# Patient Record
Sex: FEMALE | Race: BLACK OR AFRICAN AMERICAN | NOT HISPANIC OR LATINO | Employment: OTHER | URBAN - METROPOLITAN AREA
[De-identification: names, ages, dates, MRNs, and addresses within clinical notes are randomized per-mention and may not be internally consistent; named-entity substitution may affect disease eponyms.]

---

## 2017-01-03 ENCOUNTER — GENERIC CONVERSION - ENCOUNTER (OUTPATIENT)
Dept: OTHER | Facility: OTHER | Age: 82
End: 2017-01-03

## 2017-01-23 ENCOUNTER — GENERIC CONVERSION - ENCOUNTER (OUTPATIENT)
Dept: OTHER | Facility: OTHER | Age: 82
End: 2017-01-23

## 2017-02-09 ENCOUNTER — GENERIC CONVERSION - ENCOUNTER (OUTPATIENT)
Dept: OTHER | Facility: OTHER | Age: 82
End: 2017-02-09

## 2017-02-24 ENCOUNTER — ALLSCRIPTS OFFICE VISIT (OUTPATIENT)
Dept: OTHER | Facility: OTHER | Age: 82
End: 2017-02-24

## 2017-02-24 LAB
BILIRUB UR QL STRIP: NEGATIVE
CLARITY UR: NORMAL
COLOR UR: YELLOW
GLUCOSE (HISTORICAL): NEGATIVE
HGB UR QL STRIP.AUTO: NEGATIVE
KETONES UR STRIP-MCNC: NEGATIVE MG/DL
LEUKOCYTE ESTERASE UR QL STRIP: NORMAL
NITRITE UR QL STRIP: NEGATIVE
PH UR STRIP.AUTO: 5.5 [PH]
PROT UR STRIP-MCNC: NEGATIVE MG/DL
SP GR UR STRIP.AUTO: 1.02
UROBILINOGEN UR QL STRIP.AUTO: 0.2

## 2017-02-27 LAB
CULTURE RESULT (HISTORICAL): NORMAL
MISCELLANEOUS LAB TEST RESULT (HISTORICAL): NORMAL

## 2017-02-28 ENCOUNTER — ALLSCRIPTS OFFICE VISIT (OUTPATIENT)
Dept: OTHER | Facility: OTHER | Age: 82
End: 2017-02-28

## 2017-03-12 ENCOUNTER — HOSPITAL ENCOUNTER (EMERGENCY)
Facility: HOSPITAL | Age: 82
Discharge: HOME | End: 2017-03-13
Attending: EMERGENCY MEDICINE | Admitting: EMERGENCY MEDICINE
Payer: COMMERCIAL

## 2017-03-12 ENCOUNTER — APPOINTMENT (EMERGENCY)
Dept: CT IMAGING | Facility: HOSPITAL | Age: 82
End: 2017-03-12
Payer: COMMERCIAL

## 2017-03-12 VITALS
OXYGEN SATURATION: 94 % | RESPIRATION RATE: 18 BRPM | TEMPERATURE: 98.2 F | BODY MASS INDEX: 31.18 KG/M2 | HEART RATE: 64 BPM | SYSTOLIC BLOOD PRESSURE: 136 MMHG | WEIGHT: 176 LBS | DIASTOLIC BLOOD PRESSURE: 57 MMHG

## 2017-03-12 DIAGNOSIS — R07.81 RIB PAIN ON LEFT SIDE: Primary | ICD-10-CM

## 2017-03-12 DIAGNOSIS — R07.89 CHEST WALL PAIN: ICD-10-CM

## 2017-03-12 LAB
ALBUMIN SERPL BCP-MCNC: 3.3 G/DL (ref 3.5–5)
ALP SERPL-CCNC: 85 U/L (ref 46–116)
ALT SERPL W P-5'-P-CCNC: 15 U/L (ref 12–78)
ANION GAP SERPL CALCULATED.3IONS-SCNC: 9 MMOL/L (ref 4–13)
AST SERPL W P-5'-P-CCNC: 17 U/L (ref 5–45)
BACTERIA UR QL AUTO: ABNORMAL /HPF
BASOPHILS # BLD AUTO: 0.05 THOUSANDS/ΜL (ref 0–0.1)
BASOPHILS NFR BLD AUTO: 1 % (ref 0–1)
BILIRUB SERPL-MCNC: 0.34 MG/DL (ref 0.2–1)
BILIRUB UR QL STRIP: NEGATIVE
BUN SERPL-MCNC: 16 MG/DL (ref 5–25)
CALCIUM SERPL-MCNC: 8.9 MG/DL (ref 8.3–10.1)
CHLORIDE SERPL-SCNC: 105 MMOL/L (ref 100–108)
CLARITY UR: CLEAR
CO2 SERPL-SCNC: 25 MMOL/L (ref 21–32)
COLOR UR: YELLOW
CREAT SERPL-MCNC: 0.89 MG/DL (ref 0.6–1.3)
EOSINOPHIL # BLD AUTO: 0.14 THOUSAND/ΜL (ref 0–0.61)
EOSINOPHIL NFR BLD AUTO: 2 % (ref 0–6)
ERYTHROCYTE [DISTWIDTH] IN BLOOD BY AUTOMATED COUNT: 14.1 % (ref 11.6–15.1)
GFR SERPL CREATININE-BSD FRML MDRD: >60 ML/MIN/1.73SQ M
GLUCOSE SERPL-MCNC: 127 MG/DL (ref 65–140)
GLUCOSE UR STRIP-MCNC: NEGATIVE MG/DL
HCT VFR BLD AUTO: 44 % (ref 34.8–46.1)
HGB BLD-MCNC: 14.8 G/DL (ref 11.5–15.4)
HGB UR QL STRIP.AUTO: ABNORMAL
KETONES UR STRIP-MCNC: NEGATIVE MG/DL
LEUKOCYTE ESTERASE UR QL STRIP: NEGATIVE
LYMPHOCYTES # BLD AUTO: 1.18 THOUSANDS/ΜL (ref 0.6–4.47)
LYMPHOCYTES NFR BLD AUTO: 18 % (ref 14–44)
MCH RBC QN AUTO: 31 PG (ref 26.8–34.3)
MCHC RBC AUTO-ENTMCNC: 33.6 G/DL (ref 31.4–37.4)
MCV RBC AUTO: 92 FL (ref 82–98)
MONOCYTES # BLD AUTO: 0.49 THOUSAND/ΜL (ref 0.17–1.22)
MONOCYTES NFR BLD AUTO: 7 % (ref 4–12)
NEUTROPHILS # BLD AUTO: 4.78 THOUSANDS/ΜL (ref 1.85–7.62)
NEUTS SEG NFR BLD AUTO: 72 % (ref 43–75)
NITRITE UR QL STRIP: NEGATIVE
NON-SQ EPI CELLS URNS QL MICRO: ABNORMAL /HPF
NRBC BLD AUTO-RTO: 0 /100 WBCS
PH UR STRIP.AUTO: 6.5 [PH] (ref 4.5–8)
PLATELET # BLD AUTO: 176 THOUSANDS/UL (ref 149–390)
PMV BLD AUTO: 10.8 FL (ref 8.9–12.7)
POTASSIUM SERPL-SCNC: 4.2 MMOL/L (ref 3.5–5.3)
PROT SERPL-MCNC: 7.1 G/DL (ref 6.4–8.2)
PROT UR STRIP-MCNC: NEGATIVE MG/DL
RBC # BLD AUTO: 4.78 MILLION/UL (ref 3.81–5.12)
RBC #/AREA URNS AUTO: ABNORMAL /HPF
SODIUM SERPL-SCNC: 139 MMOL/L (ref 136–145)
SP GR UR STRIP.AUTO: 1.01 (ref 1–1.03)
TROPONIN I SERPL-MCNC: 0.03 NG/ML
UROBILINOGEN UR QL STRIP.AUTO: 0.2 E.U./DL
WBC # BLD AUTO: 6.64 THOUSAND/UL (ref 4.31–10.16)
WBC #/AREA URNS AUTO: ABNORMAL /HPF

## 2017-03-12 PROCEDURE — 81002 URINALYSIS NONAUTO W/O SCOPE: CPT | Performed by: EMERGENCY MEDICINE

## 2017-03-12 PROCEDURE — 93005 ELECTROCARDIOGRAM TRACING: CPT | Performed by: EMERGENCY MEDICINE

## 2017-03-12 PROCEDURE — 85025 COMPLETE CBC W/AUTO DIFF WBC: CPT | Performed by: EMERGENCY MEDICINE

## 2017-03-12 PROCEDURE — 36415 COLL VENOUS BLD VENIPUNCTURE: CPT | Performed by: EMERGENCY MEDICINE

## 2017-03-12 PROCEDURE — 71275 CT ANGIOGRAPHY CHEST: CPT

## 2017-03-12 PROCEDURE — 74175 CTA ABDOMEN W/CONTRAST: CPT

## 2017-03-12 PROCEDURE — 84484 ASSAY OF TROPONIN QUANT: CPT | Performed by: EMERGENCY MEDICINE

## 2017-03-12 PROCEDURE — 96361 HYDRATE IV INFUSION ADD-ON: CPT

## 2017-03-12 PROCEDURE — 87086 URINE CULTURE/COLONY COUNT: CPT

## 2017-03-12 PROCEDURE — 81001 URINALYSIS AUTO W/SCOPE: CPT

## 2017-03-12 PROCEDURE — 96374 THER/PROPH/DIAG INJ IV PUSH: CPT

## 2017-03-12 PROCEDURE — 80053 COMPREHEN METABOLIC PANEL: CPT | Performed by: EMERGENCY MEDICINE

## 2017-03-12 RX ORDER — MORPHINE SULFATE 2 MG/ML
2 INJECTION, SOLUTION INTRAMUSCULAR; INTRAVENOUS ONCE
Status: COMPLETED | OUTPATIENT
Start: 2017-03-12 | End: 2017-03-12

## 2017-03-12 RX ORDER — LIDOCAINE 50 MG/G
1 PATCH TOPICAL EVERY 24 HOURS
Qty: 30 PATCH | Refills: 0 | Status: SHIPPED | OUTPATIENT
Start: 2017-03-12 | End: 2017-03-25 | Stop reason: ALTCHOICE

## 2017-03-12 RX ORDER — LIDOCAINE 50 MG/G
1 PATCH TOPICAL ONCE
Status: DISCONTINUED | OUTPATIENT
Start: 2017-03-13 | End: 2017-03-13 | Stop reason: HOSPADM

## 2017-03-12 RX ADMIN — SODIUM CHLORIDE 1000 ML: 0.9 INJECTION, SOLUTION INTRAVENOUS at 21:43

## 2017-03-12 RX ADMIN — MORPHINE SULFATE 2 MG: 2 INJECTION, SOLUTION INTRAMUSCULAR; INTRAVENOUS at 21:43

## 2017-03-12 RX ADMIN — LIDOCAINE 1 PATCH: 50 PATCH CUTANEOUS at 23:51

## 2017-03-12 RX ADMIN — IOHEXOL 100 ML: 350 INJECTION, SOLUTION INTRAVENOUS at 23:21

## 2017-03-13 PROCEDURE — 99284 EMERGENCY DEPT VISIT MOD MDM: CPT

## 2017-03-14 LAB
ATRIAL RATE: 62 BPM
P AXIS: 29 DEGREES
PR INTERVAL: 202 MS
QRS AXIS: -14 DEGREES
QRSD INTERVAL: 102 MS
QT INTERVAL: 410 MS
QTC INTERVAL: 416 MS
T WAVE AXIS: -19 DEGREES
VENTRICULAR RATE: 62 BPM

## 2017-03-15 LAB — BACTERIA UR CULT: NORMAL

## 2017-03-24 ENCOUNTER — DOCTOR'S OFFICE (OUTPATIENT)
Dept: URBAN - METROPOLITAN AREA CLINIC 136 | Facility: CLINIC | Age: 82
Setting detail: OPHTHALMOLOGY
End: 2017-03-24
Payer: COMMERCIAL

## 2017-03-24 DIAGNOSIS — G44.1: ICD-10-CM

## 2017-03-24 DIAGNOSIS — H40.10X3: ICD-10-CM

## 2017-03-24 PROCEDURE — 92012 INTRM OPH EXAM EST PATIENT: CPT | Performed by: OPHTHALMOLOGY

## 2017-03-24 ASSESSMENT — REFRACTION_OUTSIDERX
OD_VA2: 20/25-
OD_ADD: +2.50
OS_VA3: 20/
OS_AXIS: 085
OD_VA3: 20/
OS_SPHERE: -0.25
OD_SPHERE: -1.25
OS_ADD: +2.50
OS_VA2: 20/
OD_AXIS: 080
OS_VA1: 20/25-1
OD_CYLINDER: -2.75
OU_VA: 20/25
OD_VA1: 20/20
OS_CYLINDER: -2.75

## 2017-03-24 ASSESSMENT — REFRACTION_CURRENTRX
OS_OVR_VA: 20/
OS_OVR_VA: 20/
OD_OVR_VA: 20/
OS_OVR_VA: 20/
OD_OVR_VA: 20/
OD_OVR_VA: 20/

## 2017-03-24 ASSESSMENT — REFRACTION_MANIFEST
OD_VA1: 20/20
OD_CYLINDER: -3.25
OD_VA3: 20/
OU_VA: 20/
OS_VA3: 20/
OS_VA2: 20/
OU_VA: 20/
OS_VA2: 20/
OD_VA2: 20/
OS_SPHERE: PLANO
OD_SPHERE: -1.00
OS_VA3: 20/
OS_CYLINDER: -3.50
OD_VA3: 20/
OD_AXIS: 080
OD_VA2: 20/
OS_VA1: 20/
OS_VA1: 20/30
OS_AXIS: 085
OD_VA1: 20/

## 2017-03-24 ASSESSMENT — LID POSITION - DERMATOCHALASIS
OS_DERMATOCHALASIS: LUL 1+
OD_DERMATOCHALASIS: RUL 1+

## 2017-03-24 ASSESSMENT — VISUAL ACUITY
OD_BCVA: 20/100-
OS_BCVA: 20/50-2

## 2017-03-24 ASSESSMENT — LID EXAM ASSESSMENTS
OS_BLEPHARITIS: LLL LUL 1+
OD_BLEPHARITIS: RLL RUL 1+

## 2017-03-24 ASSESSMENT — SPHEQUIV_DERIVED
OD_SPHEQUIV: -2.625
OD_SPHEQUIV: -3.5
OS_SPHEQUIV: -2.125

## 2017-03-24 ASSESSMENT — REFRACTION_AUTOREFRACTION
OS_SPHERE: +0.25
OS_CYLINDER: -4.75
OS_AXIS: 81
OD_CYLINDER: -4.50
OD_AXIS: 80
OD_SPHERE: -1.25

## 2017-03-24 ASSESSMENT — PUNCTA - ASSESSMENT: OS_PUNCTA: SIL PLUG LLL

## 2017-03-24 ASSESSMENT — CONFRONTATIONAL VISUAL FIELD TEST (CVF): OD_FINDINGS: FULL

## 2017-03-25 ENCOUNTER — APPOINTMENT (EMERGENCY)
Dept: CT IMAGING | Facility: HOSPITAL | Age: 82
End: 2017-03-25
Payer: COMMERCIAL

## 2017-03-25 ENCOUNTER — HOSPITAL ENCOUNTER (EMERGENCY)
Facility: HOSPITAL | Age: 82
Discharge: HOME | End: 2017-03-25
Attending: EMERGENCY MEDICINE | Admitting: EMERGENCY MEDICINE
Payer: COMMERCIAL

## 2017-03-25 ENCOUNTER — APPOINTMENT (EMERGENCY)
Dept: RADIOLOGY | Facility: HOSPITAL | Age: 82
End: 2017-03-25
Payer: COMMERCIAL

## 2017-03-25 VITALS
RESPIRATION RATE: 18 BRPM | DIASTOLIC BLOOD PRESSURE: 66 MMHG | SYSTOLIC BLOOD PRESSURE: 119 MMHG | OXYGEN SATURATION: 95 % | HEART RATE: 54 BPM | BODY MASS INDEX: 30.82 KG/M2 | TEMPERATURE: 97.4 F | WEIGHT: 174 LBS

## 2017-03-25 DIAGNOSIS — M54.2 NECK PAIN: Primary | ICD-10-CM

## 2017-03-25 DIAGNOSIS — R10.9 NONSPECIFIC ABDOMINAL PAIN: ICD-10-CM

## 2017-03-25 LAB
ALBUMIN SERPL BCP-MCNC: 3.3 G/DL (ref 3.5–5)
ALP SERPL-CCNC: 88 U/L (ref 46–116)
ALT SERPL W P-5'-P-CCNC: 14 U/L (ref 12–78)
ANION GAP SERPL CALCULATED.3IONS-SCNC: 10 MMOL/L (ref 4–13)
APTT PPP: 34 SECONDS (ref 24–36)
AST SERPL W P-5'-P-CCNC: 13 U/L (ref 5–45)
ATRIAL RATE: 60 BPM
BASOPHILS # BLD AUTO: 0.05 THOUSANDS/ΜL (ref 0–0.1)
BASOPHILS NFR BLD AUTO: 1 % (ref 0–1)
BILIRUB SERPL-MCNC: 0.34 MG/DL (ref 0.2–1)
BILIRUB UR QL STRIP: NEGATIVE
BUN SERPL-MCNC: 15 MG/DL (ref 5–25)
CALCIUM SERPL-MCNC: 9 MG/DL (ref 8.3–10.1)
CHLORIDE SERPL-SCNC: 105 MMOL/L (ref 100–108)
CLARITY UR: CLEAR
CO2 SERPL-SCNC: 27 MMOL/L (ref 21–32)
COLOR UR: YELLOW
COLOR, POC: YELLOW
CREAT SERPL-MCNC: 0.87 MG/DL (ref 0.6–1.3)
EOSINOPHIL # BLD AUTO: 0.13 THOUSAND/ΜL (ref 0–0.61)
EOSINOPHIL NFR BLD AUTO: 2 % (ref 0–6)
ERYTHROCYTE [DISTWIDTH] IN BLOOD BY AUTOMATED COUNT: 13.9 % (ref 11.6–15.1)
GFR SERPL CREATININE-BSD FRML MDRD: >60 ML/MIN/1.73SQ M
GLUCOSE SERPL-MCNC: 113 MG/DL (ref 65–140)
GLUCOSE UR STRIP-MCNC: NEGATIVE MG/DL
HCT VFR BLD AUTO: 42.4 % (ref 34.8–46.1)
HGB BLD-MCNC: 14.6 G/DL (ref 11.5–15.4)
HGB UR QL STRIP.AUTO: NEGATIVE
INR PPP: 1.08 (ref 0.86–1.16)
KETONES UR STRIP-MCNC: NEGATIVE MG/DL
LEUKOCYTE ESTERASE UR QL STRIP: NEGATIVE
LYMPHOCYTES # BLD AUTO: 1.03 THOUSANDS/ΜL (ref 0.6–4.47)
LYMPHOCYTES NFR BLD AUTO: 17 % (ref 14–44)
MCH RBC QN AUTO: 31.6 PG (ref 26.8–34.3)
MCHC RBC AUTO-ENTMCNC: 34.4 G/DL (ref 31.4–37.4)
MCV RBC AUTO: 92 FL (ref 82–98)
MONOCYTES # BLD AUTO: 0.41 THOUSAND/ΜL (ref 0.17–1.22)
MONOCYTES NFR BLD AUTO: 7 % (ref 4–12)
NEUTROPHILS # BLD AUTO: 4.54 THOUSANDS/ΜL (ref 1.85–7.62)
NEUTS SEG NFR BLD AUTO: 73 % (ref 43–75)
NITRITE UR QL STRIP: NEGATIVE
NRBC BLD AUTO-RTO: 0 /100 WBCS
P AXIS: 135 DEGREES
PH UR STRIP.AUTO: 6.5 [PH] (ref 4.5–8)
PLATELET # BLD AUTO: 204 THOUSANDS/UL (ref 149–390)
PMV BLD AUTO: 10.5 FL (ref 8.9–12.7)
POTASSIUM SERPL-SCNC: 4.1 MMOL/L (ref 3.5–5.3)
PR INTERVAL: 216 MS
PROT SERPL-MCNC: 6.9 G/DL (ref 6.4–8.2)
PROT UR STRIP-MCNC: NEGATIVE MG/DL
PROTHROMBIN TIME: 14 SECONDS (ref 12–14.3)
QRS AXIS: -9 DEGREES
QRSD INTERVAL: 102 MS
QT INTERVAL: 412 MS
QTC INTERVAL: 412 MS
RBC # BLD AUTO: 4.62 MILLION/UL (ref 3.81–5.12)
SODIUM SERPL-SCNC: 142 MMOL/L (ref 136–145)
SP GR UR STRIP.AUTO: 1.01 (ref 1–1.03)
SPECIMEN SOURCE: NORMAL
T WAVE AXIS: 196 DEGREES
TROPONIN I BLD-MCNC: 0.01 NG/ML (ref 0–0.08)
UROBILINOGEN UR QL STRIP.AUTO: 0.2 E.U./DL
VENTRICULAR RATE: 60 BPM
WBC # BLD AUTO: 6.16 THOUSAND/UL (ref 4.31–10.16)

## 2017-03-25 PROCEDURE — 96361 HYDRATE IV INFUSION ADD-ON: CPT

## 2017-03-25 PROCEDURE — 96360 HYDRATION IV INFUSION INIT: CPT

## 2017-03-25 PROCEDURE — 71020 HB CHEST X-RAY 2VW FRONTAL&LATL: CPT

## 2017-03-25 PROCEDURE — 70450 CT HEAD/BRAIN W/O DYE: CPT

## 2017-03-25 PROCEDURE — 84484 ASSAY OF TROPONIN QUANT: CPT

## 2017-03-25 PROCEDURE — 36415 COLL VENOUS BLD VENIPUNCTURE: CPT | Performed by: EMERGENCY MEDICINE

## 2017-03-25 PROCEDURE — 93005 ELECTROCARDIOGRAM TRACING: CPT | Performed by: EMERGENCY MEDICINE

## 2017-03-25 PROCEDURE — 85610 PROTHROMBIN TIME: CPT | Performed by: EMERGENCY MEDICINE

## 2017-03-25 PROCEDURE — 80053 COMPREHEN METABOLIC PANEL: CPT | Performed by: EMERGENCY MEDICINE

## 2017-03-25 PROCEDURE — 81003 URINALYSIS AUTO W/O SCOPE: CPT

## 2017-03-25 PROCEDURE — 85025 COMPLETE CBC W/AUTO DIFF WBC: CPT | Performed by: EMERGENCY MEDICINE

## 2017-03-25 PROCEDURE — 74176 CT ABD & PELVIS W/O CONTRAST: CPT

## 2017-03-25 PROCEDURE — 85730 THROMBOPLASTIN TIME PARTIAL: CPT | Performed by: EMERGENCY MEDICINE

## 2017-03-25 PROCEDURE — 99285 EMERGENCY DEPT VISIT HI MDM: CPT

## 2017-03-25 PROCEDURE — 81002 URINALYSIS NONAUTO W/O SCOPE: CPT | Performed by: EMERGENCY MEDICINE

## 2017-03-25 RX ORDER — CYCLOBENZAPRINE HCL 5 MG
5 TABLET ORAL 2 TIMES DAILY PRN
Qty: 10 TABLET | Refills: 0 | Status: SHIPPED | OUTPATIENT
Start: 2017-03-25 | End: 2017-04-21

## 2017-03-25 RX ORDER — ACETAMINOPHEN 325 MG/1
650 TABLET ORAL ONCE
Status: COMPLETED | OUTPATIENT
Start: 2017-03-25 | End: 2017-03-25

## 2017-03-25 RX ORDER — CYCLOBENZAPRINE HCL 5 MG
5 TABLET ORAL 2 TIMES DAILY PRN
Qty: 10 TABLET | Refills: 0 | Status: SHIPPED | OUTPATIENT
Start: 2017-03-25 | End: 2017-03-25

## 2017-03-25 RX ADMIN — SODIUM CHLORIDE 500 ML: 0.9 INJECTION, SOLUTION INTRAVENOUS at 12:29

## 2017-03-25 RX ADMIN — ACETAMINOPHEN 650 MG: 325 TABLET, FILM COATED ORAL at 12:26

## 2017-04-07 ENCOUNTER — GENERIC CONVERSION - ENCOUNTER (OUTPATIENT)
Dept: OTHER | Facility: OTHER | Age: 82
End: 2017-04-07

## 2017-04-21 ENCOUNTER — GENERIC CONVERSION - ENCOUNTER (OUTPATIENT)
Dept: OTHER | Facility: OTHER | Age: 82
End: 2017-04-21

## 2017-04-21 ENCOUNTER — HOSPITAL ENCOUNTER (INPATIENT)
Facility: HOSPITAL | Age: 82
LOS: 1 days | Discharge: HOME | DRG: 313 | End: 2017-04-22
Attending: EMERGENCY MEDICINE | Admitting: INTERNAL MEDICINE
Payer: COMMERCIAL

## 2017-04-21 ENCOUNTER — APPOINTMENT (EMERGENCY)
Dept: RADIOLOGY | Facility: HOSPITAL | Age: 82
DRG: 313 | End: 2017-04-21
Payer: COMMERCIAL

## 2017-04-21 DIAGNOSIS — I35.0 AORTIC STENOSIS: ICD-10-CM

## 2017-04-21 DIAGNOSIS — R07.9 CHEST PAIN, RULE OUT ACUTE MYOCARDIAL INFARCTION: Primary | ICD-10-CM

## 2017-04-21 DIAGNOSIS — M79.602 PAIN OF LEFT UPPER EXTREMITY: ICD-10-CM

## 2017-04-21 LAB
ANION GAP SERPL CALCULATED.3IONS-SCNC: 7 MMOL/L (ref 4–13)
ATRIAL RATE: 64 BPM
BASOPHILS # BLD AUTO: 0.03 THOUSANDS/ΜL (ref 0–0.1)
BASOPHILS NFR BLD AUTO: 1 % (ref 0–1)
BUN SERPL-MCNC: 16 MG/DL (ref 5–25)
CALCIUM SERPL-MCNC: 8.5 MG/DL (ref 8.3–10.1)
CHLORIDE SERPL-SCNC: 110 MMOL/L (ref 100–108)
CO2 SERPL-SCNC: 28 MMOL/L (ref 21–32)
CREAT SERPL-MCNC: 0.88 MG/DL (ref 0.6–1.3)
EOSINOPHIL # BLD AUTO: 0.22 THOUSAND/ΜL (ref 0–0.61)
EOSINOPHIL NFR BLD AUTO: 5 % (ref 0–6)
ERYTHROCYTE [DISTWIDTH] IN BLOOD BY AUTOMATED COUNT: 13.8 % (ref 11.6–15.1)
GFR SERPL CREATININE-BSD FRML MDRD: >60 ML/MIN/1.73SQ M
GLUCOSE SERPL-MCNC: 105 MG/DL (ref 65–140)
GLUCOSE SERPL-MCNC: 107 MG/DL (ref 65–140)
GLUCOSE SERPL-MCNC: 123 MG/DL (ref 65–140)
GLUCOSE SERPL-MCNC: 155 MG/DL (ref 65–140)
HCT VFR BLD AUTO: 43 % (ref 34.8–46.1)
HGB BLD-MCNC: 14.3 G/DL (ref 11.5–15.4)
LYMPHOCYTES # BLD AUTO: 1.13 THOUSANDS/ΜL (ref 0.6–4.47)
LYMPHOCYTES NFR BLD AUTO: 24 % (ref 14–44)
MCH RBC QN AUTO: 30.7 PG (ref 26.8–34.3)
MCHC RBC AUTO-ENTMCNC: 33.3 G/DL (ref 31.4–37.4)
MCV RBC AUTO: 92 FL (ref 82–98)
MONOCYTES # BLD AUTO: 0.49 THOUSAND/ΜL (ref 0.17–1.22)
MONOCYTES NFR BLD AUTO: 10 % (ref 4–12)
NEUTROPHILS # BLD AUTO: 2.83 THOUSANDS/ΜL (ref 1.85–7.62)
NEUTS SEG NFR BLD AUTO: 60 % (ref 43–75)
P AXIS: 42 DEGREES
PLATELET # BLD AUTO: 183 THOUSANDS/UL (ref 149–390)
PLATELET # BLD AUTO: 193 THOUSANDS/UL (ref 149–390)
PMV BLD AUTO: 10.3 FL (ref 8.9–12.7)
PMV BLD AUTO: 10.8 FL (ref 8.9–12.7)
POTASSIUM SERPL-SCNC: 4.3 MMOL/L (ref 3.5–5.3)
PR INTERVAL: 222 MS
QRS AXIS: 20 DEGREES
QRSD INTERVAL: 104 MS
QT INTERVAL: 396 MS
QTC INTERVAL: 408 MS
RBC # BLD AUTO: 4.66 MILLION/UL (ref 3.81–5.12)
SODIUM SERPL-SCNC: 145 MMOL/L (ref 136–145)
SPECIMEN SOURCE: NORMAL
T WAVE AXIS: 84 DEGREES
TROPONIN I BLD-MCNC: 0.01 NG/ML (ref 0–0.08)
TROPONIN I SERPL-MCNC: <0.02 NG/ML
TROPONIN I SERPL-MCNC: <0.02 NG/ML
VENTRICULAR RATE: 64 BPM
WBC # BLD AUTO: 4.7 THOUSAND/UL (ref 4.31–10.16)

## 2017-04-21 PROCEDURE — 85049 AUTOMATED PLATELET COUNT: CPT | Performed by: PHYSICIAN ASSISTANT

## 2017-04-21 PROCEDURE — 84484 ASSAY OF TROPONIN QUANT: CPT | Performed by: PHYSICIAN ASSISTANT

## 2017-04-21 PROCEDURE — 82948 REAGENT STRIP/BLOOD GLUCOSE: CPT

## 2017-04-21 PROCEDURE — 36415 COLL VENOUS BLD VENIPUNCTURE: CPT | Performed by: EMERGENCY MEDICINE

## 2017-04-21 PROCEDURE — 93005 ELECTROCARDIOGRAM TRACING: CPT | Performed by: EMERGENCY MEDICINE

## 2017-04-21 PROCEDURE — 80048 BASIC METABOLIC PNL TOTAL CA: CPT | Performed by: EMERGENCY MEDICINE

## 2017-04-21 PROCEDURE — 71010 HB CHEST X-RAY 1 VIEW FRONTAL (PORTABLE): CPT

## 2017-04-21 PROCEDURE — 85025 COMPLETE CBC W/AUTO DIFF WBC: CPT | Performed by: EMERGENCY MEDICINE

## 2017-04-21 PROCEDURE — 99285 EMERGENCY DEPT VISIT HI MDM: CPT

## 2017-04-21 PROCEDURE — 84484 ASSAY OF TROPONIN QUANT: CPT

## 2017-04-21 RX ORDER — LATANOPROST 50 UG/ML
1 SOLUTION/ DROPS OPHTHALMIC
Status: DISCONTINUED | OUTPATIENT
Start: 2017-04-21 | End: 2017-04-22 | Stop reason: HOSPADM

## 2017-04-21 RX ORDER — ONDANSETRON 2 MG/ML
4 INJECTION INTRAMUSCULAR; INTRAVENOUS EVERY 6 HOURS PRN
Status: DISCONTINUED | OUTPATIENT
Start: 2017-04-21 | End: 2017-04-22 | Stop reason: HOSPADM

## 2017-04-21 RX ORDER — ENALAPRIL MALEATE 10 MG/1
5 TABLET ORAL DAILY
Status: DISCONTINUED | OUTPATIENT
Start: 2017-04-21 | End: 2017-04-22 | Stop reason: HOSPADM

## 2017-04-21 RX ORDER — ACETAMINOPHEN 325 MG/1
650 TABLET ORAL EVERY 4 HOURS PRN
Status: DISCONTINUED | OUTPATIENT
Start: 2017-04-21 | End: 2017-04-22 | Stop reason: HOSPADM

## 2017-04-21 RX ORDER — AMLODIPINE BESYLATE 10 MG/1
10 TABLET ORAL DAILY
Status: DISCONTINUED | OUTPATIENT
Start: 2017-04-21 | End: 2017-04-22 | Stop reason: HOSPADM

## 2017-04-21 RX ORDER — HEPARIN SODIUM 5000 [USP'U]/ML
5000 INJECTION, SOLUTION INTRAVENOUS; SUBCUTANEOUS EVERY 8 HOURS SCHEDULED
Status: DISCONTINUED | OUTPATIENT
Start: 2017-04-21 | End: 2017-04-22 | Stop reason: HOSPADM

## 2017-04-21 RX ORDER — DORZOLAMIDE HCL 20 MG/ML
1 SOLUTION/ DROPS OPHTHALMIC 3 TIMES DAILY
Status: DISCONTINUED | OUTPATIENT
Start: 2017-04-21 | End: 2017-04-22 | Stop reason: HOSPADM

## 2017-04-21 RX ORDER — MAGNESIUM HYDROXIDE/ALUMINUM HYDROXICE/SIMETHICONE 120; 1200; 1200 MG/30ML; MG/30ML; MG/30ML
30 SUSPENSION ORAL EVERY 6 HOURS PRN
Status: DISCONTINUED | OUTPATIENT
Start: 2017-04-21 | End: 2017-04-22 | Stop reason: HOSPADM

## 2017-04-21 RX ORDER — ATORVASTATIN CALCIUM 40 MG/1
40 TABLET, FILM COATED ORAL EVERY EVENING
Status: DISCONTINUED | OUTPATIENT
Start: 2017-04-21 | End: 2017-04-22 | Stop reason: HOSPADM

## 2017-04-21 RX ORDER — GABAPENTIN 300 MG/1
300 CAPSULE ORAL
Status: DISCONTINUED | OUTPATIENT
Start: 2017-04-21 | End: 2017-04-22 | Stop reason: HOSPADM

## 2017-04-21 RX ORDER — LEVOTHYROXINE SODIUM 0.07 MG/1
75 TABLET ORAL
Status: DISCONTINUED | OUTPATIENT
Start: 2017-04-21 | End: 2017-04-22 | Stop reason: HOSPADM

## 2017-04-21 RX ORDER — ASPIRIN 81 MG/1
81 TABLET, CHEWABLE ORAL DAILY
Status: DISCONTINUED | OUTPATIENT
Start: 2017-04-21 | End: 2017-04-22 | Stop reason: HOSPADM

## 2017-04-21 RX ORDER — NITROGLYCERIN 0.4 MG/1
0.4 TABLET SUBLINGUAL
Status: DISCONTINUED | OUTPATIENT
Start: 2017-04-21 | End: 2017-04-22 | Stop reason: HOSPADM

## 2017-04-21 RX ADMIN — GABAPENTIN 300 MG: 300 CAPSULE ORAL at 22:08

## 2017-04-21 RX ADMIN — LATANOPROST 1 DROP: 50 SOLUTION OPHTHALMIC at 22:08

## 2017-04-21 RX ADMIN — ATORVASTATIN CALCIUM 40 MG: 40 TABLET, FILM COATED ORAL at 17:01

## 2017-04-21 RX ADMIN — ENALAPRIL MALEATE 5 MG: 10 TABLET ORAL at 14:21

## 2017-04-21 RX ADMIN — DORZOLAMIDE HYDROCHLORIDE 1 DROP: 20 SOLUTION/ DROPS OPHTHALMIC at 20:19

## 2017-04-21 RX ADMIN — AMLODIPINE BESYLATE 10 MG: 10 TABLET ORAL at 14:20

## 2017-04-21 RX ADMIN — DORZOLAMIDE HYDROCHLORIDE 1 DROP: 20 SOLUTION/ DROPS OPHTHALMIC at 17:01

## 2017-04-21 RX ADMIN — ASPIRIN 81 MG: 81 TABLET, CHEWABLE ORAL at 14:20

## 2017-04-21 RX ADMIN — HEPARIN SODIUM 5000 UNITS: 5000 INJECTION, SOLUTION INTRAVENOUS; SUBCUTANEOUS at 14:21

## 2017-04-21 RX ADMIN — LEVOTHYROXINE SODIUM 75 MCG: 75 TABLET ORAL at 17:01

## 2017-04-21 RX ADMIN — HEPARIN SODIUM 5000 UNITS: 5000 INJECTION, SOLUTION INTRAVENOUS; SUBCUTANEOUS at 22:08

## 2017-04-22 VITALS
HEIGHT: 61 IN | OXYGEN SATURATION: 92 % | HEART RATE: 64 BPM | RESPIRATION RATE: 18 BRPM | DIASTOLIC BLOOD PRESSURE: 56 MMHG | WEIGHT: 164.8 LBS | BODY MASS INDEX: 31.11 KG/M2 | SYSTOLIC BLOOD PRESSURE: 124 MMHG | TEMPERATURE: 95.4 F

## 2017-04-22 LAB
ANION GAP SERPL CALCULATED.3IONS-SCNC: 8 MMOL/L (ref 4–13)
BUN SERPL-MCNC: 18 MG/DL (ref 5–25)
CALCIUM SERPL-MCNC: 8.6 MG/DL (ref 8.3–10.1)
CHLORIDE SERPL-SCNC: 108 MMOL/L (ref 100–108)
CHOLEST SERPL-MCNC: 184 MG/DL (ref 50–200)
CO2 SERPL-SCNC: 26 MMOL/L (ref 21–32)
CREAT SERPL-MCNC: 0.89 MG/DL (ref 0.6–1.3)
EST. AVERAGE GLUCOSE BLD GHB EST-MCNC: 137 MG/DL
GFR SERPL CREATININE-BSD FRML MDRD: >60 ML/MIN/1.73SQ M
GLUCOSE SERPL-MCNC: 113 MG/DL (ref 65–140)
GLUCOSE SERPL-MCNC: 115 MG/DL (ref 65–140)
GLUCOSE SERPL-MCNC: 116 MG/DL (ref 65–140)
HBA1C MFR BLD: 6.4 % (ref 4.2–6.3)
HDLC SERPL-MCNC: 44 MG/DL (ref 40–60)
LDLC SERPL CALC-MCNC: 110 MG/DL (ref 0–100)
POTASSIUM SERPL-SCNC: 4.1 MMOL/L (ref 3.5–5.3)
SODIUM SERPL-SCNC: 142 MMOL/L (ref 136–145)
TRIGL SERPL-MCNC: 149 MG/DL
TSH SERPL DL<=0.05 MIU/L-ACNC: 1.88 UIU/ML (ref 0.36–3.74)

## 2017-04-22 PROCEDURE — 80048 BASIC METABOLIC PNL TOTAL CA: CPT | Performed by: PHYSICIAN ASSISTANT

## 2017-04-22 PROCEDURE — 84443 ASSAY THYROID STIM HORMONE: CPT | Performed by: PHYSICIAN ASSISTANT

## 2017-04-22 PROCEDURE — 83036 HEMOGLOBIN GLYCOSYLATED A1C: CPT | Performed by: PHYSICIAN ASSISTANT

## 2017-04-22 PROCEDURE — 80061 LIPID PANEL: CPT | Performed by: PHYSICIAN ASSISTANT

## 2017-04-22 PROCEDURE — 82948 REAGENT STRIP/BLOOD GLUCOSE: CPT

## 2017-04-22 RX ADMIN — ASPIRIN 81 MG: 81 TABLET, CHEWABLE ORAL at 08:23

## 2017-04-22 RX ADMIN — LEVOTHYROXINE SODIUM 75 MCG: 75 TABLET ORAL at 06:05

## 2017-04-22 RX ADMIN — AMLODIPINE BESYLATE 10 MG: 10 TABLET ORAL at 08:23

## 2017-04-22 RX ADMIN — DORZOLAMIDE HYDROCHLORIDE 1 DROP: 20 SOLUTION/ DROPS OPHTHALMIC at 08:25

## 2017-04-22 RX ADMIN — ENALAPRIL MALEATE 5 MG: 10 TABLET ORAL at 08:23

## 2017-04-22 RX ADMIN — HEPARIN SODIUM 5000 UNITS: 5000 INJECTION, SOLUTION INTRAVENOUS; SUBCUTANEOUS at 06:06

## 2017-04-25 ENCOUNTER — ALLSCRIPTS OFFICE VISIT (OUTPATIENT)
Dept: OTHER | Facility: OTHER | Age: 82
End: 2017-04-25

## 2017-05-05 ENCOUNTER — ALLSCRIPTS OFFICE VISIT (OUTPATIENT)
Dept: OTHER | Facility: OTHER | Age: 82
End: 2017-05-05

## 2017-05-22 ENCOUNTER — TRANSCRIBE ORDERS (OUTPATIENT)
Dept: ADMINISTRATIVE | Facility: HOSPITAL | Age: 82
End: 2017-05-22

## 2017-05-22 DIAGNOSIS — I25.10 ATHEROSCLEROSIS OF NATIVE CORONARY ARTERY WITHOUT ANGINA PECTORIS, UNSPECIFIED WHETHER NATIVE OR TRANSPLANTED HEART: ICD-10-CM

## 2017-05-22 DIAGNOSIS — R07.89 OTHER CHEST PAIN: ICD-10-CM

## 2017-05-22 DIAGNOSIS — I35.0 NODULAR CALCIFIC AORTIC VALVE STENOSIS: Primary | ICD-10-CM

## 2017-05-23 ENCOUNTER — ALLSCRIPTS OFFICE VISIT (OUTPATIENT)
Dept: OTHER | Facility: OTHER | Age: 82
End: 2017-05-23

## 2017-05-25 DIAGNOSIS — E11.9 TYPE 2 DIABETES MELLITUS WITHOUT COMPLICATIONS (HCC): ICD-10-CM

## 2017-05-25 DIAGNOSIS — E03.9 HYPOTHYROIDISM: ICD-10-CM

## 2017-05-26 ENCOUNTER — APPOINTMENT (OUTPATIENT)
Dept: LAB | Facility: CLINIC | Age: 82
End: 2017-05-26
Payer: COMMERCIAL

## 2017-05-26 ENCOUNTER — TRANSCRIBE ORDERS (OUTPATIENT)
Dept: LAB | Facility: CLINIC | Age: 82
End: 2017-05-26

## 2017-05-26 DIAGNOSIS — E11.9 TYPE 2 DIABETES MELLITUS WITHOUT COMPLICATIONS (HCC): ICD-10-CM

## 2017-05-26 DIAGNOSIS — E03.9 HYPOTHYROIDISM: ICD-10-CM

## 2017-05-26 LAB
CHOLEST SERPL-MCNC: 211 MG/DL (ref 50–200)
HDLC SERPL-MCNC: 45 MG/DL (ref 40–60)
LDLC SERPL CALC-MCNC: 135 MG/DL (ref 0–100)
TRIGL SERPL-MCNC: 155 MG/DL
TSH SERPL DL<=0.05 MIU/L-ACNC: 2.91 UIU/ML (ref 0.36–3.74)

## 2017-05-26 PROCEDURE — 84443 ASSAY THYROID STIM HORMONE: CPT

## 2017-05-26 PROCEDURE — 80061 LIPID PANEL: CPT

## 2017-05-26 PROCEDURE — 36415 COLL VENOUS BLD VENIPUNCTURE: CPT

## 2017-05-30 ENCOUNTER — APPOINTMENT (OUTPATIENT)
Dept: LAB | Facility: CLINIC | Age: 82
End: 2017-05-30
Payer: COMMERCIAL

## 2017-05-30 ENCOUNTER — GENERIC CONVERSION - ENCOUNTER (OUTPATIENT)
Dept: OTHER | Facility: OTHER | Age: 82
End: 2017-05-30

## 2017-05-30 DIAGNOSIS — E11.9 TYPE 2 DIABETES MELLITUS WITHOUT COMPLICATIONS (HCC): ICD-10-CM

## 2017-05-30 DIAGNOSIS — E03.9 HYPOTHYROIDISM: ICD-10-CM

## 2017-05-30 LAB
CREAT UR-MCNC: 72.6 MG/DL
MICROALBUMIN UR-MCNC: <5 MG/L (ref 0–20)
MICROALBUMIN/CREAT 24H UR: <7 MG/G CREATININE (ref 0–30)

## 2017-05-30 PROCEDURE — 82043 UR ALBUMIN QUANTITATIVE: CPT

## 2017-05-30 PROCEDURE — 82570 ASSAY OF URINE CREATININE: CPT

## 2017-06-06 ENCOUNTER — ALLSCRIPTS OFFICE VISIT (OUTPATIENT)
Dept: OTHER | Facility: OTHER | Age: 82
End: 2017-06-06

## 2017-06-13 ENCOUNTER — GENERIC CONVERSION - ENCOUNTER (OUTPATIENT)
Dept: OTHER | Facility: OTHER | Age: 82
End: 2017-06-13

## 2017-06-16 ENCOUNTER — DOCTOR'S OFFICE (OUTPATIENT)
Dept: URBAN - METROPOLITAN AREA CLINIC 136 | Facility: CLINIC | Age: 82
Setting detail: OPHTHALMOLOGY
End: 2017-06-16
Payer: COMMERCIAL

## 2017-06-16 DIAGNOSIS — H40.1132: ICD-10-CM

## 2017-06-16 DIAGNOSIS — G44.1: ICD-10-CM

## 2017-06-16 PROCEDURE — 92014 COMPRE OPH EXAM EST PT 1/>: CPT | Performed by: OPHTHALMOLOGY

## 2017-06-16 ASSESSMENT — LID POSITION - DERMATOCHALASIS
OD_DERMATOCHALASIS: RUL 1+
OS_DERMATOCHALASIS: LUL 1+

## 2017-06-16 ASSESSMENT — LID EXAM ASSESSMENTS
OS_BLEPHARITIS: LLL LUL 1+
OD_BLEPHARITIS: RLL RUL 1+

## 2017-06-16 ASSESSMENT — CONFRONTATIONAL VISUAL FIELD TEST (CVF): OD_FINDINGS: FULL

## 2017-06-16 ASSESSMENT — PUNCTA - ASSESSMENT: OS_PUNCTA: SIL PLUG LLL

## 2017-06-19 ASSESSMENT — REFRACTION_MANIFEST
OD_CYLINDER: -3.25
OD_VA2: 20/
OS_VA1: 20/
OD_VA3: 20/
OD_VA3: 20/
OS_VA2: 20/
OS_CYLINDER: -3.50
OU_VA: 20/
OD_VA1: 20/20
OS_VA2: 20/
OU_VA: 20/
OS_VA3: 20/
OS_AXIS: 085
OD_SPHERE: -1.00
OS_VA3: 20/
OD_VA1: 20/
OD_AXIS: 080
OS_VA1: 20/30
OS_SPHERE: PLANO
OD_VA2: 20/

## 2017-06-19 ASSESSMENT — SPHEQUIV_DERIVED
OS_SPHEQUIV: -2.125
OD_SPHEQUIV: -3.5
OD_SPHEQUIV: -2.625

## 2017-06-19 ASSESSMENT — REFRACTION_OUTSIDERX
OS_ADD: +2.50
OD_CYLINDER: -2.75
OD_VA1: 20/20
OD_ADD: +2.50
OS_VA1: 20/25-1
OS_SPHERE: -0.25
OD_VA3: 20/
OS_AXIS: 085
OS_CYLINDER: -2.75
OS_VA3: 20/
OD_VA2: 20/25-
OD_AXIS: 080
OS_VA2: 20/
OD_SPHERE: -1.25
OU_VA: 20/25

## 2017-06-19 ASSESSMENT — REFRACTION_AUTOREFRACTION
OS_SPHERE: +0.25
OD_CYLINDER: -4.50
OS_AXIS: 81
OD_AXIS: 80
OS_CYLINDER: -4.75
OD_SPHERE: -1.25

## 2017-06-19 ASSESSMENT — REFRACTION_CURRENTRX
OS_OVR_VA: 20/
OS_OVR_VA: 20/
OD_OVR_VA: 20/
OS_OVR_VA: 20/
OD_OVR_VA: 20/
OD_OVR_VA: 20/

## 2017-06-19 ASSESSMENT — VISUAL ACUITY
OD_BCVA: 20/60-2
OS_BCVA: 20/50-2

## 2017-07-13 ENCOUNTER — ALLSCRIPTS OFFICE VISIT (OUTPATIENT)
Dept: OTHER | Facility: OTHER | Age: 82
End: 2017-07-13

## 2017-07-13 ENCOUNTER — HOSPITAL ENCOUNTER (OUTPATIENT)
Dept: NON INVASIVE DIAGNOSTICS | Facility: CLINIC | Age: 82
Discharge: HOME | End: 2017-07-13
Payer: COMMERCIAL

## 2017-07-13 DIAGNOSIS — I35.0 NONRHEUMATIC AORTIC VALVE STENOSIS: ICD-10-CM

## 2017-07-13 PROCEDURE — 93306 TTE W/DOPPLER COMPLETE: CPT

## 2017-07-14 ENCOUNTER — DOCTOR'S OFFICE (OUTPATIENT)
Dept: URBAN - METROPOLITAN AREA CLINIC 136 | Facility: CLINIC | Age: 82
Setting detail: OPHTHALMOLOGY
End: 2017-07-14
Payer: COMMERCIAL

## 2017-07-14 DIAGNOSIS — Z79.84: ICD-10-CM

## 2017-07-14 DIAGNOSIS — H52.13: ICD-10-CM

## 2017-07-14 DIAGNOSIS — H40.1132: ICD-10-CM

## 2017-07-14 DIAGNOSIS — E11.9: ICD-10-CM

## 2017-07-14 PROCEDURE — 92012 INTRM OPH EXAM EST PATIENT: CPT | Performed by: OPHTHALMOLOGY

## 2017-07-14 ASSESSMENT — REFRACTION_MANIFEST
OD_CYLINDER: -3.25
OD_VA2: 20/
OU_VA: 20/
OS_VA2: 20/
OD_VA3: 20/
OS_VA3: 20/
OD_AXIS: 080
OS_SPHERE: PLANO
OS_AXIS: 085
OD_SPHERE: -1.00
OD_VA3: 20/
OS_VA1: 20/30
OD_VA1: 20/
OD_VA2: 20/
OU_VA: 20/
OD_VA1: 20/20
OS_CYLINDER: -3.50
OS_VA1: 20/
OS_VA3: 20/
OS_VA2: 20/

## 2017-07-14 ASSESSMENT — REFRACTION_AUTOREFRACTION
OD_CYLINDER: -4.50
OD_AXIS: 80
OS_SPHERE: +0.25
OS_CYLINDER: -4.75
OS_AXIS: 81
OD_SPHERE: -1.25

## 2017-07-14 ASSESSMENT — REFRACTION_OUTSIDERX
OD_AXIS: 080
OS_ADD: +2.50
OD_SPHERE: -1.25
OS_VA1: 20/25-1
OU_VA: 20/25
OS_CYLINDER: -2.75
OD_VA1: 20/20
OD_VA2: 20/25-
OS_SPHERE: -0.25
OD_CYLINDER: -2.75
OS_AXIS: 085
OD_VA3: 20/
OD_ADD: +2.50
OS_VA2: 20/
OS_VA3: 20/

## 2017-07-14 ASSESSMENT — LID POSITION - DERMATOCHALASIS
OS_DERMATOCHALASIS: LUL 1+
OD_DERMATOCHALASIS: RUL 1+

## 2017-07-14 ASSESSMENT — REFRACTION_CURRENTRX
OS_OVR_VA: 20/
OS_OVR_VA: 20/
OD_OVR_VA: 20/
OD_OVR_VA: 20/
OS_OVR_VA: 20/
OD_OVR_VA: 20/

## 2017-07-14 ASSESSMENT — SPHEQUIV_DERIVED
OD_SPHEQUIV: -2.625
OS_SPHEQUIV: -2.125
OD_SPHEQUIV: -3.5

## 2017-07-14 ASSESSMENT — PUNCTA - ASSESSMENT: OS_PUNCTA: SIL PLUG LLL

## 2017-07-14 ASSESSMENT — CONFRONTATIONAL VISUAL FIELD TEST (CVF): OD_FINDINGS: FULL

## 2017-07-14 ASSESSMENT — LID EXAM ASSESSMENTS
OS_BLEPHARITIS: LLL LUL 1+
OD_BLEPHARITIS: RLL RUL 1+

## 2017-07-14 ASSESSMENT — VISUAL ACUITY
OD_BCVA: 20/70-1
OS_BCVA: 20/60-2

## 2017-07-27 ENCOUNTER — ALLSCRIPTS OFFICE VISIT (OUTPATIENT)
Dept: OTHER | Facility: OTHER | Age: 82
End: 2017-07-27

## 2017-07-27 ENCOUNTER — TRANSCRIBE ORDERS (OUTPATIENT)
Dept: ADMINISTRATIVE | Facility: HOSPITAL | Age: 82
End: 2017-07-27

## 2017-07-27 DIAGNOSIS — K57.90 DIVERTICULOSIS OF INTESTINE WITHOUT PERFORATION OR ABSCESS WITHOUT BLEEDING: ICD-10-CM

## 2017-07-27 DIAGNOSIS — R10.12 ABDOMINAL PAIN, LEFT UPPER QUADRANT: Primary | ICD-10-CM

## 2017-07-27 DIAGNOSIS — R10.12 LEFT UPPER QUADRANT PAIN: ICD-10-CM

## 2017-07-27 DIAGNOSIS — R19.7 DIARRHEA OF PRESUMED INFECTIOUS ORIGIN: ICD-10-CM

## 2017-07-27 DIAGNOSIS — R53.1 WEAKNESS: ICD-10-CM

## 2017-07-27 DIAGNOSIS — R19.7 DIARRHEA: ICD-10-CM

## 2017-07-27 DIAGNOSIS — I20.0 UNSTABLE ANGINA PECTORIS (HCC): ICD-10-CM

## 2017-07-27 DIAGNOSIS — R11.0 NAUSEA: ICD-10-CM

## 2017-07-27 DIAGNOSIS — R11.0 NAUSEA ALONE: ICD-10-CM

## 2017-07-27 DIAGNOSIS — R10.9 ABDOMINAL PAIN: ICD-10-CM

## 2017-07-27 DIAGNOSIS — R07.89 OTHER CHEST PAIN: ICD-10-CM

## 2017-08-01 ENCOUNTER — HOSPITAL ENCOUNTER (OUTPATIENT)
Dept: CT IMAGING | Facility: HOSPITAL | Age: 82
Discharge: HOME | End: 2017-08-01
Payer: COMMERCIAL

## 2017-08-01 ENCOUNTER — GENERIC CONVERSION - ENCOUNTER (OUTPATIENT)
Dept: OTHER | Facility: OTHER | Age: 82
End: 2017-08-01

## 2017-08-01 ENCOUNTER — APPOINTMENT (OUTPATIENT)
Dept: LAB | Facility: HOSPITAL | Age: 82
End: 2017-08-01
Payer: COMMERCIAL

## 2017-08-01 DIAGNOSIS — R11.0 NAUSEA: ICD-10-CM

## 2017-08-01 DIAGNOSIS — R10.12 LEFT UPPER QUADRANT PAIN: ICD-10-CM

## 2017-08-01 DIAGNOSIS — R19.7 DIARRHEA: ICD-10-CM

## 2017-08-01 DIAGNOSIS — K57.90 DIVERTICULOSIS OF INTESTINE WITHOUT PERFORATION OR ABSCESS WITHOUT BLEEDING: ICD-10-CM

## 2017-08-01 DIAGNOSIS — R53.1 WEAKNESS: ICD-10-CM

## 2017-08-01 DIAGNOSIS — R10.9 ABDOMINAL PAIN: ICD-10-CM

## 2017-08-01 DIAGNOSIS — R07.89 OTHER CHEST PAIN: ICD-10-CM

## 2017-08-01 LAB
BUN SERPL-MCNC: 16 MG/DL (ref 5–25)
CREAT SERPL-MCNC: 0.86 MG/DL (ref 0.6–1.3)
GFR SERPL CREATININE-BSD FRML MDRD: 67 ML/MIN/1.73SQ M

## 2017-08-01 PROCEDURE — 36415 COLL VENOUS BLD VENIPUNCTURE: CPT

## 2017-08-01 PROCEDURE — 82565 ASSAY OF CREATININE: CPT

## 2017-08-01 PROCEDURE — 74177 CT ABD & PELVIS W/CONTRAST: CPT

## 2017-08-01 PROCEDURE — 71250 CT THORAX DX C-: CPT

## 2017-08-01 PROCEDURE — 84520 ASSAY OF UREA NITROGEN: CPT

## 2017-08-01 RX ADMIN — IOHEXOL 100 ML: 350 INJECTION, SOLUTION INTRAVENOUS at 16:32

## 2017-08-01 RX ADMIN — IOHEXOL 50 ML: 240 INJECTION, SOLUTION INTRATHECAL; INTRAVASCULAR; INTRAVENOUS; ORAL at 15:17

## 2017-08-04 ENCOUNTER — ALLSCRIPTS OFFICE VISIT (OUTPATIENT)
Dept: OTHER | Facility: OTHER | Age: 82
End: 2017-08-04

## 2017-08-12 ENCOUNTER — HOSPITAL ENCOUNTER (EMERGENCY)
Facility: HOSPITAL | Age: 82
Discharge: HOME | End: 2017-08-12
Attending: EMERGENCY MEDICINE | Admitting: EMERGENCY MEDICINE
Payer: COMMERCIAL

## 2017-08-12 VITALS
OXYGEN SATURATION: 97 % | SYSTOLIC BLOOD PRESSURE: 132 MMHG | RESPIRATION RATE: 18 BRPM | TEMPERATURE: 97.8 F | DIASTOLIC BLOOD PRESSURE: 92 MMHG | WEIGHT: 160 LBS | BODY MASS INDEX: 30.23 KG/M2 | HEART RATE: 67 BPM

## 2017-08-12 DIAGNOSIS — R53.1 WEAKNESS GENERALIZED: Primary | ICD-10-CM

## 2017-08-12 LAB
ALBUMIN SERPL BCP-MCNC: 3 G/DL (ref 3.5–5)
ALP SERPL-CCNC: 71 U/L (ref 46–116)
ALT SERPL W P-5'-P-CCNC: 20 U/L (ref 12–78)
ANION GAP SERPL CALCULATED.3IONS-SCNC: 8 MMOL/L (ref 4–13)
AST SERPL W P-5'-P-CCNC: 16 U/L (ref 5–45)
BACTERIA UR QL AUTO: ABNORMAL /HPF
BASOPHILS # BLD AUTO: 0.03 THOUSANDS/ΜL (ref 0–0.1)
BASOPHILS NFR BLD AUTO: 0 % (ref 0–1)
BILIRUB SERPL-MCNC: 0.35 MG/DL (ref 0.2–1)
BILIRUB UR QL STRIP: NEGATIVE
BUN SERPL-MCNC: 12 MG/DL (ref 5–25)
CALCIUM SERPL-MCNC: 8.9 MG/DL (ref 8.3–10.1)
CHLORIDE SERPL-SCNC: 106 MMOL/L (ref 100–108)
CLARITY UR: CLEAR
CO2 SERPL-SCNC: 25 MMOL/L (ref 21–32)
COLOR UR: YELLOW
CREAT SERPL-MCNC: 0.94 MG/DL (ref 0.6–1.3)
EOSINOPHIL # BLD AUTO: 0.31 THOUSAND/ΜL (ref 0–0.61)
EOSINOPHIL NFR BLD AUTO: 5 % (ref 0–6)
ERYTHROCYTE [DISTWIDTH] IN BLOOD BY AUTOMATED COUNT: 14.6 % (ref 11.6–15.1)
GFR SERPL CREATININE-BSD FRML MDRD: 60 ML/MIN/1.73SQ M
GLUCOSE SERPL-MCNC: 115 MG/DL (ref 65–140)
GLUCOSE UR STRIP-MCNC: NEGATIVE MG/DL
HCT VFR BLD AUTO: 40.7 % (ref 34.8–46.1)
HGB BLD-MCNC: 14.1 G/DL (ref 11.5–15.4)
HGB UR QL STRIP.AUTO: ABNORMAL
KETONES UR STRIP-MCNC: NEGATIVE MG/DL
LEUKOCYTE ESTERASE UR QL STRIP: ABNORMAL
LIPASE SERPL-CCNC: 119 U/L (ref 73–393)
LYMPHOCYTES # BLD AUTO: 1.05 THOUSANDS/ΜL (ref 0.6–4.47)
LYMPHOCYTES NFR BLD AUTO: 15 % (ref 14–44)
MCH RBC QN AUTO: 31.2 PG (ref 26.8–34.3)
MCHC RBC AUTO-ENTMCNC: 34.6 G/DL (ref 31.4–37.4)
MCV RBC AUTO: 90 FL (ref 82–98)
MONOCYTES # BLD AUTO: 0.64 THOUSAND/ΜL (ref 0.17–1.22)
MONOCYTES NFR BLD AUTO: 9 % (ref 4–12)
NEUTROPHILS # BLD AUTO: 4.89 THOUSANDS/ΜL (ref 1.85–7.62)
NEUTS SEG NFR BLD AUTO: 71 % (ref 43–75)
NITRITE UR QL STRIP: NEGATIVE
NON-SQ EPI CELLS URNS QL MICRO: ABNORMAL /HPF
NRBC BLD AUTO-RTO: 0 /100 WBCS
PH UR STRIP.AUTO: 5.5 [PH] (ref 4.5–8)
PLATELET # BLD AUTO: 177 THOUSANDS/UL (ref 149–390)
PMV BLD AUTO: 10.5 FL (ref 8.9–12.7)
POTASSIUM SERPL-SCNC: 4.5 MMOL/L (ref 3.5–5.3)
PROT SERPL-MCNC: 7.2 G/DL (ref 6.4–8.2)
PROT UR STRIP-MCNC: NEGATIVE MG/DL
RBC # BLD AUTO: 4.52 MILLION/UL (ref 3.81–5.12)
RBC #/AREA URNS AUTO: ABNORMAL /HPF
SODIUM SERPL-SCNC: 139 MMOL/L (ref 136–145)
SP GR UR STRIP.AUTO: 1.01 (ref 1–1.03)
UROBILINOGEN UR QL STRIP.AUTO: 0.2 E.U./DL
WBC # BLD AUTO: 6.92 THOUSAND/UL (ref 4.31–10.16)
WBC #/AREA URNS AUTO: ABNORMAL /HPF

## 2017-08-12 PROCEDURE — 93005 ELECTROCARDIOGRAM TRACING: CPT

## 2017-08-12 PROCEDURE — 85025 COMPLETE CBC W/AUTO DIFF WBC: CPT

## 2017-08-12 PROCEDURE — 99285 EMERGENCY DEPT VISIT HI MDM: CPT

## 2017-08-12 PROCEDURE — 83690 ASSAY OF LIPASE: CPT

## 2017-08-12 PROCEDURE — 36415 COLL VENOUS BLD VENIPUNCTURE: CPT

## 2017-08-12 PROCEDURE — 81002 URINALYSIS NONAUTO W/O SCOPE: CPT

## 2017-08-12 PROCEDURE — 81001 URINALYSIS AUTO W/SCOPE: CPT

## 2017-08-12 PROCEDURE — 80053 COMPREHEN METABOLIC PANEL: CPT

## 2017-09-13 ENCOUNTER — ALLSCRIPTS OFFICE VISIT (OUTPATIENT)
Dept: OTHER | Facility: OTHER | Age: 82
End: 2017-09-13

## 2017-10-03 NOTE — PROGRESS NOTES
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)    Plan  Cranial neuralgia    · Follow-up visit in 6 months Evaluation and Treatment  Follow-up  Status: Complete   Done: 03EEB4928  Ordered; For: Cranial neuralgia; Ordered By: Vanna Diaz  Performed:   Due:   82MTM7306; Last Updated By: Marie Naqvi; 9/13/2017 12:14:42 PM  Diabetic neuropathy, Headache, Hypertension    · *1 -  NEUROLOGY Physician Referral  Consult  Status: Canceled  Ordered; For: Diabetic neuropathy, Headache, Hypertension;  Ordered By: Nisreen Guzmán    Performed:   Due: 79AHO8993; Last Updated By: Ara Houston; 9/11/2017 4:25:14 PM  () Care Summary provided  : Yes  Sleep disturbance    · 1 - Alexandra Bowers DO  (Neurology) Co-Management  *  Status: Active  Requested for:  96Lcg5005  Ordered; For: Sleep disturbance;  Ordered By: Vanna Diaz  Performed:   Due:   28PPZ7934; Last Updated By: Marie Naqvi; 9/13/2017 12:14:42 PM  () Care Summary provided  : Yes    Discussion/Summary  Discussion Summary:   Chronic myofascial pain, cranial neuralgia, ?cervicogenic headaches:Continue gabapentin to 300 mg TIDDiscussed supplement/ headache prevention med Magnesium 250 mg daily, can increase to 500 mg if toleratedWill f/u with chiropractor, consider PT and/or acupuncture  patient will follow up in 6 months or earlier if needed  The patient was encouraged to call in the meantime with any questions or concerns  The patient was told to call 911 or report to the nearest ER with any new or worsening neurological symptoms  She and her daughter expressed understanding of the plan and they were appreciative  Counseling Documentation With Imm: The patient, patient's family was counseled regarding diagnostic results,-instructions for management,-prognosis,-patient and family education,-impressions  Patient's Capacity to Self-Care: Patient is able to Self-Care     Medication SE Review and Pt Understands Tx: Possible side effects of new medications were reviewed with the patient/guardian today  The treatment plan was reviewed with the patient/guardian  The patient/guardian understands and agrees with the treatment plan   Patient Guardian understands agrees: The treatment plan was reviewed with the patient/guardian  The patient/guardian understands and agrees with the treatment plan   Headache [facility]:   The patient was counseled regarding; There are no changes in medication management  Patient education was completed today and we also discussed precautions for rebound headaches  -   When patient has a moderate to severe headache, they should seek rest, initiate relaxation and apply cold compresses to the head  Also recommended to the patient :  1  Maintain regular sleep schedule  -2  Limit over the counter medications  (No more than 3 times a week)  -3  Maintain headache diary  -4  Limit caffeine to 1-2 cups a day or less  -5  Avoid dietary trigger  (list given to the patient and reviewed with them)  -6  Patient is to have regular frequent meals to prevent headache onset  Chief Complaint  Chief Complaint Free Text Note Form: follow up for headaches      History of Present Illness  HPI: Gonzalez Bledsoe is a very pleasant 79 yo female who presents for a headache evaluation  She first saw Dr Anne Farfan in consultation on 7/25/16  She is accompanied by her daughter today  pain improving with medication, but continues to have occasional pain in the regions: right temple, parietal, occi and this radiates into the neck at times  She takes Gabapentin 300 mg qHS (half of a 600 mg tab) wo side effects and states that it works fine for the head pain   She was told to take 600 mg qhs but it caused her to feel weird so she decreased back down to 300 mg qhs and states this works well   states the pain starts at the apex and radiates in a straight line down to the LEFT forehead (today) and directly above the left eye near the supra orbital notch  It is dull and achy, no sharp, electric, burning or ice pick like pain  Pain was 10/10 at worst, but currently is 5/10  The patient reports blurred vision, but this is 2/2 glaucoma and she sees her eye doctor regularly for this  She saw ophtho yesterday with normal pressure of the eyes  Denies any n/v, p/p, or any other associated neurological features  She does occasionally deal with imbalance, but no falls, no lightheadedness, no LOC  notes that she does not sleep well at all, about 5 hours at most  If she goes to bed and does get to sleep she will wake up at 3:00 and then is stuck awake  Bedtime is approx 11:30-12, leading up to that time she will wait and watch the news  Coffee in the morning and tea during the day  MRI wo contrast on 1/7/16 is unremarkable, and reveals no explanation for her left supraorbital pain  of systems, Past medical history, Surgical history, Family history, Social history and Medication history were all reviewed today  Review of Systems  Neurological ROS:   Constitutional: no fever, no chills, no recent weight gain, no recent weight loss, no complaints of feeling tired, no changes in appetite  HEENT: blurred vision-and-dryness of the eyes  Cardiovascular:  no chest pain or pressure, no palpitations present, the heart rate was not rapid or irregular, no swelling in the arms or legs, no poor circulation  Respiratory: shortness of breath with or without exertion  Gastrointestinal:  no nausea, no vomiting, no diarrhea, no abdominal pain, no changes in bowel habits, no melena, no loss of bowel control  Genitourinary:  no incontinence, no feelings of urinary urgency, no increase in frequency, no urinary hesitancy, no dysuria, no hematuria  Musculoskeletal: head/neck/back pain  Integumentary  no masses, no rash, no skin lesions, no livedo reticularis  Psychiatric: depression  Endocrine   no unusual weight loss or gain, no excessive urination, no excessive thirst, no hair loss or gain, no hot or cold intolerance, no menstrual period change or irregularity, no loss of sexual ability or drive, no erection difficulty, no nipple discharge  Hematologic/Lymphatic:  no unusual bleeding, no tendency for easy bruising, no clotting skin or lumps  Neurological General: trouble falling asleep  Neurological Mental Status:  no confusion, no mood swings, no alteration or loss of consciousness, no difficulty expressing/understanding speech, no memory problems  Neurological Cranial Nerves: vertigo or dizziness  Neurological Motor findings include:  no tremor, no twitching, no cramping(pre/post exercise), no atrophy  Neurological Coordination:  no unsteadiness, no vertigo or dizziness, no clumsiness, no problems reaching for objects  Neurological Sensory:  no numbness, no pain, no tingling, does not fall when eyes closed or taking a shower  Neurological Gait: difficulty walking  Active Problems  1  Abdominal pain, left upper quadrant (789 02) (R10 12)  2  Acute cystitis (595 0) (N30 00)  3  Advanced age  3  Allergic rhinitis (477 9) (J30 9)  5  Ambulatory dysfunction (719 7) (R26 2)  6  Anxiety disorder (300 00) (F41 9)  7  Aortic stenosis (424 1) (I35 0)  8  Arteriosclerosis of coronary artery (414 00) (I25 10)  9  Arthralgia (719 40) (M25 50)  10  Atypical chest pain (786 59) (R07 89)  11  Bilateral cold feet (782 9) (R20 9)  12  Bladder prolapse  13  Chronic ankle pain, unspecified laterality (777 97,545 35) (M25 579,G89 29)  14  Costochondritis (733 6) (M94 0)  15  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)  16  Diarrhea (787 91) (R19 7)  17  Diverticulitis (562 11) (K57 92)  18  Diverticulosis (562 10) (K57 90)  19  Dizziness (780 4) (R42)  20  Dyspnea on exertion (786 09) (R06 09)  21  Ear ringing sound (388 30) (H93 19)  22  Female pelvic pain (625 9) (R10 2)  23  Foot swelling (729 81) (M79 89)  24  Gastritis (535 50) (K29 70)  25   General weakness (780 79) (R53 1)  26  Glaucoma (365 9) (H40 9)  27  Glaucoma, open angle, 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,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)  28  Headache (784 0) (R51)  29  Hearing loss (389 9) (H91 90)  30  Hiatal hernia (553 3) (K44 9)  31  Hyperlipidemia (272 4) (E78 5)  32  Hypertension (401 9) (I10)  33  Hypothyroidism (244 9) (E03 9)  34  Insomnia (780 52) (G47 00)  35  Internal hemorrhoids (455 0) (K64 8)  36  Irritable bowel (564 1) (K58 9)  37  Left arm pain (729 5) (M79 602)  38  Loss of appetite (783 0) (R63 0)  39  Low back pain (724 2) (M54 5)  40  Mild cognitive impairment (331 83) (G31 84)  41  Muscle weakness (generalized) (728 87) (M62 81)  42  Myofacial muscle pain (729 1) (M79 1)  43  Nausea (787 02) (R11 0)  44  Neck pain (723 1) (M54 2)  45  Need for pneumococcal vaccine (V03 82) (Z23)  46  Osteoarthritis (715 90) (M19 90)  47  Overactive bladder (596 51) (N32 81)  48  Pelvic pressure in female (625 8) (R10 2)  49  Peripheral neuropathy (356 9) (G62 9)  50  Polypharmacy (V58 69) (Z79 899)  51  Reactive airway disease (493 90) (J45 909)  52  Shakiness (781 0) (R25 1)  53  Shortness of breath (786 05) (R06 02)  54  Skin lesion (709 9) (L98 9)  55  Swelling of lower extremity (729 81) (M79 89)  56  Trigeminal neuralgia (350 1) (G50 0)  57  Type 2 diabetes mellitus (250 00) (E11 9)  58  Type 2 diabetes mellitus, uncontrolled, with neuropathy (250 62,357 2) (E11 40,E11 65)  59  Urinary incontinence (788 30) (R32)  60  Visual hallucinations (368 16) (R44 1)    Past Medical History  1  History of Acute UTI (599 0) (N39 0)  2  History of Acute UTI (599 0) (N39 0)  3  History of Atypical angina (413 9) (I20 8)  4  History of Atypical pneumonia (486) (J18 9)  5  History of Candidal intertrigo (112 3) (B37 2)  6  History of Candidal UTI (urinary tract infection) (112 2) (B37 49)  7  History of Chest pressure (786 59) (R07 89)  8  History of Dizziness (780 4) (R42)  9  History of Dyspnea on exertion (786 09) (R06 09)  10   History of Facial pain (784 0) (R51)  11  History of abdominal pain (V13 89) (Z87 898)  12  History of acute otitis media (V12 49) (Z86 69)  13  History of blurred vision (V12 49) (Z86 69)  14  History of blurred vision (V12 49) (Z86 69)  15  History of chest pain (V13 89) (Z87 898)  16  History of constipation (V12 79) (Z87 19)  17  History of depression (V11 8) (Z86 59)  18  History of diabetic neuropathy (V12 29) (Z86 39)  19  History of diarrhea (V12 79) (Z87 898)  20  History of earache (V12 49) (Z86 69)  21  History of fever (V13 89) (Z87 898)  22  History of gastric ulcer (V12 79) (Z87 19)  23  History of headache (V13 89) (Z87 898)  24  History of left flank pain (V13 89) (Z87 898)  25  History of thyroid disease (V12 29) (Z86 39)  26  History of urinary frequency (V13 09) (Z87 898)  27  History of urinary tract infection (V13 02) (Z87 440)  28  History of Influenza vaccine needed (V04 81) (Z23)  29  History of Intertrigo (695 89) (L30 4)  30  History of Left arm numbness (782 0) (R20 0)  31  History of Left sided numbness (782 0) (R20 0)  32  History of Left-sided chest wall pain (786 52) (R07 89)  33  History of Left-sided chest wall pain (786 52) (R07 89)  34  History of Tachycardia (785 0) (R00 0)  35  History of Urinary hesitancy (788 64) (R39 11)  Active Problems And Past Medical History Reviewed: The active problems and past medical history were reviewed and updated today  Surgical History  1  History of Complete Colonoscopy  2  History of Esophagogastroduodenoscopy With Biopsy  3  History of Hysterectomy  4  History of Pulmonary Function Tests  Surgical History Reviewed: The surgical history was reviewed and updated today  Family History  Sister   1  Family history of Breast cancer  2  Family history of Hypertension  3  Family history of Thyroid disorder  Family History Reviewed: The family history was reviewed and updated today         Social History   · Caffeine use (V49 89) (F15 90)   · Lives with adult children   · Never a smoker   · No drug use   · Retired   · Social alcohol use (Z78 9)   · Two children   ·   Social History Reviewed: The social history was reviewed and updated today  The social history was reviewed and is unchanged  Current Meds  1  Accu-Chek Aline Plus In Vitro Strip; USE 1 STRIP DAILY; Therapy: 08ZIM4112 to (MNNEDGSW:42WUZ2242)  Requested for: 86FHS5490; Last   Rx:23Jun2017 Ordered  2  Accu-Chek Aline Plus w/Device Kit; testing QD; Therapy: 21LSH4542 to (Last PF:67NCT1328)  Requested for: 27Jun2017 Ordered  3  Accu-Chek Softclix Lancets Miscellaneous; TEST ONCE DAILY   DX: 250; Therapy: 01XRD5103 to (Last JM:49RXZ2636)  Requested for: 27Jun2017 Ordered  4  Amlodipine Besy-Benazepril HCl - 10-20 MG Oral Capsule; take 1 capsule by mouth   daily; Therapy: 87Nhd1905 to ([employer])  Requested for: 56Ift2462; Last   Rx:07Sep2017 Ordered  5  Aspirin 81 MG TABS; Therapy: ([phone]) to Recorded  6  Ciprofloxacin HCl - 500 MG Oral Tablet; Take 1 tablet twice daily; Therapy: 97Wuq9708 to (Last Rx:01Aug2017)  Requested for: 01Aug2017 Ordered  7  Dorzolamide HCl - 2 % Ophthalmic Solution; Therapy: 74OZE3067 to Recorded  8  Dorzolamide HCl - 2 % Ophthalmic Solution; Therapy: 62VXI0325 to (Evaluate:14Jun2015) Recorded  9  Gabapentin 600 MG Oral Tablet; Take 1/2 tab q8 hours; Therapy: 35VJK3862 to (566 324 313)  Requested for: 64RYV1975; Last   DY:82KHY5611 Ordered  10  Januvia 50 MG Oral Tablet; TAKE 1 TABLET DAILY; Therapy: 66WIV9270 to Recorded  11  Latanoprost 0 005 % Ophthalmic Solution; Therapy: 11FSH2465 to ([id number]) Recorded  12  Levothyroxine Sodium 75 MCG Oral Tablet; take 1 tablet every day; Therapy: 24ZFL0402 to (Evaluate:32Vkg5631)  Requested for: 01Sep2016; Last    Rx:01Sep2016 Ordered  13  Lidocaine 5 % External Ointment; APPLY TO AFFECTED AREAS AS DIRECTED;     Therapy: 74UXG0818 to (Last Rx:27Jul2017) Requested for: 04Kao0561 Ordered  14  LORazepam 0 5 MG Oral Tablet; TAKE 1/2 TO 1 TABLET TWICE DAILY AS NEEDED; Therapy: 08HNU2830 to Recorded  15  MetroNIDAZOLE 500 MG Oral Tablet; TAKE 1 TABLET 3 TIMES DAILY UNTIL GONE;    Therapy: 10Bad6620 to (Evaluate:77Fiz3036)  Requested for: 01Aug2017; Last    Rx:08Qyk7074 Ordered  16  Vitamin D3 2000 UNIT Oral Capsule; Take 1 tablet daily; Therapy: (Recorded:07Ach8235) to Recorded    Allergies  1  Statins    Vitals  Signs   Recorded: 13Sep2017 11:20AM   Heart Rate: 68  Systolic: 892  Diastolic: 84  Weight: 006 lb   BMI Calculated: 31 18  BSA Calculated: 1 74  Pain Scale: 2    Physical Exam  Neuro Exam: The patient was found to be awake and alert, with appropriate mental status and language function  She is very pleasant today  She appears younger than her stated age  She was oriented ×3 with intact attention  Cranial nerves II-XII were intact and symmetric bilaterally  Motor testing reveals 5/5 strength t/o bilateral upper and lower extremities with no drift  Coordination testing was unremarkable  Sensation was diminished to temperature and vibration in a length dependent fashion in the bilateral lower extremities  Romberg borderline  DTR's were 1+ and symmetric  Gait was wide based  Future Appointments    Date/Time Provider Specialty Site   03/14/2018 01:15 PM Mark Hillman, [facility] [address]     Signatures   Electronically signed by :  Pk Gold, [facility]; Sep 26 2017 10:22PM EST                       (Author)    Electronically signed by : Susy Hidalgo MD; Oct  2 2017  3:57PM EST                       (Author)    Electronically signed by : Susy Hidalgo MD; Oct  2 2017  3:57PM EST                       (Author)

## 2017-10-04 ENCOUNTER — ALLSCRIPTS OFFICE VISIT (OUTPATIENT)
Dept: OTHER | Facility: OTHER | Age: 82
End: 2017-10-04

## 2017-10-05 NOTE — PROGRESS NOTES
Assessment  1  Acute URI (465 9) (J06 9)   2  Cough (786 2) (R05)   3  Neck pain (723 1) (M54 2)   4  Lump in neck (784 2) (R22 1)   5  Hypothyroidism (244 9) (E03 9)   6  Headache (784 0) (R51)    Plan  Cough    · Benzonatate 200 MG Oral Capsule; Take one three times a day as needed for  cough  Health Maintenance    · *VB - Urinary Incontinence Screen (Dx Z13 89 Screen for UI); Status:Complete -  Retrospective By Protocol Authorization;   Done: 32LLN5021 10:57AM  Influenza vaccine needed    · Fluzone High-Dose 0 5 ML Intramuscular Suspension Prefilled Syringe    Discussion/Summary    1  URI: Advised patient on symptomatic treatment, cough suppressant was given, fluids and rest  If symptoms not better in 2 days patient to start taking azithromycin  It patient is not better to return to the office for further consideration  Palpable neck lump: From physical examination it feels like the right thyroid lobe, I can't rule out cervical lymphadenitis specially with the recent URI, patient to follow up in 4 weeks for her routine checkup and will do neck examination for that palpable lump  If still present then will consider thyroid/neck ultrasound  Hypothyroidism: Continue with the levothyroxine  Chronic headache: Patient has regular follow-up with Neurology  To continue with gabapentin  Possible side effects of new medications were reviewed with the patient/guardian today  The treatment plan was reviewed with the patient/guardian  The patient/guardian understands and agrees with the treatment plan     Self Referrals: No Neurology      Chief Complaint  c/o cold sx and neck pain x several days   kck      History of Present Illness  HPI: pt is c/o neck pain for the last few days getting worse feel pain abut no stiffness ,no headache more than her usual fever or chills but feel cold all the time, sore throat and feel a lump on her neck area cough that get worse lately , tried OTC meds with no help denied any change in her vision   feel alump on her neck for the last few days , no change in size, non tender, no difficulty swallowing      Review of Systems    Constitutional: feeling poorly-and-feeling tired, but-no fever-and-no chills  ENT: no ear ache, no loss of hearing, no nosebleeds or nasal discharge, no sore throat or hoarseness  Cardiovascular: no complaints of slow or fast heart rate, no chest pain, no palpitations, no leg claudication or lower extremity edema  Respiratory: cough, but-no complaints of shortness of breath, no wheezing, no dyspnea on exertion, no orthopnea or PND  Gastrointestinal: no complaints of abdominal pain, no constipation, no nausea or diarrhea, no vomiting, no bloody stools  Genitourinary: no complaints of dysuria, no incontinence, no pelvic pain, no dysmenorrhea, no vaginal discharge or abnormal vaginal bleeding  Musculoskeletal: arthralgias-and-myalgias, but-no joint swelling-and-no joint stiffness  Integumentary: no complaints of skin rash or lesion, no itching or dry skin, no skin wounds  Neurological: headache, but-no numbness,-no confusion-and-no dizziness  Preventive Quality 65 and Older: The patient currently has no urinary incontinence symptoms  Active Problems  1  Abdominal pain, left upper quadrant (789 02) (R10 12)   2  Advanced age   1  Allergic rhinitis (477 9) (J30 9)   4  Ambulatory dysfunction (719 7) (R26 2)   5  Anxiety disorder (300 00) (F41 9)   6  Aortic stenosis (424 1) (I35 0)   7  Arteriosclerosis of coronary artery (414 00) (I25 10)   8  Arthralgia (719 40) (M25 50)   9  Atypical chest pain (786 59) (R07 89)   10  Bilateral cold feet (782 9) (R20 9)   11  Bladder prolapse   12  Chronic ankle pain, unspecified laterality (045 07,960 15) (M25 579,G89 29)   13  Costochondritis (733 6) (M94 0)   14  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)   15  Diarrhea (787 91) (R19 7)   16  Diverticulosis (562 10) (K57 90)   17  Dizziness (780 4) (R42)   18   Dyspnea on exertion (786 09) (R06 09)   19  Ear ringing sound (388 30) (H93 19)   20  Female pelvic pain (625 9) (R10 2)   21  Foot swelling (729 81) (M79 89)   22  Gastritis (535 50) (K29 70)   23  General weakness (780 79) (R53 1)   24  Glaucoma (365 9) (H40 9)   25  Glaucoma, open angle, 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,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)   26  Headache (784 0) (R51)   27  Hearing loss (389 9) (H91 90)   28  Hiatal hernia (553 3) (K44 9)   29  Hyperlipidemia (272 4) (E78 5)   30  Hypertension (401 9) (I10)   31  Hypothyroidism (244 9) (E03 9)   32  Insomnia (780 52) (G47 00)   33  Internal hemorrhoids (455 0) (K64 8)   34  Irritable bowel (564 1) (K58 9)   35  Left arm pain (729 5) (M79 602)   36  Loss of appetite (783 0) (R63 0)   37  Low back pain (724 2) (M54 5)   38  Mild cognitive impairment (331 83) (G31 84)   39  Muscle weakness (generalized) (728 87) (M62 81)   40  Myofacial muscle pain (729 1) (M79 1)   41  Nausea (787 02) (R11 0)   42  Neck pain (723 1) (M54 2)   43  Need for pneumococcal vaccine (V03 82) (Z23)   44  Osteoarthritis (715 90) (M19 90)   45  Overactive bladder (596 51) (N32 81)   46  Pelvic pressure in female (625 8) (R10 2)   47  Peripheral neuropathy (356 9) (G62 9)   48  Polypharmacy (V58 69) (Z79 899)   49  Reactive airway disease (493 90) (J45 909)   50  Shakiness (781 0) (R25 1)   51  Shortness of breath (786 05) (R06 02)   52  Skin lesion (709 9) (L98 9)   53  Sleep disturbance (780 50) (G47 9)   54  Swelling of lower extremity (729 81) (M79 89)   55  Trigeminal neuralgia (350 1) (G50 0)   56  Type 2 diabetes mellitus (250 00) (E11 9)   57  Type 2 diabetes mellitus, uncontrolled, with neuropathy (250 62,357 2) (E11 40,E11 65)   58  Urinary incontinence (788 30) (R32)   59  Visual hallucinations (368 16) (R44 1)    Past Medical History  1  History of Acute UTI (599 0) (N39 0)   2  History of Acute UTI (599 0) (N39 0)   3  History of Atypical angina (413 9) (I20 8)   4   History of Atypical pneumonia (5) (J18 9)   5  History of Candidal intertrigo (112 3) (B37 2)   6  History of Candidal UTI (urinary tract infection) (112 2) (B37 49)   7  History of Chest pressure (786 59) (R07 89)   8  History of Dizziness (780 4) (R42)   9  History of Dyspnea on exertion (786 09) (R06 09)   10  History of Facial pain (784 0) (R51)   11  History of abdominal pain (V13 89) (Z87 898)   12  History of acute cystitis (V13 02) (Z87 440)   13  History of acute otitis media (V12 49) (Z86 69)   14  History of blurred vision (V12 49) (Z86 69)   15  History of blurred vision (V12 49) (Z86 69)   16  History of chest pain (V13 89) (Z87 898)   17  History of constipation (V12 79) (Z87 19)   18  History of depression (V11 8) (Z86 59)   19  History of diabetic neuropathy (V12 29) (Z86 39)   20  History of diarrhea (V12 79) (Z87 898)   21  History of diverticulitis (V12 70) (Z87 19)   22  History of earache (V12 49) (Z86 69)   23  History of fever (V13 89) (Z87 898)   24  History of gastric ulcer (V12 79) (Z87 19)   25  History of headache (V13 89) (Z87 898)   26  History of left flank pain (V13 89) (Z87 898)   27  History of thyroid disease (V12 29) (Z86 39)   28  History of urinary frequency (V13 09) (Z87 898)   29  History of urinary tract infection (V13 02) (Z87 440)   30  History of Influenza vaccine needed (V04 81) (Z23)   31  History of Intertrigo (695 89) (L30 4)   32  History of Left arm numbness (782 0) (R20 0)   33  History of Left sided numbness (782 0) (R20 0)   34  History of Left-sided chest wall pain (786 52) (R07 89)   35  History of Left-sided chest wall pain (786 52) (R07 89)   36  History of Tachycardia (785 0) (R00 0)   37  History of Urinary hesitancy (968 64) (R39 11)  Active Problems And Past Medical History Reviewed: The active problems and past medical history were reviewed and updated today  Family History  Sister    1  Family history of Breast cancer   2  Family history of Hypertension   3   Family history of Thyroid disorder  Family History Reviewed: The family history was reviewed and updated today  Social History   · Caffeine use (V49 89) (F15 90)   · Lives with adult children   · Never a smoker   · No drug use   · Retired   · Social alcohol use (Z78 9)   · Two children   ·   The social history was reviewed and updated today  The social history was reviewed and is unchanged  Surgical History  1  History of Complete Colonoscopy   2  History of Esophagogastroduodenoscopy With Biopsy   3  History of Hysterectomy   4  History of Pulmonary Function Tests  Surgical History Reviewed: The surgical history was reviewed and updated today  Current Meds   1  Accu-Chek Aline Plus In Vitro Strip; USE 1 STRIP DAILY; Therapy: 90BCG4566 [facility]  Requested for: 33WBZ3525; Last   Rx:12Nei9457 Ordered   2  Accu-Chek Aline Plus w/Device Kit; testing QD; Therapy: 89HRP0703 to (Last YL:68JJU8228)  Requested for: 27Jun2017 Ordered   3  Accu-Chek Softclix Lancets Miscellaneous; TEST ONCE DAILY   DX: 250; Therapy: 16ZVE1581 to (Last ZL:30EPZ7175)  Requested for: 93Cyd7328 Ordered   4  Amlodipine Besy-Benazepril HCl - 10-20 MG Oral Capsule; take 1 capsule by mouth   daily; Therapy: 57Uqd7230 to (Veto Chin)  Requested for: 79Hsw5333; Last   Rx:24Eyq5084 Ordered   5  Aspirin 81 MG TABS; Therapy: ([phone]) to Recorded   6  Dorzolamide HCl - 2 % Ophthalmic Solution; Therapy: 93PVY2458 to Recorded   7  Dorzolamide HCl - 2 % Ophthalmic Solution; Therapy: 73TYH9208 to (Evaluate:42Nzj0200) Recorded   8  Gabapentin 600 MG Oral Tablet; Take 1/2 tab q8 hours; Therapy: 17RKC9637 to (566 324 313)  Requested for: 92SOB2657; Last   NM:80LLG2942 Ordered   9  Januvia 50 MG Oral Tablet; TAKE 1 TABLET DAILY; Therapy: 76PSB4372 to Recorded   10  Latanoprost 0 005 % Ophthalmic Solution; Therapy: 30UTR0862 to ([phone]) Recorded   11   Levothyroxine Sodium 75 MCG Oral Tablet; take 1 tablet by mouth every day; Therapy: 70JYE0962 to (Evaluate:78Tkx3528)  Requested for: 82SKX3966; Last    Rx:12Qse8929 Ordered   12  Lidocaine 5 % External Ointment; APPLY TO AFFECTED AREAS AS DIRECTED; Therapy: 09IEZ9857 to (Last Rx:62Uzt3616)  Requested for: 19Jxq6748 Ordered   13  LORazepam 0 5 MG Oral Tablet; TAKE 1/2 TO 1 TABLET TWICE DAILY AS NEEDED; Therapy: 93JBI8440 to Recorded   14  MetroNIDAZOLE 500 MG Oral Tablet; TAKE 1 TABLET 3 TIMES DAILY UNTIL GONE;    Therapy: 44Ani3803 to (Evaluate:00Tub0777)  Requested for: 21Rfk9435; Last    Rx:08Mgz9507 Ordered   15  Vitamin D3 2000 UNIT Oral Capsule; Take 1 tablet daily; Therapy: (Recorded:56Hiw8823) to Recorded    The medication list was reviewed and updated today  Allergies  1  Statins    Vitals   Recorded: 07PHT3098 10:53AM   Heart Rate 64   Systolic 698   Diastolic 60   Height 5 ft 1 in   Weight 165 lb 6 oz   BMI Calculated 31 25   BSA Calculated 1 74     Physical Exam    Constitutional   General appearance: No acute distress, well appearing and well nourished  Eyes   Conjunctiva and lids: No swelling, erythema or discharge  Ears, Nose, Mouth, and Throat   External inspection of ears and nose: Normal     Otoscopic examination: Tympanic membranes translucent with normal light reflex  Canals patent without erythema  Nasal mucosa, septum, and turbinates: Normal without edema or erythema  Oropharynx: Normal with no erythema, edema, exudate or lesions  Pulmonary   Respiratory effort: No increased work of breathing or signs of respiratory distress  Auscultation of lungs: Clear to auscultation  Cardiovascular   Palpation of heart: Normal PMI, no thrills  Auscultation of heart: Normal rate and rhythm, normal S1 and S2, without murmurs  Examination of extremities for edema and/or varicosities: Normal     Carotid pulses: Normal     Abdomen   Abdomen: Non-tender, no masses      Liver and spleen: No hepatomegaly or splenomegaly  Lymphatic   Palpation of lymph nodes in neck: No lymphadenopathy  Musculoskeletal   Gait and station: Normal     Digits and nails: Normal without clubbing or cyanosis  Inspection/palpation of joints, bones, and muscles: Normal     Skin   Skin and subcutaneous tissue: Normal without rashes or lesions  Psychiatric   Orientation to person, place, and time: Normal     Mood and affect: Normal     Additional Exam:  Right thyroid lobe was palpable on examination, nontender, moving with swallowing  No surrounding erythema, or warmth, no lymph node          Results/Data  *VB - Urinary Incontinence Screen (Dx Z13 89 Screen for UI) 10QPG1407 10:57AM Tameka Alves     Test Name Result Flag Reference   Urinary Incontinence Assessment 20BGQ1399         Future Appointments    Date/Time Provider Specialty Site   03/14/2018 01:15 PM Deep Luz, [facility] Neurology [address]     Signatures   Electronically signed by : Marc Coker MD; Oct  4 2017 11:43AM EST                       (Author)

## 2017-10-16 ENCOUNTER — RX ONLY (RX ONLY)
Age: 82
End: 2017-10-16

## 2017-10-16 ENCOUNTER — DOCTOR'S OFFICE (OUTPATIENT)
Dept: URBAN - METROPOLITAN AREA CLINIC 136 | Facility: CLINIC | Age: 82
Setting detail: OPHTHALMOLOGY
End: 2017-10-16
Payer: COMMERCIAL

## 2017-10-16 DIAGNOSIS — H52.13: ICD-10-CM

## 2017-10-16 DIAGNOSIS — E11.9: ICD-10-CM

## 2017-10-16 DIAGNOSIS — Z79.84: ICD-10-CM

## 2017-10-16 DIAGNOSIS — H40.1132: ICD-10-CM

## 2017-10-16 PROCEDURE — 76514 ECHO EXAM OF EYE THICKNESS: CPT | Performed by: OPHTHALMOLOGY

## 2017-10-16 PROCEDURE — 92133 CPTRZD OPH DX IMG PST SGM ON: CPT | Performed by: OPHTHALMOLOGY

## 2017-10-16 PROCEDURE — 92250 FUNDUS PHOTOGRAPHY W/I&R: CPT | Performed by: OPHTHALMOLOGY

## 2017-10-16 PROCEDURE — 92014 COMPRE OPH EXAM EST PT 1/>: CPT | Performed by: OPHTHALMOLOGY

## 2017-10-16 ASSESSMENT — REFRACTION_OUTSIDERX
OS_VA1: 20/25-1
OD_SPHERE: -1.25
OD_VA2: 20/25-
OS_AXIS: 085
OD_ADD: +2.50
OS_VA2: 20/
OD_VA3: 20/
OS_CYLINDER: -2.75
OS_VA3: 20/
OD_CYLINDER: -2.75
OD_VA1: 20/20
OS_SPHERE: -0.25
OS_ADD: +2.50
OU_VA: 20/25
OD_AXIS: 080

## 2017-10-16 ASSESSMENT — REFRACTION_AUTOREFRACTION
OS_SPHERE: +0.25
OD_AXIS: 80
OD_SPHERE: -1.25
OS_CYLINDER: -4.75
OD_CYLINDER: -4.50
OS_AXIS: 81

## 2017-10-16 ASSESSMENT — REFRACTION_MANIFEST
OS_CYLINDER: -3.50
OS_VA2: 20/
OD_VA2: 20/
OD_VA1: 20/20
OS_VA1: 20/30
OS_SPHERE: PLANO
OS_VA1: 20/
OU_VA: 20/
OD_VA3: 20/
OD_VA2: 20/
OD_VA1: 20/
OD_SPHERE: -1.00
OD_VA3: 20/
OU_VA: 20/
OS_AXIS: 085
OS_VA2: 20/
OS_VA3: 20/
OD_AXIS: 080
OD_CYLINDER: -3.25
OS_VA3: 20/

## 2017-10-16 ASSESSMENT — SPHEQUIV_DERIVED
OS_SPHEQUIV: -2.125
OD_SPHEQUIV: -2.625
OD_SPHEQUIV: -3.5

## 2017-10-16 ASSESSMENT — REFRACTION_CURRENTRX
OS_OVR_VA: 20/
OD_OVR_VA: 20/
OS_OVR_VA: 20/
OS_OVR_VA: 20/
OD_OVR_VA: 20/
OD_OVR_VA: 20/

## 2017-10-16 ASSESSMENT — LID POSITION - DERMATOCHALASIS
OD_DERMATOCHALASIS: RUL 1+
OS_DERMATOCHALASIS: LUL 1+

## 2017-10-16 ASSESSMENT — CONFRONTATIONAL VISUAL FIELD TEST (CVF): OD_FINDINGS: FULL

## 2017-10-16 ASSESSMENT — LID EXAM ASSESSMENTS
OS_BLEPHARITIS: LLL LUL 1+
OD_BLEPHARITIS: RLL RUL 1+

## 2017-10-16 ASSESSMENT — PUNCTA - ASSESSMENT: OS_PUNCTA: SIL PLUG LLL

## 2017-10-16 ASSESSMENT — VISUAL ACUITY
OS_BCVA: 20/100
OD_BCVA: 20/70

## 2017-11-01 ENCOUNTER — GENERIC CONVERSION - ENCOUNTER (OUTPATIENT)
Dept: FAMILY MEDICINE CLINIC | Facility: CLINIC | Age: 82
End: 2017-11-01

## 2017-11-01 ENCOUNTER — GENERIC CONVERSION - ENCOUNTER (OUTPATIENT)
Dept: OTHER | Facility: OTHER | Age: 82
End: 2017-11-01

## 2017-11-14 ENCOUNTER — DOCTOR'S OFFICE (OUTPATIENT)
Dept: URBAN - METROPOLITAN AREA CLINIC 136 | Facility: CLINIC | Age: 82
Setting detail: OPHTHALMOLOGY
End: 2017-11-14
Payer: COMMERCIAL

## 2017-11-14 DIAGNOSIS — H40.1132: ICD-10-CM

## 2017-11-14 DIAGNOSIS — Z79.84: ICD-10-CM

## 2017-11-14 DIAGNOSIS — E11.9: ICD-10-CM

## 2017-11-14 PROCEDURE — 92014 COMPRE OPH EXAM EST PT 1/>: CPT | Performed by: OPHTHALMOLOGY

## 2017-11-14 ASSESSMENT — REFRACTION_CURRENTRX
OD_OVR_VA: 20/
OS_OVR_VA: 20/
OS_OVR_VA: 20/
OD_OVR_VA: 20/
OD_OVR_VA: 20/
OS_OVR_VA: 20/

## 2017-11-14 ASSESSMENT — REFRACTION_OUTSIDERX
OS_AXIS: 085
OD_VA2: 20/25-
OS_CYLINDER: -2.75
OS_VA2: 20/
OU_VA: 20/25
OD_VA3: 20/
OD_VA1: 20/20
OD_ADD: +2.50
OD_SPHERE: -1.25
OS_VA3: 20/
OD_CYLINDER: -2.75
OS_ADD: +2.50
OS_SPHERE: -0.25
OS_VA1: 20/25-1
OD_AXIS: 080

## 2017-11-14 ASSESSMENT — REFRACTION_MANIFEST
OD_VA1: 20/
OU_VA: 20/
OD_VA3: 20/
OD_AXIS: 080
OS_VA1: 20/
OS_VA2: 20/
OS_VA3: 20/
OS_AXIS: 085
OD_VA2: 20/
OU_VA: 20/
OS_CYLINDER: -3.50
OS_VA2: 20/
OD_VA3: 20/
OS_VA3: 20/
OS_VA1: 20/30
OD_VA2: 20/
OD_VA1: 20/20
OD_CYLINDER: -3.25
OS_SPHERE: PLANO
OD_SPHERE: -1.00

## 2017-11-14 ASSESSMENT — LID EXAM ASSESSMENTS
OS_BLEPHARITIS: LLL LUL 1+
OD_BLEPHARITIS: RLL RUL 1+

## 2017-11-14 ASSESSMENT — SPHEQUIV_DERIVED
OD_SPHEQUIV: -3.5
OS_SPHEQUIV: -2.125
OD_SPHEQUIV: -2.625

## 2017-11-14 ASSESSMENT — REFRACTION_AUTOREFRACTION
OD_AXIS: 80
OD_SPHERE: -1.25
OS_SPHERE: +0.25
OD_CYLINDER: -4.50
OS_CYLINDER: -4.75
OS_AXIS: 81

## 2017-11-14 ASSESSMENT — LID POSITION - DERMATOCHALASIS
OS_DERMATOCHALASIS: LUL 1+
OD_DERMATOCHALASIS: RUL 1+

## 2017-11-14 ASSESSMENT — CONFRONTATIONAL VISUAL FIELD TEST (CVF)
OD_FINDINGS: FULL
OS_FINDINGS: FULL

## 2017-11-14 ASSESSMENT — VISUAL ACUITY
OD_BCVA: 20/70-2
OS_BCVA: 20/60-2

## 2017-11-14 ASSESSMENT — PUNCTA - ASSESSMENT: OS_PUNCTA: SIL PLUG LLL

## 2017-11-27 ENCOUNTER — GENERIC CONVERSION - ENCOUNTER (OUTPATIENT)
Dept: OTHER | Facility: OTHER | Age: 82
End: 2017-11-27

## 2017-12-08 ENCOUNTER — GENERIC CONVERSION - ENCOUNTER (OUTPATIENT)
Dept: OTHER | Facility: OTHER | Age: 82
End: 2017-12-08

## 2017-12-08 ENCOUNTER — APPOINTMENT (EMERGENCY)
Dept: RADIOLOGY | Facility: HOSPITAL | Age: 82
End: 2017-12-08
Payer: COMMERCIAL

## 2017-12-08 ENCOUNTER — HOSPITAL ENCOUNTER (EMERGENCY)
Facility: HOSPITAL | Age: 82
Discharge: HOME | End: 2017-12-08
Attending: EMERGENCY MEDICINE | Admitting: EMERGENCY MEDICINE
Payer: COMMERCIAL

## 2017-12-08 ENCOUNTER — OFFICE VISIT (OUTPATIENT)
Dept: URGENT CARE | Facility: MEDICAL CENTER | Age: 82
End: 2017-12-08
Payer: COMMERCIAL

## 2017-12-08 VITALS
HEART RATE: 61 BPM | RESPIRATION RATE: 18 BRPM | TEMPERATURE: 97.5 F | DIASTOLIC BLOOD PRESSURE: 60 MMHG | SYSTOLIC BLOOD PRESSURE: 130 MMHG | OXYGEN SATURATION: 96 %

## 2017-12-08 DIAGNOSIS — R07.89 CHEST WALL PAIN: Primary | ICD-10-CM

## 2017-12-08 LAB
ALBUMIN SERPL BCP-MCNC: 3.3 G/DL (ref 3.5–5)
ALP SERPL-CCNC: 80 U/L (ref 46–116)
ALT SERPL W P-5'-P-CCNC: 20 U/L (ref 12–78)
ANION GAP SERPL CALCULATED.3IONS-SCNC: 5 MMOL/L (ref 4–13)
AST SERPL W P-5'-P-CCNC: 17 U/L (ref 5–45)
ATRIAL RATE: 61 BPM
BASOPHILS # BLD AUTO: 0.06 THOUSANDS/ΜL (ref 0–0.1)
BASOPHILS NFR BLD AUTO: 1 % (ref 0–1)
BILIRUB SERPL-MCNC: 0.36 MG/DL (ref 0.2–1)
BUN SERPL-MCNC: 16 MG/DL (ref 5–25)
CALCIUM SERPL-MCNC: 8.9 MG/DL (ref 8.3–10.1)
CHLORIDE SERPL-SCNC: 104 MMOL/L (ref 100–108)
CO2 SERPL-SCNC: 28 MMOL/L (ref 21–32)
CREAT SERPL-MCNC: 0.91 MG/DL (ref 0.6–1.3)
EOSINOPHIL # BLD AUTO: 0.31 THOUSAND/ΜL (ref 0–0.61)
EOSINOPHIL NFR BLD AUTO: 5 % (ref 0–6)
ERYTHROCYTE [DISTWIDTH] IN BLOOD BY AUTOMATED COUNT: 14.2 % (ref 11.6–15.1)
GFR SERPL CREATININE-BSD FRML MDRD: 62 ML/MIN/1.73SQ M
GLUCOSE SERPL-MCNC: 96 MG/DL (ref 65–140)
HCT VFR BLD AUTO: 43.7 % (ref 34.8–46.1)
HGB BLD-MCNC: 14.9 G/DL (ref 11.5–15.4)
LYMPHOCYTES # BLD AUTO: 1.4 THOUSANDS/ΜL (ref 0.6–4.47)
LYMPHOCYTES NFR BLD AUTO: 22 % (ref 14–44)
MCH RBC QN AUTO: 31.5 PG (ref 26.8–34.3)
MCHC RBC AUTO-ENTMCNC: 34.1 G/DL (ref 31.4–37.4)
MCV RBC AUTO: 92 FL (ref 82–98)
MONOCYTES # BLD AUTO: 0.58 THOUSAND/ΜL (ref 0.17–1.22)
MONOCYTES NFR BLD AUTO: 9 % (ref 4–12)
NEUTROPHILS # BLD AUTO: 4.08 THOUSANDS/ΜL (ref 1.85–7.62)
NEUTS SEG NFR BLD AUTO: 63 % (ref 43–75)
NRBC BLD AUTO-RTO: 0 /100 WBCS
NT-PROBNP SERPL-MCNC: 249 PG/ML
P AXIS: 46 DEGREES
PLATELET # BLD AUTO: 205 THOUSANDS/UL (ref 149–390)
PMV BLD AUTO: 10.9 FL (ref 8.9–12.7)
POTASSIUM SERPL-SCNC: 4.1 MMOL/L (ref 3.5–5.3)
PR INTERVAL: 212 MS
PROT SERPL-MCNC: 7.5 G/DL (ref 6.4–8.2)
QRS AXIS: 5 DEGREES
QRSD INTERVAL: 104 MS
QT INTERVAL: 418 MS
QTC INTERVAL: 420 MS
RBC # BLD AUTO: 4.73 MILLION/UL (ref 3.81–5.12)
SODIUM SERPL-SCNC: 137 MMOL/L (ref 136–145)
SPECIMEN SOURCE: NORMAL
T WAVE AXIS: 60 DEGREES
TROPONIN I BLD-MCNC: 0 NG/ML (ref 0–0.08)
VENTRICULAR RATE: 61 BPM
WBC # BLD AUTO: 6.43 THOUSAND/UL (ref 4.31–10.16)

## 2017-12-08 PROCEDURE — 84484 ASSAY OF TROPONIN QUANT: CPT

## 2017-12-08 PROCEDURE — 80053 COMPREHEN METABOLIC PANEL: CPT | Performed by: EMERGENCY MEDICINE

## 2017-12-08 PROCEDURE — 36415 COLL VENOUS BLD VENIPUNCTURE: CPT

## 2017-12-08 PROCEDURE — 83880 ASSAY OF NATRIURETIC PEPTIDE: CPT | Performed by: EMERGENCY MEDICINE

## 2017-12-08 PROCEDURE — 71020 HB CHEST X-RAY 2VW FRONTAL&LATL: CPT

## 2017-12-08 PROCEDURE — 99285 EMERGENCY DEPT VISIT HI MDM: CPT

## 2017-12-08 PROCEDURE — 93005 ELECTROCARDIOGRAM TRACING: CPT

## 2017-12-08 PROCEDURE — 99203 OFFICE O/P NEW LOW 30 MIN: CPT

## 2017-12-08 PROCEDURE — 85025 COMPLETE CBC W/AUTO DIFF WBC: CPT | Performed by: EMERGENCY MEDICINE

## 2017-12-08 PROCEDURE — G0463 HOSPITAL OUTPT CLINIC VISIT: HCPCS

## 2017-12-08 PROCEDURE — 93005 ELECTROCARDIOGRAM TRACING: CPT | Performed by: EMERGENCY MEDICINE

## 2017-12-08 RX ORDER — ENALAPRIL MALEATE 20 MG/1
20 TABLET ORAL DAILY
COMMUNITY
End: 2018-01-30

## 2017-12-08 RX ORDER — LIDOCAINE 50 MG/G
1 PATCH TOPICAL ONCE
Status: DISCONTINUED | OUTPATIENT
Start: 2017-12-08 | End: 2017-12-08 | Stop reason: HOSPADM

## 2017-12-08 RX ADMIN — LIDOCAINE 1 PATCH: 50 PATCH CUTANEOUS at 16:25

## 2017-12-08 NOTE — DISCHARGE INSTRUCTIONS
Chest Wall Pain   WHAT YOU NEED TO KNOW:   What do I need to know about chest wall pain? Chest wall pain may be caused by problems with the muscles, cartilage, or bones of the chest wall  Chest wall pain may also be caused by pain that spreads to your chest from another part of your body  The pain may be aching, severe, dull, or sharp  It may come and go, or it may be constant  The pain may be worse when you move in certain ways, breathe deeply, or cough  What causes chest wall pain? · Conditions that affect the joints or cartilage of the chest wall, such as arthritis or costochondritis    · Strain or injury of the chest wall muscles     · Fractures of the ribs or vertebrae (bones in your spine)    · Herniation of the discs in the upper or middle section of your spine     · Shingles  How is chest wall pain diagnosed? Your healthcare provider will ask you to describe your pain  Tell him when the pain started, what type of pain it is, and what makes it better or worse  He will also ask if you have any other symptoms  He will examine your chest  He may also ask you to move your arms in different directions to see how it affects your pain  Ask your healthcare provider about these and other tests you may need:  · A chest x-ray  may show the cause of your chest wall pain  You may need more than one x-ray  · An MRI  takes pictures of your chest or spine to show the cause of your chest wall pain  You may be given contrast liquid to help the pictures show up better  Tell a healthcare provider if you have ever had an allergic reaction to contrast liquid  Do not enter the MRI room with anything metal  Metal can cause serious injury  Tell a healthcare provider if you have any metal in or on your body  How is chest wall pain treated? Treatment depends on the cause of your chest wall pain  You may need any of the following:  · NSAIDs , such as ibuprofen, help decrease swelling, pain, and fever   This medicine is available with or without a doctor's order  NSAIDs can cause stomach bleeding or kidney problems in certain people  If you take blood thinner medicine, always ask your healthcare provider if NSAIDs are safe for you  Always read the medicine label and follow directions  · Acetaminophen  decreases pain  It is available without a doctor's order  Ask how much to take and how often to take it  Follow directions  Acetaminophen can cause liver damage if not taken correctly  · A cream  may be applied to your chest to decrease pain  · Apply heat  on your chest for 20 to 30 minutes every 2 hours for as many days as directed  Heat helps decrease pain and muscle spasms  · Apply ice  on your chest for 15 to 20 minutes every hour or as directed  Use an ice pack, or put crushed ice in a plastic bag  Cover it with a towel  Ice helps prevent tissue damage and decreases swelling and pain  Call 911 if:   · You have any of the following signs of a heart attack:      ¨ Squeezing, pressure, or pain in your chest that lasts longer than 5 minutes or returns    ¨ Discomfort or pain in your back, neck, jaw, stomach, or arm     ¨ Trouble breathing    ¨ Nausea or vomiting    ¨ Lightheadedness or a sudden cold sweat, especially with chest pain or trouble breathing    When should I seek immediate care? · You have severe pain  When should I contact my healthcare provider? · You develop a rash  · You have other new symptoms  · Your pain does not improve, even with treatment  · You have questions or concerns about your condition or care  CARE AGREEMENT:   You have the right to help plan your care  Learn about your health condition and how it may be treated  Discuss treatment options with your caregivers to decide what care you want to receive  You always have the right to refuse treatment  The above information is an  only  It is not intended as medical advice for individual conditions or treatments  Talk to your doctor, nurse or pharmacist before following any medical regimen to see if it is safe and effective for you  © 2017 [employer] Information is for End User's use only and may not be sold, redistributed or otherwise used for commercial purposes  All illustrations and images included in CareNotes® are the copyrighted property of [employer]  or [employer]

## 2017-12-08 NOTE — ED PROVIDER NOTES
History  Chief Complaint   Patient presents with    Chest Pain     Pain in left-side rib cage/low chest under breast/LUQ abdomen  Pain radiates down left arm  Sent as call-ahead from [address]  Symptoms for "awhile" intermittently but worse for the last couple of days  Denies NVD  Reporting some dizziness and SOB, but states that this is baseline  HPI   This is a 77-year-old female presenting for evaluation of left-sided chest wall pain  Patient has chronic left-sided chest pain and patient states the pain is the in the same location and quality, however more severe  She states that is directly underneath her left breast   She describes as a sharp pain which is intermittent  Pain is improved with lidocaine patch, which she has been using for while, and is worse whenever she is lying down  Patient says that movement does not alter the pain  Patient says taking a deep breath does also exacerbated pain  Patient denies any trauma, no falls  Patient states she has been feeling short of breath because she cannot take a deep breath  Patient has been evaluated multiple times over the past year for this, has had negative CT scans and echoes that revealed only valvular heart disease  She does complain of been intermittent, nonproductive cough  She denies any fevers or chills, no upper respiratory symptoms, no abdominal pain, no nausea vomiting  Of note, patient described having 2 episodes of the upper body shaking at nighttime  Patient says that this woke her up, is unsure how long they last for  Patient denies any extremity shaking during these episodes  This may be rigors  The patient says he also has increased weakness for the past 7 days  Patient has a history of hypertension, diabetes, hypothyroidism  There is recorded CAD history on her chart, however cardiology notes were reviewed and did not show the evidence of this  Patient does live at home with her daughter    Denies smoking history, denies alcohol or drug use  Prior to Admission Medications   Prescriptions Last Dose Informant Patient Reported? Taking? Cholecalciferol (VITAMIN D3) 2000 UNITS TABS   Yes Yes   Sig: Take 1,000 mg by mouth daily     amLODIPine (NORVASC) 10 mg tablet   Yes Yes   Sig: Take 10 mg by mouth daily     aspirin 81 MG tablet   Yes Yes   Sig: Take 81 mg by mouth daily  dorzolamide (TRUSOPT) 2 % ophthalmic solution   Yes Yes   Sig: Apply 1 drop to eye 3 (three) times a day  enalapril (VASOTEC) 20 mg tablet   Yes Yes   Sig: Take 20 mg by mouth daily   gabapentin (NEURONTIN) 600 MG tablet   Yes Yes   Sig: Take 300 mg by mouth daily at bedtime   latanoprost (XALATAN) 0 005 % ophthalmic solution   Yes Yes   Sig: Apply 1 drop to eye daily at bedtime  Both eyes   levothyroxine 50 mcg tablet   Yes Yes   Sig: Take 75 mcg by mouth see administration instructions Take one tablet (50 mcg) daily except Saturday and Sunday take 1 1/2 (75 mcg) tablets    sitaGLIPtin (JANUVIA) 50 mg tablet   Yes Yes   Sig: Take 50 mg by mouth daily        Facility-Administered Medications: None       Past Medical History:   Diagnosis Date    Asthma     CAD (coronary artery disease)     [facility] mellitus ([facility] [address] )     Dyslipidemia     Female bladder prolapse     Glaucoma     HTN (hypertension)     Hyperlipidemia     Hypothyroidism 4/18/2016       Past Surgical History:   Procedure Laterality Date    HYSTERECTOMY         Family History   Problem Relation Age of Onset    Breast cancer Mother     Hypothyroidism Mother     Hypertension Father      I have reviewed and agree with the history as documented  Social History   Substance Use Topics    Smoking status: Never Smoker    Smokeless tobacco: Never Used    Alcohol use No        Review of Systems    Constitutional: Negative for appetite change, chills and fever  HENT: Negative for congestion, rhinorrhea and sore throat  Eyes: Negative for photophobia, pain and visual disturbance  Respiratory: Negative for cough, chest tightness  Positive for shortness of breath  Cardiovascular:  Positive for chest pain, negative for palpitations and leg swelling  Gastrointestinal: Negative for abdominal pain, diarrhea, nausea and vomiting  Genitourinary: Negative for dysuria, flank pain and hematuria  Musculoskeletal: Negative for back pain, neck pain and neck stiffness  Skin: Negative for color change, rash and wound  Neurological: Negative for dizziness, numbness and headaches  All other systems reviewed and are negative  Physical Exam  ED Triage Vitals [12/08/17 1508]   Temperature Pulse Respirations Blood Pressure SpO2   97 5 °F (36 4 °C) 64 18 132/63 95 %      Temp Source Heart Rate Source Patient Position - Orthostatic VS BP Location FiO2 (%)   Oral Monitor Lying Right arm --      Pain Score       --           Orthostatic Vital Signs  Vitals:    12/08/17 1508 12/08/17 1531 12/08/17 1656   BP: 132/63 118/67 130/60   Pulse: 64 65 61   Patient Position - Orthostatic VS: Lying Lying Lying       Physical Exam  /60   Pulse 61   Temp 97 5 °F (36 4 °C) (Oral)   Resp 18   SpO2 96%     General Appearance:  Alert, cooperative, no distress, appears stated age   Head:  Normocephalic, without obvious abnormality, atraumatic   Eyes:  PERRL, conjunctiva/corneas clear, EOM's intact       Nose: Nares normal, septum midline,mucosa normal, no drainage or sinus tenderness   Throat: Lips, mucosa, and tongue normal; teeth and gums normal   Neck: Supple, symmetrical, trachea midline, no adenopathy   Back:   Symmetric, no curvature, ROM normal, no CVA tenderness   Lungs:   Clear to auscultation bilaterally, respirations unlabored   Heart:  Regular rate and rhythm, S1 and S2 normal, no murmur, rub, or gallop  Patient with pinpoint reproducible tenderness underneath the left breast on the anterior lateral chest wall  No signs of trauma at this area     Abdomen:   Soft, non-tender, bowel sounds active all four quadrants   Pelvic: Deferred   Extremities: Extremities normal, atraumatic, no cyanosis or edema   Pulses: 2+ and symmetric   Skin: Skin color, texture, turgor normal, no rashes or lesions   Neurologic:      Psychiatric: Moves all extremities, sensation and strength in tact in all extremities    Normal mood and affect           ED Medications  Medications   lidocaine (LIDODERM) 5 % patch 1 patch (1 patch Transdermal Medication Applied 12/8/17 1625)       Diagnostic Studies  Results Reviewed     Procedure Component Value Units Date/Time    B-type natriuretic peptide [[id number]]  (Normal) Collected:  12/08/17 1521    Lab Status:  Final result Specimen:  Blood from Arm, Right Updated:  12/08/17 1547     NT-proBNP 249 pg/mL     Comprehensive metabolic panel [[id number]]  (Abnormal) Collected:  12/08/17 1521    Lab Status:  Final result Specimen:  Blood from Arm, Right Updated:  12/08/17 1542     Sodium 137 mmol/L      Potassium 4 1 mmol/L      Chloride 104 mmol/L      CO2 28 mmol/L      Anion Gap 5 mmol/L      BUN 16 mg/dL      Creatinine 0 91 mg/dL      Glucose 96 mg/dL      Calcium 8 9 mg/dL      AST 17 U/L      ALT 20 U/L      Alkaline Phosphatase 80 U/L      Total Protein 7 5 g/dL      Albumin 3 3 (L) g/dL      Total Bilirubin 0 36 mg/dL      eGFR 62 ml/min/1 73sq m     Narrative:         National Kidney Disease Education Program recommendations are as follows:  GFR calculation is accurate only with a steady state creatinine  Chronic Kidney disease less than 60 ml/min/1 73 sq  meters  Kidney failure less than 15 ml/min/1 73 sq  meters      POCT troponin [[id number]]  (Normal) Collected:  12/08/17 1524    Lab Status:  Final result Updated:  12/08/17 1537     POC Troponin I 0 00 ng/ml      Specimen Type VENOUS    Narrative:         [employer] i-Stat handheld analyzer 99% cutoff is > 0 08ng/mL in network Emergency Departments    o cTnI 99% cutoff is useful only when applied to patients in the clinical setting of myocardial ischemia  o cTnI 99% cutoff should be interpreted in the context of clinical history, ECG findings and possibly cardiac imaging to establish correct diagnosis  o cTnI 99% cutoff may be suggestive but clearly not indicative of a coronary event without the clinical setting of myocardial ischemia  CBC and differential [[id number]]  (Normal) Collected:  12/08/17 1521    Lab Status:  Final result Specimen:  Blood from Arm, Right Updated:  12/08/17 1528     WBC 6 43 Thousand/uL      RBC 4 73 Million/uL      Hemoglobin 14 9 g/dL      Hematocrit 43 7 %      MCV 92 fL      MCH 31 5 pg      MCHC 34 1 g/dL      RDW 14 2 %      MPV 10 9 fL      Platelets 080 Thousands/uL      nRBC 0 /100 WBCs      Neutrophils Relative 63 %      Lymphocytes Relative 22 %      Monocytes Relative 9 %      Eosinophils Relative 5 %      Basophils Relative 1 %      Neutrophils Absolute 4 08 Thousands/µL      Lymphocytes Absolute 1 40 Thousands/µL      Monocytes Absolute 0 58 Thousand/µL      Eosinophils Absolute 0 31 Thousand/µL      Basophils Absolute 0 06 Thousands/µL                  X-ray chest 2 views   ED Interpretation by Mandy Aaron MD (12/08 1658)   No hemothorax, no pneumothorax, no consolidation, no effusion  Final Result by Leonela Bermeo DO (12/08 1703)      No active pulmonary disease  Workstation performed: [id number]               Procedures  Procedures      Phone Consults  ED Phone Contact    ED Course  ED Course as of Dec 08 1819   Robert Lauren Dec 08, 2017   1628 Spoke with patient about admission and would not showed want a cardiac catheterization  Patient is refusing admission, says that she with the use any kind of procedure like that  Patient just wants the pain feel better  1738 Patient states the pain feels better, says the pain is very slight now  Patient able to urinate  Given patient is afebrile with no white count, abdominal tenderness, unlikely that she has UTI    Will DC home with follow-up to PCP             MDM   Patient with acute on chronic chest wall pain  Given her age and comorbidities, we will do a cardiac workup  Will discuss with patient whether not she would like to be admitted to the hospital       [place] Time    Disposition  Final diagnoses:   Chest wall pain     Time reflects when diagnosis was documented in both MDM as applicable and the Disposition within this note     Time User Action Codes Description Comment    12/8/2017  5:37 PM Lara Damarisly Add [R07 89] Chest wall pain       ED Disposition     ED Disposition Condition Comment    Discharge  Mulu Castro discharge to home/self care  Condition at discharge: Stable        Follow-up Information     Follow up With Specialties Details Why Taqueria Garcia MD [facility] Medicine Schedule an appointment as soon as possible for a visit in 3 days  [facility] [address] [place] [address]  [phone]          Discharge Medication List as of 12/8/2017  5:39 PM      CONTINUE these medications which have NOT CHANGED    Details   amLODIPine (NORVASC) 10 mg tablet Take 10 mg by mouth daily  , Until Discontinued, Historical Med      aspirin 81 MG tablet Take 81 mg by mouth daily  , Until Discontinued, Historical Med      Cholecalciferol (VITAMIN D3) 2000 UNITS TABS Take 1,000 mg by mouth daily  , Historical Med      dorzolamide (TRUSOPT) 2 % ophthalmic solution Apply 1 drop to eye 3 (three) times a day , Until Discontinued, Historical Med      enalapril (VASOTEC) 20 mg tablet Take 20 mg by mouth daily, Historical Med      gabapentin (NEURONTIN) 600 MG tablet Take 300 mg by mouth daily at bedtime, Until Discontinued, Historical Med      latanoprost (XALATAN) 0 005 % ophthalmic solution Apply 1 drop to eye daily at bedtime   Both eyes, Until Discontinued, Historical Med      levothyroxine 50 mcg tablet Take 75 mcg by mouth see administration instructions Take one tablet (50 mcg) daily except Saturday and Sunday take 1 1/2 (75 mcg) tablets , Until Discontinued, Historical Med      sitaGLIPtin (JANUVIA) 50 mg tablet Take 50 mg by mouth daily  , Historical Med           No discharge procedures on file  ED Provider  Attending physically available and evaluated Russ Dee I managed the patient along with the ED Attending      Electronically Signed by         Stephanie Nicolas MD  Resident  12/08/17 7838

## 2017-12-09 LAB
ATRIAL RATE: 67 BPM
P AXIS: 37 DEGREES
PR INTERVAL: 204 MS
QRS AXIS: -13 DEGREES
QRSD INTERVAL: 104 MS
QT INTERVAL: 404 MS
QTC INTERVAL: 426 MS
T WAVE AXIS: 77 DEGREES
VENTRICULAR RATE: 67 BPM

## 2017-12-12 ENCOUNTER — DOCTOR'S OFFICE (OUTPATIENT)
Dept: URBAN - METROPOLITAN AREA CLINIC 136 | Facility: CLINIC | Age: 82
Setting detail: OPHTHALMOLOGY
End: 2017-12-12
Payer: COMMERCIAL

## 2017-12-12 DIAGNOSIS — E11.9: ICD-10-CM

## 2017-12-12 DIAGNOSIS — H40.1132: ICD-10-CM

## 2017-12-12 DIAGNOSIS — Z79.84: ICD-10-CM

## 2017-12-12 PROCEDURE — 92012 INTRM OPH EXAM EST PATIENT: CPT | Performed by: OPHTHALMOLOGY

## 2017-12-12 ASSESSMENT — REFRACTION_OUTSIDERX
OS_AXIS: 085
OD_VA1: 20/20
OD_CYLINDER: -2.75
OS_VA3: 20/
OS_CYLINDER: -2.75
OD_SPHERE: -1.25
OS_ADD: +2.50
OU_VA: 20/25
OD_VA2: 20/25-
OS_VA2: 20/
OD_VA3: 20/
OS_VA1: 20/25-1
OD_AXIS: 080
OS_SPHERE: -0.25
OD_ADD: +2.50

## 2017-12-12 ASSESSMENT — REFRACTION_MANIFEST
OS_SPHERE: PLANO
OD_VA3: 20/
OS_VA2: 20/
OD_VA2: 20/
OS_VA2: 20/
OS_VA3: 20/
OD_VA1: 20/20
OS_AXIS: 085
OS_VA3: 20/
OS_VA1: 20/30
OD_SPHERE: -1.00
OS_VA1: 20/
OD_CYLINDER: -3.25
OU_VA: 20/
OU_VA: 20/
OS_CYLINDER: -3.50
OD_VA1: 20/
OD_VA2: 20/
OD_VA3: 20/
OD_AXIS: 080

## 2017-12-12 ASSESSMENT — REFRACTION_AUTOREFRACTION
OD_CYLINDER: -4.50
OS_CYLINDER: -4.75
OD_AXIS: 80
OS_SPHERE: +0.25
OD_SPHERE: -1.25
OS_AXIS: 81

## 2017-12-12 ASSESSMENT — REFRACTION_CURRENTRX
OD_OVR_VA: 20/
OD_OVR_VA: 20/
OS_OVR_VA: 20/
OD_OVR_VA: 20/

## 2017-12-12 ASSESSMENT — SPHEQUIV_DERIVED
OD_SPHEQUIV: -3.5
OD_SPHEQUIV: -2.625
OS_SPHEQUIV: -2.125

## 2017-12-12 ASSESSMENT — PUNCTA - ASSESSMENT: OS_PUNCTA: SIL PLUG LLL

## 2017-12-12 ASSESSMENT — LID EXAM ASSESSMENTS
OD_BLEPHARITIS: RLL RUL 1+
OS_BLEPHARITIS: LLL LUL 1+

## 2017-12-12 ASSESSMENT — VISUAL ACUITY
OS_BCVA: 20/40-2
OD_BCVA: 20/50-2

## 2017-12-12 ASSESSMENT — LID POSITION - DERMATOCHALASIS
OD_DERMATOCHALASIS: RUL 1+
OS_DERMATOCHALASIS: LUL 1+

## 2017-12-15 ENCOUNTER — GENERIC CONVERSION - ENCOUNTER (OUTPATIENT)
Dept: OTHER | Facility: OTHER | Age: 82
End: 2017-12-15

## 2017-12-15 DIAGNOSIS — R10.11 RIGHT UPPER QUADRANT PAIN: ICD-10-CM

## 2017-12-15 DIAGNOSIS — R07.89 OTHER CHEST PAIN: ICD-10-CM

## 2017-12-15 NOTE — PROGRESS NOTES
Assessment  1  Chest pain (786 50) (R07 9)    Discussion/Summary  Discussion Summary:   Today you were evaluated for left sided chest pain  EKG shows no acute abnormality in the office  At this time I feel that you need more of a work than what I can provide here  Therefore, please go directly to [facility] [address] ER for further evaluation  Patient declined an ambulance and preferred to go via private vehicle with her daughter  AMA form was signed  Understands and agrees with treatment plan: The treatment plan was reviewed with the patient/guardian  The patient/guardian understands and agrees with the treatment plan      Chief Complaint  1  Chest Pain  Chief Complaint Free Text Note Form: CP, weakness and left arm pain X 2 days  History of Present Illness  HPI: Patient is a 17-year-old female with a medical history of hypertension and diabetes who presents today for evaluation of left-sided chest pain for the past 2 days  Patient states that she has had this pain previously however this is worse than what it has ever been  She states that she has pain that radiates down her left arm  She also admits having some tingling in the tips of her fingers  Patient rates her pain as an 8/10 and states that it is worse with lying down  She took Tylenol yesterday without relief  Patient also admits to feeling short of breath and feeling generally weak  Patient denies any recent cardiac workup  Hospital Based Practices Required Assessment:  Pain Assessment  the patient states they have pain  (on a scale of 0 to 10, the patient rates the pain at 8 )  Abuse And Domestic Violence Screen   Yes, the patient is safe at home  Depression And Suicide Screen  No, the patient has not had thoughts of hurting themself  No, the patient has not felt depressed in the past 7 days  Prefered Language is  [place]  Primary Language is  English  Review of Systems  Focused-Female:  Constitutional: no fever-- and-- no chills  Cardiovascular: chest pain  Respiratory: shortness of breath-- and-- cough  Gastrointestinal: no abdominal pain,-- no nausea,-- no vomiting-- and-- no diarrhea  Neurological: tingling  ROS Reviewed:   ROS reviewed  Active Problems  1  Advanced age   3  Allergic rhinitis (477 9) (J30 9)   3  Ambulatory dysfunction (719 7) (R26 2)   4  Anxiety disorder (300 00) (F41 9)   5  Aortic stenosis (424 1) (I35 0)   6  Arteriosclerosis of coronary artery (414 00) (I25 10)   7  Arthralgia (719 40) (M25 50)   8  Bilateral cold feet (782 9) (R20 9)   9  Bladder prolapse   10  Chronic ankle pain, unspecified laterality (779 79,278 20) (M25 579,G89 29)   11  Costochondritis (733 6) (M94 0)   12  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)   13  Diarrhea (787 91) (R19 7)   14  Diverticulosis (562 10) (K57 90)   15  Dizziness (780 4) (R42)   16  Dyspnea on exertion (786 09) (R06 09)   17  Ear ringing sound (388 30) (H93 19)   18  Gastritis (535 50) (K29 70)   19  General weakness (780 79) (R53 1)   20  Glaucoma (365 9) (H40 9)   21  Glaucoma, open angle, 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,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)   22  Headache (784 0) (R51)   23  Hearing loss (389 9) (H91 90)   24  Hiatal hernia (553 3) (K44 9)   25  Hyperlipidemia (272 4) (E78 5)   26  Hypertension (401 9) (I10)   27  Hypothyroidism (244 9) (E03 9)   28  Insomnia (780 52) (G47 00)   29  Internal hemorrhoids (455 0) (K64 8)   30  Irritable bowel (564 1) (K58 9)   31  Loss of appetite (783 0) (R63 0)   32  Low back pain (724 2) (M54 5)   33  Lump in neck (784 2) (R22 1)   34  Mild cognitive impairment (331 83) (G31 84)   35  Muscle weakness (generalized) (728 87) (M62 81)   36  Myofacial muscle pain (729 1) (M79 1)   37  Neck pain (723 1) (M54 2)   38  Osteoarthritis (715 90) (M19 90)   39  Overactive bladder (596 51) (N32 81)   40  Peripheral neuropathy (356 9) (G62 9)   41  Polypharmacy (V58 69) (Z79 899)   42  Right ear pain (388 70) (H92 01)   43  Shakiness (781 0) (R25 1)   44  Shortness of breath (786 05) (R06 02)   45  Skin lesion (709 9) (L98 9)   46  Sleep disturbance (780 50) (G47 9)   47  Swelling of lower extremity (729 81) (M79 89)   48  Trigeminal neuralgia (350 1) (G50 0)   49  Type 2 diabetes mellitus (250 00) (E11 9)   50  Type 2 diabetes mellitus, uncontrolled, with neuropathy (250 62,357 2) (E11 40,E11 65)   51  Urinary incontinence (788 30) (R32)   52  Visual hallucinations (368 16) (R44 1)    Past Medical History  1  History of Abdominal pain, left upper quadrant (789 02) (R10 12)   2  History of Acute URI (465 9) (J06 9)   3  History of Acute UTI (599 0) (N39 0)   4  History of Acute UTI (599 0) (N39 0)   5  History of Atypical angina (413 9) (I20 8)   6  History of Atypical chest pain (786 59) (R07 89)   7  History of Atypical pneumonia (486) (J18 9)   8  History of Candidal intertrigo (112 3) (B37 2)   9  History of Candidal UTI (urinary tract infection) (112 2) (B37 49)   10  History of Chest pressure (786 59) (R07 89)   11  History of Dizziness (780 4) (R42)   12  History of Dyspnea on exertion (786 09) (R06 09)   13  History of Facial pain (784 0) (R51)   14  History of Female pelvic pain (625 9) (R10 2)   15  History of Foot swelling (729 81) (M79 89)   16  History of abdominal pain (V13 89) (Z87 898)   17  History of acute cystitis (V13 02) (Z87 440)   18  History of acute otitis media (V12 49) (Z86 69)   19  History of blurred vision (V12 49) (Z86 69)   20  History of blurred vision (V12 49) (Z86 69)   21  History of chest pain (V13 89) (Z87 898)   22  History of constipation (V12 79) (Z87 19)   23  History of cough   24  History of depression (V11 8) (Z86 59)   25  History of diabetic neuropathy (V12 29) (Z86 39)   26  History of diarrhea (V12 79) (Z87 898)   27  History of diverticulitis (V12 70) (Z87 19)   28  History of earache (V12 49) (Z86 69)   29  History of fever (V13 89) (Z87 898)   30  History of gastric ulcer (V12 79) (Z87 19)   31   History of headache (V13 89) (Z87 898)   32  History of influenza vaccination (V49 89) (Z92 29)   33  History of left flank pain (V13 89) (Z87 898)   34  History of nausea (V12 79) (Z87 898)   35  History of pneumococcal vaccination (V49 89) (Z92 29)   36  History of reactive airway disease (V12 69) (Z87 09)   37  History of thyroid disease (V12 29) (Z86 39)   38  History of urinary frequency (V13 09) (Z87 898)   39  History of urinary tract infection (V13 02) (Z87 440)   40  History of Influenza vaccine needed (V04 81) (Z23)   41  History of Intertrigo (695 89) (L30 4)   42  History of Left arm numbness (782 0) (R20 0)   43  History of Left arm pain (729 5) (M79 602)   44  History of Left sided numbness (782 0) (R20 0)   45  History of Left-sided chest wall pain (786 52) (R07 89)   46  History of Left-sided chest wall pain (786 52) (R07 89)   47  History of Pelvic pressure in female (625 8) (R10 2)   48  History of Tachycardia (785 0) (R00 0)   49  History of Urinary hesitancy (788 64) (R39 11)  Active Problems And Past Medical History Reviewed: The active problems and past medical history were reviewed and updated today  Family History  Sister    1  Family history of Breast cancer   2  Family history of Hypertension   3  Family history of Thyroid disorder  Family History Reviewed: The family history was reviewed and updated today  Social History   · Caffeine use (V49 89) (F15 90)   · Lives with adult children   · Never a smoker   · No drug use   · Retired   · Social alcohol use (Z78 9)   · Two children   ·   Social History Reviewed: The social history was reviewed and updated today  The social history was reviewed and is unchanged  Surgical History  1  History of Complete Colonoscopy   2  History of Esophagogastroduodenoscopy With Biopsy   3  History of Hysterectomy   4  History of Pulmonary Function Tests  Surgical History Reviewed: The surgical history was reviewed and updated today         Current Meds   1  Accu-Chek Aline Plus In Vitro Strip; USE 1 STRIP DAILY; Therapy: 76APJ6217 to (XTCYDZVN:97OXS3002)  Requested for: 97HRS5917; Last Rx:23Jun2017 Ordered   2  Accu-Chek Aline Plus w/Device Kit; testing QD; Therapy: 43IOF8977 to (Last FM:64HXN5403)  Requested for: 27Jun2017 Ordered   3  Accu-Chek Softclix Lancets Miscellaneous; TEST ONCE DAILY DX: 250; Therapy: 05KTJ1000 to (Last QY:70BLR8454)  Requested for: 27Jun2017 Ordered   4  Amlodipine Besy-Benazepril HCl - 5-10 MG Oral Capsule; take 1 capsule by mouth daily; Therapy: 25Xyw5696 to (Javier Knight)  Requested for: 38PBQ8462; Last Rx:02Nov2017 Ordered   5  Aspirin 81 MG TABS; Therapy: (0477 49 14 00) to Recorded   6  Dorzolamide HCl - 2 % Ophthalmic Solution; Therapy: 28JUG2782 to Recorded   7  Dorzolamide HCl - 2 % Ophthalmic Solution; Therapy: 21ICM7650 to (Evaluate:14Jun2015) Recorded   8  Gabapentin 600 MG Oral Tablet; Take 1/2 tab q8 hours; Therapy: 68CKA4156 to ([facility] Cancer)  Requested for: 34CXD4546; Last PU:22OUB1088 Ordered   9  Januvia 50 MG Oral Tablet; TAKE 1 TABLET DAILY; Therapy: 17TNI8499 to Recorded   10  Latanoprost 0 005 % Ophthalmic Solution; Therapy: 24TJE6418 to ([id number]) Recorded   11  Levothyroxine Sodium 75 MCG Oral Tablet; take 1 tablet by mouth every day; Therapy: 45EJH1038 to (Evaluate:72Qqq0600)  Requested for: 86QPU0081; Last  Rx:20Lms1264 Ordered   12  Lidocaine 5 % External Ointment; APPLY TO AFFECTED AREAS AS DIRECTED; Therapy: 56ETT5133 to (Last Rx:64Qoa2105)  Requested for: 90Edw3862 Ordered   13  LORazepam 0 5 MG Oral Tablet; TAKE 1/2 TO 1 TABLET TWICE DAILY AS NEEDED; Therapy: 60BKN1888 to Recorded   14  Vitamin D3 2000 UNIT Oral Capsule; Take 1 tablet daily; Therapy: (Recorded:84Xbe1211) to Recorded  Medication List Reviewed: The medication list was reviewed and updated today  Allergies  1   Statins    Vitals  Signs   Recorded: 58VYF9617 02:14PM   Temperature: 98 F  Heart Rate: 70  Respiration: 16  Systolic: 654  Diastolic: 61  Height: 5 ft 1 in  Weight: 165 lb   BMI Calculated: 31 18  BSA Calculated: 1 74  O2 Saturation: 95  Pain Scale: 8    Physical Exam   Constitutional  General appearance: No acute distress, well appearing and well nourished  Pulmonary  Respiratory effort: No increased work of breathing or signs of respiratory distress  -- slight tenderness upon palpation of the left anterior chest   Auscultation of lungs: Clear to auscultation  Cardiovascular  Auscultation of heart: Normal rate and rhythm, normal S1 and S2, without murmurs  Abdomen  Abdomen: Non-tender, no masses  Skin  Skin and subcutaneous tissue: Normal without rashes or lesions  Psychiatric  Orientation to person, place, and time: Normal        Results/Data  ECG: A 12 lead ECG was performed and was normal   Rhythm and rate: normal sinus rhythm    ST segment: the ST segments are normal       Future Appointments    Date/Time Provider Specialty Site   03/14/2018 01:15 PM Anabell Miller [facility] [address] [place]   01/10/2018 01:00 PM Brittney Westbrook DO Cardiology  CARDIOLOGY  [place]     Signatures   Electronically signed by : Steffanie Cody [facility]; Dec  8 2017  4:19PM EST                       (Author)    Electronically signed by : BRIDGETT Graves ; Dec 14 2017 12:15PM EST                       (Co-author)

## 2017-12-19 NOTE — ED ATTENDING ATTESTATION
Pedro Valdivia MD, saw and evaluated the patient  I have discussed the patient with the resident/non-physician practitioner and agree with the resident's/non-physician practitioner's findings, Plan of Care, and MDM as documented in the resident's/non-physician practitioner's note, except where noted  All available labs and Radiology studies were reviewed  At this point I agree with the current assessment done in the Emergency Department  I have conducted an independent evaluation of this patient a history and physical is as follows:    42-year-old female presents for evaluation of left-sided chest wall pain  Patient describes acute on chronic left-sided chest pain that has been isolated to a single location for about a year  Patient states the pain is directly under her left breast   This is a sharp pain that has been somewhat intermittent  States that pain is often worse when she lies supine and is often improved with lidocaine patch  Denies traumas or falls  Patient notes that she has been evaluated multiple times over the past year for this and has had negative CT scans and cardiac workups that were unremarkable  Denies fevers or chills  No nausea or vomiting  Physical exam:  GCS 15, nonfocal   Sclerae nonicteric, conjunctiva normal   Moist mucous membranes  Regular rate and rhythm, clear to auscultation bilaterally, there is focal point tenderness under the left breast   abdomen is soft and nontender without rebound or guarding  Extremities are nontender without edema  Impression plan:  42-year-old female with acute on chronic left-sided chest pain  Patient has had multiple workups for the same in the past that have been unremarkable  Patient does have CAD history recorded in her chart, however in review of her most recent cardiology notes there is not seem to be any evidence of this    Will perform initial cardiac workup including EKG and troponin to rule out ACS, chest x-ray to rule out pneumonia pneumothorax  Of note, I did discuss possible admission for observation with the patient  She declined admission stating that she would not want any type of procedure and does not wish to stay in the hospital   Given the patient's advanced age I feel this is reasonable after a discussion of risks and benefits      Critical Care Time  CritCare Time    Procedures

## 2017-12-20 ENCOUNTER — HOSPITAL ENCOUNTER (OUTPATIENT)
Dept: ULTRASOUND IMAGING | Facility: HOSPITAL | Age: 82
Discharge: HOME | End: 2017-12-20
Payer: COMMERCIAL

## 2017-12-20 ENCOUNTER — GENERIC CONVERSION - ENCOUNTER (OUTPATIENT)
Dept: OTHER | Facility: OTHER | Age: 82
End: 2017-12-20

## 2017-12-20 DIAGNOSIS — R10.11 RIGHT UPPER QUADRANT PAIN: ICD-10-CM

## 2017-12-20 DIAGNOSIS — R07.89 OTHER CHEST PAIN: ICD-10-CM

## 2017-12-20 PROCEDURE — 76705 ECHO EXAM OF ABDOMEN: CPT

## 2018-01-09 NOTE — MISCELLANEOUS
Message  PT called c/o sneezing , runny nose of clear mucous for the last 4 days , no fever or chills, mild headache, dry cough, feel achy all over, chest congestion, tried Robitussin OTC with no help   pt most likely has allergy started on Clarinex and to use the cough suppressant   to RTC if not better      Plan  Allergic rhinitis    · Desloratadine 5 MG Oral Tablet (Clarinex); Take 1 daily  Hypertension    · AmLODIPine Besylate 10 MG Oral Tablet   · Amlodipine Besy-Benazepril HCl - 10-20 MG Oral Capsule; take 1 capsule daily  Hypothyroidism, Type 2 diabetes mellitus    · (1) LIPID PANEL, FASTING; Status:Active; Requested for:54Ahy1798;    · (1) MICROALBUMIN CREATININE RATIO, RANDOM URINE; Status:Active; Requested  for:54Cbp3949;    · (1) TSH WITH FT4 REFLEX; Status:Active; Requested for:27Lpc0229;    Unlinked    · Proventil  (90 Base) MCG/ACT Inhalation Aerosol Solution    Signatures   Electronically signed by : Judy Olivo MD; May  2 2017  1:27PM EST                       (Author)

## 2018-01-09 NOTE — RESULT NOTES
Verified Results  (1) TSH WITH FT4 REFLEX [id number] 09:28AM [facility] [place]     Test Name Result Flag Reference   TSH 2 910 uIU/mL  0 358-3 740   Patients undergoing fluorescein dye angiography may retain small amounts of fluorescein in the body for 48-72 hours post procedure  Samples containing fluorescein can produce falsely depressed TSH values  If the patient had this procedure,a specimen should be resubmitted post fluorescein clearance            The recommended reference ranges for TSH during pregnancy are as follows:  First trimester 0 1 to 2 5 uIU/mL  Second trimester  0 2 to 3 0 uIU/mL  Third trimester 0 3 to 3 0 uIU/m

## 2018-01-10 ENCOUNTER — ALLSCRIPTS OFFICE VISIT (OUTPATIENT)
Dept: OTHER | Facility: OTHER | Age: 83
End: 2018-01-10

## 2018-01-10 NOTE — RESULT NOTES
Message   this blood work form 8/2016    i did see pt multiple times after I do remember that I reviewed thoseresutlt with her    let m know if any htign was mising and why just came through,  Verified Results  (1) LIPID PANEL, FASTING [id number] 08:47AM [facility]     Test Name Result Flag Reference   CHOLESTEROL 272 mg/dL H    HDL,DIRECT 48 mg/dL  40-60   Specimen collection should occur prior to Metamizole administration due to the potential for falsely depressed results  LDL CHOLESTEROL CALCULATED 181 mg/dL H 0-100   Triglyceride:         Normal              <150 mg/dl       Borderline High    150-199 mg/dl       High               200-499 mg/dl       Very High          >499 mg/dl  Cholesterol:         Desirable        <200 mg/dl      Borderline High  200-239 mg/dl      High             >239 mg/dl  HDL Cholesterol:        High    >59 mg/dL      Low     <41 mg/dL  LDL CALCULATED:    This screening LDL is a calculated result  It does not have the accuracy of the Direct Measured LDL in the monitoring of patients with hyperlipidemia and/or statin therapy  Direct Measure LDL (HXS810) must be ordered separately in these patients  TRIGLYCERIDES 213 mg/dL H <=150   Specimen collection should occur prior to N-Acetylcysteine or Metamizole administration due to the potential for falsely depressed results

## 2018-01-10 NOTE — MISCELLANEOUS
Message  CT chest normal   CT abdomen and pelvis showed diverticulosis with sigmoid wall thickening could represent diverticulitis   also there is bladder wall thickening this could represent Cystitis    pt was started on 2 abx Flagyl x 7 days and ciprofloxacin for 10 days   The result will be reported to patient   I dw with Flor GOLDSTEIN      Signatures   Electronically signed by : Suzanne Christian MD; Aug  1 2017  6:38PM EST                       (Author)

## 2018-01-10 NOTE — RESULT NOTES
Message   Please call pt with her result    her TSH is high need to increase her medication for the thyroid    Levothyroxine to be increased to 75mcg    fu on TSH in 6 weeks    her cholesterol is doing worse due to medication stopping  Verified Results  (1) TSH WITH FT4 REFLEX [id number] 08:47AM [facility]     Test Name Result Flag Reference   TSH 4 610 uIU/mL H 0 358-3 740   Patients undergoing fluorescein dye angiography may retain small amounts of fluorescein in the body for 48-72 hours post procedure  Samples containing fluorescein can produce falsely depressed TSH values  If the patient had this procedure,a specimen should be resubmitted post fluorescein clearance            The recommended reference ranges for TSH during pregnancy are as follows:  First trimester 0 1 to 2 5 uIU/mL  Second trimester  0 2 to 3 0 uIU/mL  Third trimester 0 3 to 3 0 uIU/m   T4,FREE 1 02 ng/dL  0 76-1 46

## 2018-01-12 VITALS
WEIGHT: 163 LBS | BODY MASS INDEX: 30.8 KG/M2 | HEART RATE: 78 BPM | DIASTOLIC BLOOD PRESSURE: 60 MMHG | SYSTOLIC BLOOD PRESSURE: 120 MMHG

## 2018-01-12 NOTE — PROGRESS NOTES
Assessment   Assessed    1  Costochondritis (733 6) (M94 0)   2  Hypertension (401 9) (I10)   3  Aortic stenosis (424 1) (I35 0)    Plan   Aortic stenosis    · Follow-up visit in 6 months Evaluation and Treatment  Follow-up  Status: Hold For -    Scheduling  Requested for: 69KYD2053   Ordered; For: Aortic stenosis; Ordered By: Bryan Rinaldi Performed:  Due: 15UBR0786   · ECHO COMPLETE WITH CONTRAST IF INDICATED; Status:Need Information - Financial    Authorization; Requested for: With next appointment;    Perform:[facility] Radiology; CZS:[id number];PÉREZ; For:Aortic stenosis; Ordered By:Shelia Cardozo;    Discussion/Summary   Cardiology Discussion Summary Free Text Note Form [facility]:    #1  Chronic atypical chest pain: 4 hospitalizations in our system, once in April 2017, before that in October 2016, and prior to that in April 2016 and January 2016  Patient ruled out for myocardialinjury every time without ischemic EKG changes  Ischemic evaluation has not been pursued secondary to the patient's advanced age, comorbidities, and suboptimal candidacy for invasive testing and revascularization, and given the atypical nature of her symptoms  Patient reassured  Hypertension: Well controlled, continue current medications  Moderate to severe aortic valve stenosis: Stable on echocardiogram, repeat with next visit in 6 months   in 6 months w/ echo  Chief Complaint   Chief Complaint Free Text Note Form: AS      History of Present Illness   Cardiology HPI Free Text Note Form [facility]: This is a 25-year-old female with a history of chronic atypical chest pain, hypertension, diabetes, and dyslipidemia  She had stated on initial evaluation that she had 2 separate stress tests several years ago for atypical chest pain which was within normal limits  presented to [address] [place] [address] in October 2016 for a stress echocardiogram, which was canceled due to an inability to walk on the treadmill   Echocardiogram has also revealed moderate to severe aortic valve stenosis  was hospitalized in October 2016 for atypical chest discomfort, and ruled out for myocardial injury  She was last hospitalized in April 2017 again for atypical chest pain, and ruled out for myocardial injury with negative troponins and no ischemic changes on ECG  went to the emergency department once again on 12/08/2017 with chest wall pain  Troponin was within normal limits, with no ischemic ECG changes  She presents today for follow-up with her daughter  She has chronic exertional dyspnea, and episodic chest discomfort although she has feeling well since her visit to the emergency department  Review of Systems   Cardiology Female ROS:         Cardiac: has swelling in the ankles by end of day resolves by AM-- and-- palpitations present , but-- no chest pain  Genitourinary: No complaints of recurrent urinary tract infections, frequent urination at night, difficult urination, blood in urine, kidney stones, loss of bladder control, kidney problems, denies any birth control or hormone replacement, is not post menopausal, not currently pregnant  Psychological: depression-- and-- anxiety  General: trouble sleeping, but-- no lack of energy/fatigue  Respiratory: shortness of breath  Gastrointestinal: nausea-- and-- constipation, but-- no liver problems,-- no vomiting,-- no heartburn-- and-- no diarrhea      Hematologic: no excessive bruising      Neurological: weakness,-- dizziness-- and-- diplopia, but-- no headaches      Musculoskeletal: arthritis             ROS Reviewed:    ROS reviewed  Active Problems   Problems    1  Advanced age   3  Allergic rhinitis (477 9) (J30 9)   3  Ambulatory dysfunction (719 7) (R26 2)   4  Anxiety disorder (300 00) (F41 9)   5  Aortic stenosis (424 1) (I35 0)   6  Arteriosclerosis of coronary artery (414 00) (I25 10)   7  Arthralgia (719 40) (M25 50)   8  Bilateral cold feet (782 9) (R20 9)   9  Bladder prolapse   10  Chest pain (786 50) (R07 9)   11  Chronic ankle pain, unspecified laterality (491 38,474 74) (M25 579,G89 29)   12  Chronic left upper quadrant pain (789 02,338 29) (R10 12,G89 29)   13  Chronic right upper quadrant pain (789 01,338 29) (R10 11,G89 29)   14  Costochondritis (733 6) (M94 0)   15  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)   16  Diarrhea (787 91) (R19 7)   17  Diverticulosis (562 10) (K57 90)   18  Dizziness (780 4) (R42)   19  Dyspnea on exertion (786 09) (R06 09)   20  Ear ringing sound (388 30) (H93 19)   21  Gastritis (535 50) (K29 70)   22  General weakness (780 79) (R53 1)   23  Glaucoma (365 9) (H40 9)   24  Glaucoma, open angle, 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,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)   25  Headache (784 0) (R51)   26  Hearing loss (389 9) (H91 90)   27  Hiatal hernia (553 3) (K44 9)   28  Hyperlipidemia (272 4) (E78 5)   29  Hypertension (401 9) (I10)   30  Hypothyroidism (244 9) (E03 9)   31  Insomnia (780 52) (G47 00)   32  Internal hemorrhoids (455 0) (K64 8)   33  Irritable bowel (564 1) (K58 9)   34  Left-sided chest wall pain (786 52) (R07 89)   35  Loss of appetite (783 0) (R63 0)   36  Low back pain (724 2) (M54 5)   37  Lump in neck (784 2) (R22 1)   38  Mild cognitive impairment (331 83) (G31 84)   39  Muscle weakness (generalized) (728 87) (M62 81)   40  Myofacial muscle pain (729 1) (M79 1)   41  Neck pain (723 1) (M54 2)   42  Osteoarthritis (715 90) (M19 90)   43  Overactive bladder (596 51) (N32 81)   44  Peripheral neuropathy (356 9) (G62 9)   45  Polypharmacy (V58 69) (Z79 899)   46  Right ear pain (388 70) (H92 01)   47  Shakiness (781 0) (R25 1)   48  Shortness of breath (786 05) (R06 02)   49  Skin lesion (709 9) (L98 9)   50  Sleep disturbance (780 50) (G47 9)   51  Swelling of lower extremity (729 81) (M79 89)   52  Trigeminal neuralgia (350 1) (G50 0)   53  Type 2 diabetes mellitus (250 00) (E11 9)   54   Type 2 diabetes mellitus, uncontrolled, with neuropathy (250 62,357 2) (E11 40,E11 65)   55  Urinary incontinence (788 30) (R32)   56  Visual hallucinations (368 16) (R44 1)    Past Medical History   Problems    1  History of Abdominal pain, left upper quadrant (789 02) (R10 12)   2  History of Acute URI (465 9) (J06 9)   3  History of Acute UTI (599 0) (N39 0)   4  History of Acute UTI (599 0) (N39 0)   5  History of Atypical angina (413 9) (I20 8)   6  History of Atypical chest pain (786 59) (R07 89)   7  History of Atypical pneumonia (486) (J18 9)   8  History of Candidal intertrigo (112 3) (B37 2)   9  History of Candidal UTI (urinary tract infection) (112 2) (B37 49)   10  History of Chest pressure (786 59) (R07 89)   11  History of Dizziness (780 4) (R42)   12  History of Dyspnea on exertion (786 09) (R06 09)   13  History of Facial pain (784 0) (R51)   14  History of Female pelvic pain (625 9) (R10 2)   15  History of Foot swelling (729 81) (M79 89)   16  History of abdominal pain (V13 89) (Z87 898)   17  History of acute cystitis (V13 02) (Z87 440)   18  History of acute otitis media (V12 49) (Z86 69)   19  History of blurred vision (V12 49) (Z86 69)   20  History of blurred vision (V12 49) (Z86 69)   21  History of chest pain (V13 89) (Z87 898)   22  History of constipation (V12 79) (Z87 19)   23  History of cough   24  History of depression (V11 8) (Z86 59)   25  History of diabetic neuropathy (V12 29) (Z86 39)   26  History of diarrhea (V12 79) (Z87 898)   27  History of diverticulitis (V12 70) (Z87 19)   28  History of earache (V12 49) (Z86 69)   29  History of fever (V13 89) (Z87 898)   30  History of gastric ulcer (V12 79) (Z87 19)   31  History of headache (V13 89) (Z87 898)   32  History of influenza vaccination (V49 89) (Z92 29)   33  History of left flank pain (V13 89) (Z87 898)   34  History of nausea (V12 79) (Z87 898)   35  History of pneumococcal vaccination (V49 89) (Z92 29)   36  History of reactive airway disease (V12 69) (Z87 09)   37   History of thyroid disease (V12 29) (Z86 39)   38  History of urinary frequency (V13 09) (Z87 898)   39  History of urinary tract infection (V13 02) (Z87 440)   40  History of Influenza vaccine needed (V04 81) (Z23)   41  History of Intertrigo (695 89) (L30 4)   42  History of Left arm numbness (782 0) (R20 0)   43  History of Left arm pain (729 5) (M79 602)   44  History of Left sided numbness (782 0) (R20 0)   45  History of Left-sided chest wall pain (786 52) (R07 89)   46  History of Pelvic pressure in female (625 8) (R10 2)   47  History of Tachycardia (785 0) (R00 0)   48  History of Urinary hesitancy (788 64) (R39 11)  Active Problems And Past Medical History Reviewed: The active problems and past medical history were reviewed and updated today  Surgical History   Problems    1  History of Complete Colonoscopy   2  History of Esophagogastroduodenoscopy With Biopsy   3  History of Hysterectomy   4  History of Pulmonary Function Tests  Surgical History Reviewed: The surgical history was reviewed and updated today  Family History   Sister    1  Family history of Breast cancer   2  Family history of Hypertension   3  Family history of Thyroid disorder  Family History Reviewed: The family history was reviewed and updated today  Social History   Problems    · Caffeine use (V49 89) (F15 90)   · Lives with adult children   · Never a smoker   · No drug use   · Retired   · Social alcohol use (Z78 9)   · Two children   ·   Social History Reviewed: The social history was reviewed and updated today  Current Meds    1  Accu-Chek Aline Plus In Vitro Strip; USE 1 STRIP DAILY; Therapy: 43RAO3294 to (FQSGTXGQ:10YUD5956)  Requested for: 51NXG5809; Last     Rx:23Jun2017 Ordered   2  Accu-Chek Aline Plus w/Device Kit; testing QD; Therapy: 85OVY3846 to (Last MP:96KFE9966)  Requested for: 27Jun2017 Ordered   3  Accu-Chek Softclix Lancets Miscellaneous; TEST ONCE DAILY     DX: 250;      Therapy: 02JDE0561 to (Last WV:61NKH2186)  Requested for: 42Tiq5988 Ordered   4  Acetaminophen-Codeine #3 300-30 MG Oral Tablet; one tablet every 8 hrs as needed for     pain; Therapy: 81IFL2866 to (Last Rx:82Ngz4819) Ordered   5  Amlodipine Besy-Benazepril HCl - 5-10 MG Oral Capsule; take 1 capsule by mouth daily; Therapy: 23Cfi6419 to (Essence Babin)  Requested for: 31LCH8027; Last     Rx:97Exa9812 Ordered   6  Aspirin 81 MG TABS; Therapy: (0477 49 14 00) to Recorded   7  Dorzolamide HCl - 2 % Ophthalmic Solution; Therapy: 85HWK3829 to Recorded   8  Gabapentin 600 MG Oral Tablet; Take 1/2 tab q8 hours; Therapy: 65QYH9911 to (23 578111)  Requested for: 64LJE0122; Last     GA:44SGE9437 Ordered   9  Januvia 50 MG Oral Tablet; TAKE 1 TABLET DAILY; Therapy: 51EXR6121 to Recorded   10  Latanoprost 0 005 % Ophthalmic Solution; Therapy: 00NWB5096 to ([id number]) Recorded   11  Levothyroxine Sodium 75 MCG Oral Tablet; take 1 tablet by mouth every day; Therapy: 92RAI8814 to (Evaluate:27Mar2018)  Requested for: 44Cyx3475; Last      Rx:14Cgi4087 Ordered   12  Lidocaine 5 % External Ointment; APPLY TO AFFECTED AREAS AS DIRECTED; Therapy: 89IHX3377 to (Last Rx:89Toh1527)  Requested for: 45Rxg4244 Ordered   13  LORazepam 0 5 MG Oral Tablet; TAKE 1/2 TO 1 TABLET TWICE DAILY AS NEEDED; Therapy: 77NQI4105 to Recorded   14  Vitamin D3 2000 UNIT Oral Capsule; Take 1 tablet daily; Therapy: (Recorded:40Ojd8809) to Recorded  Medication List Reviewed: The medication list was reviewed and updated today  Allergies   Medication    1  Statins    Vitals   Vital Signs    Recorded: 49KRV4178 01:05PM   Heart Rate 72   Respiration 16   Systolic 312   Diastolic 68   Height 5 ft 1 in   Weight 168 lb    BMI Calculated 31 74   BSA Calculated 1 75     Physical Exam        Constitutional - General appearance: No acute distress, well appearing and well nourished        Eyes - Conjunctiva and Sclera examination: Conjunctiva pink, sclera anicteric  Neck - Normal, no JVD   Pulmonary - Respiratory effort: No signs of respiratory distress  -- Auscultation of lungs: Clear to auscultation  Cardiovascular - Auscultation of heart: Abnormal  -- 2/6 systolic ejection murmur, with audible S2 -- Pedal pulses: Normal, 2+ bilaterally  -- Examination of extremities for edema and/or varicosities: Normal        Abdomen - Soft  Musculoskeletal - Gait and station: Normal gait  Skin - Skin: Normal without rashes  Skin is warm and well perfused  Neurologic - Speech normal  No focal deficits        Psychiatric - Orientation to person, place, and time: Normal       Future Appointments      Date/Time Provider Specialty Site   03/14/2018 01:15 PM Namita Collado, [facility] Neurology [facility] NEUROLOGY [facility]   02/16/2018 01:30 PM Jesi Aguila MD Family Medicine [address] [facility]     Signatures    Electronically signed by : Inga Nix DO; Madhav 10 2018  1:32PM EST                       (Author)

## 2018-01-12 NOTE — MISCELLANEOUS
Message  I rec'd a call from pt stating her Blood sugars have been higher then they were when she was only on 25 mg of Januvia  Per pt her normal FBS was between 128-137 and now she is getting 140 or higher and at bedtime she is getting 195  Per BT if her BS are above 240 at bedtime or above 180 or below 80 in the morning she is to call the office for advice, otherwise, keep monitoring and keep OV for 2/8/16  Pt agrees  kw      Active Problems    1  Acute UTI (599 0) (N39 0)   2  Advanced age   1  Ambulatory dysfunction (719 7) (R26 2)   4  Anxiety (300 00) (F41 9)   5  Arthralgia (719 40) (M25 50)   6  Bladder prolapse   7  CAD (coronary atherosclerotic disease) (414 00) (I25 10)   8  Constipation (564 00) (K59 00)   9  Diabetic neuropathy (250 60,357 2) (E11 40)   10  Diverticulosis (562 10) (K57 90)   11  Dizziness (780 4) (R42)   12  Dyspnea on exertion (786 09) (R06 09)   13  Ear ringing sound (388 30) (H93 19)   14  Facial pain (784 0) (R51)   15  Female pelvic pain (625 9) (R10 2)   16  Gastritis (535 50) (K29 70)   17  General weakness (780 79) (R53 1)   18  Glaucoma (365 9) (H40 9)   19  Headache (784 0) (R51)   20  Hearing loss (389 9) (H91 90)   21  Hiatal hernia (553 3) (K44 9)   22  Hyperlipidemia (272 4) (E78 5)   23  Hypertension (401 9) (I10)   24  Hypothyroidism (244 9) (E03 9)   25  Insomnia (780 52) (G47 00)   26  Internal hemorrhoids (455 0) (K64 8)   27  Irritable bowel (564 1) (K58 9)   28  Left arm numbness (782 0) (R20 0)   29  Left flank pain (789 09) (R10 9)   30  Left sided numbness (782 0) (R20 0)   31  Low back pain (724 2) (M54 5)   32  Mild cognitive impairment (331 83) (G31 84)   33  Muscle weakness (generalized) (728 87) (M62 81)   34  Overactive bladder (596 51) (N32 81)   35  Polypharmacy (V58 69) (Z79 899)   36  Type 2 diabetes mellitus (250 00) (E11 9)   37  Uncontrolled diabetes mellitus (250 02) (E11 65)   38  Urinary incontinence (788 30) (R32)   39   Visual hallucinations (368 16) (R44 1)    Current Meds   1  AmLODIPine Besylate 10 MG Oral Tablet; Take 1 tablet daily; Therapy: 40UJB7366 to (Last Rx:14Jan2016)  Requested for: 25Jan2016 Ordered   2  Aspirin 81 MG Oral Tablet; Therapy: (Recorded:25Nov2014) to Recorded   3  Butalbital-APAP-Caffeine -40 MG Oral Tablet; TAKE 1 Tablet EVERY 4 HOURS   AS NEEDED FOR HEADACHE  DO NOT EXCEED 4 TABLETS IN 24 HOURS; Therapy: 91PHW1415 to (Evaluate:18Jan2016)  Requested for: 43WLS7599; Last   Rx:13Jan2016 Ordered   4  Carvedilol 3 125 MG Oral Tablet; TAKE 1 TABLET TWICE DAILY WITH MEALS; Therapy: 02Apr2015 to (Lionel Brown)  Requested for: 02Apr2015; Last   Rx:02Apr2015 Ordered   5  Dorzolamide HCl - 2 % Ophthalmic Solution; Therapy: 57BBG8464 to (Evaluate:14Jun2015) Recorded   6  Enalapril Maleate 10 MG Oral Tablet; take 1 tablet by mouth every day; Therapy: 99Yzg8376 to (Evaluate:08Jan2017)  Requested for: 45SRM9866; Last   Rx:14Jan2016 Ordered   7  Enalapril Maleate 5 MG Oral Tablet; TAKE 1 TABLET DAILY AS DIRECTED; Therapy: 45OLK7199 to (Evaluate:44Pfz0917)  Requested for: 67NLE5135; Last   Rx:05Oct2015 Ordered   8  FreeStyle Lancets Miscellaneous; TEST ONCE DAILY   DX: 250; Therapy: 02NVJ3063 to (Evaluate:11Mar2016)  Requested for: 76SDN8294; Last   Rx:17Mar2015 Ordered   9  FreeStyle Lite Test In Vitro Strip; TEST ONCE DAILY   Dx 250; Therapy: 63XFK5685 to (Evaluate:11Mar2016)  Requested for: 89WEE8949; Last   Rx:17Mar2015 Ordered   10  Januvia 50 MG Oral Tablet; TAKE 1 TABLET DAILY; Therapy: 65CCA7539 to (Evaluate:28Uzv3870)  Requested for: 57EJI4915; Last    Rx:00Bao8455 Ordered   11  Latanoprost 0 005 % Ophthalmic Solution; Therapy: 92PKD3332 to (0699 164 39 35) Recorded   12  Levothyroxine Sodium 50 MCG Oral Tablet; TAKE 1 TABLET DAILY; Therapy: 46YIG1884 to (Evaluate:08Jan2017)  Requested for: 08NTU8734; Last    Rx:14Jan2016 Ordered   13  Lidoderm 5 % External Patch (Lidocaine);     Therapy: (0479 34 44 62) to Recorded   14  Myrbetriq 25 MG Oral Tablet Extended Release 24 Hour; Take 1 tablet daily; Therapy: 24MCK1152 to (Evaluate:19Apr2016)  Requested for: 26DSI6910; Last    Rx:20Jan2016 Ordered   15  Nitrostat 0 4 MG Sublingual Tablet Sublingual; PLACE 1 TABLET UNDER THE TONGUE    EVERY 5 MINUTES FOR UP TO 3 DOSES AS NEEDED FOR CHEST PAIN  CALL    911 IF PAIN PERSISTS; Therapy: 51MJU8934 to (Evaluate:12Nov2015)  Requested for: 460 73 493; Last    Rx:23Oct2015 Ordered   16  OLANZapine 2 5 MG Oral Tablet (ZyPREXA); TAKE 1 TABLET Bedtime; Therapy: 10GFS7704 to (Evaluate:09Ykn3857)  Requested for: 61FLF3398; Last    Rx:17Nov2015 Ordered   17  PrednisoLONE Acetate 1 % Ophthalmic Suspension; Therapy: 80Cfj0170 to (Evaluate:06Oct2015) Recorded   18  Premarin 0 625 MG/GM Vaginal Cream; Apply a pea-sized amount to the opening of the    vagina every Monday, Wednesday, and Friday evening; Therapy: 16XBC6163 to (Evaluate:70Uxd0369)  Requested for: 23UVB4531; Last    Rx:20Jan2016 Ordered   19  Simvastatin 20 MG Oral Tablet; Take 1 tablet daily; Therapy: 40EHK3207 to (Evaluate:08Jan2017)  Requested for: 08JFK5015; Last    Rx:14Jan2016 Ordered   20  Vitamin D3 2000 UNIT Oral Capsule; Take 1 tablet daily; Therapy: (Recorded:72Kcb8640) to Recorded    Allergies    1   Statins    Signatures   Electronically signed by : Alfredo Arciniega, ; Feb 5 2016 10:06AM EST                       (Author)

## 2018-01-12 NOTE — RESULT NOTES
Message   doppler US is normal , no clot      Verified Results  VAS LOWER LIMB VENOUS DUPLEX STUDY, UNILATERAL/LIMITED [id number] 04:14PM Yani Gu   TW Order Number: [id number]    Order Number: [id number]    Order Number: [id number]     Test Name Result Flag Reference   VAS LOWER LIMB VENOUS DUPLEX STUDY, UNILATERAL/LIMITED (Report)     [facility] REPORT   CLINICAL:   Indications: Other specified soft tissue disorders [M79 89]  Patient presents   with swelling of her right foot for approximately one month  Worsens throughout   the day, is ok when she gets up in the morning  Risk Factors: The patient has history of obesity  She has no history of DVT   or malignancy  Clinical: Right Lower Limb   There is edema  FINDINGS:      Segment Right      Left          Impression    Impression       CFV   Normal (Patent) Normal (Patent)             CONCLUSION:   Impression:   RIGHT LOWER LIMB: NORMAL   No evidence of acute or chronic deep vein thrombosis   No evidence of superficial thrombophlebitis noted  Doppler evaluation shows a normal response to augmentation maneuvers  Popliteal, posterior tibial and anterior tibial arterial Doppler waveforms are   biphasic  LEFT LOWER LIMB LIMITED: NORMAL   Evaluation shows no evidence of thrombus in the common femoral vein  Doppler evaluation shows a normal response to augmentation maneuvers        SIGNATURE:   Electronically Signed by: Bart Carrel, MD, [address] on 2016-08-01 10:40:10 PM

## 2018-01-13 VITALS
BODY MASS INDEX: 31.23 KG/M2 | HEART RATE: 64 BPM | DIASTOLIC BLOOD PRESSURE: 60 MMHG | WEIGHT: 165.38 LBS | SYSTOLIC BLOOD PRESSURE: 134 MMHG | HEIGHT: 61 IN

## 2018-01-13 VITALS
TEMPERATURE: 97.9 F | WEIGHT: 165.6 LBS | HEART RATE: 70 BPM | SYSTOLIC BLOOD PRESSURE: 124 MMHG | HEIGHT: 61 IN | DIASTOLIC BLOOD PRESSURE: 70 MMHG | BODY MASS INDEX: 31.26 KG/M2

## 2018-01-13 VITALS
HEART RATE: 72 BPM | RESPIRATION RATE: 16 BRPM | WEIGHT: 170 LBS | DIASTOLIC BLOOD PRESSURE: 70 MMHG | SYSTOLIC BLOOD PRESSURE: 142 MMHG | BODY MASS INDEX: 32.1 KG/M2 | HEIGHT: 61 IN | TEMPERATURE: 98.4 F

## 2018-01-14 VITALS
HEIGHT: 61 IN | SYSTOLIC BLOOD PRESSURE: 120 MMHG | BODY MASS INDEX: 31.37 KG/M2 | WEIGHT: 166.13 LBS | HEART RATE: 72 BPM | RESPIRATION RATE: 16 BRPM | DIASTOLIC BLOOD PRESSURE: 64 MMHG

## 2018-01-14 VITALS
DIASTOLIC BLOOD PRESSURE: 60 MMHG | BODY MASS INDEX: 30.85 KG/M2 | HEIGHT: 61 IN | TEMPERATURE: 97.1 F | SYSTOLIC BLOOD PRESSURE: 118 MMHG | HEART RATE: 80 BPM | OXYGEN SATURATION: 96 % | WEIGHT: 163.38 LBS

## 2018-01-14 VITALS
SYSTOLIC BLOOD PRESSURE: 160 MMHG | WEIGHT: 167 LBS | HEART RATE: 60 BPM | BODY MASS INDEX: 31.53 KG/M2 | DIASTOLIC BLOOD PRESSURE: 64 MMHG | HEIGHT: 61 IN

## 2018-01-14 VITALS
DIASTOLIC BLOOD PRESSURE: 56 MMHG | BODY MASS INDEX: 30.8 KG/M2 | HEART RATE: 68 BPM | SYSTOLIC BLOOD PRESSURE: 140 MMHG | WEIGHT: 163 LBS

## 2018-01-14 VITALS
DIASTOLIC BLOOD PRESSURE: 84 MMHG | SYSTOLIC BLOOD PRESSURE: 124 MMHG | WEIGHT: 165 LBS | HEART RATE: 68 BPM | BODY MASS INDEX: 31.18 KG/M2

## 2018-01-15 VITALS
SYSTOLIC BLOOD PRESSURE: 122 MMHG | BODY MASS INDEX: 30.8 KG/M2 | WEIGHT: 163 LBS | DIASTOLIC BLOOD PRESSURE: 60 MMHG | HEART RATE: 68 BPM

## 2018-01-15 NOTE — RESULT NOTES
Verified Results  (1) SED RATE [id number] 11:48AM Melinda Dey     Test Name Result Flag Reference   SED RATE 16 mm/hour  0-20

## 2018-01-15 NOTE — MISCELLANEOUS
Message  Pt called the service stating she is having painful urination and urgency  Pt unable to get out in this snow  Per BT pt may have 3 days of Cipro 500 mg BID and make OV Monday if still with symptoms  Pt aware and agreeable  kw      Active Problems    1  Abdominal pain, left upper quadrant (789 02) (R10 12)   2  Advanced age   1  Ambulatory dysfunction (719 7) (R26 2)   4  Anxiety (300 00) (F41 9)   5  Anxiety disorder (300 00) (F41 9)   6  Aortic stenosis (424 1) (I35 0)   7  Arteriosclerosis of coronary artery (414 00) (I25 10)   8  Arthralgia (719 40) (M25 50)   9  Back pain (724 5) (M54 9)   10  Bilateral cold feet (782 9) (R20 9)   11  Bladder prolapse   12  Blurring of visual image (368 8) (H53 8)   13  Candidal intertrigo (112 3) (B37 2)   14  Chest pressure (786 59) (R07 89)   15  Chronic ankle pain, unspecified laterality (670 53,572 50) (M25 579,G89 29)   16  Constipation (564 00) (K59 00)   17  Costochondritis (733 6) (M94 0)   18  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)   19  Diverticulosis (562 10) (K57 90)   20  Dizziness (780 4) (R42)   21  Dyspnea on exertion (786 09) (R06 09)   22  Ear ringing sound (388 30) (H93 19)   23  Female pelvic pain (625 9) (R10 2)   24  Foot swelling (729 81) (M79 89)   25  Gastritis (535 50) (K29 70)   26  General weakness (780 79) (R53 1)   27  Glaucoma (365 9) (H40 9)   28  Headache (784 0) (R51)   29  Hearing loss (389 9) (H91 90)   30  Hiatal hernia (553 3) (K44 9)   31  Hyperlipidemia (272 4) (E78 5)   32  Hypertension (401 9) (I10)   33  Hypothyroidism (244 9) (E03 9)   34  Influenza vaccine needed (V04 81) (Z23)   35  Insomnia (780 52) (G47 00)   36  Internal hemorrhoids (455 0) (K64 8)   37  Irritable bowel (564 1) (K58 9)   38  Left arm numbness (782 0) (R20 0)   39  Left arm pain (729 5) (M79 602)   40  Left flank pain (789 09) (R10 9)   41  Left sided numbness (782 0) (R20 0)   42  Left-sided chest wall pain (786 52) (R07 89)   43   Loss of appetite (783 0) (R63 0)   44  Low back pain (724 2) (M54 5)   45  Mild cognitive impairment (331 83) (G31 84)   46  Muscle weakness (generalized) (728 87) (M62 81)   47  Neck pain (723 1) (M54 2)   48  Need for pneumococcal vaccine (V03 82) (Z23)   49  Osteoarthritis (715 90) (M19 90)   50  Overactive bladder (596 51) (N32 81)   51  Pelvic pressure in female (625 8) (N94 89)   52  Peripheral neuropathy (356 9) (G62 9)   53  Polypharmacy (V58 69) (Z79 899)   54  Shortness of breath (786 05) (R06 02)   55  Skin lesion (709 9) (L98 9)   56  Swelling of lower extremity (729 81) (M79 89)   57  Trigeminal neuralgia (350 1) (G50 0)   58  Type 2 diabetes mellitus (250 00) (E11 9)   59  Type 2 diabetes mellitus, uncontrolled, with neuropathy (250 62,357 2) (E11 40,E11 65)   60  Urinary incontinence (788 30) (R32)   61  Visual hallucinations (368 16) (R44 1)    Current Meds   1  Alcohol Prep Pads MISC; Therapy: (Recorded:12Epm1686) to Recorded   2  AmLODIPine Besylate 10 MG Oral Tablet; Take 1 tablet daily; Therapy: 15HLG6992 to (Last Rx:14Jan2016)  Requested for: 25Jan2016 Ordered   3  Aspirin 81 MG TABS; Therapy: (0479 34 44 62) to Recorded   4  Celecoxib 200 MG Oral Capsule (CeleBREX); TAKE ONE CAPSULE BY MOUTH EVERY   DAY; Therapy: 24Vws6355 to (Last Rx:28Dec2016)  Requested for: 28Dec2016 Ordered   5  Cough SYRP;   Therapy: (Recorded:96Xop8847) to Recorded   6  Diclofenac Sodium 1 % Transdermal Gel (Voltaren); apply sparingly to affected area(s)   once daily; Therapy: 26VQV5542 to (Evaluate:07Jan2017)  Requested for: 28Dec2016; Last   Rx:37Rbg0520 Ordered   7  Dorzolamide HCl - 2 % Ophthalmic Solution; Therapy: 25ZVA2944 to (Evaluate:14Jun2015) Recorded   8  Enalapril Maleate 10 MG Oral Tablet; take 1 tablet by mouth every day; Therapy: 43Dxe0142 to (Evaluate:24Nov2017)  Requested for: 52JAD4222; Last   Rx:29Nov2016 Ordered   9  FreeStyle Lancets Miscellaneous; TEST ONCE DAILY   DX: 250;    Therapy: 17XYB7024 to (Evaluate:83Gii6080)  Requested for: 66Duf8426; Last   Rx:06Xja2919 Ordered   10  FreeStyle Lite Test In Vitro Strip; TEST ONCE DAILY    Dx 250; Therapy: 39UDE9136 to (Evaluate:11Mar2016)  Requested for: 32YWP6627; Last    Rx:17Mar2015 Ordered   11  Gabapentin 600 MG Oral Tablet; Take 1/2 tab bid; Therapy: 87ZUZ8911 to (Evaluate:30Mar2017)  Requested for: 46Avt4093; Last    Rx:51Bft5657 Ordered   12  Januvia 50 MG Oral Tablet; TAKE 1 TABLET DAILY; Therapy: 93RNP3725 to (Evaluate:26Jun2017)  Requested for: 31Otp6538; Last    Rx:02Dvz0610 Ordered   13  Latanoprost 0 005 % Ophthalmic Solution; Therapy: 84MLB8552 to ([phone]) Recorded   14  Levothyroxine Sodium 75 MCG Oral Tablet; take 1 tablet every day; Therapy: 06KIO9132 to (Evaluate:78Nlp8546)  Requested for: 08Kdh9628; Last    Rx:90Hte2937 Ordered   15  LORazepam 0 5 MG Oral Tablet (Ativan); TAKE 0 5 TABLET Once prn; Therapy: 41VUZ6135 to (Evaluate:20Xia5126); Last Rx:17Aug2016 Ordered   16  Nitrostat 0 4 MG Sublingual Tablet Sublingual (Nitroglycerin); PLACE 1 TABLET UNDER    THE TONGUE EVERY 5 MINUTES FOR UP TO 3 DOSES AS NEEDED    FOR CHEST PAIN  CALL 911 IF PAIN PERSISTS; Therapy: 51CTZ7953 to (Evaluate:12Nov2015)  Requested for: 460 73 493; Last    Rx:39Djo7350 Ordered   17  Nystatin 495854 UNIT/GM External Powder; APPLY SPARINGLY TO AFFECTED    AREA(S) TWICE DAILY; Therapy: 81Rmy0239 to (Last Rx:33Dvi7372)  Requested for: 27Mgj2494 Ordered   18  Proventil  (90 Base) MCG/ACT Inhalation Aerosol Solution; Therapy: (Recorded:17Aug2016) to Recorded   19  Vitamin D3 2000 UNIT Oral Capsule; Take 1 tablet daily; Therapy: (Recorded:50Uis2714) to Recorded    Allergies    1  Statins    Plan  PMH: Acute UTI    · Ciprofloxacin HCl - 500 MG Oral Tablet;  Take 1 every 12 hours    Signatures   Electronically signed by : Rafael Michael, ; Feb 9 2017  2:36PM EST                       (Author)

## 2018-01-15 NOTE — PSYCH
History of Present Illness  Psychotherapy Provided [facility]: Individual Psychotherapy 30 minutes minutes provided today  Goals addressed in session:   Met with patient and daughter  Details some issues with some decreased energy and motivation  Has become more sedentary since her relocation to this area from [facility]  Had been volunteering and could walk on own anywhere to access stores, etc  Stays at home and reads  Patient verbal and cooperative  HPI - Psych: Patient has no prior counseling hx  Has no hx of anxiety or depression  Has limited supports in this area  Has good family support system  Denies any SI  Feels somewhat anxious as she continues to acclimate to move   Note   Note:   Will investigate increase social activity options for her  Have encouraged her to increase physical activity to see if it improves energy and interest- she agrees to do so  Assessment    1   Anxiety (300 00) (F41 9)    Signatures   Electronically signed by : Betsy Ryan LCSW; Feb 26 2016 12:20PM EST                       (Author)

## 2018-01-17 NOTE — RESULT NOTES
Message   please find out what is going on      Verified Results  [facility]) Please note [id number] 12:00AM [facility]     Test Name Result Flag Reference   Please note Comment     The date and/or time of collection was not indicated on the  requisition as required by state and federal law  The date of  receipt of the specimen was used as the collection date if not  supplied

## 2018-01-22 VITALS — SYSTOLIC BLOOD PRESSURE: 120 MMHG | DIASTOLIC BLOOD PRESSURE: 68 MMHG

## 2018-01-22 VITALS
DIASTOLIC BLOOD PRESSURE: 58 MMHG | SYSTOLIC BLOOD PRESSURE: 108 MMHG | WEIGHT: 168.38 LBS | BODY MASS INDEX: 31.81 KG/M2 | TEMPERATURE: 97.7 F | HEART RATE: 64 BPM

## 2018-01-22 VITALS
HEIGHT: 61 IN | BODY MASS INDEX: 31.72 KG/M2 | HEART RATE: 72 BPM | SYSTOLIC BLOOD PRESSURE: 122 MMHG | DIASTOLIC BLOOD PRESSURE: 68 MMHG | RESPIRATION RATE: 16 BRPM | WEIGHT: 168 LBS

## 2018-01-22 VITALS — DIASTOLIC BLOOD PRESSURE: 70 MMHG | SYSTOLIC BLOOD PRESSURE: 115 MMHG

## 2018-01-23 VITALS
HEART RATE: 70 BPM | SYSTOLIC BLOOD PRESSURE: 139 MMHG | DIASTOLIC BLOOD PRESSURE: 61 MMHG | OXYGEN SATURATION: 95 % | BODY MASS INDEX: 31.15 KG/M2 | RESPIRATION RATE: 16 BRPM | WEIGHT: 165 LBS | HEIGHT: 61 IN | TEMPERATURE: 98 F

## 2018-01-23 NOTE — MISCELLANEOUS
Assessment   1  Atypical chest pain (786 59) (R07 89)1   2  Abdominal pain, left upper quadrant (789 02) (R10 12)1   3  Aortic stenosis (424 1) (I35 0)1   4  Hypertension (401 9) (I10)1   5  Hyperlipidemia (272 4) (E78 5)1   6  Hypothyroidism (244 9) (E03 9)1   7  Type 2 diabetes mellitus (250 00) (E11 9)2      1 Amended By: Cristiano Mattson; Apr 25 2017 1:57 PM EST   2 Amended By: Cristiano Mattson; Apr 25 2017 2:15 PM EST    Plan  Hypertension    · Stop: AmLODIPine Besylate 10 MG Oral Tablet1   Rx By: Cristiano Mattson; Dispense: 0 Days ; #:90 Tablet; Refill: 3;For: Hypertension;   LUCY = N; Sent To: [facility]    · Start: Amlodipine Besy-Benazepril HCl - 10-20 MG Oral Capsule;1 1,2  take 1 capsule daily2   Rx By: Cristiano Mattson; Dispense: 30 Days ; #:30 Capsule; Refill: 5;For: Hypertension;   LUCY = N;1 1,2  Verified Transmission to [place] [id number];7 1,2  Last Updated By: System, [employer]; 4/25/2017 2:05:16 PM2   Hypothyroidism, Type 2 diabetes mellitus    · (1) LIPID PANEL, FASTING; Status:Active; Requested for:[id number]; 2   Perform:[facility]; MNC:[id number]; Ordered; For:Hypothyroidism, Type 2   diabetes mellitus; Ordered By:Teresa Zuñiga    · (1) MICROALBUMIN CREATININE RATIO, RANDOM URINE; Status:Active; Requested  for:[id number]; 2   Perform:[facility]; KVY:[id number]; Ordered; For:Hypothyroidism, Type 2   diabetes mellitus; Ordered By:Teresa Zuñiga    · (1) TSH WITH FT4 REFLEX; Status:Active; Requested for:13Tzx9822; 2   Perform:[facility]; YSY:[id number]; Ordered; For:Hypothyroidism, Type 2   diabetes mellitus; Ordered By:Teresa Zuñiga   Unlinked    · Stop: Proventil  (90 Base) MCG/ACT Inhalation Aerosol Solution1   Dispense: 0 Days ; #: Sufficient AERS; Refill: 0; LUCY = N; Record; Last Updated By:   Inderjit Longoria 1,2  4/25/2017 2:2 1,2 01:2 1,2 26 PM2      1 Amended By: Cristiano Mattson;  Apr 25 2017 1:59 PM EST   2 Amended By: Autumn Reed; Apr 25 2017 2:14 PM EST    Discussion/Summary  Discussion Summary:   1- Chest pain : resolved , 1  1,2  non 2  cardiac related 1    2    2- Chronic LUQ 1  1,2  pain 2  : I suspect this is her IBS flare up  advised on GAS-X m, probiotics and to fu if 1  1,2  not 2  better   rochelle pt 1  1,2  with 2  1,2  irregular 2  BM   '  3- HTN: 1  1,2  uncontrolled 2  1,2  lately 2  BP 1  1,2  reading 2  from home 150-170   so pt was on 1  1,2  Enalapril 2  before so 1  1,2  started 2  on amlodipine 1  1,2  -benazepril 2      4- Aortic 1  1,2  stenosis: 2  stable 1  1,2  ,cont 2  1,2  with 2  1,2  meds 2  1,2      5- HLD: 1  1,2  stable, 2  1,2  cont 2  with 1  1,2  no meds 2      6- DM : 1  1,2  stable 2  1,2  ,Januvia 2  free 1  1,2  sample 2  1,2  given     7- 1  1,2  Visual 2  hallucination: pt advised to use 1  1,2  Ativan 2  to 1  1,2  help 2  her sleep , if 1  1,2  not 2  1,2  working 2  1,2  need 2  to 1  1,2  reconsider 2  Zyprexa or may be low dose of 1  1,2  Haldol  2    Medication SE Review and Pt Understands Tx: Possible side effects of new medications were reviewed with the patient/guardian today2  The treatment plan was reviewed with the patient/guardian  The patient/guardian understands and agrees with the treatment plan2        1 Amended By: Autumn Reed; Apr 25 2017 2:21 PM EST   2 Amended By: Autumn Reed; Apr 26 2017 8:43 AM EST    Chief Complaint  Chief Complaint Free Text Note Form: pt here for a hospital f/u for atypical chest pain on 4/21/17 and d/c'd on 4/22/17 at [facility]  Patient still has left arm pain, left flank pain and left back pain  ak1    Chief Complaint Chronic Condition [facility]: The patient is here for an emergency room follow-up  2        1 Amended By: Vish Espionza; Apr 25 2017 1:15 PM EST   2 Amended By: Autumn Reed;  Apr 25 2017 1:51 PM EST    History of Present Illness  TCM Communication [facility]: The patient is being contacted for follow-up after hospitalization and pt has charli appt scheduled with DM on 4/28/17  She was hospitalized at [facility]  The date of admission: 4/21/17, date of discharge: 4/22/17  Diagnosis: Atypical Chest Pain  She was discharged to home  Symptoms:1  upper abdominal pain1  and loose stools1 , but no weakness1 , no dizziness1 , no cough1 , no shortness of breath1 , no chest pain1 , no back pain on left side1 , no arm pain left side1 , no lower abdominal pain1 , no rash:1 , no nausea1 , no vomiting1 , no constipation1  and no pain with urinating1   Communication performed and completed by Yasmin Delong LPN   HPI: pt here for fu after ER visit for acute chest pain, was non cardiac related , pt was dc home , pt chest pain improved but now she has LUQ pain non radiating, no fever or chills pt had a constipation and then voer the last few days it is more of loose BM ,   no other sx1        1 Amended By: Leandra Singh; Apr 25 2017 1:53 PM EST    Review of Systems  Complete-Female:   Constitutional:1  No fever, no chills, feels well, no tiredness, no recent weight gain or weight loss1   Eyes:1  No complaints of eye pain, no red eyes, no eyesight problems, no discharge, no dry eyes, no itching of eyes1   ENT:1  no complaints of earache, no loss of hearing, no nose bleeds, no nasal discharge, no sore throat, no hoarseness1   Cardiovascular:1  No complaints of slow heart rate, no fast heart rate, no chest pain, no palpitations, no leg claudication, no lower extremity edema1   Gastrointestinal:1  abdominal pain1 , constipation1  and diarrhea1 , but no nausea1 , no vomiting1  and no blood in stools1   Genitourinary:1  No complaints of dysuria, no incontinence, no pelvic pain, no dysmenorrhea, no vaginal discharge or bleeding1   Musculoskeletal:1  No complaints of arthralgias, no myalgias, no joint swelling or stiffness, no limb pain or swelling1      Integumentary:1  No complaints of skin rash or lesions, no itching, no skin wounds, no breast pain or lump1   Neurological:1  No complaints of headache, no confusion, no convulsions, no numbness, no dizziness or fainting, no tingling, no limb weakness, no difficulty walking1         1 Amended By: Perico Sellers; Apr 25 2017 1:54 PM EST    Active Problems   1  Advanced age  3  Ambulatory dysfunction (719 7) (R26 2)  3  Anxiety (300 00) (F41 9)  4  Anxiety disorder (300 00) (F41 9)  5  Aortic stenosis (424 1) (I35 0)  6  Arteriosclerosis of coronary artery (414 00) (I25 10)  7  Arthralgia (719 40) (M25 50)  8  Back pain (724 5) (M54 9)  9  Bilateral cold feet (782 9) (R20 9)  10  Bladder prolapse  11  Chronic ankle pain, unspecified laterality (093 11,176 77) (M25 579,G89 29)  12  Constipation (564 00) (K59 00)  13  Costochondritis (733 6) (M94 0)  14  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)  15  Diverticulosis (562 10) (K57 90)  16  Dizziness (780 4) (R42)  17  Dyspnea on exertion (786 09) (R06 09)  18  Ear ringing sound (388 30) (H93 19)  19  Female pelvic pain (625 9) (R10 2)  20  Foot swelling (729 81) (M79 89)  21  Gastritis (535 50) (K29 70)  22  General weakness (780 79) (R53 1)  23  Glaucoma (365 9) (H40 9)  24  Glaucoma, open angle, 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,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)  25  Headache (784 0) (R51)  26  Hearing loss (389 9) (H91 90)  27  Hiatal hernia (553 3) (K44 9)  28  Hyperlipidemia (272 4) (E78 5)  29  Hypertension (401 9) (I10)  30  Hypothyroidism (244 9) (E03 9)  31  Insomnia (780 52) (G47 00)  32  Internal hemorrhoids (455 0) (K64 8)  33  Irritable bowel (564 1) (K58 9)  34  Left arm pain (729 5) (M79 602)  35  Left flank pain (789 09) (R10 9)  36  Left sided numbness (782 0) (R20 0)  37  Left-sided chest wall pain (786 52) (R07 89)  38  Loss of appetite (783 0) (R63 0)  39  Low back pain (724 2) (M54 5)  40  Mild cognitive impairment (331 83) (G31 84)  41  Muscle weakness (generalized) (728 87) (M62 81)  42  Neck pain (723 1) (M54 2)  43  Need for pneumococcal vaccine (V03 82) (Z23)  44  Osteoarthritis (715 90) (M19 90)  45  Overactive bladder (596 51) (N32 81)  46  Pelvic pressure in female (625 8) (N94 89)  47  Peripheral neuropathy (356 9) (G62 9)  48  Polypharmacy (V58 69) (Z79 899)  49  Shortness of breath (786 05) (R06 02)  50  Skin lesion (709 9) (L98 9)  51  Swelling of lower extremity (729 81) (M79 89)  52  Trigeminal neuralgia (350 1) (G50 0)  53  Type 2 diabetes mellitus (250 00) (E11 9)  54  Type 2 diabetes mellitus, uncontrolled, with neuropathy (250 62,357 2) (E11 40,E11 65)  55  Urinary incontinence (788 30) (R32)  56  Visual hallucinations (368 16) (R44 1)    Past Medical History   1  History of Abdominal pain, left upper quadrant (789 02) (R10 12)  2  History of Acute UTI (599 0) (N39 0)  3  History of Acute UTI (599 0) (N39 0)  4  History of Atypical angina (413 9) (I20 8)  5  History of Candidal intertrigo (112 3) (B37 2)  6  History of Candidal UTI (urinary tract infection) (112 2) (B37 49)  7  History of Chest pressure (786 59) (R07 89)  8  History of Dizziness (780 4) (R42)  9  History of Dyspnea on exertion (786 09) (R06 09)  10  History of Facial pain (784 0) (R51)  11  History of abdominal pain (V13 89) (Z87 898)  12  History of acute otitis media (V12 49) (Z86 69)  13  History of blurred vision (V12 49) (Z86 69)  14  History of blurred vision (V12 49) (Z86 69)  15  History of chest pain (V13 89) (Z87 898)  16  History of depression (V11 8) (Z86 59)  17  History of diabetic neuropathy (V12 29) (Z86 39)  18  History of diarrhea (V12 79) (Z87 898)  19  History of earache (V12 49) (Z86 69)  20  History of fever (V13 89) (Z87 898)  21  History of gastric ulcer (V12 79) (Z87 19)  22  History of headache (V13 89) (Z87 898)  23  History of thyroid disease (V12 29) (Z86 39)  24  History of urinary frequency (V13 09) (Z87 898)  25  History of urinary tract infection (V13 02) (Z87 440)  26  History of Influenza vaccine needed (V04 81) (Z23)  27   History of Intertrigo (695 89) (L30 4)  28  History of Left arm numbness (782 0) (R20 0)  29  History of Tachycardia (785 0) (R00 0)  30  History of Urinary hesitancy (788 64) (R39 11)    Surgical History   1  History of Complete Colonoscopy  2  History of Esophagogastroduodenoscopy With Biopsy  3  History of Hysterectomy  4  History of Pulmonary Function Tests  Surgical History Reviewed: The surgical history was reviewed and updated today  1        1 Amended By: Archie Coyle; Apr 25 2017 1:57 PM EST    Family History  Sister   1  Family history of Breast cancer  2  Family history of Hypertension  3  Family history of Thyroid disorder  Family History Reviewed: The family history was reviewed and updated today  1        1 Amended By: Archie Coyle; Apr 25 2017 1:57 PM EST    Social History    · Caffeine use (V49 89) (F15 90)   · Lives with adult children   · Never a smoker   · No drug use   · Retired   · Social alcohol use (Z78 9)   · Two children   ·     Social History Reviewed: The social history was reviewed and updated today  1  The social history was reviewed and is unchanged  1        1 Amended By: Archie Coyle; Apr 25 2017 1:57 PM EST    Current Meds  1     1   2  Amlodipine Besy-Benazepril HCl - 10-20 MG Oral Capsule;1 1,2  take 1 capsule daily;2    Therapy: 98PYK8644 to (Evaluate:1 1,2 84IRM3090)  Requested for: 13PFS8367;5  Last Rx:25Apr2017 Ordered1   3  Aspirin 81 MG TABS; Therapy: ([phone]) to Recorded  4  Celecoxib 200 MG Oral Capsule (CeleBREX);1  TAKE ONE CAPSULE BY MOUTH EVERY DAY; Therapy: 72Nrj4165 to (Last Rx:70Jhg0825)  Requested for: 28Dec2016 Ordered  5  Diclofenac Sodium 1 % Transdermal Gel (Voltaren);1  apply sparingly to affected area(s) once daily; Therapy: 49WOI0308 to (Evaluate:07Jan2017)  Requested for: 70Xbf1888; Last   Rx:94Tei3488 Ordered  6  Dorzolamide HCl - 2 % Ophthalmic Solution; Therapy: 72XDK1411 to (Evaluate:14Jun2015) Recorded  7  1   8   FreeStyle Lancets Miscellaneous; TEST ONCE DAILY   DX: 250; Therapy: 51LFZ1604 to (Evaluate:85Zhu2425)  Requested for: 73Nxf5983; Last   Rx:55Ilz2230 Ordered  9  FreeStyle Lite Test In Vitro Strip; TEST ONCE DAILY   Dx 250; Therapy: 01OYS4974 to (Evaluate:11Mar2016)  Requested for: 49PWM6442; Last   Rx:17Mar2015 Ordered  10  Gabapentin 600 MG Oral Tablet; Take 1/2 tab bid; Therapy: 01UFL9585 to (Evaluate:30Mar2017)  Requested for: 06TON8008; Last    Rx:48Jcs8649 Ordered  11  1   12  Latanoprost 0 005 % Ophthalmic Solution; Therapy: 46SGY4244 to ([id number]) Recorded  13  Levothyroxine Sodium 75 MCG Oral Tablet; take 1 tablet every day; Therapy: 67DRJ2747 to (Evaluate:43Gkz0207)  Requested for: 65Wuo9247; Last    Rx:41Jqy6209 Ordered  14  LORazepam 0 5 MG Oral Tablet (Ativan);1  TAKE 0 5 TABLET Once prn; Therapy: 61DLG0004 to (Evaluate:60Hzf3561);3  Last Rx:07Hgb4640 Ordered3   15  Nitrostat 0 4 MG Sublingual Tablet Sublingual (Nitroglycerin);1  PLACE 1 TABLET UNDER THE TONGUE EVERY 5 MINUTES FOR UP TO 3 DOSES AS    NEEDED FOR CHEST PAIN  CALL 911 IF PAIN PERSISTS; Therapy: 71YFM7440 to (Evaluate:12Nov2015)  Requested for: 460 73 493; Last    Rx:71Tax6581 Ordered  16  Nystatin 457285 UNIT/GM External Powder; APPLY SPARINGLY TO AFFECTED AREA(S)    TWICE DAILY; Therapy: 19Rmo8295 to (Last Rx:83Wob5544)  Requested for: 79Qfn8498 Ordered  17  1   18  Vitamin D3 2000 UNIT Oral Capsule; Take 1 tablet daily; Therapy: (Recorded:59Adf6111) to Recorded  Medication List Reviewed: The medication list was reviewed and updated today  1        1 Amended By: Renu Dickinson; Apr 25 2017 1:54 PM EST   2 Amended By: Renu Dickinson; Apr 25 2017 2:14 PM EST   3 Amended By: Renu Dickinson; Apr 26 2017 8:38 AM EST    Allergies   1  Statins    Physical Exam    Constitutional1    General appearance: No acute distress, well appearing and well nourished  1    Eyes1    Conjunctiva and lids: No swelling, erythema or discharge  1    Pupils and irises: Equal, round and reactive to light  1    Ears, Nose, Mouth, and Throat1    External inspection of ears and nose: Normal 1    Otoscopic examination: Tympanic membranes translucent with normal light reflex  Canals patent without erythema  1    Nasal mucosa, septum, and turbinates: Normal without edema or erythema  1    Oropharynx: Normal with no erythema, edema, exudate or lesions  1    Pulmonary1    Respiratory effort: No increased work of breathing or signs of respiratory distress  1    Auscultation of lungs: Clear to auscultation  1    Cardiovascular1    Palpation of heart: Normal PMI, no thrills  1    Auscultation of heart: Normal rate and rhythm, normal S1 and S2, without murmurs  1    Examination of extremities for edema and/or varicosities: Normal 1    Carotid pulses: Normal 1    Abdomen1    Abdomen: Non-tender, no masses  1    Liver and spleen: No hepatomegaly or splenomegaly  1    Lymphatic1    Palpation of lymph nodes in neck: No lymphadenopathy  1    Musculoskeletal1    Gait and station: Normal 1    Digits and nails: Normal without clubbing or cyanosis  1    Inspection/palpation of joints, bones, and muscles: Normal 1    Skin1    Skin and subcutaneous tissue: Normal without rashes or lesions  1    Neurologic1    Cranial nerves: Cranial nerves 2-12 intact  1    Reflexes: 2+ and symmetric  1    Sensation: No sensory loss  1    Psychiatric1    Orientation to person, place, and time: Normal 1    Mood and affect: Normal 1          1 Amended By: Lucas Mlcaughlin;  Apr 25 2017 1:54 PM EST    Future Appointments    Date/Time Provider Specialty Site   06/30/2017 02:00 PM Kyung [facility]  [address]   04/25/2017 01:30 PM Lucas Mclaughlin [address] [employer] [facility]   05/30/2017 01:30 PM Lucas Mclaughlin [address] [employer] [facility]     Signatures   Electronically signed by : Verona Vasquez, ; Apr 24 2017  3:40PM EST (Author)    Electronically signed by : Amalia Flores MD; Apr 25 2017  9:49AM EST                       (Author)    Electronically signed by : Amalia Flores MD; Apr 26 2017  8:43AM EST                       (Author)

## 2018-01-23 NOTE — RESULT NOTES
Discussion/Summary   Ultrasound is completely normal      Verified Results  US ABDOMEN LIMITED [id number] 11:26AM Santo Horvath Order Number: [id number]   Performing Comments: please pay attention to the left upper quardant area , ant left lower chest area    with the maximal tenderness    multiple CT abdomen were negative   - Patient Instructions: To schedule this appointment, please contact Central Scheduling at [phone]  Test Name Result Flag Reference   US ABDOMEN LIMITED (Report)     LEFT UPPER QUADRANT ULTRASOUND     INDICATION: Left upper quadrant pain, chronic  COMPARISON: CT dated 8/1/2017  TECHNIQUE:  Real-time ultrasound of the left upper quadrant ventral abdominal wall was performed with a curvilinear transducer with both volumetric sweeps and still imaging techniques  FINDINGS:     Soft tissues are unremarkable  Underlying musculature appears intact  No hernia or mass identified  Normal vascularity  IMPRESSION:   Unremarkable ultrasound of the left upper ventral abdominal wall         Workstation performed: [id number]     Signed by:   Delilah Lee MD   12/20/17

## 2018-01-24 VITALS
HEART RATE: 64 BPM | BODY MASS INDEX: 31.34 KG/M2 | DIASTOLIC BLOOD PRESSURE: 68 MMHG | HEIGHT: 61 IN | TEMPERATURE: 98.6 F | RESPIRATION RATE: 16 BRPM | SYSTOLIC BLOOD PRESSURE: 120 MMHG | WEIGHT: 166 LBS

## 2018-01-30 ENCOUNTER — OFFICE VISIT (OUTPATIENT)
Dept: FAMILY MEDICINE CLINIC | Facility: CLINIC | Age: 83
End: 2018-01-30
Payer: COMMERCIAL

## 2018-01-30 VITALS
WEIGHT: 170 LBS | SYSTOLIC BLOOD PRESSURE: 100 MMHG | HEIGHT: 61 IN | DIASTOLIC BLOOD PRESSURE: 62 MMHG | TEMPERATURE: 97.6 F | BODY MASS INDEX: 32.1 KG/M2 | HEART RATE: 64 BPM

## 2018-01-30 DIAGNOSIS — G89.29 CHRONIC LEFT UPPER QUADRANT PAIN: ICD-10-CM

## 2018-01-30 DIAGNOSIS — R44.1 VISUAL HALLUCINATIONS: ICD-10-CM

## 2018-01-30 DIAGNOSIS — I10 ESSENTIAL HYPERTENSION: ICD-10-CM

## 2018-01-30 DIAGNOSIS — F41.9 ANXIETY DISORDER, UNSPECIFIED TYPE: ICD-10-CM

## 2018-01-30 DIAGNOSIS — I35.0 AORTIC VALVE STENOSIS, ETIOLOGY OF CARDIAC VALVE DISEASE UNSPECIFIED: ICD-10-CM

## 2018-01-30 DIAGNOSIS — E03.8 OTHER SPECIFIED HYPOTHYROIDISM: ICD-10-CM

## 2018-01-30 DIAGNOSIS — E78.5 HYPERLIPIDEMIA, UNSPECIFIED HYPERLIPIDEMIA TYPE: ICD-10-CM

## 2018-01-30 DIAGNOSIS — R26.2 AMBULATORY DYSFUNCTION: ICD-10-CM

## 2018-01-30 DIAGNOSIS — I25.10 CORONARY ARTERY DISEASE INVOLVING NATIVE CORONARY ARTERY OF NATIVE HEART WITHOUT ANGINA PECTORIS: ICD-10-CM

## 2018-01-30 DIAGNOSIS — G50.0 TRIGEMINAL NEURALGIA: ICD-10-CM

## 2018-01-30 DIAGNOSIS — R53.1 GENERAL WEAKNESS: ICD-10-CM

## 2018-01-30 DIAGNOSIS — R10.12 CHRONIC LEFT UPPER QUADRANT PAIN: ICD-10-CM

## 2018-01-30 DIAGNOSIS — E11.9 TYPE 2 DIABETES MELLITUS WITHOUT COMPLICATION, WITHOUT LONG-TERM CURRENT USE OF INSULIN (HCC): Primary | ICD-10-CM

## 2018-01-30 PROBLEM — J30.9 ALLERGIC RHINITIS: Status: ACTIVE | Noted: 2017-05-02

## 2018-01-30 PROCEDURE — 99214 OFFICE O/P EST MOD 30 MIN: CPT | Performed by: FAMILY MEDICINE

## 2018-01-30 RX ORDER — OLANZAPINE 2.5 MG/1
TABLET ORAL
Qty: 20 TABLET | Refills: 0 | Status: SHIPPED | OUTPATIENT
Start: 2018-01-30 | End: 2018-02-16 | Stop reason: SDUPTHER

## 2018-01-30 RX ORDER — BLOOD-GLUCOSE METER
EACH MISCELLANEOUS
COMMUNITY
Start: 2017-06-23 | End: 2018-08-05 | Stop reason: ALTCHOICE

## 2018-01-30 RX ORDER — OLANZAPINE 2.5 MG/1
2.5 TABLET ORAL
Qty: 10 TABLET | Refills: 0 | Status: SHIPPED | OUTPATIENT
Start: 2018-01-30 | End: 2018-01-30 | Stop reason: SDUPTHER

## 2018-01-30 RX ORDER — LORAZEPAM 0.5 MG/1
1 TABLET ORAL ONCE AS NEEDED
COMMUNITY
Start: 2017-07-14 | End: 2018-05-12

## 2018-01-30 RX ORDER — LANCETS
EACH MISCELLANEOUS
COMMUNITY
Start: 2017-06-23 | End: 2018-08-05 | Stop reason: ALTCHOICE

## 2018-01-30 RX ORDER — ACETAMINOPHEN 160 MG
2000 TABLET,DISINTEGRATING ORAL DAILY
COMMUNITY

## 2018-01-30 RX ORDER — ACETAMINOPHEN AND CODEINE PHOSPHATE 300; 30 MG/1; MG/1
TABLET ORAL
COMMUNITY
Start: 2017-12-15 | End: 2018-04-13 | Stop reason: SDUPTHER

## 2018-01-30 RX ORDER — AMLODIPINE BESYLATE AND BENAZEPRIL HYDROCHLORIDE 10; 20 MG/1; MG/1
1 CAPSULE ORAL DAILY
COMMUNITY
Start: 2017-04-25 | End: 2018-02-17 | Stop reason: DRUGHIGH

## 2018-01-30 NOTE — PROGRESS NOTES
Assessment/Plan:     Diagnoses and all orders for this visit:    Type 2 diabetes mellitus without complication, without long-term current use of insulin ([facility])  Comments:  Stable, continue with Januvia 25 mg daily  Follow-up on hemoglobin A1c  Orders:  -     CBC and differential; Future  -     Comprehensive metabolic panel; Future  -     Hemoglobin A1c; Future  -     Vitamin D 25 hydroxy; Future  -     Vitamin B12; Future  -     TSH, 3rd generation with T4 reflex; Future  -     Folate; Future  -     CBC and differential  -     Comprehensive metabolic panel  -     Hemoglobin A1c  -     Vitamin D 25 hydroxy  -     Vitamin B12  -     TSH, 3rd generation with T4 reflex  -     Folate    Chronic left upper quadrant pain  Comments: This is chronic pain, complete workup and imaging was done before, I feel it is related to her neuropathy, it is more stable now on the gabapentin to continue  Visual hallucinations  Comments:  Extremely uncontrolled, affecting patient sleep, advised to start Zyprexa 2 5 mg q h s  for the next week, to call me if it did not work  Orders:  -     OLANZapine (ZyPREXA) 2 5 mg tablet; Take 1 tablet (2 5 mg total) by mouth daily at bedtime for 10 days    Other specified hypothyroidism  Comments:  Stable continue with levothyroxine follow up on a TSH  Essential hypertension  Comments:  Blood pressure is on the low side, patient is taking amlodipine/enalapril, to continue for now, if blood pressure continue to be low need adjustment  Orders:  -     CBC and differential; Future  -     Comprehensive metabolic panel; Future  -     Hemoglobin A1c; Future  -     Vitamin D 25 hydroxy; Future  -     Vitamin B12; Future  -     TSH, 3rd generation with T4 reflex; Future  -     Folate;  Future  -     CBC and differential  -     Comprehensive metabolic panel  -     Hemoglobin A1c  -     Vitamin D 25 hydroxy  -     Vitamin B12  -     TSH, 3rd generation with T4 reflex  -     Folate    Coronary artery disease involving native coronary artery of native heart without angina pectoris  Comments:  Stable has regular follow-up with cardiologist     Aortic valve stenosis, etiology of cardiac valve disease unspecified  Comments:  Moderate to severe, has regular follow-up with cardiologist, scheduled for an echocardiogram in 6 months  Hyperlipidemia, unspecified hyperlipidemia type  Comments:  Stable continue of the statin due to her generalized weakness  Orders:  -     CBC and differential; Future  -     Comprehensive metabolic panel; Future  -     Hemoglobin A1c; Future  -     Vitamin D 25 hydroxy; Future  -     Vitamin B12; Future  -     TSH, 3rd generation with T4 reflex; Future  -     Folate; Future  -     CBC and differential  -     Comprehensive metabolic panel  -     Hemoglobin A1c  -     Vitamin D 25 hydroxy  -     Vitamin B12  -     TSH, 3rd generation with T4 reflex  -     Folate    Trigeminal neuralgia  Comments:  Very stable on the gabapentin, to continue  Ambulatory dysfunction    Anxiety disorder, unspecified type    General weakness  Comments:  Discontinued, this could be due to her medication the gabapentin, but the benefit of taking the medication out weight the risks  Patient to increase her fluid     Other orders  -     Incontinence Supply Disposable (ATTENDS BRIEFS LARGE) MISC;   -     Elastic Bandages & Supports ([address]) MISC;   -     Toothbrushes (TOOTHBRUSH/CROSS ACTION) MISC;   -     glucose blood (ACCU-CHEK BURTON PLUS) test strip; 1 Squirt by In Vitro route daily  -     Blood Glucose Monitoring Suppl (ACCU-CHEK BURTON PLUS) w/Device KIT; by Does not apply route  -     ACCU-CHEK SOFTCLIX LANCETS lancets; by Does not apply route  -     acetaminophen-codeine (TYLENOL #3) 300-30 mg per tablet; Take by mouth  -     sitaGLIPtin (JANUVIA) 50 mg tablet; Take 1 tablet by mouth daily  -     Cholecalciferol (VITAMIN D3) 2000 units capsule;  Take 1 tablet by mouth daily  - LORazepam (ATIVAN) 0 5 mg tablet; Take 1 tablet by mouth 2 (two) times a day as needed  -     amLODIPine-benazepril (LOTREL 5-10) 5-10 MG per capsule; Take 1 capsule by mouth daily          Subjective:      Patient ID: Eleazar Arnold is a 80 y o  female  Pt c/o headaches and congestion  Also pt states the pain she has in the left flank has now spread across stomach x 3 days  She is not currently experiencing this but it comes and goes and can radiate up the left side in her under arm  Also pt states she has terrible pain in her fingers which is worse in the morning  Kw  1- chronic Lorena q pain   2- headache   3- congestion      Abdominal Pain   This is a chronic problem  The current episode started more than 1 year ago  The onset quality is gradual  The problem occurs every several days  The problem has been unchanged  The pain is located in the LUQ  The pain is mild  The quality of the pain is burning and sharp  The abdominal pain radiates to the epigastric region  Associated symptoms include arthralgias and headaches  Pertinent negatives include no anorexia, belching, constipation, diarrhea, dysuria, fever, flatus, frequency, hematochezia, hematuria, melena, myalgias, nausea, vomiting or weight loss  The pain is relieved by being still  She has tried antacids, proton pump inhibitors and acetaminophen for the symptoms  The treatment provided mild relief  Prior diagnostic workup includes CT scan, GI consult and ultrasound  Her past medical history is significant for GERD  There is no history of colon cancer, Crohn's disease, gallstones, irritable bowel syndrome, pancreatitis, PUD or ulcerative colitis  The following portions of the patient's history were reviewed and updated as appropriate: allergies, current medications, past family history, past medical history, past social history, past surgical history and problem list     Review of Systems   Constitutional: Positive for fatigue   Negative for activity change, appetite change, chills, fever and weight loss  HENT: Positive for congestion and sinus pressure  Negative for drooling, ear discharge, ear pain, postnasal drip, rhinorrhea, sinus pain, sore throat, trouble swallowing and voice change  Eyes: Negative for photophobia and visual disturbance  Respiratory: Negative for cough and shortness of breath  Cardiovascular: Negative for chest pain, palpitations and leg swelling  Gastrointestinal: Positive for abdominal pain  Negative for abdominal distention, anorexia, blood in stool, constipation, diarrhea, flatus, hematochezia, melena, nausea, rectal pain and vomiting  Genitourinary: Negative for dysuria, frequency and hematuria  Musculoskeletal: Positive for arthralgias, back pain and gait problem  Negative for myalgias and neck stiffness  Neurological: Positive for weakness and headaches  Negative for dizziness, tremors, light-headedness and numbness  Objective:  Vitals:    01/30/18 1115   BP: 100/62   Pulse: 64   Temp: 97 6 °F (36 4 °C)   Weight: 77 1 kg (170 lb)   Height: 5' 1" (1 549 m)      Physical Exam   Constitutional: She is oriented to person, place, and time  She appears well-developed and well-nourished  HENT:   Head: Normocephalic and atraumatic  Eyes: Conjunctivae and EOM are normal    Neck: Normal range of motion  Neck supple  Cardiovascular: Normal rate and regular rhythm  Pulmonary/Chest: Effort normal and breath sounds normal    Abdominal: Soft  Bowel sounds are normal    Musculoskeletal: Normal range of motion  Neurological: She is alert and oriented to person, place, and time  Skin: Skin is warm and dry  Nursing note and vitals reviewed

## 2018-01-30 NOTE — PATIENT INSTRUCTIONS

## 2018-02-14 ENCOUNTER — TELEPHONE (OUTPATIENT)
Dept: FAMILY MEDICINE CLINIC | Facility: CLINIC | Age: 83
End: 2018-02-14

## 2018-02-14 DIAGNOSIS — I10 ESSENTIAL HYPERTENSION: Primary | ICD-10-CM

## 2018-02-14 NOTE — TELEPHONE ENCOUNTER
I spoke with patient and she does need a script for quinipril sent to [facility]   cd

## 2018-02-14 NOTE — TELEPHONE ENCOUNTER
Patient called because she said that Dr Judy Lomlei wanted her to call us with her BP Readings  2/4/18 133/53  2/5/18 135/63  2/6/18 114/64  2/7/18 121/74  2/13/18 122/53  2/14/18 121/66    She also said that the Dr would have samples of Januvia for her

## 2018-02-14 NOTE — TELEPHONE ENCOUNTER
Patient blood pressure is on the low side, that could be the reason for her dizziness and weakness, advised patient to adjust her blood pressure medication slightly, patient is taking amlodipine/quinapril, patient need to stop this and to start taking quinapril 10 mg daily and to keep monitoring her blood pressure over the next few weeks  I feel that patient had quinapril bite self if she does not I can send a prescription over to the pharmacy

## 2018-02-14 NOTE — TELEPHONE ENCOUNTER
Please call patient for medication adjustment  Patient to stop amlodipine/quinapril combination  Patient to start taking quinapril 10 mg daily   Patient to keep monitoring her blood pressure regularly  Let me know if patient needs a prescription for quinapril

## 2018-02-15 DIAGNOSIS — I10 ESSENTIAL HYPERTENSION: ICD-10-CM

## 2018-02-15 DIAGNOSIS — E03.8 OTHER SPECIFIED HYPOTHYROIDISM: ICD-10-CM

## 2018-02-15 DIAGNOSIS — G50.0 TRIGEMINAL NEURALGIA: ICD-10-CM

## 2018-02-15 DIAGNOSIS — E03.9 HYPOTHYROIDISM, UNSPECIFIED TYPE: Primary | ICD-10-CM

## 2018-02-15 DIAGNOSIS — G52.9 CRANIAL NEURALGIA: ICD-10-CM

## 2018-02-15 RX ORDER — QUINAPRIL 10 MG/1
10 TABLET ORAL
Qty: 30 TABLET | Refills: 5 | Status: SHIPPED | OUTPATIENT
Start: 2018-02-15 | End: 2018-02-16 | Stop reason: SDUPTHER

## 2018-02-16 ENCOUNTER — OFFICE VISIT (OUTPATIENT)
Dept: FAMILY MEDICINE CLINIC | Facility: CLINIC | Age: 83
End: 2018-02-16
Payer: COMMERCIAL

## 2018-02-16 VITALS
HEIGHT: 62 IN | WEIGHT: 171.6 LBS | HEART RATE: 68 BPM | BODY MASS INDEX: 31.58 KG/M2 | SYSTOLIC BLOOD PRESSURE: 130 MMHG | DIASTOLIC BLOOD PRESSURE: 62 MMHG

## 2018-02-16 DIAGNOSIS — R06.00 DOE (DYSPNEA ON EXERTION): ICD-10-CM

## 2018-02-16 DIAGNOSIS — R44.1 VISUAL HALLUCINATIONS: ICD-10-CM

## 2018-02-16 DIAGNOSIS — I10 ESSENTIAL HYPERTENSION: Primary | ICD-10-CM

## 2018-02-16 PROCEDURE — 99214 OFFICE O/P EST MOD 30 MIN: CPT | Performed by: FAMILY MEDICINE

## 2018-02-16 RX ORDER — OLANZAPINE 2.5 MG/1
2.5 TABLET ORAL
Qty: 30 TABLET | Refills: 2 | Status: SHIPPED | OUTPATIENT
Start: 2018-02-16 | End: 2018-05-12

## 2018-02-16 RX ORDER — LORAZEPAM 0.5 MG/1
0.5 TABLET ORAL 2 TIMES DAILY PRN
Qty: 30 TABLET | Refills: 0 | Status: CANCELLED | OUTPATIENT
Start: 2018-02-16

## 2018-02-16 NOTE — PROGRESS NOTES
Assessment/Plan:    HTN (hypertension)  BP is better controlled but her numbers from home are more on the low side , Advised pt to monitor her BP closely, medication reviewed and adjusted , pt to stop the benazepril or the quinapril, to change the medication from amlodipine  benazepril 10/20 to 5/10 mg daily and to monitor BP closely  Coronary artery disease involving native coronary artery of native heart  Stable lately, has regular fu with cardiology   Her chest pain is very less frequent  Trigeminal neuralgia  Under control with the use of the gabapentin  Visual hallucinations  Better controlled with the use of Zyprexa and no SE from it  Refills was given  SMITH (dyspnea on exertion)  This is seem to be multifactorial, pt with h/o CAD and with Aortic stenosis   Pt respond well to albuterol inhaler , refills was given   explained to her if sx get worse pr more frequent to fu with cardiology  Diagnoses and all orders for this visit:    Essential hypertension  -     amLODIPine-benazepril (LOTREL 5-10) 5-10 MG per capsule; Take 1 capsule by mouth daily    Visual hallucinations  Comments:  Extremely uncontrolled, affecting patient sleep, advised to start Zyprexa 2 5 mg q h s  for the next week, to call me if it did not work  Orders:  -     OLANZapine (ZyPREXA) 2 5 mg tablet; Take 1 tablet (2 5 mg total) by mouth daily at bedtime    SMITH (dyspnea on exertion)  -     albuterol (VENTOLIN HFA) 90 mcg/act inhaler; Inhale 2 puffs every 6 (six) hours as needed for wheezing    Other orders  -     Cancel: LORazepam (ATIVAN) 0 5 mg tablet; Take 1 tablet (0 5 mg total) by mouth 2 (two) times a day as needed          Subjective:   CC: Follow up to discuss blood pressure and to discuss medications    naima     Patient ID: Ayden Rome is a 80 y o  female      Pt was here to discuss her BP ,pt also brought her medication to review them, pt was taking quinapril 10 mg and amlodipine 10/20 mg , feel weak, she is asking for refills on Zyprexa as well it helped her visual halluciantion  The following portions of the patient's history were reviewed and updated as appropriate: allergies, current medications, past family history, past medical history, past social history, past surgical history and problem list     Review of Systems   Constitutional: Negative for appetite change, chills and fever  HENT: Negative for congestion, sore throat and voice change  Eyes: Negative for pain and visual disturbance  Respiratory: Negative for cough  Cardiovascular: Negative for chest pain and palpitations  Gastrointestinal: Negative for abdominal pain, constipation, diarrhea and nausea  Endocrine: Negative for cold intolerance, heat intolerance and polyuria  Genitourinary: Negative for dysuria, enuresis, flank pain and frequency  Musculoskeletal: Negative for gait problem, joint swelling and neck pain  Skin: Negative for color change and wound  Neurological: Negative for dizziness, syncope, speech difficulty, numbness and headaches  Psychiatric/Behavioral: Negative for behavioral problems and confusion  The patient is not nervous/anxious  Objective:    Vitals:    02/16/18 1338   BP: 130/62   BP Location: Left arm   Patient Position: Sitting   Pulse: 68   Weight: 77 8 kg (171 lb 9 6 oz)   Height: 5' 2 25" (1 581 m)          Physical Exam   Constitutional: She is oriented to person, place, and time  She appears well-developed and well-nourished  HENT:   Head: Normocephalic and atraumatic  Eyes: Conjunctivae and EOM are normal  Pupils are equal, round, and reactive to light  Neck: Normal range of motion  Neck supple  Cardiovascular: Normal rate, regular rhythm, normal heart sounds and intact distal pulses  Pulmonary/Chest: Effort normal and breath sounds normal    Abdominal: Soft  Bowel sounds are normal    Musculoskeletal: Normal range of motion     Neurological: She is alert and oriented to person, place, and time  She has normal reflexes  Skin: Skin is warm and dry  Psychiatric: She has a normal mood and affect  Her behavior is normal    Vitals reviewed

## 2018-02-17 PROBLEM — R06.09 DOE (DYSPNEA ON EXERTION): Status: ACTIVE | Noted: 2018-02-17

## 2018-02-17 PROBLEM — R06.00 DOE (DYSPNEA ON EXERTION): Status: ACTIVE | Noted: 2018-02-17

## 2018-02-17 RX ORDER — AMLODIPINE BESYLATE AND BENAZEPRIL HYDROCHLORIDE 5; 10 MG/1; MG/1
1 CAPSULE ORAL DAILY
Qty: 30 CAPSULE | Refills: 5
Start: 2018-02-17 | End: 2018-05-12

## 2018-02-17 RX ORDER — ALBUTEROL SULFATE 90 UG/1
2 AEROSOL, METERED RESPIRATORY (INHALATION) EVERY 6 HOURS PRN
Qty: 1 INHALER | Refills: 5 | Status: SHIPPED | OUTPATIENT
Start: 2018-02-17 | End: 2018-05-12

## 2018-02-17 RX ORDER — OLANZAPINE 2.5 MG/1
TABLET ORAL
Qty: 90 TABLET | Refills: 2 | OUTPATIENT
Start: 2018-02-17

## 2018-02-17 NOTE — PATIENT INSTRUCTIONS

## 2018-02-17 NOTE — ASSESSMENT & PLAN NOTE
This is seem to be multifactorial, pt with h/o CAD and with Aortic stenosis   Pt respond well to albuterol inhaler , refills was given   explained to her if sx get worse pr more frequent to fu with cardiology

## 2018-02-17 NOTE — ASSESSMENT & PLAN NOTE
BP is better controlled but her numbers from home are more on the low side , Advised pt to monitor her BP closely, medication reviewed and adjusted , pt to stop the benazepril or the quinapril, to change the medication from amlodipine  benazepril 10/20 to 5/10 mg daily and to monitor BP closely

## 2018-02-20 RX ORDER — LEVOTHYROXINE SODIUM 0.05 MG/1
75 TABLET ORAL DAILY
Qty: 90 TABLET | Refills: 3 | Status: SHIPPED | OUTPATIENT
Start: 2018-02-20 | End: 2018-03-22 | Stop reason: SDUPTHER

## 2018-02-20 RX ORDER — AMLODIPINE BESYLATE AND BENAZEPRIL HYDROCHLORIDE 5; 10 MG/1; MG/1
1 CAPSULE ORAL DAILY
Qty: 90 CAPSULE | Refills: 3 | Status: SHIPPED | OUTPATIENT
Start: 2018-02-20 | End: 2018-03-29 | Stop reason: SDUPTHER

## 2018-02-20 RX ORDER — QUINAPRIL 10 MG/1
TABLET ORAL
Qty: 90 TABLET | Refills: 5 | OUTPATIENT
Start: 2018-02-20

## 2018-02-20 RX ORDER — GABAPENTIN 600 MG/1
300 TABLET ORAL EVERY 8 HOURS PRN
Qty: 90 TABLET | Refills: 3 | Status: SHIPPED | OUTPATIENT
Start: 2018-02-20 | End: 2019-02-25 | Stop reason: SDUPTHER

## 2018-02-27 DIAGNOSIS — G52.9 CRANIAL NEURALGIA: ICD-10-CM

## 2018-02-27 RX ORDER — GABAPENTIN 600 MG/1
300 TABLET ORAL EVERY 8 HOURS PRN
Qty: 90 TABLET | Refills: 0 | Status: CANCELLED | OUTPATIENT
Start: 2018-02-27

## 2018-03-15 ENCOUNTER — DOCTOR'S OFFICE (OUTPATIENT)
Dept: URBAN - METROPOLITAN AREA CLINIC 136 | Facility: CLINIC | Age: 83
Setting detail: OPHTHALMOLOGY
End: 2018-03-15
Payer: COMMERCIAL

## 2018-03-15 DIAGNOSIS — H02.834: ICD-10-CM

## 2018-03-15 DIAGNOSIS — E11.9: ICD-10-CM

## 2018-03-15 DIAGNOSIS — H02.011: ICD-10-CM

## 2018-03-15 DIAGNOSIS — Z79.84: ICD-10-CM

## 2018-03-15 DIAGNOSIS — H40.1132: ICD-10-CM

## 2018-03-15 DIAGNOSIS — H02.831: ICD-10-CM

## 2018-03-15 PROCEDURE — 92083 EXTENDED VISUAL FIELD XM: CPT | Performed by: OPHTHALMOLOGY

## 2018-03-15 PROCEDURE — 92014 COMPRE OPH EXAM EST PT 1/>: CPT | Performed by: OPHTHALMOLOGY

## 2018-03-15 ASSESSMENT — REFRACTION_CURRENTRX
OS_OVR_VA: 20/
OD_OVR_VA: 20/
OD_OVR_VA: 20/
OS_OVR_VA: 20/
OD_OVR_VA: 20/
OS_OVR_VA: 20/

## 2018-03-15 ASSESSMENT — SUPERFICIAL PUNCTATE KERATITIS (SPK)
OD_SPK: 1+
OS_SPK: 1+

## 2018-03-15 ASSESSMENT — VISUAL ACUITY
OD_BCVA: 20/60+1
OS_BCVA: 20/70-2

## 2018-03-15 ASSESSMENT — REFRACTION_OUTSIDERX
OS_VA2: 20/
OS_VA1: 20/25-1
OD_CYLINDER: -2.75
OD_VA3: 20/
OS_AXIS: 085
OD_VA2: 20/25-
OS_VA3: 20/
OD_VA1: 20/20
OS_SPHERE: -0.25
OD_AXIS: 080
OD_SPHERE: -1.25
OS_ADD: +2.50
OS_CYLINDER: -2.75
OU_VA: 20/25
OD_ADD: +2.50

## 2018-03-15 ASSESSMENT — REFRACTION_AUTOREFRACTION
OS_SPHERE: +0.25
OD_AXIS: 80
OD_CYLINDER: -4.50
OS_AXIS: 81
OS_CYLINDER: -4.75
OD_SPHERE: -1.25

## 2018-03-15 ASSESSMENT — REFRACTION_MANIFEST
OS_VA1: 20/
OD_SPHERE: -1.00
OS_VA3: 20/
OS_VA1: 20/30
OS_CYLINDER: -3.50
OD_VA2: 20/
OD_VA1: 20/
OS_VA2: 20/
OS_VA2: 20/
OD_AXIS: 080
OS_VA3: 20/
OD_VA2: 20/
OD_VA3: 20/
OU_VA: 20/
OU_VA: 20/
OS_SPHERE: PLANO
OD_CYLINDER: -3.25
OD_VA3: 20/
OS_AXIS: 085
OD_VA1: 20/20

## 2018-03-15 ASSESSMENT — SPHEQUIV_DERIVED
OD_SPHEQUIV: -3.5
OD_SPHEQUIV: -2.625
OS_SPHEQUIV: -2.125

## 2018-03-15 ASSESSMENT — LID EXAM ASSESSMENTS
OS_COMMENTS: MGD
OD_BLEPHARITIS: RLL RUL 1+
OD_TRICHIASIS: RUL
OS_BLEPHARITIS: LLL LUL 1+

## 2018-03-15 ASSESSMENT — CONFRONTATIONAL VISUAL FIELD TEST (CVF)
OS_FINDINGS: FULL
OD_FINDINGS: FULL

## 2018-03-15 ASSESSMENT — LID POSITION - DERMATOCHALASIS
OD_DERMATOCHALASIS: RUL 1+
OS_DERMATOCHALASIS: LUL 1+

## 2018-03-15 ASSESSMENT — PUNCTA - ASSESSMENT: OS_PUNCTA: SIL PLUG LLL

## 2018-03-16 ENCOUNTER — TELEPHONE (OUTPATIENT)
Dept: FAMILY MEDICINE CLINIC | Facility: CLINIC | Age: 83
End: 2018-03-16

## 2018-03-16 NOTE — TELEPHONE ENCOUNTER
Vianey Cash called in and said that she needs a refill of her levothyroxine  She said that [place] told her they were trying to get an auth from the office but have not received anything  Please advise   Thank you

## 2018-03-22 DIAGNOSIS — E03.9 HYPOTHYROIDISM, UNSPECIFIED TYPE: Primary | ICD-10-CM

## 2018-03-26 ENCOUNTER — APPOINTMENT (OUTPATIENT)
Dept: LAB | Facility: CLINIC | Age: 83
End: 2018-03-26
Payer: COMMERCIAL

## 2018-03-26 ENCOUNTER — TRANSCRIBE ORDERS (OUTPATIENT)
Dept: LAB | Facility: CLINIC | Age: 83
End: 2018-03-26

## 2018-03-26 DIAGNOSIS — E78.49 OTHER HYPERLIPIDEMIA: Primary | ICD-10-CM

## 2018-03-26 LAB
25(OH)D3 SERPL-MCNC: 26.7 NG/ML (ref 30–100)
ALBUMIN SERPL BCP-MCNC: 3.2 G/DL (ref 3.5–5)
ALP SERPL-CCNC: 85 U/L (ref 46–116)
ALT SERPL W P-5'-P-CCNC: 14 U/L (ref 12–78)
ANION GAP SERPL CALCULATED.3IONS-SCNC: 4 MMOL/L (ref 4–13)
AST SERPL W P-5'-P-CCNC: 13 U/L (ref 5–45)
BASOPHILS # BLD AUTO: 0.05 THOUSANDS/ΜL (ref 0–0.1)
BASOPHILS NFR BLD AUTO: 1 % (ref 0–1)
BILIRUB SERPL-MCNC: 0.54 MG/DL (ref 0.2–1)
BUN SERPL-MCNC: 17 MG/DL (ref 5–25)
CALCIUM SERPL-MCNC: 8.9 MG/DL (ref 8.3–10.1)
CHLORIDE SERPL-SCNC: 108 MMOL/L (ref 100–108)
CO2 SERPL-SCNC: 26 MMOL/L (ref 21–32)
CREAT SERPL-MCNC: 0.87 MG/DL (ref 0.6–1.3)
EOSINOPHIL # BLD AUTO: 0.24 THOUSAND/ΜL (ref 0–0.61)
EOSINOPHIL NFR BLD AUTO: 4 % (ref 0–6)
ERYTHROCYTE [DISTWIDTH] IN BLOOD BY AUTOMATED COUNT: 13.4 % (ref 11.6–15.1)
EST. AVERAGE GLUCOSE BLD GHB EST-MCNC: 146 MG/DL
GFR SERPL CREATININE-BSD FRML MDRD: 65 ML/MIN/1.73SQ M
GLUCOSE P FAST SERPL-MCNC: 131 MG/DL (ref 65–99)
HBA1C MFR BLD: 6.7 % (ref 4.2–6.3)
HCT VFR BLD AUTO: 44.3 % (ref 34.8–46.1)
HGB BLD-MCNC: 15 G/DL (ref 11.5–15.4)
LYMPHOCYTES # BLD AUTO: 1.22 THOUSANDS/ΜL (ref 0.6–4.47)
LYMPHOCYTES NFR BLD AUTO: 19 % (ref 14–44)
MCH RBC QN AUTO: 30.8 PG (ref 26.8–34.3)
MCHC RBC AUTO-ENTMCNC: 33.9 G/DL (ref 31.4–37.4)
MCV RBC AUTO: 91 FL (ref 82–98)
MONOCYTES # BLD AUTO: 0.56 THOUSAND/ΜL (ref 0.17–1.22)
MONOCYTES NFR BLD AUTO: 9 % (ref 4–12)
NEUTROPHILS # BLD AUTO: 4.33 THOUSANDS/ΜL (ref 1.85–7.62)
NEUTS SEG NFR BLD AUTO: 67 % (ref 43–75)
NRBC BLD AUTO-RTO: 0 /100 WBCS
PLATELET # BLD AUTO: 210 THOUSANDS/UL (ref 149–390)
PMV BLD AUTO: 11.2 FL (ref 8.9–12.7)
POTASSIUM SERPL-SCNC: 4.1 MMOL/L (ref 3.5–5.3)
PROT SERPL-MCNC: 7.5 G/DL (ref 6.4–8.2)
RBC # BLD AUTO: 4.87 MILLION/UL (ref 3.81–5.12)
SODIUM SERPL-SCNC: 138 MMOL/L (ref 136–145)
WBC # BLD AUTO: 6.41 THOUSAND/UL (ref 4.31–10.16)

## 2018-03-26 PROCEDURE — 36415 COLL VENOUS BLD VENIPUNCTURE: CPT

## 2018-03-26 PROCEDURE — 83036 HEMOGLOBIN GLYCOSYLATED A1C: CPT

## 2018-03-26 PROCEDURE — 85025 COMPLETE CBC W/AUTO DIFF WBC: CPT

## 2018-03-26 PROCEDURE — 82306 VITAMIN D 25 HYDROXY: CPT

## 2018-03-26 PROCEDURE — 80053 COMPREHEN METABOLIC PANEL: CPT

## 2018-03-27 RX ORDER — LEVOTHYROXINE SODIUM 0.07 MG/1
75 TABLET ORAL DAILY
Qty: 90 TABLET | Refills: 3 | Status: SHIPPED | OUTPATIENT
Start: 2018-03-27 | End: 2018-03-29 | Stop reason: SDUPTHER

## 2018-03-29 ENCOUNTER — OFFICE VISIT (OUTPATIENT)
Dept: FAMILY MEDICINE CLINIC | Facility: CLINIC | Age: 83
End: 2018-03-29
Payer: COMMERCIAL

## 2018-03-29 VITALS
WEIGHT: 170.2 LBS | HEIGHT: 63 IN | HEART RATE: 84 BPM | DIASTOLIC BLOOD PRESSURE: 60 MMHG | SYSTOLIC BLOOD PRESSURE: 156 MMHG | BODY MASS INDEX: 30.16 KG/M2

## 2018-03-29 DIAGNOSIS — E03.9 HYPOTHYROIDISM, UNSPECIFIED TYPE: Primary | ICD-10-CM

## 2018-03-29 DIAGNOSIS — E11.9 TYPE 2 DIABETES MELLITUS WITHOUT COMPLICATION, WITHOUT LONG-TERM CURRENT USE OF INSULIN (HCC): ICD-10-CM

## 2018-03-29 DIAGNOSIS — I25.10 CORONARY ARTERY DISEASE INVOLVING NATIVE CORONARY ARTERY OF NATIVE HEART WITHOUT ANGINA PECTORIS: ICD-10-CM

## 2018-03-29 DIAGNOSIS — G89.29 CHRONIC LEFT UPPER QUADRANT PAIN: ICD-10-CM

## 2018-03-29 DIAGNOSIS — R10.12 CHRONIC LEFT UPPER QUADRANT PAIN: ICD-10-CM

## 2018-03-29 DIAGNOSIS — I10 ESSENTIAL HYPERTENSION: ICD-10-CM

## 2018-03-29 PROCEDURE — 99214 OFFICE O/P EST MOD 30 MIN: CPT | Performed by: FAMILY MEDICINE

## 2018-03-29 RX ORDER — LEVOTHYROXINE SODIUM 0.07 MG/1
75 TABLET ORAL DAILY
Qty: 90 TABLET | Refills: 3 | Status: SHIPPED | OUTPATIENT
Start: 2018-03-29 | End: 2019-01-09 | Stop reason: ALTCHOICE

## 2018-03-29 NOTE — ASSESSMENT & PLAN NOTE
Very chronic, multiple CT scan was done and they were all negative, x-ray, echo were done and were negative, patient described as sharp pain on the left upper quadrant waking her up from sleep, and related to her bowel movement, from her description of from the physical exam it sounds like musculoskeletal pain, it usually responds to full taking gel, advised patient to use Voltaren gel as needed to apply ice, to return to the office if it become more frequent or more severe

## 2018-03-29 NOTE — PROGRESS NOTES
Assessment/Plan:    HTN (hypertension)  Blood pressure is better controlled, is slightly on the high side today but patient is not as lightheaded as before her generalized weakness and dizziness improved, I think that is reasonable to keep her blood pressure slightly higher to be able to function  To continue with the benazepril amlodipine  Other specified hypothyroidism  Stable, with normal TSH, refills was given for the levothyroxine  Chronic left upper quadrant pain  Very chronic, multiple CT scan was done and they were all negative, x-ray, echo were done and were negative, patient described as sharp pain on the left upper quadrant waking her up from sleep, and related to her bowel movement, from her description of from the physical exam it sounds like musculoskeletal pain, it usually responds to full taking gel, advised patient to use Voltaren gel as needed to apply ice, to return to the office if it become more frequent or more severe  Coronary artery disease involving native coronary artery of native heart  Stable, has regular follow-up with cardiologist     Type 2 diabetes mellitus without complication ([facility] )  Stable with a hemoglobin A1c of 6 9, Januvia samples was given  Diagnoses and all orders for this visit:    Hypothyroidism, unspecified type  -     levothyroxine 75 mcg tablet; Take 1 tablet (75 mcg total) by mouth daily    Chronic left upper quadrant pain    Essential hypertension    Coronary artery disease involving native coronary artery of native heart without angina pectoris    Type 2 diabetes mellitus without complication, without long-term current use of insulin ([facility])    Other orders  -     Melatonin 5 MG CAPS;           Subjective:   Chief complaint:  Pt is here for follow up of hypertension   ~cd     Patient ID: Dionisio Roman is a 80 y o  female  PATIENT IS HERE FOR FOLLOW-UP ON HYPERTENSION, MEDICATION ADJUSTMENT WAS DONE RECENTLY        Hypertension   This is a chronic problem  The current episode started more than 1 year ago  The problem is unchanged  The problem is controlled  Pertinent negatives include no anxiety, blurred vision, chest pain, headaches, neck pain, orthopnea, palpitations, peripheral edema, PND, shortness of breath or sweats  There are no associated agents to hypertension  The following portions of the patient's history were reviewed and updated as appropriate: allergies, current medications, past family history, past medical history, past social history, past surgical history and problem list     Review of Systems   Constitutional: Negative for appetite change, chills and fever  HENT: Negative for congestion, sore throat and voice change  Eyes: Negative for blurred vision, pain and visual disturbance  Respiratory: Negative for cough and shortness of breath  Cardiovascular: Negative for chest pain, palpitations, orthopnea and PND  Gastrointestinal: Negative for abdominal pain, constipation, diarrhea and nausea  Endocrine: Negative for cold intolerance, heat intolerance and polyuria  Genitourinary: Negative for dysuria, enuresis, flank pain and frequency  Musculoskeletal: Negative for gait problem, joint swelling and neck pain  Skin: Negative for color change and wound  Neurological: Negative for dizziness, syncope, speech difficulty, numbness and headaches  Psychiatric/Behavioral: Negative for behavioral problems and confusion  The patient is not nervous/anxious  Objective:    Vitals:    03/29/18 1050   BP: 156/60   BP Location: Left arm   Patient Position: Sitting   Cuff Size: Large   Pulse: 84   Weight: 77 2 kg (170 lb 3 2 oz)   Height: 5' 2 5" (1 588 m)        Physical Exam   Constitutional: She is oriented to person, place, and time  She appears well-developed and well-nourished  HENT:   Head: Normocephalic and atraumatic  Eyes: Conjunctivae and EOM are normal  Pupils are equal, round, and reactive to light     Neck: Normal range of motion  Neck supple  Cardiovascular: Normal rate, regular rhythm, normal heart sounds and intact distal pulses  Pulmonary/Chest: Effort normal and breath sounds normal    Abdominal: Soft  Bowel sounds are normal    Musculoskeletal: Normal range of motion  Neurological: She is alert and oriented to person, place, and time  She has normal reflexes  Skin: Skin is warm and dry  Psychiatric: She has a normal mood and affect  Her behavior is normal    Vitals reviewed        Recent Results (from the past 1008 hour(s))   CBC and differential    Collection Time: 03/26/18  9:55 AM   Result Value Ref Range    WBC 6 41 4 31 - 10 16 Thousand/uL    RBC 4 87 3 81 - 5 12 Million/uL    Hemoglobin 15 0 11 5 - 15 4 g/dL    Hematocrit 44 3 34 8 - 46 1 %    MCV 91 82 - 98 fL    MCH 30 8 26 8 - 34 3 pg    MCHC 33 9 31 4 - 37 4 g/dL    RDW 13 4 11 6 - 15 1 %    MPV 11 2 8 9 - 12 7 fL    Platelets 065 505 - 743 Thousands/uL    nRBC 0 /100 WBCs    Neutrophils Relative 67 43 - 75 %    Lymphocytes Relative 19 14 - 44 %    Monocytes Relative 9 4 - 12 %    Eosinophils Relative 4 0 - 6 %    Basophils Relative 1 0 - 1 %    Neutrophils Absolute 4 33 1 85 - 7 62 Thousands/µL    Lymphocytes Absolute 1 22 0 60 - 4 47 Thousands/µL    Monocytes Absolute 0 56 0 17 - 1 22 Thousand/µL    Eosinophils Absolute 0 24 0 00 - 0 61 Thousand/µL    Basophils Absolute 0 05 0 00 - 0 10 Thousands/µL   Comprehensive metabolic panel    Collection Time: 03/26/18  9:55 AM   Result Value Ref Range    Sodium 138 136 - 145 mmol/L    Potassium 4 1 3 5 - 5 3 mmol/L    Chloride 108 100 - 108 mmol/L    CO2 26 21 - 32 mmol/L    Anion Gap 4 4 - 13 mmol/L    BUN 17 5 - 25 mg/dL    Creatinine 0 87 0 60 - 1 30 mg/dL    Glucose, Fasting 131 (H) 65 - 99 mg/dL    Calcium 8 9 8 3 - 10 1 mg/dL    AST 13 5 - 45 U/L    ALT 14 12 - 78 U/L    Alkaline Phosphatase 85 46 - 116 U/L    Total Protein 7 5 6 4 - 8 2 g/dL    Albumin 3 2 (L) 3 5 - 5 0 g/dL    Total Bilirubin 0  54 0 20 - 1 00 mg/dL    eGFR 65 ml/min/1 73sq m   HEMOGLOBIN A1C W/ EAG ESTIMATION    Collection Time: 03/26/18  9:55 AM   Result Value Ref Range    Hemoglobin A1C 6 7 (H) 4 2 - 6 3 %     mg/dl   Vitamin D 25 hydroxy    Collection Time: 03/26/18  9:55 AM   Result Value Ref Range    Vit D, 25-Hydroxy 26 7 (L) 30 0 - 100 0 ng/mL

## 2018-03-29 NOTE — ASSESSMENT & PLAN NOTE
Blood pressure is better controlled, is slightly on the high side today but patient is not as lightheaded as before her generalized weakness and dizziness improved, I think that is reasonable to keep her blood pressure slightly higher to be able to function  To continue with the benazepril amlodipine

## 2018-03-29 NOTE — PATIENT INSTRUCTIONS
Recent Results (from the past 1008 hour(s))   CBC and differential    Collection Time: 03/26/18  9:55 AM   Result Value Ref Range    WBC 6 41 4 31 - 10 16 Thousand/uL    RBC 4 87 3 81 - 5 12 Million/uL    Hemoglobin 15 0 11 5 - 15 4 g/dL    Hematocrit 44 3 34 8 - 46 1 %    MCV 91 82 - 98 fL    MCH 30 8 26 8 - 34 3 pg    MCHC 33 9 31 4 - 37 4 g/dL    RDW 13 4 11 6 - 15 1 %    MPV 11 2 8 9 - 12 7 fL    Platelets 192 988 - 049 Thousands/uL    nRBC 0 /100 WBCs    Neutrophils Relative 67 43 - 75 %    Lymphocytes Relative 19 14 - 44 %    Monocytes Relative 9 4 - 12 %    Eosinophils Relative 4 0 - 6 %    Basophils Relative 1 0 - 1 %    Neutrophils Absolute 4 33 1 85 - 7 62 Thousands/µL    Lymphocytes Absolute 1 22 0 60 - 4 47 Thousands/µL    Monocytes Absolute 0 56 0 17 - 1 22 Thousand/µL    Eosinophils Absolute 0 24 0 00 - 0 61 Thousand/µL    Basophils Absolute 0 05 0 00 - 0 10 Thousands/µL   Comprehensive metabolic panel    Collection Time: 03/26/18  9:55 AM   Result Value Ref Range    Sodium 138 136 - 145 mmol/L    Potassium 4 1 3 5 - 5 3 mmol/L    Chloride 108 100 - 108 mmol/L    CO2 26 21 - 32 mmol/L    Anion Gap 4 4 - 13 mmol/L    BUN 17 5 - 25 mg/dL    Creatinine 0 87 0 60 - 1 30 mg/dL    Glucose, Fasting 131 (H) 65 - 99 mg/dL    Calcium 8 9 8 3 - 10 1 mg/dL    AST 13 5 - 45 U/L    ALT 14 12 - 78 U/L    Alkaline Phosphatase 85 46 - 116 U/L    Total Protein 7 5 6 4 - 8 2 g/dL    Albumin 3 2 (L) 3 5 - 5 0 g/dL    Total Bilirubin 0 54 0 20 - 1 00 mg/dL    eGFR 65 ml/min/1 73sq m   HEMOGLOBIN A1C W/ EAG ESTIMATION    Collection Time: 03/26/18  9:55 AM   Result Value Ref Range    Hemoglobin A1C 6 7 (H) 4 2 - 6 3 %     mg/dl   Vitamin D 25 hydroxy    Collection Time: 03/26/18  9:55 AM   Result Value Ref Range    Vit D, 25-Hydroxy 26 7 (L) 30 0 - 100 0 ng/mL

## 2018-04-13 ENCOUNTER — OFFICE VISIT (OUTPATIENT)
Dept: FAMILY MEDICINE CLINIC | Facility: CLINIC | Age: 83
End: 2018-04-13
Payer: COMMERCIAL

## 2018-04-13 VITALS
WEIGHT: 169 LBS | BODY MASS INDEX: 29.95 KG/M2 | SYSTOLIC BLOOD PRESSURE: 128 MMHG | DIASTOLIC BLOOD PRESSURE: 74 MMHG | HEIGHT: 63 IN | HEART RATE: 68 BPM

## 2018-04-13 DIAGNOSIS — R10.12 CHRONIC LEFT UPPER QUADRANT PAIN: ICD-10-CM

## 2018-04-13 DIAGNOSIS — Z79.899 MEDICATION MANAGEMENT: ICD-10-CM

## 2018-04-13 DIAGNOSIS — E11.9 TYPE 2 DIABETES MELLITUS WITHOUT COMPLICATION, WITHOUT LONG-TERM CURRENT USE OF INSULIN (HCC): ICD-10-CM

## 2018-04-13 DIAGNOSIS — I25.10 CORONARY ARTERY DISEASE INVOLVING NATIVE CORONARY ARTERY OF NATIVE HEART WITHOUT ANGINA PECTORIS: ICD-10-CM

## 2018-04-13 DIAGNOSIS — I10 ESSENTIAL HYPERTENSION: Primary | ICD-10-CM

## 2018-04-13 DIAGNOSIS — G89.29 CHRONIC LEFT UPPER QUADRANT PAIN: ICD-10-CM

## 2018-04-13 PROCEDURE — 99214 OFFICE O/P EST MOD 30 MIN: CPT | Performed by: FAMILY MEDICINE

## 2018-04-13 RX ORDER — ACETAMINOPHEN AND CODEINE PHOSPHATE 300; 30 MG/1; MG/1
1 TABLET ORAL DAILY PRN
Qty: 30 TABLET | Refills: 0 | Status: SHIPPED | OUTPATIENT
Start: 2018-04-13 | End: 2018-05-12

## 2018-04-13 NOTE — PROGRESS NOTES
Assessment/Plan:    HTN (hypertension)  Well controlled, continue with same medication, advised patient to take her medication in the evening to avoid weakness and dizziness in the morning  Coronary artery disease involving native coronary artery of native heart  Stable has regular follow-up with cardiologist     Medication management  Patient medication was reviewed, adjusted and updated, refills on Tylenol No   3 was given, advised patient to use it wisely to avoid the side effect of the medication  Patient to use on as a being on regular basis since patient sleep disturbance almost every night from the nightmares and the hallucination  Patient to use gabapentin every night to help with the pain and to prevent the headache, patient was using it intermittently, to continue with aspirin, vitamin-D, Januvia, blood pressure medication, and levothyroxine  Patient seem to be confused about her medication, I suggested for patient to use a dispenser box to help her take her medication regularly and routinely, patient again is that idea, discussed it with the patient again and again and discuss it with her daughter at the office visit as well who agree to it  Patient will think about it    Chronic left upper quadrant pain  Continued to complain about this, to use Tylenol with codeine as needed for the pain to prevent her visit to the ER,       Diagnoses and all orders for this visit:    Essential hypertension    Coronary artery disease involving native coronary artery of native heart without angina pectoris    Medication management    Type 2 diabetes mellitus without complication, without long-term current use of insulin (HCC)    Chronic left upper quadrant pain  -     acetaminophen-codeine (TYLENOL #3) 300-30 mg per tablet; Take 1 tablet by mouth daily as needed for moderate pain  -     diclofenac sodium (VOLTAREN) 1 %;  Apply 2 g topically 4 (four) times a day    Other orders  -     Discontinue: diclofenac sodium (VOLTAREN) 1 %; Apply 2 g topically 4 (four) times a day          Subjective: Pt c/o high blood pressure first noticed this morning  Pt has a arm cuff that read 177/76 this morning  kw     Patient ID: May Rosa is a 80 y o  female  Patient is here for follow-up on hypertension, diabetes, patient is doing well in regard to her dizziness, generalized weakness  Patient noticed that her blood pressure is high today 177/76, patient continued to complain of nightmares but she is not taking her Zyprexa on regular basis, patient continued to complain of the chronic left upper quadrant pain that radiate to the midline and to the back and to the shoulder sometimes, is not chest pain, last for few seconds, described as sharp pain, patient had complete workup that was done before and was completely negative, patient was constipated and she is using MiraLax now  The following portions of the patient's history were reviewed and updated as appropriate: allergies, current medications, past family history, past medical history, past social history, past surgical history and problem list     Review of Systems   Constitutional: Negative for appetite change, chills and fever  HENT: Negative for congestion, sore throat and voice change  Eyes: Negative for pain and visual disturbance  Respiratory: Negative for cough  Cardiovascular: Negative for chest pain and palpitations  Gastrointestinal: Positive for abdominal pain  Negative for abdominal distention, blood in stool, constipation, diarrhea and nausea  Endocrine: Negative for cold intolerance, heat intolerance and polyuria  Genitourinary: Negative for dysuria, enuresis, flank pain and frequency  Musculoskeletal: Negative for gait problem, joint swelling and neck pain  Skin: Negative for color change and wound  Neurological: Negative for dizziness, syncope, speech difficulty, numbness and headaches     Psychiatric/Behavioral: Negative for behavioral problems and confusion  The patient is not nervous/anxious  Objective:  Vitals:    04/13/18 1107   BP: 128/74   BP Location: Left arm   Pulse: 68   Weight: 76 7 kg (169 lb)   Height: 5' 2 5" (1 588 m)      Physical Exam   Constitutional: She is oriented to person, place, and time  She appears well-developed and well-nourished  HENT:   Head: Normocephalic and atraumatic  Eyes: Conjunctivae and EOM are normal  Pupils are equal, round, and reactive to light  Neck: Normal range of motion  Neck supple  Cardiovascular: Normal rate, regular rhythm, normal heart sounds and intact distal pulses  Pulmonary/Chest: Effort normal and breath sounds normal    Abdominal: Soft  Bowel sounds are normal    Musculoskeletal: Normal range of motion  Neurological: She is alert and oriented to person, place, and time  She has normal reflexes  Skin: Skin is warm and dry  Psychiatric: She has a normal mood and affect  Her behavior is normal    Vitals reviewed

## 2018-04-13 NOTE — ASSESSMENT & PLAN NOTE
Well controlled, continue with same medication, advised patient to take her medication in the evening to avoid weakness and dizziness in the morning

## 2018-04-13 NOTE — ASSESSMENT & PLAN NOTE
Continued to complain about this, to use Tylenol with codeine as needed for the pain to prevent her visit to the ER,

## 2018-04-13 NOTE — PATIENT INSTRUCTIONS
Hypertension, Ambulatory Care   GENERAL INFORMATION:   Hypertension  is high blood pressure (BP)  Your BP is the force of your blood moving against the walls of your arteries  Hypertension is a BP of 140/90 or higher  Hypertension causes your BP to get so high that your heart has to work much harder than normal  This can cause damage to your heart  Common symptoms include the following:   · Headache     · Blurred vision     · Chest pain     · Dizziness or weakness     · Trouble breathing    · Nosebleeds  Seek immediate care for the following symptoms:   · Severe headache or vision loss    · Weakness in an arm or leg    · Confusion or difficulty speaking    · Discomfort in your chest that feels like squeezing, pressure, fullness, or pain    · Suddenly feeling lightheaded or trouble breathing    · Pain or discomfort in your back, neck, jaw, stomach, or arm  Treatment for hypertension  may include medicine to lower your BP  You may also need to make lifestyle changes  Take your medicine exactly as directed  Manage hypertension:   · Take your BP at home  Sit and rest for 5 minutes before you take your BP  Extend your arm and support it on a flat surface  Your arm should be at the same level as your heart  Follow the directions that came with your BP monitor  If possible, take at least 2 BP readings each time  Take your BP at least twice a day at the same times each day, such as morning and evening  Keep a log of your BP readings and bring it to your follow-up visits  · Eat less sodium (salt)  Do not add sodium to your food  Limit foods that are high in sodium, such as canned foods, potato chips, and cold cuts  Your healthcare provider may suggest that you follow the [address] [place] [address]  The plan is low in sodium, unhealthy fats, and total fat  It is high in potassium, calcium, and fiber  · Exercise regularly  Exercise at least 30 minutes per day, on most days of the week  This will help decrease your BP  Ask your healthcare provider about the best exercise plan for you  · Limit alcohol  Women should limit alcohol to 1 drink a day  Men should limit alcohol to 2 drinks a day  A drink of alcohol is 12 ounces of beer, 5 ounces of wine, or 1½ ounces of liquor  · Do not smoke  If you smoke, it is never too late to quit  Smoking can increase your BP  Smoking also worsens other health conditions you may have that can increase your risk for hypertension  Ask your healthcare provider for information if you need help quitting  Follow up with your healthcare provider as directed: You will need to return to have your BP checked and to have other lab tests done  Write down your questions so you remember to ask them during your visits  CARE AGREEMENT:   You have the right to help plan your care  Learn about your health condition and how it may be treated  Discuss treatment options with your caregivers to decide what care you want to receive  You always have the right to refuse treatment  The above information is an  only  It is not intended as medical advice for individual conditions or treatments  Talk to your doctor, nurse or pharmacist before following any medical regimen to see if it is safe and effective for you  © 2014 [address] is for End User's use only and may not be sold, redistributed or otherwise used for commercial purposes  All illustrations and images included in CareNotes® are the copyrighted property of A D A M [employer]  or Luis Enrique Monzon

## 2018-04-13 NOTE — ASSESSMENT & PLAN NOTE
Patient medication was reviewed, adjusted and updated, refills on Tylenol No   3 was given, advised patient to use it wisely to avoid the side effect of the medication  Patient to use on as a being on regular basis since patient sleep disturbance almost every night from the nightmares and the hallucination  Patient to use gabapentin every night to help with the pain and to prevent the headache, patient was using it intermittently, to continue with aspirin, vitamin-D, Januvia, blood pressure medication, and levothyroxine  Patient seem to be confused about her medication, I suggested for patient to use a dispenser box to help her take her medication regularly and routinely, patient again is that idea, discussed it with the patient again and again and discuss it with her daughter at the office visit as well who agree to it    Patient will think about it

## 2018-05-12 ENCOUNTER — HOSPITAL ENCOUNTER (OUTPATIENT)
Facility: HOSPITAL | Age: 83
Setting detail: OBSERVATION
Discharge: HOME | End: 2018-05-13
Attending: EMERGENCY MEDICINE | Admitting: INTERNAL MEDICINE
Payer: COMMERCIAL

## 2018-05-12 ENCOUNTER — APPOINTMENT (EMERGENCY)
Dept: CT IMAGING | Facility: HOSPITAL | Age: 83
End: 2018-05-12
Payer: COMMERCIAL

## 2018-05-12 DIAGNOSIS — R07.9 CHEST PAIN: Primary | ICD-10-CM

## 2018-05-12 DIAGNOSIS — R07.9 LEFT SIDED CHEST PAIN: ICD-10-CM

## 2018-05-12 LAB
ALBUMIN SERPL BCP-MCNC: 3.2 G/DL (ref 3.5–5)
ALP SERPL-CCNC: 86 U/L (ref 46–116)
ALT SERPL W P-5'-P-CCNC: 16 U/L (ref 12–78)
ANION GAP SERPL CALCULATED.3IONS-SCNC: 10 MMOL/L (ref 4–13)
APTT PPP: 37 SECONDS (ref 23–35)
AST SERPL W P-5'-P-CCNC: 16 U/L (ref 5–45)
BASOPHILS # BLD AUTO: 0.02 THOUSANDS/ΜL (ref 0–0.1)
BASOPHILS NFR BLD AUTO: 0 % (ref 0–1)
BILIRUB SERPL-MCNC: 0.2 MG/DL (ref 0.2–1)
BUN SERPL-MCNC: 21 MG/DL (ref 5–25)
CALCIUM SERPL-MCNC: 9.6 MG/DL (ref 8.3–10.1)
CHLORIDE SERPL-SCNC: 105 MMOL/L (ref 100–108)
CO2 SERPL-SCNC: 23 MMOL/L (ref 21–32)
CREAT SERPL-MCNC: 1.12 MG/DL (ref 0.6–1.3)
EOSINOPHIL # BLD AUTO: 0.12 THOUSAND/ΜL (ref 0–0.61)
EOSINOPHIL NFR BLD AUTO: 2 % (ref 0–6)
ERYTHROCYTE [DISTWIDTH] IN BLOOD BY AUTOMATED COUNT: 13.7 % (ref 11.6–15.1)
GFR SERPL CREATININE-BSD FRML MDRD: 48 ML/MIN/1.73SQ M
GLUCOSE SERPL-MCNC: 113 MG/DL (ref 65–140)
GLUCOSE SERPL-MCNC: 142 MG/DL (ref 65–140)
GLUCOSE SERPL-MCNC: 170 MG/DL (ref 65–140)
HCT VFR BLD AUTO: 43.6 % (ref 34.8–46.1)
HGB BLD-MCNC: 14.8 G/DL (ref 11.5–15.4)
INR PPP: 1.13 (ref 0.86–1.16)
LIPASE SERPL-CCNC: 115 U/L (ref 73–393)
LYMPHOCYTES # BLD AUTO: 1.13 THOUSANDS/ΜL (ref 0.6–4.47)
LYMPHOCYTES NFR BLD AUTO: 18 % (ref 14–44)
MAGNESIUM SERPL-MCNC: 1.9 MG/DL (ref 1.6–2.6)
MCH RBC QN AUTO: 30.8 PG (ref 26.8–34.3)
MCHC RBC AUTO-ENTMCNC: 33.9 G/DL (ref 31.4–37.4)
MCV RBC AUTO: 91 FL (ref 82–98)
MONOCYTES # BLD AUTO: 0.42 THOUSAND/ΜL (ref 0.17–1.22)
MONOCYTES NFR BLD AUTO: 7 % (ref 4–12)
NEUTROPHILS # BLD AUTO: 4.76 THOUSANDS/ΜL (ref 1.85–7.62)
NEUTS SEG NFR BLD AUTO: 73 % (ref 43–75)
NRBC BLD AUTO-RTO: 0 /100 WBCS
NT-PROBNP SERPL-MCNC: 470 PG/ML
PLATELET # BLD AUTO: 225 THOUSANDS/UL (ref 149–390)
PMV BLD AUTO: 11.2 FL (ref 8.9–12.7)
POTASSIUM SERPL-SCNC: 4.4 MMOL/L (ref 3.5–5.3)
PROT SERPL-MCNC: 7.8 G/DL (ref 6.4–8.2)
PROTHROMBIN TIME: 14.5 SECONDS (ref 12.1–14.4)
RBC # BLD AUTO: 4.8 MILLION/UL (ref 3.81–5.12)
SODIUM SERPL-SCNC: 138 MMOL/L (ref 136–145)
TROPONIN I SERPL-MCNC: <0.02 NG/ML
WBC # BLD AUTO: 6.45 THOUSAND/UL (ref 4.31–10.16)

## 2018-05-12 PROCEDURE — 96360 HYDRATION IV INFUSION INIT: CPT

## 2018-05-12 PROCEDURE — 93005 ELECTROCARDIOGRAM TRACING: CPT

## 2018-05-12 PROCEDURE — 85025 COMPLETE CBC W/AUTO DIFF WBC: CPT | Performed by: EMERGENCY MEDICINE

## 2018-05-12 PROCEDURE — 99285 EMERGENCY DEPT VISIT HI MDM: CPT

## 2018-05-12 PROCEDURE — 80053 COMPREHEN METABOLIC PANEL: CPT | Performed by: EMERGENCY MEDICINE

## 2018-05-12 PROCEDURE — 85730 THROMBOPLASTIN TIME PARTIAL: CPT | Performed by: PHYSICIAN ASSISTANT

## 2018-05-12 PROCEDURE — 84484 ASSAY OF TROPONIN QUANT: CPT | Performed by: PHYSICIAN ASSISTANT

## 2018-05-12 PROCEDURE — 83880 ASSAY OF NATRIURETIC PEPTIDE: CPT | Performed by: PHYSICIAN ASSISTANT

## 2018-05-12 PROCEDURE — 71275 CT ANGIOGRAPHY CHEST: CPT

## 2018-05-12 PROCEDURE — 85610 PROTHROMBIN TIME: CPT | Performed by: PHYSICIAN ASSISTANT

## 2018-05-12 PROCEDURE — 99220 PR INITIAL OBSERVATION CARE/DAY 70 MINUTES: CPT | Performed by: INTERNAL MEDICINE

## 2018-05-12 PROCEDURE — 84484 ASSAY OF TROPONIN QUANT: CPT | Performed by: INTERNAL MEDICINE

## 2018-05-12 PROCEDURE — 83690 ASSAY OF LIPASE: CPT | Performed by: EMERGENCY MEDICINE

## 2018-05-12 PROCEDURE — 36415 COLL VENOUS BLD VENIPUNCTURE: CPT | Performed by: EMERGENCY MEDICINE

## 2018-05-12 PROCEDURE — 83735 ASSAY OF MAGNESIUM: CPT | Performed by: PHYSICIAN ASSISTANT

## 2018-05-12 PROCEDURE — 82948 REAGENT STRIP/BLOOD GLUCOSE: CPT

## 2018-05-12 RX ORDER — SUCRALFATE ORAL 1 G/10ML
1000 SUSPENSION ORAL
Status: DISCONTINUED | OUTPATIENT
Start: 2018-05-12 | End: 2018-05-13 | Stop reason: HOSPADM

## 2018-05-12 RX ORDER — LEVOTHYROXINE SODIUM 0.07 MG/1
75 TABLET ORAL
Status: DISCONTINUED | OUTPATIENT
Start: 2018-05-13 | End: 2018-05-13 | Stop reason: HOSPADM

## 2018-05-12 RX ORDER — HYDRALAZINE HYDROCHLORIDE 20 MG/ML
10 INJECTION INTRAMUSCULAR; INTRAVENOUS EVERY 4 HOURS PRN
Status: DISCONTINUED | OUTPATIENT
Start: 2018-05-12 | End: 2018-05-13 | Stop reason: HOSPADM

## 2018-05-12 RX ORDER — ONDANSETRON 2 MG/ML
4 INJECTION INTRAMUSCULAR; INTRAVENOUS EVERY 4 HOURS PRN
Status: DISCONTINUED | OUTPATIENT
Start: 2018-05-12 | End: 2018-05-13 | Stop reason: HOSPADM

## 2018-05-12 RX ORDER — DORZOLAMIDE HCL 20 MG/ML
1 SOLUTION/ DROPS OPHTHALMIC 3 TIMES DAILY
Status: DISCONTINUED | OUTPATIENT
Start: 2018-05-12 | End: 2018-05-13 | Stop reason: HOSPADM

## 2018-05-12 RX ORDER — MELATONIN
1000 DAILY
Status: DISCONTINUED | OUTPATIENT
Start: 2018-05-12 | End: 2018-05-13 | Stop reason: HOSPADM

## 2018-05-12 RX ORDER — ACETAMINOPHEN 325 MG/1
650 TABLET ORAL EVERY 6 HOURS PRN
Status: DISCONTINUED | OUTPATIENT
Start: 2018-05-12 | End: 2018-05-13 | Stop reason: HOSPADM

## 2018-05-12 RX ORDER — ENALAPRIL MALEATE 10 MG/1
20 TABLET ORAL DAILY
COMMUNITY
End: 2018-08-14

## 2018-05-12 RX ORDER — ENALAPRIL MALEATE 10 MG/1
10 TABLET ORAL DAILY
Status: DISCONTINUED | OUTPATIENT
Start: 2018-05-13 | End: 2018-05-13 | Stop reason: HOSPADM

## 2018-05-12 RX ORDER — AMLODIPINE BESYLATE 10 MG/1
TABLET ORAL
COMMUNITY
End: 2018-08-09 | Stop reason: HOSPADM

## 2018-05-12 RX ORDER — AMLODIPINE BESYLATE 10 MG/1
10 TABLET ORAL DAILY
Status: DISCONTINUED | OUTPATIENT
Start: 2018-05-13 | End: 2018-05-13 | Stop reason: HOSPADM

## 2018-05-12 RX ORDER — ASPIRIN 81 MG/1
81 TABLET, CHEWABLE ORAL DAILY
Status: DISCONTINUED | OUTPATIENT
Start: 2018-05-12 | End: 2018-05-13 | Stop reason: HOSPADM

## 2018-05-12 RX ORDER — PANTOPRAZOLE SODIUM 40 MG/1
40 TABLET, DELAYED RELEASE ORAL
Status: DISCONTINUED | OUTPATIENT
Start: 2018-05-12 | End: 2018-05-13 | Stop reason: HOSPADM

## 2018-05-12 RX ORDER — HEPARIN SODIUM 5000 [USP'U]/ML
5000 INJECTION, SOLUTION INTRAVENOUS; SUBCUTANEOUS EVERY 8 HOURS SCHEDULED
Status: DISCONTINUED | OUTPATIENT
Start: 2018-05-12 | End: 2018-05-13 | Stop reason: HOSPADM

## 2018-05-12 RX ORDER — SODIUM CHLORIDE 9 MG/ML
50 INJECTION, SOLUTION INTRAVENOUS CONTINUOUS
Status: DISCONTINUED | OUTPATIENT
Start: 2018-05-12 | End: 2018-05-13 | Stop reason: HOSPADM

## 2018-05-12 RX ADMIN — ASPIRIN 81 MG 81 MG: 81 TABLET ORAL at 16:10

## 2018-05-12 RX ADMIN — INSULIN LISPRO 1 UNITS: 100 INJECTION, SOLUTION INTRAVENOUS; SUBCUTANEOUS at 21:19

## 2018-05-12 RX ADMIN — IODIXANOL 73 ML: 320 INJECTION, SOLUTION INTRAVASCULAR at 13:48

## 2018-05-12 RX ADMIN — HEPARIN SODIUM 5000 UNITS: 5000 INJECTION, SOLUTION INTRAVENOUS; SUBCUTANEOUS at 16:10

## 2018-05-12 RX ADMIN — PANTOPRAZOLE SODIUM 40 MG: 40 TABLET, DELAYED RELEASE ORAL at 16:10

## 2018-05-12 RX ADMIN — VITAMIN D, TAB 1000IU (100/BT) 1000 UNITS: 25 TAB at 16:10

## 2018-05-12 RX ADMIN — DORZOLAMIDE HCL 1 DROP: 20 SOLUTION/ DROPS OPHTHALMIC at 21:19

## 2018-05-12 RX ADMIN — HEPARIN SODIUM 5000 UNITS: 5000 INJECTION, SOLUTION INTRAVENOUS; SUBCUTANEOUS at 21:19

## 2018-05-12 RX ADMIN — SODIUM CHLORIDE 500 ML/HR: 0.9 INJECTION, SOLUTION INTRAVENOUS at 13:06

## 2018-05-12 RX ADMIN — DORZOLAMIDE HCL 1 DROP: 20 SOLUTION/ DROPS OPHTHALMIC at 17:22

## 2018-05-12 RX ADMIN — SUCRALFATE 1000 MG: 1 SUSPENSION ORAL at 16:10

## 2018-05-12 RX ADMIN — SUCRALFATE 1000 MG: 1 SUSPENSION ORAL at 21:18

## 2018-05-12 NOTE — ASSESSMENT & PLAN NOTE
Appears atypical   History troponin negative  Patient has had multiple emergency department visits and EKGs did not demonstrate any ischemia  Trend troponins continue aspirin  Monitor on telemetry  There is suspicion for costochondritis versus peptic ulcer disease given location of lower chest/left upper abdomen  Will trial PPI and sucralfate

## 2018-05-12 NOTE — H&P
H&P- April Griffin 9/24/1922, 80 y o  female MRN: [id number]    Unit/Bed#: ED 18 Encounter: [id number]    Primary Care Provider: Tatum Ayoub MD   Date and time admitted to hospital: 5/12/2018 12:06 PM    Assessment and Plan    * Left sided chest pain   Assessment & Plan    Appears atypical   History troponin negative  Patient has had multiple emergency department visits and EKGs did not demonstrate any ischemia  Trend troponins continue aspirin  Monitor on telemetry  There is suspicion for costochondritis versus peptic ulcer disease given location of lower chest/left upper abdomen  Will trial PPI and sucralfate  Hypothyroidism   Assessment & Plan    Continue levothyroxine, check TSH in the morning as patient complains of constantly being cold        Diabetic neuropathy ([facility] [address] )   Assessment & Plan    Diabetes with neuropathy  On Januvia  Add sliding scale  Takes gabapentin only as needed  Advanced age   Assessment & Plan    Patient is 80years old        Aortic stenosis   Assessment & Plan    Moderate to severe aortic stenosis  Advanced age, not surgical candidate        Glaucoma   Assessment & Plan    Continue dorzolamide and latanoprost        HTN (hypertension)   Assessment & Plan    Blood pressure stable continue amlodipine and enalapril        VTE Prophylaxis: Heparin  Code Status: Level 3 - DNAR and DNI  Anticipated Length of Stay:  Patient will be admitted on an Observation basis with an anticipated length of stay of   lesslength of stay of than 2 midnights  Justification for Hospital Stay:   Chest pain  Total Time for Visit, including Counseling / Coordination of Care: 40 mins  Greater than 50% of this total time spent on direct patient counseling and coordination of care  Chief Complaint:     Chief Complaint   Patient presents with    Abdominal Pain     Patient reports LUQ pain since lastnight   Patient also reporting weakness for the past two weeks as well as nausea X1 week       History of Present Illness:    Sharlene Lackey is a 80 y o  female who presents with worsening chest and abdominal pain  The patient has had multiple emergency department visits for similar  She has also seen her cardiologist and this is atypical chest pain  Patient states that symptoms has been going on for several years and would last up to 1 hour  Last night she was woken up 3 times and had to call her daughter who lives in same house  This morning due to ongoing pain she was brought here to the emergency department  Upon further questioning see states that this morning she drank hot chocolate in attempt to alleviate the pain but it worsened  She denies worsening with movement or activity however is worsened with deep breath  CTA of chest in the emergency department negative for pulmonary embolism  Due to diabetes and hypertension, admission was requested for observation ACS rule out      Review of Systems:  History obtained from chart review and the patient  General ROS: negative for - chills or fever  Psychological ROS: negative for - anxiety  Ophthalmic ROS: negative for - itchy eyes or loss of vision  Respiratory ROS: negative for - cough or shortness of breath  Cardiovascular ROS: positive for - chest pain  Gastrointestinal ROS: positive for - abdominal pain  Genito-Urinary ROS: negative for - dysuria or hematuria  Musculoskeletal ROS: negative for - joint pain or joint stiffness  Neurological ROS: negative for - memory loss  Otherwise, all other 12 point review of systems normal     Past Medical and Surgical History:   Past Medical History:   Diagnosis Date    Asthma     Atypical chest pain     CAD (coronary artery disease)     Candidal intertrigo     Depression     Diabetes mellitus ([facility] [address] )     Diabetic neuropathy ([facility] [address] )     Diverticulitis     Dyslipidemia     Female bladder prolapse     Gastric ulcer     Glaucoma     HTN (hypertension)     Hyperlipidemia     Hypothyroidism 2016    RAD (reactive airway disease)      Past Surgical History:   Procedure Laterality Date    COLONOSCOPY      ESOPHAGOGASTRODUODENOSCOPY      HYSTERECTOMY       Meds/Allergies: Allergies: Allergies   Allergen Reactions    Statins      Other reaction(s): Weakness  Category: Adverse Reaction;      Prior to Admission Medications   Prescriptions Last Dose Informant Patient Reported? Taking? ACCU-CHEK SOFTCLIX LANCETS lancets 2018 at Unknown time Self Yes Yes   Sig: by Does not apply route   Blood Glucose Monitoring Suppl (ACCU-CHEK BURTON PLUS) w/Device KIT 2018 at Unknown time Self Yes Yes   Sig: by Does not apply route   Cholecalciferol (VITAMIN D3) 2000 units capsule 2018 at Unknown time Self Yes Yes   Sig: Take 1,000 Units by mouth daily     Incontinence Supply Disposable (ATTENDS BRIEFS LARGE) [facility] 2018 at Unknown time Self Yes Yes   Toothbrushes (TOOTHBRUSH/CROSS ACTION) MISC 2018 at Unknown time Self Yes Yes   amLODIPine (NORVASC) 10 mg tablet 2018 at Unknown time  Yes Yes   Sig: amlodipine 10 mg tablet   aspirin 81 MG tablet 2018 at Unknown time Self Yes Yes   Sig: Take 81 mg by mouth daily  dorzolamide (TRUSOPT) 2 % ophthalmic solution 2018 at Unknown time Self Yes Yes   Sig: Apply 1 drop to eye 3 (three) times a day  enalapril (VASOTEC) 10 mg tablet 2018 at Unknown time  Yes Yes   Sig: Take 20 mg by mouth daily     gabapentin (NEURONTIN) 600 MG tablet 2018 at Unknown time Self No Yes   Sig: Take 0 5 tablets (300 mg total) by mouth every 8 (eight) hours as needed (Cranial Neuralgia)   glucose blood (ACCU-CHEK BURTON PLUS) test strip 2018 at Unknown time Self Yes Yes   Si Squirt by In Vitro route daily   latanoprost (XALATAN) 0 005 % ophthalmic solution 2018 at Unknown time Self Yes Yes   Sig: Apply 1 drop to eye daily at bedtime   Both eyes   levothyroxine 75 mcg tablet 2018 at Unknown time Self No Yes   Sig: Take 1 tablet (75 mcg total) by mouth daily   sitaGLIPtin (JANUVIA) 50 mg tablet 5/12/2018 at Unknown time Self Yes Yes   Sig: Take 1 tablet by mouth daily      Facility-Administered Medications: None     Social History:     Social History     Social History    Marital status:      Spouse name: N/A    Number of children: 2    Years of education: N/A     Occupational History    Retired      Social History Main Topics    Smoking status: Never Smoker    Smokeless tobacco: Never Used    Alcohol use No      Comment: Social Alcohol Use -- as per allscripts    Drug use: No    Sexual activity: Not on file     Other Topics Concern    Not on file     Social History Narrative    Lived with adult children    Caffeine Use         Patient Pre-hospital Living Situation: Lives with daughter  Patient Pre-hospital Level of Mobility: Cane only when going outside of house  Patient Pre-hospital Diet Restrictions:     Family History:  Family History   Problem Relation Age of Onset   [facility] Breast cancer Mother     Hypothyroidism Mother     Hypertension Father     Breast cancer Sister     Thyroid disease Sister     Hypertension Sister      Physical Exam:   Vitals:   Blood Pressure: 159/82 (05/12/18 1542)  Pulse: 72 (05/12/18 1542)  Temperature: 97 6 °F (36 4 °C) (05/12/18 1542)  Temp Source: Tympanic (05/12/18 1542)  Respirations: 18 (05/12/18 1542)  SpO2: 98 % (05/12/18 1542)    General appearance: alert, appears stated age and cooperative  Skin: Skin color, texture, turgor normal  No rashes or lesions  Head: Normocephalic, without obvious abnormality, atraumatic  Eyes: conjunctivae/corneas clear  PERRL, EOM's intact  Lungs: clear to auscultation bilaterally  Heart: regular rate and rhythm and positive murmur  Abdomen: soft, LUQ tenderness to palpation  Back: symmetric, no curvature  ROM normal  No CVA tenderness    Extremities: no edema  Neurologic: Grossly normal  Psychiatric: Good eye contact mood appropriate    Lab Results: I have personally reviewed pertinent reports  Results from last 7 days  Lab Units 05/12/18  1243   WBC Thousand/uL 6 45   HEMOGLOBIN g/dL 14 8   HEMATOCRIT % 43 6   PLATELETS Thousands/uL 225   NEUTROS PCT % 73   LYMPHS PCT % 18   MONOS PCT % 7   EOS PCT % 2       Results from last 7 days  Lab Units 05/12/18  1306 05/12/18  1243   SODIUM mmol/L  --  138   POTASSIUM mmol/L  --  4 4   CHLORIDE mmol/L  --  105   CO2 mmol/L  --  23   ANION GAP mmol/L  --  10   BUN mg/dL  --  21   CREATININE mg/dL  --  1 12   CALCIUM mg/dL  --  9 6   TOTAL PROTEIN g/dL  --  7 8   BILIRUBIN TOTAL mg/dL  --  0 20   ALK PHOS U/L  --  86   ALT U/L  --  16   AST U/L  --  16   EGFR ml/min/1 73sq m  --  48   GLUCOSE RANDOM mg/dL  --  142*   MAGNESIUM mg/dL 1 9  --        Results from last 7 days  Lab Units 05/12/18  1306   INR  1 13       Results from last 7 days  Lab Units 05/12/18  1552 05/12/18  1306   TROPONIN I ng/mL <0 02 <0 02               Results from last 7 days  Lab Units 05/12/18  1243   NT-PRO BNP pg/mL 470*        Imaging: I have personally reviewed pertinent films in PACS  Cta Ed Chest Pe Study  Result Date: 5/12/2018  Impression: No evidence of acute pulmonary embolus or thoracic aortic aneurysm  No acute cardiopulmonary process  Workstation performed: [id number]       EKG, Pathology, and Other Studies Reviewed on Admission:   EKG  Result Date: 05/12/18  Impression:  Normal sinus rhythm, first degree AV block    Allscripts Records Reviewed:  Yes

## 2018-05-12 NOTE — PLAN OF CARE
CARDIOVASCULAR - ADULT     Maintains optimal cardiac output and hemodynamic stability Progressing     Absence of cardiac dysrhythmias or at baseline rhythm Progressing        MUSCULOSKELETAL - ADULT     Maintain or return mobility to safest level of function Progressing     Maintain proper alignment of affected body part Progressing        Potential for Falls     Patient will remain free of falls Progressing

## 2018-05-12 NOTE — ED PROVIDER NOTES
History  Chief Complaint   Patient presents with    Abdominal Pain     Patient reports LUQ pain since lastnight  Patient also reporting weakness for the past two weeks as well as nausea X1 week  Patient is a 80-year-old female who presents for evaluation of left-sided chest pain  Has been present chronically according to patient however has worsened over the last day  The patient's daughter reports that the patient became anxious and shortness of breath  Patient agrees  She reports that she is at baseline short of breath has worsened  She denies any falls or trauma  Denies any fever  She does report to a cough without sputum production  Patient denies any significant back pain and reports that she has some chronic lower back pain which is unchanged  Denies any significant headache or blurry vision  Denies any diaphoresis  Prior to Admission Medications   Prescriptions Last Dose Informant Patient Reported? Taking? ACCU-CHEK SOFTCLIX LANCETS lancets  Self Yes No   Sig: by Does not apply route   Blood Glucose Monitoring Suppl (ACCU-CHEK BURTON PLUS) w/Device KIT  Self Yes No   Sig: by Does not apply route   Cholecalciferol (VITAMIN D3) 2000 units capsule  Self Yes Yes   Sig: Take 1,000 Units by mouth daily     Incontinence Supply Disposable (ATTENDS BRIEFS LARGE) MISC  Self Yes No   Toothbrushes (TOOTHBRUSH/CROSS ACTION) MISC  Self Yes No   amLODIPine (NORVASC) 10 mg tablet   Yes Yes   Sig: amlodipine 10 mg tablet   aspirin 81 MG tablet  Self Yes Yes   Sig: Take 81 mg by mouth daily     dorzolamide (TRUSOPT) 2 % ophthalmic solution  Self Yes Yes   Sig: Apply 1 drop to eye 3 (three) times a day    gabapentin (NEURONTIN) 600 MG tablet  Self No Yes   Sig: Take 0 5 tablets (300 mg total) by mouth every 8 (eight) hours as needed (Cranial Neuralgia)   glucose blood (ACCU-CHEK BURTON PLUS) test strip  Self Yes No   Si Squirt by In Vitro route daily   latanoprost (XALATAN) 0 005 % ophthalmic solution  Self Yes Yes   Sig: Apply 1 drop to eye daily at bedtime  Both eyes   levothyroxine 75 mcg tablet  Self No Yes   Sig: Take 1 tablet (75 mcg total) by mouth daily   sitaGLIPtin (JANUVIA) 50 mg tablet  Self Yes Yes   Sig: Take 1 tablet by mouth daily      Facility-Administered Medications: None       Past Medical History:   Diagnosis Date    Asthma     Atypical angina (HCC)     Atypical chest pain     Atypical pneumonia     Blurred vision     CAD (coronary artery disease)     Candidal intertrigo     Chest pressure     Depression     [facility]     Diabetic neuropathy ([facility]     Diverticulitis     Dyslipidemia     Dyspnea on exertion     Female bladder prolapse     Gastric ulcer     Glaucoma     HTN (hypertension)     Hyperlipidemia     Hypothyroidism 4/18/2016    Intertrigo     Left-sided chest wall pain     RAD (reactive airway disease)     Tachycardia     Thyroid disease        Past Surgical History:   Procedure Laterality Date    COLONOSCOPY      ESOPHAGOGASTRODUODENOSCOPY  2011    HYSTERECTOMY         Family History   Problem Relation Age of Onset    Breast cancer Mother     Hypothyroidism Mother     Hypertension Father     Breast cancer Sister     Thyroid disease Sister     Hypertension Sister      I have reviewed and agree with the history as documented  Social History   Substance Use Topics    Smoking status: Never Smoker    Smokeless tobacco: Never Used    Alcohol use No      Comment: Social Alcohol Use -- as per allscripts        Review of Systems   Constitutional: Negative for activity change, appetite change and fatigue  HENT: Negative for nosebleeds, sneezing, sore throat, trouble swallowing and voice change  Eyes: Negative for photophobia, pain and visual disturbance  Respiratory: Negative for apnea, choking and stridor  Cardiovascular: Negative for palpitations and leg swelling     Gastrointestinal: Negative for anal bleeding and constipation  Endocrine: Negative for cold intolerance, heat intolerance, polydipsia and polyphagia  Genitourinary: Negative for decreased urine volume, enuresis, frequency, genital sores and urgency  Musculoskeletal: Negative for joint swelling and myalgias  Allergic/Immunologic: Negative for environmental allergies and food allergies  Neurological: Negative for tremors, seizures, speech difficulty and weakness  Hematological: Negative for adenopathy  Psychiatric/Behavioral: Negative for behavioral problems, decreased concentration, dysphoric mood and hallucinations  All other systems reviewed and are negative  Physical Exam  ED Triage Vitals   Temperature Pulse Respirations Blood Pressure SpO2   05/12/18 1205 05/12/18 1203 05/12/18 1203 05/12/18 1203 05/12/18 1203   97 5 °F (36 4 °C) 73 18 153/69 99 %      Temp Source Heart Rate Source Patient Position - Orthostatic VS BP Location FiO2 (%)   05/12/18 1205 05/12/18 1203 05/12/18 1203 05/12/18 1203 --   Oral Monitor Sitting Left arm       Pain Score       05/12/18 1203       8           Orthostatic Vital Signs  Vitals:    05/12/18 1203 05/12/18 1400   BP: 153/69 141/67   Pulse: 73 64   Patient Position - Orthostatic VS: Sitting        Physical Exam   Constitutional: She is oriented to person, place, and time  She appears well-developed and well-nourished  No distress  HENT:   Head: Normocephalic and atraumatic  Right Ear: External ear normal    Left Ear: External ear normal    Nose: Nose normal    Mouth/Throat: Oropharynx is clear and moist    Eyes: Conjunctivae and EOM are normal  Pupils are equal, round, and reactive to light  Neck: Normal range of motion  Neck supple  Cardiovascular: Normal rate, regular rhythm and normal heart sounds  Exam reveals no gallop and no friction rub  No murmur heard  Pulmonary/Chest: Effort normal and breath sounds normal  No respiratory distress  She has no wheezes  Abdominal: Soft   Bowel sounds are normal    Neurological: She is alert and oriented to person, place, and time  Skin: Skin is warm and dry  She is not diaphoretic  Psychiatric: She has a normal mood and affect  Her behavior is normal    Vitals reviewed  ED Medications  Medications   sodium chloride 0 9 % infusion (0 mL/hr Intravenous Stopped 5/12/18 1407)   iodixanol (VISIPAQUE) 320 MG/ML injection 73 mL (73 mL Intravenous Given 5/12/18 1348)       Diagnostic Studies  Results Reviewed     Procedure Component Value Units Date/Time    B-type natriuretic peptide [[id number]] Collected:  05/12/18 1243    Lab Status: In process Specimen:  Blood from Arm, Left Updated:  05/12/18 1430    Troponin I [[id number]]  (Normal) Collected:  05/12/18 1306    Lab Status:  Final result Specimen:  Blood from Arm, Left Updated:  05/12/18 1338     Troponin I <0 02 ng/mL     Narrative:         [employer] Chemistry analyzer 99% cutoff is > 0 04 ng/mL in network labs    o cTnI 99% cutoff is useful only when applied to patients in the clinical setting of myocardial ischemia  o cTnI 99% cutoff should be interpreted in the context of clinical history, ECG findings and possibly cardiac imaging to establish correct diagnosis  o cTnI 99% cutoff may be suggestive but clearly not indicative of a coronary event without the clinical setting of myocardial ischemia      Protime-INR [[id number]]  (Abnormal) Collected:  05/12/18 1306    Lab Status:  Final result Specimen:  Blood from Arm, Left Updated:  05/12/18 1337     Protime 14 5 (H) seconds      INR 1 13    APTT [[id number]]  (Abnormal) Collected:  05/12/18 1306    Lab Status:  Final result Specimen:  Blood from Arm, Left Updated:  05/12/18 1337     PTT 37 (H) seconds     Magnesium [[id number]]  (Normal) Collected:  05/12/18 1306    Lab Status:  Final result Specimen:  Blood from Arm, Left Updated:  05/12/18 1328     Magnesium 1 9 mg/dL     Comprehensive metabolic panel [[id number]]  (Abnormal) Collected:  05/12/18 1243    Lab Status:  Final result Specimen:  Blood from Arm, Left Updated:  05/12/18 1310     Sodium 138 mmol/L      Potassium 4 4 mmol/L      Chloride 105 mmol/L      CO2 23 mmol/L      Anion Gap 10 mmol/L      BUN 21 mg/dL      Creatinine 1 12 mg/dL      Glucose 142 (H) mg/dL      Calcium 9 6 mg/dL      AST 16 U/L      ALT 16 U/L      Alkaline Phosphatase 86 U/L      Total Protein 7 8 g/dL      Albumin 3 2 (L) g/dL      Total Bilirubin 0 20 mg/dL      eGFR 48 ml/min/1 73sq m     Narrative:         National Kidney Disease Education Program recommendations are as follows:  GFR calculation is accurate only with a steady state creatinine  Chronic Kidney disease less than 60 ml/min/1 73 sq  meters  Kidney failure less than 15 ml/min/1 73 sq  meters  Lipase [[id number]]  (Normal) Collected:  05/12/18 1243    Lab Status:  Final result Specimen:  Blood from Arm, Left Updated:  05/12/18 1310     Lipase 115 u/L     CBC and differential [[id number]] Collected:  05/12/18 1243    Lab Status:  Final result Specimen:  Blood from Arm, Left Updated:  05/12/18 1257     WBC 6 45 Thousand/uL      RBC 4 80 Million/uL      Hemoglobin 14 8 g/dL      Hematocrit 43 6 %      MCV 91 fL      MCH 30 8 pg      MCHC 33 9 g/dL      RDW 13 7 %      MPV 11 2 fL      Platelets 098 Thousands/uL      nRBC 0 /100 WBCs      Neutrophils Relative 73 %      Lymphocytes Relative 18 %      Monocytes Relative 7 %      Eosinophils Relative 2 %      Basophils Relative 0 %      Neutrophils Absolute 4 76 Thousands/µL      Lymphocytes Absolute 1 13 Thousands/µL      Monocytes Absolute 0 42 Thousand/µL      Eosinophils Absolute 0 12 Thousand/µL      Basophils Absolute 0 02 Thousands/µL     POCT urinalysis dipstick [[id number]]     Lab Status:  No result Specimen:  Urine                  CTA ED chest PE study   Final Result by Tyrus Apley, MD (05/12 3329)         No evidence of acute pulmonary embolus or thoracic aortic aneurysm  No acute cardiopulmonary process  Workstation performed: [id number]                    Procedures  ECG 12 Lead Documentation  Date/Time: 5/12/2018 2:30 PM  Performed by: Arline Dan by: Ann Chand     Patient location:  ED  Interpretation:     Interpretation: normal    Rate:     ECG rate:  67    ECG rate assessment: normal    Rhythm:     Rhythm: sinus rhythm    QRS:     QRS axis:  Normal  ST segments:     ST segments:  Normal  T waves:     T waves: normal             Phone Contacts  ED Phone Contact    ED Course         HEART Risk Score      Most Recent Value   History  1 Filed at: 05/12/2018 1435   ECG  0 Filed at: 05/12/2018 1435   Age  2 Filed at: 05/12/2018 1435   Risk Factors  2 Filed at: 05/12/2018 1435   Troponin  0 Filed at: 05/12/2018 1435   Heart Score Risk Calculator   History  1 Filed at: 05/12/2018 1435   ECG  0 Filed at: 05/12/2018 1435   Age  2 Filed at: 05/12/2018 1435   Risk Factors  2 Filed at: 05/12/2018 1435   Troponin  0 Filed at: 05/12/2018 1435   HEART Score  5 Filed at: 05/12/2018 1435   HEART Score  5 Filed at: 05/12/2018 1435                            MDM  CritCare Time    Disposition  Final diagnoses:   Chest pain     Time reflects when diagnosis was documented in both MDM as applicable and the Disposition within this note     Time User Action Codes Description Comment    5/12/2018  2:38 PM Anushka Valentin Add [R07 9] Chest pain       ED Disposition     None      Follow-up Information    None       Patient's Medications   Discharge Prescriptions    No medications on file     No discharge procedures on file      ED Provider  Electronically Signed by           Sonny Klein PA-C  05/12/18 1439

## 2018-05-13 VITALS
DIASTOLIC BLOOD PRESSURE: 81 MMHG | TEMPERATURE: 97 F | OXYGEN SATURATION: 97 % | HEART RATE: 74 BPM | RESPIRATION RATE: 17 BRPM | SYSTOLIC BLOOD PRESSURE: 149 MMHG

## 2018-05-13 LAB
ALBUMIN SERPL BCP-MCNC: 2.6 G/DL (ref 3.5–5)
ALP SERPL-CCNC: 72 U/L (ref 46–116)
ALT SERPL W P-5'-P-CCNC: 13 U/L (ref 12–78)
ANION GAP SERPL CALCULATED.3IONS-SCNC: 10 MMOL/L (ref 4–13)
AST SERPL W P-5'-P-CCNC: 14 U/L (ref 5–45)
ATRIAL RATE: 60 BPM
ATRIAL RATE: 67 BPM
BILIRUB SERPL-MCNC: 0.27 MG/DL (ref 0.2–1)
BUN SERPL-MCNC: 19 MG/DL (ref 5–25)
CALCIUM SERPL-MCNC: 8.5 MG/DL (ref 8.3–10.1)
CHLORIDE SERPL-SCNC: 109 MMOL/L (ref 100–108)
CHOLEST SERPL-MCNC: 196 MG/DL (ref 50–200)
CO2 SERPL-SCNC: 22 MMOL/L (ref 21–32)
CREAT SERPL-MCNC: 0.87 MG/DL (ref 0.6–1.3)
ERYTHROCYTE [DISTWIDTH] IN BLOOD BY AUTOMATED COUNT: 13.9 % (ref 11.6–15.1)
GFR SERPL CREATININE-BSD FRML MDRD: 65 ML/MIN/1.73SQ M
GLUCOSE SERPL-MCNC: 127 MG/DL (ref 65–140)
GLUCOSE SERPL-MCNC: 138 MG/DL (ref 65–140)
GLUCOSE SERPL-MCNC: 213 MG/DL (ref 65–140)
HCT VFR BLD AUTO: 40.1 % (ref 34.8–46.1)
HDLC SERPL-MCNC: 37 MG/DL (ref 40–60)
HGB BLD-MCNC: 13.2 G/DL (ref 11.5–15.4)
LDLC SERPL CALC-MCNC: 134 MG/DL (ref 0–100)
MCH RBC QN AUTO: 30.1 PG (ref 26.8–34.3)
MCHC RBC AUTO-ENTMCNC: 32.9 G/DL (ref 31.4–37.4)
MCV RBC AUTO: 92 FL (ref 82–98)
P AXIS: 28 DEGREES
P AXIS: 74 DEGREES
PLATELET # BLD AUTO: 203 THOUSANDS/UL (ref 149–390)
PMV BLD AUTO: 11.5 FL (ref 8.9–12.7)
POTASSIUM SERPL-SCNC: 4 MMOL/L (ref 3.5–5.3)
PR INTERVAL: 218 MS
PR INTERVAL: 224 MS
PROT SERPL-MCNC: 6.5 G/DL (ref 6.4–8.2)
QRS AXIS: -9 DEGREES
QRS AXIS: 12 DEGREES
QRSD INTERVAL: 104 MS
QRSD INTERVAL: 104 MS
QT INTERVAL: 386 MS
QT INTERVAL: 434 MS
QTC INTERVAL: 407 MS
QTC INTERVAL: 434 MS
RBC # BLD AUTO: 4.38 MILLION/UL (ref 3.81–5.12)
SODIUM SERPL-SCNC: 141 MMOL/L (ref 136–145)
T WAVE AXIS: 62 DEGREES
T WAVE AXIS: 83 DEGREES
TRIGL SERPL-MCNC: 125 MG/DL
TSH SERPL DL<=0.05 MIU/L-ACNC: 2.05 UIU/ML (ref 0.36–3.74)
VENTRICULAR RATE: 60 BPM
VENTRICULAR RATE: 67 BPM
WBC # BLD AUTO: 5.07 THOUSAND/UL (ref 4.31–10.16)

## 2018-05-13 PROCEDURE — 80053 COMPREHEN METABOLIC PANEL: CPT | Performed by: INTERNAL MEDICINE

## 2018-05-13 PROCEDURE — 82948 REAGENT STRIP/BLOOD GLUCOSE: CPT

## 2018-05-13 PROCEDURE — 84443 ASSAY THYROID STIM HORMONE: CPT | Performed by: INTERNAL MEDICINE

## 2018-05-13 PROCEDURE — 93010 ELECTROCARDIOGRAM REPORT: CPT | Performed by: INTERNAL MEDICINE

## 2018-05-13 PROCEDURE — 80061 LIPID PANEL: CPT | Performed by: INTERNAL MEDICINE

## 2018-05-13 PROCEDURE — 85027 COMPLETE CBC AUTOMATED: CPT | Performed by: INTERNAL MEDICINE

## 2018-05-13 PROCEDURE — 99217 PR OBSERVATION CARE DISCHARGE MANAGEMENT: CPT | Performed by: INTERNAL MEDICINE

## 2018-05-13 RX ORDER — PANTOPRAZOLE SODIUM 40 MG/1
40 TABLET, DELAYED RELEASE ORAL DAILY
Qty: 30 TABLET | Refills: 2 | Status: SHIPPED | OUTPATIENT
Start: 2018-05-13 | End: 2018-08-05 | Stop reason: ALTCHOICE

## 2018-05-13 RX ORDER — SUCRALFATE ORAL 1 G/10ML
1000 SUSPENSION ORAL
Qty: 420 ML | Refills: 2 | Status: SHIPPED | OUTPATIENT
Start: 2018-05-13 | End: 2018-08-05 | Stop reason: ALTCHOICE

## 2018-05-13 RX ADMIN — SITAGLIPTIN 50 MG: 50 TABLET, FILM COATED ORAL at 08:38

## 2018-05-13 RX ADMIN — LEVOTHYROXINE SODIUM 75 MCG: 75 TABLET ORAL at 05:38

## 2018-05-13 RX ADMIN — ASPIRIN 81 MG 81 MG: 81 TABLET ORAL at 08:37

## 2018-05-13 RX ADMIN — HEPARIN SODIUM 5000 UNITS: 5000 INJECTION, SOLUTION INTRAVENOUS; SUBCUTANEOUS at 05:39

## 2018-05-13 RX ADMIN — SUCRALFATE 1000 MG: 1 SUSPENSION ORAL at 05:38

## 2018-05-13 RX ADMIN — PANTOPRAZOLE SODIUM 40 MG: 40 TABLET, DELAYED RELEASE ORAL at 05:38

## 2018-05-13 RX ADMIN — SUCRALFATE 1000 MG: 1 SUSPENSION ORAL at 12:10

## 2018-05-13 RX ADMIN — HYDRALAZINE HYDROCHLORIDE 10 MG: 20 INJECTION INTRAMUSCULAR; INTRAVENOUS at 07:46

## 2018-05-13 RX ADMIN — INSULIN LISPRO 2 UNITS: 100 INJECTION, SOLUTION INTRAVENOUS; SUBCUTANEOUS at 12:10

## 2018-05-13 RX ADMIN — ENALAPRIL MALEATE 10 MG: 10 TABLET ORAL at 08:37

## 2018-05-13 RX ADMIN — DORZOLAMIDE HCL 1 DROP: 20 SOLUTION/ DROPS OPHTHALMIC at 08:37

## 2018-05-13 RX ADMIN — ACETAMINOPHEN 650 MG: 325 TABLET, FILM COATED ORAL at 07:45

## 2018-05-13 RX ADMIN — AMLODIPINE BESYLATE 10 MG: 10 TABLET ORAL at 08:38

## 2018-05-13 RX ADMIN — VITAMIN D, TAB 1000IU (100/BT) 1000 UNITS: 25 TAB at 08:37

## 2018-05-13 NOTE — PLAN OF CARE
CARDIOVASCULAR - ADULT     Maintains optimal cardiac output and hemodynamic stability Progressing     Absence of cardiac dysrhythmias or at baseline rhythm Progressing        MUSCULOSKELETAL - ADULT     Maintain or return mobility to safest level of function Progressing     Maintain proper alignment of affected body part Progressing        Nutrition/Hydration-ADULT     Nutrient/Hydration intake appropriate for improving, restoring or maintaining nutritional needs Progressing        Potential for Falls     Patient will remain free of falls Progressing

## 2018-05-13 NOTE — PLAN OF CARE
CARDIOVASCULAR - ADULT     Maintains optimal cardiac output and hemodynamic stability Completed     Absence of cardiac dysrhythmias or at baseline rhythm Completed        MUSCULOSKELETAL - ADULT     Maintain or return mobility to safest level of function Completed     Maintain proper alignment of affected body part Completed        Nutrition/Hydration-ADULT     Nutrient/Hydration intake appropriate for improving, restoring or maintaining nutritional needs Completed        Potential for Falls     Patient will remain free of falls Completed

## 2018-05-13 NOTE — ASSESSMENT & PLAN NOTE
Appears atypical   Troponins negative  Patient has had multiple emergency department visits and EKGs did not demonstrate any ischemia  Suspect likely secondary to peptic ulcer disease versus GERD  No further symptoms since started PPI and sucralfate

## 2018-05-13 NOTE — CASE MANAGEMENT
Initial Clinical Review    Admission: Date/Time/Statement: 5/12/18 1438    Orders Placed This Encounter   Procedures    Place in Observation (expected length of stay for this patient is less than two midnights)     Standing Status:   Standing     Number of Occurrences:   1     Order Specific Question:   Admitting Physician     Answer:   Manuela Grier [[id number]]     Order Specific Question:   Level of Care     Answer:   Med Surg [[id number]]         ED: Date/Time/Mode of Arrival:   ED Arrival Information     Expected Arrival Acuity Means of Arrival Escorted By Service Admission Type    - 5/12/2018 11:59 Urgent Wheelchair Family Member General Medicine Urgent    Arrival Complaint    Abd pain          Chief Complaint:   Chief Complaint   Patient presents with    Abdominal Pain     Patient reports LUQ pain since lastnight  Patient also reporting weakness for the past two weeks as well as nausea X1 week  History of Illness: Modesta Calvert is a 80 y o  female who presents with worsening chest and abdominal pain  The patient has had multiple emergency department visits for similar  She has also seen her cardiologist and this is atypical chest pain  Patient states that symptoms has been going on for several years and would last up to 1 hour  Last night she was woken up 3 times and had to call her daughter who lives in same house  This morning due to ongoing pain she was brought here to the emergency department  Upon further questioning see states that this morning she drank hot chocolate in attempt to alleviate the pain but it worsened  She denies worsening with movement or activity however is worsened with deep breath  CTA of chest in the emergency department negative for pulmonary embolism    Due to diabetes and hypertension, admission was requested for observation ACS rule out        ED Vital Signs:   ED Triage Vitals   Temperature Pulse Respirations Blood Pressure SpO2   05/12/18 1205 05/12/18 1203 05/12/18 1203 05/12/18 1203 05/12/18 1203   97 5 °F (36 4 °C) 73 18 153/69 99 %      Temp Source Heart Rate Source Patient Position - Orthostatic VS BP Location FiO2 (%)   05/12/18 1205 05/12/18 1203 05/12/18 1203 05/12/18 1203 --   Oral Monitor Sitting Left arm       Pain Score       05/12/18 1203       8        Wt Readings from Last 1 Encounters:   04/13/18 76 7 kg (169 lb)       Abnormal Labs/Diagnostic Test Results:  ALB 2 6 HDL 37   CT CHEST   No evidence of acute pulmonary embolus or thoracic aortic aneurysm  No acute cardiopulmonary process         ED Treatment:   Medication Administration from 05/12/2018 1159 to 05/12/2018 1521       Date/Time Order Dose Route Action Action by Comments     05/12/2018 1407 sodium chloride 0 9 % infusion 0 mL/hr Intravenous Stopped Tara Castellanos RN      05/12/2018 1306 sodium chloride 0 9 % infusion 500 mL/hr Intravenous Jose Juan Castellanos RN      05/12/2018 1348 iodixanol (VISIPAQUE) 320 MG/ML injection 73 mL 73 mL Intravenous Given Yasmin Clemente           Past Medical/Surgical History:    Active Ambulatory Problems     Diagnosis Date Noted    HTN (hypertension)     Glaucoma     [facility]     Migraine 04/18/2016    Other specified hypothyroidism 04/18/2016    Coronary artery disease involving native coronary artery of native heart     Asthma     Aortic stenosis 10/21/2016    Hyperlipidemia     Female bladder prolapse     Advanced age 06/03/2015    Allergic rhinitis 05/02/2017    Ambulatory dysfunction 04/02/2015    Anxiety disorder 08/17/2016    Chronic left upper quadrant pain 12/15/2017    General weakness 11/03/2015    Irritable bowel 04/24/2015    Trigeminal neuralgia 03/16/2016    Visual hallucinations 11/17/2015    SMITH (dyspnea on exertion) 02/17/2018    Medication management 04/13/2018    Atypical chest pain      Resolved Ambulatory Problems     Diagnosis Date Noted    CAD (coronary artery disease)  Asthma     Atypical chest pain 04/18/2016    Cellulitis of leg 04/18/2016    Arm pain 04/18/2016     Past Medical History:   Diagnosis Date    Asthma     Atypical chest pain     CAD (coronary artery disease)     Candidal intertrigo     Depression     Diabetes mellitus (HCC)     Diabetic neuropathy ([facility] [address] [address] [facility]     Diverticulitis     Dyslipidemia     Female bladder prolapse     Gastric ulcer     Glaucoma     HTN (hypertension)     Hyperlipidemia     Hypothyroidism 4/18/2016    RAD (reactive airway disease)        Admitting Diagnosis: Chest pain [R07 9]  Abdominal pain [R10 9]    Age/Sex: 80 y o  female    Assessment/Plan:   * Left sided chest pain   Assessment & Plan     Appears atypical   History troponin negative  Patient has had multiple emergency department visits and EKGs did not demonstrate any ischemia  Trend troponins continue aspirin  Monitor on telemetry  There is suspicion for costochondritis versus peptic ulcer disease given location of lower chest/left upper abdomen  Will trial PPI and sucralfate       Hypothyroidism   Assessment & Plan     Continue levothyroxine, check TSH in the morning as patient complains of constantly being cold      Diabetic neuropathy ([facility] [address] [address] [facility]   Assessment & Plan     Diabetes with neuropathy  On Januvia  Add sliding scale  Takes gabapentin only as needed       Advanced age   Assessment & Plan     Patient is 80years old      Aortic stenosis   Assessment & Plan     Moderate to severe aortic stenosis  Advanced age, not surgical candidate      Glaucoma   Assessment & Plan     Continue dorzolamide and latanoprost      HTN (hypertension)   Assessment & Plan     Blood pressure stable continue amlodipine and enalapril      VTE Prophylaxis: Heparin  Code Status: Level 3 - DNAR and DNI  Anticipated Length of Stay:  Patient will be admitted on an Observation basis with an anticipated length of stay of   lesslength of stay of than 2 midnights       Justification for Hospital Stay:   Chest pain  Total Time for Visit, including Counseling / Coordination of Care: 40 mins   Greater than 50% of this total time spent on direct patient counseling and coordination of care          Admission Orders:  Scheduled Meds:   Current Facility-Administered Medications:  acetaminophen 650 mg Oral Q6H PRN Konrad Barrera, DO    amLODIPine 10 mg Oral Daily Vik Starr, DO    aspirin 81 mg Oral Daily Vik Starr, DO    cholecalciferol 1,000 Units Oral Daily Vik Starr, DO    dorzolamide 1 drop Both Eyes TID Vik Starr, DO    enalapril 10 mg Oral Daily Vik Starr, DO    heparin (porcine) 5,000 Units Subcutaneous [facility] Vik Starr, DO    hydrALAZINE 10 mg Intravenous Q4H PRN Konrad Barrera, DO    insulin lispro 1-5 Units Subcutaneous HS Vik Starr, DO    insulin lispro 1-6 Units Subcutaneous TID AC Vik Starr, DO    levothyroxine 75 mcg Oral Early Morning Vik Starr, DO    magnesium hydroxide 30 mL Oral BID PRN Konrad Barrera, DO    ondansetron 4 mg Intravenous Q4H PRN Vik Starr, DO    pantoprazole 40 mg Oral BID AC Vik Starr, DO    sitaGLIPtin 50 mg Oral Daily Vik Starr, DO    sodium chloride 50 mL/hr Intravenous Continuous Vik Starr, DO Last Rate: 50 mL/hr (05/12/18 1611)   sucralfate 1,000 mg Oral 4x Daily (AC & HS) Vik Starr, DO      Continuous Infusions:   sodium chloride 50 mL/hr Last Rate: 50 mL/hr (05/12/18 1611)     PRN Meds:   acetaminophen    hydrALAZINE    magnesium hydroxide    ondansetron

## 2018-05-13 NOTE — PROGRESS NOTES
Pt discharged to home without VNA needs  A&Ox4, able to identify needs  Minimal assist w/ ADL's  D/C summary explained and provided to pt and daughter  Both receptive to all education and compliant  2 signed scripts (non-narcotic) provided  Peripheral IV removed and pressure dsg intact  All belongings verified upon d/c and returned we/ pt  Pt assisted out of facility via w/c w/ daughter and PCa at side  Personal transport provided

## 2018-05-13 NOTE — DISCHARGE SUMMARY
Discharge- Erika Reyes 9/24/1922, 80 y o  female MRN: [id number]    Unit/Bed#: E4 -01 Encounter: [id number]    Primary Care Provider: Amena Espinoza MD   Date and time admitted to hospital: 5/12/2018 12:06 PM      DISCHARGE DIAGNOSES  * Left sided chest pain   Assessment & Plan    Appears atypical   Troponins negative  Patient has had multiple emergency department visits and EKGs did not demonstrate any ischemia  Suspect likely secondary to peptic ulcer disease versus GERD  No further symptoms since started PPI and sucralfate  Hypothyroidism   Assessment & Plan    Continue levothyroxine, TSH checked here within limits        Diabetic neuropathy ([facility] [address] )   Assessment & Plan    Diabetes with neuropathy  On Januvia  Takes gabapentin only as needed for trigeminal neuralgia  Advanced age   Assessment & Plan    Patient is 80years old        Aortic stenosis   Assessment & Plan    Moderate to severe aortic stenosis  Advanced age, not surgical candidate        Glaucoma   Assessment & Plan    Continue dorzolamide and latanoprost        HTN (hypertension)   Assessment & Plan    Blood pressure stable continue amlodipine and enalapril        Admitting Provider:  Wily Pickard DO  Discharge Provider:  Wily Pickard DO  Admission Date: 5/12/2018       Discharge Date: 05/13/18   LOS: 0  Primary Care Physician at Discharge: Amena Espinoza, [id number] [place] COURSE:  Erika Reyes is a 80 y o  female who presented with chest pain  Patient has chronic chest pain and has had multiple emergency department visits and seen Cardiology  Day of admission, chest pain was after drinking some hot chocolate  She was started on PPI and sucralfate as suspicion for gastrointestinal cause was high  Since admission she has not had any further pains  Her cardiac enzymes and telemetry are within limits  Daughter states that patient has pains after meals    Should continue PPI and sucralfate and follow up with PCP for further recommendations  CONSULTING PROVIDERS   None    PROCEDURES PERFORMED  Cta Ed Chest Pe Study  Result Date: 5/12/2018  Impression: No evidence of acute pulmonary embolus or thoracic aortic aneurysm  No acute cardiopulmonary process   Workstation performed: [id number]       Other Pertinent Test Results    Results from last 7 days  Lab Units 05/13/18  0507 05/12/18  1306 05/12/18  1243   WBC Thousand/uL 5 07  --  6 45   HEMOGLOBIN g/dL 13 2  --  14 8   HEMATOCRIT % 40 1  --  43 6   MCV fL 92  --  91   PLATELETS Thousands/uL 203  --  225   INR   --  1 13  --        Results from last 7 days  Lab Units 05/13/18  0503 05/12/18  1243   SODIUM mmol/L 141 138   POTASSIUM mmol/L 4 0 4 4   CHLORIDE mmol/L 109* 105   CO2 mmol/L 22 23   ANION GAP mmol/L 10 10   BUN mg/dL 19 21   CREATININE mg/dL 0 87 1 12   CALCIUM mg/dL 8 5 9 6   BILIRUBIN TOTAL mg/dL 0 27 0 20   ALK PHOS U/L 72 86   ALT U/L 13 16   AST U/L 14 16   EGFR ml/min/1 73sq m 65 48   GLUCOSE RANDOM mg/dL 127 142*       Results from last 7 days  Lab Units 05/12/18  1854 05/12/18  1552 05/12/18  1306   TROPONIN I ng/mL <0 02 <0 02 <0 02       Results from last 7 days  Lab Units 05/12/18  1243   NT-PRO BNP pg/mL 470*            Results from last 7 days  Lab Units 05/13/18  1055 05/13/18  0721 05/12/18  2112 05/12/18  1634   POC GLUCOSE mg/dl 213* 138 170* 113           Results from last 7 days  Lab Units 05/13/18  0503   TSH 3RD GENERATON uIU/mL 2 048           Results from last 7 days  Lab Units 05/13/18  0503   TRIGLYCERIDES mg/dL 125   CHOLESTEROL mg/dL 196   LDL CALC mg/dL 134*   HDL mg/dL 37*       PHYSICAL EXAM:  Vitals:   Blood Pressure: 149/81 (05/13/18 1032)  Pulse: 74 (05/13/18 1032)  Temperature: (!) 97 °F (36 1 °C) (05/13/18 0721)  Temp Source: Tympanic (05/13/18 0721)  Respirations: 17 (05/13/18 0721)  SpO2: 97 % (05/13/18 0721)    General appearance: alert, appears stated age and cooperative  Head: atraumatic  Eyes: conjunctivae/corneas clear  PERRL, EOM's intact  Lungs: diminished breath sounds  Heart: regular rate and rhythm and positive murmur  Abdomen: soft nontender  Back: range of motion normal  Extremities: extremities normal, atraumatic, no cyanosis or edema  Neurologic: Grossly normal    Planned Re-admission: No  Discharge Disposition: Home/Self Care    Test Results Pending at Discharge:   Incidental findings: None    Medications   Please see After Visit Summary for reconciled discharge medications provided to patient and family  Diet restrictions: Regular diet   Activity restrictions: No strenuous activity  Discharge Condition: fair    Outpatient Follow-Up  1  Michelle Mason MD [facility] [address] [phone] - follow-up within one week  2  Taylor Gore DO cardiology  Follow-up as scheduled  Code Status: Level 3 - DNAR and DNI  Discharge Statement   I spent 32 minutes discharging the patient  This time was spent on the day of discharge  Greater than 50% of total time was spent with the patient and / or family counseling and / or coordination of care

## 2018-05-18 ENCOUNTER — OFFICE VISIT (OUTPATIENT)
Dept: FAMILY MEDICINE CLINIC | Facility: CLINIC | Age: 83
End: 2018-05-18
Payer: COMMERCIAL

## 2018-05-18 VITALS
DIASTOLIC BLOOD PRESSURE: 64 MMHG | SYSTOLIC BLOOD PRESSURE: 108 MMHG | BODY MASS INDEX: 30.65 KG/M2 | HEIGHT: 63 IN | HEART RATE: 60 BPM | WEIGHT: 173 LBS

## 2018-05-18 DIAGNOSIS — R53.1 GENERAL WEAKNESS: ICD-10-CM

## 2018-05-18 DIAGNOSIS — R10.12 CHRONIC LEFT UPPER QUADRANT PAIN: ICD-10-CM

## 2018-05-18 DIAGNOSIS — R07.89 ATYPICAL CHEST PAIN: ICD-10-CM

## 2018-05-18 DIAGNOSIS — G89.29 CHRONIC LEFT UPPER QUADRANT PAIN: ICD-10-CM

## 2018-05-18 DIAGNOSIS — R07.9 LEFT SIDED CHEST PAIN: Primary | ICD-10-CM

## 2018-05-18 PROCEDURE — 99213 OFFICE O/P EST LOW 20 MIN: CPT | Performed by: FAMILY MEDICINE

## 2018-05-18 PROCEDURE — 1111F DSCHRG MED/CURRENT MED MERGE: CPT | Performed by: FAMILY MEDICINE

## 2018-05-18 NOTE — ASSESSMENT & PLAN NOTE
Was evaluated in the hospital just recently CT scan of the abdomen and pelvis was negative, blood work was unremarkable, was noncardiac related, it was so to be due to indigestion, patient feels much better now she have no more symptoms with using the pantoprazole 40 mg daily, advised patient to continue with that, patient was asking about the sucralfate if she need to use it , with the medication being in non covered by her insurance advised patient to use over-the-counter Maalox, if no improvement in her symptoms to return to the office

## 2018-05-18 NOTE — PATIENT INSTRUCTIONS
Indigestion   AMBULATORY CARE:   Indigestion , or dyspepsia, is stomach discomfort, feeling full quickly, or pain or burning in your esophagus or stomach  The cause may not be known  Seek care immediately if:   · You have trouble swallowing  · You have severe abdominal pain that does not go away even after you take pain medicine  · Your bowel movement is black or you vomit blood  · You have severe nausea or vomiting  · You feel a mass or lump in your abdomen  Contact your healthcare provider if:   · You have pain, discomfort, or constipation  · You have moderate nausea with vomiting and bloating  · Your skin looks pale, and you feel weaker and more tired than usual      · You have questions or concerns about your condition or care  Treatment for indigestion  may include medicines to help decrease acid in your stomach  You may need to stop medicines that are causing your indigestion  Manage your symptoms:   · Do not eat foods that can irritate your stomach , such as spicy or fatty foods  Do not have drinks that contain caffeine or alcohol  Chocolate, peppermint, spearmint, and citrus may also make your symptoms worse  Eat small meals several times a day instead of large meals  · Limit medicines that irritate your stomach , such as NSAIDs, steroids, or narcotics  Your healthcare provider may suggest another medicine that is less irritating  Ask your healthcare provider before you take any over-the-counter medicine  · Find ways to decrease stress  Learn new ways to relax, such as exercise, deep breathing, meditation, or listening to music  · Do not smoke  Nicotine and other chemicals in cigarettes and cigars can cause indigestion  Ask your healthcare provider for information if you currently smoke and need help to quit  E-cigarettes or smokeless tobacco still contain nicotine  Talk to your healthcare provider before you use these products    Follow up with your healthcare provider as directed:  Write down your questions so you remember to ask them during your visits  © 2017 [employer] Information is for End User's use only and may not be sold, redistributed or otherwise used for commercial purposes  All illustrations and images included in CareNotes® are the copyrighted property of [employer]  or [employer]  The above information is an  only  It is not intended as medical advice for individual conditions or treatments  Talk to your doctor, nurse or pharmacist before following any medical regimen to see if it is safe and effective for you

## 2018-05-18 NOTE — PROGRESS NOTES
Assessment/Plan:    Left sided chest pain  Was evaluated in the hospital just recently CT scan of the abdomen and pelvis was negative, blood work was unremarkable, was noncardiac related, it was so to be due to indigestion, patient feels much better now she have no more symptoms with using the pantoprazole 40 mg daily, advised patient to continue with that, patient was asking about the sucralfate if she need to use it , with the medication being in non covered by her insurance advised patient to use over-the-counter Maalox, if no improvement in her symptoms to return to the office  Chronic left upper quadrant pain  Most likely due to indigestion acid reflux to continue with the pantoprazole, to use Maalox as needed  Diagnoses and all orders for this visit:    Left sided chest pain    Atypical chest pain    General weakness    Chronic left upper quadrant pain          Subjective: ER follow up re: Chest pain  Pt was started on Pantoprazole and Carafate  She states she is doing better but she can not afford the Carafate and that worked very well  kw     Patient ID: Maru Navarrete is a 80 y o  female  Patient is here for follow-up after her recent ER visit for left-sided chest pain left upper quadrant pain, patient was diagnosed with indigestion was given Maalox in the hospital which give her a lot of relieved patient have no further symptoms after, patient denied any pain at this point, patient also was started on pantoprazole 40 mg and she is doing very well, patient was given a prescription of sucralfate it was unaffordable with her insurance plan  Patient denied any nausea vomiting, complaining of stiffness and in the morning          The following portions of the patient's history were reviewed and updated as appropriate: allergies, current medications, past family history, past medical history, past social history, past surgical history and problem list     Review of Systems   Constitutional: Negative for activity change, appetite change, chills, fatigue and fever  Gastrointestinal: Positive for abdominal pain  Negative for blood in stool, constipation, diarrhea, nausea, rectal pain and vomiting  Genitourinary: Negative for difficulty urinating, dysuria, hematuria, pelvic pain, vaginal bleeding, vaginal discharge and vaginal pain  Musculoskeletal: Negative for back pain and joint swelling  Objective:    /64 (BP Location: Left arm)   Pulse 60   Ht 5' 2 5" (1 588 m)   Wt 78 5 kg (173 lb)   BMI 31 14 kg/m²      Physical Exam   Constitutional: She appears well-developed and well-nourished  HENT:   Head: Normocephalic and atraumatic  Pulmonary/Chest: Effort normal and breath sounds normal    Abdominal: Soft  Bowel sounds are normal    Skin: Skin is warm and dry  Psychiatric: She has a normal mood and affect   Her behavior is normal

## 2018-05-18 NOTE — ASSESSMENT & PLAN NOTE
Most likely due to indigestion acid reflux to continue with the pantoprazole, to use Maalox as needed

## 2018-06-19 ENCOUNTER — DOCTOR'S OFFICE (OUTPATIENT)
Dept: URBAN - METROPOLITAN AREA CLINIC 136 | Facility: CLINIC | Age: 83
Setting detail: OPHTHALMOLOGY
End: 2018-06-19
Payer: COMMERCIAL

## 2018-06-19 DIAGNOSIS — Z79.84: ICD-10-CM

## 2018-06-19 DIAGNOSIS — H40.1132: ICD-10-CM

## 2018-06-19 DIAGNOSIS — H02.831: ICD-10-CM

## 2018-06-19 DIAGNOSIS — H02.011: ICD-10-CM

## 2018-06-19 DIAGNOSIS — H02.834: ICD-10-CM

## 2018-06-19 DIAGNOSIS — E11.9: ICD-10-CM

## 2018-06-19 LAB
LEFT EYE DIABETIC RETINOPATHY: NORMAL
RIGHT EYE DIABETIC RETINOPATHY: NORMAL

## 2018-06-19 PROCEDURE — 92014 COMPRE OPH EXAM EST PT 1/>: CPT | Performed by: OPHTHALMOLOGY

## 2018-06-19 PROCEDURE — 92133 CPTRZD OPH DX IMG PST SGM ON: CPT | Performed by: OPHTHALMOLOGY

## 2018-06-19 ASSESSMENT — REFRACTION_OUTSIDERX
OD_SPHERE: -1.25
OS_AXIS: 085
OS_CYLINDER: -2.75
OD_AXIS: 080
OS_VA1: 20/25-1
OS_ADD: +2.50
OD_VA3: 20/
OS_SPHERE: -0.25
OS_VA2: 20/
OD_CYLINDER: -2.75
OU_VA: 20/25
OD_ADD: +2.50
OS_VA3: 20/
OD_VA2: 20/25-
OD_VA1: 20/20

## 2018-06-19 ASSESSMENT — REFRACTION_MANIFEST
OD_VA2: 20/
OD_VA1: 20/20
OS_VA1: 20/
OS_VA3: 20/
OD_VA2: 20/
OS_CYLINDER: -3.50
OU_VA: 20/
OS_SPHERE: PLANO
OD_AXIS: 080
OS_VA2: 20/
OD_SPHERE: -1.00
OS_VA2: 20/
OS_VA1: 20/30
OU_VA: 20/
OD_VA1: 20/
OS_VA3: 20/
OS_AXIS: 085
OD_VA3: 20/
OD_VA3: 20/
OD_CYLINDER: -3.25

## 2018-06-19 ASSESSMENT — LID EXAM ASSESSMENTS
OD_BLEPHARITIS: RLL RUL 1+
OD_TRICHIASIS: RUL
OS_BLEPHARITIS: LLL LUL 1+
OS_COMMENTS: MGD

## 2018-06-19 ASSESSMENT — REFRACTION_AUTOREFRACTION
OS_SPHERE: +0.25
OD_SPHERE: -1.25
OD_CYLINDER: -4.50
OS_AXIS: 81
OD_AXIS: 80
OS_CYLINDER: -4.75

## 2018-06-19 ASSESSMENT — CONFRONTATIONAL VISUAL FIELD TEST (CVF)
OS_FINDINGS: FULL
OD_FINDINGS: FULL

## 2018-06-19 ASSESSMENT — VISUAL ACUITY
OS_BCVA: 20/60+2
OD_BCVA: 20/60

## 2018-06-19 ASSESSMENT — REFRACTION_CURRENTRX
OD_OVR_VA: 20/
OS_OVR_VA: 20/
OD_OVR_VA: 20/
OD_OVR_VA: 20/
OS_OVR_VA: 20/
OS_OVR_VA: 20/

## 2018-06-19 ASSESSMENT — LID POSITION - DERMATOCHALASIS
OS_DERMATOCHALASIS: LUL 1+
OD_DERMATOCHALASIS: RUL 1+

## 2018-06-19 ASSESSMENT — SPHEQUIV_DERIVED
OS_SPHEQUIV: -2.125
OD_SPHEQUIV: -2.625
OD_SPHEQUIV: -3.5

## 2018-06-19 ASSESSMENT — SUPERFICIAL PUNCTATE KERATITIS (SPK)
OS_SPK: 1+
OD_SPK: 2+

## 2018-06-19 ASSESSMENT — PUNCTA - ASSESSMENT: OS_PUNCTA: SIL PLUG LLL

## 2018-07-11 ENCOUNTER — OFFICE VISIT (OUTPATIENT)
Dept: FAMILY MEDICINE CLINIC | Facility: CLINIC | Age: 83
End: 2018-07-11
Payer: COMMERCIAL

## 2018-07-11 VITALS
DIASTOLIC BLOOD PRESSURE: 62 MMHG | HEART RATE: 64 BPM | BODY MASS INDEX: 31.14 KG/M2 | SYSTOLIC BLOOD PRESSURE: 150 MMHG | WEIGHT: 173 LBS

## 2018-07-11 DIAGNOSIS — R35.0 URINARY FREQUENCY: Primary | ICD-10-CM

## 2018-07-11 DIAGNOSIS — G50.0 TRIGEMINAL NEURALGIA: ICD-10-CM

## 2018-07-11 DIAGNOSIS — N39.0 URINARY TRACT INFECTION WITHOUT HEMATURIA, SITE UNSPECIFIED: ICD-10-CM

## 2018-07-11 DIAGNOSIS — E11.9 TYPE 2 DIABETES MELLITUS WITHOUT COMPLICATION, WITHOUT LONG-TERM CURRENT USE OF INSULIN (HCC): ICD-10-CM

## 2018-07-11 DIAGNOSIS — R10.12 CHRONIC LEFT UPPER QUADRANT PAIN: ICD-10-CM

## 2018-07-11 DIAGNOSIS — I25.10 CORONARY ARTERY DISEASE INVOLVING NATIVE CORONARY ARTERY OF NATIVE HEART WITHOUT ANGINA PECTORIS: ICD-10-CM

## 2018-07-11 DIAGNOSIS — G89.29 CHRONIC LEFT UPPER QUADRANT PAIN: ICD-10-CM

## 2018-07-11 DIAGNOSIS — R07.89 ATYPICAL CHEST PAIN: ICD-10-CM

## 2018-07-11 LAB
SL AMB  POCT GLUCOSE, UA: ABNORMAL
SL AMB LEUKOCYTE ESTERASE,UA: ABNORMAL
SL AMB POCT BILIRUBIN,UA: ABNORMAL
SL AMB POCT BLOOD,UA: ABNORMAL
SL AMB POCT CLARITY,UA: ABNORMAL
SL AMB POCT COLOR,UA: YELLOW
SL AMB POCT KETONES,UA: ABNORMAL
SL AMB POCT NITRITE,UA: ABNORMAL
SL AMB POCT PH,UA: 6.5
SL AMB POCT SPECIFIC GRAVITY,UA: 1.01
SL AMB POCT URINE PROTEIN: ABNORMAL
SL AMB POCT UROBILINOGEN: 0.2

## 2018-07-11 PROCEDURE — 81003 URINALYSIS AUTO W/O SCOPE: CPT | Performed by: FAMILY MEDICINE

## 2018-07-11 PROCEDURE — 87086 URINE CULTURE/COLONY COUNT: CPT | Performed by: FAMILY MEDICINE

## 2018-07-11 PROCEDURE — 99214 OFFICE O/P EST MOD 30 MIN: CPT | Performed by: FAMILY MEDICINE

## 2018-07-11 PROCEDURE — 81002 URINALYSIS NONAUTO W/O SCOPE: CPT | Performed by: FAMILY MEDICINE

## 2018-07-11 RX ORDER — CIPROFLOXACIN 250 MG/1
500 TABLET, FILM COATED ORAL EVERY 12 HOURS SCHEDULED
Qty: 20 TABLET | Refills: 0 | Status: SHIPPED | OUTPATIENT
Start: 2018-07-11 | End: 2018-07-16

## 2018-07-11 RX ORDER — OMEPRAZOLE 10 MG/1
10 CAPSULE, DELAYED RELEASE ORAL DAILY
COMMUNITY
End: 2018-08-05 | Stop reason: ALTCHOICE

## 2018-07-11 NOTE — PROGRESS NOTES
Assessment/Plan:    Urinary tract infection without hematuria  Patient was treated with ciprofloxacin 250 mg twice a day for 5 days, follow up on urine culture, advised on well hydration    Chronic left upper quadrant pain  Had complete evaluation for this pain in the emergency room and through an outpatient workup  Patient symptoms seem to be better controlled when she take Protonix routine, advised patient to take Protonix every morning, to take Maalox every night Co if she continued to have her symptoms to return to the office for further evaluation    General weakness  Could be due to the recent UTI advised on well hydration, to return to the office if symptoms not better    Type 2 diabetes mellitus without complication ([facility] )  Lab Results   Component Value Date    HGBA1C 6 7 (H) 03/26/2018     Well controlled on Januvia 25 mg a day, no free samples available at this point prescription will be sent to the pharmacy  Diagnoses and all orders for this visit:    Urinary frequency  -     POCT urine dip  -     Urine culture; Future  -     Urine culture    Urinary tract infection without hematuria, site unspecified  -     ciprofloxacin (CIPRO) 250 mg tablet; Take 2 tablets (500 mg total) by mouth every 12 (twelve) hours for 5 days    Atypical chest pain    Chronic left upper quadrant pain    Coronary artery disease involving native coronary artery of native heart without angina pectoris    Type 2 diabetes mellitus without complication, without long-term current use of insulin ([facility])    Trigeminal neuralgia    Other orders  -     omeprazole (PriLOSEC) 10 mg delayed release capsule; Take 10 mg by mouth daily          Subjective: patient here for a 3 month f/u re: chronic medical conditions  Patient needs to review bloodwork results  Patient c/o ongoing left sided pain and weakness  ak     Patient ID: Manpreet Marshall is a 80 y o  female      Patient is here complaining of left upper quadrant pain, comes and goes, associated with generalized shaking, patient is noncompliant with her antiacid medication come when she take the Maalox and the Protonix her symptoms are well controlled patient also has increased urine frequency, and she see some threads in her urine         The following portions of the patient's history were reviewed and updated as appropriate: allergies, current medications, past family history, past medical history, past social history, past surgical history and problem list     Review of Systems   Constitutional: Negative for appetite change, chills and fever  HENT: Negative for congestion, sore throat and voice change  Eyes: Negative for pain and visual disturbance  Respiratory: Negative for cough  Cardiovascular: Negative for chest pain and palpitations  Gastrointestinal: Negative for abdominal pain, constipation, diarrhea and nausea  Endocrine: Negative for cold intolerance, heat intolerance and polyuria  Genitourinary: Positive for frequency and urgency  Negative for dysuria, enuresis, flank pain, pelvic pain and vaginal bleeding  Musculoskeletal: Negative for gait problem, joint swelling and neck pain  Skin: Negative for color change and wound  Neurological: Negative for dizziness, syncope, speech difficulty, numbness and headaches  Psychiatric/Behavioral: Negative for behavioral problems and confusion  The patient is not nervous/anxious  Objective:    /62   Pulse 64   Wt 78 5 kg (173 lb)   BMI 31 14 kg/m²      Physical Exam   Constitutional: She is oriented to person, place, and time  She appears well-developed and well-nourished  HENT:   Head: Normocephalic and atraumatic  Eyes: Conjunctivae and EOM are normal  Pupils are equal, round, and reactive to light  Neck: Normal range of motion  Neck supple  Cardiovascular: Normal rate, regular rhythm, normal heart sounds and intact distal pulses      Pulmonary/Chest: Effort normal and breath sounds normal  Abdominal: Soft  Bowel sounds are normal    Musculoskeletal: Normal range of motion  Neurological: She is alert and oriented to person, place, and time  She has normal reflexes  Skin: Skin is warm and dry  Psychiatric: She has a normal mood and affect  Her behavior is normal    Vitals reviewed

## 2018-07-11 NOTE — PATIENT INSTRUCTIONS
Urinary Tract Infection in Women   WHAT YOU NEED TO KNOW:   A urinary tract infection (UTI) is caused by bacteria that get inside your urinary tract  Most bacteria that enter your urinary tract come out when you urinate  If the bacteria stay in your urinary tract, you may get an infection  Your urinary tract includes your kidneys, ureters, bladder, and urethra  Urine is made in your kidneys, and it flows from the ureters to the bladder  Urine leaves the bladder through the urethra  A UTI is more common in your lower urinary tract, which includes your bladder and urethra  DISCHARGE INSTRUCTIONS:   Return to the emergency department if:   · You are urinating very little or not at all  · You have a high fever with shaking chills  · You have side or back pain that gets worse  Contact your healthcare provider if:   · You have a fever  · You do not feel better after 2 days of taking antibiotics  · You are vomiting  · You have questions or concerns about your condition or care  Medicines:   · Antibiotics  help fight a bacterial infection  · Medicines  may be given to decrease pain and burning when you urinate  They will also help decrease the feeling that you need to urinate often  These medicines will make your urine orange or red  · Take your medicine as directed  Contact your healthcare provider if you think your medicine is not helping or if you have side effects  Tell him or her if you are allergic to any medicine  Keep a list of the medicines, vitamins, and herbs you take  Include the amounts, and when and why you take them  Bring the list or the pill bottles to follow-up visits  Carry your medicine list with you in case of an emergency  Follow up with your healthcare provider as directed:  Write down your questions so you remember to ask them during your visits  Prevent another UTI:   · Empty your bladder often  Urinate and empty your bladder as soon as you feel the need   Do not hold your urine for long periods of time  · Wipe from front to back after you urinate or have a bowel movement  This will help prevent germs from getting into your urinary tract through your urethra  · Drink liquids as directed  Ask how much liquid to drink each day and which liquids are best for you  You may need to drink more liquids than usual to help flush out the bacteria  Do not drink alcohol, caffeine, or citrus juices  These can irritate your bladder and increase your symptoms  Your healthcare provider may recommend cranberry juice to help prevent a UTI  · Urinate after you have sex  This can help flush out bacteria passed during sex  · Do not douche or use feminine deodorants  These can change the chemical balance in your vagina  · Change sanitary pads or tampons often  This will help prevent germs from getting into your urinary tract  · Do pelvic muscle exercises often  Pelvic muscle exercises may help you start and stop urinating  Strong pelvic muscles may help you empty your bladder easier  Squeeze these muscles tightly for 5 seconds like you are trying to hold back urine  Then relax for 5 seconds  Gradually work up to squeezing for 10 seconds  Do 3 sets of 15 repetitions a day, or as directed  © 2017 [address] [employer]  Information is for End User's use only and may not be sold, redistributed or otherwise used for commercial purposes  All illustrations and images included in CareNotes® are the copyrighted property of [employer]  or [employer]  The above information is an  only  It is not intended as medical advice for individual conditions or treatments  Talk to your doctor, nurse or pharmacist before following any medical regimen to see if it is safe and effective for you

## 2018-07-13 PROBLEM — N39.0 URINARY TRACT INFECTION WITHOUT HEMATURIA: Status: ACTIVE | Noted: 2018-07-13

## 2018-07-13 NOTE — ASSESSMENT & PLAN NOTE
Could be due to the recent UTI advised on well hydration, to return to the office if symptoms not better

## 2018-07-13 NOTE — ASSESSMENT & PLAN NOTE
Patient was treated with ciprofloxacin 250 mg twice a day for 5 days, follow up on urine culture, advised on well hydration

## 2018-07-13 NOTE — ASSESSMENT & PLAN NOTE
Lab Results   Component Value Date    HGBA1C 6 7 (H) 03/26/2018     Well controlled on Januvia 25 mg a day, no free samples available at this point prescription will be sent to the pharmacy

## 2018-07-13 NOTE — ASSESSMENT & PLAN NOTE
Had complete evaluation for this pain in the emergency room and through an outpatient workup  Patient symptoms seem to be better controlled when she take Protonix routine, advised patient to take Protonix every morning, to take Maalox every night Co if she continued to have her symptoms to return to the office for further evaluation

## 2018-07-14 LAB — BACTERIA UR CULT: ABNORMAL

## 2018-08-05 ENCOUNTER — APPOINTMENT (EMERGENCY)
Dept: RADIOLOGY | Facility: HOSPITAL | Age: 83
DRG: 293 | End: 2018-08-05
Payer: COMMERCIAL

## 2018-08-05 ENCOUNTER — HOSPITAL ENCOUNTER (INPATIENT)
Facility: HOSPITAL | Age: 83
LOS: 4 days | Discharge: HOME HEALTH | DRG: 293 | End: 2018-08-09
Attending: EMERGENCY MEDICINE | Admitting: HOSPITALIST
Payer: COMMERCIAL

## 2018-08-05 DIAGNOSIS — B96.89 MORAXELLA CATARRHALIS BRONCHITIS: ICD-10-CM

## 2018-08-05 DIAGNOSIS — I10 HYPERTENSION: ICD-10-CM

## 2018-08-05 DIAGNOSIS — R06.00 DYSPNEA: ICD-10-CM

## 2018-08-05 DIAGNOSIS — R77.8 ELEVATED TROPONIN: ICD-10-CM

## 2018-08-05 DIAGNOSIS — I50.31 ACUTE DIASTOLIC CONGESTIVE HEART FAILURE (HCC): ICD-10-CM

## 2018-08-05 DIAGNOSIS — J40 MORAXELLA CATARRHALIS BRONCHITIS: ICD-10-CM

## 2018-08-05 DIAGNOSIS — I50.9 CHF (CONGESTIVE HEART FAILURE) (HCC): Primary | ICD-10-CM

## 2018-08-05 PROBLEM — J18.9 PNEUMONIA: Status: ACTIVE | Noted: 2018-08-05

## 2018-08-05 PROBLEM — E11.49 TYPE 2 DIABETES MELLITUS WITH NEUROLOGIC COMPLICATION (HCC): Status: ACTIVE | Noted: 2018-08-05

## 2018-08-05 LAB
ANION GAP SERPL CALCULATED.3IONS-SCNC: 9 MMOL/L (ref 4–13)
ATRIAL RATE: 86 BPM
BASOPHILS # BLD AUTO: 0.02 THOUSANDS/ΜL (ref 0–0.1)
BASOPHILS NFR BLD AUTO: 0 % (ref 0–1)
BUN SERPL-MCNC: 11 MG/DL (ref 5–25)
CALCIUM SERPL-MCNC: 9.2 MG/DL (ref 8.3–10.1)
CHLORIDE SERPL-SCNC: 100 MMOL/L (ref 100–108)
CO2 SERPL-SCNC: 25 MMOL/L (ref 21–32)
CREAT SERPL-MCNC: 0.9 MG/DL (ref 0.6–1.3)
EOSINOPHIL # BLD AUTO: 0.17 THOUSAND/ΜL (ref 0–0.61)
EOSINOPHIL NFR BLD AUTO: 2 % (ref 0–6)
ERYTHROCYTE [DISTWIDTH] IN BLOOD BY AUTOMATED COUNT: 13.9 % (ref 11.6–15.1)
EST. AVERAGE GLUCOSE BLD GHB EST-MCNC: 143 MG/DL
GFR SERPL CREATININE-BSD FRML MDRD: 63 ML/MIN/1.73SQ M
GLUCOSE SERPL-MCNC: 146 MG/DL (ref 65–140)
GLUCOSE SERPL-MCNC: 149 MG/DL (ref 65–140)
GLUCOSE SERPL-MCNC: 177 MG/DL (ref 65–140)
HBA1C MFR BLD: 6.6 % (ref 4.2–6.3)
HCT VFR BLD AUTO: 43.1 % (ref 34.8–46.1)
HGB BLD-MCNC: 14.4 G/DL (ref 11.5–15.4)
L PNEUMO1 AG UR QL IA.RAPID: NEGATIVE
LYMPHOCYTES # BLD AUTO: 0.82 THOUSANDS/ΜL (ref 0.6–4.47)
LYMPHOCYTES NFR BLD AUTO: 8 % (ref 14–44)
MCH RBC QN AUTO: 30.8 PG (ref 26.8–34.3)
MCHC RBC AUTO-ENTMCNC: 33.4 G/DL (ref 31.4–37.4)
MCV RBC AUTO: 92 FL (ref 82–98)
MONOCYTES # BLD AUTO: 0.75 THOUSAND/ΜL (ref 0.17–1.22)
MONOCYTES NFR BLD AUTO: 7 % (ref 4–12)
NEUTROPHILS # BLD AUTO: 8.77 THOUSANDS/ΜL (ref 1.85–7.62)
NEUTS SEG NFR BLD AUTO: 83 % (ref 43–75)
NRBC BLD AUTO-RTO: 0 /100 WBCS
NT-PROBNP SERPL-MCNC: 1242 PG/ML
P AXIS: 70 DEGREES
PLATELET # BLD AUTO: 166 THOUSANDS/UL (ref 149–390)
PMV BLD AUTO: 11.3 FL (ref 8.9–12.7)
POTASSIUM SERPL-SCNC: 4 MMOL/L (ref 3.5–5.3)
PR INTERVAL: 218 MS
QRS AXIS: 3 DEGREES
QRSD INTERVAL: 106 MS
QT INTERVAL: 362 MS
QTC INTERVAL: 433 MS
RBC # BLD AUTO: 4.68 MILLION/UL (ref 3.81–5.12)
S PNEUM AG UR QL: NEGATIVE
SODIUM SERPL-SCNC: 134 MMOL/L (ref 136–145)
T WAVE AXIS: 77 DEGREES
TROPONIN I SERPL-MCNC: 0.04 NG/ML
TROPONIN I SERPL-MCNC: 0.05 NG/ML
TROPONIN I SERPL-MCNC: 0.05 NG/ML
VENTRICULAR RATE: 86 BPM
WBC # BLD AUTO: 10.53 THOUSAND/UL (ref 4.31–10.16)

## 2018-08-05 PROCEDURE — 96374 THER/PROPH/DIAG INJ IV PUSH: CPT

## 2018-08-05 PROCEDURE — 71046 X-RAY EXAM CHEST 2 VIEWS: CPT

## 2018-08-05 PROCEDURE — 87205 SMEAR GRAM STAIN: CPT | Performed by: HOSPITALIST

## 2018-08-05 PROCEDURE — 36415 COLL VENOUS BLD VENIPUNCTURE: CPT | Performed by: EMERGENCY MEDICINE

## 2018-08-05 PROCEDURE — 87449 NOS EACH ORGANISM AG IA: CPT | Performed by: HOSPITALIST

## 2018-08-05 PROCEDURE — 82948 REAGENT STRIP/BLOOD GLUCOSE: CPT

## 2018-08-05 PROCEDURE — 83036 HEMOGLOBIN GLYCOSYLATED A1C: CPT | Performed by: HOSPITALIST

## 2018-08-05 PROCEDURE — 99285 EMERGENCY DEPT VISIT HI MDM: CPT

## 2018-08-05 PROCEDURE — 93010 ELECTROCARDIOGRAM REPORT: CPT | Performed by: INTERNAL MEDICINE

## 2018-08-05 PROCEDURE — 80048 BASIC METABOLIC PNL TOTAL CA: CPT | Performed by: EMERGENCY MEDICINE

## 2018-08-05 PROCEDURE — 99223 1ST HOSP IP/OBS HIGH 75: CPT | Performed by: HOSPITALIST

## 2018-08-05 PROCEDURE — 84484 ASSAY OF TROPONIN QUANT: CPT | Performed by: HOSPITALIST

## 2018-08-05 PROCEDURE — 93005 ELECTROCARDIOGRAM TRACING: CPT

## 2018-08-05 PROCEDURE — 87185 SC STD ENZYME DETCJ PER NZM: CPT | Performed by: HOSPITALIST

## 2018-08-05 PROCEDURE — 94760 N-INVAS EAR/PLS OXIMETRY 1: CPT

## 2018-08-05 PROCEDURE — 84484 ASSAY OF TROPONIN QUANT: CPT | Performed by: EMERGENCY MEDICINE

## 2018-08-05 PROCEDURE — 85025 COMPLETE CBC W/AUTO DIFF WBC: CPT | Performed by: EMERGENCY MEDICINE

## 2018-08-05 PROCEDURE — 87070 CULTURE OTHR SPECIMN AEROBIC: CPT | Performed by: HOSPITALIST

## 2018-08-05 PROCEDURE — 87040 BLOOD CULTURE FOR BACTERIA: CPT | Performed by: HOSPITALIST

## 2018-08-05 PROCEDURE — 83880 ASSAY OF NATRIURETIC PEPTIDE: CPT | Performed by: EMERGENCY MEDICINE

## 2018-08-05 RX ORDER — ACETAMINOPHEN 325 MG/1
650 TABLET ORAL EVERY 6 HOURS PRN
Status: DISCONTINUED | OUTPATIENT
Start: 2018-08-05 | End: 2018-08-09 | Stop reason: HOSPADM

## 2018-08-05 RX ORDER — LEVOTHYROXINE SODIUM 0.07 MG/1
75 TABLET ORAL
Status: DISCONTINUED | OUTPATIENT
Start: 2018-08-06 | End: 2018-08-09 | Stop reason: HOSPADM

## 2018-08-05 RX ORDER — LATANOPROST 50 UG/ML
1 SOLUTION/ DROPS OPHTHALMIC
Status: DISCONTINUED | OUTPATIENT
Start: 2018-08-05 | End: 2018-08-09 | Stop reason: HOSPADM

## 2018-08-05 RX ORDER — LISINOPRIL 20 MG/1
40 TABLET ORAL DAILY
Status: DISCONTINUED | OUTPATIENT
Start: 2018-08-05 | End: 2018-08-09 | Stop reason: HOSPADM

## 2018-08-05 RX ORDER — FUROSEMIDE 10 MG/ML
40 INJECTION INTRAMUSCULAR; INTRAVENOUS ONCE
Status: COMPLETED | OUTPATIENT
Start: 2018-08-05 | End: 2018-08-05

## 2018-08-05 RX ORDER — DORZOLAMIDE HCL 20 MG/ML
1 SOLUTION/ DROPS OPHTHALMIC 3 TIMES DAILY
Status: DISCONTINUED | OUTPATIENT
Start: 2018-08-05 | End: 2018-08-09 | Stop reason: HOSPADM

## 2018-08-05 RX ORDER — ASPIRIN 81 MG/1
324 TABLET, CHEWABLE ORAL ONCE
Status: COMPLETED | OUTPATIENT
Start: 2018-08-05 | End: 2018-08-05

## 2018-08-05 RX ORDER — FUROSEMIDE 10 MG/ML
20 INJECTION INTRAMUSCULAR; INTRAVENOUS
Status: DISCONTINUED | OUTPATIENT
Start: 2018-08-05 | End: 2018-08-08

## 2018-08-05 RX ORDER — ONDANSETRON 2 MG/ML
4 INJECTION INTRAMUSCULAR; INTRAVENOUS EVERY 6 HOURS PRN
Status: DISCONTINUED | OUTPATIENT
Start: 2018-08-05 | End: 2018-08-09 | Stop reason: HOSPADM

## 2018-08-05 RX ORDER — ASPIRIN 81 MG/1
81 TABLET, CHEWABLE ORAL DAILY
Status: DISCONTINUED | OUTPATIENT
Start: 2018-08-05 | End: 2018-08-09 | Stop reason: HOSPADM

## 2018-08-05 RX ORDER — OMEGA-3S/DHA/EPA/FISH OIL/D3 300MG-1000
400 CAPSULE ORAL DAILY
Status: DISCONTINUED | OUTPATIENT
Start: 2018-08-05 | End: 2018-08-09 | Stop reason: HOSPADM

## 2018-08-05 RX ORDER — AZITHROMYCIN 250 MG/1
500 TABLET, FILM COATED ORAL EVERY 24 HOURS
Status: DISCONTINUED | OUTPATIENT
Start: 2018-08-05 | End: 2018-08-09 | Stop reason: HOSPADM

## 2018-08-05 RX ADMIN — ENOXAPARIN SODIUM 40 MG: 40 INJECTION SUBCUTANEOUS at 13:20

## 2018-08-05 RX ADMIN — LISINOPRIL 40 MG: 20 TABLET ORAL at 13:20

## 2018-08-05 RX ADMIN — CHOLECALCIFEROL TAB 10 MCG (400 UNIT) 400 UNITS: 10 TAB at 13:20

## 2018-08-05 RX ADMIN — AZITHROMYCIN MONOHYDRATE 500 MG: 250 TABLET ORAL at 13:19

## 2018-08-05 RX ADMIN — DORZOLAMIDE HCL 1 DROP: 20 SOLUTION/ DROPS OPHTHALMIC at 13:19

## 2018-08-05 RX ADMIN — ASPIRIN 81 MG 324 MG: 81 TABLET ORAL at 10:16

## 2018-08-05 RX ADMIN — LATANOPROST 1 DROP: 50 SOLUTION OPHTHALMIC at 22:10

## 2018-08-05 RX ADMIN — DORZOLAMIDE HCL 1 DROP: 20 SOLUTION/ DROPS OPHTHALMIC at 21:32

## 2018-08-05 RX ADMIN — DORZOLAMIDE HCL 1 DROP: 20 SOLUTION/ DROPS OPHTHALMIC at 16:51

## 2018-08-05 RX ADMIN — SITAGLIPTIN 50 MG: 50 TABLET, FILM COATED ORAL at 13:19

## 2018-08-05 RX ADMIN — FUROSEMIDE 20 MG: 10 INJECTION, SOLUTION INTRAMUSCULAR; INTRAVENOUS at 16:51

## 2018-08-05 RX ADMIN — CEFTRIAXONE 1000 MG: 1 INJECTION, POWDER, FOR SOLUTION INTRAMUSCULAR; INTRAVENOUS at 13:20

## 2018-08-05 RX ADMIN — FUROSEMIDE 40 MG: 10 INJECTION, SOLUTION INTRAMUSCULAR; INTRAVENOUS at 10:17

## 2018-08-05 NOTE — ASSESSMENT & PLAN NOTE
Lab Results   Component Value Date    HGBA1C 6 7 (H) 03/26/2018       No results for input(s): POCGLU in the last 72 hours      Blood Sugar Average: Last 72 hrs:     Continue home diabetic meds plus sliding scale insulin

## 2018-08-05 NOTE — ED PROVIDER NOTES
History  Chief Complaint   Patient presents with    Shortness of Breath     Pt reports having a bad cold and sob x 3 days  Pt having difficulty speaking due to sob  Pt denies fevers/cp but states she has left side back pain  Pt reports dry non productive cough      80year-old female presents for evaluation of 3 days of shortness of breath with associated nonproductive cough  The patient does have some left-sided chest and back pain which is worse with coughing  Nothing has made her symptoms better  The patient states that her twin sister was recently hospitalized sick with pneumonia and she was around her for the past week  Prior to Admission Medications   Prescriptions Last Dose Informant Patient Reported? Taking? Cholecalciferol (VITAMIN D3) 2000 units capsule  Self Yes Yes   Sig: Take 1,000 Units by mouth daily     amLODIPine (NORVASC) 10 mg tablet  Self Yes Yes   Sig: amlodipine 10 mg tablet   aspirin 81 MG tablet  Self Yes Yes   Sig: Take 81 mg by mouth daily  dorzolamide (TRUSOPT) 2 % ophthalmic solution  Self Yes Yes   Sig: Apply 1 drop to eye 3 (three) times a day  enalapril (VASOTEC) 10 mg tablet  Self Yes Yes   Sig: Take 20 mg by mouth daily     gabapentin (NEURONTIN) 600 MG tablet  Self No Yes   Sig: Take 0 5 tablets (300 mg total) by mouth every 8 (eight) hours as needed (Cranial Neuralgia)   latanoprost (XALATAN) 0 005 % ophthalmic solution  Self Yes Yes   Sig: Apply 1 drop to eye daily at bedtime   Both eyes   levothyroxine 75 mcg tablet  Self No Yes   Sig: Take 1 tablet (75 mcg total) by mouth daily   sitaGLIPtin (JANUVIA) 50 mg tablet  Self Yes Yes   Sig: Take 1 tablet by mouth daily      Facility-Administered Medications: None       Past Medical History:   Diagnosis Date    Asthma     Atypical chest pain     CAD (coronary artery disease)     Candidal intertrigo     Depression     Diabetes mellitus (HCC)     Diabetic neuropathy ([address] )     Diverticulitis     Dyslipidemia  Female bladder prolapse     Gastric ulcer     Glaucoma     HTN (hypertension)     Hyperlipidemia     Hypothyroidism 4/18/2016    RAD (reactive airway disease)        Past Surgical History:   Procedure Laterality Date    COLONOSCOPY      ESOPHAGOGASTRODUODENOSCOPY  2011    HYSTERECTOMY         Family History   Problem Relation Age of Onset    Breast cancer Mother     Hypothyroidism Mother     Hypertension Father     Breast cancer Sister     Thyroid disease Sister     Hypertension Sister      I have reviewed and agree with the history as documented  Social History   Substance Use Topics    Smoking status: Never Smoker    Smokeless tobacco: Never Used    Alcohol use No      Comment: Social Alcohol Use -- as per allscripts        Review of Systems   Constitutional: Negative for chills and fever  Respiratory: Positive for cough and shortness of breath  Cardiovascular: Positive for chest pain  Gastrointestinal: Negative for abdominal pain, diarrhea, nausea and vomiting  Neurological: Positive for light-headedness  All other systems reviewed and are negative  Physical Exam  Physical Exam   Constitutional: She is oriented to person, place, and time  She appears well-developed and well-nourished  No distress  HENT:   Head: Normocephalic and atraumatic  Right Ear: External ear normal    Left Ear: External ear normal    Eyes: Conjunctivae and EOM are normal  Pupils are equal, round, and reactive to light  No scleral icterus  Neck: Normal range of motion  Cardiovascular: Normal rate  An irregular rhythm present  Occasional extrasystoles are present  Murmur heard  Systolic murmur is present with a grade of 3/6   Pulmonary/Chest: Effort normal and breath sounds normal  No respiratory distress  Abdominal: Soft  Bowel sounds are normal  There is no tenderness  There is no rebound and no guarding  Musculoskeletal: Normal range of motion  She exhibits no edema  Neurological: She is alert and oriented to person, place, and time  Skin: Skin is warm and dry  No rash noted  Psychiatric: She has a normal mood and affect  Nursing note and vitals reviewed  Vital Signs  ED Triage Vitals   Temperature Pulse Respirations Blood Pressure SpO2   08/05/18 0858 08/05/18 0858 08/05/18 0858 08/05/18 0858 08/05/18 0858   99 1 °F (37 3 °C) 85 18 163/71 97 %      Temp Source Heart Rate Source Patient Position - Orthostatic VS BP Location FiO2 (%)   08/05/18 0858 08/05/18 0858 08/05/18 0858 08/05/18 0858 --   Temporal Monitor Sitting Right arm       Pain Score       08/05/18 1006       7           Vitals:    08/05/18 0858 08/05/18 1006   BP: 163/71 146/64   Pulse: 85 74   Patient Position - Orthostatic VS: Sitting        Visual Acuity      ED Medications  Medications   aspirin chewable tablet 324 mg (324 mg Oral Given 8/5/18 1016)   furosemide (LASIX) injection 40 mg (40 mg Intravenous Given 8/5/18 1017)       Diagnostic Studies  Results Reviewed     Procedure Component Value Units Date/Time    Basic metabolic panel [[id number]]  (Abnormal) Collected:  08/05/18 0921    Lab Status:  Final result Specimen:  Blood from Arm, Left Updated:  08/05/18 0949     Sodium 134 (L) mmol/L      Potassium 4 0 mmol/L      Chloride 100 mmol/L      CO2 25 mmol/L      Anion Gap 9 mmol/L      BUN 11 mg/dL      Creatinine 0 90 mg/dL      Glucose 177 (H) mg/dL      Calcium 9 2 mg/dL      eGFR 63 ml/min/1 73sq m     Narrative:         National Kidney Disease Education Program recommendations are as follows:  GFR calculation is accurate only with a steady state creatinine  Chronic Kidney disease less than 60 ml/min/1 73 sq  meters  Kidney failure less than 15 ml/min/1 73 sq  meters      B-type natriuretic peptide [[id number]]  (Abnormal) Collected:  08/05/18 0921    Lab Status:  Final result Specimen:  Blood from Arm, Left Updated:  08/05/18 0949     NT-proBNP 1,242 (H) pg/mL     Troponin I [[id number]] (Abnormal) Collected:  08/05/18 0921    Lab Status:  Final result Specimen:  Blood from Arm, Left Updated:  08/05/18 0946     Troponin I 0 05 (H) ng/mL     CBC and differential [[id number]]  (Abnormal) Collected:  08/05/18 0921    Lab Status:  Final result Specimen:  Blood from Arm, Left Updated:  08/05/18 0927     WBC 10 53 (H) Thousand/uL      RBC 4 68 Million/uL      Hemoglobin 14 4 g/dL      Hematocrit 43 1 %      MCV 92 fL      MCH 30 8 pg      MCHC 33 4 g/dL      RDW 13 9 %      MPV 11 3 fL      Platelets 873 Thousands/uL      nRBC 0 /100 WBCs      Neutrophils Relative 83 (H) %      Lymphocytes Relative 8 (L) %      Monocytes Relative 7 %      Eosinophils Relative 2 %      Basophils Relative 0 %      Neutrophils Absolute 8 77 (H) Thousands/µL      Lymphocytes Absolute 0 82 Thousands/µL      Monocytes Absolute 0 75 Thousand/µL      Eosinophils Absolute 0 17 Thousand/µL      Basophils Absolute 0 02 Thousands/µL                  XR chest 2 views   Final Result by Cara Crews MD (09/82 3101)      Cardiomegaly  No radiographic evidence of acute intrathoracic process or significant interval change              Workstation performed: [id number]                    Procedures  ECG 12 Lead Documentation  Date/Time: 8/5/2018 9:29 AM  Performed by: Thuy Carvalho  Authorized by: Keaton VERA     Indications / Diagnosis:  Shortness of breath  ECG reviewed by me, the ED Provider: yes    Patient location:  ED  Previous ECG:     Previous ECG:  Compared to current    Comparison ECG info:  5/12/2018  Interpretation:     Interpretation: normal    Rate:     ECG rate:  86    ECG rate assessment: normal    Rhythm:     Rhythm: sinus rhythm    Ectopy:     Ectopy: none    QRS:     QRS axis:  Normal    QRS intervals:  Normal  Conduction:     Conduction: abnormal      Abnormal conduction: 1st degree    ST segments:     ST segments:  Normal  T waves:     T waves: normal             Phone Contacts  ED Phone Contact    ED Course  ED Course as of Aug 05 1018   Sun Aug 05, 2018   1014 I discussed the case with the hospitalist  We reviewed the HPI, pertinent PMH, ED course and workup   Hospitalist agreed with plan and will admit the patient to the hospital                                 MDM  Number of Diagnoses or Management Options  CHF (congestive heart failure) ([facility] ): new and requires workup  Dyspnea: new and requires workup  Elevated troponin: new and requires workup  Hypertension: minor  Diagnosis management comments: Differential diagnosis:  Pneumonia, bronchitis, CHF, viral syndrome, doubt acute coronary syndrome    All labs reviewed and utilized in the medical decision making process    All radiology studies independently viewed by me and interpreted by the radiologist            Amount and/or Complexity of Data Reviewed  Clinical lab tests: ordered and reviewed  Tests in the radiology section of CPT®: ordered and reviewed  Tests in the medicine section of CPT®: ordered and reviewed  Review and summarize past medical records: yes  Independent visualization of images, tracings, or specimens: yes      CritCare Time    Disposition  Final diagnoses:   CH[facility] (congestive heart failure) ([facility] )   Elevated troponin   Dyspnea   Hypertension     Time reflects when diagnosis was documented in both MDM as applicable and the Disposition within this note     Time User Action Codes Description Comment    8/5/2018 10:13 AM Katiana Duel B Add [I50 9] CHF (congestive heart failure) ([facility] [address] [address] )     8/5/2018 10:13 AM Katiana Duel B Add [R74 8] Elevated troponin     8/5/2018 10:14 AM Katiana Duel B Add [R06 00] Dyspnea     8/5/2018 10:14 AM Katiana Duel B Add [I10] Hypertension     8/5/2018 10:18 AM Katiana Duel B Modify [I50 9] CHF (congestive heart failure) [facility])       ED Disposition     ED Disposition Condition Comment    Admit  Case was discussed with Dr Melida Chua and the patient's admission status was agreed to be Admission Status: inpatient status to the service of Dr Hermann Lafleur   Follow-up Information    None         Patient's Medications   Discharge Prescriptions    No medications on file     No discharge procedures on file      ED Provider  Electronically Signed by           Andreia Hull DO  08/05/18 1019

## 2018-08-05 NOTE — ASSESSMENT & PLAN NOTE
I believe that this is the underlying cause of her SOB  Will give gentle IV Lasix  Check Echocardiogram  Rule out for MI

## 2018-08-05 NOTE — ASSESSMENT & PLAN NOTE
I think that this is less likely than CHF, but she was recently exposed to her sister who had pneumonia last week and she has a mildly elevated WBC with a cough  I will cover her with Rocephin and Zithromax     If her echocardiogram shows new CHF, then I think pneumonia is unlikely and antibiotics can likely be discontinued

## 2018-08-05 NOTE — ED NOTES
Pt's [facility], contacted as per the request of pt  Carmen Givens RN alerted Wesley Ceballos of being admitted to facility and alerted of diagnosis        Bacilio Pichardo RN  08/05/18 [id number]

## 2018-08-05 NOTE — H&P
H&P- Erika Reyes 9/24/1922, 80 y o  female MRN: [id number]    Unit/Bed#: E4 -01 Encounter: [id number]    Primary Care Provider: Amena Espinoza MD   Date and time admitted to hospital: 8/5/2018  8:57 AM        * CHF (congestive heart failure) ([address] )   Assessment & Plan    I believe that this is the underlying cause of her SOB  Will give gentle IV Lasix  Check Echocardiogram  Rule out for MI        Pneumonia   Assessment & Plan    I think that this is less likely than CHF, but she was recently exposed to her sister who had pneumonia last week and she has a mildly elevated WBC with a cough  I will cover her with Rocephin and Zithromax  If her echocardiogram shows new CHF, then I think pneumonia is unlikely and antibiotics can likely be discontinued        Type 2 diabetes mellitus with neurologic complication [facility])   Assessment & Plan    Lab Results   Component Value Date    HGBA1C 6 7 (H) 03/26/2018       No results for input(s): POCGLU in the last 72 hours  Blood Sugar Average: Last 72 hrs:     Continue home diabetic meds plus sliding scale insulin              Chief Complaint:   Shortness of breath      History of Present Illness:    Erika Reyes is a 80 y o  female who presents with shortness of breath  The patient has not history of CHF  She says for the last 3 days she has gotten more and more SOB at rest    She has a nonproductive cough  She denies chest pain  She has no fever or chills  She was exposed to her sister who had pneumonia last week so she wonders if she "caught" pneumonia  She denies leg swelling  She has no orthopnea  Review of Systems:    Review of Systems   Constitutional: Negative  HENT: Positive for congestion  Eyes: Negative  Respiratory: Positive for cough and shortness of breath  Cardiovascular: Negative  Gastrointestinal: Negative  Endocrine: Negative  Genitourinary: Negative  Musculoskeletal: Negative      All other systems reviewed and are negative  Past Medical and Surgical History:     Past Medical History:   Diagnosis Date    Asthma     Atypical chest pain     CAD (coronary artery disease)     Candidal intertrigo     Depression     Diabetes mellitus (HCC)     Diabetic neuropathy (HCC)     Diverticulitis     Dyslipidemia     Female bladder prolapse     Gastric ulcer     Glaucoma     HTN (hypertension)     Hyperlipidemia     Hypothyroidism 4/18/2016    RAD (reactive airway disease)        Past Surgical History:   Procedure Laterality Date    COLONOSCOPY      ESOPHAGOGASTRODUODENOSCOPY  2011    HYSTERECTOMY           Home Medications:    Prior to Admission medications    Medication Sig Start Date End Date Taking? Authorizing Provider   amLODIPine (NORVASC) 10 mg tablet amlodipine 10 mg tablet   Yes Historical Provider, MD   aspirin 81 MG tablet Take 81 mg by mouth daily  Yes Historical Provider, MD   Cholecalciferol (VITAMIN D3) 2000 units capsule Take 1,000 Units by mouth daily     Yes Historical Provider, MD   dorzolamide (TRUSOPT) 2 % ophthalmic solution Administer 1 drop to both eyes 3 (three) times a day     Yes Historical Provider, MD   enalapril (VASOTEC) 10 mg tablet Take 20 mg by mouth daily     Yes Historical Provider, MD   gabapentin (NEURONTIN) 600 MG tablet Take 0 5 tablets (300 mg total) by mouth every 8 (eight) hours as needed (Cranial Neuralgia) 2/20/18  Yes Marc Coker MD   latanoprost (XALATAN) 0 005 % ophthalmic solution Apply 1 drop to eye daily at bedtime   Both eyes   Yes Historical Provider, MD   levothyroxine 75 mcg tablet Take 1 tablet (75 mcg total) by mouth daily 3/29/18  Yes Marc Coker MD   sitaGLIPtin (JANUVIA) 50 mg tablet Take 1 tablet by mouth daily 7/13/17  Yes Historical Provider, MD   ACCU-CHEK SOFTCLIX LANCETS lancets by Does not apply route 6/23/17 8/5/18  Historical Provider, MD   Blood Glucose Monitoring Suppl (ACCU-CHEK BURTON PLUS) w/Device KIT by Does not apply route 6/23/17 8/5/18  Historical Provider, MD   glucose blood (ACCU-CHEK BURTON PLUS) test strip 1 Squirt by In Vitro route daily 6/23/17 8/5/18  Historical Provider, MD   Incontinence Supply Disposable (ATTENDS BRIEFS LARGE) [address] [facility]  1/2/18 8/5/18  Historical Provider, MD   omeprazole (PriLOSEC) 10 mg delayed release capsule Take 10 mg by mouth daily  8/5/18  Historical Provider, MD   pantoprazole (PROTONIX) 40 mg tablet Take 1 tablet (40 mg total) by mouth daily 5/13/18 8/5/18  Norma Pillow, DO   sucralfate (CARAFATE) 1 g/10 mL suspension Take 10 mL (1,000 mg total) by mouth 4 (four) times a day (before meals and at bedtime) 5/13/18 8/5/18  Norma Pillow, DO   Toothbrushes (TOOTHBRUSH/CROSS ACTION) MISC  11/10/17 8/5/18  Historical Provider, MD     I have reviewed home medications with patient personally  Allergies: Allergies   Allergen Reactions    Statins      Other reaction(s): Weakness  Category: Adverse Reaction;          Social History:    Substance Use History:   History   Alcohol Use No     Comment: Social Alcohol Use -- as per allscripts     History   Smoking Status    Never Smoker   Smokeless Tobacco    Never Used     History   Drug Use No         Family History:    non-contributory      Physical Exam:     Vitals:   Blood Pressure: 156/77 (08/05/18 1053)  Pulse: 79 (08/05/18 1053)  Temperature: 99 3 °F (37 4 °C) (08/05/18 1053)  Temp Source: Temporal (08/05/18 1053)  Respirations: 18 (08/05/18 1053)  SpO2: 97 % (08/05/18 1053)    Physical Exam   HENT:   Head: Normocephalic and atraumatic  Eyes: EOM are normal  Pupils are equal, round, and reactive to light  Neck: JVD (positive hepatojugular reflex 1/2 way up) present  Cardiovascular: Normal rate and regular rhythm  Exam reveals no gallop and no friction rub  No murmur heard  Pulmonary/Chest: Effort normal and breath sounds normal  She has no wheezes  She has no rales  Abdominal: Soft  There is no tenderness     Musculoskeletal: She exhibits edema (1+ pitting edema bilat LE 1/2 way up tibia)  Nursing note and vitals reviewed       Additional Data:     Lab Results: I have personally reviewed pertinent reports  Results from last 7 days  Lab Units 08/05/18  0921   WBC Thousand/uL 10 53*   HEMOGLOBIN g/dL 14 4   HEMATOCRIT % 43 1   PLATELETS Thousands/uL 166   NEUTROS PCT % 83*   LYMPHS PCT % 8*   MONOS PCT % 7   EOS PCT % 2       Results from last 7 days  Lab Units 08/05/18  0921   SODIUM mmol/L 134*   POTASSIUM mmol/L 4 0   CHLORIDE mmol/L 100   CO2 mmol/L 25   BUN mg/dL 11   CREATININE mg/dL 0 90   CALCIUM mg/dL 9 2   GLUCOSE RANDOM mg/dL 177*                     Imaging: I have personally reviewed pertinent reports  and I have personally reviewed pertinent films in PACS    XR chest 2 views   Final Result by Bethany Ramirez MD (60/63 6217)      Cardiomegaly  No radiographic evidence of acute intrathoracic process or significant interval change  Workstation performed: [id number]               EKG, Pathology, and Other Studies Reviewed on Admission:   · EKG: NSR  No ST changes      VTE Prophylaxis: Enoxaparin (Lovenox)        Anticipated Length of Stay:  Patient will be admitted on an Inpatient basis with an anticipated length of stay of  Greater than  2 midnights  Justification for Hospital Stay: Patient has new diagnosis of CHF  She needs IV Lasix diuresis and will require a greater than 2 midnight stay      Total Time for Visit, including Counseling / Coordination of Care: 45 minutes  Greater than 50% of this total time spent on direct patient counseling and coordination of care  ** Please Note: This note has been constructed using a voice recognition system   **

## 2018-08-06 PROBLEM — I50.31 ACUTE DIASTOLIC CONGESTIVE HEART FAILURE (HCC): Status: ACTIVE | Noted: 2018-08-05

## 2018-08-06 PROBLEM — J40 BRONCHITIS: Status: ACTIVE | Noted: 2018-08-05

## 2018-08-06 LAB
ANION GAP SERPL CALCULATED.3IONS-SCNC: 7 MMOL/L (ref 4–13)
BASOPHILS # BLD AUTO: 0.04 THOUSANDS/ΜL (ref 0–0.1)
BASOPHILS NFR BLD AUTO: 0 % (ref 0–1)
BUN SERPL-MCNC: 18 MG/DL (ref 5–25)
CALCIUM SERPL-MCNC: 8.9 MG/DL (ref 8.3–10.1)
CHLORIDE SERPL-SCNC: 103 MMOL/L (ref 100–108)
CHOLEST SERPL-MCNC: 178 MG/DL (ref 50–200)
CO2 SERPL-SCNC: 26 MMOL/L (ref 21–32)
CREAT SERPL-MCNC: 1.17 MG/DL (ref 0.6–1.3)
EOSINOPHIL # BLD AUTO: 0.58 THOUSAND/ΜL (ref 0–0.61)
EOSINOPHIL NFR BLD AUTO: 5 % (ref 0–6)
ERYTHROCYTE [DISTWIDTH] IN BLOOD BY AUTOMATED COUNT: 14.1 % (ref 11.6–15.1)
GFR SERPL CREATININE-BSD FRML MDRD: 46 ML/MIN/1.73SQ M
GLUCOSE SERPL-MCNC: 110 MG/DL (ref 65–140)
GLUCOSE SERPL-MCNC: 149 MG/DL (ref 65–140)
GLUCOSE SERPL-MCNC: 149 MG/DL (ref 65–140)
GLUCOSE SERPL-MCNC: 155 MG/DL (ref 65–140)
GLUCOSE SERPL-MCNC: 156 MG/DL (ref 65–140)
HCT VFR BLD AUTO: 38 % (ref 34.8–46.1)
HDLC SERPL-MCNC: 47 MG/DL (ref 40–60)
HGB BLD-MCNC: 12.6 G/DL (ref 11.5–15.4)
LDLC SERPL CALC-MCNC: 114 MG/DL (ref 0–100)
LYMPHOCYTES # BLD AUTO: 1.4 THOUSANDS/ΜL (ref 0.6–4.47)
LYMPHOCYTES NFR BLD AUTO: 13 % (ref 14–44)
MAGNESIUM SERPL-MCNC: 1.6 MG/DL (ref 1.6–2.6)
MCH RBC QN AUTO: 30.7 PG (ref 26.8–34.3)
MCHC RBC AUTO-ENTMCNC: 33.2 G/DL (ref 31.4–37.4)
MCV RBC AUTO: 93 FL (ref 82–98)
MONOCYTES # BLD AUTO: 1.11 THOUSAND/ΜL (ref 0.17–1.22)
MONOCYTES NFR BLD AUTO: 10 % (ref 4–12)
NEUTROPHILS # BLD AUTO: 7.55 THOUSANDS/ΜL (ref 1.85–7.62)
NEUTS SEG NFR BLD AUTO: 71 % (ref 43–75)
NONHDLC SERPL-MCNC: 131 MG/DL
NRBC BLD AUTO-RTO: 0 /100 WBCS
PLATELET # BLD AUTO: 181 THOUSANDS/UL (ref 149–390)
PMV BLD AUTO: 11.3 FL (ref 8.9–12.7)
POTASSIUM SERPL-SCNC: 3.6 MMOL/L (ref 3.5–5.3)
RBC # BLD AUTO: 4.11 MILLION/UL (ref 3.81–5.12)
SODIUM SERPL-SCNC: 136 MMOL/L (ref 136–145)
TRIGL SERPL-MCNC: 87 MG/DL
WBC # BLD AUTO: 10.68 THOUSAND/UL (ref 4.31–10.16)

## 2018-08-06 PROCEDURE — 83735 ASSAY OF MAGNESIUM: CPT | Performed by: HOSPITALIST

## 2018-08-06 PROCEDURE — 99232 SBSQ HOSP IP/OBS MODERATE 35: CPT | Performed by: INTERNAL MEDICINE

## 2018-08-06 PROCEDURE — 80061 LIPID PANEL: CPT | Performed by: HOSPITALIST

## 2018-08-06 PROCEDURE — 85025 COMPLETE CBC W/AUTO DIFF WBC: CPT | Performed by: HOSPITALIST

## 2018-08-06 PROCEDURE — 82948 REAGENT STRIP/BLOOD GLUCOSE: CPT

## 2018-08-06 PROCEDURE — 80048 BASIC METABOLIC PNL TOTAL CA: CPT | Performed by: HOSPITALIST

## 2018-08-06 PROCEDURE — G8978 MOBILITY CURRENT STATUS: HCPCS

## 2018-08-06 PROCEDURE — 97163 PT EVAL HIGH COMPLEX 45 MIN: CPT

## 2018-08-06 PROCEDURE — G8979 MOBILITY GOAL STATUS: HCPCS

## 2018-08-06 RX ORDER — FLUTICASONE PROPIONATE 50 MCG
2 SPRAY, SUSPENSION (ML) NASAL DAILY
Status: DISCONTINUED | OUTPATIENT
Start: 2018-08-06 | End: 2018-08-09 | Stop reason: HOSPADM

## 2018-08-06 RX ADMIN — DORZOLAMIDE HCL 1 DROP: 20 SOLUTION/ DROPS OPHTHALMIC at 21:36

## 2018-08-06 RX ADMIN — CHOLECALCIFEROL TAB 10 MCG (400 UNIT) 400 UNITS: 10 TAB at 08:52

## 2018-08-06 RX ADMIN — SITAGLIPTIN 50 MG: 50 TABLET, FILM COATED ORAL at 08:51

## 2018-08-06 RX ADMIN — CEFTRIAXONE 1000 MG: 1 INJECTION, POWDER, FOR SOLUTION INTRAMUSCULAR; INTRAVENOUS at 12:06

## 2018-08-06 RX ADMIN — ASPIRIN 81 MG 81 MG: 81 TABLET ORAL at 08:51

## 2018-08-06 RX ADMIN — FUROSEMIDE 20 MG: 10 INJECTION, SOLUTION INTRAMUSCULAR; INTRAVENOUS at 08:53

## 2018-08-06 RX ADMIN — LISINOPRIL 40 MG: 20 TABLET ORAL at 08:51

## 2018-08-06 RX ADMIN — INSULIN LISPRO 1 UNITS: 100 INJECTION, SOLUTION INTRAVENOUS; SUBCUTANEOUS at 12:06

## 2018-08-06 RX ADMIN — FUROSEMIDE 20 MG: 10 INJECTION, SOLUTION INTRAMUSCULAR; INTRAVENOUS at 17:39

## 2018-08-06 RX ADMIN — LATANOPROST 1 DROP: 50 SOLUTION OPHTHALMIC at 21:36

## 2018-08-06 RX ADMIN — ENOXAPARIN SODIUM 40 MG: 40 INJECTION SUBCUTANEOUS at 08:51

## 2018-08-06 RX ADMIN — AZITHROMYCIN MONOHYDRATE 500 MG: 250 TABLET ORAL at 13:32

## 2018-08-06 RX ADMIN — FLUTICASONE PROPIONATE 2 SPRAY: 50 SPRAY, METERED NASAL at 21:36

## 2018-08-06 RX ADMIN — LEVOTHYROXINE SODIUM 75 MCG: 75 TABLET ORAL at 06:30

## 2018-08-06 RX ADMIN — DORZOLAMIDE HCL 1 DROP: 20 SOLUTION/ DROPS OPHTHALMIC at 17:39

## 2018-08-06 RX ADMIN — DORZOLAMIDE HCL 1 DROP: 20 SOLUTION/ DROPS OPHTHALMIC at 08:52

## 2018-08-06 NOTE — NURSING NOTE
Patient has not urinated since yesterday  Patient stated "that's normal for me, I've been like that for years, I usually urinate every 12 hours " BS 21 ml, abd soft non distended

## 2018-08-06 NOTE — CASE MANAGEMENT
Notification of Inpatient Admission/Inpatient Authorization Request  This is a Notification of Inpatient Admission/Request for Inpatient Authorization to our facility [address]  Please be advised that this patient is currently in our facility under Inpatient Status  Below you will find the Attending Physician and Facilitys information including NPI# and contact information for the Utilization  assigned to the [facility] where the patient is receiving services  Please feel free to contact the Utilization Review Department with any questions  Patient Information:  PATIENT NAME: Martha Naqvi  MRN: [id number]  YOB: 1922    PRESENTATION DATE: 8/5/2018  8:57 AM  IP ADMISSION DATE: 8/5/18 1018  DISCHARGE DATE: No discharge date for patient encounter  DISPOSITION: Home/Self Care    Attending Physician:  Elke Wgagoner  EPK:[id number]  Facility:  [address] [place], [place] [id number]  Tax ID: [id number]  NPI: [id number]    Thank you,  Vishal Aponte  [facility] Utilization Review Department  Phone: [phone]; Fax [phone]  ATTENTION: The Network Utilization Review Department is now centralized for our 9 Facilities  Make a note that we have a new phone and fax numbers for our Department  Please call with any questions or concerns to [phone] and carefully follow the prompts so that you are directed to the right person  All voicemails are confidential  Fax any determinations, approvals, denials, and requests for initial or continue stay review clinical to [phone]  Due to HIGH CALL volume, it would be easier if you could please send faxed requests to expedite your requests and in part, help us provide discharge notifications faster

## 2018-08-06 NOTE — PLAN OF CARE
INFECTION - ADULT     Absence of fever/infection during neutropenic period Completed          DISCHARGE PLANNING     Discharge to home or other facility with appropriate resources Progressing        INFECTION - ADULT     Absence or prevention of progression during hospitalization Progressing        Knowledge Deficit     Patient/family/caregiver demonstrates understanding of disease process, treatment plan, medications, and discharge instructions Progressing        METABOLIC, FLUID AND ELECTROLYTES - ADULT     Glucose maintained within target range Progressing        PAIN - ADULT     Verbalizes/displays adequate comfort level or baseline comfort level Progressing        Potential for Falls     Patient will remain free of falls Progressing        SAFETY ADULT     Maintain or return to baseline ADL function Progressing     Maintain or return mobility status to optimal level Progressing

## 2018-08-06 NOTE — PHYSICAL THERAPY NOTE
PHYSICAL THERAPY EVALUATION  NAME:  Osmany Escobar  DATE: 08/06/18    AGE:   89 y o  Mrn:   [id number]  ADMIT DX:  CHF (congestive heart failure) (HCC) [I50 9]  Dyspnea [R06 00]  SOB (shortness of breath) [R06 02]  Hypertension [I10]  Elevated troponin [R74 8]    Past Medical History:   Diagnosis Date    Asthma     Atypical chest pain     CAD (coronary artery disease)     Candidal intertrigo     Depression     Diabetes mellitus (HCC)     Diabetic neuropathy ([address] )     Diverticulitis     Dyslipidemia     Female bladder prolapse     Gastric ulcer     Glaucoma     HTN (hypertension)     Hyperlipidemia     Hypothyroidism 4/18/2016    RAD (reactive airway disease)        Past Surgical History:   Procedure Laterality Date    COLONOSCOPY      ESOPHAGOGASTRODUODENOSCOPY  2011    HYSTERECTOMY         Length Of Stay: 1    PHYSICAL THERAPY EVALUATION:      08/06/18 1024   Note Type   Note type Eval only   Pain Assessment   Pain Assessment No/denies pain   Home Living   Type of [address] One level  (0 STAR )   Home Equipment Cane  (Pt reports using for community distances PTA )   Additional Comments Pt reports living with daughter who is able to assit pt if needed  Pt reports (-) home alone    Prior Function   Level of [place] Independent with ADLs and functional mobility   Lives With Daughter   Christen in the last 6 months 0   Vocational Retired   Comments Pt reports the use of a SPC for community distances PTA  Pt denies the use of an AD for ambulation in the home PTA    Restrictions/Precautions   Weight Bearing Precautions Per Order No   Other Precautions Telemetry;Multiple lines; Fall Risk   General   Family/Caregiver Present No   Cognition   Overall Cognitive Status WFL   Arousal/Participation Alert   Orientation Level Oriented to person;Oriented to place;Oriented to time   Memory Within functional limits   Following Commands Follows one step commands without difficulty   RUE Assessment   RUE Assessment WFL   LUE Assessment   LUE Assessment WFL   RLE Assessment   RLE Assessment WFL   Strength RLE   RLE Overall Strength 4+/5   LLE Assessment   LLE Assessment WFL   Strength LLE   LLE Overall Strength 4/5   Bed Mobility   Additional Comments NA, Pt seated EOB at time of PT eval    Transfers   Sit to Stand 5  Supervision   Additional items Increased time required   Stand to Sit 5  Supervision   Additional items Increased time required   Additional Comments VC needed for hand placement    Ambulation/Elevation   Gait pattern Excessively slow; Short stride; Foward flexed; Inconsistent mello   Gait Assistance 5  Supervision   Assistive Device Rolling walker   Distance 65ft x 2    Balance   Static Sitting Good   Static Standing Fair +   Ambulatory Fair   Endurance Deficit   Endurance Deficit Yes   Endurance Deficit Description fatigue    Activity Tolerance   Activity Tolerance Patient limited by fatigue   Nurse Made Aware Pt appropriate to be seen per Konrad Cook RN   Assessment   Prognosis Good   Problem List Decreased strength;Decreased endurance; Impaired balance;Decreased mobility   Assessment Pt is 80 y o  female seen for PT evaluation s/p admit to [address] [id number] on 8/5/2018  Two pt identifiers were used to confirm  Pt presented w/ increased SOB and cough  Pt was admitted with a primary dx of: CHF  PT now consulted for assessment of mobility and d/c needs  Pt with Activity as tolerated orders  Pts current co morbidities effecting treatment include: asthma, CAD, depression, DM, diabetic neuropathy, glaucoma, HTN, reactive airway disease  Pts current clinical presentation is Unstable/ Unpredictable (high complexity) due to Ongoing medical management for primary dx, Increased reliance on more restrictive AD compared to baseline, Decreased activity tolerance compared to baseline, Fall risk, Ongoing telemetry monitoring     Prior to admission, pt was I with ambulation in the home without the use of an AD and with use of SPC for ambulation in the community as per pt  Upon evaluation, pt currently is requiring ; S for transfers and S for ambulation w/ RW   Pt denies any lightheadedness or dizziness with ambulation  Pt presents at PT eval functioning below baseline and currently w/ overall mobility deficits 2* to: BLE weakness, impaired balance, decreased endurance, gait deviations, decreased activity tolerance compared to baseline, fall risk  Pt currently at a fall risk 2* to impairments listed above  Based on the aforementioned PT evaluation, pt will continue to benefit from skilled Acute PT interventions to address stated impairments; to maximize functional mobility; for ongoing pt/ family training; and DME needs  At conclusion of PT session pt returned BTB with phone and call bell within reach  Pt denies any further questions at this time  PT is currently recommending home with family support pt declines home PT at this time  Pt/ family agreeable to plan and goals as stated on evaluation  PT will continue to follow during hospital stay   Barriers to Discharge None   Goals   Patient Goals I want to go home   STG Expiration Date 08/16/18   Short Term Goal #1 In 10 days pt will complete: 1) Bed mobility skills with mod I to increase safety and independence as well as decrease caregiver burden  2) Functional transfers with mod I to promote increased independence, safety, and QOL  3) Ambulate 200' using least restrictive AD with mod I without LOB and stable vitals so that pt can negotiate home environment safely and promote independence with functional mobility and return to PLOF  4) Stair training up/ down 1 step/s using rail/s with mod I so that pt can negotiate home environment safely  5) Improve balance grades to Good to increase safety with all mobility and decrease fall risk    6) Improve BLE strength by 1/2 grade to help increase functional mobility and decrease fall risk  7) PT for ongoing pt and family education; DME needs and D/C planning to promote highest level of function in least restrictive environment  Plan   Treatment/Interventions Functional transfer training;LE strengthening/ROM; Elevations; Therapeutic exercise; Endurance training;Patient/family training;Equipment eval/education; Bed mobility;Gait training;Spoke to nursing   PT Frequency Other (Comment)  (4-5x a week )   Recommendation   Recommendation Home with family support  (pt declines home PT)   Equipment Recommended Walker   PT - OK to Discharge Yes  (when medically cleared )   Modified [place] Scale   Modified Marquis Scale 3   Barthel Index   Feeding 10   Bathing 5   Grooming Score 5   Dressing Score 5   Bladder Score 10   Bowels Score 10   Toilet Use Score 10   Transfers (Bed/Chair) Score 15   Mobility (Level Surface) Score 10   Stairs Score 0   Barthel Index Score 80   Linda Villatoro PT

## 2018-08-06 NOTE — NURSING NOTE
Assumed care of patient at 3pm  Patient is awake and alert  Able to state needs  No complaints offered at this time  Call bell within reach  Will continue to monitor

## 2018-08-06 NOTE — PROGRESS NOTES
Progress Note - Mulu aCstro 80 y o  female MRN: [id number]    Unit/Bed#: E4 -01 Encounter: [id number]      Assessment/Plan:  1-probable acute diastolic congestive heart failure exacerbation:  Patient presented with dyspnea which was felt to be secondary to acute congestive heart failure exacerbation    -continue diurese with IV Lasix-no significant diuresis as of yet  Weight identical to yesterday    -echocardiogram:  Pending report   -BMP 1242    2-possible bronchitis, or URI:  Patient's sister had pneumonia  Patient presented with dyspnea    -patient does have bilateral rhonchi  She notes a productive cough with gray phlegm  She however is afebrile with an essentially normal white blood cell count    -check procalcitonin   -continue empiric antibiotics at this time    3-diabetes type 2:  With neuropathy:  Patient is not on insulin as an outpatient  Continue Januvia after discharge    -Accu-Cheks today were 149, 156, 110    4-recent UTI:  I reviewed patient's family office notes from 07/11/2018  She was recently treated with ciprofloxacin for a UTI  Patient denies any urinary symptoms  5-chronic left upper quadrant pain:  Per her outpatient office visit notes  Patient was prescribed a proton pump inhibitor and Maalox  Denies any current pain    6-history of coronary artery disease:  No evidence of current chest pain, or acute ischemia  Continue her medical therapy with aspirin  She is not on beta-blocker or statin    7-history trigeminal neuralgia:  Continue gabapentin    8-hypothyroidism:  Continue Synthroid    9-moderate-severe aortic stenosis:  Continue supportive measures  Continue outpatient follow-up  10-essential hypertension:  Patient's blood pressure is currently adequately controlled    Continue medications and monitor    11-physical therapy evaluation:  Recommend home with family support    12-family:  Updated patient's daughter at the bedside and answered all questions      VTE Pharmacologic Prophylaxis: Enoxaparin (Lovenox)  VTE Mechanical Prophylaxis: sequential compression device    Certification Statement: The patient will continue to require additional inpatient hospital stay due to need for further acute intervention for congestive heart failure on IV Lasix    Status: inpatient     ===================================================================    Subjective:  Patient relates that she feels only slightly better  She still notes dyspnea with exertion  She denies any shortness of breath at rest   She notes she urinated large amounts once this morning  She passed her bowel movements  She denies any diarrhea or constipation  She denies any nausea or vomiting  She denies any chest pain or chest discomfort  She denies any current dizziness or lightheadedness  Patient notes a cough  It was initially dry but she is currently expectorating gray phlegm  She also notes sinus congestion and rhinorrhea  She notes she previously had a frontal headache which has resolved  She denies any fever  She notes she is chronically chilled  I reviewed her office notes from 07/11/2018 for her routine follow-up    Reviewed her discharge summary from her 05/12/2018 admission for chest pain that was felt to be from a noncardiac source  Physical Exam:   Temp:  [97 6 °F (36 4 °C)-97 8 °F (36 6 °C)] 97 6 °F (36 4 °C)  HR:  [77-84] 77  Resp:  [18] 18  BP: (113-151)/(60-86) 118/60    Gen:  Pleasant, non-tachypnic, non-dyspnic  Conversant  Daughter at the bedside  Heart: regular rate and rhythm, S1S2 present, 2/6 systolic ejection murmur  No rub or gallop  Lungs:  Left lower lobe crackles  Scattered rhonchi in both bases  No wheezes  Good air movement  No accessory muscle use or respiratory distress  Abd: soft, non-tender, non-distended  NABS, no guarding, rebound or peritoneal signs  Extremities: no clubbing, cyanosis or edema  2+pedal pulses bilaterally  Neuro: awake, alert and oriented  Fluent speech  Moving all 4 extremities symmetrically  Skin: warm and dry: no petechiae, purpura and rash  LABS:     Results from last 7 days  Lab Units 08/06/18  0600 08/05/18  0921   WBC Thousand/uL 10 68* 10 53*   HEMOGLOBIN g/dL 12 6 14 4   HEMATOCRIT % 38 0 43 1   PLATELETS Thousands/uL 181 166       Results from last 7 days  Lab Units 08/06/18  0600 08/05/18  0921   SODIUM mmol/L 136 134*   POTASSIUM mmol/L 3 6 4 0   CHLORIDE mmol/L 103 100   CO2 mmol/L 26 25   BUN mg/dL 18 11   CREATININE mg/dL 1 17 0 90   GLUCOSE RANDOM mg/dL 149* 177*   CALCIUM mg/dL 8 9 9 2       Hospital Data:    8/5:  Chest x-ray:  Cardiomegaly  No radiographic evidence of acute intrathoracic process or significant interval change  No airspace consolidation  No pulmonary edema  8/5:  Sputum culture:  Pending  8/5 strep pneumoniae/Legionella antigen:  Negative  8/5:  Blood culture:  Negative x2    Echocardiogram:  Pending  ---------------------------------------------------------------------------------------------------------------  This note has been constructed using a voice recognition system

## 2018-08-06 NOTE — PLAN OF CARE
Problem: PHYSICAL THERAPY ADULT  Goal: Performs mobility at highest level of function for planned discharge setting  See evaluation for individualized goals  Treatment/Interventions: Functional transfer training, LE strengthening/ROM, Elevations, Therapeutic exercise, Endurance training, Patient/family training, Equipment eval/education, Bed mobility, Gait training, Spoke to nursing  Equipment Recommended: Genia Gonzalez       See flowsheet documentation for full assessment, interventions and recommendations  Prognosis: Good  Problem List: Decreased strength, Decreased endurance, Impaired balance, Decreased mobility  Assessment: Pt is 80 y o  female seen for PT evaluation s/p admit to [facility] [place] on 8/5/2018  Two pt identifiers were used to confirm  Pt presented w/ increased SOB and cough  Pt was admitted with a primary dx of: CHF  PT now consulted for assessment of mobility and d/c needs  Pt with Activity as tolerated orders  Pts current co morbidities effecting treatment include: asthma, CAD, depression, DM, diabetic neuropathy, glaucoma, HTN, reactive airway disease  Pts current clinical presentation is Unstable/ Unpredictable (high complexity) due to Ongoing medical management for primary dx, Increased reliance on more restrictive AD compared to baseline, Decreased activity tolerance compared to baseline, Fall risk, Ongoing telemetry monitoring    Prior to admission, pt was I with ambulation in the home without the use of an AD and with use of SPC for ambulation in the community as per pt  Upon evaluation, pt currently is requiring ; S for transfers and S for ambulation w/ RW   Pt denies any lightheadedness or dizziness with ambulation  Pt presents at PT eval functioning below baseline and currently w/ overall mobility deficits 2* to: BLE weakness, impaired balance, decreased endurance, gait deviations, decreased activity tolerance compared to baseline, fall risk   Pt currently at a fall risk 2* to impairments listed above  Based on the aforementioned PT evaluation, pt will continue to benefit from skilled Acute PT interventions to address stated impairments; to maximize functional mobility; for ongoing pt/ family training; and DME needs  At conclusion of PT session pt returned BTB with phone and call bell within reach  Pt denies any further questions at this time  PT is currently recommending home with family support pt declines home PT at this time  Pt/ family agreeable to plan and goals as stated on evaluation  PT will continue to follow during hospital stay  Barriers to Discharge: None     Recommendation: Home with family support (pt declines home PT)     PT - OK to Discharge: Yes (when medically cleared )    See flowsheet documentation for full assessment

## 2018-08-06 NOTE — PLAN OF CARE
Problem: Potential for Falls  Goal: Patient will remain free of falls  INTERVENTIONS:  - Assess patient frequently for physical needs  -  Identify cognitive and physical deficits and behaviors that affect risk of falls    -  [place] fall precautions as indicated by assessment   - Educate patient/family on patient safety including physical limitations  - Instruct patient to call for assistance with activity based on assessment  - Modify environment to reduce risk of injury  - Consider OT/PT consult to assist with strengthening/mobility   Outcome: Progressing      Problem: PAIN - ADULT  Goal: Verbalizes/displays adequate comfort level or baseline comfort level  Interventions:  - Encourage patient to monitor pain and request assistance  - Assess pain using appropriate pain scale  - Administer analgesics based on type and severity of pain and evaluate response  - Implement non-pharmacological measures as appropriate and evaluate response  - Consider cultural and social influences on pain and pain management  - Notify physician/advanced practitioner if interventions unsuccessful or patient reports new pain   Outcome: Progressing      Problem: INFECTION - ADULT  Goal: Absence or prevention of progression during hospitalization  INTERVENTIONS:  - Assess and monitor for signs and symptoms of infection  - Monitor lab/diagnostic results  - Monitor all insertion sites, i e  indwelling lines, tubes, and drains  - Monitor endotracheal (as able) and nasal secretions for changes in amount and color  - [place] appropriate cooling/warming therapies per order  - Administer medications as ordered  - Instruct and encourage patient and family to use good hand hygiene technique  - Identify and instruct in appropriate isolation precautions for identified infection/condition   Outcome: Progressing      Problem: SAFETY ADULT  Goal: Maintain or return to baseline ADL function  INTERVENTIONS:  -  Assess patient's ability to carry out ADLs; assess patient's baseline for ADL function and identify physical deficits which impact ability to perform ADLs (bathing, care of mouth/teeth, toileting, grooming, dressing, etc )  - Assess/evaluate cause of self-care deficits   - Assess range of motion  - Assess patient's mobility; develop plan if impaired  - Assess patient's need for assistive devices and provide as appropriate  - Encourage maximum independence but intervene and supervise when necessary  ¯ Involve family in performance of ADLs  ¯ Assess for home care needs following discharge   ¯ Request OT consult to assist with ADL evaluation and planning for discharge  ¯ Provide patient education as appropriate   Outcome: Progressing    Goal: Maintain or return mobility status to optimal level  INTERVENTIONS:  - Assess patient's baseline mobility status (ambulation, transfers, stairs, etc )    - Identify cognitive and physical deficits and behaviors that affect mobility  - Identify mobility aids required to assist with transfers and/or ambulation (gait belt, sit-to-stand, lift, walker, cane, etc )  - [place] fall precautions as indicated by assessment  - Record patient progress and toleration of activity level on Mobility SBAR; progress patient to next Phase/Stage  - Instruct patient to call for assistance with activity based on assessment  - Request Rehabilitation consult to assist with strengthening/weightbearing, etc    Outcome: Progressing      Problem: DISCHARGE PLANNING  Goal: Discharge to home or other facility with appropriate resources  INTERVENTIONS:  - Identify barriers to discharge w/patient and caregiver  - Arrange for needed discharge resources and transportation as appropriate  - Identify discharge learning needs (meds, wound care, etc )  - Arrange for interpretive services to assist at discharge as needed  - Refer to Case Management Department for coordinating discharge planning if the patient needs post-hospital services based on physician/advanced practitioner order or complex needs related to functional status, cognitive ability, or social support system   Outcome: Progressing      Problem: Knowledge Deficit  Goal: Patient/family/caregiver demonstrates understanding of disease process, treatment plan, medications, and discharge instructions  Complete learning assessment and assess knowledge base    Interventions:  - Provide teaching at level of understanding  - Provide teaching via preferred learning methods   Outcome: Progressing      Problem: METABOLIC, FLUID AND ELECTROLYTES - ADULT  Goal: Glucose maintained within target range  INTERVENTIONS:  - Monitor Blood Glucose as ordered  - Assess for signs and symptoms of hyperglycemia and hypoglycemia  - Administer ordered medications to maintain glucose within target range  - Assess nutritional intake and initiate nutrition service referral as needed   Outcome: Progressing

## 2018-08-06 NOTE — CASE MANAGEMENT
Initial Clinical Review    Admission: Date/Time/Statement: 8/5/18 @ 1018     Orders Placed This Encounter   Procedures    Inpatient Admission (expected length of stay for this patient is greater than two midnights)     Standing Status:   Standing     Number of Occurrences:   1     Order Specific Question:   Admitting Physician     Answer:   Lamar Abreu [[id number]]     Order Specific Question:   Level of Care     Answer:   Med Surg [[id number]]     Order Specific Question:   Estimated length of stay     Answer:   More than 2 Midnights     Order Specific Question:   Certification     Answer:   I certify that inpatient services are medically necessary for this patient for a duration of greater than two midnights  See H&P and MD Progress Notes for additional information about the patient's course of treatment  ED: Date/Time/Mode of Arrival:   ED Arrival Information     Expected Arrival Acuity Means of Arrival Escorted By Service Admission Type    - 8/5/2018 08:53 Emergent Walk-In Family Member General Medicine Emergency    Arrival Complaint    Cough; Headache        Chief Complaint:   Chief Complaint   Patient presents with    Shortness of Breath     Pt reports having a bad cold and sob x 3 days  Pt having difficulty speaking due to sob  Pt denies fevers/cp but states she has left side back pain  Pt reports dry non productive cough        History of Illness:  77-year-old female presents for evaluation of 3 days of shortness of breath with associated nonproductive cough  The patient does have some left-sided chest and back pain which is worse with coughing  Nothing has made her symptoms better  The patient states that her twin sister was recently hospitalized sick with pneumonia and she was around her for the past week  JVD (positive hepatojugular reflex 1/2 way up) present  edema (1+ pitting edema bilat LE 1/2 way up tibia)       ED Vital Signs:   ED Triage Vitals   Temperature Pulse Respirations Blood Pressure SpO2   08/05/18 0858 08/05/18 0858 08/05/18 0858 08/05/18 0858 08/05/18 0858   99 1 °F (37 3 °C) 85 18 163/71 97 %      Temp Source Heart Rate Source Patient Position - Orthostatic VS BP Location FiO2 (%)   08/05/18 0858 08/05/18 0858 08/05/18 0858 08/05/18 0858 --   Temporal Monitor Sitting Right arm       Pain Score       08/05/18 1006       7        Wt Readings from Last 1 Encounters:   08/06/18 75 9 kg (167 lb 5 3 oz)       Vital Signs (abnormal):   08/05/18 1053 99 3 °F (37 4 °C) 79 18 156/77 97 % -- KD   08/05/18 1007 -- -- -- -- 97 % -- NJT   08/05/18 1006 99 9 °F (37 7 °C) 74 20 146/64 92 %       Abnormal Labs/Diagnostic Test Results:  WBC's 10 53,   ANC 8 77,   Na 134,   Glu 177,   NTproBNP 1,242,   Trop 0 05  CXR:   Cardiomegaly  No radiographic evidence of acute intrathoracic process or significant interval change        EKG:  NSR    ED Treatment:   Medication Administration from 08/05/2018 0853 to 08/05/2018 1053       Date/Time Order Dose Route Action Action by Comments     08/05/2018 1016 aspirin chewable tablet 324 mg 324 mg Oral Given       08/05/2018 1017 furosemide (LASIX) injection 40 mg 40 mg Intravenous Given            Past Medical/Surgical History:    Active Ambulatory Problems     Diagnosis Date Noted    HTN (hypertension)     Glaucoma     Type 2 diabetes mellitus without complication ([facility] [address] )     Migraine 04/18/2016    Other specified hypothyroidism 04/18/2016    Coronary artery disease involving native coronary artery of native heart     Asthma     Aortic stenosis 10/21/2016    Hyperlipidemia     Female bladder prolapse     Advanced age 06/03/2015    Allergic rhinitis 05/02/2017    Ambulatory dysfunction 04/02/2015    Anxiety disorder 08/17/2016    Chronic left upper quadrant pain 12/15/2017    General weakness 11/03/2015    Irritable bowel 04/24/2015    Trigeminal neuralgia 03/16/2016    Visual hallucinations 11/17/2015    SMITH (dyspnea on exertion) 02/17/2018    Medication management 04/13/2018    Atypical chest pain     Diabetic neuropathy ([facility] [address] )     Hypothyroidism 04/18/2016    Left sided chest pain 05/12/2018    Urinary tract infection without hematuria 07/13/2018     Resolved Ambulatory Problems     Diagnosis Date Noted    CAD (coronary artery disease)     Asthma     Atypical chest pain 04/18/2016    Cellulitis of leg 04/18/2016    Arm pain 04/18/2016     Past Medical History:   Diagnosis Date    Asthma     Atypical chest pain     CAD (coronary artery disease)     Candidal intertrigo     Depression     Diabetes mellitus (HCC)     Diabetic neuropathy ([facility] [address] [facility]     Diverticulitis     Dyslipidemia     Female bladder prolapse     Gastric ulcer     Glaucoma     HTN (hypertension)     Hyperlipidemia     Hypothyroidism 4/18/2016    RAD (reactive airway disease)        Admitting Diagnosis: CHF (congestive heart failure) ([facility]) [I50 9]  Dyspnea [R06 00]  SOB (shortness of breath) [R06 02]  Hypertension [I10]  Elevated troponin [R74 8]    Age/Sex: 80 y o  female    Assessment/Plan:   CHF (congestive heart failure) ([facility])   Assessment & Plan     I believe that this is the underlying cause of her SOB  Will give gentle IV Lasix  Check Echocardiogram  Rule out for MI          Pneumonia   Assessment & Plan     I think that this is less likely than CHF, but she was recently exposed to her sister who had pneumonia last week and she has a mildly elevated WBC with a cough  I will cover her with Rocephin and Zithromax     If her echocardiogram shows new CHF, then I think pneumonia is unlikely and antibiotics can likely be discontinued          Type 2 diabetes mellitus with neurologic complication [facility])   Assessment & Plan             Lab Results   Component Value Date     HGBA1C 6 7 (H) 03/26/2018         No results for input(s): POCGLU in the last 72 hours      Blood Sugar Average: Last 72 hrs:     Continue home diabetic meds plus sliding scale insulin        Admission Orders:  IP  TELE  EKG with CP or ST changes  Serial Trops  ECHO  PT Eval  BC's  And Sputum CX  Urine strep pneumoniae and Legionella  CBC,  BMP,  Mag,  Lipids  O2 @ 2 liters  - 97% (  RA sat 92%)  Scheduled Meds:   Current Facility-Administered Medications:          aspirin 81 mg Oral Daily     cefTRIAXone 1,000 mg Intravenous Q24H     And        azithromycin 500 mg Oral Q24H     cholecalciferol 400 Units Oral Daily     dorzolamide 1 drop Both Eyes TID     enoxaparin 40 mg Subcutaneous Daily     furosemide 20 mg Intravenous BID (diuretic)     insulin lispro 1-5 Units Subcutaneous TID AC     latanoprost 1 drop Both Eyes HS     levothyroxine 75 mcg Oral Daily Before Breakfast     lisinopril 40 mg Oral Daily             sitaGLIPtin 50 mg Oral Daily       Continuous Infusions:    PRN Meds:   acetaminophen    Ondansetron  Repeat trop: 0 05  8/6: 97 8 - 82 - 18   135/66;   RA sat 92% @ rest  WBC's 10 68,   Glu 159  + SMITH;   + rhonchi, crackles  + cough    Thank you,  Vishal Aponte  [facility] [place] Utilization Review Department  Phone: [phone]; Fax [phone]  ATTENTION: The Network Utilization Review Department is now centralized for our 9 Facilities  Make a note that we have a new phone and fax numbers for our Department  Please call with any questions or concerns to [phone] and carefully follow the prompts so that you are directed to the right person  All voicemails are confidential  Fax any determinations, approvals, denials, and requests for initial or continue stay review clinical to [phone]  Due to HIGH CALL volume, it would be easier if you could please send faxed requests to expedite your requests and in part, help us provide discharge notifications faster

## 2018-08-07 ENCOUNTER — APPOINTMENT (INPATIENT)
Dept: NON INVASIVE DIAGNOSTICS | Facility: HOSPITAL | Age: 83
DRG: 293 | End: 2018-08-07
Payer: COMMERCIAL

## 2018-08-07 PROBLEM — B96.89 MORAXELLA CATARRHALIS BRONCHITIS: Status: ACTIVE | Noted: 2018-08-05

## 2018-08-07 LAB
ANION GAP SERPL CALCULATED.3IONS-SCNC: 7 MMOL/L (ref 4–13)
BACTERIA SPT RESP CULT: ABNORMAL
BACTERIA SPT RESP CULT: ABNORMAL
BUN SERPL-MCNC: 21 MG/DL (ref 5–25)
CALCIUM SERPL-MCNC: 8.8 MG/DL (ref 8.3–10.1)
CHLORIDE SERPL-SCNC: 101 MMOL/L (ref 100–108)
CO2 SERPL-SCNC: 23 MMOL/L (ref 21–32)
CREAT SERPL-MCNC: 1.04 MG/DL (ref 0.6–1.3)
ERYTHROCYTE [DISTWIDTH] IN BLOOD BY AUTOMATED COUNT: 14 % (ref 11.6–15.1)
GFR SERPL CREATININE-BSD FRML MDRD: 53 ML/MIN/1.73SQ M
GLUCOSE SERPL-MCNC: 111 MG/DL (ref 65–140)
GLUCOSE SERPL-MCNC: 126 MG/DL (ref 65–140)
GLUCOSE SERPL-MCNC: 140 MG/DL (ref 65–140)
GLUCOSE SERPL-MCNC: 142 MG/DL (ref 65–140)
GLUCOSE SERPL-MCNC: 151 MG/DL (ref 65–140)
GRAM STN SPEC: ABNORMAL
HCT VFR BLD AUTO: 38.1 % (ref 34.8–46.1)
HGB BLD-MCNC: 12.3 G/DL (ref 11.5–15.4)
MCH RBC QN AUTO: 29.4 PG (ref 26.8–34.3)
MCHC RBC AUTO-ENTMCNC: 32.3 G/DL (ref 31.4–37.4)
MCV RBC AUTO: 91 FL (ref 82–98)
PLATELET # BLD AUTO: 182 THOUSANDS/UL (ref 149–390)
PMV BLD AUTO: 11.1 FL (ref 8.9–12.7)
POTASSIUM SERPL-SCNC: 4 MMOL/L (ref 3.5–5.3)
PROCALCITONIN SERPL-MCNC: 0.07 NG/ML
RBC # BLD AUTO: 4.18 MILLION/UL (ref 3.81–5.12)
SODIUM SERPL-SCNC: 131 MMOL/L (ref 136–145)
WBC # BLD AUTO: 8.7 THOUSAND/UL (ref 4.31–10.16)

## 2018-08-07 PROCEDURE — 93306 TTE W/DOPPLER COMPLETE: CPT

## 2018-08-07 PROCEDURE — 85027 COMPLETE CBC AUTOMATED: CPT | Performed by: INTERNAL MEDICINE

## 2018-08-07 PROCEDURE — 84145 PROCALCITONIN (PCT): CPT | Performed by: INTERNAL MEDICINE

## 2018-08-07 PROCEDURE — 82948 REAGENT STRIP/BLOOD GLUCOSE: CPT

## 2018-08-07 PROCEDURE — 93306 TTE W/DOPPLER COMPLETE: CPT | Performed by: INTERNAL MEDICINE

## 2018-08-07 PROCEDURE — 97116 GAIT TRAINING THERAPY: CPT

## 2018-08-07 PROCEDURE — 97110 THERAPEUTIC EXERCISES: CPT

## 2018-08-07 PROCEDURE — 99232 SBSQ HOSP IP/OBS MODERATE 35: CPT | Performed by: INTERNAL MEDICINE

## 2018-08-07 PROCEDURE — 80048 BASIC METABOLIC PNL TOTAL CA: CPT | Performed by: INTERNAL MEDICINE

## 2018-08-07 RX ADMIN — LISINOPRIL 40 MG: 20 TABLET ORAL at 08:36

## 2018-08-07 RX ADMIN — FUROSEMIDE 20 MG: 10 INJECTION, SOLUTION INTRAMUSCULAR; INTRAVENOUS at 08:44

## 2018-08-07 RX ADMIN — ASPIRIN 81 MG 81 MG: 81 TABLET ORAL at 08:36

## 2018-08-07 RX ADMIN — DORZOLAMIDE HCL 1 DROP: 20 SOLUTION/ DROPS OPHTHALMIC at 17:14

## 2018-08-07 RX ADMIN — LEVOTHYROXINE SODIUM 75 MCG: 75 TABLET ORAL at 05:46

## 2018-08-07 RX ADMIN — CEFTRIAXONE 1000 MG: 1 INJECTION, POWDER, FOR SOLUTION INTRAMUSCULAR; INTRAVENOUS at 12:19

## 2018-08-07 RX ADMIN — FUROSEMIDE 20 MG: 10 INJECTION, SOLUTION INTRAMUSCULAR; INTRAVENOUS at 17:14

## 2018-08-07 RX ADMIN — DORZOLAMIDE HCL 1 DROP: 20 SOLUTION/ DROPS OPHTHALMIC at 21:30

## 2018-08-07 RX ADMIN — CHOLECALCIFEROL TAB 10 MCG (400 UNIT) 400 UNITS: 10 TAB at 08:37

## 2018-08-07 RX ADMIN — SITAGLIPTIN 50 MG: 50 TABLET, FILM COATED ORAL at 08:36

## 2018-08-07 RX ADMIN — ENOXAPARIN SODIUM 40 MG: 40 INJECTION SUBCUTANEOUS at 08:36

## 2018-08-07 RX ADMIN — AZITHROMYCIN MONOHYDRATE 500 MG: 250 TABLET ORAL at 12:18

## 2018-08-07 RX ADMIN — FLUTICASONE PROPIONATE 2 SPRAY: 50 SPRAY, METERED NASAL at 08:36

## 2018-08-07 RX ADMIN — DORZOLAMIDE HCL 1 DROP: 20 SOLUTION/ DROPS OPHTHALMIC at 08:36

## 2018-08-07 RX ADMIN — LATANOPROST 1 DROP: 50 SOLUTION OPHTHALMIC at 21:30

## 2018-08-07 NOTE — SOCIAL WORK
Heart Failure Care Coordination Note  RE: Met briefly with patient who was just receiving lunch, to explain role and establish a relationship  Will stop in again on Wednesday

## 2018-08-07 NOTE — PLAN OF CARE
Problem: Potential for Falls  Goal: Patient will remain free of falls  INTERVENTIONS:  - Assess patient frequently for physical needs  -  Identify cognitive and physical deficits and behaviors that affect risk of falls    -  [place] fall precautions as indicated by assessment   - Educate patient/family on patient safety including physical limitations  - Instruct patient to call for assistance with activity based on assessment  - Modify environment to reduce risk of injury  - Consider OT/PT consult to assist with strengthening/mobility   Outcome: Progressing      Problem: PAIN - ADULT  Goal: Verbalizes/displays adequate comfort level or baseline comfort level  Interventions:  - Encourage patient to monitor pain and request assistance  - Assess pain using appropriate pain scale  - Administer analgesics based on type and severity of pain and evaluate response  - Implement non-pharmacological measures as appropriate and evaluate response  - Consider cultural and social influences on pain and pain management  - Notify physician/advanced practitioner if interventions unsuccessful or patient reports new pain   Outcome: Progressing      Problem: INFECTION - ADULT  Goal: Absence or prevention of progression during hospitalization  INTERVENTIONS:  - Assess and monitor for signs and symptoms of infection  - Monitor lab/diagnostic results  - Monitor all insertion sites, i e  indwelling lines, tubes, and drains  - Monitor endotracheal (as able) and nasal secretions for changes in amount and color  - [place] appropriate cooling/warming therapies per order  - Administer medications as ordered  - Instruct and encourage patient and family to use good hand hygiene technique  - Identify and instruct in appropriate isolation precautions for identified infection/condition   Outcome: Progressing      Problem: SAFETY ADULT  Goal: Maintain or return to baseline ADL function  INTERVENTIONS:  -  Assess patient's ability to carry out ADLs; assess patient's baseline for ADL function and identify physical deficits which impact ability to perform ADLs (bathing, care of mouth/teeth, toileting, grooming, dressing, etc )  - Assess/evaluate cause of self-care deficits   - Assess range of motion  - Assess patient's mobility; develop plan if impaired  - Assess patient's need for assistive devices and provide as appropriate  - Encourage maximum independence but intervene and supervise when necessary  ¯ Involve family in performance of ADLs  ¯ Assess for home care needs following discharge   ¯ Request OT consult to assist with ADL evaluation and planning for discharge  ¯ Provide patient education as appropriate   Outcome: Progressing    Goal: Maintain or return mobility status to optimal level  INTERVENTIONS:  - Assess patient's baseline mobility status (ambulation, transfers, stairs, etc )    - Identify cognitive and physical deficits and behaviors that affect mobility  - Identify mobility aids required to assist with transfers and/or ambulation (gait belt, sit-to-stand, lift, walker, cane, etc )  - [place] fall precautions as indicated by assessment  - Record patient progress and toleration of activity level on Mobility SBAR; progress patient to next Phase/Stage  - Instruct patient to call for assistance with activity based on assessment  - Request Rehabilitation consult to assist with strengthening/weightbearing, etc    Outcome: Progressing      Problem: DISCHARGE PLANNING  Goal: Discharge to home or other facility with appropriate resources  INTERVENTIONS:  - Identify barriers to discharge w/patient and caregiver  - Arrange for needed discharge resources and transportation as appropriate  - Identify discharge learning needs (meds, wound care, etc )  - Arrange for interpretive services to assist at discharge as needed  - Refer to Case Management Department for coordinating discharge planning if the patient needs post-hospital services based on physician/advanced practitioner order or complex needs related to functional status, cognitive ability, or social support system   Outcome: Progressing      Problem: Knowledge Deficit  Goal: Patient/family/caregiver demonstrates understanding of disease process, treatment plan, medications, and discharge instructions  Complete learning assessment and assess knowledge base    Interventions:  - Provide teaching at level of understanding  - Provide teaching via preferred learning methods   Outcome: Progressing      Problem: METABOLIC, FLUID AND ELECTROLYTES - ADULT  Goal: Glucose maintained within target range  INTERVENTIONS:  - Monitor Blood Glucose as ordered  - Assess for signs and symptoms of hyperglycemia and hypoglycemia  - Administer ordered medications to maintain glucose within target range  - Assess nutritional intake and initiate nutrition service referral as needed   Outcome: Progressing

## 2018-08-07 NOTE — PROGRESS NOTES
Progress Note - Modesta Calvert 80 y o  female MRN: [id number]    Unit/Bed#: E4 -01 Encounter: [id number]      Assessment/Plan:  1-probable acute diastolic congestive heart failure exacerbation:  Patient presented with dyspnea which was felt to be secondary to acute congestive heart failure exacerbation               -continue diurese with IV Lasix   -patient's weight is essentially unchanged however she is symptomatic lead improved              -echocardiogram:  normal left ventricular ejection fraction              -BMP 1242     2-Moraxella bronchitis:  Pt presented with dyspnea, and productive cough  -sputum cultures grew Moraxella   -currently asymptomatically improving on ceftriaxone    -will change to augmentin at discharge      3-diabetes type 2:  With neuropathy:  Patient is not on insulin as an outpatient  Continue Januvia after discharge               -Accu-Cheks today were 140, 111     4-recent UTI:  I reviewed patient's family office notes from 07/11/2018  She was recently treated with ciprofloxacin for a UTI  Patient denies any urinary symptoms      5-chronic left upper quadrant pain:  Per her outpatient office visit notes  Patient was prescribed a proton pump inhibitor and Maalox  Denies any current pain     6-history of coronary artery disease:  No evidence of current chest pain, or acute ischemia  Continue her medical therapy with aspirin  She is not on beta-blocker or statin     7-history trigeminal neuralgia:  Continue gabapentin     8-hypothyroidism:  Continue Synthroid     9-moderate-severe aortic stenosis:  Continue supportive measures  Continue outpatient follow-up      10-essential hypertension:  Patient's blood pressure is currently adequately controlled    Continue medications and monitor     11-physical therapy evaluation:  Recommend home with family support     12-family: called daughter Miya Joshi and LM to call me for update        VTE Pharmacologic Prophylaxis: Enoxaparin (Lovenox)  VTE Mechanical Prophylaxis: sequential compression device    Certification Statement: The patient will continue to require additional inpatient hospital stay due to need for further acute intervention for chf on iv lasix    Status: inpatient     =========================================    Subjective:  Patient relates that she feels better today  She notes she continues to have a cough that is productive for yellow phlegm  She denies any shortness of breath or dyspnea on exertion  She notes her nasal congestion have improved  She denies any headache or frontal sinus pressure  She denies any rhinorrhea or nasal congestion  She notes she had a sore throat yesterday which has resolved  She is tolerating p o  without any difficulties  She denies any pain anywhere at all  She denies any headache, sinus pain, chest pain, back pain, abdominal pain, extremity pain  She notes she is ambulating and denies any dizziness or lightheadedness  She denies any nausea, vomiting, diarrhea  She is tolerating p o  without murmur  No ny difficulty  Physical Exam:   Temp:  [97 4 °F (36 3 °C)-97 7 °F (36 5 °C)] 97 6 °F (36 4 °C)  HR:  [74-80] 74  Resp:  [18] 18  BP: (118-145)/(64-76) 145/76    Gen:  Pleasant, non-tachypnic, non-dyspnic  Conversant  Heart: regular rate and rhythm, S1S2 present, 2/6 systolic ejection rub or gallop  Lungs:  Increased air movement  Coarse breath sounds, however no current wheezing, crackles, rhonchi  No accessory muscle use or respiratory distress  Abd: soft, non-tender, non-distended  NABS, no guarding, rebound or peritoneal signs  Extremities: no clubbing, cyanosis or edema  2+pedal pulses bilaterally  Neuro: awake, alert  Fluent and goal directed speech  Skin: warm and dry: no petechiae, purpura and rash      LABS:     Results from last 7 days  Lab Units 08/07/18  0447 08/06/18  0600 08/05/18  0921   WBC Thousand/uL 8 70 10 68* 10 53* HEMOGLOBIN g/dL 12 3 12 6 14 4   HEMATOCRIT % 38 1 38 0 43 1   PLATELETS Thousands/uL 182 181 166       Results from last 7 days  Lab Units 08/07/18  0447 08/06/18  0600 08/05/18  0921   SODIUM mmol/L 131* 136 134*   POTASSIUM mmol/L 4 0 3 6 4 0   CHLORIDE mmol/L 101 103 100   CO2 mmol/L 23 26 25   BUN mg/dL 21 18 11   CREATININE mg/dL 1 04 1 17 0 90   GLUCOSE RANDOM mg/dL 142* 149* 177*   CALCIUM mg/dL 8 8 8 9 9 2       Hospital Data:  8/6:  Echocardiogram:  Left ventricular ejection fraction 65-70%  Possible hypokinesis of the basal inferior and basal inferolateral walls  Mild concentric hypertrophy  Mildly reduced right ventricular systolic function  Redundancy of the atrial septum with borderline criteria for aneurysm  Mild MR  Moderate-severe aortic stenosis  Gradient 25    8/5:  Chest x-ray:  Cardiomegaly  No radiographic evidence of acute intrathoracic process or significant interval change  No airspace consolidation  No pulmonary edema      8/5:  Sputum culture:   Moraxella, mixed resp carl  8/5 strep pneumoniae/Legionella antigen:  Negative  8/5:  Blood culture:  Negative x2         Procalcitonin 0 07    ---------------------------------------------------------------------------------------------------------------  This note has been constructed using a voice recognition system

## 2018-08-07 NOTE — PLAN OF CARE
Problem: PHYSICAL THERAPY ADULT  Goal: Performs mobility at highest level of function for planned discharge setting  See evaluation for individualized goals  Treatment/Interventions: Functional transfer training, LE strengthening/ROM, Elevations, Therapeutic exercise, Endurance training, Patient/family training, Equipment eval/education, Bed mobility, Gait training, Spoke to nursing  Equipment Recommended: Reema Gomez       See flowsheet documentation for full assessment, interventions and recommendations  Outcome: Progressing  Prognosis: Good  Problem List: Decreased strength, Decreased endurance, Impaired balance, Decreased mobility  Assessment: Pt resting in bed at time of PT session  Pt reports feeling a little better today, however states she is a little tired but is willing to participate in PT session  Pt able to perform all bed mobility and transfers with slightly less A required compared to previous session, however continues to require slight VC for hand placement and safety  Pt able to tolerate increased ambulation distances this session with the use of a RW and S  No LOB noted with gait training with step through gait pattern observed  Pt able to perform all therex seated EOB this session with a focus on static and dynamic seated balance without any increase in complaints  VC and TC needed for proper form and pacing at this time  Pt assisted back to EOB at conclusion of PT session with all needs within reach  Pt denies any further questions at this time  PT will continue to follow  D/C recommendation when medically cleared is home with family support  Barriers to Discharge: None     Recommendation: Home with family support (pt declines home PT )     PT - OK to Discharge: Yes (when medically cleared )    See flowsheet documentation for full assessment

## 2018-08-07 NOTE — NURSING NOTE
Picked up patient at 3pm  Patient was awake and alert, pleasant  Stated she was in no pain and had no needs  While I agree with the previous nurses assessment, I will continue to monitor

## 2018-08-07 NOTE — PHYSICAL THERAPY NOTE
PHYSICAL THERAPY NOTE          Patient Name: Rock CAUSEY Date: 8/7/2018 08/07/18 1451   Pain Assessment   Pain Assessment No/denies pain   Restrictions/Precautions   Weight Bearing Precautions Per Order No   Other Precautions Telemetry;Multiple lines; Fall Risk   General   Chart Reviewed Yes   Response to Previous Treatment Patient with no complaints from previous session  Family/Caregiver Present No   Cognition   Overall Cognitive Status WFL   Arousal/Participation Alert   Attention Within functional limits   Orientation Level Oriented to person;Oriented to place;Oriented to time   Memory Within functional limits   Following Commands Follows one step commands without difficulty   Subjective   Subjective pt willing to participate in PT session    Bed Mobility   Supine to Sit 6  Modified independent   Sit to Supine 6  Modified independent   Transfers   Sit to Stand 5  Supervision   Additional items Increased time required   Stand to Sit 5  Supervision   Additional items Increased time required   Additional Comments VC needed for hand placement and safety    Ambulation/Elevation   Gait pattern Excessively slow; Short stride; Foward flexed; Inconsistent mello   Gait Assistance 5  Supervision   Assistive Device Rolling walker   Distance 75ft x 2    Balance   Static Sitting Good   Static Standing Fair +   Ambulatory Fair   Endurance Deficit   Endurance Deficit Yes   Endurance Deficit Description fatigue    Activity Tolerance   Activity Tolerance Patient limited by fatigue   Nurse Made Aware Pt appropriate to be seen and mobilize per [facility]    Exercises   TKR Sitting;10 reps;AROM; Bilateral  (x 2 sets )   Assessment   Prognosis Good   Problem List Decreased strength;Decreased endurance; Impaired balance;Decreased mobility   Assessment Pt resting in bed at time of PT session   Pt reports feeling a little better today, however states she is a little tired but is willing to participate in PT session  Pt able to perform all bed mobility and transfers with slightly less A required compared to previous session, however continues to require slight VC for hand placement and safety  Pt able to tolerate increased ambulation distances this session with the use of a RW and S  No LOB noted with gait training with step through gait pattern observed  Pt able to perform all therex seated EOB this session with a focus on static and dynamic seated balance without any increase in complaints  VC and TC needed for proper form and pacing at this time  Pt assisted back to EOB at conclusion of PT session with all needs within reach  Pt denies any further questions at this time  PT will continue to follow  D/C recommendation when medically cleared is home with family support  Barriers to Discharge None   Goals   Patient Goals I want to go home   STG Expiration Date 08/16/18   Treatment Day 1   Plan   Treatment/Interventions Functional transfer training;LE strengthening/ROM; Therapeutic exercise; Endurance training;Elevations; Patient/family training;Equipment eval/education; Bed mobility;Gait training;Spoke to nursing   Progress Progressing toward goals   PT Frequency Other (Comment)  (4-5x a week )   Recommendation   Recommendation Home with family support  (pt declines home PT )   Equipment Recommended Walker   PT - OK to Discharge Yes  (when medically cleared )   Andrew Nuñez, PT

## 2018-08-08 LAB
ANION GAP SERPL CALCULATED.3IONS-SCNC: 9 MMOL/L (ref 4–13)
BUN SERPL-MCNC: 21 MG/DL (ref 5–25)
CALCIUM SERPL-MCNC: 8.9 MG/DL (ref 8.3–10.1)
CHLORIDE SERPL-SCNC: 101 MMOL/L (ref 100–108)
CO2 SERPL-SCNC: 25 MMOL/L (ref 21–32)
CREAT SERPL-MCNC: 0.96 MG/DL (ref 0.6–1.3)
GFR SERPL CREATININE-BSD FRML MDRD: 58 ML/MIN/1.73SQ M
GLUCOSE SERPL-MCNC: 127 MG/DL (ref 65–140)
GLUCOSE SERPL-MCNC: 128 MG/DL (ref 65–140)
GLUCOSE SERPL-MCNC: 141 MG/DL (ref 65–140)
GLUCOSE SERPL-MCNC: 152 MG/DL (ref 65–140)
GLUCOSE SERPL-MCNC: 166 MG/DL (ref 65–140)
POTASSIUM SERPL-SCNC: 3.8 MMOL/L (ref 3.5–5.3)
SODIUM SERPL-SCNC: 135 MMOL/L (ref 136–145)

## 2018-08-08 PROCEDURE — 82948 REAGENT STRIP/BLOOD GLUCOSE: CPT

## 2018-08-08 PROCEDURE — 93005 ELECTROCARDIOGRAM TRACING: CPT

## 2018-08-08 PROCEDURE — 99232 SBSQ HOSP IP/OBS MODERATE 35: CPT | Performed by: INTERNAL MEDICINE

## 2018-08-08 PROCEDURE — 80048 BASIC METABOLIC PNL TOTAL CA: CPT | Performed by: INTERNAL MEDICINE

## 2018-08-08 PROCEDURE — 97116 GAIT TRAINING THERAPY: CPT

## 2018-08-08 PROCEDURE — 97110 THERAPEUTIC EXERCISES: CPT

## 2018-08-08 RX ORDER — LORATADINE 10 MG/1
10 TABLET ORAL DAILY
Status: DISCONTINUED | OUTPATIENT
Start: 2018-08-08 | End: 2018-08-09 | Stop reason: HOSPADM

## 2018-08-08 RX ORDER — FUROSEMIDE 20 MG/1
20 TABLET ORAL DAILY
Status: DISCONTINUED | OUTPATIENT
Start: 2018-08-09 | End: 2018-08-09 | Stop reason: HOSPADM

## 2018-08-08 RX ORDER — LANOLIN ALCOHOL/MO/W.PET/CERES
3 CREAM (GRAM) TOPICAL
Status: DISCONTINUED | OUTPATIENT
Start: 2018-08-08 | End: 2018-08-09 | Stop reason: HOSPADM

## 2018-08-08 RX ORDER — IBUPROFEN 600 MG/1
600 TABLET ORAL ONCE
Status: DISCONTINUED | OUTPATIENT
Start: 2018-08-08 | End: 2018-08-09 | Stop reason: HOSPADM

## 2018-08-08 RX ADMIN — DORZOLAMIDE HCL 1 DROP: 20 SOLUTION/ DROPS OPHTHALMIC at 21:13

## 2018-08-08 RX ADMIN — LISINOPRIL 40 MG: 20 TABLET ORAL at 09:26

## 2018-08-08 RX ADMIN — INSULIN LISPRO 1 UNITS: 100 INJECTION, SOLUTION INTRAVENOUS; SUBCUTANEOUS at 17:45

## 2018-08-08 RX ADMIN — SITAGLIPTIN 50 MG: 50 TABLET, FILM COATED ORAL at 09:25

## 2018-08-08 RX ADMIN — CHOLECALCIFEROL TAB 10 MCG (400 UNIT) 400 UNITS: 10 TAB at 09:26

## 2018-08-08 RX ADMIN — CEFTRIAXONE 1000 MG: 1 INJECTION, POWDER, FOR SOLUTION INTRAMUSCULAR; INTRAVENOUS at 12:10

## 2018-08-08 RX ADMIN — FUROSEMIDE 20 MG: 10 INJECTION, SOLUTION INTRAMUSCULAR; INTRAVENOUS at 09:26

## 2018-08-08 RX ADMIN — MELATONIN TAB 3 MG 3 MG: 3 TAB at 22:12

## 2018-08-08 RX ADMIN — LEVOTHYROXINE SODIUM 75 MCG: 75 TABLET ORAL at 04:58

## 2018-08-08 RX ADMIN — INSULIN LISPRO 1 UNITS: 100 INJECTION, SOLUTION INTRAVENOUS; SUBCUTANEOUS at 09:25

## 2018-08-08 RX ADMIN — FLUTICASONE PROPIONATE 2 SPRAY: 50 SPRAY, METERED NASAL at 09:26

## 2018-08-08 RX ADMIN — DORZOLAMIDE HCL 1 DROP: 20 SOLUTION/ DROPS OPHTHALMIC at 09:26

## 2018-08-08 RX ADMIN — ACETAMINOPHEN 650 MG: 325 TABLET, FILM COATED ORAL at 11:58

## 2018-08-08 RX ADMIN — AZITHROMYCIN MONOHYDRATE 500 MG: 250 TABLET ORAL at 12:20

## 2018-08-08 RX ADMIN — LATANOPROST 1 DROP: 50 SOLUTION OPHTHALMIC at 21:13

## 2018-08-08 RX ADMIN — DORZOLAMIDE HCL 1 DROP: 20 SOLUTION/ DROPS OPHTHALMIC at 17:45

## 2018-08-08 RX ADMIN — ASPIRIN 81 MG 81 MG: 81 TABLET ORAL at 09:25

## 2018-08-08 RX ADMIN — LORATADINE 10 MG: 10 TABLET ORAL at 17:45

## 2018-08-08 RX ADMIN — ENOXAPARIN SODIUM 40 MG: 40 INJECTION SUBCUTANEOUS at 09:25

## 2018-08-08 NOTE — SOCIAL WORK
Heart Failure Care Coordination Note  RE: Re-visit with patient who had read the Heart Talk: Living with Heart Failure booklet and was able to teach back regarding diet, daily weights and when to contact a provider with concerns  I will assist to make a follow up OV with her PCP

## 2018-08-08 NOTE — PLAN OF CARE
Problem: PHYSICAL THERAPY ADULT  Goal: Performs mobility at highest level of function for planned discharge setting  See evaluation for individualized goals  Treatment/Interventions: Functional transfer training, LE strengthening/ROM, Elevations, Therapeutic exercise, Endurance training, Patient/family training, Equipment eval/education, Bed mobility, Gait training, Spoke to nursing  Equipment Recommended: Brenda Tucker       See flowsheet documentation for full assessment, interventions and recommendations  Outcome: Progressing  Prognosis: Good  Problem List: Decreased strength, Decreased endurance, Impaired balance, Decreased mobility  Assessment: Pt resting in chair at time of PT session  Pt reports feeling more tired today, however is willing to participate in PT  Pt able to perform all transfers with slightly less A and VC required compared to previous session, however slight cueing needed to reach back for chair prior to performing stand to sit transfers  Pt able to tolerate increased ambulation distances this session with the use of a RW and S  No LOB noted with gait training and pt able to demonstrate step through gait pattern  Slight cueing needed to prevent pt from advancing RW too far forward during ambulation  Improvement noted at conclusion of gait training  Pt able to perform all LE therex seated OOB in chair and denies any new complaints  VC needed for proper form and pacing at this time  Pt continues to require increased time to complete all mobility as well as therex  Pt assisted back into chair at conclusion of PT session with all needs within reach  Pt denies any further questions at this time  PT will continue to follow  D/C recommendation when medically cleared is home with family support  Pt declines home PT at this time     Barriers to Discharge: None     Recommendation: Home with family support (pt declines home PT at this time )     PT - OK to Discharge: Yes (when medically cleared )    See flowsheet documentation for full assessment

## 2018-08-08 NOTE — PROGRESS NOTES
Progress Note - Valentina Linn 80 y o  female MRN: [id number]    Unit/Bed#: E4 -01 Encounter: [id number]      Assessment/Plan:  1-probable acute diastolic congestive heart failure exacerbation:  Patient presented with dyspnea which was felt to be secondary to acute congestive heart failure exacerbation               -patient's weight is essentially unchanged however she is symptomatically and clinically improved              -echocardiogram:  normal left ventricular ejection fraction              -BMP 1242   -with decrease Lasix to 20 mg tablet p o  daily      2-Moraxella bronchitis:  Pt presented with dyspnea, and productive cough                 -sputum cultures grew Moraxella              -currently asymptomatically improving on ceftriaxone               -will change to augmentin at discharge      3-diabetes type 2:  With neuropathy:  Patient is not on insulin as an outpatient   Continue Januvia after discharge               -Accu-Cheks today were 152, 128     4-recent UTI:  I reviewed patient's family office notes from 07/11/2018  Mayelin Goldberg was recently treated with ciprofloxacin for a UTI   Patient denies any urinary symptoms      5-chronic left upper quadrant pain:  Per her outpatient office visit notes  Esvin Patino was prescribed a proton pump inhibitor and Maalox   Denies any current pain     6-history of coronary artery disease:  No evidence of current chest pain, or acute ischemia   Continue her medical therapy with aspirin   She is not on beta-blocker or statin     7-history trigeminal neuralgia:  Continue gabapentin     8-hypothyroidism:  Continue Synthroid     9-moderate-severe aortic stenosis:  Continue supportive measures   Continue outpatient follow-up      10-essential hypertension:  Patient's blood pressure is currently adequately controlled   Continue medications and monitor     11-physical therapy evaluation:  Recommend home with family support    12-headache:  Patient presents with a frontal headache, and probable frontal sinusitis secondary to moraxella  Continue antibiotics  Continue Flonase  Will also start Claritin    13-irregular heartbeat:  Check EKG to evaluate for atrial fibrillation versus sinus arrhythmia/PVCs    14-lightheadedness:  Without focal neurologic signs  Check orthostatic vital signs to evaluate for the static hypotension secondary to diuresis     15-family:  Called and updated daughter Mariana Blanton        VTE Pharmacologic Prophylaxis: Enoxaparin (Lovenox)  VTE Mechanical Prophylaxis: sequential compression device      Certification Statement: The patient will continue to require additional inpatient hospital stay due to need for further acute intervention for dyspnea    Status: inpatient     ===================================================================    Subjective:  Patient relates that she feels fatigued today  She notes dyspnea with exertion  She is walking from the bathroom to her bed leaves her winded  She notes sinus pain across her frontal sinuses, and pressure  She denies any rhinorrhea  She notes her cough is currently dry today  She complains of exhaustion  She denies any pain anywhere, except for a frontal headache  She denies any neck pain, chest pain, back pain, abdominal or extremity pain  She notes she felt lightheaded with standing  She denies any numbness or weakness  She denies any nausea, vomiting, diarrhea  She is tolerating p o  Jodi Peals Physical Exam:   Temp:  [97 1 °F (36 2 °C)-97 6 °F (36 4 °C)] 97 6 °F (36 4 °C)  HR:  [66-71] 66  Resp:  [18] 18  BP: (113-156)/(68-85) 113/85    Gen:  Pleasant, non-tachypnic, non-dyspnic  Conversant  Heart:  Irregularly irregular , S1S2 present, 2/6 systolic ejection murmur  No rub or gallop  Lungs: clear to ausculatation bilaterally  No wheezing, crackles, or rhonchi  No accessory muscle use or respiratory distress  Good air movement  Abd: soft, non-tender, non-distended   NABS, no guarding, rebound or peritoneal signs  Extremities: no clubbing, cyanosis or edema  2+pedal pulses bilaterally  Full range of motion  Neuro: awake, alert  Fluent and goal directed speech  Skin: warm and dry: no petechiae, purpura and rash  LABS:     Results from last 7 days  Lab Units 08/07/18 0447 08/06/18  0600 08/05/18  0921   WBC Thousand/uL 8 70 10 68* 10 53*   HEMOGLOBIN g/dL 12 3 12 6 14 4   HEMATOCRIT % 38 1 38 0 43 1   PLATELETS Thousands/uL 182 181 166       Results from last 7 days  Lab Units 08/08/18  0425 08/07/18 0447 08/06/18  0600   SODIUM mmol/L 135* 131* 136   POTASSIUM mmol/L 3 8 4 0 3 6   CHLORIDE mmol/L 101 101 103   CO2 mmol/L 25 23 26   BUN mg/dL 21 21 18   CREATININE mg/dL 0 96 1 04 1 17   GLUCOSE RANDOM mg/dL 141* 142* 149*   CALCIUM mg/dL 8 9 8 8 8 9       Hospital Data:    8/6:  Echocardiogram:  Left ventricular ejection fraction 65-70%  Possible hypokinesis of the basal inferior and basal inferolateral walls  Mild concentric hypertrophy  Mildly reduced right ventricular systolic function  Redundancy of the atrial septum with borderline criteria for aneurysm  Mild MR  Moderate-severe aortic stenosis  Gradient 25     8/5:  Chest x-ray:  Cardiomegaly   No radiographic evidence of acute intrathoracic process or significant interval change   No airspace consolidation   No pulmonary edema      8/5:  Sputum culture:   Moraxella, mixed resp carl  8/5 strep pneumoniae/Legionella antigen:  Negative  8/5:  Blood culture:  Negative x2        ---------------------------------------------------------------------------------------------------------------  This note has been constructed using a voice recognition system

## 2018-08-08 NOTE — SOCIAL WORK
TC to dtr to completed assessment and explained role  PCP is Dr Addi Clement  Pt lives in [facility] with her dtr in a [address] style house with 2 steps to enter  Pt has a first floor set up  Pt is independent with ADLs and amb with no devices in the home  Out in the community pt uses [facility]  DME: [facility]  Pt was not open with any [facility]NA  Pt uses [facility] on [address]  Pt has hx of depression and no hx of D&A  Dtr Dipti Weeks is  at [phone]  Ritchie Singh will transport home  Pt admitted with CHF and dtr is agreeable to [facility] for RN and they have accepted the case

## 2018-08-08 NOTE — PLAN OF CARE
Problem: Potential for Falls  Goal: Patient will remain free of falls  INTERVENTIONS:  - Assess patient frequently for physical needs  -  Identify cognitive and physical deficits and behaviors that affect risk of falls    -  [place] fall precautions as indicated by assessment   - Educate patient/family on patient safety including physical limitations  - Instruct patient to call for assistance with activity based on assessment  - Modify environment to reduce risk of injury  - Consider OT/PT consult to assist with strengthening/mobility   Outcome: Progressing      Problem: PAIN - ADULT  Goal: Verbalizes/displays adequate comfort level or baseline comfort level  Interventions:  - Encourage patient to monitor pain and request assistance  - Assess pain using appropriate pain scale  - Administer analgesics based on type and severity of pain and evaluate response  - Implement non-pharmacological measures as appropriate and evaluate response  - Consider cultural and social influences on pain and pain management  - Notify physician/advanced practitioner if interventions unsuccessful or patient reports new pain   Outcome: Progressing      Problem: INFECTION - ADULT  Goal: Absence or prevention of progression during hospitalization  INTERVENTIONS:  - Assess and monitor for signs and symptoms of infection  - Monitor lab/diagnostic results  - Monitor all insertion sites, i e  indwelling lines, tubes, and drains  - Monitor endotracheal (as able) and nasal secretions for changes in amount and color  - [place] appropriate cooling/warming therapies per order  - Administer medications as ordered  - Instruct and encourage patient and family to use good hand hygiene technique  - Identify and instruct in appropriate isolation precautions for identified infection/condition   Outcome: Progressing      Problem: SAFETY ADULT  Goal: Maintain or return to baseline ADL function  INTERVENTIONS:  -  Assess patient's ability to carry out ADLs; assess patient's baseline for ADL function and identify physical deficits which impact ability to perform ADLs (bathing, care of mouth/teeth, toileting, grooming, dressing, etc )  - Assess/evaluate cause of self-care deficits   - Assess range of motion  - Assess patient's mobility; develop plan if impaired  - Assess patient's need for assistive devices and provide as appropriate  - Encourage maximum independence but intervene and supervise when necessary  ¯ Involve family in performance of ADLs  ¯ Assess for home care needs following discharge   ¯ Request OT consult to assist with ADL evaluation and planning for discharge  ¯ Provide patient education as appropriate   Outcome: Progressing    Goal: Maintain or return mobility status to optimal level  INTERVENTIONS:  - Assess patient's baseline mobility status (ambulation, transfers, stairs, etc )    - Identify cognitive and physical deficits and behaviors that affect mobility  - Identify mobility aids required to assist with transfers and/or ambulation (gait belt, sit-to-stand, lift, walker, cane, etc )  - [place] fall precautions as indicated by assessment  - Record patient progress and toleration of activity level on Mobility SBAR; progress patient to next Phase/Stage  - Instruct patient to call for assistance with activity based on assessment  - Request Rehabilitation consult to assist with strengthening/weightbearing, etc    Outcome: Progressing      Problem: DISCHARGE PLANNING  Goal: Discharge to home or other facility with appropriate resources  INTERVENTIONS:  - Identify barriers to discharge w/patient and caregiver  - Arrange for needed discharge resources and transportation as appropriate  - Identify discharge learning needs (meds, wound care, etc )  - Arrange for interpretive services to assist at discharge as needed  - Refer to Case Management Department for coordinating discharge planning if the patient needs post-hospital services based on physician/advanced practitioner order or complex needs related to functional status, cognitive ability, or social support system   Outcome: Progressing      Problem: Knowledge Deficit  Goal: Patient/family/caregiver demonstrates understanding of disease process, treatment plan, medications, and discharge instructions  Complete learning assessment and assess knowledge base    Interventions:  - Provide teaching at level of understanding  - Provide teaching via preferred learning methods   Outcome: Progressing      Problem: METABOLIC, FLUID AND ELECTROLYTES - ADULT  Goal: Glucose maintained within target range  INTERVENTIONS:  - Monitor Blood Glucose as ordered  - Assess for signs and symptoms of hyperglycemia and hypoglycemia  - Administer ordered medications to maintain glucose within target range  - Assess nutritional intake and initiate nutrition service referral as needed   Outcome: Progressing

## 2018-08-08 NOTE — PHYSICAL THERAPY NOTE
PHYSICAL THERAPY NOTE          Patient Name: Frankie Mckenzie  [id number] Date: 8/8/2018 08/08/18 1310   Pain Assessment   Pain Assessment No/denies pain   Restrictions/Precautions   Weight Bearing Precautions Per Order No   Other Precautions Telemetry;Multiple lines; Fall Risk   General   Chart Reviewed Yes   Response to Previous Treatment Patient with no complaints from previous session  Family/Caregiver Present No   Cognition   Overall Cognitive Status WFL   Arousal/Participation Alert   Attention Within functional limits   Orientation Level Oriented X4   Memory Within functional limits   Following Commands Follows one step commands without difficulty   Subjective   Subjective Pt willing to participate in PT    Bed Mobility   Additional Comments NA, Pt OOB in chair at time of PT session    Transfers   Sit to Stand 5  Supervision   Additional items Increased time required   Stand to Sit 5  Supervision   Additional items Increased time required   Additional Comments VC needed for hand placement and safety    Ambulation/Elevation   Gait pattern Short stride; Foward flexed   Gait Assistance 5  Supervision   Assistive Device Rolling walker   Distance 100ft x 2    Balance   Static Sitting Good   Static Standing Fair +   Ambulatory Fair   Endurance Deficit   Endurance Deficit Yes   Endurance Deficit Description fatigue    Activity Tolerance   Activity Tolerance Patient limited by fatigue   Nurse Made Aware Pt appropriate to be seen and mobilize per Inge Watkins RN   Exercises   TKR Sitting;10 reps;AROM; Bilateral  (x 2 sets )   Assessment   Prognosis Good   Problem List Decreased strength;Decreased endurance; Impaired balance;Decreased mobility   Assessment Pt resting in chair at time of PT session  Pt reports feeling more tired today, however is willing to participate in PT   Pt able to perform all transfers with slightly less A and VC required compared to previous session, however slight cueing needed to reach back for chair prior to performing stand to sit transfers  Pt able to tolerate increased ambulation distances this session with the use of a RW and S  No LOB noted with gait training and pt able to demonstrate step through gait pattern  Slight cueing needed to prevent pt from advancing RW too far forward during ambulation  Improvement noted at conclusion of gait training  Pt able to perform all LE therex seated OOB in chair and denies any new complaints  VC needed for proper form and pacing at this time  Pt continues to require increased time to complete all mobility as well as therex  Pt assisted back into chair at conclusion of PT session with all needs within reach  Pt denies any further questions at this time  PT will continue to follow  D/C recommendation when medically cleared is home with family support  Pt declines home PT at this time  Goals   Patient Goals I want to go home    STG Expiration Date 08/16/18   Treatment Day 2   Plan   Treatment/Interventions Functional transfer training;LE strengthening/ROM; Therapeutic exercise; Endurance training;Patient/family training;Bed mobility;Gait training;Spoke to nursing;Family;Elevations; Equipment eval/education   Progress Progressing toward goals   PT Frequency Other (Comment)  (4-5x a week )   Recommendation   Recommendation Home with family support  (pt declines home PT at this time )   Equipment Recommended Walker   PT - OK to Discharge Yes  (when medically cleared )   Yaw Crowley, PT

## 2018-08-08 NOTE — PLAN OF CARE
Problem: DISCHARGE PLANNING - CARE MANAGEMENT  Goal: Discharge to post-acute care or home with appropriate resources  INTERVENTIONS:  - Conduct assessment to determine patient/family and health care team treatment goals, and need for post-acute services based on payer coverage, community resources, and patient preferences, and barriers to discharge  - Address psychosocial, clinical, and financial barriers to discharge as identified in assessment in conjunction with the patient/family and health care team  - Arrange appropriate level of post-acute services according to patients   needs and preference and payer coverage in collaboration with the physician and health care team  - Communicate with and update the patient/family, physician, and health care team regarding progress on the discharge plan  - Arrange appropriate transportation to post-acute venues  Outcome: Adequate for Discharge  Pt admitted with CHF and dtr is agreeable to [facility] for RN and they have accepted the case

## 2018-08-09 VITALS
HEART RATE: 64 BPM | SYSTOLIC BLOOD PRESSURE: 129 MMHG | TEMPERATURE: 97.6 F | HEIGHT: 64 IN | WEIGHT: 169.53 LBS | OXYGEN SATURATION: 96 % | DIASTOLIC BLOOD PRESSURE: 62 MMHG | RESPIRATION RATE: 18 BRPM | BODY MASS INDEX: 28.94 KG/M2

## 2018-08-09 LAB
ATRIAL RATE: 71 BPM
GLUCOSE SERPL-MCNC: 118 MG/DL (ref 65–140)
GLUCOSE SERPL-MCNC: 166 MG/DL (ref 65–140)
P AXIS: 28 DEGREES
PR INTERVAL: 206 MS
QRS AXIS: -13 DEGREES
QRSD INTERVAL: 108 MS
QT INTERVAL: 398 MS
QTC INTERVAL: 432 MS
T WAVE AXIS: 66 DEGREES
VENTRICULAR RATE: 71 BPM

## 2018-08-09 PROCEDURE — 93010 ELECTROCARDIOGRAM REPORT: CPT | Performed by: INTERNAL MEDICINE

## 2018-08-09 PROCEDURE — 82948 REAGENT STRIP/BLOOD GLUCOSE: CPT

## 2018-08-09 PROCEDURE — 99239 HOSP IP/OBS DSCHRG MGMT >30: CPT | Performed by: INTERNAL MEDICINE

## 2018-08-09 PROCEDURE — 97116 GAIT TRAINING THERAPY: CPT

## 2018-08-09 PROCEDURE — 97110 THERAPEUTIC EXERCISES: CPT

## 2018-08-09 RX ORDER — AMOXICILLIN AND CLAVULANATE POTASSIUM 875; 125 MG/1; MG/1
1 TABLET, FILM COATED ORAL EVERY 12 HOURS SCHEDULED
Qty: 4 TABLET | Refills: 0 | Status: SHIPPED | OUTPATIENT
Start: 2018-08-09 | End: 2018-08-11

## 2018-08-09 RX ORDER — FLUTICASONE PROPIONATE 50 MCG
2 SPRAY, SUSPENSION (ML) NASAL DAILY
Qty: 16 G | Refills: 0 | Status: SHIPPED | OUTPATIENT
Start: 2018-08-10 | End: 2019-03-15 | Stop reason: SDUPTHER

## 2018-08-09 RX ORDER — FUROSEMIDE 20 MG/1
20 TABLET ORAL DAILY
Qty: 30 TABLET | Refills: 0 | Status: SHIPPED | OUTPATIENT
Start: 2018-08-10 | End: 2018-09-04 | Stop reason: SDUPTHER

## 2018-08-09 RX ORDER — LORATADINE 10 MG/1
10 TABLET ORAL DAILY
Qty: 30 TABLET | Refills: 0 | Status: SHIPPED | OUTPATIENT
Start: 2018-08-10 | End: 2018-12-18

## 2018-08-09 RX ADMIN — CEFTRIAXONE 1000 MG: 1 INJECTION, POWDER, FOR SOLUTION INTRAMUSCULAR; INTRAVENOUS at 12:00

## 2018-08-09 RX ADMIN — AZITHROMYCIN MONOHYDRATE 500 MG: 250 TABLET ORAL at 12:00

## 2018-08-09 RX ADMIN — SITAGLIPTIN 50 MG: 50 TABLET, FILM COATED ORAL at 09:08

## 2018-08-09 RX ADMIN — LEVOTHYROXINE SODIUM 75 MCG: 75 TABLET ORAL at 06:06

## 2018-08-09 RX ADMIN — ENOXAPARIN SODIUM 40 MG: 40 INJECTION SUBCUTANEOUS at 09:08

## 2018-08-09 RX ADMIN — FLUTICASONE PROPIONATE 2 SPRAY: 50 SPRAY, METERED NASAL at 09:09

## 2018-08-09 RX ADMIN — CHOLECALCIFEROL TAB 10 MCG (400 UNIT) 400 UNITS: 10 TAB at 09:08

## 2018-08-09 RX ADMIN — ASPIRIN 81 MG 81 MG: 81 TABLET ORAL at 09:08

## 2018-08-09 RX ADMIN — LISINOPRIL 40 MG: 20 TABLET ORAL at 09:08

## 2018-08-09 RX ADMIN — INSULIN LISPRO 1 UNITS: 100 INJECTION, SOLUTION INTRAVENOUS; SUBCUTANEOUS at 12:56

## 2018-08-09 RX ADMIN — DORZOLAMIDE HCL 1 DROP: 20 SOLUTION/ DROPS OPHTHALMIC at 09:09

## 2018-08-09 RX ADMIN — FUROSEMIDE 20 MG: 20 TABLET ORAL at 09:08

## 2018-08-09 RX ADMIN — LORATADINE 10 MG: 10 TABLET ORAL at 09:08

## 2018-08-09 NOTE — PLAN OF CARE
Problem: PHYSICAL THERAPY ADULT  Goal: Performs mobility at highest level of function for planned discharge setting  See evaluation for individualized goals  Treatment/Interventions: Functional transfer training, LE strengthening/ROM, Elevations, Therapeutic exercise, Endurance training, Patient/family training, Equipment eval/education, Bed mobility, Gait training, Spoke to nursing  Equipment Recommended: Jeff Letters       See flowsheet documentation for full assessment, interventions and recommendations  Outcome: Progressing  Prognosis: Good  Problem List: Decreased strength, Decreased endurance, Impaired balance, Decreased mobility  Assessment: Pt resting in bed at time of PT treatment session  Pt reports feeling much better today and is willing to participate in PT  Pt able to perform all bed mobility and transfers with slightly less A required compared to previous session, however slight cueing needed for hand placement with stand to sit transfer  Increased carryover of have placement and safety noted with all transfers  Pt able to tolerate increased ambulation distances this session with the use of a RW, however pts distances continue to be limited by fatigue at this time  No LOB noted with gait training and step through gait pattern observed  Pt able to perform all LE therex seated EOB without any increase in complaints  Slight VC needed for proper form and pacing at his time  Pt assisted back into chair at conclusion of PT session with all needs within reach  Pt denies any further questions at this time  PT will continue to follow  D/C recommendation when medically cleared is home with family support  Barriers to Discharge: None     Recommendation: Home with family support (pt declines home PT )     PT - OK to Discharge: Yes (when medically cleared )    See flowsheet documentation for full assessment

## 2018-08-09 NOTE — PLAN OF CARE
Problem: Potential for Falls  Goal: Patient will remain free of falls  INTERVENTIONS:  - Assess patient frequently for physical needs  -  Identify cognitive and physical deficits and behaviors that affect risk of falls    -  [place] fall precautions as indicated by assessment   - Educate patient/family on patient safety including physical limitations  - Instruct patient to call for assistance with activity based on assessment  - Modify environment to reduce risk of injury  - Consider OT/PT consult to assist with strengthening/mobility   Outcome: Adequate for Discharge      Problem: PAIN - ADULT  Goal: Verbalizes/displays adequate comfort level or baseline comfort level  Interventions:  - Encourage patient to monitor pain and request assistance  - Assess pain using appropriate pain scale  - Administer analgesics based on type and severity of pain and evaluate response  - Implement non-pharmacological measures as appropriate and evaluate response  - Consider cultural and social influences on pain and pain management  - Notify physician/advanced practitioner if interventions unsuccessful or patient reports new pain   Outcome: Adequate for Discharge      Problem: INFECTION - ADULT  Goal: Absence or prevention of progression during hospitalization  INTERVENTIONS:  - Assess and monitor for signs and symptoms of infection  - Monitor lab/diagnostic results  - Monitor all insertion sites, i e  indwelling lines, tubes, and drains  - Monitor endotracheal (as able) and nasal secretions for changes in amount and color  - [place] appropriate cooling/warming therapies per order  - Administer medications as ordered  - Instruct and encourage patient and family to use good hand hygiene technique  - Identify and instruct in appropriate isolation precautions for identified infection/condition   Outcome: Adequate for Discharge      Problem: SAFETY ADULT  Goal: Maintain or return to baseline ADL function  INTERVENTIONS:  -  Assess patient's ability to carry out ADLs; assess patient's baseline for ADL function and identify physical deficits which impact ability to perform ADLs (bathing, care of mouth/teeth, toileting, grooming, dressing, etc )  - Assess/evaluate cause of self-care deficits   - Assess range of motion  - Assess patient's mobility; develop plan if impaired  - Assess patient's need for assistive devices and provide as appropriate  - Encourage maximum independence but intervene and supervise when necessary  ¯ Involve family in performance of ADLs  ¯ Assess for home care needs following discharge   ¯ Request OT consult to assist with ADL evaluation and planning for discharge  ¯ Provide patient education as appropriate   Outcome: Adequate for Discharge    Goal: Maintain or return mobility status to optimal level  INTERVENTIONS:  - Assess patient's baseline mobility status (ambulation, transfers, stairs, etc )    - Identify cognitive and physical deficits and behaviors that affect mobility  - Identify mobility aids required to assist with transfers and/or ambulation (gait belt, sit-to-stand, lift, walker, cane, etc )  - [place] fall precautions as indicated by assessment  - Record patient progress and toleration of activity level on Mobility SBAR; progress patient to next Phase/Stage  - Instruct patient to call for assistance with activity based on assessment  - Request Rehabilitation consult to assist with strengthening/weightbearing, etc    Outcome: Adequate for Discharge      Problem: DISCHARGE PLANNING  Goal: Discharge to home or other facility with appropriate resources  INTERVENTIONS:  - Identify barriers to discharge w/patient and caregiver  - Arrange for needed discharge resources and transportation as appropriate  - Identify discharge learning needs (meds, wound care, etc )  - Arrange for interpretive services to assist at discharge as needed  - Refer to Case Management Department for coordinating discharge planning if the patient needs post-hospital services based on physician/advanced practitioner order or complex needs related to functional status, cognitive ability, or social support system   Outcome: Adequate for Discharge      Problem: Knowledge Deficit  Goal: Patient/family/caregiver demonstrates understanding of disease process, treatment plan, medications, and discharge instructions  Complete learning assessment and assess knowledge base    Interventions:  - Provide teaching at level of understanding  - Provide teaching via preferred learning methods   Outcome: Adequate for Discharge      Problem: METABOLIC, FLUID AND ELECTROLYTES - ADULT  Goal: Glucose maintained within target range  INTERVENTIONS:  - Monitor Blood Glucose as ordered  - Assess for signs and symptoms of hyperglycemia and hypoglycemia  - Administer ordered medications to maintain glucose within target range  - Assess nutritional intake and initiate nutrition service referral as needed   Outcome: Adequate for Discharge      Problem: DISCHARGE PLANNING - CARE MANAGEMENT  Goal: Discharge to post-acute care or home with appropriate resources  INTERVENTIONS:  - Conduct assessment to determine patient/family and health care team treatment goals, and need for post-acute services based on payer coverage, community resources, and patient preferences, and barriers to discharge  - Address psychosocial, clinical, and financial barriers to discharge as identified in assessment in conjunction with the patient/family and health care team  - Arrange appropriate level of post-acute services according to patients   needs and preference and payer coverage in collaboration with the physician and health care team  - Communicate with and update the patient/family, physician, and health care team regarding progress on the discharge plan  - Arrange appropriate transportation to post-acute venues   Outcome: Adequate for Discharge

## 2018-08-09 NOTE — DISCHARGE SUMMARY
Discharge Summary - Medical May Rosa 80 y o  female MRN: [id number]    [address] [address] MED SURG Room / Bed: [address]4 -* Encounter: [id number]    BRIEF OVERVIEW      Admission Date: 8/5/2018       Discharge Date:  05/09/2018    Primary Diagnoses  Principal Problem:    Acute diastolic congestive heart failure ([facility] )  Active Problems:    Essential hypertension    Coronary artery disease involving native coronary artery of native heart    Aortic stenosis    Hyperlipidemia    Trigeminal neuralgia    Hypothyroidism    Moraxella catarrhalis bronchitis    Type 2 diabetes mellitus with neurologic complication ([facility] )  Resolved Problems:    * No resolved hospital problems  *    Service:  [facility]      Consulting Providers   None    Procedures Performed   None    Hospital Studies:  8/6:  Echocardiogram:  Left ventricular ejection fraction 65-70%   Possible hypokinesis of the basal inferior and basal inferolateral walls   Mild concentric hypertrophy  Mildly reduced right ventricular systolic function   Redundancy of the atrial septum with borderline criteria for aneurysm   Mild MR  Moderate-severe aortic stenosis   Gradient 25     8/5:  Chest x-ray:  Cardiomegaly   No radiographic evidence of acute intrathoracic process or significant interval change   No airspace consolidation   No pulmonary edema      8/5:  Sputum culture:   Moraxella, mixed resp carl  8/5 strep pneumoniae/Legionella antigen:  Negative  8/5:  Blood culture:  Negative x2    Results from last 7 days  Lab Units 08/07/18  0447 08/06/18  0600 08/05/18  0921   WBC Thousand/uL 8 70 10 68* 10 53*   HEMOGLOBIN g/dL 12 3 12 6 14 4   HEMATOCRIT % 38 1 38 0 43 1   PLATELETS Thousands/uL 182 181 166       Results from last 7 days  Lab Units 08/08/18  0425 08/07/18  0447 08/06/18  0600   SODIUM mmol/L 135* 131* 136   POTASSIUM mmol/L 3 8 4 0 3 6   CHLORIDE mmol/L 101 101 103   CO2 mmol/L 25 23 26   BUN mg/dL 21 21 18   CREATININE mg/dL 0 96 1 04 1 17   GLUCOSE RANDOM mg/dL 141* 142* 149*   CALCIUM mg/dL 8 9 8 8 8 9       History and Physical Exam:  Please refer to the Admission H&P note    Hospital Course by Problem  1-probable acute diastolic congestive heart failure exacerbation:  Patient presented with dyspnea which was felt to be secondary to acute congestive heart failure exacerbation               -patient started on IV Lasix and has clinically and symptomatically improved               -echocardiogram:  normal left ventricular ejection fraction              -BMP 1242              -will discharge home on oral Lasix 20 mg daily  Will need a BMP in 1 week with her family physician     2-Moraxella bronchitis:  Pt presented with dyspnea, and productive cough                 -sputum cultures grew Moraxella              -currently improving on ceftriaxone               -will change to augmentin at discharge, to complete a 7 day course of antibiotics     3-diabetes type 2:  With neuropathy:  Patient is not on insulin as an outpatient   Continue Januvia after discharge               -Accu-Cheks today were 118, 166, demonstrating adequate control  She will resume her diabetic diet, oral agents, and glucometer testing as an outpatient  Will follow up with her primary care provider      4-recent UTI:  I reviewed patient's family office notes from 07/11/2018  Juan Manuel Dumont was recently treated with ciprofloxacin for a UTI   Patient denies any urinary symptoms      5-chronic left upper quadrant pain:  Per her outpatient office visit notes  Liv Knox was prescribed a proton pump inhibitor and Maalox   Denies any current pain  Patient is tolerating p   O    She has not had any significant abdominal pain during her hospitalization      6-history of coronary artery disease:  No evidence of current chest pain, or acute ischemia   Continue her medical therapy with aspirin   She is not on beta-blocker or statin  She will follow up as an outpatient      7-history trigeminal neuralgia:  Continue gabapentin     8-hypothyroidism:  Continue Synthroid     9-moderate-severe aortic stenosis:  Continue supportive measures   Continue outpatient follow-up      10-essential hypertension:  Patient's blood pressure is currently adequately controlled   Continue medications and outpatient follow-up  Prior to discharge her blood pressure is 129/62      11-physical therapy evaluation:  Recommend home with family support     12-headache:  Patient presented with a frontal headache, and probable frontal sinusitis secondary to moraxella  Patient was continued on antibiotics  She was started on Flonase and Claritin and her headache has completely resolved     13-normal sinus rhythm:  Patient had a questionable irregular heart rate during exam however her follow-up EKG revealed normal sinus rhythm  She did not require further workup      14-lightheadedness:   patient had an episode of lightheadedness  Orthostatic vital signs were unremarkable  The episode has since normalized  Patient is currently ambulating without any lightheadedness  This is packed at her lightheadedness is secondary to sinusitis and sinus pressure/headache  Continue Flonase and Claritin      15-family:  Called and updated daughter Martha Steen  Answered all questions         VTE Pharmacologic Prophylaxis: Enoxaparin (Lovenox)  VTE Mechanical Prophylaxis: sequential compression device    Discharge Condition: Improved  Discharge Disposition:  Home with home health    Discharge Note and Physical Exam:   Patient relates she feels much better today  She denies any current pain or discomfort anywhere  Her previous headache, as well as sinus pressure have completely resolved  She denies any headache, sinus pain, pressure, or discomfort  She notes her shortness of breath has resolved    She is ambulating around her room, to the restroom and back without any dyspnea on exertion  She notes she has a cough with scant yellow phlegm production  She denies any chest pain, back pain, abdominal or extremity pain  She denies any nausea, vomiting, diarrhea  She denies any constipation  She relates she is ambulating and denies any dizziness or lightheadedness  She is tolerating p o  Bartholome Pinks Vitals:    08/09/18 1100   BP: 129/62   Pulse: 64   Resp: 18   Temp: 97 6 °F (36 4 °C)   SpO2: 96%     General:  Pleasant, non-tachypnic, non-dyspnic  Conversant  Heart: Regular rate and rhythm, S1S2 present  No murmur, rub or gallop  Lungs: Clear to auscultation bilaterally, no wheezing, rhonchi, or crackles  Good air movement  No accessory muscle use or respiratory distress  Abdomen: soft, non-tender, non-distended, NABS  No rebound or guarding  No mass or peritoneal signs  Extremities: no clubbing, cyanosis or edema  2+ pedal pulses bilaterally  Neurologic:  Awake and alert  Fluent speech  Moving all 4 extremities symmetrically  Smiling  Skin: warm and dry  No petechiae, purpura or rash  Discharge Medications   Please see Medical Reconciliation Discharge Form    Discharge Follow Up Appointments:   Suzanne Christian MD: 1 week  BMP with PCP in 1 week    Discharge  Statement   Total Time Spent today including physical exam, discussion with patient and her nurse and daughter , and discharge arrangements/care = 38minutes      This note has been constructed using a voice recognition system

## 2018-08-09 NOTE — SOCIAL WORK
Heart Failure Care Coordination Note  RE: Re-visit with patient and introduced the Rachael Morales who set up a home visit

## 2018-08-09 NOTE — PHYSICAL THERAPY NOTE
PHYSICAL THERAPY NOTE          Patient Name: Daily Murillo  FZIDN'I Date: 8/9/2018 08/09/18 1040   Pain Assessment   Pain Assessment No/denies pain   Restrictions/Precautions   Weight Bearing Precautions Per Order No   Other Precautions Telemetry;Multiple lines; Fall Risk   General   Chart Reviewed Yes   Response to Previous Treatment Patient with no complaints from previous session  Family/Caregiver Present No   Cognition   Overall Cognitive Status WFL   Arousal/Participation Alert   Attention Within functional limits   Orientation Level Oriented X4   Memory Within functional limits   Following Commands Follows one step commands without difficulty   Subjective   Subjective Pt willing to participate in PT   Bed Mobility   Supine to Sit 6  Modified independent   Transfers   Sit to Stand 5  Supervision   Additional items Increased time required   Stand to Sit 5  Supervision   Additional items Increased time required   Additional Comments VC needed for hand placement    Ambulation/Elevation   Gait pattern Short stride; Foward flexed   Gait Assistance 5  Supervision   Assistive Device Rolling walker   Distance 110ft x 2    Balance   Static Sitting Good   Static Standing Fair +   Ambulatory Fair   Endurance Deficit   Endurance Deficit Yes   Endurance Deficit Description fatigue    Activity Tolerance   Activity Tolerance Patient limited by fatigue   Nurse Made Aware Pt appropriate to be seen per Beatriz Arthur RN   Exercises   TKR Sitting;10 reps;AROM; Bilateral  (x 2 sets )   Assessment   Prognosis Good   Problem List Decreased strength;Decreased endurance; Impaired balance;Decreased mobility   Assessment Pt resting in bed at time of PT treatment session  Pt reports feeling much better today and is willing to participate in PT   Pt able to perform all bed mobility and transfers with slightly less A required compared to previous session, however slight cueing needed for hand placement with stand to sit transfer  Increased carryover of have placement and safety noted with all transfers  Pt able to tolerate increased ambulation distances this session with the use of a RW, however pts distances continue to be limited by fatigue at this time  No LOB noted with gait training and step through gait pattern observed  Pt able to perform all LE therex seated EOB without any increase in complaints  Slight VC needed for proper form and pacing at his time  Pt assisted back into chair at conclusion of PT session with all needs within reach  Pt denies any further questions at this time  PT will continue to follow  D/C recommendation when medically cleared is home with family support  Barriers to Discharge None   Goals   Patient Goals I want to go home    STG Expiration Date 08/16/18   Treatment Day 3   Plan   Treatment/Interventions Functional transfer training;LE strengthening/ROM; Therapeutic exercise; Endurance training;Patient/family training;Equipment eval/education; Bed mobility;Gait training;Spoke to nursing   Progress Progressing toward goals   PT Frequency Other (Comment)  (4-5x a week )   Recommendation   Recommendation Home with family support  (pt declines home PT )   Equipment Recommended Walker   PT - OK to Discharge Yes  (when medically cleared )   Mary Jane Crawford, PT

## 2018-08-09 NOTE — DISCHARGE INSTRUCTIONS
====================================================================================================  DISCHARGE INSTRUCTIONS:    Please take your medication as directed  You will be discharged on the antibiotic Augmentin  Your 1st dose will start tomorrow morning  You have 2 more days of antibiotics, to complete a 7 day total course  This is treating your bronchitis/possible pneumonia, as well as your sinusitis  Please continue the nasal spray and Claritin for your sinus headache  You have also been started on Lasix/furosemide as a water pill/diuretic for your congestive heart failure  Please follow a low-salt, less than 2000 mg of sodium daily diet  Please weigh yourself daily  Please call your family doctor if you gain or lose more than 2 lb in a week's time  Will need to see your family doctor in 1 week for a checkup  At that visit you will need to have repeat lab work to recheck your basic metabolic panel and potassium levels, while you are on the diuretic  Side effects of the diuretic or dehydration  Please monitor for any dizziness or lightheadedness, or decreased urinary output      Please return to the ER with any problems at all, especially with any pain, worsening difficulty breathing, worsening cough, fever, chills, dizziness, lightheadedness, chest pain, or any other problems at all   ===========================================================================================

## 2018-08-09 NOTE — SOCIAL WORK
MCG Guide Used for Initial Round: Heart Failure RRG    Optimal GLOS: 2 days  Note: Goal Length of Stay assumes optimal recovery, decision making, and care  Patients may be discharged to a lower level of care (either later than or sooner than the goal) when it is appropriate for their clinical status and care needs  Hospital Day: 4 days     DC Readiness:   · Discharge readiness is indicated by patient meeting Recovery Milestones, including ALL of the following:  ? Hemodynamic stability  ? MI excluded  ? Cardiac rate and rhythm acceptable  ? Tachypnea absent  ? Immediate precipitating factors absent or controlled  ? Pulmonary edema absent or acceptable for next level of care  ? Peripheral or sacral edema absent, at baseline, or improved  ? Volume status acceptable on oral medication  ? Oxygenation at baseline or acceptable for next level of care  ? Renal function at baseline or acceptable for next level of care  ? Electrolyte levels normal or acceptable for next level of care  ? Mental status at baseline  ? Oral hydration, medications, and diet  ? Ambulatory  ? Discharge plans and education understood    Identified Barriers:   None, per morning rounds, patient will be discharged Home with [facility] for RN CHF teaching  Discussion Date (Time): 08/09/18 with Dr Kacie Mckeon during morning rounds

## 2018-08-09 NOTE — CASE MANAGEMENT
Continued Stay Review    Date:  8/8/2018    Vital Signs:  97 6 - 66 - 18   113/85   RA 96%  Ortho's:  Lying 64   138/63;      Sitting  72  158/76;     Standing 76  158/76    Medications:   Scheduled Meds:   Current Facility-Administered Medications:          aspirin 81 mg Oral Daily     cefTRIAXone 1,000 mg Intravenous Q24H     And        azithromycin 500 mg Oral Q24H     cholecalciferol 400 Units Oral Daily     dorzolamide 1 drop Both Eyes TID     enoxaparin 40 mg Subcutaneous Daily     fluticasone 2 spray Each Nare Daily     furosemide 20 mg IV BID     ibuprofen 600 mg Oral Once     insulin lispro 1-5 Units Subcutaneous TID AC     latanoprost 1 drop Both Eyes HS     levothyroxine 75 mcg Oral Daily Before Breakfast     lisinopril 40 mg Oral Daily     loratadine 10 mg Oral Daily                     sitaGLIPtin 50 mg Oral Daily       Continuous Infusions:    PRN Meds:   Acetaminophen x 1    melatonin    ondansetron    Abnormal Labs/Diagnostic Results:  Na 135,   Glu 141    Age/Sex: 80 y o  female   + SMITH ;  walking from the bathroom to her bed leaves her winded                                               lightheaded with standing     FATIGUE    Assessment/Plan:     1-probable acute diastolic congestive heart failure exacerbation:  Patient presented with dyspnea which was felt to be secondary to acute congestive heart failure exacerbation               -patient's weight is essentially unchanged however she is symptomatically and clinically improved              -echocardiogram:  normal left ventricular ejection fraction              -BMP 1242              -with decrease Lasix to 20 mg tablet p o  daily      2-Moraxella bronchitis:  Pt presented with dyspnea, and productive cough                 -sputum cultures grew Moraxella              -currently asymptomatically improving on ceftriaxone               -will change to augmentin at discharge      3-diabetes type 2:  With neuropathy:  Patient is not on insulin as an outpatient  Jackolyn Dural after discharge               -Accu-Cheks today were 152, 128     4-recent UTI:  I reviewed patient's family office notes from 07/11/2018  Amrita Siu was recently treated with ciprofloxacin for a UTI   Patient denies any urinary symptoms      5-chronic left upper quadrant pain:  Per her outpatient office visit notes  Fátima eLung was prescribed a proton pump inhibitor and Maalox   Denies any current pain     6-history of coronary artery disease:  No evidence of current chest pain, or acute ischemia   Continue her medical therapy with aspirin   She is not on beta-blocker or statin     7-history trigeminal neuralgia:  Continue gabapentin     8-hypothyroidism:  Continue Synthroid     9-moderate-severe aortic stenosis:  Continue supportive measures   Continue outpatient follow-up      10-essential hypertension:  Patient's blood pressure is currently adequately controlled   Continue medications and monitor     11-physical therapy evaluation:  Recommend home with family support     12-headache:  Patient presents with a frontal headache, and probable frontal sinusitis secondary to moraxella  Continue antibiotics  Continue Flonase  Will also start Claritin     13-irregular heartbeat:  Check EKG to evaluate for atrial fibrillation versus sinus arrhythmia/PVCs     14-lightheadedness:  Without focal neurologic signs  Check orthostatic vital signs to evaluate for the static hypotension secondary to diuresis      Certification Statement: The patient will continue to require additional inpatient hospital stay due to need for further acute intervention for dyspnea    Discharge Plan:  Anticipate home once medically cleared  With SAVANNA CRUZ      Thank you,  Vishal Aponte  [facility] Utilization Review Department  Phone: [phone]; Fax [phone]  ATTENTION: The Network Utilization Review Department is now centralized for our 9 Facilities   Make a note that we have a new phone and fax numbers for our Department  Please call with any questions or concerns to [phone] and carefully follow the prompts so that you are directed to the right person  All voicemails are confidential  Fax any determinations, approvals, denials, and requests for initial or continue stay review clinical to [phone]  Due to HIGH CALL volume, it would be easier if you could please send faxed requests to expedite your requests and in part, help us provide discharge notifications faster

## 2018-08-10 ENCOUNTER — TRANSITIONAL CARE MANAGEMENT (OUTPATIENT)
Dept: FAMILY MEDICINE CLINIC | Facility: CLINIC | Age: 83
End: 2018-08-10

## 2018-08-10 LAB
BACTERIA BLD CULT: NORMAL
BACTERIA BLD CULT: NORMAL

## 2018-08-14 ENCOUNTER — PATIENT OUTREACH (OUTPATIENT)
Dept: FAMILY MEDICINE CLINIC | Facility: CLINIC | Age: 83
End: 2018-08-14

## 2018-08-14 ENCOUNTER — OFFICE VISIT (OUTPATIENT)
Dept: FAMILY MEDICINE CLINIC | Facility: CLINIC | Age: 83
End: 2018-08-14
Payer: COMMERCIAL

## 2018-08-14 VITALS
HEART RATE: 60 BPM | BODY MASS INDEX: 28.67 KG/M2 | SYSTOLIC BLOOD PRESSURE: 130 MMHG | DIASTOLIC BLOOD PRESSURE: 60 MMHG | WEIGHT: 167 LBS

## 2018-08-14 DIAGNOSIS — I10 ESSENTIAL HYPERTENSION: ICD-10-CM

## 2018-08-14 DIAGNOSIS — R44.3 HALLUCINATION: ICD-10-CM

## 2018-08-14 DIAGNOSIS — J40 MORAXELLA CATARRHALIS BRONCHITIS: Primary | ICD-10-CM

## 2018-08-14 DIAGNOSIS — R10.12 CHRONIC LEFT UPPER QUADRANT PAIN: ICD-10-CM

## 2018-08-14 DIAGNOSIS — G89.29 CHRONIC LEFT UPPER QUADRANT PAIN: ICD-10-CM

## 2018-08-14 DIAGNOSIS — E11.9 TYPE 2 DIABETES MELLITUS WITHOUT COMPLICATION, WITHOUT LONG-TERM CURRENT USE OF INSULIN (HCC): ICD-10-CM

## 2018-08-14 DIAGNOSIS — I50.31 ACUTE DIASTOLIC CONGESTIVE HEART FAILURE (HCC): ICD-10-CM

## 2018-08-14 DIAGNOSIS — B96.89 MORAXELLA CATARRHALIS BRONCHITIS: Primary | ICD-10-CM

## 2018-08-14 DIAGNOSIS — I25.10 CORONARY ARTERY DISEASE INVOLVING NATIVE CORONARY ARTERY OF NATIVE HEART WITHOUT ANGINA PECTORIS: ICD-10-CM

## 2018-08-14 DIAGNOSIS — R53.1 GENERAL WEAKNESS: ICD-10-CM

## 2018-08-14 PROBLEM — N39.0 URINARY TRACT INFECTION WITHOUT HEMATURIA: Status: RESOLVED | Noted: 2018-07-13 | Resolved: 2018-08-14

## 2018-08-14 PROBLEM — R07.9 LEFT SIDED CHEST PAIN: Status: RESOLVED | Noted: 2018-05-12 | Resolved: 2018-08-14

## 2018-08-14 PROCEDURE — 99495 TRANSJ CARE MGMT MOD F2F 14D: CPT | Performed by: FAMILY MEDICINE

## 2018-08-14 PROCEDURE — 1111F DSCHRG MED/CURRENT MED MERGE: CPT | Performed by: FAMILY MEDICINE

## 2018-08-14 RX ORDER — OMEPRAZOLE 40 MG/1
CAPSULE, DELAYED RELEASE ORAL
Qty: 90 CAPSULE | Refills: 1 | Status: SHIPPED | OUTPATIENT
Start: 2018-08-14 | End: 2018-08-25

## 2018-08-14 RX ORDER — OLANZAPINE 2.5 MG/1
5 TABLET ORAL
Qty: 30 TABLET | Refills: 0 | Status: SHIPPED | OUTPATIENT
Start: 2018-08-14 | End: 2018-08-14 | Stop reason: SDUPTHER

## 2018-08-14 RX ORDER — OLANZAPINE 2.5 MG/1
2.5 TABLET ORAL
Qty: 30 TABLET | Refills: 0 | Status: SHIPPED | OUTPATIENT
Start: 2018-08-14 | End: 2018-08-14 | Stop reason: SDUPTHER

## 2018-08-14 RX ORDER — OMEPRAZOLE 40 MG/1
40 CAPSULE, DELAYED RELEASE ORAL DAILY
Qty: 30 CAPSULE | Refills: 1 | Status: SHIPPED | OUTPATIENT
Start: 2018-08-14 | End: 2018-08-14 | Stop reason: SDUPTHER

## 2018-08-14 RX ORDER — OLANZAPINE 2.5 MG/1
TABLET ORAL
Qty: 90 TABLET | Refills: 0 | Status: SHIPPED | OUTPATIENT
Start: 2018-08-14 | End: 2018-08-25

## 2018-08-14 NOTE — ASSESSMENT & PLAN NOTE
Lab Results   Component Value Date    HGBA1C 6 6 (H) 08/05/2018       Patient to continue with Januvia 25 mg daily  Prescription was sent to the pharmacy, if patient cannot afford it then will consider alternative

## 2018-08-14 NOTE — PROGRESS NOTES
Assessment/Plan:     Moraxella catarrhalis bronchitis  Status post Augmentin, she is feeling better her symptoms are improving  Acute diastolic congestive heart failure ([address] )  I do know if it is a diastolic congestive heart failure or systolic or mixed  Her echocardiogram showed hypokinesis on the inferior wall which is a new finding since 2017, this was addressed at her recent hospitalization, patient has regular follow-up with a cardiologist her appointment to see the cardiologist not until September 7th comma I will try to contact the cardiologist to see patient need to be seen sooner  To continue with Lasix  Coronary artery disease involving native coronary artery of native heart  Patient was taken off the Ace inhibitors unclear why  Continue with aspirin 81 mg daily  Patient was taken of the statin due to her myalgia and generalized body weakness  With new finding on the echocardiogram patient may need to restarted on the statin, I will discuss it with Cardiology in details  Essential hypertension  Patient off all her medication, currently on Lasix  Type 2 diabetes mellitus without complication (HCC)  Lab Results   Component Value Date    HGBA1C 6 6 (H) 08/05/2018       Patient to continue with Januvia 25 mg daily  Prescription was sent to the pharmacy, if patient cannot afford it then will consider alternative  Trigeminal neuralgia  Patient have more headache episodes lately, patient was taking gabapentin on an intermittent basis, patient to take gabapentin 300 mg every night to help her headache         Diagnoses and all orders for this visit:    Moraxella catarrhalis bronchitis    Acute diastolic congestive heart failure (HCC)    Coronary artery disease involving native coronary artery of native heart without angina pectoris    Essential hypertension    General weakness    Type 2 diabetes mellitus without complication, without long-term current use of insulin ([address] )  -     Lancet Devices (ACCU-CHEK SOFTCLIX) lancets; Use as instructed  -     glucose blood test strip; Use as instructed  -     sitaGLIPtin (JANUVIA) 50 mg tablet; Take 1 tablet (50 mg total) by mouth daily    Chronic left upper quadrant pain  -     Discontinue: omeprazole (PriLOSEC) 40 MG capsule; Take 1 capsule (40 mg total) by mouth daily    Hallucination  -     OLANZapine (ZyPREXA) 2 5 mg tablet; Take 2 tablets (5 mg total) by mouth daily at bedtime         Subjective:patient states that since she's been home she's had a headache and dizziness  Patient is taking Tylenol with some relief  ak     Patient ID: Chelsea Felix is a 80 y o  female  Patient was admitted to [facility] from August 5 to August 9, 2018 due to shortness of breath found to have moreaxilla catarrhalis bronchitis and acute congestive heart failure  Patient was started on Lasix 20 mg daily and continued on it, patient denied any shortness of breath at rest, she does have shortness of breath with exertion patient has orthopnea she need to use 3 pillows instead of 1 before her trip to the hospital   Denied paroxysmal nocturnal dyspnea no lower limb swelling  Her cough is much improved, it become nonproductive now, no fever no chills no any other symptoms  Patient's twin sister was admitted to the hospital for pneumonia and heart failure 5 days before this patient admission  Patient feel generalized weak, she get her headache more frequently  Review of Systems   Constitutional: Positive for fatigue  Negative for activity change, appetite change, chills, fever and unexpected weight change  HENT: Negative for congestion, sore throat and voice change  Eyes: Negative for pain and visual disturbance  Respiratory: Positive for cough and shortness of breath  Negative for apnea, chest tightness, wheezing and stridor  Cardiovascular: Negative for chest pain and palpitations  Gastrointestinal: Negative for abdominal pain, constipation, diarrhea and nausea  Endocrine: Negative for cold intolerance, heat intolerance and polyuria  Genitourinary: Negative for dysuria, enuresis, flank pain and frequency  Musculoskeletal: Negative for gait problem, joint swelling and neck pain  Skin: Negative for color change and wound  Neurological: Positive for light-headedness and headaches  Negative for dizziness, syncope, speech difficulty and numbness  Psychiatric/Behavioral: Negative for behavioral problems and confusion  The patient is not nervous/anxious  Objective:     Physical Exam   Constitutional: She is oriented to person, place, and time  She appears well-developed and well-nourished  HENT:   Head: Normocephalic and atraumatic  Eyes: Conjunctivae and EOM are normal  Pupils are equal, round, and reactive to light  Neck: Normal range of motion  Neck supple  Cardiovascular: Normal rate, regular rhythm, normal heart sounds and intact distal pulses  Pulmonary/Chest: Effort normal and breath sounds normal    Abdominal: Soft  Bowel sounds are normal    Musculoskeletal: Normal range of motion  Neurological: She is alert and oriented to person, place, and time  She has normal reflexes  Skin: Skin is warm and dry  Psychiatric: She has a normal mood and affect  Her behavior is normal    Vitals reviewed  Vitals:    08/14/18 1400   BP: 130/60   Pulse: 60   Weight: 75 8 kg (167 lb)       Transitional Care Management Review:  Tyesha Peterson is a 80 y o  female here for TCM follow up       During the TCM phone call patient stated:    Date and time hospital follow up call was made:  8/10/2018  1:10 PM  Hospital care reviewed:  Records reviewed  Patient was hopsitalized at:  [facility]  Date of admission:  8/5/18  Date of discharge:  8/9/18  Diagnosis:  CHF  Disposition:  Home  Were the patients medicaitons reviewed and updated:  Yes  Current symptoms:  None  Post hospital issues:  None  Should patient be enrolled in anticoag monitoring?: No  Scheduled for follow up?:  Yes (Comment: follow up appt with DM on 8/14/2018 at 2:15pm)  I have advised the patient to call PCP with any new or worsening symptoms (please type in name along with any credentials):  J Carlos Carty MD

## 2018-08-14 NOTE — ASSESSMENT & PLAN NOTE
I do know if it is a diastolic congestive heart failure or systolic or mixed  Her echocardiogram showed hypokinesis on the inferior wall which is a new finding since 2017, this was addressed at her recent hospitalization, patient has regular follow-up with a cardiologist her appointment to see the cardiologist not until September 7th comma I will try to contact the cardiologist to see patient need to be seen sooner  To continue with Lasix

## 2018-08-14 NOTE — PROGRESS NOTES
Outpatient Care Management Note: Complex Care    I verified with [facility] at [facility] that Brissa Bass was opened for Skilled Nursing on 8/12 & her next RN visit will be on 8/16  She is also being followed by Tadeo Rahman, [facility] Worker, OP Care Management

## 2018-08-14 NOTE — ASSESSMENT & PLAN NOTE
Patient was taken off the Ace inhibitors unclear why  Continue with aspirin 81 mg daily  Patient was taken of the statin due to her myalgia and generalized body weakness  With new finding on the echocardiogram patient may need to restarted on the statin, I will discuss it with Cardiology in details

## 2018-08-14 NOTE — PATIENT INSTRUCTIONS
Hallucinations   AMBULATORY CARE:   Hallucinations  are things you see, hear, feel, taste, or smell that seem real but are not  Some hallucinations are temporary  Hallucinations that continue, interfere with daily activities, or worsen may be a sign of a serious medical or mental condition that needs treatment  Types of hallucinations:   · Auditory  means you hear things, such as music, buzzing, or ringing  You may hear voices even though no one else is in the room  The voices may say negative things about you or tell you to harm yourself or others  You may hear the voice of a loved one who recently passed away  · Visual  means you see things, such as a person or object that is not real  Flashes of light or shapes are other examples  Another example is an object that is real but looks different to you than it does to others  · Tactile  means you feel things, such as an object that is not real  You may feel like something is touching you or is crawling on or in your skin  You may also feel that your body is being cut or torn  You may feel like something is in a body part, such as your stomach, even though tests show nothing is there  · Olfactory  means you smell something that is not real  The smell may make you gag or choke if it is not pleasant  You may smell something good, such as food or flowers  Olfactory hallucinations may be a sign of a serious medical condition that needs treatment, such as a brain tumor  · Gustatory  means you taste things that are not real  You may taste something even when your mouth is empty  Your food may taste rotten or sour even though others eating the same food think it tastes fine  Call 911 if you or someone else notices any of the following:   · You want to harm yourself or someone else  · You hear voices telling you to harm yourself or someone else  · You have a seizure      · You are confused, do not know where you are, or are not making sense when you speak   Seek care immediately if:   · Your hallucinations worsen or return after treatment  · You vomit several times in a row  · Your heartbeat or breathing is faster or slower than usual      · You have trouble breathing or shortness of breath  Contact your healthcare provider if:   · You have new hallucinations  · You have questions or concerns about your condition or care  Treatment  may include any of the following:  · Medicines  may be given to stop the hallucinations, reduce anxiety, or relax your muscles  · A behavior therapist  may help you recognize and manage hallucinations  He may teach methods such as the talk-through method  You will learn to tell yourself that the hallucination is not real and what to do when it ends  Follow up with your healthcare provider as directed:  Write down your questions so you remember to ask them during your visits  © 2017 [employer] Information is for End User's use only and may not be sold, redistributed or otherwise used for commercial purposes  All illustrations and images included in CareNotes® are the copyrighted property of A D A M [employer] Inc  or [employer] En[employer] Monzon  The above information is an  only  It is not intended as medical advice for individual conditions or treatments  Talk to your doctor, nurse or pharmacist before following any medical regimen to see if it is safe and effective for you

## 2018-08-14 NOTE — ASSESSMENT & PLAN NOTE
Patient have more headache episodes lately, patient was taking gabapentin on an intermittent basis, patient to take gabapentin 300 mg every night to help her headache

## 2018-08-17 ENCOUNTER — TELEPHONE (OUTPATIENT)
Dept: FAMILY MEDICINE CLINIC | Facility: CLINIC | Age: 83
End: 2018-08-17

## 2018-08-17 NOTE — TELEPHONE ENCOUNTER
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  Whether she has ongoing ischemia w/ exertion which is contributing to the SOB is uncertain  We should be sure she is remaining euvolemic since her hospitalization (stable weight, no increased edema/SOB)  If her SOB is worsening w/ evidence of weight gain/swelling, she may need augmented diuresis  If she's euvolemic, emperic antianginals and CAD tx(ie  Statins/antiplatlets) should be tried before invasive angiography and stenting in light of her age and comorbidities  I will try to fit her in sooner, but can tell you our schedules have been a bit tight  We are in search for another noninvasive cardiologist to help get pt's in sooner  I hope this helps  Thanks  Shelia    ----- Message -----  From: Rell Brown MD  Sent: 8/15/2018  10:35 AM  To: Sophia Sanchez DO  Subject: care coordination, ECHO                          Dear Dr Johnathon Goode our [place] patient, very youthful 80Years old was seen by Me yesterday for her transition of care visit after her recent hospitalization 8/5-8/9 to [facility]  Her symptoms was mainly shortness of breath she was discovered to have acute diastolic congestive heart failure?   I was looking at her echocardiogram from August 7 and there is possible hypokinesis of the basal inferior and basal inferior lateral walls which is new finding in compare to the prior echo  Patient continued to have dyspnea on exertion, but no actual chest pain except for her chronic left upper quadrant pain and her atypical chest pain  I am a little concerned about this echo finding and her generalized weakness that is getting worse  Patient has an appointment to see you in September, I feel that she may need to be seen sooner than September, let me know your opinion on her echo on if anything need to be done from my end      Tim Roland   [phone]

## 2018-08-21 ENCOUNTER — PATIENT OUTREACH (OUTPATIENT)
Dept: FAMILY MEDICINE CLINIC | Facility: CLINIC | Age: 83
End: 2018-08-21

## 2018-08-21 DIAGNOSIS — R06.00 DOE (DYSPNEA ON EXERTION): ICD-10-CM

## 2018-08-21 DIAGNOSIS — R26.2 AMBULATORY DYSFUNCTION: Primary | ICD-10-CM

## 2018-08-21 RX ORDER — ALBUTEROL SULFATE 90 UG/1
2 AEROSOL, METERED RESPIRATORY (INHALATION) EVERY 4 HOURS PRN
Status: DISCONTINUED | OUTPATIENT
Start: 2018-08-21 | End: 2019-04-16

## 2018-08-21 NOTE — PROGRESS NOTES
Outpatient Care Management Note: Complex Care        I spoke with Eren Saenz and she said that she is feeling well  We talked about her 2 questions/requests: shower chair & inhaler  She told me that she used to take albuterol  I explained that I would talk with Dr Hernando De Jesus about her questions  I also let her know that I'm working with [facility] to request an authorization for the shower chair  In [employer] message sent today  She is being followed at home by [facility]: RN and Josue Velarde, [address] Worker

## 2018-08-25 ENCOUNTER — HOSPITAL ENCOUNTER (EMERGENCY)
Facility: HOSPITAL | Age: 83
Discharge: HOME | End: 2018-08-25
Attending: EMERGENCY MEDICINE | Admitting: EMERGENCY MEDICINE
Payer: COMMERCIAL

## 2018-08-25 VITALS
OXYGEN SATURATION: 97 % | TEMPERATURE: 97.7 F | HEART RATE: 63 BPM | DIASTOLIC BLOOD PRESSURE: 60 MMHG | SYSTOLIC BLOOD PRESSURE: 144 MMHG | RESPIRATION RATE: 20 BRPM

## 2018-08-25 DIAGNOSIS — R07.89 CHEST WALL PAIN: Primary | ICD-10-CM

## 2018-08-25 LAB
ALBUMIN SERPL BCP-MCNC: 2.9 G/DL (ref 3.5–5)
ALP SERPL-CCNC: 91 U/L (ref 46–116)
ALT SERPL W P-5'-P-CCNC: 16 U/L (ref 12–78)
ANION GAP SERPL CALCULATED.3IONS-SCNC: 11 MMOL/L (ref 4–13)
AST SERPL W P-5'-P-CCNC: 34 U/L (ref 5–45)
BASOPHILS # BLD AUTO: 0.05 THOUSANDS/ΜL (ref 0–0.1)
BASOPHILS NFR BLD AUTO: 1 % (ref 0–1)
BILIRUB SERPL-MCNC: 0.29 MG/DL (ref 0.2–1)
BUN SERPL-MCNC: 24 MG/DL (ref 5–25)
CALCIUM SERPL-MCNC: 8.8 MG/DL (ref 8.3–10.1)
CHLORIDE SERPL-SCNC: 103 MMOL/L (ref 100–108)
CO2 SERPL-SCNC: 23 MMOL/L (ref 21–32)
CREAT SERPL-MCNC: 1.14 MG/DL (ref 0.6–1.3)
EOSINOPHIL # BLD AUTO: 0.34 THOUSAND/ΜL (ref 0–0.61)
EOSINOPHIL NFR BLD AUTO: 5 % (ref 0–6)
ERYTHROCYTE [DISTWIDTH] IN BLOOD BY AUTOMATED COUNT: 13.8 % (ref 11.6–15.1)
GFR SERPL CREATININE-BSD FRML MDRD: 47 ML/MIN/1.73SQ M
GLUCOSE SERPL-MCNC: 185 MG/DL (ref 65–140)
HCT VFR BLD AUTO: 41 % (ref 34.8–46.1)
HGB BLD-MCNC: 13.8 G/DL (ref 11.5–15.4)
LIPASE SERPL-CCNC: 153 U/L (ref 73–393)
LYMPHOCYTES # BLD AUTO: 1.46 THOUSANDS/ΜL (ref 0.6–4.47)
LYMPHOCYTES NFR BLD AUTO: 22 % (ref 14–44)
MCH RBC QN AUTO: 31 PG (ref 26.8–34.3)
MCHC RBC AUTO-ENTMCNC: 33.7 G/DL (ref 31.4–37.4)
MCV RBC AUTO: 92 FL (ref 82–98)
MONOCYTES # BLD AUTO: 0.47 THOUSAND/ΜL (ref 0.17–1.22)
MONOCYTES NFR BLD AUTO: 7 % (ref 4–12)
NEUTROPHILS # BLD AUTO: 4.42 THOUSANDS/ΜL (ref 1.85–7.62)
NEUTS SEG NFR BLD AUTO: 66 % (ref 43–75)
NRBC BLD AUTO-RTO: 0 /100 WBCS
PLATELET # BLD AUTO: 224 THOUSANDS/UL (ref 149–390)
PMV BLD AUTO: 10.9 FL (ref 8.9–12.7)
POTASSIUM SERPL-SCNC: 4.9 MMOL/L (ref 3.5–5.3)
PROT SERPL-MCNC: 7.5 G/DL (ref 6.4–8.2)
RBC # BLD AUTO: 4.45 MILLION/UL (ref 3.81–5.12)
SODIUM SERPL-SCNC: 137 MMOL/L (ref 136–145)
WBC # BLD AUTO: 6.74 THOUSAND/UL (ref 4.31–10.16)

## 2018-08-25 PROCEDURE — 99285 EMERGENCY DEPT VISIT HI MDM: CPT

## 2018-08-25 PROCEDURE — 83690 ASSAY OF LIPASE: CPT | Performed by: EMERGENCY MEDICINE

## 2018-08-25 PROCEDURE — 36415 COLL VENOUS BLD VENIPUNCTURE: CPT

## 2018-08-25 PROCEDURE — 93005 ELECTROCARDIOGRAM TRACING: CPT

## 2018-08-25 PROCEDURE — 80053 COMPREHEN METABOLIC PANEL: CPT | Performed by: EMERGENCY MEDICINE

## 2018-08-25 PROCEDURE — 85025 COMPLETE CBC W/AUTO DIFF WBC: CPT | Performed by: EMERGENCY MEDICINE

## 2018-08-25 RX ORDER — LIDOCAINE 50 MG/G
1 PATCH TOPICAL ONCE
Status: DISCONTINUED | OUTPATIENT
Start: 2018-08-25 | End: 2018-08-26 | Stop reason: HOSPADM

## 2018-08-25 RX ORDER — METHOCARBAMOL 500 MG/1
500 TABLET, FILM COATED ORAL ONCE
Status: COMPLETED | OUTPATIENT
Start: 2018-08-25 | End: 2018-08-25

## 2018-08-25 RX ORDER — ACETAMINOPHEN 325 MG/1
975 TABLET ORAL ONCE
Status: COMPLETED | OUTPATIENT
Start: 2018-08-25 | End: 2018-08-25

## 2018-08-25 RX ADMIN — ACETAMINOPHEN 975 MG: 325 TABLET, FILM COATED ORAL at 22:15

## 2018-08-25 RX ADMIN — METHOCARBAMOL 500 MG: 500 TABLET ORAL at 22:15

## 2018-08-25 RX ADMIN — LIDOCAINE 1 PATCH: 50 PATCH TOPICAL at 22:13

## 2018-08-26 NOTE — DISCHARGE INSTRUCTIONS
Chest Wall Pain   WHAT YOU NEED TO KNOW:   Chest wall pain may be caused by problems with the muscles, cartilage, or bones of the chest wall  Chest wall pain may also be caused by pain that spreads to your chest from another part of your body  The pain may be aching, severe, dull, or sharp  It may come and go, or it may be constant  The pain may be worse when you move in certain ways, breathe deeply, or cough  DISCHARGE INSTRUCTIONS:   Call 911 if:   · You have any of the following signs of a heart attack:      ¨ Squeezing, pressure, or pain in your chest that lasts longer than 5 minutes or returns    ¨ Discomfort or pain in your back, neck, jaw, stomach, or arm     ¨ Trouble breathing    ¨ Nausea or vomiting    ¨ Lightheadedness or a sudden cold sweat, especially with chest pain or trouble breathing    Return to the emergency department if:   · You have severe pain  Contact your healthcare provider if:   · You develop a rash  · You have other new symptoms  · Your pain does not improve, even with treatment  · You have questions or concerns about your condition or care  Medicines: You may need any of the following:  · NSAIDs , such as ibuprofen, help decrease swelling, pain, and fever  This medicine is available with or without a doctor's order  NSAIDs can cause stomach bleeding or kidney problems in certain people  If you take blood thinner medicine, always ask your healthcare provider if NSAIDs are safe for you  Always read the medicine label and follow directions  · Acetaminophen  decreases pain  It is available without a doctor's order  Ask how much to take and how often to take it  Follow directions  Acetaminophen can cause liver damage if not taken correctly  · A cream  may be applied to your chest to decrease pain  · Take your medicine as directed  Contact your healthcare provider if you think your medicine is not helping or if you have side effects   Tell him of her if you are allergic to any medicine  Keep a list of the medicines, vitamins, and herbs you take  Include the amounts, and when and why you take them  Bring the list or the pill bottles to follow-up visits  Carry your medicine list with you in case of an emergency  Follow up with your healthcare provider as directed:  Write down your questions so you remember to ask them during your visits  Self-care:   · Rest  as needed  Avoid activities that make your chest wall pain worse  · Apply heat  on your chest for 20 to 30 minutes every 2 hours for as many days as directed  Heat helps decrease pain and muscle spasms  · Apply ice  on your chest for 15 to 20 minutes every hour or as directed  Use an ice pack, or put crushed ice in a plastic bag  Cover it with a towel  Ice helps prevent tissue damage and decreases swelling and pain  © 2017 [employer] Information is for End User's use only and may not be sold, redistributed or otherwise used for commercial purposes  All illustrations and images included in CareNotes® are the copyrighted property of A [employer] M [employer] Inc  or [employer] En[employer] Monzon  The above information is an  only  It is not intended as medical advice for individual conditions or treatments  Talk to your doctor, nurse or pharmacist before following any medical regimen to see if it is safe and effective for you  Chest Pain   WHAT YOU NEED TO KNOW:   What causes chest pain? Chest pain can be caused by a range of conditions, from not serious to life-threatening  Chest pain can be a symptom of a digestive problem, such as acid reflux or a stomach ulcer  An anxiety attack or a strong emotion, such as anger, can also cause chest pain  Infection, inflammation, or a fracture in the bones or cartilage in your chest can cause pain or discomfort  Sometimes chest pain or pressure is caused by poor blood flow to your heart (angina)   Chest pain may also be caused by life-threatening conditions such as a heart attack or blood clot in your lungs  What other symptoms might I have with chest pain? · A burning feeling behind your breastbone    · A racing or slow heartbeat     · Fever or sweating     · Nausea or vomiting     · Shortness of breath     · Discomfort or pressure that spreads from your chest to your back, jaw, or arm     · Feeling weak, tired, or faint  How is the cause of chest pain diagnosed? Your healthcare provider will examine you  Describe your chest pain in as much detail as possible  Tell him or her where your pain is and when it began  Tell the provider if you notice anything that makes the pain worse or better  Tell him or her if it is constant or comes and goes  Your healthcare provider will ask about any medicines you use and medical conditions you have  He or she will also examine you  You may also need any of the following tests:  · An EKG  is a test that records your heart's electrical activity  · Blood tests  check for heart damage and signs of a heart attack  · An echocardiogram  uses sound waves to see if blood is flowing normally through your heart  · An ultrasound, x-ray, CT, or MRI scan  may show the cause of your chest pain  You may be given contrast liquid to help your heart show up better in the pictures  Tell the healthcare provider if you have ever had an allergic reaction to contrast liquid  Do not enter the MRI room with anything metal  Metal can cause serious injury  Tell the healthcare provider if you have any metal in or on your body  · An endoscopy  may be done to check for ulcers or problem with your esophagus  How is chest pain treated? Medicines may be given to treat the cause of your chest pain  Examples include pain medicine, anxiety medicine, or medicines to increase blood flow to your heart   Do not  take certain medicines without asking your healthcare provider first  These include NSAIDs, herbal or vitamin supplements, or hormones (estrogen or progestin)  What are some healthy living tips? The following are general healthy guidelines  If your chest pain is caused by a heart problem, your healthcare provider will give you specific guidelines to follow  · Do not smoke  Nicotine and other chemicals in cigarettes and cigars can cause lung and heart damage  Ask your healthcare provider for information if you currently smoke and need help to quit  E-cigarettes or smokeless tobacco still contain nicotine  Talk to your healthcare provider before you use these products  · Eat a variety of healthy, low-fat foods  Healthy foods include fruits, vegetables, whole-grain breads, low-fat dairy products, beans, lean meats, and fish  Ask for more information about a heart healthy diet  · Ask about activity  Your healthcare provider will tell you which activities to limit or avoid  Ask when you can drive, return to work, and have sex  Ask about the best exercise plan for you  · Maintain a healthy weight  Ask your healthcare provider how much you should weigh  Ask him or her to help you create a weight loss plan if you are overweight  Call 911 if:   · You have any of the following signs of a heart attack:      ¨ Squeezing, pressure, or pain in your chest that lasts longer than 5 minutes or returns    ¨ Discomfort or pain in your back, neck, jaw, stomach, or arm     ¨ Trouble breathing    ¨ Nausea or vomiting    ¨ Lightheadedness or a sudden cold sweat, especially with chest pain or trouble breathing    When should I seek immediate care? · You have chest discomfort that gets worse, even with medicine  · You cough or vomit blood  · Your bowel movements are black or bloody  · You cannot stop vomiting, or it hurts to swallow  When should I contact my healthcare provider? · You have questions or concerns about your condition or care  CARE AGREEMENT:   You have the right to help plan your care   Learn about your health condition and how it may be treated  Discuss treatment options with your caregivers to decide what care you want to receive  You always have the right to refuse treatment  The above information is an  only  It is not intended as medical advice for individual conditions or treatments  Talk to your doctor, nurse or pharmacist before following any medical regimen to see if it is safe and effective for you  © 2017 [employer] Information is for End User's use only and may not be sold, redistributed or otherwise used for commercial purposes  All illustrations and images included in CareNotes® are the copyrighted property of A [employer] M , Inc  or [employer] Enrique Monzon

## 2018-08-26 NOTE — ED PROVIDER NOTES
History  Chief Complaint   Patient presents with    Abdominal Pain     Left sided "side pain"  Reports nausea and need to move bowels but couldn't  Also reports SOB  Recently discharged from hospital following CHF exacerabation   Shortness of Breath     Patient is a 49-year-old female that presents for chronic left-sided chest pain  She states that she has had this pain for a very long time  States that she has been on lidocaine patch is, Tylenol and other over-the-counter medications  States that she had an echo done at the beginning of this month when she was admitted for CHF  States that during that echo the pain was exacerbated by the ultrasound probe  Patient states that last night she was laying down to go to sleep and woke up because she felt spasming and shaking internally on that left side  She states that this is the same pain she has always had just a little bit worse  History provided by:  Patient and relative   used: No    Shortness of Breath   Associated symptoms: chest pain and headaches    Associated symptoms: no abdominal pain, no cough, no fever, no rash and no vomiting    Chest Pain   Pain location:  L lateral chest  Pain quality: sharp and stabbing    Pain radiates to:  Does not radiate  Pain radiates to the back: no    Pain severity:  Moderate  Onset quality:  Sudden  Duration:  2 days  Timing:  Intermittent  Chronicity:  Chronic  Context: at rest    Relieved by:  Nothing  Associated symptoms: headache and nausea    Associated symptoms: no abdominal pain, no back pain, no cough, no dizziness, no fever, no shortness of breath and not vomiting        Prior to Admission Medications   Prescriptions Last Dose Informant Patient Reported? Taking? Cholecalciferol (VITAMIN D3) 2000 units capsule  Self Yes Yes   Sig: Take 1,000 Units by mouth daily     aspirin 81 MG tablet  Self Yes Yes   Sig: Take 81 mg by mouth daily     dorzolamide (TRUSOPT) 2 % ophthalmic solution  Self Yes Yes   Sig: Administer 1 drop to both eyes 3 (three) times a day     fluticasone (FLONASE) 50 mcg/act nasal spray   No Yes   Si sprays into each nostril daily   furosemide (LASIX) 20 mg tablet   No Yes   Sig: Take 1 tablet (20 mg total) by mouth daily   gabapentin (NEURONTIN) 600 MG tablet  Self No Yes   Sig: Take 0 5 tablets (300 mg total) by mouth every 8 (eight) hours as needed (Cranial Neuralgia)   latanoprost (XALATAN) 0 005 % ophthalmic solution  Self Yes Yes   Sig: Apply 1 drop to eye daily at bedtime  Both eyes   levothyroxine 75 mcg tablet  Self No Yes   Sig: Take 1 tablet (75 mcg total) by mouth daily   loratadine (CLARITIN) 10 mg tablet   No Yes   Sig: Take 1 tablet (10 mg total) by mouth daily   sitaGLIPtin (JANUVIA) 50 mg tablet   No Yes   Sig: Take 1 tablet (50 mg total) by mouth daily      Facility-Administered Medications Last Administration Doses Remaining   albuterol (PROVENTIL HFA,VENTOLIN HFA) inhaler 2 puff None recorded           Past Medical History:   Diagnosis Date    Asthma     Atypical chest pain     CAD (coronary artery disease)     Candidal intertrigo     Depression     Diabetes mellitus (HCC)     Diabetic neuropathy ([address] )     Diverticulitis     Dyslipidemia     Female bladder prolapse     Gastric ulcer     Glaucoma     HTN (hypertension)     Hyperlipidemia     Hypothyroidism 2016    RAD (reactive airway disease)        Past Surgical History:   Procedure Laterality Date    COLONOSCOPY      ESOPHAGOGASTRODUODENOSCOPY      HYSTERECTOMY         Family History   Problem Relation Age of Onset    Breast cancer Mother     Hypothyroidism Mother     Hypertension Father     Breast cancer Sister     Thyroid disease Sister     Hypertension Sister      I have reviewed and agree with the history as documented      Social History   Substance Use Topics    Smoking status: Never Smoker    Smokeless tobacco: Never Used    Alcohol use No Comment: Social Alcohol Use -- as per allscripts        Review of Systems   Constitutional: Negative for chills and fever  Respiratory: Negative for cough, chest tightness and shortness of breath  Cardiovascular: Positive for chest pain  Gastrointestinal: Positive for nausea  Negative for abdominal pain and vomiting  Musculoskeletal: Negative for back pain  Skin: Negative for color change, pallor, rash and wound  Neurological: Positive for headaches  Negative for dizziness and light-headedness  All other systems reviewed and are negative  Physical Exam  Physical Exam   Constitutional: She is oriented to person, place, and time  She appears well-developed and well-nourished  No distress  HENT:   Head: Normocephalic and atraumatic  Mouth/Throat: Oropharynx is clear and moist    Eyes: Conjunctivae are normal  No scleral icterus  Neck: Normal range of motion  Neck supple  Cardiovascular: Normal rate, regular rhythm and intact distal pulses  Exam reveals no friction rub  Murmur heard  Systolic murmur is present with a grade of 4/6   Pulmonary/Chest: Effort normal and breath sounds normal  No stridor  No respiratory distress  She has no wheezes  She has no rales  She exhibits tenderness  She exhibits no crepitus, no edema and no swelling  Abdominal: Soft  She exhibits no distension  There is no tenderness  There is no rebound and no guarding  Musculoskeletal: Normal range of motion  She exhibits no edema, tenderness or deformity  Neurological: She is alert and oriented to person, place, and time  Skin: Skin is warm and dry  She is not diaphoretic  Psychiatric: She has a normal mood and affect  Nursing note and vitals reviewed        Vital Signs  ED Triage Vitals   Temperature Pulse Respirations Blood Pressure SpO2   08/25/18 2114 08/25/18 2116 08/25/18 2116 08/25/18 2119 08/25/18 2116   97 7 °F (36 5 °C) 75 22 170/86 95 %      Temp Source Heart Rate Source Patient Position - Orthostatic VS BP Location FiO2 (%)   08/25/18 2114 08/25/18 2114 08/25/18 2119 08/25/18 2119 --   Oral Monitor Sitting Left arm       Pain Score       --                  Vitals:    08/25/18 2116 08/25/18 2119   BP:  170/86   Pulse: 75    Patient Position - Orthostatic VS:  Sitting       Visual Acuity      ED Medications  Medications   lidocaine (LIDODERM) 5 % patch 1 patch (1 patch Transdermal Medication Applied 8/25/18 2213)   acetaminophen (TYLENOL) tablet 975 mg (975 mg Oral Given 8/25/18 2215)   methocarbamol (ROBAXIN) tablet 500 mg (500 mg Oral Given 8/25/18 2215)       Diagnostic Studies  Results Reviewed     Procedure Component Value Units Date/Time    Comprehensive metabolic panel [[id number]]  (Abnormal) Collected:  08/25/18 2131    Lab Status:  Final result Specimen:  Blood from Arm, Left Updated:  08/25/18 2159     Sodium 137 mmol/L      Potassium 4 9 mmol/L      Chloride 103 mmol/L      CO2 23 mmol/L      Anion Gap 11 mmol/L      BUN 24 mg/dL      Creatinine 1 14 mg/dL      Glucose 185 (H) mg/dL      Calcium 8 8 mg/dL      AST 34 U/L      ALT 16 U/L      Alkaline Phosphatase 91 U/L      Total Protein 7 5 g/dL      Albumin 2 9 (L) g/dL      Total Bilirubin 0 29 mg/dL      eGFR 47 ml/min/1 73sq m     Narrative:         National Kidney Disease Education Program recommendations are as follows:  GFR calculation is accurate only with a steady state creatinine  Chronic Kidney disease less than 60 ml/min/1 73 sq  meters  Kidney failure less than 15 ml/min/1 73 sq  meters      Lipase [[id number]]  (Normal) Collected:  08/25/18 2131    Lab Status:  Final result Specimen:  Blood from Arm, Left Updated:  08/25/18 2159     Lipase 153 u/L     CBC and differential [[id number]] Collected:  08/25/18 2131    Lab Status:  Final result Specimen:  Blood from Arm, Left Updated:  08/25/18 2139     WBC 6 74 Thousand/uL      RBC 4 45 Million/uL      Hemoglobin 13 8 g/dL      Hematocrit 41 0 %      MCV 92 fL      MCH 31 0 pg MCHC 33 7 g/dL      RDW 13 8 %      MPV 10 9 fL      Platelets 842 Thousands/uL      nRBC 0 /100 WBCs      Neutrophils Relative 66 %      Lymphocytes Relative 22 %      Monocytes Relative 7 %      Eosinophils Relative 5 %      Basophils Relative 1 %      Neutrophils Absolute 4 42 Thousands/µL      Lymphocytes Absolute 1 46 Thousands/µL      Monocytes Absolute 0 47 Thousand/µL      Eosinophils Absolute 0 34 Thousand/µL      Basophils Absolute 0 05 Thousands/µL                  No orders to display              Procedures  ECG 12 Lead Documentation  Date/Time: 8/25/2018 10:24 PM  Performed by: Rio Collins  Authorized by: Rio Collins     Indications / Diagnosis:  Chest pain  Patient location:  ED  Previous ECG:     Previous ECG:  Compared to current    Similarity:  No change  Interpretation:     Interpretation: non-specific    Rate:     ECG rate:  72    ECG rate assessment: normal    Rhythm:     Rhythm: sinus rhythm and A-V block    Ectopy:     Ectopy: none    QRS:     QRS axis:  Normal    QRS intervals:  Normal  Conduction:     Conduction: normal    ST segments:     ST segments:  Normal  T waves:     T waves: normal    Other findings:     Other findings: LVH             Phone Contacts  ED Phone Contact    ED Course                               MDM  Number of Diagnoses or Management Options  Chest wall pain: new and requires workup  Diagnosis management comments: Patient appears well on exam   She has no shortness of breath right now  States that her breathing is bad at baseline and her shortness of breath complaint to triage is a chronic complaint and not new  States that this ER visit is different when she was admitted for CHF  She is not having centralized chest pain or shortness of breath or leg swelling  States that she is just having spasms to her left lower rib cage that is prevent her from fully falling asleep    Labs were checked and are normal  Patient does have some tenderness along the left lateral rib cage around the 11th and 12th ribs  Plan is to treat with Lidoderm, Tylenol and a small dose of Robaxin and reassess  10:42 PM  Patient asking to go home  Symptoms are well controlled at this time  Will have her follow up with her PCP  Amount and/or Complexity of Data Reviewed  Clinical lab tests: ordered and reviewed  Tests in the medicine section of CPT®: reviewed and ordered  Review and summarize past medical records: yes    Patient Progress  Patient progress: improved    CritCare Time    Disposition  Final diagnoses:   Chest wall pain     Time reflects when diagnosis was documented in both MDM as applicable and the Disposition within this note     Time User Action Codes Description Comment    8/25/2018 10:41 PM Kellyherbert Onesimo Add [R07 89] Chest wall pain       ED Disposition     ED Disposition Condition Comment    Discharge  Frankie Mckenzie discharge to home/self care  Condition at discharge: Good        Follow-up Information     Follow up With Specialties Details Why Taqueria Garcia MD Family Medicine Call in 2 days If symptoms persist, To schedule an appointment as soon as you can [address] [place], [place] [address]  [phone]            Patient's Medications   Discharge Prescriptions    No medications on file     No discharge procedures on file      ED Provider  Electronically Signed by           Josias Garcia DO  08/25/18 8566

## 2018-08-29 LAB
ATRIAL RATE: 72 BPM
P AXIS: 52 DEGREES
PR INTERVAL: 242 MS
QRS AXIS: -3 DEGREES
QRSD INTERVAL: 106 MS
QT INTERVAL: 394 MS
QTC INTERVAL: 431 MS
T WAVE AXIS: 91 DEGREES
VENTRICULAR RATE: 72 BPM

## 2018-08-29 PROCEDURE — 93010 ELECTROCARDIOGRAM REPORT: CPT | Performed by: INTERNAL MEDICINE

## 2018-08-30 ENCOUNTER — PATIENT OUTREACH (OUTPATIENT)
Dept: FAMILY MEDICINE CLINIC | Facility: CLINIC | Age: 83
End: 2018-08-30

## 2018-09-04 DIAGNOSIS — I50.31 ACUTE DIASTOLIC CONGESTIVE HEART FAILURE (HCC): ICD-10-CM

## 2018-09-04 RX ORDER — FUROSEMIDE 20 MG/1
20 TABLET ORAL DAILY
Qty: 30 TABLET | Refills: 5 | Status: SHIPPED | OUTPATIENT
Start: 2018-09-04 | End: 2018-11-06

## 2018-09-05 ENCOUNTER — OFFICE VISIT (OUTPATIENT)
Dept: CARDIOLOGY CLINIC | Facility: CLINIC | Age: 83
End: 2018-09-05
Payer: COMMERCIAL

## 2018-09-05 VITALS
DIASTOLIC BLOOD PRESSURE: 88 MMHG | BODY MASS INDEX: 28.85 KG/M2 | SYSTOLIC BLOOD PRESSURE: 180 MMHG | WEIGHT: 169 LBS | HEART RATE: 68 BPM | HEIGHT: 64 IN

## 2018-09-05 DIAGNOSIS — I10 HTN (HYPERTENSION), BENIGN: Primary | ICD-10-CM

## 2018-09-05 PROCEDURE — 99214 OFFICE O/P EST MOD 30 MIN: CPT | Performed by: INTERNAL MEDICINE

## 2018-09-05 RX ORDER — AMLODIPINE BESYLATE AND BENAZEPRIL HYDROCHLORIDE 5; 10 MG/1; MG/1
1 CAPSULE ORAL DAILY
Qty: 90 CAPSULE | Refills: 5
Start: 2018-09-05 | End: 2019-01-09 | Stop reason: SDDI

## 2018-09-05 NOTE — PROGRESS NOTES
Cardiology Follow Up        Chelsea Felix      9/24/1922      [id number]      Discussion/Summary:    1  Moderate to severe aortic valve stenosis  2  Chronic diastolic heart failure, compensated  3  Intermittent fatigue, suspect secondary to intermittent sleep deprivation  4  Benign essential hypertension, controlled  5  Chronic atypical chest pain  6  Possible CAD--prior echocardiogram with possible basal inferior/basilar inferolateral hypokinesis    · Echocardiogram 08/07/2018 with moderate to severe aortic valve stenosis, consistent with today's physical examination  No indication for surgery at this time  · Euvolemic by physical examination, BMP stable by recent blood work 10 days ago, continue furosemide 20 mg daily  · Blood pressure uncontrolled, benazepril-amlodipine discontinued at some point, uncertain why  Will restart  Patient states she has refills on prior prescription which she has already ordered  · Suspect patient has intermittent fatigue with certain days secondary to sleep apnea  She will continue melatonin and touch base with her PCP if any sleep remains suboptimal   · Reviewed echocardiogram personally from 08/07/2018  Agree, cannot rule out basal inferior and basal inferolateral infarction  She has exertional dyspnea which is chronic but stable, which may be an anginal equivalent  At this time would pursue adequate blood pressure control  Would hold off on ischemic evaluation considering her advanced age and comorbidities, as well as uncontrolled blood pressure  May consider restarting statin in the near future  Follow-up in 2 months                           Interval History: This is a very pleasant female with a history of chronic atypical chest pain, hypertension, dyslipidemia, and diabetes  She also has a history of severe aortic valve stenosis    She has had several admissions in the past, January 2016/April 2016/October 2016/April 2017 for atypical chest pain at which time she was ruled out for myocardial injury  She was last hospitalized August 2018 for acute diastolic congestive heart failure  She was discharged on furosemide 20 mg daily which she has been doing well with  BMP 10 days ago revealed stable renal function and potassium  She has chronic but stable exertional dyspnea  She has days when she is very tired in the daytime, usually when she does not sleep the night before  She is on melatonin, but her daughter is not sure if she takes it regularly  At some point her amlodipine/benazepril where discontinued, unclear exactly when  She complains of fatigue today as she did not sleep at all last night  Vitals:  Vitals:    09/05/18 1314   BP: (!) 180/88   BP Location: Right arm   Patient Position: Sitting   Cuff Size: Standard   Pulse: 68   Weight: 76 7 kg (169 lb)   Height: 5' 4" (1 626 m)         Past Medical History:   Diagnosis Date    Asthma     Atypical chest pain     CAD (coronary artery disease)     Candidal intertrigo     Depression     Diabetes mellitus (HCC)     Diabetic neuropathy (HCC)     Diverticulitis     Dyslipidemia     Female bladder prolapse     Gastric ulcer     Glaucoma     HTN (hypertension)     Hyperlipidemia     Hypothyroidism 4/18/2016    RAD (reactive airway disease)      Social History     Social History    Marital status:       Spouse name: N/A    Number of children: 2    Years of education: N/A     Occupational History    Retired      Social History Main Topics    Smoking status: Never Smoker    Smokeless tobacco: Never Used    Alcohol use No      Comment: Social Alcohol Use -- as per allscripts    Drug use: No    Sexual activity: Not on file     Other Topics Concern    Not on file     Social History Narrative    Lived with adult children    Caffeine Use          Family History   Problem Relation Age of Onset   Mega Messina Breast cancer Mother    Mega Messina Hypothyroidism Mother     Hypertension Father     Breast cancer Sister     Thyroid disease Sister     Hypertension Sister      Past Surgical History:   Procedure Laterality Date    COLONOSCOPY      ESOPHAGOGASTRODUODENOSCOPY  2011    HYSTERECTOMY         Current Outpatient Prescriptions:     aspirin 81 MG tablet, Take 81 mg by mouth daily  , Disp: , Rfl:     Cholecalciferol (VITAMIN D3) 2000 units capsule, Take 1,000 Units by mouth daily  , Disp: , Rfl:     dorzolamide (TRUSOPT) 2 % ophthalmic solution, Administer 1 drop to both eyes 3 (three) times a day  , Disp: , Rfl:     fluticasone (FLONASE) 50 mcg/act nasal spray, 2 sprays into each nostril daily, Disp: 16 g, Rfl: 0    furosemide (LASIX) 20 mg tablet, Take 1 tablet (20 mg total) by mouth daily, Disp: 30 tablet, Rfl: 5    gabapentin (NEURONTIN) 600 MG tablet, Take 0 5 tablets (300 mg total) by mouth every 8 (eight) hours as needed (Cranial Neuralgia), Disp: 90 tablet, Rfl: 3    latanoprost (XALATAN) 0 005 % ophthalmic solution, Apply 1 drop to eye daily at bedtime  Both eyes, Disp: , Rfl:     levothyroxine 75 mcg tablet, Take 1 tablet (75 mcg total) by mouth daily, Disp: 90 tablet, Rfl: 3    loratadine (CLARITIN) 10 mg tablet, Take 1 tablet (10 mg total) by mouth daily, Disp: 30 tablet, Rfl: 0    sitaGLIPtin (JANUVIA) 50 mg tablet, Take 1 tablet (50 mg total) by mouth daily, Disp: 30 tablet, Rfl: 5    amLODIPine-benazepril (LOTREL 5-10) 5-10 MG per capsule, Take 1 capsule by mouth daily, Disp: 90 capsule, Rfl: 5    Current Facility-Administered Medications:     albuterol (PROVENTIL HFA,VENTOLIN HFA) inhaler 2 puff, 2 puff, Inhalation, Q4H PRN, Shannan Regalado MD        Review of Systems:  Review of Systems   Constitutional: Positive for fatigue  Negative for activity change, fever and unexpected weight change  HENT: Negative for facial swelling, nosebleeds and voice change  Respiratory: Positive for shortness of breath   Negative for chest tightness and wheezing  Cardiovascular: Negative for chest pain, palpitations and leg swelling  Gastrointestinal: Negative for abdominal distention  Genitourinary: Negative for hematuria  Musculoskeletal: Negative for arthralgias  Skin: Negative for color change, pallor, rash and wound  Neurological: Negative for dizziness, seizures and syncope  Psychiatric/Behavioral: Negative for agitation  Physical Exam:  Physical Exam   Constitutional: She is oriented to person, place, and time  She appears well-developed and well-nourished  HENT:   Head: Normocephalic and atraumatic  Eyes: EOM are normal    Neck: Normal range of motion  Neck supple  Cardiovascular: Normal rate, regular rhythm and intact distal pulses  2/6 systolic ejection murmur, audible S2   Pulmonary/Chest: Effort normal and breath sounds normal    Abdominal: Soft  Musculoskeletal: Normal range of motion  Neurological: She is alert and oriented to person, place, and time  Skin: Skin is warm and dry  Psychiatric: She has a normal mood and affect  Her behavior is normal  Judgment and thought content normal    Vitals reviewed  This note was completed in part utilizing [employer] Software  Grammatical errors, random word insertions, spelling mistakes, and incomplete sentences can be an occasional consequence of this system secondary to software limitations, ambient noise, and hardware issues  If you have any questions or concerns about the content, text, or information contained within the body of this dictation, please contact the provider for clarification

## 2018-09-11 ENCOUNTER — PATIENT OUTREACH (OUTPATIENT)
Dept: FAMILY MEDICINE CLINIC | Facility: CLINIC | Age: 83
End: 2018-09-11

## 2018-09-11 NOTE — PROGRESS NOTES
Outpatient Care Management Note: Complex Care    I called Derrek to follow up on the shower chair  She explained that [facility] does not have that item, but could provide a tub transfer bench  If the patient were to be interested, a new prescription would be needed  I spoke with Igor Montgomery after she visited with Jared Alicia today  Jared Alicia would prefer a smaller shower chair & has decided to have her daughter purchase one at [facility] or a pharmacy

## 2018-09-13 ENCOUNTER — TELEPHONE (OUTPATIENT)
Dept: CARDIOLOGY CLINIC | Facility: CLINIC | Age: 83
End: 2018-09-13

## 2018-09-26 ENCOUNTER — TELEPHONE (OUTPATIENT)
Dept: FAMILY MEDICINE CLINIC | Facility: CLINIC | Age: 83
End: 2018-09-26

## 2018-09-26 NOTE — TELEPHONE ENCOUNTER
Patient called stating she's had intermittent diarrhea since last week and wondering what she can do for it  I advised she start with the BRAT diet and gatorade or powerade to keep electrolytes in her  She is aware if things don't improve in the next few days to call us or make an appt

## 2018-10-12 ENCOUNTER — OFFICE VISIT (OUTPATIENT)
Dept: FAMILY MEDICINE CLINIC | Facility: CLINIC | Age: 83
End: 2018-10-12
Payer: COMMERCIAL

## 2018-10-12 VITALS
WEIGHT: 171 LBS | BODY MASS INDEX: 29.19 KG/M2 | SYSTOLIC BLOOD PRESSURE: 110 MMHG | HEIGHT: 64 IN | DIASTOLIC BLOOD PRESSURE: 60 MMHG | HEART RATE: 76 BPM

## 2018-10-12 DIAGNOSIS — R10.12 CHRONIC LEFT UPPER QUADRANT PAIN: ICD-10-CM

## 2018-10-12 DIAGNOSIS — E11.43 TYPE 2 DIABETES MELLITUS WITH DIABETIC AUTONOMIC NEUROPATHY, WITHOUT LONG-TERM CURRENT USE OF INSULIN (HCC): ICD-10-CM

## 2018-10-12 DIAGNOSIS — Z23 NEED FOR INFLUENZA VACCINATION: ICD-10-CM

## 2018-10-12 DIAGNOSIS — G47.00 INSOMNIA, UNSPECIFIED TYPE: ICD-10-CM

## 2018-10-12 DIAGNOSIS — G89.29 CHRONIC LEFT UPPER QUADRANT PAIN: ICD-10-CM

## 2018-10-12 DIAGNOSIS — R53.1 GENERALIZED WEAKNESS: Primary | ICD-10-CM

## 2018-10-12 DIAGNOSIS — R44.1 VISUAL HALLUCINATIONS: ICD-10-CM

## 2018-10-12 DIAGNOSIS — R00.2 PALPITATION: ICD-10-CM

## 2018-10-12 PROBLEM — B96.89 MORAXELLA CATARRHALIS BRONCHITIS: Status: RESOLVED | Noted: 2018-08-05 | Resolved: 2018-10-12

## 2018-10-12 PROBLEM — J40 MORAXELLA CATARRHALIS BRONCHITIS: Status: RESOLVED | Noted: 2018-08-05 | Resolved: 2018-10-12

## 2018-10-12 PROCEDURE — 99214 OFFICE O/P EST MOD 30 MIN: CPT | Performed by: FAMILY MEDICINE

## 2018-10-12 PROCEDURE — 90662 IIV NO PRSV INCREASED AG IM: CPT

## 2018-10-12 PROCEDURE — G0008 ADMIN INFLUENZA VIRUS VAC: HCPCS

## 2018-10-12 RX ORDER — OMEPRAZOLE 40 MG/1
CAPSULE, DELAYED RELEASE ORAL
Refills: 1 | COMMUNITY
Start: 2018-09-27 | End: 2018-11-06 | Stop reason: SDUPTHER

## 2018-10-12 RX ORDER — OLANZAPINE 2.5 MG/1
2.5 TABLET ORAL
Qty: 30 TABLET | Refills: 5 | Status: SHIPPED | OUTPATIENT
Start: 2018-10-12 | End: 2019-01-09 | Stop reason: ALTCHOICE

## 2018-10-12 NOTE — ASSESSMENT & PLAN NOTE
Continued, patient was not taking Zyprexa, I advised patient to restart taking Zyprexa at night for this reason  Her hallucination is very disturbing and affecting her sleep quality

## 2018-10-12 NOTE — ASSESSMENT & PLAN NOTE
Continue to be a problem, it happen mainly in the morning, I feel this is related to her insomnia, patient describes she feel weak on the days that she does not sleep, advised patient to increase her melatonin to 5 mg every night, to use natural remedies to help her with sleep like chamomile tea, to return to the office if her sleep is not better to consider small dose trazodone

## 2018-10-12 NOTE — ASSESSMENT & PLAN NOTE
Lab Results   Component Value Date    HGBA1C 6 6 (H) 08/05/2018     Seem to be stable, continue with Januvia

## 2018-10-12 NOTE — PROGRESS NOTES
Assessment/Plan:    General weakness  Continue to be a problem, it happen mainly in the morning, I feel this is related to her insomnia, patient describes she feel weak on the days that she does not sleep, advised patient to increase her melatonin to 5 mg every night, to use natural remedies to help her with sleep like chamomile tea, to return to the office if her sleep is not better to consider small dose trazodone  Chronic left upper quadrant pain  This seem to be stable, advised patient to continue with omeprazole and Maalox as needed  Insomnia  Advised to increase her melatonin to 6 mg  To drink chamomile tea to help with insomnia  If not better to consider low-dose trazodone  Visual hallucinations  Continued, patient was not taking Zyprexa, I advised patient to restart taking Zyprexa at night for this reason  Her hallucination is very disturbing and affecting her sleep quality  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)  Lab Results   Component Value Date    HGBA1C 6 6 (H) 08/05/2018     Seem to be stable, continue with Januvia  Essential hypertension  Stable continue with amlodipine/benazepril  Acute diastolic congestive heart failure ([address] )  Seem to be euvolemic currently, continue with the Lasix as needed  Diagnoses and all orders for this visit:    Generalized weakness  -     CBC and differential; Future  -     Comprehensive metabolic panel; Future  -     Hemoglobin A1C; Future  -     TSH, 3rd generation with Free T4 reflex; Future    Need for influenza vaccination  -     influenza vaccine, [id number], high-dose, PF 0 5 mL, for patients 65 yr+ (FLUZONE HIGH-DOSE)    Visual hallucinations  -     OLANZapine (ZyPREXA) 2 5 mg tablet;  Take 1 tablet (2 5 mg total) by mouth daily at bedtime    Palpitation    Chronic left upper quadrant pain    Insomnia, unspecified type    Type 2 diabetes mellitus with diabetic autonomic neuropathy, without long-term current use of insulin ([facility] )    Other orders  -     omeprazole (PriLOSEC) 40 MG capsule;           Subjective: Follow up anxiety, HTN, DM2, hyperlipidemia, hypothyroidism  Review BW results  Pt states she is feeling weak and lightheaded today    Vision blurry, but pt states this is normal for her  High dose flu immunization administered today  - [facility]      Patient ID: Dieudonne Caballero is a 80 y o  female  Patient here for follow-up on her chronic condition congestive heart failure, coronary artery disease, hypertension,  She complaining of generalized weakness specially this morning, she also complaining of intermittent palpitation 3 times over the last 3 weeks, the only new medication over the last 3 weeks was Claritin for her allergies  No fever no chills no chest pain no shortness of breath currently but patient have dyspnea on exertion occasionally  The following portions of the patient's history were reviewed and updated as appropriate: allergies, current medications, past family history, past medical history, past social history, past surgical history and problem list     Review of Systems   Constitutional: Positive for fatigue  Negative for activity change, appetite change, chills, fever and unexpected weight change  HENT: Positive for sinus pressure  Negative for congestion, postnasal drip, sinus pain, sore throat and voice change  Eyes: Negative for pain and visual disturbance  Respiratory: Positive for shortness of breath  Negative for cough, choking, chest tightness, wheezing and stridor  Cardiovascular: Negative for chest pain and palpitations  Gastrointestinal: Negative for abdominal pain, blood in stool, constipation, diarrhea and nausea  Endocrine: Negative for cold intolerance, heat intolerance and polyuria  Genitourinary: Negative for dysuria, enuresis, flank pain and frequency  Musculoskeletal: Negative for arthralgias, back pain, gait problem, joint swelling and neck pain     Skin: Negative for color change, rash and wound  Neurological: Negative for dizziness, syncope, speech difficulty, numbness and headaches  Psychiatric/Behavioral: Positive for hallucinations  Negative for behavioral problems and confusion  The patient is not nervous/anxious  Objective:    /60 (BP Location: Left arm, Patient Position: Sitting, Cuff Size: Large)   Pulse 76   Ht 5' 4" (1 626 m)   Wt 77 6 kg (171 lb)   BMI 29 35 kg/m²      Physical Exam   Constitutional: She is oriented to person, place, and time  She appears well-developed and well-nourished  HENT:   Head: Normocephalic and atraumatic  Eyes: Pupils are equal, round, and reactive to light  Conjunctivae and EOM are normal    Neck: Normal range of motion  Neck supple  Cardiovascular: Normal rate, regular rhythm and intact distal pulses  Murmur heard  Pulmonary/Chest: Effort normal and breath sounds normal    Abdominal: Soft  Bowel sounds are normal    Genitourinary: Guaiac stool: Systolic murmur 3/6 nonradiating  Musculoskeletal: Normal range of motion  Neurological: She is alert and oriented to person, place, and time  She has normal reflexes  Skin: Skin is warm and dry  Psychiatric: She has a normal mood and affect  Her behavior is normal    Vitals reviewed

## 2018-10-12 NOTE — ASSESSMENT & PLAN NOTE
Advised to increase her melatonin to 6 mg  To drink chamomile tea to help with insomnia  If not better to consider low-dose trazodone

## 2018-10-12 NOTE — PATIENT INSTRUCTIONS
Weakness   WHAT YOU NEED TO KNOW:   What is weakness? Weakness is a loss of muscle strength  You may have weakness in a single muscle or in a group of muscles  Weakness can come and go or be constant  Weakness can get worse over time  You may have weakness for a short time, or it may be permanent  What causes or increases my risk for weakness? · Older age    · A problem in your brain, nerves, or muscles    · A condition such as dehydration, a heart problem, infection, or pregnancy    · Anxiety or depression    · Steroid or heart medicine, or muscle relaxers    · Alcohol or illegal drugs    · Lack of movement, such as from wearing a cast or splint, or being on bed rest  How is the cause of weakness diagnosed? Your healthcare provider will ask when your signs and symptoms started and what makes your weakness worse  Tell your provider about any medical conditions you have  He or she will test your muscle strength, reflexes, and sense of touch  He or she will also check how far you can move or lift your weakened area  You may also need any of the following:  · Blood tests  may be used to check for infection or another condition that can cause weakness  · A muscle biopsy  is a procedure used to take a muscle sample  This may happen if your blood tests do not show the cause of your weakness  · X-ray pictures  may show what is causing your weakness  How can I manage weakness? · Use assistive devices as directed  These help protect you from injury  Examples include a walker or cane  Have someone install handrails in your home  These will help you get out of a bathtub or stand up from a toilet  Use a shower chair so you can sit while you shower  Sit down on the toilet or another chair to dry off and put on your clothes  Get help going up and down stairs if your legs are weak  · Go to physical or occupational therapy if directed  A physical therapist can teach you exercises to help strengthen weak muscles  An occupational therapist can show you ways to do your daily activities more easily  For example, light forks and spoons can be easier to use if you have hand weakness  You may also learn ways to organize your household items so you are not moving heavy items  · Balance rest with exercise  Exercise can help increase your muscle strength and energy  Do not exercise for long periods at a time  Take breaks often to rest  Too much exercise can cause muscle strain or make you more tired  Ask your healthcare provider how much exercise is right for you  · Eat a variety of healthy foods  Too much or too little food may cause weakness or tiredness  Ask your healthcare provider what a healthy amount of food is for you  Healthy foods include fruits, vegetables, whole-grain breads, low-fat dairy products, lean meats and fish, nuts, and cooked beans  · Do not smoke  Nicotine and other chemicals in cigarettes and cigars can make your symptoms worse, and can cause lung damage  Ask your healthcare provider for information if you currently smoke and need help to quit  E-cigarettes or smokeless tobacco still contain nicotine  Talk to your healthcare provider before you use these products  · Do not use caffeine, alcohol, or illegal drugs  These may cause muscle twitching, which could lead to worsened weakness  Call 911 for any of the following:   · You have any of the following signs of a stroke:      ¨ Numbness or drooping on one side of your face     ¨ Weakness in an arm or leg    ¨ Confusion or difficulty speaking    ¨ Dizziness, a severe headache, or vision loss    · You lose feeling in your weakened body area  · You have electric shock-like feelings down your arms and legs when you flex or move your neck  · You have sudden or increased trouble speaking, swallowing, or breathing  When should I seek immediate care? · You have severe pain in your back, arms, or legs that worsens      · You have sudden or worsened muscle weakness or loss of movement  · You are not able to control when you urinate or have a bowel movement  When should I contact my healthcare provider? · You feel depressed or anxious  · You have questions or concerns about your condition or care  Insomnia   WHAT YOU NEED TO KNOW:   What is insomnia? Insomnia is a condition that makes it hard to fall or stay asleep  Lack of sleep can lead to attention or memory problems during the day  You may also be vargas, depressed, clumsy, or have headaches  What increases my risk for insomnia? · Older age    · Stress or worry    · A medical condition, such as sleep apnea, GERD, COPD, or asthma    · A mental health condition, such as depression or anxiety    · Blood pressure medicines or antidepressants    · Odd work schedules or frequent travel  How is insomnia diagnosed? Your healthcare provider will ask when your symptoms began and how often you cannot sleep  He will ask if you take any medicines that can cause insomnia, such as blood pressure medicine  He will ask if you have a medical condition, such as GERD, or a mental health condition, such as depression  He may also have you take a survey about your sleep  How is insomnia treated? · Cognitive behavioral therapy (CBT)  helps you find ways to relax, decrease stress, and improve sleep  · Medicines  may help you sleep more regularly or help you feel less anxious  Take them as directed  What can I do to improve my sleep? · Create a sleep schedule  This will help you form a sleep routine  Keep a record of your sleep patterns, and any sleeping problems you have  Bring the record to follow-up visits with healthcare providers  · Do not take naps  Naps could make it hard for you to fall asleep at bedtime  · Keep your bedroom cool, quiet, and dark  Turn on white noise, such as a fan, to help you relax  Do not use your bed for any activity that will keep you awake   Do not read, exercise, eat, or watch TV in your bedroom  · Get up if you do not fall asleep within 20 minutes  Move to another room and do something relaxing until you become sleepy  · Limit caffeine, alcohol, and food to earlier in the day  Only drink caffeine in the morning  Do not drink alcohol within 6 hours of bedtime  Do not eat a heavy meal right before you go to bed  · Exercise regularly  Daily exercise may help you sleep better  Do not exercise within 4 hours of bedtime  When should I contact my healthcare provider? · Your symptoms do not get better, or they get worse  · You begin to use drugs or alcohol to fall asleep  · You have questions or concerns about your condition or care  CARE AGREEMENT:   You have the right to help plan your care  Learn about your health condition and how it may be treated  Discuss treatment options with your caregivers to decide what care you want to receive  You always have the right to refuse treatment  The above information is an  only  It is not intended as medical advice for individual conditions or treatments  Talk to your doctor, nurse or pharmacist before following any medical regimen to see if it is safe and effective for you  © 2017 [facility] Information is for End User's use only and may not be sold, redistributed or otherwise used for commercial purposes  All illustrations and images included in CareNotes® are the copyrighted property of [employer]  or [facility] AGREEMENT:   You have the right to help plan your care  Learn about your health condition and how it may be treated  Discuss treatment options with your caregivers to decide what care you want to receive  You always have the right to refuse treatment  The above information is an  only  It is not intended as medical advice for individual conditions or treatments   Talk to your doctor, nurse or pharmacist before following any medical regimen to see if it is safe and effective for you  © 2017 [address] [employer] Information is for End User's use only and may not be sold, redistributed or otherwise used for commercial purposes  All illustrations and images included in CareNotes® are the copyrighted property of [employer]  or [employer]

## 2018-11-02 ENCOUNTER — PATIENT OUTREACH (OUTPATIENT)
Dept: FAMILY MEDICINE CLINIC | Facility: CLINIC | Age: 83
End: 2018-11-02

## 2018-11-02 NOTE — PROGRESS NOTES
Outpatient Care Management Note: Complex Care    I spoke with Roopa Mejia & she said that she is 'doing ok'  The following is a summary of our conversation:  -she now has a shower chair  -she told me that she is weighing herself daily; [id number] lbs  -she said that her BP & BS are 'good'  -she is sob with activity, but it has not gotten worse  -she is taking ZyPrexa, but is still having hallucinations at night  -she canceled her appointment on 11/1, but realizes that she should come in to discuss her concerns  -she is agreeable to call the office today to make an appointment for next week  She is interested continuing with Care Management & I gave her my contact information

## 2018-11-06 ENCOUNTER — OFFICE VISIT (OUTPATIENT)
Dept: FAMILY MEDICINE CLINIC | Facility: CLINIC | Age: 83
End: 2018-11-06
Payer: COMMERCIAL

## 2018-11-06 VITALS
BODY MASS INDEX: 30.21 KG/M2 | SYSTOLIC BLOOD PRESSURE: 128 MMHG | WEIGHT: 176 LBS | TEMPERATURE: 98.2 F | DIASTOLIC BLOOD PRESSURE: 62 MMHG | HEART RATE: 72 BPM

## 2018-11-06 DIAGNOSIS — J30.9 ALLERGIC RHINITIS, UNSPECIFIED SEASONALITY, UNSPECIFIED TRIGGER: Primary | ICD-10-CM

## 2018-11-06 DIAGNOSIS — E11.42 DIABETIC POLYNEUROPATHY ASSOCIATED WITH TYPE 2 DIABETES MELLITUS (HCC): ICD-10-CM

## 2018-11-06 DIAGNOSIS — K21.9 GASTROESOPHAGEAL REFLUX DISEASE WITHOUT ESOPHAGITIS: ICD-10-CM

## 2018-11-06 DIAGNOSIS — E11.43 TYPE 2 DIABETES MELLITUS WITH DIABETIC AUTONOMIC NEUROPATHY, WITHOUT LONG-TERM CURRENT USE OF INSULIN (HCC): ICD-10-CM

## 2018-11-06 DIAGNOSIS — E03.9 HYPOTHYROIDISM, UNSPECIFIED TYPE: ICD-10-CM

## 2018-11-06 PROCEDURE — 1036F TOBACCO NON-USER: CPT | Performed by: FAMILY MEDICINE

## 2018-11-06 PROCEDURE — 4040F PNEUMOC VAC/ADMIN/RCVD: CPT | Performed by: FAMILY MEDICINE

## 2018-11-06 PROCEDURE — 99214 OFFICE O/P EST MOD 30 MIN: CPT | Performed by: FAMILY MEDICINE

## 2018-11-06 PROCEDURE — 1160F RVW MEDS BY RX/DR IN RCRD: CPT | Performed by: FAMILY MEDICINE

## 2018-11-06 RX ORDER — ACETAMINOPHEN AND CODEINE PHOSPHATE 300; 30 MG/1; MG/1
1 TABLET ORAL EVERY 4 HOURS PRN
COMMUNITY
End: 2018-12-28 | Stop reason: SDUPTHER

## 2018-11-06 RX ORDER — OMEPRAZOLE 40 MG/1
40 CAPSULE, DELAYED RELEASE ORAL DAILY
Qty: 90 CAPSULE | Refills: 3 | Status: SHIPPED | OUTPATIENT
Start: 2018-11-06 | End: 2019-05-02 | Stop reason: SDUPTHER

## 2018-11-06 NOTE — ASSESSMENT & PLAN NOTE
Patient's symptoms of feeling something on her throat that get worse when she lays supine, and she feel it moving down her throat is consistent with postnasal drip, most likely due to her allergy, advised patient to use Flonase routinely for the next 7-10 days, if not better then will consider ENT referral   Physical examination is completely benign no polyps lesion or others

## 2018-11-06 NOTE — PATIENT INSTRUCTIONS
Allergic Rhinitis   AMBULATORY CARE:   Allergic rhinitis , or hay fever, is swelling of the inside of your nose  The swelling is a reaction to allergens in the air  An allergen can be anything that causes an allergic reaction  Allergies to weeds, grass, trees, or mold often cause seasonal allergic rhinitis  Indoor dust mites, cockroaches, pet dander, or mold can also cause allergic rhinitis  Common signs and symptoms include the following:   · Sneezing    · Nasal congestion    · Runny nose    · Itchy nose, eyes, or mouth    · Red, watery eyes    · Postnasal drip (nasal drainage down the back of your throat)    · Cough or frequent throat clearing    · Feeling tired or lethargic    · Dark circles under your eyes  Call 911 for the following:   · You have chest pain or shortness of breath  Seek care immediately if:   · You have severe pain  · You cough up blood  Contact your healthcare provider if:   · You have a fever  · You have ear or sinus pain, or a headache  · Your symptoms get worse, even after treatment  · You have yellow, green, brown, or bloody mucus coming from your nose  · Your nose is bleeding or you have pain inside your nose  · You have trouble sleeping because of your symptoms  · You have questions or concerns about your condition or care  Treatment:   · Antihistamines  help reduce itching, sneezing, and a runny nose  Some antihistamines can make you sleepy  · Nasal steroids  help decrease inflammation in your nose  · Decongestants  help clear your stuffy nose  · Immunotherapy  may be needed if your symptoms are severe or other treatments do not work  Immunotherapy is used to inject an allergen into your skin  At first, the therapy contains tiny amounts of the allergen  Your healthcare provider will slowly increase the amount of allergen  This may help your body be less sensitive to the allergen and stop reacting to it   You may need immunotherapy for weeks or longer  Manage allergic rhinitis:  The best way to manage allergic rhinitis is to avoid allergens that can trigger your symptoms  Any of the following may help decrease your symptoms:  · Rinse your nose and sinuses  with a salt water solution or use a salt water nasal spray  This will help thin the mucus in your nose and rinse away pollen and dirt  It will also help reduce swelling so you can breathe normally  Ask your healthcare provider how often to rinse your nose  · Reduce exposure to dust mites  Wash sheets and towels in hot water every week  Cover your pillows and mattresses with allergen-free covers  Limit the number of stuffed animals and soft toys your child has  Wash your child's toys in hot water regularly  Vacuum weekly and use a vacuum  with an air filter  If possible, get rid of carpets and curtains  These collect dust and dust mites  · Reduce exposure to pollen  Keep windows and doors closed in your house and car  Stay inside when air pollution or the pollen count is high  Run your air conditioner on recycle, and change air filters often  Shower and wash your hair before bed every night to rinse away pollen  · Reduce exposure to pet dander  If possible, do not keep cats, dogs, birds, or other pets  If you do keep pets in your home, keep them out of bedrooms and carpeted rooms  Bathe them often  · Reduce exposure to mold  Do not spend time in basements  Choose artificial plants instead of live plants  Keep your home's humidity at less than 45%  Do not have ponds or standing water in your home or yard  · Do not smoke  Avoid others who smoke  Ask your healthcare provider for information if you currently smoke and need help to quit  Follow up with your healthcare provider as directed:  Write down your questions so you remember to ask them during your visits     © 2017 [employer] Information is for End User's use only and may not be sold, redistributed or otherwise used for commercial purposes  All illustrations and images included in CareNotes® are the copyrighted property of [employer]  or [employer]  The above information is an  only  It is not intended as medical advice for individual conditions or treatments  Talk to your doctor, nurse or pharmacist before following any medical regimen to see if it is safe and effective for you

## 2018-11-06 NOTE — PROGRESS NOTES
Assessment/Plan:    Allergic rhinitis  Patient's symptoms of feeling something on her throat that get worse when she lays supine, and she feel it moving down her throat is consistent with postnasal drip, most likely due to her allergy, advised patient to use Flonase routinely for the next 7-10 days, if not better then will consider ENT referral   Physical examination is completely benign no polyps lesion or others    Type 2 diabetes mellitus with neurologic complication ([facility] )  Lab Results   Component Value Date    HGBA1C 6 6 (H) 08/05/2018        Stable, patient complaining of feeling of something ran through her legs down explained this could be neuropathy, patient to continue with the gabapentin  Visual hallucinations  This continue to be a problem but is less frequent lately, and her her within a shin are less disturbing lately  She see more of a flower than people  Diagnoses and all orders for this visit:    Allergic rhinitis, unspecified seasonality, unspecified trigger    Type 2 diabetes mellitus with diabetic autonomic neuropathy, without long-term current use of insulin ([facility])    Hypothyroidism, unspecified type    Diabetic polyneuropathy associated with type 2 diabetes mellitus ([facility])    Gastroesophageal reflux disease without esophagitis  -     omeprazole (PriLOSEC) 40 MG capsule; Take 1 capsule (40 mg total) by mouth daily    Other orders  -     acetaminophen-codeine (TYLENOL #3) 300-30 mg per tablet; Take 1 tablet by mouth every 4 (four) hours as needed for moderate pain          Subjective: patient c/o feeling like theres something in her throat, headache, neck pain, nasal congestion, right ear pain, intermittent cough x 3 weeks  Patient is NOT taking any NSAIDS, etc  For these symptoms  ak     Patient ID: Hallie Luz is a 80 y o  female      Patient is complaining of head pressure, neck stiffness, nasal congestion, right ear pain, dry cough, she feel something run the back of her throat specially when she laid down, her all symptoms started when she had a down at night, no fever no chills, her symptoms been going on for a while but it is worse over the last few days, denied any excessive sneezing, patient denied if she have postnasal drip or not  The following portions of the patient's history were reviewed and updated as appropriate: allergies, current medications, past family history, past medical history, past social history, past surgical history and problem list     Review of Systems   Constitutional: Positive for chills and fatigue  Negative for activity change, appetite change and fever  HENT: Positive for ear pain, sinus pain, sinus pressure and sore throat  Negative for congestion, drooling, ear discharge, hearing loss, postnasal drip, rhinorrhea, sneezing, trouble swallowing and voice change  Respiratory: Negative for chest tightness, shortness of breath and wheezing  Cardiovascular: Negative for chest pain and leg swelling  Gastrointestinal: Negative for abdominal pain, diarrhea, nausea and vomiting  Genitourinary: Negative for dysuria  Musculoskeletal: Negative for neck pain  Skin: Negative for rash  Neurological: Positive for headaches  Negative for dizziness, facial asymmetry and light-headedness  Objective:      /62   Pulse 72   Temp 98 2 °F (36 8 °C)   Wt 79 8 kg (176 lb)   BMI 30 21 kg/m²          Physical Exam   Constitutional: She is oriented to person, place, and time  She appears well-developed and well-nourished  HENT:   Head: Normocephalic and atraumatic  Eyes: Pupils are equal, round, and reactive to light  Conjunctivae and EOM are normal    Neck: Normal range of motion  Neck supple  Cardiovascular: Normal rate, regular rhythm, normal heart sounds and intact distal pulses  Pulmonary/Chest: Effort normal and breath sounds normal    Abdominal: Soft  Bowel sounds are normal    Musculoskeletal: Normal range of motion  Neurological: She is alert and oriented to person, place, and time  She has normal reflexes  Skin: Skin is warm and dry  Psychiatric: She has a normal mood and affect  Her behavior is normal    Nursing note and vitals reviewed

## 2018-11-06 NOTE — ASSESSMENT & PLAN NOTE
This continue to be a problem but is less frequent lately, and her her within a shin are less disturbing lately  She see more of a flower than people

## 2018-11-06 NOTE — ASSESSMENT & PLAN NOTE
Lab Results   Component Value Date    HGBA1C 6 6 (H) 08/05/2018        Stable, patient complaining of feeling of something ran through her legs down explained this could be neuropathy, patient to continue with the gabapentin

## 2018-12-06 RX ORDER — FUROSEMIDE 20 MG/1
20 TABLET ORAL DAILY
Refills: 5 | COMMUNITY
Start: 2018-11-08 | End: 2019-03-15 | Stop reason: SDUPTHER

## 2018-12-18 ENCOUNTER — OFFICE VISIT (OUTPATIENT)
Dept: FAMILY MEDICINE CLINIC | Facility: CLINIC | Age: 83
End: 2018-12-18
Payer: COMMERCIAL

## 2018-12-18 VITALS
WEIGHT: 174 LBS | BODY MASS INDEX: 32.02 KG/M2 | DIASTOLIC BLOOD PRESSURE: 56 MMHG | OXYGEN SATURATION: 96 % | TEMPERATURE: 98.6 F | HEIGHT: 62 IN | SYSTOLIC BLOOD PRESSURE: 124 MMHG | HEART RATE: 68 BPM

## 2018-12-18 DIAGNOSIS — G89.29 CHRONIC LEFT UPPER QUADRANT PAIN: ICD-10-CM

## 2018-12-18 DIAGNOSIS — R54 ADVANCED AGE: ICD-10-CM

## 2018-12-18 DIAGNOSIS — R53.1 GENERAL WEAKNESS: ICD-10-CM

## 2018-12-18 DIAGNOSIS — R44.1 VISUAL HALLUCINATIONS: ICD-10-CM

## 2018-12-18 DIAGNOSIS — I35.0 AORTIC VALVE STENOSIS, ETIOLOGY OF CARDIAC VALVE DISEASE UNSPECIFIED: ICD-10-CM

## 2018-12-18 DIAGNOSIS — R06.00 DYSPNEA ON EXERTION: Primary | ICD-10-CM

## 2018-12-18 DIAGNOSIS — R10.12 CHRONIC LEFT UPPER QUADRANT PAIN: ICD-10-CM

## 2018-12-18 DIAGNOSIS — I25.10 ARTERIOSCLEROSIS OF CORONARY ARTERY: ICD-10-CM

## 2018-12-18 PROBLEM — R25.1 SHAKINESS: Status: ACTIVE | Noted: 2017-05-23

## 2018-12-18 PROBLEM — G47.9 SLEEP DISTURBANCE: Status: ACTIVE | Noted: 2017-09-13

## 2018-12-18 PROBLEM — I50.31 ACUTE DIASTOLIC CONGESTIVE HEART FAILURE (HCC): Status: RESOLVED | Noted: 2018-08-05 | Resolved: 2018-12-18

## 2018-12-18 PROBLEM — IMO0002 TYPE 2 DIABETES MELLITUS, UNCONTROLLED, WITH NEUROPATHY: Status: ACTIVE | Noted: 2018-08-05

## 2018-12-18 PROCEDURE — 93000 ELECTROCARDIOGRAM COMPLETE: CPT | Performed by: FAMILY MEDICINE

## 2018-12-18 PROCEDURE — 99215 OFFICE O/P EST HI 40 MIN: CPT | Performed by: FAMILY MEDICINE

## 2018-12-18 NOTE — PATIENT INSTRUCTIONS
Dyspnea   AMBULATORY CARE:   What is dyspnea? Dyspnea is breathing difficulty or discomfort  You may have labored, painful, or shallow breathing  You may feel breathless or short of breath  Dyspnea can occur during rest or with activity  You may have dyspnea for a short time, or it might become chronic  Dyspnea is often a symptom of a disease or condition  An allergic reaction, anxiety, or travel to high altitudes can increase your risk for dyspnea  Your risk is also increased by a lung condition such as asthma, a heart condition such as heart failure, or a nerve condition  Being overweight or smoking cigarettes can also lead to dyspnea  Signs and symptoms that can occur with dyspnea:   · Chest tightness or pain    · Cough or a coarse or high-pitched noise when you breathe    · Pale and sweaty, cool skin    · Confusion and tiredness    · Bluish-gray lips or nails  Seek care immediately if:   · Your signs and symptoms are the same or worse within 24 hours of treatment  · You have shaking chills or a fever over 102°F      · You have new pain, pressure, or tightness in your chest      · You have a new or worse cough or wheezing, or you cough up blood  · You feel like you cannot get enough air  · The skin over your ribs or on your neck sinks in when you breathe  · You have a severe headache with vomiting and abdominal pain  · You feel confused or dizzy  Contact your healthcare provider if:   · You have questions or concerns about your condition or care  Treatment:  You will work with your healthcare provider to treat the condition causing your dyspnea  You may need the following to improve your symptoms:  · Oxygen therapy  may be used to help you breathe easier  You may need oxygen if your blood oxygen level is lower than it should be  · Medicines  may be used to treat the cause of your dyspnea   Medicines may reduce swelling in your airway or decrease extra fluid from around your heart or lungs  Other medicines may be used to decrease anxiety and help you feel calm and relaxed  · Pulmonary rehabilitation  is used to reduce your symptoms while keeping you active  You may learn breathing techniques, muscle strengthening, and how to pace yourself when you are active  Manage long-term dyspnea:   · Create an action plan  You and your healthcare provider can work together to create a plan for how to handle episodes of dyspnea  The plan can include daily activities, treatment changes, and what to do if you have severe breathing problems  · Lean forward on your elbows when you sit  This helps your lungs expand and may make it easier to breathe  · Use pursed-lip breathing any time you feel short of breath  Breathe in through your nose and then slowly breathe out through your mouth with your lips slightly puckered  It should take you twice as long to breathe out as it did to breathe in  · Do not smoke  Nicotine and other chemicals in cigarettes and cigars can cause lung damage and make it harder to breathe  Ask your healthcare provider for information if you currently smoke and need help to quit  E-cigarettes or smokeless tobacco still contain nicotine  Talk to your healthcare provider before you use these products  · Reach or maintain a healthy weight  Your healthcare provider can help you create a safe weight loss plan if you are overweight  · Exercise as directed  Exercise can help your lungs work more easily  Exercise can also help you lose weight if needed  Try to get at least 30 minutes of exercise most days of the week  Your healthcare provider can help you create an exercise plan that is safe for you  Follow up with your healthcare provider or specialist as directed:  Write down your questions so you remember to ask them during your visits    © 2017 [employer] Information is for End User's use only and may not be sold, redistributed or otherwise used for commercial purposes  All illustrations and images included in CareNotes® are the copyrighted property of [employer]  or [employer]  The above information is an  only  It is not intended as medical advice for individual conditions or treatments  Talk to your doctor, nurse or pharmacist before following any medical regimen to see if it is safe and effective for you

## 2018-12-18 NOTE — PROGRESS NOTES
Assessment/Plan:    General weakness  Unclear the etiology   Pt set up for PT/OT to help her strength  Pt to fu with the cardiologist ASAP to r/o cardiac reason for her sx rochelle with SMITH,  Pt has an appointment with cardiology on 1/11/2018 , will try to schedule it sooner   Pt to go to ER if worsening sx   Chronic left upper quadrant pain  I feel this neuropathic pain   Advised to cont with Gabapentin     Visual hallucinations  That much improved     Dyspnea on exertion  To fu with [facility] ASAP   This could be due to deconditioning, PT/OT ordered  Arteriosclerosis of coronary artery  Stable , has regular fu with cardiology     Aortic stenosis  To fu with cardiology, this could be contributing to her SMITH   Advanced age  Pt worried about her health a lot, her twin sister not doing well and she is in the hospital and that make pt more conscious about her sx and feel worried although she is denying that multiple times  Pt live with her daughter Jose Carlos Julian but she is very independent and take care of her medication, she refuse the help from her dtr rochelle with medication managment, she mentioned that she knew all her medication and she take them on her own and she doesn't need any help yet  I reassured the patient and her dtr there is no cognitive decline here and pt can cont to be independent but I will schedule appointments for her every 6-8 weeks routinely to avoid any extra visit to urgent or ER visits  Diagnoses and all orders for this visit:    Dyspnea on exertion  -     POCT ECG    Chronic left upper quadrant pain    General weakness  -     Ambulatory referral to Physical Therapy; Future    Visual hallucinations    Advanced age    Arteriosclerosis of coronary artery    Aortic valve stenosis, etiology of cardiac valve disease unspecified          Subjective: patient c/o SOBOE, left sided flank pain with radiation to the front,  weakness x 2-3 weeks  No fevers, n/v/d   Patient states that an OTC patch used to help but that its not working anymore  ak     Patient ID: Renny Zambrano is a 80 y o  female  Patient is here for fu   Pt cont to c/o generalized weakness no new medication, no urinary sx   Continue to c/o of LUQ pain that comes and go, pt taking pantoprazole daily with no help, pt was using Maalox daily but lately she is not  Taking it  Pt also c/o SMITH but not worse than before   No fever chills or any other sx   No chest pain or angina like sx   Hallucination improved,   Sleep better but still interrupted   The following portions of the patient's history were reviewed and updated as appropriate: allergies, current medications, past family history, past medical history, past social history, past surgical history and problem list     Review of Systems   Constitutional: Positive for activity change and fatigue  Negative for appetite change, chills, diaphoresis, fever and unexpected weight change  HENT: Negative for congestion, ear discharge, ear pain, facial swelling, postnasal drip, sinus pressure, sore throat and voice change  Eyes: Negative for pain, redness, itching and visual disturbance  Respiratory: Negative for cough, chest tightness, shortness of breath and wheezing  Cardiovascular: Negative for chest pain, palpitations and leg swelling  Gastrointestinal: Negative for abdominal pain, constipation, diarrhea and nausea  Endocrine: Negative for cold intolerance, heat intolerance and polyuria  Genitourinary: Negative for difficulty urinating, dysuria, enuresis, flank pain, frequency and hematuria  Musculoskeletal: Positive for gait problem  Negative for joint swelling and neck pain  Skin: Negative for color change and wound  Neurological: Negative for dizziness, syncope, speech difficulty, numbness and headaches  Psychiatric/Behavioral: Positive for sleep disturbance   Negative for behavioral problems, confusion, decreased concentration, dysphoric mood, hallucinations, self-injury and suicidal ideas  The patient is not nervous/anxious  Objective:      /56   Pulse 68   Temp 98 6 °F (37 °C)   Ht 5' 2" (1 575 m)   Wt 78 9 kg (174 lb)   SpO2 96%   BMI 31 83 kg/m²          Physical Exam   Constitutional: She is oriented to person, place, and time  She appears well-developed and well-nourished  HENT:   Head: Normocephalic and atraumatic  Eyes: Pupils are equal, round, and reactive to light  Conjunctivae and EOM are normal    Neck: Normal range of motion  Neck supple  Cardiovascular: Normal rate, regular rhythm and intact distal pulses  Murmur heard  Murmur    Pulmonary/Chest: Effort normal and breath sounds normal    Abdominal: Soft  Bowel sounds are normal    Musculoskeletal: Normal range of motion  Neurological: She is alert and oriented to person, place, and time  She has normal reflexes  Skin: Skin is warm and dry  Psychiatric: She has a normal mood and affect  Her behavior is normal    Nursing note and vitals reviewed  POCT ECG     Date/Time 12/18/2018 3:03 PM     Performed by  Reena Black by Ab Feliz       Consent: Verbal consent obtained  Risks and benefits: risks, benefits and alternatives were discussed      Local anesthesia used: no     Anesthesia   Local anesthesia used: no     Procedure Details   Procedure Notes: Bradycardia with left ventricular hypertrophy with prolongation of the P-R interval, no change since August 2018     Patient tolerance: Patient tolerated the procedure well with no immediate complications

## 2018-12-19 ENCOUNTER — DOCTOR'S OFFICE (OUTPATIENT)
Dept: URBAN - METROPOLITAN AREA CLINIC 136 | Facility: CLINIC | Age: 83
Setting detail: OPHTHALMOLOGY
End: 2018-12-19
Payer: COMMERCIAL

## 2018-12-19 DIAGNOSIS — H04.123: ICD-10-CM

## 2018-12-19 DIAGNOSIS — H02.011: ICD-10-CM

## 2018-12-19 DIAGNOSIS — H40.1132: ICD-10-CM

## 2018-12-19 PROCEDURE — 92012 INTRM OPH EXAM EST PATIENT: CPT | Performed by: OPHTHALMOLOGY

## 2018-12-19 PROCEDURE — 92083 EXTENDED VISUAL FIELD XM: CPT | Performed by: OPHTHALMOLOGY

## 2018-12-19 ASSESSMENT — REFRACTION_MANIFEST
OD_SPHERE: -1.00
OS_VA1: 20/25-1
OS_CYLINDER: -3.50
OD_ADD: +2.50
OS_AXIS: 085
OU_VA: 20/
OD_VA3: 20/
OS_VA2: 20/
OD_CYLINDER: -2.75
OD_SPHERE: -1.25
OS_VA3: 20/
OD_VA1: 20/20
OS_CYLINDER: -2.75
OD_CYLINDER: -3.25
OD_VA2: 20/25-
OS_VA3: 20/
OU_VA: 20/25
OS_VA1: 20/30
OD_VA3: 20/
OD_AXIS: 080
OD_VA2: 20/
OS_VA2: 20/
OD_VA1: 20/20
OS_SPHERE: -0.25
OD_AXIS: 080
OS_ADD: +2.50
OS_SPHERE: PLANO
OS_AXIS: 085

## 2018-12-19 ASSESSMENT — SPHEQUIV_DERIVED
OD_SPHEQUIV: -2.625
OD_SPHEQUIV: -2.625
OD_SPHEQUIV: -3.5
OS_SPHEQUIV: -1.625
OS_SPHEQUIV: -2.125

## 2018-12-19 ASSESSMENT — REFRACTION_AUTOREFRACTION
OS_SPHERE: +0.25
OD_AXIS: 80
OS_AXIS: 81
OS_CYLINDER: -4.75
OD_CYLINDER: -4.50
OD_SPHERE: -1.25

## 2018-12-19 ASSESSMENT — LID EXAM ASSESSMENTS
OD_TRICHIASIS: RUL
OS_BLEPHARITIS: LLL LUL 1+
OS_COMMENTS: MGD
OD_BLEPHARITIS: RLL RUL 1+

## 2018-12-19 ASSESSMENT — REFRACTION_CURRENTRX
OD_OVR_VA: 20/
OS_OVR_VA: 20/
OD_OVR_VA: 20/
OS_OVR_VA: 20/
OS_OVR_VA: 20/
OD_OVR_VA: 20/

## 2018-12-19 ASSESSMENT — PUNCTA - ASSESSMENT: OS_PUNCTA: SIL PLUG LLL

## 2018-12-19 ASSESSMENT — CONFRONTATIONAL VISUAL FIELD TEST (CVF)
OS_FINDINGS: FULL
OD_FINDINGS: FULL

## 2018-12-19 ASSESSMENT — LID POSITION - DERMATOCHALASIS
OD_DERMATOCHALASIS: RUL 1+
OS_DERMATOCHALASIS: LUL 1+

## 2018-12-19 ASSESSMENT — VISUAL ACUITY
OS_BCVA: 20/60
OD_BCVA: 20/60-2

## 2018-12-19 ASSESSMENT — SUPERFICIAL PUNCTATE KERATITIS (SPK)
OD_SPK: 2+
OS_SPK: 1+

## 2018-12-27 NOTE — ASSESSMENT & PLAN NOTE
Pt worried about her health a lot, her twin sister not doing well and she is in the hospital and that make pt more conscious about her sx and feel worried although she is denying that multiple times  Pt live with her daughter Toni Lee but she is very independent and take care of her medication, she refuse the help from her dtr rochelle with medication managment, she mentioned that she knew all her medication and she take them on her own and she doesn't need any help yet  I reassured the patient and her dtr there is no cognitive decline here and pt can cont to be independent but I will schedule appointments for her every 6-8 weeks routinely to avoid any extra visit to urgent or ER visits

## 2018-12-27 NOTE — ASSESSMENT & PLAN NOTE
Unclear the etiology   Pt set up for PT/OT to help her strength  Pt to fu with the cardiologist ASAP to r/o cardiac reason for her sx rochelle with SMITH,  Pt has an appointment with cardiology on 1/11/2018 , will try to schedule it sooner   Pt to go to ER if worsening sx

## 2018-12-28 DIAGNOSIS — G89.29 CHRONIC LEFT UPPER QUADRANT PAIN: Primary | ICD-10-CM

## 2018-12-28 DIAGNOSIS — R10.12 CHRONIC LEFT UPPER QUADRANT PAIN: Primary | ICD-10-CM

## 2018-12-28 RX ORDER — ACETAMINOPHEN AND CODEINE PHOSPHATE 300; 30 MG/1; MG/1
1 TABLET ORAL EVERY 4 HOURS PRN
Qty: 30 TABLET | Refills: 0 | Status: SHIPPED | OUTPATIENT
Start: 2018-12-28 | End: 2020-04-29 | Stop reason: ALTCHOICE

## 2018-12-28 NOTE — PROGRESS NOTES
Patient called complaining of right upper quadrant pain, asking for refills on Tylenol with codeine, refills was given to the patient  She is not in critical pain, patient to go to the emergency room if pain get worse or to return to the office if not better

## 2019-01-09 ENCOUNTER — APPOINTMENT (EMERGENCY)
Dept: CT IMAGING | Facility: HOSPITAL | Age: 84
End: 2019-01-09
Payer: COMMERCIAL

## 2019-01-09 ENCOUNTER — HOSPITAL ENCOUNTER (OUTPATIENT)
Facility: HOSPITAL | Age: 84
Setting detail: OBSERVATION
Discharge: HOME HEALTH | End: 2019-01-11
Attending: EMERGENCY MEDICINE | Admitting: HOSPITALIST
Payer: COMMERCIAL

## 2019-01-09 DIAGNOSIS — R06.00 DYSPNEA ON EXERTION: Primary | ICD-10-CM

## 2019-01-09 DIAGNOSIS — R07.9 LEFT SIDED CHEST PAIN: ICD-10-CM

## 2019-01-09 DIAGNOSIS — I50.9 CHF (CONGESTIVE HEART FAILURE) (HCC): ICD-10-CM

## 2019-01-09 DIAGNOSIS — I35.0 NONRHEUMATIC AORTIC VALVE STENOSIS: Chronic | ICD-10-CM

## 2019-01-09 DIAGNOSIS — R06.00 DOE (DYSPNEA ON EXERTION): ICD-10-CM

## 2019-01-09 PROBLEM — R51.9 HEADACHE: Status: ACTIVE | Noted: 2019-01-09

## 2019-01-09 PROBLEM — I50.33 ACUTE ON CHRONIC DIASTOLIC CONGESTIVE HEART FAILURE (HCC): Status: ACTIVE | Noted: 2018-08-05

## 2019-01-09 PROBLEM — I50.32 CHRONIC DIASTOLIC CONGESTIVE HEART FAILURE (HCC): Chronic | Status: ACTIVE | Noted: 2018-08-05

## 2019-01-09 LAB
ALBUMIN SERPL BCP-MCNC: 3.5 G/DL (ref 3.5–5)
ALP SERPL-CCNC: 94 U/L (ref 46–116)
ALT SERPL W P-5'-P-CCNC: 20 U/L (ref 12–78)
ANION GAP SERPL CALCULATED.3IONS-SCNC: 8 MMOL/L (ref 4–13)
AST SERPL W P-5'-P-CCNC: 16 U/L (ref 5–45)
BACTERIA UR QL AUTO: ABNORMAL /HPF
BASOPHILS # BLD AUTO: 0.06 THOUSANDS/ΜL (ref 0–0.1)
BASOPHILS NFR BLD AUTO: 1 % (ref 0–1)
BILIRUB DIRECT SERPL-MCNC: 0.1 MG/DL (ref 0–0.2)
BILIRUB SERPL-MCNC: 0.39 MG/DL (ref 0.2–1)
BILIRUB UR QL STRIP: NEGATIVE
BUN SERPL-MCNC: 12 MG/DL (ref 5–25)
CALCIUM SERPL-MCNC: 9.7 MG/DL (ref 8.3–10.1)
CHLORIDE SERPL-SCNC: 103 MMOL/L (ref 100–108)
CLARITY UR: CLEAR
CO2 SERPL-SCNC: 26 MMOL/L (ref 21–32)
COLOR UR: YELLOW
CREAT SERPL-MCNC: 1.01 MG/DL (ref 0.6–1.3)
EOSINOPHIL # BLD AUTO: 0.11 THOUSAND/ΜL (ref 0–0.61)
EOSINOPHIL NFR BLD AUTO: 2 % (ref 0–6)
ERYTHROCYTE [DISTWIDTH] IN BLOOD BY AUTOMATED COUNT: 13.2 % (ref 11.6–15.1)
GFR SERPL CREATININE-BSD FRML MDRD: 54 ML/MIN/1.73SQ M
GLUCOSE SERPL-MCNC: 111 MG/DL (ref 65–140)
GLUCOSE SERPL-MCNC: 130 MG/DL (ref 65–140)
GLUCOSE UR STRIP-MCNC: NEGATIVE MG/DL
HCT VFR BLD AUTO: 45.7 % (ref 34.8–46.1)
HGB BLD-MCNC: 14.7 G/DL (ref 11.5–15.4)
HGB UR QL STRIP.AUTO: NEGATIVE
IMM GRANULOCYTES # BLD AUTO: 0.01 THOUSAND/UL (ref 0–0.2)
IMM GRANULOCYTES NFR BLD AUTO: 0 % (ref 0–2)
KETONES UR STRIP-MCNC: NEGATIVE MG/DL
LEUKOCYTE ESTERASE UR QL STRIP: ABNORMAL
LIPASE SERPL-CCNC: 118 U/L (ref 73–393)
LYMPHOCYTES # BLD AUTO: 0.98 THOUSANDS/ΜL (ref 0.6–4.47)
LYMPHOCYTES NFR BLD AUTO: 15 % (ref 14–44)
MCH RBC QN AUTO: 29.9 PG (ref 26.8–34.3)
MCHC RBC AUTO-ENTMCNC: 32.2 G/DL (ref 31.4–37.4)
MCV RBC AUTO: 93 FL (ref 82–98)
MONOCYTES # BLD AUTO: 0.61 THOUSAND/ΜL (ref 0.17–1.22)
MONOCYTES NFR BLD AUTO: 9 % (ref 4–12)
NEUTROPHILS # BLD AUTO: 4.69 THOUSANDS/ΜL (ref 1.85–7.62)
NEUTS SEG NFR BLD AUTO: 73 % (ref 43–75)
NITRITE UR QL STRIP: NEGATIVE
NON-SQ EPI CELLS URNS QL MICRO: ABNORMAL /HPF
NRBC BLD AUTO-RTO: 0 /100 WBCS
NT-PROBNP SERPL-MCNC: 1092 PG/ML
PH UR STRIP.AUTO: 6.5 [PH] (ref 4.5–8)
PLATELET # BLD AUTO: 207 THOUSANDS/UL (ref 149–390)
PMV BLD AUTO: 10.7 FL (ref 8.9–12.7)
POTASSIUM SERPL-SCNC: 4.2 MMOL/L (ref 3.5–5.3)
PROT SERPL-MCNC: 7.4 G/DL (ref 6.4–8.2)
PROT UR STRIP-MCNC: NEGATIVE MG/DL
RBC # BLD AUTO: 4.91 MILLION/UL (ref 3.81–5.12)
RBC #/AREA URNS AUTO: ABNORMAL /HPF
SODIUM SERPL-SCNC: 137 MMOL/L (ref 136–145)
SP GR UR STRIP.AUTO: 1.01 (ref 1–1.03)
TROPONIN I SERPL-MCNC: 0.03 NG/ML
UROBILINOGEN UR QL STRIP.AUTO: 0.2 E.U./DL
WBC # BLD AUTO: 6.46 THOUSAND/UL (ref 4.31–10.16)
WBC #/AREA URNS AUTO: ABNORMAL /HPF

## 2019-01-09 PROCEDURE — 80048 BASIC METABOLIC PNL TOTAL CA: CPT | Performed by: EMERGENCY MEDICINE

## 2019-01-09 PROCEDURE — 96360 HYDRATION IV INFUSION INIT: CPT

## 2019-01-09 PROCEDURE — 71260 CT THORAX DX C+: CPT

## 2019-01-09 PROCEDURE — 93005 ELECTROCARDIOGRAM TRACING: CPT

## 2019-01-09 PROCEDURE — 83880 ASSAY OF NATRIURETIC PEPTIDE: CPT | Performed by: EMERGENCY MEDICINE

## 2019-01-09 PROCEDURE — 83690 ASSAY OF LIPASE: CPT | Performed by: EMERGENCY MEDICINE

## 2019-01-09 PROCEDURE — 99220 PR INITIAL OBSERVATION CARE/DAY 70 MINUTES: CPT | Performed by: NURSE PRACTITIONER

## 2019-01-09 PROCEDURE — 70450 CT HEAD/BRAIN W/O DYE: CPT

## 2019-01-09 PROCEDURE — 74177 CT ABD & PELVIS W/CONTRAST: CPT

## 2019-01-09 PROCEDURE — 80076 HEPATIC FUNCTION PANEL: CPT | Performed by: EMERGENCY MEDICINE

## 2019-01-09 PROCEDURE — 81001 URINALYSIS AUTO W/SCOPE: CPT

## 2019-01-09 PROCEDURE — 36415 COLL VENOUS BLD VENIPUNCTURE: CPT | Performed by: EMERGENCY MEDICINE

## 2019-01-09 PROCEDURE — 85025 COMPLETE CBC W/AUTO DIFF WBC: CPT | Performed by: EMERGENCY MEDICINE

## 2019-01-09 PROCEDURE — 99285 EMERGENCY DEPT VISIT HI MDM: CPT

## 2019-01-09 PROCEDURE — 82948 REAGENT STRIP/BLOOD GLUCOSE: CPT

## 2019-01-09 PROCEDURE — 84484 ASSAY OF TROPONIN QUANT: CPT | Performed by: EMERGENCY MEDICINE

## 2019-01-09 RX ORDER — FUROSEMIDE 10 MG/ML
20 INJECTION INTRAMUSCULAR; INTRAVENOUS ONCE
Status: COMPLETED | OUTPATIENT
Start: 2019-01-09 | End: 2019-01-09

## 2019-01-09 RX ORDER — ACETAMINOPHEN 325 MG/1
650 TABLET ORAL EVERY 6 HOURS PRN
Status: DISCONTINUED | OUTPATIENT
Start: 2019-01-09 | End: 2019-01-11 | Stop reason: HOSPADM

## 2019-01-09 RX ORDER — FUROSEMIDE 20 MG/1
20 TABLET ORAL DAILY
Status: DISCONTINUED | OUTPATIENT
Start: 2019-01-10 | End: 2019-01-11 | Stop reason: HOSPADM

## 2019-01-09 RX ORDER — DORZOLAMIDE HCL 20 MG/ML
1 SOLUTION/ DROPS OPHTHALMIC 3 TIMES DAILY
Status: DISCONTINUED | OUTPATIENT
Start: 2019-01-09 | End: 2019-01-11 | Stop reason: HOSPADM

## 2019-01-09 RX ORDER — ALBUTEROL SULFATE 90 UG/1
2 AEROSOL, METERED RESPIRATORY (INHALATION) EVERY 4 HOURS PRN
Status: DISCONTINUED | OUTPATIENT
Start: 2019-01-09 | End: 2019-01-11 | Stop reason: HOSPADM

## 2019-01-09 RX ORDER — ACETAMINOPHEN 325 MG/1
650 TABLET ORAL ONCE
Status: COMPLETED | OUTPATIENT
Start: 2019-01-09 | End: 2019-01-09

## 2019-01-09 RX ORDER — ASPIRIN 81 MG/1
81 TABLET, CHEWABLE ORAL DAILY
Status: DISCONTINUED | OUTPATIENT
Start: 2019-01-10 | End: 2019-01-11 | Stop reason: HOSPADM

## 2019-01-09 RX ORDER — LIDOCAINE 50 MG/G
1 PATCH TOPICAL ONCE
Status: COMPLETED | OUTPATIENT
Start: 2019-01-09 | End: 2019-01-10

## 2019-01-09 RX ORDER — PANTOPRAZOLE SODIUM 40 MG/1
40 TABLET, DELAYED RELEASE ORAL
Status: DISCONTINUED | OUTPATIENT
Start: 2019-01-10 | End: 2019-01-11 | Stop reason: HOSPADM

## 2019-01-09 RX ORDER — GABAPENTIN 300 MG/1
300 CAPSULE ORAL EVERY 8 HOURS PRN
Status: DISCONTINUED | OUTPATIENT
Start: 2019-01-09 | End: 2019-01-09

## 2019-01-09 RX ORDER — AMLODIPINE BESYLATE 5 MG/1
5 TABLET ORAL DAILY
Status: DISCONTINUED | OUTPATIENT
Start: 2019-01-10 | End: 2019-01-11 | Stop reason: HOSPADM

## 2019-01-09 RX ORDER — LATANOPROST 50 UG/ML
1 SOLUTION/ DROPS OPHTHALMIC
Status: DISCONTINUED | OUTPATIENT
Start: 2019-01-09 | End: 2019-01-11 | Stop reason: HOSPADM

## 2019-01-09 RX ORDER — OXYCODONE HYDROCHLORIDE 5 MG/1
2.5 TABLET ORAL EVERY 4 HOURS PRN
Status: DISCONTINUED | OUTPATIENT
Start: 2019-01-09 | End: 2019-01-11 | Stop reason: HOSPADM

## 2019-01-09 RX ORDER — AMLODIPINE BESYLATE AND ATORVASTATIN CALCIUM 5; 10 MG/1; MG/1
1 TABLET, FILM COATED ORAL DAILY
Status: ON HOLD | COMMUNITY
End: 2019-01-09 | Stop reason: CLARIF

## 2019-01-09 RX ORDER — ONDANSETRON 2 MG/ML
4 INJECTION INTRAMUSCULAR; INTRAVENOUS EVERY 6 HOURS PRN
Status: DISCONTINUED | OUTPATIENT
Start: 2019-01-09 | End: 2019-01-11 | Stop reason: HOSPADM

## 2019-01-09 RX ORDER — MAGNESIUM HYDROXIDE/ALUMINUM HYDROXICE/SIMETHICONE 120; 1200; 1200 MG/30ML; MG/30ML; MG/30ML
30 SUSPENSION ORAL EVERY 4 HOURS PRN
Status: DISCONTINUED | OUTPATIENT
Start: 2019-01-09 | End: 2019-01-11 | Stop reason: HOSPADM

## 2019-01-09 RX ORDER — AMLODIPINE BESYLATE AND BENAZEPRIL HYDROCHLORIDE 5; 10 MG/1; MG/1
1 CAPSULE ORAL DAILY
COMMUNITY
End: 2019-05-02 | Stop reason: SDUPTHER

## 2019-01-09 RX ORDER — GABAPENTIN 100 MG/1
100 CAPSULE ORAL
Status: DISCONTINUED | OUTPATIENT
Start: 2019-01-09 | End: 2019-01-11 | Stop reason: HOSPADM

## 2019-01-09 RX ORDER — MELATONIN
1000 DAILY
Status: DISCONTINUED | OUTPATIENT
Start: 2019-01-10 | End: 2019-01-11 | Stop reason: HOSPADM

## 2019-01-09 RX ORDER — MAGNESIUM HYDROXIDE/ALUMINUM HYDROXICE/SIMETHICONE 120; 1200; 1200 MG/30ML; MG/30ML; MG/30ML
30 SUSPENSION ORAL EVERY 4 HOURS PRN
COMMUNITY
End: 2021-11-18

## 2019-01-09 RX ORDER — LIDOCAINE 50 MG/G
1 PATCH TOPICAL DAILY
Status: DISCONTINUED | OUTPATIENT
Start: 2019-01-10 | End: 2019-01-11 | Stop reason: HOSPADM

## 2019-01-09 RX ORDER — LISINOPRIL 10 MG/1
10 TABLET ORAL DAILY
Status: DISCONTINUED | OUTPATIENT
Start: 2019-01-10 | End: 2019-01-11 | Stop reason: HOSPADM

## 2019-01-09 RX ADMIN — ACETAMINOPHEN 650 MG: 325 TABLET, FILM COATED ORAL at 17:50

## 2019-01-09 RX ADMIN — LIDOCAINE 1 PATCH: 50 PATCH TOPICAL at 17:51

## 2019-01-09 RX ADMIN — IODIXANOL 90 ML: 320 INJECTION, SOLUTION INTRAVASCULAR at 18:40

## 2019-01-09 RX ADMIN — FUROSEMIDE 20 MG: 10 INJECTION, SOLUTION INTRAVENOUS at 20:15

## 2019-01-09 RX ADMIN — DORZOLAMIDE HYDROCHLORIDE 1 DROP: 20 SOLUTION/ DROPS OPHTHALMIC at 22:45

## 2019-01-09 RX ADMIN — GABAPENTIN 100 MG: 100 CAPSULE ORAL at 21:57

## 2019-01-09 RX ADMIN — SODIUM CHLORIDE 500 ML: 0.9 INJECTION, SOLUTION INTRAVENOUS at 17:49

## 2019-01-09 NOTE — ED PROVIDER NOTES
History  Chief Complaint   Patient presents with    Shortness of Breath     patient c/o of chest pain, SOB and generalized body aches that started a couple of weeks ago;    Chest Pain     80year-old female presenting for evaluation of left-sided chest/LUQ abdominal pain  Patient reports that this same pain has been present for the past few years without findings of cause, reports she has had multiple workups in the past   Reports the pain has been getting more severe over the past few days, keeping her from sleep  Pain is now migrating across her left upper abdomen to the right side  Pain is unchanged with position, exertion, food/drink  She reports occasionally feeling nauseous, but has been tolerating PO  She reports that any time she eats she immediately has a bowel movement, but denies any loose, dark or bloody stools  Patient also reports a chronic headache mainly to the top of her head radiating to her left temporal region, denies any changes in vision, numbness, weakness  No head trauma  Patient denies any recent illness, fevers, chills, urinary complaints  She does report a cough, occasionally productive  She lives at home with her daughter who is at bedside  Per review of records, patient was evaluated for similar complaints in August 2018  History of CHF, last echo was in August       A/P:  80year-old female with CP/abdominal pain, will get cardiac workup, CTH to rule intracranial bleed, CT to rule out pneumonia/bony lesion, intra-abdominal pathology, urine to rule out UTI               8/7/18 ECHO  Size was normal  Systolic function was vigorous  Ejection fraction was estimated in the range of 65 % to 70 %  There was possible hypokinesis of the basal inferior and basal inferolateral wall(s)  There was mild concentric  hypertrophy  No evidence of apical thrombus  DOPPLER: The study was not technically sufficient to allow evaluation of LV diastolic function        Prior to Admission Medications Prescriptions Last Dose Informant Patient Reported? Taking? Cholecalciferol (VITAMIN D3) 2000 units capsule  Self Yes Yes   Sig: Take 1,000 Units by mouth daily     acetaminophen-codeine (TYLENOL #3) 300-30 mg per tablet   No Yes   Sig: Take 1 tablet by mouth every 4 (four) hours as needed for moderate pain   aluminum-magnesium hydroxide-simethicone (MYLANTA) 200-200-20 mg/5 mL suspension  Self Yes Yes   Sig: Take 30 mL by mouth every 4 (four) hours as needed for indigestion or heartburn   amLODIPine-benazepril (LOTREL 5-10) 5-10 MG per capsule   Yes Yes   Sig: Take 1 capsule by mouth daily   aspirin 81 MG tablet  Self Yes Yes   Sig: Take 81 mg by mouth daily  dorzolamide (TRUSOPT) 2 % ophthalmic solution  Self Yes Yes   Sig: Administer 1 drop to both eyes 3 (three) times a day     fluticasone (FLONASE) 50 mcg/act nasal spray   No Yes   Si sprays into each nostril daily   furosemide (LASIX) 20 mg tablet   Yes Yes   Sig: Take 20 mg by mouth daily     gabapentin (NEURONTIN) 600 MG tablet  Self No Yes   Sig: Take 0 5 tablets (300 mg total) by mouth every 8 (eight) hours as needed (Cranial Neuralgia)   latanoprost (XALATAN) 0 005 % ophthalmic solution  Self Yes Yes   Sig: Apply 1 drop to eye daily at bedtime   Both eyes   omeprazole (PriLOSEC) 40 MG capsule   No Yes   Sig: Take 1 capsule (40 mg total) by mouth daily   sitaGLIPtin (JANUVIA) 50 mg tablet   No Yes   Sig: Take 1 tablet (50 mg total) by mouth daily      Facility-Administered Medications Last Administration Doses Remaining   albuterol (PROVENTIL HFA,VENTOLIN HFA) inhaler 2 puff None recorded           Past Medical History:   Diagnosis Date    Asthma     Atypical chest pain     CAD (coronary artery disease)     Candidal intertrigo     [facility]     Depression     Diabetes mellitus ([facility])     Diabetic neuropathy ([facility] [address] )     Diverticulitis     Dyslipidemia     Female bladder prolapse     Gastric ulcer     Glaucoma  HTN (hypertension)     Hyperlipidemia     Hypothyroidism 4/18/2016    RAD (reactive airway disease)        Past Surgical History:   Procedure Laterality Date    COLONOSCOPY      ESOPHAGOGASTRODUODENOSCOPY  2011    HYSTERECTOMY         Family History   Problem Relation Age of Onset    Breast cancer Mother     Hypothyroidism Mother     Hypertension Father     Breast cancer Sister     Thyroid disease Sister     Hypertension Sister      I have reviewed and agree with the history as documented  Social History   Substance Use Topics    Smoking status: Never Smoker    Smokeless tobacco: Never Used    Alcohol use No      Comment: Social Alcohol Use -- as per allscripts (pt denies all alcohol use)        Review of Systems   Constitutional: Negative for chills, fever and unexpected weight change  HENT: Negative for ear pain, rhinorrhea and sore throat  Eyes: Negative for pain and visual disturbance  Respiratory: Negative for cough and shortness of breath  Cardiovascular: Positive for chest pain  Negative for leg swelling  Gastrointestinal: Positive for abdominal pain  Negative for constipation, diarrhea, nausea and vomiting  Endocrine: Negative for polydipsia, polyphagia and polyuria  Genitourinary: Negative for dysuria, frequency, hematuria and urgency  Musculoskeletal: Negative for back pain, myalgias and neck pain  Skin: Negative for color change and rash  Allergic/Immunologic: Negative for environmental allergies and immunocompromised state  Neurological: Positive for headaches  Negative for dizziness, weakness, light-headedness and numbness  Hematological: Negative for adenopathy  Does not bruise/bleed easily  Psychiatric/Behavioral: Negative for agitation and confusion  All other systems reviewed and are negative  Physical Exam  Physical Exam   Constitutional: She is oriented to person, place, and time  She appears well-developed and well-nourished     HENT: Head: Normocephalic and atraumatic  Nose: Nose normal    Mouth/Throat: Oropharynx is clear and moist    Eyes: Conjunctivae and EOM are normal    Neck: Normal range of motion  Neck supple  Cardiovascular: Normal rate, regular rhythm, normal heart sounds and intact distal pulses  Pulmonary/Chest: Effort normal and breath sounds normal  No stridor  No respiratory distress  She has no wheezes  She has no rales  She exhibits tenderness  Mild ttp over L anterior/lateral low ribs, no skin changes/rash/crepitus   Abdominal: Soft  She exhibits no distension  There is no tenderness  There is no rebound and no guarding  Musculoskeletal: She exhibits no edema or deformity  Neurological: She is alert and oriented to person, place, and time  She exhibits normal muscle tone  Coordination normal    Skin: Skin is warm and dry  No rash noted  Psychiatric: She has a normal mood and affect  Judgment and thought content normal    Nursing note and vitals reviewed        Vital Signs  ED Triage Vitals [01/09/19 1622]   Temperature Pulse Respirations Blood Pressure SpO2   (!) 97 3 °F (36 3 °C) 70 18 (!) 177/77 98 %      Temp Source Heart Rate Source Patient Position - Orthostatic VS BP Location FiO2 (%)   Temporal Monitor Sitting Left arm --      Pain Score       9           Vitals:    01/09/19 2159 01/09/19 2200 01/09/19 2341 01/10/19 0844   BP: (!) 171/105 170/69 101/63 130/62   Pulse: 63  61 68   Patient Position - Orthostatic VS: Lying Lying Lying        Visual Acuity      ED Medications  Medications   albuterol (PROVENTIL HFA,VENTOLIN HFA) inhaler 2 puff (not administered)   aluminum-magnesium hydroxide-simethicone (MYLANTA) 200-200-20 mg/5 mL oral suspension 30 mL (not administered)   aspirin chewable tablet 81 mg (81 mg Oral Given 1/10/19 0846)   cholecalciferol (VITAMIN D3) tablet 1,000 Units (1,000 Units Oral Given 1/10/19 0846)   dorzolamide (TRUSOPT) ophthalmic solution 1 drop (1 drop Both Eyes Given 1/10/19 0849) furosemide (LASIX) tablet 20 mg (20 mg Oral Given 1/10/19 0847)   latanoprost (XALATAN) 0 005 % ophthalmic solution 1 drop (1 drop Both Eyes Not Given 1/9/19 2149)   pantoprazole (PROTONIX) EC tablet 40 mg (40 mg Oral Given 1/10/19 0533)   ondansetron (ZOFRAN) injection 4 mg (not administered)   enoxaparin (LOVENOX) subcutaneous injection 40 mg (40 mg Subcutaneous Not Given 1/10/19 0847)   insulin lispro (HumaLOG) 100 units/mL subcutaneous injection 1-5 Units (1 Units Subcutaneous Not Given 1/10/19 0840)   insulin lispro (HumaLOG) 100 units/mL subcutaneous injection 1-5 Units (1 Units Subcutaneous Not Given 1/9/19 2155)   acetaminophen (TYLENOL) tablet 650 mg (650 mg Oral Given 1/10/19 1103)   oxyCODONE (ROXICODONE) IR tablet 2 5 mg (not administered)   amLODIPine (NORVASC) tablet 5 mg (5 mg Oral Given 1/10/19 0846)   lisinopril (ZESTRIL) tablet 10 mg (10 mg Oral Given 1/10/19 0847)   lidocaine (LIDODERM) 5 % patch 1 patch (not administered)   gabapentin (NEURONTIN) capsule 100 mg (100 mg Oral Given 1/9/19 2157)   sodium chloride 0 9 % bolus 500 mL (0 mL Intravenous Stopped 1/9/19 1849)   acetaminophen (TYLENOL) tablet 650 mg (650 mg Oral Given 1/9/19 1750)   lidocaine (LIDODERM) 5 % patch 1 patch (1 patch Topical Patch Removed 1/10/19 0633)   iodixanol (VISIPAQUE) 320 MG/ML injection 90 mL (90 mL Intravenous Given 1/9/19 1840)   furosemide (LASIX) injection 20 mg (20 mg Intravenous Given 1/9/19 2015)       Diagnostic Studies  Results Reviewed     Procedure Component Value Units Date/Time    Urine Microscopic [[id number]]  (Abnormal) Collected:  01/09/19 2018    Lab Status:  Final result Specimen:  Urine from Urine, Clean Catch Updated:  01/09/19 2035     RBC, UA None Seen /hpf      WBC, UA 2-4 (A) /hpf      Epithelial Cells Occasional /hpf      Bacteria, UA Occasional /hpf     POCT urinalysis dipstick [[id number]]  (Abnormal) Resulted:  01/09/19 2003    Lab Status:  Final result Updated:  01/09/19 2003    ED Urine Macroscopic [[id number]]  (Abnormal) Collected:  01/09/19 2018    Lab Status:  Final result Specimen:  Urine Updated:  01/09/19 2002     Color, UA Yellow     Clarity, UA Clear     pH, UA 6 5     Leukocytes, UA Trace (A)     Nitrite, UA Negative     Protein, UA Negative mg/dl      Glucose, UA Negative mg/dl      Ketones, UA Negative mg/dl      Urobilinogen, UA 0 2 E U /dl      Bilirubin, UA Negative     Blood, UA Negative     Specific Gravity, UA 1 010    Narrative:       CLINITEK RESULT    Hepatic function panel [[id number]]  (Normal) Collected:  01/09/19 1746    Lab Status:  Final result Specimen:  Blood from Arm, Left Updated:  01/09/19 1840     Total Bilirubin 0 39 mg/dL      Bilirubin, Direct 0 10 mg/dL      Alkaline Phosphatase 94 U/L      AST 16 U/L      ALT 20 U/L      Total Protein 7 4 g/dL      Albumin 3 5 g/dL     Troponin I [[id number]]  (Normal) Collected:  01/09/19 1746    Lab Status:  Final result Specimen:  Blood from Arm, Left Updated:  01/09/19 1830     Troponin I 0 03 ng/mL     NT-BNP PRO [[id number]]  (Abnormal) Collected:  01/09/19 1746    Lab Status:  Final result Specimen:  Blood from Arm, Left Updated:  01/09/19 1825     NT-proBNP 1,092 (H) pg/mL     Basic metabolic panel [[id number]] Collected:  01/09/19 1746    Lab Status:  Final result Specimen:  Blood from Arm, Left Updated:  01/09/19 1819     Sodium 137 mmol/L      Potassium 4 2 mmol/L      Chloride 103 mmol/L      CO2 26 mmol/L      ANION GAP 8 mmol/L      BUN 12 mg/dL      Creatinine 1 01 mg/dL      Glucose 130 mg/dL      Calcium 9 7 mg/dL      eGFR 54 ml/min/1 73sq m     Narrative:         National Kidney Disease Education Program recommendations are as follows:  GFR calculation is accurate only with a steady state creatinine  Chronic Kidney disease less than 60 ml/min/1 73 sq  meters  Kidney failure less than 15 ml/min/1 73 sq  meters      Lipase [[id number]]  (Normal) Collected:  01/09/19 1746    Lab Status:  Final result Specimen:  Blood from Arm, Left Updated:  01/09/19 1819     Lipase 118 u/L     CBC and differential [[id number]] Collected:  01/09/19 1746    Lab Status:  Final result Specimen:  Blood from Arm, Left Updated:  01/09/19 1803     WBC 6 46 Thousand/uL      RBC 4 91 Million/uL      Hemoglobin 14 7 g/dL      Hematocrit 45 7 %      MCV 93 fL      MCH 29 9 pg      MCHC 32 2 g/dL      RDW 13 2 %      MPV 10 7 fL      Platelets 540 Thousands/uL      nRBC 0 /100 WBCs      Neutrophils Relative 73 %      Immat GRANS % 0 %      Lymphocytes Relative 15 %      Monocytes Relative 9 %      Eosinophils Relative 2 %      Basophils Relative 1 %      Neutrophils Absolute 4 69 Thousands/µL      Immature Grans Absolute 0 01 Thousand/uL      Lymphocytes Absolute 0 98 Thousands/µL      Monocytes Absolute 0 61 Thousand/µL      Eosinophils Absolute 0 11 Thousand/µL      Basophils Absolute 0 06 Thousands/µL                  CT chest abdomen pelvis w contrast   ED Interpretation by Lynda Neumann DO (01/09 1930)   IMPRESSION:       Stable chronic changes without findings to explain this patient's left chest/left upper quadrant pain           Final Result by Meir Gustafson MD (01/09 1908)      Stable chronic changes without findings to explain this patient's left chest/left upper quadrant pain  Workstation performed: [id number]         CT head without contrast   ED Interpretation by Lynda Neumann DO (01/09 1858)   IMPRESSION:       No acute intracranial abnormality  Stable chronic microangiopathic changes      Final Result by Meir Gustafson MD (01/09 1852)      No acute intracranial abnormality  Stable chronic microangiopathic changes                    Workstation performed: [id number]                    Procedures  Procedures   EKG, NSR without ST/T wave changes    Phone Contacts  ED Phone Contact    ED Course  ED Course as of Madhav 10 1125   Wed Jan 09, 2019   1951 Patient ambulated to the bathroom to give a urine sample and upon returning to the room I re-evaluated she is tachypneic, has conversational dyspnea, oxygen saturation admitted 9 days and lungs are clear  Given the worsening shortness of breath and significant dyspnea on exertion, will admit          HEART Risk Score      Most Recent Value   History  0 Filed at: 01/09/2019 1123   ECG  0 Filed at: 01/09/2019 1123   Age  2 Filed at: 01/09/2019 1123   Risk Factors  1 Filed at: 01/09/2019 1123   Troponin  0 Filed at: 01/09/2019 1123   Heart Score Risk Calculator   History  0 Filed at: 01/09/2019 1123   ECG  0 Filed at: 01/09/2019 1123   Age  2 Filed at: 01/09/2019 1123   Risk Factors  1 Filed at: 01/09/2019 1123   Troponin  0 Filed at: 01/09/2019 1123   HEART Score  3 Filed at: 01/09/2019 1123   HEART Score  3 Filed at: 01/09/2019 1123                            MDM  Number of Diagnoses or Management Options  CHF (congestive heart failure) ([place] [address] ):   Dyspnea on exertion:   Left sided chest pain:   Diagnosis management comments: 81 yo F with CP- unremarkable ED workup, pain seems to be chronic/unchanged  Pt was noted to be very SOB with  Minimal exertion in the ED- tachypneic with conversation dyspnea, lungs clear and O2 saturation WNL   History of CHF, admitted for furhter workup of worsening SMITH       Amount and/or Complexity of Data Reviewed  Clinical lab tests: ordered and reviewed  Tests in the radiology section of CPT®: ordered and reviewed  Tests in the medicine section of CPT®: ordered and reviewed  Review and summarize past medical records: yes      CritCare Time    Disposition  Final diagnoses:   Dyspnea on exertion   CHF (congestive heart failure) ([email or web address] )   Left sided chest pain     Time reflects when diagnosis was documented in both MDM as applicable and the Disposition within this note     Time User Action Codes Description Comment    1/9/2019  7:59 PM José CANRTELL Add [R06 09] Dyspnea on exertion     1/9/2019  7:59 PM Bianca Alberts Add [I50 9] CHF (congestive heart failure) ([email or web address] 75 )     1/9/2019 7:59 PM José CANTRELL Add [R07 9] Left sided chest pain     1/9/2019  9:08 PM Carmen Jimenez Add [R06 09] SMITH (dyspnea on exertion)       ED Disposition     ED Disposition Condition Comment    Admit  Case was discussed with KISHAN and the patient's admission status was agreed to be Admission Status: observation status to the service of Dr Gilda Garza          Follow-up Information    None         Current Discharge Medication List      CONTINUE these medications which have NOT CHANGED    Details   acetaminophen-codeine (TYLENOL #3) 300-30 mg per tablet Take 1 tablet by mouth every 4 (four) hours as needed for moderate pain  Qty: 30 tablet, Refills: 0    Associated Diagnoses: Chronic left upper quadrant pain      aluminum-magnesium hydroxide-simethicone (MYLANTA) 200-200-20 mg/5 mL suspension Take 30 mL by mouth every 4 (four) hours as needed for indigestion or heartburn      amLODIPine-benazepril (LOTREL 5-10) 5-10 MG per capsule Take 1 capsule by mouth daily      aspirin 81 MG tablet Take 81 mg by mouth daily  Cholecalciferol (VITAMIN D3) 2000 units capsule Take 1,000 Units by mouth daily        dorzolamide (TRUSOPT) 2 % ophthalmic solution Administer 1 drop to both eyes 3 (three) times a day        fluticasone (FLONASE) 50 mcg/act nasal spray 2 sprays into each nostril daily  Qty: 16 g, Refills: 0    Associated Diagnoses: Moraxella catarrhalis bronchitis      furosemide (LASIX) 20 mg tablet Take 20 mg by mouth daily    Refills: 5      gabapentin (NEURONTIN) 600 MG tablet Take 0 5 tablets (300 mg total) by mouth every 8 (eight) hours as needed (Cranial Neuralgia)  Qty: 90 tablet, Refills: 3    Associated Diagnoses: Cranial neuralgia      latanoprost (XALATAN) 0 005 % ophthalmic solution Apply 1 drop to eye daily at bedtime   Both eyes      omeprazole (PriLOSEC) 40 MG capsule Take 1 capsule (40 mg total) by mouth daily  Qty: 90 capsule, Refills: 3    Associated Diagnoses: Gastroesophageal reflux disease without esophagitis      sitaGLIPtin (JANUVIA) 50 mg tablet Take 1 tablet (50 mg total) by mouth daily  Qty: 30 tablet, Refills: 5    Associated Diagnoses: Type 2 diabetes mellitus without complication, without long-term current use of insulin (HCC)           No discharge procedures on file      ED Provider  Electronically Signed by           Ann Barroso DO  01/10/19 4143

## 2019-01-10 LAB
ANION GAP SERPL CALCULATED.3IONS-SCNC: 9 MMOL/L (ref 4–13)
ATRIAL RATE: 64 BPM
BASOPHILS # BLD AUTO: 0.08 THOUSANDS/ΜL (ref 0–0.1)
BASOPHILS NFR BLD AUTO: 1 % (ref 0–1)
BUN SERPL-MCNC: 18 MG/DL (ref 5–25)
CALCIUM SERPL-MCNC: 9 MG/DL (ref 8.3–10.1)
CHLORIDE SERPL-SCNC: 106 MMOL/L (ref 100–108)
CO2 SERPL-SCNC: 25 MMOL/L (ref 21–32)
CREAT SERPL-MCNC: 1.27 MG/DL (ref 0.6–1.3)
EOSINOPHIL # BLD AUTO: 0.2 THOUSAND/ΜL (ref 0–0.61)
EOSINOPHIL NFR BLD AUTO: 3 % (ref 0–6)
ERYTHROCYTE [DISTWIDTH] IN BLOOD BY AUTOMATED COUNT: 13.4 % (ref 11.6–15.1)
GFR SERPL CREATININE-BSD FRML MDRD: 41 ML/MIN/1.73SQ M
GLUCOSE P FAST SERPL-MCNC: 130 MG/DL (ref 65–99)
GLUCOSE SERPL-MCNC: 101 MG/DL (ref 65–140)
GLUCOSE SERPL-MCNC: 110 MG/DL (ref 65–140)
GLUCOSE SERPL-MCNC: 130 MG/DL (ref 65–140)
GLUCOSE SERPL-MCNC: 183 MG/DL (ref 65–140)
GLUCOSE SERPL-MCNC: 98 MG/DL (ref 65–140)
HCT VFR BLD AUTO: 42 % (ref 34.8–46.1)
HGB BLD-MCNC: 13.4 G/DL (ref 11.5–15.4)
IMM GRANULOCYTES # BLD AUTO: 0.02 THOUSAND/UL (ref 0–0.2)
IMM GRANULOCYTES NFR BLD AUTO: 0 % (ref 0–2)
LYMPHOCYTES # BLD AUTO: 1.58 THOUSANDS/ΜL (ref 0.6–4.47)
LYMPHOCYTES NFR BLD AUTO: 25 % (ref 14–44)
MAGNESIUM SERPL-MCNC: 2 MG/DL (ref 1.6–2.6)
MCH RBC QN AUTO: 29.9 PG (ref 26.8–34.3)
MCHC RBC AUTO-ENTMCNC: 31.9 G/DL (ref 31.4–37.4)
MCV RBC AUTO: 94 FL (ref 82–98)
MONOCYTES # BLD AUTO: 0.68 THOUSAND/ΜL (ref 0.17–1.22)
MONOCYTES NFR BLD AUTO: 11 % (ref 4–12)
NEUTROPHILS # BLD AUTO: 3.79 THOUSANDS/ΜL (ref 1.85–7.62)
NEUTS SEG NFR BLD AUTO: 60 % (ref 43–75)
NRBC BLD AUTO-RTO: 0 /100 WBCS
P AXIS: 53 DEGREES
PLATELET # BLD AUTO: 203 THOUSANDS/UL (ref 149–390)
PMV BLD AUTO: 10.5 FL (ref 8.9–12.7)
POTASSIUM SERPL-SCNC: 4.1 MMOL/L (ref 3.5–5.3)
PR INTERVAL: 210 MS
QRS AXIS: -5 DEGREES
QRSD INTERVAL: 106 MS
QT INTERVAL: 378 MS
QTC INTERVAL: 389 MS
RBC # BLD AUTO: 4.48 MILLION/UL (ref 3.81–5.12)
SODIUM SERPL-SCNC: 140 MMOL/L (ref 136–145)
T WAVE AXIS: 89 DEGREES
VENTRICULAR RATE: 64 BPM
WBC # BLD AUTO: 6.35 THOUSAND/UL (ref 4.31–10.16)

## 2019-01-10 PROCEDURE — 83735 ASSAY OF MAGNESIUM: CPT | Performed by: NURSE PRACTITIONER

## 2019-01-10 PROCEDURE — 93010 ELECTROCARDIOGRAM REPORT: CPT | Performed by: INTERNAL MEDICINE

## 2019-01-10 PROCEDURE — G8987 SELF CARE CURRENT STATUS: HCPCS

## 2019-01-10 PROCEDURE — 99204 OFFICE O/P NEW MOD 45 MIN: CPT | Performed by: INTERNAL MEDICINE

## 2019-01-10 PROCEDURE — 85025 COMPLETE CBC W/AUTO DIFF WBC: CPT | Performed by: NURSE PRACTITIONER

## 2019-01-10 PROCEDURE — G8988 SELF CARE GOAL STATUS: HCPCS

## 2019-01-10 PROCEDURE — 97166 OT EVAL MOD COMPLEX 45 MIN: CPT

## 2019-01-10 PROCEDURE — G8979 MOBILITY GOAL STATUS: HCPCS

## 2019-01-10 PROCEDURE — 80048 BASIC METABOLIC PNL TOTAL CA: CPT | Performed by: NURSE PRACTITIONER

## 2019-01-10 PROCEDURE — 82948 REAGENT STRIP/BLOOD GLUCOSE: CPT

## 2019-01-10 PROCEDURE — G8978 MOBILITY CURRENT STATUS: HCPCS

## 2019-01-10 PROCEDURE — 99226 PR SBSQ OBSERVATION CARE/DAY 35 MINUTES: CPT | Performed by: HOSPITALIST

## 2019-01-10 PROCEDURE — 97163 PT EVAL HIGH COMPLEX 45 MIN: CPT

## 2019-01-10 RX ORDER — FUROSEMIDE 20 MG/1
20 TABLET ORAL ONCE
Status: COMPLETED | OUTPATIENT
Start: 2019-01-10 | End: 2019-01-10

## 2019-01-10 RX ADMIN — LIDOCAINE 1 PATCH: 50 PATCH TOPICAL at 17:33

## 2019-01-10 RX ADMIN — DORZOLAMIDE HYDROCHLORIDE 1 DROP: 20 SOLUTION/ DROPS OPHTHALMIC at 17:33

## 2019-01-10 RX ADMIN — AMLODIPINE BESYLATE 5 MG: 5 TABLET ORAL at 08:46

## 2019-01-10 RX ADMIN — GABAPENTIN 100 MG: 100 CAPSULE ORAL at 21:23

## 2019-01-10 RX ADMIN — PANTOPRAZOLE SODIUM 40 MG: 40 TABLET, DELAYED RELEASE ORAL at 05:33

## 2019-01-10 RX ADMIN — LISINOPRIL 10 MG: 10 TABLET ORAL at 08:47

## 2019-01-10 RX ADMIN — LATANOPROST 1 DROP: 50 SOLUTION/ DROPS OPHTHALMIC at 21:23

## 2019-01-10 RX ADMIN — VITAMIN D, TAB 1000IU (100/BT) 1000 UNITS: 25 TAB at 08:46

## 2019-01-10 RX ADMIN — INSULIN LISPRO 1 UNITS: 100 INJECTION, SOLUTION INTRAVENOUS; SUBCUTANEOUS at 17:36

## 2019-01-10 RX ADMIN — DORZOLAMIDE HYDROCHLORIDE 1 DROP: 20 SOLUTION/ DROPS OPHTHALMIC at 21:23

## 2019-01-10 RX ADMIN — FUROSEMIDE 20 MG: 20 TABLET ORAL at 08:47

## 2019-01-10 RX ADMIN — ACETAMINOPHEN 650 MG: 325 TABLET, FILM COATED ORAL at 11:03

## 2019-01-10 RX ADMIN — DORZOLAMIDE HYDROCHLORIDE 1 DROP: 20 SOLUTION/ DROPS OPHTHALMIC at 08:49

## 2019-01-10 RX ADMIN — ASPIRIN 81 MG 81 MG: 81 TABLET ORAL at 08:46

## 2019-01-10 RX ADMIN — FUROSEMIDE 20 MG: 20 TABLET ORAL at 17:33

## 2019-01-10 NOTE — OCCUPATIONAL THERAPY NOTE
[address]  Evaluation     Patient Name: Dionisio Roman  [id number] Date: 1/10/2019  Problem List  Patient Active Problem List   Diagnosis    Hypertension    Glaucoma, open angle, severe stage    Atypical chest pain    Migraine    Arteriosclerosis of coronary artery    Asthma    Aortic stenosis    Hyperlipidemia    Female bladder prolapse    Advanced age   [de-identified] Allergic rhinitis    Ambulatory dysfunction    Anxiety disorder    Intractable left upper quadrant abdominal pain    General weakness    Irritable bowel    Trigeminal neuralgia    Visual hallucinations    Dyspnea on exertion    Medication management    Chest pain    Diabetic neuropathy (HCC)    Hypothyroidism    Chronic diastolic congestive heart failure (HCC)    Type 2 diabetes mellitus, uncontrolled, with neuropathy [facility])    Insomnia    Gastroesophageal reflux disease without esophagitis    Arthralgia    Bilateral cold feet    Female cystocele    Diverticulosis    Dizziness    Hearing loss    Hiatal hernia    Low back pain    Mild cognitive impairment    Osteoarthritis    Overactive bladder    Shakiness    Sleep disturbance    Urinary incontinence    Headache     Past Medical History  Past Medical History:   Diagnosis Date    Asthma     Atypical chest pain     CAD (coronary artery disease)     Candidal intertrigo     CHF [facility]congestive heart failure) ([facility])     Depression     Diabetes mellitus ([address] )     Diabetic neuropathy ([address] )     Diverticulitis     Dyslipidemia     Female bladder prolapse     Gastric ulcer     Glaucoma     HTN (hypertension)     Hyperlipidemia     Hypothyroidism 4/18/2016    RAD (reactive airway disease)      Past Surgical History  Past Surgical History:   Procedure Laterality Date    COLONOSCOPY      ESOPHAGOGASTRODUODENOSCOPY  2011    HYSTERECTOMY             01/10/19 1339   Note Type   Note type Eval/Treat   Restrictions/Precautions   Weight Bearing Precautions Per Order No   Other Precautions Fall Risk;Multiple lines;Telemetry;Hard of hearing   Pain Assessment   Pain Assessment 0-10   Pain Score 2   Pain Type Acute pain   Pain Location Head  (headache, reports going away slowly)   Pain Orientation Bilateral   Hospital Pain Intervention(s) Ambulation/increased activity;Repositioned; Emotional support   Response to Interventions tolerated   Home Living   Type of [address] [place] [address] Two level; Able to live on main level with bedroom/bathroom  (1 STAR; 1st floor setup)   Bathroom Shower/Tub Walk-in shower   Bathroom Toilet Standard   Bathroom Equipment Grab bars in shower;Grab bars around toilet   [facility]   Additional Comments daughter transports to appointments and daughter provides 24/7 support/supervision   Prior Function   Level of [place] Independent with ADLs and functional mobility   Lives With Daughter   Receives Help From Family   ADL Assistance Independent   IADLs Independent  (daughter does cleaning, pt does cooking)   Falls in the last 6 months 0   Vocational Retired   Comments pt active PTA, use of SPC for mobility; pt reports feeling weak and wants to not get weaker while she is in the hospital    Lifestyle   Autonomy per pt independent w/ ADLs, independent w/ functional transfers and mobility w/ [facility], daughter transports to appointments, pt does light cooking   Reciprocal Relationships daughter and family   Service [employer] retired  in [address] reading   [address] "I'm doing okay, my headache is getting better"   ADL   Where [address] 5  Supervision/Setup   Grooming Assistance 5  [address] [facility] 5  Supervision/Setup   LB Bathing Assistance 5  [facility]  [facility] 66  5  [address] [facility]  Deficit Setup;Verbal cueing;Supervison/safety; Increased time to complete;Grab bar use   Bed Mobility   Additional Comments pt seated in bedside chair pre/post session   Transfers   Sit to Stand 5  Supervision   Additional items Assist x 1; Increased time required;Verbal cues;Armrests   Stand to Sit 5  Supervision   Additional items Assist x 1; Increased time required;Verbal cues;Armrests   Toilet transfer 5  Supervision   Additional items Assist x 1; Increased time required;Verbal cues;Standard toilet  (grab bar use)   Additional Comments VCs for hand placement and positioning   Functional Mobility   Functional Mobility 4  Minimal assistance  (contact guard)   Additional Comments assist x1 w/ increased time to complete   Additional items Rolling walker   Balance   Static Sitting Good   Dynamic Sitting Fair +   Static Standing Fair   Dynamic Standing Fair   Ambulatory Fair -   Activity Tolerance   Activity Tolerance Patient limited by fatigue   Nurse Made Aware appropriate to see per Laura FONG Assessment   RUE Assessment WFL  (4-/5)   LUE Assessment   LUE Assessment WFL  (4-/5)   Hand Function   Gross Motor Coordination Functional   Fine Motor Coordination Functional  (atrophy of prominences in hands)   Sensation   Light Touch No apparent deficits  (reports numbness in digits in the morning)   Vision-Basic Assessment   Visual History Glaucoma   Patient Visual Report (reports blurry vision)   Vision - Complex Assessment   Ocular Range of Motion [facility]   Acuity Able to read clock/calendar on wall without difficulty   Cognition   Overall Cognitive Status [facility]   Arousal/Participation Alert; Cooperative   Attention Within functional limits   Orientation Level Oriented X4   Memory Within functional limits   Following Commands Follows one step commands without difficulty   Comments pt engages in appropriate conversations   Assessment   Limitation Decreased ADL status; Decreased UE strength;Decreased cognition;Decreased Safe judgement during ADL;Decreased endurance;Decreased self-care trans;Decreased high-level ADLs   Prognosis Good   Assessment Pt is a 80 y o  female seen for OT evaluation s/p admit to [facility] on 1/9/2019 w/ Intractable left upper quadrant abdominal pain  CT head (-)  Comorbidities affecting pt's functional performance at time of assessment include: headache, chronic CHF, general weakness, aortic stenosis, DM II, GERD  Personal factors affecting pt at time of IE include: dyspnea on exertion, Hard of hearing  Prior to admission, pt was living w/ daughter and reports independent w/ ADLs, independent w/ functional transfers and mobility w/ [facility], daughter transports pt and completes cleaning, pt independent light cooking  Upon evaluation: Pt requires supervision sit<>stand w/ increased time from chair and toilet w/ grab bar use, supervision grooming at sink, supervision UB/LB ADLs, supervision toileting, contact guard assist functional mobility w/ SPC 2* the following deficits impacting occupational performance: decreased strength and endurance, slightly impaired balance, visual deficit (reports blurry vision), dyspnea on exertion (after activity 93-95% on room air)  Pt to benefit from continued skilled OT tx while in the hospital to address deficits as defined above and maximize level of functional independence w ADL's and functional mobility  Occupational Performance areas to address include: grooming, bathing/shower, toilet hygiene, dressing, functional mobility, clothing management, cleaning and meal prep, energy conservation education, and home safety education  From OT standpoint, recommendation at time of d/c would be home w/ family support vs  HOME w/ HOME OT pending progress  Goals   Patient Goals "get stronger"   LTG Time Frame 7-10   Long Term Goal please see below goals   Plan   Treatment Interventions ADL retraining;UE strengthening/ROM; Functional transfer training; Endurance training;Cognitive reorientation;Patient/family training;Equipment evaluation/education; Compensatory technique education; Energy conservation; Activityengagement;Cardiac education   Goal Expiration Date 01/20/19   OT Frequency 2-3x/wk   Recommendation   OT Discharge Recommendation Home OT   OT - OK to Discharge (when medically stable)   Barthel Index   Feeding 10   Bathing 0   Grooming Score 5   Dressing Score 5   Bladder Score 10   Bowels Score 10   Toilet Use Score 5   Transfers (Bed/Chair) Score 10   Mobility (Level Surface) Score 0  (NT)   Stairs Score 0  (NT)   Barthel Index Score 55   Modified Marquis Scale   Modified Marquis Scale 4     Occupational Therapy Goals to be met in 7-10 days:  1) Pt will improve activity tolerance to G for min 30 min txment sessions to enhance ADLs  2) Pt will complete ADLs/self care w/ mod I   3) Pt will complete toileting w/ mod I w/ G hygiene/thoroughness using DME PRN  4) Pt will improve functional transfers on/off all surfaces using DME PRN w/ G balance/safety including toileting w/ mod I  5) Pt will improve fx'l mobility during I/ADl/leisure tasks using DME PRN w/ g balance/safety w/ mod I  6) Pt will engage in ongoing cognitive assessment w/ G participation to A w/ safe d/c planning/recommendations  7) Pt will demonstrate G carryover of pt/caregiver education and training as appropriate w/ mod I  w/ G tolerance  8) Pt will demonstrate 100% carryover of E C  techniques w/ mod I t/o fx'l I/ADL/leisure tasks w/o cues s/p skilled education  9) Pt will demonstrate improved bed mobility to MOD I  10) Pt will demonstrate improved standing tolerance to 3-5 minutes during functional tasks w/ no LOB to enhance ADL performance  11) Pt will demonstrate improved b/l UE strength by 1 MMT grade to enhance ADLS and functional transfers  12) Pt will demonstrate and recall self-monitoring techniques for CHF (such as daily weights on scale, reading scale, modified diets) at MOD I level s/p education and training to enhance health maintenance routines at home       Documentation completed by: Keith Mahoney MS, OTR/L

## 2019-01-10 NOTE — PLAN OF CARE
CARDIOVASCULAR - ADULT     Maintains optimal cardiac output and hemodynamic stability Progressing     Absence of cardiac dysrhythmias or at baseline rhythm Progressing        GASTROINTESTINAL - ADULT     Minimal or absence of nausea and/or vomiting Progressing     Maintains adequate nutritional intake Progressing        MUSCULOSKELETAL - ADULT     Maintain or return mobility to safest level of function Progressing        Potential for Falls     Patient will remain free of falls Progressing

## 2019-01-10 NOTE — UTILIZATION REVIEW
[address]  Utilization Review Department  Phone: [phone]; Fax [phone]  [email or web address]  ATTENTION: Please call with any questions or concerns to [phone]  and carefully listen to the prompts so that you are directed to the right person  Send all requests for admission clinical reviews, approved or denied determinations and any other requests to fax [phone]   All voicemails are confidential   Initial Clinical Review    Admission: Date/Time/Statement: 1/09/2019 2001  Orders Placed This Encounter   Procedures    Place in Observation (expected length of stay for this patient is less than two midnights)     Standing Status:   Standing     Number of Occurrences:   1     Order Specific Question:   Admitting Physician     Answer:   Corrina Chaparro [[id number]]     Order Specific Question:   Level of Care     Answer:   Med Surg [16]     ED: Date/Time/Mode of Arrival:   ED Arrival Information     Expected Arrival Acuity Means of Arrival Escorted By Service Admission Type    - 1/9/2019 16:10 Emergent Walk-In Family Member General Medicine Emergency    Arrival Complaint    body aches        Chief Complaint:   Chief Complaint   Patient presents with    Shortness of Breath     patient c/o of chest pain, SOB and generalized body aches that started a couple of weeks ago;    Chest Pain     History of Illness: 64 yr old female presents twiith sob , headache , weakness and luq pain-- pain is chronic but has worsened as she woke up with the pain and has headache and not slept in 2 days  ED Vital Signs:   ED Triage Vitals [01/09/19 1622]   Temperature Pulse Respirations Blood Pressure SpO2   (!) 97 3 °F (36 3 °C) 70 18 (!) 177/77 98 %      Temp Source Heart Rate Source Patient Position - Orthostatic VS BP Location FiO2 (%)   Temporal Monitor Sitting Left arm --      Pain Score       9        Wt Readings from Last 1 Encounters:   01/10/19 75 1 kg (165 lb 9 1 oz) Vital Signs (abnormal): bp 152//72  Pertinent Labs/Diagnostic Test Results: bnp 1092--tr leuks in urine  ED Treatment:   Medication Administration from 01/09/2019 1610 to 01/09/2019 2128       Date/Time Order Dose Route Action Action by Comments     01/09/2019 1849 sodium chloride 0 9 % bolus 500 mL 0 mL Intravenous Stopped Adelina Arvizu RN      01/09/2019 1749 sodium chloride 0 9 % bolus 500 mL 500 mL Intravenous Leighannyamil 37 [facility], [address] [place] [address]      01/09/2019 1750 acetaminophen (TYLENOL) tablet 650 mg 650 mg Oral Given Adelina Arvizu RN      01/09/2019 1751 lidocaine (LIDODERM) 5 % patch 1 patch 1 patch Topical Medication Applied Adelina Arvizu RN      01/09/2019 1840 iodixanol (VISIPAQUE) 320 MG/ML injection 90 mL 90 mL Intravenous Given Raul Herd      01/09/2019 2015 furosemide (LASIX) injection 20 mg 20 mg Intravenous Given Adelina Arvizu RN         Past Medical/Surgical History:    Active Ambulatory Problems     Diagnosis Date Noted    Hypertension     Glaucoma, open angle, severe stage     Atypical chest pain 04/18/2016    Migraine 04/18/2016    Arteriosclerosis of coronary artery     Asthma     Aortic stenosis 10/21/2016    Hyperlipidemia     Female bladder prolapse     Advanced age 06/03/2015    Allergic rhinitis 05/02/2017    Ambulatory dysfunction 04/02/2015    Anxiety disorder 08/17/2016    Intractable left upper quadrant abdominal pain 12/15/2017    General weakness 11/03/2015    Irritable bowel 04/24/2015    Trigeminal neuralgia 03/16/2016    Visual hallucinations 11/17/2015    Dyspnea on exertion 02/17/2018    Medication management 04/13/2018    Chest pain     Diabetic neuropathy [facility]     Hypothyroidism 04/18/2016    Chronic diastolic congestive heart failure ([facility] ) 08/05/2018    Type 2 diabetes mellitus, uncontrolled, with neuropathy ([facility] ) 08/05/2018    Insomnia 10/12/2018    Gastroesophageal reflux disease without esophagitis 11/06/2018    Arthralgia 01/21/2015    Bilateral cold feet 12/28/2016    Female cystocele 04/24/2015    Diverticulosis 04/03/2015    Dizziness 07/02/2015    Hearing loss 07/27/2015    Hiatal hernia 04/03/2015    Low back pain 11/03/2015    Mild cognitive impairment 07/27/2015    Osteoarthritis 06/21/2016    Overactive bladder 01/20/2016    Shakiness 05/23/2017    Sleep disturbance 09/13/2017    Urinary incontinence 07/27/2015     Resolved Ambulatory Problems     Diagnosis Date Noted    CAD (coronary artery disease)     Asthma     Cellulitis of leg 04/18/2016    Arm pain 04/18/2016    Left sided chest pain 05/12/2018    Urinary tract infection without hematuria 07/13/2018    Moraxella catarrhalis bronchitis 08/05/2018    Gastritis 04/03/2015     Past Medical History:   Diagnosis Date    Asthma     Atypical chest pain     CAD (coronary artery disease)     Candidal intertrigo     [facility]congest[facility]) [facility])     Depression     Diabetes mellitus ([facility])     Diabetic neuropathy ([facility]     Diverticulitis     Dyslipidemia     Female bladder prolapse     Gastric ulcer     Glaucoma     HTN (hypertension)     Hyperlipidemia     Hypothyroidism 4/18/2016    RAD (reactive airway disease)      Admitting Diagnosis: CHF (congestive heart failure) ([facility]) [I50 9]  Dyspnea on exertion [R06 09]  SMITH (dyspnea on exertion) [R06 09]  Complaints of total body pain [R52]  Left sided chest pain [R07 9]  Age/Sex: 80 y o  female  Assessment/Plan: intractable left  Upper quad abd pain--? Neuropathic-- lidoderm patch with pain improvement-- ppi and prn maalox-- pain control and aqua k pad  Headache- ct negative--has insomnia  Chronic diastolic chf--bnp 2119--BIIBOXB sob and smith-- echo lvef 65-70%--ct of chest neg  Weakness-- pt consult  Patient will be admitted on an Observation basis with an anticipated length of stay of  < 2 midnights     Justification for Hospital Stay: LUQ pain      Admission Orders:  Scheduled Meds:   Current Facility-Administered Medications:  acetaminophen 650 mg Oral Q6H PRN Claudette Heller, DEVEN   albuterol 2 puff Inhalation Q4H PRN Claudette Heller, DEVEN   aluminum-magnesium hydroxide-simethicone 30 mL Oral Q4H PRN Claudette Heller, CRNP   amLODIPine 5 mg Oral Daily Claudette Heller, CRNP   aspirin 81 mg Oral Daily Claudette Heller, CRNP   cholecalciferol 1,000 Units Oral Daily Claudette Heller, CRNP   dorzolamide 1 drop Both Eyes TID Claudette Heller, CRNP   enoxaparin 40 mg Subcutaneous Daily Claudette Helmandeep, CRNP   furosemide 20 mg Oral Daily Claudette Helmandeep, CRNP   gabapentin 100 mg Oral HS Claudette Heller, CRNP   insulin lispro 1-5 Units Subcutaneous TID AC Claudette Heller, CRNP   insulin lispro 1-5 Units Subcutaneous HS Claudette Helmandeep, CRNP   latanoprost 1 drop Both Eyes HS Claudette Heller, CRNP   lidocaine 1 patch Topical Daily Claudette Heller, DEVEN   lisinopril 10 mg Oral Daily Claudette Heller, DEVEN   ondansetron 4 mg Intravenous Q6H PRN Claudette Heller, DEVEN   oxyCODONE 2 5 mg Oral Q4H PRN Claudette Heller, DEVEN   pantoprazole 40 mg Oral Early Morning Claudette Heller, DEVEN     Continuous Infusions:    PRN Meds:   acetaminophen    albuterol    aluminum-magnesium hydroxide-simethicone    ondansetron    oxyCODONE     fs glucose 4x daily  telm  Daily wt  Monitor skin heat application q 20 minutes  Cardiovascular cont carb diet  Pt/ot    fbs 130

## 2019-01-10 NOTE — ASSESSMENT & PLAN NOTE
Lab Results   Component Value Date    HGBA1C 6 6 (H) 08/05/2018     · Accuchecks and sliding scale  No results for input(s): POCGLU in the last 72 hours      Blood Sugar Average: Last 72 hrs:

## 2019-01-10 NOTE — CONSULTS
Consult - Cardiology   Travis Greenberg 80 y o  female MRN: [id number]  Unit/Bed#: E4 -01 Encounter: [id number]        Reason For Consult:  Dyspnea and possible chest discomfort               Assessment and Plan:     1  Chest pain:  Patient has very atypical pain which is poorly localized to the low left anterior chest versus upper abdomen  While it is possible that the patient may have some chest pain related either to poorly controlled hypertension and/or some component of subendocardial ischemia in the setting of at least moderate to severe aortic stenosis there is no strong evidence of this  Noncardiac etiologies are of equal or greater probability with no obvious cause with evaluation deferred to the admitting service  -Given recent completion of an echocardiogram less than 5 months ago, one will not be repeated at this time        -Continue to follow patient's blood pressure trend addressing the potential need for Rx titration to achieve good control   -will observed telemetry over the next 24 hr for any symptomatic correlation though her history is not strongly suggestive of dysrhythmia  2  Hypertension: Uncontrolled on admission-now normotensive   -follow trend with continuation of Lasix 20 mg and Norvasc 5 mg plus lisinopril (surrogate for pre-hospital Lotrel 5/10)  3  Aortic stenosis:     -Moderate to severe with INO 0 8 centimeters squared per recent echo 8/2018 ~~>as above in 1    4  Chronic diastolic CHF:  Currently seems compensated      History Of Present Illness: This woman is a 80year-old patient of Dr Yani Grace of our group (last seen 9/5/2018)  Her history is notable for hypertension, dyslipidemia, diabetes mellitus, and aortic valvar stenosis  She has also had a history of chronic recurrent atypical chest pain for which she was hospitalized several times in 2016, and again in April 2017  In August of 2018 she was hospitalized for acute decompensated diastolic CHF    An echocardiogram during that hospitalization reported normal LVEF possibly with some hypokinesia of the basal inferior and basal inferolateral walls with mild concentric hypertrophy and moderate to severe aortic stenosis with a calculated INO of 0 8 centimeters squared  When last seen by Dr Collette Edward in September 2018 patient had again reiterated a stable pattern of exertional dyspnea  She had had insomnia and because of this had daytime somnolence and fatigue  At that visit it was noted that the patient had at some point and for uncertain reasons had abandonment of benazepril and amlodipine  At that visit her systolic pressure was in excess of 180 and she was placed back on these medications  Yesterday patient came to emergency department reporting a constellation of symptoms none of which seem particularly acute  Among the she describes exertional dyspnea  He does not seem as though she has any particular rest symptoms including orthopnea or PND  With activity however she does seem to have a consistent pattern of mild dyspnea including ambulating in her home  It is possible that there may have been some mild insidious worsening in recent months though no obvious acuity  She has not describe any chest pain that which strongly endorse angina  She does complain of some discomfort which is poorly localized to the inframammary area which to me seems more like discomfort in lateral left upper quadrant or low chest wall  This too was poorly differentiated with the patient describing it as a sharp discomfort which occurs repeatedly lasting for hours at a time  It is not clearly correlated to eating or lack thereof, activity, movement or other appreciable precipitant  Ms Alis Andre also reports generalized weakness  She does not have any specific motor dysfunction but rather what sounds like fatigue, some component of effort intolerance, and perhaps lack of enthusiasm    She sleeps very poorly often no more than 4 hr per night  It sounds as if in the day that follows she is fatigued and does not nap  It sounds, to me, as it is this generalized fatigue that has most motivated the patient to come to the hospital   Again all of her symptoms lack any particular acuity  Past Medical History:        Past Medical History:   Diagnosis Date    Asthma     Atypical chest pain     CAD (coronary artery disease)     Candidal intertrigo     CHF (congestive heart failure) (HCC)     Depression     Diabetes mellitus (HCC)     Diabetic neuropathy (HCC)     Diverticulitis     Dyslipidemia     Female bladder prolapse     Gastric ulcer     Glaucoma     HTN (hypertension)     Hyperlipidemia     Hypothyroidism 4/18/2016    RAD (reactive airway disease)     Past Surgical History:   Procedure Laterality Date    COLONOSCOPY      ESOPHAGOGASTRODUODENOSCOPY  2011    HYSTERECTOMY          Allergy:        Allergies   Allergen Reactions    Statins      Other reaction(s): Weakness  Category: Adverse Reaction;        Medications:       Prior to Admission medications    Medication Sig Start Date End Date Taking? Authorizing Provider   acetaminophen-codeine (TYLENOL #3) 300-30 mg per tablet Take 1 tablet by mouth every 4 (four) hours as needed for moderate pain 12/28/18  Yes Mirian Killian MD   aluminum-magnesium hydroxide-simethicone (MYLANTA) 200-200-20 mg/5 mL suspension Take 30 mL by mouth every 4 (four) hours as needed for indigestion or heartburn   Yes Historical Provider, MD   amLODIPine-benazepril (LOTREL 5-10) 5-10 MG per capsule Take 1 capsule by mouth daily   Yes Historical Provider, MD   aspirin 81 MG tablet Take 81 mg by mouth daily     Yes Historical Provider, MD   Cholecalciferol (VITAMIN D3) 2000 units capsule Take 1,000 Units by mouth daily     Yes Historical Provider, MD   dorzolamide (TRUSOPT) 2 % ophthalmic solution Administer 1 drop to both eyes 3 (three) times a day     Yes Historical Provider, MD   fluticasone (FLONASE) 50 mcg/act nasal spray 2 sprays into each nostril daily 8/10/18  Yes Shellie Armenta MD   furosemide (LASIX) 20 mg tablet Take 20 mg by mouth daily   11/8/18  Yes Historical Provider, MD   gabapentin (NEURONTIN) 600 MG tablet Take 0 5 tablets (300 mg total) by mouth every 8 (eight) hours as needed (Cranial Neuralgia) 2/20/18  Yes Raina Harrell MD   latanoprost (XALATAN) 0 005 % ophthalmic solution Apply 1 drop to eye daily at bedtime  Both eyes   Yes Historical Provider, MD   omeprazole (PriLOSEC) 40 MG capsule Take 1 capsule (40 mg total) by mouth daily 11/6/18  Yes Raina Harrell MD   sitaGLIPtin (JANUVIA) 50 mg tablet Take 1 tablet (50 mg total) by mouth daily 8/14/18  Yes Raina Harrell MD       Family History:     Family History   Problem Relation Age of Onset   [facility] Breast cancer Mother     Hypothyroidism Mother     Hypertension Father     Breast cancer Sister     Thyroid disease Sister     Hypertension Sister         Social History:       Social History     Social History    Marital status:      Spouse name: N/A    Number of children: 2    Years of education: N/A     Occupational History    Retired      Social History Main Topics    Smoking status: Never Smoker    Smokeless tobacco: Never Used    Alcohol use No      Comment: Social Alcohol Use -- as per allscripts (pt denies all alcohol use)    Drug use: No    Sexual activity: Not Asked     Other Topics Concern    None     Social History Narrative    Lived with adult children    Caffeine Use           ROS:  Symptoms per HPI  Intermittent use of a cane to assist with ambulation  Fair appetite typically only eating 2 meals daily  Patient reports typically having postprandial bowel movement-usually 2 per day  Formed and otherwise unremarkable  Remainder review of systems is negative    Exam:  General:  Alert, normally conversant, appears less than her chronologic age  Head: Normocephalic, atraumatic  Eyes:  EOMI   Pupils - equal, round, reactive to accomodation  No icterus  Normal Conjunctiva  Oropharynx:  Moist without lesion  Neck:  No gross bruit, JVD, thyromegaly, or lymphadenopathy  Heart:  Regular with controlled rate  Basilar JENNY with near absent S2  Lungs:  Clear without rales/rhonchi/wheeze  Abdomen:  Soft and nontender with normal bowel sounds  No organomegaly or mass  Lower Limbs:  No edema  Pulses[de-identified]  RLE - DP:  2+                 LLE - DP:  2+  Musculoskeletal: Independent movement of limbs observed, Formal ROM and strength eval not performed  Neurologic:    Oriented to: person , place, situation  Cranial Nerves: grossly intact - vision, smell, taste, and hearing  were not tested       Motor function:grossly normal, symmetric

## 2019-01-10 NOTE — ASSESSMENT & PLAN NOTE
· CT head negative for acute intracranial abnormality  · Pain is probably due to insomnia, she has not slept in the last few days due to intractable pain  · P r n   Tylenol

## 2019-01-10 NOTE — ASSESSMENT & PLAN NOTE
· Multiple ER visits for chest pain, EKG and troponin negative for myocardial ischemia  · CT: Stable chronic changes without findings to explain this patient's left chest/left upper quadrant pain  · Troponin negative  · EKG without ischemic changes  · VAHID  · Telemetry monitoring

## 2019-01-10 NOTE — ASSESSMENT & PLAN NOTE
· As per PMD thought to be neuropathic pain; continue gabapentin  · CT: Stable chronic changes without findings to explain this patient's left chest/left upper quadrant pain  · Lidoderm patch placed, patient reports improvement in pain  Continue    · Aqua K-pad  · Pain control  · Continue PPI and prn Maalox

## 2019-01-10 NOTE — PLAN OF CARE
Problem: OCCUPATIONAL THERAPY ADULT  Goal: Performs self-care activities at highest level of function for planned discharge setting  See evaluation for individualized goals  Treatment Interventions: ADL retraining, UE strengthening/ROM, Functional transfer training, Endurance training, Cognitive reorientation, Patient/family training, Equipment evaluation/education, Compensatory technique education, Energy conservation, Activityengagement, Cardiac education          See flowsheet documentation for full assessment, interventions and recommendations  Limitation: Decreased ADL status, Decreased UE strength, Decreased cognition, Decreased Safe judgement during ADL, Decreased endurance, Decreased self-care trans, Decreased high-level ADLs  Prognosis: Good  Assessment: Pt is a 80 y o  female seen for OT evaluation s/p admit to [facility] on 1/9/2019 w/ Intractable left upper quadrant abdominal pain  CT head (-)  Comorbidities affecting pt's functional performance at time of assessment include: headache, chronic CHF, general weakness, aortic stenosis, DM II, GERD  Personal factors affecting pt at time of IE include: dyspnea on exertion, Hard of hearing  Prior to admission, pt was living w/ daughter and reports independent w/ ADLs, independent w/ functional transfers and mobility w/ [facility], daughter transports pt and completes cleaning, pt independent light cooking  Upon evaluation: Pt requires supervision sit<>stand w/ increased time from chair and toilet w/ grab bar use, supervision grooming at sink, supervision UB/LB ADLs, supervision toileting, contact guard assist functional mobility w/ SPC 2* the following deficits impacting occupational performance: decreased strength and endurance, slightly impaired balance, visual deficit (reports blurry vision), dyspnea on exertion (after activity 93-95% on room air)    Pt to benefit from continued skilled OT tx while in the hospital to address deficits as defined above and maximize level of functional independence w ADL's and functional mobility  Occupational Performance areas to address include: grooming, bathing/shower, toilet hygiene, dressing, functional mobility, clothing management, cleaning and meal prep, energy conservation education, and home safety education  From OT standpoint, recommendation at time of d/c would be home w/ family support vs  HOME w/ HOME OT pending progress       OT Discharge Recommendation: Home OT  OT - OK to Discharge:  (when medically stable)      Comments: Conner Morgan MS, OTR/L

## 2019-01-10 NOTE — ASSESSMENT & PLAN NOTE
· Euvolemic, no acute exacerbation  · BNP 1092; reports SOB and SMITH, chronic complaint, per Cardiology note on Sept: patient has chronic exertional dyspnea, stable, may be anginal equivalent; hold off ischemic evaluation considering her advanced age and comorbidities as well as uncontrolled blood pressure; control BP and consider statin in the future    · ECHO: LVEF 65-70%; moderate to severe aortic stenosis  · CT:  Negative for effusions or pulmonary consolidation  · Continue oral Lasix 20mg daily  · Low Na diet  · Daily weight, I&O

## 2019-01-10 NOTE — H&P
H&P- Buster Sales 9/24/1922, 80 y o  female MRN: [id number]    Unit/Bed#: E4 -01 Encounter: [id number]    Primary Care Provider: Talya Fulton MD   Date and time admitted to hospital: 1/9/2019  4:29 PM      * Intractable left upper quadrant abdominal pain   Assessment & Plan    · As per PMD thought to be neuropathic pain; continue gabapentin  · CT: Stable chronic changes without findings to explain this patient's left chest/left upper quadrant pain  · Lidoderm patch placed, patient reports improvement in pain  Continue  · Aqua K-pad  · Pain control  · Continue PPI and prn Maalox     Headache   Assessment & Plan    · CT head negative for acute intracranial abnormality  · Pain is probably due to insomnia, she has not slept in the last few days due to intractable pain  · P r n  Tylenol     Chronic diastolic congestive heart failure (HCC)   Assessment & Plan    · Euvolemic, no acute exacerbation  · BNP 1092; reports SOB and SMITH, chronic complaint, per Cardiology note on Sept: patient has chronic exertional dyspnea, stable, may be anginal equivalent; hold off ischemic evaluation considering her advanced age and comorbidities as well as uncontrolled blood pressure; control BP and consider statin in the future  · ECHO: LVEF 65-70%; moderate to severe aortic stenosis  · CT:  Negative for effusions or pulmonary consolidation  · Continue oral Lasix 20mg daily  · Low Na diet  · Daily weight, I&O     General weakness   Assessment & Plan    · PT OT consult     Aortic stenosis   Assessment & Plan    · SMITH may be related to aortic stenosis   Echo on 8/2018 shows moderate to severe aortic valve stenosis; is followed outpatient by cardiology, no indication for surgery as per cardiology  · Due to her advanced age likely not a surgical candidate     Gastroesophageal reflux disease without esophagitis   Assessment & Plan    · Continue PPI     Type 2 diabetes mellitus, uncontrolled, with neuropathy ([facility] [address] )   Assessment & Plan    Lab Results   Component Value Date    HGBA1C 6 6 (H) 08/05/2018     · Accuchecks and sliding scale  No results for input(s): POCGLU in the last 72 hours  Blood Sugar Average: Last 72 hrs:       Hypothyroidism   Assessment & Plan    · Continue levothyroxine     Hypertension   Assessment & Plan    · Continue amlodipine-benazepril        VTE Prophylaxis: Enoxaparin (Lovenox)  / reason for no mechanical VTE prophylaxis Ambulate   Code Status: FC  POLST: POLST is not applicable to this patient  Discussion with family:     Anticipated Length of Stay:  Patient will be admitted on an Observation basis with an anticipated length of stay of  < 2 midnights  Justification for Hospital Stay: LUQ pain    Total Time for Visit, including Counseling / Coordination of Care: 45 minutes  Greater than 50% of this total time spent on direct patient counseling and coordination of care  Chief Complaint:   Left upper quadrant pain, headache, weakness    History of Present Illness:    Vi Lee is a 80 y o  female who presents with c/o shortness of breath, headache, weakness and left upper quadrant pain  Reports left upper quadrant pain is chronic although recently worsening waking her from her sleep causing her to shake from the pain  Reports headache, has not slept in a few days 2/2 LUQ pain  C/o generalized weakness, has been using her walker lately, normally can ambulate independently  Patient admitted multiple times for left upper quadrant and chest pain, cardiac workup negative for ischemia, left upper quadrant pain thought to be neuropathic, treated with gabapentin, PPI and p r n  Maalox  Denies chest pain  Denies unilateral weakness, numbness, dizziness, visual changes, orthopnea, weight gain, edema, abdominal pain, NVCD or dysuria  Review of Systems:    Review of Systems   HENT: Negative  Respiratory: Positive for shortness of breath  SMITH   Cardiovascular: Negative      Gastrointestinal: Negative  Genitourinary: Negative  Musculoskeletal:        Left lateral upper quadrant pain   Neurological: Positive for weakness and headaches  Negative for dizziness, syncope, speech difficulty, light-headedness and numbness  Psychiatric/Behavioral: Positive for sleep disturbance  Past Medical and Surgical History:     Past Medical History:   Diagnosis Date    Asthma     Atypical chest pain     CAD (coronary artery disease)     Candidal intertrigo     Depression     Diabetes mellitus (HCC)     Diabetic neuropathy ([facility] [address] )     Diverticulitis     Dyslipidemia     Female bladder prolapse     Gastric ulcer     Glaucoma     HTN (hypertension)     Hyperlipidemia     Hypothyroidism 4/18/2016    RAD (reactive airway disease)        Past Surgical History:   Procedure Laterality Date    COLONOSCOPY      ESOPHAGOGASTRODUODENOSCOPY  2011    HYSTERECTOMY         Meds/Allergies:    Prior to Admission medications    Medication Sig Start Date End Date Taking? Authorizing Provider   acetaminophen-codeine (TYLENOL #3) 300-30 mg per tablet Take 1 tablet by mouth every 4 (four) hours as needed for moderate pain 12/28/18  Yes Judy Olivo MD   aluminum-magnesium hydroxide-simethicone (MYLANTA) 200-200-20 mg/5 mL suspension Take 30 mL by mouth every 4 (four) hours as needed for indigestion or heartburn   Yes Historical Provider, MD   amLODIPine-atorvastatin (CADUET) 5-10 MG per tablet Take 1 tablet by mouth daily   Yes Historical Provider, MD   aspirin 81 MG tablet Take 81 mg by mouth daily     Yes Historical Provider, MD   Cholecalciferol (VITAMIN D3) 2000 units capsule Take 1,000 Units by mouth daily     Yes Historical Provider, MD   dorzolamide (TRUSOPT) 2 % ophthalmic solution Administer 1 drop to both eyes 3 (three) times a day     Yes Historical Provider, MD   fluticasone (FLONASE) 50 mcg/act nasal spray 2 sprays into each nostril daily 8/10/18  Yes Newton De Leon MD   furosemide (LASIX) 20 mg tablet  11/8/18  Yes Historical Provider, MD   gabapentin (NEURONTIN) 600 MG tablet Take 0 5 tablets (300 mg total) by mouth every 8 (eight) hours as needed (Cranial Neuralgia) 2/20/18  Yes Kaley Whyte MD   latanoprost (XALATAN) 0 005 % ophthalmic solution Apply 1 drop to eye daily at bedtime  Both eyes   Yes Historical Provider, MD   omeprazole (PriLOSEC) 40 MG capsule Take 1 capsule (40 mg total) by mouth daily 11/6/18  Yes Kaley Whyte MD   sitaGLIPtin (JANUVIA) 50 mg tablet Take 1 tablet (50 mg total) by mouth daily 8/14/18  Yes Kaley Whyte MD   amLODIPine-benazepril (LOTREL 5-10) 5-10 MG per capsule Take 1 capsule by mouth daily 9/5/18 1/9/19 Yes Shelia Cardozo DO   levothyroxine 75 mcg tablet Take 1 tablet (75 mcg total) by mouth daily 3/29/18 1/9/19  Kaley Whyte MD   OLANZapine (ZyPREXA) 2 5 mg tablet Take 1 tablet (2 5 mg total) by mouth daily at bedtime 10/12/18 1/9/19  Kaley Whyte MD     I have reviewed home medications with patient personally  Allergies: Allergies   Allergen Reactions    Statins      Other reaction(s): Weakness  Category: Adverse Reaction;        Social History:     Marital Status:    Occupation: retired  Patient Pre-hospital Living Situation:  Resides at home with daughter  Patient Pre-hospital Level of Mobility:  Ambulatory, cane p r n    Patient Pre-hospital Diet Restrictions:   Substance Use History:   History   Alcohol Use No     Comment: Social Alcohol Use -- as per allscripts     History   Smoking Status    Never Smoker   Smokeless Tobacco    Never Used     History   Drug Use No       Family History:    Family History   Problem Relation Age of Onset   Mavis Cousin Breast cancer Mother     Hypothyroidism Mother     Hypertension Father     Breast cancer Sister     Thyroid disease Sister     Hypertension Sister        Physical Exam:     Vitals:   Blood Pressure: 166/74 (01/1933)  Pulse: 61 (01/1933)  Temperature: (!) 97 3 °F (36 3 °C) (01/09/19 1622)  Temp Source: Temporal (01/09/19 1622)  Respirations: 18 (01/1933)  Weight - Scale: 75 3 kg (166 lb) (01/09/19 1622)  SpO2: 95 % (01/1933)    Physical Exam   Constitutional: She is oriented to person, place, and time  She appears well-developed and well-nourished  No distress  HENT:   Head: Normocephalic and atraumatic  Eyes: Conjunctivae and EOM are normal    Neck: Neck supple  Cardiovascular: Normal rate, regular rhythm and intact distal pulses  Murmur heard  Pulmonary/Chest: Effort normal and breath sounds normal  No respiratory distress  She has no wheezes  She has no rales  She exhibits no tenderness  Abdominal: Soft  Bowel sounds are normal  She exhibits no distension and no mass  There is no tenderness  There is no rebound and no guarding  Musculoskeletal: She exhibits no edema  Neurological: She is alert and oriented to person, place, and time  Skin: Skin is warm and dry  She is not diaphoretic  No erythema  No pallor  Psychiatric: She has a normal mood and affect  Her behavior is normal  Judgment and thought content normal      Additional Data:     Lab Results: I have personally reviewed pertinent reports  Results from last 7 days  Lab Units 01/09/19  1746   WBC Thousand/uL 6 46   HEMOGLOBIN g/dL 14 7   HEMATOCRIT % 45 7   PLATELETS Thousands/uL 207   NEUTROS PCT % 73   LYMPHS PCT % 15   MONOS PCT % 9   EOS PCT % 2       Results from last 7 days  Lab Units 01/09/19  1746   SODIUM mmol/L 137   POTASSIUM mmol/L 4 2   CHLORIDE mmol/L 103   CO2 mmol/L 26   BUN mg/dL 12   CREATININE mg/dL 1 01   ANION GAP mmol/L 8   CALCIUM mg/dL 9 7   ALBUMIN g/dL 3 5   TOTAL BILIRUBIN mg/dL 0 39   ALK PHOS U/L 94   ALT U/L 20   AST U/L 16   GLUCOSE RANDOM mg/dL 130                       Imaging: I have personally reviewed pertinent reports        CT chest abdomen pelvis w contrast   ED Interpretation by Cary Nino DO (01/09 1930)   IMPRESSION:       Stable chronic changes without findings to explain this patient's left chest/left upper quadrant pain           Final Result by Patricia Fontenot MD (01/09 1908)      Stable chronic changes without findings to explain this patient's left chest/left upper quadrant pain  Workstation performed: [id number]         CT head without contrast   ED Interpretation by Padma Hernandez DO (01/09 1858)   IMPRESSION:       No acute intracranial abnormality  Stable chronic microangiopathic changes      Final Result by Patricia Fontenot MD (01/09 1852)      No acute intracranial abnormality  Stable chronic microangiopathic changes  Workstation performed: [id number]             EKG, Pathology, and Other Studies Reviewed on Admission:   · EKG:  Echo, CT    Allscripts / Epic Records Reviewed: Yes     ** Please Note: This note has been constructed using a voice recognition system   **

## 2019-01-10 NOTE — ED NOTES
Pt ambulatory at the bathroom at this time with RN/Cane assistance      Rosemary Saucedo RN  01/09/19 1940

## 2019-01-10 NOTE — ASSESSMENT & PLAN NOTE
· SMITH may be related to aortic stenosis   Echo on 8/2018 shows moderate to severe aortic valve stenosis; is followed outpatient by cardiology, no indication for surgery as per cardiology  · Due to her advanced age likely not a surgical candidate

## 2019-01-10 NOTE — PLAN OF CARE
Problem: PHYSICAL THERAPY ADULT  Goal: Performs mobility at highest level of function for planned discharge setting  See evaluation for individualized goals  Treatment/Interventions: Functional transfer training, LE strengthening/ROM, Elevations, Therapeutic exercise, Endurance training, Patient/family training, Equipment eval/education, Bed mobility, Gait training, Compensatory technique education, Continued evaluation, Spoke to nursing          See flowsheet documentation for full assessment, interventions and recommendations  Outcome: Progressing  Prognosis: Good  Problem List: Decreased strength, Decreased endurance, Impaired balance, Decreased mobility, Impaired hearing  Assessment: Pt is 81 y/o female admitted with L sided abdominal pain and chest wall pain, headache and weakness  Hx of CHF, AS  PT consulted  Prior to admission was independent with use of cane  Primarily household ambulation  Notes progressive decline with strength and activity tolerance with increased fatigue  Resides with daughter who is supportive and available to assist prn  Denies hx of falls  Currently presents with mild impairments in strength, balance, activity tolerance and overall mobility  S for transfers and close S for ambulation with use of SPC  No LOB with cane  + fatigue noted and mild SMITH with ambulation  O2 sats on RA 93-96%  Given impairments in strength, balance and activity tolerance, may benefit from ongoing PT in order to optimize outcomes  Pt expresses desire to improve strength overall  HHPT may be beneficial   Pt receptive to same  PT will follow and progress  Recommendation: Home PT, Home with family support     PT - OK to Discharge: Yes    See flowsheet documentation for full assessment

## 2019-01-10 NOTE — PROGRESS NOTES
Progress Note - Farhad Blanton 80 y o  female MRN: [id number]    Unit/Bed#: E4 -01 Encounter: [id number]    Principal Problem:    Intractable left upper quadrant abdominal pain  Active Problems:    Hypertension    Aortic stenosis    General weakness    Hypothyroidism    Chronic diastolic congestive heart failure (HCC)    Type 2 diabetes mellitus, uncontrolled, with neuropathy ([facility])    Gastroesophageal reflux disease without esophagitis    Headache  Resolved Problems:    * No resolved hospital problems  *      Assessment/Plan:     left upper quadrant abdominal and left chest wall pain  Assessment & Plan     · As per PMD thought to be neuropathic pain however could be angina equivalent; per the patient's daughter her chest pain occurs almost daily at rest   ·   CT: Stable chronic changes without findings to explain this patient's left chest/left upper quadrant pain  · Lidoderm patch placed, patient reports improvement in pain  Continue  · Aqua K-pad  · Pain control  · Continue PPI and prn Maalox      Headache   Assessment & Plan     · CT head negative for acute intracranial abnormality  · Pain is probably due to insomnia, she has not slept in the last few days due to intractable pain  · P r n  Tylenol      Chronic diastolic congestive heart failure (HCC)   Assessment & Plan     · Euvolemic, no acute exacerbation  · BNP 1092; reports SOB and SMITH, chronic complaint, per Cardiology note on Sept: patient has chronic exertional dyspnea, stable, may be anginal equivalent; hold off ischemic evaluation considering her advanced age and comorbidities as well as uncontrolled blood pressure; control BP and consider statin in the future    · ECHO: LVEF 65-70%; moderate to severe aortic stenosis  · CT:  Negative for effusions or pulmonary consolidation  · Continue oral Lasix 20mg daily  · Low Na diet  · Daily weight, I&O      General weakness   Assessment & Plan     · PT OT consult      Aortic stenosis   Assessment & Plan     · SMITH may be related to aortic stenosis  Echo on 8/2018 shows moderate to severe aortic valve stenosis; is followed outpatient by cardiology, no indication for surgery as per cardiology  · Due to her advanced age likely not a surgical candidate-TAVR could be an option      Gastroesophageal reflux disease without esophagitis   Assessment & Plan     · Continue PPI      Type 2 diabetes mellitus, uncontrolled, with neuropathy ([facility] [address] )   Assessment & Plan             Lab Results   Component Value Date     HGBA1C 6 6 (H) 08/05/2018      · Accuchecks and sliding scale  No results for input(s): POCGLU in the last 72 hours      Blood Sugar Average: Last 72 hrs:         Hypothyroidism   Assessment & Plan     · Continue levothyroxine      Hypertension   Assessment & Plan     · Continue amlodipine-benazepril         Consult Cardiology  Patient will need another midnight stay      Subjective:  Patient admitted with left-sided chest and upper abdominal wall pain  Patient also has chronic exertional dyspnea which she states has now worsened over the past few weeks  Has moderate to severe aortic valve stenosis  Was due to see Dr Flor Kennedy today  During her last visit in September of 2018-it was thought that she probably had angina equivalent and ischemic evaluation was held of considering her advanced age  Physical Exam:   Vitals: Blood pressure 130/62, pulse 68, temperature 98 4 °F (36 9 °C), temperature source Tympanic, resp  rate 18, weight 75 1 kg (165 lb 9 1 oz), SpO2 92 %, not currently breastfeeding  ,Body mass index is 30 28 kg/m²  Gen:  Pleasant, non-tachypnic, non-dyspnic  Conversant  Heart: regular rate and rhythm, S1S2 present, no murmur, rub or gallop  Lungs: clear to ausculatation bilaterally  No wheezing, crackless, or rhonchi  No accessory muscle use or respiratory distress  Abd: soft, non-tender, non-distended  NABS, no guarding, rebound or peritoneal signs    Extremities: no clubbing, cyanosis or edema  2+pedal pulses bilaterally  Full range of motion  Neuro: awake, alert and oriented  Cranial nerves 2-12 intact  Strength and sensation grossly intact  Skin: warm and dry: no petechiae, purpura and rash      LABS:     Results from last 7 days  Lab Units 01/10/19  0554 01/09/19  1746   WBC Thousand/uL 6 35 6 46   HEMOGLOBIN g/dL 13 4 14 7   HEMATOCRIT % 42 0 45 7   PLATELETS Thousands/uL 203 207       Results from last 7 days  Lab Units 01/10/19  0554 01/09/19  1746   POTASSIUM mmol/L 4 1 4 2   CHLORIDE mmol/L 106 103   CO2 mmol/L 25 26   BUN mg/dL 18 12   CREATININE mg/dL 1 27 1 01   CALCIUM mg/dL 9 0 9 7       Intake/Output Summary (Last 24 hours) at 01/10/19 1209  Last data filed at 01/10/19 0930   Gross per 24 hour   Intake              960 ml   Output              540 ml   Net              420 ml           Current Facility-Administered Medications:  acetaminophen 650 mg Oral Q6H PRN Elizabethtova Huddleston, CRNP   albuterol 2 puff Inhalation Q4H PRN Elizabethtova Huddleston, CRNP   aluminum-magnesium hydroxide-simethicone 30 mL Oral Q4H PRN Rayvilletova Huddleston, CRNP   amLODIPine 5 mg Oral Daily Elizabeth Speck, CRNP   aspirin 81 mg Oral Daily Rayville Speck, CRNP   cholecalciferol 1,000 Units Oral Daily Elizabeth Speck, CRNP   dorzolamide 1 drop Both Eyes TID Rayvilletova Huddleston, CRNP   enoxaparin 40 mg Subcutaneous Daily Rayvilletova Huddleston, CRNP   furosemide 20 mg Oral Daily Elizabethtova Huddleston, CRNP   gabapentin 100 mg Oral HS Rayvilletova Huddleston, CRNP   insulin lispro 1-5 Units Subcutaneous TID AC Rayville Flaquito, CRNP   insulin lispro 1-5 Units Subcutaneous HS Rayville Flaquito, CRNP   latanoprost 1 drop Both Eyes HS Rayvilletova Huddleston, CRNP   lidocaine 1 patch Topical Daily Elizabethtova Huddleston, CRNP   lisinopril 10 mg Oral Daily Elizabethtova Huddleston, CRNP   ondansetron 4 mg Intravenous Q6H PRN Rayvilletova Huddleston, CRNP   oxyCODONE 2 5 mg Oral Q4H PRN Rayvilletova Huddleston, CRNP   pantoprazole 40 mg Oral Early Morning DEVEN Jimenez       Family update- updated daughter Jennifer Obando

## 2019-01-11 VITALS
RESPIRATION RATE: 18 BRPM | BODY MASS INDEX: 30.28 KG/M2 | HEART RATE: 60 BPM | TEMPERATURE: 97.6 F | WEIGHT: 165.57 LBS | DIASTOLIC BLOOD PRESSURE: 60 MMHG | SYSTOLIC BLOOD PRESSURE: 125 MMHG | OXYGEN SATURATION: 97 %

## 2019-01-11 LAB
ANION GAP SERPL CALCULATED.3IONS-SCNC: 10 MMOL/L (ref 4–13)
BUN SERPL-MCNC: 26 MG/DL (ref 5–25)
CALCIUM SERPL-MCNC: 8.8 MG/DL (ref 8.3–10.1)
CHLORIDE SERPL-SCNC: 103 MMOL/L (ref 100–108)
CO2 SERPL-SCNC: 23 MMOL/L (ref 21–32)
CREAT SERPL-MCNC: 1.29 MG/DL (ref 0.6–1.3)
GFR SERPL CREATININE-BSD FRML MDRD: 40 ML/MIN/1.73SQ M
GLUCOSE P FAST SERPL-MCNC: 118 MG/DL (ref 65–99)
GLUCOSE SERPL-MCNC: 118 MG/DL (ref 65–140)
GLUCOSE SERPL-MCNC: 119 MG/DL (ref 65–140)
GLUCOSE SERPL-MCNC: 126 MG/DL (ref 65–140)
POTASSIUM SERPL-SCNC: 4 MMOL/L (ref 3.5–5.3)
SODIUM SERPL-SCNC: 136 MMOL/L (ref 136–145)

## 2019-01-11 PROCEDURE — 82948 REAGENT STRIP/BLOOD GLUCOSE: CPT

## 2019-01-11 PROCEDURE — 99239 HOSP IP/OBS DSCHRG MGMT >30: CPT | Performed by: HOSPITALIST

## 2019-01-11 PROCEDURE — 80048 BASIC METABOLIC PNL TOTAL CA: CPT | Performed by: INTERNAL MEDICINE

## 2019-01-11 PROCEDURE — 97530 THERAPEUTIC ACTIVITIES: CPT

## 2019-01-11 PROCEDURE — 97110 THERAPEUTIC EXERCISES: CPT

## 2019-01-11 PROCEDURE — 97116 GAIT TRAINING THERAPY: CPT

## 2019-01-11 RX ADMIN — ASPIRIN 81 MG 81 MG: 81 TABLET ORAL at 08:13

## 2019-01-11 RX ADMIN — DORZOLAMIDE HYDROCHLORIDE 1 DROP: 20 SOLUTION/ DROPS OPHTHALMIC at 08:12

## 2019-01-11 RX ADMIN — LISINOPRIL 10 MG: 10 TABLET ORAL at 08:12

## 2019-01-11 RX ADMIN — LIDOCAINE 1 PATCH: 50 PATCH TOPICAL at 08:11

## 2019-01-11 RX ADMIN — PANTOPRAZOLE SODIUM 40 MG: 40 TABLET, DELAYED RELEASE ORAL at 05:43

## 2019-01-11 RX ADMIN — ENOXAPARIN SODIUM 40 MG: 40 INJECTION SUBCUTANEOUS at 08:12

## 2019-01-11 RX ADMIN — AMLODIPINE BESYLATE 5 MG: 5 TABLET ORAL at 08:13

## 2019-01-11 RX ADMIN — FUROSEMIDE 20 MG: 20 TABLET ORAL at 08:13

## 2019-01-11 RX ADMIN — VITAMIN D, TAB 1000IU (100/BT) 1000 UNITS: 25 TAB at 08:13

## 2019-01-11 NOTE — SOCIAL WORK
Patient accepted by [address] [place] [address]  for SN/PT/OT    Eduin Calix RN  01/11/19  11:18 AM

## 2019-01-11 NOTE — UTILIZATION REVIEW
Notification of Observation Care Status/Observation Authorization Request    This is a Notification of Observation Care Status to our facility [facility] [email or web address] Topete  Be advised that this patient was admitted to our facility under Observation Status  Please contact the Utilization Review Department where the patient is receiving care services for additional admission information  Place of Service Code: 25  Place of Service Name: [facility]  CPT Code for Observation:     Presentation Date & Time: 1/9/2019  4:29 PM  Observation Admission Date & Time: 01/09 @ 2000  Hours in Observation: 37 HRS AS OF 01/11 @ 09:22 AM  Discharge Date & Time: No discharge date for patient encounter  Discharge Disposition (if discharged): Home/Self Care  Attending Physician: BRIDGETT Dodson  [facility] Practioner ID- [id number]  [facility], [facility]  Phone 1: [phone]  Fax: [phone]  Admission Orders     Ordered        01/09/19 2000  Place in Observation (expected length of stay for this patient is less than two midnights)  Once             Facility[email or web address]  Address: [address] [place], [place] [address]adonay Larios, [facility], [facility]  Phone: [phone] Tax ID: [id number]  NPI: [id number]  Medicare ID: [id number]    [facility]  Phone: [phone]; Fax [phone]  ATTENTION: Please call with any questions or concerns to [phone]  and carefully listen to the prompts so that you are directed to the right person  Send all requests for admission clinical reviews, approved or denied determinations and any other requests to fax [phone]   All voicemails are confidential

## 2019-01-11 NOTE — PROGRESS NOTES
Progress Note - Anthony Valle 80 y o  female MRN: [id number]    Unit/Bed#: E4 -01 Encounter: [id number]    Principal Problem:    Intractable left upper quadrant abdominal pain  Active Problems:    Hypertension    Aortic stenosis    General weakness    Hypothyroidism    Chronic diastolic congestive heart failure (HCC)    Type 2 diabetes mellitus, uncontrolled, with neuropathy ([facility])    Gastroesophageal reflux disease without esophagitis    Headache  Resolved Problems:    * No resolved hospital problems  *      Assessment/Plan:   left upper quadrant abdominal and left chest wall pain  Resolved   Assessment & Plan     · As per PMD thought to be neuropathic pain however could be angina equivalent; per the patient's daughter her chest pain occurs almost daily at rest   Cardiology input appreciated  Could have coronary artery disease  Continue aspirin  ·   CT: Stable chronic changes left upper quadrant pain  · Lidoderm patch placed, patient reports improvement in pain   Continue  · Aqua K-pad  · Pain control  · Continue PPI and prn Maalox      Headache   Assessment & Plan     · CT head negative for acute intracranial abnormality  · Pain is probably due to insomnia, she has not slept in the last few days due to intractable pain  · P r n  Tylenol      Chronic diastolic congestive heart failure (HCC)   Assessment & Plan     · Euvolemic, no acute exacerbation  · BNP 1092; reports SOB and SMITH, chronic complaint, per Cardiology note on Sept: patient has chronic exertional dyspnea, stable, may be anginal equivalent; hold off ischemic evaluation considering her advanced age and comorbidities as well as uncontrolled blood pressure; control BP and consider statin in the future    · ECHO: LVEF 65-70%; moderate to severe aortic stenosis  · CT:  Negative for effusions or pulmonary consolidation  · Continue oral Lasix 20mg daily  · Low Na diet  · Daily weight, I&O      General weakness   Assessment & Plan     · PT OT consult      Aortic stenosis   Assessment & Plan     · SMITH may be related to aortic stenosis  Echo on 8/2018 shows moderate to severe aortic valve stenosis; is followed outpatient by cardiology, no indication for surgery as per cardiology  Due to her advanced age likely not a surgical candidate-    Gastroesophageal reflux disease without esophagitis   Assessment & Plan     · Continue PPI      Type 2 diabetes mellitus, uncontrolled, with neuropathy ([facility] [address] )   Assessment & Plan                   Lab Results   Component Value Date     HGBA1C 6 6 (H) 08/05/2018      · Accuchecks and sliding scale  No results for input(s): POCGLU in the last 72 hours      Blood Sugar Average: Last 72 hrs:         Hypothyroidism   Assessment & Plan     · Continue levothyroxine      Hypertension   Assessment & Plan     · Continue amlodipine-benazepril           Stable for discharge home    Subjective:  Patient doing well  No further episodes of chest pain  Shortness of breath attributed to severe aortic stenosis  No intervention suggested by Cardiology in light of age 80 years  Physical Exam:   Vitals: Blood pressure 125/60, pulse 60, temperature 97 6 °F (36 4 °C), temperature source Tympanic, resp  rate 18, weight 75 1 kg (165 lb 9 1 oz), SpO2 97 %, not currently breastfeeding  ,Body mass index is 30 28 kg/m²  Gen:  Pleasant, non-tachypnic, non-dyspnic  Conversant  Heart: regular rate and rhythm, S1S2 present, no murmur, rub or gallop  Lungs: clear to ausculatation bilaterally  No wheezing, crackless, or rhonchi  No accessory muscle use or respiratory distress  Abd: soft, non-tender, non-distended  NABS, no guarding, rebound or peritoneal signs  Extremities: no clubbing, cyanosis or edema  2+pedal pulses bilaterally  Full range of motion  Neuro: awake, alert and oriented  Cranial nerves 2-12 intact  Strength and sensation grossly intact  Skin: warm and dry: no petechiae, purpura and rash      LABS:     Results from last 7 days  Lab Units 01/10/19  0554 01/09/19  1746   WBC Thousand/uL 6 35 6 46   HEMOGLOBIN g/dL 13 4 14 7   HEMATOCRIT % 42 0 45 7   PLATELETS Thousands/uL 203 207       Results from last 7 days  Lab Units 01/11/19  0506 01/10/19  0554 01/09/19  1746   POTASSIUM mmol/L 4 0 4 1 4 2   CHLORIDE mmol/L 103 106 103   CO2 mmol/L 23 25 26   BUN mg/dL 26* 18 12   CREATININE mg/dL 1 29 1 27 1 01   CALCIUM mg/dL 8 8 9 0 9 7       Intake/Output Summary (Last 24 hours) at 01/11/19 1039  Last data filed at 01/11/19 1001   Gross per 24 hour   Intake              580 ml   Output             3600 ml   Net            -3020 ml           Current Facility-Administered Medications:  acetaminophen 650 mg Oral Q6H PRN Carmen Grade, CRNP   albuterol 2 puff Inhalation Q4H PRN Carmen Grade, CRNP   aluminum-magnesium hydroxide-simethicone 30 mL Oral Q4H PRN Carmen Grade, CRNP   amLODIPine 5 mg Oral Daily Carmen Grade, CRNP   aspirin 81 mg Oral Daily Carmen Grade, CRNP   cholecalciferol 1,000 Units Oral Daily Carmen Grade, CRNP   dorzolamide 1 drop Both Eyes TID Carmen Grade, CRNP   enoxaparin 40 mg Subcutaneous Daily Carmen Grade, CRNP   furosemide 20 mg Oral Daily Carmen Grade, CRNP   gabapentin 100 mg Oral HS Carmen Grade, CRNP   insulin lispro 1-5 Units Subcutaneous TID AC Carmen Grade, CRNP   insulin lispro 1-5 Units Subcutaneous HS Carmen Grade, CRNP   latanoprost 1 drop Both Eyes HS Carmen Grade, CRNP   lidocaine 1 patch Topical Daily Carmen Grade, CRNP   lisinopril 10 mg Oral Daily Carmen Grade, CRNP   ondansetron 4 mg Intravenous Q6H PRN Carmen Grade, CRNP   oxyCODONE 2 5 mg Oral Q4H PRN Carmen Grade, CRNP   pantoprazole 40 mg Oral Early Morning Carmen Grade, CRNP       Family update- updated daughter

## 2019-01-11 NOTE — PHYSICAL THERAPY NOTE
Physical Therapy Progress Note     01/11/19 1200   Pain Assessment   Pain Assessment No/denies pain   Pain Score No Pain   Hospital Pain Intervention(s) Ambulation/increased activity;Repositioned   Response to Interventions Tolerated  Restrictions/Precautions   Weight Bearing Precautions Per Order No   Other Precautions Fall Risk   General   Chart Reviewed Yes   Response to Previous Treatment Patient with no complaints from previous session  Family/Caregiver Present No   Subjective   Subjective Willing to participate in therapy this AM    Bed Mobility   Supine to Sit 5  Supervision   Additional items Assist x 1;Bedrails;HOB elevated; Increased time required;Leg ;Verbal cues;LE management   Sit to Supine 5  Supervision   Additional items Assist x 1;Bedrails;Leg ; Increased time required;LE management;Verbal cues   Transfers   Sit to Stand 5  Supervision   Additional items Assist x 1;Bedrails; Increased time required;Verbal cues   Stand to Sit 5  Supervision   Additional items Assist x 1;Bedrails; Increased time required;Verbal cues   Ambulation/Elevation   Gait pattern Decreased foot clearance; Improper Weight shift; Inconsistent mello   Gait Assistance 5  Supervision   Additional items Assist x 1;Verbal cues; Tactile cues   Assistive Device [facility]   Distance 100'   Balance   Static Sitting Good   Dynamic Sitting Fair +   Static Standing Fair   Dynamic Standing Fair   Ambulatory Fair   Endurance Deficit   Endurance Deficit No   Activity Tolerance   Activity Tolerance Patient tolerated treatment well   Exercises   TKR Sitting;10 reps;AROM; Bilateral   Balance training  Able to complete standing dynamic balance training, simulating stair training   Assessment   Prognosis Good   Problem List Decreased strength;Decreased range of motion;Decreased endurance; Impaired balance;Decreased mobility; Impaired hearing   Assessment Pt  supine in bed upon my arrival  Progressed with transfers being able to complete practicing proper technique with no noted LOB  Performance of HEP seated with cues provided for proper completion  Progressed with OOB mobility being able to complete with standbyA of therapist with use of SPC  After completion of additional amb  trial returned to room and repositioned supine in bed  O2 saturation measured at 96% after increased amb  trial  PT will continue to recommend d/c home with HHPT and family support as needed when medically stable  Barriers to Discharge None   Goals   Patient Goals To go home today  STG Expiration Date 01/20/19   Treatment Day 1   Plan   Treatment/Interventions LE strengthening/ROM; Functional transfer training; Therapeutic exercise;Elevations; Endurance training;Bed mobility;Gait training;Spoke to nursing;Spoke to case management   Progress Progressing toward goals   PT Frequency Other (Comment)  (4-5x/wk)   Recommendation   Recommendation Home PT; Home with family support   PT - OK to Discharge Yes  (if d/c when medically stable )     Clyde Armendariz, PTA

## 2019-01-11 NOTE — PLAN OF CARE
Problem: PHYSICAL THERAPY ADULT  Goal: Performs mobility at highest level of function for planned discharge setting  See evaluation for individualized goals  Treatment/Interventions: Functional transfer training, LE strengthening/ROM, Elevations, Therapeutic exercise, Endurance training, Patient/family training, Equipment eval/education, Bed mobility, Gait training, Compensatory technique education, Continued evaluation, Spoke to nursing          See flowsheet documentation for full assessment, interventions and recommendations  Outcome: Progressing  Prognosis: Good  Problem List: Decreased strength, Decreased range of motion, Decreased endurance, Impaired balance, Decreased mobility, Impaired hearing  Assessment: Pt  supine in bed upon my arrival  Progressed with transfers being able to complete practicing proper technique with no noted LOB  Performance of HEP seated with cues provided for proper completion  Progressed with OOB mobility being able to complete with standbyA of therapist with use of SPC  After completion of additional amb  trial returned to room and repositioned supine in bed  O2 saturation measured at 96% after increased amb  trial  PT will continue to recommend d/c home with HHPT and family support as needed when medically stable  Barriers to Discharge: None     Recommendation: Home PT, Home with family support     PT - OK to Discharge: Yes (if d/c when medically stable )    See flowsheet documentation for full assessment

## 2019-01-11 NOTE — DISCHARGE SUMMARY
Discharge Summary - Medical Dionisio Roman 80 y o  female MRN: [id number]    [address] [address] MED SURG Room / Bed: [address] 438/E4 MS Lisset-* Encounter: [id number]    BRIEF OVERVIEW      Admission Date: 1/9/2019       Discharge Date:  1/11/2019      Admitting Diagnosis:  Chest pain  Shortness of breath    Primary Discharge Diagnosis  Principal Problem:    Chest pain  Active Problems:    Hypertension    Aortic stenosis    General weakness    Hypothyroidism    Chronic diastolic congestive heart failure (HCC)    Type 2 diabetes mellitus, uncontrolled, with neuropathy (HCC)    Gastroesophageal reflux disease without esophagitis    Headache      Service:  [facility] Internal Medicine    Consulting Providers   Cardiology-Dr Camilla Bender      Assessment and plan on date of discharge  left upper quadrant abdominal and left chest wall pain  Resolved   Assessment & Plan     · As per PMD thought to be neuropathic pain however could be angina equivalent; per the patient's daughter her chest pain occurs almost daily at rest   Cardiology input appreciated  Could have coronary artery disease  Continue aspirin  ·   CT: Stable chronic changes left upper quadrant pain  · Lidoderm patch placed, patient reports improvement in pain   Continue  · Aqua K-pad  · Pain control  · Continue PPI and prn Maalox      Headache   Assessment & Plan     · CT head negative for acute intracranial abnormality  · Pain is probably due to insomnia, she has not slept in the last few days due to intractable pain  · P r n   Tylenol      Chronic diastolic congestive heart failure (HCC)   Assessment & Plan     · Euvolemic, no acute exacerbation  · BNP 1092; reports SOB and SMITH, chronic complaint, per Cardiology note on Sept: patient has chronic exertional dyspnea, stable, may be anginal equivalent; hold off ischemic evaluation considering her advanced age and comorbidities as well as uncontrolled blood pressure; control BP and consider statin in the future  · ECHO: LVEF 65-70%; moderate to severe aortic stenosis  · CT:  Negative for effusions or pulmonary consolidation  · Continue oral Lasix 20mg daily  · Low Na diet  · Daily weight, I&O      General weakness   Assessment & Plan     · PT OT consult      Aortic stenosis   Assessment & Plan     · SMITH may be related to aortic stenosis  Echo on 8/2018 shows moderate to severe aortic valve stenosis; is followed outpatient by cardiology, no indication for surgery as per cardiology  Due to her advanced age likely not a surgical candidate-    Gastroesophageal reflux disease without esophagitis   Assessment & Plan     · Continue PPI      Type 2 diabetes mellitus, uncontrolled, with neuropathy ([facility] [address] )   Assessment & Plan                   Lab Results   Component Value Date     HGBA1C 6 6 (H) 08/05/2018      · Accuchecks and sliding scale  No results for input(s): POCGLU in the last 72 hours      Blood Sugar Average: Last 72 hrs:         Hypothyroidism   Assessment & Plan     · Continue levothyroxine      Hypertension   Assessment & Plan     · Continue amlodipine-benazepril             Stable for discharge home     Subjective:  Patient doing well  No further episodes of chest pain  Shortness of breath attributed to severe aortic stenosis  No intervention suggested by Cardiology in light of age   Discharge Condition: Improved    Discharge Disposition: Home/Self Care    Discharge summary:   Mal Guzman is an extremely pleasant 51-year-old female who has chronic diastolic heart failure, longstanding or typical chest pain and essential hypertension  Patient has severe aortic stenosis and a stable pattern of exertional dyspnea  The patient was admitted to [facility] on 01/09/2019 complaining of worsening exertional dyspnea    Patient also complained of left-sided chest discomfort going down to the lower aspects of her let for rib which she describes as being sharp shooting in nature  This has been treated with gabapentin for suspected neuropathic pain  This was evaluated by Cardiology who felt that her chest pain was a typical and likely noncardiac with negative troponin despite prolonged episode  It was entirely possible that she could have coronary artery disease at her age and had some wall motion abnormality seen on prior echo  It was recommended that she be treated conservatively and continue on aspirin  Her shortness of breath could likely be secondary to her aortic stenosis-and mild diastolic heart failure again conservative medical management was recommended in light of her age of 80 years  The patient currently feels well and will be discharged home without any change in her home regimen  She will follow up with her cardiologist Dr Rafael Lemus on discharge          Discharge Medications   Please see Medical Reconciliation Discharge Form    Discharge Follow Up Appointments:   PCP  Cardiology    Discharge  Statement   Total Time Spent today including physical exam, discussion with patient and family, and discharge arrangements/care 41 minutes

## 2019-01-11 NOTE — UTILIZATION REVIEW
Continued Stay Review    Date:  1/11/2019    Vital Signs: /60 (BP Location: Left leg)   Pulse 60   Temp 97 6 °F (36 4 °C) (Tympanic)   Resp 18   Wt 75 1 kg (165 lb 9 1 oz)   SpO2 97%   BMI 30 28 kg/m²        Assessment/Plan:   Patient doing well  No further episodes of chest pain  Shortness of breath attributed to severe aortic stenosis  No intervention suggested by Cardiology in light of age 80 years  Written for Discharge today    Medications:   Scheduled Meds:   Current Facility-Administered Medications:  acetaminophen 650 mg Oral Q6H PRN    albuterol 2 puff Inhalation Q4H PRN    aluminum-magnesium hydroxide-simethicone 30 mL Oral Q4H PRN    amLODIPine 5 mg Oral Daily    aspirin 81 mg Oral Daily    cholecalciferol 1,000 Units Oral Daily    dorzolamide 1 drop Both Eyes TID    enoxaparin 40 mg Subcutaneous Daily    furosemide 20 mg Oral Daily    gabapentin 100 mg Oral HS    insulin lispro 1-5 Units Subcutaneous TID AC    insulin lispro 1-5 Units Subcutaneous HS    latanoprost 1 drop Both Eyes HS    lidocaine 1 patch Topical Daily    lisinopril 10 mg Oral Daily    ondansetron 4 mg Intravenous Q6H PRN    oxyCODONE 2 5 mg Oral Q4H PRN    pantoprazole 40 mg Oral Early Morning      Pertinent Labs/Diagnostic Results:   BUN 26      Discharge Plan: Home with [address] [place] [address] Review Department  Phone: [phone]; Fax [phone]  [email or web address]  ATTENTION: Please call with any questions or concerns to [phone]  and carefully listen to the prompts so that you are directed to the right person  Send all requests for admission clinical reviews, approved or denied determinations and any other requests to fax [phone]   All voicemails are confidential

## 2019-01-11 NOTE — SOCIAL WORK
PT/OT recommend Home PT/OT, per record review patient has used [facility] in the past, [facility] referral sent for SN/PT/OT  CM will f/u if able to accept case and will discuss with patient       Carmen Richardson RN  01/11/19  8:22 AM

## 2019-01-11 NOTE — PLAN OF CARE
CARDIOVASCULAR - ADULT     Maintains optimal cardiac output and hemodynamic stability Progressing     Absence of cardiac dysrhythmias or at baseline rhythm Progressing        DISCHARGE PLANNING     Discharge to home or other facility with appropriate resources Progressing        GASTROINTESTINAL - ADULT     Minimal or absence of nausea and/or vomiting Progressing     Maintains adequate nutritional intake Progressing        Knowledge Deficit     Patient/family/caregiver demonstrates understanding of disease process, treatment plan, medications, and discharge instructions Progressing        MUSCULOSKELETAL - ADULT     Maintain or return mobility to safest level of function Progressing        PAIN - ADULT     Verbalizes/displays adequate comfort level or baseline comfort level Progressing        Potential for Falls     Patient will remain free of falls Progressing        SAFETY ADULT     Maintain or return to baseline ADL function Progressing     Maintain or return mobility status to optimal level Progressing

## 2019-01-14 ENCOUNTER — TRANSITIONAL CARE MANAGEMENT (OUTPATIENT)
Dept: FAMILY MEDICINE CLINIC | Facility: CLINIC | Age: 84
End: 2019-01-14

## 2019-01-18 ENCOUNTER — APPOINTMENT (OUTPATIENT)
Dept: LAB | Facility: CLINIC | Age: 84
End: 2019-01-18
Payer: COMMERCIAL

## 2019-01-18 ENCOUNTER — TELEPHONE (OUTPATIENT)
Dept: CARDIOLOGY CLINIC | Facility: CLINIC | Age: 84
End: 2019-01-18

## 2019-01-18 DIAGNOSIS — R53.1 GENERALIZED WEAKNESS: ICD-10-CM

## 2019-01-18 LAB
ALBUMIN SERPL BCP-MCNC: 3.1 G/DL (ref 3.5–5)
ALP SERPL-CCNC: 85 U/L (ref 46–116)
ALT SERPL W P-5'-P-CCNC: 14 U/L (ref 12–78)
ANION GAP SERPL CALCULATED.3IONS-SCNC: 7 MMOL/L (ref 4–13)
AST SERPL W P-5'-P-CCNC: 14 U/L (ref 5–45)
BASOPHILS # BLD AUTO: 0.08 THOUSANDS/ΜL (ref 0–0.1)
BASOPHILS NFR BLD AUTO: 1 % (ref 0–1)
BILIRUB SERPL-MCNC: 0.29 MG/DL (ref 0.2–1)
BUN SERPL-MCNC: 24 MG/DL (ref 5–25)
CALCIUM SERPL-MCNC: 8.9 MG/DL (ref 8.3–10.1)
CHLORIDE SERPL-SCNC: 109 MMOL/L (ref 100–108)
CO2 SERPL-SCNC: 22 MMOL/L (ref 21–32)
CREAT SERPL-MCNC: 1.09 MG/DL (ref 0.6–1.3)
EOSINOPHIL # BLD AUTO: 0.35 THOUSAND/ΜL (ref 0–0.61)
EOSINOPHIL NFR BLD AUTO: 6 % (ref 0–6)
ERYTHROCYTE [DISTWIDTH] IN BLOOD BY AUTOMATED COUNT: 13.4 % (ref 11.6–15.1)
EST. AVERAGE GLUCOSE BLD GHB EST-MCNC: 157 MG/DL
GFR SERPL CREATININE-BSD FRML MDRD: 50 ML/MIN/1.73SQ M
GLUCOSE SERPL-MCNC: 120 MG/DL (ref 65–140)
HBA1C MFR BLD: 7.1 % (ref 4.2–6.3)
HCT VFR BLD AUTO: 40.5 % (ref 34.8–46.1)
HGB BLD-MCNC: 13.3 G/DL (ref 11.5–15.4)
IMM GRANULOCYTES # BLD AUTO: 0.02 THOUSAND/UL (ref 0–0.2)
IMM GRANULOCYTES NFR BLD AUTO: 0 % (ref 0–2)
LYMPHOCYTES # BLD AUTO: 1.38 THOUSANDS/ΜL (ref 0.6–4.47)
LYMPHOCYTES NFR BLD AUTO: 24 % (ref 14–44)
MCH RBC QN AUTO: 30.8 PG (ref 26.8–34.3)
MCHC RBC AUTO-ENTMCNC: 32.8 G/DL (ref 31.4–37.4)
MCV RBC AUTO: 94 FL (ref 82–98)
MONOCYTES # BLD AUTO: 0.56 THOUSAND/ΜL (ref 0.17–1.22)
MONOCYTES NFR BLD AUTO: 10 % (ref 4–12)
NEUTROPHILS # BLD AUTO: 3.34 THOUSANDS/ΜL (ref 1.85–7.62)
NEUTS SEG NFR BLD AUTO: 59 % (ref 43–75)
NRBC BLD AUTO-RTO: 0 /100 WBCS
PLATELET # BLD AUTO: 182 THOUSANDS/UL (ref 149–390)
PMV BLD AUTO: 11.5 FL (ref 8.9–12.7)
POTASSIUM SERPL-SCNC: 4.4 MMOL/L (ref 3.5–5.3)
PROT SERPL-MCNC: 7 G/DL (ref 6.4–8.2)
RBC # BLD AUTO: 4.32 MILLION/UL (ref 3.81–5.12)
SODIUM SERPL-SCNC: 138 MMOL/L (ref 136–145)
T4 FREE SERPL-MCNC: 0.99 NG/DL (ref 0.76–1.46)
TSH SERPL DL<=0.05 MIU/L-ACNC: 3.87 UIU/ML (ref 0.36–3.74)
WBC # BLD AUTO: 5.73 THOUSAND/UL (ref 4.31–10.16)

## 2019-01-18 PROCEDURE — 36415 COLL VENOUS BLD VENIPUNCTURE: CPT

## 2019-01-18 PROCEDURE — 83036 HEMOGLOBIN GLYCOSYLATED A1C: CPT

## 2019-01-18 PROCEDURE — 84443 ASSAY THYROID STIM HORMONE: CPT

## 2019-01-18 PROCEDURE — 84439 ASSAY OF FREE THYROXINE: CPT

## 2019-01-18 PROCEDURE — 80053 COMPREHEN METABOLIC PANEL: CPT

## 2019-01-18 PROCEDURE — 85025 COMPLETE CBC W/AUTO DIFF WBC: CPT

## 2019-01-18 NOTE — TELEPHONE ENCOUNTER
Trevin Syed from home care calling on pt, wt on 1/17/19 was 166 lbs, wt today was 169 lbs  /60 HR 62 O2 94%  No edema, SOB is unchanged, lungs clear  She did have blood work done today  Pt does have an appt with Dr Zepeda Mom on 2/7/19

## 2019-01-22 ENCOUNTER — OFFICE VISIT (OUTPATIENT)
Dept: FAMILY MEDICINE CLINIC | Facility: CLINIC | Age: 84
End: 2019-01-22
Payer: COMMERCIAL

## 2019-01-22 VITALS
WEIGHT: 174.2 LBS | DIASTOLIC BLOOD PRESSURE: 60 MMHG | BODY MASS INDEX: 32.06 KG/M2 | SYSTOLIC BLOOD PRESSURE: 122 MMHG | HEART RATE: 56 BPM | HEIGHT: 62 IN

## 2019-01-22 DIAGNOSIS — R06.00 DYSPNEA ON EXERTION: ICD-10-CM

## 2019-01-22 DIAGNOSIS — G50.0 TRIGEMINAL NEURALGIA: ICD-10-CM

## 2019-01-22 DIAGNOSIS — R68.89 THROAT AND MOUTH SYMPTOM: ICD-10-CM

## 2019-01-22 DIAGNOSIS — I25.10 ARTERIOSCLEROSIS OF CORONARY ARTERY: ICD-10-CM

## 2019-01-22 DIAGNOSIS — R10.12 INTRACTABLE LEFT UPPER QUADRANT ABDOMINAL PAIN: Primary | ICD-10-CM

## 2019-01-22 DIAGNOSIS — IMO0002 TYPE 2 DIABETES MELLITUS, UNCONTROLLED, WITH NEUROPATHY: ICD-10-CM

## 2019-01-22 DIAGNOSIS — I10 ESSENTIAL HYPERTENSION: ICD-10-CM

## 2019-01-22 DIAGNOSIS — R68.2 DRY MOUTH: ICD-10-CM

## 2019-01-22 DIAGNOSIS — I50.32 CHRONIC DIASTOLIC CONGESTIVE HEART FAILURE (HCC): Chronic | ICD-10-CM

## 2019-01-22 DIAGNOSIS — R44.1 VISUAL HALLUCINATIONS: ICD-10-CM

## 2019-01-22 DIAGNOSIS — I35.0 NONRHEUMATIC AORTIC VALVE STENOSIS: Chronic | ICD-10-CM

## 2019-01-22 PROCEDURE — 1111F DSCHRG MED/CURRENT MED MERGE: CPT | Performed by: FAMILY MEDICINE

## 2019-01-22 PROCEDURE — 99496 TRANSJ CARE MGMT HIGH F2F 7D: CPT | Performed by: FAMILY MEDICINE

## 2019-01-22 RX ORDER — OLANZAPINE 2.5 MG/1
TABLET ORAL
Refills: 5 | COMMUNITY
Start: 2019-01-12 | End: 2019-02-26 | Stop reason: SDUPTHER

## 2019-01-22 NOTE — TELEPHONE ENCOUNTER
Spoke with Lucille President, [facility] visit for today was canceled due to pt going to PCP office  No current updates, I will call after PCP visit

## 2019-01-22 NOTE — PROGRESS NOTES
Assessment/Plan:     Intractable left upper quadrant abdominal pain  Acute on chronic unclear the etiology   Seem to be neuropathic  Seem to be getting worse when pt was off the gabapentin, Pt restarted on gabapentin, to use the Capsicin gel   Tylenol with codeine to use it as needed for pain to prevent ER visits  To use Maalox and PPI for possible GERD  Throat and mouth symptom  Pt c/o something on the back of her throat, no difficulty swallowing, I had along discussion with patient to use Flonase for possible PND, pt insist on a referral to ENT,  Had a very long discussion with patient with her given age to avoid too many referral but pt insisting on that  Type 2 diabetes mellitus, uncontrolled, with neuropathy (HCC)  Lab Results   Component Value Date    HGBA1C 7 1 (H) 01/18/2019       Stable cont with [facility] and [place]  Aortic stenosis  Cont with medical managementhas regular fu with cardiology      Hypertension  Stable cont with meds     Chronic diastolic congestive heart failure ([facility] [address] )  euvolemic currently     Arteriosclerosis of coronary artery  Stable no anginal sx  Trigeminal neuralgia  To cont with gabapentin       Visual hallucinations  To use Zyprexa as needed   Need to consider psych if continued   Diagnoses and all orders for this visit:    Intractable left upper quadrant abdominal pain    Dry mouth  -     Ambulatory Referral to Otolaryngology; Future    Throat and mouth symptom  -     Ambulatory Referral to Otolaryngology; Future    Type 2 diabetes mellitus, uncontrolled, with neuropathy (HCC)    Nonrheumatic aortic valve stenosis    Chronic diastolic congestive heart failure (HCC)    Essential hypertension    Arteriosclerosis of coronary artery    Trigeminal neuralgia    Visual hallucinations    Dyspnea on exertion    Other orders  -     OLANZapine (ZyPREXA) 2 5 mg tablet;          Subjective: Hospital Follow up re: SOB and Headache  Pt states she is feeling better   kw     Patient ID: Ayden Rome is a 80 y o  female  Pt here for fu of her recent hospitalization for LUQ pain that is improving now, pt weakness improved much   Headache start coming back   Pt hallucination start back again, no dizziness ,trouble sleeping continued   Pt twin sister not doing well and she is admitted to SNF AND pt worried about her health   Pt c/o something stuck in her throat for awhile, feel dry , no PND, no difficulty swallowing but she always feel something on the back of her throat and she requested to be seen by ENT  Review of Systems   Constitutional: Positive for fatigue  Negative for activity change, appetite change, chills, diaphoresis, fever and unexpected weight change  HENT: Positive for dental problem and postnasal drip  Negative for congestion, drooling, ear discharge, ear pain, facial swelling, hearing loss, mouth sores, nosebleeds, rhinorrhea, sinus pain, sinus pressure, sneezing, sore throat, tinnitus, trouble swallowing and voice change  Eyes: Negative for pain, redness and visual disturbance  Respiratory: Positive for choking  Negative for apnea, cough, chest tightness, shortness of breath, wheezing and stridor  Cardiovascular: Negative for chest pain, palpitations and leg swelling  Gastrointestinal: Negative for abdominal pain, anal bleeding, constipation, diarrhea and nausea  Endocrine: Negative for cold intolerance, heat intolerance and polyuria  Genitourinary: Negative for dysuria, enuresis, flank pain and frequency  Musculoskeletal: Negative for gait problem, joint swelling and neck pain  Skin: Negative for color change and wound  Neurological: Positive for headaches  Negative for dizziness, syncope, speech difficulty and numbness  Psychiatric/Behavioral: Positive for hallucinations  Negative for behavioral problems and confusion  The patient is not nervous/anxious            Objective:     Physical Exam   Constitutional: She is oriented to person, place, and time  She appears well-developed and well-nourished  HENT:   Head: Normocephalic and atraumatic  Eyes: Pupils are equal, round, and reactive to light  Conjunctivae and EOM are normal    Neck: Normal range of motion  Neck supple  Cardiovascular: Normal rate, regular rhythm, normal heart sounds and intact distal pulses  Pulmonary/Chest: Effort normal and breath sounds normal    Abdominal: Soft  Bowel sounds are normal    Musculoskeletal: Normal range of motion  Neurological: She is alert and oriented to person, place, and time  She has normal reflexes  Skin: Skin is warm and dry  Psychiatric: She has a normal mood and affect  Her behavior is normal    Nursing note and vitals reviewed  Vitals:    01/22/19 1305   BP: 122/60   BP Location: Left arm   Pulse: 56   Weight: 79 kg (174 lb 3 2 oz)   Height: 5' 2" (1 575 m)       Transitional Care Management Review:  Ashtyn Hutchinson is a 80 y o  female here for TCM follow up  During the TCM phone call patient stated:    TCM Call (since 12/28/2018)     Date and time call was made  1/14/2019 10:03 AM    Hospital care reviewed  Records reviewed    Patient was hospitialized at  [place] [address]    Date of Admission  01/09/19    Date of discharge  01/11/19    Diagnosis  LUQ abdominal pain, SOB, Chest pain    Disposition  Home    Were the patients medications reviewed and updated  Yes    Current Symptoms  None      TCM Call (since 12/28/2018)     Post hospital issues  None    Should patient be enrolled in anticoag monitoring? No    Scheduled for follow up?   Yes    Patients specialists  Cardiologist    Cardiologist name  Dr Kaleb Campos    Did you obtain your prescribed medications  Yes    Do you need help managing your prescriptions or medications  No    Is transportation to your appointment needed  No    I have advised the patient to call PCP with any new or worsening symptoms  PADMINI Rocha MD

## 2019-01-24 NOTE — TELEPHONE ENCOUNTER
I did try calling pt, no answer  I did look at PCP office visit and pt's weight from ov was 174 3 lbs

## 2019-01-25 NOTE — TELEPHONE ENCOUNTER
Kelly Billings calling from [facility]  /66 HR 64  WT  169 lbs  No edema, lungs clear  O2 95%  Pt states SOB has gotten better, she does have a dry cough as well  Any questions or orders please call Kelly Billings

## 2019-01-28 PROBLEM — R68.89 THROAT AND MOUTH SYMPTOM: Status: ACTIVE | Noted: 2019-01-28

## 2019-01-28 NOTE — ASSESSMENT & PLAN NOTE
Lab Results   Component Value Date    HGBA1C 7 1 (H) 01/18/2019       Stable cont with [facility] and [place]

## 2019-01-28 NOTE — PATIENT INSTRUCTIONS
Postnasal Drip   AMBULATORY CARE:   Postnasal drip  is a condition that causes a large amount of mucus to collect in your throat or nose  It may also be called upper airway cough syndrome because the mucus causes repeated coughing  You may have a sore throat, or throat tissues may swell  This may feel like a lump in your throat  You may also feel like you need to clear your throat often  Contact your healthcare provider if:   · You have trouble breathing because of the mucus  · You have new or worsening symptoms, even with treatment  · You have signs of an infection, such as yellow or green mucus, or a fever  · You have questions or concerns about your condition or care  Treatment  may include any of the following:  · Medicines  may be given to thin the mucus  You may need to swallow the medicine or use a device to flush your sinuses with liquid squirted into your nose  Nasal sprays may also be needed to keep the tissues in your nose moist  Medicines can also relieve congestion  Allergy medicine may help if your symptoms are caused by seasonal allergies, such as hay fever  You may need medicine to help control GERD  · Antibiotics  may be needed to treat a bacterial infection  Manage postnasal drip:   · Use a humidifier or vaporizer  Use a cool mist humidifier or a vaporizer to increase air moisture in your home  This may make it easier for you to breathe  · Drink more liquids as directed  Liquids help keep your air passages moist and help you cough up mucus  Ask how much liquid to drink each day and which liquids are best for you  · Avoid cold air and dry, heated air  Cold or dry air can trigger postnasal drip  Try to stay inside on cold days, or keep your mouth covered  Do not stay long in areas that have dry, heated air  · Do not smoke, and avoid secondhand smoke  Nicotine and other chemicals in cigarettes and cigars can irritate your throat and make coughing worse   Ask your healthcare provider for information if you currently smoke and need help to quit  E-cigarettes or smokeless tobacco still contain nicotine  Talk to your healthcare provider before you use these products  Follow up with your healthcare provider as directed:  Write down your questions so you remember to ask them during your visits  © 2017 [employer] Information is for End User's use only and may not be sold, redistributed or otherwise used for commercial purposes  All illustrations and images included in CareNotes® are the copyrighted property of [employer] , [employer]  or [employer]  The above information is an  only  It is not intended as medical advice for individual conditions or treatments  Talk to your doctor, nurse or pharmacist before following any medical regimen to see if it is safe and effective for you

## 2019-01-28 NOTE — ASSESSMENT & PLAN NOTE
Pt c/o something on the back of her throat, no difficulty swallowing, I had along discussion with patient to use Flonase for possible PND, pt insist on a referral to ENT,  Had a very long discussion with patient with her given age to avoid too many referral but pt insisting on that

## 2019-01-28 NOTE — ASSESSMENT & PLAN NOTE
Acute on chronic unclear the etiology   Seem to be neuropathic  Seem to be getting worse when pt was off the gabapentin, Pt restarted on gabapentin, to use the Capsicin gel   Tylenol with codeine to use it as needed for pain to prevent ER visits  To use Maalox and PPI for possible GERD

## 2019-02-05 ENCOUNTER — TELEPHONE (OUTPATIENT)
Dept: CARDIOLOGY CLINIC | Facility: CLINIC | Age: 84
End: 2019-02-05

## 2019-02-05 NOTE — TELEPHONE ENCOUNTER
Tino Mckeon from [facility] calling to give update  Weight today is 170 lbs  /70 HR 64 o2 96%  Trace to +1 edema  Pt does have a dry cough, lungs are clear  Pt does have an office visit this Thursday 2/7/19  [facility] will wait for this office visit and any updates then will DC next visit

## 2019-02-07 ENCOUNTER — OFFICE VISIT (OUTPATIENT)
Dept: CARDIOLOGY CLINIC | Facility: CLINIC | Age: 84
End: 2019-02-07
Payer: COMMERCIAL

## 2019-02-07 VITALS
SYSTOLIC BLOOD PRESSURE: 134 MMHG | BODY MASS INDEX: 32.28 KG/M2 | DIASTOLIC BLOOD PRESSURE: 60 MMHG | HEIGHT: 62 IN | HEART RATE: 68 BPM | WEIGHT: 175.4 LBS

## 2019-02-07 DIAGNOSIS — I35.0 CALCIFIC AORTIC STENOSIS: Primary | ICD-10-CM

## 2019-02-07 DIAGNOSIS — I10 BENIGN ESSENTIAL HTN: ICD-10-CM

## 2019-02-07 DIAGNOSIS — I50.32 CHRONIC DIASTOLIC CHF (CONGESTIVE HEART FAILURE) (HCC): ICD-10-CM

## 2019-02-07 PROCEDURE — 99214 OFFICE O/P EST MOD 30 MIN: CPT | Performed by: INTERNAL MEDICINE

## 2019-02-07 NOTE — PROGRESS NOTES
Cardiology Follow Up        Estrella Hanks      9/24/1922      [id number]      Discussion/Summary:    1  Moderate to severe aortic valve stenosis  2  Chronic diastolic heart failure, compensated  3  Intermittent fatigue, suspect secondary to intermittent sleep deprivation  4  Benign essential hypertension, controlled  5  Chronic atypical chest pain  6  Possible CAD--prior echocardiogram with possible basal inferior/basilar inferolateral hypokinesis    · Echocardiogram 08/07/2018 with moderate to severe aortic valve stenosis, consistent with today's physical examination  No indication for surgery at this time  · Euvolemic by examination, stable renal function and potassium 01/18/2018 blood work, continue furosemide 20 mg daily  · Continue benazepril/amlodipine for blood pressure control    Follow-up in 6 months, will likely repeat echocardiogram after next visit to re-evaluate aortic valve stenosis                   Interval History: This is a very pleasant female with a history of chronic atypical chest pain, hypertension, dyslipidemia, and diabetes  She also has a history of severe aortic valve stenosis  She has had several admissions in the past, January 2016/April 2016/October 2016/April 2017 for atypical chest pain at which time she was ruled out for myocardial injury  She was last hospitalized August 2018 for acute diastolic congestive heart failure  She was discharged on furosemide 20 mg daily which she has been doing well with  Doing was in the hospital 01/2019 with atypical chest pain, rule out for myocardial injury  She presents today for follow-up with her daughter with no new complaints  She has chronic but stable exertional dyspnea  She denies lower extremity edema, subjective weight gain, chest pain with exertion, dizziness             Vitals:  Vitals:    02/07/19 1553   BP: 134/60   BP Location: Right arm   Patient Position: Sitting   Cuff Size: Large   Pulse: 68   Weight: 79 6 kg (175 lb 6 4 oz)   Height: 5' 2" (1 575 m)         Past Medical History:   Diagnosis Date    Asthma     Atypical chest pain     CAD (coronary artery disease)     Candidal intertrigo     CHF (congestive heart failure) (HCC)     Depression     Diabetes mellitus (HCC)     Diabetic neuropathy (HCC)     Diverticulitis     Dyslipidemia     Female bladder prolapse     Gastric ulcer     Glaucoma     HTN (hypertension)     Hyperlipidemia     Hypothyroidism 4/18/2016    RAD (reactive airway disease)      Social History     Social History    Marital status:       Spouse name: N/A    Number of children: 2    Years of education: N/A     Occupational History    Retired      Social History Main Topics    Smoking status: Never Smoker    Smokeless tobacco: Never Used    Alcohol use No      Comment: Social Alcohol Use -- as per allscripts (pt denies all alcohol use)    Drug use: No    Sexual activity: Not on file     Other Topics Concern    Not on file     Social History Narrative    Lived with adult children    Caffeine Use          Family History   Problem Relation Age of Onset   Anna Mcelroy Breast cancer Mother     Hypothyroidism Mother     Hypertension Father     Breast cancer Sister     Thyroid disease Sister     Hypertension Sister      Past Surgical History:   Procedure Laterality Date    COLONOSCOPY      ESOPHAGOGASTRODUODENOSCOPY  2011    HYSTERECTOMY         Current Outpatient Prescriptions:     acetaminophen-codeine (TYLENOL #3) 300-30 mg per tablet, Take 1 tablet by mouth every 4 (four) hours as needed for moderate pain, Disp: 30 tablet, Rfl: 0    aluminum-magnesium hydroxide-simethicone (MYLANTA) 200-200-20 mg/5 mL suspension, Take 30 mL by mouth every 4 (four) hours as needed for indigestion or heartburn, Disp: , Rfl:     amLODIPine-benazepril (LOTREL 5-10) 5-10 MG per capsule, Take 1 capsule by mouth daily, Disp: , Rfl:     aspirin 81 MG tablet, Take 81 mg by mouth daily  , Disp: , Rfl:     Cholecalciferol (VITAMIN D3) 2000 units capsule, Take 1,000 Units by mouth daily  , Disp: , Rfl:     dorzolamide (TRUSOPT) 2 % ophthalmic solution, Administer 1 drop to both eyes 3 (three) times a day  , Disp: , Rfl:     fluticasone (FLONASE) 50 mcg/act nasal spray, 2 sprays into each nostril daily, Disp: 16 g, Rfl: 0    furosemide (LASIX) 20 mg tablet, Take 20 mg by mouth daily  , Disp: , Rfl: 5    gabapentin (NEURONTIN) 600 MG tablet, Take 0 5 tablets (300 mg total) by mouth every 8 (eight) hours as needed (Cranial Neuralgia), Disp: 90 tablet, Rfl: 3    latanoprost (XALATAN) 0 005 % ophthalmic solution, Apply 1 drop to eye daily at bedtime  Both eyes, Disp: , Rfl:     Lidocaine-Menthol 4-5 % PTCH, lidocaine patch, Disp: , Rfl:     OLANZapine (ZyPREXA) 2 5 mg tablet, , Disp: , Rfl: 5    omeprazole (PriLOSEC) 40 MG capsule, Take 1 capsule (40 mg total) by mouth daily, Disp: 90 capsule, Rfl: 3    sitaGLIPtin (JANUVIA) 50 mg tablet, Take 1 tablet (50 mg total) by mouth daily, Disp: 30 tablet, Rfl: 5    Current Facility-Administered Medications:     albuterol (PROVENTIL HFA,VENTOLIN HFA) inhaler 2 puff, 2 puff, Inhalation, Q4H PRN, Lore Hahn MD        Review of Systems:  Review of Systems   Constitutional: Positive for fatigue  Negative for activity change, fever and unexpected weight change  HENT: Negative for facial swelling, nosebleeds and voice change  Respiratory: Positive for shortness of breath  Negative for chest tightness and wheezing  Cardiovascular: Negative for chest pain, palpitations and leg swelling  Gastrointestinal: Negative for abdominal distention  Genitourinary: Negative for hematuria  Musculoskeletal: Negative for arthralgias  Skin: Negative for color change, pallor, rash and wound  Neurological: Negative for dizziness, seizures and syncope  Hematological: Bruises/bleeds easily     Psychiatric/Behavioral: Negative for agitation  No change in review of systems since last visit      Physical Exam:  Physical Exam   Constitutional: She is oriented to person, place, and time  She appears well-developed and well-nourished  HENT:   Head: Normocephalic and atraumatic  Eyes: EOM are normal    Neck: Normal range of motion  Neck supple  Cardiovascular: Normal rate, regular rhythm and intact distal pulses  2/6 systolic ejection murmur, audible S2   Pulmonary/Chest: Effort normal and breath sounds normal    Abdominal: Soft  Musculoskeletal: Normal range of motion  Neurological: She is alert and oriented to person, place, and time  Skin: Skin is warm and dry  Psychiatric: She has a normal mood and affect  Her behavior is normal  Judgment and thought content normal    Vitals reviewed  No change in physical examination since last visit    This note was completed in part utilizing M-Modal Fluency Direct Software  Grammatical errors, random word insertions, spelling mistakes, and incomplete sentences can be an occasional consequence of this system secondary to software limitations, ambient noise, and hardware issues  If you have any questions or concerns about the content, text, or information contained within the body of this dictation, please contact the provider for clarification

## 2019-02-08 ENCOUNTER — TELEPHONE (OUTPATIENT)
Dept: FAMILY MEDICINE CLINIC | Facility: CLINIC | Age: 84
End: 2019-02-08

## 2019-02-08 NOTE — TELEPHONE ENCOUNTER
MAYDA FROM  VISITING NURSE CALLED  FYI - PATIENT IS BEING DISCHARGED FROM NURSING TO THERAPY  THANK YOU

## 2019-02-25 DIAGNOSIS — G52.9 CRANIAL NEURALGIA: ICD-10-CM

## 2019-02-25 RX ORDER — GABAPENTIN 600 MG/1
TABLET ORAL
Qty: 90 TABLET | Refills: 3 | Status: SHIPPED | OUTPATIENT
Start: 2019-02-25 | End: 2020-01-23 | Stop reason: SDUPTHER

## 2019-02-26 DIAGNOSIS — R44.3 HALLUCINATION: Primary | ICD-10-CM

## 2019-02-26 DIAGNOSIS — E11.9 TYPE 2 DIABETES MELLITUS WITHOUT COMPLICATION, WITHOUT LONG-TERM CURRENT USE OF INSULIN (HCC): ICD-10-CM

## 2019-02-27 RX ORDER — OLANZAPINE 2.5 MG/1
2.5 TABLET ORAL DAILY
Qty: 90 TABLET | Refills: 3 | Status: SHIPPED | OUTPATIENT
Start: 2019-02-27 | End: 2020-06-25 | Stop reason: SDUPTHER

## 2019-03-15 DIAGNOSIS — H40.10X3 OPEN-ANGLE GLAUCOMA, SEVERE STAGE, UNSPECIFIED LATERALITY, UNSPECIFIED OPEN-ANGLE GLAUCOMA TYPE: ICD-10-CM

## 2019-03-15 DIAGNOSIS — I50.32 CHRONIC DIASTOLIC CONGESTIVE HEART FAILURE (HCC): Primary | Chronic | ICD-10-CM

## 2019-03-15 DIAGNOSIS — J40 MORAXELLA CATARRHALIS BRONCHITIS: ICD-10-CM

## 2019-03-15 DIAGNOSIS — B96.89 MORAXELLA CATARRHALIS BRONCHITIS: ICD-10-CM

## 2019-03-16 RX ORDER — FLUTICASONE PROPIONATE 50 MCG
2 SPRAY, SUSPENSION (ML) NASAL DAILY
Qty: 3 BOTTLE | Refills: 3 | Status: SHIPPED | OUTPATIENT
Start: 2019-03-16 | End: 2022-03-09 | Stop reason: HOSPADM

## 2019-03-16 RX ORDER — FUROSEMIDE 20 MG/1
20 TABLET ORAL DAILY
Qty: 90 TABLET | Refills: 3 | Status: SHIPPED | OUTPATIENT
Start: 2019-03-16 | End: 2019-03-20 | Stop reason: SDUPTHER

## 2019-03-16 RX ORDER — LATANOPROST 50 UG/ML
1 SOLUTION/ DROPS OPHTHALMIC
Qty: 7.5 ML | Refills: 1 | Status: SHIPPED | OUTPATIENT
Start: 2019-03-16

## 2019-03-20 ENCOUNTER — OFFICE VISIT (OUTPATIENT)
Dept: FAMILY MEDICINE CLINIC | Facility: CLINIC | Age: 84
End: 2019-03-20
Payer: COMMERCIAL

## 2019-03-20 VITALS
DIASTOLIC BLOOD PRESSURE: 50 MMHG | HEART RATE: 72 BPM | RESPIRATION RATE: 16 BRPM | SYSTOLIC BLOOD PRESSURE: 98 MMHG | WEIGHT: 170 LBS | BODY MASS INDEX: 31.28 KG/M2 | HEIGHT: 62 IN

## 2019-03-20 DIAGNOSIS — I50.32 CHRONIC DIASTOLIC CONGESTIVE HEART FAILURE (HCC): Primary | Chronic | ICD-10-CM

## 2019-03-20 DIAGNOSIS — G47.00 INSOMNIA, UNSPECIFIED TYPE: ICD-10-CM

## 2019-03-20 DIAGNOSIS — I25.10 ARTERIOSCLEROSIS OF CORONARY ARTERY: ICD-10-CM

## 2019-03-20 DIAGNOSIS — E78.5 HYPERLIPIDEMIA, UNSPECIFIED HYPERLIPIDEMIA TYPE: ICD-10-CM

## 2019-03-20 DIAGNOSIS — R10.12 INTRACTABLE LEFT UPPER QUADRANT ABDOMINAL PAIN: ICD-10-CM

## 2019-03-20 DIAGNOSIS — G50.0 TRIGEMINAL NEURALGIA: ICD-10-CM

## 2019-03-20 DIAGNOSIS — R68.89 THROAT AND MOUTH SYMPTOM: ICD-10-CM

## 2019-03-20 DIAGNOSIS — R06.02 SOB (SHORTNESS OF BREATH): ICD-10-CM

## 2019-03-20 DIAGNOSIS — I50.32 CHRONIC DIASTOLIC CONGESTIVE HEART FAILURE (HCC): Chronic | ICD-10-CM

## 2019-03-20 DIAGNOSIS — R44.1 VISUAL HALLUCINATIONS: ICD-10-CM

## 2019-03-20 DIAGNOSIS — IMO0002 TYPE 2 DIABETES MELLITUS, UNCONTROLLED, WITH NEUROPATHY: ICD-10-CM

## 2019-03-20 DIAGNOSIS — Z00.00 MEDICARE ANNUAL WELLNESS VISIT, SUBSEQUENT: ICD-10-CM

## 2019-03-20 DIAGNOSIS — I10 HYPERTENSION, UNSPECIFIED TYPE: ICD-10-CM

## 2019-03-20 DIAGNOSIS — I35.0 NONRHEUMATIC AORTIC VALVE STENOSIS: Chronic | ICD-10-CM

## 2019-03-20 DIAGNOSIS — R53.1 GENERAL WEAKNESS: ICD-10-CM

## 2019-03-20 LAB — SL AMB POCT HGB: 14.1

## 2019-03-20 PROCEDURE — 85018 HEMOGLOBIN: CPT | Performed by: FAMILY MEDICINE

## 2019-03-20 PROCEDURE — 1101F PT FALLS ASSESS-DOCD LE1/YR: CPT | Performed by: FAMILY MEDICINE

## 2019-03-20 PROCEDURE — 1160F RVW MEDS BY RX/DR IN RCRD: CPT | Performed by: FAMILY MEDICINE

## 2019-03-20 PROCEDURE — G0439 PPPS, SUBSEQ VISIT: HCPCS | Performed by: FAMILY MEDICINE

## 2019-03-20 PROCEDURE — 1036F TOBACCO NON-USER: CPT | Performed by: FAMILY MEDICINE

## 2019-03-20 PROCEDURE — 3725F SCREEN DEPRESSION PERFORMED: CPT | Performed by: FAMILY MEDICINE

## 2019-03-20 PROCEDURE — 99214 OFFICE O/P EST MOD 30 MIN: CPT | Performed by: FAMILY MEDICINE

## 2019-03-20 PROCEDURE — 1125F AMNT PAIN NOTED PAIN PRSNT: CPT | Performed by: FAMILY MEDICINE

## 2019-03-20 PROCEDURE — 1170F FXNL STATUS ASSESSED: CPT | Performed by: FAMILY MEDICINE

## 2019-03-20 RX ORDER — FLUTICASONE PROPIONATE 50 MCG
SPRAY, SUSPENSION (ML) NASAL
COMMUNITY
End: 2019-03-20

## 2019-03-20 RX ORDER — FUROSEMIDE 20 MG/1
20 TABLET ORAL DAILY
Qty: 10 TABLET | Refills: 0 | Status: SHIPPED | OUTPATIENT
Start: 2019-03-20 | End: 2019-03-20 | Stop reason: SDUPTHER

## 2019-03-20 NOTE — PROGRESS NOTES
Assessment and Plan:    Problem List Items Addressed This Visit     Chronic diastolic congestive heart failure ([address] ) (Chronic)      Other Visit Diagnoses     Medicare annual wellness visit, subsequent    -  Primary    SOB (shortness of breath)        Relevant Orders    POCT hemoglobin fingerstick (Completed)    Anemia, unspecified type        Relevant Orders    POCT hemoglobin fingerstick (Completed)        Health Maintenance Due   Topic Date Due    Medicare Annual Wellness Visit (AWV)  09/24/1922    BMI: Followup Plan  09/[birth year]/1940    URINE MICROALBUMIN  05/30/2018         HPI:  Leah Deras is a 80 y o  female here for her Subsequent Wellness Visit      Patient Active Problem List   Diagnosis    Hypertension    Glaucoma, open angle, severe stage    Atypical chest pain    Migraine    Arteriosclerosis of coronary artery    Asthma    Aortic stenosis    Hyperlipidemia    Female bladder prolapse    Advanced age   Bonner Allergic rhinitis    Ambulatory dysfunction    Anxiety disorder    Intractable left upper quadrant abdominal pain    General weakness    Irritable bowel    Trigeminal neuralgia    Visual hallucinations    Dyspnea on exertion    Medication management    Hypothyroidism    Chronic diastolic congestive heart failure (HCC)    Type 2 diabetes mellitus, uncontrolled, with neuropathy (HCC)    Insomnia    Gastroesophageal reflux disease without esophagitis    Arthralgia    Bilateral cold feet    Female cystocele    Diverticulosis    Dizziness    Hearing loss    Hiatal hernia    Low back pain    Mild cognitive impairment    Osteoarthritis    Overactive bladder    Shakiness    Sleep disturbance    Urinary incontinence    Headache    Throat and mouth symptom     Past Medical History:   Diagnosis Date    Asthma     Atypical chest pain     CAD (coronary artery disease)     Candidal intertrigo     CHF (congestive heart failure) (HCC)     Depression     Diabetes mellitus ([facility] )     Diabetic neuropathy ([facility] )     Diverticulitis     Dyslipidemia     Female bladder prolapse     Gastric ulcer     Glaucoma     HTN (hypertension)     Hyperlipidemia     Hypothyroidism 4/18/2016    RAD (reactive airway disease)      Past Surgical History:   Procedure Laterality Date    COLONOSCOPY      ESOPHAGOGASTRODUODENOSCOPY  2011    HYSTERECTOMY       Family History   Problem Relation Age of Onset    Breast cancer Mother     Hypothyroidism Mother     Hypertension Father     Breast cancer Sister     Thyroid disease Sister     Hypertension Sister      Social History     Tobacco Use   Smoking Status Never Smoker   Smokeless Tobacco Never Used     Social History     Substance and Sexual Activity   Alcohol Use No    Comment: Social Alcohol Use -- as per allscripts (pt denies all alcohol use)      Social History     Substance and Sexual Activity   Drug Use No       Current Outpatient Medications   Medication Sig Dispense Refill    acetaminophen-codeine (TYLENOL #3) 300-30 mg per tablet Take 1 tablet by mouth every 4 (four) hours as needed for moderate pain 30 tablet 0    aluminum-magnesium hydroxide-simethicone (MYLANTA) 200-200-20 mg/5 mL suspension Take 30 mL by mouth every 4 (four) hours as needed for indigestion or heartburn      amLODIPine-benazepril (LOTREL 5-10) 5-10 MG per capsule Take 1 capsule by mouth daily      aspirin 81 MG tablet Take 81 mg by mouth daily        Cholecalciferol (VITAMIN D3) 2000 units capsule Take 1,000 Units by mouth daily        dorzolamide (TRUSOPT) 2 % ophthalmic solution Administer 1 drop to both eyes 3 (three) times a day        fluticasone (FLONASE) 50 mcg/act nasal spray 2 sprays into each nostril daily 3 Bottle 3    gabapentin (NEURONTIN) 600 MG tablet TAKE 1/2 TABLET EVERY 8 HOURS AS NEEDED (CRANIAL NEURALGIA) 90 tablet 3    latanoprost (XALATAN) 0 005 % ophthalmic solution Apply 1 drop to eye daily at bedtime Both eyes 7 5 mL 1    Lidocaine-Menthol 4-5 % PTCH lidocaine patch      OLANZapine (ZyPREXA) 2 5 mg tablet Take 1 tablet (2 5 mg total) by mouth daily 90 tablet 3    omeprazole (PriLOSEC) 40 MG capsule Take 1 capsule (40 mg total) by mouth daily 90 capsule 3    sitaGLIPtin (JANUVIA) 50 mg tablet Take 1 tablet (50 mg total) by mouth daily 90 tablet 3    furosemide (LASIX) 20 mg tablet TAKE 1 TABLET(20 MG) BY MOUTH DAILY 90 tablet 0     Current Facility-Administered Medications   Medication Dose Route Frequency Provider Last Rate Last Dose    albuterol (PROVENTIL HFA,VENTOLIN HFA) inhaler 2 puff  2 puff Inhalation Q4H PRN Manpreet Prieto MD         Allergies   Allergen Reactions    Statins      Other reaction(s): Weakness  Category: Adverse Reaction;      Immunization History   Administered Date(s) Administered    INFLUENZA 09/25/2014, 10/23/2015, 12/28/2016, 10/04/2017    Influenza Quadrivalent, 6-35 Months IM 10/23/2015    Influenza Split High Dose Preservative Free IM 12/28/2016, 10/04/2017    Influenza, high dose seasonal 0 5 mL 10/12/2018    Pneumococcal Conjugate 13-Valent 12/28/2016    Pneumococcal Polysaccharide PPV23 01/01/2009       Patient Care Team:  Manpreet Prieto MD as PCP - General (Family Medicine)  Manpreet Prieto MD as PCP - General  MD Manpreet Francois MD Amie Mason, DO    Medicare Screening Tests and Risk Assessments:  Derek Wallace is here for her Subsequent Wellness visit  Last Medicare Wellness visit information reviewed, patient interviewed and updates made to the record today  Health Risk Assessment:  Patient rates overall health as fair  Patient feels that their physical health rating is Same  Eyesight was rated as Slightly worse  Hearing was rated as Same  Patient feels that their emotional and mental health rating is Same  Pain experienced by patient in the last 7 days has been None  Patient's pain rating has been 4/10   Patient states that she has experienced no weight loss or gain in last 6 months  Emotional/Mental Health:    PHQ-9 Depression Screening:    Frequency of the following problems over the past two weeks:      1  Little interest or pleasure in doing things: 0 - not at all      2  Feeling down, depressed, or hopeless: 0 - not at all  PHQ-2 Score: 0          Broken Bones/Falls: Fall Risk Assessment:    In the past year, patient has experienced: No history of falling in past year          Bladder/Bowel:  Patient has not leaked urine accidently in the last six months  Patient reports no loss of bowel control  Immunizations:  Patient has had a flu vaccination within the last year  Patient has received a pneumonia shot  Patient has received a shingles shot  Patient has received tetanus/diphtheria shot  Home Safety:  Patient does not have trouble with stairs inside or outside of their home  Patient currently reports that there are safety hazards present in home , working smoke alarms, working carbon monoxide detectors  Preventative Screenings:   Breast cancer screening performed, colon cancer screen completed, cholesterol screen completed, glaucoma eye exam completed,     Nutrition:  Current diet: Diabetic and Limited junk food with servings of the following:    Medications:  Patient is currently taking over-the-counter supplements  Patient is able to manage medications  Lifestyle Choices:  Patient reports no tobacco use  Patient has not smoked or used tobacco in the past   Patient reports no alcohol use  Patient does not drive a vehicle  Patient wears seat belt          Activities of Daily Living:  Can get out of bed by his or her self, able to dress self, able to make own meals, able to do own shopping, able to bathe self, can do own laundry/housekeeping, can manage own money, pay bills and track expenses    Previous Hospitalizations:  No hospitalization or ED visit in past 12 months        Advanced Directives:  Patient has not spoken to designated power of   Patient has completed advanced directive  Preventative Screening/Counseling:      Cardiovascular:      General: Risks and Benefits Discussed and Screening Current          Diabetes:      General: Risks and Benefits Discussed and Screening Current          Colorectal Cancer:      General: Risks and Benefits Discussed and Screening Current          Breast Cancer:      General: Risks and Benefits Discussed, Patient Declines and Screening Not Indicated          Cervical Cancer:      General: Risks and Benefits Discussed and Screening Not Indicated          Osteoporosis:      General: Risks and Benefits Discussed, Screening Not Indicated and Patient Declines      Counseling: Calcium and Vitamin D Intake and Regular Weightbearing Exercise          AAA:      General: Screening Not Indicated          Glaucoma:      General: Risks and Benefits Discussed and Screening Current          HIV:      General: Screening Not Indicated          Hepatitis C:      General: Screening Not Indicated        Advanced Directives:   Patient has living will for healthcare, has durable POA for healthcare, patient has an advanced directive  Immunizations:      Influenza: Risks & Benefits Discussed and Influenza UTD This Year      Pneumococcal: Risks & Benefits Discussed and Lifetime Vaccine Completed      TDAP: Risks & Benefits Discussed and Tdap Vaccine UTD      Other Preventative Counseling (Non-Medicare):   Fall Prevention and Increase physical activity      Referrals:  Referral(s) to: Cardiologist and Neurology

## 2019-03-20 NOTE — PROGRESS NOTES
Assessment and Plan:    Chronic diastolic congestive heart failure ([address] )  I feel patient in mild congestive heart failure eacerbation, I attribute her symptoms to being off the Lasix, advised patient to restart her Lasix immediately, if not better to go to the emergency room  Hypertension  Her blood pressure is on the low side today, advised patient to hold off her blood pressure medication the next day,  especially with the restarting of the Lasix  To monitor her blood pressure closely, if continued to be low to call us to adjust her medication further  General weakness  This is chronic but is getting worse lately, could be due to multiple recent could be beginning of simple viral infection versus her fluid overload status, if not better to go to the emergency room immediately  Intractable left upper quadrant abdominal pain  Unclear the reason for this, it is chronic but persistent, multiple imaging multiple evaluation between my office, emergency room visits, geriatric clinic, unclear the etiology for her pain, the suspicion that could be gastritis, patient taking Protonix daily, advised to use Maalox every night, patient was given prescription of Tylenol with codeine to take it as needed, no evidence of post herpetic neurologia, no evidence of rib pain,      Visual hallucinations  Improving, to cont with Zyprexa at night  Insomnia  Unclear why, pt to try melatonin   If not better may consider small dose Seroquel  Throat and mouth symptom  Evaluated by ENT, nothing was found, pt sx much better now  Hyperlipidemia  Off statin, BW need to be updated  Trigeminal neuralgia  Had regular fu with neurology, her current sx unrelated to this, pt is worried about every single symptoms  Pt is to cont with Gabapentin       Arteriosclerosis of coronary artery  Stable, has regular fu with cardiology     Jorge Neves is very social person and she loves to be socializing with people, now she lives with her daughter , her twin sister was just admitted to [facility] and she is happy there, and she feel better there, Lorenzo Wayne was not thinking about leaving her house now but I discussed with her and her daughter to look into it if her weakness progressively worsen,   Pt and daughter don't want to discuss this now  Diagnoses and all orders for this visit:    Chronic diastolic congestive heart failure (HCC)  -     Discontinue: furosemide (LASIX) 20 mg tablet; Take 1 tablet (20 mg total) by mouth daily    SOB (shortness of breath)  -     POCT hemoglobin fingerstick    Medicare annual wellness visit, subsequent    Type 2 diabetes mellitus, uncontrolled, with neuropathy ([facility] )    Nonrheumatic aortic valve stenosis    Hypertension, unspecified type    Arteriosclerosis of coronary artery    Trigeminal neuralgia    Hyperlipidemia, unspecified hyperlipidemia type    Intractable left upper quadrant abdominal pain    General weakness    Insomnia, unspecified type    Throat and mouth symptom    Visual hallucinations    Other orders  -     Discontinue: fluticasone (FLONASE) 50 mcg/act nasal spray; fluticasone propionate 50 mcg/actuation nasal spray,suspension          Subjective:      Patient ID: Ayden Rome is a 80 y o  female  CC:    Chief Complaint   Patient presents with    Follow-up     pt here for a follow up on chronic conditions  Pt states she is not feeling good, still has stomach issues and headaches   R Jorge Luis       HPI:    Patient is here stating she is not feeling well  For the last week she is feeling more weak than usual, tired, somewhat short of breath but no chest pain, mainly with exertion with no paroxysmal nocturnal dyspnea or orthopnea, mild lower limb swelling, patient did not take the Lasix for the last 5 -6 days, ran out of medication and waiting for her refills, also continued to complain of the chronic left upper quadrant pain, patient described as the pain dull ache that is start the mainly at night and then it is followed by shaking, no seizure or tonic clonic movement, the pain take few minutes to go away, she feel more fatigued and tired than usual, she denied any fever but she feel cold all the time, denied any upper respiratory symptoms of cough, runny nose or sinus congestion, although she feel dry mouth and she has been drinking constantly, also her headache is start bothering how at the top of the head and radiating behind the left eye  No numbness no tingling, patient is taking her gabapentin 300 mg at night  The following portions of the patient's history were reviewed and updated as appropriate: allergies, current medications, past family history, past medical history, past social history, past surgical history and problem list       Review of Systems   Constitutional: Positive for activity change, appetite change, chills and fatigue  Negative for diaphoresis, fever and unexpected weight change  HENT: Negative for congestion, postnasal drip, sinus pressure, sore throat and voice change  Eyes: Negative for pain, redness and visual disturbance  Respiratory: Positive for shortness of breath  Negative for apnea, cough, choking, chest tightness and wheezing  Cardiovascular: Positive for leg swelling  Negative for chest pain and palpitations  Gastrointestinal: Positive for abdominal pain  Negative for blood in stool, constipation, diarrhea, nausea, rectal pain and vomiting  Endocrine: Negative for cold intolerance, heat intolerance and polyuria  Genitourinary: Negative for difficulty urinating, dysuria, enuresis, flank pain, frequency and vaginal bleeding  Musculoskeletal: Positive for arthralgias, back pain, gait problem and myalgias  Negative for joint swelling and neck pain  Skin: Negative for color change, pallor, rash and wound  Neurological: Positive for headaches  Negative for dizziness, syncope, speech difficulty and numbness     Psychiatric/Behavioral: Positive for sleep disturbance  Negative for agitation, behavioral problems, confusion, decreased concentration, hallucinations, self-injury and suicidal ideas  The patient is nervous/anxious  Data to review:       Objective:    Vitals:    03/20/19 1326   BP: 98/50   BP Location: Left arm   Patient Position: Sitting   Cuff Size: Large   Pulse: 72   Resp: 16   Weight: 77 1 kg (170 lb)   Height: 5' 2" (1 575 m)        Physical Exam   Constitutional: She is oriented to person, place, and time  She appears well-developed and well-nourished  Tired looking  HENT:   Head: Normocephalic and atraumatic  Eyes: Pupils are equal, round, and reactive to light  Conjunctivae and EOM are normal    Neck: Normal range of motion  Neck supple  Cardiovascular: Normal rate, regular rhythm and intact distal pulses  Gallop: systolic murmur  Murmur heard  Pulmonary/Chest: Effort normal and breath sounds normal    Abdominal: Soft  Bowel sounds are normal    Musculoskeletal: Normal range of motion  Walk with a cane    Neurological: She is alert and oriented to person, place, and time  She has normal reflexes  Skin: Skin is warm and dry  Psychiatric: She has a normal mood and affect  Her behavior is normal    Nursing note and vitals reviewed

## 2019-03-22 RX ORDER — FUROSEMIDE 20 MG/1
TABLET ORAL
Qty: 90 TABLET | Refills: 0 | Status: SHIPPED | OUTPATIENT
Start: 2019-03-22 | End: 2019-08-21 | Stop reason: SDUPTHER

## 2019-03-22 NOTE — ASSESSMENT & PLAN NOTE
I feel patient in mild congestive heart failure eacerbation, I attribute her symptoms to being off the Lasix, advised patient to restart her Lasix immediately, if not better to go to the emergency room

## 2019-03-22 NOTE — ASSESSMENT & PLAN NOTE
Her blood pressure is on the low side today, advised patient to hold off her blood pressure medication the next day,  especially with the restarting of the Lasix  To monitor her blood pressure closely, if continued to be low to call us to adjust her medication further

## 2019-03-22 NOTE — ASSESSMENT & PLAN NOTE
This is chronic but is getting worse lately, could be due to multiple recent could be beginning of simple viral infection versus her fluid overload status, if not better to go to the emergency room immediately

## 2019-03-22 NOTE — ASSESSMENT & PLAN NOTE
Unclear the reason for this, it is chronic but persistent, multiple imaging multiple evaluation between my office, emergency room visits, geriatric clinic, unclear the etiology for her pain, the suspicion that could be gastritis, patient taking Protonix daily, advised to use Maalox every night, patient was given prescription of Tylenol with codeine to take it as needed, no evidence of post herpetic neurologia, no evidence of rib pain,

## 2019-03-28 NOTE — ASSESSMENT & PLAN NOTE
Had regular fu with neurology, her current sx unrelated to this, pt is worried about every single symptoms  Pt is to cont with Gabapentin

## 2019-04-09 ENCOUNTER — DOCTOR'S OFFICE (OUTPATIENT)
Dept: URBAN - METROPOLITAN AREA CLINIC 136 | Facility: CLINIC | Age: 84
Setting detail: OPHTHALMOLOGY
End: 2019-04-09
Payer: COMMERCIAL

## 2019-04-09 DIAGNOSIS — H04.123: ICD-10-CM

## 2019-04-09 DIAGNOSIS — H04.122: ICD-10-CM

## 2019-04-09 DIAGNOSIS — H01.001: ICD-10-CM

## 2019-04-09 DIAGNOSIS — H01.002: ICD-10-CM

## 2019-04-09 DIAGNOSIS — H02.011: ICD-10-CM

## 2019-04-09 DIAGNOSIS — H01.004: ICD-10-CM

## 2019-04-09 DIAGNOSIS — H40.1132: ICD-10-CM

## 2019-04-09 DIAGNOSIS — H01.005: ICD-10-CM

## 2019-04-09 PROCEDURE — 92014 COMPRE OPH EXAM EST PT 1/>: CPT | Performed by: OPHTHALMOLOGY

## 2019-04-09 ASSESSMENT — LID EXAM ASSESSMENTS
OS_COMMENTS: POSTERIOR
OS_BLEPHARITIS: LLL LUL 1+
OD_TRICHIASIS: RUL
OD_COMMENTS: POSTERIOR
OD_BLEPHARITIS: RLL RUL 1+

## 2019-04-09 ASSESSMENT — REFRACTION_MANIFEST
OS_ADD: +2.50
OD_ADD: +2.50
OS_VA3: 20/
OD_VA1: 20/20
OD_VA2: 20/25-
OD_CYLINDER: -2.75
OS_VA1: 20/25-1
OD_SPHERE: -1.00
OS_SPHERE: -0.25
OD_AXIS: 080
OD_VA2: 20/
OD_SPHERE: -1.25
OS_AXIS: 085
OS_CYLINDER: -2.75
OD_AXIS: 080
OU_VA: 20/
OU_VA: 20/25
OS_VA2: 20/
OS_VA1: 20/30
OD_VA3: 20/
OS_SPHERE: PLANO
OS_AXIS: 085
OS_CYLINDER: -3.50
OD_VA1: 20/20
OS_VA3: 20/
OD_CYLINDER: -3.25
OD_VA3: 20/
OS_VA2: 20/

## 2019-04-09 ASSESSMENT — REFRACTION_AUTOREFRACTION
OD_SPHERE: -1.25
OS_SPHERE: +0.25
OS_AXIS: 81
OD_AXIS: 80
OD_CYLINDER: -4.50
OS_CYLINDER: -4.75

## 2019-04-09 ASSESSMENT — LID POSITION - DERMATOCHALASIS
OS_DERMATOCHALASIS: LUL 1+
OD_DERMATOCHALASIS: RUL 1+

## 2019-04-09 ASSESSMENT — REFRACTION_CURRENTRX
OS_OVR_VA: 20/
OD_OVR_VA: 20/

## 2019-04-09 ASSESSMENT — VISUAL ACUITY
OS_BCVA: 20/70-2
OD_BCVA: 20/60-2

## 2019-04-09 ASSESSMENT — DRY EYES - PHYSICIAN NOTES
OS_GENERALCOMMENTS: LOW TEAR FILM
OD_GENERALCOMMENTS: LOW TEAR FILM

## 2019-04-09 ASSESSMENT — CONFRONTATIONAL VISUAL FIELD TEST (CVF)
OD_FINDINGS: FULL
OS_FINDINGS: FULL

## 2019-04-09 ASSESSMENT — SPHEQUIV_DERIVED
OS_SPHEQUIV: -2.125
OD_SPHEQUIV: -3.5
OD_SPHEQUIV: -2.625
OD_SPHEQUIV: -2.625
OS_SPHEQUIV: -1.625

## 2019-04-09 ASSESSMENT — PUNCTA - ASSESSMENT: OS_PUNCTA: SIL PLUG LLL

## 2019-04-09 ASSESSMENT — SUPERFICIAL PUNCTATE KERATITIS (SPK)
OD_SPK: T
OS_SPK: T

## 2019-04-16 DIAGNOSIS — R06.00 DYSPNEA ON EXERTION: Primary | ICD-10-CM

## 2019-04-16 RX ORDER — ALBUTEROL SULFATE 90 UG/1
2 AEROSOL, METERED RESPIRATORY (INHALATION) EVERY 4 HOURS PRN
Qty: 1 INHALER | Refills: 3 | Status: SHIPPED | OUTPATIENT
Start: 2019-04-16 | End: 2022-03-09 | Stop reason: HOSPADM

## 2019-04-17 DIAGNOSIS — E03.9 HYPOTHYROIDISM, UNSPECIFIED TYPE: Primary | ICD-10-CM

## 2019-04-19 RX ORDER — LEVOTHYROXINE SODIUM 0.07 MG/1
75 TABLET ORAL DAILY
Qty: 90 TABLET | Refills: 3 | Status: SHIPPED | OUTPATIENT
Start: 2019-04-19 | End: 2019-05-21 | Stop reason: SDUPTHER

## 2019-05-02 DIAGNOSIS — I15.9 SECONDARY HYPERTENSION: Primary | ICD-10-CM

## 2019-05-02 DIAGNOSIS — K21.9 GASTROESOPHAGEAL REFLUX DISEASE WITHOUT ESOPHAGITIS: ICD-10-CM

## 2019-05-08 RX ORDER — AMLODIPINE BESYLATE AND BENAZEPRIL HYDROCHLORIDE 5; 10 MG/1; MG/1
1 CAPSULE ORAL DAILY
Qty: 90 CAPSULE | Refills: 3 | Status: SHIPPED | OUTPATIENT
Start: 2019-05-08 | End: 2019-10-08 | Stop reason: HOSPADM

## 2019-05-08 RX ORDER — OMEPRAZOLE 40 MG/1
40 CAPSULE, DELAYED RELEASE ORAL DAILY
Qty: 90 CAPSULE | Refills: 3 | Status: SHIPPED | OUTPATIENT
Start: 2019-05-08 | End: 2020-03-25 | Stop reason: SDUPTHER

## 2019-05-21 ENCOUNTER — OFFICE VISIT (OUTPATIENT)
Dept: FAMILY MEDICINE CLINIC | Facility: CLINIC | Age: 84
End: 2019-05-21
Payer: COMMERCIAL

## 2019-05-21 VITALS
DIASTOLIC BLOOD PRESSURE: 58 MMHG | HEIGHT: 62 IN | SYSTOLIC BLOOD PRESSURE: 122 MMHG | BODY MASS INDEX: 32.57 KG/M2 | HEART RATE: 64 BPM | WEIGHT: 177 LBS

## 2019-05-21 DIAGNOSIS — G47.00 INSOMNIA, UNSPECIFIED TYPE: ICD-10-CM

## 2019-05-21 DIAGNOSIS — E03.9 HYPOTHYROIDISM, UNSPECIFIED TYPE: ICD-10-CM

## 2019-05-21 DIAGNOSIS — I25.10 ARTERIOSCLEROSIS OF CORONARY ARTERY: ICD-10-CM

## 2019-05-21 DIAGNOSIS — K21.9 GASTROESOPHAGEAL REFLUX DISEASE WITHOUT ESOPHAGITIS: ICD-10-CM

## 2019-05-21 DIAGNOSIS — I50.32 CHRONIC DIASTOLIC CONGESTIVE HEART FAILURE (HCC): Chronic | ICD-10-CM

## 2019-05-21 DIAGNOSIS — IMO0002 TYPE 2 DIABETES MELLITUS, UNCONTROLLED, WITH NEUROPATHY: Primary | ICD-10-CM

## 2019-05-21 DIAGNOSIS — R44.1 VISUAL HALLUCINATIONS: ICD-10-CM

## 2019-05-21 DIAGNOSIS — I35.0 NONRHEUMATIC AORTIC VALVE STENOSIS: Chronic | ICD-10-CM

## 2019-05-21 LAB — SL AMB POCT HEMOGLOBIN AIC: 6.5 (ref ?–6.5)

## 2019-05-21 PROCEDURE — 99214 OFFICE O/P EST MOD 30 MIN: CPT | Performed by: FAMILY MEDICINE

## 2019-05-21 PROCEDURE — 83036 HEMOGLOBIN GLYCOSYLATED A1C: CPT | Performed by: FAMILY MEDICINE

## 2019-05-21 RX ORDER — NITROGLYCERIN 0.4 MG/1
TABLET SUBLINGUAL
COMMUNITY
End: 2019-11-01 | Stop reason: SDUPTHER

## 2019-05-21 RX ORDER — LEVOTHYROXINE SODIUM 0.07 MG/1
75 TABLET ORAL DAILY
Qty: 90 TABLET | Refills: 3 | Status: SHIPPED | OUTPATIENT
Start: 2019-05-21 | End: 2020-07-02

## 2019-06-21 ENCOUNTER — OFFICE VISIT (OUTPATIENT)
Dept: FAMILY MEDICINE CLINIC | Facility: CLINIC | Age: 84
End: 2019-06-21
Payer: COMMERCIAL

## 2019-06-21 VITALS
SYSTOLIC BLOOD PRESSURE: 126 MMHG | DIASTOLIC BLOOD PRESSURE: 62 MMHG | HEART RATE: 84 BPM | WEIGHT: 177.2 LBS | BODY MASS INDEX: 32.61 KG/M2 | HEIGHT: 62 IN

## 2019-06-21 DIAGNOSIS — M25.551 RIGHT HIP PAIN: Primary | ICD-10-CM

## 2019-06-21 DIAGNOSIS — IMO0002 TYPE 2 DIABETES MELLITUS, UNCONTROLLED, WITH NEUROPATHY: ICD-10-CM

## 2019-06-21 DIAGNOSIS — I50.32 CHRONIC DIASTOLIC CONGESTIVE HEART FAILURE (HCC): Chronic | ICD-10-CM

## 2019-06-21 DIAGNOSIS — G50.0 TRIGEMINAL NEURALGIA: ICD-10-CM

## 2019-06-21 DIAGNOSIS — B37.2 CANDIDAL INTERTRIGO: ICD-10-CM

## 2019-06-21 PROCEDURE — 99214 OFFICE O/P EST MOD 30 MIN: CPT | Performed by: FAMILY MEDICINE

## 2019-06-21 PROCEDURE — 1160F RVW MEDS BY RX/DR IN RCRD: CPT | Performed by: FAMILY MEDICINE

## 2019-06-21 PROCEDURE — 1036F TOBACCO NON-USER: CPT | Performed by: FAMILY MEDICINE

## 2019-06-21 RX ORDER — NYSTATIN 100000 [USP'U]/G
POWDER TOPICAL 2 TIMES DAILY
Qty: 60 G | Refills: 1 | Status: SHIPPED | OUTPATIENT
Start: 2019-06-21 | End: 2020-08-21 | Stop reason: SDUPTHER

## 2019-06-21 RX ORDER — KETOCONAZOLE 20 MG/G
CREAM TOPICAL DAILY
Qty: 60 G | Refills: 1 | Status: SHIPPED | OUTPATIENT
Start: 2019-06-21 | End: 2020-06-26

## 2019-07-19 ENCOUNTER — EVALUATION (OUTPATIENT)
Dept: PHYSICAL THERAPY | Facility: CLINIC | Age: 84
End: 2019-07-19
Payer: COMMERCIAL

## 2019-07-19 DIAGNOSIS — M25.551 RIGHT HIP PAIN: Primary | ICD-10-CM

## 2019-07-19 PROCEDURE — 97162 PT EVAL MOD COMPLEX 30 MIN: CPT | Performed by: PHYSICAL THERAPIST

## 2019-07-19 NOTE — PROGRESS NOTES
PT Evaluation     Today's date: 2019  Patient name: Rani Louis  : 1922  MRN: [id number]  Referring provider: Stanislav Dougherty MD  Dx:   Encounter Diagnosis     ICD-10-CM    1  Right hip pain M25 551 Ambulatory referral to Physical Therapy                  Assessment  Assessment details: Rani Louis is a 80 y o  female who presents to physical therapy with Right hip pain  Pt has deficits in lumbar AROM, L hip abduction strength, and core stability  Pt has good support at home and states that her daughter helps her with IADLs  Pt feels stiffness in the morning and has worst pain with lying down at night  Rani Louis would benefit from formal physical therapy to address impairments as detailed, decrease pain, and restore maximal level of function for all home, self-care, and mobility tasks  Thank you for this referral     Impairments: abnormal or restricted ROM, activity intolerance, impaired physical strength, lacks appropriate home exercise program, pain with function and poor posture   Barriers to therapy: High copay  Understanding of Dx/Px/POC: good   Prognosis: good    Goals  Short-term goals:  1  Pt will decrease pain by 1-2 points within 4 weeks  2  Pt will improve ROM by 5-10 degrees within 4 weeks  3  Pt will improve strength by 1/2 grade within 4 weeks  4  Pt will improve physical FS score by 10 points by discharge  Long-term goals:  1  Pt will demonstrate independence with HEP by discharge  2  Pt will return to all home tasks with good body mechanics and pain <3/10 by discharge  3  Pt will perform all transfers with good body mechanics and without cuing by discharge      Plan  Plan details: Due to high copay, plan to see pt 1x next week to establish HEP, then 1x in 2 weeks to assess effectiveness of HEP  Patient would benefit from: PT eval and skilled PT  Planned modality interventions: cryotherapy and thermotherapy: hydrocollator packs  Planned therapy interventions: joint mobilization, manual therapy, neuromuscular re-education, patient education, strengthening, stretching, therapeutic exercise, flexibility, functional ROM exercises, home exercise program, abdominal trunk stabilization, activity modification, behavior modification, body mechanics training, postural training, therapeutic activities and self care  Frequency: 1x week  Duration in weeks: 4  Treatment plan discussed with: patient and family        Subjective Evaluation    History of Present Illness  Mechanism of injury: Pt locates pain over her L hip, stomach, and lower back  Pt feels stiff in the morning and takes a while to get moving properly  Pt notes that her ambulation is slower and painful  Pt has shortness of breath that has been worsening over the last few months  Pt denies heart palpitations and indigestion, reports wheezing  Pt is being followed by cardiologist  Pt reports chronic pain at L ribs that comes and goes, worse at night, slightly relieved with Lidocaine patch  Pt states that pain used to happen more frequently  Pt is not feeling much relief with inhaler  Pt states that she sometimes "feels a noise" in L hip  Pt has been carrying cane in R hand  Pt minimizes stairclimbing and is able to do so carefully  Pt can cook a little but has difficulty with cleaning, sweeping, and doing housework, stating that daughter takes care of most things  Pt denies numbness and tingling in legs    Pain  At best pain ratin  At worst pain ratin  Location: lower back  Relieving factors: medications    Social Support  Stairs in house: yes   Lives with: adult children    Employment status: not working    Diagnostic Tests  No diagnostic tests performed  Patient Goals  Patient goals for therapy: decreased pain  Patient goal: do more around the house        Objective     Static Posture     Comments  Thoracic kyphosis, rotated L, decreased lordosis, [facility] posture    Tenderness     Additional Tenderness Details  Tender over L ischial tuberosity    Neurological Testing     Reflexes   Left   Patellar (L4): trace (1+)  Achilles (S1): trace (1+)  Clonus sign: negative    Right   Patellar (L4): normal (2+)  Achilles (S1): normal (2+)  Clonus sign: negative    Active Range of Motion     Lumbar   Flexion: 72 (low back pain) degrees  with pain  Extension: 26 degrees   Left lateral flexion: 19 (increased groin pain) degrees    with pain  Right lateral flexion: 19 (central spine pain) degrees  with pain  Left rotation: Active left lumbar rotation: 100%, discomfort in sides  Right rotation: Active right lumbar rotation: 100%, discomfort in sides  Strength/Myotome Testing     Left Hip   Planes of Motion   Flexion: 4+  Abduction: 3  External rotation: 4  Internal rotation: 5    Right Hip   Planes of Motion   Flexion: 4+  External rotation: 4  Internal rotation: 5    Left Knee   Flexion: 4+  Extension: 4+    Right Knee   Flexion: 4+  Extension: 4    Left Ankle/Foot   Dorsiflexion: 5  Plantar flexion: 4+  Inversion: 4  Eversion: 5    Right Ankle/Foot   Dorsiflexion: 5  Plantar flexion: 4+  Inversion: 4  Eversion: 5    Tests     Lumbar     Left   Negative crossed SLR, passive SLR and slump test      Right   Negative crossed SLR, passive SLR and slump test      Left Pelvic Girdle/Sacrum   Negative: active SLR test      Right Pelvic Girdle/Sacrum   Negative: active SLR test      Left Hip   Positive FADIR  Negative RADHA  Right Hip   Negative RADHA and FADIR       Additional Tests Details  Groin pain B with maximal hip flexion  Increased pain with B LAD, L > R             Precautions: DM with neuropathy, hearing loss, CHF, asthma, dyspnea on exertion, hypothyroid, HTN, migraine, OA, bladder prolapse  Focus on hip strengthening, posture, transitional movements    Manual  7/19            none                                                                     Exercise Diary  7/19            Sit to supine nv            TA iso nv            TA iso + march nv            TA iso + hip add squeeze nv            TA iso + TB hip abd nv            TA + SLR (flex, abd) nv            TA + clamshells, reg & rev nv            Side stepping nv            Sit to stands nv            TA + shoulder scaption with breathing nv            TA + shoulder ext nv            TA + low rows nv                                                                                                                        Modalities  7/19            MH/CP prn nv

## 2019-07-23 ENCOUNTER — DOCTOR'S OFFICE (OUTPATIENT)
Dept: URBAN - METROPOLITAN AREA CLINIC 136 | Facility: CLINIC | Age: 84
Setting detail: OPHTHALMOLOGY
End: 2019-07-23
Payer: COMMERCIAL

## 2019-07-23 DIAGNOSIS — H04.123: ICD-10-CM

## 2019-07-23 DIAGNOSIS — H43.811: ICD-10-CM

## 2019-07-23 DIAGNOSIS — H40.1132: ICD-10-CM

## 2019-07-23 DIAGNOSIS — H34.8312: ICD-10-CM

## 2019-07-23 PROBLEM — G44.1 HEADACHE: Status: RESOLVED | Noted: 2017-03-24 | Resolved: 2019-07-23

## 2019-07-23 PROCEDURE — 92133 CPTRZD OPH DX IMG PST SGM ON: CPT | Performed by: OPHTHALMOLOGY

## 2019-07-23 PROCEDURE — 76514 ECHO EXAM OF EYE THICKNESS: CPT | Performed by: OPHTHALMOLOGY

## 2019-07-23 PROCEDURE — 92014 COMPRE OPH EXAM EST PT 1/>: CPT | Performed by: OPHTHALMOLOGY

## 2019-07-23 ASSESSMENT — REFRACTION_MANIFEST
OS_VA3: 20/
OU_VA: 20/25
OS_VA2: 20/
OD_VA2: 20/
OS_AXIS: 085
OD_VA3: 20/
OD_VA2: 20/25-
OS_VA3: 20/
OS_AXIS: 085
OU_VA: 20/
OD_VA1: 20/20
OS_VA1: 20/25-1
OS_SPHERE: PLANO
OS_SPHERE: -0.25
OD_VA3: 20/
OD_SPHERE: -1.25
OS_CYLINDER: -2.75
OS_ADD: +2.50
OD_AXIS: 080
OS_VA2: 20/
OD_SPHERE: -1.00
OD_AXIS: 080
OS_CYLINDER: -3.50
OD_ADD: +2.50
OD_VA1: 20/20
OD_CYLINDER: -3.25
OS_VA1: 20/30
OD_CYLINDER: -2.75

## 2019-07-23 ASSESSMENT — SUPERFICIAL PUNCTATE KERATITIS (SPK)
OS_SPK: 2+
OD_SPK: 2+

## 2019-07-23 ASSESSMENT — REFRACTION_AUTOREFRACTION
OS_SPHERE: +0.25
OD_CYLINDER: -4.50
OD_SPHERE: -1.25
OS_AXIS: 81
OD_AXIS: 80
OS_CYLINDER: -4.75

## 2019-07-23 ASSESSMENT — LID POSITION - DERMATOCHALASIS
OS_DERMATOCHALASIS: LUL 1+
OD_DERMATOCHALASIS: RUL 1+

## 2019-07-23 ASSESSMENT — SPHEQUIV_DERIVED
OD_SPHEQUIV: -3.5
OS_SPHEQUIV: -2.125
OD_SPHEQUIV: -2.625
OS_SPHEQUIV: -1.625
OD_SPHEQUIV: -2.625

## 2019-07-23 ASSESSMENT — VISUAL ACUITY
OD_BCVA: 20/80
OS_BCVA: 20/80-1

## 2019-07-23 ASSESSMENT — REFRACTION_CURRENTRX
OS_OVR_VA: 20/
OS_OVR_VA: 20/
OD_OVR_VA: 20/
OS_OVR_VA: 20/
OD_OVR_VA: 20/
OD_OVR_VA: 20/

## 2019-07-23 ASSESSMENT — PACHYMETRY
OS_CT_UM: 534
OD_CT_CORRECTION: 1
OD_CT_UM: 532
OS_CT_CORRECTION: 1

## 2019-07-23 ASSESSMENT — LID EXAM ASSESSMENTS
OS_BLEPHARITIS: LLL LUL 1+
OD_TRICHIASIS: RUL
OD_COMMENTS: POSTERIOR
OS_COMMENTS: POSTERIOR
OD_BLEPHARITIS: RLL RUL 1+

## 2019-07-23 ASSESSMENT — DRY EYES - PHYSICIAN NOTES
OD_GENERALCOMMENTS: LOW TEAR FILM
OS_GENERALCOMMENTS: LOW TEAR FILM

## 2019-07-23 ASSESSMENT — CONFRONTATIONAL VISUAL FIELD TEST (CVF)
OS_FINDINGS: CONSTRICTION
OD_FINDINGS: CONSTRICTION

## 2019-07-23 ASSESSMENT — PUNCTA - ASSESSMENT: OS_PUNCTA: SIL PLUG LLL

## 2019-07-24 ENCOUNTER — TELEPHONE (OUTPATIENT)
Dept: FAMILY MEDICINE CLINIC | Facility: CLINIC | Age: 84
End: 2019-07-24

## 2019-07-24 ENCOUNTER — OFFICE VISIT (OUTPATIENT)
Dept: PHYSICAL THERAPY | Facility: CLINIC | Age: 84
End: 2019-07-24
Payer: COMMERCIAL

## 2019-07-24 DIAGNOSIS — M25.551 PAIN OF BOTH HIP JOINTS: Primary | ICD-10-CM

## 2019-07-24 DIAGNOSIS — M54.5 LOW BACK PAIN, UNSPECIFIED BACK PAIN LATERALITY, UNSPECIFIED CHRONICITY, WITH SCIATICA PRESENCE UNSPECIFIED: ICD-10-CM

## 2019-07-24 DIAGNOSIS — M25.551 RIGHT HIP PAIN: Primary | ICD-10-CM

## 2019-07-24 DIAGNOSIS — M15.9 OSTEOARTHRITIS OF MULTIPLE JOINTS, UNSPECIFIED OSTEOARTHRITIS TYPE: ICD-10-CM

## 2019-07-24 DIAGNOSIS — M25.552 PAIN OF BOTH HIP JOINTS: Primary | ICD-10-CM

## 2019-07-24 PROCEDURE — 97140 MANUAL THERAPY 1/> REGIONS: CPT

## 2019-07-24 PROCEDURE — 97110 THERAPEUTIC EXERCISES: CPT

## 2019-07-24 NOTE — TELEPHONE ENCOUNTER
Dr [facility] patient  Patient presented with order for PT for R hip, she then complained of L hip during her initial eval  They can not continue to treat L hip without an order  Patient is now insisting she has no pain in R hip  Will you place order for L hip without her being seen here? Please call [facility] to advise [phone]  They will see her today but not after   Thank you

## 2019-07-24 NOTE — PROGRESS NOTES
Daily Note     Today's date: 2019  Patient name: Erika Reyes  : 1922  MRN: [id number]  Referring provider: Yani Gu MD  Dx:   Encounter Diagnosis     ICD-10-CM    1  Right hip pain M25 551                   Subjective: Pt presents to PT reporting increased shortness of breath today stating "everything is taking longer to do"  Pt does have dyspenia on exertion as noted in the chart but patient states worse since yesterday  Took vitals; all with in normal limits  She denies any cardia signs or sx  /72, L UE seated  HR 62 BPM    Pt reports it is her L hip that is bothering her not her R as noted on the script  [facility] desk staff called MD office to confirm however MD who wrote the script is no longer there and stated they will call back  Pt grades her pain a 2/10 in L posterior hip  Later talked to daughter and she states that occ breathing gets worse and her mother has breathing treatment she should do but refuses when daughter states she offers the treatment  Pt was advised to follow MD orders for breathing treatment so she can perform task with less shortness of breath  Pt reports a verbal understanding  Objective: See treatment diary below      Assessment:  Pt performed minor TE today secondary to pt's complaint of SOB  Issued pt HEP and highlighted the three TE to perform at home and issued a peach theraband for hip abd seated  Also, reviewed and practiced with pt transferring from sit <---> stand  Pt demonstrates improved transfers with education and practice  Patient demonstrated fatigue post treatment, exhibited good technique with therapeutic exercises and would benefit from continued PT  Plan: Continue per plan of care        Precautions: DM with neuropathy, hearing loss, CHF, asthma, dyspnea on exertion, hypothyroid, HTN, migraine, OA, bladder prolapse  Focus on hip strengthening, posture, transitional movements    Manual             none Exercise Diary  7/19 7/24           Sit to supine nv nv           TA iso nv 5" x 10           TA iso + march nv nv           TA iso + hip add squeeze nv 5" x 10           TA iso + TB hip abd nv 5" x 10,  peach           TA + SLR (flex, abd) nv nv           TA + clamshells, reg & rev nv nv           Side stepping nv nv           Sit to stands nv 8'           TA + shoulder scaption with breathing nv nv           TA + shoulder ext nv nv           TA + low rows nv nv                                                                                                                       Modalities  7/19 7/24           MH/CP prn nv

## 2019-07-25 PROBLEM — M25.552 PAIN OF BOTH HIP JOINTS: Status: ACTIVE | Noted: 2019-06-21

## 2019-07-26 ENCOUNTER — APPOINTMENT (OUTPATIENT)
Dept: PHYSICAL THERAPY | Facility: CLINIC | Age: 84
End: 2019-07-26
Payer: COMMERCIAL

## 2019-07-31 ENCOUNTER — DOCTOR'S OFFICE (OUTPATIENT)
Dept: URBAN - METROPOLITAN AREA CLINIC 136 | Facility: CLINIC | Age: 84
Setting detail: OPHTHALMOLOGY
End: 2019-07-31
Payer: COMMERCIAL

## 2019-07-31 DIAGNOSIS — H43.811: ICD-10-CM

## 2019-07-31 DIAGNOSIS — H34.8312: ICD-10-CM

## 2019-07-31 DIAGNOSIS — H40.1132: ICD-10-CM

## 2019-07-31 PROCEDURE — 92134 CPTRZ OPH DX IMG PST SGM RTA: CPT | Performed by: OPHTHALMOLOGY

## 2019-07-31 PROCEDURE — 67028 INJECTION EYE DRUG: CPT | Performed by: OPHTHALMOLOGY

## 2019-07-31 PROCEDURE — 92014 COMPRE OPH EXAM EST PT 1/>: CPT | Performed by: OPHTHALMOLOGY

## 2019-07-31 ASSESSMENT — DRY EYES - PHYSICIAN NOTES
OS_GENERALCOMMENTS: LOW TEAR FILM
OD_GENERALCOMMENTS: LOW TEAR FILM

## 2019-07-31 ASSESSMENT — PUNCTA - ASSESSMENT: OS_PUNCTA: SIL PLUG LLL

## 2019-07-31 ASSESSMENT — LID POSITION - DERMATOCHALASIS
OS_DERMATOCHALASIS: LUL 1+
OD_DERMATOCHALASIS: RUL 1+

## 2019-07-31 ASSESSMENT — LID EXAM ASSESSMENTS
OD_COMMENTS: POSTERIOR
OS_BLEPHARITIS: LLL LUL 1+
OS_COMMENTS: POSTERIOR
OD_BLEPHARITIS: RLL RUL 1+
OD_TRICHIASIS: RUL

## 2019-07-31 ASSESSMENT — CONFRONTATIONAL VISUAL FIELD TEST (CVF)
OS_FINDINGS: CONSTRICTION
OD_FINDINGS: CONSTRICTION

## 2019-07-31 ASSESSMENT — SUPERFICIAL PUNCTATE KERATITIS (SPK)
OS_SPK: 2+
OD_SPK: 2+

## 2019-08-01 ASSESSMENT — SPHEQUIV_DERIVED
OD_SPHEQUIV: -3.5
OS_SPHEQUIV: -1.625
OD_SPHEQUIV: -2.625
OD_SPHEQUIV: -2.625
OS_SPHEQUIV: -2.125

## 2019-08-01 ASSESSMENT — REFRACTION_MANIFEST
OD_VA3: 20/
OS_SPHERE: -0.25
OS_VA3: 20/
OS_AXIS: 085
OD_VA3: 20/
OD_SPHERE: -1.25
OS_CYLINDER: -3.50
OD_VA2: 20/25-
OS_VA3: 20/
OS_ADD: +2.50
OD_VA2: 20/
OD_CYLINDER: -3.25
OS_CYLINDER: -2.75
OD_AXIS: 080
OD_AXIS: 080
OS_SPHERE: PLANO
OS_VA1: 20/25-1
OD_CYLINDER: -2.75
OD_VA1: 20/20
OD_VA1: 20/20
OS_VA2: 20/
OU_VA: 20/25
OU_VA: 20/
OS_VA2: 20/
OS_VA1: 20/30
OS_AXIS: 085
OD_SPHERE: -1.00
OD_ADD: +2.50

## 2019-08-01 ASSESSMENT — REFRACTION_CURRENTRX
OD_OVR_VA: 20/
OS_OVR_VA: 20/
OS_OVR_VA: 20/
OD_OVR_VA: 20/
OS_OVR_VA: 20/
OD_OVR_VA: 20/

## 2019-08-01 ASSESSMENT — REFRACTION_AUTOREFRACTION
OS_CYLINDER: -4.75
OD_CYLINDER: -4.50
OS_AXIS: 81
OD_SPHERE: -1.25
OD_AXIS: 80
OS_SPHERE: +0.25

## 2019-08-01 ASSESSMENT — VISUAL ACUITY
OS_BCVA: 20/150-1
OD_BCVA: 20/150-2

## 2019-08-21 ENCOUNTER — OFFICE VISIT (OUTPATIENT)
Dept: CARDIOLOGY CLINIC | Facility: CLINIC | Age: 84
End: 2019-08-21
Payer: COMMERCIAL

## 2019-08-21 VITALS
DIASTOLIC BLOOD PRESSURE: 65 MMHG | OXYGEN SATURATION: 96 % | HEART RATE: 63 BPM | HEIGHT: 62 IN | RESPIRATION RATE: 19 BRPM | BODY MASS INDEX: 32.2 KG/M2 | SYSTOLIC BLOOD PRESSURE: 135 MMHG | WEIGHT: 175 LBS

## 2019-08-21 DIAGNOSIS — I35.0 CALCIFIC AORTIC STENOSIS: ICD-10-CM

## 2019-08-21 DIAGNOSIS — I10 BENIGN ESSENTIAL HTN: ICD-10-CM

## 2019-08-21 DIAGNOSIS — I50.32 CHRONIC DIASTOLIC CONGESTIVE HEART FAILURE (HCC): Primary | Chronic | ICD-10-CM

## 2019-08-21 PROCEDURE — 99214 OFFICE O/P EST MOD 30 MIN: CPT | Performed by: INTERNAL MEDICINE

## 2019-08-21 RX ORDER — FUROSEMIDE 20 MG/1
40 TABLET ORAL DAILY
Qty: 180 TABLET | Refills: 3 | Status: SHIPPED | OUTPATIENT
Start: 2019-08-21 | End: 2019-10-08 | Stop reason: HOSPADM

## 2019-08-21 RX ORDER — DIAPER,BRIEF,ADULT, DISPOSABLE
EACH MISCELLANEOUS
COMMUNITY
Start: 2019-06-14

## 2019-08-21 NOTE — PROGRESS NOTES
Cardiology Follow Up        Elmo Espinosa      9/24/1922      [id number]      Discussion/Summary:    1  Moderate to severe aortic valve stenosis  2  Chronic diastolic heart failure, compensated  3  Intermittent fatigue, suspect secondary to intermittent sleep deprivation  4  Benign essential hypertension, controlled  5  Chronic atypical chest pain  6  Possible CAD--prior echocardiogram with possible basal inferior/basilar inferolateral hypokinesis  7  Exertional dyspnea    · Uncertain etiology of exertional dyspnea  Patient states she is not sure if it is related to the left-sided rib pain that she has, or with actual physical exertion  However upon repeated questioning she does state that she feels significantly worse than before in terms of her breathing and her functional capacity has reduced  Will improve clean increase furosemide to 40 mg daily  I have requested she increase her dietary potassium intake  Will recheck echocardiogram and re-evaluate severity of aortic valve stenosis  By exam, sounds moderate to severe  NT proBNP and BMP before next visit in 4 weeks  Her weight has been stable without increased lower extremity edema  · Blood pressure acceptable, continue amlodipine and benazepril  · No symptoms of angina, although cannot rule out her exertional dyspnea as an anginal equivalent  Follow-up in 4 weeks after BMP, NT proBNP  Schedule echocardiogram      Interval History: This is a very pleasant female with a history of chronic atypical chest pain, hypertension, dyslipidemia, and diabetes  She also has a history of severe aortic valve stenosis  She has had several admissions in the past, January 2016/April 2016/October 2016/April 2017 for atypical chest pain at which time she was ruled out for myocardial injury      She was last hospitalized August 2018 for acute diastolic congestive heart failure    She was discharged on furosemide 20 mg daily which she has been doing well with        Danny Maharaj was in the hospital 01/2019 with atypical chest pain, rule out for myocardial injury  She presents today for follow-up, and has complaints of exertional dyspnea, ongoing for the past several months  She denies any weight gain, worsening lower extremity edema, exertional chest discomfort, orthopnea, or paroxysmal nocturnal dyspnea  She feels short of breath usually walking around the house  She does have a discomfort under her left breast in the ribs which always happens at rest as well, and she cannot tell if her discomfort causes shortness of breath or whether it is more related to physical exertion  Vitals:  Vitals:    08/21/19 1521   BP: 135/65   BP Location: Right arm   Patient Position: Sitting   Cuff Size: Large   Pulse: 63   Resp: 19   SpO2: 96%   Weight: 79 4 kg (175 lb)   Height: 5' 2" (1 575 m)         Past Medical History:   Diagnosis Date    Asthma     Atypical chest pain     CAD (coronary artery disease)     Candidal intertrigo     CHF (congestive heart failure) (HCC)     Depression     Diabetes mellitus (HCC)     Diabetic neuropathy (HCC)     Diverticulitis     Dyslipidemia     Female bladder prolapse     Gastric ulcer     Glaucoma     HTN (hypertension)     Hyperlipidemia     Hypothyroidism 4/18/2016    RAD (reactive airway disease)      Social History     Socioeconomic History    Marital status:      Spouse name: Not on file    Number of children: 2    Years of education: Not on file    Highest education level: Not on file   Occupational History    Occupation: Retired   Social Needs    Financial resource strain: Not on file    Food insecurity:     Worry: Not on file     Inability: Not on file   TORIA needs:     Medical: Not on file     Non-medical: Not on file   Tobacco Use    Smoking status: Never Smoker    Smokeless tobacco: Never Used   Substance and Sexual Activity    Alcohol use:  No Comment: Social Alcohol Use -- as per allscripts (pt denies all alcohol use)    Drug use: No    Sexual activity: Not on file   Lifestyle    Physical activity:     Days per week: Not on file     Minutes per session: Not on file    Stress: Not on file   Relationships    Social connections:     Talks on phone: Not on file     Gets together: Not on file     Attends Spiritism service: Not on file     Active member of club or organization: Not on file     Attends meetings of clubs or organizations: Not on file     Relationship status: Not on file    Intimate partner violence:     Fear of current or ex partner: Not on file     Emotionally abused: Not on file     Physically abused: Not on file     Forced sexual activity: Not on file   Other Topics Concern    Not on file   Social History Narrative    Lived with adult children    Caffeine Use      Family History   Problem Relation Age of Onset    Breast cancer Mother     Hypothyroidism Mother     Hypertension Father     Breast cancer Sister     Thyroid disease Sister     Hypertension Sister      Past Surgical History:   Procedure Laterality Date    COLONOSCOPY      ESOPHAGOGASTRODUODENOSCOPY  2011    HYSTERECTOMY         Current Outpatient Medications:     acetaminophen-codeine (TYLENOL #3) 300-30 mg per tablet, Take 1 tablet by mouth every 4 (four) hours as needed for moderate pain, Disp: 30 tablet, Rfl: 0    albuterol (VENTOLIN HFA) 90 mcg/act inhaler, Inhale 2 puffs every 4 (four) hours as needed for wheezing, Disp: 1 Inhaler, Rfl: 3    aluminum-magnesium hydroxide-simethicone (MYLANTA) 200-200-20 mg/5 mL suspension, Take 30 mL by mouth every 4 (four) hours as needed for indigestion or heartburn, Disp: , Rfl:     amLODIPine-benazepril (LOTREL 5-10) 5-10 MG per capsule, Take 1 capsule by mouth daily, Disp: 90 capsule, Rfl: 3    aspirin 81 MG tablet, Take 81 mg by mouth daily  , Disp: , Rfl:     Cholecalciferol (VITAMIN D3) 2000 units capsule, Take 1,000 Units by mouth daily  , Disp: , Rfl:     dorzolamide (TRUSOPT) 2 % ophthalmic solution, Administer 1 drop to both eyes 3 (three) times a day  , Disp: , Rfl:     furosemide (LASIX) 20 mg tablet, TAKE 1 TABLET(20 MG) BY MOUTH DAILY, Disp: 90 tablet, Rfl: 0    gabapentin (NEURONTIN) 600 MG tablet, TAKE 1/2 TABLET EVERY 8 HOURS AS NEEDED (CRANIAL NEURALGIA) (Patient taking differently: 600 mg ), Disp: 90 tablet, Rfl: 3    Incontinence Supply Disposable (SELECT DISPOSABLE UNDERWEAR LG) MISC, , Disp: , Rfl:     ketoconazole (NIZORAL) 2 % cream, Apply topically daily, Disp: 60 g, Rfl: 1    latanoprost (XALATAN) 0 005 % ophthalmic solution, Apply 1 drop to eye daily at bedtime Both eyes, Disp: 7 5 mL, Rfl: 1    levothyroxine 75 mcg tablet, Take 1 tablet (75 mcg total) by mouth daily, Disp: 90 tablet, Rfl: 3    Lidocaine-Menthol 4-5 % PTCH, lidocaine patch, Disp: , Rfl:     Melatonin 5 MG CAPS, 5 mg as needed , Disp: , Rfl:     nystatin (MYCOSTATIN) powder, Apply topically 2 (two) times a day, Disp: 60 g, Rfl: 1    omeprazole (PriLOSEC) 40 MG capsule, Take 1 capsule (40 mg total) by mouth daily (Patient taking differently: Take 40 mg by mouth as needed ), Disp: 90 capsule, Rfl: 3    sitaGLIPtin (JANUVIA) 50 mg tablet, Take 1 tablet (50 mg total) by mouth daily, Disp: 90 tablet, Rfl: 3    fluticasone (FLONASE) 50 mcg/act nasal spray, 2 sprays into each nostril daily (Patient not taking: Reported on 8/21/2019), Disp: 3 Bottle, Rfl: 3    nitroglycerin (NITROSTAT) 0 4 mg SL tablet, Nitrostat 0 4 mg sublingual tablet, Disp: , Rfl:     OLANZapine (ZyPREXA) 2 5 mg tablet, Take 1 tablet (2 5 mg total) by mouth daily, Disp: 90 tablet, Rfl: 3        Review of Systems:  Review of Systems   Constitutional: Negative for activity change, fever and unexpected weight change  HENT: Negative for facial swelling, nosebleeds and voice change  Respiratory: Negative for chest tightness, shortness of breath and wheezing  Cardiovascular: Negative for chest pain, palpitations and leg swelling  Gastrointestinal: Negative for abdominal distention  Genitourinary: Negative for hematuria  Musculoskeletal: Negative for arthralgias  Skin: Negative for color change, pallor, rash and wound  Neurological: Negative for dizziness, seizures and syncope  Psychiatric/Behavioral: Negative for agitation  Physical Exam:  Physical Exam   Constitutional: She is oriented to person, place, and time  She appears well-developed and well-nourished  HENT:   Head: Normocephalic and atraumatic  Eyes: EOM are normal    Neck: Normal range of motion  Neck supple  Cardiovascular: Normal rate, regular rhythm and intact distal pulses  2/6 JENNY, audible S2   Pulmonary/Chest: Effort normal and breath sounds normal    Abdominal: Soft  Musculoskeletal: Normal range of motion  Neurological: She is alert and oriented to person, place, and time  Skin: Skin is warm and dry  Psychiatric: She has a normal mood and affect  Her behavior is normal  Judgment and thought content normal    Vitals reviewed  This note was completed in part utilizing [employer] Software  Grammatical errors, random word insertions, spelling mistakes, and incomplete sentences can be an occasional consequence of this system secondary to software limitations, ambient noise, and hardware issues  If you have any questions or concerns about the content, text, or information contained within the body of this dictation, please contact the provider for clarification

## 2019-08-22 ENCOUNTER — OFFICE VISIT (OUTPATIENT)
Dept: FAMILY MEDICINE CLINIC | Facility: CLINIC | Age: 84
End: 2019-08-22
Payer: COMMERCIAL

## 2019-08-22 VITALS
BODY MASS INDEX: 32.28 KG/M2 | SYSTOLIC BLOOD PRESSURE: 108 MMHG | WEIGHT: 175.4 LBS | HEART RATE: 64 BPM | DIASTOLIC BLOOD PRESSURE: 58 MMHG | HEIGHT: 62 IN

## 2019-08-22 DIAGNOSIS — I50.32 CHRONIC DIASTOLIC CONGESTIVE HEART FAILURE (HCC): Chronic | ICD-10-CM

## 2019-08-22 DIAGNOSIS — IMO0002 TYPE 2 DIABETES MELLITUS, UNCONTROLLED, WITH NEUROPATHY: Primary | ICD-10-CM

## 2019-08-22 DIAGNOSIS — E03.9 ACQUIRED HYPOTHYROIDISM: ICD-10-CM

## 2019-08-22 DIAGNOSIS — E55.9 VITAMIN D DEFICIENCY: ICD-10-CM

## 2019-08-22 DIAGNOSIS — E78.2 MIXED HYPERLIPIDEMIA: ICD-10-CM

## 2019-08-22 PROBLEM — R68.89 THROAT AND MOUTH SYMPTOM: Status: RESOLVED | Noted: 2019-01-28 | Resolved: 2019-08-22

## 2019-08-22 PROBLEM — R10.12 INTRACTABLE LEFT UPPER QUADRANT ABDOMINAL PAIN: Status: RESOLVED | Noted: 2017-12-15 | Resolved: 2019-08-22

## 2019-08-22 PROCEDURE — 1036F TOBACCO NON-USER: CPT | Performed by: PHYSICIAN ASSISTANT

## 2019-08-22 PROCEDURE — 99214 OFFICE O/P EST MOD 30 MIN: CPT | Performed by: PHYSICIAN ASSISTANT

## 2019-08-22 NOTE — PATIENT INSTRUCTIONS
Problem List Items Addressed This Visit        Endocrine    Hypothyroidism       Check TSH  Relevant Orders    TSH, 3rd generation with Free T4 reflex    Type 2 diabetes mellitus, uncontrolled, with neuropathy (HCC) - Primary       Check hemoglobin A1c and fasting CMP  Relevant Orders    Microalbumin / creatinine urine ratio    Comprehensive metabolic panel    Hemoglobin A1C    Microalbumin / creatinine urine ratio       Cardiovascular and Mediastinum    Chronic diastolic congestive heart failure ([facility] [place] [address] ) (Chronic)      Continue cardiology follow-up  Other    Hyperlipidemia      Check fasting lipid panel  Relevant Orders    Lipid Panel with Direct LDL reflex    Vitamin D deficiency      Check vitamin-D level           Relevant Orders    Vitamin D 25 hydroxy

## 2019-08-22 NOTE — PROGRESS NOTES
Assessment and Plan:  Pt is due for labs for all chronic conditions  I have ordered and she will go in 3 weeks with cardio orders also and I will review and then determine when pt needs to come back to office  In the future I prefer to have blood work results to go over when patient comes in for her visit  Problem List Items Addressed This Visit        Endocrine    Hypothyroidism       Check TSH  Relevant Orders    TSH, 3rd generation with Free T4 reflex    Type 2 diabetes mellitus, uncontrolled, with neuropathy (HCC) - Primary       Check hemoglobin A1c and fasting CMP  Relevant Orders    Microalbumin / creatinine urine ratio    Comprehensive metabolic panel    Hemoglobin A1C    Microalbumin / creatinine urine ratio       Cardiovascular and Mediastinum    Chronic diastolic congestive heart failure ([facility] [address] ) (Chronic)      Continue cardiology follow-up  Other    Hyperlipidemia      Check fasting lipid panel  Relevant Orders    Lipid Panel with Direct LDL reflex    Vitamin D deficiency      Check vitamin-D level  Relevant Orders    Vitamin D 25 hydroxy                 Diagnoses and all orders for this visit:    Type 2 diabetes mellitus, uncontrolled, with neuropathy ([facility] [address] )  -     Microalbumin / creatinine urine ratio  -     Comprehensive metabolic panel  -     Hemoglobin A1C  -     Microalbumin / creatinine urine ratio    Acquired hypothyroidism  -     TSH, 3rd generation with Free T4 reflex    Vitamin D deficiency  -     Vitamin D 25 hydroxy    Mixed hyperlipidemia  -     Lipid Panel with Direct LDL reflex    Chronic diastolic congestive heart failure (HCC)    Other orders  -     Cancel: IRIS Diabetic eye exam              Subjective:      Patient ID: Louis Griffin is a 80 y o  female  CC:    Chief Complaint   Patient presents with    Follow-up     Follow up   Also pt states her entire left arm was sore this morning and she was afraid there was something wrong with her heart  kw    Diabetes    Hypothyroidism    Hypertension       HPI:      Dilia Rivas is here for chronic conditions f/u including the diagnosis of Type 2 diabetes mellitus, uncontrolled, with neuropathy (hcc)  (primary encounter diagnosis)   Pt  states they are taking all medications as directed without complaints or side effects     Blood work has not    This is a DM patient I am seeing for the 1st time that has multiple problem  Blood work has not been done since January  Patient is accompanied by her daughter today and states that her fasting sugars have been good in the 130s  She is blood work to do for cardiology 3 weeks from now and can go for the blood work I am ordering today than  No other acute complaints  She states this morning her arm hurt but it has resolved  The following portions of the patient's history were reviewed and updated as appropriate: allergies, current medications, past family history, past medical history, past social history, past surgical history and problem list       Review of Systems   Constitutional: Negative  HENT: Negative  Eyes: Negative  Respiratory: Negative  Cardiovascular: Negative  Gastrointestinal: Negative  Endocrine: Negative  Genitourinary: Negative  Musculoskeletal: Negative  Skin: Negative  Allergic/Immunologic: Negative  Neurological: Negative  Hematological: Negative  Psychiatric/Behavioral: Negative  Data to review:       Objective:    Vitals:    08/22/19 1147   BP: 108/58   BP Location: Left arm   Patient Position: Sitting   Pulse: 64   Weight: 79 6 kg (175 lb 6 4 oz)   Height: 5' 2" (1 575 m)        Physical Exam   Constitutional: She is oriented to person, place, and time  She appears well-developed and well-nourished  No distress  HENT:   Head: Normocephalic and atraumatic  Eyes: Conjunctivae are normal  Right eye exhibits no discharge  Left eye exhibits no discharge     Neck: Neck supple  Carotid bruit is not present  Cardiovascular: Normal rate and regular rhythm  Exam reveals no gallop and no friction rub  Murmur heard  Systolic murmur is present with a grade of 4/6  Pulmonary/Chest: Effort normal and breath sounds normal  No respiratory distress  She has no wheezes  She has no rales  Neurological: She is alert and oriented to person, place, and time  Skin: Skin is warm and dry  She is not diaphoretic  Psychiatric: She has a normal mood and affect  Judgment normal    Nursing note and vitals reviewed

## 2019-08-29 ENCOUNTER — DOCTOR'S OFFICE (OUTPATIENT)
Dept: URBAN - METROPOLITAN AREA CLINIC 136 | Facility: CLINIC | Age: 84
Setting detail: OPHTHALMOLOGY
End: 2019-08-29
Payer: COMMERCIAL

## 2019-08-29 DIAGNOSIS — H34.8312: ICD-10-CM

## 2019-08-29 PROCEDURE — 67028 INJECTION EYE DRUG: CPT | Performed by: OPHTHALMOLOGY

## 2019-08-29 PROCEDURE — 92134 CPTRZ OPH DX IMG PST SGM RTA: CPT | Performed by: OPHTHALMOLOGY

## 2019-08-29 ASSESSMENT — REFRACTION_AUTOREFRACTION
OD_SPHERE: -1.25
OS_CYLINDER: -4.75
OS_AXIS: 81
OS_SPHERE: +0.25
OD_CYLINDER: -4.50
OD_AXIS: 80

## 2019-08-29 ASSESSMENT — VISUAL ACUITY
OS_BCVA: 20/100+1
OD_BCVA: 20/100+1

## 2019-08-29 ASSESSMENT — SPHEQUIV_DERIVED
OS_SPHEQUIV: -2.125
OD_SPHEQUIV: -3.5

## 2019-09-10 ENCOUNTER — APPOINTMENT (OUTPATIENT)
Dept: LAB | Facility: CLINIC | Age: 84
End: 2019-09-10
Payer: COMMERCIAL

## 2019-09-10 DIAGNOSIS — E55.9 VITAMIN D DEFICIENCY: ICD-10-CM

## 2019-09-10 DIAGNOSIS — IMO0002 TYPE 2 DIABETES MELLITUS, UNCONTROLLED, WITH NEUROPATHY: Primary | ICD-10-CM

## 2019-09-10 DIAGNOSIS — I50.32 CHRONIC DIASTOLIC CONGESTIVE HEART FAILURE (HCC): Chronic | ICD-10-CM

## 2019-09-10 DIAGNOSIS — E78.2 MIXED HYPERLIPIDEMIA: ICD-10-CM

## 2019-09-10 LAB
25(OH)D3 SERPL-MCNC: 23.4 NG/ML (ref 30–100)
ALBUMIN SERPL BCP-MCNC: 3.2 G/DL (ref 3.5–5)
ALP SERPL-CCNC: 94 U/L (ref 46–116)
ALT SERPL W P-5'-P-CCNC: 14 U/L (ref 12–78)
ANION GAP SERPL CALCULATED.3IONS-SCNC: 4 MMOL/L (ref 4–13)
AST SERPL W P-5'-P-CCNC: 12 U/L (ref 5–45)
BILIRUB SERPL-MCNC: 0.3 MG/DL (ref 0.2–1)
BUN SERPL-MCNC: 30 MG/DL (ref 5–25)
CALCIUM SERPL-MCNC: 8.9 MG/DL (ref 8.3–10.1)
CHLORIDE SERPL-SCNC: 109 MMOL/L (ref 100–108)
CHOLEST SERPL-MCNC: 231 MG/DL (ref 50–200)
CO2 SERPL-SCNC: 25 MMOL/L (ref 21–32)
CREAT SERPL-MCNC: 1.27 MG/DL (ref 0.6–1.3)
CREAT UR-MCNC: 72.6 MG/DL
EST. AVERAGE GLUCOSE BLD GHB EST-MCNC: 151 MG/DL
GFR SERPL CREATININE-BSD FRML MDRD: 41 ML/MIN/1.73SQ M
GLUCOSE P FAST SERPL-MCNC: 124 MG/DL (ref 65–99)
HBA1C MFR BLD: 6.9 % (ref 4.2–6.3)
HDLC SERPL-MCNC: 38 MG/DL (ref 40–60)
LDLC SERPL CALC-MCNC: 152 MG/DL (ref 0–100)
MICROALBUMIN UR-MCNC: 9.4 MG/L (ref 0–20)
MICROALBUMIN/CREAT 24H UR: 13 MG/G CREATININE (ref 0–30)
NT-PROBNP SERPL-MCNC: 244 PG/ML
POTASSIUM SERPL-SCNC: 4.1 MMOL/L (ref 3.5–5.3)
PROT SERPL-MCNC: 7.2 G/DL (ref 6.4–8.2)
SODIUM SERPL-SCNC: 138 MMOL/L (ref 136–145)
TRIGL SERPL-MCNC: 206 MG/DL
TSH SERPL DL<=0.05 MIU/L-ACNC: 2.03 UIU/ML (ref 0.36–3.74)

## 2019-09-10 PROCEDURE — 80053 COMPREHEN METABOLIC PANEL: CPT | Performed by: PHYSICIAN ASSISTANT

## 2019-09-10 PROCEDURE — 84443 ASSAY THYROID STIM HORMONE: CPT | Performed by: PHYSICIAN ASSISTANT

## 2019-09-10 PROCEDURE — 83036 HEMOGLOBIN GLYCOSYLATED A1C: CPT | Performed by: PHYSICIAN ASSISTANT

## 2019-09-10 PROCEDURE — 82570 ASSAY OF URINE CREATININE: CPT | Performed by: PHYSICIAN ASSISTANT

## 2019-09-10 PROCEDURE — 82306 VITAMIN D 25 HYDROXY: CPT | Performed by: PHYSICIAN ASSISTANT

## 2019-09-10 PROCEDURE — 80061 LIPID PANEL: CPT | Performed by: PHYSICIAN ASSISTANT

## 2019-09-10 PROCEDURE — 82043 UR ALBUMIN QUANTITATIVE: CPT | Performed by: PHYSICIAN ASSISTANT

## 2019-09-10 PROCEDURE — 83880 ASSAY OF NATRIURETIC PEPTIDE: CPT

## 2019-09-10 PROCEDURE — 36415 COLL VENOUS BLD VENIPUNCTURE: CPT | Performed by: PHYSICIAN ASSISTANT

## 2019-09-25 ENCOUNTER — DOCTOR'S OFFICE (OUTPATIENT)
Dept: URBAN - METROPOLITAN AREA CLINIC 136 | Facility: CLINIC | Age: 84
Setting detail: OPHTHALMOLOGY
End: 2019-09-25
Payer: COMMERCIAL

## 2019-09-25 DIAGNOSIS — H34.8312: ICD-10-CM

## 2019-09-25 DIAGNOSIS — H34.8313: ICD-10-CM

## 2019-09-25 DIAGNOSIS — H34.8311: ICD-10-CM

## 2019-09-25 PROCEDURE — 67028 INJECTION EYE DRUG: CPT | Performed by: OPHTHALMOLOGY

## 2019-09-25 PROCEDURE — 92134 CPTRZ OPH DX IMG PST SGM RTA: CPT | Performed by: OPHTHALMOLOGY

## 2019-09-25 ASSESSMENT — DRY EYES - PHYSICIAN NOTES
OD_GENERALCOMMENTS: LOW TEAR FILM
OS_GENERALCOMMENTS: LOW TEAR FILM

## 2019-09-25 ASSESSMENT — REFRACTION_CURRENTRX
OS_OVR_VA: 20/
OD_OVR_VA: 20/
OS_OVR_VA: 20/
OD_OVR_VA: 20/
OS_OVR_VA: 20/
OD_OVR_VA: 20/

## 2019-09-25 ASSESSMENT — REFRACTION_MANIFEST
OD_VA3: 20/
OU_VA: 20/
OD_VA2: 20/
OD_VA1: 20/
OS_VA1: 20/
OS_VA3: 20/
OU_VA: 20/
OD_VA3: 20/
OS_VA2: 20/
OD_VA1: 20/
OS_VA3: 20/
OS_VA1: 20/
OS_VA2: 20/
OD_VA2: 20/

## 2019-09-25 ASSESSMENT — VISUAL ACUITY
OD_BCVA: 20/80-2
OS_BCVA: 20/80

## 2019-09-25 ASSESSMENT — CONFRONTATIONAL VISUAL FIELD TEST (CVF)
OS_FINDINGS: CONSTRICTION
OD_FINDINGS: CONSTRICTION

## 2019-09-25 ASSESSMENT — SUPERFICIAL PUNCTATE KERATITIS (SPK)
OS_SPK: 2+
OD_SPK: 2+

## 2019-09-25 ASSESSMENT — LID EXAM ASSESSMENTS
OD_COMMENTS: POSTERIOR
OD_TRICHIASIS: RUL
OD_BLEPHARITIS: RLL RUL 1+
OS_COMMENTS: POSTERIOR
OS_BLEPHARITIS: LLL LUL 1+

## 2019-09-25 ASSESSMENT — REFRACTION_AUTOREFRACTION
OS_SPHERE: +0.25
OS_CYLINDER: -4.75
OD_AXIS: 80
OD_SPHERE: -1.25
OS_AXIS: 81
OD_CYLINDER: -4.50

## 2019-09-25 ASSESSMENT — SPHEQUIV_DERIVED
OS_SPHEQUIV: -2.125
OD_SPHEQUIV: -3.5

## 2019-09-25 ASSESSMENT — LID POSITION - DERMATOCHALASIS
OD_DERMATOCHALASIS: RUL 1+
OS_DERMATOCHALASIS: LUL 1+

## 2019-09-25 ASSESSMENT — PUNCTA - ASSESSMENT: OS_PUNCTA: SIL PLUG LLL

## 2019-10-05 ENCOUNTER — APPOINTMENT (EMERGENCY)
Dept: CT IMAGING | Facility: HOSPITAL | Age: 84
DRG: 306 | End: 2019-10-05
Payer: COMMERCIAL

## 2019-10-05 ENCOUNTER — APPOINTMENT (EMERGENCY)
Dept: RADIOLOGY | Facility: HOSPITAL | Age: 84
DRG: 306 | End: 2019-10-05
Payer: COMMERCIAL

## 2019-10-05 ENCOUNTER — HOSPITAL ENCOUNTER (INPATIENT)
Facility: HOSPITAL | Age: 84
LOS: 1 days | Discharge: HOME HEALTH | DRG: 306 | End: 2019-10-08
Attending: EMERGENCY MEDICINE | Admitting: INTERNAL MEDICINE
Payer: COMMERCIAL

## 2019-10-05 DIAGNOSIS — I35.0 NONRHEUMATIC AORTIC VALVE STENOSIS: Chronic | ICD-10-CM

## 2019-10-05 DIAGNOSIS — R07.9 INTERMITTENT CHEST PAIN: ICD-10-CM

## 2019-10-05 DIAGNOSIS — R06.00 DYSPNEA ON MINIMAL EXERTION: ICD-10-CM

## 2019-10-05 DIAGNOSIS — R06.00 DYSPNEA ON EXERTION: Primary | ICD-10-CM

## 2019-10-05 PROBLEM — E11.42 DM TYPE 2 WITH DIABETIC PERIPHERAL NEUROPATHY (HCC): Chronic | Status: ACTIVE | Noted: 2019-10-05

## 2019-10-05 LAB
ALBUMIN SERPL BCP-MCNC: 3.3 G/DL (ref 3.5–5)
ALP SERPL-CCNC: 98 U/L (ref 46–116)
ALT SERPL W P-5'-P-CCNC: 14 U/L (ref 12–78)
ANION GAP SERPL CALCULATED.3IONS-SCNC: 10 MMOL/L (ref 4–13)
AST SERPL W P-5'-P-CCNC: 25 U/L (ref 5–45)
BASOPHILS # BLD AUTO: 0.06 THOUSANDS/ΜL (ref 0–0.1)
BASOPHILS NFR BLD AUTO: 1 % (ref 0–1)
BILIRUB SERPL-MCNC: 0.36 MG/DL (ref 0.2–1)
BUN SERPL-MCNC: 15 MG/DL (ref 5–25)
CALCIUM SERPL-MCNC: 10.1 MG/DL (ref 8.3–10.1)
CHLORIDE SERPL-SCNC: 106 MMOL/L (ref 100–108)
CO2 SERPL-SCNC: 23 MMOL/L (ref 21–32)
CREAT SERPL-MCNC: 1.05 MG/DL (ref 0.6–1.3)
DEPRECATED D DIMER PPP: 2073 NG/ML (FEU)
EOSINOPHIL # BLD AUTO: 0.14 THOUSAND/ΜL (ref 0–0.61)
EOSINOPHIL NFR BLD AUTO: 2 % (ref 0–6)
ERYTHROCYTE [DISTWIDTH] IN BLOOD BY AUTOMATED COUNT: 13.5 % (ref 11.6–15.1)
GFR SERPL CREATININE-BSD FRML MDRD: 51 ML/MIN/1.73SQ M
GLUCOSE SERPL-MCNC: 149 MG/DL (ref 65–140)
HCT VFR BLD AUTO: 45.7 % (ref 34.8–46.1)
HGB BLD-MCNC: 14.3 G/DL (ref 11.5–15.4)
IMM GRANULOCYTES # BLD AUTO: 0.02 THOUSAND/UL (ref 0–0.2)
IMM GRANULOCYTES NFR BLD AUTO: 0 % (ref 0–2)
LYMPHOCYTES # BLD AUTO: 0.67 THOUSANDS/ΜL (ref 0.6–4.47)
LYMPHOCYTES NFR BLD AUTO: 10 % (ref 14–44)
MCH RBC QN AUTO: 29.2 PG (ref 26.8–34.3)
MCHC RBC AUTO-ENTMCNC: 31.3 G/DL (ref 31.4–37.4)
MCV RBC AUTO: 94 FL (ref 82–98)
MONOCYTES # BLD AUTO: 0.39 THOUSAND/ΜL (ref 0.17–1.22)
MONOCYTES NFR BLD AUTO: 6 % (ref 4–12)
NEUTROPHILS # BLD AUTO: 5.16 THOUSANDS/ΜL (ref 1.85–7.62)
NEUTS SEG NFR BLD AUTO: 81 % (ref 43–75)
NRBC BLD AUTO-RTO: 0 /100 WBCS
NT-PROBNP SERPL-MCNC: 798 PG/ML
PLATELET # BLD AUTO: 191 THOUSANDS/UL (ref 149–390)
PMV BLD AUTO: 10.8 FL (ref 8.9–12.7)
POTASSIUM SERPL-SCNC: 4.3 MMOL/L (ref 3.5–5.3)
PROT SERPL-MCNC: 7.6 G/DL (ref 6.4–8.2)
RBC # BLD AUTO: 4.89 MILLION/UL (ref 3.81–5.12)
SODIUM SERPL-SCNC: 139 MMOL/L (ref 136–145)
TROPONIN I SERPL-MCNC: 0.02 NG/ML
TROPONIN I SERPL-MCNC: 0.02 NG/ML
TROPONIN I SERPL-MCNC: <0.02 NG/ML
WBC # BLD AUTO: 6.44 THOUSAND/UL (ref 4.31–10.16)

## 2019-10-05 PROCEDURE — 85379 FIBRIN DEGRADATION QUANT: CPT | Performed by: EMERGENCY MEDICINE

## 2019-10-05 PROCEDURE — 84484 ASSAY OF TROPONIN QUANT: CPT | Performed by: HOSPITALIST

## 2019-10-05 PROCEDURE — 99285 EMERGENCY DEPT VISIT HI MDM: CPT

## 2019-10-05 PROCEDURE — 99285 EMERGENCY DEPT VISIT HI MDM: CPT | Performed by: EMERGENCY MEDICINE

## 2019-10-05 PROCEDURE — 99220 PR INITIAL OBSERVATION CARE/DAY 70 MINUTES: CPT | Performed by: HOSPITALIST

## 2019-10-05 PROCEDURE — 71275 CT ANGIOGRAPHY CHEST: CPT

## 2019-10-05 PROCEDURE — 85025 COMPLETE CBC W/AUTO DIFF WBC: CPT | Performed by: EMERGENCY MEDICINE

## 2019-10-05 PROCEDURE — 84484 ASSAY OF TROPONIN QUANT: CPT | Performed by: EMERGENCY MEDICINE

## 2019-10-05 PROCEDURE — 71046 X-RAY EXAM CHEST 2 VIEWS: CPT

## 2019-10-05 PROCEDURE — 96360 HYDRATION IV INFUSION INIT: CPT

## 2019-10-05 PROCEDURE — 94760 N-INVAS EAR/PLS OXIMETRY 1: CPT

## 2019-10-05 PROCEDURE — 93005 ELECTROCARDIOGRAM TRACING: CPT

## 2019-10-05 PROCEDURE — 94640 AIRWAY INHALATION TREATMENT: CPT

## 2019-10-05 PROCEDURE — 83880 ASSAY OF NATRIURETIC PEPTIDE: CPT | Performed by: EMERGENCY MEDICINE

## 2019-10-05 PROCEDURE — 80053 COMPREHEN METABOLIC PANEL: CPT | Performed by: EMERGENCY MEDICINE

## 2019-10-05 PROCEDURE — 36415 COLL VENOUS BLD VENIPUNCTURE: CPT | Performed by: EMERGENCY MEDICINE

## 2019-10-05 RX ORDER — IPRATROPIUM BROMIDE AND ALBUTEROL SULFATE 2.5; .5 MG/3ML; MG/3ML
3 SOLUTION RESPIRATORY (INHALATION)
Status: DISCONTINUED | OUTPATIENT
Start: 2019-10-05 | End: 2019-10-05

## 2019-10-05 RX ORDER — LATANOPROST 50 UG/ML
1 SOLUTION/ DROPS OPHTHALMIC
Status: DISCONTINUED | OUTPATIENT
Start: 2019-10-05 | End: 2019-10-08 | Stop reason: HOSPADM

## 2019-10-05 RX ORDER — DORZOLAMIDE HCL 20 MG/ML
1 SOLUTION/ DROPS OPHTHALMIC 3 TIMES DAILY
Status: DISCONTINUED | OUTPATIENT
Start: 2019-10-05 | End: 2019-10-08 | Stop reason: HOSPADM

## 2019-10-05 RX ORDER — FUROSEMIDE 40 MG/1
40 TABLET ORAL DAILY
Status: DISCONTINUED | OUTPATIENT
Start: 2019-10-05 | End: 2019-10-06

## 2019-10-05 RX ORDER — FLUTICASONE PROPIONATE 50 MCG
2 SPRAY, SUSPENSION (ML) NASAL DAILY
Status: DISCONTINUED | OUTPATIENT
Start: 2019-10-05 | End: 2019-10-08 | Stop reason: HOSPADM

## 2019-10-05 RX ORDER — HEPARIN SODIUM 5000 [USP'U]/ML
5000 INJECTION, SOLUTION INTRAVENOUS; SUBCUTANEOUS EVERY 8 HOURS SCHEDULED
Status: DISCONTINUED | OUTPATIENT
Start: 2019-10-05 | End: 2019-10-08 | Stop reason: HOSPADM

## 2019-10-05 RX ORDER — AMLODIPINE BESYLATE 10 MG/1
10 TABLET ORAL DAILY
Status: DISCONTINUED | OUTPATIENT
Start: 2019-10-05 | End: 2019-10-07

## 2019-10-05 RX ORDER — AMLODIPINE BESYLATE 10 MG/1
10 TABLET ORAL DAILY
COMMUNITY
End: 2019-10-08 | Stop reason: HOSPADM

## 2019-10-05 RX ORDER — LEVOTHYROXINE SODIUM 0.07 MG/1
75 TABLET ORAL
Status: DISCONTINUED | OUTPATIENT
Start: 2019-10-06 | End: 2019-10-08 | Stop reason: HOSPADM

## 2019-10-05 RX ORDER — GABAPENTIN 300 MG/1
300 CAPSULE ORAL
Status: DISCONTINUED | OUTPATIENT
Start: 2019-10-05 | End: 2019-10-08 | Stop reason: HOSPADM

## 2019-10-05 RX ORDER — IPRATROPIUM BROMIDE AND ALBUTEROL SULFATE 2.5; .5 MG/3ML; MG/3ML
3 SOLUTION RESPIRATORY (INHALATION)
Status: DISCONTINUED | OUTPATIENT
Start: 2019-10-05 | End: 2019-10-08 | Stop reason: HOSPADM

## 2019-10-05 RX ORDER — ASPIRIN 81 MG/1
81 TABLET ORAL DAILY
Status: DISCONTINUED | OUTPATIENT
Start: 2019-10-05 | End: 2019-10-08 | Stop reason: HOSPADM

## 2019-10-05 RX ADMIN — IOHEXOL 85 ML: 350 INJECTION, SOLUTION INTRAVENOUS at 14:22

## 2019-10-05 RX ADMIN — IPRATROPIUM BROMIDE AND ALBUTEROL SULFATE 3 ML: 2.5; .5 SOLUTION RESPIRATORY (INHALATION) at 19:50

## 2019-10-05 RX ADMIN — GABAPENTIN 300 MG: 300 CAPSULE ORAL at 21:13

## 2019-10-05 RX ADMIN — DORZOLAMIDE HYDROCHLORIDE 1 DROP: 20 SOLUTION/ DROPS OPHTHALMIC at 21:13

## 2019-10-05 RX ADMIN — HEPARIN SODIUM 5000 UNITS: 5000 INJECTION INTRAVENOUS; SUBCUTANEOUS at 17:38

## 2019-10-05 RX ADMIN — SODIUM CHLORIDE 500 ML: 0.9 INJECTION, SOLUTION INTRAVENOUS at 13:53

## 2019-10-05 RX ADMIN — LATANOPROST 1 DROP: 50 SOLUTION OPHTHALMIC at 21:13

## 2019-10-05 RX ADMIN — HEPARIN SODIUM 5000 UNITS: 5000 INJECTION INTRAVENOUS; SUBCUTANEOUS at 21:13

## 2019-10-05 NOTE — ASSESSMENT & PLAN NOTE
It is probably multifactorial in etiology    Patient does have a history of aortic stenosis, diastolic CHF, asthma  -will repeat a 2D echocardiogram  -will draw serial troponins  -monitor in telemetry  -patient will benefit by Cardiology as well as Pulmonary Medicine evaluations

## 2019-10-05 NOTE — H&P
H&P- Bishop Juares 9/24/1922, 80 y o  female MRN: [id number]    Unit/Bed#: EDITA Encounter: [id number]    Primary Care Provider: Nick Valentin MD   Date and time admitted to hospital: 10/5/2019 11:35 AM        * Dyspnea on minimal exertion  Assessment & Plan  It is probably multifactorial in etiology  Patient does have a history of aortic stenosis, diastolic CHF, asthma  -will repeat a 2D echocardiogram  -will draw serial troponins  -monitor in telemetry  -patient will benefit by Cardiology as well as Pulmonary Medicine evaluations    Aortic stenosis  Assessment & Plan  -echocardiogram ordered  -follow up with cardiology's recommendations    Asthma  Assessment & Plan  -also contributing to patient's shortness of breath  -patient will benefit from Pulmonary Medicine consultation med optimization for asthma    DM type 2 with diabetic peripheral neuropathy [facility])  Assessment & Plan  Lab Results   Component Value Date    HGBA1C 6 9 (H) 09/10/2019       No results for input(s): POCGLU in the last 72 hours  Blood Sugar Average: Last 72 hrs:     -continue Januvia  Would not recommend tight blood sugar control due to patient's advanced age  -continue gabapentin for neuropathy    Chronic diastolic congestive heart failure [facility])  Assessment & Plan  Wt Readings from Last 3 Encounters:   10/05/19 78 7 kg (173 lb 8 oz)   08/22/19 79 6 kg (175 lb 6 4 oz)   08/21/19 79 4 kg (175 lb)       -continue Lasix        Hypothyroidism  Assessment & Plan  -continue levothyroxine        [address] Internal Medicine - History and Physical:       Bisohp Juares 80 y o  female MRN: [id number]  Unit/Bed#: EDITA Encounter: [id number]  Admitting Physician: Charis Worley MD  PCP: Nick Valentin MD  Date of Admission:  10/05/19        Assessment and Plan:     * Dyspnea on minimal exertion  Assessment & Plan  It is probably multifactorial in etiology    Patient does have a history of aortic stenosis, diastolic CHF, asthma  -will repeat a 2D echocardiogram  -will draw serial troponins  -monitor in telemetry  -patient will benefit by Cardiology as well as Pulmonary Medicine evaluations    Aortic stenosis  Assessment & Plan  -echocardiogram ordered  -follow up with cardiology's recommendations    Asthma  Assessment & Plan  -also contributing to patient's shortness of breath  -patient will benefit from Pulmonary Medicine consultation med optimization for asthma    DM type 2 with diabetic peripheral neuropathy [facility])  Assessment & Plan  Lab Results   Component Value Date    HGBA1C 6 9 (H) 09/10/2019       No results for input(s): POCGLU in the last 72 hours  Blood Sugar Average: Last 72 hrs:     -continue Januvia  Would not recommend tight blood sugar control due to patient's advanced age  -continue gabapentin for neuropathy    Chronic diastolic congestive heart failure (HCC)  Assessment & Plan  Wt Readings from Last 3 Encounters:   10/05/19 78 7 kg (173 lb 8 oz)   08/22/19 79 6 kg (175 lb 6 4 oz)   08/21/19 79 4 kg (175 lb)       -continue Lasix        Hypothyroidism  Assessment & Plan  -continue levothyroxine            VTE Prophylaxis: Heparin  / sequential compression device   Code Status:  Full code    Anticipated Length of Stay:  Patient will be admitted on an Observation basis with an anticipated length of stay of  1-2 midnights  Justification for Hospital Stay:  Dyspnea    Total Time for Visit, including Counseling / Coordination of Care: 45 minutes  Greater than 50% of this total time spent on direct patient counseling and coordination of care  Chief Complaint:   Dyspnea    History of Present Illness:    Bhavani Mitchell is a 80 y o  female who presented to the ED with complaints of dyspnea upon minimal exertion  According to the patient, she gets short of breath easily  This is been going on for while but lately she has been noticing that it is becoming progressively worse    At times she gets short of breath even upon rest   The patient has a history of asthma but she does not feel that her inhalers are helping her  She stated that she was recently put on Lasix by her cardiologist   She denied any subjective fevers/chills/chest pain/diaphoresis  Review of Systems:    Review of Systems   Constitutional: Negative  HENT: Negative  Respiratory: Positive for shortness of breath  Cardiovascular: Negative  Gastrointestinal: Negative  Genitourinary: Negative  Musculoskeletal: Negative  Neurological: Negative  Psychiatric/Behavioral: Negative  Past Medical and Surgical History:     Past Medical History:   Diagnosis Date    Asthma     Atypical chest pain     CAD (coronary artery disease)     Candidal intertrigo     CHF (congestive heart failure) (HCC)     Depression     Diabetes mellitus (HCC)     Diabetic neuropathy (HCC)     Diverticulitis     Dyslipidemia     Female bladder prolapse     Gastric ulcer     Glaucoma     HTN (hypertension)     Hyperlipidemia     Hypothyroidism 4/18/2016    RAD (reactive airway disease)        Past Surgical History:   Procedure Laterality Date    COLONOSCOPY      ESOPHAGOGASTRODUODENOSCOPY  2011    HYSTERECTOMY         Meds/Allergies:    Prior to Admission medications    Medication Sig Start Date End Date Taking? Authorizing Provider   amLODIPine (NORVASC) 10 mg tablet Take 10 mg by mouth daily   Yes Historical Provider, MD   aspirin 81 MG tablet Take 81 mg by mouth daily     Yes Historical Provider, MD   Cholecalciferol (VITAMIN D3) 2000 units capsule Take 1,000 Units by mouth daily     Yes Historical Provider, MD   dorzolamide (TRUSOPT) 2 % ophthalmic solution Administer 1 drop to both eyes 3 (three) times a day     Yes Historical Provider, MD   gabapentin (NEURONTIN) 600 MG tablet TAKE 1/2 TABLET EVERY 8 HOURS AS NEEDED (CRANIAL NEURALGIA)  Patient taking differently: 300 mg daily at bedtime Taking 1 tablet daily at bedtime 2/25/19  Yes Sowmya Richey MD latanoprost (XALATAN) 0 005 % ophthalmic solution Apply 1 drop to eye daily at bedtime Both eyes 3/16/19  Yes Holden Chew MD   levothyroxine 75 mcg tablet Take 1 tablet (75 mcg total) by mouth daily 5/21/19  Yes Holden Chew MD   sitaGLIPtin (JANUVIA) 50 mg tablet Take 1 tablet (50 mg total) by mouth daily 2/27/19  Yes Holden Chew MD   acetaminophen-codeine (TYLENOL #3) 300-30 mg per tablet Take 1 tablet by mouth every 4 (four) hours as needed for moderate pain 12/28/18   Holden Chew MD   albuterol (VENTOLIN HFA) 90 mcg/act inhaler Inhale 2 puffs every 4 (four) hours as needed for wheezing 4/16/19   Holden Chew MD   aluminum-magnesium hydroxide-simethicone (MYLANTA) 200-200-20 mg/5 mL suspension Take 30 mL by mouth every 4 (four) hours as needed for indigestion or heartburn    Historical Provider, MD   amLODIPine-benazepril (LOTREL 5-10) 5-10 MG per capsule Take 1 capsule by mouth daily 5/8/19   Holden Chew MD   fluticasone (FLONASE) 50 mcg/act nasal spray 2 sprays into each nostril daily 3/16/19   Holden Chew MD   furosemide (LASIX) 20 mg tablet Take 2 tablets (40 mg total) by mouth daily 8/21/19   Shelia Cardozo,    Incontinence Supply Disposable (SELECT DISPOSABLE UNDERWEAR LG) MISC  6/14/19   Historical Provider, MD   ketoconazole (NIZORAL) 2 % cream Apply topically daily 6/21/19   Holden Chew MD   Lidocaine-Menthol 4-5 % PTCH lidocaine patch    Historical Provider, MD   Melatonin 5 MG CAPS 5 mg as needed     Historical Provider, MD   nitroglycerin (NITROSTAT) 0 4 mg SL tablet Nitrostat 0 4 mg sublingual tablet    Historical Provider, MD   nystatin (MYCOSTATIN) powder Apply topically 2 (two) times a day 6/21/19   Holden Chew MD   OLANZapine (ZyPREXA) 2 5 mg tablet Take 1 tablet (2 5 mg total) by mouth daily 2/27/19   Holden Chew MD   omeprazole (PriLOSEC) 40 MG capsule Take 1 capsule (40 mg total) by mouth daily  Patient taking differently: Take 40 mg by mouth as needed 5/8/19   Garfield Castle MD     I have reviewed home medications with patient personally  Allergies: Allergies   Allergen Reactions    Statins      Other reaction(s): Weakness  Category: Adverse Reaction;        Social History:     Marital Status:      Social History     Substance and Sexual Activity   Alcohol Use No    Comment: Social Alcohol Use -- as per allscripts (pt denies all alcohol use)     Social History     Tobacco Use   Smoking Status Never Smoker   Smokeless Tobacco Never Used     Social History     Substance and Sexual Activity   Drug Use No       Family History:    non-contributory    Physical Exam:     Vitals:   Blood Pressure: 149/67 (10/05/19 1505)  Pulse: 65 (10/05/19 1505)  Temperature: 98 5 °F (36 9 °C) (10/05/19 1059)  Temp Source: Oral (10/05/19 1059)  Respirations: 18 (10/05/19 1505)  Weight - Scale: 78 7 kg (173 lb 8 oz) (10/05/19 1059)  SpO2: 94 % (10/05/19 1505)    Physical Exam   Constitutional: She is oriented to person, place, and time  She appears well-developed and well-nourished  HENT:   Head: Normocephalic and atraumatic  Eyes: Pupils are equal, round, and reactive to light  EOM are normal    Neck: Neck supple  Cardiovascular: Normal rate and regular rhythm  Murmur heard  Pulmonary/Chest: Effort normal and breath sounds normal    Abdominal: Soft  Bowel sounds are normal    Musculoskeletal: Normal range of motion  Neurological: She is alert and oriented to person, place, and time  Skin: Skin is warm and dry  Psychiatric: Her behavior is normal          Additional Data:     Lab Results: I have personally reviewed pertinent reports        Results from last 7 days   Lab Units 10/05/19  1208   WBC Thousand/uL 6 44   HEMOGLOBIN g/dL 14 3   HEMATOCRIT % 45 7   PLATELETS Thousands/uL 191   NEUTROS PCT % 81*   LYMPHS PCT % 10*   MONOS PCT % 6   EOS PCT % 2     Results from last 7 days   Lab Units 10/05/19  1208   SODIUM mmol/L 139   POTASSIUM mmol/L 4 3   CHLORIDE mmol/L 106   CO2 mmol/L 23   BUN mg/dL 15   CREATININE mg/dL 1 05   ANION GAP mmol/L 10   CALCIUM mg/dL 10 1   ALBUMIN g/dL 3 3*   TOTAL BILIRUBIN mg/dL 0 36   ALK PHOS U/L 98   ALT U/L 14   AST U/L 25   GLUCOSE RANDOM mg/dL 149*                       Imaging: I have personally reviewed pertinent reports  CTA ED chest PE study   Final Result by Catalina Larios MD (10/05 1443)   No acute intrathoracic pathology identified      Specifically, no evidence of pulmonary embolism                           Workstation performed: [id number]         X-ray chest 2 views   Final Result by Dia Bashir MD (10/05 1426)      No acute cardiopulmonary disease  Workstation performed: [id number]             EKG, Pathology, and Other Studies Reviewed on Admission:   · EKG:  Sinus rhythm  No ST elevations noted    Allscripts / Epic Records Reviewed: Yes     ** Please Note: This note has been constructed using a voice recognition system   **

## 2019-10-05 NOTE — ASSESSMENT & PLAN NOTE
Wt Readings from Last 3 Encounters:   10/05/19 78 7 kg (173 lb 8 oz)   08/22/19 79 6 kg (175 lb 6 4 oz)   08/21/19 79 4 kg (175 lb)       -continue Lasix

## 2019-10-05 NOTE — ASSESSMENT & PLAN NOTE
Lab Results   Component Value Date    HGBA1C 6 9 (H) 09/10/2019       No results for input(s): POCGLU in the last 72 hours  Blood Sugar Average: Last 72 hrs:     -continue Januvia    Would not recommend tight blood sugar control due to patient's advanced age  -continue gabapentin for neuropathy

## 2019-10-05 NOTE — ED PROVIDER NOTES
History  Chief Complaint   Patient presents with    Shortness of Breath     Reports worsening shortness of breath  Reports SMITH  States chest pain that woke her up out of sleep a "couple weeks ago " Denies chest pain currently  Dyspnic when moving from wheel chair to stretcher next to each other   Chest Pain     81 yo female with CHF, DM, CAD, HTN, hypothyroid, c/o onset of shortness of breath, bothering her for several months, but increasing now for at least a few days, most exacerbated by any amount of exertion, only able to go a few feet before feeling very short of breath  Apparently about 1 week ago, she was experiencing shortness of breath and chest pain and told her daughter with whom she lives that she thought she was having a heart attack,but apparently they did not seek any medical care  History provided by:  Patient  Shortness of Breath   Severity:  Moderate  Onset quality:  Gradual  Timing:  Constant  Progression:  Worsening  Chronicity:  Recurrent  Context: activity    Worsened by: Movement  Ineffective treatments:  Inhaler and rest  Associated symptoms: cough    Associated symptoms: no abdominal pain, no chest pain, no fever, no headaches, no neck pain, no rash, no sore throat, no vomiting and no wheezing        Prior to Admission Medications   Prescriptions Last Dose Informant Patient Reported? Taking?    Cholecalciferol (VITAMIN D3) 2000 units capsule  Self Yes Yes   Sig: Take 1,000 Units by mouth daily     Incontinence Supply Disposable (SELECT DISPOSABLE UNDERWEAR LG) MISC  Self Yes No   Lidocaine-Menthol 4-5 % PTCH  Self Yes No   Sig: lidocaine patch   Melatonin 5 MG CAPS  Self Yes No   Si mg as needed    OLANZapine (ZyPREXA) 2 5 mg tablet  Self No No   Sig: Take 1 tablet (2 5 mg total) by mouth daily   acetaminophen-codeine (TYLENOL #3) 300-30 mg per tablet  Self No No   Sig: Take 1 tablet by mouth every 4 (four) hours as needed for moderate pain   albuterol (VENTOLIN HFA) 90 mcg/act inhaler  Self No No   Sig: Inhale 2 puffs every 4 (four) hours as needed for wheezing   aluminum-magnesium hydroxide-simethicone (MYLANTA) 200-200-20 mg/5 mL suspension  Self Yes No   Sig: Take 30 mL by mouth every 4 (four) hours as needed for indigestion or heartburn   amLODIPine (NORVASC) 10 mg tablet   Yes Yes   Sig: Take 10 mg by mouth daily   amLODIPine-benazepril (LOTREL 5-10) 5-10 MG per capsule  Self No No   Sig: Take 1 capsule by mouth daily   aspirin 81 MG tablet  Self Yes Yes   Sig: Take 81 mg by mouth daily     dorzolamide (TRUSOPT) 2 % ophthalmic solution  Self Yes Yes   Sig: Administer 1 drop to both eyes 3 (three) times a day     fluticasone (FLONASE) 50 mcg/act nasal spray  Self No No   Si sprays into each nostril daily   furosemide (LASIX) 20 mg tablet  Self No No   Sig: Take 2 tablets (40 mg total) by mouth daily   gabapentin (NEURONTIN) 600 MG tablet  Self No Yes   Sig: TAKE 1/2 TABLET EVERY 8 HOURS AS NEEDED (CRANIAL NEURALGIA)   Patient taking differently: 300 mg daily at bedtime Taking 1 tablet daily at bedtime   ketoconazole (NIZORAL) 2 % cream  Self No No   Sig: Apply topically daily   latanoprost (XALATAN) 0 005 % ophthalmic solution  Self No Yes   Sig: Apply 1 drop to eye daily at bedtime Both eyes   levothyroxine 75 mcg tablet  Self No Yes   Sig: Take 1 tablet (75 mcg total) by mouth daily   nitroglycerin (NITROSTAT) 0 4 mg SL tablet  Self Yes No   Sig: Nitrostat 0 4 mg sublingual tablet   nystatin (MYCOSTATIN) powder  Self No No   Sig: Apply topically 2 (two) times a day   omeprazole (PriLOSEC) 40 MG capsule  Self No No   Sig: Take 1 capsule (40 mg total) by mouth daily   Patient taking differently: Take 40 mg by mouth as needed    sitaGLIPtin (JANUVIA) 50 mg tablet  Self No Yes   Sig: Take 1 tablet (50 mg total) by mouth daily      Facility-Administered Medications: None       Past Medical History:   Diagnosis Date    Asthma     Atypical chest pain     CAD (coronary artery disease)     Candidal intertrigo     CHF (congestive heart failure) (HCC)     Depression     Diabetes mellitus (HCC)     Diabetic neuropathy (HCC)     Diverticulitis     Dyslipidemia     Female bladder prolapse     Gastric ulcer     Glaucoma     HTN (hypertension)     Hyperlipidemia     Hypothyroidism 4/18/2016    RAD (reactive airway disease)        Past Surgical History:   Procedure Laterality Date    COLONOSCOPY      ESOPHAGOGASTRODUODENOSCOPY  2011    HYSTERECTOMY         Family History   Problem Relation Age of Onset    Breast cancer Mother     Hypothyroidism Mother     Hypertension Father     Breast cancer Sister     Thyroid disease Sister     Hypertension Sister      I have reviewed and agree with the history as documented  Social History     Tobacco Use    Smoking status: Never Smoker    Smokeless tobacco: Never Used   Substance Use Topics    Alcohol use: No     Comment: Social Alcohol Use -- as per allscripts (pt denies all alcohol use)    Drug use: No        Review of Systems   Constitutional: Negative for appetite change, chills and fever  HENT: Negative for sore throat  Respiratory: Positive for cough and shortness of breath  Negative for wheezing  Cardiovascular: Negative for chest pain and palpitations  Gastrointestinal: Negative for abdominal pain, diarrhea, nausea and vomiting  Genitourinary: Negative for dysuria and hematuria  Musculoskeletal: Negative for neck pain  Skin: Negative for rash  Neurological: Negative for dizziness, weakness and headaches  Psychiatric/Behavioral: Negative for suicidal ideas  All other systems reviewed and are negative  Physical Exam  Physical Exam   Constitutional: She is oriented to person, place, and time  Vital signs are normal  She appears well-developed and well-nourished  Non-toxic appearance  No distress  Elderly and frail c/w recorded age   HENT:   Head: Normocephalic and atraumatic     Right Ear: Tympanic membrane and external ear normal    Left Ear: Tympanic membrane and external ear normal    Nose: Nose normal    Mouth/Throat: Oropharynx is clear and moist    Eyes: Pupils are equal, round, and reactive to light  Conjunctivae and EOM are normal    Neck: Normal range of motion and full passive range of motion without pain  Neck supple  No Brudzinski's sign and no Kernig's sign noted  Cardiovascular: Normal rate, regular rhythm, normal heart sounds, intact distal pulses and normal pulses  No murmur heard  Pulmonary/Chest: Effort normal and breath sounds normal  No tachypnea  No respiratory distress  She has no decreased breath sounds  She has no wheezes  Abdominal: Soft  Bowel sounds are normal  She exhibits no distension  There is no tenderness  There is no rigidity, no rebound and no guarding  Musculoskeletal: Normal range of motion  Right lower leg: She exhibits no swelling  Left lower leg: She exhibits no swelling  Lymphadenopathy:     She has no cervical adenopathy  Neurological: She is alert and oriented to person, place, and time  She has normal strength and normal reflexes  No cranial nerve deficit or sensory deficit  Coordination and gait normal  GCS eye subscore is 4  GCS verbal subscore is 5  GCS motor subscore is 6  Skin: Skin is warm and dry  Capillary refill takes less than 2 seconds  No rash noted  She is not diaphoretic  No pallor  Psychiatric: She has a normal mood and affect  Her speech is normal and behavior is normal  Judgment and thought content normal  Cognition and memory are normal    Nursing note and vitals reviewed        Vital Signs  ED Triage Vitals [10/05/19 1059]   Temperature Pulse Respirations Blood Pressure SpO2   98 5 °F (36 9 °C) 78 16 165/74 98 %      Temp Source Heart Rate Source Patient Position - Orthostatic VS BP Location FiO2 (%)   Oral Monitor Sitting Right arm --      Pain Score       No Pain           Vitals:    10/05/19 1353 10/05/19 1505 10/05/19 1538 10/05/19 1618   BP: 151/71 149/67 (!) 187/79 144/71   Pulse: 70 65 76 67   Patient Position - Orthostatic VS: Lying Lying Sitting Lying         Visual Acuity      ED Medications  Medications   aspirin (ECOTRIN LOW STRENGTH) EC tablet 81 mg (81 mg Oral Not Given 10/5/19 1605)   fluticasone (FLONASE) 50 mcg/act nasal spray 2 spray (2 sprays Nasal Refused 10/5/19 1606)   dorzolamide (TRUSOPT) ophthalmic solution 1 drop (1 drop Both Eyes Not Given 10/5/19 1605)   amLODIPine (NORVASC) tablet 10 mg (10 mg Oral Not Given 10/5/19 1607)   furosemide (LASIX) tablet 40 mg (0 mg Oral Hold 10/5/19 1606)   gabapentin (NEURONTIN) capsule 300 mg (has no administration in time range)   levothyroxine tablet 75 mcg (has no administration in time range)   sitaGLIPtin (JANUVIA) tablet 50 mg (50 mg Oral Not Given 10/5/19 1606)   latanoprost (XALATAN) 0 005 % ophthalmic solution 1 drop (has no administration in time range)   heparin (porcine) subcutaneous injection 5,000 Units (has no administration in time range)   ipratropium-albuterol (DUO-NEB) 0 5-2 5 mg/3 mL inhalation solution 3 mL (has no administration in time range)   sodium chloride 0 9 % bolus 500 mL (500 mL Intravenous New Bag 10/5/19 1353)   iohexol (OMNIPAQUE) 350 MG/ML injection (MULTI-DOSE) 85 mL (85 mL Intravenous Given 10/5/19 1422)       Diagnostic Studies  Results Reviewed     Procedure Component Value Units Date/Time    Troponin I [[id number]]     Lab Status:  No result Specimen:  Blood     Platelet count [[id number]]     Lab Status:  No result Specimen:  Blood     Troponin I [[id number]]     Lab Status:  No result Specimen:  Blood     Comprehensive metabolic panel [[id number]]  (Abnormal) Collected:  10/05/19 1208    Lab Status:  Final result Specimen:  Blood from Arm, Left Updated:  10/05/19 1250     Sodium 139 mmol/L      Potassium 4 3 mmol/L      Chloride 106 mmol/L      CO2 23 mmol/L      ANION GAP 10 mmol/L      BUN 15 mg/dL      Creatinine 1 05 mg/dL      Glucose 149 mg/dL      Calcium 10 1 mg/dL      AST 25 U/L      ALT 14 U/L      Alkaline Phosphatase 98 U/L      Total Protein 7 6 g/dL      Albumin 3 3 g/dL      Total Bilirubin 0 36 mg/dL      eGFR 51 ml/min/1 73sq m     Narrative:       [place] guidelines for Chronic Kidney Disease (CKD):     Stage 1 with normal or high GFR (GFR > 90 mL/min/1 73 square meters)    Stage 2 Mild CKD (GFR = 60-89 mL/min/1 73 square meters)    Stage 3A Moderate CKD (GFR = 45-59 mL/min/1 73 square meters)    Stage 3B Moderate CKD (GFR = 30-44 mL/min/1 73 square meters)    Stage 4 Severe CKD (GFR = 15-29 mL/min/1 73 square meters)    Stage 5 End Stage CKD (GFR <15 mL/min/1 73 square meters)  Note: GFR calculation is accurate only with a steady state creatinine    B-type natriuretic peptide [[id number]]  (Abnormal) Collected:  10/05/19 1208    Lab Status:  Final result Specimen:  Blood from Arm, Left Updated:  10/05/19 1250     NT-proBNP 798 pg/mL     Troponin I [[id number]]  (Normal) Collected:  10/05/19 1208    Lab Status:  Final result Specimen:  Blood from Arm, Left Updated:  10/05/19 1243     Troponin I <0 02 ng/mL     D-Dimer [[id number]]  (Abnormal) Collected:  10/05/19 1208    Lab Status:  Final result Specimen:  Blood from Arm, Left Updated:  10/05/19 1236     D-Dimer, Quant 2,073 ng/ml (FEU)     CBC and differential [[id number]]  (Abnormal) Collected:  10/05/19 1208    Lab Status:  Final result Specimen:  Blood from Arm, Left Updated:  10/05/19 1222     WBC 6 44 Thousand/uL      RBC 4 89 Million/uL      Hemoglobin 14 3 g/dL      Hematocrit 45 7 %      MCV 94 fL      MCH 29 2 pg      MCHC 31 3 g/dL      RDW 13 5 %      MPV 10 8 fL      Platelets 969 Thousands/uL      nRBC 0 /100 WBCs      Neutrophils Relative 81 %      Immat GRANS % 0 %      Lymphocytes Relative 10 %      Monocytes Relative 6 %      Eosinophils Relative 2 %      Basophils Relative 1 %      Neutrophils Absolute 5 16 Thousands/µL      Immature Grans Absolute 0 02 Thousand/uL      Lymphocytes Absolute 0 67 Thousands/µL      Monocytes Absolute 0 39 Thousand/µL      Eosinophils Absolute 0 14 Thousand/µL      Basophils Absolute 0 06 Thousands/µL                  CTA ED chest PE study   Final Result by Linda Jordan MD (10/05 1443)   No acute intrathoracic pathology identified      Specifically, no evidence of pulmonary embolism                           Workstation performed: [id number]         X-ray chest 2 views   Final Result by Lakshmi Nam MD (10/05 1426)      No acute cardiopulmonary disease        Workstation performed: [id number]                    Procedures  Procedures       ED Course  ED Course as of Oct 05 1653   Sat Oct 05, 2019   1320 Elevated, in range to consider PE   D-DIMER QUANTITATIVE(!): 2,073   1320 No acute findings   X-ray chest 2 views   1454 No PE     CTA ED chest PE study         HEART Risk Score      Most Recent Value   History  1 Filed at: 10/05/2019 1502   ECG  1 Filed at: 10/05/2019 1502   Age  2 Filed at: 10/05/2019 1502   Risk Factors  1 Filed at: 10/05/2019 1502   Troponin  0 Filed at: 10/05/2019 1502   Heart Score Risk Calculator   History  1 Filed at: 10/05/2019 1502   ECG  1 Filed at: 10/05/2019 1502   Age  2 Filed at: 10/05/2019 1502   Risk Factors  1 Filed at: 10/05/2019 1502   Troponin  0 Filed at: 10/05/2019 1502   HEART Score  5 Filed at: 10/05/2019 1502   HEART Score  5 Filed at: 10/05/2019 1502                            MDM    Disposition  Final diagnoses:   Dyspnea on exertion   Intermittent chest pain     Time reflects when diagnosis was documented in both MDM as applicable and the Disposition within this note     Time User Action Codes Description Comment    10/5/2019  2:57 PM Mukund Lover L Add [R06 09] Dyspnea on exertion     10/5/2019  3:00 PM Mukund Lover L Add [R07 9] Intermittent chest pain       ED Disposition     ED Disposition Condition Date/Time Comment    Admit Good Sat Oct 5, 2019  3:00 PM Case was discussed with Dr Ravin Garduno and the patient's admission status was agreed to be Admission Status: observation status to the service of Dr Ravin Garduno   Follow-up Information    None         Current Discharge Medication List      CONTINUE these medications which have NOT CHANGED    Details   amLODIPine (NORVASC) 10 mg tablet Take 10 mg by mouth daily      aspirin 81 MG tablet Take 81 mg by mouth daily        Cholecalciferol (VITAMIN D3) 2000 units capsule Take 1,000 Units by mouth daily        dorzolamide (TRUSOPT) 2 % ophthalmic solution Administer 1 drop to both eyes 3 (three) times a day        gabapentin (NEURONTIN) 600 MG tablet TAKE 1/2 TABLET EVERY 8 HOURS AS NEEDED (CRANIAL NEURALGIA)  Qty: 90 tablet, Refills: 3    Associated Diagnoses: Cranial neuralgia      latanoprost (XALATAN) 0 005 % ophthalmic solution Apply 1 drop to eye daily at bedtime Both eyes  Qty: 7 5 mL, Refills: 1    Associated Diagnoses: Open-angle glaucoma, severe stage, unspecified laterality, unspecified open-angle glaucoma type      levothyroxine 75 mcg tablet Take 1 tablet (75 mcg total) by mouth daily  Qty: 90 tablet, Refills: 3    Associated Diagnoses: Hypothyroidism, unspecified type      sitaGLIPtin (JANUVIA) 50 mg tablet Take 1 tablet (50 mg total) by mouth daily  Qty: 90 tablet, Refills: 3    Associated Diagnoses: Type 2 diabetes mellitus without complication, without long-term current use of insulin ([facility])      acetaminophen-codeine (TYLENOL #3) 300-30 mg per tablet Take 1 tablet by mouth every 4 (four) hours as needed for moderate pain  Qty: 30 tablet, Refills: 0    Associated Diagnoses: Chronic left upper quadrant pain      albuterol (VENTOLIN HFA) 90 mcg/act inhaler Inhale 2 puffs every 4 (four) hours as needed for wheezing  Qty: 1 Inhaler, Refills: 3    Associated Diagnoses: Dyspnea on exertion      aluminum-magnesium hydroxide-simethicone (MYLANTA) 200-200-20 mg/5 mL suspension Take 30 mL by mouth every 4 (four) hours as needed for indigestion or heartburn      amLODIPine-benazepril (LOTREL 5-10) 5-10 MG per capsule Take 1 capsule by mouth daily  Qty: 90 capsule, Refills: 3    Associated Diagnoses: Secondary hypertension      fluticasone (FLONASE) 50 mcg/act nasal spray 2 sprays into each nostril daily  Qty: 3 Bottle, Refills: 3    Associated Diagnoses: Moraxella catarrhalis bronchitis      furosemide (LASIX) 20 mg tablet Take 2 tablets (40 mg total) by mouth daily  Qty: 180 tablet, Refills: 3    Comments: **Patient requests 90 days supply**  Associated Diagnoses: Chronic diastolic congestive heart failure (HCC)      Incontinence Supply Disposable (SELECT DISPOSABLE UNDERWEAR LG) MISC       ketoconazole (NIZORAL) 2 % cream Apply topically daily  Qty: 60 g, Refills: 1    Associated Diagnoses: Candidal intertrigo      Lidocaine-Menthol 4-5 % PTCH lidocaine patch      Melatonin 5 MG CAPS 5 mg as needed       nitroglycerin (NITROSTAT) 0 4 mg SL tablet Nitrostat 0 4 mg sublingual tablet      nystatin (MYCOSTATIN) powder Apply topically 2 (two) times a day  Qty: 60 g, Refills: 1    Associated Diagnoses: Candidal intertrigo      OLANZapine (ZyPREXA) 2 5 mg tablet Take 1 tablet (2 5 mg total) by mouth daily  Qty: 90 tablet, Refills: 3    Associated Diagnoses: Hallucination      omeprazole (PriLOSEC) 40 MG capsule Take 1 capsule (40 mg total) by mouth daily  Qty: 90 capsule, Refills: 3    Associated Diagnoses: Gastroesophageal reflux disease without esophagitis           No discharge procedures on file      ED Provider  Electronically Signed by           Tamera Garcia MD  10/05/19 76

## 2019-10-05 NOTE — ASSESSMENT & PLAN NOTE
-also contributing to patient's shortness of breath  -patient will benefit from Pulmonary Medicine consultation med optimization for asthma

## 2019-10-05 NOTE — ED NOTES
Patient ambulated to the bathroom with daughter utilizing cane with steady gait        Lety Cunha RN  10/05/19 9808

## 2019-10-05 NOTE — PLAN OF CARE
Problem: Potential for Falls  Goal: Patient will remain free of falls  Description  INTERVENTIONS:  - Assess patient frequently for physical needs  -  Identify cognitive and physical deficits and behaviors that affect risk of falls    -  [place] fall precautions as indicated by assessment   - Educate patient/family on patient safety including physical limitations  - Instruct patient to call for assistance with activity based on assessment  - Modify environment to reduce risk of injury  - Consider OT/PT consult to assist with strengthening/mobility  Outcome: Progressing     Problem: Prexisting or High Potential for Compromised Skin Integrity  Goal: Skin integrity is maintained or improved  Description  INTERVENTIONS:  - Identify patients at risk for skin breakdown  - Assess and monitor skin integrity  - Assess and monitor nutrition and hydration status  - Monitor labs   - Assess for incontinence   - Turn and reposition patient  - Assist with mobility/ambulation  - Relieve pressure over bony prominences  - Avoid friction and shearing  - Provide appropriate hygiene as needed including keeping skin clean and dry  - Evaluate need for skin moisturizer/barrier cream  - Collaborate with interdisciplinary team   - Patient/family teaching  - Consider wound care consult   Outcome: Progressing     Problem: RESPIRATORY - ADULT  Goal: Achieves optimal ventilation and oxygenation  Description  INTERVENTIONS:  - Assess for changes in respiratory status  - Assess for changes in mentation and behavior  - Position to facilitate oxygenation and minimize respiratory effort  - Oxygen administered by appropriate delivery if ordered  - Initiate smoking cessation education as indicated  - Encourage broncho-pulmonary hygiene including cough, deep breathe, Incentive Spirometry  - Assess the need for suctioning and aspirate as needed  - Assess and instruct to report SOB or any respiratory difficulty  - Respiratory Therapy support as indicated  Outcome: Progressing

## 2019-10-06 LAB
ALBUMIN SERPL BCP-MCNC: 2.7 G/DL (ref 3.5–5)
ALP SERPL-CCNC: 75 U/L (ref 46–116)
ALT SERPL W P-5'-P-CCNC: 11 U/L (ref 12–78)
ANION GAP SERPL CALCULATED.3IONS-SCNC: 11 MMOL/L (ref 4–13)
AST SERPL W P-5'-P-CCNC: 19 U/L (ref 5–45)
BILIRUB SERPL-MCNC: 0.39 MG/DL (ref 0.2–1)
BUN SERPL-MCNC: 18 MG/DL (ref 5–25)
CALCIUM SERPL-MCNC: 9 MG/DL (ref 8.3–10.1)
CHLORIDE SERPL-SCNC: 107 MMOL/L (ref 100–108)
CO2 SERPL-SCNC: 23 MMOL/L (ref 21–32)
CREAT SERPL-MCNC: 1.14 MG/DL (ref 0.6–1.3)
ERYTHROCYTE [DISTWIDTH] IN BLOOD BY AUTOMATED COUNT: 13.9 % (ref 11.6–15.1)
GFR SERPL CREATININE-BSD FRML MDRD: 47 ML/MIN/1.73SQ M
GLUCOSE SERPL-MCNC: 119 MG/DL (ref 65–140)
HCT VFR BLD AUTO: 39 % (ref 34.8–46.1)
HGB BLD-MCNC: 12.6 G/DL (ref 11.5–15.4)
MCH RBC QN AUTO: 29.9 PG (ref 26.8–34.3)
MCHC RBC AUTO-ENTMCNC: 32.3 G/DL (ref 31.4–37.4)
MCV RBC AUTO: 93 FL (ref 82–98)
PLATELET # BLD AUTO: 181 THOUSANDS/UL (ref 149–390)
PMV BLD AUTO: 10.8 FL (ref 8.9–12.7)
POTASSIUM SERPL-SCNC: 3.5 MMOL/L (ref 3.5–5.3)
PROT SERPL-MCNC: 6.2 G/DL (ref 6.4–8.2)
RBC # BLD AUTO: 4.21 MILLION/UL (ref 3.81–5.12)
SODIUM SERPL-SCNC: 141 MMOL/L (ref 136–145)
TSH SERPL DL<=0.05 MIU/L-ACNC: 2.32 UIU/ML (ref 0.36–3.74)
WBC # BLD AUTO: 5.43 THOUSAND/UL (ref 4.31–10.16)

## 2019-10-06 PROCEDURE — 99203 OFFICE O/P NEW LOW 30 MIN: CPT | Performed by: INTERNAL MEDICINE

## 2019-10-06 PROCEDURE — 85027 COMPLETE CBC AUTOMATED: CPT | Performed by: HOSPITALIST

## 2019-10-06 PROCEDURE — 94760 N-INVAS EAR/PLS OXIMETRY 1: CPT

## 2019-10-06 PROCEDURE — 99225 PR SBSQ OBSERVATION CARE/DAY 25 MINUTES: CPT | Performed by: INTERNAL MEDICINE

## 2019-10-06 PROCEDURE — 80053 COMPREHEN METABOLIC PANEL: CPT | Performed by: HOSPITALIST

## 2019-10-06 PROCEDURE — 99215 OFFICE O/P EST HI 40 MIN: CPT | Performed by: INTERNAL MEDICINE

## 2019-10-06 PROCEDURE — 84443 ASSAY THYROID STIM HORMONE: CPT | Performed by: HOSPITALIST

## 2019-10-06 PROCEDURE — 94640 AIRWAY INHALATION TREATMENT: CPT

## 2019-10-06 RX ORDER — BUDESONIDE 0.5 MG/2ML
0.5 INHALANT ORAL
Status: DISCONTINUED | OUTPATIENT
Start: 2019-10-06 | End: 2019-10-08 | Stop reason: HOSPADM

## 2019-10-06 RX ORDER — FLUTICASONE FUROATE AND VILANTEROL 100; 25 UG/1; UG/1
1 POWDER RESPIRATORY (INHALATION) DAILY
Status: DISCONTINUED | OUTPATIENT
Start: 2019-10-06 | End: 2019-10-08 | Stop reason: HOSPADM

## 2019-10-06 RX ORDER — POTASSIUM CHLORIDE 20 MEQ/1
20 TABLET, EXTENDED RELEASE ORAL DAILY
Status: DISCONTINUED | OUTPATIENT
Start: 2019-10-06 | End: 2019-10-07

## 2019-10-06 RX ORDER — FUROSEMIDE 40 MG/1
40 TABLET ORAL
Status: DISCONTINUED | OUTPATIENT
Start: 2019-10-06 | End: 2019-10-07

## 2019-10-06 RX ADMIN — GABAPENTIN 300 MG: 300 CAPSULE ORAL at 21:00

## 2019-10-06 RX ADMIN — ASPIRIN 81 MG: 81 TABLET, COATED ORAL at 09:18

## 2019-10-06 RX ADMIN — DORZOLAMIDE HYDROCHLORIDE 1 DROP: 20 SOLUTION/ DROPS OPHTHALMIC at 20:56

## 2019-10-06 RX ADMIN — BUDESONIDE 0.5 MG: 0.5 INHALANT RESPIRATORY (INHALATION) at 19:41

## 2019-10-06 RX ADMIN — DORZOLAMIDE HYDROCHLORIDE 1 DROP: 20 SOLUTION/ DROPS OPHTHALMIC at 16:08

## 2019-10-06 RX ADMIN — DORZOLAMIDE HYDROCHLORIDE 1 DROP: 20 SOLUTION/ DROPS OPHTHALMIC at 09:18

## 2019-10-06 RX ADMIN — FLUTICASONE FUROATE AND VILANTEROL TRIFENATATE 1 PUFF: 100; 25 POWDER RESPIRATORY (INHALATION) at 14:07

## 2019-10-06 RX ADMIN — SITAGLIPTIN 50 MG: 50 TABLET, FILM COATED ORAL at 09:18

## 2019-10-06 RX ADMIN — HEPARIN SODIUM 5000 UNITS: 5000 INJECTION INTRAVENOUS; SUBCUTANEOUS at 21:00

## 2019-10-06 RX ADMIN — IPRATROPIUM BROMIDE AND ALBUTEROL SULFATE 3 ML: 2.5; .5 SOLUTION RESPIRATORY (INHALATION) at 07:51

## 2019-10-06 RX ADMIN — HEPARIN SODIUM 5000 UNITS: 5000 INJECTION INTRAVENOUS; SUBCUTANEOUS at 05:58

## 2019-10-06 RX ADMIN — FUROSEMIDE 40 MG: 40 TABLET ORAL at 16:08

## 2019-10-06 RX ADMIN — LEVOTHYROXINE SODIUM 75 MCG: 75 TABLET ORAL at 05:58

## 2019-10-06 RX ADMIN — POTASSIUM CHLORIDE 20 MEQ: 1500 TABLET, EXTENDED RELEASE ORAL at 11:59

## 2019-10-06 RX ADMIN — HEPARIN SODIUM 5000 UNITS: 5000 INJECTION INTRAVENOUS; SUBCUTANEOUS at 13:41

## 2019-10-06 RX ADMIN — IPRATROPIUM BROMIDE AND ALBUTEROL SULFATE 3 ML: 2.5; .5 SOLUTION RESPIRATORY (INHALATION) at 19:41

## 2019-10-06 RX ADMIN — FUROSEMIDE 40 MG: 40 TABLET ORAL at 09:18

## 2019-10-06 RX ADMIN — LATANOPROST 1 DROP: 50 SOLUTION OPHTHALMIC at 21:00

## 2019-10-06 RX ADMIN — AMLODIPINE BESYLATE 10 MG: 10 TABLET ORAL at 09:18

## 2019-10-06 NOTE — ASSESSMENT & PLAN NOTE
Echocardiogram ordered    Cardiology recommendations are below    1  Exertional dyspnea, suspect secondary to significant aortic valve stenosis  2  Chronic diastolic heart failure, seems to be well compensated on exam  3  Hypertension  4  Possible CAD with prior echocardiogram revealing possible basal inferior and basal inferolateral hypokinesis    Diuretic to be increased   Check BMP in the morning

## 2019-10-06 NOTE — ASSESSMENT & PLAN NOTE
Multifactorial in etiology    Patient does have a history of aortic stenosis, diastolic CHF, asthma  -will repeat a 2D echocardiogram  -patient will benefit by Cardiology as well as Pulmonary Medicine evaluations

## 2019-10-06 NOTE — UTILIZATION REVIEW
Initial Clinical Review    Admission: Date/Time/Statement:  10/5 2019 @ 1501 to Niharika Lipscomb This Encounter   Procedures    Place in Observation     Standing Status:   Standing     Number of Occurrences:   1     Order Specific Question:   Admitting Physician     Answer:   Denette Lennox [[id number]]     Order Specific Question:   Level of Care     Answer:   Med Surg [[id number]]     ED Arrival Information     Expected Arrival Acuity Means of Arrival Escorted By Service Admission Type    - 10/5/2019 10:41 Urgent Wheelchair Self General Medicine Urgent    Arrival Complaint    Sob/ chest pain        Chief Complaint   Patient presents with    Shortness of Breath     Reports worsening shortness of breath  Reports SMITH  States chest pain that woke her up out of sleep a "couple weeks ago " Denies chest pain currently  Dyspnic when moving from wheel chair to stretcher next to each other   Chest Pain     Assessment/Plan: 79 yo female with CHF, DM, CAD, HTN, hypothyroid presents to ED from home with increasing SOB last few days both with exertion & @ rest  CT neg for PE  Admitted to IP with Dyspnea - probably multifactorial in etiology  Patient does have a history of aortic stenosis, diastolic CHF, asthma  Monitor on Tele, Check ECHO, serial trops, Consult Cardio & Pulm  10/6 Per Cardio: Suspect Exertional dyspnea 2/2 to Aortic valve stenosis  Increase lasix to bid  Check ECHO   Possible CAD    ED Triage Vitals [10/05/19 1059]   Temperature Pulse Respirations Blood Pressure SpO2   98 5 °F (36 9 °C) 78 16 165/74 98 %      Temp Source Heart Rate Source Patient Position - Orthostatic VS BP Location FiO2 (%)   Oral Monitor Sitting Right arm --      Pain Score       No Pain        Wt Readings from Last 1 Encounters:   10/05/19 78 5 kg (173 lb 1 oz)     Additional Vital Signs:   10/06/19 0749  96 7 °F (35 9   59  18  123/60  95 %  None (Room air) Lying   10/05/19 2300  98 5 °F (36 9 °C)  68  17  135/63  97 %  None (Room air) Lying   10/05/19 1618    67  18  144/71  95 %  None (Room air) Lying   10/05/19 1538  98 3 °F (36 8 °C)  76  16  187/79Abnormal   95 %  None (Room air) Sitting       Pertinent Labs/Diagnostic Test Results:   Results from last 7 days   Lab Units 10/06/19  0443 10/05/19  1208   WBC Thousand/uL 5 43 6 44   HEMOGLOBIN g/dL 12 6 14 3   HEMATOCRIT % 39 0 45 7   PLATELETS Thousands/uL 181 191   NEUTROS ABS Thousands/µL  --  5 16     Results from last 7 days   Lab Units 10/05/19  1208   SODIUM mmol/L 139   POTASSIUM mmol/L 4 3   CHLORIDE mmol/L 106   CO2 mmol/L 23   ANION GAP mmol/L 10   BUN mg/dL 15   CREATININE mg/dL 1 05   EGFR ml/min/1 73sq m 51   CALCIUM mg/dL 10 1     Results from last 7 days   Lab Units 10/05/19  1208   AST U/L 25   ALT U/L 14   ALK PHOS U/L 98   TOTAL PROTEIN g/dL 7 6   ALBUMIN g/dL 3 3*   TOTAL BILIRUBIN mg/dL 0 36     Results from last 7 days   Lab Units 10/05/19  1208   GLUCOSE RANDOM mg/dL 149*       Results from last 7 days   Lab Units 10/05/19  2118 10/05/19  1654 10/05/19  1208   TROPONIN I ng/mL 0 02 0 02 <0 02     Results from last 7 days   Lab Units 10/05/19  1208   D DIMER QUANT ng/ml (FEU) 2,073*     Results from last 7 days   Lab Units 10/05/19  1208   NT-PRO BNP pg/mL 798*       10/5 CTA Chest:  No acute intrathoracic pathology identified  Specifically, no evidence of pulmonary embolism  10/5 CXR: No acute cardiopulmonary disease          ED Treatment:   Medication Administration from 10/05/2019 1041 to 10/05/2019 1548       Date/Time Order Dose Route Action     10/05/2019 1353 sodium chloride 0 9 % bolus 500 mL 500 mL Intravenous New Bag        Past Medical History:   Diagnosis Date    Asthma     Atypical chest pain     CAD (coronary artery disease)     Candidal intertrigo     CHF (congestive heart failure) (HCC)     Depression     Diabetes mellitus (HCC)     Diabetic neuropathy ([address] )     Diverticulitis     Dyslipidemia     Female bladder prolapse     Gastric ulcer     Glaucoma     HTN (hypertension)     Hyperlipidemia     Hypothyroidism 4/18/2016    RAD (reactive airway disease)      Present on Admission:   Aortic stenosis   Asthma   Chronic diastolic congestive heart failure (HCC)   Hypothyroidism   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HCC)      Admitting Diagnosis: SOB (shortness of breath) [R06 02]  Dyspnea on exertion [R06 09]  Intermittent chest pain [R07 9]  Age/Sex: 80 y o  female  Admission Orders:    Current Facility-Administered Medications:  amLODIPine 10 mg Oral Daily    aspirin 81 mg Oral Daily    dorzolamide 1 drop Both Eyes TID    fluticasone 2 spray Nasal Daily    furosemide 40 mg Oral Daily Increased to 40 BID 10/6   gabapentin 300 mg Oral HS    heparin (porcine) 5,000 Units Subcutaneous Q8H [address]    ipratropium-albuterol 3 mL Nebulization Q6H    latanoprost 1 drop Both Eyes HS    levothyroxine 75 mcg Oral Early Morning    sitaGLIPtin 50 mg Oral Daily      TELE  ECHO  SCD's  IP CONSULT TO CARDIOLOGY  IP CONSULT TO PULMONOLOGY    Network Utilization Review Department  Phone: [phone]; Fax [phone]  [email or web address]  ATTENTION: Please call with any questions or concerns to [phone]  and carefully listen to the prompts so that you are directed to the right person  Send all requests for admission clinical reviews, approved or denied determinations and any other requests to fax [phone]   All voicemails are confidential

## 2019-10-06 NOTE — ASSESSMENT & PLAN NOTE
Lab Results   Component Value Date    HGBA1C 6 9 (H) 09/10/2019       Blood Sugar Average: Last 72 hrs:  Continue Januvia  Continue gabapentin for neuropathy

## 2019-10-06 NOTE — PROGRESS NOTES
Progress Note - Elise Lundy 9/24/1922, 80 y o  female MRN: [id number]    Unit/Bed#: E4 -01 Encounter: [id number]    Primary Care Provider: Manjit Clement MD   Date and time admitted to hospital: 10/5/2019 11:35 AM        DM type 2 with diabetic peripheral neuropathy [facility])  Assessment & Plan  Lab Results   Component Value Date    HGBA1C 6 9 (H) 09/10/2019       Blood Sugar Average: Last 72 hrs:  Continue Januvia  Continue gabapentin for neuropathy    Chronic diastolic congestive heart failure (HCC)  Assessment & Plan    Weights stable- 78 5 kg        Hypothyroidism  Assessment & Plan  -continue levothyroxine    Aortic stenosis  Assessment & Plan  Echocardiogram ordered    Cardiology recommendations are below    1  Exertional dyspnea, suspect secondary to significant aortic valve stenosis  2  Chronic diastolic heart failure, seems to be well compensated on exam  3  Hypertension  4  Possible CAD with prior echocardiogram revealing possible basal inferior and basal inferolateral hypokinesis    Diuretic to be increased  Check BMP in the morning         Asthma  Assessment & Plan  No bronchospasm on examination  Suspect age appropriate volume loss contributing to patient's shortness of breath  * Dyspnea on minimal exertion  Assessment & Plan  Multifactorial in etiology  Patient does have a history of aortic stenosis, diastolic CHF, asthma  -will repeat a 2D echocardiogram  -patient will benefit by Cardiology as well as Pulmonary Medicine evaluations        VTE Pharmacologic Prophylaxis:   Pharmacologic: Heparin  Mechanical VTE Prophylaxis in Place: Yes    Patient Centered Rounds: I have performed bedside rounds with nursing staff today  Discussions with Specialists or Other Care Team Provider:  Cardiology    Education and Discussions with Family / Patient:  None available for discussion    Time Spent for Care: 15 minutes    More than 50% of total time spent on counseling and coordination of care as described above  Current Length of Stay: 0 day(s)    Current Patient Status: Observation   Certification Statement:  Awaiting echocardiogram for further evaluation of patient's shortness of breath  Echocardiogram could not be obtained to 10/07/2019    Discharge Plan:  Home self-care    Code Status: Level 1 - Full Code      Subjective:   Patient denies any shortness of breath while laying in bed  She is reported to have dyspnea on minimal exertion which was not tested  No chest pain, lower extremity edema  A complete and comprehensive 14 point organ system review was performed and all other systems are negative    Objective:     Vitals:   Temp (24hrs), Av 7 °F (36 5 °C), Min:96 7 °F (35 9 °C), Max:98 5 °F (36 9 °C)    Temp:  [96 7 °F (35 9 °C)-98 5 °F (36 9 °C)] 97 4 °F (36 3 °C)  HR:  [59-76] 71  Resp:  [16-19] 19  BP: (112-187)/(53-79) 112/53  SpO2:  [94 %-97 %] 94 %  Body mass index is 31 65 kg/m²  Input and Output Summary (last 24 hours): Intake/Output Summary (Last 24 hours) at 10/6/2019 1341  Last data filed at 10/6/2019 1124  Gross per 24 hour   Intake 270 ml   Output 1375 ml   Net -1105 ml       Physical Exam:     Physical Exam   Constitutional: She is oriented to person, place, and time  She appears well-developed and well-nourished  HENT:   Head: Normocephalic and atraumatic  Mouth/Throat: Oropharynx is clear and moist    Eyes: Pupils are equal, round, and reactive to light  EOM are normal    Neck: Normal range of motion  Neck supple  Cardiovascular: Normal rate and regular rhythm  Murmur heard  Pulmonary/Chest: Effort normal and breath sounds normal    Musculoskeletal: Normal range of motion  Right lower leg: Normal         Left lower leg: Normal    Neurological: She is alert and oriented to person, place, and time  Skin: Skin is warm and dry  Capillary refill takes less than 2 seconds  Psychiatric: She has a normal mood and affect   Her behavior is normal  Additional Data:     Labs:    Results from last 7 days   Lab Units 10/06/19  0443 10/05/19  1208   WBC Thousand/uL 5 43 6 44   HEMOGLOBIN g/dL 12 6 14 3   HEMATOCRIT % 39 0 45 7   PLATELETS Thousands/uL 181 191   NEUTROS PCT %  --  81*   LYMPHS PCT %  --  10*   MONOS PCT %  --  6   EOS PCT %  --  2     Results from last 7 days   Lab Units 10/06/19  0443   SODIUM mmol/L 141   POTASSIUM mmol/L 3 5   CHLORIDE mmol/L 107   CO2 mmol/L 23   BUN mg/dL 18   CREATININE mg/dL 1 14   ANION GAP mmol/L 11   CALCIUM mg/dL 9 0   ALBUMIN g/dL 2 7*   TOTAL BILIRUBIN mg/dL 0 39   ALK PHOS U/L 75   ALT U/L 11*   AST U/L 19   GLUCOSE RANDOM mg/dL 119                           * I Have Reviewed All Lab Data Listed Above  * Additional Pertinent [facility] Tests Reviewed: [facility]    Imaging:    Recent Cultures (last 7 days):           Last 24 Hours Medication List:     Current Facility-Administered Medications:  amLODIPine 10 mg Oral Daily Ihsan Doe MD   aspirin 81 mg Oral Daily Ihsan Doe MD   budesonide 0 5 mg Nebulization Q12H Suresh Diaz MD   dorzolamide 1 drop Both Eyes TID Ihsan Doe MD   fluticasone 2 spray Nasal Daily Ihsan Doe MD   fluticasone-vilanterol 1 puff Inhalation Daily Suresh Diaz MD   furosemide 40 mg Oral BID (diuretic) Rujul Cardozo, DO   gabapentin 300 mg Oral HS Ihsan Doe MD   heparin (porcine) 5,000 Units Subcutaneous [facility] Ihsan Doe MD   ipratropium-albuterol 3 mL Nebulization Q6H Ihsan Doe MD   latanoprost 1 drop Both Eyes HS Ihsan Doe MD   levothyroxine 75 mcg Oral Early Morning Ihsan Doe MD   potassium chloride 20 mEq Oral Daily Rujul Cardozo, DO   sitaGLIPtin 50 mg Oral Daily Ihsan Doe MD        Today, Patient Was Seen By: Missy Quinn DO    ** Please Note: Dictation voice to text software may have been used in the creation of this document   **

## 2019-10-06 NOTE — ASSESSMENT & PLAN NOTE
No bronchospasm on examination  Suspect age appropriate volume loss contributing to patient's shortness of breath

## 2019-10-06 NOTE — CONSULTS
Consult Note - Pulmonary   Tiffanie Bravo 80 y o  female MRN: [id number]  Unit/Bed#: E4 -01 Encounter: [id number]      Reason for consultation:  Shortness of breath    Requesting physician:  Dr Leanne Ku and plan:  The patient has the following medical problems:  1  Cardiac  The patient has most probably diastolic heart failure, mild, with moderate to severe aortic stenosis  Her echo previously showed Arctic valve area 0 8 cm with mean gradient 25  She is being evaluated by Cardiology by another echo and consideration of cover  2  Pulmonary  The patient has asthma for the last 2 years according to her  She is not taking any inhalers besides rescue inhaler very infrequently  She is currently wheezing  Even though I do not think that her main issue, it might be a component in her shortness of breath  Recommend to treat with nebulizers and very low dose of steroids given her age for 5 days  Once discharged, recommend to continue with Breo once daily  We can see her as an outpatient for asthma management if clinically indicated  We are signing off, please re-consult as needed  Steven Poon MD/PhD,  [facility]    History of Present Illness  Tiffanie Bravo is a 80 y o  female with previous medical history of asthma in the last 2 years, Arctic stenosis, congestive diastolic heart failure, diabetes mellitus, who presented on October 5th due to shortness of breath  Patient had CT angio that was normal   We are being consulted for asthma management  Occupational History:  Retired  Social History:  Never smoked  Does not drink alcohol  Lives with her daughter  Review of systems  Review of Systems   Constitutional: Negative  HENT: Negative  Eyes: Negative  Respiratory: Positive for shortness of breath  Cardiovascular: Positive for chest pain  Gastrointestinal: Negative  Endocrine: Negative  Genitourinary: Negative  Musculoskeletal: Negative  Skin: Negative  Allergic/Immunologic: Negative  Neurological: Negative  Hematological: Negative  Psychiatric/Behavioral: Negative          Historical Information   Past Medical History:   Diagnosis Date    Asthma     Atypical chest pain     CAD (coronary artery disease)     Candidal intertrigo     CHF (congestive heart failure) (HCC)     Depression     Diabetes mellitus (HCC)     Diabetic neuropathy (HCC)     Diverticulitis     Dyslipidemia     Female bladder prolapse     Gastric ulcer     Glaucoma     HTN (hypertension)     Hyperlipidemia     Hypothyroidism 4/18/2016    RAD (reactive airway disease)      Past Surgical History:   Procedure Laterality Date    COLONOSCOPY      ESOPHAGOGASTRODUODENOSCOPY  2011    HYSTERECTOMY       Family History   Problem Relation Age of Onset    Breast cancer Mother     Hypothyroidism Mother     Hypertension Father     Breast cancer Sister     Thyroid disease Sister     Hypertension Sister        Meds/Allergies   Current Facility-Administered Medications   Medication Dose Route Frequency    amLODIPine (NORVASC) tablet 10 mg  10 mg Oral Daily    aspirin (ECOTRIN LOW STRENGTH) EC tablet 81 mg  81 mg Oral Daily    dorzolamide (TRUSOPT) ophthalmic solution 1 drop  1 drop Both Eyes TID    fluticasone (FLONASE) 50 mcg/act nasal spray 2 spray  2 spray Nasal Daily    furosemide (LASIX) tablet 40 mg  40 mg Oral BID (diuretic)    gabapentin (NEURONTIN) capsule 300 mg  300 mg Oral HS    heparin (porcine) subcutaneous injection 5,000 Units  5,000 Units Subcutaneous Q8H [address]    ipratropium-albuterol (DUO-NEB) 0 5-2 5 mg/3 mL inhalation solution 3 mL  3 mL Nebulization Q6H    latanoprost (XALATAN) 0 005 % ophthalmic solution 1 drop  1 drop Both Eyes HS    levothyroxine tablet 75 mcg  75 mcg Oral Early Morning    potassium chloride (K-DUR,KLOR-CON) CR tablet 20 mEq  20 mEq Oral Daily    sitaGLIPtin (JANUVIA) tablet 50 mg  50 mg Oral Daily     Medications Prior to Admission   Medication    amLODIPine (NORVASC) 10 mg tablet    aspirin 81 MG tablet    Cholecalciferol (VITAMIN D3) 2000 units capsule    dorzolamide (TRUSOPT) 2 % ophthalmic solution    gabapentin (NEURONTIN) 600 MG tablet    latanoprost (XALATAN) 0 005 % ophthalmic solution    levothyroxine 75 mcg tablet    sitaGLIPtin (JANUVIA) 50 mg tablet    acetaminophen-codeine (TYLENOL #3) 300-30 mg per tablet    albuterol (VENTOLIN HFA) 90 mcg/act inhaler    aluminum-magnesium hydroxide-simethicone (MYLANTA) 200-200-20 mg/5 mL suspension    amLODIPine-benazepril (LOTREL 5-10) 5-10 MG per capsule    fluticasone (FLONASE) 50 mcg/act nasal spray    furosemide (LASIX) 20 mg tablet    Incontinence Supply Disposable (SELECT DISPOSABLE UNDERWEAR LG) MISC    ketoconazole (NIZORAL) 2 % cream    Lidocaine-Menthol 4-5 % PTCH    Melatonin 5 MG CAPS    nitroglycerin (NITROSTAT) 0 4 mg SL tablet    nystatin (MYCOSTATIN) powder    OLANZapine (ZyPREXA) 2 5 mg tablet    omeprazole (PriLOSEC) 40 MG capsule     Allergies   Allergen Reactions    Statins      Other reaction(s): Weakness  Category: Adverse Reaction;        Vitals: Blood pressure 112/53, pulse 71, temperature (!) 97 4 °F (36 3 °C), temperature source Temporal, resp  rate 19, height 5' 2" (1 575 m), weight 78 5 kg (173 lb 1 oz), SpO2 94 %, not currently breastfeeding , Body mass index is 31 65 kg/m²  Physical Exam  Physical Exam   Constitutional: She is oriented to person, place, and time  She appears well-developed and well-nourished  No distress  HENT:   Head: Normocephalic and atraumatic  Eyes: Pupils are equal, round, and reactive to light  EOM are normal    Neck: Normal range of motion  Neck supple  No JVD present  Cardiovascular: Normal rate and regular rhythm  Murmur (Systolic murmur) heard  Pulmonary/Chest: Effort normal  No stridor  No respiratory distress  She has wheezes  She has no rales  Abdominal: Soft  She exhibits no distension  Musculoskeletal: Normal range of motion  She exhibits no edema or deformity  Neurological: She is alert and oriented to person, place, and time  Skin: Skin is warm  She is not diaphoretic  Psychiatric: She has a normal mood and affect  Intake/Output Summary (Last 24 hours) at 10/6/2019 1131  Last data filed at 10/6/2019 1124  Gross per 24 hour   Intake 270 ml   Output 1375 ml   Net -1105 ml       Labs: I have personally reviewed pertinent lab results    Results from last 7 days   Lab Units 10/06/19  0443 10/05/19  1208   WBC Thousand/uL 5 43 6 44   HEMOGLOBIN g/dL 12 6 14 3   HEMATOCRIT % 39 0 45 7   PLATELETS Thousands/uL 181 191   NEUTROS PCT %  --  81*   MONOS PCT %  --  6      Results from last 7 days   Lab Units 10/06/19  0443 10/05/19  1208   POTASSIUM mmol/L 3 5 4 3   CHLORIDE mmol/L 107 106   CO2 mmol/L 23 23   BUN mg/dL 18 15   CREATININE mg/dL 1 14 1 05   CALCIUM mg/dL 9 0 10 1   ALK PHOS U/L 75 98   ALT U/L 11* 14   AST U/L 19 25                      0   Lab Value Date/Time    TROPONINI 0 02 10/05/2019 2118    TROPONINI 0 02 10/05/2019 1654    TROPONINI <0 02 10/05/2019 1208    TROPONINI 0 03 01/09/2019 1746    TROPONINI 0 04 08/05/2018 1624    TROPONINI 0 05 (H) 08/05/2018 1210    TROPONINI 0 05 (H) 08/05/2018 0921    TROPONINI <0 02 05/12/2018 1854    TROPONINI <0 02 05/12/2018 1552    TROPONINI <0 02 05/12/2018 1306    TROPONINI <0 02 04/21/2017 1751    TROPONINI <0 02 04/21/2017 1419    TROPONINI 0 03 03/12/2017 2143    TROPONINI <0 02 10/21/2016 1200    TROPONINI <0 02 10/21/2016 0922    TROPONINI <0 02 10/21/2016 1975    TROPONINI <0 02 04/18/2016 1641    TROPONINI <0 02 04/18/2016 1042    TROPONINI <0 02 01/27/2016 2208    TROPONINI 0 02 01/27/2016 1545    TROPONINI <0 02 01/27/2016 0951    TROPONINI <0 02 10/15/2015 0510    TROPONINI <0 02 10/14/2015 2050    TROPONINI <0 04 04/19/2015 1230    TROPONINI <0 04 04/18/2015 2125    TROPONINI <0 04 04/18/2015 1300    TROPONINI <0 04 03/22/2015 2044    TROPONINI 0 06 (H) 03/22/2015 1408       Imaging and other studies: I have personally reviewed pertinent films in PACS  Chest CT October 5, 2019  PULMONARY ARTERIAL TREE:  No pulmonary embolus is seen       LUNGS: Persistent minimal bibasal atelectasis  Lungs are otherwise clear    There is no tracheal or endobronchial lesion      PLEURA:  Unremarkable      HEART/GREAT VESSELS:  Unremarkable for patient's age      MEDIASTINUM AND MARLENE:  Unremarkable      CHEST WALL AND LOWER NECK:   Unremarkable      VISUALIZED STRUCTURES IN THE UPPER ABDOMEN:  Calcified splenic granuloma      OSSEOUS STRUCTURES:  No acute fracture or destructive osseous lesion      IMPRESSION:  No acute intrathoracic pathology identified       Gris Henson MD/PhD,  [facility]

## 2019-10-06 NOTE — UTILIZATION REVIEW
Notification of Observation Care Status/Observation Authorization Request    This is a Notification of Observation Care Status to our facility [address]  Be advised that this patient was admitted to our facility under Observation Status  Please contact the Utilization Review Department where the patient is receiving care services for additional admission information  Place of Service Code: 25  Place of Service Name: [facility]  CPT Code for Observation: CPT [facility]ours / CPT 63124    Presentation Date & Time: 10/5/2019 11:35 AM  Observation Admission Date & Time: 10/05 @ 1501 PM  Discharge Date & Time: No discharge date for patient encounter  Discharge Disposition (if discharged): Home with [facility]  Attending Physician and NPI#:Ravindra Shea [[id number]]  Admission Orders (From admission, onward)     Ordered        10/05/19 1501  Place in Observation  Once                   Facility: [address] Utilization Review Department  Phone: [phone]; Fax [phone]  [email or web address]  ATTENTION: Please call with any questions or concerns to [phone]  and carefully listen to the prompts so that you are directed to the right person  Send all requests for admission clinical reviews, approved or denied determinations and any other requests to fax [phone]   All voicemails are confidential

## 2019-10-06 NOTE — CONSULTS
Cardiology Consult  10/06/19    Referring Physian: DO KISHAN Morales    Chief Complain/Reason for Referal:     IMPRESSION/RECOMMENDATIONS/DISCUSSION:  1  Exertional dyspnea, suspect secondary to significant aortic valve stenosis  2  Chronic diastolic heart failure, seems to be well compensated on exam  3  Hypertension  4  Possible CAD with prior echocardiogram revealing possible basal inferior and basal inferolateral hypokinesis    · Will increase furosemide 40 mg twice daily empirically to see if shortness of breath improved  · Echocardiogram tomorrow to re-evaluate aortic valve stenosis  Suspect severe/symptomatic  · If aortic valve stenosis does not appear severe on echocardiogram, and if regional wall motion abnormalities present, augmenting antianginal regimen may be considered  No beta-blocker secondary to sinus bradycardia--consider nuclear stress tests with eventual revascularization if no symptomatic relief  · Continue amlodipine, aspirin  · Discussed with patient's daughter at bedside      ======================================================    HPI:  I am seeing this patient in cardiology consultation for:  Shortness of breath    Starlyn Snellen is a 80 y o  female with a history of hypertension, dyslipidemia, diabetes  She has had prior admissions to the hospital, last 1 being 04/2017 for atypical chest pain, always ruled out for myocardial injury  In August 2018 she was admitted for diastolic congestive heart failure and discharged on furosemide 20 mg daily  During her prior visit with me 08/21/2019, she had complaints of worsening exertional dyspnea  For this reason furosemide was increased to 40 mg daily and she was scheduled for an echocardiogram     She presents to the hospital yesterday with progressively worsening exertional dyspnea  She denies much in the way of lower extremity edema, but admits to orthopnea  Her weight has been stable and she denies exertional chest discomfort  She has been compliant with her medications  She denies lightheadedness, presyncope, syncope, palpitations  Past Medical History:   Diagnosis Date    Asthma     Atypical chest pain     CAD (coronary artery disease)     Candidal intertrigo     CHF (congestive heart failure) (HCC)     Depression     Diabetes mellitus (HCC)     Diabetic neuropathy (HCC)     Diverticulitis     Dyslipidemia     Female bladder prolapse     Gastric ulcer     Glaucoma     HTN (hypertension)     Hyperlipidemia     Hypothyroidism 4/18/2016    RAD (reactive airway disease)          Scheduled Meds:  Current Facility-Administered Medications:  amLODIPine 10 mg Oral Daily Kaley Kamara MD   aspirin 81 mg Oral Daily Kaley Kamara MD   dorzolamide 1 drop Both Eyes TID Kaley Kamara MD   fluticasone 2 spray Nasal Daily Kaley Kamara MD   furosemide 40 mg Oral Daily Kaley Kamara MD   gabapentin 300 mg Oral HS Kaley Kamara MD   heparin (porcine) 5,000 Units Subcutaneous [facility] Kaley Kamara MD   ipratropium-albuterol 3 mL Nebulization Q6H Kaley Kamara MD   latanoprost 1 drop Both Eyes HS Kaley Kamara MD   levothyroxine 75 mcg Oral Early Morning Kaley Kamara MD   sitaGLIPtin 50 mg Oral Daily Kaley Kamara MD     Continuous Infusions:   PRN Meds:  Allergies   Allergen Reactions    Statins      Other reaction(s): Weakness  Category: Adverse Reaction;      I reviewed the Home Medication list in the chart  Family History   Problem Relation Age of Onset   Ismael Spindale Breast cancer Mother     Hypothyroidism Mother     Hypertension Father     Breast cancer Sister     Thyroid disease Sister     Hypertension Sister        Social History     Socioeconomic History    Marital status:       Spouse name: Not on file    Number of children: 2    Years of education: Not on file    Highest education level: Not on file   Occupational History    Occupation: Retired   Social Needs    Financial resource strain: Not on file    Food insecurity:     Worry: Not on file     Inability: Not on file    Transportation needs:     Medical: Not on file     Non-medical: Not on file   Tobacco Use    Smoking status: Never Smoker    Smokeless tobacco: Never Used   Substance and Sexual Activity    Alcohol use: No     Comment: Social Alcohol Use -- as per allscripts (pt denies all alcohol use)    Drug use: No    Sexual activity: Not on file   Lifestyle    Physical activity:     Days per week: Not on file     Minutes per session: Not on file    Stress: Not on file   Relationships    Social connections:     Talks on phone: Not on file     Gets together: Not on file     Attends Buddhist service: Not on file     Active member of club or organization: Not on file     Attends meetings of clubs or organizations: Not on file     Relationship status: Not on file    Intimate partner violence:     Fear of current or ex partner: Not on file     Emotionally abused: Not on file     Physically abused: Not on file     Forced sexual activity: Not on file   Other Topics Concern    Not on file   Social History Narrative    Lived with adult children    Caffeine Use       Review of Systems - as per HPI, all others reviewed and negative  Vitals:    10/06/19 0752   BP:    Pulse:    Resp:    Temp:    SpO2: 96%     I/O       10/04 0701 - 10/05 0700 10/05 0701 - 10/06 0700 10/06 0701 - 10/07 0700    P  O   270     Total Intake(mL/kg)  270 (3 4)     Urine (mL/kg/hr)  1100     Total Output  1100     Net  -830                Weight (last 2 days)     Date/Time   Weight    10/05/19 1538   78 5 (173 06)    10/05/19 1059   78 7 (173 5)              GEN: NAD, Alert  HEENT: Mucus membranes moist, pink conjunctivae  EYES: Pupils equal, sclera anicteric  NECK: No JVD/HJR, no carotid bruit  CARDIOVASCULAR: RRR, No murmur, rub, gallops S1,S2, no parasternal heave/thrill  LUNGS: Clear To auscultation bilaterally  ABDOMEN: Soft, nondistended, no hepatic systolic pulsation  EXTREMITIES/VASCULAR: No edema  PSYCH: Normal Affect by limited examination  NEURO: Grossly intact by limited examination    HEME: No significant bleeding, bruising, petechia by limited examination  SKIN: No significant rashes by limited examination  MSK: Normal upper extremity and trunk strengths by limited examination      EKG:  Sinus rhythm, LVH  TELE:  Sinus rhythm, no dysrhythmia  CXR:  No acute cardiopulmonary disease    ECHO:  Prior echocardiogram 08/07/2018 with dimensionless index 0 25, valve area 0 8 centimeter squared, classified moderate to severe aortic valve stenosis     Results from last 7 days   Lab Units 10/06/19  0443 10/05/19  1208   WBC Thousand/uL 5 43 6 44   HEMOGLOBIN g/dL 12 6 14 3   HEMATOCRIT % 39 0 45 7   PLATELETS Thousands/uL 181 191   NEUTROS PCT %  --  81*   MONOS PCT %  --  6     Results from last 7 days   Lab Units 10/06/19  0443 10/05/19  1208   POTASSIUM mmol/L 3 5 4 3   CHLORIDE mmol/L 107 106   CO2 mmol/L 23 23   BUN mg/dL 18 15   CREATININE mg/dL 1 14 1 05   CALCIUM mg/dL 9 0 10 1     Results from last 7 days   Lab Units 10/06/19  0443 10/05/19  1208   POTASSIUM mmol/L 3 5 4 3   CHLORIDE mmol/L 107 106   CO2 mmol/L 23 23   BUN mg/dL 18 15   CREATININE mg/dL 1 14 1 05   CALCIUM mg/dL 9 0 10 1   ALK PHOS U/L 75 98   ALT U/L 11* 14   AST U/L 19 25     No results found for: TROPONINT      Results from last 7 days   Lab Units 10/05/19  2118   TROPONIN I ng/mL 0 02                       I have personally reviewed the EKG, CXR and Telemetry images directly        Patient Active Problem List    Diagnosis Date Noted    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[facility] [address] ) 10/05/2019     Priority: Low    Vitamin D deficiency 08/22/2019     Priority: Low    Pain of both hip joints 06/21/2019     Priority: Low    Candidal intertrigo 06/21/2019     Priority: Low    Headache 01/09/2019     Priority: Low    Gastroesophageal reflux disease without esophagitis 11/06/2018     Priority: Low    Insomnia 10/12/2018     Priority: Low    Chronic diastolic congestive heart failure ([facility] ) 08/05/2018     Priority: Low    Type 2 diabetes mellitus, uncontrolled, with neuropathy ([facility] ) 08/05/2018     Priority: Low    Medication management 04/13/2018     Priority: Low    Dyspnea on minimal exertion 02/17/2018     Priority: Low    Shakiness 05/23/2017     Priority: Low    Allergic rhinitis 05/02/2017     Priority: Low    Hyperlipidemia      Priority: Low    Female bladder prolapse      Priority: Low    Bilateral cold feet 12/28/2016     Priority: Low    Aortic stenosis 10/21/2016     Priority: Low    Arteriosclerosis of coronary artery      Priority: Low    Asthma      Priority: Low    Anxiety disorder 08/17/2016     Priority: Low    Osteoarthritis 06/21/2016     Priority: Low    Migraine 04/18/2016     Priority: Low    Hypothyroidism 04/18/2016     Priority: Low    Trigeminal neuralgia 03/16/2016     Priority: Low    Peripheral vascular disease (HCC)      Priority: Low    Glaucoma, open angle, severe stage      Priority: Low    Overactive bladder 01/20/2016     Priority: Low    Visual hallucinations 11/17/2015     Priority: Low    General weakness 11/03/2015     Priority: Low    Low back pain 11/03/2015     Priority: Low    Hearing loss 07/27/2015     Priority: Low    Mild cognitive impairment 07/27/2015     Priority: Low    Urinary incontinence 07/27/2015     Priority: Low    Dizziness 07/02/2015     Priority: Low    Advanced age 06/03/2015     Priority: Low    Irritable bowel 04/24/2015     Priority: Low    Female cystocele 04/24/2015     Priority: Low    Diverticulosis 04/03/2015     Priority: Low    Hiatal hernia 04/03/2015     Priority: Low    Ambulatory dysfunction 04/02/2015     Priority: Low       Portions of the record may have been created with voice recognition software   Occasional wrong word or "sound a like" substitutions may have occurred due to the inherent limitations of voice recognition software  Read the chart carefully and recognize, using context, where substitutions have occurred

## 2019-10-07 ENCOUNTER — APPOINTMENT (OUTPATIENT)
Dept: NON INVASIVE DIAGNOSTICS | Facility: HOSPITAL | Age: 84
DRG: 306 | End: 2019-10-07
Payer: COMMERCIAL

## 2019-10-07 LAB
ATRIAL RATE: 79 BPM
P AXIS: 45 DEGREES
PR INTERVAL: 206 MS
QRS AXIS: -10 DEGREES
QRSD INTERVAL: 108 MS
QT INTERVAL: 378 MS
QTC INTERVAL: 433 MS
T WAVE AXIS: 81 DEGREES
VENTRICULAR RATE: 79 BPM

## 2019-10-07 PROCEDURE — 94760 N-INVAS EAR/PLS OXIMETRY 1: CPT

## 2019-10-07 PROCEDURE — 99232 SBSQ HOSP IP/OBS MODERATE 35: CPT | Performed by: INTERNAL MEDICINE

## 2019-10-07 PROCEDURE — 94640 AIRWAY INHALATION TREATMENT: CPT

## 2019-10-07 PROCEDURE — 93010 ELECTROCARDIOGRAM REPORT: CPT

## 2019-10-07 PROCEDURE — 93306 TTE W/DOPPLER COMPLETE: CPT

## 2019-10-07 PROCEDURE — 93306 TTE W/DOPPLER COMPLETE: CPT | Performed by: INTERNAL MEDICINE

## 2019-10-07 RX ORDER — AMLODIPINE BESYLATE 5 MG/1
5 TABLET ORAL DAILY
Status: DISCONTINUED | OUTPATIENT
Start: 2019-10-08 | End: 2019-10-08 | Stop reason: HOSPADM

## 2019-10-07 RX ORDER — FUROSEMIDE 10 MG/ML
20 INJECTION INTRAMUSCULAR; INTRAVENOUS
Status: DISCONTINUED | OUTPATIENT
Start: 2019-10-07 | End: 2019-10-07

## 2019-10-07 RX ORDER — FUROSEMIDE 10 MG/ML
20 INJECTION INTRAMUSCULAR; INTRAVENOUS DAILY
Status: DISCONTINUED | OUTPATIENT
Start: 2019-10-07 | End: 2019-10-08

## 2019-10-07 RX ORDER — POTASSIUM CHLORIDE 20 MEQ/1
20 TABLET, EXTENDED RELEASE ORAL 2 TIMES DAILY
Status: DISCONTINUED | OUTPATIENT
Start: 2019-10-07 | End: 2019-10-08 | Stop reason: HOSPADM

## 2019-10-07 RX ADMIN — DORZOLAMIDE HYDROCHLORIDE 1 DROP: 20 SOLUTION/ DROPS OPHTHALMIC at 15:25

## 2019-10-07 RX ADMIN — SITAGLIPTIN 50 MG: 50 TABLET, FILM COATED ORAL at 08:38

## 2019-10-07 RX ADMIN — HEPARIN SODIUM 5000 UNITS: 5000 INJECTION INTRAVENOUS; SUBCUTANEOUS at 06:14

## 2019-10-07 RX ADMIN — BUDESONIDE 0.5 MG: 0.5 INHALANT RESPIRATORY (INHALATION) at 19:09

## 2019-10-07 RX ADMIN — ASPIRIN 81 MG: 81 TABLET, COATED ORAL at 08:38

## 2019-10-07 RX ADMIN — FUROSEMIDE 20 MG: 10 INJECTION, SOLUTION INTRAMUSCULAR; INTRAVENOUS at 12:08

## 2019-10-07 RX ADMIN — HEPARIN SODIUM 5000 UNITS: 5000 INJECTION INTRAVENOUS; SUBCUTANEOUS at 13:53

## 2019-10-07 RX ADMIN — POTASSIUM CHLORIDE 20 MEQ: 1500 TABLET, EXTENDED RELEASE ORAL at 17:16

## 2019-10-07 RX ADMIN — FLUTICASONE PROPIONATE 2 SPRAY: 50 SPRAY, METERED NASAL at 08:38

## 2019-10-07 RX ADMIN — FUROSEMIDE 40 MG: 40 TABLET ORAL at 08:38

## 2019-10-07 RX ADMIN — HEPARIN SODIUM 5000 UNITS: 5000 INJECTION INTRAVENOUS; SUBCUTANEOUS at 21:40

## 2019-10-07 RX ADMIN — DORZOLAMIDE HYDROCHLORIDE 1 DROP: 20 SOLUTION/ DROPS OPHTHALMIC at 08:37

## 2019-10-07 RX ADMIN — IPRATROPIUM BROMIDE AND ALBUTEROL SULFATE 3 ML: 2.5; .5 SOLUTION RESPIRATORY (INHALATION) at 08:16

## 2019-10-07 RX ADMIN — BUDESONIDE 0.5 MG: 0.5 INHALANT RESPIRATORY (INHALATION) at 08:16

## 2019-10-07 RX ADMIN — GABAPENTIN 300 MG: 300 CAPSULE ORAL at 21:40

## 2019-10-07 RX ADMIN — DORZOLAMIDE HYDROCHLORIDE 1 DROP: 20 SOLUTION/ DROPS OPHTHALMIC at 21:40

## 2019-10-07 RX ADMIN — AMLODIPINE BESYLATE 10 MG: 10 TABLET ORAL at 08:38

## 2019-10-07 RX ADMIN — LATANOPROST 1 DROP: 50 SOLUTION OPHTHALMIC at 21:47

## 2019-10-07 RX ADMIN — IPRATROPIUM BROMIDE AND ALBUTEROL SULFATE 3 ML: 2.5; .5 SOLUTION RESPIRATORY (INHALATION) at 19:09

## 2019-10-07 RX ADMIN — LEVOTHYROXINE SODIUM 75 MCG: 75 TABLET ORAL at 06:14

## 2019-10-07 RX ADMIN — FLUTICASONE FUROATE AND VILANTEROL TRIFENATATE 1 PUFF: 100; 25 POWDER RESPIRATORY (INHALATION) at 08:38

## 2019-10-07 RX ADMIN — IPRATROPIUM BROMIDE AND ALBUTEROL SULFATE 3 ML: 2.5; .5 SOLUTION RESPIRATORY (INHALATION) at 13:15

## 2019-10-07 RX ADMIN — POTASSIUM CHLORIDE 20 MEQ: 1500 TABLET, EXTENDED RELEASE ORAL at 08:38

## 2019-10-07 NOTE — PROGRESS NOTES
Progress Note - Cardiology   Merlyn Boas 80 y o  female MRN: [id number]  Unit/Bed#: E4 -01 Encounter: [id number]      Assessment/Recommendations/Discussion:   1  Exertional dyspnea, multifactorial  2  Severe calcific aortic valve stenosis  3  Chronic diastolic heart failure, possible acute component  4  Hypertension  5  Possible CAD with prior echocardiogram revealing possible basal inferior and basal inferolateral hypokinesis    · Will challenge with 20 mg IV Lasix daily to see if helped with shortness of breath  Suspect increased intracavitary gradients in left atrial pressures secondary to diastolic dysfunction and severe aortic stenosis  NT proBNP slightly increased from baseline  · Recommend outpatient CT surgery evaluation for consideration of transcatheter aortic valve replacement  Although she is 80 years of old, she is fairly functional and quality of life is fairly impaired with her symptoms  Will need coronary angiography after assessment by CT surgery  · Reduce amlodipine to 5 mg once daily to avoid hypotension  · Increase potassium to 20 mEq b i d    BMP tomorrow morning      Subjective:  Patient seen and examined, feels well at rest          Physical Exam:  GEN:  NAD  HEENT:  MMM, NCAT, pink conjunctiva, EOMI, nonicteric sclera  CV:  NO JVD/HJR, RR, 12 6 systolic ejection murmur, +S1/S2, NO PARASTERNAL HEAVE/THRILL, NO LE EDEMA, NO HEPATIC SYSTOLIC PULSATION, WARM EXTREMITIES  RESP:  CTAB/L  ABD:  SOFT, NT, NO GROSS ORGANOMEGALY        Vitals:   /58 (BP Location: Left arm)   Pulse 74   Temp 97 6 °F (36 4 °C) (Tympanic)   Resp 20   Ht 5' 2" (1 575 m)   Wt 78 5 kg (173 lb 1 oz)   SpO2 97%   BMI 31 65 kg/m²   Vitals:    10/05/19 1059 10/05/19 1538   Weight: 78 7 kg (173 lb 8 oz) 78 5 kg (173 lb 1 oz)       Intake/Output Summary (Last 24 hours) at 10/7/2019 1141  Last data filed at 10/7/2019 1001  Gross per 24 hour   Intake 600 ml   Output 200 ml   Net 400 ml       TELEMETRY: SR  Lab Results:  Results from last 7 days   Lab Units 10/06/19  0443   WBC Thousand/uL 5 43   HEMOGLOBIN g/dL 12 6   HEMATOCRIT % 39 0   PLATELETS Thousands/uL 181     Results from last 7 days   Lab Units 10/06/19  0443   POTASSIUM mmol/L 3 5   CHLORIDE mmol/L 107   CO2 mmol/L 23   BUN mg/dL 18   CREATININE mg/dL 1 14   CALCIUM mg/dL 9 0   ALK PHOS U/L 75   ALT U/L 11*   AST U/L 19     Results from last 7 days   Lab Units 10/06/19  0443   POTASSIUM mmol/L 3 5   CHLORIDE mmol/L 107   CO2 mmol/L 23   BUN mg/dL 18   CREATININE mg/dL 1 14   CALCIUM mg/dL 9 0           Medications:    Current Facility-Administered Medications:     amLODIPine (NORVASC) tablet 10 mg, 10 mg, Oral, Daily, Elena Villareal MD, 10 mg at 10/07/19 0838    aspirin (ECOTRIN LOW STRENGTH) EC tablet 81 mg, 81 mg, Oral, Daily, Elena Villareal MD, 81 mg at 10/07/19 0838    budesonide (PULMICORT) inhalation solution 0 5 mg, 0 5 mg, Nebulization, Q12H, Robert Hernandez MD, 0 5 mg at 10/07/19 0816    dorzolamide (TRUSOPT) ophthalmic solution 1 drop, 1 drop, Both Eyes, TID, Elena Villareal MD, 1 drop at 10/07/19 0837    fluticasone (FLONASE) 50 mcg/act nasal spray 2 spray, 2 spray, Nasal, Daily, Elena Villareal MD, 2 spray at 10/07/19 0838    fluticasone-vilanterol (BREO ELLIPTA) 100-25 mcg/inh inhaler 1 puff, 1 puff, Inhalation, Daily, Robert Hernandez MD, 1 puff at 10/07/19 0838    furosemide (LASIX) injection 20 mg, 20 mg, Intravenous, BID (diuretic), Rujul Cardozo, DO    gabapentin (NEURONTIN) capsule 300 mg, 300 mg, Oral, HS, Elena Villareal MD, 300 mg at 10/06/19 2100    heparin (porcine) subcutaneous injection 5,000 Units, 5,000 Units, Subcutaneous, Q8H [facility], 5,000 Units at 10/07/19 0614 **AND** Platelet count, , , Once, Elena Villareal MD    ipratropium-albuterol (DUO-NEB) 0 5-2 5 mg/3 mL inhalation solution 3 mL, 3 mL, Nebulization, Q6H, Elena Villareal MD, 3 mL at 10/07/19 0816    latanoprost (XALATAN) 0 005 % ophthalmic solution 1 drop, 1 drop, Both Eyes, HS, Larry Lockwood MD, 1 drop at 10/06/19 2100    levothyroxine tablet 75 mcg, 75 mcg, Oral, Early Morning, Larry Lockwood MD, 75 mcg at 10/07/19 3593    perflutren lipid microsphere (DEFINITY) injection, 0 4 mL/min, Intravenous, Once in imaging, Larry Lockwood MD    potassium chloride (K-DUR,KLOR-CON) CR tablet 20 mEq, 20 mEq, Oral, Daily, Rujul Cardozo, DO, 20 mEq at 10/07/19 0838    sitaGLIPtin (JANUVIA) tablet 50 mg, 50 mg, Oral, Daily, Larry Lockwood MD, 50 mg at 10/07/19 5899    This note was completed in part utilizing [employer] Fluency Direct Software  Grammatical errors, random word insertions, spelling mistakes, and incomplete sentences may be an occasional consequence of this system secondary to software limitations, ambient noise, and hardware issues  If you have any questions or concerns about the content, text, or information contained within the body of this dictation, please contact the provider for clarification

## 2019-10-07 NOTE — ASSESSMENT & PLAN NOTE
Lab Results   Component Value Date    HGBA1C 6 9 (H) 09/10/2019       Blood Sugar Average: Last 72 hrs:  Continue Januvia  Continue gabapentin for neuropathy  No changes over the past 24 hours

## 2019-10-07 NOTE — UTILIZATION REVIEW
Continued Stay Review    Date: 10/7                         Patient Class/ Level of Care:   10/5  Admitted OBS status at [facility]  LOC   10/7  CHANGED to INPT status  At  Level 2 Stepdown  LOC  Due to ongoing dyspnea w/MINIMAL exertion; Initiation of IV diuresis,  Continued neb therapy, cardiac med adjustments  HPI:89 y o  female initially admitted on  10/5  @  1501  For exertional dyspnea, suspected 2/2 significant AV stenosis  Chronic Diastolic heart failure well compensated  10/6   CARDIOLOGY   Increased lasix  Empirically  To 40 mg po daily  Ordered Echocardiogram tomorrow to re-evaluate aortic valve stenosis  (prior echo revealed possible basal inferior and basal inferolateral hypokinesis)   Cont amlodipine, ASA  · If aortic valve stenosis does not appear severe on echocardiogram, and if regional wall motion abnormalities present, augmenting antianginal regimen may be considered  No beta-blocker secondary to sinus bradycardia--consider nuclear stress tests with eventual revascularization if no symptomatic relief    10/6  PULMONOLOGY   patient has most probably diastolic heart failure, mild, with moderate to severe aortic stenosis  Does have h/o asthma, (+) wheezing; May be contributing to SOB  Will treat w/nebs and lo dose steroids for 5 days    10/7    CONTINUES w/Dyspnea on MINIMAL exertion;   Rm air sats  92-98%,  Dependent on activity     10/7   CARDIOLOGY  Will challenge with 20 mg IV Lasix daily to see if helps w/SOB  Suspect increased intracavitary gradients in left atrial pressures secondary to diastolic dysfunction and severe aortic stenosis  NT proBNP slightly increased from baseline  · Recommend outpatient CT surgery evaluation for consideration of transcatheter aortic valve replacement  · Reduce amlodipine to 5 mg once daily to avoid hypotension  Increase potassium to 20 mEq b i d    BMP tomorrow morning      Pertinent Labs/Diagnostic Results:   10/5  cxr -  The lungs are clear   No pneumothorax or pleural effusion    10/5  CT chest -  Unremarkable  No ekg available       Results from last 7 days   Lab Units 10/06/19  0443 10/05/19  1208   WBC Thousand/uL 5 43 6 44   HEMOGLOBIN g/dL 12 6 14 3   HEMATOCRIT % 39 0 45 7   PLATELETS Thousands/uL 181 191   NEUTROS ABS Thousands/µL  --  5 16         Results from last 7 days   Lab Units 10/06/19  0443 10/05/19  1208   SODIUM mmol/L 141 139   POTASSIUM mmol/L 3 5 4 3   CHLORIDE mmol/L 107 106   CO2 mmol/L 23 23   ANION GAP mmol/L 11 10   BUN mg/dL 18 15   CREATININE mg/dL 1 14 1 05   EGFR ml/min/1 73sq m 47 51   CALCIUM mg/dL 9 0 10 1     Results from last 7 days   Lab Units 10/06/19  0443 10/05/19  1208   AST U/L 19 25   ALT U/L 11* 14   ALK PHOS U/L 75 98   TOTAL PROTEIN g/dL 6 2* 7 6   ALBUMIN g/dL 2 7* 3 3*   TOTAL BILIRUBIN mg/dL 0 39 0 36         Results from last 7 days   Lab Units 10/06/19  0443 10/05/19  1208   GLUCOSE RANDOM mg/dL 119 149*         Results from last 7 days   Lab Units 10/05/19  2118 10/05/19  1654 10/05/19  1208   TROPONIN I ng/mL 0 02 0 02 <0 02     Results from last 7 days   Lab Units 10/05/19  1208   D DIMER QUANT ng/ml (FEU) 2,073*         Results from last 7 days   Lab Units 10/06/19  0443   TSH 3RD GENERATON uIU/mL 2 323     Results from last 7 days   Lab Units 10/05/19  1208   NT-PRO BNP pg/mL 798*     Vital Signs:   10/07/19 0754  98 1 °F (36 7 °C)  93  20  142/66  96 %     10/07/19 1124  97 6 °F (36 4 °C)  74  20  129/58 97% sat on rma air while sitting        Medications:   Scheduled Meds:   Current Facility-Administered Medications:  amLODIPine 10 mg Oral Daily   aspirin 81 mg Oral Daily   budesonide 0 5 mg Nebulization Q12H   dorzolamide 1 drop Both Eyes TID   fluticasone 2 spray Nasal Daily   fluticasone-vilanterol 1 puff Inhalation Daily   furosemide 40 mg Oral BID (diuretic)   gabapentin 300 mg Oral HS   heparin (porcine) 5,000 Units Subcutaneous Q8H [address]   ipratropium-albuterol 3 mL Nebulization Q6H   latanoprost 1 drop Both Eyes HS   levothyroxine 75 mcg Oral Early Morning   potassium chloride 20 mEq Oral Daily   sitaGLIPtin 50 mg Oral Daily       Discharge Plan: tbd    Network Utilization Review Department  Phone: [phone]; Fax [phone]  [email or web address]  ATTENTION: Please call with any questions or concerns to [phone]  and carefully listen to the prompts so that you are directed to the right person  Send all requests for admission clinical reviews, approved or denied determinations and any other requests to fax [phone]   All voicemails are confidential

## 2019-10-07 NOTE — NURSING NOTE
I spoke to Dr Madhu Neumann via telephone  He wants the patient to have the IV lasix 20mg now, in addition to the po dose she had earlier today

## 2019-10-07 NOTE — PLAN OF CARE
Problem: Potential for Falls  Goal: Patient will remain free of falls  Description  INTERVENTIONS:  - Assess patient frequently for physical needs  -  Identify cognitive and physical deficits and behaviors that affect risk of falls    -  [place] fall precautions as indicated by assessment   - Educate patient/family on patient safety including physical limitations  - Instruct patient to call for assistance with activity based on assessment  - Modify environment to reduce risk of injury  - Consider OT/PT consult to assist with strengthening/mobility  Outcome: Progressing     Problem: Prexisting or High Potential for Compromised Skin Integrity  Goal: Skin integrity is maintained or improved  Description  INTERVENTIONS:  - Identify patients at risk for skin breakdown  - Assess and monitor skin integrity  - Assess and monitor nutrition and hydration status  - Monitor labs   - Assess for incontinence   - Turn and reposition patient  - Assist with mobility/ambulation  - Relieve pressure over bony prominences  - Avoid friction and shearing  - Provide appropriate hygiene as needed including keeping skin clean and dry  - Evaluate need for skin moisturizer/barrier cream  - Collaborate with interdisciplinary team   - Patient/family teaching  - Consider wound care consult   Outcome: Progressing     Problem: RESPIRATORY - ADULT  Goal: Achieves optimal ventilation and oxygenation  Description  INTERVENTIONS:  - Assess for changes in respiratory status  - Assess for changes in mentation and behavior  - Position to facilitate oxygenation and minimize respiratory effort  - Oxygen administered by appropriate delivery if ordered  - Initiate smoking cessation education as indicated  - Encourage broncho-pulmonary hygiene including cough, deep breathe, Incentive Spirometry  - Assess the need for suctioning and aspirate as needed  - Assess and instruct to report SOB or any respiratory difficulty  - Respiratory Therapy support as indicated  Outcome: Progressing

## 2019-10-07 NOTE — PROGRESS NOTES
Progress Note - Joelene Plane 9/24/1922, 80 y o  female MRN: [id number]    Unit/Bed#: E4 -01 Encounter: [id number]    Primary Care Provider: Mario Tellez MD   Date and time admitted to hospital: 10/5/2019 11:35 AM        DM type 2 with diabetic peripheral neuropathy [facility])  Assessment & Plan  Lab Results   Component Value Date    HGBA1C 6 9 (H) 09/10/2019       Blood Sugar Average: Last 72 hrs:  Continue Januvia  Continue gabapentin for neuropathy  No changes over the past 24 hours    Chronic diastolic congestive heart failure (HCC)  Assessment & Plan    Weights stable- 78 5 kg, monitor      Hypothyroidism  Assessment & Plan  -continue levothyroxine    Aortic stenosis  Assessment & Plan  Echo reviewed  Will optimize volume status      Cardiology recommendations are below    1  Exertional dyspnea, suspect secondary to significant aortic valve stenosis  2  Chronic diastolic heart failure, seems to be well compensated on exam  3  Hypertension  4  Possible CAD with prior echocardiogram revealing possible basal inferior and basal inferolateral hypokinesis  · Will challenge with 20 mg IV Lasix daily to see if helped with shortness of breath  Suspect increased intracavitary gradients in left atrial pressures secondary to diastolic dysfunction and severe aortic stenosis  NT proBNP slightly increased from baseline  · Recommend outpatient CT surgery evaluation for consideration of transcatheter aortic valve replacement  Although she is 80 years of old, she is fairly functional and quality of life is fairly impaired with her symptoms  Will need coronary angiography after assessment by CT surgery  · Reduce amlodipine to 5 mg once daily to avoid hypotension  · Increase potassium to 20 mEq b i d  BMP tomorrow morning       Asthma  Assessment & Plan  No bronchospasm on examination  Suspect age appropriate volume loss contributing to patient's shortness of breath           VTE Pharmacologic Prophylaxis: Pharmacologic: Heparin  Mechanical VTE Prophylaxis in Place: Yes    Patient Centered Rounds: I have performed bedside rounds with nursing staff today  Discussions with Specialists or Other Care Team Provider:  Cardiology notes reviewed    Education and Discussions with Family / Patient:  Patient    Time Spent for Care: 30 minutes  More than 50% of total time spent on counseling and coordination of care as described above  Current Length of Stay: 0 day(s)    Current Patient Status: Inpatient   Certification Statement: The patient will continue to require additional inpatient hospital stay due to Intravenous diuretics to help maintain euvolemia hazard is concerned that her shortness of breath is likely a result of volume overload given the patient's aortic stenosis    Discharge Plan:  Home with visiting nurses    Code Status: Level 1 - Full Code      Subjective:   Patient seen and examined still with significant shortness of breath, no lower extremity edema and no chest pain    Objective:     Vitals:   Temp (24hrs), Av 4 °F (36 9 °C), Min:97 6 °F (36 4 °C), Max:99 1 °F (37 3 °C)    Temp:  [97 6 °F (36 4 °C)-99 1 °F (37 3 °C)] 98 2 °F (36 8 °C)  HR:  [] 102  Resp:  [19-20] 20  BP: (123-142)/(58-70) 140/60  SpO2:  [92 %-97 %] 96 %  Body mass index is 31 65 kg/m²  Input and Output Summary (last 24 hours): Intake/Output Summary (Last 24 hours) at 10/7/2019 1613  Last data filed at 10/7/2019 1305  Gross per 24 hour   Intake 680 ml   Output 800 ml   Net -120 ml       Physical Exam:     Physical Exam   Constitutional: She appears well-developed and well-nourished  HENT:   Head: Normocephalic and atraumatic  Right Ear: External ear normal    Left Ear: External ear normal    Mouth/Throat: Oropharynx is clear and moist    Eyes: Pupils are equal, round, and reactive to light  Conjunctivae and EOM are normal    Neck: Normal range of motion     Cardiovascular: Regular rhythm and intact distal pulses  Murmur heard  Pulmonary/Chest: Effort normal and breath sounds normal    Abdominal: Soft  Bowel sounds are normal    Musculoskeletal: Normal range of motion  Neurological: She is alert  Skin: Skin is warm and dry  Capillary refill takes less than 2 seconds  Psychiatric: She has a normal mood and affect  Her behavior is normal  Thought content normal          Additional Data:     Labs:    Results from last 7 days   Lab Units 10/06/19  0443 10/05/19  1208   WBC Thousand/uL 5 43 6 44   HEMOGLOBIN g/dL 12 6 14 3   HEMATOCRIT % 39 0 45 7   PLATELETS Thousands/uL 181 191   NEUTROS PCT %  --  81*   LYMPHS PCT %  --  10*   MONOS PCT %  --  6   EOS PCT %  --  2     Results from last 7 days   Lab Units 10/06/19  0443   SODIUM mmol/L 141   POTASSIUM mmol/L 3 5   CHLORIDE mmol/L 107   CO2 mmol/L 23   BUN mg/dL 18   CREATININE mg/dL 1 14   ANION GAP mmol/L 11   CALCIUM mg/dL 9 0   ALBUMIN g/dL 2 7*   TOTAL BILIRUBIN mg/dL 0 39   ALK PHOS U/L 75   ALT U/L 11*   AST U/L 19   GLUCOSE RANDOM mg/dL 119                     The patient was changed from observation to inpatient secondary to symptomatic aortic stenosis requiring an additional 2 midnights for treatment and management  For the past family and social history and review of systems please see observation H&P which was dated October 5, 2019, and is located in the patient's chart  I have reviewed the information and there have been no changes  * I Have Reviewed All Lab Data Listed Above  * Additional Pertinent Lab Tests Reviewed:  [facility]    Imaging:    Imaging Reports Reviewed Today Include:  Echocardiogram      Recent Cultures (last 7 days):           Last 24 Hours Medication List:     Current Facility-Administered Medications:  [START ON 10/8/2019] amLODIPine 5 mg Oral Daily Shelia Cardozo DO   aspirin 81 mg Oral Daily Wilfred Freitas MD   budesonide 0 5 mg Nebulization Q12H Elena Garcia MD   dorzolamide 1 drop Both Eyes TID Radhika Cerda MD   fluticasone 2 spray Nasal Daily Radhika Cerda MD   fluticasone-vilanterol 1 puff Inhalation Daily Priscilla Walters MD   furosemide 20 mg Intravenous Daily Shelia Cardozo DO   gabapentin 300 mg Oral HS Radhika Cerda MD   heparin (porcine) 5,000 Units Subcutaneous [facility] Radhika Cerda MD   ipratropium-albuterol 3 mL Nebulization Q6H Radhika Cerda MD   latanoprost 1 drop Both Eyes HS Radhika Cerda MD   levothyroxine 75 mcg Oral Early Morning Radhika Cerda MD   perflutren lipid microsphere 0 4 mL/min Intravenous Once in imaging Radhika Cerda MD   potassium chloride 20 mEq Oral BID Shelia Cardozo DO   sitaGLIPtin 50 mg Oral Daily Radhika Cerda MD        Today, Patient Was Seen By: Angelica Lafleur DO    ** Please Note: Dictation voice to text software may have been used in the creation of this document   **

## 2019-10-07 NOTE — PLAN OF CARE
Problem: Potential for Falls  Goal: Patient will remain free of falls  Description  INTERVENTIONS:  - Assess patient frequently for physical needs  -  Identify cognitive and physical deficits and behaviors that affect risk of falls    -  [place] fall precautions as indicated by assessment   - Educate patient/family on patient safety including physical limitations  - Instruct patient to call for assistance with activity based on assessment  - Modify environment to reduce risk of injury  - Consider OT/PT consult to assist with strengthening/mobility  10/7/2019 1052 by Bret Renteria RN  Outcome: Progressing  10/7/2019 1036 by Bret Renteria RN  Outcome: Progressing     Problem: Prexisting or High Potential for Compromised Skin Integrity  Goal: Skin integrity is maintained or improved  Description  INTERVENTIONS:  - Identify patients at risk for skin breakdown  - Assess and monitor skin integrity  - Assess and monitor nutrition and hydration status  - Monitor labs   - Assess for incontinence   - Turn and reposition patient  - Assist with mobility/ambulation  - Relieve pressure over bony prominences  - Avoid friction and shearing  - Provide appropriate hygiene as needed including keeping skin clean and dry  - Evaluate need for skin moisturizer/barrier cream  - Collaborate with interdisciplinary team   - Patient/family teaching  - Consider wound care consult   10/7/2019 1052 by Bret Renteria RN  Outcome: Progressing  10/7/2019 1036 by Bret Renteria RN  Outcome: Progressing     Problem: RESPIRATORY - ADULT  Goal: Achieves optimal ventilation and oxygenation  Description  INTERVENTIONS:  - Assess for changes in respiratory status  - Assess for changes in mentation and behavior  - Position to facilitate oxygenation and minimize respiratory effort  - Oxygen administered by appropriate delivery if ordered  - Initiate smoking cessation education as indicated  - Encourage broncho-pulmonary hygiene including cough, deep breathe, Incentive Spirometry  - Assess the need for suctioning and aspirate as needed  - Assess and instruct to report SOB or any respiratory difficulty  - Respiratory Therapy support as indicated  10/7/2019 1052 by Renata Dumont RN  Outcome: Progressing  10/7/2019 1036 by Renata uDmont RN  Outcome: Progressing

## 2019-10-07 NOTE — PLAN OF CARE
Problem: Potential for Falls  Goal: Patient will remain free of falls  Description  INTERVENTIONS:  - Assess patient frequently for physical needs  -  Identify cognitive and physical deficits and behaviors that affect risk of falls    -  [place] fall precautions as indicated by assessment   - Educate patient/family on patient safety including physical limitations  - Instruct patient to call for assistance with activity based on assessment  - Modify environment to reduce risk of injury  - Consider OT/PT consult to assist with strengthening/mobility  Outcome: Progressing     Problem: Prexisting or High Potential for Compromised Skin Integrity  Goal: Skin integrity is maintained or improved  Description  INTERVENTIONS:  - Identify patients at risk for skin breakdown  - Assess and monitor skin integrity  - Assess and monitor nutrition and hydration status  - Monitor labs   - Assess for incontinence   - Turn and reposition patient  - Assist with mobility/ambulation  - Relieve pressure over bony prominences  - Avoid friction and shearing  - Provide appropriate hygiene as needed including keeping skin clean and dry  - Evaluate need for skin moisturizer/barrier cream  - Collaborate with interdisciplinary team   - Patient/family teaching  - Consider wound care consult   Outcome: Progressing     Problem: RESPIRATORY - ADULT  Goal: Achieves optimal ventilation and oxygenation  Description  INTERVENTIONS:  - Assess for changes in respiratory status  - Assess for changes in mentation and behavior  - Position to facilitate oxygenation and minimize respiratory effort  - Oxygen administered by appropriate delivery if ordered  - Initiate smoking cessation education as indicated  - Encourage broncho-pulmonary hygiene including cough, deep breathe, Incentive Spirometry  - Assess the need for suctioning and aspirate as needed  - Assess and instruct to report SOB or any respiratory difficulty  - Respiratory Therapy support as indicated  Outcome: Progressing

## 2019-10-08 ENCOUNTER — DOCUMENTATION (OUTPATIENT)
Dept: INTERNAL MEDICINE UNIT | Facility: HOSPITAL | Age: 84
End: 2019-10-08

## 2019-10-08 ENCOUNTER — TRANSITIONAL CARE MANAGEMENT (OUTPATIENT)
Dept: FAMILY MEDICINE CLINIC | Facility: CLINIC | Age: 84
End: 2019-10-08

## 2019-10-08 VITALS
RESPIRATION RATE: 20 BRPM | TEMPERATURE: 98.2 F | BODY MASS INDEX: 30.87 KG/M2 | HEIGHT: 62 IN | SYSTOLIC BLOOD PRESSURE: 138 MMHG | DIASTOLIC BLOOD PRESSURE: 63 MMHG | WEIGHT: 167.77 LBS | HEART RATE: 67 BPM | OXYGEN SATURATION: 97 %

## 2019-10-08 DIAGNOSIS — Z71.89 COMPLEX CARE COORDINATION: Primary | ICD-10-CM

## 2019-10-08 LAB
ANION GAP SERPL CALCULATED.3IONS-SCNC: 11 MMOL/L (ref 4–13)
BUN SERPL-MCNC: 25 MG/DL (ref 5–25)
CALCIUM SERPL-MCNC: 9.1 MG/DL (ref 8.3–10.1)
CHLORIDE SERPL-SCNC: 106 MMOL/L (ref 100–108)
CO2 SERPL-SCNC: 22 MMOL/L (ref 21–32)
CREAT SERPL-MCNC: 1.16 MG/DL (ref 0.6–1.3)
GFR SERPL CREATININE-BSD FRML MDRD: 46 ML/MIN/1.73SQ M
GLUCOSE SERPL-MCNC: 139 MG/DL (ref 65–140)
POTASSIUM SERPL-SCNC: 4.2 MMOL/L (ref 3.5–5.3)
SODIUM SERPL-SCNC: 139 MMOL/L (ref 136–145)

## 2019-10-08 PROCEDURE — 99239 HOSP IP/OBS DSCHRG MGMT >30: CPT | Performed by: INTERNAL MEDICINE

## 2019-10-08 PROCEDURE — 94760 N-INVAS EAR/PLS OXIMETRY 1: CPT

## 2019-10-08 PROCEDURE — 80048 BASIC METABOLIC PNL TOTAL CA: CPT | Performed by: INTERNAL MEDICINE

## 2019-10-08 PROCEDURE — 99232 SBSQ HOSP IP/OBS MODERATE 35: CPT | Performed by: INTERNAL MEDICINE

## 2019-10-08 PROCEDURE — 94640 AIRWAY INHALATION TREATMENT: CPT

## 2019-10-08 RX ORDER — FUROSEMIDE 20 MG/1
20 TABLET ORAL DAILY
Qty: 30 TABLET | Refills: 0 | Status: SHIPPED | OUTPATIENT
Start: 2019-10-08 | End: 2019-10-09 | Stop reason: SDUPTHER

## 2019-10-08 RX ORDER — AMLODIPINE BESYLATE 5 MG/1
5 TABLET ORAL DAILY
Qty: 30 TABLET | Refills: 0 | Status: SHIPPED | OUTPATIENT
Start: 2019-10-09 | End: 2019-11-07 | Stop reason: SDUPTHER

## 2019-10-08 RX ORDER — AMLODIPINE BESYLATE 5 MG/1
5 TABLET ORAL DAILY
Qty: 30 TABLET | Refills: 0 | Status: SHIPPED | OUTPATIENT
Start: 2019-10-09 | End: 2019-10-08

## 2019-10-08 RX ORDER — POTASSIUM CHLORIDE 20 MEQ/1
20 TABLET, EXTENDED RELEASE ORAL DAILY
Qty: 60 TABLET | Refills: 0 | Status: SHIPPED | OUTPATIENT
Start: 2019-10-08 | End: 2019-10-08

## 2019-10-08 RX ORDER — POTASSIUM CHLORIDE 20 MEQ/1
20 TABLET, EXTENDED RELEASE ORAL DAILY
Qty: 60 TABLET | Refills: 0 | Status: SHIPPED | OUTPATIENT
Start: 2019-10-08 | End: 2020-06-08

## 2019-10-08 RX ORDER — FLUTICASONE FUROATE AND VILANTEROL 100; 25 UG/1; UG/1
1 POWDER RESPIRATORY (INHALATION) DAILY
Qty: 1 INHALER | Refills: 0 | Status: SHIPPED | OUTPATIENT
Start: 2019-10-09 | End: 2019-10-08

## 2019-10-08 RX ORDER — FUROSEMIDE 20 MG/1
20 TABLET ORAL
Status: DISCONTINUED | OUTPATIENT
Start: 2019-10-08 | End: 2019-10-08 | Stop reason: HOSPADM

## 2019-10-08 RX ORDER — FLUTICASONE FUROATE AND VILANTEROL 100; 25 UG/1; UG/1
1 POWDER RESPIRATORY (INHALATION) DAILY
Qty: 1 INHALER | Refills: 0 | Status: SHIPPED | OUTPATIENT
Start: 2019-10-09 | End: 2019-10-15

## 2019-10-08 RX ORDER — FUROSEMIDE 20 MG/1
20 TABLET ORAL DAILY
Qty: 30 TABLET | Refills: 0 | Status: SHIPPED | OUTPATIENT
Start: 2019-10-08 | End: 2019-10-08

## 2019-10-08 RX ADMIN — BUDESONIDE 0.5 MG: 0.5 INHALANT RESPIRATORY (INHALATION) at 08:02

## 2019-10-08 RX ADMIN — DORZOLAMIDE HYDROCHLORIDE 1 DROP: 20 SOLUTION/ DROPS OPHTHALMIC at 08:15

## 2019-10-08 RX ADMIN — FLUTICASONE FUROATE AND VILANTEROL TRIFENATATE 1 PUFF: 100; 25 POWDER RESPIRATORY (INHALATION) at 08:15

## 2019-10-08 RX ADMIN — HEPARIN SODIUM 5000 UNITS: 5000 INJECTION INTRAVENOUS; SUBCUTANEOUS at 05:08

## 2019-10-08 RX ADMIN — IPRATROPIUM BROMIDE AND ALBUTEROL SULFATE 3 ML: 2.5; .5 SOLUTION RESPIRATORY (INHALATION) at 08:02

## 2019-10-08 RX ADMIN — AMLODIPINE BESYLATE 5 MG: 5 TABLET ORAL at 08:14

## 2019-10-08 RX ADMIN — FUROSEMIDE 20 MG: 10 INJECTION, SOLUTION INTRAMUSCULAR; INTRAVENOUS at 08:14

## 2019-10-08 RX ADMIN — SITAGLIPTIN 50 MG: 50 TABLET, FILM COATED ORAL at 08:14

## 2019-10-08 RX ADMIN — LEVOTHYROXINE SODIUM 75 MCG: 75 TABLET ORAL at 05:09

## 2019-10-08 RX ADMIN — FLUTICASONE PROPIONATE 2 SPRAY: 50 SPRAY, METERED NASAL at 08:15

## 2019-10-08 RX ADMIN — POTASSIUM CHLORIDE 20 MEQ: 1500 TABLET, EXTENDED RELEASE ORAL at 08:14

## 2019-10-08 RX ADMIN — ASPIRIN 81 MG: 81 TABLET, COATED ORAL at 08:14

## 2019-10-08 NOTE — PLAN OF CARE
Problem: Potential for Falls  Goal: Patient will remain free of falls  Description  INTERVENTIONS:  - Assess patient frequently for physical needs  -  Identify cognitive and physical deficits and behaviors that affect risk of falls    -  [place] fall precautions as indicated by assessment   - Educate patient/family on patient safety including physical limitations  - Instruct patient to call for assistance with activity based on assessment  - Modify environment to reduce risk of injury  - Consider OT/PT consult to assist with strengthening/mobility  10/8/2019 1230 by Tariq Spears RN  Outcome: Adequate for Discharge  10/8/2019 0948 by Tariq Spears RN  Outcome: Progressing     Problem: Prexisting or High Potential for Compromised Skin Integrity  Goal: Skin integrity is maintained or improved  Description  INTERVENTIONS:  - Identify patients at risk for skin breakdown  - Assess and monitor skin integrity  - Assess and monitor nutrition and hydration status  - Monitor labs   - Assess for incontinence   - Turn and reposition patient  - Assist with mobility/ambulation  - Relieve pressure over bony prominences  - Avoid friction and shearing  - Provide appropriate hygiene as needed including keeping skin clean and dry  - Evaluate need for skin moisturizer/barrier cream  - Collaborate with interdisciplinary team   - Patient/family teaching  - Consider wound care consult   10/8/2019 1230 by Tariq Spears RN  Outcome: Adequate for Discharge  10/8/2019 0948 by Tariq Spears RN  Outcome: Progressing     Problem: RESPIRATORY - ADULT  Goal: Achieves optimal ventilation and oxygenation  Description  INTERVENTIONS:  - Assess for changes in respiratory status  - Assess for changes in mentation and behavior  - Position to facilitate oxygenation and minimize respiratory effort  - Oxygen administered by appropriate delivery if ordered  - Initiate smoking cessation education as indicated  - Encourage broncho-pulmonary hygiene including cough, deep breathe, Incentive Spirometry  - Assess the need for suctioning and aspirate as needed  - Assess and instruct to report SOB or any respiratory difficulty  - Respiratory Therapy support as indicated  10/8/2019 1230 by Anabel Meza RN  Outcome: Adequate for Discharge  10/8/2019 0948 by Anabel Meza RN  Outcome: Progressing

## 2019-10-08 NOTE — DISCHARGE INSTRUCTIONS
Aortic Stenosis   WHAT YOU NEED TO KNOW:   Aortic stenosis is a condition where the aortic valve in your heart is narrowed  The aortic valve is between the left ventricle and the aorta  The left ventricle is the lower left chamber of your heart  The aorta is a blood vessel that pumps blood to your head and body  The aortic valve opens and closes to direct blood flow through your heart  When the aortic valve is narrowed, blood flow may decrease  Your tissues and organs will not have enough oxygen and nutrients to function properly  DISCHARGE INSTRUCTIONS:   Medicines:   · Cholesterol medicine: This medicine will help decrease the amount of cholesterol in your blood  · Antibiotics: This medicine will help fight or prevent an infection  You may need it if you had rheumatic fever in the past  You may need to take the medicine every day, or once a month  · Take your medicine as directed  Contact your healthcare provider if you think your medicine is not helping or if you have side effects  Tell him or her if you are allergic to any medicine  Keep a list of the medicines, vitamins, and herbs you take  Include the amounts, and when and why you take them  Bring the list or the pill bottles to follow-up visits  Carry your medicine list with you in case of an emergency  Follow up with your healthcare provider or cardiologist as directed: You may need to return for more tests to check your heart  Write down your questions so you remember to ask them during your visits  Self-care:   · Eat a variety of healthy foods:  Healthy foods include fruits, vegetables, whole-grain breads, low-fat dairy products, beans, lean meats, and fish  Ask if you need to be on a special diet  · Exercise: This will improve your heart health  Ask your healthcare provider about the best exercise plan for you  · Maintain a healthy weight:  Ask your healthcare provider how much you should weigh   Ask him to help you create a weight loss plan if you are overweight  Contact your healthcare provider or cardiologist if:   · You are bleeding from your nose or gums  · The veins in your neck look swollen or are bulging  · You have a fever  · You have blood in your urine or bowel movements  · You have questions or concerns about your condition or care  Seek care immediately or call 911 if:   · Your arm or leg feels warm, tender, and painful  It may look swollen and red  · Your heart is beating faster than normal for you, and you feel fluttering in your chest     · You suddenly feel lightheaded and short of breath  · You have chest pain that feels like squeezing, pressure, fullness, or pain  · You have chest pain that lasts for more than a few minutes or returns  · You are nauseated and have trouble breathing  · You have a severe headache, cold sweats, and feel lightheaded or dizzy  · You have weakness or numbness on one side of your arm, leg, or face  · You are confused and cannot speak clearly  © 2017 [employer] Information is for End User's use only and may not be sold, redistributed or otherwise used for commercial purposes  All illustrations and images included in CareNotes® are the copyrighted property of [employer]  or [employer]  The above information is an  only  It is not intended as medical advice for individual conditions or treatments  Talk to your doctor, nurse or pharmacist before following any medical regimen to see if it is safe and effective for you

## 2019-10-08 NOTE — PLAN OF CARE
Problem: Potential for Falls  Goal: Patient will remain free of falls  Description  INTERVENTIONS:  - Assess patient frequently for physical needs  -  Identify cognitive and physical deficits and behaviors that affect risk of falls    -  [place] fall precautions as indicated by assessment   - Educate patient/family on patient safety including physical limitations  - Instruct patient to call for assistance with activity based on assessment  - Modify environment to reduce risk of injury  - Consider OT/PT consult to assist with strengthening/mobility  Outcome: Progressing     Problem: Prexisting or High Potential for Compromised Skin Integrity  Goal: Skin integrity is maintained or improved  Description  INTERVENTIONS:  - Identify patients at risk for skin breakdown  - Assess and monitor skin integrity  - Assess and monitor nutrition and hydration status  - Monitor labs   - Assess for incontinence   - Turn and reposition patient  - Assist with mobility/ambulation  - Relieve pressure over bony prominences  - Avoid friction and shearing  - Provide appropriate hygiene as needed including keeping skin clean and dry  - Evaluate need for skin moisturizer/barrier cream  - Collaborate with interdisciplinary team   - Patient/family teaching  - Consider wound care consult   Outcome: Progressing     Problem: RESPIRATORY - ADULT  Goal: Achieves optimal ventilation and oxygenation  Description  INTERVENTIONS:  - Assess for changes in respiratory status  - Assess for changes in mentation and behavior  - Position to facilitate oxygenation and minimize respiratory effort  - Oxygen administered by appropriate delivery if ordered  - Initiate smoking cessation education as indicated  - Encourage broncho-pulmonary hygiene including cough, deep breathe, Incentive Spirometry  - Assess the need for suctioning and aspirate as needed  - Assess and instruct to report SOB or any respiratory difficulty  - Respiratory Therapy support as indicated  Outcome: Progressing

## 2019-10-08 NOTE — ASSESSMENT & PLAN NOTE
Will optimize volume status      Cardiology recommendations are below  -Furosemide 20 mg in the morning and 20 mg afternoon along with amlodipine 5 mg once daily  Continue with potassium chloride 20 mEq once daily - repeat BMP in a week  - continue remaining COPD and other medications    - follow-up appointment made with Dr Mitzi Barnes for October 15, 2019, Tuesday, at 10 AM   Referral will be made for TAVR team evaluation

## 2019-10-08 NOTE — PROGRESS NOTES
CARDIOLOGY INPATIENT FOLLOW-UP PROGRESS NOTE    ENCOUNTER DATE: 10/08/19 10:19 AM  PATIENT NAME: Mikie Lamar   9/24/1922    [id number]  Age: 80 y o  Sex: female  AUTHOR: Maria De Jesus Encarnacion MD  PRIMARYCARE PHYSICIAN: Melony Pinto MD  PRIMARY INPATIENT PHYSICIAN: Mich Gordon DO  *-*-*-*-*-*-*-*-*-*-*-*-*-*-*-*-*-*-*-*-*-*-*-*-*-*-*-*-*-*-*-*-*-*-*-*-*-*-*-*-*-*-*-*-*-*-*-*-*-*-*-*-*-*-  FOLLOW-UP FOR:  1  Decompensated diastolic heart failure  2  Severe calcific aortic valve stenosis  3  Benign essential hypertension  4  Suspected underlying CAD due to presence of regional wall motion abnormality on echocardiogram      Patient is symptomatic Elvia better with improved breathing and resolved decompensated heart failure  CARDIOLOGY PLAN:  - Agree with discharging patient on furosemide 20 mg in the morning and 20 mg afternoon along with amlodipine 5 mg once daily  Continue with potassium chloride 20 mEq twice daily  - continue remaining COPD and other medications  - follow-up appointment made with Dr Jacob Abrams for October 15, 2019, Tuesday, at 10 AM   Referral will be made for TAVR team evaluation  *-*-*-*-*-*-*-*-*-*-*-*-*-*-*-*-*-*-*-*-*-*-*-*-*-*-*-*-*-*-*-*-*-*-*-*-*-*-*-*-*-*-*-*-*-*-*-*-*-*-*-*-*-*-  INTERVAL CHANGES / HISTORY OF PRESENT ILLNESS:  Patient reports feeling much better with improved shortness of breath  Denies any chest pain, dizziness or lightheadedness  Is ambulating using a cane  REVIEW OF SYMPTOMS:    Positive for:  Improved and resolved shortness of breath  Negative for: All remaining as reviewed below and in HPI      SYSTEM SYMPTOMS REVIEWED:  Generalweight change, fever, night sweats  Respiratoryl Wheezing, shortness of breath, cough, URI symptoms, sputum, blood  Cardiovascularchest pain, syncope, dyspnea on exertion, edema, decline in exercise tolerance, claudication   Gastrointestinalpersistent vomiting, diarrhea, abdominal distention, blood in stool Muscular or skeletaljoint pain or swelling   Neurologicheadaches, syncope, abnormal movement  Hematologichistory of easy bruising and bleeding   Endocrinethyroid enlargement, heat or cold intolerance, polyuria   Psychiatricanxiety, depression   *-*-*-*-*-*-*-*-*-*-*-*-*-*-*-*-*-*-*-*-*-*-*-*-*-*-*-*-*-*-*-*-*-*-*-*-*-*-*-*-*-*-*-*-*-*-*-*-*-*-*-*-*-*-    VITAL SIGNS:  Vitals:    10/07/19 2340 10/08/19 0726 10/08/19 0729 10/08/19 08   BP: 117/58  138/63    BP Location: Left arm  Left arm    Pulse: 67  67    Resp: 18  20    Temp: 99 1 °F (37 3 °C)  98 2 °F (36 8 °C)    TempSrc: Temporal  Tympanic    SpO2: 92%  98% 97%   Weight:  76 1 kg (167 lb 12 3 oz)     Height:          Temp (24hrs), Av 3 °F (36 8 °C), Min:97 6 °F (36 4 °C), Max:99 1 °F (37 3 °C)  Current: Temperature: 98 2 °F (36 8 °C)      Intake/Output Summary (Last 24 hours) at 10/8/2019 1019  Last data filed at 10/8/2019 0729  Gross per 24 hour   Intake 1200 ml   Output 800 ml   Net 400 ml      Weight (last 2 days)     Date/Time   Weight    10/08/19 0726   76 1 (167 77)           ,   Wt Readings from Last 3 Encounters:   10/08/19 76 1 kg (167 lb 12 3 oz)   19 79 6 kg (175 lb 6 4 oz)   19 79 4 kg (175 lb)    , Body mass index is 30 69 kg/m²  *-*-*-*-*-*-*-*-*-*-*-*-*-*-*-*-*-*-*-*-*-*-*-*-*-*-*-*-*-*-*-*-*-*-*-*-*-*-*-*-*-*-*-*-*-*-*-*-*-*-*-*-*-*-  PHYSICAL EXAM:  General Appearance:    Alert, cooperative, no distress, appears stated age   Head, Eyes, ENT:    No obvious abnormality, moist mucous mebranes  Neck:   Supple, no carotid bruit or JVD   Back:     Symmetric, no curvature  Lungs:     Respirations unlabored  Clear to auscultation bilaterally,    Chest wall:    No tenderness or deformity   Heart:    Regular rate and rhythm, S1 and S2 normal, 3/6 harsh systolic murmur along upper sternal border consistent with aortic valve stenosis, rub  or gallop     Abdomen:     Soft, non-tender, No obvious masses, or organomegaly Extremities:   Extremities normal, no cyanosis or edema    Skin:   Skin color, texture, turgor normal, no rashes or lesions   *-*-*-*-*-*-*-*-*-*-*-*-*-*-*-*-*-*-*-*-*-*-*-*-*-*-*-*-*-*-*-*-*-*-*-*-*-*-*-*-*-*-*-*-*-*-*-*-*-*-*-*-*-*-    Telemetry reviewed    Not on telemetry    Last ECG     Results for orders placed or performed during the hospital encounter of 10/05/19   ECG 12 lead   Result Value    Ventricular Rate 79    Atrial Rate 79    MO Interval 206    QRSD Interval 108    QT Interval 378    QTC Interval 433    P Axis 45    QRS Axis -10    T Wave Axis 81    Narrative    Normal sinus rhythm  Left ventricular hypertrophy with repolarization abnormality  Abnormal ECG  When compared with ECG of 09-JAN-2019 16:29,  No significant change was found  Confirmed by Jos English ([id number]) on 10/7/2019 3:49:52 PM       Medications reviewed         Current Facility-Administered Medications:     amLODIPine (NORVASC) tablet 5 mg, 5 mg, Oral, Daily, Rujul Cardozo, DO, 5 mg at 10/08/19 0814    aspirin (ECOTRIN LOW STRENGTH) EC tablet 81 mg, 81 mg, Oral, Daily, Bill Jones MD, 81 mg at 10/08/19 0814    budesonide (PULMICORT) inhalation solution 0 5 mg, 0 5 mg, Nebulization, Q12H, Nolvia Russell MD, 0 5 mg at 10/08/19 0802    dorzolamide (TRUSOPT) ophthalmic solution 1 drop, 1 drop, Both Eyes, TID, Bill Jones MD, 1 drop at 10/08/19 0815    fluticasone (FLONASE) 50 mcg/act nasal spray 2 spray, 2 spray, Nasal, Daily, Bill Jones MD, 2 spray at 10/08/19 0815    fluticasone-vilanterol (BREO ELLIPTA) 100-25 mcg/inh inhaler 1 puff, 1 puff, Inhalation, Daily, Nolvia Russell MD, 1 puff at 10/08/19 0815    furosemide (LASIX) injection 20 mg, 20 mg, Intravenous, Daily, Shelia Cardozo DO, 20 mg at 10/08/19 0814    gabapentin (NEURONTIN) capsule 300 mg, 300 mg, Oral, HS, Bill Jones MD, 300 mg at 10/07/19 2140    heparin (porcine) subcutaneous injection 5,000 Units, 5,000 Units, Subcutaneous, Q8H [address], 5,000 Units at 10/08/19 0508 **AND** Platelet count, , , Once, Dionne Chew MD    ipratropium-albuterol (DUO-NEB) 0 5-2 5 mg/3 mL inhalation solution 3 mL, 3 mL, Nebulization, Q6H, Dionne Chew MD, 3 mL at 10/08/19 0802    latanoprost (XALATAN) 0 005 % ophthalmic solution 1 drop, 1 drop, Both Eyes, HS, Dionne Chew MD, 1 drop at 10/07/19 2147    levothyroxine tablet 75 mcg, 75 mcg, Oral, Early Morning, Dionne Chew MD, 75 mcg at 10/08/19 0509    perflutren lipid microsphere (DEFINITY) injection, 0 4 mL/min, Intravenous, Once in imaging, Dionne Chew MD    potassium chloride (K-DUR,KLOR-CON) CR tablet 20 mEq, 20 mEq, Oral, BID, Rujul Cardozo, DO, 20 mEq at 10/08/19 0814    sitaGLIPtin (JANUVIA) tablet 50 mg, 50 mg, Oral, Daily, Dionne Chew MD, 50 mg at 10/08/19 1960    Imaging studies results reviewed    No procedure found  *-*-*-*-*-*-*-*-*-*-*-*-*-*-*-*-*-*-*-*-*-*-*-*-*-*-*-*-*-*-*-*-*-*-*-*-*-*-*-*-*-*-*-*-*-*-*-*-*-*-*-*-*-*-    LABORATORY DATA:  I have personally reviewed pertinent labs  CMP:   Results from last 7 days   Lab Units 10/08/19  0525 10/06/19  0443 10/05/19  1208   POTASSIUM mmol/L 4 2 3 5 4 3   CHLORIDE mmol/L 106 107 106   CO2 mmol/L 22 23 23   BUN mg/dL 25 18 15   CREATININE mg/dL 1 16 1 14 1 05   CALCIUM mg/dL 9 1 9 0 10 1   ALK PHOS U/L  --  75 98   ALT U/L  --  11* 14   AST U/L  --  19 25       Cardiac Profile:   Troponin I   Date Value Ref Range Status   10/05/2019 0 02 <=0 04 ng/mL Final     Comment:       [employer] Chemistry analyzer 99% cutoff is > 0 04 ng/mL in network labs     o cTnI 99% cutoff is useful only when applied to patients in the clinical setting of myocardial ischemia   o cTnI 99% cutoff should be interpreted in the context of clinical history, ECG findings and possibly cardiac imaging to establish correct diagnosis     o cTnI 99% cutoff may be suggestive but clearly not indicative of a coronary event without the clinical setting of myocardial ischemia  10/05/2019 0 02 <=0 04 ng/mL Final     Comment:       S[employer]ens Chemistry analyzer 99% cutoff is > 0 04 ng/mL in network labs     o cTnI 99% cutoff is useful only when applied to patients in the clinical setting of myocardial ischemia   o cTnI 99% cutoff should be interpreted in the context of clinical history, ECG findings and possibly cardiac imaging to establish correct diagnosis  o cTnI 99% cutoff may be suggestive but clearly not indicative of a coronary event without the clinical setting of myocardial ischemia  10/05/2019 <0 02 <=0 04 ng/mL Final     Comment:     3Autovalidation override  Siemens Chemistry analyzer 99% cutoff is > 0 04 ng/mL in network labs     o cTnI 99% cutoff is useful only when applied to patients in the clinical setting of myocardial ischemia   o cTnI 99% cutoff should be interpreted in the context of clinical history, ECG findings and possibly cardiac imaging to establish correct diagnosis  o cTnI 99% cutoff may be suggestive but clearly not indicative of a coronary event without the clinical setting of myocardial ischemia       10/15/2015 <0 02 0 00 - 0 04 ng/mL Final     Comment:     The above [facility]es [facility] [address]     10/14/2015 <0 02 0 00 - 0 04 ng/mL Final     Comment:     The above 1 analytes were performed by [address] [address]     04/19/2015 <0 04 0 00 - 0 04 ng/mL Final     Comment:     The above 1 analytes were performed by [address] [address]       NT-proBNP   Date Value Ref Range Status   10/05/2019 798 (H) <450 pg/mL Final   09/10/2019 244 <450 pg/mL Final   01/09/2019 1,092 (H) <450 pg/mL Final       CBC:   Results from last 7 days   Lab Units 10/06/19  0443 10/05/19  1208   WBC Thousand/uL 5 43 6 44   HEMOGLOBIN g/dL 12 6 14 3   HEMATOCRIT % 39 0 45 7   PLATELETS Thousands/uL 181 191       PT/INR: No results found for: PT, INR,     Magnesium: Phosphorous:       Microbiology:              *-*-*-*-*-*-*-*-*-*-*-*-*-*-*-*-*-*-*-*-*-*-*-*-*-*-*-*-*-*-*-*-*-*-*-*-*-*-*-*-*-*-*-*-*-*-*-*-*-*-*-*-*-*-  ECHOCARDIOGRAM AND OTHER CARDIOLOGY RESULTS:  Results for orders placed during the hospital encounter of 10/05/19   Echo complete with contrast if indicated    Narrative [address]  [address]  [place], [address]  [phone]    Transthoracic Echocardiogram  2D, M-mode, Doppler, and Color Doppler    Study date:  07-Oct-2019    Patient: Suma Carson  MR number: [id number]  Account number: [de-identified]  : 24-Sep-1922  Age: 80 years  Gender: Female  Status: Outpatient  Location: Bedside  Height: 62 in  Weight: 173 lb  BP: 142/ 66 mmHg    Indications: Shortness of breath  Diagnoses: R06 02 - Shortness of breath    Sonographer:  BALJIT Solano  Primary Physician:  Alize Olsen MD  Referring Physician:  Sukhi Romero MD  Group:  [facility]  Interpreting Physician:  Julissa Caballero MD    SUMMARY    LEFT VENTRICLE:  Systolic function was hyperdynamic  Ejection fraction was estimated to be 70 %  Intracavitary gradient of 27 mmHg at rest that is likely secondary to hyperdynamic left ventrcle plus basal septal hypertrophy  Although no diagnostic regional wall motion abnormality was identified, this possibility cannot be completely excluded on the basis of this study  There is mild to moderate basal septal hypertrophy  LEFT ATRIUM:  The atrium was mildly to moderately dilated  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  RIGHT ATRIUM:  The atrium was mildly to moderately dilated  MITRAL VALVE:  There was mild annular calcification  AORTIC VALVE:  The valve was trileaflet  Leaflets exhibited normal thickness, moderate calcification, and moderately reduced cuspal separation  There was severe stenosis  Mean gradient is 41 mmHg  Dimensionless index is 0 22   Valve area is approximately 0 7 cm2  There was mild regurgitation  TRICUSPID VALVE:  There was mild regurgitation  Estimated peak PA pressure was 44 mmHg plus right atrial pressure  AORTA:  The root exhibited mild dilatation  HISTORY: PRIOR HISTORY: Risk factors: hypertension, diabetes, and medication-treated hypercholesterolemia  History of severe stenosis of the aortic valve  PROCEDURE: The procedure was performed at the bedside  This was a routine study  The transthoracic approach was used  The study included complete 2D imaging, M-mode, complete spectral Doppler, and color Doppler  The heart rate was 89 bpm,  at the start of the study  Intravenous contrast (  4ml of Definity) was administered  Echocardiographic views were limited due to poor acoustic window availability and lung interference  This was a technically difficult study  LEFT VENTRICLE: Size was normal  Systolic function was hyperdynamic  Ejection fraction was estimated to be 70 %  Intracavitary gradient of 27 mmHg at rest that is likely secondary to hyperdynamic left ventrcle plus basal septal  hypertrophy  Although no diagnostic regional wall motion abnormality was identified, this possibility cannot be completely excluded on the basis of this study  There is mild to moderate basal septal hypertrophy  No evidence of apical  thrombus  DOPPLER: The study was not technically sufficient to allow evaluation of LV diastolic function  RIGHT VENTRICLE: The size was normal  Systolic function was normal  Wall thickness was normal     LEFT ATRIUM: The atrium was mildly to moderately dilated  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  RIGHT ATRIUM: The atrium was mildly to moderately dilated  MITRAL VALVE: There was mild annular calcification  Valve structure was normal  There was normal leaflet separation  DOPPLER: The transmitral velocity was within the normal range  There was no evidence for stenosis   There was no  significant regurgitation  AORTIC VALVE: The valve was trileaflet  Leaflets exhibited normal thickness, moderate calcification, and moderately reduced cuspal separation  DOPPLER: Transaortic velocity was within the normal range  There was severe stenosis  Mean  gradient is 41 mmHg  Dimensionless index is 0 22  Valve area is approximately 0 7 cm2  There was mild regurgitation  TRICUSPID VALVE: The valve structure was normal  There was normal leaflet separation  DOPPLER: The transtricuspid velocity was within the normal range  There was no evidence for stenosis  There was mild regurgitation  Estimated peak PA  pressure was 44 mmHg plus right atrial pressure  PULMONIC VALVE: Leaflets exhibited normal thickness, no calcification, and normal cuspal separation  DOPPLER: The transpulmonic velocity was within the normal range  There was no significant regurgitation  PERICARDIUM: There was no pericardial effusion  The pericardium was normal in appearance  AORTA: The root exhibited mild dilatation  SYSTEMIC VEINS: IVC: The inferior vena cava was normal in size  SYSTEM MEASUREMENT TABLES    2D  Ao Diam: 3 4 cm  LA Area: 19 4 cm2  LA Diam: 4 2 cm  LVEDV MOD A4C: 31 1 ml  LVEF MOD A4C: 51 7 %  LVESV MOD A4C: 15 ml  LVLd A4C: 6 9 cm  LVLs A4C: 6 cm  LVOT Diam: 2 2 cm  RA Area: 20 1 cm2  RVIDd: 2 7 cm  SV MOD A4C: 16 1 ml    CW  AR Dec [place]: 3 7 m/s2  AR Dec Time: 1052 4 ms  AR PHT: 305 2 ms  AR Vmax: 3 9 m/s  AR maxP 8 mmHg  AV Env  Ti: 266 4 ms  AV VTI: 75 5 cm  AV Vmax: 3 8 m/s  AV Vmean: 2 8 m/s  AV maxP 3 mmHg  AV meanP 3 mmHg  TR Vmax: 3 3 m/s  TR maxP 9 mmHg    Doppler Continuous Wave  AV Pmax: 27 1 mmHg    MM  TAPSE: 1 4 cm    PW  INO (VTI): 1 1 cm2  INO Vmax: 1 1 cm2  E': 0 1 m/s  E' Lat: 0 1 m/s  LVOT Env  Ti: 281 6 ms  LVOT VTI: 21 3 cm  LVOT Vmax: 1 1 m/s  LVOT Vmean: 0 8 m/s  LVOT maxP 8 mmHg  LVOT meanP 7 mmHg  LVSV Dopp: 82 5 ml    [facility]    Prepared and electronically signed by    Elisha Bee MD  Signed 07-Oct-2019 11:19:28       No results found for this or any previous visit  No results found for this or any previous visit  No results found for this or any previous visit  *-*-*-*-*-*-*-*-*-*-*-*-*-*-*-*-*-*-*-*-*-*-*-*-*-*-*-*-*-*-*-*-*-*-*-*-*-*-*-*-*-*-*-*-*-*-*-*-*-*-*-*-*-*-  ALLERGIES:  Allergies   Allergen Reactions    Statins      Other reaction(s): Weakness  Category:  Adverse Reaction;        *-*-*-*-*-*-*-*-*-*-*-*-*-*-*-*-*-*-*-*-*-*-*-*-*-*-*-*-*-*-*-*-*-*-*-*-*-*-*-*-*-*-*-*-*-*-*-*-*-*-*-*-*-*-    SIGNATURES:   @YSO@   Maria De Jesus Encarnacion MD

## 2019-10-08 NOTE — PLAN OF CARE
Problem: Potential for Falls  Goal: Patient will remain free of falls  Description  INTERVENTIONS:  - Assess patient frequently for physical needs  -  Identify cognitive and physical deficits and behaviors that affect risk of falls    -  [place] fall precautions as indicated by assessment   - Educate patient/family on patient safety including physical limitations  - Instruct patient to call for assistance with activity based on assessment  - Modify environment to reduce risk of injury  - Consider OT/PT consult to assist with strengthening/mobility  Outcome: Progressing     Problem: Prexisting or High Potential for Compromised Skin Integrity  Goal: Skin integrity is maintained or improved  Description  INTERVENTIONS:  - Identify patients at risk for skin breakdown  - Assess and monitor skin integrity  - Assess and monitor nutrition and hydration status  - Monitor labs   - Assess for incontinence   - Turn and reposition patient  - Assist with mobility/ambulation  - Relieve pressure over bony prominences  - Avoid friction and shearing  - Provide appropriate hygiene as needed including keeping skin clean and dry  - Evaluate need for skin moisturizer/barrier cream  - Collaborate with interdisciplinary team   - Patient/family teaching  - Consider wound care consult   Outcome: Progressing     Problem: RESPIRATORY - ADULT  Goal: Achieves optimal ventilation and oxygenation  Description  INTERVENTIONS:  - Assess for changes in respiratory status  - Assess for changes in mentation and behavior  - Position to facilitate oxygenation and minimize respiratory effort  - Oxygen administered by appropriate delivery if ordered  - Initiate smoking cessation education as indicated  - Encourage broncho-pulmonary hygiene including cough, deep breathe, Incentive Spirometry  - Assess the need for suctioning and aspirate as needed  - Assess and instruct to report SOB or any respiratory difficulty  - Respiratory Therapy support as indicated  Outcome: Progressing

## 2019-10-08 NOTE — DISCHARGE SUMMARY
Discharge- Smooth Cowan 9/24/1922, 80 y o  female MRN: [id number]    Unit/Bed#: E4 -01 Encounter: [id number]    Primary Care Provider: Stiven Barnes MD   Date and time admitted to hospital: 10/5/2019 11:35 AM        Chronic diastolic congestive heart failure ([address] )  Assessment & Plan    Weights stable- 76 1kg      Aortic stenosis  Assessment & Plan    Will optimize volume status      Cardiology recommendations are below  -Furosemide 20 mg in the morning and 20 mg afternoon along with amlodipine 5 mg once daily  Continue with potassium chloride 20 mEq once daily - repeat BMP in a week  - continue remaining COPD and other medications  - follow-up appointment made with Dr Susan Lopez for October 15, 2019, Tuesday, at 10 AM   Referral will be made for TAVR team evaluation              Discharging Physician / Practitioner: Essence Broussard DO  PCP: Stiven Barnes MD  Admission Date:   Admission Orders (From admission, onward)     Ordered        10/07/19 1306  Inpatient Admission  Once         10/05/19 1501  Place in Observation  Once                   Discharge Date: 10/08/19    Resolved Problems  Date Reviewed: 10/8/2019    None          Consultations During Hospital Stay:  · Cardiology and Pulmonary     Procedures Performed:   X-ray Chest 2 Views    Impression: No acute cardiopulmonary disease  Workstation performed: [id number]     Cta Ed Chest Pe Study        · Impression: No acute intrathoracic pathology identified Specifically, no evidence of pulmonary embolism Workstation    Significant Findings / Test Results: The valve was trileaflet  Leaflets exhibited normal thickness, moderate calcification, and moderately reduced cuspal separation  There was severe stenosis  Mean gradient is 41 mmHg  Dimensionless index is 0 22  Valve area is approximately 0 7 cm2  There was mild regurgitation  Incidental Findings:   · N/A    Test Results Pending at Discharge (will require follow up):    · None Outpatient Tests Requested:  · Outpatient TAVR eval       Reason for Admission: shortness of breath    Hospital Course:     Francisco Bravo is a 80 y o  female patient who originally presented to the hospital on 10/5/2019 due to shortness of breath not improving with medical treatment  She underwent CT Pulmonary Study with no evidence of lung pathology  She was started on inhaler regimen by the pulmonary service  She was found to have severe AS, and diuretic regimen was adjusted  At time of discharge her shortness of breath had improved  She was without complaints    She will f/u with Cardiology and CT surgery as an outpatient  Please see above list of diagnoses and related plan for additional information  Condition at Discharge: good     Discharge Day Visit / Exam:     Subjective:  No complaints  Vitals: Blood Pressure: 138/63 (10/08/19 0729)  Pulse: 67 (10/08/19 0729)  Temperature: 98 2 °F (36 8 °C) (10/08/19 0729)  Temp Source: Tympanic (10/08/19 0729)  Respirations: 20 (10/08/19 0729)  Height: 5' 2" (157 5 cm) (10/05/19 1538)  Weight - Scale: 76 1 kg (167 lb 12 3 oz) (10/08/19 0726)  SpO2: 97 % (10/08/19 0802)  Exam:   Physical Exam   Constitutional: She is oriented to person, place, and time  She appears well-developed and well-nourished  HENT:   Head: Normocephalic and atraumatic  Right Ear: External ear normal    Left Ear: External ear normal    Mouth/Throat: Oropharynx is clear and moist    Eyes: Pupils are equal, round, and reactive to light  Conjunctivae and EOM are normal    Neck: Normal range of motion  Neck supple  Cardiovascular: Normal rate, regular rhythm and intact distal pulses  Murmur heard  Pulmonary/Chest: Effort normal and breath sounds normal    Abdominal: Soft  Bowel sounds are normal    Musculoskeletal: Normal range of motion  Neurological: She is alert and oriented to person, place, and time  Skin: Skin is warm and dry  Capillary refill takes less than 2 seconds  Psychiatric: She has a normal mood and affect  Her behavior is normal            Discharge instructions/Information to patient and family:   See after visit summary for information provided to patient and family  Provisions for Follow-Up Care:  See after visit summary for information related to follow-up care and any pertinent home health orders  Disposition:     Home with VNA Services (Reminder: Complete face to face encounter)    For Discharges to [facility]:     Planned Readmission: No     Discharge Statement:  I spent 35 minutes discharging the patient  This time was spent on the day of discharge  I had direct contact with the patient on the day of discharge  Greater than 50% of the total time was spent examining patient, answering all patient questions, arranging and discussing plan of care with patient as well as directly providing post-discharge instructions  Additional time then spent on discharge activities  Discharge Medications:  See after visit summary for reconciled discharge medications provided to patient and family        ** Please Note: This note has been constructed using a voice recognition system **

## 2019-10-09 ENCOUNTER — OFFICE VISIT (OUTPATIENT)
Dept: FAMILY MEDICINE CLINIC | Facility: CLINIC | Age: 84
End: 2019-10-09
Payer: COMMERCIAL

## 2019-10-09 VITALS
BODY MASS INDEX: 31.83 KG/M2 | OXYGEN SATURATION: 96 % | TEMPERATURE: 97.8 F | DIASTOLIC BLOOD PRESSURE: 68 MMHG | RESPIRATION RATE: 18 BRPM | SYSTOLIC BLOOD PRESSURE: 120 MMHG | HEART RATE: 72 BPM | WEIGHT: 174 LBS

## 2019-10-09 DIAGNOSIS — I50.32 CHRONIC DIASTOLIC CONGESTIVE HEART FAILURE (HCC): Primary | Chronic | ICD-10-CM

## 2019-10-09 DIAGNOSIS — I35.0 NONRHEUMATIC AORTIC VALVE STENOSIS: Chronic | ICD-10-CM

## 2019-10-09 PROCEDURE — 99496 TRANSJ CARE MGMT HIGH F2F 7D: CPT | Performed by: PHYSICIAN ASSISTANT

## 2019-10-09 RX ORDER — FUROSEMIDE 20 MG/1
20 TABLET ORAL 2 TIMES DAILY
Qty: 60 TABLET | Refills: 11
Start: 2019-10-09 | End: 2019-10-15 | Stop reason: SDUPTHER

## 2019-10-09 NOTE — UTILIZATION REVIEW
Continued Stay Review    Date: 10/7                         Patient Class/ Level of Care:   10/5  Admitted OBS status at [facility], [facility]  LOC   10/7  CHANGED to INPT status  At  Level 2 Stepdown  LOC  Due to ongoing dyspnea w/MINIMAL exertion; Initiation of IV diuresis,  Continued neb therapy, cardiac med adjustments  HPI:89 y o  female initially admitted on  10/5  @  1501  For exertional dyspnea, suspected 2/2 significant AV stenosis  Chronic Diastolic heart failure well compensated  10/6   CARDIOLOGY   Increased lasix  Empirically  To 40 mg po daily  Ordered Echocardiogram tomorrow to re-evaluate aortic valve stenosis  (prior echo revealed possible basal inferior and basal inferolateral hypokinesis)   Cont amlodipine, ASA  · If aortic valve stenosis does not appear severe on echocardiogram, and if regional wall motion abnormalities present, augmenting antianginal regimen may be considered  No beta-blocker secondary to sinus bradycardia--consider nuclear stress tests with eventual revascularization if no symptomatic relief    10/6  PULMONOLOGY   patient has most probably diastolic heart failure, mild, with moderate to severe aortic stenosis  Does have h/o asthma, (+) wheezing; May be contributing to SOB  Will treat w/nebs and lo dose steroids for 5 days    10/7    CONTINUES w/Dyspnea on MINIMAL exertion;   Rm air sats  92-98%,  Dependent on activity     10/7   CARDIOLOGY  Will challenge with 20 mg IV Lasix daily to see if helps w/SOB  Suspect increased intracavitary gradients in left atrial pressures secondary to diastolic dysfunction and severe aortic stenosis  NT proBNP slightly increased from baseline  · Recommend outpatient CT surgery evaluation for consideration of transcatheter aortic valve replacement  · Reduce amlodipine to 5 mg once daily to avoid hypotension  Increase potassium to 20 mEq b i d    BMP tomorrow morning      Pertinent Labs/Diagnostic Results:   10/5  cxr -  The lungs are clear   No pneumothorax or pleural effusion    10/5  CT chest -  Unremarkable  No ekg available       Results from last 7 days   Lab Units 10/06/19  0443 10/05/19  1208   WBC Thousand/uL 5 43 6 44   HEMOGLOBIN g/dL 12 6 14 3   HEMATOCRIT % 39 0 45 7   PLATELETS Thousands/uL 181 191   NEUTROS ABS Thousands/µL  --  5 16         Results from last 7 days   Lab Units 10/08/19  0525 10/06/19  0443 10/05/19  1208   SODIUM mmol/L 139 141 139   POTASSIUM mmol/L 4 2 3 5 4 3   CHLORIDE mmol/L 106 107 106   CO2 mmol/L 22 23 23   ANION GAP mmol/L 11 11 10   BUN mg/dL 25 18 15   CREATININE mg/dL 1 16 1 14 1 05   EGFR ml/min/1 73sq m 46 47 51   CALCIUM mg/dL 9 1 9 0 10 1     Results from last 7 days   Lab Units 10/06/19  0443 10/05/19  1208   AST U/L 19 25   ALT U/L 11* 14   ALK PHOS U/L 75 98   TOTAL PROTEIN g/dL 6 2* 7 6   ALBUMIN g/dL 2 7* 3 3*   TOTAL BILIRUBIN mg/dL 0 39 0 36         Results from last 7 days   Lab Units 10/08/19  0525 10/06/19  0443 10/05/19  1208   GLUCOSE RANDOM mg/dL 139 119 149*         Results from last 7 days   Lab Units 10/05/19  2118 10/05/19  1654 10/05/19  1208   TROPONIN I ng/mL 0 02 0 02 <0 02     Results from last 7 days   Lab Units 10/05/19  1208   D DIMER QUANT ng/ml (FEU) 2,073*         Results from last 7 days   Lab Units 10/06/19  0443   TSH 3RD GENERATON uIU/mL 2 323     Results from last 7 days   Lab Units 10/05/19  1208   NT-PRO BNP pg/mL 798*     Vital Signs:   10/07/19 0754  98 1 °F (36 7 °C)  93  20  142/66  96 %     10/07/19 1124  97 6 °F (36 4 °C)  74  20  129/58 97% sat on rma air while sitting        Medications:   Scheduled Meds:   Current Facility-Administered Medications:  amLODIPine 10 mg Oral Daily   aspirin 81 mg Oral Daily   budesonide 0 5 mg Nebulization Q12H   dorzolamide 1 drop Both Eyes TID   fluticasone 2 spray Nasal Daily   fluticasone-vilanterol 1 puff Inhalation Daily   furosemide 40 mg Oral BID (diuretic)   gabapentin 300 mg Oral HS   heparin (porcine) 5,000 Units Subcutaneous Q8H [address]   ipratropium-albuterol 3 mL Nebulization Q6H   latanoprost 1 drop Both Eyes HS   levothyroxine 75 mcg Oral Early Morning   potassium chloride 20 mEq Oral Daily   sitaGLIPtin 50 mg Oral Daily       Discharge Plan: tbd    Network Utilization Review Department  Phone: [phone]; Fax [phone]  [email or web address]  ATTENTION: Please call with any questions or concerns to [phone]  and carefully listen to the prompts so that you are directed to the right person  Send all requests for admission clinical reviews, approved or denied determinations and any other requests to fax [phone]   All voicemails are confidential

## 2019-10-09 NOTE — ASSESSMENT & PLAN NOTE
Currently very stable no shortness of breath no leg swelling  Patient did get mixed up on her Lasix dosing and has been taking 40 mg twice daily since discharge when she was only supposed to be taking 20 mg twice daily  She realizes this now and will go back to taking 20 mg twice daily and will actually skip tonight's dose as she already took 40 mg this morning  BMP will be done on Monday and Dr Aleisha Han cardiology appointment is on Tuesday next week

## 2019-10-09 NOTE — UTILIZATION REVIEW
Initial Clinical Review    Admission: Date/Time/Statement:   Inpatient Admission Orders (From admission, onward)     Ordered        10/07/19 1306  Inpatient Admission  Once                  10/5 2019 @ 1501 to Niharika Lipscomb This Encounter   Procedures    Place in Observation     Standing Status:   Standing     Number of Occurrences:   1     Order Specific Question:   Admitting Physician     Answer:   Jose G Kang [[id number]]     Order Specific Question:   Level of Care     Answer:   Med Surg [16]    Inpatient Admission     Standing Status:   Standing     Number of Occurrences:   1     Order Specific Question:   Admitting Physician     Answer:   Shin Hollins [[id number]]     Order Specific Question:   Level of Care     Answer:   Level 2 Stepdown / HOT [14]     Order Specific Question:   Bed Type     Answer:   Nacho [4]     Order Specific Question:   Estimated length of stay     Answer:   More than 2 Midnights     Order Specific Question:   Certification     Answer:   I certify that inpatient services are medically necessary for this patient for a duration of greater than two midnights  See H&P and MD Progress Notes for additional information about the patient's course of treatment  ED Arrival Information     Expected Arrival Acuity Means of Arrival Escorted By Service Admission Type    - 10/5/2019 10:41 Urgent Wheelchair Self General Medicine Urgent    Arrival Complaint    Sob/ chest pain        Chief Complaint   Patient presents with    Shortness of Breath     Reports worsening shortness of breath  Reports SMITH  States chest pain that woke her up out of sleep a "couple weeks ago " Denies chest pain currently  Dyspnic when moving from wheel chair to stretcher next to each other   Chest Pain     Assessment/Plan: 81 yo female with CHF, DM, CAD, HTN, hypothyroid presents to ED from home with increasing SOB last few days both with exertion & @ rest  CT neg for PE   Admitted to IP with Dyspnea - probably multifactorial in etiology  Patient does have a history of aortic stenosis, diastolic CHF, asthma  Monitor on Tele, Check ECHO, serial trops, Consult Cardio & Pulm  10/6 Per Cardio: Suspect Exertional dyspnea 2/2 to Aortic valve stenosis  Increase lasix to bid  Check ECHO   Possible CAD    ED Triage Vitals [10/05/19 1059]   Temperature Pulse Respirations Blood Pressure SpO2   98 5 °F (36 9 °C) 78 16 165/74 98 %      Temp Source Heart Rate Source Patient Position - Orthostatic VS BP Location FiO2 (%)   Oral Monitor Sitting Right arm --      Pain Score       No Pain          Wt Readings from Last 1 Encounters:   10/09/19 78 9 kg (174 lb)     Additional Vital Signs:   10/06/19 0749  96 7 °F (35 9   59  18  123/60  95 %  None (Room air) Lying   10/05/19 2300  98 5 °F (36 9 °C)  68  17  135/63  97 %  None (Room air) Lying   10/05/19 1618    67  18  144/71  95 %  None (Room air) Lying   10/05/19 1538  98 3 °F (36 8 °C)  76  16  187/79Abnormal   95 %  None (Room air) Sitting       Pertinent Labs/Diagnostic Test Results:   Results from last 7 days   Lab Units 10/06/19  0443 10/05/19  1208   WBC Thousand/uL 5 43 6 44   HEMOGLOBIN g/dL 12 6 14 3   HEMATOCRIT % 39 0 45 7   PLATELETS Thousands/uL 181 191   NEUTROS ABS Thousands/µL  --  5 16     Results from last 7 days   Lab Units 10/08/19  0525 10/06/19  0443 10/05/19  1208   SODIUM mmol/L 139 141 139   POTASSIUM mmol/L 4 2 3 5 4 3   CHLORIDE mmol/L 106 107 106   CO2 mmol/L 22 23 23   ANION GAP mmol/L 11 11 10   BUN mg/dL 25 18 15   CREATININE mg/dL 1 16 1 14 1 05   EGFR ml/min/1 73sq m 46 47 51   CALCIUM mg/dL 9 1 9 0 10 1     Results from last 7 days   Lab Units 10/06/19  0443 10/05/19  1208   AST U/L 19 25   ALT U/L 11* 14   ALK PHOS U/L 75 98   TOTAL PROTEIN g/dL 6 2* 7 6   ALBUMIN g/dL 2 7* 3 3*   TOTAL BILIRUBIN mg/dL 0 39 0 36     Results from last 7 days   Lab Units 10/08/19  0525 10/06/19  0443 10/05/19  1208   GLUCOSE RANDOM mg/dL 139 119 149* Results from last 7 days   Lab Units 10/05/19  2118 10/05/19  1654 10/05/19  1208   TROPONIN I ng/mL 0 02 0 02 <0 02     Results from last 7 days   Lab Units 10/05/19  1208   D DIMER QUANT ng/ml (FEU) 2,073*     Results from last 7 days   Lab Units 10/05/19  1208   NT-PRO BNP pg/mL 798*       10/5 CTA Chest:  No acute intrathoracic pathology identified  Specifically, no evidence of pulmonary embolism  10/5 CXR: No acute cardiopulmonary disease          ED Treatment:   Medication Administration from 10/05/2019 1041 to 10/05/2019 1548       Date/Time Order Dose Route Action     10/05/2019 1353 sodium chloride 0 9 % bolus 500 mL 500 mL Intravenous New Bag        Past Medical History:   Diagnosis Date    Asthma     Atypical chest pain     CAD (coronary artery disease)     Candidal intertrigo     CH[facility]congestive [facility] failure) [facility]     Depression     Diabetes mellitus (HCC)     Diabetic neuropathy ([address] )     Diverticulitis     Dyslipidemia     Female bladder prolapse     Gastric ulcer     Glaucoma     HTN (hypertension)     Hyperlipidemia     Hypothyroidism 4/18/2016    RAD (reactive airway disease)      Present on Admission:   Aortic stenosis   Asthma   Chronic diastolic congestive heart failure (HCC)   Hypothyroidism   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[facility])      Admitting Diagnosis: SOB (shortness of breath) [R06 02]  Dyspnea on exertion [R06 09]  Intermittent chest pain [R07 9]  Age/Sex: 80 y o  female  Admission Orders:    Current Facility-Administered Medications:  amLODIPine 10 mg Oral Daily    aspirin 81 mg Oral Daily    dorzolamide 1 drop Both Eyes TID    fluticasone 2 spray Nasal Daily    furosemide 40 mg Oral Daily Increased to 40 BID 10/6   gabapentin 300 mg Oral HS    heparin (porcine) 5,000 Units Subcutaneous Q8H [facility]    ipratropium-albuterol 3 mL Nebulization Q6H    latanoprost 1 drop Both Eyes HS    levothyroxine 75 mcg Oral Early Morning    sitaGLIPtin 50 mg Oral Daily TELE  ECHO  SCD's  IP CONSULT TO CARDIOLOGY  IP CONSULT TO PULMONOLOGY    Network Utilization Review Department  Phone: [phone]; Fax [phone]  [email or web address]  ATTENTION: Please call with any questions or concerns to [phone]  and carefully listen to the prompts so that you are directed to the right person  Send all requests for admission clinical reviews, approved or denied determinations and any other requests to fax [phone]   All voicemails are confidential

## 2019-10-09 NOTE — PATIENT INSTRUCTIONS
Problem List Items Addressed This Visit        Cardiovascular and Mediastinum    Aortic stenosis (Chronic)       Patient will be called by cardiothoracic surgery for consultation in the near future  Relevant Medications    furosemide (LASIX) 20 mg tablet    Chronic diastolic congestive heart failure (HCC) - Primary (Chronic)       Currently very stable no shortness of breath no leg swelling  Patient did get mixed up on her Lasix dosing and has been taking 40 mg twice daily since discharge when she was only supposed to be taking 20 mg twice daily  She realizes this now and will go back to taking 20 mg twice daily and will actually skip tonight's dose as she already took 40 mg this morning  BMP will be done on Monday and Dr Kimo Ravi cardiology appointment is on Tuesday next week

## 2019-10-09 NOTE — PROGRESS NOTES
Assessment/Plan: Aortic stenosis    Patient will be called by cardiothoracic surgery for consultation in the near future  Chronic diastolic congestive heart failure (HCC)    Currently very stable no shortness of breath no leg swelling  Patient did get mixed up on her Lasix dosing and has been taking 40 mg twice daily since discharge when she was only supposed to be taking 20 mg twice daily  She realizes this now and will go back to taking 20 mg twice daily and will actually skip tonight's dose as she already took 40 mg this morning  BMP will be done on Monday and Dr Ely Mann cardiology appointment is on Tuesday next week  Diagnoses and all orders for this visit:    Chronic diastolic congestive heart failure (HCC)    Nonrheumatic aortic valve stenosis  -     furosemide (LASIX) 20 mg tablet; Take 1 tablet (20 mg total) by mouth 2 (two) times a day         Subjective:     Patient ID: Tiffanie Bravo is a 80 y o  female  Patient here today for ARIEL visit  She presented to the emergency room with shortness of breath on exertion on 10/05 and was admitted until 10/8 at [facility]  She was diagnosed with acute on chronic CHF and severe aortic stenosis  She was put on diuretic and transitioned to an outpatient dosing of Lasix 20 mg a m  And p m  And Norvasc 5 mg added  She remains on all other medications  CT PE negative she has an outpatient appointment with Dr Ely Mann cardiology on the 15th of October  Patient is accompanied by her daughter today patient states that sometimes she has trouble getting out the words that she really wants to say which may cause her to slur 1 word and sentence very infrequently  She has been taking 40 mg of Lasix twice a day since discharge and today she states she is not short of breath but she feels weak  She was supposed only be taking 20 and 20   Otherwise she has her order for BMP which is going to be done on Monday and then she sees a cardiologist on Tuesday  Review of Systems   Constitutional: Positive for fatigue  HENT: Negative  Eyes: Negative  Respiratory: Negative  Cardiovascular: Negative  Gastrointestinal: Negative  Endocrine: Negative  Genitourinary: Negative  Musculoskeletal: Negative  Skin: Negative  Allergic/Immunologic: Negative  Neurological: Negative  Hematological: Negative  Psychiatric/Behavioral: Negative  Objective:     Physical Exam   Constitutional: She is oriented to person, place, and time  She appears well-developed and well-nourished  No distress  HENT:   Head: Normocephalic and atraumatic  Eyes: Conjunctivae are normal  Right eye exhibits no discharge  Left eye exhibits no discharge  Neck: Neck supple  Carotid bruit is not present  Cardiovascular: Normal rate and regular rhythm  Exam reveals no gallop and no friction rub  Murmur heard  Systolic murmur is present with a grade of 4/6  Pulmonary/Chest: Effort normal and breath sounds normal  No respiratory distress  She has no wheezes  She has no rales  Neurological: She is alert and oriented to person, place, and time  Skin: Skin is warm and dry  She is not diaphoretic  Psychiatric: She has a normal mood and affect  Judgment normal    Nursing note and vitals reviewed  Vitals:    10/09/19 1435   BP: 120/68   BP Location: Left arm   Patient Position: Sitting   Cuff Size: Adult   Pulse: 72   Resp: 18   Temp: 97 8 °F (36 6 °C)   TempSrc: Temporal   SpO2: 96%   Weight: 78 9 kg (174 lb)       Transitional Care Management Review:  Starlyn Snellen is a 80 y o  female here for TCM follow up       During the TCM phone call patient stated:    TCM Call (since 9/8/2019)     Date and time call was made  10/8/2019  4:01 PM    Hospital care reviewed  Records reviewed    Patient was hospitialized at  [facility]     Date of Admission  10/05/19    Date of discharge  10/08/19    Diagnosis  Dyspnea on Minimal Exertion Disposition  Home    Were the patients medications reviewed and updated  Yes    Current Symptoms  None      TCM Call (since 9/8/2019)     Post hospital issues  None    Scheduled for follow up?   Yes    Did you obtain your prescribed medications  Yes    Do you need help managing your prescriptions or medications  No    Is transportation to your appointment needed  No    I have advised the patient to call PCP with any new or worsening symptoms  Tyrone Libman, LPN Leveda Stair, PA-C

## 2019-10-10 ENCOUNTER — PATIENT OUTREACH (OUTPATIENT)
Dept: FAMILY MEDICINE CLINIC | Facility: CLINIC | Age: 84
End: 2019-10-10

## 2019-10-10 NOTE — PROGRESS NOTES
This CM spoke to patient's daughter Selina Warren who declined [facility] services but accepted CM contact number and was made aware she could call should she change her mind about needing OPCM services or if she had any concerns

## 2019-10-11 NOTE — UTILIZATION REVIEW
Notification of Discharge  This is a Notification of Discharge from our facility [address]  Please be advised that this patient has been discharge from our facility  Below you will find the admission and discharge date and time including the patients disposition  PRESENTATION DATE: 10/5/2019 11:35 AM  OBS ADMISSION DATE:   IP ADMISSION DATE: 10/7/19 1306   DISCHARGE DATE: 10/8/2019  2:01 PM  [facility] [address] [facility]   Admission Orders listed below:  Admission Orders (From admission, onward)     Ordered        10/07/19 1306  Inpatient Admission  Once         10/05/19 1501  Place in Observation  Once                   Please contact the UR Department if additional information is required to close this patient's authorization/case  [address]  Utilization Review Department  Phone: [phone]; Fax [phone]  [email or web address]  ATTENTION: Please call with any questions or concerns to [phone]  and carefully listen to the prompts so that you are directed to the right person  Send all requests for admission clinical reviews, approved or denied determinations and any other requests to fax [phone]   All voicemails are confidential

## 2019-10-14 ENCOUNTER — TRANSCRIBE ORDERS (OUTPATIENT)
Dept: LAB | Facility: CLINIC | Age: 84
End: 2019-10-14

## 2019-10-14 ENCOUNTER — APPOINTMENT (OUTPATIENT)
Dept: LAB | Facility: CLINIC | Age: 84
End: 2019-10-14
Payer: COMMERCIAL

## 2019-10-14 DIAGNOSIS — R06.89 HYPOVENTILATION, IDIOPATHIC: ICD-10-CM

## 2019-10-14 DIAGNOSIS — R06.89 HYPOVENTILATION, IDIOPATHIC: Primary | ICD-10-CM

## 2019-10-14 DIAGNOSIS — I35.0 NODULAR CALCIFIC AORTIC VALVE STENOSIS: ICD-10-CM

## 2019-10-14 LAB
ANION GAP SERPL CALCULATED.3IONS-SCNC: 9 MMOL/L (ref 4–13)
BUN SERPL-MCNC: 20 MG/DL (ref 5–25)
CALCIUM SERPL-MCNC: 9 MG/DL (ref 8.3–10.1)
CHLORIDE SERPL-SCNC: 113 MMOL/L (ref 100–108)
CO2 SERPL-SCNC: 20 MMOL/L (ref 21–32)
CREAT SERPL-MCNC: 1.09 MG/DL (ref 0.6–1.3)
GFR SERPL CREATININE-BSD FRML MDRD: 49 ML/MIN/1.73SQ M
GLUCOSE SERPL-MCNC: 126 MG/DL (ref 65–140)
POTASSIUM SERPL-SCNC: 4.3 MMOL/L (ref 3.5–5.3)
SODIUM SERPL-SCNC: 142 MMOL/L (ref 136–145)

## 2019-10-14 PROCEDURE — 80048 BASIC METABOLIC PNL TOTAL CA: CPT

## 2019-10-14 PROCEDURE — 36415 COLL VENOUS BLD VENIPUNCTURE: CPT

## 2019-10-15 ENCOUNTER — OFFICE VISIT (OUTPATIENT)
Dept: CARDIOLOGY CLINIC | Facility: CLINIC | Age: 84
End: 2019-10-15
Payer: COMMERCIAL

## 2019-10-15 VITALS
RESPIRATION RATE: 16 BRPM | HEART RATE: 79 BPM | SYSTOLIC BLOOD PRESSURE: 142 MMHG | WEIGHT: 173.8 LBS | DIASTOLIC BLOOD PRESSURE: 70 MMHG | HEIGHT: 62 IN | BODY MASS INDEX: 31.98 KG/M2

## 2019-10-15 DIAGNOSIS — I35.0 NONRHEUMATIC AORTIC VALVE STENOSIS: Chronic | ICD-10-CM

## 2019-10-15 DIAGNOSIS — I10 BENIGN ESSENTIAL HTN: ICD-10-CM

## 2019-10-15 DIAGNOSIS — I35.0 CALCIFIC AORTIC STENOSIS: ICD-10-CM

## 2019-10-15 DIAGNOSIS — R06.00 DYSPNEA ON MINIMAL EXERTION: ICD-10-CM

## 2019-10-15 DIAGNOSIS — I25.10 ARTERIOSCLEROSIS OF CORONARY ARTERY: Primary | ICD-10-CM

## 2019-10-15 DIAGNOSIS — I50.32 CHRONIC DIASTOLIC CHF (CONGESTIVE HEART FAILURE) (HCC): ICD-10-CM

## 2019-10-15 PROCEDURE — 93000 ELECTROCARDIOGRAM COMPLETE: CPT | Performed by: INTERNAL MEDICINE

## 2019-10-15 PROCEDURE — 99214 OFFICE O/P EST MOD 30 MIN: CPT | Performed by: INTERNAL MEDICINE

## 2019-10-15 RX ORDER — FUROSEMIDE 20 MG/1
TABLET ORAL
Qty: 60 TABLET | Refills: 11 | Status: SHIPPED | OUTPATIENT
Start: 2019-10-15 | End: 2019-10-15 | Stop reason: SDUPTHER

## 2019-10-15 RX ORDER — FLUTICASONE FUROATE AND VILANTEROL 100; 25 UG/1; UG/1
1 POWDER RESPIRATORY (INHALATION) DAILY
Qty: 1 INHALER | Refills: 5 | Status: SHIPPED | OUTPATIENT
Start: 2019-10-15 | End: 2022-06-28

## 2019-10-15 RX ORDER — FUROSEMIDE 20 MG/1
TABLET ORAL
Qty: 270 TABLET | Refills: 11 | Status: SHIPPED | OUTPATIENT
Start: 2019-10-15 | End: 2021-04-21

## 2019-10-15 NOTE — PROGRESS NOTES
Cardiology Follow Up        Partha Kunz      9/24/1922      [id number]      Discussion/Summary:    1  Severe calcific aortic valve stenosis  2  Acute on chronic diastolic heart failure  3  Hypertension  4  Possible CAD with prior echocardiogram revealing possible basal inferior and basal inferolateral hypokinesis    · Patient still with symptoms of paroxysmal nocturnal dyspnea and orthopnea, requiring at least 2 pillows to sleep at night  She still wakes up soon after falling sleep with shortness of breath and pressure in her throat  Will challenge with increasing furosemide at 20 mg in a m /40 mg in p m  Netoleo Angela Patient asked to call me in 1 week to report how she is doing  If still with symptoms, will challenge with 40 mg furosemide twice daily and repeat BMP/NT proBNP in 2 weeks, and follow up with her in 3 weeks  · Follow-up with CT surgery scheduled in 2 weeks    =============================  Addendum: 10/22/19:  Pt called, feels a bit better, no orthopnea or PND  Will continue furosemide 20 AM/40 PM and plan on BMP/NTproBNP on MOnday before re-evaluation Tuesday  Interval History: This is a very pleasant 79 YO female with a history of chronic atypical chest pain, hypertension, dyslipidemia, and diabetes   She also has a history of severe aortic valve stenosis  Mannie Camarena has had several admissions in the past, January 2016/April 2016/October 2016/April 2017 for atypical chest pain at which time she was ruled out for myocardial injury      She was last hospitalized August 2018 for acute diastolic congestive heart failure  Mannie Camarena was discharged on furosemide 20 mg daily which she has been doing well with        Anna Zamora was in the hospital 01/2019 with atypical chest pain, rule out for myocardial injury  She was then hospitalized 10/2019 with symptoms of exertional dyspnea  Echocardiogram revealed severe calcific aortic valve stenosis    Her furosemide was increased, she was discharged on 20 mg furosemide twice daily  She presents today for follow-up  She states although she felt better since her hospitalization, she started to feel worse again  She admits to paroxysmal nocturnal dyspnea, and orthopnea, stating that she requires 2 pillows to sleep at night  She wakes up soon after falling sleep gasping for air and with a discomfort in her throat, and feels more short of breath lying supine  Interestingly she states her exertional symptoms have improved  Vitals:  Vitals:    10/15/19 0945   BP: 142/70   Pulse: 79   Resp: 16   Weight: 78 8 kg (173 lb 12 8 oz)   Height: 5' 2" (1 575 m)         Past Medical History:   Diagnosis Date    Asthma     Atypical chest pain     CAD (coronary artery disease)     Candidal intertrigo     CHF (congestive heart failure) (HCC)     Depression     Diabetes mellitus (HCC)     Diabetic neuropathy (HCC)     Diverticulitis     Dyslipidemia     Female bladder prolapse     Gastric ulcer     Glaucoma     HTN (hypertension)     Hyperlipidemia     Hypothyroidism 4/18/2016    RAD (reactive airway disease)      Social History     Socioeconomic History    Marital status:       Spouse name: Not on file    Number of children: 2    Years of education: Not on file    Highest education level: Not on file   Occupational History    Occupation: Retired   Social Needs    Financial resource strain: Not on file    Food insecurity:     Worry: Not on file     Inability: Not on file   [employer] needs:     Medical: Not on file     Non-medical: Not on file   Tobacco Use    Smoking status: Never Smoker    Smokeless tobacco: Never Used   Substance and Sexual Activity    Alcohol use: No     Comment: Social Alcohol Use -- as per allscripts (pt denies all alcohol use)    Drug use: No    Sexual activity: Not on file   Lifestyle    Physical activity:     Days per week: Not on file     Minutes per session: Not on file    Stress: Not on file   Relationships    Social connections:     Talks on phone: Not on file     Gets together: Not on file     Attends Denominational service: Not on file     Active member of club or organization: Not on file     Attends meetings of clubs or organizations: Not on file     Relationship status: Not on file    Intimate partner violence:     Fear of current or ex partner: Not on file     Emotionally abused: Not on file     Physically abused: Not on file     Forced sexual activity: Not on file   Other Topics Concern    Not on file   Social History Narrative    Lived with adult children    Caffeine Use      Family History   Problem Relation Age of Onset    Breast cancer Mother     Hypothyroidism Mother     Hypertension Father     Breast cancer Sister     Thyroid disease Sister     Hypertension Sister      Past Surgical History:   Procedure Laterality Date    COLONOSCOPY      ESOPHAGOGASTRODUODENOSCOPY  2011    HYSTERECTOMY         Current Outpatient Medications:     acetaminophen-codeine (TYLENOL #3) 300-30 mg per tablet, Take 1 tablet by mouth every 4 (four) hours as needed for moderate pain, Disp: 30 tablet, Rfl: 0    albuterol (VENTOLIN HFA) 90 mcg/act inhaler, Inhale 2 puffs every 4 (four) hours as needed for wheezing, Disp: 1 Inhaler, Rfl: 3    aluminum-magnesium hydroxide-simethicone (MYLANTA) 200-200-20 mg/5 mL suspension, Take 30 mL by mouth every 4 (four) hours as needed for indigestion or heartburn, Disp: , Rfl:     amLODIPine (NORVASC) 5 mg tablet, Take 1 tablet (5 mg total) by mouth daily, Disp: 30 tablet, Rfl: 0    aspirin 81 MG tablet, Take 81 mg by mouth daily  , Disp: , Rfl:     Cholecalciferol (VITAMIN D3) 2000 units capsule, Take 1,000 Units by mouth daily  , Disp: , Rfl:     dorzolamide (TRUSOPT) 2 % ophthalmic solution, Administer 1 drop to both eyes 3 (three) times a day  , Disp: , Rfl:     fluticasone (FLONASE) 50 mcg/act nasal spray, 2 sprays into each nostril daily, Disp: 3 Bottle, Rfl: 3    furosemide (LASIX) 20 mg tablet, Take 1 tablet in AM, 2 tablets in PM, Disp: 60 tablet, Rfl: 11    gabapentin (NEURONTIN) 600 MG tablet, TAKE 1/2 TABLET EVERY 8 HOURS AS NEEDED (CRANIAL NEURALGIA) (Patient taking differently: 300 mg daily at bedtime Taking 1 tablet daily at bedtime), Disp: 90 tablet, Rfl: 3    Incontinence Supply Disposable (SELECT DISPOSABLE UNDERWEAR LG) MISC, , Disp: , Rfl:     ketoconazole (NIZORAL) 2 % cream, Apply topically daily, Disp: 60 g, Rfl: 1    latanoprost (XALATAN) 0 005 % ophthalmic solution, Apply 1 drop to eye daily at bedtime Both eyes, Disp: 7 5 mL, Rfl: 1    levothyroxine 75 mcg tablet, Take 1 tablet (75 mcg total) by mouth daily, Disp: 90 tablet, Rfl: 3    Lidocaine-Menthol 4-5 % PTCH, lidocaine patch, Disp: , Rfl:     Melatonin 5 MG CAPS, 5 mg as needed , Disp: , Rfl:     nitroglycerin (NITROSTAT) 0 4 mg SL tablet, Nitrostat 0 4 mg sublingual tablet, Disp: , Rfl:     nystatin (MYCOSTATIN) powder, Apply topically 2 (two) times a day, Disp: 60 g, Rfl: 1    OLANZapine (ZyPREXA) 2 5 mg tablet, Take 1 tablet (2 5 mg total) by mouth daily, Disp: 90 tablet, Rfl: 3    omeprazole (PriLOSEC) 40 MG capsule, Take 1 capsule (40 mg total) by mouth daily (Patient taking differently: Take 40 mg by mouth as needed ), Disp: 90 capsule, Rfl: 3    potassium chloride (K-DUR,KLOR-CON) 20 mEq tablet, Take 1 tablet (20 mEq total) by mouth daily, Disp: 60 tablet, Rfl: 0    sitaGLIPtin (JANUVIA) 50 mg tablet, Take 1 tablet (50 mg total) by mouth daily, Disp: 90 tablet, Rfl: 3    fluticasone-vilanterol (BREO ELLIPTA) 100-25 mcg/inh inhaler, Inhale 1 puff daily Rinse mouth after use , Disp: 1 Inhaler, Rfl: 5        Review of Systems:  Review of Systems   Constitutional: Negative for activity change, fever and unexpected weight change  HENT: Negative for facial swelling, nosebleeds and voice change  Respiratory: Positive for shortness of breath  Negative for chest tightness and wheezing  +PND, orthopnea   Cardiovascular: Negative for chest pain, palpitations and leg swelling  Gastrointestinal: Negative for abdominal distention  Genitourinary: Negative for hematuria  Musculoskeletal: Negative for arthralgias  Skin: Negative for color change, pallor, rash and wound  Neurological: Negative for dizziness, seizures and syncope  Psychiatric/Behavioral: Negative for agitation  Physical Exam:  Physical Exam   Constitutional: She is oriented to person, place, and time  She appears well-developed and well-nourished  HENT:   Head: Normocephalic and atraumatic  Eyes: EOM are normal    Neck: Normal range of motion  Neck supple  Cardiovascular: Normal rate, regular rhythm and intact distal pulses  2/6 JENNY, +S2   Pulmonary/Chest: Effort normal and breath sounds normal    Abdominal: Soft  Musculoskeletal: Normal range of motion  Neurological: She is alert and oriented to person, place, and time  Skin: Skin is warm and dry  Psychiatric: She has a normal mood and affect  Her behavior is normal  Judgment and thought content normal    Vitals reviewed  This note was completed in part utilizing [employer] Fluency Direct Software  Grammatical errors, random word insertions, spelling mistakes, and incomplete sentences can be an occasional consequence of this system secondary to software limitations, ambient noise, and hardware issues  If you have any questions or concerns about the content, text, or information contained within the body of this dictation, please contact the provider for clarification

## 2019-10-16 ENCOUNTER — TRANSCRIBE ORDERS (OUTPATIENT)
Dept: FAMILY MEDICINE CLINIC | Facility: CLINIC | Age: 84
End: 2019-10-16

## 2019-10-16 DIAGNOSIS — I35.0 NONRHEUMATIC AORTIC (VALVE) STENOSIS: Primary | ICD-10-CM

## 2019-10-16 DIAGNOSIS — I50.32 CHRONIC DIASTOLIC (CONGESTIVE) HEART FAILURE (HCC): ICD-10-CM

## 2019-10-18 ENCOUNTER — TRANSCRIBE ORDERS (OUTPATIENT)
Dept: FAMILY MEDICINE CLINIC | Facility: CLINIC | Age: 84
End: 2019-10-18

## 2019-10-18 DIAGNOSIS — E11.65 TYPE 2 DIABETES MELLITUS WITH HYPERGLYCEMIA (HCC): Primary | ICD-10-CM

## 2019-10-18 DIAGNOSIS — H40.10X3 UNSPECIFIED OPEN-ANGLE GLAUCOMA, SEVERE STAGE: ICD-10-CM

## 2019-10-21 ENCOUNTER — TRANSCRIBE ORDERS (OUTPATIENT)
Dept: FAMILY MEDICINE CLINIC | Facility: CLINIC | Age: 84
End: 2019-10-21

## 2019-10-21 DIAGNOSIS — I35.0 NONRHEUMATIC AORTIC (VALVE) STENOSIS: ICD-10-CM

## 2019-10-21 DIAGNOSIS — I50.32 CHRONIC DIASTOLIC (CONGESTIVE) HEART FAILURE (HCC): Primary | ICD-10-CM

## 2019-10-22 ENCOUNTER — DOCTOR'S OFFICE (OUTPATIENT)
Dept: URBAN - METROPOLITAN AREA CLINIC 136 | Facility: CLINIC | Age: 84
Setting detail: OPHTHALMOLOGY
End: 2019-10-22
Payer: COMMERCIAL

## 2019-10-22 DIAGNOSIS — E11.9: ICD-10-CM

## 2019-10-22 DIAGNOSIS — H04.123: ICD-10-CM

## 2019-10-22 DIAGNOSIS — H40.1132: ICD-10-CM

## 2019-10-22 DIAGNOSIS — H34.8312: ICD-10-CM

## 2019-10-22 LAB
LEFT EYE DIABETIC RETINOPATHY: NORMAL
RIGHT EYE DIABETIC RETINOPATHY: NORMAL

## 2019-10-22 PROCEDURE — 92014 COMPRE OPH EXAM EST PT 1/>: CPT | Performed by: OPHTHALMOLOGY

## 2019-10-22 ASSESSMENT — REFRACTION_MANIFEST
OD_VA3: 20/
OS_VA2: 20/
OS_VA1: 20/
OU_VA: 20/
OD_VA1: 20/
OU_VA: 20/
OS_VA3: 20/
OS_VA3: 20/
OD_VA2: 20/
OS_VA1: 20/
OD_VA3: 20/
OD_VA2: 20/
OD_VA1: 20/
OS_VA2: 20/

## 2019-10-22 ASSESSMENT — REFRACTION_CURRENTRX
OD_OVR_VA: 20/
OD_OVR_VA: 20/
OS_OVR_VA: 20/
OD_OVR_VA: 20/

## 2019-10-22 ASSESSMENT — SPHEQUIV_DERIVED
OS_SPHEQUIV: -2.125
OD_SPHEQUIV: -3.5

## 2019-10-22 ASSESSMENT — LID EXAM ASSESSMENTS
OS_BLEPHARITIS: LLL LUL 1+
OS_COMMENTS: POSTERIOR
OD_COMMENTS: POSTERIOR
OD_BLEPHARITIS: RLL RUL 1+
OD_TRICHIASIS: RUL

## 2019-10-22 ASSESSMENT — VISUAL ACUITY
OD_BCVA: 20/70-1
OS_BCVA: 20/60-1

## 2019-10-22 ASSESSMENT — CONFRONTATIONAL VISUAL FIELD TEST (CVF)
OD_FINDINGS: CONSTRICTION
OS_FINDINGS: CONSTRICTION

## 2019-10-22 ASSESSMENT — LID POSITION - DERMATOCHALASIS
OS_DERMATOCHALASIS: LUL 1+
OD_DERMATOCHALASIS: RUL 1+

## 2019-10-22 ASSESSMENT — REFRACTION_AUTOREFRACTION
OD_SPHERE: -1.25
OS_SPHERE: +0.25
OS_CYLINDER: -4.75
OD_AXIS: 80
OS_AXIS: 81
OD_CYLINDER: -4.50

## 2019-10-22 ASSESSMENT — SUPERFICIAL PUNCTATE KERATITIS (SPK)
OS_SPK: 2+
OD_SPK: 2+

## 2019-10-22 ASSESSMENT — PUNCTA - ASSESSMENT: OS_PUNCTA: SIL PLUG LLL

## 2019-10-22 ASSESSMENT — DRY EYES - PHYSICIAN NOTES
OS_GENERALCOMMENTS: LOW TEAR FILM
OD_GENERALCOMMENTS: LOW TEAR FILM

## 2019-10-28 ENCOUNTER — LAB REQUISITION (OUTPATIENT)
Dept: LAB | Facility: HOSPITAL | Age: 84
End: 2019-10-28
Payer: COMMERCIAL

## 2019-10-28 DIAGNOSIS — I25.10 ATHEROSCLEROTIC HEART DISEASE OF NATIVE CORONARY ARTERY WITHOUT ANGINA PECTORIS: ICD-10-CM

## 2019-10-28 LAB
ANION GAP SERPL CALCULATED.3IONS-SCNC: 8 MMOL/L (ref 4–13)
BUN SERPL-MCNC: 23 MG/DL (ref 5–25)
CALCIUM SERPL-MCNC: 9 MG/DL (ref 8.3–10.1)
CHLORIDE SERPL-SCNC: 110 MMOL/L (ref 100–108)
CO2 SERPL-SCNC: 23 MMOL/L (ref 21–32)
CREAT SERPL-MCNC: 1.13 MG/DL (ref 0.6–1.3)
GFR SERPL CREATININE-BSD FRML MDRD: 47 ML/MIN/1.73SQ M
GLUCOSE SERPL-MCNC: 136 MG/DL (ref 65–140)
NT-PROBNP SERPL-MCNC: 401 PG/ML
POTASSIUM SERPL-SCNC: 4.1 MMOL/L (ref 3.5–5.3)
SODIUM SERPL-SCNC: 141 MMOL/L (ref 136–145)

## 2019-10-28 PROCEDURE — 80048 BASIC METABOLIC PNL TOTAL CA: CPT | Performed by: INTERNAL MEDICINE

## 2019-10-28 PROCEDURE — 83880 ASSAY OF NATRIURETIC PEPTIDE: CPT | Performed by: INTERNAL MEDICINE

## 2019-10-29 ENCOUNTER — OFFICE VISIT (OUTPATIENT)
Dept: CARDIOLOGY CLINIC | Facility: CLINIC | Age: 84
End: 2019-10-29
Payer: COMMERCIAL

## 2019-10-29 VITALS
BODY MASS INDEX: 31.28 KG/M2 | OXYGEN SATURATION: 95 % | HEIGHT: 62 IN | DIASTOLIC BLOOD PRESSURE: 70 MMHG | HEART RATE: 75 BPM | WEIGHT: 170 LBS | SYSTOLIC BLOOD PRESSURE: 118 MMHG

## 2019-10-29 DIAGNOSIS — I10 BENIGN ESSENTIAL HTN: ICD-10-CM

## 2019-10-29 DIAGNOSIS — R06.00 DOE (DYSPNEA ON EXERTION): ICD-10-CM

## 2019-10-29 DIAGNOSIS — I35.0 CALCIFIC AORTIC STENOSIS: Primary | ICD-10-CM

## 2019-10-29 PROCEDURE — 99214 OFFICE O/P EST MOD 30 MIN: CPT | Performed by: INTERNAL MEDICINE

## 2019-10-29 NOTE — PROGRESS NOTES
Cardiology Follow Up        Bhavani Mitchell      9/24/1922      [id number]      Discussion/Summary:    1  Severe calcific aortic valve stenosis  2  Chronic diastolic heart failure  3  Hypertension  4  Possible CAD with prior echocardiogram revealing possible basal inferior and basal inferolateral hypokinesis    · Perhaps minimal improvement in shortness of breath, orthopnea, and PND with augmenting furosemide regimen, now taking 20 mg in a m /40 mg in p m   NT proBNP also improved  Patient reluctant to increase diuretic regimen any further  She has residual exertional dyspnea, orthopnea, and PND  She is scheduled to meet with CT surgery tomorrow to assess her risk for TAVR  She states she is minimally interested in invasive procedures at this time, and may end up refusing this procedure, but will get more information from our surgeons  In light of her advanced age and comorbidities, medical therapy may certainly be reasonable  · Blood pressure controlled, continue amlodipine    Follow-up in 6 weeks      Interval History: This is a very pleasant 81 YO female with a history of chronic atypical chest pain, hypertension, dyslipidemia, and diabetes   She also has a history of severe aortic valve stenosis  Keila Tristan has had several admissions in the past, January 2016/April 2016/October 2016/April 2017 for atypical chest pain at which time she was ruled out for myocardial injury      She was last hospitalized August 2018 for acute diastolic congestive heart failure  Keila Tristan was discharged on furosemide 20 mg daily which she has been doing well with        Favio Rust in the hospital 01/2019 with atypical chest pain, rule out for myocardial injury      She was then hospitalized 10/2019 with symptoms of exertional dyspnea  Echocardiogram revealed severe calcific aortic valve stenosis    Her furosemide was increased, she was discharged on 20 mg furosemide twice daily, and subsequently 60 mg daily  Subsequent NT proBNP yesterday improved to 401 pg/mL with stable renal function and potassium  She continues to have some level of exertional dyspnea, orthopnea, and paroxysmal nocturnal dyspnea  She does state that she is urinating frequently now and is reluctant to increase her diuretic regimen further  Vitals:  Vitals:    10/29/19 1357   BP: 118/70   BP Location: Right arm   Patient Position: Sitting   Cuff Size: Adult   Pulse: 75   SpO2: 95%   Weight: 77 1 kg (170 lb)   Height: 5' 2" (1 575 m)         Past Medical History:   Diagnosis Date    Asthma     Atypical chest pain     CAD (coronary artery disease)     Candidal intertrigo     CHF (congestive heart failure) (HCC)     Depression     Diabetes mellitus (HCC)     Diabetic neuropathy (HCC)     Diverticulitis     Dyslipidemia     Female bladder prolapse     Gastric ulcer     Glaucoma     HTN (hypertension)     Hyperlipidemia     Hypothyroidism 4/18/2016    RAD (reactive airway disease)      Social History     Socioeconomic History    Marital status:       Spouse name: Not on file    Number of children: 2    Years of education: Not on file    Highest education level: Not on file   Occupational History    Occupation: Retired   Social Needs    Financial resource strain: Not on file    Food insecurity:     Worry: Not on file     Inability: Not on file   [employer] needs:     Medical: Not on file     Non-medical: Not on file   Tobacco Use    Smoking status: Never Smoker    Smokeless tobacco: Never Used   Substance and Sexual Activity    Alcohol use: No     Comment: Social Alcohol Use -- as per allscripts (pt denies all alcohol use)    Drug use: No    Sexual activity: Not on file   Lifestyle    Physical activity:     Days per week: Not on file     Minutes per session: Not on file    Stress: Not on file   Relationships    Social connections:     Talks on phone: Not on file     Gets together: Not on file     Attends Mandaen service: Not on file     Active member of club or organization: Not on file     Attends meetings of clubs or organizations: Not on file     Relationship status: Not on file    Intimate partner violence:     Fear of current or ex partner: Not on file     Emotionally abused: Not on file     Physically abused: Not on file     Forced sexual activity: Not on file   Other Topics Concern    Not on file   Social History Narrative    Lived with adult children    Caffeine Use      Family History   Problem Relation Age of Onset    Breast cancer Mother     Hypothyroidism Mother     Hypertension Father     Breast cancer Sister     Thyroid disease Sister     Hypertension Sister      Past Surgical History:   Procedure Laterality Date    COLONOSCOPY      ESOPHAGOGASTRODUODENOSCOPY  2011    HYSTERECTOMY         Current Outpatient Medications:     acetaminophen-codeine (TYLENOL #3) 300-30 mg per tablet, Take 1 tablet by mouth every 4 (four) hours as needed for moderate pain, Disp: 30 tablet, Rfl: 0    albuterol (VENTOLIN HFA) 90 mcg/act inhaler, Inhale 2 puffs every 4 (four) hours as needed for wheezing, Disp: 1 Inhaler, Rfl: 3    aluminum-magnesium hydroxide-simethicone (MYLANTA) 200-200-20 mg/5 mL suspension, Take 30 mL by mouth every 4 (four) hours as needed for indigestion or heartburn, Disp: , Rfl:     amLODIPine (NORVASC) 5 mg tablet, Take 1 tablet (5 mg total) by mouth daily, Disp: 30 tablet, Rfl: 0    aspirin 81 MG tablet, Take 81 mg by mouth daily  , Disp: , Rfl:     Cholecalciferol (VITAMIN D3) 2000 units capsule, Take 1,000 Units by mouth daily  , Disp: , Rfl:     dorzolamide (TRUSOPT) 2 % ophthalmic solution, Administer 1 drop to both eyes 3 (three) times a day  , Disp: , Rfl:     fluticasone (FLONASE) 50 mcg/act nasal spray, 2 sprays into each nostril daily, Disp: 3 Bottle, Rfl: 3    fluticasone-vilanterol (BREO ELLIPTA) 100-25 mcg/inh inhaler, Inhale 1 puff daily Rinse mouth after use , Disp: 1 Inhaler, Rfl: 5    furosemide (LASIX) 20 mg tablet, TAKE 1 TABLET BY MOUTH EVERY MORNING AND 2 TABLETS BY MOUTH EVERY EVENING, Disp: 270 tablet, Rfl: 11    gabapentin (NEURONTIN) 600 MG tablet, TAKE 1/2 TABLET EVERY 8 HOURS AS NEEDED (CRANIAL NEURALGIA) (Patient taking differently: 300 mg daily at bedtime Taking 1 tablet daily at bedtime), Disp: 90 tablet, Rfl: 3    Incontinence Supply Disposable (SELECT DISPOSABLE UNDERWEAR LG) MISC, , Disp: , Rfl:     ketoconazole (NIZORAL) 2 % cream, Apply topically daily, Disp: 60 g, Rfl: 1    latanoprost (XALATAN) 0 005 % ophthalmic solution, Apply 1 drop to eye daily at bedtime Both eyes, Disp: 7 5 mL, Rfl: 1    levothyroxine 75 mcg tablet, Take 1 tablet (75 mcg total) by mouth daily, Disp: 90 tablet, Rfl: 3    Lidocaine-Menthol 4-5 % PTCH, lidocaine patch, Disp: , Rfl:     Melatonin 5 MG CAPS, 5 mg as needed , Disp: , Rfl:     nitroglycerin (NITROSTAT) 0 4 mg SL tablet, Nitrostat 0 4 mg sublingual tablet, Disp: , Rfl:     nystatin (MYCOSTATIN) powder, Apply topically 2 (two) times a day, Disp: 60 g, Rfl: 1    OLANZapine (ZyPREXA) 2 5 mg tablet, Take 1 tablet (2 5 mg total) by mouth daily, Disp: 90 tablet, Rfl: 3    omeprazole (PriLOSEC) 40 MG capsule, Take 1 capsule (40 mg total) by mouth daily (Patient taking differently: Take 40 mg by mouth as needed ), Disp: 90 capsule, Rfl: 3    potassium chloride (K-DUR,KLOR-CON) 20 mEq tablet, Take 1 tablet (20 mEq total) by mouth daily, Disp: 60 tablet, Rfl: 0    sitaGLIPtin (JANUVIA) 50 mg tablet, Take 1 tablet (50 mg total) by mouth daily, Disp: 90 tablet, Rfl: 3        Review of Systems:  Review of Systems   Constitutional: Negative for activity change, fever and unexpected weight change  HENT: Negative for facial swelling, nosebleeds and voice change  Respiratory: Positive for shortness of breath  Negative for chest tightness and wheezing      Cardiovascular: Negative for chest pain, palpitations and leg swelling  Gastrointestinal: Negative for abdominal distention  Genitourinary: Negative for hematuria  Musculoskeletal: Negative for arthralgias  Skin: Negative for color change, pallor, rash and wound  Neurological: Negative for dizziness, seizures and syncope  Psychiatric/Behavioral: Negative for agitation  Physical Exam:  Physical Exam   Constitutional: She is oriented to person, place, and time  She appears well-developed and well-nourished  HENT:   Head: Normocephalic and atraumatic  Eyes: EOM are normal    Neck: Normal range of motion  Neck supple  Cardiovascular: Normal rate, regular rhythm, normal heart sounds and intact distal pulses  Pulmonary/Chest: Effort normal and breath sounds normal    Abdominal: Soft  Musculoskeletal: Normal range of motion  Neurological: She is alert and oriented to person, place, and time  Skin: Skin is warm and dry  Psychiatric: She has a normal mood and affect  Her behavior is normal  Judgment and thought content normal    Vitals reviewed  This note was completed in part utilizing [employer] Flu[employer] Direct Software  Grammatical errors, random word insertions, spelling mistakes, and incomplete sentences can be an occasional consequence of this system secondary to software limitations, ambient noise, and hardware issues  If you have any questions or concerns about the content, text, or information contained within the body of this dictation, please contact the provider for clarification

## 2019-10-30 ENCOUNTER — OFFICE VISIT (OUTPATIENT)
Dept: CARDIAC SURGERY | Facility: CLINIC | Age: 84
End: 2019-10-30
Payer: COMMERCIAL

## 2019-10-30 VITALS
RESPIRATION RATE: 12 BRPM | TEMPERATURE: 97.6 F | HEART RATE: 68 BPM | WEIGHT: 174 LBS | DIASTOLIC BLOOD PRESSURE: 56 MMHG | SYSTOLIC BLOOD PRESSURE: 120 MMHG | BODY MASS INDEX: 32.02 KG/M2 | OXYGEN SATURATION: 94 % | HEIGHT: 62 IN

## 2019-10-30 DIAGNOSIS — I35.0 NONRHEUMATIC AORTIC VALVE STENOSIS: Primary | ICD-10-CM

## 2019-10-30 PROCEDURE — 99203 OFFICE O/P NEW LOW 30 MIN: CPT | Performed by: PHYSICIAN ASSISTANT

## 2019-10-30 NOTE — PROGRESS NOTES
Consultation - Cardiothoracic Surgery   Kyung Ness 80 y o  female MRN: [id number]    Physician Requesting Consult: Dr Lieutenant Arellano    Reason for Consult / Principal Problem: Aortic stenosis, Non-Rheumatic    History of Present Illness: Kyung Ness is a 80y o  year old female who presents for initial outpatient surgical consultation for severe symptomatic aortic stenosis  Ms Christiano Blanton has a history of chronic diastolic CHF and a hospitalization in August 2018 for acute diastolic CHF  She also has several other hospitalizations in the past for atypical chest pain  She complains of SMITH, PND and 2 pillow orthopnea  She also reports having a choking sensation in her throat that wakes her from sleep  She denies chest pain, lightheadedness, syncope, LE edema or palpitations  She remains active in her house, but requires assistance from her daughter for bathing  She is a lifelong non-smoker and does not drink alcohol  She denies any family history of cardiac disease  She obtains routine dental care      Past Medical History:  Past Medical History:   Diagnosis Date    Asthma     Atypical chest pain     CAD (coronary artery disease)     Candidal intertrigo     CHF (congestive heart failure) (HCC)     Depression     Diabetes mellitus (HCC)     Diabetic neuropathy (HCC)     Diverticulitis     Dyslipidemia     Female bladder prolapse     Gastric ulcer     Glaucoma     HTN (hypertension)     Hyperlipidemia     Hypothyroidism 4/18/2016    RAD (reactive airway disease)          Past Surgical History:   Past Surgical History:   Procedure Laterality Date    COLONOSCOPY      ESOPHAGOGASTRODUODENOSCOPY  2011    HYSTERECTOMY           Family History:  Family History   Problem Relation Age of Onset   [facility] Breast cancer Mother     Hypothyroidism Mother     Hypertension Father     Breast cancer Sister     Thyroid disease Sister     Hypertension Sister          Social History:    Social History     Substance and Sexual Activity   Alcohol Use No    Comment: Social Alcohol Use -- as per allscripts (pt denies all alcohol use)     Social History     Substance and Sexual Activity   Drug Use No     Social History     Tobacco Use   Smoking Status Never Smoker   Smokeless Tobacco Never Used       Home Medications:   Prior to Admission medications    Medication Sig Start Date End Date Taking? Authorizing Provider   acetaminophen-codeine (TYLENOL #3) 300-30 mg per tablet Take 1 tablet by mouth every 4 (four) hours as needed for moderate pain 12/28/18  Yes Madhav Phan MD   albuterol (VENTOLIN HFA) 90 mcg/act inhaler Inhale 2 puffs every 4 (four) hours as needed for wheezing 4/16/19  Yes Madhav Phan MD   aluminum-magnesium hydroxide-simethicone (MYLANTA) 200-200-20 mg/5 mL suspension Take 30 mL by mouth every 4 (four) hours as needed for indigestion or heartburn   Yes Historical Provider, MD   amLODIPine (NORVASC) 5 mg tablet Take 1 tablet (5 mg total) by mouth daily 10/9/19 11/8/19 Yes Ravindra Paul DO   aspirin 81 MG tablet Take 81 mg by mouth daily  Yes Historical Provider, MD   Cholecalciferol (VITAMIN D3) 2000 units capsule Take 1,000 Units by mouth daily     Yes Historical Provider, MD   dorzolamide (TRUSOPT) 2 % ophthalmic solution Administer 1 drop to both eyes 3 (three) times a day     Yes Historical Provider, MD   fluticasone (FLONASE) 50 mcg/act nasal spray 2 sprays into each nostril daily 3/16/19  Yes Madhav Phan MD   fluticasone-vilanterol (BREO ELLIPTA) 100-25 mcg/inh inhaler Inhale 1 puff daily Rinse mouth after use   10/15/19  Yes Shelia Cardozo DO   furosemide (LASIX) 20 mg tablet TAKE 1 TABLET BY MOUTH EVERY MORNING AND 2 TABLETS BY MOUTH EVERY EVENING 10/15/19  Yes Shelia Cardozo DO   gabapentin (NEURONTIN) 600 MG tablet TAKE 1/2 TABLET EVERY 8 HOURS AS NEEDED (CRANIAL NEURALGIA)  Patient taking differently: 300 mg daily at bedtime Taking 1 tablet daily at bedtime 2/25/19  Yes Madhav Phan MD Incontinence Supply Disposable (SELECT DISPOSABLE UNDERWEAR LG) MISC  6/14/19  Yes Historical Provider, MD   ketoconazole (NIZORAL) 2 % cream Apply topically daily 6/21/19  Yes Fariba Hurst MD   latanoprost (XALATAN) 0 005 % ophthalmic solution Apply 1 drop to eye daily at bedtime Both eyes 3/16/19  Yes Fariba Hurst MD   levothyroxine 75 mcg tablet Take 1 tablet (75 mcg total) by mouth daily 5/21/19  Yes Fariba Hurst MD   Lidocaine-Menthol 4-5 % PTCH lidocaine patch   Yes Historical Provider, MD   Melatonin 5 MG CAPS 5 mg as needed    Yes Historical Provider, MD   nitroglycerin (NITROSTAT) 0 4 mg SL tablet Nitrostat 0 4 mg sublingual tablet   Yes Historical Provider, MD   nystatin (MYCOSTATIN) powder Apply topically 2 (two) times a day 6/21/19  Yes Fariba Hurst MD   OLANZapine (ZyPREXA) 2 5 mg tablet Take 1 tablet (2 5 mg total) by mouth daily 2/27/19  Yes Fariba Hurst MD   omeprazole (PriLOSEC) 40 MG capsule Take 1 capsule (40 mg total) by mouth daily  Patient taking differently: Take 40 mg by mouth as needed  5/8/19  Yes Fariba Hurst MD   potassium chloride (K-DUR,KLOR-CON) 20 mEq tablet Take 1 tablet (20 mEq total) by mouth daily 10/8/19 11/7/19 Yes Ravindra Paul,    sitaGLIPtin (JANUVIA) 50 mg tablet Take 1 tablet (50 mg total) by mouth daily 2/27/19  Yes Fariba Hurst MD       Allergies: Allergies   Allergen Reactions    Statins      Other reaction(s): Weakness  Category: Adverse Reaction; Review of Systems:  Review of Systems   Constitutional: Negative  HENT: Negative for dental problem, facial swelling, sinus pressure, sneezing and trouble swallowing  Eyes: Negative  Respiratory: Positive for choking and shortness of breath  Negative for chest tightness  Cardiovascular: Negative  Negative for chest pain, palpitations and leg swelling  Gastrointestinal: Negative  Negative for abdominal pain  Endocrine: Negative  Genitourinary: Negative      Musculoskeletal: Negative  Allergic/Immunologic: Negative  Neurological: Negative  Negative for dizziness, syncope, weakness and light-headedness  Hematological: Negative for adenopathy  Does not bruise/bleed easily  Psychiatric/Behavioral: Negative  All other systems reviewed and are negative  Vital Signs:     Vitals:    10/30/19 1400 10/30/19 1422   BP: 122/56 120/56   BP Location: Left arm Right arm   Cuff Size: Adult    Pulse: 68    Resp: 12    Temp: 97 6 °F (36 4 °C)    TempSrc: Tympanic    SpO2: 94%    Weight: 78 9 kg (174 lb)    Height: 5' 2" (1 575 m)        Physical Exam:  Physical Exam   Constitutional: She is oriented to person, place, and time  She appears well-developed and well-nourished  No distress  HENT:   Head: Normocephalic and atraumatic  Right Ear: External ear normal    Left Ear: External ear normal    Nose: Nose normal    Mouth/Throat: Oropharynx is clear and moist  No oropharyngeal exudate  Eyes: Pupils are equal, round, and reactive to light  Conjunctivae and EOM are normal  Right eye exhibits no discharge  Left eye exhibits no discharge  No scleral icterus  Neck: Normal range of motion  Neck supple  No JVD present  No tracheal deviation present  Cardiovascular: Normal rate, regular rhythm and intact distal pulses  Exam reveals no gallop and no friction rub  Murmur heard  Systolic murmur is present with a grade of 3/6  Pulmonary/Chest: Effort normal and breath sounds normal  No stridor  No respiratory distress  She has no wheezes  She has no rales  Abdominal: Soft  Bowel sounds are normal  She exhibits no distension  There is no tenderness  There is no rebound and no guarding  Musculoskeletal: Normal range of motion  She exhibits edema  She exhibits no tenderness or deformity  Trace lower extremity edema bilaterally   Neurological: She is alert and oriented to person, place, and time  She displays normal reflexes  No cranial nerve deficit or sensory deficit   She exhibits normal muscle tone  Coordination normal    Skin: Skin is warm and dry  No rash noted  She is not diaphoretic  No erythema  No pallor  Psychiatric: She has a normal mood and affect  Her behavior is normal  Judgment and thought content normal    Nursing note and vitals reviewed  Lab Results:         Results from last 7 days   Lab Units 10/28/19  1024   POTASSIUM mmol/L 4 1   CHLORIDE mmol/L 110*   CO2 mmol/L 23   BUN mg/dL 23   CREATININE mg/dL 1 13   CALCIUM mg/dL 9 0         Lab Results   Component Value Date    HGBA1C 6 9 (H) 09/10/2019     Lab Results   Component Value Date    TROPONINI 0 02 10/05/2019       Imaging Studies:     Echocardiogram:    LEFT VENTRICLE: Size was normal  Systolic function was hyperdynamic  Ejection fraction was estimated to be 70 %  Intracavitary gradient of 27 mmHg at rest that is likely secondary to hyperdynamic left ventrcle plus basal septal  hypertrophy  Although no diagnostic regional wall motion abnormality was identified, this possibility cannot be completely excluded on the basis of this study  There is mild to moderate basal septal hypertrophy  No evidence of apical  thrombus  DOPPLER: The study was not technically sufficient to allow evaluation of LV diastolic function      RIGHT VENTRICLE: The size was normal  Systolic function was normal  Wall thickness was normal      LEFT ATRIUM: The atrium was mildly to moderately dilated      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      RIGHT ATRIUM: The atrium was mildly to moderately dilated      MITRAL VALVE: There was mild annular calcification  Valve structure was normal  There was normal leaflet separation  DOPPLER: The transmitral velocity was within the normal range  There was no evidence for stenosis  There was no  significant regurgitation      AORTIC VALVE: The valve was trileaflet  Leaflets exhibited normal thickness, moderate calcification, and moderately reduced cuspal separation   DOPPLER: Transaortic velocity was within the normal range  There was severe stenosis  Mean  gradient is 41 mmHg  Dimensionless index is 0 22  Valve area is approximately 0 7 cm2  There was mild regurgitation      TRICUSPID VALVE: The valve structure was normal  There was normal leaflet separation  DOPPLER: The transtricuspid velocity was within the normal range  There was no evidence for stenosis  There was mild regurgitation  Estimated peak PA  pressure was 44 mmHg plus right atrial pressure      PULMONIC VALVE: Leaflets exhibited normal thickness, no calcification, and normal cuspal separation  DOPPLER: The transpulmonic velocity was within the normal range  There was no significant regurgitation      PERICARDIUM: There was no pericardial effusion  The pericardium was normal in appearance      AORTA: The root exhibited mild dilatation      SYSTEMIC VEINS: IVC: The inferior vena cava was normal in size      SYSTEM MEASUREMENT TABLES     2D  Ao Diam: 3 4 cm  LA Area: 19 4 cm2  LA Diam: 4 2 cm  LVEDV MOD A4C: 31 1 ml  LVEF MOD A4C: 51 7 %  LVESV MOD A4C: 15 ml  LVLd A4C: 6 9 cm  LVLs A4C: 6 cm  LVOT Diam: 2 2 cm  RA Area: 20 1 cm2  RVIDd: 2 7 cm  SV MOD A4C: 16 1 ml     CW  AR Dec [place]: 3 7 m/s2  AR Dec Time: 1052 4 ms  AR PHT: 305 2 ms  AR Vmax: 3 9 m/s  AR maxP 8 mmHg  AV Env  Ti: 266 4 ms  AV VTI: 75 5 cm  AV Vmax: 3 8 m/s  AV Vmean: 2 8 m/s  AV maxP 3 mmHg  AV meanP 3 mmHg  TR Vmax: 3 3 m/s  TR maxP 9 mmHg     Doppler Continuous Wave  AV Pmax: 27 1 mmHg     MM  TAPSE: 1 4 cm     PW  INO (VTI): 1 1 cm2  INO Vmax: 1 1 cm2  E': 0 1 m/s  E' Lat: 0 1 m/s  LVOT Env  Ti: 281 6 ms  LVOT VTI: 21 3 cm  LVOT Vmax: 1 1 m/s  LVOT Vmean: 0 8 m/s  LVOT maxP 8 mmHg  LVOT meanP 7 mmHg  LVSV Dopp: 82 5 ml    CTA chest PE study:  No acute intrathoracic pathology identified    I have personally reviewed pertinent reports     and I have personally reviewed pertinent films in PACS    TAVR evaluation Assessment:     Jaz Goff: III    STS Risk Score: 4 1 %, mortality risk    KCCQ-12 completed        Assessment:  Patient Active Problem List    Diagnosis Date Noted    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[facility] ) 10/05/2019    Vitamin D deficiency 08/22/2019    Pain of both hip joints 06/21/2019    Candidal intertrigo 06/21/2019    Headache 01/09/2019    Gastroesophageal reflux disease without esophagitis 11/06/2018    Insomnia 10/12/2018    Chronic diastolic congestive heart failure ([facility] ) 08/05/2018    Type 2 diabetes mellitus, uncontrolled, with neuropathy ([facility] ) 08/05/2018    Medication management 04/13/2018    Dyspnea on minimal exertion 02/17/2018    Shakiness 05/23/2017    Allergic rhinitis 05/02/2017    Hyperlipidemia     Female bladder prolapse     Bilateral cold feet 12/28/2016    Aortic stenosis 10/21/2016    Arteriosclerosis of coronary artery     Asthma     Anxiety disorder 08/17/2016    Osteoarthritis 06/21/2016    Migraine 04/18/2016    Hypothyroidism 04/18/2016    Trigeminal neuralgia 03/16/2016    Peripheral vascular disease (HCC)     Glaucoma, open angle, severe stage     Overactive bladder 01/20/2016    Visual hallucinations 11/17/2015    General weakness 11/03/2015    Low back pain 11/03/2015    Hearing loss 07/27/2015    Mild cognitive impairment 07/27/2015    Urinary incontinence 07/27/2015    Dizziness 07/02/2015    Advanced age 06/03/2015    Irritable bowel 04/24/2015    Female cystocele 04/24/2015    Diverticulosis 04/03/2015    Hiatal hernia 04/03/2015    Ambulatory dysfunction 04/02/2015     Severe aortic stenosis; Ongoing TAVR workup    Plan:    Myrtis Lundborg has severe symptomatic aortic stenosis  Based on their STS risk assessment and review of her CTA of the chest, Ms Ruby Perdomo is not a suitable candidate for TAVR  Her CTA reveals extensive atherosclerotic calcifications, making TF TAVR unsuitable  She is not a suitable candidate for TA approach   Our recommendation is she continue with medical management and follow with her Cardiologist and PCP  Mikie Lamar was comfortable with our recommendations, and their questions were answered to their satisfaction  Thank you for allowing us to participate in the care of this patient       Routine referral to gastroenterology for colonoscopy screening was not indicated, as the patient is over 76years old    SIGNATURE: Stevie Muro  DATE: October 30, 2019  TIME: 2:53 PM

## 2019-10-30 NOTE — LETTER
October 30, 2019     Chao Andrade [address] 1983 [facility]  [address] [address] [address]    Patient: Archie Carey   YOB: 1922   Date of Visit: 10/30/2019       Dear Dr Margarita Hdz: Thank you for referring Dale Morton to me for evaluation  Below are my notes for this consultation  If you have questions, please do not hesitate to call me  I look forward to following your patient along with you  Sincerely,        Bebeto Weiss,         CC: No Recipients  Pedro Minaya, [place]  10/30/2019  3:18 PM  Attested  Consultation - Cardiothoracic Surgery   Archie Carey 80 y o  female MRN: [id number]    Physician Requesting Consult: Dr Rose Marie Espinoza    Reason for Consult / Principal Problem: Aortic stenosis, Non-Rheumatic    History of Present Illness: Archie Carey is a 80y o  year old female who presents for initial outpatient surgical consultation for severe symptomatic aortic stenosis  Ms Stacey Awan has a history of chronic diastolic CHF and a hospitalization in August 2018 for acute diastolic CHF  She also has several other hospitalizations in the past for atypical chest pain  She complains of SMITH, PND and 2 pillow orthopnea  She also reports having a choking sensation in her throat that wakes her from sleep  She denies chest pain, lightheadedness, syncope, LE edema or palpitations  She remains active in her house, but requires assistance from her daughter for bathing  She is a lifelong non-smoker and does not drink alcohol  She denies any family history of cardiac disease  She obtains routine dental care      Past Medical History:  Past Medical History:   Diagnosis Date    Asthma     Atypical chest pain     CAD (coronary artery disease)     Candidal intertrigo     CHF (congestive heart failure) (HCC)     Depression     Diabetes mellitus (HCC)     Diabetic neuropathy (HCC)     Diverticulitis     Dyslipidemia     Female bladder prolapse     Gastric ulcer     Glaucoma     HTN (hypertension)     Hyperlipidemia     Hypothyroidism 4/18/2016    RAD (reactive airway disease)          Past Surgical History:   Past Surgical History:   Procedure Laterality Date    COLONOSCOPY      ESOPHAGOGASTRODUODENOSCOPY  2011    HYSTERECTOMY           Family History:  Family History   Problem Relation Age of Onset   [facility] Breast cancer Mother     Hypothyroidism Mother    [facility] Hypertension Father     Breast cancer Sister     Thyroid disease Sister     Hypertension Sister          Social History:    Social History     Substance and Sexual Activity   Alcohol Use No    Comment: Social Alcohol Use -- as per allscripts (pt denies all alcohol use)     Social History     Substance and Sexual Activity   Drug Use No     Social History     Tobacco Use   Smoking Status Never Smoker   Smokeless Tobacco Never Used       Home Medications:   Prior to Admission medications    Medication Sig Start Date End Date Taking? Authorizing Provider   acetaminophen-codeine (TYLENOL #3) 300-30 mg per tablet Take 1 tablet by mouth every 4 (four) hours as needed for moderate pain 12/28/18  Yes Garcia Dillon MD   albuterol (VENTOLIN HFA) 90 mcg/act inhaler Inhale 2 puffs every 4 (four) hours as needed for wheezing 4/16/19  Yes Garcia Dillon MD   aluminum-magnesium hydroxide-simethicone (MYLANTA) 200-200-20 mg/5 mL suspension Take 30 mL by mouth every 4 (four) hours as needed for indigestion or heartburn   Yes Historical Provider, MD   amLODIPine (NORVASC) 5 mg tablet Take 1 tablet (5 mg total) by mouth daily 10/9/19 11/8/19 Yes Ravindra Paul,    aspirin 81 MG tablet Take 81 mg by mouth daily     Yes Historical Provider, MD   Cholecalciferol (VITAMIN D3) 2000 units capsule Take 1,000 Units by mouth daily     Yes Historical Provider, MD   dorzolamide (TRUSOPT) 2 % ophthalmic solution Administer 1 drop to both eyes 3 (three) times a day     Yes Historical Provider, MD   fluticasone (FLONASE) 50 mcg/act nasal spray 2 sprays into each nostril daily 3/16/19  Yes Melissa Parisi MD   fluticasone-vilanterol (BREO ELLIPTA) 100-25 mcg/inh inhaler Inhale 1 puff daily Rinse mouth after use  10/15/19  Yes Shelia Cardozo DO   furosemide (LASIX) 20 mg tablet TAKE 1 TABLET BY MOUTH EVERY MORNING AND 2 TABLETS BY MOUTH EVERY EVENING 10/15/19  Yes Shelia Cardozo DO   gabapentin (NEURONTIN) 600 MG tablet TAKE 1/2 TABLET EVERY 8 HOURS AS NEEDED (CRANIAL NEURALGIA)  Patient taking differently: 300 mg daily at bedtime Taking 1 tablet daily at bedtime 2/25/19  Yes Melissa Parisi MD   Incontinence Supply Disposable (SELECT DISPOSABLE UNDERWEAR LG) MISC  6/14/19  Yes Historical Provider, MD   ketoconazole (NIZORAL) 2 % cream Apply topically daily 6/21/19  Yes Melissa Parisi MD   latanoprost (XALATAN) 0 005 % ophthalmic solution Apply 1 drop to eye daily at bedtime Both eyes 3/16/19  Yes Melissa Parisi MD   levothyroxine 75 mcg tablet Take 1 tablet (75 mcg total) by mouth daily 5/21/19  Yes Melissa Parisi MD   Lidocaine-Menthol 4-5 % PTCH lidocaine patch   Yes Historical Provider, MD   Melatonin 5 MG CAPS 5 mg as needed    Yes Historical Provider, MD   nitroglycerin (NITROSTAT) 0 4 mg SL tablet Nitrostat 0 4 mg sublingual tablet   Yes Historical Provider, MD   nystatin (MYCOSTATIN) powder Apply topically 2 (two) times a day 6/21/19  Yes Melissa Parisi MD   OLANZapine (ZyPREXA) 2 5 mg tablet Take 1 tablet (2 5 mg total) by mouth daily 2/27/19  Yes Melissa Parisi MD   omeprazole (PriLOSEC) 40 MG capsule Take 1 capsule (40 mg total) by mouth daily  Patient taking differently: Take 40 mg by mouth as needed  5/8/19  Yes Melissa Parisi MD   potassium chloride (K-DUR,KLOR-CON) 20 mEq tablet Take 1 tablet (20 mEq total) by mouth daily 10/8/19 11/7/19 Yes Ravindra Paul DO   sitaGLIPtin (JANUVIA) 50 mg tablet Take 1 tablet (50 mg total) by mouth daily 2/27/19  Yes Melissa Parisi MD       Allergies:   Allergies   Allergen Reactions    Statins      Other reaction(s): Weakness  Category: Adverse Reaction; Review of Systems:  Review of Systems   Constitutional: Negative  HENT: Negative for dental problem, facial swelling, sinus pressure, sneezing and trouble swallowing  Eyes: Negative  Respiratory: Positive for choking and shortness of breath  Negative for chest tightness  Cardiovascular: Negative  Negative for chest pain, palpitations and leg swelling  Gastrointestinal: Negative  Negative for abdominal pain  Endocrine: Negative  Genitourinary: Negative  Musculoskeletal: Negative  Allergic/Immunologic: Negative  Neurological: Negative  Negative for dizziness, syncope, weakness and light-headedness  Hematological: Negative for adenopathy  Does not bruise/bleed easily  Psychiatric/Behavioral: Negative  All other systems reviewed and are negative  Vital Signs:     Vitals:    10/30/19 1400 10/30/19 1422   BP: 122/56 120/56   BP Location: Left arm Right arm   Cuff Size: Adult    Pulse: 68    Resp: 12    Temp: 97 6 °F (36 4 °C)    TempSrc: Tympanic    SpO2: 94%    Weight: 78 9 kg (174 lb)    Height: 5' 2" (1 575 m)        Physical Exam:  Physical Exam   Constitutional: She is oriented to person, place, and time  She appears well-developed and well-nourished  No distress  HENT:   Head: Normocephalic and atraumatic  Right Ear: External ear normal    Left Ear: External ear normal    Nose: Nose normal    Mouth/Throat: Oropharynx is clear and moist  No oropharyngeal exudate  Eyes: Pupils are equal, round, and reactive to light  Conjunctivae and EOM are normal  Right eye exhibits no discharge  Left eye exhibits no discharge  No scleral icterus  Neck: Normal range of motion  Neck supple  No JVD present  No tracheal deviation present  Cardiovascular: Normal rate, regular rhythm and intact distal pulses  Exam reveals no gallop and no friction rub  Murmur heard  Systolic murmur is present with a grade of 3/6    Pulmonary/Chest: Effort normal and breath sounds normal  No stridor  No respiratory distress  She has no wheezes  She has no rales  Abdominal: Soft  Bowel sounds are normal  She exhibits no distension  There is no tenderness  There is no rebound and no guarding  Musculoskeletal: Normal range of motion  She exhibits edema  She exhibits no tenderness or deformity  Trace lower extremity edema bilaterally   Neurological: She is alert and oriented to person, place, and time  She displays normal reflexes  No cranial nerve deficit or sensory deficit  She exhibits normal muscle tone  Coordination normal    Skin: Skin is warm and dry  No rash noted  She is not diaphoretic  No erythema  No pallor  Psychiatric: She has a normal mood and affect  Her behavior is normal  Judgment and thought content normal    Nursing note and vitals reviewed  Lab Results:         Results from last 7 days   Lab Units 10/28/19  1024   POTASSIUM mmol/L 4 1   CHLORIDE mmol/L 110*   CO2 mmol/L 23   BUN mg/dL 23   CREATININE mg/dL 1 13   CALCIUM mg/dL 9 0         Lab Results   Component Value Date    HGBA1C 6 9 (H) 09/10/2019     Lab Results   Component Value Date    TROPONINI 0 02 10/05/2019       Imaging Studies:     Echocardiogram:    LEFT VENTRICLE: Size was normal  Systolic function was hyperdynamic  Ejection fraction was estimated to be 70 %  Intracavitary gradient of 27 mmHg at rest that is likely secondary to hyperdynamic left ventrcle plus basal septal  hypertrophy  Although no diagnostic regional wall motion abnormality was identified, this possibility cannot be completely excluded on the basis of this study  There is mild to moderate basal septal hypertrophy  No evidence of apical  thrombus   DOPPLER: The study was not technically sufficient to allow evaluation of LV diastolic function      RIGHT VENTRICLE: The size was normal  Systolic function was normal  Wall thickness was normal      LEFT ATRIUM: The atrium was mildly to moderately dilated      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      RIGHT ATRIUM: The atrium was mildly to moderately dilated      MITRAL VALVE: There was mild annular calcification  Valve structure was normal  There was normal leaflet separation  DOPPLER: The transmitral velocity was within the normal range  There was no evidence for stenosis  There was no  significant regurgitation      AORTIC VALVE: The valve was trileaflet  Leaflets exhibited normal thickness, moderate calcification, and moderately reduced cuspal separation  DOPPLER: Transaortic velocity was within the normal range  There was severe stenosis  Mean  gradient is 41 mmHg  Dimensionless index is 0 22  Valve area is approximately 0 7 cm2  There was mild regurgitation      TRICUSPID VALVE: The valve structure was normal  There was normal leaflet separation  DOPPLER: The transtricuspid velocity was within the normal range  There was no evidence for stenosis  There was mild regurgitation  Estimated peak PA  pressure was 44 mmHg plus right atrial pressure      PULMONIC VALVE: Leaflets exhibited normal thickness, no calcification, and normal cuspal separation  DOPPLER: The transpulmonic velocity was within the normal range  There was no significant regurgitation      PERICARDIUM: There was no pericardial effusion  The pericardium was normal in appearance      AORTA: The root exhibited mild dilatation      SYSTEMIC VEINS: IVC: The inferior vena cava was normal in size      SYSTEM MEASUREMENT TABLES     2D  Ao Diam: 3 4 cm  LA Area: 19 4 cm2  LA Diam: 4 2 cm  LVEDV MOD A4C: 31 1 ml  LVEF MOD A4C: 51 7 %  LVESV MOD A4C: 15 ml  LVLd A4C: 6 9 cm  LVLs A4C: 6 cm  LVOT Diam: 2 2 cm  RA Area: 20 1 cm2  RVIDd: 2 7 cm  SV MOD A4C: 16 1 ml     CW  AR Dec Collin: 3 7 m/s2  AR Dec Time: 1052 4 ms  AR PHT: 305 2 ms  AR Vmax: 3 9 m/s  AR maxP 8 mmHg  AV Env  Ti: 266 4 ms  AV VTI: 75 5 cm  AV Vmax: 3 8 m/s  AV Vmean: 2 8 m/s  AV maxP 3 mmHg  AV meanP 3 mmHg  TR Vmax: 3 3 m/s  TR maxP 9 mmHg     Doppler Continuous Wave  AV Pmax: 27 1 mmHg     MM  TAPSE: 1 4 cm     PW  INO (VTI): 1 1 cm2  INO Vmax: 1 1 cm2  E': 0 1 m/s  E' Lat: 0 1 m/s  LVOT Env  Ti: 281 6 ms  LVOT VTI: 21 3 cm  LVOT Vmax: 1 1 m/s  LVOT Vmean: 0 8 m/s  LVOT maxP 8 mmHg  LVOT meanP 7 mmHg  LVSV Dopp: 82 5 ml    CTA chest PE study:  No acute intrathoracic pathology identified    I have personally reviewed pertinent reports     and I have personally reviewed pertinent films in PACS    TAVR evaluation Assessment:     Nicole Lopez 122: III    STS Risk Score: 4 1 %, mortality risk    KCCQ-12 completed        Assessment:  Patient Active Problem List    Diagnosis Date Noted    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[facility] [place] [facility] ) 10/05/2019    Vitamin D deficiency 2019    Pain of both hip joints 2019    Candidal intertrigo 2019    Headache 2019    Gastroesophageal reflux disease without esophagitis 2018    Insomnia 10/12/2018    Chronic diastolic congestive heart failure ([facility] ) 2018    Type 2 diabetes mellitus, uncontrolled, with neuropathy ([facility] ) 2018    Medication management 2018    Dyspnea on minimal exertion 2018    Shakiness 2017    Allergic rhinitis 2017    Hyperlipidemia     Female bladder prolapse     Bilateral cold feet 2016    Aortic stenosis 10/21/2016    Arteriosclerosis of coronary artery     Asthma     Anxiety disorder 2016    Osteoarthritis 2016    Migraine 2016    Hypothyroidism 2016    Trigeminal neuralgia 2016    Peripheral vascular disease ([facility] )     Glaucoma, open angle, severe stage     Overactive bladder 2016    Visual hallucinations 2015    General weakness 2015    Low back pain 2015    Hearing loss 2015    Mild cognitive impairment 2015    Urinary incontinence 2015    Dizziness 2015    Advanced age 2015   Reggie Brock Irritable bowel 04/24/2015    Female cystocele 04/24/2015    Diverticulosis 04/03/2015    Hiatal hernia 04/03/2015    Ambulatory dysfunction 04/02/2015     Severe aortic stenosis; Ongoing TAVR workup    Plan:    Merlyn Boas has severe symptomatic aortic stenosis  Based on their STS risk assessment and review of her CTA of the chest, Ms Wilbert Rocha is not a suitable candidate for TAVR  Her CTA reveals extensive atherosclerotic calcifications, making TF TAVR unsuitable  She is not a suitable candidate for TA approach  Our recommendation is she continue with medical management and follow with her Cardiologist and PCP  Catherine Boas was comfortable with our recommendations, and their questions were answered to their satisfaction  Thank you for allowing us to participate in the care of this patient  Routine referral to gastroenterology for colonoscopy screening was not indicated, as the patient is over 76years old    SIGNATURE: Stevie Ulloa  DATE: October 30, 2019  TIME: 2:53 PM  Attestation signed by Zohra Thompson DO at 10/30/2019  3:54 PM:  The patient was seen and examined, and I agree with the midlevel's history, physical exam, assessment and plan with the following additions:    Clau Mckay was seen in the office for evaluation of her aortic stenosis  Her echocardiogram and CT scans were reviewed by myself personally and discussed with her and her daughter  She does indeed have aortic stenosis and is symptomatic  However with her other comorbidities, in particular her age and amount of arch calcification and lower extremity aortoiliac calcification, she would be high risk for rupture of iliac artery, or stroke from arch calcium  We would not consider a transapical approach in her age  Given her age and comorbidities and calcification of her aorta I recommended continued medical therapy and no further procedure

## 2019-10-31 ENCOUNTER — TELEPHONE (OUTPATIENT)
Dept: FAMILY MEDICINE CLINIC | Facility: CLINIC | Age: 84
End: 2019-10-31

## 2019-10-31 NOTE — TELEPHONE ENCOUNTER
CALLED PT TO CONFIRM THAT SHE IS STILL A PT AT OUR OFFICE OR IS SHE SEEING [facility] OFFICE  PT WILL CONTACT HER INSUR CAN HAVE DR JOSE RUIZ AS HER PCP AND WOULD LIKE TO CONTINUE TO SEE KB

## 2019-11-01 ENCOUNTER — RX ONLY (RX ONLY)
Age: 84
End: 2019-11-01

## 2019-11-01 ENCOUNTER — DOCTOR'S OFFICE (OUTPATIENT)
Dept: URBAN - METROPOLITAN AREA CLINIC 136 | Facility: CLINIC | Age: 84
Setting detail: OPHTHALMOLOGY
End: 2019-11-01
Payer: COMMERCIAL

## 2019-11-01 DIAGNOSIS — I20.8 STABLE ANGINA (HCC): Primary | ICD-10-CM

## 2019-11-01 DIAGNOSIS — H34.8313: ICD-10-CM

## 2019-11-01 DIAGNOSIS — H34.8312: ICD-10-CM

## 2019-11-01 PROCEDURE — 67028 INJECTION EYE DRUG: CPT | Performed by: OPHTHALMOLOGY

## 2019-11-01 PROCEDURE — 92134 CPTRZ OPH DX IMG PST SGM RTA: CPT | Performed by: OPHTHALMOLOGY

## 2019-11-01 ASSESSMENT — REFRACTION_AUTOREFRACTION
OD_SPHERE: -1.25
OD_CYLINDER: -4.50
OS_SPHERE: +0.25
OS_CYLINDER: -4.75
OD_AXIS: 80
OS_AXIS: 81

## 2019-11-01 ASSESSMENT — SPHEQUIV_DERIVED
OS_SPHEQUIV: -2.125
OD_SPHEQUIV: -3.5

## 2019-11-01 ASSESSMENT — VISUAL ACUITY
OD_BCVA: 20/70-1
OS_BCVA: 20/60-1

## 2019-11-04 NOTE — PROGRESS NOTES
Palliative and Supportive Care   Melony Foster 80 y o  female [id number]    Assessment/Plan:  1  Nonrheumatic aortic valve stenosis    2  Dyspnea on minimal exertion    3  Chronic diastolic congestive heart failure ([address] )    4  Moderate persistent asthma, unspecified whether complicated    5  Paroxysmal nocturnal dyspnea    6  Orthopnea    7  Shortness of breath          Symptom management: dyspnea on exertion, paroxysmal nocturnal dyspnea    - discussed use of opioids for dyspnea but I would prefer to hold off for now, as she is usually able to recover quickly after exertion, and concern about increasing fall risk, causing drowsiness, etc     - Patient is not interested in hospital bed  - Walked patient around the house, O2 sat dropped to 91% from 95% but not lower  Therefore wouldn't qualify for O2 on that basis at this time  - explained to patient Buxton is meant as a maintenance med, and should be used on daily basis, not as needed    - Given patient's symptoms of waking with sensation of choking at night accompanied by dyspnea, will order overnight pulsoximetry testing  Goals of care:  - Patient has end-stage condition, with severe AS and not candidate for surgical intervention  At this time she remains treatment-focused, and would like to continue with plan for medical optimization  She would elect to return to the hospital for acute issues in the future, for example to optimize volume status in event of heart failure exacerbation, etc  She indicates that she would likely elect DNR status, however she was not at all interested in completing POLST form today, after purpose was explained  Recommend visiting the topic of code status to clarify, if patient were to be hospitalized        Follow up in one month      Subjective    Chief Concern  New patient consultation for PALS at home, severe AS         History of Present Illness  Patient ID: Melony Foster is a 80 y o  female with history of chronic diastolic heart failure, severe AS, DM, chronic atypical chest pain, possible CAD  She was recently evaluated by cardiothoracic surgery for possible surgical intervention for AS as she is quite symptomatic however was deemed not to be a candidate for any procedures, due to age, co-morbidities, and calcification of her aorta  Most recent hospitalization was 10/5-10/8 with dyspnea, found to have severe AS, and diuretics were altered, also was started on inhalers  Dyspnea improved and she was discharged home  She is receiving VNA services but these will end soon  Patient has dyspnea on exertion; she recovers with rest  She occasionally has dyspnea at rest  Patient sleeps in bed propped by two pillows  She is sometimes awoken at night by choking sensation/ sensation that something is caught in her throat, which is accompanied by dyspnea  She has history of paroxysmal nocturnal dyspnea  Has been taking Breo on an as-needed basis, along with albuterol inhaler  Minimal improvement in dyspnea with use of albuterol inhaler  Lately sleeping well  Mood is described as "up and down," sometimes sad, but overall ok  Other history includes chronic left sided abdominal pain of unclear etiology and visual hallucinations  Patient reports that she has not had the chronic left-sided abdominal/ lateral side/ flank pain has been occurring for years but lately has not occurred often, only on rare occasion  Takes PRN Tylenol-codeine very rarely if needed when pain occurs  Lives w/ daughter Taryn Bastrop  Patient manages her own medications  Patient is able to ambulate around the house independently, with use of cane  She is able to leave home using cane  Daughter assists patient with showering  She has a shower chair  Daughter transports to appointments  No recent falls         The following portions of the patient's history were reviewed and updated as appropriate: allergies, current medications, past family history, past medical history, past social history, past surgical history and problem list       Visit Information    Accompanied By: daughter Miller Benoit present during visit but did not participate in visit for the most part  Source of History: Self  History Limitations: None    ROS  Review of Systems   Constitutional: Positive for fatigue  Negative for fever  Respiratory: Positive for shortness of breath  Cardiovascular: Negative for leg swelling  Musculoskeletal: Negative  Psychiatric/Behavioral: Positive for dysphoric mood (occasional)  Negative for sleep disturbance  Objective     Physical Exam   Constitutional: She is oriented to person, place, and time  She is cooperative  Cardiovascular: Normal rate and regular rhythm  Murmur heard  Systolic murmur is present  No lower extremity edema   Pulmonary/Chest: Effort normal and breath sounds normal    Neurological: She is alert and oriented to person, place, and time  Psychiatric: She has a normal mood and affect  Cognition and memory are normal          /60   Pulse 67   Wt 78 kg (172 lb)   SpO2 95%   BMI 31 46 kg/m²         Current Outpatient Medications:     Cholecalciferol (VITAMIN D3) 2000 units capsule, Take 2,000 Units by mouth daily , Disp: , Rfl:     acetaminophen-codeine (TYLENOL #3) 300-30 mg per tablet, Take 1 tablet by mouth every 4 (four) hours as needed for moderate pain, Disp: 30 tablet, Rfl: 0    albuterol (VENTOLIN HFA) 90 mcg/act inhaler, Inhale 2 puffs every 4 (four) hours as needed for wheezing, Disp: 1 Inhaler, Rfl: 3    aluminum-magnesium hydroxide-simethicone (MYLANTA) 200-200-20 mg/5 mL suspension, Take 30 mL by mouth every 4 (four) hours as needed for indigestion or heartburn, Disp: , Rfl:     amLODIPine (NORVASC) 5 mg tablet, Take 1 tablet (5 mg total) by mouth daily, Disp: 90 tablet, Rfl: 1    aspirin 81 MG tablet, Take 81 mg by mouth daily  , Disp: , Rfl:     dorzolamide (TRUSOPT) 2 % ophthalmic solution, Administer 1 drop to both eyes 3 (three) times a day  , Disp: , Rfl:     fluticasone (FLONASE) 50 mcg/act nasal spray, 2 sprays into each nostril daily, Disp: 3 Bottle, Rfl: 3    fluticasone-vilanterol (BREO ELLIPTA) 100-25 mcg/inh inhaler, Inhale 1 puff daily Rinse mouth after use , Disp: 1 Inhaler, Rfl: 5    furosemide (LASIX) 20 mg tablet, TAKE 1 TABLET BY MOUTH EVERY MORNING AND 2 TABLETS BY MOUTH EVERY EVENING, Disp: 270 tablet, Rfl: 11    gabapentin (NEURONTIN) 600 MG tablet, TAKE 1/2 TABLET EVERY 8 HOURS AS NEEDED (CRANIAL NEURALGIA) (Patient taking differently: 600 mg daily at bedtime ), Disp: 90 tablet, Rfl: 3    Incontinence Supply Disposable (SELECT DISPOSABLE UNDERWEAR LG) MISC, , Disp: , Rfl:     ketoconazole (NIZORAL) 2 % cream, Apply topically daily, Disp: 60 g, Rfl: 1    latanoprost (XALATAN) 0 005 % ophthalmic solution, Apply 1 drop to eye daily at bedtime Both eyes, Disp: 7 5 mL, Rfl: 1    levothyroxine 75 mcg tablet, Take 1 tablet (75 mcg total) by mouth daily, Disp: 90 tablet, Rfl: 3    Lidocaine-Menthol 4-5 % PTCH, lidocaine patch, Disp: , Rfl:     Melatonin 5 MG CAPS, 5 mg as needed , Disp: , Rfl:     nitroglycerin (NITROSTAT) 0 4 mg SL tablet, Place 1 tablet (0 4 mg total) under the tongue every 5 (five) minutes as needed for chest pain, Disp: 25 tablet, Rfl: 3    nystatin (MYCOSTATIN) powder, Apply topically 2 (two) times a day, Disp: 60 g, Rfl: 1    OLANZapine (ZyPREXA) 2 5 mg tablet, Take 1 tablet (2 5 mg total) by mouth daily, Disp: 90 tablet, Rfl: 3    omeprazole (PriLOSEC) 40 MG capsule, Take 1 capsule (40 mg total) by mouth daily, Disp: 90 capsule, Rfl: 3    potassium chloride (K-DUR,KLOR-CON) 20 mEq tablet, Take 1 tablet (20 mEq total) by mouth daily, Disp: 60 tablet, Rfl: 0    sitaGLIPtin (JANUVIA) 50 mg tablet, Take 1 tablet (50 mg total) by mouth daily, Disp: 90 tablet, Rfl: [address] [facility], [address] [place] [address] Palliative and Supportive Care

## 2019-11-05 RX ORDER — NITROGLYCERIN 0.4 MG/1
0.4 TABLET SUBLINGUAL
Qty: 25 TABLET | Refills: 3 | Status: SHIPPED | OUTPATIENT
Start: 2019-11-05 | End: 2022-03-09 | Stop reason: HOSPADM

## 2019-11-06 ENCOUNTER — IN HOME VISIT (OUTPATIENT)
Dept: PALLIATIVE MEDICINE | Facility: CLINIC | Age: 84
End: 2019-11-06
Payer: COMMERCIAL

## 2019-11-06 VITALS
BODY MASS INDEX: 31.46 KG/M2 | DIASTOLIC BLOOD PRESSURE: 60 MMHG | OXYGEN SATURATION: 95 % | HEART RATE: 67 BPM | WEIGHT: 172 LBS | SYSTOLIC BLOOD PRESSURE: 116 MMHG

## 2019-11-06 DIAGNOSIS — I50.32 CHRONIC DIASTOLIC CONGESTIVE HEART FAILURE (HCC): Chronic | ICD-10-CM

## 2019-11-06 DIAGNOSIS — R06.00 DYSPNEA ON MINIMAL EXERTION: ICD-10-CM

## 2019-11-06 DIAGNOSIS — R06.01 ORTHOPNEA: ICD-10-CM

## 2019-11-06 DIAGNOSIS — R06.00 PAROXYSMAL NOCTURNAL DYSPNEA: ICD-10-CM

## 2019-11-06 DIAGNOSIS — I35.0 NONRHEUMATIC AORTIC VALVE STENOSIS: Primary | Chronic | ICD-10-CM

## 2019-11-06 DIAGNOSIS — R06.02 SHORTNESS OF BREATH: ICD-10-CM

## 2019-11-06 DIAGNOSIS — J45.40 MODERATE PERSISTENT ASTHMA, UNSPECIFIED WHETHER COMPLICATED: ICD-10-CM

## 2019-11-06 PROCEDURE — 99342 HOME/RES VST NEW LOW MDM 30: CPT | Performed by: NURSE PRACTITIONER

## 2019-11-07 DIAGNOSIS — I35.0 NONRHEUMATIC AORTIC VALVE STENOSIS: Chronic | ICD-10-CM

## 2019-11-07 RX ORDER — AMLODIPINE BESYLATE 5 MG/1
5 TABLET ORAL DAILY
Qty: 90 TABLET | Refills: 1 | Status: SHIPPED | OUTPATIENT
Start: 2019-11-07 | End: 2020-06-02

## 2019-11-11 ENCOUNTER — TELEPHONE (OUTPATIENT)
Dept: NEUROLOGY | Facility: CLINIC | Age: 84
End: 2019-11-11

## 2019-11-11 PROBLEM — R06.00 PAROXYSMAL NOCTURNAL DYSPNEA: Status: ACTIVE | Noted: 2019-11-11

## 2019-11-11 PROBLEM — R06.01 ORTHOPNEA: Status: ACTIVE | Noted: 2019-11-11

## 2019-11-11 PROBLEM — R06.02 SHORTNESS OF BREATH: Status: ACTIVE | Noted: 2019-11-11

## 2019-11-12 ENCOUNTER — TELEPHONE (OUTPATIENT)
Dept: FAMILY MEDICINE CLINIC | Facility: CLINIC | Age: 84
End: 2019-11-12

## 2019-11-12 NOTE — TELEPHONE ENCOUNTER
Per call from Aster CHAUDHRY with [facility]- pt called her stating she was very weak and had diarrhea that started yesterday  Decreased appetite  Took meds yesterday but not yet today  /64    HR 70   O2 96%     Nurse believes it may be diet related as pt had chili on Sunday  She recommends BRAT diet and will have pt call if symptoms continue

## 2019-11-13 ENCOUNTER — OFFICE VISIT (OUTPATIENT)
Dept: NEUROLOGY | Facility: CLINIC | Age: 84
End: 2019-11-13
Payer: COMMERCIAL

## 2019-11-13 VITALS
HEART RATE: 64 BPM | DIASTOLIC BLOOD PRESSURE: 58 MMHG | BODY MASS INDEX: 31.09 KG/M2 | SYSTOLIC BLOOD PRESSURE: 119 MMHG | WEIGHT: 170 LBS

## 2019-11-13 DIAGNOSIS — R26.2 AMBULATORY DYSFUNCTION: Primary | ICD-10-CM

## 2019-11-13 DIAGNOSIS — R51.9 NONINTRACTABLE EPISODIC HEADACHE, UNSPECIFIED HEADACHE TYPE: ICD-10-CM

## 2019-11-13 DIAGNOSIS — G52.9 CRANIAL NEURALGIA: ICD-10-CM

## 2019-11-13 PROCEDURE — 99214 OFFICE O/P EST MOD 30 MIN: CPT | Performed by: PHYSICIAN ASSISTANT

## 2019-11-13 NOTE — PROGRESS NOTES
Patient ID: Archie Carey is a 80 y o  female  Assessment/Plan:     Diagnoses and all orders for this visit:    Ambulatory dysfunction    Cranial neuralgia    Nonintractable episodic headache, unspecified headache type         Headaches are unilateral and occur 2x/week at the most, they begin while laying in bed ready to fall asleep but do not wake her up from sleep  No migraine features, although difficult for her to give me a good history partly because she is in bed in the dark room when a headache comes on  No autonomic features associated with the headache  Very low suspicion for GCA  She did not want to increase gabapentin, and she did not want to try an alternative  She was agreeable to more conservative treatments such as magnesium and riboflavin  She will increase melatonin as discussed in the office, and if this does not help her sleep she should see sleep medicine in a formal evaluation  Discussed fall precautions: continue to use a cane at all times, avoid lose rugs in the house and keep the house well lit  Her daughter lives with her  Discussed trying to maintain more regular exercise in an attempt to improve headaches and sleep function  She likes to walk outside short distances, and her daughter can go with her  She should also do stretching exercises she learned in former PT  I asked the patient to follow up in 4-6 weeks  She prefers to follow up as needed  The patient should not hesitate to call me prior to her follow up with any questions or concerns  The patient was instructed to urgently call 911 or present to the nearest emergency room with any new or worsening neurological deficits  Subjective:    HPI     Ms Archie Carey is a very pleasant 81 yo female who is here for neurological follow-up for headaches  She is accompanied by her daughter today  I saw her about 2 years ago and she canceled some appointments because her headaches were better    In the past several months she has developed headaches again which are similar to the HAs described before  This time the headaches are  The unilateral and more so on the left  They start in the left occipital region and radiate forward into apex and then frontal region  Describes 8/10 at worst, usually it is only a 3/10  She takes Tylenol and she thinks it helps  This headache occurs 2 times per week on average  It never occurs during the day  No eye pain, lacrimation, congestion  Denies dizziness, light or sound sensitivity, nausea  The pain is described as a throbbing, sometimes sharp, sometimes dull  She continues gabapentin 600 mg q h s     She is unsure if it is helping  We prescribed this before and she stopped it because the headaches were better  When the headaches became worse she restarted it  She cannot tell me whether the gabapentin recently improved the headaches but she restarted it in the last few weeks  Denies side effects to gabapentin at this time  She denies any focal neurologic deficits such as facial droop, arm weakness or numbness  She denies worsening gait instability and continues to use a cane  Denies falls  She has difficulty falling asleep  Sometimes she cannot fall asleep at all  Over the past several weeks she had no sleep in 48 hours  She denies that the headache keeps her up she does cannot sleep  She takes melatonin 5 mg q h s  But it does not seem to help  Sometimes she awakens with a jolt or a feeling of falling  She denies restless leg  She denies snoring, rather her daughter denies that she hears snoring  Prior documentation: states the pain starts at the apex and radiates in a straight line down to the LEFT forehead and directly above the left eye near the supra orbital notch  It is dull and achy, no sharp, electric, burning or ice pick like pain  Pain was 10/10 at worst, but currently is 5/10   The patient reports blurred vision, but this is 2/2 glaucoma and she sees her eye doctor regularly for this  She saw ophtho yesterday with normal pressure of the eyes  Denies any n/v, p/p, or any other associated neurological features  She does occasionally deal with imbalance, but no falls, no lightheadedness, no LOC  MRI wo contrast on 1/7/16 is unremarkable, and reveals no explanation for her left supraorbital pain  She continues medications amlodipine for hypertension, furosemide for CHF, Zyprexa once daily but she cannot remember if she takes it and morning or evening or bed, Januvia, Prilosec, levothyroxine with recent normal TSH  Denies any changes to her medication regimen since last seen  The following portions of the patient's history were reviewed and updated as appropriate:   She  has a past medical history of Asthma, Atypical chest pain, CAD (coronary artery disease), Candidal intertrigo, CHF (congestive heart failure) ([address] ), Depression, Diabetes mellitus ([address] ), Diabetic neuropathy ([facility] [address] ), Diverticulitis, Dyslipidemia, Female bladder prolapse, Gastric ulcer, Glaucoma, HTN (hypertension), Hyperlipidemia, Hypothyroidism (4/18/2016), and RAD (reactive airway disease)    She   Patient Active Problem List    Diagnosis Date Noted    Cranial neuralgia 11/14/2019    Nonintractable episodic headache 11/14/2019    Paroxysmal nocturnal dyspnea 11/11/2019    Orthopnea 11/11/2019    Shortness of breath 11/11/2019    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[address] ) 10/05/2019    Vitamin D deficiency 08/22/2019    Pain of both hip joints 06/21/2019    Candidal intertrigo 06/21/2019    Headache 01/09/2019    Gastroesophageal reflux disease without esophagitis 11/06/2018    Insomnia 10/12/2018    Chronic diastolic congestive heart failure ([address] ) 08/05/2018    Type 2 diabetes mellitus, uncontrolled, with neuropathy ([address] ) 08/05/2018    Medication management 04/13/2018    Dyspnea on minimal exertion 02/17/2018    Shakiness 05/23/2017    Allergic rhinitis 05/02/2017    Hyperlipidemia     Female bladder prolapse     Bilateral cold feet 12/28/2016    Aortic stenosis 10/21/2016    Arteriosclerosis of coronary artery     Asthma     Anxiety disorder 08/17/2016    Osteoarthritis 06/21/2016    Migraine 04/18/2016    Hypothyroidism 04/18/2016    Trigeminal neuralgia 03/16/2016    Peripheral vascular disease (HCC)     Glaucoma, open angle, severe stage     Overactive bladder 01/20/2016    Visual hallucinations 11/17/2015    General weakness 11/03/2015    Low back pain 11/03/2015    Hearing loss 07/27/2015    Mild cognitive impairment 07/27/2015    Urinary incontinence 07/27/2015    Dizziness 07/02/2015    Advanced age 06/03/2015    Irritable bowel 04/24/2015    Female cystocele 04/24/2015    Diverticulosis 04/03/2015    Hiatal hernia 04/03/2015    Ambulatory dysfunction 04/02/2015     She  has a past surgical history that includes Hysterectomy; Colonoscopy; and Esophagogastroduodenoscopy (2011)  Her family history includes Breast cancer in her mother and sister; Hypertension in her father and sister; Hypothyroidism in her mother; Thyroid disease in her sister  She  reports that she has never smoked  She has never used smokeless tobacco  She reports that she does not drink alcohol or use drugs  Current Outpatient Medications   Medication Sig Dispense Refill    acetaminophen-codeine (TYLENOL #3) 300-30 mg per tablet Take 1 tablet by mouth every 4 (four) hours as needed for moderate pain 30 tablet 0    albuterol (VENTOLIN HFA) 90 mcg/act inhaler Inhale 2 puffs every 4 (four) hours as needed for wheezing 1 Inhaler 3    aluminum-magnesium hydroxide-simethicone (MYLANTA) 200-200-20 mg/5 mL suspension Take 30 mL by mouth every 4 (four) hours as needed for indigestion or heartburn      amLODIPine (NORVASC) 5 mg tablet Take 1 tablet (5 mg total) by mouth daily 90 tablet 1    aspirin 81 MG tablet Take 81 mg by mouth daily        Cholecalciferol (VITAMIN D3) 2000 units capsule Take 2,000 Units by mouth daily       dorzolamide (TRUSOPT) 2 % ophthalmic solution Administer 1 drop to both eyes 3 (three) times a day        fluticasone (FLONASE) 50 mcg/act nasal spray 2 sprays into each nostril daily 3 Bottle 3    fluticasone-vilanterol (BREO ELLIPTA) 100-25 mcg/inh inhaler Inhale 1 puff daily Rinse mouth after use  1 Inhaler 5    furosemide (LASIX) 20 mg tablet TAKE 1 TABLET BY MOUTH EVERY MORNING AND 2 TABLETS BY MOUTH EVERY EVENING 270 tablet 11    gabapentin (NEURONTIN) 600 MG tablet TAKE 1/2 TABLET EVERY 8 HOURS AS NEEDED (CRANIAL NEURALGIA) (Patient taking differently: 600 mg daily at bedtime ) 90 tablet 3    Incontinence Supply Disposable (SELECT DISPOSABLE UNDERWEAR LG) MISC       ketoconazole (NIZORAL) 2 % cream Apply topically daily 60 g 1    latanoprost (XALATAN) 0 005 % ophthalmic solution Apply 1 drop to eye daily at bedtime Both eyes 7 5 mL 1    levothyroxine 75 mcg tablet Take 1 tablet (75 mcg total) by mouth daily 90 tablet 3    Lidocaine-Menthol 4-5 % PTCH lidocaine patch      Melatonin 5 MG CAPS 5 mg as needed       nitroglycerin (NITROSTAT) 0 4 mg SL tablet Place 1 tablet (0 4 mg total) under the tongue every 5 (five) minutes as needed for chest pain 25 tablet 3    nystatin (MYCOSTATIN) powder Apply topically 2 (two) times a day 60 g 1    OLANZapine (ZyPREXA) 2 5 mg tablet Take 1 tablet (2 5 mg total) by mouth daily 90 tablet 3    omeprazole (PriLOSEC) 40 MG capsule Take 1 capsule (40 mg total) by mouth daily 90 capsule 3    sitaGLIPtin (JANUVIA) 50 mg tablet Take 1 tablet (50 mg total) by mouth daily 90 tablet 3    potassium chloride (K-DUR,KLOR-CON) 20 mEq tablet Take 1 tablet (20 mEq total) by mouth daily 60 tablet 0     No current facility-administered medications for this visit  She is allergic to statins            Objective:    Blood pressure 119/58, pulse 64, weight 77 1 kg (170 lb), not currently breastfeeding  Physical Exam    Neurological Exam  Vital signs reviewed  Well developed, well nourished  Head: Normocephalic, atraumatic  Neck: Neck flexors 5/5, No TTP  CN 2-12: intact and symmetric, including EOMs which are normal b/l and PERRL  MSK: 5/5 t/o  ROM normal x all 4 extr  Sensation: Inact to LT and temp x4 extr  Reflexes: 1+ and symmetric in all 4 extr  Coordination: Nml x4 extr  Gait: wb gait with a cane  ROS:    Review of Systems   Constitutional: Negative  Negative for appetite change and fever  HENT: Negative  Negative for hearing loss, tinnitus, trouble swallowing and voice change  Eyes: Negative  Negative for photophobia and pain  Respiratory: Negative  Negative for shortness of breath  Cardiovascular: Negative  Negative for palpitations  Gastrointestinal: Negative  Negative for nausea and vomiting  Endocrine: Negative  Negative for cold intolerance and heat intolerance  Genitourinary: Negative  Negative for dysuria, frequency and urgency  Musculoskeletal: Negative  Negative for myalgias and neck pain  Skin: Negative  Negative for rash  Neurological: Positive for headaches  Negative for dizziness, tremors, seizures, syncope, facial asymmetry, speech difficulty, weakness, light-headedness and numbness  Hematological: Negative  Does not bruise/bleed easily  Psychiatric/Behavioral: Negative  Negative for confusion, hallucinations and sleep disturbance  The following portions of the patient's history were reviewed and updated as appropriate: allergies, current medications/ medication history, past family history, past medical history, past social history, past surgical history and problem list     Review of systems was reviewed and otherwise unremarkable from a neurological perspective

## 2019-11-14 ENCOUNTER — TELEPHONE (OUTPATIENT)
Dept: PALLIATIVE MEDICINE | Facility: CLINIC | Age: 84
End: 2019-11-14

## 2019-11-14 PROBLEM — R51.9 NONINTRACTABLE EPISODIC HEADACHE: Status: ACTIVE | Noted: 2019-11-14

## 2019-11-14 PROBLEM — G52.9 CRANIAL NEURALGIA: Status: ACTIVE | Noted: 2019-11-14

## 2019-11-14 NOTE — TELEPHONE ENCOUNTER
Faxed script for Overnight Oximetry, demographic sheet and chart notes to [address] [facility]  [phone]     F/U call 11/14 spoke to Frederic @ [facility] she states she spoke to daughter 11/13 and gave her information that they will be sending oximeter out and pt will receive in 5-7 days and that pt will send back when completed

## 2019-11-25 NOTE — TELEPHONE ENCOUNTER
Overnight pulse oximetry test completed 11/18 to 11/19  Results sent to [facility] via secured E mail  Entered into [employer]

## 2019-11-29 NOTE — TELEPHONE ENCOUNTER
Overnight pulse Ox completed 11/18 to 11/19  Pt qualified for nocturnal O2  Order for home O2  sent to [facility] supply 11/26  Verified today that O2 equipment was delivered 11/26/19 as ordered

## 2019-12-02 NOTE — PROGRESS NOTES
Palliative and Supportive Care   Melony Foster 80 y o  female [id number]    Assessment/Plan:  1  Nonrheumatic aortic valve stenosis    2  Moderate persistent asthma, unspecified whether complicated    3  Chronic diastolic congestive heart failure ([address] )    4  Dyspnea on minimal exertion    5  Insomnia, unspecified type    6  Paroxysmal nocturnal dyspnea        Symptom management: difficulty sleeping, paroxsymal nocturnal dyspnea, dyspnea on exertion, occasional left-sided abdominal pain, new tingling of feet at night, hallucinations at night    - encouraged patient to use oxygen all night, every night, for goal of improvement of symptoms of paroxysmal nocturnal dyspnea    - restart olanzapine 2 5mg q HS PRN for hallucination    - hold off on adding new medications for sleep  For now, try restarting olanzapine and using oxygen all night  At last visit, sleep was not an issue  In the future, if issue persists, could consider use of mirtazapine     - holding off on use of opioids for dyspnea as she recovers quickly after exertion and concern for increasing fall risk, drowsiness, etc     - recommended evaluation of new tingling of feet at night by PCP    - abdominal pain is chronic issue, now occurring only intermittently  Managed with Tylenol and topical OTC products  - instructed to use Breo daily as a maintenance med    Goals of care: pt declined at last visit to complete POLST  She would elect to return to the hospital in the future for acute issues  Follow up in about one month      Subjective    Chief Concern  Follow up visit for:  PALS at home         History of Present Illness  Patient ID: Melony Foster is a 80 y o  female history of chronic diastolic heart failure, severe AS, DM, chronic atypical chest pain, possible CAD    She was recently evaluated by cardiothoracic surgery for possible surgical intervention for AS as she is quite symptomatic however was deemed not to be a candidate for any procedures, due to age, co-morbidities, and calcification of her aorta  Most recent hospitalization was 10/5-10/8 with dyspnea, found to have severe AS, and diuretics were altered, also was started on inhalers  Dyspnea improved and she was discharged home  She is receiving VNA services but these will end soon      No significant changes from last month  She continues with dyspnea on exertion, unchanged  She was approved for home O2 use at night due to hypoxia at night, and concentrator was delivered  She has only used a couple times for about 15min because she was unsure and hesitant about how often or how long to use  She often wakes at night with sensation of choking and dyspnea  Checking weight every day  Has been stable 169-171 lb  She does not get lower extremity edema  Continues with intermittent left-sided abdominal/ flank pain  This is a chronic issue that has been occurring for years but used to be much more constant  Now much more rare- occurred yesterday, and prior to that, about a week ago  No pain today  Improved with Tylenol yesterday; occasionally uses topical patches  She has been having tingling of feet at night for past couple weeks  Past couple days, also having sensation of something "running up" her legs  She has already been taking gabapentin at night  She does not have tingling during day  She does not have change in tingling with changes of leg position  She states this is a new issue  She has visual hallucinations at night, at least 3 nights per week  She was previously prescribed olanzapine for this issue  She is not taking currently  She has difficulty sleeping many night; often cannot fall asleep at all  Other nights has hallucinations which interfere with sleep  Did not get benefit from melatonin  Appetite is "not that great " Not losing weight      Lives w/ daughter Martin Hannah  Patient manages her own medications   Patient is able to ambulate around the house independently, with use of cane  She is able to leave home using cane  Daughter assists patient with showering  She has a shower chair  Daughter transports to appointments  The following portions of the patient's history were reviewed and updated as appropriate: allergies, current medications, past family history, past medical history, past social history, past surgical history and problem list       Visit Information    Accompanied By: daughter  Source of History: Self  History Limitations: None    ROS  Review of Systems   Constitutional: Positive for fatigue  Respiratory: Positive for shortness of breath (on exertion)  Cardiovascular: Negative for leg swelling  Gastrointestinal: Positive for abdominal pain (intermittent)  Neurological:        Tingling of feet at night   Psychiatric/Behavioral: Positive for hallucinations and sleep disturbance  Objective     Physical Exam   Constitutional: She is oriented to person, place, and time  She is cooperative  Cardiovascular: Normal rate and regular rhythm  Murmur heard  Systolic murmur is present  No lower extremity edema   Pulmonary/Chest: Effort normal and breath sounds normal    Neurological: She is alert and oriented to person, place, and time  Psychiatric: She has a normal mood and affect   Her behavior is normal  Cognition and memory are normal          /70   Pulse 73   Resp 18   SpO2 93%         Current Outpatient Medications:     acetaminophen-codeine (TYLENOL #3) 300-30 mg per tablet, Take 1 tablet by mouth every 4 (four) hours as needed for moderate pain, Disp: 30 tablet, Rfl: 0    albuterol (VENTOLIN HFA) 90 mcg/act inhaler, Inhale 2 puffs every 4 (four) hours as needed for wheezing, Disp: 1 Inhaler, Rfl: 3    aluminum-magnesium hydroxide-simethicone (MYLANTA) 200-200-20 mg/5 mL suspension, Take 30 mL by mouth every 4 (four) hours as needed for indigestion or heartburn, Disp: , Rfl:     amLODIPine (NORVASC) 5 mg tablet, Take 1 tablet (5 mg total) by mouth daily, Disp: 90 tablet, Rfl: 1    aspirin 81 MG tablet, Take 81 mg by mouth daily  , Disp: , Rfl:     Cholecalciferol (VITAMIN D3) 2000 units capsule, Take 2,000 Units by mouth daily , Disp: , Rfl:     dorzolamide (TRUSOPT) 2 % ophthalmic solution, Administer 1 drop to both eyes 3 (three) times a day  , Disp: , Rfl:     fluticasone (FLONASE) 50 mcg/act nasal spray, 2 sprays into each nostril daily, Disp: 3 Bottle, Rfl: 3    fluticasone-vilanterol (BREO ELLIPTA) 100-25 mcg/inh inhaler, Inhale 1 puff daily Rinse mouth after use , Disp: 1 Inhaler, Rfl: 5    furosemide (LASIX) 20 mg tablet, TAKE 1 TABLET BY MOUTH EVERY MORNING AND 2 TABLETS BY MOUTH EVERY EVENING, Disp: 270 tablet, Rfl: 11    gabapentin (NEURONTIN) 600 MG tablet, TAKE 1/2 TABLET EVERY 8 HOURS AS NEEDED (CRANIAL NEURALGIA) (Patient taking differently: 600 mg daily at bedtime ), Disp: 90 tablet, Rfl: 3    Incontinence Supply Disposable (SELECT DISPOSABLE UNDERWEAR LG) MISC, , Disp: , Rfl:     ketoconazole (NIZORAL) 2 % cream, Apply topically daily, Disp: 60 g, Rfl: 1    latanoprost (XALATAN) 0 005 % ophthalmic solution, Apply 1 drop to eye daily at bedtime Both eyes, Disp: 7 5 mL, Rfl: 1    levothyroxine 75 mcg tablet, Take 1 tablet (75 mcg total) by mouth daily, Disp: 90 tablet, Rfl: 3    Lidocaine-Menthol 4-5 % PTCH, lidocaine patch, Disp: , Rfl:     Melatonin 5 MG CAPS, 5 mg as needed , Disp: , Rfl:     nitroglycerin (NITROSTAT) 0 4 mg SL tablet, Place 1 tablet (0 4 mg total) under the tongue every 5 (five) minutes as needed for chest pain, Disp: 25 tablet, Rfl: 3    nystatin (MYCOSTATIN) powder, Apply topically 2 (two) times a day, Disp: 60 g, Rfl: 1    OLANZapine (ZyPREXA) 2 5 mg tablet, Take 1 tablet (2 5 mg total) by mouth daily, Disp: 90 tablet, Rfl: 3    omeprazole (PriLOSEC) 40 MG capsule, Take 1 capsule (40 mg total) by mouth daily, Disp: 90 capsule, Rfl: 3    potassium chloride (K-DUR,KLOR-CON) 20 mEq tablet, Take 1 tablet (20 mEq total) by mouth daily, Disp: 60 tablet, Rfl: 0    sitaGLIPtin (JANUVIA) 50 mg tablet, Take 1 tablet (50 mg total) by mouth daily, Disp: 90 tablet, Rfl: [address] [facility]  [phone]

## 2019-12-03 ENCOUNTER — IN HOME VISIT (OUTPATIENT)
Dept: PALLIATIVE MEDICINE | Facility: CLINIC | Age: 84
End: 2019-12-03
Payer: COMMERCIAL

## 2019-12-03 VITALS
OXYGEN SATURATION: 93 % | DIASTOLIC BLOOD PRESSURE: 70 MMHG | SYSTOLIC BLOOD PRESSURE: 126 MMHG | HEART RATE: 73 BPM | RESPIRATION RATE: 18 BRPM

## 2019-12-03 DIAGNOSIS — J45.40 MODERATE PERSISTENT ASTHMA, UNSPECIFIED WHETHER COMPLICATED: ICD-10-CM

## 2019-12-03 DIAGNOSIS — I35.0 NONRHEUMATIC AORTIC VALVE STENOSIS: Primary | Chronic | ICD-10-CM

## 2019-12-03 DIAGNOSIS — R06.00 PAROXYSMAL NOCTURNAL DYSPNEA: ICD-10-CM

## 2019-12-03 DIAGNOSIS — R06.00 DYSPNEA ON MINIMAL EXERTION: ICD-10-CM

## 2019-12-03 DIAGNOSIS — I50.32 CHRONIC DIASTOLIC CONGESTIVE HEART FAILURE (HCC): Chronic | ICD-10-CM

## 2019-12-03 DIAGNOSIS — G47.00 INSOMNIA, UNSPECIFIED TYPE: ICD-10-CM

## 2019-12-03 PROCEDURE — 99349 HOME/RES VST EST MOD MDM 40: CPT | Performed by: NURSE PRACTITIONER

## 2019-12-04 ENCOUNTER — DOCTOR'S OFFICE (OUTPATIENT)
Dept: URBAN - METROPOLITAN AREA CLINIC 136 | Facility: CLINIC | Age: 84
Setting detail: OPHTHALMOLOGY
End: 2019-12-04
Payer: COMMERCIAL

## 2019-12-04 DIAGNOSIS — H34.8312: ICD-10-CM

## 2019-12-04 DIAGNOSIS — H34.8313: ICD-10-CM

## 2019-12-04 DIAGNOSIS — H34.8311: ICD-10-CM

## 2019-12-04 PROCEDURE — SAMPLE SAMPLE MEDICATION: Performed by: OPHTHALMOLOGY

## 2019-12-04 PROCEDURE — 92012 INTRM OPH EXAM EST PATIENT: CPT | Performed by: OPHTHALMOLOGY

## 2019-12-04 PROCEDURE — 67028 INJECTION EYE DRUG: CPT | Performed by: OPHTHALMOLOGY

## 2019-12-04 PROCEDURE — 92134 CPTRZ OPH DX IMG PST SGM RTA: CPT | Performed by: OPHTHALMOLOGY

## 2019-12-04 ASSESSMENT — SPHEQUIV_DERIVED
OS_SPHEQUIV: -2.125
OD_SPHEQUIV: -3.5

## 2019-12-04 ASSESSMENT — REFRACTION_AUTOREFRACTION
OS_CYLINDER: -4.75
OS_SPHERE: +0.25
OS_AXIS: 81
OD_AXIS: 80
OD_CYLINDER: -4.50
OD_SPHERE: -1.25

## 2019-12-04 ASSESSMENT — LID EXAM ASSESSMENTS
OS_COMMENTS: POSTERIOR
OS_BLEPHARITIS: LLL LUL 1+
OD_COMMENTS: POSTERIOR
OD_TRICHIASIS: RUL
OD_BLEPHARITIS: RLL RUL 1+

## 2019-12-04 ASSESSMENT — CONFRONTATIONAL VISUAL FIELD TEST (CVF)
OD_FINDINGS: CONSTRICTION
OS_FINDINGS: CONSTRICTION

## 2019-12-04 ASSESSMENT — REFRACTION_MANIFEST
OD_VA3: 20/
OS_VA2: 20/
OS_VA2: 20/
OS_VA1: 20/
OS_VA1: 20/
OS_VA3: 20/
OD_VA3: 20/
OD_VA1: 20/
OD_VA2: 20/
OU_VA: 20/
OS_VA3: 20/
OD_VA1: 20/
OU_VA: 20/
OD_VA2: 20/

## 2019-12-04 ASSESSMENT — SUPERFICIAL PUNCTATE KERATITIS (SPK)
OS_SPK: 3+
OD_SPK: 3+

## 2019-12-04 ASSESSMENT — REFRACTION_CURRENTRX
OS_OVR_VA: 20/
OS_OVR_VA: 20/
OD_OVR_VA: 20/
OS_OVR_VA: 20/
OD_OVR_VA: 20/
OD_OVR_VA: 20/

## 2019-12-04 ASSESSMENT — DRY EYES - PHYSICIAN NOTES
OS_GENERALCOMMENTS: LOW TEAR FILM
OD_GENERALCOMMENTS: LOW TEAR FILM

## 2019-12-04 ASSESSMENT — VISUAL ACUITY
OS_BCVA: 20/80
OD_BCVA: 20/80

## 2019-12-04 ASSESSMENT — LID POSITION - DERMATOCHALASIS
OD_DERMATOCHALASIS: RUL 1+
OS_DERMATOCHALASIS: LUL 1+

## 2019-12-04 ASSESSMENT — PUNCTA - ASSESSMENT: OS_PUNCTA: SIL PLUG LLL

## 2020-01-07 ENCOUNTER — IN HOME VISIT (OUTPATIENT)
Dept: PALLIATIVE MEDICINE | Facility: CLINIC | Age: 85
End: 2020-01-07
Payer: COMMERCIAL

## 2020-01-07 VITALS
HEART RATE: 64 BPM | BODY MASS INDEX: 31.09 KG/M2 | WEIGHT: 170 LBS | SYSTOLIC BLOOD PRESSURE: 122 MMHG | DIASTOLIC BLOOD PRESSURE: 70 MMHG | OXYGEN SATURATION: 96 % | RESPIRATION RATE: 16 BRPM

## 2020-01-07 DIAGNOSIS — K58.9 IRRITABLE BOWEL SYNDROME WITHOUT DIARRHEA: ICD-10-CM

## 2020-01-07 DIAGNOSIS — I50.32 CHRONIC DIASTOLIC CONGESTIVE HEART FAILURE (HCC): Chronic | ICD-10-CM

## 2020-01-07 DIAGNOSIS — R54 ADVANCED AGE: ICD-10-CM

## 2020-01-07 DIAGNOSIS — K21.9 GASTROESOPHAGEAL REFLUX DISEASE WITHOUT ESOPHAGITIS: ICD-10-CM

## 2020-01-07 DIAGNOSIS — R06.02 SHORTNESS OF BREATH: ICD-10-CM

## 2020-01-07 PROCEDURE — 99497 ADVNCD CARE PLAN 30 MIN: CPT

## 2020-01-07 PROCEDURE — 99349 HOME/RES VST EST MOD MDM 40: CPT

## 2020-01-07 NOTE — PROGRESS NOTES
Assessment/Plan:      There are no diagnoses linked to this encounter  Subjective:     Patient ID: Mega English is a 80 y o  female  Visit made to patients home as part of the Palliative Care at Home program through [facility]  Greater than 1/2 hour was spent in direct patient care and participating in goals of care conversation  Chronic Care Management facilitated by this visit  Emotional and social issues addressed  This nurse reviewed all medications and instructed on their use and dosing  Used the teach back method to faciilitate learning of proper medication administration  Present during the visit were the patient and her friend  VSS  No peripheral edema noted  Lungs clear upon auscultation  Patient has 02 sat 96 percent on room air  She verbalized she uses the 02 when she feels sob  She is supposed to wear it at night but doesn't know why she needs to wear it  This nurse explained 02 use and how it helps her heart with workload  Patient asked if her concentrator can run out  This nurse explained how the concentrator works and use of the portable 02 tank for power outages or when she goes out to MD appts  Patient reverbalized understanding  Patient denies pain today and her complaint at this time is that she has bowel movements after each meal  She denies diarrhea and has formed stools  Explained if she were to develop irritation to rectum or other concerns with bowels we may address that concern but no changes made to treatment plan at this time  This nurse asked patient if she has any specific Advanced Directives in place or a POLST or 5 wishes document filled out  She does not have these documents in place but would like to do this  This nurse will bring the document to patient's home with next visit and assist her with completion  Plan to revisit in 1 month  Assess CVP and plan to fill out POLST document with patient             Review of Systems Constitutional: Negative  HENT:        Patient complains of some choking at night with sleeping  She uses 2 pillows  She denies chewing or swallowing problems  Takes mylanta for reflux  Respiratory: Positive for choking  Cardiovascular: Negative  Gastrointestinal: Negative  Endocrine: Negative  Genitourinary: Negative  Musculoskeletal: Negative  Skin: Negative  Allergic/Immunologic: Negative  Neurological: Positive for weakness  Hematological: Negative  Objective:     Physical Exam   Constitutional: She is oriented to person, place, and time  She appears well-nourished  Neck: Normal range of motion  No JVD present  No tracheal deviation present  Cardiovascular: Normal rate, regular rhythm and normal heart sounds  Pulmonary/Chest: Effort normal and breath sounds normal    Abdominal: Soft  Bowel sounds are normal    Musculoskeletal: Normal range of motion  She exhibits no edema  Neurological: She is alert and oriented to person, place, and time  Skin: Skin is warm and dry  Psychiatric: She has a normal mood and affect   Her behavior is normal  Judgment and thought content normal

## 2020-01-16 ENCOUNTER — DOCTOR'S OFFICE (OUTPATIENT)
Dept: URBAN - METROPOLITAN AREA CLINIC 136 | Facility: CLINIC | Age: 85
Setting detail: OPHTHALMOLOGY
End: 2020-01-16
Payer: COMMERCIAL

## 2020-01-16 DIAGNOSIS — H40.1132: ICD-10-CM

## 2020-01-16 DIAGNOSIS — H43.811: ICD-10-CM

## 2020-01-16 DIAGNOSIS — H35.81: ICD-10-CM

## 2020-01-16 DIAGNOSIS — E11.9: ICD-10-CM

## 2020-01-16 DIAGNOSIS — H34.8312: ICD-10-CM

## 2020-01-16 PROCEDURE — 67028 INJECTION EYE DRUG: CPT | Performed by: OPHTHALMOLOGY

## 2020-01-16 PROCEDURE — 92012 INTRM OPH EXAM EST PATIENT: CPT | Performed by: OPHTHALMOLOGY

## 2020-01-16 PROCEDURE — 92134 CPTRZ OPH DX IMG PST SGM RTA: CPT | Performed by: OPHTHALMOLOGY

## 2020-01-16 PROCEDURE — SAMPLE SAMPLE MEDICATION: Performed by: OPHTHALMOLOGY

## 2020-01-16 ASSESSMENT — REFRACTION_AUTOREFRACTION
OS_SPHERE: +0.25
OD_CYLINDER: -4.50
OS_CYLINDER: -4.75
OD_AXIS: 80
OD_SPHERE: -1.25
OS_AXIS: 81

## 2020-01-16 ASSESSMENT — LID POSITION - DERMATOCHALASIS
OS_DERMATOCHALASIS: LUL 1+
OD_DERMATOCHALASIS: RUL 1+

## 2020-01-16 ASSESSMENT — KERATOMETRY
OS_AXISANGLE_DEGREES: 90
OD_AXISANGLE_DEGREES: 75
OD_K2POWER_DIOPTERS: 47.25
OS_K2POWER_DIOPTERS: 46.75
METHOD_AUTO_MANUAL: AUTO
OS_K1POWER_DIOPTERS: 42.50
OD_K1POWER_DIOPTERS: 42.75

## 2020-01-16 ASSESSMENT — LID EXAM ASSESSMENTS
OS_COMMENTS: POSTERIOR
OD_TRICHIASIS: RUL
OS_BLEPHARITIS: LLL LUL 1+
OD_BLEPHARITIS: RLL RUL 1+
OD_COMMENTS: POSTERIOR

## 2020-01-16 ASSESSMENT — DRY EYES - PHYSICIAN NOTES
OD_GENERALCOMMENTS: LOW TEAR FILM
OS_GENERALCOMMENTS: LOW TEAR FILM

## 2020-01-16 ASSESSMENT — VISUAL ACUITY
OD_BCVA: 20/100
OS_BCVA: 20/100

## 2020-01-16 ASSESSMENT — CONFRONTATIONAL VISUAL FIELD TEST (CVF)
OD_FINDINGS: CONSTRICTION
OS_FINDINGS: CONSTRICTION

## 2020-01-16 ASSESSMENT — SUPERFICIAL PUNCTATE KERATITIS (SPK)
OD_SPK: 3+
OS_SPK: 3+

## 2020-01-16 ASSESSMENT — PUNCTA - ASSESSMENT: OS_PUNCTA: SIL PLUG LLL

## 2020-01-16 ASSESSMENT — SPHEQUIV_DERIVED
OD_SPHEQUIV: -3.5
OS_SPHEQUIV: -2.125

## 2020-01-16 ASSESSMENT — AXIALLENGTH_DERIVED
OD_AL: 24.4335
OS_AL: 24.014

## 2020-01-20 DIAGNOSIS — G52.9 CRANIAL NEURALGIA: ICD-10-CM

## 2020-01-20 RX ORDER — GABAPENTIN 600 MG/1
600 TABLET ORAL
Qty: 90 TABLET | Refills: 1 | Status: CANCELLED | OUTPATIENT
Start: 2020-01-20

## 2020-01-22 ENCOUNTER — DOCTOR'S OFFICE (OUTPATIENT)
Dept: URBAN - METROPOLITAN AREA CLINIC 136 | Facility: CLINIC | Age: 85
Setting detail: OPHTHALMOLOGY
End: 2020-01-22
Payer: COMMERCIAL

## 2020-01-22 DIAGNOSIS — H40.1132: ICD-10-CM

## 2020-01-22 DIAGNOSIS — H04.123: ICD-10-CM

## 2020-01-22 PROCEDURE — 92012 INTRM OPH EXAM EST PATIENT: CPT | Performed by: OPHTHALMOLOGY

## 2020-01-22 PROCEDURE — 92083 EXTENDED VISUAL FIELD XM: CPT | Performed by: OPHTHALMOLOGY

## 2020-01-22 ASSESSMENT — SUPERFICIAL PUNCTATE KERATITIS (SPK)
OD_SPK: 1+
OS_SPK: 1+

## 2020-01-22 ASSESSMENT — REFRACTION_AUTOREFRACTION
OD_CYLINDER: -4.25
OD_SPHERE: PLANO
OS_SPHERE: PLANO
OS_CYLINDER: -4.00
OS_AXIS: 084
OD_AXIS: 075

## 2020-01-22 ASSESSMENT — CONFRONTATIONAL VISUAL FIELD TEST (CVF)
OD_FINDINGS: CONSTRICTION
OS_FINDINGS: CONSTRICTION

## 2020-01-22 ASSESSMENT — KERATOMETRY
OD_K1POWER_DIOPTERS: 42.75
OS_AXISANGLE_DEGREES: 90
OD_K2POWER_DIOPTERS: 47.25
OS_K2POWER_DIOPTERS: 46.75
METHOD_AUTO_MANUAL: AUTO
OD_AXISANGLE_DEGREES: 75
OS_K1POWER_DIOPTERS: 42.50

## 2020-01-22 ASSESSMENT — DRY EYES - PHYSICIAN NOTES
OD_GENERALCOMMENTS: LOW TEAR FILM
OS_GENERALCOMMENTS: LOW TEAR FILM

## 2020-01-22 ASSESSMENT — VISUAL ACUITY
OD_BCVA: 20/100-1
OS_BCVA: 20/100-1

## 2020-01-22 ASSESSMENT — PUNCTA - ASSESSMENT: OS_PUNCTA: SIL PLUG LLL

## 2020-01-22 ASSESSMENT — LID POSITION - DERMATOCHALASIS
OD_DERMATOCHALASIS: RUL 1+
OS_DERMATOCHALASIS: LUL 1+

## 2020-01-23 DIAGNOSIS — G52.9 CRANIAL NEURALGIA: ICD-10-CM

## 2020-01-23 RX ORDER — GABAPENTIN 600 MG/1
600 TABLET ORAL
Qty: 90 TABLET | Refills: 3 | Status: SHIPPED | OUTPATIENT
Start: 2020-01-23 | End: 2020-02-18 | Stop reason: SDUPTHER

## 2020-01-27 ENCOUNTER — OFFICE VISIT (OUTPATIENT)
Dept: FAMILY MEDICINE CLINIC | Facility: CLINIC | Age: 85
End: 2020-01-27
Payer: COMMERCIAL

## 2020-01-27 VITALS
HEART RATE: 72 BPM | HEIGHT: 62 IN | RESPIRATION RATE: 16 BRPM | BODY MASS INDEX: 32.61 KG/M2 | WEIGHT: 177.2 LBS | TEMPERATURE: 97.7 F | DIASTOLIC BLOOD PRESSURE: 62 MMHG | SYSTOLIC BLOOD PRESSURE: 128 MMHG

## 2020-01-27 DIAGNOSIS — D49.89 NEOPLASM OF FACE: Primary | ICD-10-CM

## 2020-01-27 PROCEDURE — 1160F RVW MEDS BY RX/DR IN RCRD: CPT | Performed by: PHYSICIAN ASSISTANT

## 2020-01-27 PROCEDURE — 99213 OFFICE O/P EST LOW 20 MIN: CPT | Performed by: PHYSICIAN ASSISTANT

## 2020-01-27 NOTE — ASSESSMENT & PLAN NOTE
L upper cheek/tmeple area with raised corrugated brown  mole lesion with keratitic features  Will refer to derm for removal as it is located on the face

## 2020-01-27 NOTE — PROGRESS NOTES
Assessment and Plan:    Problem List Items Addressed This Visit        Other    Neoplasm of face - Primary     L upper cheek/tmeple area with raised corrugated brown  mole lesion with keratitic features  Will refer to derm for removal as it is located on the face  Relevant Orders    Ambulatory referral to Dermatology                 Diagnoses and all orders for this visit:    Neoplasm of face  -     Ambulatory referral to Dermatology; Future              Subjective:      Patient ID: Signe Siemens is a 80 y o  female  CC:    Chief Complaint   Patient presents with    Nevus     Pt is c/o mole to left side of face that she has had for "months" and stated she notices it is growing  Pt denies pain  HPI:    Patient here today accompanied by her daughter  She states that for the past few months she has had a growth on her left upper cheek that she would like removed  No personal history of skin cancer she does have other flat moles that are dark in nature but this 1 seems to be going quite fast and is catching on articles of clothing and jackets and such  She has never seen a dermatologist but would like to have this removed at this point time  The following portions of the patient's history were reviewed and updated as appropriate: allergies, current medications, past family history, past medical history, past social history, past surgical history and problem list       Review of Systems   Constitutional: Negative  HENT: Negative  Eyes: Negative  Respiratory: Negative  Cardiovascular: Negative  Gastrointestinal: Negative  Endocrine: Negative  Genitourinary: Negative  Musculoskeletal: Negative  Skin:        Mole on face  Allergic/Immunologic: Negative  Neurological: Negative  Hematological: Negative  Psychiatric/Behavioral: Negative            Data to review:       Objective:    Vitals:    01/27/20 1011   BP: 128/62   BP Location: Left arm Patient Position: Sitting   Cuff Size: Adult   Pulse: 72   Resp: 16   Temp: 97 7 °F (36 5 °C)   TempSrc: Tympanic   Weight: 80 4 kg (177 lb 3 2 oz)   Height: 5' 2" (1 575 m)        Physical Exam   Constitutional: She is oriented to person, place, and time  She appears well-developed and well-nourished  No distress  HENT:   Head: Normocephalic and atraumatic  Eyes: Conjunctivae are normal  Right eye exhibits no discharge  Left eye exhibits no discharge  Neck: Neck supple  Carotid bruit is not present  Cardiovascular: Normal rate, regular rhythm and normal heart sounds  Exam reveals no gallop and no friction rub  No murmur heard  Pulmonary/Chest: Effort normal and breath sounds normal  No respiratory distress  She has no wheezes  She has no rales  Neurological: She is alert and oriented to person, place, and time  Skin: Skin is warm and dry  She is not diaphoretic  Left high cheek temple area with a 7-8 mm raised hard keratotic looking brown growth that is dry without signs of infection raised roughly at least 5 mm  Patient does have multiple other flat brown spots of aging on the rest of her face as well  Psychiatric: She has a normal mood and affect  Judgment normal    Nursing note and vitals reviewed

## 2020-01-27 NOTE — PATIENT INSTRUCTIONS
Problem List Items Addressed This Visit        Other    Neoplasm of face - Primary     L upper cheek/tmeple area with raised corrugated brown  mole lesion with keratitic features  Will refer to derm for removal as it is located on the face            Relevant Orders    Ambulatory referral to Dermatology

## 2020-02-03 ENCOUNTER — APPOINTMENT (EMERGENCY)
Dept: CT IMAGING | Facility: HOSPITAL | Age: 85
End: 2020-02-03
Payer: COMMERCIAL

## 2020-02-03 ENCOUNTER — HOSPITAL ENCOUNTER (EMERGENCY)
Facility: HOSPITAL | Age: 85
Discharge: HOME | End: 2020-02-03
Attending: EMERGENCY MEDICINE
Payer: COMMERCIAL

## 2020-02-03 VITALS
SYSTOLIC BLOOD PRESSURE: 152 MMHG | OXYGEN SATURATION: 98 % | WEIGHT: 171 LBS | DIASTOLIC BLOOD PRESSURE: 67 MMHG | BODY MASS INDEX: 31.28 KG/M2 | RESPIRATION RATE: 18 BRPM | TEMPERATURE: 97.6 F | HEART RATE: 68 BPM

## 2020-02-03 DIAGNOSIS — G44.209 TENSION-TYPE HEADACHE, NOT INTRACTABLE, UNSPECIFIED CHRONICITY PATTERN: Primary | ICD-10-CM

## 2020-02-03 LAB
CRP SERPL QL: 3.2 MG/L
ERYTHROCYTE [SEDIMENTATION RATE] IN BLOOD: 15 MM/HOUR (ref 0–20)

## 2020-02-03 PROCEDURE — 36415 COLL VENOUS BLD VENIPUNCTURE: CPT | Performed by: EMERGENCY MEDICINE

## 2020-02-03 PROCEDURE — 99285 EMERGENCY DEPT VISIT HI MDM: CPT | Performed by: EMERGENCY MEDICINE

## 2020-02-03 PROCEDURE — 86140 C-REACTIVE PROTEIN: CPT | Performed by: EMERGENCY MEDICINE

## 2020-02-03 PROCEDURE — 70450 CT HEAD/BRAIN W/O DYE: CPT

## 2020-02-03 PROCEDURE — 99284 EMERGENCY DEPT VISIT MOD MDM: CPT

## 2020-02-03 PROCEDURE — 85652 RBC SED RATE AUTOMATED: CPT | Performed by: EMERGENCY MEDICINE

## 2020-02-03 RX ORDER — ACETAMINOPHEN 325 MG/1
975 TABLET ORAL ONCE
Status: COMPLETED | OUTPATIENT
Start: 2020-02-03 | End: 2020-02-03

## 2020-02-03 RX ADMIN — ACETAMINOPHEN 975 MG: 325 TABLET ORAL at 18:16

## 2020-02-03 NOTE — ED ATTENDING ATTESTATION
2/3/2020  Lia MONTEIRO, saw and evaluated the patient  I have discussed the patient with the resident/non-physician practitioner and agree with the resident's/non-physician practitioner's findings, Plan of Care, and MDM as documented in the resident's/non-physician practitioner's note, except where noted  All available labs and Radiology studies were reviewed  I was present for key portions of any procedure(s) performed by the resident/non-physician practitioner and I was immediately available to provide assistance  At this point I agree with the current assessment done in the Emergency Department  I have conducted an independent evaluation of this patient a history and physical is as follows:    A 66-year-old female with past medical history of CAD, CHF, depression, diabetes, neuropathy, dyslipidemia, glaucoma, hypertension, hyperlipidemia and hypothyroidism; presents with a left-sided headache that has gotten progressively worse over the past 2 days  Headache was of gradual onset  Patient does report a history of headaches, however states she has not had one recently  Patient's current pain feels similar to prior headaches however is more severe in nature  Patient does complain of blurred vision, worse from her baseline  Patient otherwise denies fever, chills, photosensitivity, phonosensitivity, dizziness, lightheadedness, neck pain, chest pain, shortness of breath, abdominal pain, nausea, vomiting, diarrhea, peripheral edema, rashes, paresthesias and focal weakness  Physical Exam  General Appearance: alert and oriented, nad, non toxic appearing  Skin:  Warm, dry, intact  HEENT: atraumatic, normocephalic  Tenderness over the left frontal and maxillary sinuses, also tender along left temporal region    Neck: Supple, trachea midline  Cardiac: RRR; no murmurs, rub, gallops  Pulmonary: lungs CTAB; no wheezes, rales, rhonchi  Gastrointestinal: abdomen soft, nontender, nondistended; no guarding or rebound tenderness; good bowel sounds, no mass or bruits  Extremities:  no pedal edema, 2+ pulses; no calf tenderness, no clubbing, no cyanosis  Neuro:  no focal motor or sensory deficits, CN 2-12 grossly intact  Psych:  Normal mood and affect, normal judgement and insight    Assessment and Plan:  Headache, tender over the left frontal and maxillary sinuses along with the left temporal region  Given patient's age, will obtain CT head to evaluate for intracranial pathology  Will obtain CRP and ESR to evaluate for possible temporal arteritis  Will treat symptomatically      ED Course         Critical Care Time  Procedures

## 2020-02-04 NOTE — ED PROVIDER NOTES
History  Chief Complaint   Patient presents with    Headache     pt reports HA for 2 days  pt reports photosensitivity  51-year-old female presenting for evaluation of a headache for the past 2 days which has been worsening  She has history of headaches in the past treated by Neurology she has been taking Tylenol inconsistently without improvement of her symptoms last dose was last night  Patient at this time has no neurologic symptoms but does states he has a left-sided frontal headache radiating to below her eye and the left side of her face  She has no associated chest pain shortness of breath nausea vomiting fevers chills neck stiffness or neck pain  She has not had any trauma she feels otherwise well but rates her headache a 9/10  History provided by:  Patient   used: No    Headache   Pain location:  Frontal and L temporal  Severity currently:  9/10  Onset quality:  Sudden  Timing:  Constant  Progression:  Unchanged  Chronicity:  New  Similar to prior headaches: yes    Relieved by:  Nothing  Worsened by:  Nothing  Ineffective treatments:  Acetaminophen  Associated symptoms: no abdominal pain, no diarrhea, no fever, no myalgias, no nausea, no sore throat, no vomiting and no weakness        Prior to Admission Medications   Prescriptions Last Dose Informant Patient Reported? Taking?    Cholecalciferol (VITAMIN D3) 2000 units capsule  Self Yes Yes   Sig: Take 2,000 Units by mouth daily    Incontinence Supply Disposable (SELECT DISPOSABLE UNDERWEAR LG) MISC  Self Yes Yes   Lidocaine-Menthol 4-5 % PTCH  Self Yes Yes   Sig: lidocaine patch   Melatonin 5 MG CAPS  Self Yes Yes   Si mg as needed    OLANZapine (ZyPREXA) 2 5 mg tablet  Self No Yes   Sig: Take 1 tablet (2 5 mg total) by mouth daily   acetaminophen-codeine (TYLENOL #3) 300-30 mg per tablet  Self No Yes   Sig: Take 1 tablet by mouth every 4 (four) hours as needed for moderate pain   albuterol (VENTOLIN HFA) 90 mcg/act inhaler  Self No Yes   Sig: Inhale 2 puffs every 4 (four) hours as needed for wheezing   aluminum-magnesium hydroxide-simethicone (MYLANTA) 200-200-20 mg/5 mL suspension  Self Yes Yes   Sig: Take 30 mL by mouth every 4 (four) hours as needed for indigestion or heartburn   amLODIPine (NORVASC) 5 mg tablet   No No   Sig: Take 1 tablet (5 mg total) by mouth daily   aspirin 81 MG tablet  Self Yes Yes   Sig: Take 81 mg by mouth daily  dorzolamide (TRUSOPT) 2 % ophthalmic solution  Self Yes Yes   Sig: Administer 1 drop to both eyes 3 (three) times a day     fluticasone (FLONASE) 50 mcg/act nasal spray  Self No Yes   Si sprays into each nostril daily   fluticasone-vilanterol (BREO ELLIPTA) 100-25 mcg/inh inhaler  Self No No   Sig: Inhale 1 puff daily Rinse mouth after use     furosemide (LASIX) 20 mg tablet  Self No Yes   Sig: TAKE 1 TABLET BY MOUTH EVERY MORNING AND 2 TABLETS BY MOUTH EVERY EVENING   gabapentin (NEURONTIN) 600 MG tablet   No No   Sig: Take 1 tablet (600 mg total) by mouth daily at bedtime   ketoconazole (NIZORAL) 2 % cream  Self No Yes   Sig: Apply topically daily   latanoprost (XALATAN) 0 005 % ophthalmic solution  Self No Yes   Sig: Apply 1 drop to eye daily at bedtime Both eyes   levothyroxine 75 mcg tablet  Self No Yes   Sig: Take 1 tablet (75 mcg total) by mouth daily   nitroglycerin (NITROSTAT) 0 4 mg SL tablet   No Yes   Sig: Place 1 tablet (0 4 mg total) under the tongue every 5 (five) minutes as needed for chest pain   nystatin (MYCOSTATIN) powder  Self No Yes   Sig: Apply topically 2 (two) times a day   omeprazole (PriLOSEC) 40 MG capsule  Self No Yes   Sig: Take 1 capsule (40 mg total) by mouth daily   potassium chloride (K-DUR,KLOR-CON) 20 mEq tablet  Self No No   Sig: Take 1 tablet (20 mEq total) by mouth daily   sitaGLIPtin (JANUVIA) 50 mg tablet  Self No Yes   Sig: Take 1 tablet (50 mg total) by mouth daily      Facility-Administered Medications: None       Past Medical History: Diagnosis Date    Asthma     Atypical chest pain     CAD (coronary artery disease)     Candidal intertrigo     CHF (congestive heart failure) (HCC)     Depression     Diabetes mellitus (HCC)     Diabetic neuropathy (HCC)     Diverticulitis     Dyslipidemia     Female bladder prolapse     Gastric ulcer     Glaucoma     HTN (hypertension)     Hyperlipidemia     Hypothyroidism 4/18/2016    RAD (reactive airway disease)        Past Surgical History:   Procedure Laterality Date    COLONOSCOPY      ESOPHAGOGASTRODUODENOSCOPY  2011    HYSTERECTOMY         Family History   Problem Relation Age of Onset    Breast cancer Mother     Hypothyroidism Mother     Hypertension Father     Breast cancer Sister     Thyroid disease Sister     Hypertension Sister      I have reviewed and agree with the history as documented  Social History     Tobacco Use    Smoking status: Never Smoker    Smokeless tobacco: Never Used   Substance Use Topics    Alcohol use: No     Comment: Social Alcohol Use -- as per allscripts (pt denies all alcohol use)    Drug use: No        Review of Systems   Constitutional: Negative for chills and fever  HENT: Negative for sore throat  Eyes: Negative for visual disturbance  Respiratory: Negative for chest tightness, shortness of breath and wheezing  Cardiovascular: Negative for chest pain  Gastrointestinal: Negative for abdominal pain, constipation, diarrhea, nausea and vomiting  Genitourinary: Negative for dysuria and hematuria  Musculoskeletal: Negative for arthralgias and myalgias  Skin: Negative for color change  Neurological: Positive for headaches  Negative for weakness and light-headedness  Hematological: Negative for adenopathy  Psychiatric/Behavioral: Negative for agitation and behavioral problems  All other systems reviewed and are negative        Physical Exam  ED Triage Vitals   Temperature Pulse Respirations Blood Pressure SpO2   02/03/20 1705 02/03/20 1705 02/03/20 1705 02/03/20 1706 02/03/20 1705   97 6 °F (36 4 °C) 78 18 156/79 98 %      Temp src Heart Rate Source Patient Position - Orthostatic VS BP Location FiO2 (%)   -- 02/03/20 1819 02/03/20 1819 02/03/20 1819 --    Monitor Sitting Left arm       Pain Score       02/03/20 1816       9             Orthostatic Vital Signs  Vitals:    02/03/20 1705 02/03/20 1706 02/03/20 1819 02/03/20 2008   BP:  156/79 164/70 152/67   Pulse: 78  66 68   Patient Position - Orthostatic VS:   Sitting        Physical Exam   Constitutional: She is oriented to person, place, and time  She appears well-developed and well-nourished  No distress  HENT:   Head: Normocephalic and atraumatic  Eyes: Conjunctivae and EOM are normal  No scleral icterus  Neck: Normal range of motion  Neck supple  Cardiovascular: Normal rate, regular rhythm and normal heart sounds  No murmur heard  Pulmonary/Chest: Effort normal and breath sounds normal  No respiratory distress  Abdominal: Soft  Bowel sounds are normal  There is no tenderness  Musculoskeletal: Normal range of motion  Neurological: She is alert and oriented to person, place, and time  No cranial nerve deficit  Coordination normal    Skin: Skin is warm and dry  Psychiatric: She has a normal mood and affect  Her behavior is normal    Nursing note and vitals reviewed        ED Medications  Medications   acetaminophen (TYLENOL) tablet 975 mg (975 mg Oral Given 2/3/20 1816)       Diagnostic Studies  Results Reviewed     Procedure Component Value Units Date/Time    Sedimentation rate, automated [[id number]]  (Normal) Collected:  02/03/20 1851    Lab Status:  Final result Specimen:  Blood from Arm, Left Updated:  02/03/20 2055     Sed Rate 15 mm/hour     C-reactive protein [[id number]]  (Abnormal) Collected:  02/03/20 1851    Lab Status:  Final result Specimen:  Blood from Arm, Left Updated:  02/03/20 1938     CRP 3 2 mg/L                  CT head without contrast Final Result by Naomy Castaneda DO (02/03 1907)      No acute intracranial abnormality  Microangiopathic changes  Workstation performed: [id number]               Procedures  Procedures      ED Course  ED Course as of Feb 03 2220 Mon Feb 03, 2020   1931 Patient's symptoms have improved gradually now rates at a 7/10 compared to previous 9/10       2101 Sed Rate: 15   2101 C-REACTIVE PROTEIN(!): 3 2                               MDM  Number of Diagnoses or Management Options  Tension-type headache, not intractable, unspecified chronicity pattern: new and requires workup  Diagnosis management comments: 15-year-old female presenting with headache on left side of her face, will order ESR CRP and CT head  CT head unremarkable and ESR CRP are within normal limits  Discussed with patient and her symptoms had significantly improved with Tylenol she will follow up with Neurology and primary care and return to the emergency depart with any worsening symptoms  Amount and/or Complexity of Data Reviewed  Clinical lab tests: ordered and reviewed  Tests in the radiology section of CPT®: ordered and reviewed  Tests in the medicine section of CPT®: ordered and reviewed  Review and summarize past medical records: yes  Independent visualization of images, tracings, or specimens: yes          Disposition  Final diagnoses:   Tension-type headache, not intractable, unspecified chronicity pattern     Time reflects when diagnosis was documented in both MDM as applicable and the Disposition within this note     Time User Action Codes Description Comment    2/3/2020  9:18 PM Jenaro Witt Add [G46 [id number]] Tension-type headache, not intractable, unspecified chronicity pattern       ED Disposition     ED Disposition Condition Date/Time Comment    Discharge Stable Mon Feb 3, 2020  9:18 PM Kyung Plan discharge to home/self care              Follow-up Information     Follow up With Specialties Details Why Contact Info Additional Information    [facility]   [address] [address]  [address] [facility], [address] [facility], [facility] [address], [place], [place], [address]    [facility] Emergency Department Emergency Medicine  If symptoms wor[facility] [id number] [phone] [place] ED, [address] [facility] , [place], [place], [address]          Discharge Medication List as of 2/3/2020  9:21 PM      CONTINUE these medications which have NOT CHANGED    Details   acetaminophen-codeine (TYLENOL #3) 300-30 mg per tablet Take 1 tablet by mouth every 4 (four) hours as needed for moderate pain, Starting Fri 12/28/2018, Normal      albuterol (VENTOLIN HFA) 90 mcg/act inhaler Inhale 2 puffs every 4 (four) hours as needed for wheezing, Starting Tue 4/16/2019, Normal      aluminum-magnesium hydroxide-simethicone (MYLANTA) 200-200-20 mg/5 mL suspension Take 30 mL by mouth every 4 (four) hours as needed for indigestion or heartburn, Historical Med      aspirin 81 MG tablet Take 81 mg by mouth daily  , Historical Med      Cholecalciferol (VITAMIN D3) 2000 units capsule Take 2,000 Units by mouth daily , Historical Med      dorzolamide (TRUSOPT) 2 % ophthalmic solution Administer 1 drop to both eyes 3 (three) times a day  , Historical Med      fluticasone (FLONASE) 50 mcg/act nasal spray 2 sprays into each nostril daily, Starting Sat 3/16/2019, Normal      furosemide (LASIX) 20 mg tablet TAKE 1 TABLET BY MOUTH EVERY MORNING AND 2 TABLETS BY MOUTH EVERY EVENING, Normal      Incontinence Supply Disposable (SELECT DISPOSABLE UNDERWEAR LG) MISC Starting Fri 6/14/2019, Historical Med      ketoconazole (NIZORAL) 2 % cream Apply topically daily, Starting Fri 6/21/2019, Normal      latanoprost (XALATAN) 0 005 % ophthalmic solution Apply 1 drop to eye daily at bedtime Both eyes, Starting Sat 3/16/2019, Normal      levothyroxine 75 mcg tablet Take 1 tablet (75 mcg total) by mouth daily, Starting Tue 5/21/2019, Normal      Lidocaine-Menthol 4-5 % PTCH lidocaine patch, Historical Med      Melatonin 5 MG CAPS 5 mg as needed , Historical Med      nitroglycerin (NITROSTAT) 0 4 mg SL tablet Place 1 tablet (0 4 mg total) under the tongue every 5 (five) minutes as needed for chest pain, Starting Tue 11/5/2019, Normal      nystatin (MYCOSTATIN) powder Apply topically 2 (two) times a day, Starting Fri 6/21/2019, Normal      OLANZapine (ZyPREXA) 2 5 mg tablet Take 1 tablet (2 5 mg total) by mouth daily, Starting Wed 2/27/2019, Normal      omeprazole (PriLOSEC) 40 MG capsule Take 1 capsule (40 mg total) by mouth daily, Starting Wed 5/8/2019, Normal      sitaGLIPtin (JANUVIA) 50 mg tablet Take 1 tablet (50 mg total) by mouth daily, Starting Wed 2/27/2019, Normal      amLODIPine (NORVASC) 5 mg tablet Take 1 tablet (5 mg total) by mouth daily, Starting Thu 11/7/2019, Until Mon 1/27/2020, Normal      fluticasone-vilanterol (BREO ELLIPTA) 100-25 mcg/inh inhaler Inhale 1 puff daily Rinse mouth after use , Starting Tue 10/15/2019, Normal      gabapentin (NEURONTIN) 600 MG tablet Take 1 tablet (600 mg total) by mouth daily at bedtime, Starting Thu 1/23/2020, Normal      potassium chloride (K-DUR,KLOR-CON) 20 mEq tablet Take 1 tablet (20 mEq total) by mouth daily, Starting Tue 10/8/2019, Until Mon 1/27/2020, Print           No discharge procedures on file  ED Provider  Attending physically available and evaluated Jhonatan Luke I managed the patient along with the ED Attending      Electronically Signed by         Tj Ortiz MD  02/03/20 9333

## 2020-02-04 NOTE — DISCHARGE INSTRUCTIONS
Follow-up with Neurology, information listed below  Return to the emergency depart with any worsening symptoms  Take Tylenol for your symptoms if they return

## 2020-02-04 NOTE — ED NOTES
Daughter, Anjelica Zayas, and updated on patient's plan of care and daughter on her way to pick patient up       Verito Murray, RN  02/03/20 3544

## 2020-02-09 ENCOUNTER — HOSPITAL ENCOUNTER (EMERGENCY)
Facility: HOSPITAL | Age: 85
Discharge: HOME | End: 2020-02-09
Attending: EMERGENCY MEDICINE
Payer: COMMERCIAL

## 2020-02-09 VITALS
DIASTOLIC BLOOD PRESSURE: 60 MMHG | SYSTOLIC BLOOD PRESSURE: 126 MMHG | HEART RATE: 59 BPM | WEIGHT: 169.5 LBS | BODY MASS INDEX: 31 KG/M2 | OXYGEN SATURATION: 95 % | TEMPERATURE: 98 F | RESPIRATION RATE: 17 BRPM

## 2020-02-09 DIAGNOSIS — G44.209 TENSION-TYPE HEADACHE, NOT INTRACTABLE, UNSPECIFIED CHRONICITY PATTERN: Primary | ICD-10-CM

## 2020-02-09 LAB
ANION GAP SERPL CALCULATED.3IONS-SCNC: 10 MMOL/L (ref 4–13)
BASOPHILS # BLD AUTO: 0.05 THOUSANDS/ΜL (ref 0–0.1)
BASOPHILS NFR BLD AUTO: 1 % (ref 0–1)
BUN SERPL-MCNC: 19 MG/DL (ref 5–25)
CALCIUM SERPL-MCNC: 9.2 MG/DL (ref 8.3–10.1)
CHLORIDE SERPL-SCNC: 106 MMOL/L (ref 100–108)
CO2 SERPL-SCNC: 26 MMOL/L (ref 21–32)
CREAT SERPL-MCNC: 1.08 MG/DL (ref 0.6–1.3)
EOSINOPHIL # BLD AUTO: 0.19 THOUSAND/ΜL (ref 0–0.61)
EOSINOPHIL NFR BLD AUTO: 3 % (ref 0–6)
ERYTHROCYTE [DISTWIDTH] IN BLOOD BY AUTOMATED COUNT: 13.4 % (ref 11.6–15.1)
GFR SERPL CREATININE-BSD FRML MDRD: 50 ML/MIN/1.73SQ M
GLUCOSE SERPL-MCNC: 140 MG/DL (ref 65–140)
HCT VFR BLD AUTO: 45.6 % (ref 34.8–46.1)
HGB BLD-MCNC: 14.7 G/DL (ref 11.5–15.4)
IMM GRANULOCYTES # BLD AUTO: 0.02 THOUSAND/UL (ref 0–0.2)
IMM GRANULOCYTES NFR BLD AUTO: 0 % (ref 0–2)
LYMPHOCYTES # BLD AUTO: 0.97 THOUSANDS/ΜL (ref 0.6–4.47)
LYMPHOCYTES NFR BLD AUTO: 14 % (ref 14–44)
MCH RBC QN AUTO: 30.6 PG (ref 26.8–34.3)
MCHC RBC AUTO-ENTMCNC: 32.2 G/DL (ref 31.4–37.4)
MCV RBC AUTO: 95 FL (ref 82–98)
MONOCYTES # BLD AUTO: 0.7 THOUSAND/ΜL (ref 0.17–1.22)
MONOCYTES NFR BLD AUTO: 10 % (ref 4–12)
NEUTROPHILS # BLD AUTO: 5.28 THOUSANDS/ΜL (ref 1.85–7.62)
NEUTS SEG NFR BLD AUTO: 72 % (ref 43–75)
NRBC BLD AUTO-RTO: 0 /100 WBCS
PLATELET # BLD AUTO: 191 THOUSANDS/UL (ref 149–390)
PMV BLD AUTO: 10.9 FL (ref 8.9–12.7)
POTASSIUM SERPL-SCNC: 4.3 MMOL/L (ref 3.5–5.3)
RBC # BLD AUTO: 4.8 MILLION/UL (ref 3.81–5.12)
SODIUM SERPL-SCNC: 142 MMOL/L (ref 136–145)
WBC # BLD AUTO: 7.21 THOUSAND/UL (ref 4.31–10.16)

## 2020-02-09 PROCEDURE — 80048 BASIC METABOLIC PNL TOTAL CA: CPT | Performed by: EMERGENCY MEDICINE

## 2020-02-09 PROCEDURE — 99283 EMERGENCY DEPT VISIT LOW MDM: CPT | Performed by: EMERGENCY MEDICINE

## 2020-02-09 PROCEDURE — 36415 COLL VENOUS BLD VENIPUNCTURE: CPT | Performed by: EMERGENCY MEDICINE

## 2020-02-09 PROCEDURE — 85025 COMPLETE CBC W/AUTO DIFF WBC: CPT | Performed by: EMERGENCY MEDICINE

## 2020-02-09 PROCEDURE — 99283 EMERGENCY DEPT VISIT LOW MDM: CPT

## 2020-02-09 RX ORDER — BUTALBITAL, ACETAMINOPHEN AND CAFFEINE 50; 325; 40 MG/1; MG/1; MG/1
1 TABLET ORAL EVERY 4 HOURS PRN
Qty: 6 TABLET | Refills: 0 | Status: SHIPPED | OUTPATIENT
Start: 2020-02-09 | End: 2021-11-18

## 2020-02-09 RX ORDER — BUTALBITAL, ACETAMINOPHEN AND CAFFEINE 50; 325; 40 MG/1; MG/1; MG/1
1 TABLET ORAL ONCE
Status: COMPLETED | OUTPATIENT
Start: 2020-02-09 | End: 2020-02-09

## 2020-02-09 RX ADMIN — BUTALBITAL, ACETAMINOPHEN, AND CAFFEINE 1 TABLET: 50; 325; 40 TABLET ORAL at 16:59

## 2020-02-09 NOTE — DISCHARGE INSTRUCTIONS
Tension Headache   WHAT YOU NEED TO KNOW:   Tension headaches are most often caused by stress, eye strain, or muscle tightness  The pain of a tension headache may start in the forehead or the back of the head  The pain often spreads over the whole head and down into the neck and shoulders  Over-the-counter pain medicine is the most useful and common treatment for a tension headache  DISCHARGE INSTRUCTIONS:   Return to the emergency department if:   You have a sudden headache that seems different or much worse than your usual headaches  You have difficulty seeing, speaking, or moving  You pass out, become confused, or have a seizure  You have a headache, fever, and a stiff neck  Contact your healthcare provider if:   Your headaches continue to get worse  Your headaches happen so often that they affect your ability to do your work or normal activities  You need to take medicine to help your headaches more often than your healthcare provider says you should  Your headaches get so bad that they cause you to vomit  You have questions or concerns about your condition or care  and when and why you take them  Bring the list or the pill bottles to follow-up visits  Carry your medicine list with you in case of an emergency  Prevent tension headaches:   Avoid muscle tension  Do not stay in one position for long periods of time  Use a different pillow if you wake up with sore neck and shoulder muscles  Find ways to relax your muscles, such as massage or resting in a quiet, dark room  Avoid eye strain  Make sure you have good lighting when you read, sew, or do similar activities  Get yearly eye exams and wear glasses as directed  Get enough sleep  Get 8 to 10 hours of sleep each night  Create a sleep schedule  Go to bed and wake up at the same times each day  It may be helpful to do something relaxing before bed  Do not watch television right before bed  Eat a variety of healthy foods  Healthy foods include fruits, vegetables, whole-grain breads, low-fat dairy products, beans, lean meats, and fish  Do not eat foods that trigger your headaches  Exercise regularly  Exercise helps decrease stress and headaches  Ask about the best exercise plan for you  Drink liquids as directed  You may need to drink more liquid to prevent dehydration  Dehydration can make a tension headache worse  Ask your healthcare provider how much liquid to drink and which liquids are best for you  Limit caffeine as directed  Caffeine may make a tension headache worse  Do not drink alcohol  Alcohol can trigger a headache  It can also prevent medicines from stopping your headache

## 2020-02-09 NOTE — ED PROVIDER NOTES
History  Chief Complaint   Patient presents with    Headache     Reports headache, weakness on left side, denies nausea/vomiting, photosensitivity, reports feeling more off balance  Reports blurred vision  States started on Monday and has not improved  81 yo female c/o headache that she recalls gradual onset about 2 weeks ago, localizing to left temple area, without fever, chills, N/V  She says she has blurry vision, but that seems to be chronic and bilateral   She uses acetaminophen to some relief initially, but it seems to have become less effective  She was evaluated in the ED 2/3/20 for same headache, and did have some improvement in the ED, CT with no acute findings, and labs  She was advised to continue to use acetaminophen and follow up soon with PCP, and/or neurologist, whom she has seen for HA in the past   She mentioned some balance issues with walking, but it also does not seem that is acutely associated with this HA syndrome  History provided by:  Patient  Headache   Pain location:  L temporal  Quality:  Dull  Radiates to: across left forehead  Duration:  2 weeks  Timing:  Constant  Progression:  Waxing and waning  Chronicity:  New  Associated symptoms: no cough, no fever, no nausea, no sinus pressure, no sore throat and no vomiting        Prior to Admission Medications   Prescriptions Last Dose Informant Patient Reported? Taking?    Cholecalciferol (VITAMIN D3) 2000 units capsule  Self Yes No   Sig: Take 2,000 Units by mouth daily    Incontinence Supply Disposable (SELECT DISPOSABLE UNDERWEAR LG) MISC  Self Yes No   Lidocaine-Menthol 4-5 % PTCH  Self Yes No   Sig: lidocaine patch   Melatonin 5 MG CAPS  Self Yes No   Si mg as needed    OLANZapine (ZyPREXA) 2 5 mg tablet  Self No No   Sig: Take 1 tablet (2 5 mg total) by mouth daily   acetaminophen-codeine (TYLENOL #3) 300-30 mg per tablet  Self No No   Sig: Take 1 tablet by mouth every 4 (four) hours as needed for moderate pain albuterol (VENTOLIN HFA) 90 mcg/act inhaler  Self No No   Sig: Inhale 2 puffs every 4 (four) hours as needed for wheezing   aluminum-magnesium hydroxide-simethicone (MYLANTA) 200-200-20 mg/5 mL suspension  Self Yes No   Sig: Take 30 mL by mouth every 4 (four) hours as needed for indigestion or heartburn   amLODIPine (NORVASC) 5 mg tablet   No No   Sig: Take 1 tablet (5 mg total) by mouth daily   aspirin 81 MG tablet  Self Yes No   Sig: Take 81 mg by mouth daily  dorzolamide (TRUSOPT) 2 % ophthalmic solution  Self Yes No   Sig: Administer 1 drop to both eyes 3 (three) times a day     fluticasone (FLONASE) 50 mcg/act nasal spray  Self No No   Si sprays into each nostril daily   fluticasone-vilanterol (BREO ELLIPTA) 100-25 mcg/inh inhaler  Self No No   Sig: Inhale 1 puff daily Rinse mouth after use     furosemide (LASIX) 20 mg tablet  Self No No   Sig: TAKE 1 TABLET BY MOUTH EVERY MORNING AND 2 TABLETS BY MOUTH EVERY EVENING   gabapentin (NEURONTIN) 600 MG tablet   No No   Sig: Take 1 tablet (600 mg total) by mouth daily at bedtime   ketoconazole (NIZORAL) 2 % cream  Self No No   Sig: Apply topically daily   latanoprost (XALATAN) 0 005 % ophthalmic solution  Self No No   Sig: Apply 1 drop to eye daily at bedtime Both eyes   levothyroxine 75 mcg tablet  Self No No   Sig: Take 1 tablet (75 mcg total) by mouth daily   nitroglycerin (NITROSTAT) 0 4 mg SL tablet   No No   Sig: Place 1 tablet (0 4 mg total) under the tongue every 5 (five) minutes as needed for chest pain   nystatin (MYCOSTATIN) powder  Self No No   Sig: Apply topically 2 (two) times a day   omeprazole (PriLOSEC) 40 MG capsule  Self No No   Sig: Take 1 capsule (40 mg total) by mouth daily   potassium chloride (K-DUR,KLOR-CON) 20 mEq tablet  Self No No   Sig: Take 1 tablet (20 mEq total) by mouth daily   sitaGLIPtin (JANUVIA) 50 mg tablet  Self No No   Sig: Take 1 tablet (50 mg total) by mouth daily      Facility-Administered Medications: None Past Medical History:   Diagnosis Date    Asthma     Atypical chest pain     CAD (coronary artery disease)     Candidal intertrigo     CHF (congestive heart failure) (HCC)     Depression     Diabetes mellitus (HCC)     Diabetic neuropathy (HCC)     Diverticulitis     Dyslipidemia     Female bladder prolapse     Gastric ulcer     Glaucoma     HTN (hypertension)     Hyperlipidemia     Hypothyroidism 4/18/2016    RAD (reactive airway disease)        Past Surgical History:   Procedure Laterality Date    COLONOSCOPY      ESOPHAGOGASTRODUODENOSCOPY  2011    HYSTERECTOMY         Family History   Problem Relation Age of Onset    Breast cancer Mother     Hypothyroidism Mother     Hypertension Father     Breast cancer Sister     Thyroid disease Sister     Hypertension Sister      I have reviewed and agree with the history as documented  Social History     Tobacco Use    Smoking status: Never Smoker    Smokeless tobacco: Never Used   Substance Use Topics    Alcohol use: No     Comment: Social Alcohol Use -- as per allscripts (pt denies all alcohol use)    Drug use: No        Review of Systems   Constitutional: Negative for fever  HENT: Negative for sinus pressure and sore throat  Respiratory: Negative for cough  Gastrointestinal: Negative for nausea and vomiting  Neurological: Positive for headaches  Physical Exam  Physical Exam   Constitutional: She is oriented to person, place, and time  Vital signs are normal  She appears well-developed and well-nourished  Non-toxic appearance  HENT:   Head: Normocephalic and atraumatic  Right Ear: Tympanic membrane and external ear normal    Left Ear: Tympanic membrane and external ear normal    Nose: Nose normal    Mouth/Throat: Oropharynx is clear and moist    Eyes: Pupils are equal, round, and reactive to light  Conjunctivae and EOM are normal  Right eye exhibits no chemosis, no discharge and no exudate   Left eye exhibits no chemosis, no discharge and no exudate  Right conjunctiva is not injected  Right conjunctiva has no hemorrhage  Left conjunctiva is not injected  Left conjunctiva has no hemorrhage  Right eye exhibits normal extraocular motion  Left eye exhibits normal extraocular motion  Right pupil is reactive  Left pupil is reactive  IOP:  OS 8  OD 7   Neck: Normal range of motion and full passive range of motion without pain  Neck supple  No Brudzinski's sign and no Kernig's sign noted  Cardiovascular: Normal rate, regular rhythm, normal heart sounds, intact distal pulses and normal pulses  No murmur heard  Pulmonary/Chest: Effort normal and breath sounds normal  No tachypnea  No respiratory distress  She has no wheezes  Abdominal: Soft  Bowel sounds are normal  She exhibits no distension  There is no tenderness  There is no rigidity, no rebound and no guarding  Musculoskeletal: Normal range of motion  Right lower leg: She exhibits no swelling  Left lower leg: She exhibits no swelling  Lymphadenopathy:     She has no cervical adenopathy  Neurological: She is alert and oriented to person, place, and time  She has normal strength and normal reflexes  No cranial nerve deficit or sensory deficit  Coordination and gait normal  GCS eye subscore is 4  GCS verbal subscore is 5  GCS motor subscore is 6  No pronator drift  BUE strength 6/6  BLE strength 6/6  Symmetrical  strength  Bilateral symmetrical rapid alternating movements that cross midline  Bilateral normal finger nose and crosses midline  No nystagmus  CN 2-12 intact   Skin: Skin is warm and dry  No rash noted  She is not diaphoretic  No pallor  Psychiatric: She has a normal mood and affect  Her speech is normal and behavior is normal  Judgment and thought content normal  Cognition and memory are normal    Nursing note and vitals reviewed        Vital Signs  ED Triage Vitals [02/09/20 1436]   Temperature Pulse Respirations Blood Pressure SpO2 98 °F (36 7 °C) 78 16 164/79 97 %      Temp Source Heart Rate Source Patient Position - Orthostatic VS BP Location FiO2 (%)   Temporal Monitor Sitting Left arm --      Pain Score       8           Vitals:    02/09/20 1436 02/09/20 1701   BP: 164/79 126/60   Pulse: 78 59   Patient Position - Orthostatic VS: Sitting Lying         Visual Acuity      ED Medications  Medications   butalbital-acetaminophen-caffeine (FIORICET,ESGIC) -40 mg per tablet 1 tablet (1 tablet Oral Given 2/9/20 1659)       Diagnostic Studies  Results Reviewed     Procedure Component Value Units Date/Time    Basic metabolic panel [[id number]] Collected:  02/09/20 1626    Lab Status:  Final result Specimen:  Blood from Arm, Right Updated:  02/09/20 1648     Sodium 142 mmol/L      Potassium 4 3 mmol/L      Chloride 106 mmol/L      CO2 26 mmol/L      ANION GAP 10 mmol/L      BUN 19 mg/dL      Creatinine 1 08 mg/dL      Glucose 140 mg/dL      Calcium 9 2 mg/dL      eGFR 50 ml/min/1 73sq m     Narrative:       [place] guidelines for Chronic Kidney Disease (CKD):     Stage 1 with normal or high GFR (GFR > 90 mL/min/1 73 square meters)    Stage 2 Mild CKD (GFR = 60-89 mL/min/1 73 square meters)    Stage 3A Moderate CKD (GFR = 45-59 mL/min/1 73 square meters)    Stage 3B Moderate CKD (GFR = 30-44 mL/min/1 73 square meters)    Stage 4 Severe CKD (GFR = 15-29 mL/min/1 73 square meters)    Stage 5 End Stage CKD (GFR <15 mL/min/1 73 square meters)  Note: GFR calculation is accurate only with a steady state creatinine    CBC and differential [[id number]] Collected:  02/09/20 1626    Lab Status:  Final result Specimen:  Blood from Arm, Right Updated:  02/09/20 1633     WBC 7 21 Thousand/uL      RBC 4 80 Million/uL      Hemoglobin 14 7 g/dL      Hematocrit 45 6 %      MCV 95 fL      MCH 30 6 pg      MCHC 32 2 g/dL      RDW 13 4 %      MPV 10 9 fL      Platelets 290 Thousands/uL      nRBC 0 /100 WBCs      Neutrophils Relative 72 %      Immat GRANS % 0 %      Lymphocytes Relative 14 %      Monocytes Relative 10 %      Eosinophils Relative 3 %      Basophils Relative 1 %      Neutrophils Absolute 5 28 Thousands/µL      Immature Grans Absolute 0 02 Thousand/uL      Lymphocytes Absolute 0 97 Thousands/µL      Monocytes Absolute 0 70 Thousand/µL      Eosinophils Absolute 0 19 Thousand/µL      Basophils Absolute 0 05 Thousands/µL                  No orders to display              Procedures  Procedures         ED Course  ED Course as of Feb 09 1735   Neto Lozano Feb 09, 2020   1618 She has pain on the left temple area, and she c/o tenderness to touch, so I was considering temporal arteritis, but review of records she had normal ESR 2/3/20 so I do not suspect it at this time      1731 She reports near complete relief with fiorcet  Her exam is otherwise normal, recent imaging and labs have been reassuring  I am comfortable providing a short course for her at home  I am encouraging her to followup with her PCP, neurologist, and ophthalmologist                                     MDM      Disposition  Final diagnoses:   Tension-type headache, not intractable, unspecified chronicity pattern     Time reflects when diagnosis was documented in both MDM as applicable and the Disposition within this note     Time User Action Codes Description Comment    2/9/2020  5:34 PM Latah Ang Add [G44 209] Tension-type headache, not intractable, unspecified chronicity pattern     2/9/2020  5:35 PM Mindy Ang Modify [J06 772] Tension-type headache, not intractable, unspecified chronicity pattern       ED Disposition     ED Disposition Condition Date/Time Comment    Discharge Good Sun Feb 9, 2020  5:34 PM Delories Confer discharge to home/self care              Follow-up Information     Follow up With Specialties Details Why Contact Info    Carmen Kaye PA-C Family Medicine, Physician Assistant Go to  For followup [id number] [id number]  [phone]            Patient's Medications   Discharge Prescriptions    BUTALBITAL-ACETAMINOPHEN-CAFFEINE (FIORICET,ESGIC) -40 MG PER TABLET    Take 1 tablet by mouth every 4 (four) hours as needed for headaches       Start Date: 2/9/2020  End Date: --       Order Dose: 1 tablet       Quantity: 6 tablet    Refills: 0     No discharge procedures on file      ED Provider  Electronically Signed by           Sheyla Townsend MD  02/09/20 710

## 2020-02-12 ENCOUNTER — TELEPHONE (OUTPATIENT)
Dept: FAMILY MEDICINE CLINIC | Facility: CLINIC | Age: 85
End: 2020-02-12

## 2020-02-12 NOTE — TELEPHONE ENCOUNTER
CALL FROM [facility] WITH [facility] STATING THEY HAD A VISIT WITH PT TODAY AND PT HAS A LOW GRADE FEVER 100 3, COUGHING UP MUCUS, LUNG SOUND CLEAR AND OTHER VITALS ARE STABLE   CALL BACK NUMBER IF ANY QUESTIONS [phone]

## 2020-02-18 ENCOUNTER — OFFICE VISIT (OUTPATIENT)
Dept: FAMILY MEDICINE CLINIC | Facility: CLINIC | Age: 85
End: 2020-02-18
Payer: COMMERCIAL

## 2020-02-18 VITALS
WEIGHT: 172 LBS | HEIGHT: 62 IN | HEART RATE: 80 BPM | BODY MASS INDEX: 31.65 KG/M2 | DIASTOLIC BLOOD PRESSURE: 60 MMHG | SYSTOLIC BLOOD PRESSURE: 110 MMHG

## 2020-02-18 DIAGNOSIS — I20.8 STABLE ANGINA (HCC): ICD-10-CM

## 2020-02-18 DIAGNOSIS — G50.0 TRIGEMINAL NEURALGIA: Primary | ICD-10-CM

## 2020-02-18 DIAGNOSIS — G52.9 CRANIAL NEURALGIA: ICD-10-CM

## 2020-02-18 DIAGNOSIS — F41.9 ANXIETY DISORDER, UNSPECIFIED TYPE: ICD-10-CM

## 2020-02-18 DIAGNOSIS — R44.1 VISUAL HALLUCINATIONS: ICD-10-CM

## 2020-02-18 PROBLEM — I20.89 STABLE ANGINA: Status: ACTIVE | Noted: 2020-02-18

## 2020-02-18 PROCEDURE — 4040F PNEUMOC VAC/ADMIN/RCVD: CPT | Performed by: PHYSICIAN ASSISTANT

## 2020-02-18 PROCEDURE — 3008F BODY MASS INDEX DOCD: CPT | Performed by: PHYSICIAN ASSISTANT

## 2020-02-18 PROCEDURE — 1160F RVW MEDS BY RX/DR IN RCRD: CPT | Performed by: PHYSICIAN ASSISTANT

## 2020-02-18 PROCEDURE — 1036F TOBACCO NON-USER: CPT | Performed by: PHYSICIAN ASSISTANT

## 2020-02-18 PROCEDURE — 3074F SYST BP LT 130 MM HG: CPT | Performed by: PHYSICIAN ASSISTANT

## 2020-02-18 PROCEDURE — 2022F DILAT RTA XM EVC RTNOPTHY: CPT | Performed by: PHYSICIAN ASSISTANT

## 2020-02-18 PROCEDURE — 99214 OFFICE O/P EST MOD 30 MIN: CPT | Performed by: PHYSICIAN ASSISTANT

## 2020-02-18 PROCEDURE — 3078F DIAST BP <80 MM HG: CPT | Performed by: PHYSICIAN ASSISTANT

## 2020-02-18 RX ORDER — GABAPENTIN 600 MG/1
TABLET ORAL
Qty: 180 TABLET | Refills: 3 | Status: SHIPPED | OUTPATIENT
Start: 2020-02-18 | End: 2020-05-27 | Stop reason: SDUPTHER

## 2020-02-18 NOTE — ASSESSMENT & PLAN NOTE
This diagnosis has been the main cause of patient's head pain lately in the past few weeks  It has come to light that patient actually does not take her gabapentin nightly on a regular basis and this may be the reason for the breakthrough of her trigeminal neuralgia pain  At this time I am going to have her start taking her 600 mg every night and for now I will add in half which is 300 mg during the morning and then if needed another half in the afternoon  She does have appoint with neurology 3/27

## 2020-02-18 NOTE — PATIENT INSTRUCTIONS
Problem List Items Addressed This Visit        Cardiovascular and Mediastinum    Stable angina (HCC)       Nervous and Auditory    Trigeminal neuralgia - Primary     This diagnosis has been the main cause of patient's head pain lately in the past few weeks  It has come to light that patient actually does not take her gabapentin nightly on a regular basis and this may be the reason for the breakthrough of her trigeminal neuralgia pain  At this time I am going to have her start taking her 600 mg every night and for now I will add in half which is 300 mg during the morning and then if needed another half in the afternoon  She does have appoint with neurology 3/27  Cranial neuralgia    Relevant Medications    gabapentin (NEURONTIN) 600 MG tablet       Other    Anxiety disorder     Pt is not taking her zyprexa every night just as needed for anxiety  It was explained that this medicine does not work quickly and she would need to take it regularly at bedtime for at least 2 weeks  If she does this and still not working I would increase then to 5 mg nightly  Visual hallucinations     Per grand daughter these have subsided

## 2020-02-18 NOTE — ASSESSMENT & PLAN NOTE
Pt is not taking her zyprexa every night just as needed for anxiety  It was explained that this medicine does not work quickly and she would need to take it regularly at bedtime for at least 2 weeks  If she does this and still not working I would increase then to 5 mg nightly

## 2020-02-18 NOTE — PROGRESS NOTES
Assessment and Plan:    Problem List Items Addressed This Visit        Cardiovascular and Mediastinum    Stable angina (HCC)       Nervous and Auditory    Trigeminal neuralgia - Primary     This diagnosis has been the main cause of patient's head pain lately in the past few weeks  It has come to light that patient actually does not take her gabapentin nightly on a regular basis and this may be the reason for the breakthrough of her trigeminal neuralgia pain  At this time I am going to have her start taking her 600 mg every night and for now I will add in half which is 300 mg during the morning and then if needed another half in the afternoon  She does have appoint with neurology 3/27  Cranial neuralgia    Relevant Medications    gabapentin (NEURONTIN) 600 MG tablet       Other    Anxiety disorder     Pt is not taking her zyprexa every night just as needed for anxiety  It was explained that this medicine does not work quickly and she would need to take it regularly at bedtime for at least 2 weeks  If she does this and still not working I would increase then to 5 mg nightly  Visual hallucinations     Per grand daughter these have subsided  Diagnoses and all orders for this visit:    Trigeminal neuralgia    Cranial neuralgia  -     gabapentin (NEURONTIN) 600 MG tablet; Take 1/2 tab am, 1/2 tab afternoon and whole tab at bedtime  Anxiety disorder, unspecified type    Visual hallucinations    Stable angina (HCC)              Subjective:      Patient ID: Johana Pool is a 80 y o  female  CC:    Chief Complaint   Patient presents with    Headache     patient c/o ongoing "head pain above the left eye" and radiates down to her left cheek  Patient was given Tylenol # 3 which helps  ak    Shortness of Breath     patient c/o ongoing SOB and is on oxygen  No chest pain/pressure   ak       HPI:    Patient here today accompanied by her granddaughter because her daughter usually cares for her had surgery recently  Kelly Ramirez was in the emergency room twice in the past 2 weeks forehead pain/headaches  She does see Neurology for this and is on gabapentin last appoint was 11/13/2019  She did have a CT scan of the head which is negative labs including CMP CBC normal CRP was elevated  She does have an upcoming appointment palliative care in 2/26 and with Neurology 3/27  At the last Neurology appointment patient was suggested to increase her gabapentin to control her symptoms better but patient did not want to do this at that time  Upon speaking with both the patient and the granddaughter it does seem that all of her symptoms are stemming from her trigeminal neuralgia in her left face and cheek  She has not been taking gabapentin on a regular basis and for long time did not take at all because she did not have any symptoms however now her pain is back and she restarted taking her gabapentin 600 mg at bedtime  It did explain that trigeminal neuralgia does 10 to breakthrough again if not on the proper medications so she should continue this even if she is feeling normal     She also is having a lot of what seems to be anxiety at nighttime after she falls asleep she will wake up 1 or 2 times having somewhat of an anxiety attack with physical symptoms of throat discomfort  Upon speaking with her she does not take her Zyprexa on a regular basis just here and there at bedtime  Patient no longer is having any hallucinations  The following portions of the patient's history were reviewed and updated as appropriate: allergies, current medications, past family history, past medical history, past social history, past surgical history and problem list       Review of Systems   Respiratory: Positive for shortness of breath  Neurological: Positive for headaches           Data to review:       Objective:    Vitals:    02/18/20 1433   BP: 110/60   Pulse: 80   Weight: 78 kg (172 lb)   Height: 5' 2" (1 575 m)        Physical Exam   Constitutional: She is oriented to person, place, and time  She appears well-developed and well-nourished  No distress  HENT:   Head: Normocephalic and atraumatic  Eyes: Conjunctivae are normal  Right eye exhibits no discharge  Left eye exhibits no discharge  Neck: Neck supple  Carotid bruit is not present  Cardiovascular: Normal rate and regular rhythm  Exam reveals no gallop and no friction rub  Murmur heard  Systolic murmur is present with a grade of 4/6  Pulmonary/Chest: Effort normal and breath sounds normal  No respiratory distress  She has no wheezes  She has no rales  Neurological: She is alert and oriented to person, place, and time  Skin: Skin is warm and dry  She is not diaphoretic  Psychiatric: She has a normal mood and affect  Judgment normal    Nursing note and vitals reviewed

## 2020-02-26 ENCOUNTER — IN HOME VISIT (OUTPATIENT)
Dept: PALLIATIVE MEDICINE | Facility: CLINIC | Age: 85
End: 2020-02-26

## 2020-02-26 VITALS
BODY MASS INDEX: 30.91 KG/M2 | SYSTOLIC BLOOD PRESSURE: 116 MMHG | RESPIRATION RATE: 16 BRPM | DIASTOLIC BLOOD PRESSURE: 60 MMHG | WEIGHT: 169 LBS | HEART RATE: 80 BPM | OXYGEN SATURATION: 98 %

## 2020-02-26 DIAGNOSIS — R06.00 DYSPNEA ON MINIMAL EXERTION: ICD-10-CM

## 2020-02-26 DIAGNOSIS — R06.02 SHORTNESS OF BREATH: ICD-10-CM

## 2020-02-26 DIAGNOSIS — I50.32 CHRONIC DIASTOLIC CONGESTIVE HEART FAILURE (HCC): Primary | ICD-10-CM

## 2020-02-26 PROCEDURE — 99499 UNLISTED E&M SERVICE: CPT

## 2020-02-26 NOTE — PROGRESS NOTES
Visit made to patients home as part of the Palliative Care at Home program through [facility]  Greater than 1/2 hour was spent in direct patient care and participating in goals of care conversation  Chronic Care Management facilitated by this visit  Emotional and social issues addressed  This nurse reviewed all medications and instructed on their use and dosing  Used the teach back method to faciilitate learning of proper medication administration  Present during the visit were the patient and her daughter Lidia Centeno  VSS  No peripheral edema noted weight down 2 lbs from normal Patient denies cough but admits to SOB with exertion  She has not been using her oxygen regularly  Educated on use of 02 to decrease workload to the heart  Patient will use 02 more regularly  Lidia Centeno, daughter, does not want patient to try low-dose opiods for SOB until using oxygen more frequenlty and will see how that helps first      Assessed GI  Per patient, bowels have been more sluggish lately  Recommended miralax and senokot tablets approved by Andi Cherry  Patient will try this and if has complaints of loose stools, diarrhea, she will decrease the amount of senokot she takes, then miralax  Plan to revisit in 1 month and assess cvp,oxygen use,  SOB, bowel regime

## 2020-02-27 ENCOUNTER — DOCTOR'S OFFICE (OUTPATIENT)
Dept: URBAN - METROPOLITAN AREA CLINIC 136 | Facility: CLINIC | Age: 85
Setting detail: OPHTHALMOLOGY
End: 2020-02-27
Payer: COMMERCIAL

## 2020-02-27 DIAGNOSIS — H34.8312: ICD-10-CM

## 2020-02-27 PROCEDURE — 92134 CPTRZ OPH DX IMG PST SGM RTA: CPT | Performed by: OPHTHALMOLOGY

## 2020-02-27 PROCEDURE — 67028 INJECTION EYE DRUG: CPT | Performed by: OPHTHALMOLOGY

## 2020-02-27 PROCEDURE — SAMPLE SAMPLE MEDICATION: Performed by: OPHTHALMOLOGY

## 2020-02-27 ASSESSMENT — REFRACTION_AUTOREFRACTION
OD_SPHERE: PLANO
OS_AXIS: 084
OD_CYLINDER: -4.25
OS_SPHERE: PLANO
OD_AXIS: 075
OS_CYLINDER: -4.00

## 2020-02-27 ASSESSMENT — KERATOMETRY
OD_AXISANGLE_DEGREES: 75
OS_K2POWER_DIOPTERS: 46.75
OD_K2POWER_DIOPTERS: 47.25
OD_K1POWER_DIOPTERS: 42.75
OS_AXISANGLE_DEGREES: 90
OS_K1POWER_DIOPTERS: 42.50
METHOD_AUTO_MANUAL: AUTO

## 2020-02-27 ASSESSMENT — VISUAL ACUITY
OD_BCVA: 20/100+1
OS_BCVA: 20/50-1

## 2020-02-27 ASSESSMENT — CONFRONTATIONAL VISUAL FIELD TEST (CVF)
OS_FINDINGS: CONSTRICTION
OD_FINDINGS: CONSTRICTION

## 2020-03-13 DIAGNOSIS — E11.9 TYPE 2 DIABETES MELLITUS WITHOUT COMPLICATION, WITHOUT LONG-TERM CURRENT USE OF INSULIN (HCC): ICD-10-CM

## 2020-03-14 RX ORDER — SITAGLIPTIN 50 MG/1
TABLET, FILM COATED ORAL
Qty: 90 TABLET | Refills: 3 | Status: SHIPPED | OUTPATIENT
Start: 2020-03-14 | End: 2020-11-18 | Stop reason: SDUPTHER

## 2020-03-18 ENCOUNTER — TELEPHONE (OUTPATIENT)
Dept: PALLIATIVE MEDICINE | Facility: CLINIC | Age: 85
End: 2020-03-18

## 2020-03-18 NOTE — TELEPHONE ENCOUNTER
Called patient and let her and her daughter Estefany Kruger know we are suspending visits at this time for the home visit program  She reports she is doing well and no longer has issues with constipation  We will call her in a couple of months to let her know where the home program stands   Lina Tuttle

## 2020-03-25 DIAGNOSIS — K21.9 GASTROESOPHAGEAL REFLUX DISEASE WITHOUT ESOPHAGITIS: ICD-10-CM

## 2020-03-25 RX ORDER — OMEPRAZOLE 40 MG/1
40 CAPSULE, DELAYED RELEASE ORAL DAILY
Qty: 90 CAPSULE | Refills: 3 | Status: SHIPPED | OUTPATIENT
Start: 2020-03-25 | End: 2021-06-01 | Stop reason: SDUPTHER

## 2020-03-27 ENCOUNTER — TELEMEDICINE (OUTPATIENT)
Dept: NEUROLOGY | Facility: CLINIC | Age: 85
End: 2020-03-27
Payer: COMMERCIAL

## 2020-03-27 DIAGNOSIS — G50.1 ATYPICAL FACIAL PAIN: Primary | ICD-10-CM

## 2020-03-27 DIAGNOSIS — F51.01 PRIMARY INSOMNIA: ICD-10-CM

## 2020-03-27 DIAGNOSIS — R51.9 OCCIPITAL HEADACHE: ICD-10-CM

## 2020-03-27 PROCEDURE — G2012 BRIEF CHECK IN BY MD/QHP: HCPCS | Performed by: PHYSICIAN ASSISTANT

## 2020-03-29 PROBLEM — R51.9 HEADACHE: Status: RESOLVED | Noted: 2019-01-09 | Resolved: 2020-03-29

## 2020-03-29 PROBLEM — G50.1 ATYPICAL FACIAL PAIN: Status: ACTIVE | Noted: 2020-03-29

## 2020-03-29 PROBLEM — G52.9 CRANIAL NEURALGIA: Status: RESOLVED | Noted: 2019-11-14 | Resolved: 2020-03-29

## 2020-03-29 RX ORDER — BACLOFEN 10 MG/1
TABLET ORAL
Qty: 30 TABLET | Refills: 2 | Status: SHIPPED | OUTPATIENT
Start: 2020-03-29 | End: 2020-06-26

## 2020-03-29 NOTE — PROGRESS NOTES
Virtual Brief Visit    Problem List Items Addressed This Visit        Other    Insomnia    Occipital headache    Relevant Medications    baclofen 10 mg tablet    Atypical facial pain - Primary    Relevant Medications    baclofen 10 mg tablet          Left-sided facial pain, probable occipital neuralgia, low suspicion for temporal arteritis, slightly worse when she clenches her jaw or chews  On questioning, the patient is not sure if it is worse recently because of the cold weather  Denies fevers, chills or any viral-like illness  No new or worsening neurologic deficits  She will add a half tab of baclofen and can increase to a full tab q h s  P r n  Facial pain  I specifically advised her daughter, who I talked to personally on the phone, to watch for any changes in mentation, dizziness or balance changes with the addition of this medication and to let me know if that occurs and completely stop it if that occurs  Will also order a [employer] cream   May be awkward to apply to the face, however better than adding a medication which could cause side effects  She was advised to stay away from the eyes  She should continue gabapentin 600 mg q h s  For the facial pain  She does not want to increase the dose of this any further on questioning  Regarding insomnia I asked her to increase the melatonin from 3-6 mg q h s     Adding baclofen may assist sleep  If this does not we will consider trazodone however will proceed with caution considering her age  I do not want to make her gait or mentation worse med side effects  Already taking Zyprexa some maybe this dose could be increased  We will cross that bridge when we get to it  The patient should not hesitate to call me prior to her follow up with any questions or concerns  The patient was instructed to urgently call 911 or present to the nearest emergency room with any new or worsening neurological deficits            Reason for visit is facial pain and headaches  Patient also complains of insomnia  Encounter provider Trecia Lennox, PA-C    Provider located at [address] [place] [phone]      Recent Visits  Date Type Provider Dept   03/27/20 106 Eloina Cho PA-C [facility] [place]   Showing recent visits within past 7 days and meeting all other requirements     Future Appointments  No visits were found meeting these conditions  Showing future appointments within next 150 days and meeting all other requirements        After connecting through telephone, the patient was identified by name and date of birth  Archie Carey was informed that this is a telemedicine visit and that the visit is being conducted through telephone  My office door was closed  No one else was in the room  She acknowledged consent and understanding of privacy and security of the video platform  The patient has agreed to participate and understands they can discontinue the visit at any time  Patient is aware this is a billable service  Subjective  Archie Carey is an extremely pleasant 80 y o  female who connected with me on the telephone today for a remote visit to address facial pain and headaches  Her daughter was available in the room to speak if needed  I mainly spoke to the patient alone over the phone  She has been doing quite well since the addition of gabapentin  It was prescribed for 3 times a day but she tells me that she only takes the nighttime dose of 600 mg  Since starting gabapentin is decreased her facial pain by greater than 50% however she continues to have daily pain of 4 to 5/10, reassuring me that it is nowhere near the severity was before and that she is happy with the relief  Recently her facial pain has worsened  It is classically on the left side  She states it is only worse when she chews or clenches her jaw    It is not worse when brushing her teeth or talking  Sometimes she has to pain on the left side and she feels like there is something wrong with her teeth, but her dentist recently reassured her that there is nothing wrong with her teeth  She denies nausea, light or sound sensitivity  Sometimes the headache is on the left occipital region and radiates around the ear or under the ear to the face  Sometimes she has pain on the left side of the nose  She reports that sometimes the pain is worse when she is trying to sleep and keeps her up at night  The pain is controlled she still has a difficult time falling asleep  She has tried melatonin 3 mg  She denies swelling, redness or pain in the temporal region  Recent sed rate is within normal limits and CRP is only slightly elevated  She denies vision changes or worsening gait instability, falls  She tries to get outside as much as possible and walk around the yd or sit on her porch      Past Medical History:   Diagnosis Date    Asthma     Atypical chest pain     CAD (coronary artery disease)     Candidal intertrigo     CHF (congestive heart failure) (HCC)     Depression     Diabetes mellitus (HCC)     Diabetic neuropathy (HCC)     Diverticulitis     Dyslipidemia     Female bladder prolapse     Gastric ulcer     Glaucoma     HTN (hypertension)     Hyperlipidemia     Hypothyroidism 4/18/2016    RAD (reactive airway disease)        Past Surgical History:   Procedure Laterality Date    COLONOSCOPY      ESOPHAGOGASTRODUODENOSCOPY  2011    HYSTERECTOMY         Current Outpatient Medications   Medication Sig Dispense Refill    albuterol (VENTOLIN HFA) 90 mcg/act inhaler Inhale 2 puffs every 4 (four) hours as needed for wheezing 1 Inhaler 3    aluminum-magnesium hydroxide-simethicone (MYLANTA) 200-200-20 mg/5 mL suspension Take 30 mL by mouth every 4 (four) hours as needed for indigestion or heartburn      amLODIPine (NORVASC) 5 mg tablet Take 1 tablet (5 mg total) by mouth daily 90 tablet 1    aspirin 81 MG tablet Take 81 mg by mouth daily   baclofen 10 mg tablet 1/2- 1 full tab qhs prn facial pain/ insomnia 30 tablet 2    butalbital-acetaminophen-caffeine (FIORICET,ESGIC) -40 mg per tablet Take 1 tablet by mouth every 4 (four) hours as needed for headaches 6 tablet 0    Cholecalciferol (VITAMIN D3) 2000 units capsule Take 2,000 Units by mouth daily       dorzolamide (TRUSOPT) 2 % ophthalmic solution Administer 1 drop to both eyes 3 (three) times a day        fluticasone (FLONASE) 50 mcg/act nasal spray 2 sprays into each nostril daily 3 Bottle 3    fluticasone-vilanterol (BREO ELLIPTA) 100-25 mcg/inh inhaler Inhale 1 puff daily Rinse mouth after use  1 Inhaler 5    furosemide (LASIX) 20 mg tablet TAKE 1 TABLET BY MOUTH EVERY MORNING AND 2 TABLETS BY MOUTH EVERY EVENING 270 tablet 11    gabapentin (NEURONTIN) 600 MG tablet Take 1/2 tab am, 1/2 tab afternoon and whole tab at bedtime   180 tablet 3    Incontinence Supply Disposable (SELECT DISPOSABLE UNDERWEAR [employer]) MISC       JANUVIA 50 MG tablet TAKE 1 TABLET EVERY DAY 90 tablet 3    ketoconazole (NIZORAL) 2 % cream Apply topically daily 60 g 1    latanoprost (XALATAN) 0 005 % ophthalmic solution Apply 1 drop to eye daily at bedtime Both eyes 7 5 mL 1    levothyroxine 75 mcg tablet Take 1 tablet (75 mcg total) by mouth daily 90 tablet 3    Lidocaine-Menthol 4-5 % PTCH lidocaine patch      Melatonin 5 MG CAPS Take 3 mg by mouth daily at bedtime      nitroglycerin (NITROSTAT) 0 4 mg SL tablet Place 1 tablet (0 4 mg total) under the tongue every 5 (five) minutes as needed for chest pain 25 tablet 3    nystatin (MYCOSTATIN) powder Apply topically 2 (two) times a day 60 g 1    OLANZapine (ZyPREXA) 2 5 mg tablet Take 1 tablet (2 5 mg total) by mouth daily 90 tablet 3    omeprazole (PriLOSEC) 40 MG capsule Take 1 capsule (40 mg total) by mouth daily 90 capsule 3    potassium chloride (K-DUR,KLOR-CON) 20 mEq tablet Take 1 tablet (20 mEq total) by mouth daily 60 tablet 0    acetaminophen-codeine (TYLENOL #3) 300-30 mg per tablet Take 1 tablet by mouth every 4 (four) hours as needed for moderate pain 30 tablet 0     No current facility-administered medications for this visit  Allergies   Allergen Reactions    Statins      Other reaction(s): Weakness  Category: Adverse Reaction; Review of Systems    Review of Systems - Negative except as mentioned in the HPI above  History obtained from child (daughter, Atul Cole), chart review and the patient    The following portions of the patient's history were reviewed and updated as appropriate: allergies, current medications/ medication history, past family history, past medical history, past social history, past surgical history and problem list     Review of systems was reviewed and otherwise unremarkable from a neurological perspective  I spent 21 minutes with the patient during this visit

## 2020-04-06 ENCOUNTER — TELEPHONE (OUTPATIENT)
Dept: FAMILY MEDICINE CLINIC | Facility: CLINIC | Age: 85
End: 2020-04-06

## 2020-04-28 DIAGNOSIS — E11.42 DM TYPE 2 WITH DIABETIC PERIPHERAL NEUROPATHY (HCC): Primary | Chronic | ICD-10-CM

## 2020-04-29 ENCOUNTER — TELEMEDICINE (OUTPATIENT)
Dept: PALLIATIVE MEDICINE | Facility: CLINIC | Age: 85
End: 2020-04-29
Payer: COMMERCIAL

## 2020-04-29 DIAGNOSIS — K21.9 GASTROESOPHAGEAL REFLUX DISEASE WITHOUT ESOPHAGITIS: ICD-10-CM

## 2020-04-29 DIAGNOSIS — I50.32 CHRONIC DIASTOLIC CONGESTIVE HEART FAILURE (HCC): Primary | ICD-10-CM

## 2020-04-29 DIAGNOSIS — R06.02 SHORTNESS OF BREATH: ICD-10-CM

## 2020-04-29 DIAGNOSIS — R54 ADVANCED AGE: ICD-10-CM

## 2020-04-29 DIAGNOSIS — J45.40 MODERATE PERSISTENT ASTHMA, UNSPECIFIED WHETHER COMPLICATED: ICD-10-CM

## 2020-04-29 PROCEDURE — 99211 OFF/OP EST MAY X REQ PHY/QHP: CPT

## 2020-05-11 DIAGNOSIS — E11.42 DM TYPE 2 WITH DIABETIC PERIPHERAL NEUROPATHY (HCC): Primary | Chronic | ICD-10-CM

## 2020-05-26 DIAGNOSIS — G52.9 CRANIAL NEURALGIA: ICD-10-CM

## 2020-05-26 RX ORDER — GABAPENTIN 600 MG/1
TABLET ORAL
Qty: 90 TABLET | Refills: 3 | Status: SHIPPED | OUTPATIENT
Start: 2020-05-26 | End: 2021-05-24

## 2020-05-26 NOTE — TELEPHONE ENCOUNTER
Patient arrived back to 5501 from PACU. Patient A&Ox4. VSS with exception to BP being elevated. Patient denies any pain. Patient has no feeling but can wiggle toes on L foot. Wound Vac in place. Doyle in place draining magalis urine. Fall precautions in place and call light within reach. Will continue to monitor. Phone call from [facility], who is in visiting Grzegorz Torrez, who is doing very well  Today, however, she has had a 5 lb weight gain in 3 days  No edema, usual chronic SOB, BP good at 140/80, HR 75, no signs of distress, feels fine, lungs clear, so Meghan Long is a little perplexed  I asked if the patient has changed her diet, and she answered "yes considerably"  She has family members cooking/bringing meals and eats 4-5 times a day, not just snacks  Meghan Long will check on her on Monday, and we will double check that she is not filling up with fluid, but today, it appears to be just from increased appetite

## 2020-05-27 ENCOUNTER — TELEMEDICINE (OUTPATIENT)
Dept: PALLIATIVE MEDICINE | Facility: CLINIC | Age: 85
End: 2020-05-27
Payer: COMMERCIAL

## 2020-05-27 DIAGNOSIS — K21.9 GASTROESOPHAGEAL REFLUX DISEASE WITHOUT ESOPHAGITIS: ICD-10-CM

## 2020-05-27 DIAGNOSIS — R06.02 SHORTNESS OF BREATH: ICD-10-CM

## 2020-05-27 DIAGNOSIS — I50.32 CHRONIC DIASTOLIC CONGESTIVE HEART FAILURE (HCC): Primary | ICD-10-CM

## 2020-05-27 DIAGNOSIS — R54 ADVANCED AGE: ICD-10-CM

## 2020-05-27 PROCEDURE — 1160F RVW MEDS BY RX/DR IN RCRD: CPT

## 2020-05-27 PROCEDURE — 99214 OFFICE O/P EST MOD 30 MIN: CPT

## 2020-06-02 ENCOUNTER — TELEPHONE (OUTPATIENT)
Dept: CARDIOLOGY CLINIC | Facility: CLINIC | Age: 85
End: 2020-06-02

## 2020-06-02 DIAGNOSIS — I35.0 NONRHEUMATIC AORTIC VALVE STENOSIS: Chronic | ICD-10-CM

## 2020-06-02 RX ORDER — AMLODIPINE BESYLATE 5 MG/1
TABLET ORAL
Qty: 90 TABLET | Refills: 1 | Status: SHIPPED | OUTPATIENT
Start: 2020-06-02 | End: 2020-12-29 | Stop reason: SDUPTHER

## 2020-06-03 DIAGNOSIS — I35.0 NONRHEUMATIC AORTIC VALVE STENOSIS: Chronic | ICD-10-CM

## 2020-06-08 RX ORDER — POTASSIUM CHLORIDE 20 MEQ/1
TABLET, EXTENDED RELEASE ORAL
Qty: 60 TABLET | Refills: 0 | Status: SHIPPED | OUTPATIENT
Start: 2020-06-08 | End: 2020-06-09 | Stop reason: SDUPTHER

## 2020-06-09 DIAGNOSIS — I35.0 NONRHEUMATIC AORTIC VALVE STENOSIS: Chronic | ICD-10-CM

## 2020-06-10 RX ORDER — POTASSIUM CHLORIDE 20 MEQ/1
20 TABLET, EXTENDED RELEASE ORAL DAILY
Qty: 90 TABLET | Refills: 2 | Status: SHIPPED | OUTPATIENT
Start: 2020-06-10 | End: 2021-09-30

## 2020-06-25 ENCOUNTER — OFFICE VISIT (OUTPATIENT)
Dept: FAMILY MEDICINE CLINIC | Facility: CLINIC | Age: 85
End: 2020-06-25
Payer: COMMERCIAL

## 2020-06-25 VITALS
SYSTOLIC BLOOD PRESSURE: 112 MMHG | BODY MASS INDEX: 30.55 KG/M2 | TEMPERATURE: 97.4 F | HEIGHT: 62 IN | RESPIRATION RATE: 16 BRPM | DIASTOLIC BLOOD PRESSURE: 74 MMHG | HEART RATE: 72 BPM | WEIGHT: 166 LBS

## 2020-06-25 DIAGNOSIS — F51.01 PRIMARY INSOMNIA: Primary | ICD-10-CM

## 2020-06-25 DIAGNOSIS — R44.3 HALLUCINATION: ICD-10-CM

## 2020-06-25 PROCEDURE — 3074F SYST BP LT 130 MM HG: CPT | Performed by: PHYSICIAN ASSISTANT

## 2020-06-25 PROCEDURE — 3008F BODY MASS INDEX DOCD: CPT | Performed by: PHYSICIAN ASSISTANT

## 2020-06-25 PROCEDURE — 1160F RVW MEDS BY RX/DR IN RCRD: CPT | Performed by: PHYSICIAN ASSISTANT

## 2020-06-25 PROCEDURE — 2022F DILAT RTA XM EVC RTNOPTHY: CPT | Performed by: PHYSICIAN ASSISTANT

## 2020-06-25 PROCEDURE — 99214 OFFICE O/P EST MOD 30 MIN: CPT | Performed by: PHYSICIAN ASSISTANT

## 2020-06-25 PROCEDURE — 4040F PNEUMOC VAC/ADMIN/RCVD: CPT | Performed by: PHYSICIAN ASSISTANT

## 2020-06-25 PROCEDURE — 1036F TOBACCO NON-USER: CPT | Performed by: PHYSICIAN ASSISTANT

## 2020-06-25 PROCEDURE — 3078F DIAST BP <80 MM HG: CPT | Performed by: PHYSICIAN ASSISTANT

## 2020-06-25 RX ORDER — OLANZAPINE 5 MG/1
5 TABLET ORAL DAILY
Qty: 90 TABLET | Refills: 3 | Status: ON HOLD | OUTPATIENT
Start: 2020-06-25 | End: 2022-03-09 | Stop reason: SDUPTHER

## 2020-06-26 ENCOUNTER — OFFICE VISIT (OUTPATIENT)
Dept: CARDIOLOGY CLINIC | Facility: CLINIC | Age: 85
End: 2020-06-26
Payer: COMMERCIAL

## 2020-06-26 VITALS
DIASTOLIC BLOOD PRESSURE: 70 MMHG | BODY MASS INDEX: 30.55 KG/M2 | HEIGHT: 62 IN | TEMPERATURE: 98.3 F | OXYGEN SATURATION: 97 % | HEART RATE: 67 BPM | WEIGHT: 166 LBS | SYSTOLIC BLOOD PRESSURE: 120 MMHG

## 2020-06-26 DIAGNOSIS — I10 BENIGN ESSENTIAL HTN: ICD-10-CM

## 2020-06-26 DIAGNOSIS — I35.0 NONRHEUMATIC AORTIC VALVE STENOSIS: Chronic | ICD-10-CM

## 2020-06-26 DIAGNOSIS — I50.32 CHRONIC DIASTOLIC CHF (CONGESTIVE HEART FAILURE) (HCC): ICD-10-CM

## 2020-06-26 DIAGNOSIS — I35.0 CALCIFIC AORTIC STENOSIS: Primary | ICD-10-CM

## 2020-06-26 PROCEDURE — 3078F DIAST BP <80 MM HG: CPT | Performed by: INTERNAL MEDICINE

## 2020-06-26 PROCEDURE — 2022F DILAT RTA XM EVC RTNOPTHY: CPT | Performed by: INTERNAL MEDICINE

## 2020-06-26 PROCEDURE — 3074F SYST BP LT 130 MM HG: CPT | Performed by: INTERNAL MEDICINE

## 2020-06-26 PROCEDURE — 4040F PNEUMOC VAC/ADMIN/RCVD: CPT | Performed by: INTERNAL MEDICINE

## 2020-06-26 PROCEDURE — 1036F TOBACCO NON-USER: CPT | Performed by: INTERNAL MEDICINE

## 2020-06-26 PROCEDURE — 99214 OFFICE O/P EST MOD 30 MIN: CPT | Performed by: INTERNAL MEDICINE

## 2020-06-26 PROCEDURE — 1160F RVW MEDS BY RX/DR IN RCRD: CPT | Performed by: INTERNAL MEDICINE

## 2020-06-26 PROCEDURE — 3008F BODY MASS INDEX DOCD: CPT | Performed by: INTERNAL MEDICINE

## 2020-06-30 ENCOUNTER — DOCTOR'S OFFICE (OUTPATIENT)
Dept: URBAN - METROPOLITAN AREA CLINIC 136 | Facility: CLINIC | Age: 85
Setting detail: OPHTHALMOLOGY
End: 2020-06-30
Payer: COMMERCIAL

## 2020-06-30 DIAGNOSIS — H43.811: ICD-10-CM

## 2020-06-30 DIAGNOSIS — H04.123: ICD-10-CM

## 2020-06-30 DIAGNOSIS — E11.9: ICD-10-CM

## 2020-06-30 DIAGNOSIS — H34.8312: ICD-10-CM

## 2020-06-30 DIAGNOSIS — H40.1132: ICD-10-CM

## 2020-06-30 DIAGNOSIS — H34.8311: ICD-10-CM

## 2020-06-30 PROCEDURE — 67028 INJECTION EYE DRUG: CPT | Performed by: OPHTHALMOLOGY

## 2020-06-30 PROCEDURE — 92014 COMPRE OPH EXAM EST PT 1/>: CPT | Performed by: OPHTHALMOLOGY

## 2020-06-30 PROCEDURE — SAMPLE SAMPLE MEDICATION: Performed by: OPHTHALMOLOGY

## 2020-06-30 PROCEDURE — 92134 CPTRZ OPH DX IMG PST SGM RTA: CPT | Performed by: OPHTHALMOLOGY

## 2020-06-30 ASSESSMENT — KERATOMETRY
OD_K2POWER_DIOPTERS: 47.25
METHOD_AUTO_MANUAL: AUTO
OD_AXISANGLE_DEGREES: 75
OS_AXISANGLE_DEGREES: 90
OD_K1POWER_DIOPTERS: 42.75
OS_K2POWER_DIOPTERS: 46.75
OS_K1POWER_DIOPTERS: 42.50

## 2020-06-30 ASSESSMENT — DRY EYES - PHYSICIAN NOTES
OS_GENERALCOMMENTS: LOW TEAR FILM
OD_GENERALCOMMENTS: LOW TEAR FILM

## 2020-06-30 ASSESSMENT — LID EXAM ASSESSMENTS
OD_BLEPHARITIS: RLL RUL 1+
OS_BLEPHARITIS: LLL LUL 1+
OD_TRICHIASIS: RUL
OD_COMMENTS: POSTERIOR
OS_COMMENTS: POSTERIOR

## 2020-06-30 ASSESSMENT — REFRACTION_AUTOREFRACTION
OS_AXIS: 084
OD_AXIS: 075
OD_SPHERE: PLANO
OD_CYLINDER: -4.25
OS_SPHERE: PLANO
OS_CYLINDER: -4.00

## 2020-06-30 ASSESSMENT — VISUAL ACUITY
OD_BCVA: 20/80+1
OS_BCVA: 20/150

## 2020-06-30 ASSESSMENT — CONFRONTATIONAL VISUAL FIELD TEST (CVF)
OD_FINDINGS: CONSTRICTION
OS_FINDINGS: CONSTRICTION

## 2020-06-30 ASSESSMENT — SUPERFICIAL PUNCTATE KERATITIS (SPK)
OS_SPK: 1+
OD_SPK: 1+

## 2020-06-30 ASSESSMENT — LID POSITION - DERMATOCHALASIS
OS_DERMATOCHALASIS: LUL 1+
OD_DERMATOCHALASIS: RUL 1+

## 2020-06-30 ASSESSMENT — PUNCTA - ASSESSMENT: OS_PUNCTA: SIL PLUG LLL

## 2020-07-02 DIAGNOSIS — E03.9 HYPOTHYROIDISM, UNSPECIFIED TYPE: ICD-10-CM

## 2020-07-02 RX ORDER — LEVOTHYROXINE SODIUM 0.07 MG/1
TABLET ORAL
Qty: 90 TABLET | Refills: 3 | Status: SHIPPED | OUTPATIENT
Start: 2020-07-02 | End: 2021-09-08

## 2020-07-22 ENCOUNTER — DOCTOR'S OFFICE (OUTPATIENT)
Dept: URBAN - METROPOLITAN AREA CLINIC 136 | Facility: CLINIC | Age: 85
Setting detail: OPHTHALMOLOGY
End: 2020-07-22
Payer: COMMERCIAL

## 2020-07-22 DIAGNOSIS — H02.011: ICD-10-CM

## 2020-07-22 DIAGNOSIS — H01.002: ICD-10-CM

## 2020-07-22 DIAGNOSIS — H01.005: ICD-10-CM

## 2020-07-22 DIAGNOSIS — H01.001: ICD-10-CM

## 2020-07-22 DIAGNOSIS — H04.123: ICD-10-CM

## 2020-07-22 DIAGNOSIS — H40.1132: ICD-10-CM

## 2020-07-22 DIAGNOSIS — H01.004: ICD-10-CM

## 2020-07-22 PROCEDURE — 92133 CPTRZD OPH DX IMG PST SGM ON: CPT | Performed by: OPHTHALMOLOGY

## 2020-07-22 PROCEDURE — 76514 ECHO EXAM OF EYE THICKNESS: CPT | Performed by: OPHTHALMOLOGY

## 2020-07-22 PROCEDURE — 92014 COMPRE OPH EXAM EST PT 1/>: CPT | Performed by: OPHTHALMOLOGY

## 2020-07-22 ASSESSMENT — PACHYMETRY
OD_CT_CORRECTION: 1
OS_CT_UM: 519
OD_CT_UM: 539
OS_CT_CORRECTION: 2

## 2020-07-22 ASSESSMENT — SUPERFICIAL PUNCTATE KERATITIS (SPK)
OS_SPK: 2+
OD_SPK: 2+

## 2020-07-22 ASSESSMENT — LID EXAM ASSESSMENTS
OS_COMMENTS: POSTERIOR
OD_COMMENTS: POSTERIOR
OD_TRICHIASIS: RUL
OD_BLEPHARITIS: RLL RUL 1+
OS_BLEPHARITIS: LLL LUL 1+

## 2020-07-22 ASSESSMENT — KERATOMETRY
METHOD_AUTO_MANUAL: AUTO
OD_K1POWER_DIOPTERS: 42.75
OD_K2POWER_DIOPTERS: 47.25
OD_AXISANGLE_DEGREES: 75
OS_K2POWER_DIOPTERS: 46.75
OS_K1POWER_DIOPTERS: 42.50
OS_AXISANGLE_DEGREES: 90

## 2020-07-22 ASSESSMENT — REFRACTION_AUTOREFRACTION
OD_AXIS: 075
OS_CYLINDER: -4.00
OS_SPHERE: PLANO
OD_SPHERE: PLANO
OS_AXIS: 084
OD_CYLINDER: -4.25

## 2020-07-22 ASSESSMENT — CONFRONTATIONAL VISUAL FIELD TEST (CVF)
OD_FINDINGS: CONSTRICTION
OS_FINDINGS: CONSTRICTION

## 2020-07-22 ASSESSMENT — LID POSITION - DERMATOCHALASIS
OS_DERMATOCHALASIS: LUL 1+
OD_DERMATOCHALASIS: RUL 1+

## 2020-07-22 ASSESSMENT — VISUAL ACUITY
OS_BCVA: 20/125-1
OD_BCVA: 20/200

## 2020-07-22 ASSESSMENT — PUNCTA - ASSESSMENT: OS_PUNCTA: SIL PLUG LLL

## 2020-08-12 ENCOUNTER — DOCTOR'S OFFICE (OUTPATIENT)
Dept: URBAN - METROPOLITAN AREA CLINIC 136 | Facility: CLINIC | Age: 85
Setting detail: OPHTHALMOLOGY
End: 2020-08-12
Payer: COMMERCIAL

## 2020-08-12 DIAGNOSIS — H34.8312: ICD-10-CM

## 2020-08-12 PROCEDURE — 67028 INJECTION EYE DRUG: CPT | Performed by: OPHTHALMOLOGY

## 2020-08-12 PROCEDURE — 92134 CPTRZ OPH DX IMG PST SGM RTA: CPT | Performed by: OPHTHALMOLOGY

## 2020-08-12 ASSESSMENT — REFRACTION_AUTOREFRACTION
OS_AXIS: 084
OD_SPHERE: PLANO
OS_CYLINDER: -4.00
OD_AXIS: 075
OD_CYLINDER: -4.25
OS_SPHERE: PLANO

## 2020-08-12 ASSESSMENT — KERATOMETRY
METHOD_AUTO_MANUAL: AUTO
OD_K1POWER_DIOPTERS: 42.75
OD_AXISANGLE_DEGREES: 75
OS_AXISANGLE_DEGREES: 90
OD_K2POWER_DIOPTERS: 47.25
OS_K2POWER_DIOPTERS: 46.75
OS_K1POWER_DIOPTERS: 42.50

## 2020-08-12 ASSESSMENT — VISUAL ACUITY
OS_BCVA: 20/150
OD_BCVA: 20/125

## 2020-08-21 ENCOUNTER — OFFICE VISIT (OUTPATIENT)
Dept: FAMILY MEDICINE CLINIC | Facility: CLINIC | Age: 85
End: 2020-08-21
Payer: COMMERCIAL

## 2020-08-21 VITALS
HEART RATE: 70 BPM | WEIGHT: 169 LBS | DIASTOLIC BLOOD PRESSURE: 58 MMHG | RESPIRATION RATE: 16 BRPM | HEIGHT: 62 IN | BODY MASS INDEX: 31.1 KG/M2 | SYSTOLIC BLOOD PRESSURE: 122 MMHG | TEMPERATURE: 98.5 F

## 2020-08-21 DIAGNOSIS — B37.2 CANDIDAL INTERTRIGO: ICD-10-CM

## 2020-08-21 DIAGNOSIS — R19.7 DIARRHEA, UNSPECIFIED TYPE: ICD-10-CM

## 2020-08-21 DIAGNOSIS — Z00.00 MEDICARE ANNUAL WELLNESS VISIT, SUBSEQUENT: Primary | ICD-10-CM

## 2020-08-21 PROCEDURE — 3725F SCREEN DEPRESSION PERFORMED: CPT | Performed by: PHYSICIAN ASSISTANT

## 2020-08-21 PROCEDURE — 4040F PNEUMOC VAC/ADMIN/RCVD: CPT | Performed by: PHYSICIAN ASSISTANT

## 2020-08-21 PROCEDURE — 1170F FXNL STATUS ASSESSED: CPT | Performed by: PHYSICIAN ASSISTANT

## 2020-08-21 PROCEDURE — 1036F TOBACCO NON-USER: CPT | Performed by: PHYSICIAN ASSISTANT

## 2020-08-21 PROCEDURE — 1125F AMNT PAIN NOTED PAIN PRSNT: CPT | Performed by: PHYSICIAN ASSISTANT

## 2020-08-21 PROCEDURE — 3008F BODY MASS INDEX DOCD: CPT | Performed by: PHYSICIAN ASSISTANT

## 2020-08-21 PROCEDURE — 99213 OFFICE O/P EST LOW 20 MIN: CPT | Performed by: PHYSICIAN ASSISTANT

## 2020-08-21 PROCEDURE — 1160F RVW MEDS BY RX/DR IN RCRD: CPT | Performed by: PHYSICIAN ASSISTANT

## 2020-08-21 PROCEDURE — G0439 PPPS, SUBSEQ VISIT: HCPCS | Performed by: PHYSICIAN ASSISTANT

## 2020-08-21 PROCEDURE — 2022F DILAT RTA XM EVC RTNOPTHY: CPT | Performed by: PHYSICIAN ASSISTANT

## 2020-08-21 PROCEDURE — 3074F SYST BP LT 130 MM HG: CPT | Performed by: PHYSICIAN ASSISTANT

## 2020-08-21 PROCEDURE — 3078F DIAST BP <80 MM HG: CPT | Performed by: PHYSICIAN ASSISTANT

## 2020-08-21 RX ORDER — NYSTATIN 100000 [USP'U]/G
POWDER TOPICAL 2 TIMES DAILY
Qty: 120 G | Refills: 3 | Status: SHIPPED | OUTPATIENT
Start: 2020-08-21 | End: 2022-03-09 | Stop reason: HOSPADM

## 2020-08-21 NOTE — PROGRESS NOTES
Assessment and Plan:    Problem List Items Addressed This Visit        Musculoskeletal and Integument    Candidal intertrigo    Relevant Medications    nystatin (MYCOSTATIN) powder       Other    Medicare annual wellness visit, subsequent - Primary     All screenings and immunizations and blood work for this 51-year-old female are up-to-date  Five wishes blue Packet given  Diarrhea     Currently resolved since patient made an appointment on Monday  She is on omeprazole and sometimes this can cause C diff so I did give her an order for C diff check on her stools if they become diarrhea again just to make sure  Relevant Orders    Clostridium difficile toxin by PCR                 Diagnoses and all orders for this visit:    Medicare annual wellness visit, subsequent    Candidal intertrigo  -     nystatin (MYCOSTATIN) powder; Apply topically 2 (two) times a day    Diarrhea, unspecified type  -     Clostridium difficile toxin by PCR; Future              Subjective:      Patient ID: Franki Santiago is a 80 y o  female  CC:    Chief Complaint   Patient presents with    Diarrhea     Patient is here due to having diarrhea 2-3 times in  x 1 week especially after eating with abdominal disconfort    Medication Refill     Patine is requesting a refill on Nystatin Jasmin ricks [address] [place] [address],[address]   Medicare Wellness Visit     Patient is here for an AWV        HPI:    Patient here today accompanied by her daughter  She states that her symptoms for why she made the appointment today have resolved and she made the appointment on Monday  But she was having frequent loose stools and diarrhea after eating only for couple days  No blood or mucus  No abdominal pain accompanied  Patient does have a list of irritable bowel on her diagnoses but no medications for this  She also like a refill of her nystatin powder which she has applied to a sore spot on her backside and worked really well        The following portions of the patient's history were reviewed and updated as appropriate: allergies, current medications, past family history, past medical history, past social history, past surgical history and problem list       Review of Systems   Constitutional: Negative  HENT: Negative  Eyes: Negative  Respiratory: Negative  Cardiovascular: Negative  Gastrointestinal: Positive for diarrhea  Endocrine: Negative  Genitourinary: Negative  Musculoskeletal: Negative  Skin: Negative  Allergic/Immunologic: Negative  Neurological: Negative  Hematological: Negative  Psychiatric/Behavioral: Negative  Data to review:       Objective:    Vitals:    08/21/20 1513   BP: 122/58   BP Location: Left arm   Patient Position: Sitting   Cuff Size: Standard   Pulse: 70   Resp: 16   Temp: 98 5 °F (36 9 °C)   Weight: 76 7 kg (169 lb)   Height: 5' 2" (1 575 m)        Physical Exam  Vitals signs and nursing note reviewed  Constitutional:       Appearance: Normal appearance  She is well-developed  HENT:      Head: Normocephalic and atraumatic  Eyes:      General: Lids are normal       Conjunctiva/sclera: Conjunctivae normal       Pupils: Pupils are equal, round, and reactive to light  Cardiovascular:      Rate and Rhythm: Normal rate and regular rhythm  Heart sounds: No murmur  Pulmonary:      Effort: Pulmonary effort is normal       Breath sounds: Normal breath sounds  Abdominal:      General: Abdomen is protuberant  Palpations: Abdomen is soft  Tenderness: There is no abdominal tenderness  Hernia: No hernia is present  Skin:     General: Skin is warm and dry  Neurological:      General: No focal deficit present  Mental Status: She is alert  Coordination: Coordination is intact  Psychiatric:         Mood and Affect: Mood normal          Behavior: Behavior normal  Behavior is cooperative  Thought Content:  Thought content normal          Judgment: Judgment normal

## 2020-08-21 NOTE — PROGRESS NOTES
Assessment and Plan:     Problem List Items Addressed This Visit        Other    Medicare annual wellness visit, subsequent - Primary     All screenings and immunizations and blood work for this 20-year-old female are up-to-date  Five wishes blue Packet given  Preventive health issues were discussed with patient, and age appropriate screening tests were ordered as noted in patient's After Visit Summary  Personalized health advice and appropriate referrals for health education or preventive services given if needed, as noted in patient's After Visit Summary       History of Present Illness:     Patient presents for Medicare Annual Wellness visit    Patient Care Team:  Jerod Bean PA-C as PCP - General (Family Medicine)  MD Will Santoro DO Elihu Dunk, RN as  ([address])  Fernie Leon RN as Outpatient Care Manager (Cardiology)     Problem List:     Patient Active Problem List   Diagnosis    Peripheral vascular disease ([facility] [address] )    Glaucoma, open angle, severe stage    Arteriosclerosis of coronary artery    Asthma    Aortic stenosis    Hyperlipidemia    Female bladder prolapse    Advanced age   [place] Allergic rhinitis    Ambulatory dysfunction    Anxiety disorder    General weakness    Irritable bowel    Trigeminal neuralgia    Hallucination    Dyspnea on minimal exertion    Medication management    Hypothyroidism    Chronic diastolic congestive heart failure (HCC)    Type 2 diabetes mellitus, uncontrolled, with neuropathy (HCC)    Insomnia    Gastroesophageal reflux disease without esophagitis    Bilateral cold feet    Female cystocele    Diverticulosis    Dizziness    Hearing loss    Hiatal hernia    Low back pain    Mild cognitive impairment    Osteoarthritis    Overactive bladder    Shakiness    Urinary incontinence    Pain of both hip joints    Candidal intertrigo    Vitamin D deficiency    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HCC)    Paroxysmal nocturnal dyspnea    Orthopnea    Shortness of breath    Occipital headache    Neoplasm of face    Stable angina (HCC)    Atypical facial pain    Medicare annual wellness visit, subsequent      Past Medical and Surgical History:     Past Medical History:   Diagnosis Date    Asthma     Atypical chest pain     CAD (coronary artery disease)     Candidal intertrigo     CHF (congestive heart failure) (HCC)     Depression     Diabetes mellitus (HCC)     Diabetic neuropathy (HCC)     Diverticulitis     Dyslipidemia     Female bladder prolapse     Gastric ulcer     Glaucoma     HTN (hypertension)     Hyperlipidemia     Hypothyroidism 4/18/2016    RAD (reactive airway disease)      Past Surgical History:   Procedure Laterality Date    COLONOSCOPY      ESOPHAGOGASTRODUODENOSCOPY  2011    HYSTERECTOMY        Family History:     Family History   Problem Relation Age of Onset    Breast cancer Mother     Hypothyroidism Mother     Hypertension Father     Breast cancer Sister     Thyroid disease Sister     Hypertension Sister       Social History:        Social History     Socioeconomic History    Marital status:       Spouse name: None    Number of children: 2    Years of education: None    Highest education level: None   Occupational History    Occupation: Retired   Social Needs    Financial resource strain: None    Food insecurity     Worry: None     Inability: None    Transportation needs     Medical: None     Non-medical: None   Tobacco Use    Smoking status: Never Smoker    Smokeless tobacco: Never Used   Substance and Sexual Activity    Alcohol use: No     Comment: Social Alcohol Use -- as per allscripts (pt denies all alcohol use)    Drug use: No    Sexual activity: None   Lifestyle    Physical activity     Days per week: None     Minutes per session: None    Stress: None   Relationships    Social connections     Talks on phone: None     Gets together: None     Attends Christian service: None     Active member of club or organization: None     Attends meetings of clubs or organizations: None     Relationship status: None    Intimate partner violence     Fear of current or ex partner: None     Emotionally abused: None     Physically abused: None     Forced sexual activity: None   Other Topics Concern    None   Social History Narrative    Lived with adult children    Caffeine Use      Medications and Allergies:     Current Outpatient Medications   Medication Sig Dispense Refill    aluminum-magnesium hydroxide-simethicone (MYLANTA) 200-200-20 mg/5 mL suspension Take 30 mL by mouth every 4 (four) hours as needed for indigestion or heartburn      amLODIPine (NORVASC) 5 mg tablet TAKE 1 TABLET EVERY DAY 90 tablet 1    aspirin 81 MG tablet Take 81 mg by mouth daily   Cholecalciferol (VITAMIN D3) 2000 units capsule Take 2,000 Units by mouth daily       dorzolamide (TRUSOPT) 2 % ophthalmic solution Administer 1 drop to both eyes 3 (three) times a day        fluticasone (FLONASE) 50 mcg/act nasal spray 2 sprays into each nostril daily 3 Bottle 3    fluticasone-vilanterol (BREO ELLIPTA) 100-25 mcg/inh inhaler Inhale 1 puff daily Rinse mouth after use  1 Inhaler 5    furosemide (LASIX) 20 mg tablet TAKE 1 TABLET BY MOUTH EVERY MORNING AND 2 TABLETS BY MOUTH EVERY EVENING 270 tablet 11    gabapentin (NEURONTIN) 600 MG tablet Take 1/2 tab am, 1/2 tab afternoon and whole tab at bedtime  90 tablet 3    glucose blood (Accu-Chek Aline Plus) test strip Testing once daily E11 9 100 each 3    Incontinence Supply Disposable (SELECT DISPOSABLE UNDERWEAR LG) MISC       JANUVIA 50 MG tablet TAKE 1 TABLET EVERY DAY 90 tablet 3    Lancets (ACCU-CHEK SOFT TOUCH) lancets Testing 1 time daily              Dx: E11 9 100 each 3    latanoprost (XALATAN) 0 005 % ophthalmic solution Apply 1 drop to eye daily at bedtime Both eyes 7 5 mL 1    levothyroxine 75 mcg tablet TAKE 1 TABLET EVERY DAY 90 tablet 3    Lidocaine-Menthol 4-5 % PTCH lidocaine patch      Melatonin 5 MG CAPS Take 3 mg by mouth daily at bedtime      nitroglycerin (NITROSTAT) 0 4 mg SL tablet Place 1 tablet (0 4 mg total) under the tongue every 5 (five) minutes as needed for chest pain 25 tablet 3    nystatin (MYCOSTATIN) powder Apply topically 2 (two) times a day 60 g 1    OLANZapine (ZyPREXA) 5 mg tablet Take 1 tablet (5 mg total) by mouth daily 90 tablet 3    omeprazole (PriLOSEC) 40 MG capsule Take 1 capsule (40 mg total) by mouth daily 90 capsule 3    potassium chloride (K-DUR,KLOR-CON) 20 mEq tablet Take 1 tablet (20 mEq total) by mouth daily 90 tablet 2    albuterol (VENTOLIN HFA) 90 mcg/act inhaler Inhale 2 puffs every 4 (four) hours as needed for wheezing (Patient not taking: Reported on 8/21/2020) 1 Inhaler 3    butalbital-acetaminophen-caffeine (FIORICET,ESGIC) -40 mg per tablet Take 1 tablet by mouth every 4 (four) hours as needed for headaches (Patient not taking: Reported on 6/26/2020) 6 tablet 0     No current facility-administered medications for this visit  Allergies   Allergen Reactions    Statins      Other reaction(s): Weakness  Category: Adverse Reaction;       Immunizations:     Immunization History   Administered Date(s) Administered    INFLUENZA 09/25/2014, 10/23/2015, 12/28/2016, 10/04/2017    Influenza Quadrivalent, 6-35 Months IM 10/23/2015    Influenza Split High Dose Preservative Free IM 12/28/2016, 10/04/2017    Influenza, high dose seasonal 0 7 mL 10/12/2018    Pneumococcal Conjugate 13-Valent 12/28/2016    Pneumococcal Polysaccharide PPV23 01/01/2009      Health Maintenance: There are no preventive care reminders to display for this patient        Topic Date Due    Influenza Vaccine  07/01/2020      Medicare Health Risk Assessment:     /58 (BP Location: Left arm, Patient Position: Sitting, Cuff Size: Standard)   Pulse 70   Temp 98 5 °F (36 9 °C)   Resp 16   Ht 5' 2" (1 575 m)   Wt 76 7 kg (169 lb)   BMI 30 91 kg/m²      Leticia Flynn is here for her Subsequent Wellness visit  Last Medicare Wellness visit information reviewed, patient interviewed and updates made to the record today  Health Risk Assessment:   Patient rates overall health as good  Patient feels that their physical health rating is same  Eyesight was rated as slightly worse  Hearing was rated as much worse  Patient feels that their emotional and mental health rating is same  Pain experienced in the last 7 days has been none  Patient states that she has experienced no weight loss or gain in last 6 months  Depression Screening:   PHQ-2 Score: 1      Fall Risk Screening: In the past year, patient has experienced: history of falling in past year    Number of falls: 1  Injured during fall?: No    Feels unsteady when standing or walking?: Yes    Worried about falling?: No      Home Safety:  Patient has trouble with stairs inside or outside of their home  Patient has working smoke alarms and has working carbon monoxide detector  Home safety hazards include: none  Nutrition:   Current diet is Diabetic and No Added Salt  Medications:   Patient is currently taking over-the-counter supplements  OTC medications include: see medication list  Patient is able to manage medications  Activities of Daily Living (ADLs)/Instrumental Activities of Daily Living (IADLs):   Walk and transfer into and out of bed and chair?: Yes  Dress and groom yourself?: Yes    Bathe or shower yourself?: No    Feed yourself?  Yes  Do your laundry/housekeeping?: No  Manage your money, pay your bills and track your expenses?: Yes  Make your own meals?: No    Do your own shopping?: No    Previous Hospitalizations:   Any hospitalizations or ED visits within the last 12 months?: No      Advance Care Planning:   Living will: No    Durable POA for healthcare: No    Advanced directive: No    Five wishes given: Yes      Cognitive Screening:   Provider or family/friend/caregiver concerned regarding cognition?: No    PREVENTIVE SCREENINGS      Cardiovascular Screening:    General: Screening Not Indicated and History Lipid Disorder      Diabetes Screening:     General: Screening Not Indicated and History Diabetes      Colorectal Cancer Screening:     General: Screening Not Indicated      Breast Cancer Screening:     General: Patient Declines      Cervical Cancer Screening:    General: Screening Not Indicated      Osteoporosis Screening:    General: Patient Declines      Abdominal Aortic Aneurysm (AAA) Screening:        General: Screening Not Indicated      Lung Cancer Screening:     General: Screening Not Indicated      Hepatitis C Screening:    General: Patient Declines      Glenny Richard PA-C

## 2020-08-21 NOTE — ASSESSMENT & PLAN NOTE
All screenings and immunizations and blood work for this 44-year-old female are up-to-date  Five wishes blue Packet given

## 2020-08-21 NOTE — ASSESSMENT & PLAN NOTE
Currently resolved since patient made an appointment on Monday  She is on omeprazole and sometimes this can cause C diff so I did give her an order for C diff check on her stools if they become diarrhea again just to make sure

## 2020-08-21 NOTE — PATIENT INSTRUCTIONS
Problem List Items Addressed This Visit        Musculoskeletal and Integument    Candidal intertrigo    Relevant Medications    nystatin (MYCOSTATIN) powder       Other    Medicare annual wellness visit, subsequent - Primary     All screenings and immunizations and blood work for this 80-year-old female are up-to-date  Five wishes blue Packet given  Diarrhea     Currently resolved since patient made an appointment on Monday  She is on omeprazole and sometimes this can cause C diff so I did give her an order for C diff check on her stools if they become diarrhea again just to make sure           Relevant Orders    Clostridium difficile toxin by PCR

## 2020-09-04 ENCOUNTER — DOCTOR'S OFFICE (OUTPATIENT)
Dept: URBAN - METROPOLITAN AREA CLINIC 136 | Facility: CLINIC | Age: 85
Setting detail: OPHTHALMOLOGY
End: 2020-09-04
Payer: COMMERCIAL

## 2020-09-04 DIAGNOSIS — E11.9: ICD-10-CM

## 2020-09-04 DIAGNOSIS — H02.011: ICD-10-CM

## 2020-09-04 DIAGNOSIS — H04.123: ICD-10-CM

## 2020-09-04 DIAGNOSIS — H40.1133: ICD-10-CM

## 2020-09-04 DIAGNOSIS — H34.8312: ICD-10-CM

## 2020-09-04 PROCEDURE — 92020 GONIOSCOPY: CPT | Performed by: OPHTHALMOLOGY

## 2020-09-04 PROCEDURE — 92014 COMPRE OPH EXAM EST PT 1/>: CPT | Performed by: OPHTHALMOLOGY

## 2020-09-04 PROCEDURE — 92083 EXTENDED VISUAL FIELD XM: CPT | Performed by: OPHTHALMOLOGY

## 2020-09-04 PROCEDURE — 92202 OPSCPY EXTND ON/MAC DRAW: CPT | Performed by: OPHTHALMOLOGY

## 2020-09-04 ASSESSMENT — CONFRONTATIONAL VISUAL FIELD TEST (CVF)
OS_FINDINGS: CONSTRICTION
OD_FINDINGS: CONSTRICTION

## 2020-09-04 ASSESSMENT — LID EXAM ASSESSMENTS
OD_BLEPHARITIS: RLL RUL T
OS_COMMENTS: POSTERIOR
OD_COMMENTS: POSTERIOR
OS_BLEPHARITIS: LLL LUL T

## 2020-09-04 ASSESSMENT — SUPERFICIAL PUNCTATE KERATITIS (SPK)
OS_SPK: 2+
OD_SPK: 2+

## 2020-09-04 ASSESSMENT — LID POSITION - DERMATOCHALASIS
OD_DERMATOCHALASIS: RUL 1+
OS_DERMATOCHALASIS: LUL 1+

## 2020-09-04 ASSESSMENT — PUNCTA - ASSESSMENT: OS_PUNCTA: SIL PLUG LLL

## 2020-09-07 VITALS — HEIGHT: 55 IN

## 2020-09-07 ASSESSMENT — KERATOMETRY
OS_K2POWER_DIOPTERS: 46.75
OS_K1POWER_DIOPTERS: 42.50
METHOD_AUTO_MANUAL: AUTO
OD_K2POWER_DIOPTERS: 47.25
OS_AXISANGLE_DEGREES: 90
OD_K1POWER_DIOPTERS: 42.75
OD_AXISANGLE_DEGREES: 75

## 2020-09-07 ASSESSMENT — REFRACTION_AUTOREFRACTION
OD_AXIS: 075
OD_CYLINDER: -4.25
OS_SPHERE: PLANO
OD_SPHERE: PLANO
OS_CYLINDER: -4.00
OS_AXIS: 084

## 2020-09-07 ASSESSMENT — VISUAL ACUITY
OD_BCVA: 20/150+1
OS_BCVA: 20/150

## 2020-09-09 ENCOUNTER — DOCTOR'S OFFICE (OUTPATIENT)
Dept: URBAN - METROPOLITAN AREA CLINIC 136 | Facility: CLINIC | Age: 85
Setting detail: OPHTHALMOLOGY
End: 2020-09-09
Payer: COMMERCIAL

## 2020-09-09 ENCOUNTER — RX ONLY (RX ONLY)
Age: 85
End: 2020-09-09

## 2020-09-09 DIAGNOSIS — H34.8310: ICD-10-CM

## 2020-09-09 PROCEDURE — 67028 INJECTION EYE DRUG: CPT | Performed by: OPHTHALMOLOGY

## 2020-09-09 PROCEDURE — 92134 CPTRZ OPH DX IMG PST SGM RTA: CPT | Performed by: OPHTHALMOLOGY

## 2020-09-09 ASSESSMENT — KERATOMETRY
OS_AXISANGLE_DEGREES: 90
OS_K2POWER_DIOPTERS: 46.75
OS_K1POWER_DIOPTERS: 42.50
OD_K2POWER_DIOPTERS: 47.25
OD_AXISANGLE_DEGREES: 75
METHOD_AUTO_MANUAL: AUTO
OD_K1POWER_DIOPTERS: 42.75

## 2020-09-09 ASSESSMENT — VISUAL ACUITY
OD_BCVA: 20/70-1
OS_BCVA: 20/100-1

## 2020-09-09 ASSESSMENT — REFRACTION_AUTOREFRACTION
OD_SPHERE: PLANO
OS_AXIS: 084
OD_CYLINDER: -4.25
OD_AXIS: 075
OS_CYLINDER: -4.00
OS_SPHERE: PLANO

## 2020-10-02 ENCOUNTER — DOCTOR'S OFFICE (OUTPATIENT)
Dept: URBAN - METROPOLITAN AREA CLINIC 136 | Facility: CLINIC | Age: 85
Setting detail: OPHTHALMOLOGY
End: 2020-10-02
Payer: COMMERCIAL

## 2020-10-02 DIAGNOSIS — H40.1133: ICD-10-CM

## 2020-10-02 DIAGNOSIS — H04.123: ICD-10-CM

## 2020-10-02 DIAGNOSIS — H34.8310: ICD-10-CM

## 2020-10-02 PROCEDURE — 92012 INTRM OPH EXAM EST PATIENT: CPT | Performed by: OPHTHALMOLOGY

## 2020-10-02 ASSESSMENT — LID EXAM ASSESSMENTS
OD_BLEPHARITIS: RLL RUL T
OS_BLEPHARITIS: LLL LUL T
OS_COMMENTS: POSTERIOR
OD_COMMENTS: POSTERIOR

## 2020-10-02 ASSESSMENT — PUNCTA - ASSESSMENT: OS_PUNCTA: SIL PLUG LLL

## 2020-10-02 ASSESSMENT — SUPERFICIAL PUNCTATE KERATITIS (SPK)
OD_SPK: 1+
OS_SPK: 1+

## 2020-10-02 ASSESSMENT — LID POSITION - DERMATOCHALASIS
OS_DERMATOCHALASIS: LUL 1+
OD_DERMATOCHALASIS: RUL 1+

## 2020-10-04 ASSESSMENT — REFRACTION_AUTOREFRACTION
OD_SPHERE: PLANO
OD_AXIS: 075
OD_CYLINDER: -4.25
OS_SPHERE: PLANO
OS_CYLINDER: -4.00
OS_AXIS: 084

## 2020-10-04 ASSESSMENT — KERATOMETRY
OS_K1POWER_DIOPTERS: 42.50
OS_K2POWER_DIOPTERS: 46.75
OD_K2POWER_DIOPTERS: 47.25
OS_AXISANGLE_DEGREES: 90
METHOD_AUTO_MANUAL: AUTO
OD_AXISANGLE_DEGREES: 75
OD_K1POWER_DIOPTERS: 42.75

## 2020-10-04 ASSESSMENT — VISUAL ACUITY
OD_BCVA: 20/80-1
OS_BCVA: 20/200-1

## 2020-10-15 ENCOUNTER — DOCTOR'S OFFICE (OUTPATIENT)
Dept: URBAN - METROPOLITAN AREA CLINIC 136 | Facility: CLINIC | Age: 85
Setting detail: OPHTHALMOLOGY
End: 2020-10-15
Payer: COMMERCIAL

## 2020-10-15 DIAGNOSIS — H40.1133: ICD-10-CM

## 2020-10-15 DIAGNOSIS — H34.8310: ICD-10-CM

## 2020-10-15 DIAGNOSIS — H04.123: ICD-10-CM

## 2020-10-15 PROCEDURE — 92134 CPTRZ OPH DX IMG PST SGM RTA: CPT | Performed by: OPHTHALMOLOGY

## 2020-10-15 PROCEDURE — 67028 INJECTION EYE DRUG: CPT | Performed by: OPHTHALMOLOGY

## 2020-10-15 PROCEDURE — 92012 INTRM OPH EXAM EST PATIENT: CPT | Performed by: OPHTHALMOLOGY

## 2020-10-15 ASSESSMENT — REFRACTION_AUTOREFRACTION
OS_AXIS: 084
OD_SPHERE: PLANO
OD_AXIS: 075
OS_SPHERE: PLANO
OS_CYLINDER: -4.00
OD_CYLINDER: -4.25

## 2020-10-15 ASSESSMENT — VISUAL ACUITY
OS_BCVA: 20/200
OD_BCVA: 20/100

## 2020-10-15 ASSESSMENT — KERATOMETRY
OS_K2POWER_DIOPTERS: 46.75
OS_K1POWER_DIOPTERS: 42.50
METHOD_AUTO_MANUAL: AUTO
OD_K2POWER_DIOPTERS: 47.25
OS_AXISANGLE_DEGREES: 90
OD_AXISANGLE_DEGREES: 75
OD_K1POWER_DIOPTERS: 42.75

## 2020-10-15 ASSESSMENT — LID EXAM ASSESSMENTS
OS_BLEPHARITIS: LLL LUL T
OD_COMMENTS: POSTERIOR
OS_COMMENTS: POSTERIOR
OD_BLEPHARITIS: RLL RUL T

## 2020-10-15 ASSESSMENT — SUPERFICIAL PUNCTATE KERATITIS (SPK)
OS_SPK: 1+
OD_SPK: 1+

## 2020-10-15 ASSESSMENT — LID POSITION - DERMATOCHALASIS
OS_DERMATOCHALASIS: LUL 1+
OD_DERMATOCHALASIS: RUL 1+

## 2020-10-15 ASSESSMENT — CONFRONTATIONAL VISUAL FIELD TEST (CVF)
OS_FINDINGS: CONSTRICTION
OD_FINDINGS: CONSTRICTION

## 2020-10-15 ASSESSMENT — PUNCTA - ASSESSMENT: OS_PUNCTA: SIL PLUG LLL

## 2020-11-16 ENCOUNTER — RX ONLY (RX ONLY)
Age: 85
End: 2020-11-16

## 2020-11-16 ENCOUNTER — DOCTOR'S OFFICE (OUTPATIENT)
Dept: URBAN - METROPOLITAN AREA CLINIC 136 | Facility: CLINIC | Age: 85
Setting detail: OPHTHALMOLOGY
End: 2020-11-16
Payer: COMMERCIAL

## 2020-11-16 DIAGNOSIS — H34.8310: ICD-10-CM

## 2020-11-16 DIAGNOSIS — H04.123: ICD-10-CM

## 2020-11-16 DIAGNOSIS — H40.1133: ICD-10-CM

## 2020-11-16 PROCEDURE — 92012 INTRM OPH EXAM EST PATIENT: CPT | Performed by: OPHTHALMOLOGY

## 2020-11-16 ASSESSMENT — PACHYMETRY
OS_CT_UM: 519
OD_CT_UM: 539
OS_CT_CORRECTION: 2
OD_CT_CORRECTION: 1

## 2020-11-16 ASSESSMENT — TONOMETRY
OD_IOP_MMHG: 13
OS_IOP_MMHG: 13

## 2020-11-16 ASSESSMENT — TEAR BREAK UP TIME (TBUT)
OS_TBUT: T
OD_TBUT: 1+

## 2020-11-16 ASSESSMENT — CONFRONTATIONAL VISUAL FIELD TEST (CVF)
OD_FINDINGS: FULL
OS_FINDINGS: FULL

## 2020-11-16 ASSESSMENT — LID POSITION - DERMATOCHALASIS
OS_DERMATOCHALASIS: LUL 1+
OD_DERMATOCHALASIS: RUL 1+

## 2020-11-16 ASSESSMENT — LID EXAM ASSESSMENTS
OS_COMMENTS: POSTERIOR
OS_BLEPHARITIS: LLL LUL T
OD_COMMENTS: POSTERIOR
OD_BLEPHARITIS: RLL RUL T

## 2020-11-16 ASSESSMENT — SUPERFICIAL PUNCTATE KERATITIS (SPK)
OS_SPK: T
OD_SPK: T

## 2020-11-16 ASSESSMENT — PUNCTA - ASSESSMENT: OS_PUNCTA: SIL PLUG LLL

## 2020-11-17 ASSESSMENT — KERATOMETRY
OS_K1POWER_DIOPTERS: 42.50
OS_AXISANGLE_DEGREES: 90
OD_AXISANGLE_DEGREES: 75
METHOD_AUTO_MANUAL: AUTO
OD_K2POWER_DIOPTERS: 47.25
OD_K1POWER_DIOPTERS: 42.75
OS_K2POWER_DIOPTERS: 46.75

## 2020-11-17 ASSESSMENT — REFRACTION_AUTOREFRACTION
OD_SPHERE: PLANO
OS_AXIS: 084
OS_SPHERE: PLANO
OD_AXIS: 075
OS_CYLINDER: -4.00
OD_CYLINDER: -4.25

## 2020-11-17 ASSESSMENT — VISUAL ACUITY
OS_BCVA: 20/400
OD_BCVA: 20/80-1

## 2020-11-18 DIAGNOSIS — E11.9 TYPE 2 DIABETES MELLITUS WITHOUT COMPLICATION, WITHOUT LONG-TERM CURRENT USE OF INSULIN (HCC): ICD-10-CM

## 2020-11-19 ENCOUNTER — DOCTOR'S OFFICE (OUTPATIENT)
Dept: URBAN - METROPOLITAN AREA CLINIC 136 | Facility: CLINIC | Age: 85
Setting detail: OPHTHALMOLOGY
End: 2020-11-19
Payer: COMMERCIAL

## 2020-11-19 ENCOUNTER — RX ONLY (RX ONLY)
Age: 85
End: 2020-11-19

## 2020-11-19 DIAGNOSIS — H43.811: ICD-10-CM

## 2020-11-19 DIAGNOSIS — H04.121: ICD-10-CM

## 2020-11-19 DIAGNOSIS — Z79.84: ICD-10-CM

## 2020-11-19 DIAGNOSIS — E11.9: ICD-10-CM

## 2020-11-19 DIAGNOSIS — H34.8310: ICD-10-CM

## 2020-11-19 DIAGNOSIS — H04.122: ICD-10-CM

## 2020-11-19 DIAGNOSIS — H53.16: ICD-10-CM

## 2020-11-19 PROBLEM — H01.004 BLEPHARITIS; RIGHT UPPER LID, RIGHT LOWER LID, LEFT UPPER LID, LEFT LOWER LID: Status: ACTIVE | Noted: 2019-04-09

## 2020-11-19 PROBLEM — H52.13 MYOPIA; BOTH EYES: Status: ACTIVE | Noted: 2017-03-24

## 2020-11-19 PROBLEM — H02.831 DERMATOCHALASIS; RIGHT UPPER LID, LEFT UPPER LID: Status: ACTIVE | Noted: 2020-09-04

## 2020-11-19 PROBLEM — H01.005 BLEPHARITIS; RIGHT UPPER LID, RIGHT LOWER LID, LEFT UPPER LID, LEFT LOWER LID: Status: ACTIVE | Noted: 2019-04-09

## 2020-11-19 PROBLEM — H25.89 PSEUDOEXFOLIATION OF LENS CAPSULE, RIGHT EYE: Status: ACTIVE | Noted: 2020-11-16

## 2020-11-19 PROBLEM — H52.223 ASTIGMATISM, REGULAR; BOTH EYES: Status: ACTIVE | Noted: 2017-03-24

## 2020-11-19 PROBLEM — H01.001 BLEPHARITIS; RIGHT UPPER LID, RIGHT LOWER LID, LEFT UPPER LID, LEFT LOWER LID: Status: ACTIVE | Noted: 2019-04-09

## 2020-11-19 PROBLEM — H02.011: Status: ACTIVE | Noted: 2018-03-15

## 2020-11-19 PROBLEM — H01.002 BLEPHARITIS; RIGHT UPPER LID, RIGHT LOWER LID, LEFT UPPER LID, LEFT LOWER LID: Status: ACTIVE | Noted: 2019-04-09

## 2020-11-19 PROBLEM — H52.4 PRESBYOPIA: Status: ACTIVE | Noted: 2017-03-24

## 2020-11-19 PROBLEM — H54.8 LEGAL BLINDNESS: Status: ACTIVE | Noted: 2020-09-04

## 2020-11-19 PROBLEM — H02.834 DERMATOCHALASIS; RIGHT UPPER LID, LEFT UPPER LID: Status: ACTIVE | Noted: 2020-09-04

## 2020-11-19 PROCEDURE — 92134 CPTRZ OPH DX IMG PST SGM RTA: CPT | Performed by: OPHTHALMOLOGY

## 2020-11-19 PROCEDURE — 92014 COMPRE OPH EXAM EST PT 1/>: CPT | Performed by: OPHTHALMOLOGY

## 2020-11-19 PROCEDURE — 67028 INJECTION EYE DRUG: CPT | Performed by: OPHTHALMOLOGY

## 2020-11-19 ASSESSMENT — LID EXAM ASSESSMENTS
OD_COMMENTS: POSTERIOR
OS_BLEPHARITIS: LLL LUL T
OS_COMMENTS: POSTERIOR
OD_BLEPHARITIS: RLL RUL T

## 2020-11-19 ASSESSMENT — KERATOMETRY
OD_K1POWER_DIOPTERS: 42.75
OS_AXISANGLE_DEGREES: 90
OD_AXISANGLE_DEGREES: 75
OD_K2POWER_DIOPTERS: 47.25
OS_K1POWER_DIOPTERS: 42.50
METHOD_AUTO_MANUAL: AUTO
OS_K2POWER_DIOPTERS: 46.75

## 2020-11-19 ASSESSMENT — VISUAL ACUITY
OD_BCVA: 20/100-1
OS_BCVA: 20/200

## 2020-11-19 ASSESSMENT — REFRACTION_AUTOREFRACTION
OD_SPHERE: PLANO
OD_CYLINDER: -4.25
OS_CYLINDER: -4.00
OS_AXIS: 084
OS_SPHERE: PLANO
OD_AXIS: 075

## 2020-11-19 ASSESSMENT — PUNCTA - ASSESSMENT: OS_PUNCTA: SIL PLUG LLL

## 2020-11-19 ASSESSMENT — CONFRONTATIONAL VISUAL FIELD TEST (CVF)
OD_FINDINGS: FULL
OS_FINDINGS: FULL

## 2020-11-19 ASSESSMENT — TEAR BREAK UP TIME (TBUT)
OD_TBUT: 1+
OS_TBUT: T

## 2020-11-19 ASSESSMENT — SUPERFICIAL PUNCTATE KERATITIS (SPK)
OS_SPK: T
OD_SPK: T

## 2020-11-19 ASSESSMENT — LID POSITION - DERMATOCHALASIS
OD_DERMATOCHALASIS: RUL 1+
OS_DERMATOCHALASIS: LUL 1+

## 2020-12-21 ENCOUNTER — TELEPHONE (OUTPATIENT)
Dept: FAMILY MEDICINE CLINIC | Facility: CLINIC | Age: 85
End: 2020-12-21

## 2020-12-23 DIAGNOSIS — E11.9 TYPE 2 DIABETES MELLITUS WITHOUT COMPLICATION, WITHOUT LONG-TERM CURRENT USE OF INSULIN (HCC): ICD-10-CM

## 2020-12-29 DIAGNOSIS — I35.0 NONRHEUMATIC AORTIC VALVE STENOSIS: Chronic | ICD-10-CM

## 2020-12-29 RX ORDER — AMLODIPINE BESYLATE 5 MG/1
5 TABLET ORAL DAILY
Qty: 90 TABLET | Refills: 1 | Status: SHIPPED | OUTPATIENT
Start: 2020-12-29 | End: 2021-06-01 | Stop reason: SDUPTHER

## 2020-12-30 ENCOUNTER — TELEMEDICINE (OUTPATIENT)
Dept: FAMILY MEDICINE CLINIC | Facility: CLINIC | Age: 85
End: 2020-12-30
Payer: COMMERCIAL

## 2020-12-30 VITALS
SYSTOLIC BLOOD PRESSURE: 141 MMHG | DIASTOLIC BLOOD PRESSURE: 66 MMHG | HEIGHT: 62 IN | WEIGHT: 166.5 LBS | BODY MASS INDEX: 30.64 KG/M2 | HEART RATE: 73 BPM

## 2020-12-30 DIAGNOSIS — K21.9 GASTROESOPHAGEAL REFLUX DISEASE WITHOUT ESOPHAGITIS: Primary | ICD-10-CM

## 2020-12-30 PROCEDURE — 99214 OFFICE O/P EST MOD 30 MIN: CPT | Performed by: PHYSICIAN ASSISTANT

## 2020-12-30 RX ORDER — FAMOTIDINE 20 MG/1
20 TABLET, FILM COATED ORAL 2 TIMES DAILY
Qty: 60 TABLET | Refills: 11 | Status: ON HOLD | OUTPATIENT
Start: 2020-12-30 | End: 2022-03-09 | Stop reason: SDUPTHER

## 2021-01-19 ENCOUNTER — TELEMEDICINE (OUTPATIENT)
Dept: FAMILY MEDICINE CLINIC | Facility: CLINIC | Age: 86
End: 2021-01-19
Payer: COMMERCIAL

## 2021-01-19 DIAGNOSIS — M25.552 LEFT HIP PAIN: Primary | ICD-10-CM

## 2021-01-19 PROCEDURE — 99214 OFFICE O/P EST MOD 30 MIN: CPT | Performed by: PHYSICIAN ASSISTANT

## 2021-01-19 NOTE — PROGRESS NOTES
Virtual Brief Visit    Assessment/Plan:    Problem List Items Addressed This Visit        Other    Left hip pain - Primary     Tylenol every 6 hours full tear in gel 3 times a day patient may use an ibuprofen product if she has that at home once a day  I did order x-ray of her left hip pelvis but patient states that it is too uncomfortable to walk on at this time but will consider if she improves over the week  Relevant Orders    XR hip/pelv 2-3 vws left if performed          BMI Counseling: There is no height or weight on file to calculate BMI  The BMI is above normal  Nutrition recommendations include decreasing portion sizes, encouraging healthy choices of fruits and vegetables, decreasing fast food intake, consuming healthier snacks, limiting drinks that contain sugar, moderation in carbohydrate intake, increasing intake of lean protein, reducing intake of saturated and trans fat and reducing intake of cholesterol  Exercise recommendations include exercising 3-5 times per week  No pharmacotherapy was ordered  Reason for visit is   Chief Complaint   Patient presents with    Virtual Brief Visit        Encounter provider Glenn Hardin PA-C    Provider located at [address] [place] [id number]    Recent Visits  No visits were found meeting these conditions  Showing recent visits within past 7 days and meeting all other requirements     Today's Visits  Date Type Provider Dept   01/19/21 1135 Joel Navarro PA-C [facility]   Showing today's visits and meeting all other requirements     Future Appointments  No visits were found meeting these conditions  Showing future appointments within next 150 days and meeting all other requirements        After connecting through telephone, the patient was identified by name and date of birth   Smooth Cowan was informed that this is a telemedicine visit and that the visit is being conducted through telephone  My office door was closed  No one else was in the room  She acknowledged consent and understanding of privacy and security of the platform  The patient has agreed to participate and understands she can discontinue the visit at any time  Patient is aware this is a billable service  Subjective    Smooth Cowan is a 80 y o  female pt  Pt states that on Friday she woke up and had excruciating pain of the left hip but was not able to ambulate or bear weight  She states it was about a 10 of 10 she has been using Voltaren gel and Tylenol once a day her last dose of this was last night  She states that today it is better is only about an 8 and she was able to bear weight and walk to the bathroom  She has no history of fall or injury she usually sleeps on her left side  She has no Motrin ibuprofen at home  Patient states that her legs are of even length and she is able to rotate feet and in out and have him in an upright position in the lying or sitting stance         Past Medical History:   Diagnosis Date    Asthma     Atypical chest pain     CAD (coronary artery disease)     Candidal intertrigo     CHF (congestive heart failure) (HCC)     Depression     Diabetes mellitus (HCC)     Diabetic neuropathy (HCC)     Diverticulitis     Dyslipidemia     Female bladder prolapse     Gastric ulcer     Glaucoma     HTN (hypertension)     Hyperlipidemia     Hypothyroidism 4/18/2016    RAD (reactive airway disease)        Past Surgical History:   Procedure Laterality Date    COLONOSCOPY      ESOPHAGOGASTRODUODENOSCOPY  2011    HYSTERECTOMY         Current Outpatient Medications   Medication Sig Dispense Refill    albuterol (VENTOLIN HFA) 90 mcg/act inhaler Inhale 2 puffs every 4 (four) hours as needed for wheezing (Patient not taking: Reported on 8/21/2020) 1 Inhaler 3    aluminum-magnesium hydroxide-simethicone (MYLANTA) 200-200-20 mg/5 mL suspension Take 30 mL by mouth every 4 (four) hours as needed for indigestion or heartburn      amLODIPine (NORVASC) 5 mg tablet Take 1 tablet (5 mg total) by mouth daily 90 tablet 1    aspirin 81 MG tablet Take 81 mg by mouth daily   butalbital-acetaminophen-caffeine (FIORICET,ESGIC) -40 mg per tablet Take 1 tablet by mouth every 4 (four) hours as needed for headaches (Patient not taking: Reported on 6/26/2020) 6 tablet 0    Cholecalciferol (VITAMIN D3) 2000 units capsule Take 2,000 Units by mouth daily       dorzolamide (TRUSOPT) 2 % ophthalmic solution Administer 1 drop to both eyes 3 (three) times a day        famotidine (PEPCID) 20 mg tablet Take 1 tablet (20 mg total) by mouth 2 (two) times a day 60 tablet 11    fluticasone (FLONASE) 50 mcg/act nasal spray 2 sprays into each nostril daily 3 Bottle 3    fluticasone-vilanterol (BREO ELLIPTA) 100-25 mcg/inh inhaler Inhale 1 puff daily Rinse mouth after use  1 Inhaler 5    furosemide (LASIX) 20 mg tablet TAKE 1 TABLET BY MOUTH EVERY MORNING AND 2 TABLETS BY MOUTH EVERY EVENING 270 tablet 11    gabapentin (NEURONTIN) 600 MG tablet Take 1/2 tab am, 1/2 tab afternoon and whole tab at bedtime  90 tablet 3    glucose blood (Accu-Chek Aline Plus) test strip Testing once daily E11 9 100 each 3    Incontinence Supply Disposable (SELECT DISPOSABLE UNDERWEAR LG) MISC       Lancets (ACCU-CHEK SOFT TOUCH) lancets Testing 1 time daily              Dx: E11 9 100 each 3    latanoprost (XALATAN) 0 005 % ophthalmic solution Apply 1 drop to eye daily at bedtime Both eyes 7 5 mL 1    levothyroxine 75 mcg tablet TAKE 1 TABLET EVERY DAY 90 tablet 3    Lidocaine-Menthol 4-5 % PTCH lidocaine patch      Melatonin 5 MG CAPS Take 3 mg by mouth daily at bedtime      nitroglycerin (NITROSTAT) 0 4 mg SL tablet Place 1 tablet (0 4 mg total) under the tongue every 5 (five) minutes as needed for chest pain 25 tablet 3    nystatin (MYCOSTATIN) powder Apply topically 2 (two) times a day 120 g 3    OLANZapine (ZyPREXA) 5 mg tablet Take 1 tablet (5 mg total) by mouth daily 90 tablet 3    omeprazole (PriLOSEC) 40 MG capsule Take 1 capsule (40 mg total) by mouth daily 90 capsule 3    potassium chloride (K-DUR,KLOR-CON) 20 mEq tablet Take 1 tablet (20 mEq total) by mouth daily 90 tablet 2    sitaGLIPtin (Januvia) 50 mg tablet Take 1 tablet (50 mg total) by mouth daily 90 tablet 3     No current facility-administered medications for this visit  Allergies   Allergen Reactions    Statins      Other reaction(s): Weakness  Category: Adverse Reaction; Review of Systems   Constitutional: Negative  HENT: Negative  Eyes: Negative  Respiratory: Negative  Cardiovascular: Negative  Gastrointestinal: Negative  Endocrine: Negative  Genitourinary: Negative  Musculoskeletal:        Hip pain   Skin: Negative  Allergic/Immunologic: Negative  Neurological: Negative  Hematological: Negative  Psychiatric/Behavioral: Negative  There were no vitals filed for this visit  I spent 15 minutes directly with the patient during this visit    VIRTUAL VISIT DISCLAIMER    Radha Rosales acknowledges that she has consented to an online visit or consultation  She understands that the online visit is based solely on information provided by her, and that, in the absence of a face-to-face physical evaluation by the physician, the diagnosis she receives is both limited and provisional in terms of accuracy and completeness  This is not intended to replace a full medical face-to-face evaluation by the physician  Radha Rosales understands and accepts these terms

## 2021-01-19 NOTE — ASSESSMENT & PLAN NOTE
Tylenol every 6 hours full tear in gel 3 times a day patient may use an ibuprofen product if she has that at home once a day  I did order x-ray of her left hip pelvis but patient states that it is too uncomfortable to walk on at this time but will consider if she improves over the week

## 2021-01-19 NOTE — PATIENT INSTRUCTIONS
Left hip pain  Tylenol every 6 hours full tear in gel 3 times a day patient may use an ibuprofen product if she has that at home once a day  I did order x-ray of her left hip pelvis but patient states that it is too uncomfortable to walk on at this time but will consider if she improves over the week

## 2021-01-24 ENCOUNTER — APPOINTMENT (EMERGENCY)
Dept: RADIOLOGY | Facility: HOSPITAL | Age: 86
End: 2021-01-24
Payer: COMMERCIAL

## 2021-01-24 ENCOUNTER — HOSPITAL ENCOUNTER (EMERGENCY)
Facility: HOSPITAL | Age: 86
Discharge: HOME | End: 2021-01-24
Attending: EMERGENCY MEDICINE | Admitting: EMERGENCY MEDICINE
Payer: COMMERCIAL

## 2021-01-24 ENCOUNTER — APPOINTMENT (EMERGENCY)
Dept: CT IMAGING | Facility: HOSPITAL | Age: 86
End: 2021-01-24
Payer: COMMERCIAL

## 2021-01-24 VITALS
OXYGEN SATURATION: 94 % | DIASTOLIC BLOOD PRESSURE: 67 MMHG | TEMPERATURE: 98.4 F | SYSTOLIC BLOOD PRESSURE: 158 MMHG | HEART RATE: 62 BPM | RESPIRATION RATE: 16 BRPM

## 2021-01-24 DIAGNOSIS — M47.9 DEGENERATIVE JOINT DISEASE OF LOW BACK: Primary | ICD-10-CM

## 2021-01-24 DIAGNOSIS — N12 PYELONEPHRITIS: ICD-10-CM

## 2021-01-24 LAB
ALBUMIN SERPL BCP-MCNC: 3.1 G/DL (ref 3.5–5)
ALP SERPL-CCNC: 92 U/L (ref 46–116)
ALT SERPL W P-5'-P-CCNC: 16 U/L (ref 12–78)
AMORPH URATE CRY URNS QL MICRO: ABNORMAL /HPF
ANION GAP SERPL CALCULATED.3IONS-SCNC: 7 MMOL/L (ref 4–13)
APTT PPP: 33 SECONDS (ref 23–37)
AST SERPL W P-5'-P-CCNC: 15 U/L (ref 5–45)
ATRIAL RATE: 67 BPM
BACTERIA UR QL AUTO: ABNORMAL /HPF
BASOPHILS # BLD AUTO: 0.06 THOUSANDS/ΜL (ref 0–0.1)
BASOPHILS NFR BLD AUTO: 1 % (ref 0–1)
BILIRUB SERPL-MCNC: 0.19 MG/DL (ref 0.2–1)
BILIRUB UR QL STRIP: NEGATIVE
BUN SERPL-MCNC: 23 MG/DL (ref 5–25)
CALCIUM ALBUM COR SERPL-MCNC: 10.1 MG/DL (ref 8.3–10.1)
CALCIUM SERPL-MCNC: 9.4 MG/DL (ref 8.3–10.1)
CHLORIDE SERPL-SCNC: 104 MMOL/L (ref 100–108)
CLARITY UR: CLEAR
CO2 SERPL-SCNC: 28 MMOL/L (ref 21–32)
COLOR UR: YELLOW
CREAT SERPL-MCNC: 1.24 MG/DL (ref 0.6–1.3)
EOSINOPHIL # BLD AUTO: 0.16 THOUSAND/ΜL (ref 0–0.61)
EOSINOPHIL NFR BLD AUTO: 3 % (ref 0–6)
ERYTHROCYTE [DISTWIDTH] IN BLOOD BY AUTOMATED COUNT: 13.2 % (ref 11.6–15.1)
FLUAV RNA RESP QL NAA+PROBE: NEGATIVE
FLUBV RNA RESP QL NAA+PROBE: NEGATIVE
GFR SERPL CREATININE-BSD FRML MDRD: 42 ML/MIN/1.73SQ M
GLUCOSE SERPL-MCNC: 180 MG/DL (ref 65–140)
GLUCOSE UR STRIP-MCNC: NEGATIVE MG/DL
HCT VFR BLD AUTO: 41 % (ref 34.8–46.1)
HGB BLD-MCNC: 12.9 G/DL (ref 11.5–15.4)
HGB UR QL STRIP.AUTO: NEGATIVE
IMM GRANULOCYTES # BLD AUTO: 0.01 THOUSAND/UL (ref 0–0.2)
IMM GRANULOCYTES NFR BLD AUTO: 0 % (ref 0–2)
INR PPP: 1.15 (ref 0.84–1.19)
KETONES UR STRIP-MCNC: NEGATIVE MG/DL
LEUKOCYTE ESTERASE UR QL STRIP: ABNORMAL
LYMPHOCYTES # BLD AUTO: 0.78 THOUSANDS/ΜL (ref 0.6–4.47)
LYMPHOCYTES NFR BLD AUTO: 13 % (ref 14–44)
MCH RBC QN AUTO: 29.9 PG (ref 26.8–34.3)
MCHC RBC AUTO-ENTMCNC: 31.5 G/DL (ref 31.4–37.4)
MCV RBC AUTO: 95 FL (ref 82–98)
MONOCYTES # BLD AUTO: 0.59 THOUSAND/ΜL (ref 0.17–1.22)
MONOCYTES NFR BLD AUTO: 10 % (ref 4–12)
NEUTROPHILS # BLD AUTO: 4.24 THOUSANDS/ΜL (ref 1.85–7.62)
NEUTS SEG NFR BLD AUTO: 73 % (ref 43–75)
NITRITE UR QL STRIP: NEGATIVE
NON-SQ EPI CELLS URNS QL MICRO: ABNORMAL /HPF
NRBC BLD AUTO-RTO: 0 /100 WBCS
NT-PROBNP SERPL-MCNC: 777 PG/ML
P AXIS: 75 DEGREES
PH UR STRIP.AUTO: 5.5 [PH]
PLATELET # BLD AUTO: 255 THOUSANDS/UL (ref 149–390)
PMV BLD AUTO: 10.6 FL (ref 8.9–12.7)
POTASSIUM SERPL-SCNC: 4 MMOL/L (ref 3.5–5.3)
PR INTERVAL: 226 MS
PROT SERPL-MCNC: 7.6 G/DL (ref 6.4–8.2)
PROT UR STRIP-MCNC: NEGATIVE MG/DL
PROTHROMBIN TIME: 14.5 SECONDS (ref 11.6–14.5)
QRS AXIS: -2 DEGREES
QRSD INTERVAL: 104 MS
QT INTERVAL: 386 MS
QTC INTERVAL: 407 MS
RBC # BLD AUTO: 4.31 MILLION/UL (ref 3.81–5.12)
RBC #/AREA URNS AUTO: ABNORMAL /HPF
RSV RNA RESP QL NAA+PROBE: NEGATIVE
SARS-COV-2 RNA RESP QL NAA+PROBE: NEGATIVE
SODIUM SERPL-SCNC: 139 MMOL/L (ref 136–145)
SP GR UR STRIP.AUTO: 1.01 (ref 1–1.03)
T WAVE AXIS: 64 DEGREES
TROPONIN I SERPL-MCNC: <0.02 NG/ML
TSH SERPL DL<=0.05 MIU/L-ACNC: 4.11 UIU/ML (ref 0.36–3.74)
UROBILINOGEN UR QL STRIP.AUTO: 0.2 E.U./DL
VENTRICULAR RATE: 67 BPM
WBC # BLD AUTO: 5.84 THOUSAND/UL (ref 4.31–10.16)
WBC #/AREA URNS AUTO: ABNORMAL /HPF

## 2021-01-24 PROCEDURE — 83880 ASSAY OF NATRIURETIC PEPTIDE: CPT | Performed by: EMERGENCY MEDICINE

## 2021-01-24 PROCEDURE — G1004 CDSM NDSC: HCPCS

## 2021-01-24 PROCEDURE — 93010 ELECTROCARDIOGRAM REPORT: CPT | Performed by: INTERNAL MEDICINE

## 2021-01-24 PROCEDURE — 85610 PROTHROMBIN TIME: CPT | Performed by: EMERGENCY MEDICINE

## 2021-01-24 PROCEDURE — 81001 URINALYSIS AUTO W/SCOPE: CPT | Performed by: EMERGENCY MEDICINE

## 2021-01-24 PROCEDURE — 84484 ASSAY OF TROPONIN QUANT: CPT | Performed by: EMERGENCY MEDICINE

## 2021-01-24 PROCEDURE — 80053 COMPREHEN METABOLIC PANEL: CPT | Performed by: EMERGENCY MEDICINE

## 2021-01-24 PROCEDURE — 71045 X-RAY EXAM CHEST 1 VIEW: CPT

## 2021-01-24 PROCEDURE — 36415 COLL VENOUS BLD VENIPUNCTURE: CPT | Performed by: EMERGENCY MEDICINE

## 2021-01-24 PROCEDURE — 74176 CT ABD & PELVIS W/O CONTRAST: CPT

## 2021-01-24 PROCEDURE — 85730 THROMBOPLASTIN TIME PARTIAL: CPT | Performed by: EMERGENCY MEDICINE

## 2021-01-24 PROCEDURE — 99285 EMERGENCY DEPT VISIT HI MDM: CPT

## 2021-01-24 PROCEDURE — 0241U HB NFCT DS VIR RESP RNA 4 TRGT: CPT | Performed by: EMERGENCY MEDICINE

## 2021-01-24 PROCEDURE — 99284 EMERGENCY DEPT VISIT MOD MDM: CPT | Performed by: EMERGENCY MEDICINE

## 2021-01-24 PROCEDURE — 85025 COMPLETE CBC W/AUTO DIFF WBC: CPT | Performed by: EMERGENCY MEDICINE

## 2021-01-24 PROCEDURE — 84443 ASSAY THYROID STIM HORMONE: CPT | Performed by: EMERGENCY MEDICINE

## 2021-01-24 PROCEDURE — 93005 ELECTROCARDIOGRAM TRACING: CPT

## 2021-01-24 RX ORDER — CYCLOBENZAPRINE HCL 5 MG
5 TABLET ORAL 3 TIMES DAILY PRN
Qty: 15 TABLET | Refills: 0 | Status: SHIPPED | OUTPATIENT
Start: 2021-01-24 | End: 2021-11-18

## 2021-01-24 RX ORDER — CYCLOBENZAPRINE HCL 10 MG
5 TABLET ORAL ONCE
Status: COMPLETED | OUTPATIENT
Start: 2021-01-24 | End: 2021-01-24

## 2021-01-24 RX ORDER — ACETAMINOPHEN 325 MG/1
650 TABLET ORAL EVERY 6 HOURS PRN
Qty: 20 TABLET | Refills: 0 | Status: SHIPPED | OUTPATIENT
Start: 2021-01-24

## 2021-01-24 RX ORDER — CEPHALEXIN 500 MG/1
500 CAPSULE ORAL EVERY 6 HOURS SCHEDULED
Qty: 28 CAPSULE | Refills: 0 | Status: SHIPPED | OUTPATIENT
Start: 2021-01-24 | End: 2021-01-31

## 2021-01-24 RX ORDER — ACETAMINOPHEN 325 MG/1
975 TABLET ORAL ONCE
Status: COMPLETED | OUTPATIENT
Start: 2021-01-24 | End: 2021-01-24

## 2021-01-24 RX ADMIN — CYCLOBENZAPRINE HYDROCHLORIDE 5 MG: 10 TABLET, FILM COATED ORAL at 17:27

## 2021-01-24 RX ADMIN — ACETAMINOPHEN 975 MG: 325 TABLET ORAL at 17:26

## 2021-01-24 NOTE — ED PROVIDER NOTES
History  Chief Complaint   Patient presents with    Back Pain     L lower back pain for 5 days, some radiation of pain into LLE  No known injury   Abdominal Pain     Sharp mid abdominal pain for several days, present only when lying down, accompanied by dizziness  C/o lower L sided back pain radiating down her leg for the past 5 days, no known injury  Sometimes pain radiates to her abd  She took tylenol for pain without much relief  No n/v/d, no fevers, no urinary symptoms  Pt  Appeared slightly sob when getting into the bed  She states that she is always a little sob at baseline  No cough or fevers lately  She lives at home with her daughter , Imtiaz Spangler, and walks with a cane  No weakness, no cp          Prior to Admission Medications   Prescriptions Last Dose Informant Patient Reported? Taking? Cholecalciferol (VITAMIN D3) 2000 units capsule  Self Yes No   Sig: Take 2,000 Units by mouth daily    Incontinence Supply Disposable (SELECT DISPOSABLE UNDERWEAR LG) MISC  Self Yes No   Lancets (ACCU-CHEK SOFT TOUCH) lancets  Self No No   Sig: Testing 1 time daily  Dx: E11 9   Lidocaine-Menthol 4-5 % PTCH  Self Yes No   Sig: lidocaine patch   Melatonin 5 MG CAPS  Self Yes No   Sig: Take 3 mg by mouth daily at bedtime   OLANZapine (ZyPREXA) 5 mg tablet  Self No No   Sig: Take 1 tablet (5 mg total) by mouth daily   albuterol (VENTOLIN HFA) 90 mcg/act inhaler  Self No No   Sig: Inhale 2 puffs every 4 (four) hours as needed for wheezing   Patient not taking: Reported on 8/21/2020   aluminum-magnesium hydroxide-simethicone (MYLANTA) 200-200-20 mg/5 mL suspension  Self Yes No   Sig: Take 30 mL by mouth every 4 (four) hours as needed for indigestion or heartburn   amLODIPine (NORVASC) 5 mg tablet   No No   Sig: Take 1 tablet (5 mg total) by mouth daily   aspirin 81 MG tablet  Self Yes No   Sig: Take 81 mg by mouth daily     butalbital-acetaminophen-caffeine (FIORICET,ESGIC) -40 mg per tablet Self No No   Sig: Take 1 tablet by mouth every 4 (four) hours as needed for headaches   Patient not taking: Reported on 2020   dorzolamide (TRUSOPT) 2 % ophthalmic solution  Self Yes No   Sig: Administer 1 drop to both eyes 3 (three) times a day     famotidine (PEPCID) 20 mg tablet   No No   Sig: Take 1 tablet (20 mg total) by mouth 2 (two) times a day   fluticasone (FLONASE) 50 mcg/act nasal spray  Self No No   Si sprays into each nostril daily   fluticasone-vilanterol (BREO ELLIPTA) 100-25 mcg/inh inhaler  Self No No   Sig: Inhale 1 puff daily Rinse mouth after use  furosemide (LASIX) 20 mg tablet  Self No No   Sig: TAKE 1 TABLET BY MOUTH EVERY MORNING AND 2 TABLETS BY MOUTH EVERY EVENING   gabapentin (NEURONTIN) 600 MG tablet  Self No No   Sig: Take 1/2 tab am, 1/2 tab afternoon and whole tab at bedtime     glucose blood (Accu-Chek Aline Plus) test strip  Self No No   Sig: Testing once daily E11 9   latanoprost (XALATAN) 0 005 % ophthalmic solution  Self No No   Sig: Apply 1 drop to eye daily at bedtime Both eyes   levothyroxine 75 mcg tablet   No No   Sig: TAKE 1 TABLET EVERY DAY   nitroglycerin (NITROSTAT) 0 4 mg SL tablet  Self No No   Sig: Place 1 tablet (0 4 mg total) under the tongue every 5 (five) minutes as needed for chest pain   nystatin (MYCOSTATIN) powder   No No   Sig: Apply topically 2 (two) times a day   omeprazole (PriLOSEC) 40 MG capsule  Self No No   Sig: Take 1 capsule (40 mg total) by mouth daily   potassium chloride (K-DUR,KLOR-CON) 20 mEq tablet  Self No No   Sig: Take 1 tablet (20 mEq total) by mouth daily   sitaGLIPtin (Januvia) 50 mg tablet   No No   Sig: Take 1 tablet (50 mg total) by mouth daily      Facility-Administered Medications: None       Past Medical History:   Diagnosis Date    Asthma     Atypical chest pain     CAD (coronary artery disease)     Candidal intertrigo     CHF (congestive heart failure) (HCC)     Depression     Diabetes mellitus ([facility] )     Diabetic neuropathy ([address] )     Diverticulitis     Dyslipidemia     Female bladder prolapse     Gastric ulcer     Glaucoma     HTN (hypertension)     Hyperlipidemia     Hypothyroidism 4/18/2016    RAD (reactive airway disease)        Past Surgical History:   Procedure Laterality Date    COLONOSCOPY      ESOPHAGOGASTRODUODENOSCOPY  2011    HYSTERECTOMY         Family History   Problem Relation Age of Onset    Breast cancer Mother     Hypothyroidism Mother     Hypertension Father     Breast cancer Sister     Thyroid disease Sister     Hypertension Sister      I have reviewed and agree with the history as documented  E-Cigarette/Vaping     E-Cigarette/Vaping Substances     Social History     Tobacco Use    Smoking status: Never Smoker    Smokeless tobacco: Never Used   Substance Use Topics    Alcohol use: No     Comment: Social Alcohol Use -- as per allscripts (pt denies all alcohol use)    Drug use: No       Review of Systems   Constitutional: Negative for appetite change, fatigue and fever  HENT: Negative for rhinorrhea and sore throat  Respiratory: Negative for cough and wheezing  Cardiovascular: Negative for chest pain and leg swelling  Gastrointestinal: Positive for abdominal pain  Negative for diarrhea, nausea and vomiting  Genitourinary: Negative for dysuria and flank pain  Musculoskeletal: Positive for back pain  Negative for neck pain  Skin: Negative for rash  Neurological: Negative for syncope and headaches  Psychiatric/Behavioral:        Mood normal       Physical Exam  Physical Exam  Vitals signs and nursing note reviewed  Constitutional:       Appearance: She is well-developed  HENT:      Head: Normocephalic and atraumatic  Eyes:      Pupils: Pupils are equal, round, and reactive to light  Neck:      Musculoskeletal: Normal range of motion and neck supple  Cardiovascular:      Rate and Rhythm: Normal rate and regular rhythm     Pulmonary:      Effort: Pulmonary effort is normal  No respiratory distress  Breath sounds: Normal breath sounds  No wheezing  Abdominal:      Palpations: Abdomen is soft  Tenderness: There is no abdominal tenderness  Musculoskeletal: Normal range of motion  Comments: Mild lower lumbar tenderness, neg  St  Leg raising test  +n/v intact   Skin:     General: Skin is warm and dry  Neurological:      Mental Status: She is alert and oriented to person, place, and time           Vital Signs  ED Triage Vitals [01/24/21 1640]   Temperature Pulse Respirations Blood Pressure SpO2   98 4 °F (36 9 °C) 80 16 167/72 98 %      Temp Source Heart Rate Source Patient Position - Orthostatic VS BP Location FiO2 (%)   Temporal Monitor Sitting Left arm --      Pain Score       7           Vitals:    01/24/21 1640 01/24/21 1849 01/24/21 2112   BP: 167/72 166/71 158/67   Pulse: 80 73 62   Patient Position - Orthostatic VS: Sitting Lying Lying         Visual Acuity      ED Medications  Medications   acetaminophen (TYLENOL) tablet 975 mg (975 mg Oral Given 1/24/21 1726)   cyclobenzaprine (FLEXERIL) tablet 5 mg (5 mg Oral Given 1/24/21 1727)       Diagnostic Studies  Results Reviewed     Procedure Component Value Units Date/Time    Urine Microscopic [[id number]]  (Abnormal) Collected: 01/24/21 1851    Lab Status: Final result Specimen: Urine, Clean Catch Updated: 01/24/21 1927     RBC, UA None Seen /hpf      WBC, UA 4-10 /hpf      Epithelial Cells Occasional /hpf      Bacteria, UA Occasional /hpf      AMORPH URATES Occasional /hpf     UA (URINE) with reflex to Scope [[id number]]  (Abnormal) Collected: 01/24/21 1851    Lab Status: Final result Specimen: Urine, Clean Catch Updated: 01/24/21 1906     Color, UA Yellow     Clarity, UA Clear     Specific Gravity, UA 1 010     pH, UA 5 5     Leukocytes, UA Trace     Nitrite, UA Negative     Protein, UA Negative mg/dl      Glucose, UA Negative mg/dl      Ketones, UA Negative mg/dl      Urobilinogen, UA 0 2 E U /dl Bilirubin, UA Negative     Blood, UA Negative    NT-BNP PRO [[id number]]  (Abnormal) Collected: 01/24/21 1722    Lab Status: Final result Specimen: Blood from Arm, Left Updated: 01/24/21 1828     NT-proBNP 777 pg/mL     COVID19, Influenza A/B, RSV PCR, [facility] [[id number]]  (Normal) Collected: 01/24/21 1726    Lab Status: Final result Specimen: Nares from Nasopharyngeal Swab Updated: 01/24/21 1826     SARS-CoV-2 Negative     INFLUENZA A PCR Negative     INFLUENZA B PCR Negative     RSV PCR Negative    Narrative: This test has been authorized by FDA under an EUA (Emergency Use Assay) for use by authorized laboratories  Clinical caution and judgement should be used with the interpretation of these results with consideration of the clinical impression and other laboratory testing  Testing reported as "Positive" or "Negative" has been proven to be accurate according to standard laboratory validation requirements  All testing is performed with control materials showing appropriate reactivity at standard intervals  Mary Alice Rash [[id number]]  (Normal) Collected: 01/24/21 1722    Lab Status: Final result Specimen: Blood from Arm, Left Updated: 01/24/21 1815     Protime 14 5 seconds      INR 1 15    APTT [[id number]]  (Normal) Collected: 01/24/21 1722    Lab Status: Final result Specimen: Blood from Arm, Left Updated: 01/24/21 1815     PTT 33 seconds     TSH [[id number]]  (Abnormal) Collected: 01/24/21 1722    Lab Status: Final result Specimen: Blood from Arm, Left Updated: 01/24/21 1811     TSH 3RD GENERATON 4 113 uIU/mL     Narrative:      Patients undergoing fluorescein dye angiography may retain small amounts of fluorescein in the body for 48-72 hours post procedure  Samples containing fluorescein can produce falsely depressed TSH values  If the patient had this procedure,a specimen should be resubmitted post fluorescein clearance        Troponin I [[id number]]  (Normal) Collected: 01/24/21 1722    Lab Status: Final result Specimen: Blood from Arm, Left Updated: 01/24/21 1803     Troponin I <0 02 ng/mL     Comprehensive metabolic panel [[id number]]  (Abnormal) Collected: 01/24/21 1722    Lab Status: Final result Specimen: Blood from Arm, Left Updated: 01/24/21 1754     Sodium 139 mmol/L      Potassium 4 0 mmol/L      Chloride 104 mmol/L      CO2 28 mmol/L      ANION GAP 7 mmol/L      BUN 23 mg/dL      Creatinine 1 24 mg/dL      Glucose 180 mg/dL      Calcium 9 4 mg/dL      Corrected Calcium 10 1 mg/dL      AST 15 U/L      ALT 16 U/L      Alkaline Phosphatase 92 U/L      Total Protein 7 6 g/dL      Albumin 3 1 g/dL      Total Bilirubin 0 19 mg/dL      eGFR 42 ml/min/1 73sq m     Narrative:      National Kidney Disease Foundation guidelines for Chronic Kidney Disease (CKD):     Stage 1 with normal or high GFR (GFR > 90 mL/min/1 73 square meters)    Stage 2 Mild CKD (GFR = 60-89 mL/min/1 73 square meters)    Stage 3A Moderate CKD (GFR = 45-59 mL/min/1 73 square meters)    Stage 3B Moderate CKD (GFR = 30-44 mL/min/1 73 square meters)    Stage 4 Severe CKD (GFR = 15-29 mL/min/1 73 square meters)    Stage 5 End Stage CKD (GFR <15 mL/min/1 73 square meters)  Note: GFR calculation is accurate only with a steady state creatinine    CBC and differential [[id number]]  (Abnormal) Collected: 01/24/21 1722    Lab Status: Final result Specimen: Blood from Arm, Left Updated: 01/24/21 1752     WBC 5 84 Thousand/uL      RBC 4 31 Million/uL      Hemoglobin 12 9 g/dL      Hematocrit 41 0 %      MCV 95 fL      MCH 29 9 pg      MCHC 31 5 g/dL      RDW 13 2 %      MPV 10 6 fL      Platelets 293 Thousands/uL      nRBC 0 /100 WBCs      Neutrophils Relative 73 %      Immat GRANS % 0 %      Lymphocytes Relative 13 %      Monocytes Relative 10 %      Eosinophils Relative 3 %      Basophils Relative 1 %      Neutrophils Absolute 4 24 Thousands/µL      Immature Grans Absolute 0 01 Thousand/uL      Lymphocytes Absolute 0 78 Thousands/µL Monocytes Absolute 0 59 Thousand/µL      Eosinophils Absolute 0 16 Thousand/µL      Basophils Absolute 0 06 Thousands/µL                  CT renal stone study abdomen pelvis without contrast   Final Result by Edilia Franklin MD (01/24 1939)         1  No hydronephrosis or obstructive nephrolithiasis  2   Trabeculated bladder with bladder wall thickening  This may be related to chronic urostasis and/or component of underdistention  Superimposed cystitis difficult to exclude  Correlation with urinalysis/clinical and other laboratory findings    recommended  3   Chronic sclerosing mesenteritis, stable from 2017 without progressive coalescent mesenteric lymphadenopathy or mass formation  4   Diverticulosis coli  5   Fat-containing umbilical hernia stable  Workstation performed: [id number]         CT recon only lumbar spine   Final Result by Edilia Franklin MD (01/24 1952)      Moderate spondylotic changes are similar to the previous study  No acute fracture or significant change from the prior study  Workstation performed: [id number]         XR chest 1 view portable    (Results Pending)              Procedures  ECG 12 Lead Documentation Only    Date/Time: 1/24/2021 5:31 PM  Performed by: Chalo Perdomo MD  Authorized by: Chalo Perdomo MD     Rate:     ECG rate:  67    ECG rate assessment: normal    Rhythm:     Rhythm: sinus rhythm    ST segments:     ST segments:  Non-specific             ED Course                             SBIRT 20yo+      Most Recent Value   SBIRT (23 yo +)   In order to provide better care to our patients, we are screening all of our patients for alcohol and drug use  Would it be okay to ask you these screening questions?   No Filed at: 01/24/2021 1651                    MDM  Number of Diagnoses or Management Options  Degenerative joint disease of low back:   Pyelonephritis:      Amount and/or Complexity of Data Reviewed  Clinical lab tests: reviewed and ordered  Tests in the radiology section of CPT®: ordered and reviewed    Risk of Complications, Morbidity, and/or Mortality  Presenting problems: moderate  General comments: CXR - nad    Pt  Was offered admisison but refused  Stable for outpt  Follow up    I updated her and her daughter, Mayelin Tredawell, on results  They understand to return for any worsening of symptoms  Disposition  Final diagnoses:   Degenerative joint disease of low back   Pyelonephritis     Time reflects when diagnosis was documented in both MDM as applicable and the Disposition within this note     Time User Action Codes Description Comment    1/24/2021  9:07 PM Wendy Arriola R Add [M47 9] Degenerative joint disease of low back     1/24/2021  9:08 PM Mojgan Arriola Add [N12] Pyelonephritis       ED Disposition     ED Disposition Condition Date/Time Comment    Discharge Stable Sun Jan 24, 2021  9:07 PM Smooth Cowan discharge to home/self care              Follow-up Information     Follow up With Specialties Details Why [address] [place] [address] [place], PA-C Family Medicine, Physician Assistant   Iva Vanegas [address] [place] [address]  [phone]            Discharge Medication List as of 1/24/2021  9:10 PM      START taking these medications    Details   acetaminophen (TYLENOL) 325 mg tablet Take 2 tablets (650 mg total) by mouth every 6 (six) hours as needed for mild pain, Starting Sun 1/24/2021, Normal      cephalexin (KEFLEX) 500 mg capsule Take 1 capsule (500 mg total) by mouth every 6 (six) hours for 7 days, Starting Sun 1/24/2021, Until Sun 1/31/2021, Normal      cyclobenzaprine (FLEXERIL) 5 mg tablet Take 1 tablet (5 mg total) by mouth 3 (three) times a day as needed for muscle spasms, Starting Sun 1/24/2021, Normal         CONTINUE these medications which have NOT CHANGED    Details   albuterol (VENTOLIN HFA) 90 mcg/act inhaler Inhale 2 puffs every 4 (four) hours as needed for wheezing, Starting Tue 4/16/2019, Normal      aluminum-magnesium hydroxide-simethicone (MYLANTA) 200-200-20 mg/5 mL suspension Take 30 mL by mouth every 4 (four) hours as needed for indigestion or heartburn, Historical Med      amLODIPine (NORVASC) 5 mg tablet Take 1 tablet (5 mg total) by mouth daily, Starting Tue 12/29/2020, Normal      aspirin 81 MG tablet Take 81 mg by mouth daily  , Historical Med      butalbital-acetaminophen-caffeine (FIORICET,ESGIC) -40 mg per tablet Take 1 tablet by mouth every 4 (four) hours as needed for headaches, Starting Sun 2/9/2020, Print      Cholecalciferol (VITAMIN D3) 2000 units capsule Take 2,000 Units by mouth daily , Historical Med      dorzolamide (TRUSOPT) 2 % ophthalmic solution Administer 1 drop to both eyes 3 (three) times a day  , Historical Med      famotidine (PEPCID) 20 mg tablet Take 1 tablet (20 mg total) by mouth 2 (two) times a day, Starting Wed 12/30/2020, Normal      fluticasone (FLONASE) 50 mcg/act nasal spray 2 sprays into each nostril daily, Starting Sat 3/16/2019, Normal      fluticasone-vilanterol (BREO ELLIPTA) 100-25 mcg/inh inhaler Inhale 1 puff daily Rinse mouth after use , Starting Tue 10/15/2019, Normal      furosemide (LASIX) 20 mg tablet TAKE 1 TABLET BY MOUTH EVERY MORNING AND 2 TABLETS BY MOUTH EVERY EVENING, Normal      gabapentin (NEURONTIN) 600 MG tablet Take 1/2 tab am, 1/2 tab afternoon and whole tab at bedtime , Normal      glucose blood (Accu-Chek Aline Plus) test strip Testing once daily E11 9, Normal      Incontinence Supply Disposable (SELECT DISPOSABLE UNDERWEAR LG) MISC Starting Fri 6/14/2019, Historical Med      Lancets (ACCU-CHEK SOFT TOUCH) lancets Testing 1 time daily              Dx: E11 9, Normal      latanoprost (XALATAN) 0 005 % ophthalmic solution Apply 1 drop to eye daily at bedtime Both eyes, Starting Sat 3/16/2019, Normal      levothyroxine 75 mcg tablet TAKE 1 TABLET EVERY DAY, Normal      Lidocaine-Menthol 4-5 % PTCH lidocaine patch, Historical Med      Melatonin 5 MG CAPS Take 3 mg by mouth daily at bedtime, Historical Med      nitroglycerin (NITROSTAT) 0 4 mg SL tablet Place 1 tablet (0 4 mg total) under the tongue every 5 (five) minutes as needed for chest pain, Starting Tue 11/5/2019, Normal      nystatin (MYCOSTATIN) powder Apply topically 2 (two) times a day, Starting Fri 8/21/2020, Normal      OLANZapine (ZyPREXA) 5 mg tablet Take 1 tablet (5 mg total) by mouth daily, Starting Thu 6/25/2020, Normal      omeprazole (PriLOSEC) 40 MG capsule Take 1 capsule (40 mg total) by mouth daily, Starting Wed 3/25/2020, Normal      potassium chloride (K-DUR,KLOR-CON) 20 mEq tablet Take 1 tablet (20 mEq total) by mouth daily, Starting Wed 6/10/2020, Normal      sitaGLIPtin (Januvia) 50 mg tablet Take 1 tablet (50 mg total) by mouth daily, Starting Wed 12/23/2020, Normal           No discharge procedures on file      PDMP Review       Value Time User    PDMP Reviewed  Yes 11/18/2020  1:31 PM Stephanie Kemp PA-C          ED Provider  Electronically Signed by           Chalo Perdomo MD  01/24/21 4247

## 2021-01-26 ENCOUNTER — TELEMEDICINE (OUTPATIENT)
Dept: FAMILY MEDICINE CLINIC | Facility: CLINIC | Age: 86
End: 2021-01-26
Payer: COMMERCIAL

## 2021-01-26 ENCOUNTER — TELEPHONE (OUTPATIENT)
Dept: FAMILY MEDICINE CLINIC | Facility: CLINIC | Age: 86
End: 2021-01-26

## 2021-01-26 VITALS
SYSTOLIC BLOOD PRESSURE: 151 MMHG | HEART RATE: 80 BPM | HEIGHT: 62 IN | BODY MASS INDEX: 30.55 KG/M2 | WEIGHT: 166 LBS | DIASTOLIC BLOOD PRESSURE: 78 MMHG

## 2021-01-26 DIAGNOSIS — N12 PYELONEPHRITIS: Primary | ICD-10-CM

## 2021-01-26 DIAGNOSIS — M25.552 LEFT HIP PAIN: ICD-10-CM

## 2021-01-26 PROCEDURE — 99213 OFFICE O/P EST LOW 20 MIN: CPT | Performed by: PHYSICIAN ASSISTANT

## 2021-01-26 PROCEDURE — 1160F RVW MEDS BY RX/DR IN RCRD: CPT | Performed by: PHYSICIAN ASSISTANT

## 2021-01-26 PROCEDURE — 1036F TOBACCO NON-USER: CPT | Performed by: PHYSICIAN ASSISTANT

## 2021-01-26 RX ORDER — BRIMONIDINE TARTRATE 0.1 %
DROPS OPHTHALMIC (EYE)
COMMUNITY
End: 2021-11-18

## 2021-01-26 NOTE — ASSESSMENT & PLAN NOTE
Recent diagnosis of pyelonephritis during ER evaluation for hip pain  I am not sure this is actually a true diagnosis as urine only showed trace white blood cell count CT scan of bilateral kidneys were normal without hydronephrosis however bladder did show evidence of either thickening or hypo distension which caused it to look like it was thickened so the emergency room did put patient on Keflex 500 mg 3 times daily for 7 days just in case she did have an infection

## 2021-01-26 NOTE — ASSESSMENT & PLAN NOTE
Patient continues to have left hip pain she thinks that the ER diagnosed this as a symptom of a kidney infection however I do not think this is correct and most likely the kidney infection possibility that she is being treated for was an incidental finding of a workup of symptoms  X-ray of the hip was not done in the emergency room even though was ordered by myself to for the patient to do as an outpatient  I am encouraging her to try the Flexeril given to her by the emergency room if her hip pain does not continue to slowly improve I would have her go for the x-ray placed by me at the last visit and she most likely would benefit from an ortho evaluation

## 2021-01-26 NOTE — PATIENT INSTRUCTIONS
Problem List Items Addressed This Visit        Genitourinary    Pyelonephritis - Primary     Recent diagnosis of pyelonephritis during ER evaluation for hip pain  I am not sure this is actually a true diagnosis as urine only showed trace white blood cell count CT scan of bilateral kidneys were normal without hydronephrosis however bladder did show evidence of either thickening or hypo distension which caused it to look like it was thickened so the emergency room did put patient on Keflex 500 mg 3 times daily for 7 days just in case she did have an infection  Other    Left hip pain     Patient continues to have left hip pain she thinks that the ER diagnosed this as a symptom of a kidney infection however I do not think this is correct and most likely the kidney infection possibility that she is being treated for was an incidental finding of a workup of symptoms  X-ray of the hip was not done in the emergency room even though was ordered by myself to for the patient to do as an outpatient  I am encouraging her to try the Flexeril given to her by the emergency room if her hip pain does not continue to slowly improve I would have her go for the x-ray placed by me at the last visit and she most likely would benefit from an ortho evaluation

## 2021-01-26 NOTE — PROGRESS NOTES
Virtual Brief Visit    Assessment/Plan:    Problem List Items Addressed This Visit        Genitourinary    Pyelonephritis - Primary     Recent diagnosis of pyelonephritis during ER evaluation for hip pain  I am not sure this is actually a true diagnosis as urine only showed trace white blood cell count CT scan of bilateral kidneys were normal without hydronephrosis however bladder did show evidence of either thickening or hypo distension which caused it to look like it was thickened so the emergency room did put patient on Keflex 500 mg 3 times daily for 7 days just in case she did have an infection  Other    Left hip pain     Patient continues to have left hip pain she thinks that the ER diagnosed this as a symptom of a kidney infection however I do not think this is correct and most likely the kidney infection possibility that she is being treated for was an incidental finding of a workup of symptoms  X-ray of the hip was not done in the emergency room even though was ordered by myself to for the patient to do as an outpatient  I am encouraging her to try the Flexeril given to her by the emergency room if her hip pain does not continue to slowly improve I would have her go for the x-ray placed by me at the last visit and she most likely would benefit from an ortho evaluation  Reason for visit is   Chief Complaint   Patient presents with    Follow-up     pt states she went to ed for hip pain and was dx with kidney infection and was given antibiotic and would like to discuss this        Virtual Brief Visit        Encounter provider Nicolas Gibbs PA-C    Provider located at [address] [place] [address] PRIMARY CARE  [address] [address]    Recent Visits  Date Type Provider Dept   01/19/21 1135 Joel Navarro PA-C Pg [facility]   Showing recent visits within past 7 days and meeting all other requirements     Today's Visits  Date Type Provider Dept   01/26/21 1135 Joel Navarro PA-C Pg [facility]   01/26/21 Telephone Cris Kurtz PA-C Pg [facility] ER Primary Care   Showing today's visits and meeting all other requirements     Future Appointments  No visits were found meeting these conditions  Showing future appointments within next 150 days and meeting all other requirements        After connecting through telephone, the patient was identified by name and date of birth  Radha Rosales was informed that this is a telemedicine visit and that the visit is being conducted through telephone  My office door was closed  No one else was in the room  She acknowledged consent and understanding of privacy and security of the platform  The patient has agreed to participate and understands she can discontinue the visit at any time  Patient is aware this is a billable service  Subjective    Radha Rosales is a 80 y o  female pt    Patient wanted to call today for follow-up from an ER visit  Patient visited the [address] [place] [address] Emergency Room because of her ongoing left hip pain that she describes however workup kind of followed in a different direction and they ended up doing blood work chest x-ray troponin CT stone study lumbar spine CT scan and no x-ray of her hip  Because of not done examination and my visit with her concerning this condition was only virtual [place] cannot say whether this evaluation was appropriate or not  CT stone study was negative kidneys bilateral normal without any hydronephrosis the bladder was either hypoinflated or thickened or evidence of cystitis and radiology said to correlate with other lab and physical findings however her urine only showed trace white cell and no bacteria and with micro only a slight elevation in white blood cell count with negative nitrate negative bacteria    Emergency room did however make a diagnosis of possible pyelonephritis and treated her with outpatient oral Keflex 500 low g 3 times a day for 7 days  Patient's hip pain is slowly improving but not because of the treatment  She thinks that she was told by them that her hip pain was secondary to a kidney infection although I do not think this is true and that the kidney issues that she is being treated for was an incidental finding  She has not tried the Flexeril given to her  Past Medical History:   Diagnosis Date    Asthma     Atypical chest pain     CAD (coronary artery disease)     Candidal intertrigo     CHF (congestive heart failure) (HCC)     Depression     Diabetes mellitus (HCC)     Diabetic neuropathy (HCC)     Diverticulitis     Dyslipidemia     Female bladder prolapse     Gastric ulcer     Glaucoma     HTN (hypertension)     Hyperlipidemia     Hypothyroidism 4/18/2016    RAD (reactive airway disease)        Past Surgical History:   Procedure Laterality Date    COLONOSCOPY      ESOPHAGOGASTRODUODENOSCOPY  2011    HYSTERECTOMY         Current Outpatient Medications   Medication Sig Dispense Refill    acetaminophen (TYLENOL) 325 mg tablet Take 2 tablets (650 mg total) by mouth every 6 (six) hours as needed for mild pain 20 tablet 0    aluminum-magnesium hydroxide-simethicone (MYLANTA) 200-200-20 mg/5 mL suspension Take 30 mL by mouth every 4 (four) hours as needed for indigestion or heartburn      amLODIPine (NORVASC) 5 mg tablet Take 1 tablet (5 mg total) by mouth daily 90 tablet 1    aspirin 81 MG tablet Take 81 mg by mouth daily        brimonidine (Alphagan P) 0 1 % Alphagan P 0 1 % eye drops   INT 1 GTT IN OU BID      cephalexin (KEFLEX) 500 mg capsule Take 1 capsule (500 mg total) by mouth every 6 (six) hours for 7 days 28 capsule 0    Cholecalciferol (VITAMIN D3) 2000 units capsule Take 2,000 Units by mouth daily       cyclobenzaprine (FLEXERIL) 5 mg tablet Take 1 tablet (5 mg total) by mouth 3 (three) times a day as needed for muscle spasms 15 tablet 0    dorzolamide (TRUSOPT) 2 % ophthalmic solution Administer 1 drop to both eyes 3 (three) times a day        famotidine (PEPCID) 20 mg tablet Take 1 tablet (20 mg total) by mouth 2 (two) times a day 60 tablet 11    fluticasone (FLONASE) 50 mcg/act nasal spray 2 sprays into each nostril daily 3 Bottle 3    fluticasone-vilanterol (BREO ELLIPTA) 100-25 mcg/inh inhaler Inhale 1 puff daily Rinse mouth after use  1 Inhaler 5    furosemide (LASIX) 20 mg tablet TAKE 1 TABLET BY MOUTH EVERY MORNING AND 2 TABLETS BY MOUTH EVERY EVENING 270 tablet 11    gabapentin (NEURONTIN) 600 MG tablet Take 1/2 tab am, 1/2 tab afternoon and whole tab at bedtime  90 tablet 3    glucose blood (Accu-Chek Aline Plus) test strip Testing once daily E11 9 100 each 3    Incontinence Supply Disposable (SELECT DISPOSABLE UNDERWEAR LG) MISC       Lancets (ACCU-CHEK SOFT TOUCH) lancets Testing 1 time daily              Dx: E11 9 100 each 3    latanoprost (XALATAN) 0 005 % ophthalmic solution Apply 1 drop to eye daily at bedtime Both eyes 7 5 mL 1    levothyroxine 75 mcg tablet TAKE 1 TABLET EVERY DAY 90 tablet 3    Lidocaine-Menthol 4-5 % PTCH lidocaine patch      Melatonin 5 MG CAPS Take 3 mg by mouth daily at bedtime      nitroglycerin (NITROSTAT) 0 4 mg SL tablet Place 1 tablet (0 4 mg total) under the tongue every 5 (five) minutes as needed for chest pain 25 tablet 3    nystatin (MYCOSTATIN) powder Apply topically 2 (two) times a day 120 g 3    OLANZapine (ZyPREXA) 5 mg tablet Take 1 tablet (5 mg total) by mouth daily 90 tablet 3    omeprazole (PriLOSEC) 40 MG capsule Take 1 capsule (40 mg total) by mouth daily 90 capsule 3    potassium chloride (K-DUR,KLOR-CON) 20 mEq tablet Take 1 tablet (20 mEq total) by mouth daily 90 tablet 2    sitaGLIPtin (Januvia) 50 mg tablet Take 1 tablet (50 mg total) by mouth daily 90 tablet 3    albuterol (VENTOLIN HFA) 90 mcg/act inhaler Inhale 2 puffs every 4 (four) hours as needed for wheezing (Patient not taking: Reported on 8/21/2020) 1 Inhaler 3    butalbital-acetaminophen-caffeine (FIORICET,ESGIC) -40 mg per tablet Take 1 tablet by mouth every 4 (four) hours as needed for headaches (Patient not taking: Reported on 6/26/2020) 6 tablet 0     No current facility-administered medications for this visit  Allergies   Allergen Reactions    Statins      Other reaction(s): Weakness  Category: Adverse Reaction; Review of Systems   Constitutional: Negative  HENT: Negative  Eyes: Negative  Respiratory: Negative  Cardiovascular: Negative  Gastrointestinal: Negative  Endocrine: Negative  Genitourinary: Negative  Musculoskeletal:        L hip pain   Skin: Negative  Allergic/Immunologic: Negative  Neurological: Negative  Hematological: Negative  Psychiatric/Behavioral: Negative  Vitals:    01/26/21 1351   BP: 151/78   BP Location: Left arm   Patient Position: Sitting   Cuff Size: Adult   Pulse: 80   Weight: 75 3 kg (166 lb)   Height: 5' 2" (1 575 m)         I spent 20 minutes directly with the patient during this visit    VIRTUAL VISIT DISCLAIMER    Mega English acknowledges that she has consented to an online visit or consultation  She understands that the online visit is based solely on information provided by her, and that, in the absence of a face-to-face physical evaluation by the physician, the diagnosis she receives is both limited and provisional in terms of accuracy and completeness  This is not intended to replace a full medical face-to-face evaluation by the physician  Mega English understands and accepts these terms

## 2021-02-11 ENCOUNTER — OFFICE VISIT (OUTPATIENT)
Dept: FAMILY MEDICINE CLINIC | Facility: CLINIC | Age: 86
End: 2021-02-11
Payer: COMMERCIAL

## 2021-02-11 VITALS
TEMPERATURE: 98.3 F | DIASTOLIC BLOOD PRESSURE: 60 MMHG | HEART RATE: 86 BPM | BODY MASS INDEX: 31.65 KG/M2 | WEIGHT: 172 LBS | HEIGHT: 62 IN | SYSTOLIC BLOOD PRESSURE: 110 MMHG

## 2021-02-11 DIAGNOSIS — H61.23 BILATERAL IMPACTED CERUMEN: ICD-10-CM

## 2021-02-11 DIAGNOSIS — K59.00 CONSTIPATION, UNSPECIFIED CONSTIPATION TYPE: ICD-10-CM

## 2021-02-11 DIAGNOSIS — N30.01 ACUTE CYSTITIS WITH HEMATURIA: ICD-10-CM

## 2021-02-11 DIAGNOSIS — Z23 NEED FOR INFLUENZA VACCINATION: Primary | ICD-10-CM

## 2021-02-11 LAB
SL AMB  POCT GLUCOSE, UA: NEGATIVE
SL AMB LEUKOCYTE ESTERASE,UA: ABNORMAL
SL AMB POCT BILIRUBIN,UA: NEGATIVE
SL AMB POCT BLOOD,UA: ABNORMAL
SL AMB POCT CLARITY,UA: ABNORMAL
SL AMB POCT COLOR,UA: YELLOW
SL AMB POCT KETONES,UA: NEGATIVE
SL AMB POCT NITRITE,UA: NEGATIVE
SL AMB POCT PH,UA: 6
SL AMB POCT SPECIFIC GRAVITY,UA: >1.03
SL AMB POCT URINE PROTEIN: ABNORMAL
SL AMB POCT UROBILINOGEN: 0.2

## 2021-02-11 PROCEDURE — 90662 IIV NO PRSV INCREASED AG IM: CPT | Performed by: PHYSICIAN ASSISTANT

## 2021-02-11 PROCEDURE — G0008 ADMIN INFLUENZA VIRUS VAC: HCPCS | Performed by: PHYSICIAN ASSISTANT

## 2021-02-11 PROCEDURE — 87186 SC STD MICRODIL/AGAR DIL: CPT | Performed by: PHYSICIAN ASSISTANT

## 2021-02-11 PROCEDURE — 87086 URINE CULTURE/COLONY COUNT: CPT | Performed by: PHYSICIAN ASSISTANT

## 2021-02-11 PROCEDURE — 87077 CULTURE AEROBIC IDENTIFY: CPT | Performed by: PHYSICIAN ASSISTANT

## 2021-02-11 PROCEDURE — 81002 URINALYSIS NONAUTO W/O SCOPE: CPT | Performed by: PHYSICIAN ASSISTANT

## 2021-02-11 PROCEDURE — 99214 OFFICE O/P EST MOD 30 MIN: CPT | Performed by: PHYSICIAN ASSISTANT

## 2021-02-11 RX ORDER — NITROFURANTOIN 25; 75 MG/1; MG/1
100 CAPSULE ORAL 2 TIMES DAILY
Qty: 14 CAPSULE | Refills: 0 | Status: SHIPPED | OUTPATIENT
Start: 2021-02-11 | End: 2021-02-18

## 2021-02-11 NOTE — ASSESSMENT & PLAN NOTE
Will send for culture  Treat with macrobid, increase fluids  Call with results only if we need to change treatment

## 2021-02-11 NOTE — PROGRESS NOTES
Assessment and Plan:    Problem List Items Addressed This Visit        Nervous and Auditory    Bilateral impacted cerumen     Pt to use debrox nightly for one week and RTO for flush  Genitourinary    Acute cystitis with hematuria     Will send for culture  Treat with macrobid, increase fluids  Call with results only if we need to change treatment  Relevant Medications    nitrofurantoin (MACROBID) 100 mg capsule    Other Relevant Orders    POCT urine dip (Completed)    Urine culture       Other    Constipation     Suggest suppository use to loosen from end up as on exam she has no physical blockage  Other Visit Diagnoses     Need for influenza vaccination    -  Primary    Relevant Orders    influenza vaccine, high-dose, PF 0 7 mL (FLUZONE HIGH-DOSE)                 Diagnoses and all orders for this visit:    Need for influenza vaccination  -     influenza vaccine, high-dose, PF 0 7 mL (FLUZONE HIGH-DOSE)    Acute cystitis with hematuria  -     nitrofurantoin (MACROBID) 100 mg capsule; Take 1 capsule (100 mg total) by mouth 2 (two) times a day for 7 days  -     POCT urine dip  -     Urine culture    Constipation, unspecified constipation type    Bilateral impacted cerumen              Subjective:      Patient ID: Tiffanie Bravo is a 80 y o  female  CC:    Chief Complaint   Patient presents with    Urinary Urgency     Pt notes foul smelling cloudy urine   Constipation     pt states there is "something in her bowels"  that prevents her from a bowel movement  This started on Monday  Pt denies any pain  High dose flu immunization administetred today  -  [facility]       HPI:    Patient here today accompanied by her daughter  She states that she has felt that her urine has been very strong smelling and she does have a little bit of burning with the end of urination and also she has frequency involved as well    She also feels like there is something blocking her from having a bowel movement and she would like us to take a look at it  Patient does seem a little confused today  The following portions of the patient's history were reviewed and updated as appropriate: allergies, current medications, past family history, past medical history, past social history, past surgical history and problem list       Review of Systems   Constitutional: Negative  Negative for fever  HENT: Negative  Eyes: Negative  Respiratory: Negative  Cardiovascular: Negative  Gastrointestinal: Positive for constipation  Endocrine: Negative  Genitourinary: Positive for dysuria and frequency  Negative for flank pain  Musculoskeletal: Negative  Skin: Negative  Allergic/Immunologic: Negative  Neurological: Negative  Hematological: Negative  Psychiatric/Behavioral: Negative  Data to review:       Objective:    Vitals:    02/11/21 1432   BP: 110/60   BP Location: Left arm   Patient Position: Sitting   Cuff Size: Large   Pulse: 86   Temp: 98 3 °F (36 8 °C)   TempSrc: Oral   Weight: 78 kg (172 lb)   Height: 5' 2" (1 575 m)        Physical Exam  HENT:      Right Ear: Decreased hearing noted  There is impacted cerumen  Left Ear: Decreased hearing noted  There is impacted cerumen  Abdominal:      General: Bowel sounds are normal       Palpations: Abdomen is soft  Tenderness: There is abdominal tenderness in the suprapubic area  There is no right CVA tenderness or left CVA tenderness  Genitourinary:         Comments: Patient with a small hemorrhoid but not obstructing rectal opening  It does seem that patient has a very large bowel movement ready to exit

## 2021-02-14 LAB
BACTERIA UR CULT: ABNORMAL
BACTERIA UR CULT: ABNORMAL

## 2021-02-15 ENCOUNTER — TELEPHONE (OUTPATIENT)
Dept: FAMILY MEDICINE CLINIC | Facility: CLINIC | Age: 86
End: 2021-02-15

## 2021-02-15 DIAGNOSIS — N30.01 ACUTE CYSTITIS WITH HEMATURIA: Primary | ICD-10-CM

## 2021-02-15 RX ORDER — LEVOFLOXACIN 250 MG/1
250 TABLET ORAL DAILY
Qty: 3 TABLET | Refills: 0 | Status: SHIPPED | OUTPATIENT
Start: 2021-02-15 | End: 2021-02-18

## 2021-02-15 NOTE — TELEPHONE ENCOUNTER
Please see result note from today about urine culture and needing to change to a different antibiotic

## 2021-02-15 NOTE — RESULT ENCOUNTER NOTE
Please let daughter of pt know her urine culture shows we need to change her antibiotic to levoquin once daily for 3 days  I called this in to her pharmacy

## 2021-02-15 NOTE — TELEPHONE ENCOUNTER
PATIENT STATES THE MACROBID IS UPSETTING HER STOMACH, SHE IS NAUSEOUS AND HAS LOTS OF ABDOMINAL CRAMPING AND LOWER BACK PAIN FROM IT   PLEASE ADVISE [phone]

## 2021-03-01 ENCOUNTER — TELEPHONE (OUTPATIENT)
Dept: NEUROLOGY | Facility: CLINIC | Age: 86
End: 2021-03-01

## 2021-03-01 DIAGNOSIS — G50.1 ATYPICAL FACIAL PAIN: Primary | ICD-10-CM

## 2021-03-01 DIAGNOSIS — G52.9 CRANIAL NEURALGIA: ICD-10-CM

## 2021-03-01 NOTE — TELEPHONE ENCOUNTER
Patient calling in requesting to speak with [address]  Patient says she is having extreme sharp pain on the left side of her face and head  States she started having head pain last week and as of today it has traveled down to her neck as well  Denies any numbness/tingling, denies any changes in speech  States the pain is 10/10 and is seeking recommendations  She was also requesting an office visit with [address], last visit was 3/27/2020  Patient is not available on Friday 3/5 for open appointment  Patient scheduled for next available on 4/29 @ 1130 AM and was placed on wait list as well  Medications:   Gabapentin 600mg Take 1/2 tab am, 1/2 tab afternoon and whole tab at bedtime  Tylenol     Please advise   Patient would like to speak with [address] directly if possible     Cb#: [phone]

## 2021-03-04 ENCOUNTER — OFFICE VISIT (OUTPATIENT)
Dept: FAMILY MEDICINE CLINIC | Facility: CLINIC | Age: 86
End: 2021-03-04
Payer: COMMERCIAL

## 2021-03-04 VITALS
WEIGHT: 172 LBS | DIASTOLIC BLOOD PRESSURE: 62 MMHG | TEMPERATURE: 97.6 F | HEIGHT: 62 IN | SYSTOLIC BLOOD PRESSURE: 128 MMHG | RESPIRATION RATE: 16 BRPM | HEART RATE: 68 BPM | BODY MASS INDEX: 31.65 KG/M2

## 2021-03-04 DIAGNOSIS — E11.42 DM TYPE 2 WITH DIABETIC PERIPHERAL NEUROPATHY (HCC): Primary | Chronic | ICD-10-CM

## 2021-03-04 DIAGNOSIS — H90.0 CONDUCTIVE HEARING LOSS, BILATERAL: ICD-10-CM

## 2021-03-04 DIAGNOSIS — G50.1 ATYPICAL FACIAL PAIN: ICD-10-CM

## 2021-03-04 DIAGNOSIS — G50.0 TRIGEMINAL NEURALGIA: ICD-10-CM

## 2021-03-04 DIAGNOSIS — H61.23 BILATERAL IMPACTED CERUMEN: ICD-10-CM

## 2021-03-04 DIAGNOSIS — IMO0002 TYPE 2 DIABETES MELLITUS, UNCONTROLLED, WITH NEUROPATHY: ICD-10-CM

## 2021-03-04 LAB — SL AMB POCT HEMOGLOBIN AIC: 6.9 (ref ?–6.5)

## 2021-03-04 PROCEDURE — 99214 OFFICE O/P EST MOD 30 MIN: CPT | Performed by: PHYSICIAN ASSISTANT

## 2021-03-04 PROCEDURE — 69210 REMOVE IMPACTED EAR WAX UNI: CPT | Performed by: PHYSICIAN ASSISTANT

## 2021-03-04 PROCEDURE — 83036 HEMOGLOBIN GLYCOSYLATED A1C: CPT | Performed by: PHYSICIAN ASSISTANT

## 2021-03-04 RX ORDER — LIDOCAINE 50 MG/G
OINTMENT TOPICAL AS NEEDED
Qty: 35.44 G | Refills: 0 | Status: SHIPPED | OUTPATIENT
Start: 2021-03-04 | End: 2021-03-25 | Stop reason: SDUPTHER

## 2021-03-04 NOTE — PATIENT INSTRUCTIONS
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

## 2021-03-04 NOTE — ASSESSMENT & PLAN NOTE
Utensil used to  Remove copious amounts of cerumen from bilateral ear canals  Patient is hearing better after procedure

## 2021-03-04 NOTE — TELEPHONE ENCOUNTER
Daveha/ clinical- can you please ask her if she tried baclofen? Tell her sorry for the late reply  I can call her later in the day, probably [phone] during triage  Please add her to the schedule if possible  Thanks

## 2021-03-04 NOTE — PROGRESS NOTES
Assessment and Plan:Ear cerumen removal    Date/Time: 3/4/2021 3:17 PM  Performed by: Roopa Hope PA-C  Authorized by: Roopa Hope PA-C   [place] Protocol:  Procedure performed by: (Daughter of patient)  Consent: Verbal consent obtained  Consent given by: patient  Patient understanding: patient states understanding of the procedure being performed  Patient identity confirmed: verbally with patient      Patient location:  Clinic  Procedure details:     Local anesthetic:  None    Location:  L ear and R ear    Procedure type: curette      Approach:  Natural orifice  Post-procedure details:     Complication:  None    Hearing quality:  Improved    Patient tolerance of procedure: Tolerated well, no immediate complications          Problem List Items Addressed This Visit        Endocrine    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)       Lab Results   Component Value Date    HGBA1C 6 9 (H) 09/10/2019            Relevant Orders    POCT hemoglobin A1c (Completed)    Type 2 diabetes mellitus, uncontrolled, with neuropathy (HCC)       Nervous and Auditory    Trigeminal neuralgia       Patient has been having a exacerbation of this for the past week  She is already on 1200 mg of gabapentin total   She does have Neurology  Neurology will be calling the patient tomorrow as the patient was busy coming to our office today for this evaluation  I am hesitant to increase her gabapentin anymore due to her age  I would wait for patient to consult with neurology tomorrow  Patient may possibly be a candidate for Trileptal however I will leave this up to neurology  Bilateral impacted cerumen      Utensil used to  Remove copious amounts of cerumen from bilateral ear canals  Patient is hearing better after procedure  Conductive hearing loss, bilateral       Bilateral ears with wax impaction  Resolved with utensil use              Other    Atypical facial pain Diagnoses and all orders for this visit:    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[address] )  -     POCT hemoglobin A1c    Type 2 diabetes mellitus, uncontrolled, with neuropathy (HCC)    Atypical facial pain    Trigeminal neuralgia    Bilateral impacted cerumen    Conductive hearing loss, bilateral    Other orders  -     Ear cerumen removal              Subjective:      Patient ID: Uma Fuchs is a 80 y o  female  CC:    Chief Complaint   Patient presents with    Headache     patient present today for horrible pain on her head radiating to the left sie of her forehead  Per patient the pain also radiated to her neck but, it is resolved  HPI:      Patient here today accompanied by her daughter  She has a history of trigeminal neuralgia and atypical face pain and is seeing Neurology  She is on 300 gabapentin a m  afternoon and 600 gabapentin in the p m  [facility] She did call Nephrology today and because she had already decided to come to our office they were unable to see her but are calling her tomorrow to further discuss her concerns  She has had this for many years  Patient states that about a week ago her whole head her and then it just kind of went into her left face and neck  Very sharp in nature coming going occurring with chewing yawning and just movement  Also patient is complaining of hearing loss bilateral ears and does have a history of cerumen impaction and would like us to remove any wax available today  The following portions of the patient's history were reviewed and updated as appropriate: allergies, current medications, past family history, past medical history, past social history, past surgical history and problem list       Review of Systems   Constitutional: Negative  HENT: Negative  Eyes: Negative  Respiratory: Negative  Cardiovascular: Negative  Gastrointestinal: Negative  Endocrine: Negative  Genitourinary: Negative  Musculoskeletal: Negative  Skin: Negative  Allergic/Immunologic: Negative  Neurological: Positive for headaches  Facial sharp pain left side  Hematological: Negative  Psychiatric/Behavioral: Negative  Data to review:       Objective:    Vitals:    03/04/21 1408   BP: 128/62   BP Location: Left arm   Patient Position: Sitting   Cuff Size: Large   Pulse: 68   Resp: 16   Temp: 97 6 °F (36 4 °C)   TempSrc: Tympanic   Weight: 78 kg (172 lb)   Height: 5' 2" (1 575 m)        Physical Exam  Vitals signs and nursing note reviewed  Constitutional:       Appearance: Normal appearance  She is well-developed  HENT:      Head: Normocephalic and atraumatic  Comments:   Patient nontender to palpation in the depicted area but this is where pain does sharply come and go the past 2 weeks  Right Ear: Tympanic membrane, ear canal and external ear normal       Left Ear: Tympanic membrane, ear canal and external ear normal       Ears:      Comments:  Previous to curette utensil use TMs not visualized secondary to total impaction of bilateral ear canals  Eyes:      General: Lids are normal       Conjunctiva/sclera: Conjunctivae normal       Pupils: Pupils are equal, round, and reactive to light  Cardiovascular:      Rate and Rhythm: Normal rate and regular rhythm  Heart sounds: No murmur  Pulmonary:      Effort: Pulmonary effort is normal       Breath sounds: Normal breath sounds  Skin:     General: Skin is warm and dry  Neurological:      General: No focal deficit present  Mental Status: She is alert  Coordination: Coordination is intact  Psychiatric:         Mood and Affect: Mood normal          Behavior: Behavior normal  Behavior is cooperative  Thought Content:  Thought content normal          Judgment: Judgment normal

## 2021-03-04 NOTE — TELEPHONE ENCOUNTER
The patient states that she has left-sided facial pain which is characterized by a sharp pain which lasts a second or 2 and goes away on its own  This is located above the left eye  It is triggered by yawning, chewing, moving her jaw  She states she had pain in the top portion of her teeth on the left side when she touched her tongue to her teeth  Pain was 10/10  Today she is doing better  Recommended half tab baclofen or 5 mg q h s   Side effects reviewed including drowsiness  Lidocaine cream also prescribed  Will use transdermal therapeutics if this is denied  Will call her tomorrow afternoon to check in  Patient needs an earlier appointment, will place on cancellation list     Will consider low-dose Tegretol if baclofen is ineffective  Also advised to call her dentist r/o infection

## 2021-03-04 NOTE — TELEPHONE ENCOUNTER
Called patient and discussed below  She states she has never tried baclofen  She has only been using warm compresses  Patient has an appointment today at 2 pm with another provider and will not be able to take the call today  Are you available to speak with the patient tomorrow?      Please advise

## 2021-03-04 NOTE — ASSESSMENT & PLAN NOTE
Patient has been having a exacerbation of this for the past week  She is already on 1200 mg of gabapentin total   She does have Neurology  Neurology will be calling the patient tomorrow as the patient was busy coming to our office today for this evaluation  I am hesitant to increase her gabapentin anymore due to her age  I would wait for patient to consult with neurology tomorrow  Patient may possibly be a candidate for Trileptal however I will leave this up to neurology

## 2021-03-05 NOTE — TELEPHONE ENCOUNTER
Called patient and offered 3/25/21 @ 11:45am  Patient accepted appointment  Patient r/s to this date/time  Patient reports that she had good results with the baclofen without any side effects  Patient does report she only took one dose at this time       [id number], vivian

## 2021-03-05 NOTE — TELEPHONE ENCOUNTER
Would you mind calling her to offer 1145 slot on 3/25/21? And also please ask her if the baclofen was helpful for pain, not causing s/e? Thanks

## 2021-03-09 ENCOUNTER — IMMUNIZATIONS (OUTPATIENT)
Dept: FAMILY MEDICINE CLINIC | Facility: HOSPITAL | Age: 86
End: 2021-03-09

## 2021-03-09 DIAGNOSIS — Z23 ENCOUNTER FOR IMMUNIZATION: Primary | ICD-10-CM

## 2021-03-09 PROCEDURE — 0001A SARS-COV-2 / COVID-19 MRNA VACCINE (PFIZER-BIONTECH) 30 MCG: CPT

## 2021-03-09 PROCEDURE — 91300 SARS-COV-2 / COVID-19 MRNA VACCINE (PFIZER-BIONTECH) 30 MCG: CPT

## 2021-03-12 ENCOUNTER — HOSPITAL ENCOUNTER (EMERGENCY)
Facility: HOSPITAL | Age: 86
Discharge: HOME | End: 2021-03-12
Attending: EMERGENCY MEDICINE
Payer: COMMERCIAL

## 2021-03-12 ENCOUNTER — TELEPHONE (OUTPATIENT)
Dept: NEUROLOGY | Facility: CLINIC | Age: 86
End: 2021-03-12

## 2021-03-12 VITALS
OXYGEN SATURATION: 95 % | TEMPERATURE: 97.7 F | SYSTOLIC BLOOD PRESSURE: 152 MMHG | WEIGHT: 172 LBS | DIASTOLIC BLOOD PRESSURE: 71 MMHG | HEART RATE: 60 BPM | RESPIRATION RATE: 14 BRPM | BODY MASS INDEX: 31.46 KG/M2

## 2021-03-12 DIAGNOSIS — G50.1 ATYPICAL FACIAL PAIN: ICD-10-CM

## 2021-03-12 DIAGNOSIS — G50.1 ATYPICAL FACIAL PAIN: Primary | ICD-10-CM

## 2021-03-12 DIAGNOSIS — G50.0 TRIGEMINAL NEURALGIA: Primary | ICD-10-CM

## 2021-03-12 PROCEDURE — 99284 EMERGENCY DEPT VISIT MOD MDM: CPT | Performed by: EMERGENCY MEDICINE

## 2021-03-12 PROCEDURE — 96365 THER/PROPH/DIAG IV INF INIT: CPT

## 2021-03-12 PROCEDURE — 96366 THER/PROPH/DIAG IV INF ADDON: CPT

## 2021-03-12 PROCEDURE — 99283 EMERGENCY DEPT VISIT LOW MDM: CPT

## 2021-03-12 RX ORDER — CARBAMAZEPINE 100 MG/1
TABLET, CHEWABLE ORAL
Qty: 60 TABLET | Refills: 0 | Status: SHIPPED | OUTPATIENT
Start: 2021-03-12 | End: 2021-03-12 | Stop reason: SDUPTHER

## 2021-03-12 RX ORDER — CARBAMAZEPINE 100 MG/1
TABLET, CHEWABLE ORAL
Qty: 60 TABLET | Refills: 0 | Status: SHIPPED | OUTPATIENT
Start: 2021-03-12 | End: 2021-03-15 | Stop reason: SDUPTHER

## 2021-03-12 RX ADMIN — PHENYTOIN SODIUM 1175 MG: 50 INJECTION INTRAMUSCULAR; INTRAVENOUS at 10:56

## 2021-03-12 NOTE — TELEPHONE ENCOUNTER
Received fax from [address] [place] [address] requesting a 90 day supply of carbamazepine 100 mg chewable to be sent  Please send updated script for 90 day supply to [place] on file  Thank you!

## 2021-03-12 NOTE — TELEPHONE ENCOUNTER
Ed provider contacted me via tiger text today re: Abhinav Pierre in the ED for severe/worsening facial pain  Collaborated on the use Dilantin for treatment of acute pain  Will send Tegretol to start tonight  Please contact her daughter to let her know I sent to js  Thank you!

## 2021-03-12 NOTE — ED PROVIDER NOTES
Pt Name: Meme Vargas  MRN: [id number]  [place] 9/24/1922  Age/Sex: 80 y o  female  Date of evaluation: 3/12/2021  PCP: Aneta Pringle Dr    Chief Complaint   Patient presents with    Headache     Headache and left arm pain "for awhile" Worse today, states "well, I'm 89) Taking gabapentin and baclofen without relief  HPI    Radha [place] presents to the Emergency Department complaining of severe episodes of sharp left sided facial pain  She has known trigeminal neuralgia and is seeing neurology for this  She is on gabapentin as well as baclofen for it but she cannot tolerate the pain  She has a full feeling in her throat but this is not reproducible and she is able to swallow and tolerate her secretions          HPI      Past Medical and Surgical History    Past Medical History:   Diagnosis Date    Asthma     Atypical chest pain     CAD (coronary artery disease)     Candidal intertrigo     CHF (congestive heart failure) (HCC)     Depression     Diabetes mellitus (HCC)     Diabetic neuropathy (HCC)     Diverticulitis     Dyslipidemia     Female bladder prolapse     Gastric ulcer     Glaucoma     HTN (hypertension)     Hyperlipidemia     Hypothyroidism 4/18/2016    RAD (reactive airway disease)        Past Surgical History:   Procedure Laterality Date    COLONOSCOPY      ESOPHAGOGASTRODUODENOSCOPY  2011    HYSTERECTOMY         Family History   Problem Relation Age of Onset    Breast cancer Mother     Hypothyroidism Mother     Hypertension Father     Breast cancer Sister     Thyroid disease Sister     Hypertension Sister        Social History     Tobacco Use    Smoking status: Never Smoker    Smokeless tobacco: Never Used   Substance Use Topics    Alcohol use: No     Comment: Social Alcohol Use -- as per allscripts (pt denies all alcohol use)    Drug use: No              Allergies    Allergies   Allergen Reactions    Statins      Other reaction(s): Weakness  Category: Adverse Reaction;        Home Medications    Prior to Admission medications    Medication Sig Start Date End Date Taking? Authorizing Provider   acetaminophen (TYLENOL) 325 mg tablet Take 2 tablets (650 mg total) by mouth every 6 (six) hours as needed for mild pain 1/24/21   Eva Garcia MD   albuterol (VENTOLIN HFA) 90 mcg/act inhaler Inhale 2 puffs every 4 (four) hours as needed for wheezing  Patient not taking: Reported on 8/21/2020 4/16/19   Alize Olsen MD   aluminum-magnesium hydroxide-simethicone (MYLANTA) 200-200-20 mg/5 mL suspension Take 30 mL by mouth every 4 (four) hours as needed for indigestion or heartburn    Historical Provider, MD   amLODIPine (NORVASC) 5 mg tablet Take 1 tablet (5 mg total) by mouth daily 12/29/20   Bar Robbins PA-C   aspirin 81 MG tablet Take 81 mg by mouth daily      Historical Provider, MD   brimonidine (Alphagan P) 0 1 % Alphagan P 0 1 % eye drops   INT 1 GTT IN OU BID    Historical Provider, MD   butalbital-acetaminophen-caffeine (FIORICET,ESGIC) -40 mg per tablet Take 1 tablet by mouth every 4 (four) hours as needed for headaches  Patient not taking: Reported on 6/26/2020 2/9/20   Bri Everett MD   Cholecalciferol (VITAMIN D3) 2000 units capsule Take 2,000 Units by mouth daily     Historical Provider, MD   cyclobenzaprine (FLEXERIL) 5 mg tablet Take 1 tablet (5 mg total) by mouth 3 (three) times a day as needed for muscle spasms 1/24/21   Eva Garcia MD   dorzolamide (TRUSOPT) 2 % ophthalmic solution Administer 1 drop to both eyes 3 (three) times a day      Historical Provider, MD   famotidine (PEPCID) 20 mg tablet Take 1 tablet (20 mg total) by mouth 2 (two) times a day 12/30/20   Bar Robbins PA-C   fluticasone [facility]) 50 mcg/act nasal spray 2 sprays into each nostril daily 3/16/19   Alize Olsen MD   fluticasone-vilanterol (BREO ELLIPTA) 100-25 mcg/inh inhaler Inhale 1 puff daily Rinse mouth after use  10/15/19   Rujul Cas, DO   furosemide (LASIX) 20 mg tablet TAKE 1 TABLET BY MOUTH EVERY MORNING AND 2 TABLETS BY MOUTH EVERY EVENING 10/15/19   Juniorl Cas, DO   gabapentin (NEURONTIN) 600 MG tablet Take 1/2 tab am, 1/2 tab afternoon and whole tab at bedtime  5/26/20   Lennoxjose ramon Dunn PA-C   glucose blood (Accu-Chek Aline Plus) test strip Testing once daily E11 9 5/11/20   Deann Gorodn PA-C   Incontinence Supply Disposable (SELECT DISPOSABLE UNDERWEAR LG) MISC  6/14/19   Historical Provider, MD   Lancets (ACCU-CHEK SOFT TOUCH) lancets Testing 1 time daily  Dx: E11 9 4/28/20   Deann Gordon PA-C   latanoprost (XALATAN) 0 005 % ophthalmic solution Apply 1 drop to eye daily at bedtime Both eyes 3/16/19   Melony Pinto MD   levothyroxine 75 mcg tablet TAKE 1 TABLET EVERY DAY 7/2/20   Celso Marie DO   lidocaine (XYLOCAINE) 5 % ointment Apply topically as needed for mild pain 3/4/21   Lennox Gold, PA-C   Lidocaine-Menthol 4-5 % [facility] lidocaine patch    Historical Provider, MD   Melatonin 5 MG CAPS Take 3 mg by mouth daily at bedtime    Historical Provider, MD   nitroglycerin (NITROSTAT) 0 4 mg SL tablet Place 1 tablet (0 4 mg total) under the tongue every 5 (five) minutes as needed for chest pain 11/5/19   Shelia Cardozo, DO   nystatin (MYCOSTATIN) powder Apply topically 2 (two) times a day 8/21/20   Deann Gordon PA-C   OLANZapine (ZyPREXA) 5 mg tablet Take 1 tablet (5 mg total) by mouth daily 6/25/20   Deann Gordon PA-C   omeprazole (PriLOSEC) 40 MG capsule Take 1 capsule (40 mg total) by mouth daily 3/25/20   Deann Gordon PA-C   potassium chloride (K-DUR,KLOR-CON) 20 mEq tablet Take 1 tablet (20 mEq total) by mouth daily 6/10/20   Shelia Cardozo, DO   sitaGLIPtin (Januvia) 50 mg tablet Take 1 tablet (50 mg total) by mouth daily 12/23/20   Deann Gordon PA-C           Review of Systems    Review of Systems   Constitutional: Negative for chills and fever  HENT: Negative for ear pain and sore throat  Eyes: Negative for pain and visual disturbance  Respiratory: Negative for cough and shortness of breath  Cardiovascular: Negative for chest pain and palpitations  Gastrointestinal: Negative for abdominal pain and vomiting  Genitourinary: Negative for dysuria and hematuria  Musculoskeletal: Negative for arthralgias and back pain  Skin: Negative for color change and rash  Neurological: Negative for seizures and syncope  All other systems reviewed and are negative  Physical Exam      ED Triage Vitals [03/12/21 0816]   Temperature Pulse Respirations Blood Pressure SpO2   97 7 °F (36 5 °C) 74 18 (!) 183/92 97 %      Temp Source Heart Rate Source Patient Position - Orthostatic VS BP Location FiO2 (%)   Oral Monitor Lying Right arm --      Pain Score       Worst Possible Pain               Physical Exam  Vitals signs and nursing note reviewed  Constitutional:       General: She is not in acute distress  Appearance: She is well-developed  HENT:      Head: Normocephalic and atraumatic  Eyes:      Conjunctiva/sclera: Conjunctivae normal    Neck:      Musculoskeletal: Neck supple  Cardiovascular:      Rate and Rhythm: Normal rate and regular rhythm  Heart sounds: No murmur  Pulmonary:      Effort: Pulmonary effort is normal  No respiratory distress  Breath sounds: Normal breath sounds  Abdominal:      Palpations: Abdomen is soft  Tenderness: There is no abdominal tenderness  Skin:     General: Skin is warm and dry  Neurological:      Mental Status: She is alert  Assessment and Plan    Meme Vargas is a 80 y o  female who presents with severe left sided facial pain  Physical examination remarkable for same  Differential diagnosis (not completely inclusive) includes exacerbation of chronic trigeminal neuralgia  Plan will be to perform diagnostic testing and treat symptomatically        MDM    Diagnostic Results      Labs:      Procedure    Procedures      ED Course of Care and Re-Assessments    I discussed case with Suzanne Breen from Neuro who cares for this patient  I also spoke with our clinical pharmacist Leesa Tierney and we agreed for a 2 hour infusion of phenytoin at 15mg/kg for pain relief and then she will be started on Tegretol as an outpatient  Medications   phenytoin (DILANTIN) 1,175 mg in sodium chloride 0 9 % 210 6 mL IVPB (loading dose) (0 mg/kg × 78 kg Intravenous Stopped 3/12/21 1306)       Patient was feeling better after infusion  She did complain of slight dizziness but otherwise was relieved that her pain was improved  FINAL IMPRESSION    Final diagnoses:   Trigeminal neuralgia         DISPOSITION/PLAN    Time reflects when diagnosis was documented in both MDM as applicable and the Disposition within this note     Time User Action Codes Description Comment    3/12/2021  1:02 PM Vonnie Tee Add [G50 0] Trigeminal neuralgia       ED Disposition     ED Disposition Condition Date/Time Comment    Discharge Stable Fri Mar 12, 2021  1:02 PM Ghislaine Rowan discharge to home/self care              Follow-up Information     Follow up With Specialties Details Why [address] [facility] [address], PA-C Family Medicine, Physician Assistant   [address] [place] [address] [address] [facility]  [phone]      Gume Davey PA-C Neurology, Physician Assistant Call in 1 day  [address] [facility] [phone]              PATIENT REFERRED TO:    Chris Grossman PA-C  [address] [place] [address] [address] [place] [address], [address]  [phone]    Call in 1 day        DISCHARGE MEDICATIONS:    Discharge Medication List as of 3/12/2021  1:03 PM      START taking these medications    Details   carBAMazepine (TEGretol) 100 mg chewable tablet 1 tab qhs x 1 week, then BID , Normal         CONTINUE these medications which have NOT CHANGED Details   acetaminophen (TYLENOL) 325 mg tablet Take 2 tablets (650 mg total) by mouth every 6 (six) hours as needed for mild pain, Starting Sun 1/24/2021, Normal      albuterol (VENTOLIN HFA) 90 mcg/act inhaler Inhale 2 puffs every 4 (four) hours as needed for wheezing, Starting Tue 4/16/2019, Normal      aluminum-magnesium hydroxide-simethicone (MYLANTA) 200-200-20 mg/5 mL suspension Take 30 mL by mouth every 4 (four) hours as needed for indigestion or heartburn, Historical Med      amLODIPine (NORVASC) 5 mg tablet Take 1 tablet (5 mg total) by mouth daily, Starting Tue 12/29/2020, Normal      aspirin 81 MG tablet Take 81 mg by mouth daily  , Historical Med      brimonidine (Alphagan P) 0 1 % Alphagan P 0 1 % eye drops   INT 1 GTT IN OU BID, Historical Med      butalbital-acetaminophen-caffeine (FIORICET,ESGIC) -40 mg per tablet Take 1 tablet by mouth every 4 (four) hours as needed for headaches, Starting Sun 2/9/2020, Print      Cholecalciferol (VITAMIN D3) 2000 units capsule Take 2,000 Units by mouth daily , Historical Med      cyclobenzaprine (FLEXERIL) 5 mg tablet Take 1 tablet (5 mg total) by mouth 3 (three) times a day as needed for muscle spasms, Starting Sun 1/24/2021, Normal      dorzolamide (TRUSOPT) 2 % ophthalmic solution Administer 1 drop to both eyes 3 (three) times a day  , Historical Med      famotidine (PEPCID) 20 mg tablet Take 1 tablet (20 mg total) by mouth 2 (two) times a day, Starting Wed 12/30/2020, Normal      fluticasone (FLONASE) 50 mcg/act nasal spray 2 sprays into each nostril daily, Starting Sat 3/16/2019, Normal      fluticasone-vilanterol (BREO ELLIPTA) 100-25 mcg/inh inhaler Inhale 1 puff daily Rinse mouth after use , Starting Tue 10/15/2019, Normal      furosemide (LASIX) 20 mg tablet TAKE 1 TABLET BY MOUTH EVERY MORNING AND 2 TABLETS BY MOUTH EVERY EVENING, Normal      gabapentin (NEURONTIN) 600 MG tablet Take 1/2 tab am, 1/2 tab afternoon and whole tab at bedtime , Normal glucose blood (Accu-Chek Aline Plus) test strip Testing once daily E11 9, Normal      Incontinence Supply Disposable (SELECT DISPOSABLE UNDERWEAR LG) MISC Starting Fri 6/14/2019, Historical Med      Lancets (ACCU-CHEK SOFT TOUCH) lancets Testing 1 time daily  Dx: E11 9, Normal      latanoprost (XALATAN) 0 005 % ophthalmic solution Apply 1 drop to eye daily at bedtime Both eyes, Starting Sat 3/16/2019, Normal      levothyroxine 75 mcg tablet TAKE 1 TABLET EVERY DAY, Normal      lidocaine (XYLOCAINE) 5 % ointment Apply topically as needed for mild pain, Starting Thu 3/4/2021, Normal      Lidocaine-Menthol 4-5 % PT[facility] lidocaine patch, Historical Med      Melatonin 5 MG CAPS Take 3 mg by mouth daily at bedtime, Historical Med      nitroglycerin (NITROSTAT) 0 4 mg SL tablet Place 1 tablet (0 4 mg total) under the tongue every 5 (five) minutes as needed for chest pain, Starting Tue 11/5/2019, Normal      nystatin (MYCOSTATIN) powder Apply topically 2 (two) times a day, Starting Fri 8/21/2020, Normal      OLANZapine (ZyPREXA) 5 mg tablet Take 1 tablet (5 mg total) by mouth daily, Starting Thu 6/25/2020, Normal      omeprazole (PriLOSEC) 40 MG capsule Take 1 capsule (40 mg total) by mouth daily, Starting Wed 3/25/2020, Normal      potassium chloride (K-DUR,KLOR-CON) 20 mEq tablet Take 1 tablet (20 mEq total) by mouth daily, Starting Wed 6/10/2020, Normal      sitaGLIPtin (Januvia) 50 mg tablet Take 1 tablet (50 mg total) by mouth daily, Starting Wed 12/23/2020, Normal             No discharge procedures on file           [facility], DO     Dianelys Rodney, DO  03/12/21 3078

## 2021-03-15 RX ORDER — CARBAMAZEPINE 100 MG/1
TABLET, CHEWABLE ORAL
Qty: 180 TABLET | Refills: 0 | Status: SHIPPED | OUTPATIENT
Start: 2021-03-15 | End: 2021-03-17 | Stop reason: SINTOL

## 2021-03-15 RX ORDER — CARBAMAZEPINE 100 MG/1
TABLET, CHEWABLE ORAL
Qty: 180 TABLET | Refills: 0 | Status: SHIPPED | OUTPATIENT
Start: 2021-03-15 | End: 2021-03-15 | Stop reason: SDUPTHER

## 2021-03-15 NOTE — TELEPHONE ENCOUNTER
Left patient detailed message regarding below recommendations   Asked patient to return call to office if she is agreeable to trying trileptal

## 2021-03-15 NOTE — TELEPHONE ENCOUNTER
Patient returning call to office  She states she tried the carbamazepine for 2 nights and says that it made her feel out of it  She reported drowsiness and weakness  She states she took only 1 tab nightly  States it did help with the pain but she is not comfortable with the side effects  Please advise       cb#: [phone]

## 2021-03-16 ENCOUNTER — OFFICE VISIT (OUTPATIENT)
Dept: FAMILY MEDICINE CLINIC | Facility: CLINIC | Age: 86
End: 2021-03-16
Payer: COMMERCIAL

## 2021-03-16 VITALS
TEMPERATURE: 96.6 F | HEIGHT: 62 IN | SYSTOLIC BLOOD PRESSURE: 120 MMHG | DIASTOLIC BLOOD PRESSURE: 74 MMHG | WEIGHT: 168 LBS | BODY MASS INDEX: 30.91 KG/M2 | HEART RATE: 76 BPM

## 2021-03-16 DIAGNOSIS — R30.0 DYSURIA: Primary | ICD-10-CM

## 2021-03-16 DIAGNOSIS — R82.90 FOUL SMELLING URINE: ICD-10-CM

## 2021-03-16 LAB
SL AMB  POCT GLUCOSE, UA: NORMAL
SL AMB LEUKOCYTE ESTERASE,UA: NORMAL
SL AMB POCT BILIRUBIN,UA: NORMAL
SL AMB POCT BLOOD,UA: NORMAL
SL AMB POCT CLARITY,UA: CLEAR
SL AMB POCT COLOR,UA: YELLOW
SL AMB POCT KETONES,UA: NORMAL
SL AMB POCT NITRITE,UA: NORMAL
SL AMB POCT PH,UA: 5.5
SL AMB POCT SPECIFIC GRAVITY,UA: 1.02
SL AMB POCT URINE PROTEIN: NORMAL
SL AMB POCT UROBILINOGEN: 0.2

## 2021-03-16 PROCEDURE — 1160F RVW MEDS BY RX/DR IN RCRD: CPT | Performed by: PHYSICIAN ASSISTANT

## 2021-03-16 PROCEDURE — 81002 URINALYSIS NONAUTO W/O SCOPE: CPT | Performed by: PHYSICIAN ASSISTANT

## 2021-03-16 PROCEDURE — 87086 URINE CULTURE/COLONY COUNT: CPT | Performed by: PHYSICIAN ASSISTANT

## 2021-03-16 PROCEDURE — 99213 OFFICE O/P EST LOW 20 MIN: CPT | Performed by: PHYSICIAN ASSISTANT

## 2021-03-16 PROCEDURE — 1036F TOBACCO NON-USER: CPT | Performed by: PHYSICIAN ASSISTANT

## 2021-03-16 NOTE — PROGRESS NOTES
Assessment and Plan:    Problem List Items Addressed This Visit        Other    Foul smelling urine      Patient does have a history of pyelonephritis in the recent past although she has no UTI symptoms currently and her urine in-house dip is normal   She states that her urine smell did go away as of today  To plate safe I will send a urine culture out and if it does grow bacteria will call antibiotic in for patient  She is to continue to drink adequate amounts of fluids  Other Visit Diagnoses     Dysuria    -  Primary    Relevant Orders    POCT urine dip (Completed)                 Diagnoses and all orders for this visit:    Dysuria  -     POCT urine dip    Foul smelling urine              Subjective:      Patient ID: Vandana Garcia is a 80 y o  female  CC:    Chief Complaint   Patient presents with    Urinary Symptoms     c/o strong odor for the past couple days  mjs       HPI:      Patient here today accompanied by her daughter because the past few days except for this morning her urine smelled very strong  She has not been eating any foods that would cause it to be strong without infection that she is aware of  She does have a history of a recent pyelonephritis that developed without her knowledge or symptoms and therefore they are just want to make sure that they are in top of any UTIs that she might develop before gets that point again  No fever back pain nausea slight discomfort in her suprapubic area  The following portions of the patient's history were reviewed and updated as appropriate: allergies, current medications, past family history, past medical history, past social history, past surgical history and problem list       Review of Systems   Constitutional: Negative  HENT: Negative  Eyes: Negative  Respiratory: Negative  Cardiovascular: Negative  Gastrointestinal: Negative  Endocrine: Negative  Genitourinary: Negative  Musculoskeletal: Negative      Skin: Negative  Allergic/Immunologic: Negative  Neurological: Negative  Hematological: Negative  Psychiatric/Behavioral: Negative  Data to review:       Objective:    Vitals:    03/16/21 1501   BP: 120/74   Pulse: 76   Temp: (!) 96 6 °F (35 9 °C)   Weight: 76 2 kg (168 lb)   Height: 5' 2" (1 575 m)        Physical Exam  Vitals signs and nursing note reviewed  Constitutional:       Appearance: Normal appearance  She is well-developed  HENT:      Head: Normocephalic and atraumatic  Eyes:      General: Lids are normal       Conjunctiva/sclera: Conjunctivae normal       Pupils: Pupils are equal, round, and reactive to light  Cardiovascular:      Rate and Rhythm: Normal rate and regular rhythm  Heart sounds: No murmur  Pulmonary:      Effort: Pulmonary effort is normal       Breath sounds: Normal breath sounds  Abdominal:      General: Abdomen is protuberant  Tenderness: There is abdominal tenderness in the suprapubic area  There is no right CVA tenderness or left CVA tenderness  Skin:     General: Skin is warm and dry  Neurological:      General: No focal deficit present  Mental Status: She is alert  Coordination: Coordination is intact  Psychiatric:         Mood and Affect: Mood normal          Behavior: Behavior normal  Behavior is cooperative  Thought Content:  Thought content normal          Judgment: Judgment normal

## 2021-03-16 NOTE — TELEPHONE ENCOUNTER
Patient's daughter returned call to office  Gayathri Magallanes is willing to try trileptal  Please send to [facility] on file    TY

## 2021-03-16 NOTE — PATIENT INSTRUCTIONS
Problem List Items Addressed This Visit        Other    Foul smelling urine      Patient does have a history of pyelonephritis in the recent past although she has no UTI symptoms currently and her urine in-house dip is normal   She states that her urine smell did go away as of today  To plate safe I will send a urine culture out and if it does grow bacteria will call antibiotic in for patient  She is to continue to drink adequate amounts of fluids             Other Visit Diagnoses     Dysuria    -  Primary    Relevant Orders    POCT urine dip (Completed)

## 2021-03-16 NOTE — ASSESSMENT & PLAN NOTE
Patient does have a history of pyelonephritis in the recent past although she has no UTI symptoms currently and her urine in-house dip is normal   She states that her urine smell did go away as of today  To plate safe I will send a urine culture out and if it does grow bacteria will call antibiotic in for patient  She is to continue to drink adequate amounts of fluids

## 2021-03-17 RX ORDER — OXCARBAZEPINE 150 MG/1
TABLET, FILM COATED ORAL
Qty: 60 TABLET | Refills: 0 | Status: SHIPPED | OUTPATIENT
Start: 2021-03-17 | End: 2021-03-18 | Stop reason: SDUPTHER

## 2021-03-17 NOTE — TELEPHONE ENCOUNTER
Received another fax from [facility] now requesting 90 day supply of Oxcarbazepine    If agreeable, please send to [address] on file  Thank you!

## 2021-03-18 LAB — BACTERIA UR CULT: NORMAL

## 2021-03-18 RX ORDER — OXCARBAZEPINE 150 MG/1
TABLET, FILM COATED ORAL
Qty: 180 TABLET | Refills: 0 | Status: SHIPPED | OUTPATIENT
Start: 2021-03-18 | End: 2021-06-16 | Stop reason: SDUPTHER

## 2021-03-18 NOTE — RESULT ENCOUNTER NOTE
Please let the patient know that her urine culture was negative and there is no infection and no need for antibiotics at this time  Thank you

## 2021-03-25 ENCOUNTER — OFFICE VISIT (OUTPATIENT)
Dept: NEUROLOGY | Facility: CLINIC | Age: 86
End: 2021-03-25
Payer: COMMERCIAL

## 2021-03-25 VITALS
SYSTOLIC BLOOD PRESSURE: 160 MMHG | WEIGHT: 170.2 LBS | HEART RATE: 77 BPM | BODY MASS INDEX: 31.32 KG/M2 | HEIGHT: 62 IN | DIASTOLIC BLOOD PRESSURE: 72 MMHG

## 2021-03-25 DIAGNOSIS — G50.0 SUPRAORBITAL NEURALGIA: Primary | ICD-10-CM

## 2021-03-25 DIAGNOSIS — G52.9 CRANIAL NEURALGIA: ICD-10-CM

## 2021-03-25 DIAGNOSIS — M79.18 MYOFASCIAL MUSCLE PAIN: ICD-10-CM

## 2021-03-25 PROCEDURE — 20552 NJX 1/MLT TRIGGER POINT 1/2: CPT | Performed by: PHYSICIAN ASSISTANT

## 2021-03-25 PROCEDURE — 99213 OFFICE O/P EST LOW 20 MIN: CPT | Performed by: PHYSICIAN ASSISTANT

## 2021-03-25 PROCEDURE — 1160F RVW MEDS BY RX/DR IN RCRD: CPT | Performed by: PHYSICIAN ASSISTANT

## 2021-03-25 RX ORDER — KETOROLAC TROMETHAMINE 10 MG/1
10 TABLET, FILM COATED ORAL EVERY 6 HOURS PRN
Qty: 10 TABLET | Refills: 0 | Status: SHIPPED | OUTPATIENT
Start: 2021-03-25 | End: 2021-11-18

## 2021-03-25 RX ORDER — LIDOCAINE 50 MG/G
OINTMENT TOPICAL AS NEEDED
Qty: 35.44 G | Refills: 0 | Status: SHIPPED | OUTPATIENT
Start: 2021-03-25 | End: 2021-03-25

## 2021-03-25 RX ORDER — KETOROLAC TROMETHAMINE 10 MG/1
10 TABLET, FILM COATED ORAL EVERY 6 HOURS PRN
Qty: 10 TABLET | Refills: 0 | Status: SHIPPED | OUTPATIENT
Start: 2021-03-25 | End: 2021-03-25

## 2021-03-25 RX ORDER — BUPIVACAINE HYDROCHLORIDE 2.5 MG/ML
1 INJECTION, SOLUTION EPIDURAL; INFILTRATION; INTRACAUDAL ONCE
Status: COMPLETED | OUTPATIENT
Start: 2021-03-25 | End: 2021-03-25

## 2021-03-25 RX ADMIN — BUPIVACAINE HYDROCHLORIDE 1 ML: 2.5 INJECTION, SOLUTION EPIDURAL; INFILTRATION; INTRACAUDAL at 12:46

## 2021-03-25 NOTE — PATIENT INSTRUCTIONS
Supraorbital neuralgia    150 mg oxcarbazepine x7 days at night, then 150 mg qAM and 150 mg qhs indeifnitely    Reschedule eye exam at your convenience    Watch sugars at home    If the pain comes back tomorrow- try a steroid taper    I will call you tomorrow

## 2021-03-25 NOTE — PROGRESS NOTES
Patient ID: Yosi Saleem is a 80 y o  female  Assessment/Plan:     Diagnoses and all orders for this visit:    Supraorbital neuralgia  -     ketorolac (TORADOL) 10 mg tablet; Take 1 tablet (10 mg total) by mouth every 6 (six) hours as needed (migraine) Max 2-3 per week  Cranial neuralgia  -     Discontinue: lidocaine (XYLOCAINE) 5 % ointment; Apply topically as needed for mild pain  -     Discontinue: ketorolac (TORADOL) 10 mg tablet; Take 1 tablet (10 mg total) by mouth every 6 (six) hours as needed (migraine) Max 2-3 per week  -     ketorolac (TORADOL) 10 mg tablet; Take 1 tablet (10 mg total) by mouth every 6 (six) hours as needed (migraine) Max 2-3 per week  Myofascial muscle pain  -     Cancel: Inj Trigger Point Single/Multiple; Future  -     bupivacaine (PF) (MARCAINE) 0 25 % injection 1 mL  -     Inj Trigger Point Single/Multiple         Suspect L supraorbital neuralgia vs L V1 trigeminal neuralgia  Pain does not extend into the nose, it does radiate into the apex/ parietal region on the left  HCT unremarkable     -continue to advance trileptal  Continue 150 mg qhs x 7 days, then BID if tolerated  - hold tegretol due to s/e  - TPIs today, if ineffective consider PM referral  - encouraged pt to tightly monitor sugars as this can sometimes contribute slightly  - encouraged repeat eye exam to r/o secondary ophtho issue  - toradol prn if pain recurs at home, try to use sparingly  - will call pt tomorrow on updates, could consider decadron/ steroid taper however would like to avoid in light of DM  - will consider decrease/wean from gabapentin as she feels this has been ineffective  The patient should not hesitate to call me prior to her follow up with any questions or concerns  Subjective:    HPI      The patient reports worsening facial pain on the left  This was previously fairly well controlled with gabapentin and baclofen, but recently ineffective    She reported to the ED for severe pain, associated with a full feeling in the throat  The ED physician consuelo texted me and we recommended Dilantin IV  She received phenytoin 15 mg/kilogram over 2 hours  The patient did not feel that this was helpful, when I questioned her today  Tegretol was prescribed for outpatient use but it caused excessive sedation and weakness during the day, so Trileptal was then prescribed  She no longer uses baclofen due to side effects  She prefers heat over ice  She states ice makes it worse  Tylenol does not help  Gabapentin does not help but she continues this as prescribed before  Head CT 2/3/2020 is unremarkable , except for microangiopathic changes  Similar in appearance to the last head CT on 1/9/2019  The patient further describes her pain as a sharp, stabbing pain around the left eye and radiating upward passed the hairline on the left into the left scalp/left parietal region  Rarely it does go down into the left temporal region  When she gets a headache or pain it is 10/10, and it goes in waves  The pain lasts several seconds and then goes away and then comes back several times per day  It is not associated with tearing, congestion, facial droop, numbness  She denies any visual disturbance  She denies swelling or redness  She denies any recent injury  The pain started about a month ago  Around that time she had a UTI which is now resolved, but otherwise she is unable to identify any other trigger  She denies any pain in the back of the head, some neck stiffness to a mild degree      The following portions of the patient's history were reviewed and updated as appropriate:   She  has a past medical history of Asthma, Atypical chest pain, CAD (coronary artery disease), Candidal intertrigo, CHF (congestive heart failure) ([facility] [address] ), Depression, Diabetes mellitus ([facility] [address] ), Diabetic neuropathy ([facility] [address] ), Diverticulitis, Dyslipidemia, Female bladder prolapse, Gastric ulcer, Glaucoma, HTN (hypertension), Hyperlipidemia, Hypothyroidism (4/18/2016), and RAD (reactive airway disease)    She   Patient Active Problem List    Diagnosis Date Noted    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    Myofascial muscle pain 03/25/2021    Cranial neuralgia 03/25/2021    Foul smelling urine 03/16/2021    Conductive hearing loss, bilateral 03/04/2021    Acute cystitis with hematuria 02/11/2021    Constipation 02/11/2021    Bilateral impacted cerumen 02/11/2021    Pyelonephritis 01/26/2021    Left hip pain 01/19/2021    Medicare annual wellness visit, subsequent 08/21/2020    Diarrhea 08/21/2020    Atypical facial pain 03/29/2020    Stable angina ([address] ) 02/18/2020    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    Occipital headache 11/14/2019    Paroxysmal nocturnal dyspnea 11/11/2019    Orthopnea 11/11/2019    Shortness of breath 11/11/2019    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[address] ) 10/05/2019    Vitamin D deficiency 08/22/2019    Pain of both hip joints 06/21/2019    Candidal intertrigo 06/21/2019    Gastroesophageal reflux disease without esophagitis 11/06/2018    Insomnia 10/12/2018    Chronic diastolic congestive heart failure ([address] ) 08/05/2018    Type 2 diabetes mellitus, uncontrolled, with neuropathy ([address] ) 08/05/2018    Medication management 04/13/2018    Dyspnea on minimal exertion 02/17/2018    Shakiness 05/23/2017    Allergic rhinitis 05/02/2017    Hyperlipidemia     Female bladder prolapse     Bilateral cold feet 12/28/2016    Aortic stenosis 10/21/2016    Arteriosclerosis of coronary artery     Asthma     Anxiety disorder 08/17/2016    Osteoarthritis 06/21/2016    Hypothyroidism 04/18/2016    Trigeminal neuralgia 03/16/2016    Peripheral vascular disease ([address] )     Glaucoma, open angle, severe stage     Overactive bladder 01/20/2016    Hallucination 11/17/2015    General weakness 11/03/2015    Low back pain 11/03/2015    Hearing loss 07/27/2015    Mild cognitive impairment 07/27/2015    Urinary incontinence 07/27/2015    Dizziness 07/02/2015    Advanced age 06/03/2015    Irritable bowel 04/24/2015    Female cystocele 04/24/2015    Diverticulosis 04/03/2015    Hiatal hernia 04/03/2015    Ambulatory dysfunction 04/02/2015     She  has a past surgical history that includes Hysterectomy; Colonoscopy; and Esophagogastroduodenoscopy (2011)  Her family history includes Breast cancer in her mother and sister; Hypertension in her father and sister; Hypothyroidism in her mother; Thyroid disease in her sister  She  reports that she has never smoked  She has never used smokeless tobacco  She reports that she does not drink alcohol or use drugs  Current Outpatient Medications   Medication Sig Dispense Refill    acetaminophen (TYLENOL) 325 mg tablet Take 2 tablets (650 mg total) by mouth every 6 (six) hours as needed for mild pain 20 tablet 0    aluminum-magnesium hydroxide-simethicone (MYLANTA) 200-200-20 mg/5 mL suspension Take 30 mL by mouth every 4 (four) hours as needed for indigestion or heartburn      amLODIPine (NORVASC) 5 mg tablet Take 1 tablet (5 mg total) by mouth daily 90 tablet 1    aspirin 81 MG tablet Take 81 mg by mouth daily   Cholecalciferol (VITAMIN D3) 2000 units capsule Take 2,000 Units by mouth daily       dorzolamide (TRUSOPT) 2 % ophthalmic solution Administer 1 drop to both eyes 3 (three) times a day        fluticasone (FLONASE) 50 mcg/act nasal spray 2 sprays into each nostril daily 3 Bottle 3    gabapentin (NEURONTIN) 600 MG tablet Take 1/2 tab am, 1/2 tab afternoon and whole tab at bedtime  90 tablet 3    glucose blood (Accu-Chek Aline Plus) test strip Testing once daily E11 9 100 each 3    Incontinence Supply Disposable (SELECT DISPOSABLE UNDERWEAR LG) MISC       Lancets (ACCU-CHEK SOFT TOUCH) lancets Testing 1 time daily              Dx: E11 9 100 each 3    latanoprost (XALATAN) 0 005 % ophthalmic solution Apply 1 drop to eye daily at bedtime Both eyes 7 5 mL 1    levothyroxine 75 mcg tablet TAKE 1 TABLET EVERY DAY 90 tablet 3    Melatonin 5 MG CAPS Take 3 mg by mouth daily at bedtime      OLANZapine (ZyPREXA) 5 mg tablet Take 1 tablet (5 mg total) by mouth daily 90 tablet 3    omeprazole (PriLOSEC) 40 MG capsule Take 1 capsule (40 mg total) by mouth daily 90 capsule 3    OXcarbazepine (TRILEPTAL) 150 mg tablet 1 tab qhs x 1 week, then  tablet 0    sitaGLIPtin (Januvia) 50 mg tablet Take 1 tablet (50 mg total) by mouth daily 90 tablet 3    albuterol (VENTOLIN HFA) 90 mcg/act inhaler Inhale 2 puffs every 4 (four) hours as needed for wheezing (Patient not taking: Reported on 3/25/2021) 1 Inhaler 3    brimonidine (Alphagan P) 0 1 % Alphagan P 0 1 % eye drops   INT 1 GTT IN OU BID      butalbital-acetaminophen-caffeine (FIORICET,ESGIC) -40 mg per tablet Take 1 tablet by mouth every 4 (four) hours as needed for headaches (Patient not taking: Reported on 3/25/2021) 6 tablet 0    cyclobenzaprine (FLEXERIL) 5 mg tablet Take 1 tablet (5 mg total) by mouth 3 (three) times a day as needed for muscle spasms (Patient not taking: Reported on 3/25/2021) 15 tablet 0    famotidine (PEPCID) 20 mg tablet Take 1 tablet (20 mg total) by mouth 2 (two) times a day (Patient not taking: Reported on 3/25/2021) 60 tablet 11    fluticasone-vilanterol (BREO ELLIPTA) 100-25 mcg/inh inhaler Inhale 1 puff daily Rinse mouth after use  (Patient not taking: Reported on 3/25/2021) 1 Inhaler 5    furosemide (LASIX) 20 mg tablet TAKE 1 TABLET BY MOUTH EVERY MORNING AND 2 TABLETS BY MOUTH EVERY EVENING (Patient not taking: Reported on 3/25/2021) 270 tablet 11    ketorolac (TORADOL) 10 mg tablet Take 1 tablet (10 mg total) by mouth every 6 (six) hours as needed (migraine) Max 2-3 per week   10 tablet 0    Lidocaine-Menthol 4-5 % PTCH lidocaine patch      nitroglycerin (NITROSTAT) 0 4 mg SL tablet Place 1 tablet (0 4 mg total) under the tongue every 5 (five) minutes as needed for chest pain (Patient not taking: Reported on 3/25/2021) 25 tablet 3    nystatin (MYCOSTATIN) powder Apply topically 2 (two) times a day (Patient not taking: Reported on 3/25/2021) 120 g 3    potassium chloride (K-DUR,KLOR-CON) 20 mEq tablet Take 1 tablet (20 mEq total) by mouth daily (Patient not taking: Reported on 3/25/2021) 90 tablet 2     No current facility-administered medications for this visit  She is allergic to statins            Objective:    Blood pressure 160/72, pulse 77, height 5' 2" (1 575 m), weight 77 2 kg (170 lb 3 2 oz), not currently breastfeeding  Body mass index is 31 13 kg/m²  Physical Exam    Neurological Exam  On neurologic exam, the patient is alert and oriented to time and place  Speech is fluent and articulate, and the patient follows commands appropriately  Judgment and affect appear normal  Pupils are equally round and reactive to light and extraocular muscles are intact without nystagmus  Face is symmetric, and tongue, uvula, and palate are midline  Facial sensation is normal and symmetric, in all 3 divisions of the trigeminal nerve  Hearing is diminished to finger rub b/l; I have to speak loudly for the pt to hear me  Motor examination reveals intact strength throughout  Normal gait is steady  ROS:    Review of Systems   Constitutional: Negative  Negative for appetite change and fever  HENT: Negative  Negative for hearing loss, tinnitus, trouble swallowing and voice change  Eyes: Negative  Negative for photophobia and pain  Respiratory: Positive for shortness of breath  Cardiovascular: Negative  Negative for palpitations  Gastrointestinal: Negative  Negative for nausea and vomiting  Endocrine: Negative  Negative for cold intolerance  Genitourinary: Negative  Negative for dysuria, frequency and urgency  Musculoskeletal: Positive for neck pain  Negative for myalgias  Skin: Negative  Negative for rash     Neurological: Positive for weakness and light-headedness  Negative for dizziness, tremors, seizures, syncope, facial asymmetry, speech difficulty, numbness and headaches  Hematological: Negative  Does not bruise/bleed easily  Psychiatric/Behavioral: Positive for hallucinations and sleep disturbance  The following portions of the patient's history were reviewed and updated as appropriate: allergies, current medications/ medication history, past family history, past medical history, past social history, past surgical history and problem list     Review of systems was reviewed and otherwise unremarkable from a neurological perspective  Inj Trigger Point Single/Multiple     Date/Time 3/25/2021 12:55 PM     Performed by  Elvira Weems PA-C     Authorized by Elvira Weems PA-C      Universal Protocol   Consent: The procedure was performed in an emergent situation  Verbal consent obtained  Written consent obtained  Risks and benefits: risks, benefits and alternatives were discussed  Consent given by: patient       Procedure Details   Procedure Notes:   Trigger point injections were performed in the sites of maximal tenderness at the following locations:  Left , left orbicularis oris, left frontalis muscle, and left scalp above the hairline/parietal region  A total of 1 mL 0 25% bupivacaine was injected using a 30 gauge 1/2 inch needle  The patient tolerated the injections well, there was minimal bleeding and no immediate complications    Patient Transportation: confirmed  Patient tolerance: patient tolerated the procedure well with no immediate complications

## 2021-03-30 ENCOUNTER — IMMUNIZATIONS (OUTPATIENT)
Dept: FAMILY MEDICINE CLINIC | Facility: HOSPITAL | Age: 86
End: 2021-03-30

## 2021-03-30 DIAGNOSIS — Z23 ENCOUNTER FOR IMMUNIZATION: Primary | ICD-10-CM

## 2021-03-30 PROCEDURE — 0002A SARS-COV-2 / COVID-19 MRNA VACCINE (PFIZER-BIONTECH) 30 MCG: CPT

## 2021-03-30 PROCEDURE — 91300 SARS-COV-2 / COVID-19 MRNA VACCINE (PFIZER-BIONTECH) 30 MCG: CPT

## 2021-03-31 ENCOUNTER — TELEPHONE (OUTPATIENT)
Dept: NEUROLOGY | Facility: CLINIC | Age: 86
End: 2021-03-31

## 2021-03-31 NOTE — TELEPHONE ENCOUNTER
Called and spoke with patient  Patient states after receiving injection she is in less pain  Patient states she does have pain sometimes but not as often

## 2021-03-31 NOTE — TELEPHONE ENCOUNTER
----- Message from Anupama Rodriguez PA-C sent at 3/30/2021  9:30 PM EDT -----  Regarding: f/u  Can you ask her how the injection went last week, if headache pain still is low after the injection or if worsening? Thanks

## 2021-04-02 ENCOUNTER — TELEPHONE (OUTPATIENT)
Dept: NEUROLOGY | Facility: CLINIC | Age: 86
End: 2021-04-02

## 2021-04-02 NOTE — TELEPHONE ENCOUNTER
Called patient, she states that she is not getting the headache pain as often  She states that it is still there, but it is not worsening  Patient asked about her oxcarbazepine if she should be feeling weak from taking this  I asked how she is taking this  Patient states she is taking it at bedtime, when she wakes up in the morning, and again in the afternoon  I advised patient that she is to take the medication only twice daily  Clarified for patient to take medication when she wakes up in the morning, and then when she is going to bed at night  Patient states she has only done this for two days  I advised her to call the office if she is not feeling any better after reverting to the correct dose after the weekend  Patient denies any dizziness, abd pain or blurry vision      Sanjuana

## 2021-04-02 NOTE — TELEPHONE ENCOUNTER
----- Message from Scott Higgins PA-C sent at 3/30/2021  9:30 PM EDT -----  Regarding: f/u  Can you ask her how the injection went last week, if headache pain still is low after the injection or if worsening? Thanks

## 2021-04-15 ENCOUNTER — APPOINTMENT (OUTPATIENT)
Dept: LAB | Facility: CLINIC | Age: 86
End: 2021-04-15
Payer: COMMERCIAL

## 2021-04-15 DIAGNOSIS — R22.1 LOCALIZED SWELLING, MASS AND LUMP, NECK: ICD-10-CM

## 2021-04-15 LAB
CREAT SERPL-MCNC: 1.19 MG/DL (ref 0.6–1.3)
GFR SERPL CREATININE-BSD FRML MDRD: 44 ML/MIN/1.73SQ M

## 2021-04-15 PROCEDURE — 82565 ASSAY OF CREATININE: CPT

## 2021-04-15 PROCEDURE — 36415 COLL VENOUS BLD VENIPUNCTURE: CPT

## 2021-04-19 ENCOUNTER — HOSPITAL ENCOUNTER (OUTPATIENT)
Dept: CT IMAGING | Facility: HOSPITAL | Age: 86
Discharge: HOME | End: 2021-04-19
Attending: OTOLARYNGOLOGY
Payer: COMMERCIAL

## 2021-04-19 DIAGNOSIS — R22.1 LOCALIZED SWELLING, MASS AND LUMP, NECK: ICD-10-CM

## 2021-04-19 PROCEDURE — 70491 CT SOFT TISSUE NECK W/DYE: CPT

## 2021-04-19 PROCEDURE — G1004 CDSM NDSC: HCPCS

## 2021-04-19 RX ADMIN — IOHEXOL 85 ML: 350 INJECTION, SOLUTION INTRAVENOUS at 16:44

## 2021-04-21 DIAGNOSIS — I35.0 NONRHEUMATIC AORTIC VALVE STENOSIS: Chronic | ICD-10-CM

## 2021-04-21 RX ORDER — FUROSEMIDE 20 MG/1
TABLET ORAL
Qty: 270 TABLET | Refills: 11 | Status: SHIPPED | OUTPATIENT
Start: 2021-04-21 | End: 2022-05-16

## 2021-05-03 ENCOUNTER — HOSPITAL ENCOUNTER (EMERGENCY)
Facility: HOSPITAL | Age: 86
Discharge: HOME | End: 2021-05-03
Attending: EMERGENCY MEDICINE | Admitting: EMERGENCY MEDICINE
Payer: COMMERCIAL

## 2021-05-03 ENCOUNTER — APPOINTMENT (EMERGENCY)
Dept: CT IMAGING | Facility: HOSPITAL | Age: 86
End: 2021-05-03
Payer: COMMERCIAL

## 2021-05-03 VITALS
OXYGEN SATURATION: 96 % | HEART RATE: 73 BPM | DIASTOLIC BLOOD PRESSURE: 74 MMHG | TEMPERATURE: 98.8 F | RESPIRATION RATE: 18 BRPM | SYSTOLIC BLOOD PRESSURE: 174 MMHG

## 2021-05-03 DIAGNOSIS — N39.0 URINARY TRACT INFECTION: ICD-10-CM

## 2021-05-03 DIAGNOSIS — R51.9 HEADACHE: ICD-10-CM

## 2021-05-03 DIAGNOSIS — R53.1 GENERALIZED WEAKNESS: Primary | ICD-10-CM

## 2021-05-03 LAB
ALBUMIN SERPL BCP-MCNC: 3.1 G/DL (ref 3.5–5)
ALP SERPL-CCNC: 86 U/L (ref 46–116)
ALT SERPL W P-5'-P-CCNC: 15 U/L (ref 12–78)
ANION GAP SERPL CALCULATED.3IONS-SCNC: 9 MMOL/L (ref 4–13)
AST SERPL W P-5'-P-CCNC: 17 U/L (ref 5–45)
ATRIAL RATE: 66 BPM
BACTERIA UR QL AUTO: ABNORMAL /HPF
BASOPHILS # BLD AUTO: 0.03 THOUSANDS/ΜL (ref 0–0.1)
BASOPHILS NFR BLD AUTO: 1 % (ref 0–1)
BILIRUB SERPL-MCNC: 0.27 MG/DL (ref 0.2–1)
BILIRUB UR QL STRIP: NEGATIVE
BUN SERPL-MCNC: 8 MG/DL (ref 5–25)
CALCIUM ALBUM COR SERPL-MCNC: 9.9 MG/DL (ref 8.3–10.1)
CALCIUM SERPL-MCNC: 9.2 MG/DL (ref 8.3–10.1)
CHLORIDE SERPL-SCNC: 101 MMOL/L (ref 100–108)
CLARITY UR: ABNORMAL
CO2 SERPL-SCNC: 22 MMOL/L (ref 21–32)
COLOR UR: YELLOW
CREAT SERPL-MCNC: 0.89 MG/DL (ref 0.6–1.3)
EOSINOPHIL # BLD AUTO: 0.06 THOUSAND/ΜL (ref 0–0.61)
EOSINOPHIL NFR BLD AUTO: 2 % (ref 0–6)
ERYTHROCYTE [DISTWIDTH] IN BLOOD BY AUTOMATED COUNT: 13.3 % (ref 11.6–15.1)
GFR SERPL CREATININE-BSD FRML MDRD: 62 ML/MIN/1.73SQ M
GLUCOSE SERPL-MCNC: 119 MG/DL (ref 65–140)
GLUCOSE UR STRIP-MCNC: NEGATIVE MG/DL
HCT VFR BLD AUTO: 39.3 % (ref 34.8–46.1)
HGB BLD-MCNC: 13 G/DL (ref 11.5–15.4)
HGB UR QL STRIP.AUTO: ABNORMAL
IMM GRANULOCYTES # BLD AUTO: 0.02 THOUSAND/UL (ref 0–0.2)
IMM GRANULOCYTES NFR BLD AUTO: 1 % (ref 0–2)
KETONES UR STRIP-MCNC: ABNORMAL MG/DL
LEUKOCYTE ESTERASE UR QL STRIP: ABNORMAL
LIPASE SERPL-CCNC: 70 U/L (ref 73–393)
LYMPHOCYTES # BLD AUTO: 0.73 THOUSANDS/ΜL (ref 0.6–4.47)
LYMPHOCYTES NFR BLD AUTO: 18 % (ref 14–44)
MCH RBC QN AUTO: 30.3 PG (ref 26.8–34.3)
MCHC RBC AUTO-ENTMCNC: 33.1 G/DL (ref 31.4–37.4)
MCV RBC AUTO: 92 FL (ref 82–98)
MONOCYTES # BLD AUTO: 0.59 THOUSAND/ΜL (ref 0.17–1.22)
MONOCYTES NFR BLD AUTO: 15 % (ref 4–12)
NEUTROPHILS # BLD AUTO: 2.56 THOUSANDS/ΜL (ref 1.85–7.62)
NEUTS SEG NFR BLD AUTO: 63 % (ref 43–75)
NITRITE UR QL STRIP: NEGATIVE
NON-SQ EPI CELLS URNS QL MICRO: ABNORMAL /HPF
NRBC BLD AUTO-RTO: 0 /100 WBCS
P AXIS: 70 DEGREES
PH UR STRIP.AUTO: 6 [PH] (ref 4.5–8)
PLATELET # BLD AUTO: 237 THOUSANDS/UL (ref 149–390)
PMV BLD AUTO: 10.1 FL (ref 8.9–12.7)
POTASSIUM SERPL-SCNC: 4.5 MMOL/L (ref 3.5–5.3)
PR INTERVAL: 222 MS
PROT SERPL-MCNC: 7 G/DL (ref 6.4–8.2)
PROT UR STRIP-MCNC: ABNORMAL MG/DL
QRS AXIS: 2 DEGREES
QRSD INTERVAL: 108 MS
QT INTERVAL: 390 MS
QTC INTERVAL: 408 MS
RBC # BLD AUTO: 4.29 MILLION/UL (ref 3.81–5.12)
RBC #/AREA URNS AUTO: ABNORMAL /HPF
SODIUM SERPL-SCNC: 132 MMOL/L (ref 136–145)
SP GR UR STRIP.AUTO: 1.02 (ref 1–1.03)
T WAVE AXIS: 50 DEGREES
TROPONIN I SERPL-MCNC: <0.02 NG/ML
UROBILINOGEN UR QL STRIP.AUTO: 0.2 E.U./DL
VENTRICULAR RATE: 66 BPM
WBC # BLD AUTO: 3.99 THOUSAND/UL (ref 4.31–10.16)
WBC #/AREA URNS AUTO: ABNORMAL /HPF

## 2021-05-03 PROCEDURE — 70450 CT HEAD/BRAIN W/O DYE: CPT

## 2021-05-03 PROCEDURE — 81001 URINALYSIS AUTO W/SCOPE: CPT

## 2021-05-03 PROCEDURE — 93005 ELECTROCARDIOGRAM TRACING: CPT

## 2021-05-03 PROCEDURE — 87186 SC STD MICRODIL/AGAR DIL: CPT

## 2021-05-03 PROCEDURE — 83690 ASSAY OF LIPASE: CPT | Performed by: EMERGENCY MEDICINE

## 2021-05-03 PROCEDURE — 74177 CT ABD & PELVIS W/CONTRAST: CPT

## 2021-05-03 PROCEDURE — 93010 ELECTROCARDIOGRAM REPORT: CPT | Performed by: INTERNAL MEDICINE

## 2021-05-03 PROCEDURE — 99284 EMERGENCY DEPT VISIT MOD MDM: CPT | Performed by: EMERGENCY MEDICINE

## 2021-05-03 PROCEDURE — 36415 COLL VENOUS BLD VENIPUNCTURE: CPT | Performed by: EMERGENCY MEDICINE

## 2021-05-03 PROCEDURE — 80053 COMPREHEN METABOLIC PANEL: CPT | Performed by: EMERGENCY MEDICINE

## 2021-05-03 PROCEDURE — 99284 EMERGENCY DEPT VISIT MOD MDM: CPT

## 2021-05-03 PROCEDURE — 87086 URINE CULTURE/COLONY COUNT: CPT

## 2021-05-03 PROCEDURE — 84484 ASSAY OF TROPONIN QUANT: CPT | Performed by: EMERGENCY MEDICINE

## 2021-05-03 PROCEDURE — 96374 THER/PROPH/DIAG INJ IV PUSH: CPT

## 2021-05-03 PROCEDURE — 87077 CULTURE AEROBIC IDENTIFY: CPT

## 2021-05-03 PROCEDURE — 85025 COMPLETE CBC W/AUTO DIFF WBC: CPT | Performed by: EMERGENCY MEDICINE

## 2021-05-03 RX ORDER — CEPHALEXIN 500 MG/1
500 CAPSULE ORAL EVERY 6 HOURS SCHEDULED
Qty: 28 CAPSULE | Refills: 0 | Status: SHIPPED | OUTPATIENT
Start: 2021-05-03 | End: 2021-05-07 | Stop reason: ALTCHOICE

## 2021-05-03 RX ORDER — METOCLOPRAMIDE HYDROCHLORIDE 5 MG/ML
10 INJECTION INTRAMUSCULAR; INTRAVENOUS ONCE
Status: COMPLETED | OUTPATIENT
Start: 2021-05-03 | End: 2021-05-03

## 2021-05-03 RX ADMIN — IOHEXOL 100 ML: 350 INJECTION, SOLUTION INTRAVENOUS at 15:37

## 2021-05-03 RX ADMIN — METOCLOPRAMIDE 10 MG: 5 INJECTION, SOLUTION INTRAMUSCULAR; INTRAVENOUS at 14:49

## 2021-05-03 NOTE — DISCHARGE INSTRUCTIONS
Continue with the prescribed antibiotic for the next 10 days for urinary tract infection  If weakness worsens or any altered mental status, return to the emergency department

## 2021-05-03 NOTE — ED PROVIDER NOTES
History  Chief Complaint   Patient presents with    Headache     Pt reports with daughter for headache, back pain and abd pain, pt reprots previously seen for and feels like its getting worse, back and stomach pain be going on for over a week, pt reports using bathroom often ("every 45 min"), Pt states always SOB, pt has diarrhea, no n/v, pt denies cp     Abdominal Pain     HPI  Patient is a 51-year-old female history of asthma, diabetes, diabetic neuropathy, bladder prolapse, peptic ulcer, trigeminal neuralgia with ongoing outpatient follow-up with neurology presenting for evaluation of multiple complaints including left-sided headache, blurry vision, abdominal pain, decreased appetite, diarrhea  Patient states she has been having headache for multiple years, states that it is left-sided, sometimes more on the surface in the upper lateral aspect of her forehead, sometimes deeper and radiating behind her left eye  Patient states that it is essentially constant, feels that it is sometimes exacerbated by light touch to the skin, denies associated nausea, vomiting, photophobia, phonophobia, focal weakness or numbness, nuchal rigidity  Patient feels that it is gradually worsened over the course of the past week and a half, had outpatient evaluation by her neurologist at which time her gabapentin was increased without significant improvement of symptoms  Patient additionally stating roughly 2 weeks of generalized abdominal pain, crampy, worst in the left lower quadrant, associated with multiple loose nonbloody bowel movements per day  Patient states that she was on antibiotics about a month ago but is unable to further elaborate on this, denies recent hospitalization, denies history of Clostridium difficile infection  Patient denies fevers, chills, rigors, nausea, has had a decreased appetite  Patient denies cough, sore throat, myalgias, recent travel or sick contacts, is vaccinated against COVID-19    Prior to Admission Medications   Prescriptions Last Dose Informant Patient Reported? Taking? Cholecalciferol (VITAMIN D3) 2000 units capsule  Self Yes No   Sig: Take 2,000 Units by mouth daily    Incontinence Supply Disposable (SELECT DISPOSABLE UNDERWEAR LG) MISC  Self Yes No   Lancets (ACCU-CHEK SOFT TOUCH) lancets  Self No No   Sig: Testing 1 time daily  Dx: E11 9   Patient not taking: Reported on 2021   Lidocaine-Menthol 4-5 % PTCH  Self Yes No   Sig: lidocaine patch   Melatonin 5 MG CAPS  Self Yes No   Sig: Take 3 mg by mouth daily at bedtime   OLANZapine (ZyPREXA) 5 mg tablet  Self No No   Sig: Take 1 tablet (5 mg total) by mouth daily   OXcarbazepine (TRILEPTAL) 150 mg tablet   No No   Si tab qhs x 1 week, then BID   acetaminophen (TYLENOL) 325 mg tablet  Self No No   Sig: Take 2 tablets (650 mg total) by mouth every 6 (six) hours as needed for mild pain   Patient not taking: Reported on 2021   albuterol (VENTOLIN HFA) 90 mcg/act inhaler  Self No No   Sig: Inhale 2 puffs every 4 (four) hours as needed for wheezing   Patient not taking: Reported on 3/25/2021   aluminum-magnesium hydroxide-simethicone (MYLANTA) 200-200-20 mg/5 mL suspension  Self Yes No   Sig: Take 30 mL by mouth every 4 (four) hours as needed for indigestion or heartburn   amLODIPine (NORVASC) 5 mg tablet  Self No No   Sig: Take 1 tablet (5 mg total) by mouth daily   aspirin 81 MG tablet  Self Yes No   Sig: Take 81 mg by mouth daily     brimonidine (Alphagan P) 0 1 %  Self Yes No   Sig: Alphagan P 0 1 % eye drops   INT 1 GTT IN OU BID   butalbital-acetaminophen-caffeine (FIORICET,ESGIC) -40 mg per tablet  Self No No   Sig: Take 1 tablet by mouth every 4 (four) hours as needed for headaches   Patient not taking: Reported on 3/25/2021   cyclobenzaprine (FLEXERIL) 5 mg tablet  Self No No   Sig: Take 1 tablet (5 mg total) by mouth 3 (three) times a day as needed for muscle spasms   Patient not taking: Reported on 3/25/2021 dorzolamide (TRUSOPT) 2 % ophthalmic solution  Self Yes No   Sig: Administer 1 drop to both eyes 3 (three) times a day     famotidine (PEPCID) 20 mg tablet  Self No No   Sig: Take 1 tablet (20 mg total) by mouth 2 (two) times a day   Patient not taking: Reported on 3/25/2021   fluticasone (FLONASE) 50 mcg/act nasal spray  Self No No   Si sprays into each nostril daily   Patient not taking: Reported on 2021   fluticasone-vilanterol (BREO ELLIPTA) 100-25 mcg/inh inhaler  Self No No   Sig: Inhale 1 puff daily Rinse mouth after use  furosemide (LASIX) 20 mg tablet   No No   Sig: TAKE 1 TABLET IN THE MORNING  AND TAKE 2 TABLETS IN THE EVENING   gabapentin (NEURONTIN) 600 MG tablet  Self No No   Sig: Take 1/2 tab am, 1/2 tab afternoon and whole tab at bedtime  glucose blood (Accu-Chek Aline Plus) test strip  Self No No   Sig: Testing once daily E11 9   Patient not taking: Reported on 2021   ketorolac (TORADOL) 10 mg tablet   No No   Sig: Take 1 tablet (10 mg total) by mouth every 6 (six) hours as needed (migraine) Max 2-3 per week     Patient not taking: Reported on 2021   latanoprost (XALATAN) 0 005 % ophthalmic solution  Self No No   Sig: Apply 1 drop to eye daily at bedtime Both eyes   levothyroxine 75 mcg tablet  Self No No   Sig: TAKE 1 TABLET EVERY DAY   nitroglycerin (NITROSTAT) 0 4 mg SL tablet  Self No No   Sig: Place 1 tablet (0 4 mg total) under the tongue every 5 (five) minutes as needed for chest pain   Patient not taking: Reported on 3/25/2021   nystatin (MYCOSTATIN) powder  Self No No   Sig: Apply topically 2 (two) times a day   Patient not taking: Reported on 3/25/2021   omeprazole (PriLOSEC) 40 MG capsule  Self No No   Sig: Take 1 capsule (40 mg total) by mouth daily   potassium chloride (K-DUR,KLOR-CON) 20 mEq tablet  Self No No   Sig: Take 1 tablet (20 mEq total) by mouth daily   sitaGLIPtin (Januvia) 50 mg tablet  Self No No   Sig: Take 1 tablet (50 mg total) by mouth daily Facility-Administered Medications: None       Past Medical History:   Diagnosis Date    Asthma     Atypical chest pain     CAD (coronary artery disease)     Candidal intertrigo     CHF (congestive heart failure) (HCC)     Depression     Diabetes mellitus (HCC)     Diabetic neuropathy (HCC)     Diverticulitis     Dyslipidemia     Female bladder prolapse     Gastric ulcer     Glaucoma     HTN (hypertension)     Hyperlipidemia     Hypothyroidism 4/18/2016    RAD (reactive airway disease)        Past Surgical History:   Procedure Laterality Date    COLONOSCOPY      ESOPHAGOGASTRODUODENOSCOPY  2011    HYSTERECTOMY      TONSILLECTOMY         Family History   Problem Relation Age of Onset    Breast cancer Mother     Hypothyroidism Mother     Hypertension Father     Breast cancer Sister     Thyroid disease Sister     Hypertension Sister      I have reviewed and agree with the history as documented  E-Cigarette/Vaping    E-Cigarette Use Never User      E-Cigarette/Vaping Substances     Social History     Tobacco Use    Smoking status: Never Smoker    Smokeless tobacco: Never Used   Substance Use Topics    Alcohol use: No     Comment: Social Alcohol Use -- as per allscripts (pt denies all alcohol use)    Drug use: No        Review of Systems   Constitutional: Positive for fatigue  Negative for chills and fever  HENT: Negative for hearing loss  Eyes: Positive for visual disturbance  Negative for pain  Respiratory: Negative for cough, chest tightness and shortness of breath  Cardiovascular: Negative for chest pain  Gastrointestinal: Positive for abdominal pain and diarrhea  Negative for abdominal distention, constipation, nausea and vomiting  Endocrine: Negative for polydipsia and polyuria  Genitourinary: Negative for dysuria and hematuria  Musculoskeletal: Negative for arthralgias and myalgias  Skin: Negative for color change and rash     Neurological: Positive for weakness (Generalized without focality) and headaches  Negative for dizziness, syncope and light-headedness  Psychiatric/Behavioral: Negative for confusion  Physical Exam  ED Triage Vitals   Temperature Pulse Respirations Blood Pressure SpO2   05/03/21 1321 05/03/21 1321 05/03/21 1321 05/03/21 1321 05/03/21 1321   98 8 °F (37 1 °C) 81 18 (!) 178/85 98 %      Temp Source Heart Rate Source Patient Position - Orthostatic VS BP Location FiO2 (%)   05/03/21 1321 05/03/21 1321 05/03/21 1321 05/03/21 1321 --   Oral Monitor Sitting Right arm       Pain Score       05/03/21 1521       7             Orthostatic Vital Signs  Vitals:    05/03/21 1321 05/03/21 1521   BP: (!) 178/85 (!) 174/74   Pulse: 81 73   Patient Position - Orthostatic VS: Sitting Lying       Physical Exam  Vitals signs reviewed  Constitutional:       General: She is not in acute distress  Appearance: She is well-developed  She is not diaphoretic  Comments: Well-appearing, nondistressed   HENT:      Head: Normocephalic and atraumatic  Comments: Moist mucous membranes     Right Ear: External ear normal       Left Ear: External ear normal       Nose: Nose normal       Mouth/Throat:      Pharynx: No oropharyngeal exudate  Eyes:      Pupils: Pupils are equal, round, and reactive to light  Neck:      Musculoskeletal: Normal range of motion  Cardiovascular:      Rate and Rhythm: Normal rate and regular rhythm  Heart sounds: Normal heart sounds  No murmur  No friction rub  No gallop  Pulmonary:      Effort: Pulmonary effort is normal  No respiratory distress  Breath sounds: Normal breath sounds  No wheezing  Chest:      Chest wall: No tenderness  Abdominal:      General: Bowel sounds are normal  There is no distension  Palpations: Abdomen is soft  There is no mass  Tenderness: There is abdominal tenderness  There is no guarding        Comments: Generalized abdominal tenderness, most prominent in the left lower quadrant without rigidity, rebound, guarding   Musculoskeletal: Normal range of motion  General: No deformity  Lymphadenopathy:      Cervical: No cervical adenopathy  Skin:     General: Skin is warm and dry  Capillary Refill: Capillary refill takes less than 2 seconds  Neurological:      Mental Status: She is alert and oriented to person, place, and time  Comments: Cranial nerves 2-12 intact, full strength and sensation bilaterally in upper and lower extremities, vision grossly normal, normal pupillary examination         ED Medications  Medications   metoclopramide (REGLAN) injection 10 mg (10 mg Intravenous Given 5/3/21 1449)   iohexol (OMNIPAQUE) 350 MG/ML injection (SINGLE-DOSE) 100 mL (100 mL Intravenous Given 5/3/21 1537)       Diagnostic Studies  Results Reviewed     Procedure Component Value Units Date/Time    Urine Microscopic [[id number]]  (Abnormal) Collected: 05/03/21 1517    Lab Status: Final result Specimen: Urine, Other Updated: 05/03/21 1552     RBC, UA 2-4 /hpf      WBC, UA Innumerable /hpf      Epithelial Cells Occasional /hpf      Bacteria, UA Occasional /hpf     Urine culture [[id number]] Collected: 05/03/21 1517    Lab Status:  In process Specimen: Urine, Other Updated: 05/03/21 1552    Comprehensive metabolic panel [[id number]]  (Abnormal) Collected: 05/03/21 1449    Lab Status: Final result Specimen: Blood from Arm, Right Updated: 05/03/21 1521     Sodium 132 mmol/L      Potassium 4 5 mmol/L      Chloride 101 mmol/L      CO2 22 mmol/L      ANION GAP 9 mmol/L      BUN 8 mg/dL      Creatinine 0 89 mg/dL      Glucose 119 mg/dL      Calcium 9 2 mg/dL      Corrected Calcium 9 9 mg/dL      AST 17 U/L      ALT 15 U/L      Alkaline Phosphatase 86 U/L      Total Protein 7 0 g/dL      Albumin 3 1 g/dL      Total Bilirubin 0 27 mg/dL      eGFR 62 ml/min/1 73sq m     Narrative:      [place] guidelines for Chronic Kidney Disease (CKD):     Stage 1 with normal or high GFR (GFR > 90 mL/min/1 73 square meters)    Stage 2 Mild CKD (GFR = 60-89 mL/min/1 73 square meters)    Stage 3A Moderate CKD (GFR = 45-59 mL/min/1 73 square meters)    Stage 3B Moderate CKD (GFR = 30-44 mL/min/1 73 square meters)    Stage 4 Severe CKD (GFR = 15-29 mL/min/1 73 square meters)    Stage 5 End Stage CKD (GFR <15 mL/min/1 73 square meters)  Note: GFR calculation is accurate only with a steady state creatinine    Lipase [[id number]]  (Abnormal) Collected: 05/03/21 1449    Lab Status: Final result Specimen: Blood from Arm, Right Updated: 05/03/21 1521     Lipase 70 u/L     Urine Macroscopic, POC [[id number]]  (Abnormal) Collected: 05/03/21 1517    Lab Status: Final result Specimen: Urine Updated: 05/03/21 1518     Color, UA Yellow     Clarity, UA Cloudy     pH, UA 6 0     Leukocytes, UA Large     Nitrite, UA Negative     Protein, UA 30 (1+) mg/dl      Glucose, UA Negative mg/dl      Ketones, UA 15 (1+) mg/dl      Urobilinogen, UA 0 2 E U /dl      Bilirubin, UA Negative     Blood, UA Moderate     Specific [place], UA 1 020    Narrative:      CLINITEK RESULT    Troponin I [[id number]]  (Normal) Collected: 05/03/21 1450    Lab Status: Final result Specimen: Blood from Arm, Right Updated: 05/03/21 1514     Troponin I <0 02 ng/mL     CBC and differential [[id number]]  (Abnormal) Collected: 05/03/21 1450    Lab Status: Final result Specimen: Blood from Arm, Right Updated: 05/03/21 1455     WBC 3 99 Thousand/uL      RBC 4 29 Million/uL      Hemoglobin 13 0 g/dL      Hematocrit 39 3 %      MCV 92 fL      MCH 30 3 pg      MCHC 33 1 g/dL      RDW 13 3 %      MPV 10 1 fL      Platelets 629 Thousands/uL      nRBC 0 /100 WBCs      Neutrophils Relative 63 %      Immat GRANS % 1 %      Lymphocytes Relative 18 %      Monocytes Relative 15 %      Eosinophils Relative 2 %      Basophils Relative 1 %      Neutrophils Absolute 2 56 Thousands/µL      Immature Grans Absolute 0 02 Thousand/uL      Lymphocytes Absolute 0 73 Thousands/µL      Monocytes Absolute 0 59 Thousand/µL      Eosinophils Absolute 0 06 Thousand/µL      Basophils Absolute 0 03 Thousands/µL                  CT head without contrast   Final Result by Ghislaine Orozco MD (05/03 9351)      No acute intracranial abnormality  Workstation performed: [id number]         CT abdomen pelvis with contrast   Final Result by Ck Waller MD (05/03 1606)      Thickened bladder wall with enhancing mucosa suggesting cystitis  Diverticulosis  Stable sclerosing mesenteritis  Workstation performed: [id number]               Procedures  Procedures      ED Course                             SBIRT 20yo+      Most Recent Value   SBIRT (22 yo +)   In order to provide better care to our patients, we are screening all of our patients for alcohol and drug use  Would it be okay to ask you these screening questions? No Filed at: 05/03/2021 1500                MDM  Number of Diagnoses or Management Options  Generalized weakness:   Headache:   Urinary tract infection:   Diagnosis management comments: 80-year-old female with multiple complaints including headache abdominal pain, diarrhea, patient with ongoing outpatient evaluation for trigeminal neuralgia and multiple prior evaluations for this, describing symptoms are similar to this but more severe, also describing certain components that sound like a migraine, plan to CT head, treat with Reglan, magnesium, additionally plan to check belly labs for abdominal pain, CT abdomen pelvis to evaluate for any kind of bowel pathology, particularly diverticulitis, likely outpatient stool study    CT grossly unremarkable for both head and abdomen pelvis, patient well-appearing on re-examination, urinalysis significant for leukocytes, patient provided with prescription for Keflex, discharged with return precautions, discussed this with patient's daughter    Patient Progress  Patient progress: improved      Disposition  Final diagnoses:   Generalized weakness   Urinary tract infection   Headache     Time reflects when diagnosis was documented in both MDM as applicable and the Disposition within this note     Time User Action Codes Description Comment    5/3/2021  4:25 PM Maryam Cull Add [R53 1] Generalized weakness     5/3/2021  4:25 PM Maryam Cull Add [N39 0] Urinary tract infection     5/3/2021  4:25 PM Maryam Cull Add [R51 9] Headache       ED Disposition     ED Disposition Condition Date/Time Comment    Discharge Stable Mon May 3, 2021  4:25 PM Pamelia Dance discharge to home/self care  Follow-up Information     Follow up With Specialties Details Why [address] [facility] [address], [facility], Physician Assistant   Iva Vanegas [address] [place] [address] [address]  [phone]            Discharge Medication List as of 5/3/2021  4:29 PM      START taking these medications    Details   cephalexin (KEFLEX) 500 mg capsule Take 1 capsule (500 mg total) by mouth every 6 (six) hours for 7 days, Starting Mon 5/3/2021, Until Mon 5/10/2021, Print         CONTINUE these medications which have NOT CHANGED    Details   acetaminophen (TYLENOL) 325 mg tablet Take 2 tablets (650 mg total) by mouth every 6 (six) hours as needed for mild pain, Starting Sun 1/24/2021, Normal      albuterol (VENTOLIN HFA) 90 mcg/act inhaler Inhale 2 puffs every 4 (four) hours as needed for wheezing, Starting Tue 4/16/2019, Normal      aluminum-magnesium hydroxide-simethicone (MYLANTA) 200-200-20 mg/5 mL suspension Take 30 mL by mouth every 4 (four) hours as needed for indigestion or heartburn, Historical Med      amLODIPine (NORVASC) 5 mg tablet Take 1 tablet (5 mg total) by mouth daily, Starting Tue 12/29/2020, Normal      aspirin 81 MG tablet Take 81 mg by mouth daily  , Historical Med      brimonidine (Alphagan P) 0 1 % Alphagan P 0 1 % eye drops   INT 1 GTT IN OU BID, Historical Med      butalbital-acetaminophen-caffeine (FIORICET,ESGIC) -40 mg per tablet Take 1 tablet by mouth every 4 (four) hours as needed for headaches, Starting Sun 2/9/2020, Print      Cholecalciferol (VITAMIN D3) 2000 units capsule Take 2,000 Units by mouth daily , Historical Med      cyclobenzaprine (FLEXERIL) 5 mg tablet Take 1 tablet (5 mg total) by mouth 3 (three) times a day as needed for muscle spasms, Starting Sun 1/24/2021, Normal      dorzolamide (TRUSOPT) 2 % ophthalmic solution Administer 1 drop to both eyes 3 (three) times a day  , Historical Med      famotidine (PEPCID) 20 mg tablet Take 1 tablet (20 mg total) by mouth 2 (two) times a day, Starting Wed 12/30/2020, Normal      fluticasone (FLONASE) 50 mcg/act nasal spray 2 sprays into each nostril daily, Starting Sat 3/16/2019, Normal      fluticasone-vilanterol (BREO ELLIPTA) 100-25 mcg/inh inhaler Inhale 1 puff daily Rinse mouth after use , Starting Tue 10/15/2019, Normal      furosemide (LASIX) 20 mg tablet TAKE 1 TABLET IN THE MORNING  AND TAKE 2 TABLETS IN THE EVENING, Normal      gabapentin (NEURONTIN) 600 MG tablet Take 1/2 tab am, 1/2 tab afternoon and whole tab at bedtime , Normal      glucose blood (Accu-Chek Aline Plus) test strip Testing once daily E11 9, Normal      Incontinence Supply Disposable (SELECT DISPOSABLE UNDERWEAR LG) MISC Starting Fri 6/14/2019, Historical Med      ketorolac (TORADOL) 10 mg tablet Take 1 tablet (10 mg total) by mouth every 6 (six) hours as needed (migraine) Max 2-3 per week , Starting u 3/25/2021, Normal      Lancets (ACCU-CHEK SOFT TOUCH) lancets Testing 1 time daily              Dx: E11 9, Normal      latanoprost (XALATAN) 0 005 % ophthalmic solution Apply 1 drop to eye daily at bedtime Both eyes, Starting Sat 3/16/2019, Normal      levothyroxine 75 mcg tablet TAKE 1 TABLET EVERY DAY, Normal      Lidocaine-Menthol 4-5 % PTCH lidocaine patch, Historical Med      Melatonin 5 MG CAPS Take 3 mg by mouth daily at bedtime, Historical Med nitroglycerin (NITROSTAT) 0 4 mg SL tablet Place 1 tablet (0 4 mg total) under the tongue every 5 (five) minutes as needed for chest pain, Starting Tue 11/5/2019, Normal      nystatin (MYCOSTATIN) powder Apply topically 2 (two) times a day, Starting Fri 8/21/2020, Normal      OLANZapine (ZyPREXA) 5 mg tablet Take 1 tablet (5 mg total) by mouth daily, Starting Thu 6/25/2020, Normal      omeprazole (PriLOSEC) 40 MG capsule Take 1 capsule (40 mg total) by mouth daily, Starting Wed 3/25/2020, Normal      OXcarbazepine (TRILEPTAL) 150 mg tablet 1 tab qhs x 1 week, then BID, Normal      potassium chloride (K-DUR,KLOR-CON) 20 mEq tablet Take 1 tablet (20 mEq total) by mouth daily, Starting Wed 6/10/2020, Normal      sitaGLIPtin (Januvia) 50 mg tablet Take 1 tablet (50 mg total) by mouth daily, Starting Wed 12/23/2020, Normal           No discharge procedures on file  PDMP Review       Value Time User    PDMP Reviewed  Yes 11/18/2020  1:31 PM Norah Kaur PA-C           ED Provider  Attending physically available and evaluated Anthony Stantonhaja MONTEIRO managed the patient along with the ED Attending      Electronically Signed by         Vashti Mcgovern MD  05/03/21 7933

## 2021-05-03 NOTE — ED ATTENDING ATTESTATION
5/3/2021  Rebecca Goff MD, saw and evaluated the patient  I have discussed the patient with the resident/non-physician practitioner and agree with the resident's/non-physician practitioner's findings, Plan of Care, and MDM as documented in the resident's/non-physician practitioner's note, except where noted  All available labs and Radiology studies were reviewed  I was present for key portions of any procedure(s) performed by the resident/non-physician practitioner and I was immediately available to provide assistance  At this point I agree with the current assessment done in the Emergency Department  I have conducted an independent evaluation of this patient a history and physical is as follows:    Final Diagnosis:  1  Generalized weakness    2  Urinary tract infection    3  Headache      Chief Complaint   Patient presents with    Headache     Pt reports with daughter for headache, back pain and abd pain, pt reprots previously seen for and feels like its getting worse, back and stomach pain be going on for over a week, pt reports using bathroom often ("every 45 min"), Pt states always SOB, pt has diarrhea, no n/v, pt denies cp     Abdominal Pain         This is a 60-year-old female with history of diabetes, CAD, hypertension, hyperlipidemia, trigeminal neuralgia, diverticulitis who presents with a headache and abdominal pain  Patient states that over the past week, she has been experiencing worsening of her trigeminal neuralgia  Patient is currently seeing Neurology for her symptoms  States that the area is very sensitive to the touch  Denies any rashes  No nausea/vomiting, lightheadedness/dizziness, neck pain  She also reports that over the past week, she has been experiencing intermittent left lower quadrant abdominal pain described as movement, nonradiating, associated with loose stools  Denies watery stool but the stool is loose    Patient states that she seems to be going every hour to 2 hours  However, she has not experienced a bowel movement today  Patient states that she was recently on antibiotics for UTI  She also admits to urinary frequency  Denies fever/chills, nausea/vomiting, lightheadedness/dizziness, numbness/weakness, change in vision, URI symptoms, neck pain, chest pain, palpitations, shortness of breath, cough, back pain, flank pain, hematochezia, melena, dysuria, hematuria, abnormal vaginal discharge/bleeding  PMH:  - hypertension, hyperlipidemia, diabetes, CAD, trigeminal neuralgia, diverticulitis  PSH:  - hysterectomy      PE:   Vitals:    05/03/21 1321 05/03/21 1521   BP: (!) 178/85 (!) 174/74   BP Location: Right arm Right arm   Pulse: 81 73   Resp: 18 18   Temp: 98 8 °F (37 1 °C)    TempSrc: Oral    SpO2: 98% 96%       Constitutional: Vital signs are normal   Chronically ill-appearing  She is cooperative  No distress  HENT:   Mouth/Throat: Uvula is midline, oropharynx is clear and moist and mucous membranes are normal    HEENT:  No rashes or tenderness  Eyes: Pupils are equal, round, and reactive to light  Conjunctivae and EOM are normal    Neck: Trachea normal  No thyroid mass and no thyromegaly present  Cardiovascular: Normal rate, regular rhythm, normal heart sounds, intact distal pulses and normal pulses  No murmur heard  Pulmonary/Chest: Effort normal and breath sounds normal    Abdominal: Soft  Normal appearance and bowel sounds are normal   Mild tenderness in the left lower quadrant (very benign abdominal exam)  There is no rebound, no guarding and no CVA tenderness  Neurological: She is alert  Skin: Skin is warm, dry and intact  Psychiatric: She has a normal mood and affect  Her speech is normal and behavior is normal  Thought content normal          A:  - this is a 20-year-old female who presents with worsening of her trigeminal neuralgia as well as abdominal pain  P:  - labs, EKG  CT head and abdomen/pelvis  Urinalysis    Disposition pending results  - 13 point ROS was performed and all are normal unless stated in the history above  - Nursing note reviewed  Vitals reviewed  - Orders placed by myself and/or advanced practitioner / resident     - Previous chart was reviewed  - No language barrier    - History obtained from patient  - There are no limitations to the history obtained  - Critical care time: Not applicable for this patient  Medications   metoclopramide (REGLAN) injection 10 mg (10 mg Intravenous Given 5/3/21 1449)   iohexol (OMNIPAQUE) 350 MG/ML injection (SINGLE-DOSE) 100 mL (100 mL Intravenous Given 5/3/21 1537)     CT head without contrast   Final Result      No acute intracranial abnormality  Workstation performed: [id number]         CT abdomen pelvis with contrast   Final Result      Thickened bladder wall with enhancing mucosa suggesting cystitis  Diverticulosis  Stable sclerosing mesenteritis              Workstation performed: [id number]           Orders Placed This Encounter   Procedures    Urine culture    CT head without contrast    CT abdomen pelvis with contrast    CBC and differential    Comprehensive metabolic panel    Lipase    Troponin I    Urine Microscopic    Urine dip analyzer    EKG RESULTS    ECG 12 lead    ECG 12 lead     Labs Reviewed   CBC AND DIFFERENTIAL - Abnormal       Result Value Ref Range Status    WBC 3 99 (*) 4 31 - 10 16 Thousand/uL Final    RBC 4 29  3 81 - 5 12 Million/uL Final    Hemoglobin 13 0  11 5 - 15 4 g/dL Final    Hematocrit 39 3  34 8 - 46 1 % Final    MCV 92  82 - 98 fL Final    MCH 30 3  26 8 - 34 3 pg Final    MCHC 33 1  31 4 - 37 4 g/dL Final    RDW 13 3  11 6 - 15 1 % Final    MPV 10 1  8 9 - 12 7 fL Final    Platelets 245  779 - 390 Thousands/uL Final    nRBC 0  /100 WBCs Final    Neutrophils Relative 63  43 - 75 % Final    Immat GRANS % 1  0 - 2 % Final    Lymphocytes Relative 18  14 - 44 % Final    Monocytes Relative 15 (*) 4 - 12 % Final    Eosinophils Relative 2  0 - 6 % Final    Basophils Relative 1  0 - 1 % Final    Neutrophils Absolute 2 56  1 85 - 7 62 Thousands/µL Final    Immature Grans Absolute 0 02  0 00 - 0 20 Thousand/uL Final    Lymphocytes Absolute 0 73  0 60 - 4 47 Thousands/µL Final    Monocytes Absolute 0 59  0 17 - 1 22 Thousand/µL Final    Eosinophils Absolute 0 06  0 00 - 0 61 Thousand/µL Final    Basophils Absolute 0 03  0 00 - 0 10 Thousands/µL Final   COMPREHENSIVE METABOLIC PANEL - Abnormal    Sodium 132 (*) 136 - 145 mmol/L Final    Potassium 4 5  3 5 - 5 3 mmol/L Final    Chloride 101  100 - 108 mmol/L Final    CO2 22  21 - 32 mmol/L Final    ANION GAP 9  4 - 13 mmol/L Final    BUN 8  5 - 25 mg/dL Final    Creatinine 0 89  0 60 - 1 30 mg/dL Final    Comment: Standardized to IDMS reference method    Glucose 119  65 - 140 mg/dL Final    Comment: If the patient is fasting, the ADA then defines impaired fasting glucose as > 100 mg/dL and diabetes as > or equal to 123 mg/dL  Specimen collection should occur prior to Sulfasalazine administration due to the potential for falsely depressed results  Specimen collection should occur prior to Sulfapyridine administration due to the potential for falsely elevated results  Calcium 9 2  8 3 - 10 1 mg/dL Final    Corrected Calcium 9 9  8 3 - 10 1 mg/dL Final    AST 17  5 - 45 U/L Final    Comment: Specimen collection should occur prior to Sulfasalazine administration due to the potential for falsely depressed results  ALT 15  12 - 78 U/L Final    Comment: Specimen collection should occur prior to Sulfasalazine administration due to the potential for falsely depressed results       Alkaline Phosphatase 86  46 - 116 U/L Final    Total Protein 7 0  6 4 - 8 2 g/dL Final    Albumin 3 1 (*) 3 5 - 5 0 g/dL Final    Total Bilirubin 0 27  0 20 - 1 00 mg/dL Final    Comment: Use of this assay is not recommended for patients undergoing treatment with eltrombopag due to the potential for falsely elevated results  eGFR 62  ml/min/1 73sq m Final    Narrative:     National Kidney Disease Foundation guidelines for Chronic Kidney Disease (CKD):     Stage 1 with normal or high GFR (GFR > 90 mL/min/1 73 square meters)    Stage 2 Mild CKD (GFR = 60-89 mL/min/1 73 square meters)    Stage 3A Moderate CKD (GFR = 45-59 mL/min/1 73 square meters)    Stage 3B Moderate CKD (GFR = 30-44 mL/min/1 73 square meters)    Stage 4 Severe CKD (GFR = 15-29 mL/min/1 73 square meters)    Stage 5 End Stage CKD (GFR <15 mL/min/1 73 square meters)  Note: GFR calculation is accurate only with a steady state creatinine   LIPASE - Abnormal    Lipase 70 (*) 73 - 393 u/L Final   URINE MICROSCOPIC - Abnormal    RBC, UA 2-4  None Seen, 2-4 /hpf Final    WBC, UA Innumerable (*) None Seen, 2-4 /hpf Final    Epithelial Cells Occasional  None Seen, Occasional /hpf Final    Bacteria, UA Occasional  None Seen, Occasional /hpf Final   URINE MACROSCOPIC, POC - Abnormal    Color, UA Yellow   Final    Clarity, UA Cloudy   Final    pH, UA 6 0  4 5 - 8 0 Final    Leukocytes, UA Large (*) Negative Final    Nitrite, UA Negative  Negative Final    Protein, UA 30 (1+) (*) Negative mg/dl Final    Glucose, UA Negative  Negative mg/dl Final    Ketones, UA 15 (1+) (*) Negative mg/dl Final    Urobilinogen, UA 0 2  0 2, 1 0 E U /dl E U /dl Final    Bilirubin, UA Negative  Negative Final    Blood, UA Moderate (*) Negative Final    Specific Gravity, UA 1 020  1 003 - 1 030 Final    Narrative:     CLINITEK RESULT   TROPONIN I - Normal    Troponin I <0 02  <=0 04 ng/mL Final    Comment: 3Autovalidation override  [employer] Chemistry analyzer 99% cutoff is > 0 04 ng/mL in network labs     o cTnI 99% cutoff is useful only when applied to patients in the clinical setting of myocardial ischemia   o cTnI 99% cutoff should be interpreted in the context of clinical history, ECG findings and possibly cardiac imaging to establish correct diagnosis     o cTnI 99% cutoff may be suggestive but clearly not indicative of a coronary event without the clinical setting of myocardial ischemia  URINE CULTURE     Time reflects when diagnosis was documented in both MDM as applicable and the Disposition within this note     Time User Action Codes Description Comment    5/3/2021  4:25 PM Elan Miamiville Add [R53 1] Generalized weakness     5/3/2021  4:25 PM Conover [place] Add [N39 0] Urinary tract infection     5/3/2021  4:25 PM Elan [place]le Add [R51 9] Headache       ED Disposition     ED Disposition Condition Date/Time Comment    Discharge Stable Mon May 3, 2021  4:25 PM Buster LetsCram discharge to home/self care  Follow-up Information     Follow up With Specialties Details Why [address] [facility] [address], PA-C Family Medicine, Physician Assistant   [address]  [facility] [address] [phone]          Patient's Medications   Discharge Prescriptions    CEPHALEXIN (KEFLEX) 500 MG CAPSULE    Take 1 capsule (500 mg total) by mouth every 6 (six) hours for 7 days       Start Date: 5/3/2021  End Date: 5/10/2021       Order Dose: 500 mg       Quantity: 28 capsule    Refills: 0     No discharge procedures on file  Prior to Admission Medications   Prescriptions Last Dose Informant Patient Reported? Taking? Cholecalciferol (VITAMIN D3) 2000 units capsule  Self Yes No   Sig: Take 2,000 Units by mouth daily    Incontinence Supply Disposable (SELECT DISPOSABLE UNDERWEAR LG) MISC  Self Yes No   Lancets (ACCU-CHEK SOFT TOUCH) lancets  Self No No   Sig: Testing 1 time daily              Dx: E11 9   Patient not taking: Reported on 2021   Lidocaine-Menthol 4-5 % PTCH  Self Yes No   Sig: lidocaine patch   Melatonin 5 MG CAPS  Self Yes No   Sig: Take 3 mg by mouth daily at bedtime   OLANZapine (ZyPREXA) 5 mg tablet  Self No No   Sig: Take 1 tablet (5 mg total) by mouth daily   OXcarbazepine (TRILEPTAL) 150 mg tablet   No No   Si tab qhs x 1 week, then BID   acetaminophen (TYLENOL) 325 mg tablet  Self No No   Sig: Take 2 tablets (650 mg total) by mouth every 6 (six) hours as needed for mild pain   Patient not taking: Reported on 2021   albuterol (VENTOLIN HFA) 90 mcg/act inhaler  Self No No   Sig: Inhale 2 puffs every 4 (four) hours as needed for wheezing   Patient not taking: Reported on 3/25/2021   aluminum-magnesium hydroxide-simethicone (MYLANTA) 200-200-20 mg/5 mL suspension  Self Yes No   Sig: Take 30 mL by mouth every 4 (four) hours as needed for indigestion or heartburn   amLODIPine (NORVASC) 5 mg tablet  Self No No   Sig: Take 1 tablet (5 mg total) by mouth daily   aspirin 81 MG tablet  Self Yes No   Sig: Take 81 mg by mouth daily  brimonidine (Alphagan P) 0 1 %  Self Yes No   Sig: Alphagan P 0 1 % eye drops   INT 1 GTT IN OU BID   butalbital-acetaminophen-caffeine (FIORICET,ESGIC) -40 mg per tablet  Self No No   Sig: Take 1 tablet by mouth every 4 (four) hours as needed for headaches   Patient not taking: Reported on 3/25/2021   cyclobenzaprine (FLEXERIL) 5 mg tablet  Self No No   Sig: Take 1 tablet (5 mg total) by mouth 3 (three) times a day as needed for muscle spasms   Patient not taking: Reported on 3/25/2021   dorzolamide (TRUSOPT) 2 % ophthalmic solution  Self Yes No   Sig: Administer 1 drop to both eyes 3 (three) times a day     famotidine (PEPCID) 20 mg tablet  Self No No   Sig: Take 1 tablet (20 mg total) by mouth 2 (two) times a day   Patient not taking: Reported on 3/25/2021   fluticasone (FLONASE) 50 mcg/act nasal spray  Self No No   Si sprays into each nostril daily   Patient not taking: Reported on 2021   fluticasone-vilanterol (BREO ELLIPTA) 100-25 mcg/inh inhaler  Self No No   Sig: Inhale 1 puff daily Rinse mouth after use     furosemide (LASIX) 20 mg tablet   No No   Sig: TAKE 1 TABLET IN THE MORNING  AND TAKE 2 TABLETS IN THE EVENING   gabapentin (NEURONTIN) 600 MG tablet  Self No No   Sig: Take 1/2 tab am, 1/2 tab afternoon and whole tab at bedtime  glucose blood (Accu-Chek Aline Plus) test strip  Self No No   Sig: Testing once daily E11 9   Patient not taking: Reported on 4/6/2021   ketorolac (TORADOL) 10 mg tablet   No No   Sig: Take 1 tablet (10 mg total) by mouth every 6 (six) hours as needed (migraine) Max 2-3 per week  Patient not taking: Reported on 4/6/2021   latanoprost (XALATAN) 0 005 % ophthalmic solution  Self No No   Sig: Apply 1 drop to eye daily at bedtime Both eyes   levothyroxine 75 mcg tablet  Self No No   Sig: TAKE 1 TABLET EVERY DAY   nitroglycerin (NITROSTAT) 0 4 mg SL tablet  Self No No   Sig: Place 1 tablet (0 4 mg total) under the tongue every 5 (five) minutes as needed for chest pain   Patient not taking: Reported on 3/25/2021   nystatin (MYCOSTATIN) powder  Self No No   Sig: Apply topically 2 (two) times a day   Patient not taking: Reported on 3/25/2021   omeprazole (PriLOSEC) 40 MG capsule  Self No No   Sig: Take 1 capsule (40 mg total) by mouth daily   potassium chloride (K-DUR,KLOR-CON) 20 mEq tablet  Self No No   Sig: Take 1 tablet (20 mEq total) by mouth daily   sitaGLIPtin (Januvia) 50 mg tablet  Self No No   Sig: Take 1 tablet (50 mg total) by mouth daily      Facility-Administered Medications: None       Portions of the record may have been created with voice recognition software  Occasional wrong word or "sound a like" substitutions may have occurred due to the inherent limitations of voice recognition software  Read the chart carefully and recognize, using context, where substitutions have occurred        ED Course         Critical Care Time  Procedures

## 2021-05-06 LAB — BACTERIA UR CULT: ABNORMAL

## 2021-05-07 RX ORDER — CEFDINIR 300 MG/1
300 CAPSULE ORAL EVERY 12 HOURS SCHEDULED
Qty: 14 CAPSULE | Refills: 0 | Status: SHIPPED | OUTPATIENT
Start: 2021-05-07 | End: 2021-05-11

## 2021-05-07 NOTE — RESULT ENCOUNTER NOTE
Called patient to inform her that the Keflex is not sensitive to the culture  Will change to cefdinir  Instructed patient to discontinue the Keflex, and start cefdinir twice a day  It was electronically prescribed to pharmacy of patient's choice  Patient verbalized understanding and all questions are answered

## 2021-05-11 ENCOUNTER — OFFICE VISIT (OUTPATIENT)
Dept: FAMILY MEDICINE CLINIC | Facility: CLINIC | Age: 86
End: 2021-05-11
Payer: COMMERCIAL

## 2021-05-11 VITALS
TEMPERATURE: 99.1 F | SYSTOLIC BLOOD PRESSURE: 154 MMHG | WEIGHT: 169 LBS | HEIGHT: 62 IN | BODY MASS INDEX: 31.1 KG/M2 | HEART RATE: 76 BPM | DIASTOLIC BLOOD PRESSURE: 62 MMHG

## 2021-05-11 DIAGNOSIS — N30.01 ACUTE CYSTITIS WITH HEMATURIA: Primary | ICD-10-CM

## 2021-05-11 LAB
SL AMB  POCT GLUCOSE, UA: NEGATIVE
SL AMB LEUKOCYTE ESTERASE,UA: ABNORMAL
SL AMB POCT BILIRUBIN,UA: NEGATIVE
SL AMB POCT BLOOD,UA: ABNORMAL
SL AMB POCT CLARITY,UA: ABNORMAL
SL AMB POCT COLOR,UA: YELLOW
SL AMB POCT KETONES,UA: NEGATIVE
SL AMB POCT NITRITE,UA: POSITIVE
SL AMB POCT PH,UA: 5.5
SL AMB POCT SPECIFIC GRAVITY,UA: 1.01
SL AMB POCT URINE PROTEIN: ABNORMAL
SL AMB POCT UROBILINOGEN: 0.2

## 2021-05-11 PROCEDURE — 81002 URINALYSIS NONAUTO W/O SCOPE: CPT | Performed by: PHYSICIAN ASSISTANT

## 2021-05-11 PROCEDURE — 87077 CULTURE AEROBIC IDENTIFY: CPT | Performed by: PHYSICIAN ASSISTANT

## 2021-05-11 PROCEDURE — 99214 OFFICE O/P EST MOD 30 MIN: CPT | Performed by: PHYSICIAN ASSISTANT

## 2021-05-11 PROCEDURE — 87186 SC STD MICRODIL/AGAR DIL: CPT | Performed by: PHYSICIAN ASSISTANT

## 2021-05-11 PROCEDURE — 87086 URINE CULTURE/COLONY COUNT: CPT | Performed by: PHYSICIAN ASSISTANT

## 2021-05-11 RX ORDER — LEVOFLOXACIN 250 MG/1
250 TABLET ORAL DAILY
Qty: 7 TABLET | Refills: 0 | Status: SHIPPED | OUTPATIENT
Start: 2021-05-11 | End: 2021-05-14

## 2021-05-11 NOTE — PROGRESS NOTES
Assessment and Plan:    Problem List Items Addressed This Visit        Genitourinary    Acute cystitis with hematuria - Primary       Patient was seen and evaluated in the emergency room on the 3rd of May she actually did have a CT of her  Abdomen and pelvis which did show evidence for cystitis and was initially told to go and start Keflex but then after culture results came in the switched her to MULLINS BRITNEY which patient is not tolerating secondary to side effects  At this time I will have pt switch to levoquin 250 mg as her UA in office is still highly positive for UTI and this will be sent for urine culture  Repeat urine culture after 7 days of new antibiotic  I also am going to recommend cranberry supplements daily to hopefully help prevent future infection  Relevant Medications    levofloxacin (LEVAQUIN) 250 mg tablet    Other Relevant Orders    POCT urine dip (Completed)    Urine culture    Urine culture                 Diagnoses and all orders for this visit:    Acute cystitis with hematuria  -     POCT urine dip  -     Urine culture  -     levofloxacin (LEVAQUIN) 250 mg tablet; Take 1 tablet (250 mg total) by mouth daily for 7 days  -     Urine culture; Future    Other orders  -     Diclofenac Sodium (Voltaren) 1 %; Voltaren 1 % topical gel              Subjective:      Patient ID: Jose Luis La is a 80 y o  female  CC:    Chief Complaint   Patient presents with    Follow-up     patient is here for an ER f/u for a UTI  Patient stopped Cefdinir because it gave her chest pain, dizziness  ak       HPI:      Patient was seen in the emergency room on the 3rd of May with complaints of abdominal pain and headache  Urine was positive for UTI and a CT abdomen pelvis confirmed that she had acute cystitis  Her blood pressure was also elevated and she was complaining a headache so CT head was also done and without egg any acute problems    She was initially given Keflex 500 mg q 6 hours for 7 days however with this was switched to MULLINS BRITNEY after a few days which the patient started and has now stopped because of side effects of chest discomfort blurry vision  Pt has been feeling some slight back pain for a few days  The following portions of the patient's history were reviewed and updated as appropriate: allergies, current medications, past family history, past medical history, past social history, past surgical history and problem list       Review of Systems   Constitutional: Negative  HENT: Negative  Eyes: Positive for visual disturbance  Respiratory: Negative  Cardiovascular: Negative  Gastrointestinal: Positive for abdominal pain  Endocrine: Negative  Genitourinary: Negative  Musculoskeletal: Negative  Skin: Negative  Allergic/Immunologic: Negative  Neurological: Negative  Hematological: Negative  Psychiatric/Behavioral: Negative  Data to review:       Objective:    Vitals:    05/11/21 1809   BP: 154/62   Pulse: 76   Temp: 99 1 °F (37 3 °C)   Weight: 76 7 kg (169 lb)   Height: 5' 2" (1 575 m)        Physical Exam  Vitals signs and nursing note reviewed  Constitutional:       Appearance: Normal appearance  She is well-developed  HENT:      Head: Normocephalic and atraumatic  Eyes:      General: Lids are normal       Conjunctiva/sclera: Conjunctivae normal       Pupils: Pupils are equal, round, and reactive to light  Cardiovascular:      Rate and Rhythm: Normal rate and regular rhythm  Heart sounds: No murmur  Pulmonary:      Effort: Pulmonary effort is normal       Breath sounds: Normal breath sounds  Abdominal:      General: Abdomen is protuberant  Bowel sounds are normal       Tenderness: There is abdominal tenderness in the suprapubic area  There is no right CVA tenderness or left CVA tenderness  Skin:     General: Skin is warm and dry  Neurological:      General: No focal deficit present  Mental Status: She is alert  Coordination: Coordination is intact  Psychiatric:         Mood and Affect: Mood normal          Behavior: Behavior normal  Behavior is cooperative  Thought Content:  Thought content normal          Judgment: Judgment normal

## 2021-05-11 NOTE — ASSESSMENT & PLAN NOTE
Patient was seen and evaluated in the emergency room on the 3rd of May she actually did have a CT of her  Abdomen and pelvis which did show evidence for cystitis and was initially told to go and start Keflex but then after culture results came in the switched her to MULLINS BRITNEY which patient is not tolerating secondary to side effects  At this time I will have pt switch to levoquin 250 mg as her UA in office is still highly positive for UTI and this will be sent for urine culture  Repeat urine culture after 7 days of new antibiotic  I also am going to recommend cranberry supplements daily to hopefully help prevent future infection

## 2021-05-12 ENCOUNTER — TELEPHONE (OUTPATIENT)
Dept: ADMINISTRATIVE | Facility: OTHER | Age: 86
End: 2021-05-12

## 2021-05-12 DIAGNOSIS — E11.9 TYPE 2 DIABETES MELLITUS WITHOUT COMPLICATION, WITHOUT LONG-TERM CURRENT USE OF INSULIN (HCC): ICD-10-CM

## 2021-05-12 NOTE — TELEPHONE ENCOUNTER
Upon review of the In Basket request and the patient's chart, initial outreach has been made via fax, please see Contacts section for details       Thank you  Shawna Michael

## 2021-05-12 NOTE — TELEPHONE ENCOUNTER
PATIENT CALLED IN STATES SHE HAD JUST ORDERED HER MEDICATION FROM [facility] "Elio Wang" & THEY HAVE NOT ARRIVED VIA MAIL ORDER YE  PT WOULD LIKE TO KNOW  [address] CAN POSSIBLY SEND A SCRIPT WITH A FEW PILLS TO HER LOCAL PHARMACY? ?- DO NOT SEE ANY REFILL REQUESTED ON THIS MEDICATION ON FILE         PLEASE SEND SHORT SUPPLY TO [facility] [address] PER PT  ANY QUESTIONS/CONCERNS PLEASE CALL PATIENT BACK [phone]

## 2021-05-12 NOTE — LETTER
Diabetic Foot Exam Form    Date Requested: 21  Patient: Martin Kaiser  Patient : [id number]   Referring Provider: Fabiola Cordero PA-C    Diabetic Foot Exam Performed with shoes and socks removed        Yes         No     Date of Diabetic Foot Exam ______________________________  Risk Score ____________________________________________    Left Foot       Visual Inspection         Monofilament Testing Sensory Exam        Pedal Pulses         Additional Comments         Right Foot      Visual Inspection         Monofilament Testing Sensory Exam       Pedal Pulses         Additional Comments         Comments __________________________________________________________    Practice Providing Exam ______________________________________________    Exam Performed By (print name) _______________________________________      Provider Signature ___________________________________________________      These reports are needed for  compliance    Please fax this completed form and a copy of the Diabetic Foot Exam report to our office located at [place] [address] as soon as possible to [phone] ambar Ramos: Phone [phone]    We thank you for your assistance in treating our mutual patient

## 2021-05-12 NOTE — TELEPHONE ENCOUNTER
----- Message from Theodora Gonsalves sent at 5/11/2021  6:28 PM EDT -----  Regarding: foot exam  05/11/21 6:28 PM    Hello, our patient Pily Ash has had Diabetic Foot Exam completed/performed  Please assist in updating the patient chart by making an External outreach to Dr Arturo Flores facility located in [facility]  The date of service is April or May 2021      Thank you,  Flor Reis [facility]

## 2021-05-14 ENCOUNTER — TELEPHONE (OUTPATIENT)
Dept: FAMILY MEDICINE CLINIC | Facility: CLINIC | Age: 86
End: 2021-05-14

## 2021-05-14 LAB — BACTERIA UR CULT: ABNORMAL

## 2021-05-14 NOTE — TELEPHONE ENCOUNTER
She should stop the antibiotic and do not take it  Please ascertain if she had 3 doses of this  If she did that would complete therapy    If she did not I would wait for susceptibility of her urine to come back and place another antibiotic

## 2021-05-14 NOTE — TELEPHONE ENCOUNTER
PT WORRIED ABOUT TAKING THE LEVOFLOXACIN, SHE ONLY HAD DOSES AND SHE IS GETTING CHEAT PAINS AND BODY ACHES WITH IT THAT SHE HAD NOT HAD BEFORE    PLEASE CALL PT BACK TODAY [phone]

## 2021-05-17 ENCOUNTER — TELEPHONE (OUTPATIENT)
Dept: FAMILY MEDICINE CLINIC | Facility: CLINIC | Age: 86
End: 2021-05-17

## 2021-05-17 ENCOUNTER — OFFICE VISIT (OUTPATIENT)
Dept: FAMILY MEDICINE CLINIC | Facility: CLINIC | Age: 86
End: 2021-05-17
Payer: COMMERCIAL

## 2021-05-17 VITALS
DIASTOLIC BLOOD PRESSURE: 80 MMHG | HEIGHT: 62 IN | SYSTOLIC BLOOD PRESSURE: 140 MMHG | WEIGHT: 168 LBS | BODY MASS INDEX: 30.91 KG/M2 | TEMPERATURE: 98.6 F | HEART RATE: 88 BPM

## 2021-05-17 DIAGNOSIS — R03.0 BLOOD PRESSURE ELEVATED WITHOUT HISTORY OF HTN: ICD-10-CM

## 2021-05-17 DIAGNOSIS — T50.905A ADVERSE EFFECT OF DRUG, INITIAL ENCOUNTER: Primary | ICD-10-CM

## 2021-05-17 DIAGNOSIS — N39.0 RECURRENT UTI: ICD-10-CM

## 2021-05-17 DIAGNOSIS — R07.9 CHEST PAIN, UNSPECIFIED TYPE: ICD-10-CM

## 2021-05-17 DIAGNOSIS — N12 PYELONEPHRITIS: ICD-10-CM

## 2021-05-17 DIAGNOSIS — R10.9 FLANK PAIN: ICD-10-CM

## 2021-05-17 PROCEDURE — 99214 OFFICE O/P EST MOD 30 MIN: CPT | Performed by: FAMILY MEDICINE

## 2021-05-17 PROCEDURE — 1160F RVW MEDS BY RX/DR IN RCRD: CPT | Performed by: FAMILY MEDICINE

## 2021-05-17 PROCEDURE — 93000 ELECTROCARDIOGRAM COMPLETE: CPT | Performed by: FAMILY MEDICINE

## 2021-05-17 PROCEDURE — 1036F TOBACCO NON-USER: CPT | Performed by: FAMILY MEDICINE

## 2021-05-17 NOTE — TELEPHONE ENCOUNTER
PATIENT CALLED ABOUT MEDICATIONS  SHE WANTS A NURSE TO CALL HER ABOUT QUESTIONS ON MEDICATIONS  SHE SAW DR Gee Bloom LAST Monday   [place] [address] [phone]

## 2021-05-17 NOTE — PROGRESS NOTES
Assessment and Plan:  1  Adverse drug reaction patient felt that her Levaquin increased her chest pain  Patient has been having chest pain off and on for years  Unsure if this is a true allergic reaction  2  Chest pain, chronic, EKG completed in office today  EKG was without acute change no change from previous  chest x-ray ordered  3  Recurrent UTI, UA with culture was ordered  4  Flank pain/ pyelonephritis, ultrasound renal bladder ordered  5  Elevated blood pressure normalized and of office visit  6  Will have patient recheck Thursday with her usual PCP    Problem List Items Addressed This Visit        Genitourinary    Pyelonephritis    Relevant Orders    UA (URINE) with reflex to Scope    Urine culture    US kidney and bladder    Recurrent UTI      Repeat urinalysis with culture ordered  Patient unable to void in office today will send slip to lab         Relevant Orders    UA (URINE) with reflex to Scope    Urine culture    US kidney and bladder      Other Visit Diagnoses     Adverse effect of drug, initial encounter    -  Primary    Chest pain, unspecified type        Relevant Orders    XR chest pa & lateral    Flank pain        Relevant Orders    UA (URINE) with reflex to Scope    Urine culture    US kidney and bladder    Blood pressure elevated without history of HTN                     Diagnoses and all orders for this visit:    Adverse effect of drug, initial encounter    Chest pain, unspecified type  -     XR chest pa & lateral; Future    Recurrent UTI  -     UA (URINE) with reflex to Scope; Future  -     Urine culture; Future  -     US kidney and bladder; Future    Pyelonephritis  -     UA (URINE) with reflex to Scope; Future  -     Urine culture; Future  -     US kidney and bladder; Future    Flank pain  -     UA (URINE) with reflex to Scope; Future  -     Urine culture; Future  -     US kidney and bladder;  Future    Blood pressure elevated without history of HTN              Subjective: Patient ID: Renny Zambrano is a 80 y o  female  CC:    Chief Complaint   Patient presents with    Allergic Reaction     patient states she was put on Levaquin last week, but stopped it after 2 doses due to it giving her chest pain  ak       HPI:     Patient had UTIs placed on Levaquin  Patient did take 2 doses  But fell of a that she got "chest pain" from this medication  Upon questioning patient still has chest pain however patient states this is off and on for "years"  Patient denies any dysuria hematuria  No fever chills or abdominal pain      The following portions of the patient's history were reviewed and updated as appropriate: allergies, current medications, past family history, past medical history, past social history, past surgical history and problem list       Review of Systems   Constitutional:         HPI   HENT: Negative  Eyes: Negative  Respiratory: Negative  Cardiovascular:         Chronic chest pain seemed to be worsening with Levaquin   Gastrointestinal: Negative  Endocrine: Negative  Genitourinary:         HPI   Musculoskeletal: Negative  Skin: Negative  Allergic/Immunologic: Negative  Neurological: Negative  Hematological: Negative  Psychiatric/Behavioral: Negative  Data to review:       Objective:    Vitals:    05/17/21 1619 05/17/21 1625   BP: (!) 172/68 140/80   Pulse: 88    Temp: 98 6 °F (37 °C)    Weight: 76 2 kg (168 lb)    Height: 5' 2" (1 575 m)         Physical Exam  Vitals signs and nursing note reviewed  Constitutional:       Appearance: Normal appearance  HENT:      Head: Normocephalic and atraumatic  Eyes:      General: No scleral icterus  Neck:      Musculoskeletal: Neck supple  No neck rigidity  Cardiovascular:      Rate and Rhythm: Normal rate and regular rhythm  Heart sounds: Normal heart sounds  Pulmonary:      Effort: Pulmonary effort is normal       Breath sounds: Normal breath sounds     Abdominal: General: Bowel sounds are normal       Palpations: Abdomen is soft  Tenderness: There is no abdominal tenderness  There is right CVA tenderness and left CVA tenderness  There is no guarding or rebound  Musculoskeletal:      Right lower leg: No edema  Left lower leg: No edema  Skin:     General: Skin is warm and dry  Neurological:      General: No focal deficit present  Mental Status: She is alert     Psychiatric:         Mood and Affect: Mood normal

## 2021-05-17 NOTE — ASSESSMENT & PLAN NOTE
Repeat urinalysis with culture ordered    Patient unable to void in office today will send slip to lab

## 2021-05-18 ENCOUNTER — HOSPITAL ENCOUNTER (OUTPATIENT)
Dept: ULTRASOUND IMAGING | Facility: HOSPITAL | Age: 86
Discharge: HOME | End: 2021-05-18
Payer: COMMERCIAL

## 2021-05-18 ENCOUNTER — APPOINTMENT (OUTPATIENT)
Dept: LAB | Facility: CLINIC | Age: 86
End: 2021-05-18
Payer: COMMERCIAL

## 2021-05-18 DIAGNOSIS — R10.9 FLANK PAIN: ICD-10-CM

## 2021-05-18 DIAGNOSIS — N12 PYELONEPHRITIS: ICD-10-CM

## 2021-05-18 DIAGNOSIS — N39.0 RECURRENT UTI: ICD-10-CM

## 2021-05-18 LAB
BILIRUB UR QL STRIP: NEGATIVE
CLARITY UR: CLEAR
COLOR UR: YELLOW
GLUCOSE UR STRIP-MCNC: NEGATIVE MG/DL
HGB UR QL STRIP.AUTO: NEGATIVE
KETONES UR STRIP-MCNC: NEGATIVE MG/DL
LEUKOCYTE ESTERASE UR QL STRIP: NEGATIVE
NITRITE UR QL STRIP: NEGATIVE
PH UR STRIP.AUTO: 6 [PH]
PROT UR STRIP-MCNC: NEGATIVE MG/DL
SP GR UR STRIP.AUTO: 1.01 (ref 1–1.03)
UROBILINOGEN UR QL STRIP.AUTO: 0.2 E.U./DL

## 2021-05-18 PROCEDURE — 87086 URINE CULTURE/COLONY COUNT: CPT

## 2021-05-18 PROCEDURE — 81003 URINALYSIS AUTO W/O SCOPE: CPT

## 2021-05-18 PROCEDURE — 76770 US EXAM ABDO BACK WALL COMP: CPT

## 2021-05-19 ENCOUNTER — TELEPHONE (OUTPATIENT)
Dept: FAMILY MEDICINE CLINIC | Facility: CLINIC | Age: 86
End: 2021-05-19

## 2021-05-19 ENCOUNTER — APPOINTMENT (EMERGENCY)
Dept: CT IMAGING | Facility: HOSPITAL | Age: 86
End: 2021-05-19
Payer: COMMERCIAL

## 2021-05-19 ENCOUNTER — HOSPITAL ENCOUNTER (OUTPATIENT)
Facility: HOSPITAL | Age: 86
Setting detail: OBSERVATION
Discharge: HOME | End: 2021-05-20
Attending: EMERGENCY MEDICINE | Admitting: STUDENT IN AN ORGANIZED HEALTH CARE EDUCATION/TRAINING PROGRAM
Payer: COMMERCIAL

## 2021-05-19 DIAGNOSIS — R07.9 CHEST PAIN: ICD-10-CM

## 2021-05-19 DIAGNOSIS — Z71.89 COMPLEX CARE COORDINATION: ICD-10-CM

## 2021-05-19 DIAGNOSIS — R53.1 GENERALIZED WEAKNESS: Primary | ICD-10-CM

## 2021-05-19 PROBLEM — E87.1 HYPONATREMIA: Status: ACTIVE | Noted: 2021-05-19

## 2021-05-19 PROBLEM — I10 ESSENTIAL HYPERTENSION: Chronic | Status: ACTIVE | Noted: 2021-05-19

## 2021-05-19 LAB
ALBUMIN SERPL BCP-MCNC: 3.5 G/DL (ref 3.5–5)
ALP SERPL-CCNC: 84 U/L (ref 46–116)
ALT SERPL W P-5'-P-CCNC: 13 U/L (ref 12–78)
ANION GAP SERPL CALCULATED.3IONS-SCNC: 9 MMOL/L (ref 4–13)
APTT PPP: 33 SECONDS (ref 23–37)
AST SERPL W P-5'-P-CCNC: 16 U/L (ref 5–45)
ATRIAL RATE: 75 BPM
BACTERIA UR CULT: NORMAL
BASOPHILS # BLD AUTO: 0.06 THOUSANDS/ΜL (ref 0–0.1)
BASOPHILS NFR BLD AUTO: 1 % (ref 0–1)
BILIRUB SERPL-MCNC: 0.41 MG/DL (ref 0.2–1)
BILIRUB UR QL STRIP: NEGATIVE
BUN SERPL-MCNC: 14 MG/DL (ref 5–25)
CALCIUM SERPL-MCNC: 9.4 MG/DL (ref 8.3–10.1)
CHLORIDE SERPL-SCNC: 99 MMOL/L (ref 100–108)
CLARITY UR: CLEAR
CO2 SERPL-SCNC: 25 MMOL/L (ref 21–32)
COLOR UR: YELLOW
CREAT SERPL-MCNC: 1.14 MG/DL (ref 0.6–1.3)
EOSINOPHIL # BLD AUTO: 0.13 THOUSAND/ΜL (ref 0–0.61)
EOSINOPHIL NFR BLD AUTO: 2 % (ref 0–6)
ERYTHROCYTE [DISTWIDTH] IN BLOOD BY AUTOMATED COUNT: 13.2 % (ref 11.6–15.1)
GFR SERPL CREATININE-BSD FRML MDRD: 46 ML/MIN/1.73SQ M
GLUCOSE SERPL-MCNC: 131 MG/DL (ref 65–140)
GLUCOSE SERPL-MCNC: 141 MG/DL (ref 65–140)
GLUCOSE UR STRIP-MCNC: NEGATIVE MG/DL
HCT VFR BLD AUTO: 39.7 % (ref 34.8–46.1)
HGB BLD-MCNC: 13.1 G/DL (ref 11.5–15.4)
HGB UR QL STRIP.AUTO: NEGATIVE
IMM GRANULOCYTES # BLD AUTO: 0.02 THOUSAND/UL (ref 0–0.2)
IMM GRANULOCYTES NFR BLD AUTO: 0 % (ref 0–2)
INR PPP: 1.08 (ref 0.84–1.19)
KETONES UR STRIP-MCNC: NEGATIVE MG/DL
LACTATE SERPL-SCNC: 1 MMOL/L (ref 0.5–2)
LEUKOCYTE ESTERASE UR QL STRIP: NEGATIVE
LYMPHOCYTES # BLD AUTO: 0.89 THOUSANDS/ΜL (ref 0.6–4.47)
LYMPHOCYTES NFR BLD AUTO: 15 % (ref 14–44)
MCH RBC QN AUTO: 30.1 PG (ref 26.8–34.3)
MCHC RBC AUTO-ENTMCNC: 33 G/DL (ref 31.4–37.4)
MCV RBC AUTO: 91 FL (ref 82–98)
MONOCYTES # BLD AUTO: 0.61 THOUSAND/ΜL (ref 0.17–1.22)
MONOCYTES NFR BLD AUTO: 11 % (ref 4–12)
NEUTROPHILS # BLD AUTO: 4.1 THOUSANDS/ΜL (ref 1.85–7.62)
NEUTS SEG NFR BLD AUTO: 71 % (ref 43–75)
NITRITE UR QL STRIP: NEGATIVE
NRBC BLD AUTO-RTO: 0 /100 WBCS
NT-PROBNP SERPL-MCNC: 1500 PG/ML
P AXIS: 44 DEGREES
PH UR STRIP.AUTO: 8.5 [PH] (ref 4.5–8)
PLATELET # BLD AUTO: 161 THOUSANDS/UL (ref 149–390)
PLATELET # BLD AUTO: 179 THOUSANDS/UL (ref 149–390)
PMV BLD AUTO: 10.5 FL (ref 8.9–12.7)
PMV BLD AUTO: 10.9 FL (ref 8.9–12.7)
POTASSIUM SERPL-SCNC: 3.7 MMOL/L (ref 3.5–5.3)
PR INTERVAL: 212 MS
PROT SERPL-MCNC: 7.6 G/DL (ref 6.4–8.2)
PROT UR STRIP-MCNC: NEGATIVE MG/DL
PROTHROMBIN TIME: 13.8 SECONDS (ref 11.6–14.5)
QRS AXIS: 0 DEGREES
QRSD INTERVAL: 116 MS
QT INTERVAL: 370 MS
QTC INTERVAL: 405 MS
RBC # BLD AUTO: 4.35 MILLION/UL (ref 3.81–5.12)
SARS-COV-2 RNA RESP QL NAA+PROBE: NEGATIVE
SODIUM SERPL-SCNC: 133 MMOL/L (ref 136–145)
SP GR UR STRIP.AUTO: 1.01 (ref 1–1.03)
T WAVE AXIS: 61 DEGREES
TROPONIN I SERPL-MCNC: 0.02 NG/ML
TROPONIN I SERPL-MCNC: <0.02 NG/ML
TSH SERPL DL<=0.05 MIU/L-ACNC: 1.47 UIU/ML (ref 0.36–3.74)
UROBILINOGEN UR QL STRIP.AUTO: 0.2 E.U./DL
VENTRICULAR RATE: 72 BPM
WBC # BLD AUTO: 5.81 THOUSAND/UL (ref 4.31–10.16)

## 2021-05-19 PROCEDURE — 85610 PROTHROMBIN TIME: CPT | Performed by: EMERGENCY MEDICINE

## 2021-05-19 PROCEDURE — 87040 BLOOD CULTURE FOR BACTERIA: CPT | Performed by: EMERGENCY MEDICINE

## 2021-05-19 PROCEDURE — 74174 CTA ABD&PLVS W/CONTRAST: CPT

## 2021-05-19 PROCEDURE — 82948 REAGENT STRIP/BLOOD GLUCOSE: CPT

## 2021-05-19 PROCEDURE — 93005 ELECTROCARDIOGRAM TRACING: CPT

## 2021-05-19 PROCEDURE — 99220 PR INITIAL OBSERVATION CARE/DAY 70 MINUTES: CPT | Performed by: STUDENT IN AN ORGANIZED HEALTH CARE EDUCATION/TRAINING PROGRAM

## 2021-05-19 PROCEDURE — 93010 ELECTROCARDIOGRAM REPORT: CPT | Performed by: INTERNAL MEDICINE

## 2021-05-19 PROCEDURE — 84484 ASSAY OF TROPONIN QUANT: CPT | Performed by: EMERGENCY MEDICINE

## 2021-05-19 PROCEDURE — 99285 EMERGENCY DEPT VISIT HI MDM: CPT | Performed by: EMERGENCY MEDICINE

## 2021-05-19 PROCEDURE — 83880 ASSAY OF NATRIURETIC PEPTIDE: CPT | Performed by: EMERGENCY MEDICINE

## 2021-05-19 PROCEDURE — 84484 ASSAY OF TROPONIN QUANT: CPT | Performed by: PHYSICIAN ASSISTANT

## 2021-05-19 PROCEDURE — 96360 HYDRATION IV INFUSION INIT: CPT

## 2021-05-19 PROCEDURE — 80053 COMPREHEN METABOLIC PANEL: CPT | Performed by: EMERGENCY MEDICINE

## 2021-05-19 PROCEDURE — 85049 AUTOMATED PLATELET COUNT: CPT | Performed by: PHYSICIAN ASSISTANT

## 2021-05-19 PROCEDURE — 85730 THROMBOPLASTIN TIME PARTIAL: CPT | Performed by: EMERGENCY MEDICINE

## 2021-05-19 PROCEDURE — U0005 INFEC AGEN DETEC AMPLI PROBE: HCPCS | Performed by: EMERGENCY MEDICINE

## 2021-05-19 PROCEDURE — 85025 COMPLETE CBC W/AUTO DIFF WBC: CPT | Performed by: EMERGENCY MEDICINE

## 2021-05-19 PROCEDURE — 71275 CT ANGIOGRAPHY CHEST: CPT

## 2021-05-19 PROCEDURE — 83605 ASSAY OF LACTIC ACID: CPT | Performed by: EMERGENCY MEDICINE

## 2021-05-19 PROCEDURE — 36415 COLL VENOUS BLD VENIPUNCTURE: CPT | Performed by: EMERGENCY MEDICINE

## 2021-05-19 PROCEDURE — 81003 URINALYSIS AUTO W/O SCOPE: CPT

## 2021-05-19 PROCEDURE — G1004 CDSM NDSC: HCPCS

## 2021-05-19 PROCEDURE — 99285 EMERGENCY DEPT VISIT HI MDM: CPT

## 2021-05-19 PROCEDURE — 84443 ASSAY THYROID STIM HORMONE: CPT | Performed by: EMERGENCY MEDICINE

## 2021-05-19 PROCEDURE — 96361 HYDRATE IV INFUSION ADD-ON: CPT

## 2021-05-19 PROCEDURE — U0003 INFECTIOUS AGENT DETECTION BY NUCLEIC ACID (DNA OR RNA); SEVERE ACUTE RESPIRATORY SYNDROME CORONAVIRUS 2 (SARS-COV-2) (CORONAVIRUS DISEASE [COVID-19]), AMPLIFIED PROBE TECHNIQUE, MAKING USE OF HIGH THROUGHPUT TECHNOLOGIES AS DESCRIBED BY CMS-2020-01-R: HCPCS | Performed by: EMERGENCY MEDICINE

## 2021-05-19 RX ORDER — ASPIRIN 81 MG/1
81 TABLET, CHEWABLE ORAL DAILY
Status: DISCONTINUED | OUTPATIENT
Start: 2021-05-20 | End: 2021-05-20 | Stop reason: HOSPADM

## 2021-05-19 RX ORDER — GABAPENTIN 300 MG/1
600 CAPSULE ORAL
Status: DISCONTINUED | OUTPATIENT
Start: 2021-05-19 | End: 2021-05-20 | Stop reason: HOSPADM

## 2021-05-19 RX ORDER — ONDANSETRON 2 MG/ML
4 INJECTION INTRAMUSCULAR; INTRAVENOUS EVERY 6 HOURS PRN
Status: DISCONTINUED | OUTPATIENT
Start: 2021-05-19 | End: 2021-05-20 | Stop reason: HOSPADM

## 2021-05-19 RX ORDER — GABAPENTIN 300 MG/1
300 CAPSULE ORAL 2 TIMES DAILY
Status: DISCONTINUED | OUTPATIENT
Start: 2021-05-20 | End: 2021-05-20 | Stop reason: HOSPADM

## 2021-05-19 RX ORDER — LEVOTHYROXINE SODIUM 0.07 MG/1
75 TABLET ORAL
Status: DISCONTINUED | OUTPATIENT
Start: 2021-05-20 | End: 2021-05-20 | Stop reason: HOSPADM

## 2021-05-19 RX ORDER — OLANZAPINE 5 MG/1
2.5 TABLET ORAL DAILY
Status: DISCONTINUED | OUTPATIENT
Start: 2021-05-20 | End: 2021-05-20 | Stop reason: HOSPADM

## 2021-05-19 RX ORDER — CALCIUM CARBONATE 200(500)MG
1000 TABLET,CHEWABLE ORAL DAILY PRN
Status: DISCONTINUED | OUTPATIENT
Start: 2021-05-19 | End: 2021-05-20 | Stop reason: HOSPADM

## 2021-05-19 RX ORDER — ACETAMINOPHEN 325 MG/1
650 TABLET ORAL EVERY 6 HOURS PRN
Status: DISCONTINUED | OUTPATIENT
Start: 2021-05-19 | End: 2021-05-20 | Stop reason: HOSPADM

## 2021-05-19 RX ORDER — AMLODIPINE BESYLATE 5 MG/1
5 TABLET ORAL DAILY
Status: DISCONTINUED | OUTPATIENT
Start: 2021-05-20 | End: 2021-05-20 | Stop reason: HOSPADM

## 2021-05-19 RX ORDER — OXCARBAZEPINE 150 MG/1
150 TABLET, FILM COATED ORAL EVERY 12 HOURS SCHEDULED
Status: DISCONTINUED | OUTPATIENT
Start: 2021-05-19 | End: 2021-05-20 | Stop reason: HOSPADM

## 2021-05-19 RX ORDER — HEPARIN SODIUM 5000 [USP'U]/ML
5000 INJECTION, SOLUTION INTRAVENOUS; SUBCUTANEOUS EVERY 8 HOURS SCHEDULED
Status: DISCONTINUED | OUTPATIENT
Start: 2021-05-19 | End: 2021-05-20 | Stop reason: HOSPADM

## 2021-05-19 RX ORDER — SENNOSIDES 8.6 MG
1 TABLET ORAL DAILY
Status: DISCONTINUED | OUTPATIENT
Start: 2021-05-20 | End: 2021-05-20 | Stop reason: HOSPADM

## 2021-05-19 RX ORDER — PANTOPRAZOLE SODIUM 40 MG/1
40 TABLET, DELAYED RELEASE ORAL
Status: DISCONTINUED | OUTPATIENT
Start: 2021-05-20 | End: 2021-05-20 | Stop reason: HOSPADM

## 2021-05-19 RX ORDER — LANOLIN ALCOHOL/MO/W.PET/CERES
6 CREAM (GRAM) TOPICAL
Status: DISCONTINUED | OUTPATIENT
Start: 2021-05-19 | End: 2021-05-20 | Stop reason: HOSPADM

## 2021-05-19 RX ORDER — FLUTICASONE FUROATE AND VILANTEROL 100; 25 UG/1; UG/1
1 POWDER RESPIRATORY (INHALATION) DAILY
Status: DISCONTINUED | OUTPATIENT
Start: 2021-05-20 | End: 2021-05-20 | Stop reason: HOSPADM

## 2021-05-19 RX ORDER — FUROSEMIDE 20 MG/1
20 TABLET ORAL DAILY
Status: DISCONTINUED | OUTPATIENT
Start: 2021-05-20 | End: 2021-05-20 | Stop reason: HOSPADM

## 2021-05-19 RX ORDER — SODIUM CHLORIDE 9 MG/ML
50 INJECTION, SOLUTION INTRAVENOUS CONTINUOUS
Status: DISCONTINUED | OUTPATIENT
Start: 2021-05-19 | End: 2021-05-19

## 2021-05-19 RX ORDER — MAGNESIUM HYDROXIDE/ALUMINUM HYDROXICE/SIMETHICONE 120; 1200; 1200 MG/30ML; MG/30ML; MG/30ML
30 SUSPENSION ORAL EVERY 4 HOURS PRN
Status: DISCONTINUED | OUTPATIENT
Start: 2021-05-19 | End: 2021-05-20 | Stop reason: HOSPADM

## 2021-05-19 RX ORDER — ACETAMINOPHEN 325 MG/1
975 TABLET ORAL ONCE
Status: COMPLETED | OUTPATIENT
Start: 2021-05-19 | End: 2021-05-19

## 2021-05-19 RX ADMIN — IOHEXOL 100 ML: 350 INJECTION, SOLUTION INTRAVENOUS at 18:42

## 2021-05-19 RX ADMIN — SODIUM CHLORIDE 500 ML: 0.9 INJECTION, SOLUTION INTRAVENOUS at 17:48

## 2021-05-19 RX ADMIN — ACETAMINOPHEN 975 MG: 325 TABLET, FILM COATED ORAL at 17:47

## 2021-05-19 RX ADMIN — MELATONIN 6 MG: at 22:53

## 2021-05-19 RX ADMIN — HEPARIN SODIUM 5000 UNITS: 5000 INJECTION INTRAVENOUS; SUBCUTANEOUS at 22:53

## 2021-05-19 RX ADMIN — OXCARBAZEPINE 150 MG: 150 TABLET, FILM COATED ORAL at 21:53

## 2021-05-19 RX ADMIN — GABAPENTIN 600 MG: 300 CAPSULE ORAL at 22:53

## 2021-05-19 NOTE — TELEPHONE ENCOUNTER
As a follow-up, a second attempt has been made for outreach via fax, please see Contacts section for details      Thank you  Renny Wynne

## 2021-05-19 NOTE — TELEPHONE ENCOUNTER
Patient called and states she was seen for a UTI and was given Levofloxacin  She was having side affects from it and was told to stop the medication  Patient states she is in a lot of pain and would like something else called in  Patient is also insisting on speaking to Fran Kirkland  I informed patient that Orlando Andrews is busy with patients and doesn't usually call patients  I told patient I would pass the message on

## 2021-05-19 NOTE — TELEPHONE ENCOUNTER
Her last urine culture was negative for infection so she does not need another antibiotic at this time

## 2021-05-19 NOTE — ED PROVIDER NOTES
History  Chief Complaint   Patient presents with    Weakness - Generalized     feeling fatigued, having headaches, sore throat  seen here 5/3 and started on antibiotics for a UTI reports treated with 3 different antibiotics all of which caused a reaction and patient did not complete full course  had kidney US was supposed to have a CXR but forgot the prescription  pt reports now having back and chest pauin as well  C/o generalized weakness and chest pain intermittent for 2 weeks  She was treated for a uti on 5/3, but had to change her antibiotics twice because she was having a reaction to them  She didn't complete a full course of antibiotics  +slight cough and sore throat  Prior to Admission Medications   Prescriptions Last Dose Informant Patient Reported? Taking? Cholecalciferol (VITAMIN D3) 2000 units capsule  Self Yes No   Sig: Take 2,000 Units by mouth daily    Diclofenac Sodium (Voltaren) 1 %   Yes No   Sig: Voltaren 1 % topical gel   Incontinence Supply Disposable (SELECT DISPOSABLE UNDERWEAR LG) MISC  Self Yes No   Lancets (ACCU-CHEK SOFT TOUCH) lancets  Self No No   Sig: Testing 1 time daily              Dx: E11 9   Lidocaine-Menthol 4-5 % PTCH  Self Yes No   Sig: lidocaine patch   Melatonin 5 MG CAPS  Self Yes No   Sig: Take 3 mg by mouth daily at bedtime   OLANZapine (ZyPREXA) 5 mg tablet  Self No No   Sig: Take 1 tablet (5 mg total) by mouth daily   OXcarbazepine (TRILEPTAL) 150 mg tablet   No No   Si tab qhs x 1 week, then BID   acetaminophen (TYLENOL) 325 mg tablet  Self No No   Sig: Take 2 tablets (650 mg total) by mouth every 6 (six) hours as needed for mild pain   Patient not taking: Reported on 2021   albuterol (VENTOLIN HFA) 90 mcg/act inhaler  Self No No   Sig: Inhale 2 puffs every 4 (four) hours as needed for wheezing   Patient not taking: Reported on 3/25/2021   aluminum-magnesium hydroxide-simethicone (MYLANTA) 200-200-20 mg/5 mL suspension  Self Yes No   Sig: Take 30 mL by mouth every 4 (four) hours as needed for indigestion or heartburn   amLODIPine (NORVASC) 5 mg tablet  Self No No   Sig: Take 1 tablet (5 mg total) by mouth daily   aspirin 81 MG tablet  Self Yes No   Sig: Take 81 mg by mouth daily  brimonidine (Alphagan P) 0 1 %  Self Yes No   Sig: Alphagan P 0 1 % eye drops   INT 1 GTT IN OU BID   butalbital-acetaminophen-caffeine (FIORICET,ESGIC) -40 mg per tablet  Self No No   Sig: Take 1 tablet by mouth every 4 (four) hours as needed for headaches   Patient not taking: Reported on 3/25/2021   cyclobenzaprine (FLEXERIL) 5 mg tablet  Self No No   Sig: Take 1 tablet (5 mg total) by mouth 3 (three) times a day as needed for muscle spasms   Patient not taking: Reported on 3/25/2021   dorzolamide (TRUSOPT) 2 % ophthalmic solution  Self Yes No   Sig: Administer 1 drop to both eyes 3 (three) times a day     famotidine (PEPCID) 20 mg tablet  Self No No   Sig: Take 1 tablet (20 mg total) by mouth 2 (two) times a day   Patient not taking: Reported on 3/25/2021   fluticasone (FLONASE) 50 mcg/act nasal spray  Self No No   Si sprays into each nostril daily   Patient not taking: Reported on 2021   fluticasone-vilanterol (BREO ELLIPTA) 100-25 mcg/inh inhaler  Self No No   Sig: Inhale 1 puff daily Rinse mouth after use  Patient not taking: Reported on 2021   furosemide (LASIX) 20 mg tablet   No No   Sig: TAKE 1 TABLET IN THE MORNING  AND TAKE 2 TABLETS IN THE EVENING   gabapentin (NEURONTIN) 600 MG tablet  Self No No   Sig: Take 1/2 tab am, 1/2 tab afternoon and whole tab at bedtime  glucose blood (Accu-Chek Aline Plus) test strip  Self No No   Sig: Testing once daily E11 9   ketorolac (TORADOL) 10 mg tablet   No No   Sig: Take 1 tablet (10 mg total) by mouth every 6 (six) hours as needed (migraine) Max 2-3 per week     Patient not taking: Reported on 2021   latanoprost (XALATAN) 0 005 % ophthalmic solution  Self No No   Sig: Apply 1 drop to eye daily at bedtime Both eyes   levothyroxine 75 mcg tablet  Self No No   Sig: TAKE 1 TABLET EVERY DAY   nitroglycerin (NITROSTAT) 0 4 mg SL tablet  Self No No   Sig: Place 1 tablet (0 4 mg total) under the tongue every 5 (five) minutes as needed for chest pain   Patient not taking: Reported on 3/25/2021   nystatin (MYCOSTATIN) powder  Self No No   Sig: Apply topically 2 (two) times a day   omeprazole (PriLOSEC) 40 MG capsule  Self No No   Sig: Take 1 capsule (40 mg total) by mouth daily   potassium chloride (K-DUR,KLOR-CON) 20 mEq tablet  Self No No   Sig: Take 1 tablet (20 mEq total) by mouth daily   sitaGLIPtin (Januvia) 50 mg tablet   No No   Sig: Take 1 tablet (50 mg total) by mouth daily      Facility-Administered Medications: None       Past Medical History:   Diagnosis Date    Asthma     Atypical chest pain     CAD (coronary artery disease)     Candidal intertrigo     CHF (congestive heart failure) (HCC)     Depression     Diabetes mellitus (HCC)     Diabetic neuropathy ([facility] [address] )     Diverticulitis     Dyslipidemia     Female bladder prolapse     Gastric ulcer     Glaucoma     HTN (hypertension)     Hyperlipidemia     Hypothyroidism 4/18/2016    RAD (reactive airway disease)        Past Surgical History:   Procedure Laterality Date    COLONOSCOPY      ESOPHAGOGASTRODUODENOSCOPY  2011    HYSTERECTOMY      TONSILLECTOMY         Family History   Problem Relation Age of Onset    Breast cancer Mother     Hypothyroidism Mother     Hypertension Father     Breast cancer Sister     Thyroid disease Sister     Hypertension Sister      I have reviewed and agree with the history as documented      E-Cigarette/Vaping    E-Cigarette Use Never User      E-Cigarette/Vaping Substances     Social History     Tobacco Use    Smoking status: Never Smoker    Smokeless tobacco: Never Used   Substance Use Topics    Alcohol use: No     Comment: Social Alcohol Use -- as per allscripts (pt denies all alcohol use)    Drug use: No       Review of Systems   Constitutional: Negative for appetite change, fatigue and fever  HENT: Positive for sore throat  Negative for rhinorrhea  Eyes: Negative for pain  Respiratory: Positive for cough  Negative for shortness of breath and wheezing  Cardiovascular: Positive for chest pain  Negative for leg swelling  Gastrointestinal: Negative for abdominal pain, diarrhea, nausea and vomiting  Genitourinary: Negative for dysuria and flank pain  Musculoskeletal: Negative for back pain and neck pain  Skin: Negative for rash  Neurological: Negative for syncope and headaches  Psychiatric/Behavioral:        Mood normal       Physical Exam  Physical Exam  Vitals signs and nursing note reviewed  Constitutional:       Appearance: She is well-developed  HENT:      Head: Normocephalic and atraumatic  Mouth/Throat:      Mouth: Mucous membranes are moist       Pharynx: No posterior oropharyngeal erythema  Neck:      Musculoskeletal: Normal range of motion and neck supple  Cardiovascular:      Rate and Rhythm: Normal rate and regular rhythm  Pulmonary:      Effort: Pulmonary effort is normal  No respiratory distress  Breath sounds: Normal breath sounds  Abdominal:      Palpations: Abdomen is soft  Tenderness: There is no abdominal tenderness  Musculoskeletal: Normal range of motion  Skin:     General: Skin is warm and dry  Neurological:      Mental Status: She is alert and oriented to person, place, and time           Vital Signs  ED Triage Vitals   Temperature Pulse Respirations Blood Pressure SpO2   05/19/21 1704 05/19/21 1701 05/19/21 1701 05/19/21 1701 05/19/21 1701   98 °F (36 7 °C) 86 (!) 23 (!) 194/96 94 %      Temp Source Heart Rate Source Patient Position - Orthostatic VS BP Location FiO2 (%)   05/19/21 1704 05/19/21 1800 05/19/21 1800 05/19/21 1800 --   Oral Monitor Lying Right arm       Pain Score       05/19/21 1747       8           Vitals:    05/19/21 1701 05/19/21 1800 05/19/21 2125 05/19/21 2156   BP: (!) 194/96 164/72 142/60 162/77   Pulse: 86 66 74 66   Patient Position - Orthostatic VS:  Lying Lying          Visual Acuity      ED Medications  Medications   amLODIPine (NORVASC) tablet 5 mg (has no administration in time range)   aspirin chewable tablet 81 mg (has no administration in time range)   fluticasone-vilanterol (BREO ELLIPTA) 100-25 mcg/inh inhaler 1 puff (has no administration in time range)   furosemide (LASIX) tablet 20 mg (has no administration in time range)   gabapentin (NEURONTIN) capsule 300 mg (has no administration in time range)   gabapentin (NEURONTIN) capsule 600 mg (600 mg Oral Given 5/19/21 2253)   levothyroxine tablet 75 mcg (has no administration in time range)   melatonin tablet 6 mg (6 mg Oral Given 5/19/21 2253)   OLANZapine (ZyPREXA) tablet 2 5 mg (has no administration in time range)   pantoprazole (PROTONIX) EC tablet 40 mg (has no administration in time range)   OXcarbazepine (TRILEPTAL) tablet 150 mg (150 mg Oral Given 5/19/21 2153)   acetaminophen (TYLENOL) tablet 650 mg (has no administration in time range)   senna (SENOKOT) tablet 8 6 mg (has no administration in time range)   ondansetron (ZOFRAN) injection 4 mg (has no administration in time range)   calcium carbonate (TUMS) chewable tablet 1,000 mg (has no administration in time range)   heparin (porcine) subcutaneous injection 5,000 Units (5,000 Units Subcutaneous Given 5/19/21 2253)   insulin lispro (HumaLOG) 100 units/mL subcutaneous injection 1-6 Units (has no administration in time range)   insulin lispro (HumaLOG) 100 units/mL subcutaneous injection 1-6 Units (1 Units Subcutaneous Not Given 5/19/21 2252)   sodium chloride 0 9 % bolus 500 mL (0 mL Intravenous Stopped 5/19/21 2045)   acetaminophen (TYLENOL) tablet 975 mg (975 mg Oral Given 5/19/21 1747)   iohexol (OMNIPAQUE) 350 MG/ML injection (SINGLE-DOSE) 100 mL (100 mL Intravenous Given 5/19/21 1842) Diagnostic Studies  Results Reviewed     Procedure Component Value Units Date/Time    Urine Macroscopic, POC [[id number]]  (Abnormal) Collected: 05/19/21 1951    Lab Status: Final result Specimen: Urine Updated: 05/19/21 1952     Color, UA Yellow     Clarity, UA Clear     pH, UA 8 5     Leukocytes, UA Negative     Nitrite, UA Negative     Protein, UA Negative mg/dl      Glucose, UA Negative mg/dl      Ketones, UA Negative mg/dl      Urobilinogen, UA 0 2 E U /dl      Bilirubin, UA Negative     Blood, UA Negative     Specific Gravity, UA 1 010    Narrative:      CLINITEK RESULT    Novel Coronavirus (Covid-19),PCR [facility] - 2 Hour Stat [[id number]]  (Normal) Collected: 05/19/21 1749    Lab Status: Final result Specimen: Nares from Nasopharyngeal Swab Updated: 05/19/21 1927     SARS-CoV-2 Negative    Narrative: The specimen collection materials, transport medium, and/or testing methodology utilized in the production of these test results have been proven to be reliable in a limited validation with an abbreviated program under the Emergency Utilization Authorization provided by the FDA  Testing reported as "Presumptive positive" will be confirmed with secondary testing to ensure result accuracy  Clinical caution and judgement should be used with the interpretation of these results with consideration of the clinical impression and other laboratory testing  Testing reported as "Positive" or "Negative" has been proven to be accurate according to standard laboratory validation requirements  All testing is performed with control materials showing appropriate reactivity at standard intervals  TSH [[id number]]  (Normal) Collected: 05/19/21 1705    Lab Status: Final result Specimen: Blood from Arm, Left Updated: 05/19/21 1838     TSH 3RD GENERATON 1 468 uIU/mL     Narrative:      Patients undergoing fluorescein dye angiography may retain small amounts of fluorescein in the body for 48-72 hours post procedure  Samples containing fluorescein can produce falsely depressed TSH values  If the patient had this procedure,a specimen should be resubmitted post fluorescein clearance  NT-BNP PRO [[id number]]  (Abnormal) Collected: 05/19/21 1705    Lab Status: Final result Specimen: Blood from Arm, Left Updated: 05/19/21 1838     NT-proBNP 1,500 pg/mL     Protime-INR [[id number]]  (Normal) Collected: 05/19/21 1705    Lab Status: Final result Specimen: Blood from Arm, Left Updated: 05/19/21 1823     Protime 13 8 seconds      INR 1 08    APTT [[id number]]  (Normal) Collected: 05/19/21 1705    Lab Status: Final result Specimen: Blood from Arm, Left Updated: 05/19/21 1823     PTT 33 seconds     Lactic acid [[id number]]  (Normal) Collected: 05/19/21 1742    Lab Status: Final result Specimen: Blood from Arm, Left Updated: 05/19/21 1813     LACTIC ACID 1 0 mmol/L     Narrative:      Result may be elevated if tourniquet was used during collection      Troponin I [[id number]]  (Normal) Collected: 05/19/21 1705    Lab Status: Final result Specimen: Blood from Arm, Left Updated: 05/19/21 1807     Troponin I <0 02 ng/mL     Comprehensive metabolic panel [[id number]]  (Abnormal) Collected: 05/19/21 1705    Lab Status: Final result Specimen: Blood from Arm, Left Updated: 05/19/21 1805     Sodium 133 mmol/L      Potassium 3 7 mmol/L      Chloride 99 mmol/L      CO2 25 mmol/L      ANION GAP 9 mmol/L      BUN 14 mg/dL      Creatinine 1 14 mg/dL      Glucose 131 mg/dL      Calcium 9 4 mg/dL      AST 16 U/L      ALT 13 U/L      Alkaline Phosphatase 84 U/L      Total Protein 7 6 g/dL      Albumin 3 5 g/dL      Total Bilirubin 0 41 mg/dL      eGFR 46 ml/min/1 73sq m     Narrative:      [place] guidelines for Chronic Kidney Disease (CKD):     Stage 1 with normal or high GFR (GFR > 90 mL/min/1 73 square meters)    Stage 2 Mild CKD (GFR = 60-89 mL/min/1 73 square meters)    Stage 3A Moderate CKD (GFR = 45-59 mL/min/1 73 square meters)    Stage 3B Moderate CKD (GFR = 30-44 mL/min/1 73 square meters)    Stage 4 Severe CKD (GFR = 15-29 mL/min/1 73 square meters)    Stage 5 End Stage CKD (GFR <15 mL/min/1 73 square meters)  Note: GFR calculation is accurate only with a steady state creatinine    CBC and differential [[id number]] Collected: 05/19/21 1705    Lab Status: Final result Specimen: Blood from Arm, Left Updated: 05/19/21 1749     WBC 5 81 Thousand/uL      RBC 4 35 Million/uL      Hemoglobin 13 1 g/dL      Hematocrit 39 7 %      MCV 91 fL      MCH 30 1 pg      MCHC 33 0 g/dL      RDW 13 2 %      MPV 10 9 fL      Platelets 734 Thousands/uL      nRBC 0 /100 WBCs      Neutrophils Relative 71 %      Immat GRANS % 0 %      Lymphocytes Relative 15 %      Monocytes Relative 11 %      Eosinophils Relative 2 %      Basophils Relative 1 %      Neutrophils Absolute 4 10 Thousands/µL      Immature Grans Absolute 0 02 Thousand/uL      Lymphocytes Absolute 0 89 Thousands/µL      Monocytes Absolute 0 61 Thousand/µL      Eosinophils Absolute 0 13 Thousand/µL      Basophils Absolute 0 06 Thousands/µL     Blood culture #2 [[id number]] Collected: 05/19/21 1742    Lab Status: In process Specimen: Blood from Arm, Left Updated: 05/19/21 1746    Blood culture #1 [[id number]] Collected: 05/19/21 1740    Lab Status: In process Specimen: Blood from Arm, Right Updated: 05/19/21 1746                 CTA dissection protocol chest abdomen pelvis w wo contrast   Final Result by Nitza Taylor MD (05/19 1935)         1  No evidence of thoracic or abdominal aortic aneurysm or dissection  2   No acute cardiopulmonary process  3   No evidence of bowel obstruction, colitis or diverticulitis                    Workstation performed: [id number]                    Procedures  ECG 12 Lead Documentation Only    Date/Time: 5/19/2021 5:05 PM  Performed by: Jesús Delaney MD  Authorized by: Jesús Delaney MD     Rate:     ECG rate:  72    ECG rate assessment: normal    ST segments:     ST segments:  Non-specific             ED Course                             SBIRT 22yo+      Most Recent Value   SBIRT (22 yo +)   In order to provide better care to our patients, we are screening all of our patients for alcohol and drug use  Would it be okay to ask you these screening questions? Unable to answer at this time Filed at: 05/19/2021 1705        VAHID Risk Score      Most Recent Value   Age >= 72  1 Filed at: 05/19/2021 2128   Known CAD (stenosis >= 50%)  0 Filed at: 05/19/2021 2128   Recent (<=24 hrs) Service Angina  0 Filed at: 05/19/2021 2128   ST Deviation >= 0 5 mm  0 Filed at: 05/19/2021 2128   3+ CAD Risk Factors (FHx, HTN, HLP, DM, Smoker)  1 Filed at: 05/19/2021 2128   Aspirin Use Past 7 Days  1 Filed at: 05/19/2021 2128   Elevated Cardiac Markers  --   VAHID Risk Score (Calculated)  --                  MDM  Number of Diagnoses or Management Options  Chest pain:   Generalized weakness:      Amount and/or Complexity of Data Reviewed  Clinical lab tests: ordered and reviewed  Tests in the radiology section of CPT®: ordered and reviewed    Risk of Complications, Morbidity, and/or Mortality  Presenting problems: moderate  General comments: Pt  Was admitted for further workup/management          Disposition  Final diagnoses:   Generalized weakness   Chest pain     Time reflects when diagnosis was documented in both MDM as applicable and the Disposition within this note     Time User Action Codes Description Comment    5/19/2021  8:33 PM Evelina GOLDMAN Add [R53 1] Generalized weakness     5/19/2021  8:33 PM Linda Arriola Add [R07 9] Chest pain       ED Disposition     ED Disposition Condition Date/Time Comment    Admit Stable Wed May 19, 2021  8:33 PM Case was discussed with KISHAN  and the patient's admission status was agreed to be obs /tele        Follow-up Information    None         Current Discharge Medication List      CONTINUE these medications which have NOT CHANGED    Details   acetaminophen (TYLENOL) 325 mg tablet Take 2 tablets (650 mg total) by mouth every 6 (six) hours as needed for mild pain  Qty: 20 tablet, Refills: 0    Associated Diagnoses: Pyelonephritis      albuterol (VENTOLIN HFA) 90 mcg/act inhaler Inhale 2 puffs every 4 (four) hours as needed for wheezing  Qty: 1 Inhaler, Refills: 3    Associated Diagnoses: Dyspnea on exertion      aluminum-magnesium hydroxide-simethicone (MYLANTA) 200-200-20 mg/5 mL suspension Take 30 mL by mouth every 4 (four) hours as needed for indigestion or heartburn      amLODIPine (NORVASC) 5 mg tablet Take 1 tablet (5 mg total) by mouth daily  Qty: 90 tablet, Refills: 1    Associated Diagnoses: Nonrheumatic aortic valve stenosis      aspirin 81 MG tablet Take 81 mg by mouth daily        brimonidine (Alphagan P) 0 1 % Alphagan P 0 1 % eye drops   INT 1 GTT IN OU BID      butalbital-acetaminophen-caffeine (FIORICET,ESGIC) -40 mg per tablet Take 1 tablet by mouth every 4 (four) hours as needed for headaches  Qty: 6 tablet, Refills: 0    Associated Diagnoses: Tension-type headache, not intractable, unspecified chronicity pattern      Cholecalciferol (VITAMIN D3) 2000 units capsule Take 2,000 Units by mouth daily       cyclobenzaprine (FLEXERIL) 5 mg tablet Take 1 tablet (5 mg total) by mouth 3 (three) times a day as needed for muscle spasms  Qty: 15 tablet, Refills: 0    Associated Diagnoses: Pyelonephritis      Diclofenac Sodium (Voltaren) 1 % Voltaren 1 % topical gel      dorzolamide (TRUSOPT) 2 % ophthalmic solution Administer 1 drop to both eyes 3 (three) times a day        famotidine (PEPCID) 20 mg tablet Take 1 tablet (20 mg total) by mouth 2 (two) times a day  Qty: 60 tablet, Refills: 11    Associated Diagnoses: Gastroesophageal reflux disease without esophagitis      fluticasone (FLONASE) 50 mcg/act nasal spray 2 sprays into each nostril daily  Qty: 3 Bottle, Refills: 3    Associated Diagnoses: Moraxella catarrhalis bronchitis      fluticasone-vilanterol (BREO ELLIPTA) 100-25 mcg/inh inhaler Inhale 1 puff daily Rinse mouth after use  Qty: 1 Inhaler, Refills: 5    Associated Diagnoses: Dyspnea on minimal exertion      furosemide (LASIX) 20 mg tablet TAKE 1 TABLET IN THE MORNING  AND TAKE 2 TABLETS IN THE EVENING  Qty: 270 tablet, Refills: 11    Associated Diagnoses: Nonrheumatic aortic valve stenosis      gabapentin (NEURONTIN) 600 MG tablet Take 1/2 tab am, 1/2 tab afternoon and whole tab at bedtime  Qty: 90 tablet, Refills: 3    Comments: Hold until pt calls for fill  New directions  Associated Diagnoses: Cranial neuralgia      glucose blood (Accu-Chek Aline Plus) test strip Testing once daily E11 9  Qty: 100 each, Refills: 3    Associated Diagnoses: 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HCC)      Incontinence Supply Disposable (SELECT DISPOSABLE UNDERWEAR LG) MISC       ketorolac (TORADOL) 10 mg tablet Take 1 tablet (10 mg total) by mouth every 6 (six) hours as needed (migraine) Max 2-3 per week  Qty: 10 tablet, Refills: 0    Associated Diagnoses: Cranial neuralgia; Supraorbital neuralgia      Lancets (ACCU-CHEK SOFT TOUCH) lancets Testing 1 time daily              Dx: E11 9  Qty: 100 each, Refills: 3    Associated Diagnoses: 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HCC)      latanoprost (XALATAN) 0 005 % ophthalmic solution Apply 1 drop to eye daily at bedtime Both eyes  Qty: 7 5 mL, Refills: 1    Associated Diagnoses: Open-angle glaucoma, severe stage, unspecified laterality, unspecified open-angle glaucoma type      levothyroxine 75 mcg tablet TAKE 1 TABLET EVERY DAY  Qty: 90 tablet, Refills: 3    Associated Diagnoses: Hypothyroidism, unspecified type      Lidocaine-Menthol 4-5 % PTCH lidocaine patch      Melatonin 5 MG CAPS Take 3 mg by mouth daily at bedtime      nitroglycerin (NITROSTAT) 0 4 mg SL tablet Place 1 tablet (0 4 mg total) under the tongue every 5 (five) minutes as needed for chest pain  Qty: 25 tablet, Refills: 3    Associated Diagnoses: Stable angina (HCC)      nystatin (MYCOSTATIN) powder Apply topically 2 (two) times a day  Qty: 120 g, Refills: 3    Associated Diagnoses: Candidal intertrigo      OLANZapine (ZyPREXA) 5 mg tablet Take 1 tablet (5 mg total) by mouth daily  Qty: 90 tablet, Refills: 3    Associated Diagnoses: Hallucination      omeprazole (PriLOSEC) 40 MG capsule Take 1 capsule (40 mg total) by mouth daily  Qty: 90 capsule, Refills: 3    Associated Diagnoses: Gastroesophageal reflux disease without esophagitis      OXcarbazepine (TRILEPTAL) 150 mg tablet 1 tab qhs x 1 week, then BID  Qty: 180 tablet, Refills: 0    Associated Diagnoses: Atypical facial pain      potassium chloride (K-DUR,KLOR-CON) 20 mEq tablet Take 1 tablet (20 mEq total) by mouth daily  Qty: 90 tablet, Refills: 2    Associated Diagnoses: Nonrheumatic aortic valve stenosis      sitaGLIPtin (Januvia) 50 mg tablet Take 1 tablet (50 mg total) by mouth daily  Qty: 90 tablet, Refills: 0    Associated Diagnoses: Type 2 diabetes mellitus without complication, without long-term current use of insulin (HCC)           No discharge procedures on file      PDMP Review       Value Time User    PDMP Reviewed  Yes 11/18/2020  1:31 PM Sherry Neal PA-C          ED Provider  Electronically Signed by           Ruben Knox MD  05/19/21 9363

## 2021-05-20 VITALS
RESPIRATION RATE: 16 BRPM | OXYGEN SATURATION: 95 % | HEIGHT: 62 IN | TEMPERATURE: 97.9 F | WEIGHT: 166.67 LBS | DIASTOLIC BLOOD PRESSURE: 72 MMHG | HEART RATE: 59 BPM | BODY MASS INDEX: 30.67 KG/M2 | SYSTOLIC BLOOD PRESSURE: 144 MMHG

## 2021-05-20 LAB
ANION GAP SERPL CALCULATED.3IONS-SCNC: 9 MMOL/L (ref 4–13)
ATRIAL RATE: 62 BPM
ATRIAL RATE: 71 BPM
BASOPHILS # BLD AUTO: 0.05 THOUSANDS/ΜL (ref 0–0.1)
BASOPHILS NFR BLD AUTO: 1 % (ref 0–1)
BUN SERPL-MCNC: 13 MG/DL (ref 5–25)
CALCIUM SERPL-MCNC: 9 MG/DL (ref 8.3–10.1)
CHLORIDE SERPL-SCNC: 104 MMOL/L (ref 100–108)
CO2 SERPL-SCNC: 25 MMOL/L (ref 21–32)
CREAT SERPL-MCNC: 0.96 MG/DL (ref 0.6–1.3)
EOSINOPHIL # BLD AUTO: 0.2 THOUSAND/ΜL (ref 0–0.61)
EOSINOPHIL NFR BLD AUTO: 4 % (ref 0–6)
ERYTHROCYTE [DISTWIDTH] IN BLOOD BY AUTOMATED COUNT: 13.6 % (ref 11.6–15.1)
GFR SERPL CREATININE-BSD FRML MDRD: 57 ML/MIN/1.73SQ M
GLUCOSE P FAST SERPL-MCNC: 111 MG/DL (ref 65–99)
GLUCOSE SERPL-MCNC: 111 MG/DL (ref 65–140)
GLUCOSE SERPL-MCNC: 111 MG/DL (ref 65–140)
GLUCOSE SERPL-MCNC: 190 MG/DL (ref 65–140)
HCT VFR BLD AUTO: 38 % (ref 34.8–46.1)
HGB BLD-MCNC: 12.1 G/DL (ref 11.5–15.4)
IMM GRANULOCYTES # BLD AUTO: 0.02 THOUSAND/UL (ref 0–0.2)
IMM GRANULOCYTES NFR BLD AUTO: 0 % (ref 0–2)
LYMPHOCYTES # BLD AUTO: 1 THOUSANDS/ΜL (ref 0.6–4.47)
LYMPHOCYTES NFR BLD AUTO: 22 % (ref 14–44)
MCH RBC QN AUTO: 29.2 PG (ref 26.8–34.3)
MCHC RBC AUTO-ENTMCNC: 31.8 G/DL (ref 31.4–37.4)
MCV RBC AUTO: 92 FL (ref 82–98)
MONOCYTES # BLD AUTO: 0.53 THOUSAND/ΜL (ref 0.17–1.22)
MONOCYTES NFR BLD AUTO: 12 % (ref 4–12)
NEUTROPHILS # BLD AUTO: 2.79 THOUSANDS/ΜL (ref 1.85–7.62)
NEUTS SEG NFR BLD AUTO: 61 % (ref 43–75)
NRBC BLD AUTO-RTO: 0 /100 WBCS
P AXIS: 64 DEGREES
P AXIS: 7 DEGREES
PLATELET # BLD AUTO: 160 THOUSANDS/UL (ref 149–390)
PMV BLD AUTO: 10.4 FL (ref 8.9–12.7)
POTASSIUM SERPL-SCNC: 3.8 MMOL/L (ref 3.5–5.3)
PR INTERVAL: 222 MS
PR INTERVAL: 258 MS
QRS AXIS: 15 DEGREES
QRS AXIS: 27 DEGREES
QRSD INTERVAL: 114 MS
QRSD INTERVAL: 114 MS
QT INTERVAL: 410 MS
QT INTERVAL: 426 MS
QTC INTERVAL: 432 MS
QTC INTERVAL: 445 MS
RBC # BLD AUTO: 4.15 MILLION/UL (ref 3.81–5.12)
SODIUM SERPL-SCNC: 138 MMOL/L (ref 136–145)
T WAVE AXIS: 55 DEGREES
T WAVE AXIS: 76 DEGREES
TROPONIN I SERPL-MCNC: 0.02 NG/ML
VENTRICULAR RATE: 62 BPM
VENTRICULAR RATE: 71 BPM
WBC # BLD AUTO: 4.59 THOUSAND/UL (ref 4.31–10.16)

## 2021-05-20 PROCEDURE — 82948 REAGENT STRIP/BLOOD GLUCOSE: CPT

## 2021-05-20 PROCEDURE — 93005 ELECTROCARDIOGRAM TRACING: CPT

## 2021-05-20 PROCEDURE — 80048 BASIC METABOLIC PNL TOTAL CA: CPT | Performed by: PHYSICIAN ASSISTANT

## 2021-05-20 PROCEDURE — 85025 COMPLETE CBC W/AUTO DIFF WBC: CPT | Performed by: PHYSICIAN ASSISTANT

## 2021-05-20 PROCEDURE — 99217 PR OBSERVATION CARE DISCHARGE MANAGEMENT: CPT | Performed by: PHYSICIAN ASSISTANT

## 2021-05-20 PROCEDURE — 93010 ELECTROCARDIOGRAM REPORT: CPT | Performed by: INTERNAL MEDICINE

## 2021-05-20 PROCEDURE — 84484 ASSAY OF TROPONIN QUANT: CPT | Performed by: PHYSICIAN ASSISTANT

## 2021-05-20 RX ADMIN — OXCARBAZEPINE 150 MG: 150 TABLET, FILM COATED ORAL at 09:25

## 2021-05-20 RX ADMIN — HEPARIN SODIUM 5000 UNITS: 5000 INJECTION INTRAVENOUS; SUBCUTANEOUS at 06:21

## 2021-05-20 RX ADMIN — OLANZAPINE 2.5 MG: 5 TABLET, FILM COATED ORAL at 09:25

## 2021-05-20 RX ADMIN — PANTOPRAZOLE SODIUM 40 MG: 40 TABLET, DELAYED RELEASE ORAL at 06:21

## 2021-05-20 RX ADMIN — FUROSEMIDE 20 MG: 20 TABLET ORAL at 09:25

## 2021-05-20 RX ADMIN — SENNOSIDES 8.6 MG: 8.6 TABLET ORAL at 09:25

## 2021-05-20 RX ADMIN — GABAPENTIN 300 MG: 300 CAPSULE ORAL at 14:04

## 2021-05-20 RX ADMIN — HEPARIN SODIUM 5000 UNITS: 5000 INJECTION INTRAVENOUS; SUBCUTANEOUS at 14:04

## 2021-05-20 RX ADMIN — AMLODIPINE BESYLATE 5 MG: 5 TABLET ORAL at 09:24

## 2021-05-20 RX ADMIN — ASPIRIN 81 MG: 81 TABLET, CHEWABLE ORAL at 09:25

## 2021-05-20 RX ADMIN — GABAPENTIN 300 MG: 300 CAPSULE ORAL at 09:24

## 2021-05-20 RX ADMIN — INSULIN LISPRO 2 UNITS: 100 INJECTION, SOLUTION INTRAVENOUS; SUBCUTANEOUS at 12:37

## 2021-05-20 RX ADMIN — FLUTICASONE FUROATE AND VILANTEROL TRIFENATATE 1 PUFF: 100; 25 POWDER RESPIRATORY (INHALATION) at 09:25

## 2021-05-20 RX ADMIN — LEVOTHYROXINE SODIUM 75 MCG: 75 TABLET ORAL at 06:21

## 2021-05-20 NOTE — PLAN OF CARE
Problem: Potential for Falls  Goal: Patient will remain free of falls  Description: INTERVENTIONS:  - Assess patient frequently for physical needs  -  Identify cognitive and physical deficits and behaviors that affect risk of falls    -  [place] fall precautions as indicated by assessment   - Educate patient/family on patient safety including physical limitations  - Instruct patient to call for assistance with activity based on assessment  - Modify environment to reduce risk of injury  - Consider OT/PT consult to assist with strengthening/mobility  5/20/2021 0940 by Chetna Hood RN  Outcome: Progressing  5/20/2021 0940 by Chetna Hood RN  Outcome: Progressing     Problem: PAIN - ADULT  Goal: Verbalizes/displays adequate comfort level or baseline comfort level  Description: Interventions:  - Encourage patient to monitor pain and request assistance  - Assess pain using appropriate pain scale  - Administer analgesics based on type and severity of pain and evaluate response  - Implement non-pharmacological measures as appropriate and evaluate response  - Consider cultural and social influences on pain and pain management  - Notify physician/advanced practitioner if interventions unsuccessful or patient reports new pain  5/20/2021 0940 by Chetna Hood RN  Outcome: Progressing  5/20/2021 0940 by Chetna Hood RN  Outcome: Progressing     Problem: INFECTION - ADULT  Goal: Absence or prevention of progression during hospitalization  Description: INTERVENTIONS:  - Assess and monitor for signs and symptoms of infection  - Monitor lab/diagnostic results  - Monitor all insertion sites, i e  indwelling lines, tubes, and drains  - Monitor endotracheal if appropriate and nasal secretions for changes in amount and color  - [place] appropriate cooling/warming therapies per order  - Administer medications as ordered  - Instruct and encourage patient and family to use good hand hygiene technique  - Identify and instruct in appropriate isolation precautions for identified infection/condition  5/20/2021 0940 by Marie Villalta RN  Outcome: Progressing  5/20/2021 0940 by Marie Villalta RN  Outcome: Progressing  Goal: Absence of fever/infection during neutropenic period  Description: INTERVENTIONS:  - Monitor WBC    5/20/2021 0940 by Marie Villalta RN  Outcome: Progressing  5/20/2021 0940 by Marie Villalta RN  Outcome: Progressing     Problem: SAFETY ADULT  Goal: Patient will remain free of falls  Description: INTERVENTIONS:  - Assess patient frequently for physical needs  -  Identify cognitive and physical deficits and behaviors that affect risk of falls    -  [place] fall precautions as indicated by assessment   - Educate patient/family on patient safety including physical limitations  - Instruct patient to call for assistance with activity based on assessment  - Modify environment to reduce risk of injury  - Consider OT/PT consult to assist with strengthening/mobility  5/20/2021 0940 by Marie Villalta RN  Outcome: Progressing  5/20/2021 0940 by Marie Villalta RN  Outcome: Progressing  Goal: Maintain or return to baseline ADL function  Description: INTERVENTIONS:  -  Assess patient's ability to carry out ADLs; assess patient's baseline for ADL function and identify physical deficits which impact ability to perform ADLs (bathing, care of mouth/teeth, toileting, grooming, dressing, etc )  - Assess/evaluate cause of self-care deficits   - Assess range of motion  - Assess patient's mobility; develop plan if impaired  - Assess patient's need for assistive devices and provide as appropriate  - Encourage maximum independence but intervene and supervise when necessary  - Involve family in performance of ADLs  - Assess for home care needs following discharge   - Consider OT consult to assist with ADL evaluation and planning for discharge  - Provide patient education as appropriate  5/20/2021 0940 by Mark Calle Tamika Duarte RN  Outcome: Progressing  5/20/2021 0940 by Pallavi Haddad RN  Outcome: Progressing  Goal: Maintain or return mobility status to optimal level  Description: INTERVENTIONS:  - Assess patient's baseline mobility status (ambulation, transfers, stairs, etc )    - Identify cognitive and physical deficits and behaviors that affect mobility  - Identify mobility aids required to assist with transfers and/or ambulation (gait belt, sit-to-stand, lift, walker, cane, etc )  - [place] fall precautions as indicated by assessment  - Record patient progress and toleration of activity level on Mobility SBAR; progress patient to next Phase/Stage  - Instruct patient to call for assistance with activity based on assessment  - Consider rehabilitation consult to assist with strengthening/weightbearing, etc   5/20/2021 0940 by Pallavi Haddad RN  Outcome: Progressing  5/20/2021 0940 by Pallavi Haddad RN  Outcome: Progressing     Problem: DISCHARGE PLANNING  Goal: Discharge to home or other facility with appropriate resources  Description: INTERVENTIONS:  - Identify barriers to discharge w/patient and caregiver  - Arrange for needed discharge resources and transportation as appropriate  - Identify discharge learning needs (meds, wound care, etc )  - Arrange for interpretive services to assist at discharge as needed  - Refer to Case Management Department for coordinating discharge planning if the patient needs post-hospital services based on physician/advanced practitioner order or complex needs related to functional status, cognitive ability, or social support system  5/20/2021 0940 by Pallavi Haddad RN  Outcome: Progressing  5/20/2021 0940 by Pallavi Haddad RN  Outcome: Progressing     Problem: Knowledge Deficit  Goal: Patient/family/caregiver demonstrates understanding of disease process, treatment plan, medications, and discharge instructions  Description: Complete learning assessment and assess knowledge base   Interventions:  - Provide teaching at level of understanding  - Provide teaching via preferred learning methods  5/20/2021 0940 by Kayla Betancur RN  Outcome: Progressing  5/20/2021 0940 by Kayla Betancur RN  Outcome: Progressing     Problem: NEUROSENSORY - ADULT  Goal: Achieves stable or improved neurological status  Description: INTERVENTIONS  - Monitor and report changes in neurological status  - Monitor vital signs such as temperature, blood pressure, glucose, and any other labs ordered   - Initiate measures to prevent increased intracranial pressure  - Monitor for seizure activity and implement precautions if appropriate      5/20/2021 0940 by Kayla Betancur RN  Outcome: Progressing  5/20/2021 0940 by Kayla Betancur RN  Outcome: Progressing  Goal: Achieves maximal functionality and self care  Description: INTERVENTIONS  - Monitor swallowing and airway patency with patient fatigue and changes in neurological status  - Encourage and assist patient to increase activity and self care     - Encourage visually impaired, hearing impaired and aphasic patients to use assistive/communication devices  5/20/2021 0940 by Kayla Betancur RN  Outcome: Progressing  5/20/2021 0940 by Kayla Betancur RN  Outcome: Progressing     Problem: CARDIOVASCULAR - ADULT  Goal: Maintains optimal cardiac output and hemodynamic stability  Description: INTERVENTIONS:  - Monitor I/O, vital signs and rhythm  - Monitor for S/S and trends of decreased cardiac output  - Administer and titrate ordered vasoactive medications to optimize hemodynamic stability  - Assess quality of pulses, skin color and temperature  - Assess for signs of decreased coronary artery perfusion  - Instruct patient to report change in severity of symptoms  5/20/2021 0940 by Kayla Betancur RN  Outcome: Progressing  5/20/2021 0940 by Kayla Betancur RN  Outcome: Progressing  Goal: Absence of cardiac dysrhythmias or at baseline rhythm  Description: INTERVENTIONS:  - Continuous cardiac monitoring, vital signs, obtain 12 lead EKG if ordered  - Administer antiarrhythmic and heart rate control medications as ordered  - Monitor electrolytes and administer replacement therapy as ordered  5/20/2021 0940 by Mary Saunders RN  Outcome: Progressing  5/20/2021 0940 by Mary Saunders RN  Outcome: Progressing     Problem: RESPIRATORY - ADULT  Goal: Achieves optimal ventilation and oxygenation  Description: INTERVENTIONS:  - Assess for changes in respiratory status  - Assess for changes in mentation and behavior  - Position to facilitate oxygenation and minimize respiratory effort  - Oxygen administered by appropriate delivery if ordered  - Initiate smoking cessation education as indicated  - Encourage broncho-pulmonary hygiene including cough, deep breathe, Incentive Spirometry  - Assess the need for suctioning and aspirate as needed  - Assess and instruct to report SOB or any respiratory difficulty  - Respiratory Therapy support as indicated  5/20/2021 0940 by Mary Saunders RN  Outcome: Progressing  5/20/2021 0940 by Mary Saunders RN  Outcome: Progressing     Problem: GASTROINTESTINAL - ADULT  Goal: Maintains or returns to baseline bowel function  Description: INTERVENTIONS:  - Assess bowel function  - Encourage oral fluids to ensure adequate hydration  - Administer IV fluids if ordered to ensure adequate hydration  - Administer ordered medications as needed  - Encourage mobilization and activity  - Consider nutritional services referral to assist patient with adequate nutrition and appropriate food choices  5/20/2021 0940 by Mary Saunders RN  Outcome: Progressing  5/20/2021 0940 by Mary Saunders RN  Outcome: Progressing  Goal: Maintains adequate nutritional intake  Description: INTERVENTIONS:  - Monitor percentage of each meal consumed  - Identify factors contributing to decreased intake, treat as appropriate  - Assist with meals as needed  - Monitor I&O, weight, and lab values if indicated  - Obtain nutrition services referral as needed  5/20/2021 0940 by Clive Angulo RN  Outcome: Progressing  5/20/2021 0940 by Clive Angulo RN  Outcome: Progressing     Problem: GENITOURINARY - ADULT  Goal: Maintains or returns to baseline urinary function  Description: INTERVENTIONS:  - Assess urinary function  - Encourage oral fluids to ensure adequate hydration if ordered  - Administer IV fluids as ordered to ensure adequate hydration  - Administer ordered medications as needed  - Offer frequent toileting  - Follow urinary retention protocol if ordered  5/20/2021 0940 by Clive Angulo RN  Outcome: Progressing  5/20/2021 0940 by Clive Angulo RN  Outcome: Progressing     Problem: METABOLIC, FLUID AND ELECTROLYTES - ADULT  Goal: Electrolytes maintained within normal limits  Description: INTERVENTIONS:  - Monitor labs and assess patient for signs and symptoms of electrolyte imbalances  - Administer electrolyte replacement as ordered  - Monitor response to electrolyte replacements, including repeat lab results as appropriate  - Instruct patient on fluid and nutrition as appropriate  5/20/2021 0940 by Clive Angulo RN  Outcome: Progressing  5/20/2021 0940 by Clive Angulo RN  Outcome: Progressing  Goal: Fluid balance maintained  Description: INTERVENTIONS:  - Monitor labs   - Monitor I/O and WT  - Instruct patient on fluid and nutrition as appropriate  - Assess for signs & symptoms of volume excess or deficit  5/20/2021 0940 by Clive Angulo RN  Outcome: Progressing  5/20/2021 0940 by Clive Angulo RN  Outcome: Progressing  Goal: Glucose maintained within target range  Description: INTERVENTIONS:  - Monitor Blood Glucose as ordered  - Assess for signs and symptoms of hyperglycemia and hypoglycemia  - Administer ordered medications to maintain glucose within target range  - Assess nutritional intake and initiate nutrition service referral as needed  5/20/2021 0940 by Roderick Carias RN  Outcome: Progressing  5/20/2021 0940 by Roderick Carias RN  Outcome: Progressing     Problem: SKIN/TISSUE INTEGRITY - ADULT  Goal: Skin integrity remains intact  Description: INTERVENTIONS  - Identify patients at risk for skin breakdown  - Assess and monitor skin integrity  - Assess and monitor nutrition and hydration status  - Monitor labs (i e  albumin)  - Assess for incontinence   - Turn and reposition patient  - Assist with mobility/ambulation  - Relieve pressure over bony prominences  - Avoid friction and shearing  - Provide appropriate hygiene as needed including keeping skin clean and dry  - Evaluate need for skin moisturizer/barrier cream  - Collaborate with interdisciplinary team (i e  Nutrition, Rehabilitation, etc )   - Patient/family teaching  5/20/2021 0940 by Roderick Carias RN  Outcome: Progressing  5/20/2021 0940 by Roderick Carias RN  Outcome: Progressing  Goal: Oral mucous membranes remain intact  Description: INTERVENTIONS  - Assess oral mucosa and hygiene practices  - Implement preventative oral hygiene regimen  - Implement oral medicated treatments as ordered  - Initiate Nutrition services referral as needed  5/20/2021 0940 by Roderick Carias RN  Outcome: Progressing  5/20/2021 0940 by Roderick Carias RN  Outcome: Progressing     Problem: HEMATOLOGIC - ADULT  Goal: Maintains hematologic stability  Description: INTERVENTIONS  - Assess for signs and symptoms of bleeding or hemorrhage  - Monitor labs  - Administer supportive blood products/factors as ordered and appropriate  5/20/2021 0940 by Roderick Carias RN  Outcome: Progressing  5/20/2021 0940 by Roderick Carias RN  Outcome: Progressing     Problem: MUSCULOSKELETAL - ADULT  Goal: Maintain or return mobility to safest level of function  Description: INTERVENTIONS:  - Assess patient's ability to carry out ADLs; assess patient's baseline for ADL function and identify physical deficits which impact ability to perform ADLs (bathing, care of mouth/teeth, toileting, grooming, dressing, etc )  - Assess/evaluate cause of self-care deficits   - Assess range of motion  - Assess patient's mobility  - Assess patient's need for assistive devices and provide as appropriate  - Encourage maximum independence but intervene and supervise when necessary  - Involve family in performance of ADLs  - Assess for home care needs following discharge   - Consider OT consult to assist with ADL evaluation and planning for discharge  - Provide patient education as appropriate  5/20/2021 0940 by Romero Su RN  Outcome: Progressing  5/20/2021 0940 by Romero Su RN  Outcome: Progressing  Goal: Maintain proper alignment of affected body part  Description: INTERVENTIONS:  - Support, maintain and protect limb and body alignment  - Provide patient/ family with appropriate education  5/20/2021 0940 by Romero Su RN  Outcome: Progressing  5/20/2021 0940 by Romero uS RN  Outcome: Progressing

## 2021-05-20 NOTE — ASSESSMENT & PLAN NOTE
Wt Readings from Last 3 Encounters:   05/19/21 75 6 kg (166 lb 10 7 oz)   05/17/21 76 2 kg (168 lb)   05/11/21 76 7 kg (169 lb)     · Patient euvolemic on exam  · Echocardiogram performed in 2019 showed hyperdynamic systolic function the left ventricle, ejection fraction estimated to be approximately 70%, severe aortic stenosis  · Continue to monitor with daily weights  · Continue Lasix 20 mg q d

## 2021-05-20 NOTE — ASSESSMENT & PLAN NOTE
Lab Results   Component Value Date    HGBA1C 6 9 (A) 03/04/2021       No results for input(s): POCGLU in the last 72 hours  Blood Sugar Average: Last 72 hrs:  ·  patient on Januvia 50 mg outpatient, will discontinue while hospitalized  · Sliding scale insulin a c   Hs  · Hypoglycemia protocol

## 2021-05-20 NOTE — CASE MANAGEMENT
Pt admitted as observation with CP-notification reviewed by registration  Pt lives with her daughter, receives minimal asst with bathing from daughter and ambulated with a SPC  DME: SPC, RW, BSC, Shower Chair  Advance Care Planning     Pt denies having an official POA however states her daughter Tiana Carias [phone] is her HCR- EHR updated to reflect same  PCP is Denise ' Care seeing Mary MAXWELL and she uses [facility]'s on [address] for acute needs and [facility] delivery for maintenance meds  Pt's daughter transports to appointments and will do so on d/c  No further questions/concerns voiced  No barriers to d/c identified

## 2021-05-20 NOTE — ASSESSMENT & PLAN NOTE
Wt Readings from Last 3 Encounters:   05/20/21 75 6 kg (166 lb 10 7 oz)   05/17/21 76 2 kg (168 lb)   05/11/21 76 7 kg (169 lb)     · Patient euvolemic on exam  · Echocardiogram performed in 2019 showed hyperdynamic systolic function the left ventricle, ejection fraction estimated to be approximately 70%, severe aortic stenosis  · Continue to monitor with daily weights  · Continue Lasix 20 mg q d

## 2021-05-20 NOTE — H&P
[facility]  H&P- Ayden Haver 9/24/1922, 80 y o  female MRN: [id number]  Unit/Bed#: [address] 2 [address] [id number] Encounter: [id number]  Primary Care Provider: Andre Torres PA-C   Date and time admitted to hospital: 5/19/2021  4:56 PM    * Chest pain  Assessment & Plan  · Presenting with intermittent chest pain since 5/3  · States that her chest pain began after beginning antibiotics for urinary tract infection, states that she was switched to 3 different antibiotics all of which caused her chest pain  · Describes the pain as substernal, no radiation  · Initial troponin in the ED negative  · CTA chest, abdomen, pelvis without evidence of thoracic or abdominal aortic aneurysm or dissection, no acute cardiopulmonary process, no evidence of bowel obstruction, colitis, or diverticulitis  · Will trend troponins  · VAHID score 3  · Telemetry  · ASA 81  · EKG with first-degree heart block, LVH, unchanged from previous EKGs  · Last echocardiogram performed in 2019 showed ejection fraction of 70% and severe aortic stenosis    Hyponatremia  Assessment & Plan  · Sodium 133  · Will recheck BMP in a m  Essential hypertension  Assessment & Plan  · Continue amlodipine 5 mg  · Lasix 20 mg  · Monitor blood pressures per unit    Gastroesophageal reflux disease without esophagitis  Assessment & Plan  · Prilosec 40 mg q d  As an outpatient  · Will give Protonix 40 mg q d  While inpatient    Type 2 diabetes mellitus, uncontrolled, with neuropathy ([facility] [address] )  Assessment & Plan  Lab Results   Component Value Date    HGBA1C 6 9 (A) 03/04/2021       No results for input(s): POCGLU in the last 72 hours  Blood Sugar Average: Last 72 hrs:  ·  patient on Januvia 50 mg outpatient, will discontinue while hospitalized  · Sliding scale insulin a c   Hs  · Hypoglycemia protocol    Chronic diastolic congestive heart failure (HCC)  Assessment & Plan  Wt Readings from Last 3 Encounters:   05/19/21 75 6 kg (166 lb 10 7 oz) 05/17/21 76 2 kg (168 lb)   05/11/21 76 7 kg (169 lb)     · Patient euvolemic on exam  · Echocardiogram performed in 2019 showed hyperdynamic systolic function the left ventricle, ejection fraction estimated to be approximately 70%, severe aortic stenosis  · Continue to monitor with daily weights  · Continue Lasix 20 mg q d  Hypothyroidism  Assessment & Plan  · Continue levothyroxine 75 mcg q d  Hyperlipidemia  Assessment & Plan  · Maintained without medications    VTE Prophylaxis: Heparin  / sequential compression device   Code Status:  DNR/DNI  POLST: There is no POLST form on file for this patient (pre-hospital)  Discussion with family:  Discussed plan of care with patient, answering questions  Anticipated Length of Stay:  Patient will be admitted on an Observation basis with an anticipated length of stay of  less than 2 midnights  Justification for Hospital Stay:  Chest pain rule out    Total Time for Visit, including Counseling / Coordination of Care: 60 minutes  Greater than 50% of this total time spent on direct patient counseling and coordination of care  Chief Complaint:   Chest pain    History of Present Illness:    Cece Fan is a 80 y o  female with past medical history of type 2 diabetes, hypothyroidism, hyperlipidemia, hypertension, CHF who presents with chest pain  The patient reports that her chest pain began on 05/03 when she was started on antibiotics for a urinary tract infection  She states that she was then switched to 3 different antibiotics all of which caused her to have chest pain  She describes the chest pain as substernal, nonradiating, 4/10 in severity  States that nothing makes the pain better or worse  Reports shortness of breath, however this is baseline for her, denies any worsening dyspnea  While in the ED, the patient's EKG was nonischemic, initial troponin negative, with mild hyponatremia 133, all other labs within normal limits    The patient will be admitted to observation a monitor on telemetry for chest pain rule-out  Review of Systems:    Review of Systems   Constitutional: Negative for appetite change, chills, diaphoresis, fatigue and fever  HENT: Negative for ear pain and sore throat  Eyes: Negative for pain and visual disturbance  Respiratory: Positive for shortness of breath  Negative for cough and wheezing  Cardiovascular: Positive for chest pain  Negative for palpitations and leg swelling  Gastrointestinal: Negative for abdominal distention, abdominal pain, constipation, diarrhea, nausea and vomiting  Endocrine: Negative for polyphagia and polyuria  Genitourinary: Negative for dysuria, frequency, hematuria and urgency  Musculoskeletal: Negative for arthralgias and back pain  Skin: Negative for color change and rash  Neurological: Negative for dizziness, seizures, syncope, facial asymmetry, weakness, light-headedness, numbness and headaches  Past Medical and Surgical History:     Past Medical History:   Diagnosis Date    Asthma     Atypical chest pain     CAD (coronary artery disease)     Candidal intertrigo     CHF (congestive heart failure) (HCC)     Depression     Diabetes mellitus (HCC)     Diabetic neuropathy (HCC)     Diverticulitis     Dyslipidemia     Female bladder prolapse     Gastric ulcer     Glaucoma     HTN (hypertension)     Hyperlipidemia     Hypothyroidism 4/18/2016    RAD (reactive airway disease)        Past Surgical History:   Procedure Laterality Date    COLONOSCOPY      ESOPHAGOGASTRODUODENOSCOPY  2011    HYSTERECTOMY      TONSILLECTOMY         Meds/Allergies:    Prior to Admission medications    Medication Sig Start Date End Date Taking?  Authorizing Provider   acetaminophen (TYLENOL) 325 mg tablet Take 2 tablets (650 mg total) by mouth every 6 (six) hours as needed for mild pain  Patient not taking: Reported on 4/6/2021 1/24/21   Alberto Long MD   albuterol (VENTOLIN HFA) 90 mcg/act inhaler Inhale 2 puffs every 4 (four) hours as needed for wheezing  Patient not taking: Reported on 3/25/2021 4/16/19   Nelli Tompkins MD   aluminum-magnesium hydroxide-simethicone (MYLANTA) 200-200-20 mg/5 mL suspension Take 30 mL by mouth every 4 (four) hours as needed for indigestion or heartburn    Historical Provider, MD   amLODIPine (NORVASC) 5 mg tablet Take 1 tablet (5 mg total) by mouth daily 12/29/20   Yudith Lynch PA-C   aspirin 81 MG tablet Take 81 mg by mouth daily  Historical Provider, MD   brimonidine (Alphagan P) 0 1 % Alphagan P 0 1 % eye drops   INT 1 GTT IN OU BID    Historical Provider, MD   butalbital-acetaminophen-caffeine (FIORICET,ESGIC) -40 mg per tablet Take 1 tablet by mouth every 4 (four) hours as needed for headaches  Patient not taking: Reported on 3/25/2021 2/9/20   Lotus Fu MD   Cholecalciferol (VITAMIN D3) 2000 units capsule Take 2,000 Units by mouth daily     Historical Provider, MD   cyclobenzaprine (FLEXERIL) 5 mg tablet Take 1 tablet (5 mg total) by mouth 3 (three) times a day as needed for muscle spasms  Patient not taking: Reported on 3/25/2021 1/24/21   Jeremi Cerna MD   Diclofenac Sodium (Voltaren) 1 % Voltaren 1 % topical gel    Historical Provider, MD   dorzolamide (TRUSOPT) 2 % ophthalmic solution Administer 1 drop to both eyes 3 (three) times a day      Historical Provider, MD   famotidine (PEPCID) 20 mg tablet Take 1 tablet (20 mg total) by mouth 2 (two) times a day  Patient not taking: Reported on 3/25/2021 12/30/20   Yudith Lynch PA-C   fluticasone [facility]) 50 mcg/act nasal spray 2 sprays into each nostril daily  Patient not taking: Reported on 4/6/2021 3/16/19   Nelli Tompkins MD   fluticasone-vilanterol (BREO ELLIPTA) 100-25 mcg/inh inhaler Inhale 1 puff daily Rinse mouth after use    Patient not taking: Reported on 5/11/2021 10/15/19   Shelia Cardozo DO   furosemide (LASIX) 20 mg tablet TAKE 1 TABLET IN THE MORNING  AND TAKE 2 TABLETS IN THE EVENING 4/21/21   Shelia Cardozo DO   gabapentin (NEURONTIN) 600 MG tablet Take 1/2 tab am, 1/2 tab afternoon and whole tab at bedtime  5/26/20   Lauren Carvalho PA-C   glucose blood (Accu-Chek Aline Plus) test strip Testing once daily E11 9 5/11/20   Luz Elena Dunbar PA-C   Incontinence Supply Disposable (SELECT DISPOSABLE UNDERWEAR LG) MISC  6/14/19   Historical Provider, MD   ketorolac (TORADOL) 10 mg tablet Take 1 tablet (10 mg total) by mouth every 6 (six) hours as needed (migraine) Max 2-3 per week  Patient not taking: Reported on 4/6/2021 3/25/21   Lauren TYRONE Carvalho   Lancets (ACCU-CHEK SOFT TOUCH) lancets Testing 1 time daily              Dx: E11 9 4/28/20   Luz Elena Dunbar PA-C   latanoprost (XALATAN) 0 005 % ophthalmic solution Apply 1 drop to eye daily at bedtime Both eyes 3/16/19   Toyin Negron MD   levothyroxine 75 mcg tablet TAKE 1 TABLET EVERY DAY 7/2/20   Celso Marie DO   Lidocaine-Menthol 4-5 % [facility] lidocaine patch    Historical Provider, MD   Melatonin 5 MG CAPS Take 3 mg by mouth daily at bedtime    Historical Provider, MD   nitroglycerin (NITROSTAT) 0 4 mg SL tablet Place 1 tablet (0 4 mg total) under the tongue every 5 (five) minutes as needed for chest pain  Patient not taking: Reported on 3/25/2021 11/5/19   Shelia Cardozo DO   nystatin (MYCOSTATIN) powder Apply topically 2 (two) times a day 8/21/20   Luz Elena Dunbar PA-C   OLANZapine (ZyPREXA) 5 mg tablet Take 1 tablet (5 mg total) by mouth daily 6/25/20   Luz Elena Dunbar PA-C   omeprazole (PriLOSEC) 40 MG capsule Take 1 capsule (40 mg total) by mouth daily 3/25/20   Luz Elena Dunbar PA-C   OXcarbazepine (TRILEPTAL) 150 mg tablet 1 tab qhs x 1 week, then BID 3/18/21   Lauren TYRONE Carvalho   potassium chloride (K-DUR,KLOR-CON) 20 mEq tablet Take 1 tablet (20 mEq total) by mouth daily 6/10/20   Shelia Cardozo DO   sitaGLIPtin (Januvia) 50 mg tablet Take 1 tablet (50 mg total) by mouth daily 5/13/21   Norah Kaur PA-C     I have reviewed home medications with patient personally  Allergies: Allergies   Allergen Reactions    Levaquin [Levofloxacin] Myalgia    Statins      Other reaction(s): Weakness  Category: Adverse Reaction;        Social History:     Marital Status:    Occupation:  Unemployed  Patient Pre-hospital Living Situation:  With daughter  Patient Pre-hospital Level of Mobility:  Ambulatory, with cane  Patient Pre-hospital Diet Restrictions:  None  Substance Use History:   Social History     Substance and Sexual Activity   Alcohol Use No    Comment: Social Alcohol Use -- as per allscripts (pt denies all alcohol use)     Social History     Tobacco Use   Smoking Status Never Smoker   Smokeless Tobacco Never Used     Social History     Substance and Sexual Activity   Drug Use No       Family History:    Family History   Problem Relation Age of Onset    Breast cancer Mother     Hypothyroidism Mother     Hypertension Father     Breast cancer Sister     Thyroid disease Sister     Hypertension Sister        Physical Exam:     Vitals:   Blood Pressure: 142/60 (05/19/21 2125)  Pulse: 74 (05/19/21 2125)  Temperature: 98 °F (36 7 °C) (05/19/21 1704)  Temp Source: Oral (05/19/21 1704)  Respirations: 18 (05/19/21 2125)  Height: 5' 2" (157 5 cm) (05/19/21 1701)  Weight - Scale: 75 6 kg (166 lb 10 7 oz) (05/19/21 1701)  SpO2: 96 % (05/19/21 2125)    Physical Exam  Vitals signs and nursing note reviewed  Constitutional:       General: She is not in acute distress  Appearance: Normal appearance  She is well-developed and normal weight  She is not ill-appearing, toxic-appearing or diaphoretic  Comments: Appears significantly younger than stated age   HENT:      Head: Normocephalic and atraumatic  Eyes:      General: No scleral icterus  Conjunctiva/sclera: Conjunctivae normal    Neck:      Musculoskeletal: Neck supple        Comments: No JVD  Cardiovascular:      Rate and Rhythm: Normal rate and regular rhythm  Heart sounds: Murmur present  No friction rub  No gallop  Pulmonary:      Effort: Pulmonary effort is normal  No respiratory distress  Breath sounds: Normal breath sounds  No stridor  No wheezing, rhonchi or rales  Chest:      Chest wall: Tenderness present  Abdominal:      General: Abdomen is flat  Bowel sounds are normal  There is no distension  Palpations: Abdomen is soft  Tenderness: There is no abdominal tenderness  There is no guarding or rebound  Musculoskeletal:      Right lower leg: No edema  Left lower leg: No edema  Skin:     General: Skin is warm and dry  Capillary Refill: Capillary refill takes less than 2 seconds  Coloration: Skin is not jaundiced or pale  Findings: No erythema  Neurological:      General: No focal deficit present  Mental Status: She is alert and oriented to person, place, and time  Mental status is at baseline  Cranial Nerves: No cranial nerve deficit  Motor: No weakness  Additional Data:     Lab Results: I have personally reviewed pertinent reports     and I have personally reviewed pertinent films in PACS    Results from last 7 days   Lab Units 05/19/21  1705   WBC Thousand/uL 5 81   HEMOGLOBIN g/dL 13 1   HEMATOCRIT % 39 7   PLATELETS Thousands/uL 179   NEUTROS PCT % 71   LYMPHS PCT % 15   MONOS PCT % 11   EOS PCT % 2     Results from last 7 days   Lab Units 05/19/21  1705   SODIUM mmol/L 133*   POTASSIUM mmol/L 3 7   CHLORIDE mmol/L 99*   CO2 mmol/L 25   BUN mg/dL 14   CREATININE mg/dL 1 14   ANION GAP mmol/L 9   CALCIUM mg/dL 9 4   ALBUMIN g/dL 3 5   TOTAL BILIRUBIN mg/dL 0 41   ALK PHOS U/L 84   ALT U/L 13   AST U/L 16   GLUCOSE RANDOM mg/dL 131     Results from last 7 days   Lab Units 05/19/21  1705   INR  1 08             Results from last 7 days   Lab Units 05/19/21  1742   LACTIC ACID mmol/L 1 0       Imaging: I have personally reviewed pertinent reports  and I have personally reviewed pertinent films in PACS    CTA dissection protocol chest abdomen pelvis w wo contrast   Final Result by Andre Herron MD (05/19 1935)         1  No evidence of thoracic or abdominal aortic aneurysm or dissection  2   No acute cardiopulmonary process  3   No evidence of bowel obstruction, colitis or diverticulitis  Workstation performed: [id number]             EKG, Pathology, and Other Studies Reviewed on Admission:   · EKG:  First-degree AV block, left ventricular hypertrophy, unchanged from previous EKG  No signs of acute ischemia  Allscripts / Epic Records Reviewed: Yes     ** Please Note: This note has been constructed using a voice recognition system   **

## 2021-05-20 NOTE — PLAN OF CARE
Problem: Potential for Falls  Goal: Patient will remain free of falls  Description: INTERVENTIONS:  - Assess patient frequently for physical needs  -  Identify cognitive and physical deficits and behaviors that affect risk of falls    -  [place] fall precautions as indicated by assessment   - Educate patient/family on patient safety including physical limitations  - Instruct patient to call for assistance with activity based on assessment  - Modify environment to reduce risk of injury  - Consider OT/PT consult to assist with strengthening/mobility  5/20/2021 1300 by Suzanne Trevino RN  Outcome: Adequate for Discharge  5/20/2021 0940 by Suzanne Trevino RN  Outcome: Progressing     Problem: PAIN - ADULT  Goal: Verbalizes/displays adequate comfort level or baseline comfort level  Description: Interventions:  - Encourage patient to monitor pain and request assistance  - Assess pain using appropriate pain scale  - Administer analgesics based on type and severity of pain and evaluate response  - Implement non-pharmacological measures as appropriate and evaluate response  - Consider cultural and social influences on pain and pain management  - Notify physician/advanced practitioner if interventions unsuccessful or patient reports new pain  5/20/2021 1300 by Suzanne Trevino RN  Outcome: Adequate for Discharge  5/20/2021 0940 by Suzanne Trevino RN  Outcome: Progressing     Problem: INFECTION - ADULT  Goal: Absence or prevention of progression during hospitalization  Description: INTERVENTIONS:  - Assess and monitor for signs and symptoms of infection  - Monitor lab/diagnostic results  - Monitor all insertion sites, i e  indwelling lines, tubes, and drains  - Monitor endotracheal if appropriate and nasal secretions for changes in amount and color  - [place] appropriate cooling/warming therapies per order  - Administer medications as ordered  - Instruct and encourage patient and family to use good hand hygiene technique  - Identify and instruct in appropriate isolation precautions for identified infection/condition  5/20/2021 1300 by Kayla Betancur RN  Outcome: Adequate for Discharge  5/20/2021 0940 by Kayla Betancur RN  Outcome: Progressing  Goal: Absence of fever/infection during neutropenic period  Description: INTERVENTIONS:  - Monitor WBC    5/20/2021 1300 by Kayla Betancur RN  Outcome: Adequate for Discharge  5/20/2021 0940 by Kayla Betancur RN  Outcome: Progressing     Problem: SAFETY ADULT  Goal: Patient will remain free of falls  Description: INTERVENTIONS:  - Assess patient frequently for physical needs  -  Identify cognitive and physical deficits and behaviors that affect risk of falls    -  [place] fall precautions as indicated by assessment   - Educate patient/family on patient safety including physical limitations  - Instruct patient to call for assistance with activity based on assessment  - Modify environment to reduce risk of injury  - Consider OT/PT consult to assist with strengthening/mobility  5/20/2021 1300 by Kayla Betancur RN  Outcome: Adequate for Discharge  5/20/2021 0940 by Kayla Betancur RN  Outcome: Progressing  Goal: Maintain or return to baseline ADL function  Description: INTERVENTIONS:  -  Assess patient's ability to carry out ADLs; assess patient's baseline for ADL function and identify physical deficits which impact ability to perform ADLs (bathing, care of mouth/teeth, toileting, grooming, dressing, etc )  - Assess/evaluate cause of self-care deficits   - Assess range of motion  - Assess patient's mobility; develop plan if impaired  - Assess patient's need for assistive devices and provide as appropriate  - Encourage maximum independence but intervene and supervise when necessary  - Involve family in performance of ADLs  - Assess for home care needs following discharge   - Consider OT consult to assist with ADL evaluation and planning for discharge  - Provide patient education as appropriate  5/20/2021 1300 by Anisha Casarez RN  Outcome: Adequate for Discharge  5/20/2021 0940 by Anisha Casarez RN  Outcome: Progressing  Goal: Maintain or return mobility status to optimal level  Description: INTERVENTIONS:  - Assess patient's baseline mobility status (ambulation, transfers, stairs, etc )    - Identify cognitive and physical deficits and behaviors that affect mobility  - Identify mobility aids required to assist with transfers and/or ambulation (gait belt, sit-to-stand, lift, walker, cane, etc )  - [place] fall precautions as indicated by assessment  - Record patient progress and toleration of activity level on Mobility SBAR; progress patient to next Phase/Stage  - Instruct patient to call for assistance with activity based on assessment  - Consider rehabilitation consult to assist with strengthening/weightbearing, etc   5/20/2021 1300 by Anisha Casarez RN  Outcome: Adequate for Discharge  5/20/2021 0940 by Anisha Casarez RN  Outcome: Progressing     Problem: DISCHARGE PLANNING  Goal: Discharge to home or other facility with appropriate resources  Description: INTERVENTIONS:  - Identify barriers to discharge w/patient and caregiver  - Arrange for needed discharge resources and transportation as appropriate  - Identify discharge learning needs (meds, wound care, etc )  - Arrange for interpretive services to assist at discharge as needed  - Refer to Case Management Department for coordinating discharge planning if the patient needs post-hospital services based on physician/advanced practitioner order or complex needs related to functional status, cognitive ability, or social support system  5/20/2021 1300 by Anisha Casarez RN  Outcome: Adequate for Discharge  5/20/2021 0940 by Anisha Casarez RN  Outcome: Progressing     Problem: Knowledge Deficit  Goal: Patient/family/caregiver demonstrates understanding of disease process, treatment plan, medications, and discharge instructions  Description: Complete learning assessment and assess knowledge base  Interventions:  - Provide teaching at level of understanding  - Provide teaching via preferred learning methods  5/20/2021 1300 by Mello Guido RN  Outcome: Adequate for Discharge  5/20/2021 0940 by Mello Guido RN  Outcome: Progressing     Problem: NEUROSENSORY - ADULT  Goal: Achieves stable or improved neurological status  Description: INTERVENTIONS  - Monitor and report changes in neurological status  - Monitor vital signs such as temperature, blood pressure, glucose, and any other labs ordered   - Initiate measures to prevent increased intracranial pressure  - Monitor for seizure activity and implement precautions if appropriate      5/20/2021 1300 by Mello Guido RN  Outcome: Adequate for Discharge  5/20/2021 0940 by Mello Guido RN  Outcome: Progressing  Goal: Achieves maximal functionality and self care  Description: INTERVENTIONS  - Monitor swallowing and airway patency with patient fatigue and changes in neurological status  - Encourage and assist patient to increase activity and self care     - Encourage visually impaired, hearing impaired and aphasic patients to use assistive/communication devices  5/20/2021 1300 by Mello Guido RN  Outcome: Adequate for Discharge  5/20/2021 0940 by Mello Guido RN  Outcome: Progressing     Problem: CARDIOVASCULAR - ADULT  Goal: Maintains optimal cardiac output and hemodynamic stability  Description: INTERVENTIONS:  - Monitor I/O, vital signs and rhythm  - Monitor for S/S and trends of decreased cardiac output  - Administer and titrate ordered vasoactive medications to optimize hemodynamic stability  - Assess quality of pulses, skin color and temperature  - Assess for signs of decreased coronary artery perfusion  - Instruct patient to report change in severity of symptoms  5/20/2021 1300 by Mello Guido RN  Outcome: Adequate for Discharge  5/20/2021 0940 by Maru Velazquez RN  Outcome: Progressing  Goal: Absence of cardiac dysrhythmias or at baseline rhythm  Description: INTERVENTIONS:  - Continuous cardiac monitoring, vital signs, obtain 12 lead EKG if ordered  - Administer antiarrhythmic and heart rate control medications as ordered  - Monitor electrolytes and administer replacement therapy as ordered  5/20/2021 1300 by Maru Velazquez RN  Outcome: Adequate for Discharge  5/20/2021 0940 by Maru Velazquez RN  Outcome: Progressing     Problem: RESPIRATORY - ADULT  Goal: Achieves optimal ventilation and oxygenation  Description: INTERVENTIONS:  - Assess for changes in respiratory status  - Assess for changes in mentation and behavior  - Position to facilitate oxygenation and minimize respiratory effort  - Oxygen administered by appropriate delivery if ordered  - Initiate smoking cessation education as indicated  - Encourage broncho-pulmonary hygiene including cough, deep breathe, Incentive Spirometry  - Assess the need for suctioning and aspirate as needed  - Assess and instruct to report SOB or any respiratory difficulty  - Respiratory Therapy support as indicated  5/20/2021 1300 by Maru Velazquez RN  Outcome: Adequate for Discharge  5/20/2021 0940 by Maru Velazquez RN  Outcome: Progressing     Problem: GASTROINTESTINAL - ADULT  Goal: Maintains or returns to baseline bowel function  Description: INTERVENTIONS:  - Assess bowel function  - Encourage oral fluids to ensure adequate hydration  - Administer IV fluids if ordered to ensure adequate hydration  - Administer ordered medications as needed  - Encourage mobilization and activity  - Consider nutritional services referral to assist patient with adequate nutrition and appropriate food choices  5/20/2021 1300 by Maru Velazquez RN  Outcome: Adequate for Discharge  5/20/2021 0940 by Maru Velazquez RN  Outcome: Progressing  Goal: Maintains adequate nutritional intake  Description: INTERVENTIONS:  - Monitor percentage of each meal consumed  - Identify factors contributing to decreased intake, treat as appropriate  - Assist with meals as needed  - Monitor I&O, weight, and lab values if indicated  - Obtain nutrition services referral as needed  5/20/2021 1300 by Jakob Little RN  Outcome: Adequate for Discharge  5/20/2021 0940 by Jakob Little RN  Outcome: Progressing     Problem: GENITOURINARY - ADULT  Goal: Maintains or returns to baseline urinary function  Description: INTERVENTIONS:  - Assess urinary function  - Encourage oral fluids to ensure adequate hydration if ordered  - Administer IV fluids as ordered to ensure adequate hydration  - Administer ordered medications as needed  - Offer frequent toileting  - Follow urinary retention protocol if ordered  5/20/2021 1300 by Jakob Little RN  Outcome: Adequate for Discharge  5/20/2021 0940 by Jakob Little RN  Outcome: Progressing     Problem: METABOLIC, FLUID AND ELECTROLYTES - ADULT  Goal: Electrolytes maintained within normal limits  Description: INTERVENTIONS:  - Monitor labs and assess patient for signs and symptoms of electrolyte imbalances  - Administer electrolyte replacement as ordered  - Monitor response to electrolyte replacements, including repeat lab results as appropriate  - Instruct patient on fluid and nutrition as appropriate  5/20/2021 1300 by Jakob Little RN  Outcome: Adequate for Discharge  5/20/2021 0940 by Jakob Little RN  Outcome: Progressing  Goal: Fluid balance maintained  Description: INTERVENTIONS:  - Monitor labs   - Monitor I/O and WT  - Instruct patient on fluid and nutrition as appropriate  - Assess for signs & symptoms of volume excess or deficit  5/20/2021 1300 by Jakob Little RN  Outcome: Adequate for Discharge  5/20/2021 0940 by Jakob Little RN  Outcome: Progressing  Goal: Glucose maintained within target range  Description: INTERVENTIONS:  - Monitor Blood Glucose as ordered  - Assess for signs and symptoms of hyperglycemia and hypoglycemia  - Administer ordered medications to maintain glucose within target range  - Assess nutritional intake and initiate nutrition service referral as needed  5/20/2021 1300 by Rebekah Carnes RN  Outcome: Adequate for Discharge  5/20/2021 0940 by Rebekah Carnes RN  Outcome: Progressing     Problem: SKIN/TISSUE INTEGRITY - ADULT  Goal: Skin integrity remains intact  Description: INTERVENTIONS  - Identify patients at risk for skin breakdown  - Assess and monitor skin integrity  - Assess and monitor nutrition and hydration status  - Monitor labs (i e  albumin)  - Assess for incontinence   - Turn and reposition patient  - Assist with mobility/ambulation  - Relieve pressure over bony prominences  - Avoid friction and shearing  - Provide appropriate hygiene as needed including keeping skin clean and dry  - Evaluate need for skin moisturizer/barrier cream  - Collaborate with interdisciplinary team (i e  Nutrition, Rehabilitation, etc )   - Patient/family teaching  5/20/2021 1300 by Rebekah Carnes RN  Outcome: Adequate for Discharge  5/20/2021 0940 by Rebekah Carnes RN  Outcome: Progressing  Goal: Oral mucous membranes remain intact  Description: INTERVENTIONS  - Assess oral mucosa and hygiene practices  - Implement preventative oral hygiene regimen  - Implement oral medicated treatments as ordered  - Initiate Nutrition services referral as needed  5/20/2021 1300 by Rebekah Carnes RN  Outcome: Adequate for Discharge  5/20/2021 0940 by Rebekah Carnes RN  Outcome: Progressing     Problem: HEMATOLOGIC - ADULT  Goal: Maintains hematologic stability  Description: INTERVENTIONS  - Assess for signs and symptoms of bleeding or hemorrhage  - Monitor labs  - Administer supportive blood products/factors as ordered and appropriate  5/20/2021 1300 by Rebekah Carnes RN  Outcome: Adequate for Discharge  5/20/2021 0940 by John Arrieta Rashad Davis RN  Outcome: Progressing     Problem: MUSCULOSKELETAL - ADULT  Goal: Maintain or return mobility to safest level of function  Description: INTERVENTIONS:  - Assess patient's ability to carry out ADLs; assess patient's baseline for ADL function and identify physical deficits which impact ability to perform ADLs (bathing, care of mouth/teeth, toileting, grooming, dressing, etc )  - Assess/evaluate cause of self-care deficits   - Assess range of motion  - Assess patient's mobility  - Assess patient's need for assistive devices and provide as appropriate  - Encourage maximum independence but intervene and supervise when necessary  - Involve family in performance of ADLs  - Assess for home care needs following discharge   - Consider OT consult to assist with ADL evaluation and planning for discharge  - Provide patient education as appropriate  5/20/2021 1300 by Say Crouch RN  Outcome: Adequate for Discharge  5/20/2021 0940 by Say Crouch RN  Outcome: Progressing  Goal: Maintain proper alignment of affected body part  Description: INTERVENTIONS:  - Support, maintain and protect limb and body alignment  - Provide patient/ family with appropriate education  5/20/2021 1300 by Say Crouch RN  Outcome: Adequate for Discharge  5/20/2021 0940 by Say Crouch RN  Outcome: Progressing

## 2021-05-20 NOTE — NURSING NOTE
Pt  D/C home  D/C instructions reviewed with pt  And daughter, Bran Ruelas  Both verbalized understanding  Pt  IV removed  Pt  Masimo safety net removed  Pt  Sent home with paper work and all personal items  Pt  Escorted to car by PCA in w/c

## 2021-05-20 NOTE — DISCHARGE INSTRUCTIONS
Chest Pain   WHAT YOU NEED TO KNOW:   Chest pain can be caused by a range of conditions, from not serious to life-threatening  Chest pain can be a symptom of a digestive problem, such as acid reflux or a stomach ulcer  An anxiety attack or a strong emotion, such as anger, can also cause chest pain  Infection, inflammation, or a fracture in the bones or cartilage in your chest can cause pain or discomfort  Sometimes chest pain or pressure is caused by poor blood flow to your heart (angina)  Chest pain may also be caused by life-threatening conditions such as a heart attack or blood clot in your lungs  DISCHARGE INSTRUCTIONS:   Call your local emergency number (911 in the [address] [facility]) or have someone call if:   · You have any of the following signs of a heart attack:      ? Squeezing, pressure, or pain in your chest    ? You may  also have any of the following:     § Discomfort or pain in your back, neck, jaw, stomach, or arm    § Shortness of breath    § Nausea or vomiting    § Lightheadedness or a sudden cold sweat      Return to the emergency department if:   · You have chest discomfort that gets worse, even with medicine  · You cough or vomit blood  · Your bowel movements are black or bloody  · You cannot stop vomiting, or it hurts to swallow  Call your doctor if:   · You have questions or concerns about your condition or care  Medicines:   · Medicines  may be given to treat the cause of your chest pain  Examples include pain medicine, anxiety medicine, or medicines to increase blood flow to your heart  · Do not take certain medicines without asking your healthcare provider first   These include NSAIDs, herbal or vitamin supplements, or hormones (estrogen or progestin)  · Take your medicine as directed  Contact your healthcare provider if you think your medicine is not helping or if you have side effects  Tell him or her if you are allergic to any medicine   Keep a list of the medicines, vitamins, and herbs you take  Include the amounts, and when and why you take them  Bring the list or the pill bottles to follow-up visits  Carry your medicine list with you in case of an emergency  Healthy living tips: The following are general healthy guidelines  If the cause of your chest pain is known, your healthcare provider will give you specific guidelines to follow  · Do not smoke  Nicotine and other chemicals in cigarettes and cigars can cause lung and heart damage  Ask your healthcare provider for information if you currently smoke and need help to quit  E-cigarettes or smokeless tobacco still contain nicotine  Talk to your healthcare provider before you use these products  · Choose a variety of healthy foods as often as possible  Include fresh, frozen, or canned fruits and vegetables  Also include low-fat dairy products, fish, chicken (without skin), and lean meats  Your healthcare provider or a dietitian can help you create meal plans  You may need to avoid certain foods or drinks if your pain is caused by a digestion problem  · Lower your sodium (salt) intake  Limit foods that are high in sodium, such as canned foods, salty snacks, and cold cuts  If you add salt when you cook food, do not add more at the table  Choose low-sodium canned foods as much as possible  · Drink plenty of water every day  Water helps your body to control your temperature and blood pressure  Ask your healthcare provider how much water you should drink every day  · Ask about activity  Your healthcare provider will tell you which activities to limit or avoid  Ask when you can drive, return to work, and have sex  Ask about the best exercise plan for you  · Maintain a healthy weight  Ask your healthcare provider what a healthy weight is for you  Ask him or her to help you create a safe weight loss plan if you are overweight  · Ask about vaccines you may need    Get the influenza (flu) vaccine every year as soon as recommended, usually in September or October  You may also need a pneumococcal vaccine to prevent pneumonia  The vaccine is usually given every 5 years, starting at age 72  Your healthcare provider can tell you if should get other vaccines, and when to get them  Follow up with your healthcare provider within 72 hours, or as directed: You may need to return for more tests to find the cause of your chest pain  You may be referred to a specialist, such as a cardiologist or gastroenterologist  Write down your questions so you remember to ask them during your visits  © Copyright [employer] 2021 Information is for End User's use only and may not be sold, redistributed or otherwise used for commercial purposes  All illustrations and images included in CareNotes® are the copyrighted property of [employer]  or Darren Navarro  The above information is an  only  It is not intended as medical advice for individual conditions or treatments  Talk to your doctor, nurse or pharmacist before following any medical regimen to see if it is safe and effective for you

## 2021-05-20 NOTE — DISCHARGE SUMMARY
[address] [facility]  Discharge- Amber Bowels 9/24/1922, 80 y o  female MRN: [id number]  [facility] [address] [id number] Encounter: [id number]  Primary Care Provider: Essence Cesar PA-C   Date and time admitted to hospital: 5/19/2021  4:56 PM    * Chest pain  Assessment & Plan  · Presenting with intermittent chest pain since 5/3  · States that her chest pain began after beginning antibiotics for urinary tract infection, states that she was switched to 3 different antibiotics all of which caused her chest pain  · Describes the pain as substernal, no radiation  · Pain in epigastric region with palpation  Possible GI  Can add pepcid  · CTA chest, abdomen, pelvis without evidence of thoracic or abdominal aortic aneurysm or dissection, no acute cardiopulmonary process, no evidence of bowel obstruction, colitis, or diverticulitis  · Trop x 3 negative and EKG with first-degree heart block, LVH, unchanged from previous EKGs  · Last echocardiogram performed in 2019 showed ejection fraction of 70% and severe aortic stenosis  · Unlikely cardiac etiology, follow up outpatient PCP and cards per routine     Essential hypertension  Assessment & Plan  · Continue amlodipine 5 mg, Lasix 20 mg  · Monitor blood pressures per unit    Gastroesophageal reflux disease without esophagitis  Assessment & Plan  · Prilosec 40 mg q d   As an outpatient  · Can take PRN tums or pepcid     Type 2 diabetes mellitus, uncontrolled, with neuropathy [facility])  Assessment & Plan  Lab Results   Component Value Date    HGBA1C 6 9 (A) 03/04/2021     Recent Labs     05/19/21  2251 05/20/21  0745   POCGLU 141* 111     Blood Sugar Average: Last 72 hrs:  · (P) 126   · Patient on Januvia 50 mg outpatient, resume on d/c    Chronic diastolic congestive heart failure (HCC)  Assessment & Plan  Wt Readings from Last 3 Encounters:   05/20/21 75 6 kg (166 lb 10 7 oz)   05/17/21 76 2 kg (168 lb)   05/11/21 76 7 kg (169 lb)     · Patient euvolemic on exam  · Echocardiogram performed in 2019 showed hyperdynamic systolic function the left ventricle, ejection fraction estimated to be approximately 70%, severe aortic stenosis  · Continue to monitor with daily weights  · Continue Lasix 20 mg q d  Hypothyroidism  Assessment & Plan  · Continue levothyroxine 75 mcg q d  Hyperlipidemia  Assessment & Plan  · Maintained without medications    Discharging Physician / Practitioner: Jacek Navarrete PA-C  PCP: Mile Silva PA-C  Admission Date:   Admission Orders (From admission, onward)     Ordered        05/19/21 2035  Place in Observation  Once                   Discharge Date: 05/20/21    Resolved Problems  Date Reviewed: 5/20/2021    None          Consultations During Hospital Stay:  · None     Procedures Performed:   · None     Significant Findings / Test Results:   · EKG NSR 1st deg AV block  · Trop neg x 3   · COVID-19 neg  · CTA dissection protocol w/ abdomen and pelvis - 1  No evidence of thoracic or abdominal aortic aneurysm or dissection  2   No acute cardiopulmonary process  3   No evidence of bowel obstruction, colitis or diverticulitis  Incidental Findings:   · None      Test Results Pending at Discharge (will require follow up):   · bl cx x 2     Outpatient Tests Requested:  · Follow up with PCP    Complications:  None     Reason for Admission: chest pain     Hospital Course:     Kev Armendariz is a 80 y o  female patient   With past medical history significant for CHF, diabetes, GERD, hypertension, hyperlipidemia, hypothyroidism, who originally presented to the hospital on 5/19/2021 due to  Chest pain  Patient states she developed chest pain after completing antibiotics for a urinary tract infection  She was admitted for further workup given cardiac history and VAHID of 3  EKG was stable, troponins were negative and chest pain new be resolved  She did have residual tenderness to palpation in the epigastric region    It is possible symptoms were related to GI/gastritis  She is medically stable for discharge home with close outpatient follow-up at this time  Please see above list of diagnoses and related plan for additional information  Condition at Discharge: stable     Discharge Day Visit / Exam:     Subjective:  Pt seen and examined at bedside  Pain less severe  Points to epigastric region, pain with palpation  No sob or palpitations  No belching  Vitals: Blood Pressure: 144/72 (05/20/21 0742)  Pulse: 59 (05/20/21 0742)  Temperature: 97 9 °F (36 6 °C) (05/20/21 0742)  Temp Source: Oral (05/19/21 1704)  Respirations: 16 (05/20/21 0742)  Height: 5' 2" (157 5 cm) (05/19/21 1701)  Weight - Scale: 75 6 kg (166 lb 10 7 oz) (05/20/21 0600)  SpO2: 95 % (05/20/21 0742)    Exam:   Physical Exam  Constitutional:       Appearance: Normal appearance  HENT:      Head: Normocephalic and atraumatic  Mouth/Throat:      Mouth: Mucous membranes are moist    Eyes:      Extraocular Movements: Extraocular movements intact  Neck:      Musculoskeletal: Normal range of motion and neck supple  Cardiovascular:      Rate and Rhythm: Normal rate and regular rhythm  Heart sounds: Murmur present  Pulmonary:      Effort: Pulmonary effort is normal       Breath sounds: Normal breath sounds  Abdominal:      General: Abdomen is flat  Palpations: Abdomen is soft  Tenderness: There is abdominal tenderness (epigastric region )  Musculoskeletal: Normal range of motion  Skin:     General: Skin is warm and dry  Neurological:      General: No focal deficit present  Mental Status: She is alert and oriented to person, place, and time  Psychiatric:         Mood and Affect: Mood normal          Behavior: Behavior normal        Discussion with Family: called dtr     Discharge instructions/Information to patient and family:   See after visit summary for information provided to patient and family        Provisions for Follow-Up Care:  See after visit summary for information related to follow-up care and any pertinent home health orders  Disposition:     Home    For Discharges to [facility]:   · Not Applicable to this Patient - Not Applicable to this Patient    Planned Readmission: none      Discharge Statement:  I spent 45 minutes discharging the patient  This time was spent on the day of discharge  I had direct contact with the patient on the day of discharge  Greater than 50% of the total time was spent examining patient, answering all patient questions, arranging and discussing plan of care with patient as well as directly providing post-discharge instructions  Additional time then spent on discharge activities  Discharge Medications:  See after visit summary for reconciled discharge medications provided to patient and family        ** Please Note: This note has been constructed using a voice recognition system **

## 2021-05-20 NOTE — ASSESSMENT & PLAN NOTE
· Presenting with intermittent chest pain since 5/3  · States that her chest pain began after beginning antibiotics for urinary tract infection, states that she was switched to 3 different antibiotics all of which caused her chest pain  · Describes the pain as substernal, no radiation  · Initial troponin in the ED negative  · CTA chest, abdomen, pelvis without evidence of thoracic or abdominal aortic aneurysm or dissection, no acute cardiopulmonary process, no evidence of bowel obstruction, colitis, or diverticulitis    · Will trend troponins  · VAHID score 3  · Telemetry  · ASA 81  · EKG with first-degree heart block, LVH, unchanged from previous EKGs  · Last echocardiogram performed in 2019 showed ejection fraction of 70% and severe aortic stenosis

## 2021-05-20 NOTE — UTILIZATION REVIEW
Initial Clinical Review    Admission: Date/Time/Statement:   Admission Orders (From admission, onward)     Ordered        05/19/21 2035  Place in Observation  Once                   Orders Placed This Encounter   Procedures    Place in Observation     Standing Status:   Standing     Number of Occurrences:   1     Order Specific Question:   Level of Care     Answer:   Med Surg [16]     ED Arrival Information     Expected Arrival Acuity Means of Arrival Escorted By Service Admission Type    - 5/19/2021 16:52 Urgent Walk-In Family Member Hospitalist Urgent    Arrival Complaint    headache, chest pain        Chief Complaint   Patient presents with    Weakness - Generalized     feeling fatigued, having headaches, sore throat  seen here 5/3 and started on antibiotics for a UTI reports treated with 3 different antibiotics all of which caused a reaction and patient did not complete full course  had kidney US was supposed to have a CXR but forgot the prescription  pt reports now having back and chest pauin as well  Initial Presentation:  81 yo female with Hx of type 2 diabetes, hypothyroidism, HTN, HLD, CHF presents to ED from home with c/o weakness, intermittent chest pain x 2 weeks  She was seen in the ED on 5/3 and treated for a UTI - states she had to change abxs twice due to reaction to them  ED Findings: Na 133,  Trop negative, NTproBNP 1500, EKG nonischemic  No acute findings on CTA        Admitted 5/19 to Observation M/S with Chest pain r/o acs and plan is for telemetry, trend trops, asa, bmp in am     Day 2:  PENDING     ED Triage Vitals   Temperature Pulse Respirations Blood Pressure SpO2   05/19/21 1704 05/19/21 1701 05/19/21 1701 05/19/21 1701 05/19/21 1701   98 °F (36 7 °C) 86 (!) 23 (!) 194/96 94 %      Temp Source Heart Rate Source Patient Position - Orthostatic VS BP Location FiO2 (%)   05/19/21 1704 05/19/21 1800 05/19/21 1800 05/19/21 1800 --   Oral Monitor Lying Right arm       Pain Score 05/19/21 1747       8          Wt Readings from Last 1 Encounters:   05/20/21 75 6 kg (166 lb 10 7 oz)     Additional Vital Signs:   05/20/21 07:42:02  97 9 °F (36 6 °C)  59  16  144/72  96  95 %  --  --   05/19/21 21:56:13  98 4 °F (36 9 °C)  66  18  162/77  105  95 %  --  --   05/19/21 2125  --  74  18  142/60  --  96 %  None (Room air)  Lying   05/19/21 1800  --  66  17  164/72  103  95 %  None (Room air)  Lying       Pertinent Labs/Diagnostic Test Results:   Results from last 7 days   Lab Units 05/19/21  1749   SARS-COV-2  Negative     Results from last 7 days   Lab Units 05/20/21  0624 05/19/21 2209 05/19/21  1705   WBC Thousand/uL 4 59  --  5 81   HEMOGLOBIN g/dL 12 1  --  13 1   HEMATOCRIT % 38 0  --  39 7   PLATELETS Thousands/uL 160 161 179   NEUTROS ABS Thousands/µL 2 79  --  4 10     Results from last 7 days   Lab Units 05/20/21  0624 05/19/21  1705   SODIUM mmol/L 138 133*   POTASSIUM mmol/L 3 8 3 7   CHLORIDE mmol/L 104 99*   CO2 mmol/L 25 25   ANION GAP mmol/L 9 9   BUN mg/dL 13 14   CREATININE mg/dL 0 96 1 14   EGFR ml/min/1 73sq m 57 46   CALCIUM mg/dL 9 0 9 4     Results from last 7 days   Lab Units 05/19/21  1705   AST U/L 16   ALT U/L 13   ALK PHOS U/L 84   TOTAL PROTEIN g/dL 7 6   ALBUMIN g/dL 3 5   TOTAL BILIRUBIN mg/dL 0 41     Results from last 7 days   Lab Units 05/20/21  0745 05/19/21  2251   POC GLUCOSE mg/dl 111 141*     Results from last 7 days   Lab Units 05/20/21  0624 05/19/21  1705   GLUCOSE RANDOM mg/dL 111 131     Results from last 7 days   Lab Units 05/20/21  0123 05/19/21  2209 05/19/21  1705   TROPONIN I ng/mL 0 02 0 02 <0 02     Results from last 7 days   Lab Units 05/19/21  1705   PROTIME seconds 13 8   INR  1 08   PTT seconds 33     Results from last 7 days   Lab Units 05/19/21  1705   TSH 3RD GENERATON uIU/mL 1 468     Results from last 7 days   Lab Units 05/19/21  1742   LACTIC ACID mmol/L 1 0     Results from last 7 days   Lab Units 05/19/21  1705   NT-PRO BNP pg/mL 1,500*     Results from last 7 days   Lab Units 05/19/21  1951 05/18/21  0923   CLARITY UA  Clear Clear   COLOR UA  Yellow Yellow   SPEC GRAV UA  1 010 1 008   PH UA  8 5* 6 0   GLUCOSE UA mg/dl Negative Negative   KETONES UA mg/dl Negative Negative   BLOOD UA  Negative Negative   PROTEIN UA mg/dl Negative Negative   NITRITE UA  Negative Negative   BILIRUBIN UA  Negative Negative   UROBILINOGEN UA E U /dl 0 2 0 2   LEUKOCYTES UA  Negative Negative     Results from last 7 days   Lab Units 05/19/21  1742 05/19/21  1740 05/18/21  0923   BLOOD CULTURE  Received in Microbiology Lab  Culture in Progress  Received in Microbiology Lab  Culture in Progress  --    URINE CULTURE   --   --  No Growth <1000 cfu/mL     5/19 EKG: ECG rate:  72   Sinus rhythm with 1st degree A-V block       ST segments:  Non-specific    5/19 CTA C/A/P:  1   No evidence of thoracic or abdominal aortic aneurysm or dissection  2   No acute cardiopulmonary process  3   No evidence of bowel obstruction, colitis or diverticulitis       ED Treatment:   Medication Administration from 05/19/2021 1652 to 05/19/2021 2133       Date/Time Order Dose Route Action     05/19/2021 1748 sodium chloride 0 9 % bolus 500 mL 500 mL Intravenous New Bag     05/19/2021 1747 acetaminophen (TYLENOL) tablet 975 mg 975 mg Oral Given        Past Medical History:   Diagnosis Date    Asthma     Atypical chest pain     CAD (coronary artery disease)     Candidal intertrigo     [facility]     Depression     Diabetes mellitus ([facility] [facility] )     [facility]     Diverticulitis     Dyslipidemia     Female bladder prolapse     Gastric ulcer     Glaucoma     HTN (hypertension)     Hyperlipidemia     Hypothyroidism 4/18/2016    RAD (reactive airway disease)      Present on Admission:   Type 2 diabetes mellitus, uncontrolled, with neuropathy (HCC)   Hyperlipidemia   Hypothyroidism   Gastroesophageal reflux disease without esophagitis   Chronic diastolic congestive heart failure (HCC)      Admitting Diagnosis: Chest pain [R07 9]  Generalized weakness [R53 1]  Headache [R51 9]  Age/Sex: 80 y o  female     Admission Orders:  Scheduled Medications:  amLODIPine, 5 mg, Oral, Daily  aspirin, 81 mg, Oral, Daily  fluticasone-vilanterol, 1 puff, Inhalation, Daily  furosemide, 20 mg, Oral, Daily  gabapentin, 300 mg, Oral, BID  gabapentin, 600 mg, Oral, HS  heparin (porcine), 5,000 Units, Subcutaneous, Q8H JONATAN  insulin lispro, 1-6 Units, Subcutaneous, TID AC  insulin lispro, 1-6 Units, Subcutaneous, HS  levothyroxine, 75 mcg, Oral, Early Morning  melatonin, 6 mg, Oral, HS  OLANZapine, 2 5 mg, Oral, Daily  OXcarbazepine, 150 mg, Oral, Q12H JONATAN  pantoprazole, 40 mg, Oral, Early Morning  senna, 1 tablet, Oral, Daily    Continuous IV Infusions:     PRN Meds:  acetaminophen, 650 mg, Oral, Q6H PRN  aluminum-magnesium hydroxide-simethicone, 30 mL, Oral, Q4H PRN  calcium carbonate, 1,000 mg, Oral, Daily PRN  ondansetron, 4 mg, Intravenous, Q6H PRN    Telemetry  SCDs    Network Utilization Review Department  ATTENTION: Please call with any questions or concerns to [phone] and carefully listen to the prompts so that you are directed to the right person  All voicemails are confidential   Sudie Crigler all requests for admission clinical reviews, approved or denied determinations and any other requests to dedicated fax number below belonging to the campus where the patient is receiving treatment   List of dedicated fax numbers for the Facilities:  [address] DENIALS (Administrative/Medical Necessity) [phone]   [address] [place] [address] (Maternity/NICU/Pediatrics) 261 [facility],[address] Arben Menjivar 3421 [address] [place] [address] [address] Anya [place] [address]81 [address] [facility] [phone]

## 2021-05-20 NOTE — PLAN OF CARE
Problem: Potential for Falls  Goal: Patient will remain free of falls  Description: INTERVENTIONS:  - Assess patient frequently for physical needs  -  Identify cognitive and physical deficits and behaviors that affect risk of falls    -  [place] fall precautions as indicated by assessment   - Educate patient/family on patient safety including physical limitations  - Instruct patient to call for assistance with activity based on assessment  - Modify environment to reduce risk of injury  - Consider OT/PT consult to assist with strengthening/mobility  Outcome: Progressing     Problem: PAIN - ADULT  Goal: Verbalizes/displays adequate comfort level or baseline comfort level  Description: Interventions:  - Encourage patient to monitor pain and request assistance  - Assess pain using appropriate pain scale  - Administer analgesics based on type and severity of pain and evaluate response  - Implement non-pharmacological measures as appropriate and evaluate response  - Consider cultural and social influences on pain and pain management  - Notify physician/advanced practitioner if interventions unsuccessful or patient reports new pain  Outcome: Progressing     Problem: INFECTION - ADULT  Goal: Absence or prevention of progression during hospitalization  Description: INTERVENTIONS:  - Assess and monitor for signs and symptoms of infection  - Monitor lab/diagnostic results  - Monitor all insertion sites, i e  indwelling lines, tubes, and drains  - Monitor endotracheal if appropriate and nasal secretions for changes in amount and color  - [place] appropriate cooling/warming therapies per order  - Administer medications as ordered  - Instruct and encourage patient and family to use good hand hygiene technique  - Identify and instruct in appropriate isolation precautions for identified infection/condition  Outcome: Progressing  Goal: Absence of fever/infection during neutropenic period  Description: INTERVENTIONS:  - Monitor WBC    Outcome: Progressing     Problem: SAFETY ADULT  Goal: Patient will remain free of falls  Description: INTERVENTIONS:  - Assess patient frequently for physical needs  -  Identify cognitive and physical deficits and behaviors that affect risk of falls    -  [place] fall precautions as indicated by assessment   - Educate patient/family on patient safety including physical limitations  - Instruct patient to call for assistance with activity based on assessment  - Modify environment to reduce risk of injury  - Consider OT/PT consult to assist with strengthening/mobility  Outcome: Progressing  Goal: Maintain or return to baseline ADL function  Description: INTERVENTIONS:  -  Assess patient's ability to carry out ADLs; assess patient's baseline for ADL function and identify physical deficits which impact ability to perform ADLs (bathing, care of mouth/teeth, toileting, grooming, dressing, etc )  - Assess/evaluate cause of self-care deficits   - Assess range of motion  - Assess patient's mobility; develop plan if impaired  - Assess patient's need for assistive devices and provide as appropriate  - Encourage maximum independence but intervene and supervise when necessary  - Involve family in performance of ADLs  - Assess for home care needs following discharge   - Consider OT consult to assist with ADL evaluation and planning for discharge  - Provide patient education as appropriate  Outcome: Progressing  Goal: Maintain or return mobility status to optimal level  Description: INTERVENTIONS:  - Assess patient's baseline mobility status (ambulation, transfers, stairs, etc )    - Identify cognitive and physical deficits and behaviors that affect mobility  - Identify mobility aids required to assist with transfers and/or ambulation (gait belt, sit-to-stand, lift, walker, cane, etc )  - [place] fall precautions as indicated by assessment  - Record patient progress and toleration of activity level on Mobility SBAR; progress patient to next Phase/Stage  - Instruct patient to call for assistance with activity based on assessment  - Consider rehabilitation consult to assist with strengthening/weightbearing, etc   Outcome: Progressing     Problem: MUSCULOSKELETAL - ADULT  Goal: Maintain or return mobility to safest level of function  Description: INTERVENTIONS:  - Assess patient's ability to carry out ADLs; assess patient's baseline for ADL function and identify physical deficits which impact ability to perform ADLs (bathing, care of mouth/teeth, toileting, grooming, dressing, etc )  - Assess/evaluate cause of self-care deficits   - Assess range of motion  - Assess patient's mobility  - Assess patient's need for assistive devices and provide as appropriate  - Encourage maximum independence but intervene and supervise when necessary  - Involve family in performance of ADLs  - Assess for home care needs following discharge   - Consider OT consult to assist with ADL evaluation and planning for discharge  - Provide patient education as appropriate  Outcome: Progressing  Goal: Maintain proper alignment of affected body part  Description: INTERVENTIONS:  - Support, maintain and protect limb and body alignment  - Provide patient/ family with appropriate education  Outcome: Progressing     Problem: HEMATOLOGIC - ADULT  Goal: Maintains hematologic stability  Description: INTERVENTIONS  - Assess for signs and symptoms of bleeding or hemorrhage  - Monitor labs  - Administer supportive blood products/factors as ordered and appropriate  Outcome: Progressing     Problem: SKIN/TISSUE INTEGRITY - ADULT  Goal: Skin integrity remains intact  Description: INTERVENTIONS  - Identify patients at risk for skin breakdown  - Assess and monitor skin integrity  - Assess and monitor nutrition and hydration status  - Monitor labs (i e  albumin)  - Assess for incontinence   - Turn and reposition patient  - Assist with mobility/ambulation  - Relieve pressure over bony prominences  - Avoid friction and shearing  - Provide appropriate hygiene as needed including keeping skin clean and dry  - Evaluate need for skin moisturizer/barrier cream  - Collaborate with interdisciplinary team (i e  Nutrition, Rehabilitation, etc )   - Patient/family teaching  Outcome: Progressing  Goal: Oral mucous membranes remain intact  Description: INTERVENTIONS  - Assess oral mucosa and hygiene practices  - Implement preventative oral hygiene regimen  - Implement oral medicated treatments as ordered  - Initiate Nutrition services referral as needed  Outcome: Progressing     Problem: METABOLIC, FLUID AND ELECTROLYTES - ADULT  Goal: Electrolytes maintained within normal limits  Description: INTERVENTIONS:  - Monitor labs and assess patient for signs and symptoms of electrolyte imbalances  - Administer electrolyte replacement as ordered  - Monitor response to electrolyte replacements, including repeat lab results as appropriate  - Instruct patient on fluid and nutrition as appropriate  Outcome: Progressing  Goal: Fluid balance maintained  Description: INTERVENTIONS:  - Monitor labs   - Monitor I/O and WT  - Instruct patient on fluid and nutrition as appropriate  - Assess for signs & symptoms of volume excess or deficit  Outcome: Progressing  Goal: Glucose maintained within target range  Description: INTERVENTIONS:  - Monitor Blood Glucose as ordered  - Assess for signs and symptoms of hyperglycemia and hypoglycemia  - Administer ordered medications to maintain glucose within target range  - Assess nutritional intake and initiate nutrition service referral as needed  Outcome: Progressing     Problem: GENITOURINARY - ADULT  Goal: Maintains or returns to baseline urinary function  Description: INTERVENTIONS:  - Assess urinary function  - Encourage oral fluids to ensure adequate hydration if ordered  - Administer IV fluids as ordered to ensure adequate hydration  - Administer ordered medications as needed  - Offer frequent toileting  - Follow urinary retention protocol if ordered  Outcome: Progressing     Problem: GASTROINTESTINAL - ADULT  Goal: Maintains or returns to baseline bowel function  Description: INTERVENTIONS:  - Assess bowel function  - Encourage oral fluids to ensure adequate hydration  - Administer IV fluids if ordered to ensure adequate hydration  - Administer ordered medications as needed  - Encourage mobilization and activity  - Consider nutritional services referral to assist patient with adequate nutrition and appropriate food choices  Outcome: Progressing  Goal: Maintains adequate nutritional intake  Description: INTERVENTIONS:  - Monitor percentage of each meal consumed  - Identify factors contributing to decreased intake, treat as appropriate  - Assist with meals as needed  - Monitor I&O, weight, and lab values if indicated  - Obtain nutrition services referral as needed  Outcome: Progressing     Problem: RESPIRATORY - ADULT  Goal: Achieves optimal ventilation and oxygenation  Description: INTERVENTIONS:  - Assess for changes in respiratory status  - Assess for changes in mentation and behavior  - Position to facilitate oxygenation and minimize respiratory effort  - Oxygen administered by appropriate delivery if ordered  - Initiate smoking cessation education as indicated  - Encourage broncho-pulmonary hygiene including cough, deep breathe, Incentive Spirometry  - Assess the need for suctioning and aspirate as needed  - Assess and instruct to report SOB or any respiratory difficulty  - Respiratory Therapy support as indicated  Outcome: Progressing     Problem: Knowledge Deficit  Goal: Patient/family/caregiver demonstrates understanding of disease process, treatment plan, medications, and discharge instructions  Description: Complete learning assessment and assess knowledge base    Interventions:  - Provide teaching at level of understanding  - Provide teaching via preferred learning methods  Outcome: Progressing     Problem: DISCHARGE PLANNING  Goal: Discharge to home or other facility with appropriate resources  Description: INTERVENTIONS:  - Identify barriers to discharge w/patient and caregiver  - Arrange for needed discharge resources and transportation as appropriate  - Identify discharge learning needs (meds, wound care, etc )  - Arrange for interpretive services to assist at discharge as needed  - Refer to Case Management Department for coordinating discharge planning if the patient needs post-hospital services based on physician/advanced practitioner order or complex needs related to functional status, cognitive ability, or social support system  Outcome: Progressing     Problem: SAFETY ADULT  Goal: Patient will remain free of falls  Description: INTERVENTIONS:  - Assess patient frequently for physical needs  -  Identify cognitive and physical deficits and behaviors that affect risk of falls    -  [place] fall precautions as indicated by assessment   - Educate patient/family on patient safety including physical limitations  - Instruct patient to call for assistance with activity based on assessment  - Modify environment to reduce risk of injury  - Consider OT/PT consult to assist with strengthening/mobility  Outcome: Progressing  Goal: Maintain or return to baseline ADL function  Description: INTERVENTIONS:  -  Assess patient's ability to carry out ADLs; assess patient's baseline for ADL function and identify physical deficits which impact ability to perform ADLs (bathing, care of mouth/teeth, toileting, grooming, dressing, etc )  - Assess/evaluate cause of self-care deficits   - Assess range of motion  - Assess patient's mobility; develop plan if impaired  - Assess patient's need for assistive devices and provide as appropriate  - Encourage maximum independence but intervene and supervise when necessary  - Involve family in performance of ADLs  - Assess for home care needs following discharge   - Consider OT consult to assist with ADL evaluation and planning for discharge  - Provide patient education as appropriate  Outcome: Progressing  Goal: Maintain or return mobility status to optimal level  Description: INTERVENTIONS:  - Assess patient's baseline mobility status (ambulation, transfers, stairs, etc )    - Identify cognitive and physical deficits and behaviors that affect mobility  - Identify mobility aids required to assist with transfers and/or ambulation (gait belt, sit-to-stand, lift, walker, cane, etc )  - [place] fall precautions as indicated by assessment  - Record patient progress and toleration of activity level on Mobility SBAR; progress patient to next Phase/Stage  - Instruct patient to call for assistance with activity based on assessment  - Consider rehabilitation consult to assist with strengthening/weightbearing, etc   Outcome: Progressing     Problem: INFECTION - ADULT  Goal: Absence or prevention of progression during hospitalization  Description: INTERVENTIONS:  - Assess and monitor for signs and symptoms of infection  - Monitor lab/diagnostic results  - Monitor all insertion sites, i e  indwelling lines, tubes, and drains  - Monitor endotracheal if appropriate and nasal secretions for changes in amount and color  - [place] appropriate cooling/warming therapies per order  - Administer medications as ordered  - Instruct and encourage patient and family to use good hand hygiene technique  - Identify and instruct in appropriate isolation precautions for identified infection/condition  Outcome: Progressing  Goal: Absence of fever/infection during neutropenic period  Description: INTERVENTIONS:  - Monitor WBC    Outcome: Progressing

## 2021-05-20 NOTE — ASSESSMENT & PLAN NOTE
Lab Results   Component Value Date    HGBA1C 6 9 (A) 03/04/2021     Recent Labs     05/19/21  2251 05/20/21  0745   POCGLU 141* 111     Blood Sugar Average: Last 72 hrs:  · (P) 126   · Patient on Januvia 50 mg outpatient, resume on d/c

## 2021-05-22 DIAGNOSIS — G52.9 CRANIAL NEURALGIA: ICD-10-CM

## 2021-05-24 ENCOUNTER — TRANSITIONAL CARE MANAGEMENT (OUTPATIENT)
Dept: FAMILY MEDICINE CLINIC | Facility: CLINIC | Age: 86
End: 2021-05-24

## 2021-05-24 LAB
BACTERIA BLD CULT: NORMAL
BACTERIA BLD CULT: NORMAL

## 2021-05-24 RX ORDER — GABAPENTIN 600 MG/1
TABLET ORAL
Qty: 180 TABLET | Refills: 0 | Status: SHIPPED | OUTPATIENT
Start: 2021-05-24 | End: 2021-10-07

## 2021-05-25 ENCOUNTER — TELEPHONE (OUTPATIENT)
Dept: NEUROLOGY | Facility: CLINIC | Age: 86
End: 2021-05-25

## 2021-05-25 DIAGNOSIS — R42 VERTIGO: ICD-10-CM

## 2021-05-25 DIAGNOSIS — R26.2 AMBULATORY DYSFUNCTION: Primary | ICD-10-CM

## 2021-05-25 NOTE — TELEPHONE ENCOUNTER
Patient calling in  Patient states she is having dizziness when she lays down for bed  She is also reporting lightheadedness that happens even when she is still and happens intermittently  Patient reports she has blurred vision at baseline but it is worsening  Patient has an appointment with eye doctor this afternoon  She states the dizziness and lightheadedness has been going on for a long time but is worsening over time and does not know what the cause is  Patient denies any changes to her medications  Please advise  Appointment added to waiting list  Patient was requesting sooner appointment        Medications:   gabapentin 600 MG tablet TAKE 1/2 TABLET IN THE MORNING AND AFTERNOON; TAKE 1 TABLET AT BEDTIME  oxcarbazepine 150mg BID - not consistent     Cb#: [phone], okay to leave detailed message

## 2021-05-27 NOTE — TELEPHONE ENCOUNTER
Dizziness was not related to Trileptal  She states it occurs at any random time of the day, not related to any times of medications  Called patient   She is agreeable to both MRI and PT if that is what you recommend

## 2021-05-27 NOTE — TELEPHONE ENCOUNTER
Is dizziness related to trileptal?  We can certainly repeat brain MRI if needed  Also if vertigo- recommend PT

## 2021-05-27 NOTE — TELEPHONE ENCOUNTER
Called patient  She denies any worsening pain, n/v/d/f/c  Does not report SOB, chest pain, or palpitations  Patient states she was being treated earlier this month with a UTI and was switched on and off of multiple antibiotics  Patient reports she is still having pain in her belly like she did when she was first found with a UTI  I asked patient to reach out to PCP again regarding these symptoms  Any other recommendations?

## 2021-05-27 NOTE — TELEPHONE ENCOUNTER
Please also alert pcp  Recommend UA with reflex  Any f/c/n/d/worsening headache or facial pain? Any CP/ SOB/ palpitations?   Does she get dizzy after she take the trileptal?

## 2021-05-28 NOTE — TELEPHONE ENCOUNTER
Called office and spoke with Diana Barker, yulissa Carrillo the DM Foot exam has now been faxed  Will update patient's chart once it is recieved

## 2021-05-28 NOTE — TELEPHONE ENCOUNTER
Just take trileptal once at bedtime  Will order PT, I looked at last CT head which is fine so will hold off on imaging now  Trileptal can cause this dizziness so decreasing it may improve sxs  Thanks

## 2021-05-28 NOTE — TELEPHONE ENCOUNTER
As a final attempt, a third outreach has been made via telephone call  Please see Contacts section for details  This encounter will be closed and completed by end of day  Should we receive the requested information because of previous outreach attempts, the requested patient's chart will be updated appropriately       Thank you  Danielle Calhoun

## 2021-05-28 NOTE — TELEPHONE ENCOUNTER
Upon review of the In Basket request we were able to locate, review, and update the patient chart as requested for Diabetic Foot Exam     Any additional questions or concerns should be emailed to the Practice Liaisons via [email or web address] email, please do not reply via In Basket      Thank you  Anni Oquendo

## 2021-06-01 ENCOUNTER — OFFICE VISIT (OUTPATIENT)
Dept: FAMILY MEDICINE CLINIC | Facility: CLINIC | Age: 86
End: 2021-06-01
Payer: COMMERCIAL

## 2021-06-01 VITALS
HEIGHT: 62 IN | BODY MASS INDEX: 30.22 KG/M2 | WEIGHT: 164.2 LBS | SYSTOLIC BLOOD PRESSURE: 118 MMHG | DIASTOLIC BLOOD PRESSURE: 60 MMHG | HEART RATE: 80 BPM

## 2021-06-01 DIAGNOSIS — R07.9 CHEST PAIN, UNSPECIFIED TYPE: ICD-10-CM

## 2021-06-01 DIAGNOSIS — I35.0 NONRHEUMATIC AORTIC VALVE STENOSIS: Chronic | ICD-10-CM

## 2021-06-01 DIAGNOSIS — K21.9 GASTROESOPHAGEAL REFLUX DISEASE WITHOUT ESOPHAGITIS: ICD-10-CM

## 2021-06-01 DIAGNOSIS — N39.0 RECURRENT UTI: Primary | ICD-10-CM

## 2021-06-01 LAB
SL AMB  POCT GLUCOSE, UA: NORMAL
SL AMB LEUKOCYTE ESTERASE,UA: NORMAL
SL AMB POCT BILIRUBIN,UA: NORMAL
SL AMB POCT BLOOD,UA: NORMAL
SL AMB POCT CLARITY,UA: CLEAR
SL AMB POCT COLOR,UA: YELLOW
SL AMB POCT KETONES,UA: NORMAL
SL AMB POCT NITRITE,UA: NORMAL
SL AMB POCT PH,UA: 5.5
SL AMB POCT SPECIFIC GRAVITY,UA: 1.01
SL AMB POCT URINE PROTEIN: NORMAL
SL AMB POCT UROBILINOGEN: 0.2

## 2021-06-01 PROCEDURE — 99496 TRANSJ CARE MGMT HIGH F2F 7D: CPT | Performed by: PHYSICIAN ASSISTANT

## 2021-06-01 PROCEDURE — 81002 URINALYSIS NONAUTO W/O SCOPE: CPT | Performed by: PHYSICIAN ASSISTANT

## 2021-06-01 PROCEDURE — 1111F DSCHRG MED/CURRENT MED MERGE: CPT | Performed by: PHYSICIAN ASSISTANT

## 2021-06-01 RX ORDER — OMEPRAZOLE 40 MG/1
40 CAPSULE, DELAYED RELEASE ORAL DAILY
Qty: 90 CAPSULE | Refills: 3 | Status: SHIPPED | OUTPATIENT
Start: 2021-06-01 | End: 2022-08-05

## 2021-06-01 RX ORDER — AMLODIPINE BESYLATE 5 MG/1
5 TABLET ORAL DAILY
Qty: 90 TABLET | Refills: 3 | Status: SHIPPED | OUTPATIENT
Start: 2021-06-01

## 2021-06-01 NOTE — PROGRESS NOTES
Assessment and Plan:    Problem List Items Addressed This Visit     None                 Diagnoses and all orders for this visit:    Recurrent UTI  -     POCT urine dip              Subjective:      Patient ID: Ashtyn Hutchinson is a 80 y o  female  CC:    Chief Complaint   Patient presents with    Follow-up     Pt here to [facility] up re: UTI  Urine today was clear  SHe has had some middle chest pain  She states that pain comes and goes  Daughter has been giving pt some Antacid liquid and it helps but no more Omeprazole  kw       HPI:     Patient here today accompanied by her daughter  On the 19th of May she presented to [facility] with chest pain  Workup was negative for heart disease including a CTA of chest abdomen pelvis and negative troponins  It was most likely deemed secondary to antibiotic irritation of the stomach lining from her UTI and she was told to use Pepcid with outpatient follow-up  Patient does have a history of severe aortic stenosis but they did not think this was the cause of her discomfort and to follow as an outpatient  The following portions of the patient's history were reviewed and updated as appropriate: allergies, current medications, past family history, past medical history, past social history, past surgical history and problem list       Review of Systems   Constitutional: Negative  HENT: Negative  Eyes: Negative  Respiratory: Negative  Cardiovascular: Negative  Gastrointestinal: Negative  Endocrine: Negative  Genitourinary: Negative  Musculoskeletal: Negative  Skin: Negative  Allergic/Immunologic: Negative  Neurological: Negative  Hematological: Negative  Psychiatric/Behavioral: Negative            Data to review:       Objective:    Vitals:    06/01/21 1529   BP: 118/60   BP Location: Left arm   Patient Position: Sitting   Pulse: 80   Weight: 74 5 kg (164 lb 3 2 oz)   Height: 5' 2" (1 575 m)        Physical Exam  Vitals signs and nursing note reviewed  Constitutional:       Appearance: Normal appearance  She is well-developed  HENT:      Head: Normocephalic and atraumatic  Eyes:      General: Lids are normal       Conjunctiva/sclera: Conjunctivae normal       Pupils: Pupils are equal, round, and reactive to light  Cardiovascular:      Rate and Rhythm: Normal rate and regular rhythm  Heart sounds: No murmur  Pulmonary:      Effort: Pulmonary effort is normal       Breath sounds: Normal breath sounds  Skin:     General: Skin is warm and dry  Neurological:      General: No focal deficit present  Mental Status: She is alert  Coordination: Coordination is intact  Psychiatric:         Mood and Affect: Mood normal          Behavior: Behavior normal  Behavior is cooperative  Thought Content:  Thought content normal          Judgment: Judgment normal

## 2021-06-01 NOTE — PROGRESS NOTES
Assessment/Plan:     Gastroesophageal reflux disease without esophagitis  Pt  ran out of her Prilosec a few months ago  She needs a new Rx to start taking it again as her stomach has been upset since taking the antibiotic for UTI  Until she receives the Rx in the mail she should continue OTC stomach relief which is likely a form of pepcid  Recurrent UTI  In house urine dip today WNL  Patient has been on cranberry supplements  Chest pain  Resolved  Aortic stenosis    Asymptomatic  Murmur auscultated  Stable  Cardiology not needed  Diagnoses and all orders for this visit:    Recurrent UTI  -     POCT urine dip    Gastroesophageal reflux disease without esophagitis  -     omeprazole (PriLOSEC) 40 MG capsule; Take 1 capsule (40 mg total) by mouth daily    Chest pain, unspecified type    Nonrheumatic aortic valve stenosis  -     amLODIPine (NORVASC) 5 mg tablet; Take 1 tablet (5 mg total) by mouth daily         Subjective:     Patient ID: Eleazar Arnold is a 80 y o  female  Patient here today accompanied by her daughter she presented to [facility] Emergency Room on the 19th of May complaining of chest pain  Negative cardiac workup includes a CT a of the chest abdomen and pelvis and negative troponins  It was felt that her symptoms were secondary to antibiotic irritation of the stomach lining secondary to UTI  She was advised to follow with Pepcid in addition to her PPI and outpatient cardio follow-up if needed  Last echo in 2019 showed severe so aortic stenosis for her symptoms were not concomitant with this diagnosis  Today urine dip was negative for UTI  Patient states that she has been out of her omeprazole least until last March as she ran out of refills  She states that her chest pain feels better but she still has the sensation that something is in her lower throat  Review of Systems   Constitutional: Negative  HENT: Negative  Eyes: Negative  Respiratory: Negative  Cardiovascular: Negative  Gastrointestinal: Negative  Endocrine: Negative  Genitourinary: Negative  Musculoskeletal: Negative  Skin: Negative  Allergic/Immunologic: Negative  Neurological: Negative  Hematological: Negative  Psychiatric/Behavioral: Negative  Objective:     Physical Exam  Vitals signs and nursing note reviewed  Constitutional:       General: She is not in acute distress  Appearance: She is well-developed  She is not diaphoretic  HENT:      Head: Normocephalic and atraumatic  Eyes:      General:         Right eye: No discharge  Left eye: No discharge  Conjunctiva/sclera: Conjunctivae normal    Neck:      Musculoskeletal: Neck supple  Vascular: No carotid bruit  Cardiovascular:      Rate and Rhythm: Normal rate and regular rhythm  Heart sounds: Normal heart sounds  No murmur  No friction rub  No gallop  Pulmonary:      Effort: Pulmonary effort is normal  No respiratory distress  Breath sounds: Normal breath sounds  No wheezing or rales  Skin:     General: Skin is warm and dry  Neurological:      Mental Status: She is alert and oriented to person, place, and time  Psychiatric:         Judgment: Judgment normal            Vitals:    06/01/21 1529   BP: 118/60   BP Location: Left arm   Patient Position: Sitting   Pulse: 80   Weight: 74 5 kg (164 lb 3 2 oz)   Height: 5' 2" (1 575 m)       Transitional Care Management Review:  Millicent Mcduffie is a 80 y o  female here for TCM follow up       During the TCM phone call patient stated:    TCM Call (since 5/1/2021)     Date and time call was made  5/24/2021  4:36 PM    Hospital care reviewed  Records reviewed    Patient was hospitialized at  [facility]        Date of Admission  05/19/21    Date of discharge  05/20/21    Diagnosis  generalized weakness, Chest Pain    Disposition  Home    Were the patients medications reviewed and updated  No Current Symptoms  None      TCM Call (since 5/1/2021)     Post hospital issues  None    Should patient be enrolled in anticoag monitoring? No    Scheduled for follow up?   Yes    Did you obtain your prescribed medications  Yes    Do you need help managing your prescriptions or medications  No    Is transportation to your appointment needed  No    I have advised the patient to call PCP with any new or worsening symptoms  Justine Ames, Practice Administrator           Tu Flores PA-C

## 2021-06-01 NOTE — ASSESSMENT & PLAN NOTE
Pt  ran out of her Prilosec a few months ago  She needs a new Rx to start taking it again as her stomach has been upset since taking the antibiotic for UTI  Until she receives the Rx in the mail she should continue OTC stomach relief which is likely a form of pepcid

## 2021-06-01 NOTE — PATIENT INSTRUCTIONS
Problem List Items Addressed This Visit        Genitourinary    Recurrent UTI - Primary    Relevant Orders    POCT urine dip (Completed)

## 2021-06-02 NOTE — TELEPHONE ENCOUNTER
Called patient and daughter  Reviewed recommendations below   They both verbalized understanding and provided them with number to contact PT

## 2021-06-16 ENCOUNTER — OFFICE VISIT (OUTPATIENT)
Dept: NEUROLOGY | Facility: CLINIC | Age: 86
End: 2021-06-16
Payer: COMMERCIAL

## 2021-06-16 ENCOUNTER — TELEPHONE (OUTPATIENT)
Dept: NEUROLOGY | Facility: CLINIC | Age: 86
End: 2021-06-16

## 2021-06-16 VITALS
SYSTOLIC BLOOD PRESSURE: 137 MMHG | HEIGHT: 62 IN | WEIGHT: 168.4 LBS | HEART RATE: 70 BPM | DIASTOLIC BLOOD PRESSURE: 63 MMHG | BODY MASS INDEX: 30.99 KG/M2

## 2021-06-16 DIAGNOSIS — R42 VERTIGO: ICD-10-CM

## 2021-06-16 DIAGNOSIS — G50.1 ATYPICAL FACIAL PAIN: ICD-10-CM

## 2021-06-16 DIAGNOSIS — R51.9 CHRONIC HEADACHE: ICD-10-CM

## 2021-06-16 DIAGNOSIS — G89.29 CHRONIC HEADACHE: ICD-10-CM

## 2021-06-16 DIAGNOSIS — M79.18 MYOFASCIAL MUSCLE PAIN: Primary | ICD-10-CM

## 2021-06-16 PROCEDURE — 1036F TOBACCO NON-USER: CPT | Performed by: PHYSICIAN ASSISTANT

## 2021-06-16 PROCEDURE — 1111F DSCHRG MED/CURRENT MED MERGE: CPT | Performed by: PHYSICIAN ASSISTANT

## 2021-06-16 PROCEDURE — 99213 OFFICE O/P EST LOW 20 MIN: CPT | Performed by: PHYSICIAN ASSISTANT

## 2021-06-16 PROCEDURE — 20553 NJX 1/MLT TRIGGER POINTS 3/>: CPT | Performed by: PHYSICIAN ASSISTANT

## 2021-06-16 RX ORDER — OXCARBAZEPINE 150 MG/1
TABLET, FILM COATED ORAL
Qty: 180 TABLET | Refills: 0 | Status: SHIPPED | OUTPATIENT
Start: 2021-06-16 | End: 2021-12-02

## 2021-06-16 RX ORDER — MECLIZINE HCL 12.5 MG/1
TABLET ORAL
Qty: 90 TABLET | Refills: 0 | Status: SHIPPED | OUTPATIENT
Start: 2021-06-16 | End: 2022-03-09 | Stop reason: HOSPADM

## 2021-06-16 RX ORDER — BUPIVACAINE HYDROCHLORIDE 2.5 MG/ML
2 INJECTION, SOLUTION EPIDURAL; INFILTRATION; INTRACAUDAL ONCE
Status: COMPLETED | OUTPATIENT
Start: 2021-06-16 | End: 2021-06-16

## 2021-06-16 RX ADMIN — BUPIVACAINE HYDROCHLORIDE 2 ML: 2.5 INJECTION, SOLUTION EPIDURAL; INFILTRATION; INTRACAUDAL at 17:21

## 2021-06-16 NOTE — PROGRESS NOTES
Neurology      Kenisha Paulino is a 80 y o  female  [id number]      Assessment/Recommendations:     Diagnoses and all orders for this visit:    Myofascial muscle pain  -     Inj Trigger Point Single/Multiple  -     bupivacaine (PF) (MARCAINE) 0 25 % injection 2 mL    Atypical facial pain  -     OXcarbazepine (TRILEPTAL) 150 mg tablet; 1 tab q12 hours (please call with dizziness)    Vertigo  -     Ambulatory referral to Physical Therapy; Future  -     meclizine (ANTIVERT) 12 5 MG tablet; 1-2 tabs qhs prn dizziness    Chronic headache       The patient continues to have headaches but they seem to be changing in location over time  For example headaches and facial pain was located on the left side as noted in prior documentation, and now it is both sides in the frontalis region and radiating into the scalp on both sides  She denies V2 or V3 pain  She did benefit from trigger point injections at the last visit but temporarily  She does not remember when it wore off  She was agreeable to repeat injections today  She was instructed to call me if they do wear off quickly and I will have her back in for third round  She was also instructed to increase the oxcarbazepine to 150 mg q 12 hours and call back with any side effects such as dizziness or sedation or imbalance  She was also instructed to proceed with physical therapy for vertigo which appears to be benign positional due to the fact that it is triggered by laying her head down before she goes to bed  She does not have it when she is sitting up or standing  It is not associated with the headache  I provided meclizine for bedtime and her daughter was instructed to watch for any signs of disorientation or imbalance after taking it  She should hold the melatonin since the pt states it does not help  The patient should not hesitate to call me prior to her follow up with any questions or concerns        Chief Complaint:  Follow-up      Subjective:  HPI    The patient reports worsening facial pain and now worsening headache in the bifrontal region and radiating into the scalp  She felt that the trigger point injections done at the last visit were helpful but they wore off after several weeks  She takes Trileptal 150 mg at bedtime only  She cannot tell me if it is ineffective in decreasing her headaches or facial pain  Her daughter denies given return much information either  They do agree that she is taking it on a daily basis and denies significant side effects  The patient was asked to try vestibular therapy due to new onset vertigo, but she is reluctant to do so  Facial pain and HAs were previously fairly well controlled with gabapentin and baclofen, but recently ineffective  She reported to the ED for severe pain, associated with a full feeling in the throat  The ED physician consuelo texted me and we recommended Dilantin IV  She received phenytoin 15 mg/kilogram over 2 hours  The patient did not feel that this was helpful, when I questioned her today  Tegretol was prescribed for outpatient use but it caused excessive sedation and weakness during the day, so Trileptal was then prescribed  She no longer uses baclofen due to side effects  She prefers heat over ice  She states ice makes it worse  Tylenol does not help  Gabapentin does not help but she continues this as prescribed before      Head CT 2/3/2020 is unremarkable , except for microangiopathic changes  Similar in appearance to the last head CT on 1/9/2019      The patient further describes her pain as a sharp, stabbing pain around the left eye and radiating upward passed the hairline on the left into the left scalp/left parietal region  Rarely it does go down into the left temporal region  When she gets a headache or pain it is 10/10, and it goes in waves  The pain lasts several seconds and then goes away and then comes back several times per day    It is not associated with tearing, congestion, facial droop, numbness  She denies any visual disturbance  She denies swelling or redness  She denies any recent injury  The pain started about a month ago  Around that time she had a UTI which is now resolved, but otherwise she is unable to identify any other trigger      She denies any pain in the back of the head, some neck stiffness to a mild degree        Patient Active Problem List   Diagnosis    Peripheral vascular disease ([facility] [address] )    Glaucoma, open angle, severe stage    Arteriosclerosis of coronary artery    Asthma    Aortic stenosis    Hyperlipidemia    Female bladder prolapse    Advanced age   Diane Lama Allergic rhinitis    Ambulatory dysfunction    Anxiety disorder    General weakness    Irritable bowel    Trigeminal neuralgia    Hallucination    Dyspnea on minimal exertion    Medication management    Chest pain    Hypothyroidism    Chronic diastolic congestive heart failure (HCC)    Type 2 diabetes mellitus, uncontrolled, with neuropathy ([facility])    Insomnia    Gastroesophageal reflux disease without esophagitis    Bilateral cold feet    Female cystocele    Diverticulosis    Dizziness    Hearing loss    Hiatal hernia    Low back pain    Mild cognitive impairment    Osteoarthritis    Overactive bladder    Shakiness    Urinary incontinence    Pain of both hip joints    Candidal intertrigo    Vitamin D deficiency    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HCC)    Paroxysmal nocturnal dyspnea    Orthopnea    Shortness of breath    Chronic headache    Neoplasm of face    Stable angina ([facility])    Atypical facial pain    Medicare annual wellness visit, subsequent    Diarrhea    Left hip pain    Pyelonephritis    Acute cystitis with hematuria    Constipation    Bilateral impacted cerumen    Conductive hearing loss, bilateral    Foul smelling urine    Supraorbital neuralgia    Myofascial muscle pain    Cranial neuralgia    Recurrent UTI    Essential hypertension    Hyponatremia    Vertigo     Past Medical History:   Diagnosis Date    Asthma     Atypical chest pain     CAD (coronary artery disease)     Candidal intertrigo     [facility]est[facility] failure) [facility])     Depression     Diabetes mellitus ([facility])     Diabetic neuropathy ([facility] [address] )     Diverticulitis     Dyslipidemia     Female bladder prolapse     Gastric ulcer     Glaucoma     HTN (hypertension)     Hyperlipidemia     Hypothyroidism 4/18/2016    RAD (reactive airway disease)      Past Surgical History:   Procedure Laterality Date    COLONOSCOPY      ESOPHAGOGASTRODUODENOSCOPY  2011    HYSTERECTOMY      TONSILLECTOMY       Current Outpatient Medications on File Prior to Visit   Medication Sig Dispense Refill    acetaminophen (TYLENOL) 325 mg tablet Take 2 tablets (650 mg total) by mouth every 6 (six) hours as needed for mild pain 20 tablet 0    aluminum-magnesium hydroxide-simethicone (MYLANTA) 200-200-20 mg/5 mL suspension Take 30 mL by mouth every 4 (four) hours as needed for indigestion or heartburn      amLODIPine (NORVASC) 5 mg tablet Take 1 tablet (5 mg total) by mouth daily 90 tablet 3    aspirin 81 MG tablet Take 81 mg by mouth daily   Cholecalciferol (VITAMIN D3) 2000 units capsule Take 2,000 Units by mouth daily       dorzolamide (TRUSOPT) 2 % ophthalmic solution Administer 1 drop to both eyes 3 (three) times a day        fluticasone-vilanterol (BREO ELLIPTA) 100-25 mcg/inh inhaler Inhale 1 puff daily Rinse mouth after use   1 Inhaler 5    furosemide (LASIX) 20 mg tablet TAKE 1 TABLET IN THE MORNING  AND TAKE 2 TABLETS IN THE EVENING 270 tablet 11    gabapentin (NEURONTIN) 600 MG tablet TAKE 1/2 TABLET IN THE MORNING AND AFTERNOON; TAKE 1 TABLET AT BEDTIME 180 tablet 0    glucose blood (Accu-Chek Aline Plus) test strip Testing once daily E11 9 100 each 3    Incontinence Supply Disposable (SELECT DISPOSABLE UNDERWEAR LG) MISC       Lancets (ACCU-CHEK SOFT TOUCH) lancets Testing 1 time daily  Dx: E11 9 100 each 3    latanoprost (XALATAN) 0 005 % ophthalmic solution Apply 1 drop to eye daily at bedtime Both eyes 7 5 mL 1    levothyroxine 75 mcg tablet TAKE 1 TABLET EVERY DAY 90 tablet 3    Melatonin 5 MG CAPS Take 3 mg by mouth daily at bedtime      nystatin (MYCOSTATIN) powder Apply topically 2 (two) times a day 120 g 3    OLANZapine (ZyPREXA) 5 mg tablet Take 1 tablet (5 mg total) by mouth daily 90 tablet 3    omeprazole (PriLOSEC) 40 MG capsule Take 1 capsule (40 mg total) by mouth daily 90 capsule 3    potassium chloride (K-DUR,KLOR-CON) 20 mEq tablet Take 1 tablet (20 mEq total) by mouth daily 90 tablet 2    sitaGLIPtin (Januvia) 50 mg tablet Take 1 tablet (50 mg total) by mouth daily 90 tablet 0    albuterol (VENTOLIN HFA) 90 mcg/act inhaler Inhale 2 puffs every 4 (four) hours as needed for wheezing (Patient not taking: Reported on 6/16/2021) 1 Inhaler 3    brimonidine (Alphagan P) 0 1 % Alphagan P 0 1 % eye drops   INT 1 GTT IN OU BID      butalbital-acetaminophen-caffeine (FIORICET,ESGIC) -40 mg per tablet Take 1 tablet by mouth every 4 (four) hours as needed for headaches (Patient not taking: Reported on 3/25/2021) 6 tablet 0    cyclobenzaprine (FLEXERIL) 5 mg tablet Take 1 tablet (5 mg total) by mouth 3 (three) times a day as needed for muscle spasms (Patient not taking: Reported on 3/25/2021) 15 tablet 0    Diclofenac Sodium (Voltaren) 1 % Voltaren 1 % topical gel      famotidine (PEPCID) 20 mg tablet Take 1 tablet (20 mg total) by mouth 2 (two) times a day (Patient not taking: Reported on 3/25/2021) 60 tablet 11    fluticasone (FLONASE) 50 mcg/act nasal spray 2 sprays into each nostril daily (Patient not taking: Reported on 4/6/2021) 3 Bottle 3    ketorolac (TORADOL) 10 mg tablet Take 1 tablet (10 mg total) by mouth every 6 (six) hours as needed (migraine) Max 2-3 per week   (Patient not taking: Reported on 6/16/2021) 10 tablet 0    Lidocaine-Menthol 4-5 % [facility] lidocaine patch      nitroglycerin (NITROSTAT) 0 4 mg SL tablet Place 1 tablet (0 4 mg total) under the tongue every 5 (five) minutes as needed for chest pain (Patient not taking: Reported on 3/25/2021) 25 tablet 3     No current facility-administered medications on file prior to visit  Allergies   Allergen Reactions    Levaquin [Levofloxacin] Myalgia    Statins      Other reaction(s): Weakness  Category: Adverse Reaction;            ROS:  Review of Systems   Constitutional: Negative  Negative for appetite change and fever  HENT: Negative  Negative for hearing loss, tinnitus, trouble swallowing and voice change  Eyes: Positive for pain  Negative for photophobia  Respiratory: Negative  Negative for shortness of breath  Cardiovascular: Negative  Negative for palpitations  Gastrointestinal: Negative  Negative for nausea and vomiting  Endocrine: Negative  Negative for cold intolerance  Genitourinary: Negative  Negative for dysuria, frequency and urgency  Musculoskeletal: Negative  Negative for myalgias and neck pain  Skin: Negative  Negative for rash  Neurological: Positive for headaches  Negative for dizziness, tremors, seizures, syncope, facial asymmetry, speech difficulty, weakness, light-headedness and numbness  Hematological: Negative  Does not bruise/bleed easily  Psychiatric/Behavioral: Negative  Negative for confusion, hallucinations and sleep disturbance  Objective:  /63 (BP Location: Right arm, Patient Position: Sitting, Cuff Size: Adult)   Pulse 70   Ht 5' 2" (1 575 m)   Wt 76 4 kg (168 lb 6 4 oz)   BMI 30 80 kg/m²     Physical Exam    Neurological Exam  Vital signs reviewed  Well developed, well nourished  Head: Normocephalic, atraumatic  Neck: Neck flexors 5/5, No TTP  CN 2-12: intact and symmetric, including EOMs which are normal b/l and PERRL  MSK: 5/5 t/o  ROM normal x all 4 extr    Sensation: Romberg positive  Reflexes: 2+ and symmetric in all 4 extr  Coordination: Nml x4 extr  Gait: WB gait, using a cane  Slightly antalgic 2/2 low back pain  The following portions of the patient's history were reviewed and updated as appropriate: allergies, current medications, family history, past medical history, social history and active problem list   Review of systems was reviewed and otherwise unremarkable from a neurological perspective  I have spent 25 minutes with the patient her daughter today in which greater than 50% of this time was spent in counseling and/or coordination of care regarding diagnoses, test results, impression, plan and potential medication side effects  Inj Trigger Point Single/Multiple     Date/Time 6/16/2021 5:13 PM     Performed by  Tutu Mcdonough PA-C     Authorized by Tutu Mcdonough PA-C      Universal Protocol   Consent: The procedure was performed in an emergent situation  Verbal consent obtained  Risks and benefits: risks, benefits and alternatives were discussed  Consent given by: patient and guardian       Procedure Details   Procedure Notes: Trigger point injections were performed in the sites of maximal tenderness at the following locations:  L and R corrugators, L and R frontalis muscles, and L and R scalp above the hairline/parietal region  A total of 2 mL 0 25% bupivacaine was injected using a 30 gauge 1/2 inch needle  The patient tolerated the injections well, there was minimal bleeding and no immediate complications    Patient Transportation: confirmed  Patient tolerance: patient tolerated the procedure well with no immediate complications

## 2021-06-16 NOTE — PATIENT INSTRUCTIONS
Increased oxcarb to 150 mg q12 hours, please call if experiencing dizziness or sedation or other side effects  Meclizine at night- for dizziness and sleep  Hold melatonin  Call if injections wear off  Please try physical therapy for vertigo/dizziness

## 2021-06-16 NOTE — TELEPHONE ENCOUNTER
ADD ON - patient is scheduled with Katie kruse 6/16 (today) @ 2;30p in [employer] is still in force

## 2021-06-22 ENCOUNTER — TELEPHONE (OUTPATIENT)
Dept: NEUROLOGY | Facility: CLINIC | Age: 86
End: 2021-06-22

## 2021-06-22 NOTE — TELEPHONE ENCOUNTER
Patient calling in  She states she had trigger point injections with [address] last week and was instructed to call the office if the pain returns  Patient is calling in to report that her pain has returned and is having dizziness and shakiness  She states the shakiness lasts all day and night and she is not sleeping well due to this       Medications:   Meclizine 2 tabs at bedtime - started last night  Oxcarbazepine - has not yet started this medication     Please advise

## 2021-06-22 NOTE — TELEPHONE ENCOUNTER
When I had spoken to patient, I mentioned that she should have been on trileptal before  She stated she has never been on it  I'm not sure if she is possibly confusing this with something else  She states she did not start it because of her dizziness  Called patient and had her look at her medication bottles  Patient states she was taking trileptal 1 tab at bedtime with her gabapentin  She started taking the meclizine last night but did not take the trileptal nor the gabapentin as she was not aware she was supposed to take all 3 medications at once  Patient wants to confirm: she is to take her trileptal, gabapentin, and meclizine at bedtime? Patient also states the meclizine did not help last night  She took it before bed and woke up with the shakiness and dizziness       Please advise

## 2021-06-22 NOTE — TELEPHONE ENCOUNTER
She should be taking 150 mg Trileptal at least at nighttime  The last time I spoke with her she confirmed that she is taking 150 mg Trileptal q h s   I asked her to increase it to b I d  Please tell her to do this  Does meclizine help with the dizziness and or shakiness?

## 2021-06-24 NOTE — TELEPHONE ENCOUNTER
Hold meclizine  Continue trileptal and gabapentin at the same time at bed  To clarify- is dizziness in the AM from vertigo or off balance or lightheadedness?

## 2021-06-27 PROBLEM — R42 VERTIGO: Status: ACTIVE | Noted: 2021-06-27

## 2021-06-27 PROBLEM — G89.29 CHRONIC HEADACHE: Status: ACTIVE | Noted: 2019-11-14

## 2021-06-28 NOTE — TELEPHONE ENCOUNTER
Called patient and advised of MK's message  She advised that she is only taking the gabapentin and oxcarbazepine  Patient states she never started taking the meclizine  Patient states she has felt off balance, not lightheaded  She reports this is pretty much all day, not intermittent, and does not increase in intensity

## 2021-07-02 NOTE — TELEPHONE ENCOUNTER
Patient calling requesting to speak with [address]  I advised I was one of her nurses and could assist her  Patient reluctantly advised that she was unsure how she should be taking her medication  Reviewed patient's oxcarbazepine 150mg per 's recommendations  Patient to increase to bid dosing  She advised that she is only taking this qhs at this time  She states that she will increase this  Advised of potential side effects patient should be aware of and to let us know if she has any concerns with this  Patient confirmed she is correctly utilizing her gabapentin  Patient also asked about side effects of the TPIs  I advised that there are not any side effects of the TPIs that would be of any of her current concerns  Patient verbalized understanding

## 2021-07-06 NOTE — TELEPHONE ENCOUNTER
Patient calling again regarding her head and neck symptoms  Patient now stating that she can't turn her head and neck, that it feels "like something's loose"  Patient states she continues to have a lot of pain  She did not take her medication this morning, reporting that she thought her symptoms were d/t this  Patient states that she also is having numbness/burning continuing in her hands  Patient also states that she has not yet had any PT, that she had an appointment, but did not go  [address], patient was hard to understand while I was talking to her, I am not sure that I correctly was able to get all her symptoms when I attempted to clarify  Please advise on your recommendations

## 2021-07-06 NOTE — TELEPHONE ENCOUNTER
I am trying to contact her  It seems like she picks up the phone but we cannot connect  I cant hear her on the other end at all  Will try again in an hour or so

## 2021-07-30 ENCOUNTER — OFFICE VISIT (OUTPATIENT)
Dept: NEUROLOGY | Facility: CLINIC | Age: 86
End: 2021-07-30
Payer: COMMERCIAL

## 2021-07-30 VITALS — HEART RATE: 66 BPM | DIASTOLIC BLOOD PRESSURE: 60 MMHG | SYSTOLIC BLOOD PRESSURE: 147 MMHG

## 2021-07-30 DIAGNOSIS — R42 VERTIGO: ICD-10-CM

## 2021-07-30 DIAGNOSIS — G52.9 CRANIAL NEURALGIA: ICD-10-CM

## 2021-07-30 DIAGNOSIS — R51.9 CHRONIC NONINTRACTABLE HEADACHE, UNSPECIFIED HEADACHE TYPE: Primary | ICD-10-CM

## 2021-07-30 DIAGNOSIS — G50.0 SUPRAORBITAL NEURALGIA: ICD-10-CM

## 2021-07-30 DIAGNOSIS — G89.29 CHRONIC NONINTRACTABLE HEADACHE, UNSPECIFIED HEADACHE TYPE: Primary | ICD-10-CM

## 2021-07-30 PROCEDURE — 99213 OFFICE O/P EST LOW 20 MIN: CPT | Performed by: PHYSICIAN ASSISTANT

## 2021-07-30 PROCEDURE — 1160F RVW MEDS BY RX/DR IN RCRD: CPT | Performed by: PHYSICIAN ASSISTANT

## 2021-07-30 PROCEDURE — 1036F TOBACCO NON-USER: CPT | Performed by: PHYSICIAN ASSISTANT

## 2021-07-30 NOTE — PROGRESS NOTES
Patient ID: Aline Santiago is a 80 y o  female  Assessment/Plan:     Diagnoses and all orders for this visit:    Chronic nonintractable headache, unspecified headache type    Supraorbital neuralgia    Cranial neuralgia    Vertigo       As noted below her headaches have not been as frequent  She states she has not had headache in a while  She will continue the same doses of gabapentin and oxcarbazepine  States she only takes oxcarbazepine at nighttime, she did not increase to q 12 hours  She denies side effects  She reports new symptoms of numbness and tingling in the upper extremities, and cold sensation in the lower extremities  Neurologic exam is nonfocal   Her balance is adequate with a cane  She was agreeable to try po B12 supplementation, and if that does not help we can try B12 injections  She should call me back if the numbness and tingling worsens or if any symptoms did not improve  The patient also still has some vertigo which resolved on its own after few seconds, when she closes her eyes and lays her head down  She states when she opens her eyes the dizziness goes away  I did refer her to physical therapy but she declines at this therapy  I have scheduled her for 3 months , or earlier if needed  The patient/ her daughter should not hesitate to call me prior to her follow up with any questions or concerns  Subjective:    HPI      She is with her daughter today  Since I last saw her her headaches have resolved  She does not know why but she has not had frequent headaches at all  She takes oxcarbazepine but only 150 mg at bedtime  She did not increase the dose to twice daily  She also continues the same dose of gabapentin  She denies significant side effects  To either medication  She reports some miscellaneous symptoms since last seen such as and numbness and tingling in her arms or coldness in lower extremities  There is no focal numbness, no focal weakness    No other focal or lateralizing deficits  She denies any triggers to these symptoms  She does not know what made them start  Sometimes she has difficulty falling asleep  She states that either she is up until 3:00 a m  or she sleeps until 3:00 a m  and then wakes up and can not fall back asleep  Sometimes she has a feeling that there is a lump in her throat but she denies difficulty swallowing  The lump is more on the right side but she did not feel it when she is swallowing  The following portions of the patient's history were reviewed and updated as appropriate:   She  has a past medical history of Asthma, Atypical chest pain, CAD (coronary artery disease), Candidal intertrigo, CHF (congestive heart failure) ([address] ), Depression, Diabetes mellitus ([address] ), Diabetic neuropathy ([address] ), Diverticulitis, Dyslipidemia, Female bladder prolapse, Gastric ulcer, Glaucoma, HTN (hypertension), Hyperlipidemia, Hypothyroidism (4/18/2016), and RAD (reactive airway disease)    She   Patient Active Problem List    Diagnosis Date Noted    Chronic nonintractable headache 08/01/2021    Vertigo 06/27/2021    Essential hypertension 05/19/2021    Hyponatremia 05/19/2021    Recurrent UTI 05/17/2021    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    Myofascial muscle pain 03/25/2021    Cranial neuralgia 03/25/2021    Foul smelling urine 03/16/2021    Conductive hearing loss, bilateral 03/04/2021    Acute cystitis with hematuria 02/11/2021    Constipation 02/11/2021    Bilateral impacted cerumen 02/11/2021    Pyelonephritis 01/26/2021    Left hip pain 01/19/2021    Medicare annual wellness visit, subsequent 08/21/2020    Diarrhea 08/21/2020    Atypical facial pain 03/29/2020    Stable angina ([address] ) 02/18/2020    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    Chronic headache 11/14/2019    Paroxysmal nocturnal dyspnea 11/11/2019    Orthopnea 11/11/2019    Shortness of breath 11/11/2019    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[address] ) 10/05/2019    Vitamin D deficiency 08/22/2019    Pain of both hip joints 06/21/2019    Candidal intertrigo 06/21/2019    Gastroesophageal reflux disease without esophagitis 11/06/2018    Insomnia 10/12/2018    Chronic diastolic congestive heart failure ([facility] ) 08/05/2018    Type 2 diabetes mellitus, uncontrolled, with neuropathy ([facility] ) 08/05/2018    Chest pain     Medication management 04/13/2018    Dyspnea on minimal exertion 02/17/2018    Shakiness 05/23/2017    Allergic rhinitis 05/02/2017    Hyperlipidemia     Female bladder prolapse     Bilateral cold feet 12/28/2016    Aortic stenosis 10/21/2016    Arteriosclerosis of coronary artery     Asthma     Anxiety disorder 08/17/2016    Osteoarthritis 06/21/2016    Hypothyroidism 04/18/2016    Trigeminal neuralgia 03/16/2016    Peripheral vascular disease ([facility] )     Glaucoma, open angle, severe stage     Overactive bladder 01/20/2016    Hallucination 11/17/2015    General weakness 11/03/2015    Low back pain 11/03/2015    Hearing loss 07/27/2015    Mild cognitive impairment 07/27/2015    Urinary incontinence 07/27/2015    Dizziness 07/02/2015    Advanced age 06/03/2015    Irritable bowel 04/24/2015    Female cystocele 04/24/2015    Diverticulosis 04/03/2015    Hiatal hernia 04/03/2015    Ambulatory dysfunction 04/02/2015     She  has a past surgical history that includes Hysterectomy; Colonoscopy; Esophagogastroduodenoscopy (2011); and Tonsillectomy  Her family history includes Breast cancer in her mother and sister; Hypertension in her father and sister; Hypothyroidism in her mother; Thyroid disease in her sister  She  reports that she has never smoked  She has never used smokeless tobacco  She reports that she does not drink alcohol and does not use drugs    Current Outpatient Medications   Medication Sig Dispense Refill    acetaminophen (TYLENOL) 325 mg tablet Take 2 tablets (650 mg total) by mouth every 6 (six) hours as needed for mild pain 20 tablet 0    albuterol (VENTOLIN HFA) 90 mcg/act inhaler Inhale 2 puffs every 4 (four) hours as needed for wheezing 1 Inhaler 3    aluminum-magnesium hydroxide-simethicone (MYLANTA) 200-200-20 mg/5 mL suspension Take 30 mL by mouth every 4 (four) hours as needed for indigestion or heartburn      amLODIPine (NORVASC) 5 mg tablet Take 1 tablet (5 mg total) by mouth daily 90 tablet 3    aspirin 81 MG tablet Take 81 mg by mouth daily   brimonidine (Alphagan P) 0 1 % Alphagan P 0 1 % eye drops   INT 1 GTT IN OU BID      butalbital-acetaminophen-caffeine (FIORICET,ESGIC) -40 mg per tablet Take 1 tablet by mouth every 4 (four) hours as needed for headaches 6 tablet 0    Cholecalciferol (VITAMIN D3) 2000 units capsule Take 2,000 Units by mouth daily       cyclobenzaprine (FLEXERIL) 5 mg tablet Take 1 tablet (5 mg total) by mouth 3 (three) times a day as needed for muscle spasms 15 tablet 0    Diclofenac Sodium (Voltaren) 1 % Voltaren 1 % topical gel      dorzolamide (TRUSOPT) 2 % ophthalmic solution Administer 1 drop to both eyes 3 (three) times a day        famotidine (PEPCID) 20 mg tablet Take 1 tablet (20 mg total) by mouth 2 (two) times a day 60 tablet 11    fluticasone (FLONASE) 50 mcg/act nasal spray 2 sprays into each nostril daily 3 Bottle 3    fluticasone-vilanterol (BREO ELLIPTA) 100-25 mcg/inh inhaler Inhale 1 puff daily Rinse mouth after use   1 Inhaler 5    furosemide (LASIX) 20 mg tablet TAKE 1 TABLET IN THE MORNING  AND TAKE 2 TABLETS IN THE EVENING 270 tablet 11    gabapentin (NEURONTIN) 600 MG tablet TAKE 1/2 TABLET IN THE MORNING AND AFTERNOON; TAKE 1 TABLET AT BEDTIME 180 tablet 0    glucose blood (Accu-Chek Aline Plus) test strip Testing once daily E11 9 100 each 3    Incontinence Supply Disposable (SELECT DISPOSABLE UNDERWEAR LG) MISC       ketorolac (TORADOL) 10 mg tablet Take 1 tablet (10 mg total) by mouth every 6 (six) hours as needed (migraine) Max 2-3 per week  10 tablet 0    Lancets (ACCU-CHEK SOFT TOUCH) lancets Testing 1 time daily  Dx: E11 9 100 each 3    latanoprost (XALATAN) 0 005 % ophthalmic solution Apply 1 drop to eye daily at bedtime Both eyes 7 5 mL 1    levothyroxine 75 mcg tablet TAKE 1 TABLET EVERY DAY 90 tablet 3    Lidocaine-Menthol 4-5 % PTCH lidocaine patch      meclizine (ANTIVERT) 12 5 MG tablet 1-2 tabs qhs prn dizziness 90 tablet 0    Melatonin 5 MG CAPS Take 3 mg by mouth daily at bedtime      nitroglycerin (NITROSTAT) 0 4 mg SL tablet Place 1 tablet (0 4 mg total) under the tongue every 5 (five) minutes as needed for chest pain 25 tablet 3    nystatin (MYCOSTATIN) powder Apply topically 2 (two) times a day 120 g 3    OLANZapine (ZyPREXA) 5 mg tablet Take 1 tablet (5 mg total) by mouth daily 90 tablet 3    omeprazole (PriLOSEC) 40 MG capsule Take 1 capsule (40 mg total) by mouth daily 90 capsule 3    OXcarbazepine (TRILEPTAL) 150 mg tablet 1 tab q12 hours (please call with dizziness) 180 tablet 0    potassium chloride (K-DUR,KLOR-CON) 20 mEq tablet Take 1 tablet (20 mEq total) by mouth daily 90 tablet 2    sitaGLIPtin (Januvia) 50 mg tablet Take 1 tablet (50 mg total) by mouth daily 90 tablet 0     No current facility-administered medications for this visit  She is allergic to levaquin [levofloxacin] and statins            Objective:    Blood pressure 147/60, pulse 66, not currently breastfeeding  There is no height or weight on file to calculate BMI  Physical Exam    Neurological Exam  Vital signs reviewed  Well developed, well nourished  Head: Normocephalic, atraumatic  CN 1-02: intact and symmetric, including EOMs which are normal b/l and PERRL  MSK: 5/5 t/o  ROM normal x all 4 extr  Sensation: Inact to LT x4 extr  Reflexes:   Nonfocal throughout  Coordination: Nml x4 extr  Gait:   She is resting in a wheelchair  She stands on her own without assistance but with some difficulty    She uses a cane for ambulation  She has a wide-based gait with slightly stooped posture  ROS:    Review of Systems   Constitutional: Negative  Negative for appetite change and fever  HENT: Negative  Negative for hearing loss, tinnitus, trouble swallowing and voice change  Eyes: Negative  Negative for photophobia and pain  Respiratory: Negative  Negative for shortness of breath  Cardiovascular: Negative  Negative for palpitations  Gastrointestinal: Negative  Negative for nausea and vomiting  Endocrine: Negative  Negative for cold intolerance  Legs always stay cold    Genitourinary: Negative  Negative for dysuria, frequency and urgency  Musculoskeletal: Negative  Negative for myalgias and neck pain  Skin: Negative  Negative for rash  Neurological: Positive for numbness (up and down legs/arms "feels like ants" )  Negative for dizziness, tremors, seizures, syncope, facial asymmetry, speech difficulty, weakness, light-headedness and headaches  Hematological: Negative  Does not bruise/bleed easily  Psychiatric/Behavioral: Negative  Negative for confusion, hallucinations and sleep disturbance  All other systems reviewed and are negative  The following portions of the patient's history were reviewed and updated as appropriate: allergies, current medications/ medication history, past family history, past medical history, past social history, past surgical history and problem list     Review of systems was reviewed and otherwise unremarkable from a neurological perspective

## 2021-08-01 PROBLEM — G89.29 CHRONIC NONINTRACTABLE HEADACHE: Status: ACTIVE | Noted: 2021-08-01

## 2021-08-01 PROBLEM — R51.9 CHRONIC NONINTRACTABLE HEADACHE: Status: ACTIVE | Noted: 2021-08-01

## 2021-08-10 DIAGNOSIS — E11.9 TYPE 2 DIABETES MELLITUS WITHOUT COMPLICATION, WITHOUT LONG-TERM CURRENT USE OF INSULIN (HCC): ICD-10-CM

## 2021-08-31 ENCOUNTER — TELEPHONE (OUTPATIENT)
Dept: FAMILY MEDICINE CLINIC | Facility: CLINIC | Age: 86
End: 2021-08-31

## 2021-08-31 NOTE — TELEPHONE ENCOUNTER
FYI:  PT HAS HUM[employer] INSUR AND HAS ALWAYS HAD DR JOSE RUIZ ON HER CARD  SHE JUST RECEIVED HER NEW CARD IN THE MAIL AND IT HAS DR Marcos Ambrocio ON THE CARD  THE PATIENT ALWAYS SEE'S [facility]   THE PT CALLED [facility] AND TOLD THEM THAT SHE DOESN'T KNOW DR Corrinne Prim AND ASK TO HAVE DR JOSE RUIZ PUT ON HER CARD AND [facility] TOLD HER THAT DR JOSE RUIZ NO LONGER ACCEPTS [address]  I TOLD THE PT THE SHE WILL BE ABLE TO CONTINUE TO SEE [facility] WITH DR Corrinne Prim ON HER CARD  THE PT JUST WANTED US TO KNOW WHAT [facility] HAD TOLD HER

## 2021-09-08 DIAGNOSIS — E03.9 HYPOTHYROIDISM, UNSPECIFIED TYPE: ICD-10-CM

## 2021-09-08 RX ORDER — LEVOTHYROXINE SODIUM 0.07 MG/1
TABLET ORAL
Qty: 90 TABLET | Refills: 3 | Status: SHIPPED | OUTPATIENT
Start: 2021-09-08

## 2021-09-30 DIAGNOSIS — I35.0 NONRHEUMATIC AORTIC VALVE STENOSIS: Chronic | ICD-10-CM

## 2021-09-30 RX ORDER — POTASSIUM CHLORIDE 20 MEQ/1
TABLET, EXTENDED RELEASE ORAL
Qty: 90 TABLET | Refills: 2 | Status: SHIPPED | OUTPATIENT
Start: 2021-09-30

## 2021-10-06 DIAGNOSIS — G52.9 CRANIAL NEURALGIA: ICD-10-CM

## 2021-10-07 RX ORDER — GABAPENTIN 600 MG/1
TABLET ORAL
Qty: 180 TABLET | Refills: 0 | Status: SHIPPED | OUTPATIENT
Start: 2021-10-07 | End: 2022-03-05

## 2021-10-12 ENCOUNTER — OFFICE VISIT (OUTPATIENT)
Dept: FAMILY MEDICINE CLINIC | Facility: CLINIC | Age: 86
End: 2021-10-12
Payer: COMMERCIAL

## 2021-10-12 VITALS
BODY MASS INDEX: 30.73 KG/M2 | HEART RATE: 76 BPM | SYSTOLIC BLOOD PRESSURE: 136 MMHG | TEMPERATURE: 97.6 F | WEIGHT: 167 LBS | DIASTOLIC BLOOD PRESSURE: 64 MMHG | HEIGHT: 62 IN

## 2021-10-12 DIAGNOSIS — K59.00 CONSTIPATION, UNSPECIFIED CONSTIPATION TYPE: ICD-10-CM

## 2021-10-12 DIAGNOSIS — G50.0 TRIGEMINAL NEURALGIA: ICD-10-CM

## 2021-10-12 DIAGNOSIS — K21.9 GASTROESOPHAGEAL REFLUX DISEASE WITHOUT ESOPHAGITIS: Primary | ICD-10-CM

## 2021-10-12 PROCEDURE — 3725F SCREEN DEPRESSION PERFORMED: CPT | Performed by: PHYSICIAN ASSISTANT

## 2021-10-12 PROCEDURE — 99214 OFFICE O/P EST MOD 30 MIN: CPT | Performed by: PHYSICIAN ASSISTANT

## 2021-10-12 PROCEDURE — 1160F RVW MEDS BY RX/DR IN RCRD: CPT | Performed by: PHYSICIAN ASSISTANT

## 2021-10-12 PROCEDURE — 1036F TOBACCO NON-USER: CPT | Performed by: PHYSICIAN ASSISTANT

## 2021-11-04 ENCOUNTER — TELEPHONE (OUTPATIENT)
Dept: NEUROLOGY | Facility: CLINIC | Age: 86
End: 2021-11-04

## 2021-11-09 ENCOUNTER — OFFICE VISIT (OUTPATIENT)
Dept: FAMILY MEDICINE CLINIC | Facility: CLINIC | Age: 86
End: 2021-11-09
Payer: COMMERCIAL

## 2021-11-09 VITALS — OXYGEN SATURATION: 98 % | HEART RATE: 65 BPM | DIASTOLIC BLOOD PRESSURE: 58 MMHG | SYSTOLIC BLOOD PRESSURE: 128 MMHG
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
## 2021-11-09 DIAGNOSIS — M53.3 TAIL BONE PAIN: ICD-10-CM

## 2021-11-09 DIAGNOSIS — Z23 NEEDS FLU SHOT: Primary | ICD-10-CM

## 2021-11-09 PROCEDURE — 1101F PT FALLS ASSESS-DOCD LE1/YR: CPT | Performed by: PHYSICIAN ASSISTANT

## 2021-11-09 PROCEDURE — 99214 OFFICE O/P EST MOD 30 MIN: CPT | Performed by: PHYSICIAN ASSISTANT

## 2021-11-09 PROCEDURE — G0008 ADMIN INFLUENZA VIRUS VAC: HCPCS | Performed by: PHYSICIAN ASSISTANT

## 2021-11-09 PROCEDURE — 3288F FALL RISK ASSESSMENT DOCD: CPT | Performed by: PHYSICIAN ASSISTANT

## 2021-11-09 PROCEDURE — 90662 IIV NO PRSV INCREASED AG IM: CPT | Performed by: PHYSICIAN ASSISTANT

## 2021-11-10 ENCOUNTER — TELEPHONE (OUTPATIENT)
Dept: NEUROLOGY | Facility: CLINIC | Age: 86
End: 2021-11-10

## 2021-11-11 ENCOUNTER — HOSPITAL ENCOUNTER (OUTPATIENT)
Dept: RADIOLOGY | Facility: HOSPITAL | Age: 86
Discharge: HOME | End: 2021-11-11
Payer: COMMERCIAL

## 2021-11-11 DIAGNOSIS — M53.3 TAIL BONE PAIN: ICD-10-CM

## 2021-11-11 DIAGNOSIS — R07.9 CHEST PAIN, UNSPECIFIED TYPE: ICD-10-CM
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
## 2021-11-11 PROCEDURE — 71046 X-RAY EXAM CHEST 2 VIEWS: CPT

## 2021-11-11 PROCEDURE — 72100 X-RAY EXAM L-S SPINE 2/3 VWS: CPT

## 2021-11-15 ENCOUNTER — TELEPHONE (OUTPATIENT)
Dept: FAMILY MEDICINE CLINIC | Facility: CLINIC | Age: 86
End: 2021-11-15

## 2021-11-18 ENCOUNTER — OFFICE VISIT (OUTPATIENT)
Dept: NEUROLOGY | Facility: CLINIC | Age: 86
End: 2021-11-18
Payer: COMMERCIAL

## 2021-11-18 VITALS
RESPIRATION RATE: 18 BRPM | DIASTOLIC BLOOD PRESSURE: 62 MMHG | TEMPERATURE: 97 F | HEIGHT: 61 IN | HEART RATE: 63 BPM | SYSTOLIC BLOOD PRESSURE: 128 MMHG | BODY MASS INDEX: 31.53 KG/M2 | WEIGHT: 167 LBS

## 2021-11-18 DIAGNOSIS — G52.9 CRANIAL NEURALGIA: ICD-10-CM

## 2021-11-18 DIAGNOSIS — G50.0 SUPRAORBITAL NEURALGIA: ICD-10-CM

## 2021-11-18 DIAGNOSIS — M79.18 MYOFASCIAL MUSCLE PAIN: ICD-10-CM

## 2021-11-18 DIAGNOSIS — R42 VERTIGO: ICD-10-CM

## 2021-11-18 DIAGNOSIS — M54.50 ACUTE MIDLINE LOW BACK PAIN WITHOUT SCIATICA: Primary | ICD-10-CM

## 2021-11-18 DIAGNOSIS — R26.2 AMBULATORY DYSFUNCTION: ICD-10-CM

## 2021-11-18 PROCEDURE — 1160F RVW MEDS BY RX/DR IN RCRD: CPT | Performed by: PHYSICIAN ASSISTANT

## 2021-11-18 PROCEDURE — 1036F TOBACCO NON-USER: CPT | Performed by: PHYSICIAN ASSISTANT

## 2021-11-18 PROCEDURE — 99214 OFFICE O/P EST MOD 30 MIN: CPT | Performed by: PHYSICIAN ASSISTANT

## 2021-12-07 ENCOUNTER — TELEPHONE (OUTPATIENT)
Dept: NEUROLOGY | Facility: CLINIC | Age: 86
End: 2021-12-07

## 2021-12-07 DIAGNOSIS — R26.2 AMBULATORY DYSFUNCTION: ICD-10-CM

## 2021-12-07 DIAGNOSIS — G50.1 ATYPICAL FACIAL PAIN: Primary | ICD-10-CM

## 2021-12-09 ENCOUNTER — APPOINTMENT (OUTPATIENT)
Dept: LAB | Facility: HOSPITAL | Age: 86
End: 2021-12-09
Payer: COMMERCIAL

## 2021-12-09 DIAGNOSIS — G50.1 ATYPICAL FACIAL PAIN: ICD-10-CM

## 2021-12-09 DIAGNOSIS — R26.2 AMBULATORY DYSFUNCTION: ICD-10-CM

## 2021-12-09 LAB
ALBUMIN SERPL BCP-MCNC: 3.4 G/DL (ref 3.5–5)
ALP SERPL-CCNC: 113 U/L (ref 46–116)
ALT SERPL W P-5'-P-CCNC: 20 U/L (ref 12–78)
ANION GAP SERPL CALCULATED.3IONS-SCNC: 10 MMOL/L (ref 4–13)
AST SERPL W P-5'-P-CCNC: 18 U/L (ref 5–45)
BILIRUB SERPL-MCNC: 0.32 MG/DL (ref 0.2–1)
BUN SERPL-MCNC: 31 MG/DL (ref 5–25)
CALCIUM ALBUM COR SERPL-MCNC: 9.8 MG/DL (ref 8.3–10.1)
CALCIUM SERPL-MCNC: 9.3 MG/DL (ref 8.3–10.1)
CHLORIDE SERPL-SCNC: 107 MMOL/L (ref 100–108)
CO2 SERPL-SCNC: 25 MMOL/L (ref 21–32)
CREAT SERPL-MCNC: 1.44 MG/DL (ref 0.6–1.3)
GFR SERPL CREATININE-BSD FRML MDRD: 35 ML/MIN/1.73SQ M
GLUCOSE SERPL-MCNC: 145 MG/DL (ref 65–140)
POTASSIUM SERPL-SCNC: 4 MMOL/L (ref 3.5–5.3)
PROT SERPL-MCNC: 7.5 G/DL (ref 6.4–8.2)
SODIUM SERPL-SCNC: 142 MMOL/L (ref 136–145)

## 2021-12-09 PROCEDURE — 80053 COMPREHEN METABOLIC PANEL: CPT

## 2021-12-09 PROCEDURE — 36415 COLL VENOUS BLD VENIPUNCTURE: CPT

## 2021-12-16 ENCOUNTER — TELEPHONE (OUTPATIENT)
Dept: NEUROLOGY | Facility: CLINIC | Age: 86
End: 2021-12-16

## 2021-12-16 NOTE — TELEPHONE ENCOUNTER
----- Message from Zohra Scott PA-C sent at 12/13/2021  1:53 PM EST -----  Please notify pt that her labs are good, except kidney function is effected a bit since last seen. Please promote hydration/ drinking water t/o where possible. I will order repeat test for 4-6 weeks if possible. Thanks.  CC PCP.

## 2021-12-16 NOTE — TELEPHONE ENCOUNTER
Pt called in. Went over lab results.  Encouraged fluids    Told her repeat labs may be ordered.  CC'ng PCP

## 2021-12-20 LAB
LEFT EYE DIABETIC RETINOPATHY: NORMAL
RIGHT EYE DIABETIC RETINOPATHY: NORMAL

## 2022-01-06 DIAGNOSIS — E11.9 TYPE 2 DIABETES MELLITUS WITHOUT COMPLICATION, WITHOUT LONG-TERM CURRENT USE OF INSULIN (HCC): ICD-10-CM

## 2022-02-01 ENCOUNTER — TELEPHONE (OUTPATIENT)
Dept: NEUROLOGY | Facility: CLINIC | Age: 87
End: 2022-02-01

## 2022-02-01 DIAGNOSIS — G89.29 CHRONIC NONINTRACTABLE HEADACHE, UNSPECIFIED HEADACHE TYPE: ICD-10-CM

## 2022-02-01 DIAGNOSIS — R51.9 CHRONIC NONINTRACTABLE HEADACHE, UNSPECIFIED HEADACHE TYPE: ICD-10-CM

## 2022-02-01 DIAGNOSIS — G50.1 ATYPICAL FACIAL PAIN: Primary | ICD-10-CM

## 2022-02-01 NOTE — TELEPHONE ENCOUNTER
Patient calling in  She states she feels she has pain from head, ears and back of neck  She states this started up again the past couple of weeks with the sensation of "sand" going up her legs, feet and hands  Patient states she has not been able to sleep due to the pain  Current medications:  oxcarbazepine 150 mg bedtime  gabapentin 600 mg TAKE 1/2 TABLET IN THE MORNING AND AFTERNOON AND TAKE 1 TABLET AT BEDTIME     Patient states she has not yet tried CBD but she will have her daughter go to the pharmacy to see what products they carry for her to try     Any recommendations?      Please advise

## 2022-02-02 NOTE — TELEPHONE ENCOUNTER
Still taking the melatonin? That has helped in the past  She can also try to increase it by 2 mg intervals if she is taking it  If that does not help, what about increasing the trileptal at bed to 300 mg qhs? Thanks

## 2022-02-03 NOTE — TELEPHONE ENCOUNTER
Pt stopped taking Melatonin  She stated she increased melatonin up to 8 mg and she said she felt "funny" on it, and it didn't help  So she stopped it  Doesn't want to increase the Trileptal at this time  Stated her daughter is going to buy her CBD and she is going to try that  Pt also stated if she can get imaging on her head done? Said she gets a sensation of "something leaking" it starts at the top of her head and travels down to her Neck  Said this sensation feels like it's internal     Denies any other symptoms  And this sensation occurs a few times a week  Please advise

## 2022-02-03 NOTE — TELEPHONE ENCOUNTER
Lets start with blood tests, BMP is pending and I ordered vitamin levels  Will order HCT but I do not suspect anything wrong with the brain at this time, rochelle if no focal/ unilateral sxs  Any trauma? Thanks

## 2022-02-04 NOTE — TELEPHONE ENCOUNTER
Spoke with pt and daughter Isabel Rush aware of orders for labs and Head CT  Number for CS given to daughter to schedule CT Head  Pt and daughter deny any recent Head Trauma or falls

## 2022-02-17 ENCOUNTER — HOSPITAL ENCOUNTER (OUTPATIENT)
Dept: CT IMAGING | Facility: HOSPITAL | Age: 87
Discharge: HOME | End: 2022-02-17
Payer: COMMERCIAL

## 2022-02-17 ENCOUNTER — APPOINTMENT (OUTPATIENT)
Dept: LAB | Facility: HOSPITAL | Age: 87
End: 2022-02-17
Payer: COMMERCIAL

## 2022-02-17 DIAGNOSIS — G50.1 ATYPICAL FACIAL PAIN: ICD-10-CM

## 2022-02-17 DIAGNOSIS — G89.29 CHRONIC NONINTRACTABLE HEADACHE, UNSPECIFIED HEADACHE TYPE: ICD-10-CM

## 2022-02-17 DIAGNOSIS — R26.2 AMBULATORY DYSFUNCTION: ICD-10-CM

## 2022-02-17 DIAGNOSIS — R51.9 CHRONIC NONINTRACTABLE HEADACHE, UNSPECIFIED HEADACHE TYPE: ICD-10-CM

## 2022-02-17 LAB
25(OH)D3 SERPL-MCNC: 50.8 NG/ML (ref 30–100)
ANION GAP SERPL CALCULATED.3IONS-SCNC: 9 MMOL/L (ref 4–13)
BUN SERPL-MCNC: 27 MG/DL (ref 5–25)
CALCIUM SERPL-MCNC: 9.2 MG/DL (ref 8.3–10.1)
CHLORIDE SERPL-SCNC: 108 MMOL/L (ref 100–108)
CO2 SERPL-SCNC: 26 MMOL/L (ref 21–32)
CREAT SERPL-MCNC: 1.37 MG/DL (ref 0.6–1.3)
FOLATE SERPL-MCNC: >20 NG/ML (ref 3.1–17.5)
GFR SERPL CREATININE-BSD FRML MDRD: 31 ML/MIN/1.73SQ M
GLUCOSE SERPL-MCNC: 103 MG/DL (ref 65–140)
POTASSIUM SERPL-SCNC: 4.5 MMOL/L (ref 3.5–5.3)
SODIUM SERPL-SCNC: 143 MMOL/L (ref 136–145)
VIT B12 SERPL-MCNC: 844 PG/ML (ref 100–900)

## 2022-02-17 PROCEDURE — 70450 CT HEAD/BRAIN W/O DYE: CPT

## 2022-02-17 PROCEDURE — 80048 BASIC METABOLIC PNL TOTAL CA: CPT

## 2022-02-17 PROCEDURE — G1004 CDSM NDSC: HCPCS

## 2022-02-17 PROCEDURE — 82306 VITAMIN D 25 HYDROXY: CPT

## 2022-02-17 PROCEDURE — 36415 COLL VENOUS BLD VENIPUNCTURE: CPT

## 2022-02-17 PROCEDURE — 82607 VITAMIN B-12: CPT

## 2022-02-17 PROCEDURE — 82746 ASSAY OF FOLIC ACID SERUM: CPT

## 2022-02-20 DIAGNOSIS — E03.9 ACQUIRED HYPOTHYROIDISM: Primary | ICD-10-CM

## 2022-02-20 PROBLEM — N12 PYELONEPHRITIS: Status: RESOLVED | Noted: 2021-01-26 | Resolved: 2022-02-20

## 2022-02-20 PROBLEM — IMO0002 TYPE 2 DIABETES MELLITUS, UNCONTROLLED, WITH NEUROPATHY: Status: RESOLVED | Noted: 2018-08-05 | Resolved: 2022-02-20

## 2022-02-20 PROBLEM — R82.90 FOUL SMELLING URINE: Status: RESOLVED | Noted: 2021-03-16 | Resolved: 2022-02-20

## 2022-02-20 PROBLEM — N30.01 ACUTE CYSTITIS WITH HEMATURIA: Status: RESOLVED | Noted: 2021-02-11 | Resolved: 2022-02-20

## 2022-02-20 PROBLEM — Z79.899 MEDICATION MANAGEMENT: Status: RESOLVED | Noted: 2018-04-13 | Resolved: 2022-02-20

## 2022-02-23 ENCOUNTER — TELEPHONE (OUTPATIENT)
Dept: NEUROLOGY | Facility: CLINIC | Age: 87
End: 2022-02-23

## 2022-02-23 DIAGNOSIS — G50.1 ATYPICAL FACIAL PAIN: Primary | ICD-10-CM

## 2022-02-23 NOTE — TELEPHONE ENCOUNTER
Called pt and went over Head CT and labs  Aware  Pt states she continues to have the pain in her head and notices it more when she clenches down  Said she has pain in her back tooth and it travels to the front of her head  Pt states she went to the Dentist recently and had xrays done, and dentist couldn't find any issue with back tooth  Any other suggestions?

## 2022-02-23 NOTE — TELEPHONE ENCOUNTER
Patient called and left  regarding CT results  [address] - I see your note below regarding lab results  Prior to calling patient for this, would you mind reviewing the CT and advising on that so we can call her with both lab and imaging? Thank you!

## 2022-02-23 NOTE — TELEPHONE ENCOUNTER
----- Message from Carolyn Mae PA-C sent at 2/18/2022  3:13 PM EST -----  Blood work looks very good, however GFR a little lower  I am not sure that this is contributing to some of her concerns though, however please make sure she is hydrating well throughout the day

## 2022-02-24 NOTE — TELEPHONE ENCOUNTER
I can suggest medications for that, ie baclofen low dose may help  She may also benefit more from seeing pain management? Would her daughter feel comfortable bringing her there? They sometimes do nerve blocks in the jaw region for pain  Depending on if she can come to this office, I could try injections as well

## 2022-02-25 RX ORDER — BACLOFEN 5 MG/1
TABLET ORAL
Qty: 30 TABLET | Refills: 0 | Status: SHIPPED | OUTPATIENT
Start: 2022-02-25 | End: 2022-03-09 | Stop reason: HOSPADM

## 2022-02-25 NOTE — TELEPHONE ENCOUNTER
Called patient she is agreeable to trying low dose baclofen first  If this is not effective then she will be okay with a pain management referral     Please send baclofen

## 2022-03-04 DIAGNOSIS — G52.9 CRANIAL NEURALGIA: ICD-10-CM

## 2022-03-05 RX ORDER — GABAPENTIN 600 MG/1
TABLET ORAL
Qty: 180 TABLET | Refills: 0 | Status: SHIPPED | OUTPATIENT
Start: 2022-03-05 | End: 2022-08-05

## 2022-03-08 ENCOUNTER — APPOINTMENT (EMERGENCY)
Dept: CT IMAGING | Facility: HOSPITAL | Age: 87
End: 2022-03-08
Payer: COMMERCIAL

## 2022-03-08 ENCOUNTER — HOSPITAL ENCOUNTER (OUTPATIENT)
Facility: HOSPITAL | Age: 87
Setting detail: OBSERVATION
Discharge: HOME | End: 2022-03-09
Attending: EMERGENCY MEDICINE | Admitting: HOSPITALIST
Payer: COMMERCIAL

## 2022-03-08 ENCOUNTER — APPOINTMENT (INPATIENT)
Dept: RADIOLOGY | Facility: HOSPITAL | Age: 87
End: 2022-03-08
Payer: COMMERCIAL

## 2022-03-08 DIAGNOSIS — I50.9 CHF (CONGESTIVE HEART FAILURE) (HCC): ICD-10-CM

## 2022-03-08 DIAGNOSIS — R10.13 EPIGASTRIC PAIN: Primary | ICD-10-CM

## 2022-03-08 DIAGNOSIS — R77.8 ELEVATED TROPONIN: ICD-10-CM

## 2022-03-08 DIAGNOSIS — I25.10 CAD (CORONARY ARTERY DISEASE): ICD-10-CM

## 2022-03-08 DIAGNOSIS — G50.1 ATYPICAL FACIAL PAIN: ICD-10-CM

## 2022-03-08 DIAGNOSIS — K21.9 GASTROESOPHAGEAL REFLUX DISEASE WITHOUT ESOPHAGITIS: ICD-10-CM

## 2022-03-08 DIAGNOSIS — R44.3 HALLUCINATION: ICD-10-CM

## 2022-03-08 DIAGNOSIS — E03.9 HYPOTHYROIDISM: ICD-10-CM

## 2022-03-08 PROBLEM — R10.9 ABDOMINAL PAIN: Status: ACTIVE | Noted: 2022-03-08

## 2022-03-08 LAB
2HR DELTA HS TROPONIN: 9 NG/L
4HR DELTA HS TROPONIN: 23 NG/L
ALBUMIN SERPL BCP-MCNC: 3.7 G/DL (ref 3.5–5)
ALP SERPL-CCNC: 106 U/L (ref 46–116)
ALT SERPL W P-5'-P-CCNC: 19 U/L (ref 12–78)
ANION GAP SERPL CALCULATED.3IONS-SCNC: 10 MMOL/L (ref 4–13)
AST SERPL W P-5'-P-CCNC: 21 U/L (ref 5–45)
ATRIAL RATE: 258 BPM
ATRIAL RATE: 87 BPM
BASOPHILS # BLD AUTO: 0.03 THOUSANDS/ΜL (ref 0–0.1)
BASOPHILS NFR BLD AUTO: 1 % (ref 0–1)
BILIRUB DIRECT SERPL-MCNC: 0.11 MG/DL (ref 0–0.2)
BILIRUB SERPL-MCNC: 0.45 MG/DL (ref 0.2–1)
BILIRUB UR QL STRIP: NEGATIVE
BUN SERPL-MCNC: 20 MG/DL (ref 5–25)
CALCIUM SERPL-MCNC: 9 MG/DL (ref 8.3–10.1)
CARDIAC TROPONIN I PNL SERPL HS: 13 NG/L
CARDIAC TROPONIN I PNL SERPL HS: 22 NG/L
CARDIAC TROPONIN I PNL SERPL HS: 36 NG/L
CHLORIDE SERPL-SCNC: 102 MMOL/L (ref 100–108)
CLARITY UR: CLEAR
CO2 SERPL-SCNC: 25 MMOL/L (ref 21–32)
COLOR UR: YELLOW
CREAT SERPL-MCNC: 1.33 MG/DL (ref 0.6–1.3)
EOSINOPHIL # BLD AUTO: 0.15 THOUSAND/ΜL (ref 0–0.61)
EOSINOPHIL NFR BLD AUTO: 2 % (ref 0–6)
ERYTHROCYTE [DISTWIDTH] IN BLOOD BY AUTOMATED COUNT: 13.2 % (ref 11.6–15.1)
GFR SERPL CREATININE-BSD FRML MDRD: 33 ML/MIN/1.73SQ M
GLUCOSE SERPL-MCNC: 135 MG/DL (ref 65–140)
GLUCOSE SERPL-MCNC: 153 MG/DL (ref 65–140)
GLUCOSE UR STRIP-MCNC: NEGATIVE MG/DL
HCT VFR BLD AUTO: 42.7 % (ref 34.8–46.1)
HGB BLD-MCNC: 14.4 G/DL (ref 11.5–15.4)
HGB UR QL STRIP.AUTO: NEGATIVE
IMM GRANULOCYTES # BLD AUTO: 0.02 THOUSAND/UL (ref 0–0.2)
IMM GRANULOCYTES NFR BLD AUTO: 0 % (ref 0–2)
KETONES UR STRIP-MCNC: NEGATIVE MG/DL
LEUKOCYTE ESTERASE UR QL STRIP: NEGATIVE
LIPASE SERPL-CCNC: 122 U/L (ref 73–393)
LYMPHOCYTES # BLD AUTO: 0.85 THOUSANDS/ΜL (ref 0.6–4.47)
LYMPHOCYTES NFR BLD AUTO: 14 % (ref 14–44)
MCH RBC QN AUTO: 30.4 PG (ref 26.8–34.3)
MCHC RBC AUTO-ENTMCNC: 33.7 G/DL (ref 31.4–37.4)
MCV RBC AUTO: 90 FL (ref 82–98)
MONOCYTES # BLD AUTO: 0.58 THOUSAND/ΜL (ref 0.17–1.22)
MONOCYTES NFR BLD AUTO: 9 % (ref 4–12)
NEUTROPHILS # BLD AUTO: 4.55 THOUSANDS/ΜL (ref 1.85–7.62)
NEUTS SEG NFR BLD AUTO: 74 % (ref 43–75)
NITRITE UR QL STRIP: NEGATIVE
NRBC BLD AUTO-RTO: 0 /100 WBCS
P AXIS: 139 DEGREES
PH UR STRIP.AUTO: 6 [PH]
PLATELET # BLD AUTO: 184 THOUSANDS/UL (ref 149–390)
PMV BLD AUTO: 11.1 FL (ref 8.9–12.7)
POTASSIUM SERPL-SCNC: 4.4 MMOL/L (ref 3.5–5.3)
PR INTERVAL: 240 MS
PROT SERPL-MCNC: 7.7 G/DL (ref 6.4–8.2)
PROT UR STRIP-MCNC: NEGATIVE MG/DL
QRS AXIS: -7 DEGREES
QRS AXIS: 8 DEGREES
QRSD INTERVAL: 106 MS
QRSD INTERVAL: 112 MS
QT INTERVAL: 370 MS
QT INTERVAL: 370 MS
QTC INTERVAL: 405 MS
QTC INTERVAL: 445 MS
RBC # BLD AUTO: 4.73 MILLION/UL (ref 3.81–5.12)
SODIUM SERPL-SCNC: 137 MMOL/L (ref 136–145)
SP GR UR STRIP.AUTO: 1.01 (ref 1–1.03)
T WAVE AXIS: 122 DEGREES
T WAVE AXIS: 75 DEGREES
UROBILINOGEN UR QL STRIP.AUTO: 0.2 E.U./DL
VENTRICULAR RATE: 72 BPM
VENTRICULAR RATE: 87 BPM
WBC # BLD AUTO: 6.18 THOUSAND/UL (ref 4.31–10.16)

## 2022-03-08 PROCEDURE — 83690 ASSAY OF LIPASE: CPT | Performed by: EMERGENCY MEDICINE

## 2022-03-08 PROCEDURE — 96374 THER/PROPH/DIAG INJ IV PUSH: CPT

## 2022-03-08 PROCEDURE — 71046 X-RAY EXAM CHEST 2 VIEWS: CPT

## 2022-03-08 PROCEDURE — G1004 CDSM NDSC: HCPCS

## 2022-03-08 PROCEDURE — 81003 URINALYSIS AUTO W/O SCOPE: CPT | Performed by: EMERGENCY MEDICINE

## 2022-03-08 PROCEDURE — 99220 PR INITIAL OBSERVATION CARE/DAY 70 MINUTES: CPT | Performed by: PHYSICIAN ASSISTANT

## 2022-03-08 PROCEDURE — 93005 ELECTROCARDIOGRAM TRACING: CPT

## 2022-03-08 PROCEDURE — 36415 COLL VENOUS BLD VENIPUNCTURE: CPT | Performed by: EMERGENCY MEDICINE

## 2022-03-08 PROCEDURE — 80048 BASIC METABOLIC PNL TOTAL CA: CPT | Performed by: EMERGENCY MEDICINE

## 2022-03-08 PROCEDURE — 80076 HEPATIC FUNCTION PANEL: CPT | Performed by: EMERGENCY MEDICINE

## 2022-03-08 PROCEDURE — 93010 ELECTROCARDIOGRAM REPORT: CPT | Performed by: INTERNAL MEDICINE

## 2022-03-08 PROCEDURE — 96361 HYDRATE IV INFUSION ADD-ON: CPT

## 2022-03-08 PROCEDURE — 84484 ASSAY OF TROPONIN QUANT: CPT | Performed by: EMERGENCY MEDICINE

## 2022-03-08 PROCEDURE — 99285 EMERGENCY DEPT VISIT HI MDM: CPT

## 2022-03-08 PROCEDURE — 74177 CT ABD & PELVIS W/CONTRAST: CPT

## 2022-03-08 PROCEDURE — 99285 EMERGENCY DEPT VISIT HI MDM: CPT | Performed by: EMERGENCY MEDICINE

## 2022-03-08 PROCEDURE — 83880 ASSAY OF NATRIURETIC PEPTIDE: CPT | Performed by: PHYSICIAN ASSISTANT

## 2022-03-08 PROCEDURE — 85025 COMPLETE CBC W/AUTO DIFF WBC: CPT | Performed by: EMERGENCY MEDICINE

## 2022-03-08 PROCEDURE — 82948 REAGENT STRIP/BLOOD GLUCOSE: CPT

## 2022-03-08 RX ORDER — HEPARIN SODIUM 5000 [USP'U]/ML
5000 INJECTION, SOLUTION INTRAVENOUS; SUBCUTANEOUS EVERY 8 HOURS SCHEDULED
Status: DISCONTINUED | OUTPATIENT
Start: 2022-03-08 | End: 2022-03-09

## 2022-03-08 RX ORDER — LANOLIN ALCOHOL/MO/W.PET/CERES
3 CREAM (GRAM) TOPICAL
Status: DISCONTINUED | OUTPATIENT
Start: 2022-03-08 | End: 2022-03-09 | Stop reason: HOSPADM

## 2022-03-08 RX ORDER — GABAPENTIN 300 MG/1
600 CAPSULE ORAL
Status: DISCONTINUED | OUTPATIENT
Start: 2022-03-08 | End: 2022-03-09 | Stop reason: HOSPADM

## 2022-03-08 RX ORDER — LATANOPROST 50 UG/ML
1 SOLUTION/ DROPS OPHTHALMIC
Status: DISCONTINUED | OUTPATIENT
Start: 2022-03-08 | End: 2022-03-09 | Stop reason: HOSPADM

## 2022-03-08 RX ORDER — ACETAMINOPHEN 325 MG/1
650 TABLET ORAL EVERY 6 HOURS PRN
Status: DISCONTINUED | OUTPATIENT
Start: 2022-03-08 | End: 2022-03-09 | Stop reason: HOSPADM

## 2022-03-08 RX ORDER — CALCIUM CARBONATE 200(500)MG
500 TABLET,CHEWABLE ORAL 2 TIMES DAILY PRN
Status: DISCONTINUED | OUTPATIENT
Start: 2022-03-08 | End: 2022-03-09 | Stop reason: HOSPADM

## 2022-03-08 RX ORDER — DORZOLAMIDE HCL 20 MG/ML
1 SOLUTION/ DROPS OPHTHALMIC 3 TIMES DAILY
Status: DISCONTINUED | OUTPATIENT
Start: 2022-03-08 | End: 2022-03-09 | Stop reason: HOSPADM

## 2022-03-08 RX ORDER — ONDANSETRON 2 MG/ML
4 INJECTION INTRAMUSCULAR; INTRAVENOUS EVERY 6 HOURS PRN
Status: DISCONTINUED | OUTPATIENT
Start: 2022-03-08 | End: 2022-03-09 | Stop reason: HOSPADM

## 2022-03-08 RX ORDER — AMLODIPINE BESYLATE 5 MG/1
5 TABLET ORAL DAILY
Status: DISCONTINUED | OUTPATIENT
Start: 2022-03-09 | End: 2022-03-09 | Stop reason: HOSPADM

## 2022-03-08 RX ORDER — DICYCLOMINE HCL 20 MG
20 TABLET ORAL ONCE
Status: COMPLETED | OUTPATIENT
Start: 2022-03-08 | End: 2022-03-08

## 2022-03-08 RX ORDER — OXCARBAZEPINE 300 MG/1
150 TABLET, FILM COATED ORAL
Status: DISCONTINUED | OUTPATIENT
Start: 2022-03-08 | End: 2022-03-09 | Stop reason: HOSPADM

## 2022-03-08 RX ORDER — MAGNESIUM HYDROXIDE/ALUMINUM HYDROXICE/SIMETHICONE 120; 1200; 1200 MG/30ML; MG/30ML; MG/30ML
30 SUSPENSION ORAL ONCE
Status: COMPLETED | OUTPATIENT
Start: 2022-03-08 | End: 2022-03-08

## 2022-03-08 RX ORDER — FUROSEMIDE 40 MG/1
40 TABLET ORAL EVERY EVENING
Status: DISCONTINUED | OUTPATIENT
Start: 2022-03-08 | End: 2022-03-09 | Stop reason: HOSPADM

## 2022-03-08 RX ORDER — ASPIRIN 81 MG/1
81 TABLET, CHEWABLE ORAL DAILY
Status: DISCONTINUED | OUTPATIENT
Start: 2022-03-09 | End: 2022-03-09 | Stop reason: HOSPADM

## 2022-03-08 RX ORDER — LEVOTHYROXINE SODIUM 0.07 MG/1
75 TABLET ORAL
Status: DISCONTINUED | OUTPATIENT
Start: 2022-03-09 | End: 2022-03-09 | Stop reason: HOSPADM

## 2022-03-08 RX ORDER — LIDOCAINE HYDROCHLORIDE 20 MG/ML
15 SOLUTION OROPHARYNGEAL ONCE
Status: COMPLETED | OUTPATIENT
Start: 2022-03-08 | End: 2022-03-08

## 2022-03-08 RX ORDER — FUROSEMIDE 20 MG/1
20 TABLET ORAL DAILY
Status: DISCONTINUED | OUTPATIENT
Start: 2022-03-09 | End: 2022-03-09 | Stop reason: HOSPADM

## 2022-03-08 RX ORDER — GABAPENTIN 300 MG/1
300 CAPSULE ORAL DAILY
Status: DISCONTINUED | OUTPATIENT
Start: 2022-03-09 | End: 2022-03-09 | Stop reason: HOSPADM

## 2022-03-08 RX ADMIN — FAMOTIDINE 20 MG: 10 INJECTION INTRAVENOUS at 17:53

## 2022-03-08 RX ADMIN — ALUMINUM HYDROXIDE, MAGNESIUM HYDROXIDE, AND SIMETHICONE 30 ML: 200; 200; 20 SUSPENSION ORAL at 19:51

## 2022-03-08 RX ADMIN — SODIUM CHLORIDE 500 ML: 9 INJECTION, SOLUTION INTRAVENOUS at 18:01

## 2022-03-08 RX ADMIN — DICYCLOMINE HYDROCHLORIDE 20 MG: 20 TABLET ORAL at 19:51

## 2022-03-08 RX ADMIN — IOHEXOL 100 ML: 350 INJECTION, SOLUTION INTRAVENOUS at 18:07

## 2022-03-08 RX ADMIN — GABAPENTIN 600 MG: 300 CAPSULE ORAL at 23:31

## 2022-03-08 RX ADMIN — HEPARIN SODIUM 5000 UNITS: 5000 INJECTION INTRAVENOUS; SUBCUTANEOUS at 23:31

## 2022-03-08 RX ADMIN — LATANOPROST 1 DROP: 50 SOLUTION OPHTHALMIC at 23:34

## 2022-03-08 RX ADMIN — LIDOCAINE HYDROCHLORIDE 15 ML: 20 SOLUTION ORAL; TOPICAL at 19:51

## 2022-03-08 RX ADMIN — OXCARBAZEPINE 150 MG: 300 TABLET, FILM COATED ORAL at 23:30

## 2022-03-08 RX ADMIN — FUROSEMIDE 40 MG: 40 TABLET ORAL at 23:24

## 2022-03-08 RX ADMIN — MELATONIN 3 MG: at 23:31

## 2022-03-08 NOTE — ED PROVIDER NOTES
History  Chief Complaint   Patient presents with    Abdominal Pain     Patient reports left sided abdominal pain that has been going on "for years," but then went away  Patient reports it recently started up again and now the pain travels acorss the front part of her abdomen  Patient denies n/v/d  Kenisha Paulino is an 80y o  year old female with PMHx significant for CAD, CHF, HTN, NIDDM, hypothyroidism, who presents to the ED today with abdominal pain since last night that is colicky  Abdominal pain is exacerbated by nothing and relieved by laying down  The pain is stabbing in quality, does not radiate, and currently rated severe in severity  Has a HA on the top of her head as well  The patient denies fevers, chills, lightheadedness or syncope, nausea, vomiting, chest pain, change in baseline shortness of breath, cough, abdominal pain, changes in usual bowel movements, changes with urination, new back pain, vaginal bleeding, pain anywhere else in body  ROS otherwise negative  History provided by:  Medical records and patient   used: No    Abdominal Pain      Prior to Admission Medications   Prescriptions Last Dose Informant Patient Reported? Taking? Cholecalciferol (VITAMIN D3) 2000 units capsule 3/7/2022 at Unknown time Self Yes Yes   Sig: Take 2,000 Units by mouth daily    Diclofenac Sodium (Voltaren) 1 % Past Month at Unknown time  Yes Yes   Sig: Voltaren 1 % topical gel   Incontinence Supply Disposable (SELECT DISPOSABLE UNDERWEAR LG) MISC  Self Yes No   Lancets (ACCU-CHEK SOFT TOUCH) lancets  Self No No   Sig: Testing 1 time daily              Dx: E11 9   Melatonin 5 MG CAPS 3/7/2022 at Unknown time Self Yes Yes   Sig: Take 3 mg by mouth daily at bedtime   OLANZapine (ZyPREXA) 5 mg tablet 3/7/2022 at Unknown time Self No Yes   Sig: Take 1 tablet (5 mg total) by mouth daily   Patient taking differently: Take 2 5 mg by mouth daily     OXcarbazepine (TRILEPTAL) 150 mg tablet 3/7/2022 at Unknown time  No Yes   Sig: TAKE 1 TABLET qhs (PLEASE CALL WITH DIZZINESS)   Patient taking differently: 150 mg every 8 (eight) hours TAKE 1 TABLET qhs (PLEASE CALL WITH DIZZINESS)    acetaminophen (TYLENOL) 325 mg tablet  Self No No   Sig: Take 2 tablets (650 mg total) by mouth every 6 (six) hours as needed for mild pain   albuterol (VENTOLIN HFA) 90 mcg/act inhaler More than a month at Unknown time Self No No   Sig: Inhale 2 puffs every 4 (four) hours as needed for wheezing   Patient not taking: Reported on 2021    amLODIPine (NORVASC) 5 mg tablet 3/7/2022 at Unknown time  No Yes   Sig: Take 1 tablet (5 mg total) by mouth daily   aspirin 81 MG tablet 3/7/2022 at Unknown time Self Yes Yes   Sig: Take 81 mg by mouth daily  baclofen 5 MG TABS Not Taking at Unknown time  No No   Si tab qhs prn facial pain   Patient not taking: Reported on 3/8/2022    dorzolamide (TRUSOPT) 2 % ophthalmic solution 3/7/2022 at Unknown time Self Yes Yes   Sig: Administer 1 drop to both eyes 3 (three) times a day     famotidine (PEPCID) 20 mg tablet Not Taking at Unknown time Self No No   Sig: Take 1 tablet (20 mg total) by mouth 2 (two) times a day   Patient not taking: Reported on 2021    fluticasone (FLONASE) 50 mcg/act nasal spray Not Taking at Unknown time Self No No   Si sprays into each nostril daily   Patient not taking: Reported on 2021    fluticasone-vilanterol (BREO ELLIPTA) 100-25 mcg/inh inhaler 3/7/2022 at Unknown time Self No Yes   Sig: Inhale 1 puff daily Rinse mouth after use     furosemide (LASIX) 20 mg tablet 3/7/2022 at Unknown time  No Yes   Sig: TAKE 1 TABLET IN THE MORNING  AND TAKE 2 TABLETS IN THE EVENING   gabapentin (NEURONTIN) 600 MG tablet 3/7/2022 at Unknown time  No Yes   Sig: TAKE 1/2 TABLET IN THE MORNING AND AFTERNOON AND TAKE 1 TABLET AT BEDTIME   glucose blood (Accu-Chek Aline Plus) test strip  Self No No   Sig: Testing once daily E11 9   latanoprost (XALATAN) 0 005 % ophthalmic solution 3/7/2022 at Unknown time Self No Yes   Sig: Apply 1 drop to eye daily at bedtime Both eyes   levothyroxine 75 mcg tablet 3/7/2022 at Unknown time  No Yes   Sig: TAKE 1 TABLET EVERY DAY   meclizine (ANTIVERT) 12 5 MG tablet Not Taking at Unknown time  No No   Si-2 tabs qhs prn dizziness   Patient not taking: Reported on 2021    nitroglycerin (NITROSTAT) 0 4 mg SL tablet More than a month at Unknown time Self No No   Sig: Place 1 tablet (0 4 mg total) under the tongue every 5 (five) minutes as needed for chest pain   Patient not taking: Reported on 2021    nystatin (MYCOSTATIN) powder Not Taking at Unknown time Self No No   Sig: Apply topically 2 (two) times a day   Patient not taking: Reported on 2021    omeprazole (PriLOSEC) 40 MG capsule 3/7/2022 at Unknown time  No Yes   Sig: Take 1 capsule (40 mg total) by mouth daily   potassium chloride (K-DUR,KLOR-CON) 20 mEq tablet 3/7/2022 at Unknown time  No Yes   Sig: TAKE 1 TABLET EVERY DAY   sitaGLIPtin (Januvia) 50 mg tablet 3/7/2022 at Unknown time  No Yes   Sig: Take 1 tablet (50 mg total) by mouth daily   vitamin B-12 (CYANOCOBALAMIN) 250 MCG tablet   Yes Yes   Sig: Take 250 mcg by mouth daily      Facility-Administered Medications: None       Past Medical History:   Diagnosis Date    Asthma     Atypical chest pain     CAD (coronary artery disease)     Candidal intertrigo     [facility])     Depression     Diabetes mellitus (HCC)     Diabetic neuropathy ([facility] [address] )     Diverticulitis     Dyslipidemia     Female bladder prolapse     Gastric ulcer     Glaucoma     HTN (hypertension)     Hyperlipidemia     Hypothyroidism 2016    RAD (reactive airway disease)        Past Surgical History:   Procedure Laterality Date    COLONOSCOPY      ESOPHAGOGASTRODUODENOSCOPY      HYSTERECTOMY      TONSILLECTOMY         Family History   Problem Relation Age of Onset    Breast cancer Mother     Hypothyroidism Mother     Hypertension Father     Breast cancer Sister     Thyroid disease Sister     Hypertension Sister      I have reviewed and agree with the history as documented  E-Cigarette/Vaping    E-Cigarette Use Never User      E-Cigarette/Vaping Substances     Social History     Tobacco Use    Smoking status: Never Smoker    Smokeless tobacco: Never Used   Vaping Use    Vaping Use: Never used   Substance Use Topics    Alcohol use: No     Comment: Social Alcohol Use -- as per allscripts (pt denies all alcohol use)    Drug use: No        Review of Systems   Reason unable to perform ROS: please see above for ROS  All other ROS negative  Gastrointestinal: Positive for abdominal pain  Physical Exam  ED Triage Vitals [03/08/22 1616]   Temperature Pulse Respirations Blood Pressure SpO2   97 6 °F (36 4 °C) 88 18 158/70 98 %      Temp Source Heart Rate Source Patient Position - Orthostatic VS BP Location FiO2 (%)   Oral Monitor Sitting Right arm --      Pain Score       7             Orthostatic Vital Signs  Vitals:    03/08/22 2045 03/08/22 2245 03/09/22 0253 03/09/22 0712   BP: (!) 147/112 (!) 176/83 137/63 140/66   Pulse: 78 91 65 72   Patient Position - Orthostatic VS:  Lying Lying Lying       Physical Exam  Vitals and nursing note reviewed  Constitutional:       General: She is not in acute distress  Appearance: She is well-developed  She is not diaphoretic  HENT:      Head: Normocephalic and atraumatic  Eyes:      General: No scleral icterus  Conjunctiva/sclera: Conjunctivae normal    Neck:      Vascular: No JVD  Trachea: No tracheal deviation  Cardiovascular:      Rate and Rhythm: Normal rate and regular rhythm  Pulmonary:      Effort: Pulmonary effort is normal  No respiratory distress  Abdominal:      General: Bowel sounds are normal  There is no distension  Palpations: Abdomen is soft  Tenderness:  There is abdominal tenderness in the epigastric area  Musculoskeletal:         General: No deformity  Cervical back: Neck supple  Skin:     General: Skin is warm and dry  Neurological:      Mental Status: She is alert     Psychiatric:         Behavior: Behavior normal          ED Medications  Medications   acetaminophen (TYLENOL) tablet 650 mg (has no administration in time range)   amLODIPine (NORVASC) tablet 5 mg (5 mg Oral Given 3/9/22 0802)   aspirin chewable tablet 81 mg (81 mg Oral Given 3/9/22 0802)   dorzolamide (TRUSOPT) ophthalmic solution 1 drop (1 drop Both Eyes Given 3/9/22 0806)   Diclofenac Sodium (VOLTAREN) 1 % topical gel 2 g (2 g Topical Given 3/9/22 0806)   gabapentin (NEURONTIN) capsule 600 mg (600 mg Oral Given 3/8/22 2331)   gabapentin (NEURONTIN) capsule 300 mg (300 mg Oral Given 3/9/22 0801)   furosemide (LASIX) tablet 20 mg (20 mg Oral Given 3/9/22 0802)   furosemide (LASIX) tablet 40 mg (40 mg Oral Given 3/8/22 2324)   levothyroxine tablet 75 mcg (75 mcg Oral Given 3/9/22 0545)   latanoprost (XALATAN) 0 005 % ophthalmic solution 1 drop (1 drop Both Eyes Given 3/8/22 2334)   melatonin tablet 3 mg (3 mg Oral Given 3/8/22 2331)   OXcarbazepine (TRILEPTAL) tablet 150 mg (150 mg Oral Given 3/8/22 2330)   ondansetron (ZOFRAN) injection 4 mg (has no administration in time range)   calcium carbonate (TUMS) chewable tablet 500 mg (500 mg Oral Given 3/9/22 0219)   heparin (porcine) subcutaneous injection 5,000 Units (5,000 Units Subcutaneous Given 3/9/22 0553)   famotidine (PEPCID) injection 20 mg (20 mg Intravenous Given 3/8/22 1753)   sodium chloride 0 9 % bolus 500 mL (0 mL Intravenous Stopped 3/8/22 2118)   iohexol (OMNIPAQUE) 350 MG/ML injection (SINGLE-DOSE) 100 mL (100 mL Intravenous Given 3/8/22 1807)   aluminum-magnesium hydroxide-simethicone (MYLANTA) oral suspension 30 mL (30 mL Oral Given 3/8/22 1951)   Lidocaine Viscous HCl (XYLOCAINE) 2 % mucosal solution 15 mL (15 mL Swish & Swallow Given 3/8/22 1951) dicyclomine (BENTYL) tablet 20 mg (20 mg Oral Given 3/8/22 1951)       Diagnostic Studies  Results Reviewed     Procedure Component Value Units Date/Time    NT-BNP PRO [[id number]]  (Abnormal) Collected: 03/08/22 1656    Lab Status: Final result Specimen: Blood from Arm, Right Updated: 03/09/22 0042     NT-proBNP 1,064 pg/mL     Fingerstick Glucose (POCT) [[id number]]  (Abnormal) Collected: 03/08/22 2242    Lab Status: Final result Updated: 03/08/22 2245     POC Glucose 153 mg/dl     HS Troponin I 4hr [[id number]]  (Abnormal) Collected: 03/08/22 2218    Lab Status: Final result Specimen: Blood Updated: 03/08/22 2243     hs TnI 4hr 36 ng/L      Delta 4hr hsTnI 23 ng/L     HS Troponin I 2hr [[id number]]  (Normal) Collected: 03/08/22 1952    Lab Status: Final result Specimen: Blood from Arm, Right Updated: 03/08/22 2024     hs TnI 2hr 22 ng/L      Delta 2hr hsTnI 9 ng/L     UA (URINE) with reflex to Scope [[id number]] Collected: 03/08/22 1831    Lab Status: Final result Specimen: Urine, Other Updated: 03/08/22 1856     Color, UA Yellow     Clarity, UA Clear     Specific Gravity, UA 1 015     pH, UA 6 0     Leukocytes, UA Negative     Nitrite, UA Negative     Protein, UA Negative mg/dl      Glucose, UA Negative mg/dl      Ketones, UA Negative mg/dl      Urobilinogen, UA 0 2 E U /dl      Bilirubin, UA Negative     Blood, UA Negative    HS Troponin 0hr (reflex protocol) [[id number]]  (Normal) Collected: 03/08/22 1656    Lab Status: Final result Specimen: Blood from Arm, Right Updated: 03/08/22 1801     hs TnI 0hr 13 ng/L     Basic metabolic panel [[id number]]  (Abnormal) Collected: 03/08/22 1656    Lab Status: Final result Specimen: Blood from Arm, Right Updated: 03/08/22 1753     Sodium 137 mmol/L      Potassium 4 4 mmol/L      Chloride 102 mmol/L      CO2 25 mmol/L      ANION GAP 10 mmol/L      BUN 20 mg/dL      Creatinine 1 33 mg/dL      Glucose 135 mg/dL      Calcium 9 0 mg/dL      eGFR 33 ml/min/1 73sq m     Narrative: National Kidney Disease Foundation guidelines for Chronic Kidney Disease (CKD):     Stage 1 with normal or high GFR (GFR > 90 mL/min/1 73 square meters)    Stage 2 Mild CKD (GFR = 60-89 mL/min/1 73 square meters)    Stage 3A Moderate CKD (GFR = 45-59 mL/min/1 73 square meters)    Stage 3B Moderate CKD (GFR = 30-44 mL/min/1 73 square meters)    Stage 4 Severe CKD (GFR = 15-29 mL/min/1 73 square meters)    Stage 5 End Stage CKD (GFR <15 mL/min/1 73 square meters)  Note: GFR calculation is accurate only with a steady state creatinine    Hepatic function panel [[id number]]  (Normal) Collected: 03/08/22 1656    Lab Status: Final result Specimen: Blood from Arm, Right Updated: 03/08/22 1753     Total Bilirubin 0 45 mg/dL      Bilirubin, Direct 0 11 mg/dL      Alkaline Phosphatase 106 U/L      AST 21 U/L      ALT 19 U/L      Total Protein 7 7 g/dL      Albumin 3 7 g/dL     Lipase [[id number]]  (Normal) Collected: 03/08/22 1656    Lab Status: Final result Specimen: Blood from Arm, Right Updated: 03/08/22 1753     Lipase 122 u/L     CBC and differential [[id number]] Collected: 03/08/22 1656    Lab Status: Final result Specimen: Blood from Arm, Right Updated: 03/08/22 1737     WBC 6 18 Thousand/uL      RBC 4 73 Million/uL      Hemoglobin 14 4 g/dL      Hematocrit 42 7 %      MCV 90 fL      MCH 30 4 pg      MCHC 33 7 g/dL      RDW 13 2 %      MPV 11 1 fL      Platelets 107 Thousands/uL      nRBC 0 /100 WBCs      Neutrophils Relative 74 %      Immat GRANS % 0 %      Lymphocytes Relative 14 %      Monocytes Relative 9 %      Eosinophils Relative 2 %      Basophils Relative 1 %      Neutrophils Absolute 4 55 Thousands/µL      Immature Grans Absolute 0 02 Thousand/uL      Lymphocytes Absolute 0 85 Thousands/µL      Monocytes Absolute 0 58 Thousand/µL      Eosinophils Absolute 0 15 Thousand/µL      Basophils Absolute 0 03 Thousands/µL                  XR chest 2 views   Final Result by Jovana Mead MD (03/09 3260) No acute cardiopulmonary disease  Workstation performed: [id number]         CT abdomen pelvis with contrast   Final Result by Ridge Richard MD (03/08 1935)      Mild bladder wall thickening  Correlate for possible cystitis  Colonic diverticulosis without diverticulitis  Workstation performed: [id number]               Procedures  Procedures      ED Course  ED Course as of 03/09/22 1047   Tue Mar 08, 2022   1719 Procedure Note: EKG  Date/Time: 03/08/22 5:20 PM   Interpreted by: Isreal Nolen DO  Indications / Diagnosis: epigastric pain  ECG reviewed by me, the ED Provider: yes   The EKG demonstrates:  Rhythm: sinus, rate 72  Intervals: normal intervals  Axis: normal axis  QRS: normal QRS  ST Changes: No acute ST Changes, no STD/STAR        1755 Creatinine(!): 1 33  Baseline   1804 hs TnI 0hr: 13 2031 Procedure Note: EKG  Date/Time: 03/08/22 8:31 PM   Interpreted by: Isreal Nolen DO  Indications / Diagnosis: epigastric pain  ECG reviewed by me, the ED Provider: yes   The EKG demonstrates:  Rhythm: sinus, rate 87  Intervals: normal intervals  Axis: normal axis  QRS: normal QRS  ST Changes: No acute ST Changes, no STD/STAR                                 SBIRT 22yo+      Most Recent Value   SBIRT (25 yo +)    In order to provide better care to our patients, we are screening all of our patients for alcohol and drug use  Would it be okay to ask you these screening questions? Yes Filed at: 03/08/2022 2126   Initial Alcohol Screen: US AUDIT-C     1  How often do you have a drink containing alcohol? 0 Filed at: 03/08/2022 2126   2  How many drinks containing alcohol do you have on a typical day you are drinking? 0 Filed at: 03/08/2022 2126   3a  Male UNDER 65: How often do you have five or more drinks on one occasion? 0 Filed at: 03/08/2022 2126   3b  FEMALE Any Age, or MALE 65+: How often do you have 4 or more drinks on one occassion?  0 Filed at: 03/08/2022 2126   Audit-C Score 0 Filed at: 03/08/2022 2126   KAMRAN: How many times in the past year have you    Used an illegal drug or used a prescription medication for non-medical reasons? Never Filed at: 03/08/2022 2126                [facility]  Number of Diagnoses or Management Options  Diagnosis management comments: No pain out of proportion to exam or worsening of pain with or shortly after eating  Pain not colicky  No peritoneal signs on exam   Patient has no pulsatile abdominal mass, known aneurysm, pulse deficit or asymmetry, tearing or ripping pain  No radiation of pain from flank to groin, CVA tenderness, urinary complains, or history of urolithiasis  No history of abdominal trauma or sickle cell disease  Vital signs normal, no fever  Patient is able to pass flatus and stool           Amount and/or Complexity of Data Reviewed  Clinical lab tests: ordered and reviewed  Tests in the radiology section of CPT®: ordered and reviewed  Review and summarize past medical records: yes        Disposition  Final diagnoses:   Epigastric pain   CAD (coronary artery disease)   CHF (congestive heart failure) ([facility] )   Hypothyroidism   Elevated troponin     Time reflects when diagnosis was documented in both MDM as applicable and the Disposition within this note     Time User Action Codes Description Comment    3/8/2022  8:30 PM Dimple Soho Add [R10 13] Epigastric pain     3/8/2022  8:31 PM Dimple Soho Add [I25 10] CAD (coronary artery disease)     3/8/2022  8:31 PM Dimple Soho Add [I50 9] CHF (congestive heart failure) ([facility] [address] )     3/8/2022  8:31 PM Dimple Soho Add [E03 9] Hypothyroidism     3/8/2022  8:31 PM Dimple Soho Add [R77 8] Elevated troponin       ED Disposition     ED Disposition Condition Date/Time Comment    Admit Stable Tue Mar 8, 2022  8:30 PM       Follow-up Information    None         Current Discharge Medication List      CONTINUE these medications which have NOT CHANGED    Details   amLODIPine (NORVASC) 5 mg tablet Take 1 tablet (5 mg total) by mouth daily  Qty: 90 tablet, Refills: 3    Associated Diagnoses: Nonrheumatic aortic valve stenosis      aspirin 81 MG tablet Take 81 mg by mouth daily  Cholecalciferol (VITAMIN D3) 2000 units capsule Take 2,000 Units by mouth daily       Diclofenac Sodium (Voltaren) 1 % Voltaren 1 % topical gel      dorzolamide (TRUSOPT) 2 % ophthalmic solution Administer 1 drop to both eyes 3 (three) times a day        fluticasone-vilanterol (BREO ELLIPTA) 100-25 mcg/inh inhaler Inhale 1 puff daily Rinse mouth after use    Qty: 1 Inhaler, Refills: 5    Associated Diagnoses: Dyspnea on minimal exertion      furosemide (LASIX) 20 mg tablet TAKE 1 TABLET IN THE MORNING  AND TAKE 2 TABLETS IN THE EVENING  Qty: 270 tablet, Refills: 11    Associated Diagnoses: Nonrheumatic aortic valve stenosis      gabapentin (NEURONTIN) 600 MG tablet TAKE 1/2 TABLET IN THE MORNING AND AFTERNOON AND TAKE 1 TABLET AT BEDTIME  Qty: 180 tablet, Refills: 0    Associated Diagnoses: Cranial neuralgia      latanoprost (XALATAN) 0 005 % ophthalmic solution Apply 1 drop to eye daily at bedtime Both eyes  Qty: 7 5 mL, Refills: 1    Associated Diagnoses: Open-angle glaucoma, severe stage, unspecified laterality, unspecified open-angle glaucoma type      levothyroxine 75 mcg tablet TAKE 1 TABLET EVERY DAY  Qty: 90 tablet, Refills: 3    Associated Diagnoses: Hypothyroidism, unspecified type      Melatonin 5 MG CAPS Take 3 mg by mouth daily at bedtime      OLANZapine (ZyPREXA) 5 mg tablet Take 1 tablet (5 mg total) by mouth daily  Qty: 90 tablet, Refills: 3    Associated Diagnoses: Hallucination      omeprazole (PriLOSEC) 40 MG capsule Take 1 capsule (40 mg total) by mouth daily  Qty: 90 capsule, Refills: 3    Associated Diagnoses: Gastroesophageal reflux disease without esophagitis      OXcarbazepine (TRILEPTAL) 150 mg tablet TAKE 1 TABLET qhs (PLEASE CALL WITH DIZZINESS)  Qty: 90 tablet, Refills: 2    Associated Diagnoses: Atypical facial pain      potassium chloride (K-DUR,KLOR-CON) 20 mEq tablet TAKE 1 TABLET EVERY DAY  Qty: 90 tablet, Refills: 2    Associated Diagnoses: Nonrheumatic aortic valve stenosis      sitaGLIPtin (Januvia) 50 mg tablet Take 1 tablet (50 mg total) by mouth daily  Qty: 90 tablet, Refills: 1    Associated Diagnoses: Type 2 diabetes mellitus without complication, without long-term current use of insulin (HCC)      vitamin B-12 (CYANOCOBALAMIN) 250 MCG tablet Take 250 mcg by mouth daily      acetaminophen (TYLENOL) 325 mg tablet Take 2 tablets (650 mg total) by mouth every 6 (six) hours as needed for mild pain  Qty: 20 tablet, Refills: 0    Associated Diagnoses: Pyelonephritis      albuterol (VENTOLIN HFA) 90 mcg/act inhaler Inhale 2 puffs every 4 (four) hours as needed for wheezing  Qty: 1 Inhaler, Refills: 3    Associated Diagnoses: Dyspnea on exertion      baclofen 5 MG TABS 1 tab qhs prn facial pain  Qty: 30 tablet, Refills: 0    Associated Diagnoses: Atypical facial pain      famotidine (PEPCID) 20 mg tablet Take 1 tablet (20 mg total) by mouth 2 (two) times a day  Qty: 60 tablet, Refills: 11    Associated Diagnoses: Gastroesophageal reflux disease without esophagitis      fluticasone (FLONASE) 50 mcg/act nasal spray 2 sprays into each nostril daily  Qty: 3 Bottle, Refills: 3    Associated Diagnoses: Moraxella catarrhalis bronchitis      glucose blood (Accu-Chek Aline Plus) test strip Testing once daily E11 9  Qty: 100 each, Refills: 3    Associated Diagnoses: 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HCC)      Incontinence Supply Disposable (SELECT DISPOSABLE UNDERWEAR LG) MISC       Lancets (ACCU-CHEK SOFT TOUCH) lancets Testing 1 time daily              Dx: E11 9  Qty: 100 each, Refills: 3    Associated Diagnoses: 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HCC)      meclizine (ANTIVERT) 12 5 MG tablet 1-2 tabs qhs prn dizziness  Qty: 90 tablet, Refills: 0    Associated Diagnoses: Vertigo      nitroglycerin (NITROSTAT) 0 4 mg SL tablet Place 1 tablet (0 4 mg total) under the tongue every 5 (five) minutes as needed for chest pain  Qty: 25 tablet, Refills: 3    Associated Diagnoses: Stable angina (HCC)      nystatin (MYCOSTATIN) powder Apply topically 2 (two) times a day  Qty: 120 g, Refills: 3    Associated Diagnoses: Candidal intertrigo           No discharge procedures on file  PDMP Review       Value Time User    PDMP Reviewed  Yes 11/18/2020  1:31 PM Lisa Roman PA-C           ED Provider  Attending physically available and evaluated Dieudonne Caballero I managed the patient along with the ED Attending      Electronically Signed by         Antonoi Araujo DO  03/09/22 3939

## 2022-03-08 NOTE — ED ATTENDING ATTESTATION
3/8/2022  Lia MONTEIRO, saw and evaluated the patient  I have discussed the patient with the resident/non-physician practitioner and agree with the resident's/non-physician practitioner's findings, Plan of Care, and MDM as documented in the resident's/non-physician practitioner's note, except where noted  All available labs and Radiology studies were reviewed  I was present for key portions of any procedure(s) performed by the resident/non-physician practitioner and I was immediately available to provide assistance  At this point I agree with the current assessment done in the Emergency Department  I have conducted an independent evaluation of this patient a history and physical is as follows:      A 58-year-old female with past medical history of asthma, CAD, hypothyroidism, hypertension, hyperlipidemia, diabetes, CHF and depression; presents with epigastric abdominal pain that began last night  Pain has been constant since, although waxes and wanes  Pain does radiate across the upper abdomen  Patient has not had associated fever, chills, chest pain, shortness of breath, nausea, vomiting, diarrhea, peripheral edema and rashes  Physical Exam  General Appearance: alert and oriented, nad, non toxic appearing  Skin:  Warm, dry, intact  HEENT: atraumatic, normocephalic  Neck: Supple, trachea midline  Cardiac: RRR; no murmurs, rub, gallops  Pulmonary: lungs CTAB; no wheezes, rales, rhonchi  Gastrointestinal: abdomen soft, epigastric tenderness, nondistended; no guarding or rebound tenderness; good bowel sounds, no mass or bruits  Extremities:  no pedal edema, 2+ pulses; no calf tenderness, no clubbing, no cyanosis  Neuro:  no focal motor or sensory deficits, CN 2-12 grossly intact  Psych:  Normal mood and affect, normal judgement and insight    Assessment and Plan:  Epigastric abdominal pain, radiated across the upper abdomen  Reproducible epigastric tenderness, however no peritoneal signs    Will check lab work for pancreatitis, hepatitis, biliary etiology, renal impairment, electrolyte abnormality and cardiac etiology  Will obtain EKG to evaluate for underlying ischemic changes  Will obtain CTAP to evaluate for intra-abdominal pathology  Will give a dose of Pepcid for possible gastritis/peptic ulcer disease  Patient declines any other additional pain medication      ED Course         Critical Care Time  Procedures

## 2022-03-09 VITALS
HEART RATE: 66 BPM | TEMPERATURE: 98.1 F | HEIGHT: 66 IN | RESPIRATION RATE: 20 BRPM | DIASTOLIC BLOOD PRESSURE: 57 MMHG | SYSTOLIC BLOOD PRESSURE: 116 MMHG | OXYGEN SATURATION: 94 % | BODY MASS INDEX: 27.32 KG/M2 | WEIGHT: 170 LBS

## 2022-03-09 LAB
FLUAV RNA RESP QL NAA+PROBE: NEGATIVE
FLUBV RNA RESP QL NAA+PROBE: NEGATIVE
NT-PROBNP SERPL-MCNC: 1064 PG/ML
RSV RNA RESP QL NAA+PROBE: NEGATIVE
SARS-COV-2 RNA RESP QL NAA+PROBE: NEGATIVE

## 2022-03-09 PROCEDURE — 0241U HB NFCT DS VIR RESP RNA 4 TRGT: CPT | Performed by: HOSPITALIST

## 2022-03-09 PROCEDURE — 99217 PR OBSERVATION CARE DISCHARGE MANAGEMENT: CPT | Performed by: INTERNAL MEDICINE

## 2022-03-09 RX ORDER — HEPARIN SODIUM 5000 [USP'U]/ML
5000 INJECTION, SOLUTION INTRAVENOUS; SUBCUTANEOUS EVERY 8 HOURS SCHEDULED
Status: DISCONTINUED | OUTPATIENT
Start: 2022-03-09 | End: 2022-03-09 | Stop reason: HOSPADM

## 2022-03-09 RX ORDER — OLANZAPINE 5 MG/1
2.5 TABLET ORAL DAILY
Qty: 30 TABLET | Refills: 0 | Status: SHIPPED | OUTPATIENT
Start: 2022-03-09 | End: 2022-06-28

## 2022-03-09 RX ORDER — SUCRALFATE 1 G/1
1 TABLET ORAL
Qty: 40 TABLET | Refills: 0 | Status: SHIPPED | OUTPATIENT
Start: 2022-03-09 | End: 2022-03-11

## 2022-03-09 RX ORDER — SUCRALFATE 1 G/1
1 TABLET ORAL
Status: DISCONTINUED | OUTPATIENT
Start: 2022-03-09 | End: 2022-03-09 | Stop reason: HOSPADM

## 2022-03-09 RX ORDER — CALCIUM CARBONATE 200(500)MG
500 TABLET,CHEWABLE ORAL 2 TIMES DAILY PRN
Qty: 20 TABLET | Refills: 0 | Status: SHIPPED | OUTPATIENT
Start: 2022-03-09 | End: 2022-03-11

## 2022-03-09 RX ORDER — FAMOTIDINE 20 MG/1
20 TABLET, FILM COATED ORAL 2 TIMES DAILY
Qty: 60 TABLET | Refills: 0 | Status: SHIPPED | OUTPATIENT
Start: 2022-03-09 | End: 2022-03-11 | Stop reason: SDUPTHER

## 2022-03-09 RX ADMIN — HEPARIN SODIUM 5000 UNITS: 5000 INJECTION INTRAVENOUS; SUBCUTANEOUS at 05:53

## 2022-03-09 RX ADMIN — DORZOLAMIDE HYDROCHLORIDE 1 DROP: 20 SOLUTION/ DROPS OPHTHALMIC at 08:06

## 2022-03-09 RX ADMIN — AMLODIPINE BESYLATE 5 MG: 5 TABLET ORAL at 08:02

## 2022-03-09 RX ADMIN — FUROSEMIDE 20 MG: 20 TABLET ORAL at 08:02

## 2022-03-09 RX ADMIN — CALCIUM CARBONATE (ANTACID) CHEW TAB 500 MG 500 MG: 500 CHEW TAB at 02:19

## 2022-03-09 RX ADMIN — LEVOTHYROXINE SODIUM 75 MCG: 75 TABLET ORAL at 05:45

## 2022-03-09 RX ADMIN — GABAPENTIN 300 MG: 300 CAPSULE ORAL at 08:01

## 2022-03-09 RX ADMIN — DICLOFENAC SODIUM 2 G: 10 GEL TOPICAL at 08:06

## 2022-03-09 RX ADMIN — ASPIRIN 81 MG CHEWABLE TABLET 81 MG: 81 TABLET CHEWABLE at 08:02

## 2022-03-09 NOTE — ASSESSMENT & PLAN NOTE
On pepcid at home bid  -continue with omeprazole, p r n  antacid and trial of Carafate    Reflux precautions discussed with patient at length

## 2022-03-09 NOTE — DISCHARGE SUMMARY
[facility]  Discharge- Travis Greenberg 9/24/1922, 80 y o  female MRN: [id number]  Unit/Bed#: E4 -01 Encounter: [id number]  Primary Care Provider: Erick Hernandez PA-C   Date and time admitted to hospital: 3/8/2022  4:44 PM    * Abdominal pain  Assessment & Plan  Admitted for anginal equivalent  This been going on for 24 hours and is unlikely to be ACS  Worse w/lying down and improved w/GI cocktail  Notably has had some recently increasing sob but no LE edema  Has chronic pnd and feels she has been sleeping poorly more due to this pain than anything else    -Her delta troponin is unremarkable  Overnight telemetry monitor did not reveal any tachy or [place] arrhythmias  -chest x-ray and ct a/p and routine labs wnl  -given her advanced age, aortic stenosis, and ckd would not be a good candidate for stress test or catheterization  Mildly elevated NT BNP  Will resume outpatient Lasix  Patient appears euvolemic currently  Pain appears likely to be gastric in origin  Will continue with PPI daily, H2 blocker b i d , p r n  antacids and Carafate  Essential hypertension  Assessment & Plan  Continue lasix/norvasc  Blood pressure well controlled    DM type 2 with diabetic peripheral neuropathy [facility])  Assessment & Plan  Lab Results   Component Value Date    HGBA1C 6 9 (A) 03/04/2021       Recent Labs     03/08/22  2242   POCGLU 153*       Blood Sugar Average: Last 72 hrs:  (P) 153 notably concerned regarding  Pains in hands/feet  Recent b12 levels are excellent  Continue op surveillance  Gastroesophageal reflux disease without esophagitis  Assessment & Plan  On pepcid at home bid  -continue with omeprazole, p r n  antacid and trial of Carafate  Reflux precautions discussed with patient at length    Insomnia  Assessment & Plan  Recommended increase melatonin to 10 mg daily    If she does not have any significant improvement in her sleep with that, may need to be started on trazodone or Ambien  Recommended patient to follow-up with primary care physician within 1 week of discharge    Hypothyroidism  Assessment & Plan  Continue with levothyroxine        Medical Problems             Resolved Problems  Date Reviewed: 3/8/2022    None              Discharging Physician / Practitioner: Peggye Gottron, MD  PCP: Elo Collazo PA-C  Admission Date:   Admission Orders (From admission, onward)     Ordered        03/08/22 2125  Place in Observation  Once            03/08/22 2037  Inpatient Admission  Once,   Status:  Canceled                      Discharge Date: 03/09/22    Consultations During Hospital Stay:  · None    Procedures Performed:   · CT abdomen pelvis diverticulosis without diverticulitis  Significant Findings / Test Results:   · See above    Incidental Findings:   · NA     Test Results Pending at Discharge (will require follow up):   · NA     Outpatient Tests Requested:  · NA    Complications: none   Reason for Admission:  Epigastric  Hospital Course:   Nicole Kamara is a 80 y o  female patient who originally presented to the hospital on 3/8/2022 due to his epigastric abdominal pain  EKG on admission did not reveal any acute abnormalities  Serial cardiac enzymes were negative  Her pain was thought to be secondary to gastric reflux  Her liver function tests were normal and lipase and CT scan of the abdomen and pelvis was unremarkable  Urinalysis was also negative  She was started on PPI daily and H2 blocker b i d  along with Carafate and p r n  and acid  His symptoms resolved and she was deemed stable for discharge home  Please see above list of diagnoses and related plan for additional information  Condition at Discharge: stable    Discharge Day Visit / Exam:   Subjective:  Patient denies any episode of chest or abdominal pain    Vitals: Blood Pressure: 116/57 (03/09/22 1049)  Pulse: 66 (03/09/22 1049)  Temperature: 98 1 °F (36 7 °C) (03/09/22 1049)  Temp Source: Tympanic (03/09/22 1049)  Respirations: 20 (03/09/22 1049)  Height: 5' 6" (167 6 cm) (03/08/22 2045)  Weight - Scale: 77 1 kg (170 lb) (03/08/22 2045)  SpO2: 94 % (03/09/22 1049)  Exam:   Physical Exam  Constitutional:       General: She is not in acute distress  HENT:      Head: Normocephalic  Right Ear: Tympanic membrane normal       Nose: Nose normal       Mouth/Throat:      Mouth: Mucous membranes are moist    Eyes:      Pupils: Pupils are equal, round, and reactive to light  Cardiovascular:      Rate and Rhythm: Normal rate and regular rhythm  Heart sounds: Murmur heard  Pulmonary:      Effort: Pulmonary effort is normal       Breath sounds: Normal breath sounds  Abdominal:      General: Abdomen is flat  Bowel sounds are normal       Palpations: Abdomen is soft  Musculoskeletal:         General: No swelling or tenderness  Cervical back: Neck supple  Neurological:      General: No focal deficit present  Mental Status: She is alert  Mental status is at baseline  Psychiatric:         Mood and Affect: Mood normal           Discussion with Family: Updated  (daughter) via phone  Discharge instructions/Information to patient and family:   See after visit summary for information provided to patient and family  Provisions for Follow-Up Care:  See after visit summary for information related to follow-up care and any pertinent home health orders  Disposition:   Home    Planned Readmission: No     Discharge Statement:  I spent 35 minutes discharging the patient  This time was spent on the day of discharge  I had direct contact with the patient on the day of discharge  Greater than 50% of the total time was spent examining patient, answering all patient questions, arranging and discussing plan of care with patient as well as directly providing post-discharge instructions  Additional time then spent on discharge activities      Discharge Medications:  See after visit summary for reconciled discharge medications provided to patient and/or family        **Please Note: This note may have been constructed using a voice recognition system**

## 2022-03-09 NOTE — ASSESSMENT & PLAN NOTE
Lab Results   Component Value Date    HGBA1C 6 9 (A) 03/04/2021       Recent Labs     03/08/22  2242   POCGLU 153*       Blood Sugar Average: Last 72 hrs:  (P) 153 notably concerned regarding  Pains in hands/feet  Recent b12 levels are excellent  Continue op surveillance

## 2022-03-09 NOTE — ASSESSMENT & PLAN NOTE
Admitted for anginal equivalent  This been going on for 24 hours and is unlikely to be ACS  Worse w/lying down and improved w/GI cocktail  Notably has had some recently increasing sob but no LE edema  Has chronic pnd and feels she has been sleeping poorly more due to this pain than anything else    -Her delta troponin is unremarkable   -ct a/p and routine labs wnl  -given her advanced age, aortic stenosis, and ckd would not be a good candidate for stress test or catheterization  If troponin significantly elevates may benefit from discussion w/pt and family regarding mgmt options  -f/u formal read cxr, will obtain 3rd ekg/troponin for completeness    Check nt pro bnp to r/o vascular congestion and covid swab

## 2022-03-09 NOTE — ASSESSMENT & PLAN NOTE
On pepcid at home bid  -S/p iv pepcid bentyl and and prilosec  -trial tums and continue supportive care for abd pain

## 2022-03-09 NOTE — ASSESSMENT & PLAN NOTE
Recommended increase melatonin to 10 mg daily  If she does not have any significant improvement in her sleep with that, may need to be started on trazodone or Ambien    Recommended patient to follow-up with primary care physician within 1 week of discharge

## 2022-03-09 NOTE — ASSESSMENT & PLAN NOTE
Lab Results   Component Value Date    HGBA1C 6 9 (A) 03/04/2021       No results for input(s): POCGLU in the last 72 hours  Blood Sugar Average: Last 72 hrs:   notably concerned regarding  Pains in hands/feet  Recent b12 levels are excellent  Continue op surveillance  Given advanced age can likely d/c januvia to liberate given increased risk of renal insufficiency    Will hold while ip

## 2022-03-09 NOTE — ASSESSMENT & PLAN NOTE
Admitted for anginal equivalent  This been going on for 24 hours and is unlikely to be ACS  Worse w/lying down and improved w/GI cocktail  Notably has had some recently increasing sob but no LE edema  Has chronic pnd and feels she has been sleeping poorly more due to this pain than anything else    -Her delta troponin is unremarkable  Overnight telemetry monitor did not reveal any tachy or [place] arrhythmias  -chest x-ray and ct a/p and routine labs wnl  -given her advanced age, aortic stenosis, and ckd would not be a good candidate for stress test or catheterization  Mildly elevated NT BNP  Will resume outpatient Lasix  Patient appears euvolemic currently  Pain appears likely to be gastric in origin  Will continue with PPI daily, H2 blocker b i d , p r n  antacids and Carafate

## 2022-03-09 NOTE — H&P
[facility]  H&P- Martha Morales 9/24/1922, 80 y o  female MRN: [id number]  Unit/Bed#: ED 08 Encounter: [id number]  Primary Care Provider: Mary Aguirre PA-C   Date and time admitted to hospital: 3/8/2022  4:44 PM    * Abdominal pain  Assessment & Plan  Admitted for anginal equivalent  This been going on for 24 hours and is unlikely to be ACS  Worse w/lying down and improved w/GI cocktail  Notably has had some recently increasing sob but no LE edema  Has chronic pnd and feels she has been sleeping poorly more due to this pain than anything else    -Her delta troponin is unremarkable   -ct a/p and routine labs wnl  -given her advanced age, aortic stenosis, and ckd would not be a good candidate for stress test or catheterization  If troponin significantly elevates may benefit from discussion w/pt and family regarding mgmt options  -f/u formal read cxr, will obtain 3rd ekg/troponin for completeness  Check nt pro bnp to r/o vascular congestion and covid swab    Essential hypertension  Assessment & Plan  Continue lasix/norvasc    DM type 2 with diabetic peripheral neuropathy [facility])  Assessment & Plan  Lab Results   Component Value Date    HGBA1C 6 9 (A) 03/04/2021       No results for input(s): POCGLU in the last 72 hours  Blood Sugar Average: Last 72 hrs:   notably concerned regarding  Pains in hands/feet  Recent b12 levels are excellent  Continue op surveillance  Given advanced age can likely d/c januvia to liberate given increased risk of renal insufficiency    Will hold while ip    Gastroesophageal reflux disease without esophagitis  Assessment & Plan  On pepcid at home bid  -S/p iv pepcid bentyl and and prilosec  -trial tums and continue supportive care for abd pain      VTE Prophylaxis: Heparin  / sequential compression device   Code Status: level 3 dni/dnr  POLST: There is no POLST form on file for this patient (pre-hospital)  Discussion with family:     Anticipated Length of Stay:  Patient will be admitted on an Observation basis with an anticipated length of stay of less than  2 midnights  Justification for Hospital Stay: acs r/o    Total Time for Visit, including Counseling / Coordination of Care: 45 minutes  Greater than 50% of this total time spent on direct patient counseling and coordination of care  Chief Complaint:   abd pain    History of Present Illness:    Ayden Rome is a 80 y o  female who presents with PMH possible CAD, chronic diastolic CHF, severe aortic stenosis, hypertension diabetes hyperlipidemia coming hospital for abdominal pain  Patient notes that over the last several days she has had some dyspnea with exertion worse than her baseline  She uses oxygen as needed  She has not required any oxygen  She notes that she has had some abdominal pain the night prior to admission which woke up in the middle the night  She reports that this is bilateral non radiating upper abdominal pain  It was not associated with nausea vomiting diarrhea  No constipation  No new dysuria urgency or frequency  She has not tried anything for this at home  She reports that is worse when she lies down  She came to the ER to be evaluated for this pain  She underwent CT AP and routine labs which were unremarkable  Delta troponin was obtained which was also unremarkable  Presently her pain is improved although still slightly persistent after Mylanta of viscous lidocaine and Pepcid and Bentyl  She reports her biggest concern at the moment is a coursing sensation she gets in her hands and feet with her known diagnosis of diabetic peripheral neuropathy  No fevers/chills  No worsening le edema  We will admit for acs r/o  Review of Systems:    Review of Systems   Constitutional: Negative for chills and fever  Respiratory: Positive for shortness of breath  Cardiovascular: Negative for chest pain and leg swelling  Gastrointestinal: Positive for abdominal pain  Negative for constipation, diarrhea, nausea and vomiting  Genitourinary: Negative for dysuria, frequency and urgency  Psychiatric/Behavioral: Positive for sleep disturbance  All other systems reviewed and are negative  Past Medical and Surgical History:     Past Medical History:   Diagnosis Date    Asthma     Atypical chest pain     CAD (coronary artery disease)     Candidal intertrigo     CHF (congestive heart failure) (HCC)     Depression     Diabetes mellitus (HCC)     Diabetic neuropathy (HCC)     Diverticulitis     Dyslipidemia     Female bladder prolapse     Gastric ulcer     Glaucoma     HTN (hypertension)     Hyperlipidemia     Hypothyroidism 4/18/2016    RAD (reactive airway disease)        Past Surgical History:   Procedure Laterality Date    COLONOSCOPY      ESOPHAGOGASTRODUODENOSCOPY  2011    HYSTERECTOMY      TONSILLECTOMY         Meds/Allergies:    Prior to Admission medications    Medication Sig Start Date End Date Taking? Authorizing Provider   acetaminophen (TYLENOL) 325 mg tablet Take 2 tablets (650 mg total) by mouth every 6 (six) hours as needed for mild pain 1/24/21   Eliceo Valdez MD   albuterol (VENTOLIN HFA) 90 mcg/act inhaler Inhale 2 puffs every 4 (four) hours as needed for wheezing  Patient not taking: Reported on 11/18/2021 4/16/19   Kanika Keys MD   amLODIPine (NORVASC) 5 mg tablet Take 1 tablet (5 mg total) by mouth daily 6/1/21   Mann Sheppard PA-C   aspirin 81 MG tablet Take 81 mg by mouth daily      Historical Provider, MD   baclofen 5 MG TABS 1 tab qhs prn facial pain 2/25/22   Son Prince PA-C   Cholecalciferol (VITAMIN D3) 2000 units capsule Take 2,000 Units by mouth daily     Historical Provider, MD   Diclofenac Sodium (Voltaren) 1 % Voltaren 1 % topical gel    Historical Provider, MD   dorzolamide (TRUSOPT) 2 % ophthalmic solution Administer 1 drop to both eyes 3 (three) times a day      Historical Provider, MD famotidine (PEPCID) 20 mg tablet Take 1 tablet (20 mg total) by mouth 2 (two) times a day  Patient not taking: Reported on 11/18/2021 12/30/20   Val Jonas PA-C   fluticasone [facility]) 50 mcg/act nasal spray 2 sprays into each nostril daily  Patient not taking: Reported on 11/18/2021  3/16/19   Manpreet Prieto MD   fluticasone-vilanterol (BREO ELLIPTA) 100-25 mcg/inh inhaler Inhale 1 puff daily Rinse mouth after use  Patient not taking: Reported on 11/18/2021  10/15/19   Shelia Cardozo DO   furosemide (LASIX) 20 mg tablet TAKE 1 TABLET IN THE MORNING  AND TAKE 2 TABLETS IN THE EVENING 4/21/21   Shelia Cardozo DO   gabapentin (NEURONTIN) 600 MG tablet TAKE 1/2 TABLET IN THE MORNING AND AFTERNOON AND TAKE 1 TABLET AT BEDTIME 3/5/22   Momo Stafford PA-C   glucose blood (Accu-Chek Aline Plus) test strip Testing once daily E11 9 5/11/20   Val Jonas PA-C   Incontinence Supply Disposable (SELECT DISPOSABLE UNDERWEAR LG) MISC  6/14/19   Historical Provider, MD   Lancets (ACCU-CHEK SOFT TOUCH) lancets Testing 1 time daily              Dx: E11 9 4/28/20   Val Jonas PA-C   latanoprost (XALATAN) 0 005 % ophthalmic solution Apply 1 drop to eye daily at bedtime Both eyes 3/16/19   Manpreet Prieto MD   levothyroxine 75 mcg tablet TAKE 1 TABLET EVERY DAY 9/8/21   Val Jonas PA-C   meclizine (ANTIVERT) 12 5 MG tablet 1-2 tabs qhs prn dizziness  Patient not taking: Reported on 11/18/2021 6/16/21   Momo Stafford PA-C   Melatonin 5 MG CAPS Take 3 mg by mouth daily at bedtime    Historical Provider, MD   nitroglycerin (NITROSTAT) 0 4 mg SL tablet Place 1 tablet (0 4 mg total) under the tongue every 5 (five) minutes as needed for chest pain  Patient not taking: Reported on 11/18/2021 11/5/19   Rujul Cardozo, DO   nystatin (MYCOSTATIN) powder Apply topically 2 (two) times a day  Patient not taking: Reported on 11/18/2021 8/21/20   Val Jonas PA-C   OLANZapine (ZyPREXA) 5 mg tablet Take 1 tablet (5 mg total) by mouth daily  Patient not taking: Reported on 11/18/2021 6/25/20   Lisa Smart PA-C   omeprazole (PriLOSEC) 40 MG capsule Take 1 capsule (40 mg total) by mouth daily 6/1/21   Lisa Smart PA-C   OXcarbazepine (TRILEPTAL) 150 mg tablet TAKE 1 TABLET qhs (PLEASE CALL WITH DIZZINESS) 12/2/21   Abbey Hermosillo PA-C   potassium chloride (K-DUR,KLOR-CON) 20 mEq tablet TAKE 1 TABLET EVERY DAY 9/30/21   Shelia Cardozo DO   sitaGLIPtin (Januvia) 50 mg tablet Take 1 tablet (50 mg total) by mouth daily 1/6/22   Lisa Smart PA-C     I have reviewed home medications with patient personally  Allergies: Allergies   Allergen Reactions    Levaquin [Levofloxacin] Myalgia    Statins      Other reaction(s): Weakness  Category: Adverse Reaction;        Social History:     Marital Status:    Occupation:   Patient Pre-hospital Living Situation:   Patient Pre-hospital Level of Mobility:   Patient Pre-hospital Diet Restrictions:   Substance Use History:   Social History     Substance and Sexual Activity   Alcohol Use No    Comment: Social Alcohol Use -- as per allscripts (pt denies all alcohol use)     Social History     Tobacco Use   Smoking Status Never Smoker   Smokeless Tobacco Never Used     Social History     Substance and Sexual Activity   Drug Use No       Family History:    Family History   Problem Relation Age of Onset    Breast cancer Mother     Hypothyroidism Mother     Hypertension Father     Breast cancer Sister     Thyroid disease Sister     Hypertension Sister        Physical Exam:     Vitals:   Blood Pressure: 167/72 (03/08/22 1831)  Pulse: 88 (03/08/22 1831)  Temperature: 97 6 °F (36 4 °C) (03/08/22 1616)  Temp Source: Oral (03/08/22 1616)  Respirations: 20 (03/08/22 1831)  Weight - Scale: 72 7 kg (160 lb 4 4 oz) (03/08/22 1613)  SpO2: 96 % (03/08/22 1831)    Physical Exam  Vitals reviewed  Constitutional:       General: She is not in acute distress  Appearance: She is not ill-appearing, toxic-appearing or diaphoretic  HENT:      Head: Normocephalic and atraumatic  Right Ear: External ear normal       Left Ear: External ear normal       Nose: Nose normal    Eyes:      Extraocular Movements: Extraocular movements intact  Cardiovascular:      Rate and Rhythm: Normal rate and regular rhythm  Heart sounds: No murmur heard  No friction rub  No gallop  Pulmonary:      Effort: No respiratory distress  Breath sounds: No wheezing, rhonchi or rales  Comments: Coarse breath sounds fine rales  On ra w/o wob or conversational dyspnea  Chest:      Chest wall: No tenderness  Abdominal:      General: There is no distension  Palpations: Abdomen is soft  There is no mass  Tenderness: There is abdominal tenderness (mild ttp in b/l abd pain)  There is no guarding or rebound  Hernia: No hernia is present  Musculoskeletal:      Cervical back: Normal range of motion  Right lower leg: No edema  Left lower leg: No edema  Skin:     General: Skin is warm  Neurological:      Mental Status: She is alert  Mental status is at baseline  Psychiatric:         Mood and Affect: Mood normal            Additional Data:     Lab Results:           Results from last 7 days   Lab Units 03/08/22  1656   WBC Thousand/uL 6 18   HEMOGLOBIN g/dL 14 4   HEMATOCRIT % 42 7   PLATELETS Thousands/uL 184   NEUTROS PCT % 74   LYMPHS PCT % 14   MONOS PCT % 9   EOS PCT % 2     Results from last 7 days   Lab Units 03/08/22  1656   SODIUM mmol/L 137   POTASSIUM mmol/L 4 4   CHLORIDE mmol/L 102   CO2 mmol/L 25   BUN mg/dL 20   CREATININE mg/dL 1 33*   ANION GAP mmol/L 10   CALCIUM mg/dL 9 0   ALBUMIN g/dL 3 7   TOTAL BILIRUBIN mg/dL 0 45   ALK PHOS U/L 106   ALT U/L 19   AST U/L 21   GLUCOSE RANDOM mg/dL 135                       Imaging: I have personally reviewed pertinent reports        CT abdomen pelvis with contrast   Final Result by Star Jeffery MD (03/08 1935)      Mild bladder wall thickening  Correlate for possible cystitis  Colonic diverticulosis without diverticulitis  Workstation performed: [id number]         XR chest 2 views    (Results Pending)       EKG, Pathology, and Other Studies Reviewed on Admission:   · EKG:  Normal sinus rhythm  There are no ischemic changes in 2+ contiguous leads on initial or delta EKG obtained on 3/8/2022  These are stable compared to prior EKG of 5//2021  Allscripts / Epic Records Reviewed: Yes     ** Please Note: This note has been constructed using a voice recognition system   **

## 2022-03-09 NOTE — UTILIZATION REVIEW
Initial Clinical Review    Admission: Date/Time/Statement:   Start   Ordered   03/08/22 2125  Place in Observation  Once        Transfer Service: Hospitalist       Question: Level of Care Answer: Med Surg    03/08/22 2125       Orders Placed This Encounter   Procedures    Place in Observation     Standing Status:   Standing     Number of Occurrences:   1     Order Specific Question:   Level of Care     Answer:   Med Surg [16]     ED Arrival Information     Expected Arrival Acuity    - 3/8/2022 15:58 Urgent         Means of arrival Escorted by Service Admission type    Wheelchair Family Member Hospitalist Urgent         Arrival complaint    Abdominal pain        Chief Complaint   Patient presents with    Abdominal Pain     Patient reports left sided abdominal pain that has been going on "for years," but then went away  Patient reports it recently started up again and now the pain travels acorss the front part of her abdomen  Patient denies n/v/d  Initial Presentation: 81 yo female with PMH possible CAD, CHF, severe AS, HTN, HLD, DM presents to ED with abdominal pain since last night, SMITH worse than baseline  + tenderness epigastric area  Trop negative and CT A&P w/o acute findingds  Admitted to Observation with Abdominal pain r/o ACS  Monitor on telemetry, serial trop & ekg, nt-probnp  given her advanced age, aortic stenosis, and ckd would not be a good candidate for stress test or catheterization  If troponin significantly elevates may benefit from discussion w/pt and family regarding mgmt options  Date: 3/9  Day 2:  Written for discharge  Overnight telemetry monitor did not reveal any tachy or Bill arrhythmias  EKG on admission did not reveal any acute abnormalities  Serial cardiac enzymes were negative  Her pain was thought to be secondary to gastric reflux  She was started on PPI daily and H2 blocker b i d  along with Carafate and p r n  and acid    His symptoms resolved and she was deemed stable for discharge home  ED Triage Vitals [03/08/22 1616]   Temperature Pulse Respirations Blood Pressure SpO2   97 6 °F (36 4 °C) 88 18 158/70 98 %      Temp Source Heart Rate Source Patient Position - Orthostatic VS BP Location FiO2 (%)   Oral Monitor Sitting Right arm --      Pain Score       7          Wt Readings from Last 1 Encounters:   03/08/22 77 1 kg (170 lb)     Additional Vital Signs:   03/09/22 0712 97 1 °F (36 2 °C)  72 18 140/66 95 95 % None (Room air) Lying   03/09/22 0253 97 °F (36 1 °C)  65 18 137/63 90 94 % None (Room air) Lying   03/08/22 2245 96 9 °F (36 1 °C)  91 18 176/83 Abnormal  114 95 % None (Room air) Lying   03/08/22 2045 -- 78 17 147/112 Abnormal  121 95 % -- --   03/08/22 1945 -- 90 20 149/66 95 95 % -- --   03/08/22 1831 -- 88 20 167/72 -- 96 % None (Room air) Lying       Pertinent Labs/Diagnostic Test Results:   CT abdomen pelvis with contrast   Final Result by Beena Arita MD (03/08 1935)      Mild bladder wall thickening  Correlate for possible cystitis  Colonic diverticulosis without diverticulitis  Workstation performed: [id number]         XR chest 2 views    (Results Pending)     3/8 CXR: No acute cardiopulmonary disease  3/8 EKG:  Sinus rhythm with artifact   Left ventricular hypertrophy with repolarization abnormality   Abnormal ECG     Results from last 7 days   Lab Units 03/09/22  0554   SARS-COV-2  Negative     Results from last 7 days   Lab Units 03/08/22  1656   WBC Thousand/uL 6 18   HEMOGLOBIN g/dL 14 4   HEMATOCRIT % 42 7   PLATELETS Thousands/uL 184   NEUTROS ABS Thousands/µL 4 55     Results from last 7 days   Lab Units 03/08/22  1656   SODIUM mmol/L 137   POTASSIUM mmol/L 4 4   CHLORIDE mmol/L 102   CO2 mmol/L 25   ANION GAP mmol/L 10   BUN mg/dL 20   CREATININE mg/dL 1 33*   EGFR ml/min/1 73sq m 33   CALCIUM mg/dL 9 0     Results from last 7 days   Lab Units 03/08/22  1656   AST U/L 21   ALT U/L 19   ALK PHOS U/L 106   TOTAL PROTEIN g/dL 7  7   ALBUMIN g/dL 3 7   TOTAL BILIRUBIN mg/dL 0 45   BILIRUBIN DIRECT mg/dL 0 11     Results from last 7 days   Lab Units 03/08/22  2242   POC GLUCOSE mg/dl 153*     Results from last 7 days   Lab Units 03/08/22  1656   GLUCOSE RANDOM mg/dL 135     Results from last 7 days   Lab Units 03/08/22  2218 03/08/22 1952 03/08/22  1656   HS TNI 0HR ng/L  --   --  13   HS TNI 2HR ng/L  --  22  --    HSTNI D2 ng/L  --  9  --    HS TNI 4HR ng/L 36  --   --    HSTNI D4 ng/L 23*  --   --      Results from last 7 days   Lab Units 03/08/22  1656   NT-PRO BNP pg/mL 1,064*     Results from last 7 days   Lab Units 03/08/22  1656   LIPASE u/L 122     Results from last 7 days   Lab Units 03/08/22  1831   CLARITY UA  Clear   COLOR UA  Yellow   SPEC GRAV UA  1 015   PH UA  6 0   GLUCOSE UA mg/dl Negative   KETONES UA mg/dl Negative   BLOOD UA  Negative   PROTEIN UA mg/dl Negative   NITRITE UA  Negative   BILIRUBIN UA  Negative   UROBILINOGEN UA E U /dl 0 2   LEUKOCYTES UA  Negative     Results from last 7 days   Lab Units 03/09/22  0554   INFLUENZA A PCR  Negative   INFLUENZA B PCR  Negative   RSV PCR  Negative     ED Treatment:   Medication Administration from 03/08/2022 1558 to 03/08/2022 2245       Date/Time Order Dose Route Action     03/08/2022 1753 famotidine (PEPCID) injection 20 mg 20 mg Intravenous Given     03/08/2022 1801 sodium chloride 0 9 % bolus 500 mL 500 mL Intravenous New Bag     03/08/2022 1951 aluminum-magnesium hydroxide-simethicone (MYLANTA) oral suspension 30 mL 30 mL Oral Given     03/08/2022 1951 Lidocaine Viscous HCl (XYLOCAINE) 2 % mucosal solution 15 mL 15 mL Swish & Swallow Given     03/08/2022 1951 dicyclomine (BENTYL) tablet 20 mg 20 mg Oral Given        Past Medical History:   Diagnosis Date    Asthma     Atypical chest pain     CAD (coronary artery disease)     Candidal intertrigo     [facility]     Depression     Diabetes mellitus ([facility] )     Diabetic neuropathy ([facility] )  Diverticulitis     Dyslipidemia     Female bladder prolapse     Gastric ulcer     Glaucoma     HTN (hypertension)     Hyperlipidemia     Hypothyroidism 4/18/2016    RAD (reactive airway disease)      Present on Admission:   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HCC)   Essential hypertension   Gastroesophageal reflux disease without esophagitis      Admitting Diagnosis: CHF (congestive heart failure) (HCC) [I50 9]  Epigastric pain [R10 13]  CAD (coronary artery disease) [I25 10]  Abdominal pain [R10 9]  Hypothyroidism [E03 9]  Elevated troponin [R77 8]  Age/Sex: 80 y o  female     Admission Orders:  Scheduled Medications:  amLODIPine, 5 mg, Oral, Daily  aspirin, 81 mg, Oral, Daily  Diclofenac Sodium, 2 g, Topical, 4x Daily  dorzolamide, 1 drop, Both Eyes, TID  furosemide, 20 mg, Oral, Daily  furosemide, 40 mg, Oral, QPM  gabapentin, 300 mg, Oral, Daily  gabapentin, 600 mg, Oral, HS  heparin (porcine), 5,000 Units, Subcutaneous, Q8H JONATAN  latanoprost, 1 drop, Both Eyes, HS  levothyroxine, 75 mcg, Oral, Early Morning  melatonin, 3 mg, Oral, HS  OXcarbazepine, 150 mg, Oral, HS    Continuous IV Infusions:     PRN Meds:  acetaminophen, 650 mg, Oral, Q6H PRN  calcium carbonate, 500 mg, Oral, BID PRN x 1 3/9  ondansetron, 4 mg, Intravenous, Q6H PRN    Telemetry  SCDs    Network Utilization Review Department  ATTENTION: Please call with any questions or concerns to [phone] and carefully listen to the prompts so that you are directed to the right person  All voicemails are confidential   Dakota Blackburn all requests for admission clinical reviews, approved or denied determinations and any other requests to dedicated fax number below belonging to the campus where the patient is receiving treatment   List of dedicated fax numbers for the Facilities:  [address] DENIALS (Administrative/Medical Necessity) [phone]   [address] [place] [address] (Maternity/NICU/Pediatrics) [phone]     [address]  [facility] [address] 125 [facility]  [phone]   [address] [place] [address] [address] [place] [address] [address] [place] [address] [phone]

## 2022-03-09 NOTE — PLAN OF CARE
Problem: Potential for Falls  Goal: Patient will remain free of falls  Description: INTERVENTIONS:  - Educate patient/family on patient safety including physical limitations  - Instruct patient to call for assistance with activity   - Consult OT/PT to assist with strengthening/mobility   - Keep Call bell within reach  - Keep bed low and locked with side rails adjusted as appropriate  - Keep care items and personal belongings within reach  - Initiate and maintain comfort rounds  - Make Fall Risk Sign visible to staff  - Offer Toileting every 3 Hours, in advance of need  - Initiate/Maintain alarm  - Obtain necessary fall risk management equipment  - Apply yellow socks and bracelet for high fall risk patients  - Consider moving patient to room near nurses station  Outcome: Progressing     Problem: PAIN - ADULT  Goal: Verbalizes/displays adequate comfort level or baseline comfort level  Description: Interventions:  - Encourage patient to monitor pain and request assistance  - Assess pain using appropriate pain scale  - Administer analgesics based on type and severity of pain and evaluate response  - Implement non-pharmacological measures as appropriate and evaluate response  - Consider cultural and social influences on pain and pain management  - Notify physician/advanced practitioner if interventions unsuccessful or patient reports new pain  Outcome: Progressing     Problem: INFECTION - ADULT  Goal: Absence or prevention of progression during hospitalization  Description: INTERVENTIONS:  - Assess and monitor for signs and symptoms of infection  - Monitor lab/diagnostic results  - Monitor all insertion sites, i e  indwelling lines, tubes, and drains  - Monitor endotracheal if appropriate and nasal secretions for changes in amount and color  - [place] appropriate cooling/warming therapies per order  - Administer medications as ordered  - Instruct and encourage patient and family to use good hand hygiene technique  - Identify and instruct in appropriate isolation precautions for identified infection/condition  Outcome: Progressing  Goal: Absence of fever/infection during neutropenic period  Description: INTERVENTIONS:  - Monitor WBC    Outcome: Progressing     Problem: SAFETY ADULT  Goal: Patient will remain free of falls  Description: INTERVENTIONS:  - Educate patient/family on patient safety including physical limitations  - Instruct patient to call for assistance with activity   - Consult OT/PT to assist with strengthening/mobility   - Keep Call bell within reach  - Keep bed low and locked with side rails adjusted as appropriate  - Keep care items and personal belongings within reach  - Initiate and maintain comfort rounds  - Make Fall Risk Sign visible to staff  - Offer Toileting every 3 Hours, in advance of need  - Initiate/Maintain alarm  - Obtain necessary fall risk management equipment:   - Apply yellow socks and bracelet for high fall risk patients  - Consider moving patient to room near nurses station  Outcome: Progressing  Goal: Maintain or return to baseline ADL function  Description: INTERVENTIONS:  -  Assess patient's ability to carry out ADLs; assess patient's baseline for ADL function and identify physical deficits which impact ability to perform ADLs (bathing, care of mouth/teeth, toileting, grooming, dressing, etc )  - Assess/evaluate cause of self-care deficits   - Assess range of motion  - Assess patient's mobility; develop plan if impaired  - Assess patient's need for assistive devices and provide as appropriate  - Encourage maximum independence but intervene and supervise when necessary  - Involve family in performance of ADLs  - Assess for home care needs following discharge   - Consider OT consult to assist with ADL evaluation and planning for discharge  - Provide patient education as appropriate  Outcome: Progressing  Goal: Maintains/Returns to pre admission functional level  Description: INTERVENTIONS:  - Perform BMAT or MOVE assessment daily    - Set and communicate daily mobility goal to care team and patient/family/caregiver  - Collaborate with rehabilitation services on mobility goals if consulted  - Perform Range of Motion 3 times a day  - Reposition patient every 3 hours  - Dangle patient 3 times a day  - Stand patient 3 times a day  - Ambulate patient 3 times a day  - Out of bed to chair 3 times a day   - Out of bed for meals 3 times a day  - Out of bed for toileting  - Record patient progress and toleration of activity level   Outcome: Progressing     Problem: DISCHARGE PLANNING  Goal: Discharge to home or other facility with appropriate resources  Description: INTERVENTIONS:  - Identify barriers to discharge w/patient and caregiver  - Arrange for needed discharge resources and transportation as appropriate  - Identify discharge learning needs (meds, wound care, etc )  - Arrange for interpretive services to assist at discharge as needed  - Refer to Case Management Department for coordinating discharge planning if the patient needs post-hospital services based on physician/advanced practitioner order or complex needs related to functional status, cognitive ability, or social support system  Outcome: Progressing     Problem: Knowledge Deficit  Goal: Patient/family/caregiver demonstrates understanding of disease process, treatment plan, medications, and discharge instructions  Description: Complete learning assessment and assess knowledge base  Interventions:  - Provide teaching at level of understanding  - Provide teaching via preferred learning methods  Outcome: Progressing     Problem: Nutrition/Hydration-ADULT  Goal: Nutrient/Hydration intake appropriate for improving, restoring or maintaining nutritional needs  Description: Monitor and assess patient's nutrition/hydration status for malnutrition  Collaborate with interdisciplinary team and initiate plan and interventions as ordered  Monitor patient's weight and dietary intake as ordered or per policy  Utilize nutrition screening tool and intervene as necessary  Determine patient's food preferences and provide high-protein, high-caloric foods as appropriate       INTERVENTIONS:  - Monitor oral intake, urinary output, labs, and treatment plans  - Assess nutrition and hydration status and recommend course of action  - Evaluate amount of meals eaten  - Assist patient with eating if necessary   - Allow adequate time for meals  - Recommend/ encourage appropriate diets, oral nutritional supplements, and vitamin/mineral supplements  - Order, calculate, and assess calorie counts as needed  - Recommend, monitor, and adjust tube feedings and TPN/PPN based on assessed needs  - Assess need for intravenous fluids  - Provide specific nutrition/hydration education as appropriate  - Include patient/family/caregiver in decisions related to nutrition  Outcome: Progressing

## 2022-03-10 ENCOUNTER — TRANSITIONAL CARE MANAGEMENT (OUTPATIENT)
Dept: FAMILY MEDICINE CLINIC | Facility: CLINIC | Age: 87
End: 2022-03-10

## 2022-03-11 RX ORDER — FAMOTIDINE 20 MG/1
20 TABLET, FILM COATED ORAL 2 TIMES DAILY
Qty: 60 TABLET | Refills: 0 | Status: ON HOLD | OUTPATIENT
Start: 2022-03-11 | End: 2022-06-21 | Stop reason: SDUPTHER

## 2022-03-11 RX ORDER — OXCARBAZEPINE 150 MG/1
TABLET, FILM COATED ORAL
Qty: 90 TABLET | Refills: 0 | Status: SHIPPED | OUTPATIENT
Start: 2022-03-11

## 2022-03-11 RX ORDER — SUCRALFATE 1 G/1
1 TABLET ORAL
Qty: 40 TABLET | Refills: 0 | Status: SHIPPED | OUTPATIENT
Start: 2022-03-11 | End: 2022-06-28

## 2022-03-11 RX ORDER — CALCIUM CARBONATE 200(500)MG
500 TABLET,CHEWABLE ORAL 2 TIMES DAILY PRN
Qty: 20 TABLET | Refills: 0 | Status: ON HOLD | OUTPATIENT
Start: 2022-03-11 | End: 2022-06-18

## 2022-03-14 ENCOUNTER — OFFICE VISIT (OUTPATIENT)
Dept: FAMILY MEDICINE CLINIC | Facility: CLINIC | Age: 87
End: 2022-03-14
Payer: COMMERCIAL

## 2022-03-14 VITALS
WEIGHT: 165.25 LBS | DIASTOLIC BLOOD PRESSURE: 78 MMHG | HEIGHT: 66 IN | SYSTOLIC BLOOD PRESSURE: 126 MMHG | TEMPERATURE: 96.9 F | BODY MASS INDEX: 26.56 KG/M2

## 2022-03-14 DIAGNOSIS — K21.9 GASTROESOPHAGEAL REFLUX DISEASE WITHOUT ESOPHAGITIS: ICD-10-CM

## 2022-03-14 DIAGNOSIS — I50.32 CHRONIC DIASTOLIC CONGESTIVE HEART FAILURE (HCC): Chronic | ICD-10-CM

## 2022-03-14 DIAGNOSIS — E03.9 ACQUIRED HYPOTHYROIDISM: Primary | ICD-10-CM

## 2022-03-14 DIAGNOSIS — H61.23 BILATERAL IMPACTED CERUMEN: ICD-10-CM

## 2022-03-14 PROCEDURE — 1111F DSCHRG MED/CURRENT MED MERGE: CPT | Performed by: PHYSICIAN ASSISTANT

## 2022-03-14 PROCEDURE — 99496 TRANSJ CARE MGMT HIGH F2F 7D: CPT | Performed by: PHYSICIAN ASSISTANT

## 2022-03-14 NOTE — ASSESSMENT & PLAN NOTE
Wt Readings from Last 3 Encounters:   03/14/22 75 kg (165 lb 4 oz)   03/08/22 77 1 kg (170 lb)   11/18/21 75 8 kg (167 lb)         BNP was elevated during admit but pt w/o SOB, CP or leg edema  She was bothered by the fact her d/c papers said she had heart failure as she forgot this was one of her diagnosis  Wt is very stable overall  Mild crackle frandy lower lungs  PT still on lasix 1 am and 2 pm per daughter  Walking w/o SOB today

## 2022-03-14 NOTE — PROGRESS NOTES
Assessment/Plan:     Hypothyroidism  TSH order in chart  Last was normal 5/2021  Gastroesophageal reflux disease without esophagitis  PT is s/p admit for epigastric pain  She states this has resolved  Daughter states she is only taking PPI daily and not pepcid, carafate or prn antacid  Chronic diastolic congestive heart failure (HCC)  Wt Readings from Last 3 Encounters:   03/14/22 75 kg (165 lb 4 oz)   03/08/22 77 1 kg (170 lb)   11/18/21 75 8 kg (167 lb)         BNP was elevated during admit but pt w/o SOB, CP or leg edema  She was bothered by the fact her d/c papers said she had heart failure as she forgot this was one of her diagnosis  Wt is very stable overall  Mild crackle frandy lower lungs  PT still on lasix 1 am and 2 pm per daughter  Walking w/o SOB today  Bilateral impacted cerumen  Resovled today as copious wax removed from canals bilaterally  Ear cerumen removal    Date/Time: 3/14/2022 1:38 PM  Performed by: Estefania Hammond PA-C  Authorized by: Estefania Hammond PA-C   Universal Protocol:  Consent: Verbal consent obtained  Consent given by: patient  Patient understanding: patient states understanding of the procedure being performed  Patient identity confirmed: verbally with patient      Patient location:  Clinic  Procedure details:     Local anesthetic:  None    Location:  L ear and R ear    Procedure type: curette      Approach:  Natural orifice  Post-procedure details:     Complication:  None    Hearing quality:  Improved    Patient tolerance of procedure: Tolerated well, no immediate complications           Diagnoses and all orders for this visit:    Acquired hypothyroidism    Gastroesophageal reflux disease without esophagitis    Chronic diastolic congestive heart failure (HCC)    Bilateral impacted cerumen    Other orders  -     Ear cerumen removal         Subjective:     Patient ID: Mary Chris is a 80 y o  female  ARIEL  PT was admitted for epigastric pain   All testing was stable and cardiac event was ruled out  BNP was elevated at 1000  Pt has known CHF and stress testing was not appropriate  Pts symptoms were controlled on PPI, H2 and carafate and she was discharged  NO new c/o today and she states her symptoms have remained resolved  Daughter accompanies her today  Review of Systems   Constitutional: Negative  HENT: Negative  Eyes: Negative  Respiratory: Negative  Cardiovascular: Negative  Gastrointestinal: Negative  Endocrine: Negative  Genitourinary: Negative  Musculoskeletal: Negative  Skin: Negative  Allergic/Immunologic: Negative  Neurological: Negative  Hematological: Negative  Psychiatric/Behavioral: Negative  Objective:     Physical Exam  Vitals and nursing note reviewed  Constitutional:       General: She is not in acute distress  Appearance: She is well-developed  She is not diaphoretic  HENT:      Head: Normocephalic and atraumatic  Eyes:      General:         Right eye: No discharge  Left eye: No discharge  Conjunctiva/sclera: Conjunctivae normal    Neck:      Vascular: No carotid bruit  Cardiovascular:      Rate and Rhythm: Normal rate and regular rhythm  Heart sounds: Murmur heard  Systolic murmur is present with a grade of 4/6  No friction rub  Pulmonary:      Effort: Pulmonary effort is normal  No respiratory distress  Breath sounds: Examination of the right-lower field reveals rales  Examination of the left-lower field reveals rales  Rales present  No wheezing  Musculoskeletal:      Cervical back: Neck supple  Skin:     General: Skin is warm and dry  Neurological:      Mental Status: She is alert and oriented to person, place, and time  Psychiatric:         Cognition and Memory: Memory is impaired  Judgment: Judgment normal       Comments: Pt did not recognize me today for todays visit  After a while she asked if I was Salazar Uday             Vitals: 03/14/22 1216   BP: 126/78   BP Location: Left arm   Patient Position: Sitting   Cuff Size: Large   Temp: (!) 96 9 °F (36 1 °C)   TempSrc: Temporal   Weight: 75 kg (165 lb 4 oz)   Height: 5' 6" (1 676 m)       Transitional Care Management Review:  Ashtyn Hutchinson is a 80 y o  female here for TCM follow up  During the TCM phone call patient stated:    TCM Call (since 2/11/2022)     Date and time call was made  3/10/2022  9:29 AM    Patient was hospitialized at  [facility] [place] [facility]        Date of Admission  03/08/22    Date of discharge  03/09/22    Diagnosis  Abdominal pain    Disposition  Home    Were the patients medications reviewed and updated  No    Current Symptoms  None      TCM Call (since 2/11/2022)     Post hospital issues  None    Should patient be enrolled in anticoag monitoring? No    Scheduled for follow up?   Yes    Do you need help managing your prescriptions or medications  No    Is transportation to your appointment needed  No    I have advised the patient to call PCP with any new or worsening symptoms  [address]    The type of support provided  Physical; Emotional    Do you have social support  Yes, as much as I need    Are you recieving any outpatient services  No    Are you recieving home care services  Yes    Types of home care services  Nurse visit          Rajinder Argueta PA-C

## 2022-03-14 NOTE — ASSESSMENT & PLAN NOTE
PT is s/p admit for epigastric pain  She states this has resolved  Daughter states she is only taking PPI daily and not pepcid, carafate or prn antacid

## 2022-03-21 ENCOUNTER — TELEPHONE (OUTPATIENT)
Dept: NEUROLOGY | Facility: CLINIC | Age: 87
End: 2022-03-21

## 2022-03-21 NOTE — TELEPHONE ENCOUNTER
Called daughter to confirm mom's appointment for 03/29  No answer, line kept ringing with no answering machine

## 2022-03-29 ENCOUNTER — OFFICE VISIT (OUTPATIENT)
Dept: NEUROLOGY | Facility: CLINIC | Age: 87
End: 2022-03-29
Payer: COMMERCIAL

## 2022-03-29 VITALS
SYSTOLIC BLOOD PRESSURE: 134 MMHG | HEART RATE: 83 BPM | BODY MASS INDEX: 26.26 KG/M2 | WEIGHT: 163.4 LBS | HEIGHT: 66 IN | TEMPERATURE: 97.4 F | DIASTOLIC BLOOD PRESSURE: 63 MMHG

## 2022-03-29 DIAGNOSIS — R51.9 CHRONIC NONINTRACTABLE HEADACHE, UNSPECIFIED HEADACHE TYPE: ICD-10-CM

## 2022-03-29 DIAGNOSIS — G52.9 CRANIAL NEURALGIA: Primary | ICD-10-CM

## 2022-03-29 DIAGNOSIS — G89.29 CHRONIC NONINTRACTABLE HEADACHE, UNSPECIFIED HEADACHE TYPE: ICD-10-CM

## 2022-03-29 DIAGNOSIS — R42 VERTIGO: ICD-10-CM

## 2022-03-29 DIAGNOSIS — G50.1 ATYPICAL FACIAL PAIN: ICD-10-CM

## 2022-03-29 DIAGNOSIS — R26.2 AMBULATORY DYSFUNCTION: ICD-10-CM

## 2022-03-29 DIAGNOSIS — M79.18 MYOFASCIAL MUSCLE PAIN: ICD-10-CM

## 2022-03-29 DIAGNOSIS — M54.50 ACUTE MIDLINE LOW BACK PAIN WITHOUT SCIATICA: ICD-10-CM

## 2022-03-29 PROCEDURE — 99214 OFFICE O/P EST MOD 30 MIN: CPT | Performed by: PHYSICIAN ASSISTANT

## 2022-03-29 PROCEDURE — 1160F RVW MEDS BY RX/DR IN RCRD: CPT | Performed by: PHYSICIAN ASSISTANT

## 2022-03-29 PROCEDURE — 1111F DSCHRG MED/CURRENT MED MERGE: CPT | Performed by: PHYSICIAN ASSISTANT

## 2022-03-29 NOTE — PROGRESS NOTES
Patient ID: Davian Harris is a 80 y o  female  Assessment/Plan:       Diagnoses and all orders for this visit:    Cranial neuralgia    Atypical facial pain    Chronic nonintractable headache, unspecified headache type    Ambulatory dysfunction    Vertigo    Acute midline low back pain without sciatica    Myofascial muscle pain           For facial pain and headache continue oxcarbazepine 150 mg q h s , do not increase the dose or take during the day to prevent dizziness and gait instability/falls  She also takes gabapentin for headache prevention:  300 mg q a m , 300 mg q p m  And 600 mg at bedtime  Continue the same doses as noted above since no recurrence of headache and no complaints of facial pain or worsening neuropathic pain  We are looking forward to her starting physical therapy for low back pain and gait training/imbalance  Since she had her fall as noted below she has been having more low back pain, and tightness around the low back region and pelvic region  I would also kindly ask the physical therapist to assess for BPV and potentially address this as well if possible  The patient has this when she lays down to go to bed and is somewhat irritating, however thankfully does not have it during the day  The patient was prescribed Carafate trial for her reflux and recommended to continue omeprazole and Pepcid, as well as antacid p r n     I did advise her to try this for at least 4-6 weeks to see if the fullness feeling in her throat would improve and if epigastric pain would improve  The patient still has a full and uncomfortable feeling in the left upper to mid abdomen sort of underneath the rib cage  I am not sure where this is coming from, and I asked her to address this with her family provider if possible  It is not constant and I am wondering if radicular or neuropathic related, and if PT would help      The patient was asking about her abdominal CT results and I did review them with her in detail  Diverticulosis without diverticulitis, discussed diet appropriate in this setting  The patient should not hesitate to call me prior to her follow up with any questions or concerns  Subjective:    HPI    Ms  Cece Fan is here with her daughter for neurological follow-up  She was recently hospitalized 3/8/2022 for 11 hours at the [facility]   The patient's daughter states that she was screaming in the middle of the night and telling her that she was having a heart attack  She was complaining of upper abdominal pain  On occasion she feels a fullness in her throat or like something is stuck particularly on the right side  If she lays flat the epigastric pain is worse sometimes  She states that she feels better sleeping upright on the couch  Per Internal Medicine:  Denise Hickey on admission did not reveal any acute abnormalities  Serial cardiac enzymes were negative  Her pain was thought to be secondary to gastric reflux  Her liver function tests were normal and lipase and CT scan of the abdomen and pelvis was unremarkable  Urinalysis was also negative  She was started on PPI daily and H2 blocker b i d  along with Carafate and p r n  and acid  His symptoms resolved and she was deemed stable for discharge home "    She was diagnosed with GERD without esophagitis, already on Pepcid at time of presentation, and was prescribed omeprazole, prn antacid and Carafate trial   The patient states that she does not want to take the additional GERD medications and she was going to send them back to the pharmacy  Regarding neurology symptoms, the patient denies significant headaches since I last saw her  She denies facial pain or eye pain/supraorbital pain as described at the last visit  She does not need trigger point injections today  She continues gabapentin as dosed, and Trileptal at nighttime, 150 mg q h s       The patient states that she feels something heavy is resting on her eyes  This is worse in the left eye but also present on the right  She does have dry eye drops which help  She is scheduled to see Physical therapy for low back which she was unable to do secondary to not feeling well and hospital admission  Previously physical therapy was ordered for low back pain after she had a fall  She is looking forward to physical therapy  She still has vertigo symptoms when she lays down to go to sleep, she closes her eyes and it does not bother her that much  The following portions of the patient's history were reviewed and updated as appropriate:   She  has a past medical history of Asthma, Atypical chest pain, CAD (coronary artery disease), Candidal intertrigo, CHF (congestive heart failure) ([address] ), Depression, Diabetes mellitus ([address] ), Diabetic neuropathy ([address] ), Diverticulitis, Dyslipidemia, Female bladder prolapse, Gastric ulcer, Glaucoma, HTN (hypertension), Hyperlipidemia, Hypothyroidism (4/18/2016), and RAD (reactive airway disease)    She   Patient Active Problem List    Diagnosis Date Noted    Abdominal pain 03/08/2022    Fall 11/09/2021    Tail bone pain 11/09/2021    Chronic nonintractable headache 08/01/2021    Vertigo 06/27/2021    Essential hypertension 05/19/2021    Hyponatremia 05/19/2021    Recurrent UTI 05/17/2021    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    Myofascial muscle pain 03/25/2021    Cranial neuralgia 03/25/2021    Conductive hearing loss, bilateral 03/04/2021    Constipation 02/11/2021    Bilateral impacted cerumen 02/11/2021    Left hip pain 01/19/2021    Medicare annual wellness visit, subsequent 08/21/2020    Diarrhea 08/21/2020    Atypical facial pain 03/29/2020    Stable angina ([address] ) 02/18/2020    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    Chronic headache 11/14/2019    Paroxysmal nocturnal dyspnea 11/11/2019    Orthopnea 11/11/2019    Shortness of breath 11/11/2019    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[address] ) 10/05/2019    Vitamin D deficiency 08/22/2019    Pain of both hip joints 06/21/2019    Candidal intertrigo 06/21/2019    Gastroesophageal reflux disease without esophagitis 11/06/2018    Insomnia 10/12/2018    Chronic diastolic congestive heart failure ([facility] ) 08/05/2018    Chest pain     Dyspnea on minimal exertion 02/17/2018    Shakiness 05/23/2017    Allergic rhinitis 05/02/2017    Hyperlipidemia     Female bladder prolapse     Bilateral cold feet 12/28/2016    Aortic stenosis 10/21/2016    Arteriosclerosis of coronary artery     Asthma     Anxiety disorder 08/17/2016    Osteoarthritis 06/21/2016    Hypothyroidism 04/18/2016    Trigeminal neuralgia 03/16/2016    Peripheral vascular disease ([facility] )     Glaucoma, open angle, severe stage     Overactive bladder 01/20/2016    Hallucination 11/17/2015    General weakness 11/03/2015    Low back pain 11/03/2015    Hearing loss 07/27/2015    Mild cognitive impairment 07/27/2015    Dizziness 07/02/2015    Advanced age 06/03/2015    Irritable bowel 04/24/2015    Female cystocele 04/24/2015    Diverticulosis 04/03/2015    Hiatal hernia 04/03/2015    Ambulatory dysfunction 04/02/2015     She  has a past surgical history that includes Hysterectomy; Colonoscopy; Esophagogastroduodenoscopy (2011); and Tonsillectomy  Her family history includes Breast cancer in her mother and sister; Hypertension in her father and sister; Hypothyroidism in her mother; Thyroid disease in her sister  She  reports that she has never smoked  She has never used smokeless tobacco  She reports that she does not drink alcohol and does not use drugs    Current Outpatient Medications   Medication Sig Dispense Refill    acetaminophen (TYLENOL) 325 mg tablet Take 2 tablets (650 mg total) by mouth every 6 (six) hours as needed for mild pain 20 tablet 0    amLODIPine (NORVASC) 5 mg tablet Take 1 tablet (5 mg total) by mouth daily 90 tablet 3    aspirin 81 MG tablet Take 81 mg by mouth daily   calcium carbonate (TUMS) 500 mg chewable tablet Chew 1 tablet (500 mg total) 2 (two) times a day as needed for indigestion or heartburn 20 tablet 0    Cholecalciferol (VITAMIN D3) 2000 units capsule Take 2,000 Units by mouth daily       Diclofenac Sodium (Voltaren) 1 % Voltaren 1 % topical gel      dorzolamide (TRUSOPT) 2 % ophthalmic solution Administer 1 drop to both eyes 3 (three) times a day        famotidine (PEPCID) 20 mg tablet Take 1 tablet (20 mg total) by mouth 2 (two) times a day (Patient taking differently: Take 20 mg by mouth if needed  ) 60 tablet 0    fluticasone-vilanterol (BREO ELLIPTA) 100-25 mcg/inh inhaler Inhale 1 puff daily Rinse mouth after use  1 Inhaler 5    furosemide (LASIX) 20 mg tablet TAKE 1 TABLET IN THE MORNING  AND TAKE 2 TABLETS IN THE EVENING 270 tablet 11    gabapentin (NEURONTIN) 600 MG tablet TAKE 1/2 TABLET IN THE MORNING AND AFTERNOON AND TAKE 1 TABLET AT BEDTIME 180 tablet 0    Incontinence Supply Disposable (SELECT DISPOSABLE UNDERWEAR LG) MISC       Lancets (ACCU-CHEK SOFT TOUCH) lancets Testing 1 time daily              Dx: E11 9 100 each 3    latanoprost (XALATAN) 0 005 % ophthalmic solution Apply 1 drop to eye daily at bedtime Both eyes 7 5 mL 1    levothyroxine 75 mcg tablet TAKE 1 TABLET EVERY DAY 90 tablet 3    Melatonin 5 MG CAPS Take 2 capsules (10 mg total) by mouth daily at bedtime 30 capsule 0    OLANZapine (ZyPREXA) 5 mg tablet Take 0 5 tablets (2 5 mg total) by mouth daily 30 tablet 0    omeprazole (PriLOSEC) 40 MG capsule Take 1 capsule (40 mg total) by mouth daily 90 capsule 3    OXcarbazepine (TRILEPTAL) 150 mg tablet TAKE 1 TABLET qhs (PLEASE CALL WITH DIZZINESS) 90 tablet 0    potassium chloride (K-DUR,KLOR-CON) 20 mEq tablet TAKE 1 TABLET EVERY DAY 90 tablet 2    sitaGLIPtin (Januvia) 50 mg tablet Take 1 tablet (50 mg total) by mouth daily 90 tablet 1    vitamin B-12 (CYANOCOBALAMIN) 250 MCG tablet Take 250 mcg by mouth daily      sucralfate (CARAFATE) 1 g tablet Take 1 tablet (1 g total) by mouth 4 (four) times a day (before meals and at bedtime) for 10 days 40 tablet 0     No current facility-administered medications for this visit  She is allergic to levaquin [levofloxacin] and statins  Objective:    Blood pressure 134/63, pulse 83, temperature (!) 97 4 °F (36 3 °C), temperature source Tympanic, height 5' 6" (1 676 m), weight 74 1 kg (163 lb 6 4 oz), not currently breastfeeding  Body mass index is 26 37 kg/m²  Physical Exam    Neurological Exam  Vital signs reviewed  Well developed, well nourished  Mood and affect are very pleasant  Speech is fluent and articulate  She follows all 3 step commands without difficulty  She is oriented x4  She does look slightly more tired appearing since I last saw her  Head: Normocephalic, atraumatic  CN 3-29: intact and symmetric, including EOMs which are normal b/l and PERRL  MSK:  Mild generalized weakness throughout, no focal weakness  Coordination: Nml x4 extr  Gait:  Resting in a wheelchair  She stands on her own with pushing off  She has a wide-based gait and slightly antalgic gait secondary to left knee pain  She uses a cane  She can turn around with multiple steps  ROS:    Review of Systems   Constitutional: Negative  Negative for appetite change and fever  HENT: Negative  Negative for hearing loss, tinnitus, trouble swallowing and voice change  Eyes: Positive for photophobia and pain  Respiratory: Negative  Negative for shortness of breath  Cardiovascular: Negative  Negative for palpitations  Gastrointestinal: Negative  Negative for nausea and vomiting  Endocrine: Negative  Negative for cold intolerance  Genitourinary: Negative  Negative for dysuria, frequency and urgency  Musculoskeletal: Positive for back pain, myalgias and neck pain  Skin: Negative  Negative for rash     Neurological: Positive for dizziness and tremors  Negative for seizures, syncope, facial asymmetry, speech difficulty, weakness, light-headedness, numbness and headaches  Hematological: Negative  Does not bruise/bleed easily  Psychiatric/Behavioral: Positive for confusion, hallucinations and sleep disturbance  The following portions of the patient's history were reviewed and updated as appropriate: allergies, current medications/ medication history, past family history, past medical history, past social history, past surgical history and problem list     Review of systems was reviewed and otherwise unremarkable from a neurological perspective

## 2022-04-06 ENCOUNTER — EVALUATION (OUTPATIENT)
Dept: PHYSICAL THERAPY | Facility: CLINIC | Age: 87
End: 2022-04-06
Payer: COMMERCIAL

## 2022-04-06 DIAGNOSIS — R26.2 AMBULATORY DYSFUNCTION: Primary | ICD-10-CM

## 2022-04-06 DIAGNOSIS — M54.50 ACUTE MIDLINE LOW BACK PAIN WITHOUT SCIATICA: ICD-10-CM

## 2022-04-06 PROCEDURE — 97162 PT EVAL MOD COMPLEX 30 MIN: CPT

## 2022-04-06 PROCEDURE — 97110 THERAPEUTIC EXERCISES: CPT

## 2022-04-06 NOTE — PROGRESS NOTES
PT Evaluation     Today's date: 2022  Patient name: Tiffanie Bravo  : 1922  MRN: [id number]  Referring provider: Patricia Poe  Dx:   Encounter Diagnosis     ICD-10-CM    1  Ambulatory dysfunction  R26 2 Ambulatory referral to Physical Therapy   2  Acute midline low back pain without sciatica  M54 50 Ambulatory referral to Physical Therapy       Start Time: 1530  Stop Time: 1630  Total time in clinic (min): 60 minutes    Assessment  Assessment details: Pt is a 80 yof presenting to therapy with low back pain and complaints of dizziness at night  Low back pain is consistent with referring diagnosis  Pt was referred out to neuro clinic for dizziness as the complaints were of of dizziness throughout the entire night and not with positional changes  Pt displays weakness of LEs bilaterally and impaired balance, ambulating with SPC  She has difficulty walking for long periods of time and standing up straight  Pt will benefit from continued physical therapy once a week for 6-8 weeks to improve strength, balance, and function  Impairments: abnormal or restricted ROM, activity intolerance, impaired physical strength, lacks appropriate home exercise program and pain with function    Symptom irritability: moderateUnderstanding of Dx/Px/POC: good   Prognosis: good    Goals  Short term goals (3-4 weeks)  1  Pt will display independence with understanding and performance of HEP to allow for carryover of plan of care at home  2  Pt will improve FOTO score from initial evaluation to show improvement in pain and function  3  Pt will increase strength of hip flexors to 4/5 to improve hip stability strength and decrease pain  4  Pt will increase hamstring strength to 4/5 bilaterally to improve hip and knee stability strength bilaterally  Long term goals (6-8 weeks)  1  Pt will score equal or better than projected score on FOTO to show improvement in pain and function    2   Pt will increase strength of hip flexors to 4+/5 to improve hip stability strength and decrease pain  3  Pt will increase hamstring strength to 4+/5 bilaterally to improve hip and knee stability strength bilaterally  Plan  Patient would benefit from: skilled physical therapy  Planned modality interventions: TENS, thermotherapy: hydrocollator packs, traction, ultrasound, cryotherapy and low level laser therapy  Planned therapy interventions: body mechanics training, therapeutic training, therapeutic exercise, therapeutic activities, stretching, strengthening, neuromuscular re-education, patient education, home exercise program, functional ROM exercises, flexibility, manual therapy, Moore taping, joint mobilization and balance  Frequency: 1x week  Duration in weeks: 8  Treatment plan discussed with: patient        Subjective Evaluation    History of Present Illness  Mechanism of injury: Pt reports she has back pain that wraps around to her waist  She is unsure of how long it has been occurring and does not know what caused it  She reports her back often feels sore and tight with sustained positions  Pt is also reporting she has vertigo at night in certain positions  She reports the vertigo and room spinning lasts all night as long as her head is down  Pain  Current pain ratin  At best pain ratin  At worst pain ratin  Quality: dull ache and tight  Alleviating factors: has not found relief    Aggravating factors: sitting and standing (sustained positions)  Progression: no change    Patient Goals  Patient goals for therapy: decreased pain and increased strength (walking for long periods of time; pt wants to be able to stand straight)          Objective     General Comments:      Lumbar Comments  Lumbar ROM  -Flexion: minimally limited, no pain  -Extension: minimally limited, no pain  -R SB: minimally limited, no pain  -L SB: minimally limited, no pain  Thoracic ROM  -R rot: minimally limited, no pain  -L rot: minimally limited, no pain    LE strength  Hip flexion: 3+/5 bilat  Knee ext: 4/5 R, 4+/5 L  Knee flex: 4/5 R, 3+/5 L  DF: 4/5 R, 3/5 L  PF: 4/5 R, 3+/5 L  Hip abduct: 4/5 bilat  Hip ER: 4/5 bilat    Reflexes  -Patellar: 1+  -Achilles: 1+    Hip ROM  -Flexion:  WNL  -ER: WNL  -IR: WNL    Special tests  -SI compression: (-)  -SLR: (-) bilat    Palpation  -Lumbar PA and unilateral PA: no pain with palpation to lumbar spine or PA to SIJ              Precautions: GERD, diverticulosis, irritable bowel, DM type 2 with peripheral neuropathy, hypothyroidism, asthma, aortic stenosis, CHF, HTN, PVD, arteriosclerosis of coronary artery, stable angina, trigeminal neuralgia, hearing loss, conductive hearing loss bilateral, cranial neuralgia, OA, candidal intertrigo, bladder prolapse, recurrent UTI, general weakness, glaucoma, hyperlipidemia, anxiety, dizziness, hiatal hernia, low back pain, mild cognitive impairment, paroxysmal nocturnal dyspnea, neoplasm of face, hyponatremia, vertigo, **h/o fall**      Manuals 4/6                                                                Neuro Re-Ed 4/6            bridges nv            hooklying clams nv            SLS nv            sidelying abduction nv                                                   Ther Ex 4/6            Standing abduction 1x10; HEP            Standing extension 1x10; HEP            Standing marches 1x10; HEP            Thoracic extensions 1x10; HEP            Lateral walks nv            Leg press nv                                      Ther Activity 4/6            Mini squats nv            Lateral walks nv            Gait Training 4/6                                      Modalities 4/6

## 2022-04-15 ENCOUNTER — OFFICE VISIT (OUTPATIENT)
Dept: PHYSICAL THERAPY | Facility: CLINIC | Age: 87
End: 2022-04-15
Payer: COMMERCIAL

## 2022-04-15 DIAGNOSIS — R26.2 AMBULATORY DYSFUNCTION: Primary | ICD-10-CM

## 2022-04-15 DIAGNOSIS — M54.50 ACUTE MIDLINE LOW BACK PAIN WITHOUT SCIATICA: ICD-10-CM

## 2022-04-15 PROCEDURE — 97112 NEUROMUSCULAR REEDUCATION: CPT

## 2022-04-15 PROCEDURE — 97110 THERAPEUTIC EXERCISES: CPT

## 2022-04-15 NOTE — PROGRESS NOTES
Daily Note     Today's date: 4/15/2022  Patient name: Lin Murphy  : 1922  MRN: [id number]  Referring provider: Cristal Petersen  Dx:   Encounter Diagnosis     ICD-10-CM    1  Ambulatory dysfunction  R26 2    2  Acute midline low back pain without sciatica  M54 50        Start Time: 1130  Stop Time: 1210  Total time in clinic (min): 40 minutes    Subjective: Pt reports she has a lot of back pain upon arrival today  Pt reported feeling good when leaving the session  Objective: See treatment diary below      Assessment: Tolerated treatment well  Patient required cuing for TA isometrics this session; pt displayed valsalva maneuver compensation and required cuing to avoid holding her breath  Pt required min A for STS with 3# goblet and decreased repetitions due to quick fatigue  Eccentric control with hooklying clamshells was lacking; pt required cuing to improve control  Verbal cuing was provided during standing hip extensions to avoid trunk flexion compensations  Continue to progress pt as tolerated next visit  Plan: Continue per plan of care        Precautions: GERD, diverticulosis, irritable bowel, DM type 2 with peripheral neuropathy, hypothyroidism, asthma, aortic stenosis, CHF, HTN, PVD, arteriosclerosis of coronary artery, stable angina, trigeminal neuralgia, hearing loss, conductive hearing loss bilateral, cranial neuralgia, OA, candidal intertrigo, bladder prolapse, recurrent UTI, general weakness, glaucoma, hyperlipidemia, anxiety, dizziness, hiatal hernia, low back pain, mild cognitive impairment, paroxysmal nocturnal dyspnea, neoplasm of face, hyponatremia, vertigo, **h/o fall**      Manuals 4/6 4/15                                                               Neuro Re-Ed 4/6 4/15           bridges nv 2x10           hooklying clams nv 2x10 ptb           SLS nv            sidelying abduction nv            TA isometric  1x10 5" hold                                     Ther Ex  4/15           Standing abduction 1x10; HEP            Standing extension 1x10; HEP 2x10 each no weight           Standing marches 1x10; HEP 1x20           Thoracic extensions 1x10; HEP            Lateral walks nv 3x ptb           Leg press nv 35# 1x10, 55# 1x10                                     Ther Activity 4/6 4/15           Mini squats nv            STS  3# 2x5           Pt edu  NS           Lateral walks nv            Gait Training 4/6 4/15                                     Modalities 4/6 4/15

## 2022-04-22 ENCOUNTER — OFFICE VISIT (OUTPATIENT)
Dept: PHYSICAL THERAPY | Facility: CLINIC | Age: 87
End: 2022-04-22
Payer: COMMERCIAL

## 2022-04-22 ENCOUNTER — HOME HEALTH ADMISSION (OUTPATIENT)
Dept: HOME HEALTH SERVICES | Facility: HOME HEALTHCARE | Age: 87
End: 2022-04-22

## 2022-04-22 DIAGNOSIS — M54.50 ACUTE MIDLINE LOW BACK PAIN WITHOUT SCIATICA: ICD-10-CM

## 2022-04-22 DIAGNOSIS — R26.2 AMBULATORY DYSFUNCTION: Primary | ICD-10-CM

## 2022-04-22 PROCEDURE — 97112 NEUROMUSCULAR REEDUCATION: CPT

## 2022-04-22 PROCEDURE — 97110 THERAPEUTIC EXERCISES: CPT

## 2022-04-22 PROCEDURE — 97530 THERAPEUTIC ACTIVITIES: CPT

## 2022-04-22 NOTE — PROGRESS NOTES
Daily Note     Today's date: 2022  Patient name: Kun Benjamin  : 1922  MRN: [id number]  Referring provider: Yoselin Richardson  Dx:   Encounter Diagnosis     ICD-10-CM    1  Ambulatory dysfunction  R26 2    2  Acute midline low back pain without sciatica  M54 50        Start Time: 1328          Subjective: Pt reports her back isn't bothering her too much today  Objective: See treatment diary below      Assessment: Tolerated treatment well  Patient progressed leg press with increased weight this session with no increased in adverse symptoms  HS curls were added to exercises with pt requiring initial cuing  Increased difficulty was noted on the LLE with muscle fasciculations present  Pt required cuing for bridges to control the motion and hold for a few seconds at the top of each motion  Pt continues to show difficulty with sit to stand, requiring assistance with posterior support and fatiguing by 5 repetitions  Pt fatigued by the third repetition of the second set, requiring another rest break before finishing  Continue to progress pt as tolerated next visit  Plan: Continue per plan of care        Precautions: GERD, diverticulosis, irritable bowel, DM type 2 with peripheral neuropathy, hypothyroidism, asthma, aortic stenosis, CHF, HTN, PVD, arteriosclerosis of coronary artery, stable angina, trigeminal neuralgia, hearing loss, conductive hearing loss bilateral, cranial neuralgia, OA, candidal intertrigo, bladder prolapse, recurrent UTI, general weakness, glaucoma, hyperlipidemia, anxiety, dizziness, hiatal hernia, low back pain, mild cognitive impairment, paroxysmal nocturnal dyspnea, neoplasm of face, hyponatremia, vertigo, **h/o fall**      Manuals 4/6 4/15 4/22                                                              Neuro Re-Ed 4/6 4/15 4/22          bridges nv 2x10 2x10          hooklying clams nv 2x10 ptb 1x15 ptb          SLS nv            sidelying abduction nv            TA isometric  1x10 5" hold           Adduction iso, seated   1x10 5" hold                        Ther Ex 4/6 4/15 4/22          Standing abduction 1x10; HEP            Standing extension 1x10; HEP 2x10 each no weight           Standing marches 1x10; HEP 1x20           Thoracic extensions 1x10; HEP            Lateral walks nv 3x ptb           Leg press nv 35# 1x10, 55# 1x10 65# 2x10          bike   6'          HS curls   2x10 each ptb          Ther Activity 4/6 4/15 4/22          Mini squats nv            STS  3# 2x5 3# 2x5          Pt edu  NS NS          Lateral walks nv            Gait Training 4/6 4/15 4/22                                    Modalities 4/6 4/15 4/22

## 2022-04-26 ENCOUNTER — TELEPHONE (OUTPATIENT)
Dept: FAMILY MEDICINE CLINIC | Facility: CLINIC | Age: 87
End: 2022-04-26

## 2022-04-26 DIAGNOSIS — R42 VERTIGO: ICD-10-CM

## 2022-04-26 DIAGNOSIS — I50.32 CHRONIC DIASTOLIC CONGESTIVE HEART FAILURE (HCC): ICD-10-CM

## 2022-04-26 DIAGNOSIS — I73.9 PERIPHERAL VASCULAR DISEASE (HCC): ICD-10-CM

## 2022-04-26 DIAGNOSIS — M15.9 OSTEOARTHRITIS OF MULTIPLE JOINTS, UNSPECIFIED OSTEOARTHRITIS TYPE: ICD-10-CM

## 2022-04-26 DIAGNOSIS — R25.1 SHAKINESS: ICD-10-CM
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
## 2022-04-26 DIAGNOSIS — R26.2 AMBULATORY DYSFUNCTION: Primary | ICD-10-CM

## 2022-04-26 NOTE — TELEPHONE ENCOUNTER
NANCY Conde called asking for pcp to place order for a transport chair to be faxed to [facility] (fax) [phone] as pt would like to get out more and has trouble walking   Thank you

## 2022-04-29 ENCOUNTER — OFFICE VISIT (OUTPATIENT)
Dept: PHYSICAL THERAPY | Facility: CLINIC | Age: 87
End: 2022-04-29
Payer: COMMERCIAL

## 2022-04-29 DIAGNOSIS — M54.50 ACUTE MIDLINE LOW BACK PAIN WITHOUT SCIATICA: ICD-10-CM

## 2022-04-29 DIAGNOSIS — R26.2 AMBULATORY DYSFUNCTION: Primary | ICD-10-CM

## 2022-04-29 PROCEDURE — 97110 THERAPEUTIC EXERCISES: CPT

## 2022-04-29 NOTE — PROGRESS NOTES
Daily Note     Today's date: 2022  Patient name: Kelli Scott  : 1922  MRN: [id number]  Referring provider: Quincy Oneill  Dx:   Encounter Diagnosis     ICD-10-CM    1  Ambulatory dysfunction  R26 2    2  Acute midline low back pain without sciatica  M54 50                   Subjective: Pt reports she can't do much today due to her back hurting her more  Objective: See treatment diary below      Assessment: Patient fatigued post tx, reports no increased pain throughout session  Plan: Continue per plan of care        Precautions: GERD, diverticulosis, irritable bowel, DM type 2 with peripheral neuropathy, hypothyroidism, asthma, aortic stenosis, CHF, HTN, PVD, arteriosclerosis of coronary artery, stable angina, trigeminal neuralgia, hearing loss, conductive hearing loss bilateral, cranial neuralgia, OA, candidal intertrigo, bladder prolapse, recurrent UTI, general weakness, glaucoma, hyperlipidemia, anxiety, dizziness, hiatal hernia, low back pain, mild cognitive impairment, paroxysmal nocturnal dyspnea, neoplasm of face, hyponatremia, vertigo, **h/o fall**      Manuals 4/6 4/15 4/22 4/29                                                             Neuro Re-Ed 4/6 4/15 4/22 4/29         bridges nv 2x10 2x10 2x10         hooklying clams nv 2x10 ptb 1x15 ptb PTB 2x10         SLS nv            sidelying abduction nv            TA isometric  1x10 5" hold           Adduction iso, seated   1x10 5" hold  5" 2x10                      Ther Ex 4/6 4/15 4/22 4/29         Standing abduction 1x10; HEP            Standing extension 1x10; HEP 2x10 each no weight           Standing marches 1x10; HEP 1x20           Thoracic extensions 1x10; HEP            Lateral walks nv 3x ptb           Leg press nv 35# 1x10, 55# 1x10 65# 2x10 65# 2x10         bike   6' 5'         HS curls   2x10 each ptb 2x10 ea PTB         Ther Activity 4/6 4/15 4/22 4/29         Mini squats nv            STS  3# 2x5 3# 2x5 2x5 Pt edu  NS NS          Lateral walks nv            Gait Training 4/6 4/15 4/22 4/29                                   Modalities 4/6 4/15 4/22 4/29

## 2022-05-06 ENCOUNTER — APPOINTMENT (OUTPATIENT)
Dept: PHYSICAL THERAPY | Facility: CLINIC | Age: 87
End: 2022-05-06
Payer: COMMERCIAL

## 2022-05-06 NOTE — PROGRESS NOTES
Daily Note     Today's date: 2022  Patient name: Kun Benjamin  : 1922  MRN: [id number]  Referring provider: Edward Felix PA-C  Dx: No diagnosis found  Subjective: ***      Objective: See treatment diary below      Assessment: Tolerated treatment {Tolerated treatment :[id number]}  Patient {assessment:[id number]}      Plan: Continue per plan of care        Precautions: GERD, diverticulosis, irritable bowel, DM type 2 with peripheral neuropathy, hypothyroidism, asthma, aortic stenosis, CHF, HTN, PVD, arteriosclerosis of coronary artery, stable angina, trigeminal neuralgia, hearing loss, conductive hearing loss bilateral, cranial neuralgia, OA, candidal intertrigo, bladder prolapse, recurrent UTI, general weakness, glaucoma, hyperlipidemia, anxiety, dizziness, hiatal hernia, low back pain, mild cognitive impairment, paroxysmal nocturnal dyspnea, neoplasm of face, hyponatremia, vertigo, **h/o fall**      Manuals 4/6 4/15 4/22 4/29 5/6                                                            Neuro Re-Ed 4/6 4/15 4/22 4/29 5/6        bridges nv 2x10 2x10 2x10         hooklying clams nv 2x10 ptb 1x15 ptb PTB 2x10         SLS nv            sidelying abduction nv            TA isometric  1x10 5" hold           Adduction iso, seated   1x10 5" hold  5" 2x10                      Ther Ex 4/6 4/15 4/22 4/29 5/6        Standing abduction 1x10; HEP            Standing extension 1x10; HEP 2x10 each no weight           Standing marches 1x10; HEP 1x20           Thoracic extensions 1x10; HEP            Lateral walks nv 3x ptb           Leg press nv 35# 1x10, 55# 1x10 65# 2x10 65# 2x10         bike   6' 5'         HS curls   2x10 each ptb 2x10 ea PTB         Ther Activity 4/6 4/15 4/22 4/29 5/6        Mini squats nv            STS  3# 2x5 3# 2x5 2x5         Pt edu  NS NS          Lateral walks nv            Gait Training 4/6 4/15 4/22 4/29 5/6                                  Modalities 4/6 4/15 4/22 4/29 5/6

## 2022-05-09 ENCOUNTER — OFFICE VISIT (OUTPATIENT)
Dept: FAMILY MEDICINE CLINIC | Facility: CLINIC | Age: 87
End: 2022-05-09
Payer: COMMERCIAL

## 2022-05-09 VITALS
HEART RATE: 68 BPM | DIASTOLIC BLOOD PRESSURE: 70 MMHG | HEIGHT: 66 IN | SYSTOLIC BLOOD PRESSURE: 130 MMHG | WEIGHT: 163 LBS | BODY MASS INDEX: 26.2 KG/M2

## 2022-05-09 DIAGNOSIS — I73.9 PERIPHERAL VASCULAR DISEASE (HCC): ICD-10-CM

## 2022-05-09 DIAGNOSIS — M79.10 MYALGIA: ICD-10-CM

## 2022-05-09 DIAGNOSIS — E78.2 MIXED HYPERLIPIDEMIA: ICD-10-CM

## 2022-05-09 DIAGNOSIS — R10.9 FLANK PAIN: ICD-10-CM

## 2022-05-09 DIAGNOSIS — Z00.00 MEDICARE ANNUAL WELLNESS VISIT, SUBSEQUENT: Primary | ICD-10-CM

## 2022-05-09 DIAGNOSIS — I20.8 STABLE ANGINA (HCC): ICD-10-CM

## 2022-05-09 DIAGNOSIS — I10 ESSENTIAL HYPERTENSION: Chronic | ICD-10-CM

## 2022-05-09 DIAGNOSIS — E11.42 DM TYPE 2 WITH DIABETIC PERIPHERAL NEUROPATHY (HCC): ICD-10-CM

## 2022-05-09 DIAGNOSIS — N18.32 STAGE 3B CHRONIC KIDNEY DISEASE (HCC): ICD-10-CM

## 2022-05-09 LAB — SL AMB POCT HEMOGLOBIN AIC: 6.4 (ref ?–6.5)

## 2022-05-09 PROCEDURE — 1125F AMNT PAIN NOTED PAIN PRSNT: CPT | Performed by: NURSE PRACTITIONER

## 2022-05-09 PROCEDURE — 1036F TOBACCO NON-USER: CPT | Performed by: NURSE PRACTITIONER

## 2022-05-09 PROCEDURE — 3288F FALL RISK ASSESSMENT DOCD: CPT | Performed by: NURSE PRACTITIONER

## 2022-05-09 PROCEDURE — 1160F RVW MEDS BY RX/DR IN RCRD: CPT | Performed by: NURSE PRACTITIONER

## 2022-05-09 PROCEDURE — 3725F SCREEN DEPRESSION PERFORMED: CPT | Performed by: NURSE PRACTITIONER

## 2022-05-09 PROCEDURE — 83036 HEMOGLOBIN GLYCOSYLATED A1C: CPT | Performed by: NURSE PRACTITIONER

## 2022-05-09 PROCEDURE — 99214 OFFICE O/P EST MOD 30 MIN: CPT | Performed by: NURSE PRACTITIONER

## 2022-05-09 PROCEDURE — 1170F FXNL STATUS ASSESSED: CPT | Performed by: NURSE PRACTITIONER

## 2022-05-09 PROCEDURE — G0439 PPPS, SUBSEQ VISIT: HCPCS | Performed by: NURSE PRACTITIONER

## 2022-05-09 NOTE — ASSESSMENT & PLAN NOTE
UA ordered to assess for any hematuria which could be a sign of nephrolithiasis  If UA is normal and abdominal pain continues further imaging can be performed on the abdomen to assess this

## 2022-05-09 NOTE — PROGRESS NOTES
Assessment and Plan:    Problem List Items Addressed This Visit        Endocrine    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HCC) (Chronic)       Lab Results   Component Value Date    HGBA1C 6 4 05/09/2022     Well controlled on current regimen  Urine microalbumin ordered to be completed prior to next office visit  Relevant Orders    POCT hemoglobin A1c (Completed)    UA w Reflex to Microscopic w Reflex to Culture -Lab Collect    Microalbumin / creatinine urine ratio       Cardiovascular and Mediastinum    Essential hypertension (Chronic)     Well controlled on current regimen  Peripheral vascular disease ([facility] [address] )     MATHEW ordered to assess extent of her PVD  Patient was advised to continue baby aspirin daily  Relevant Orders    VAS MATHEW & waveform analysis, multiple levels    Stable angina (HCC)     No current issues regarding this  Genitourinary    Stage 3b chronic kidney disease ([facility] [address] )     Lab Results   Component Value Date    EGFR 33 03/08/2022    EGFR 31 02/17/2022    EGFR 35 12/09/2021    CREATININE 1 33 (H) 03/08/2022    CREATININE 1 37 (H) 02/17/2022    CREATININE 1 44 (H) 12/09/2021     Patient was advised to continue only Tylenol as needed for pain and to avoid all NSAIDs  She was also advised to drink at least 8 8 oz glasses of water per day to prevent dehydration  Relevant Orders    UA w Reflex to Microscopic w Reflex to Culture -Lab Collect    Microalbumin / creatinine urine ratio       Other    Hyperlipidemia     Lipid panel ordered to be drawn prior to next office visit  Relevant Orders    Lipid Panel with Direct LDL reflex    Medicare annual wellness visit, subsequent - Primary    Flank pain     UA ordered to assess for any hematuria which could be a sign of nephrolithiasis  If UA is normal and abdominal pain continues further imaging can be performed on the abdomen to assess this           Relevant Orders    UA w Reflex to Microscopic w Reflex to Culture -Lab Collect    Myalgia     CRP, ESR, CRISTIANO, and RF ordered to assess for any signs of rheumatoid arthritis or other causes of polyarthralgia or polymyalgia  Patient was advised to continue Tylenol as needed for pain  Relevant Orders    C-reactive protein    Sedimentation rate, automated    Antinuclear Antibodies (CRISTIANO), IFA    RF Screen w/ Reflex to Titer                 Diagnoses and all orders for this visit:    Medicare annual wellness visit, subsequent    Myalgia  -     C-reactive protein; Future  -     Sedimentation rate, automated; Future  -     Antinuclear Antibodies (CRISTIANO), IFA; Future  -     RF Screen w/ Reflex to Titer; Future    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HCC)  -     POCT hemoglobin A1c  -     UA w Reflex to Microscopic w Reflex to Culture -Lab Collect; Future  -     Microalbumin / creatinine urine ratio  -     Cancel: IRIS Diabetic eye exam    Stage 3b chronic kidney disease (HCC)  -     UA w Reflex to Microscopic w Reflex to Culture -Lab Collect; Future  -     Microalbumin / creatinine urine ratio    Peripheral vascular disease (HCC)  -     VAS MATHEW & waveform analysis, multiple levels; Future    Stable angina (HCC)    Flank pain  -     UA w Reflex to Microscopic w Reflex to Culture -Lab Collect; Future    Mixed hyperlipidemia  -     Lipid Panel with Direct LDL reflex; Future    Essential hypertension            Subjective:      Patient ID: Archie Carey is a 80 y o  female  CC:    Chief Complaint   Patient presents with    Abdominal Pain     patient c/o abdominal pain x 2-3 weeks as well as all over muscles in her arms and legs, worse at night  ak       HPI:    Abdominal pain: The patient reports that she has been having left flank pain that radiates across to her right flank area through the upper abdomen over the past 2 weeks  The patient denies any nausea, vomiting, constipation, or diarrhea  She denies any dysuria, increased frequency, or urgency   She does report some lower back pain  She denies fevers or chills  She denies any gross hematuria  The patient was unable to give a urine sample in the office today  She denies that eating or drinking affects the pain  She currently denies any GERD symptoms  Myalgias: The patient is reporting generalized myalgias in her BUE and BLE  The patient is currently seeing PT for this  It was noted that patient was seen by Neurology in the past for this as well and they did have the patient complete a vitamin B12 and folate level which were both normal   Patient reports that she has been taking Tylenol as needed for her pain  Type 2 diabetes with peripheral neuropathy:  Patient's most recent hemoglobin A1c was completed in March 2021 and was 6 9 at that time  Patient is currently managed on Januvia 50 mg daily  Patient is currently managed on gabapentin 300 mg in the morning and 600 mg HS for neuropathy  Patient's HbA1C was 6 4 in the office today  Hypertension:  Well controlled on current dosages of amlodipine and Lasix  Patient denies any lightheadedness, dizziness, or orthostasis  PVD:  Patient has not had imaging regarding this in some time  Patient is currently managed on baby aspirin daily  Patient does report her legs are frequently cold and she does have frequent myalgia in her BLE  Angina:  Patient currently denies any chest pain, shortness of breath, or dyspnea on exertion  Patient is currently managed on amlodipine and Lasix  Stage 3b CKD:  Patient's most recent BMP showed a GFR of 33 which appears to be near her baseline  Patient currently denies any dysuria symptoms or decreased urine output  Spoke with patient about the importance of NSAID avoidance  Patient reports that she does drink only a small amount of water throughout the day            The following portions of the patient's history were reviewed and updated as appropriate: allergies, current medications, past family history, past medical history, past social history, past surgical history and problem list       Review of Systems   Constitutional: Negative for chills and fever  HENT: Negative for ear pain and sore throat  Eyes: Negative for pain and visual disturbance  Respiratory: Negative for cough, chest tightness, shortness of breath and wheezing  Cardiovascular: Negative for chest pain, palpitations and leg swelling  Gastrointestinal: Positive for abdominal pain (upper abdomen)  Negative for constipation, diarrhea, nausea and vomiting  Endocrine: Negative for cold intolerance and heat intolerance  Genitourinary: Positive for flank pain (bilateral)  Negative for decreased urine volume, difficulty urinating, dysuria, frequency, hematuria and urgency  Musculoskeletal: Positive for myalgias (BUE and BLE)  Negative for arthralgias and back pain  Skin: Negative for color change and rash  Allergic/Immunologic: Negative for environmental allergies  Neurological: Negative for dizziness, seizures, syncope, weakness, light-headedness, numbness and headaches  Hematological: Negative for adenopathy  Psychiatric/Behavioral: Negative for confusion  The patient is not nervous/anxious  All other systems reviewed and are negative  Data to review:       Objective:    Vitals:    05/09/22 1731   BP: 130/70   Pulse: 68   Weight: 73 9 kg (163 lb)   Height: 5' 6" (1 676 m)        Physical Exam  Vitals and nursing note reviewed  Constitutional:       General: She is not in acute distress  Appearance: Normal appearance  She is well-developed  She is not ill-appearing  HENT:      Head: Normocephalic and atraumatic  Eyes:      Conjunctiva/sclera: Conjunctivae normal    Cardiovascular:      Rate and Rhythm: Normal rate and regular rhythm  Pulses: no weak pulses          Carotid pulses are 2+ on the right side and 2+ on the left side  Radial pulses are 2+ on the right side and 2+ on the left side  Dorsalis pedis pulses are 1+ on the right side and 1+ on the left side  Posterior tibial pulses are 1+ on the right side and 1+ on the left side  Heart sounds: Murmur heard  Systolic murmur is present with a grade of 3/6  Comments: Hyperpigmentation with stasis dermatitis noted on bilateral lower extremities  Bilateral lower extremities were cool to touch  Pulmonary:      Effort: Pulmonary effort is normal  No respiratory distress  Breath sounds: Normal breath sounds  No wheezing or rales  Abdominal:      General: Abdomen is flat  Bowel sounds are normal  There is no distension  Palpations: Abdomen is soft  Tenderness: There is no abdominal tenderness  There is no right CVA tenderness, left CVA tenderness or guarding  Comments: No tenderness noted with palpation in areas of concern  Musculoskeletal:         General: Normal range of motion  Cervical back: Normal range of motion and neck supple  Right lower leg: No edema  Left lower leg: No edema  Feet:      Right foot:      Skin integrity: No ulcer, skin breakdown, erythema, warmth, callus or dry skin  Left foot:      Skin integrity: No ulcer, skin breakdown, erythema, warmth, callus or dry skin  Skin:     General: Skin is warm and dry  Capillary Refill: Capillary refill takes less than 2 seconds  Neurological:      General: No focal deficit present  Mental Status: She is alert and oriented to person, place, and time  Psychiatric:         Mood and Affect: Mood normal          Behavior: Behavior normal          Thought Content: Thought content normal          Judgment: Judgment normal            BMI Counseling: Body mass index is 26 31 kg/m²  The BMI is above normal  Nutrition recommendations include decreasing portion sizes, encouraging healthy choices of fruits and vegetables, moderation in carbohydrate intake and increasing intake of lean protein   Exercise recommendations include exercising 3-5 times per week and obtaining a gym membership  No pharmacotherapy was ordered  Rationale for BMI follow-up plan is due to patient being overweight or obese  Depression Screening and Follow-up Plan: Patient was screened for depression during today's encounter  They screened negative with a PHQ-2 score of 2  Diabetic Foot Exam    Patient's shoes and socks removed  Right Foot/Ankle   Right Foot Inspection  Skin Exam: skin normal  Skin not intact, no dry skin, no warmth, no callus, no erythema, no maceration, no abnormal color, no pre-ulcer, no ulcer and no callus  Toe Exam: ROM and strength within normal limits  No swelling, no tenderness, erythema and  no right toe deformity    Sensory   Vibration: intact  Proprioception: intact  Monofilament testing: intact    Vascular  Capillary refills: < 3 seconds  The right DP pulse is 1+  The right PT pulse is 1+  Left Foot/Ankle  Left Foot Inspection  Skin Exam: skin normal  Skin not intact, no dry skin, no warmth, no erythema, no maceration, normal color, no pre-ulcer, no ulcer and no callus  Toe Exam: ROM and strength within normal limits  No swelling, no erythema and no left toe deformity  Sensory   Vibration: intact  Proprioception: intact  Monofilament testing: intact    Vascular  Capillary refills: < 3 seconds  The left DP pulse is 1+  The left PT pulse is 1+       Assign Risk Category  No deformity present  No loss of protective sensation  No weak pulses  Risk: 0

## 2022-05-09 NOTE — ASSESSMENT & PLAN NOTE
Lab Results   Component Value Date    HGBA1C 6 4 05/09/2022     Well controlled on current regimen  Urine microalbumin ordered to be completed prior to next office visit

## 2022-05-09 NOTE — PROGRESS NOTES
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)       Lab Results   Component Value Date    HGBA1C 6 4 05/09/2022     Well controlled on current regimen  Urine microalbumin ordered to be completed prior to next office visit  Relevant Orders    POCT hemoglobin A1c (Completed)    UA w Reflex to Microscopic w Reflex to Culture -Lab Collect    Microalbumin / creatinine urine ratio       Cardiovascular and Mediastinum    Essential hypertension (Chronic)     Well controlled on current regimen  Peripheral vascular disease ([facility] [address] [facility] )     MATHEW ordered to assess extent of her PVD  Patient was advised to continue baby aspirin daily  Relevant Orders    VAS MATHEW & waveform analysis, multiple levels    Stable angina (HCC)     No current issues regarding this  Genitourinary    Stage 3b chronic kidney disease ([facility] [address] )     Lab Results   Component Value Date    EGFR 33 03/08/2022    EGFR 31 02/17/2022    EGFR 35 12/09/2021    CREATININE 1 33 (H) 03/08/2022    CREATININE 1 37 (H) 02/17/2022    CREATININE 1 44 (H) 12/09/2021     Patient was advised to continue only Tylenol as needed for pain and to avoid all NSAIDs  She was also advised to drink at least 8 8 oz glasses of water per day to prevent dehydration  Relevant Orders    UA w Reflex to Microscopic w Reflex to Culture -Lab Collect    Microalbumin / creatinine urine ratio       Other    Hyperlipidemia     Lipid panel ordered to be drawn prior to next office visit  Relevant Orders    Lipid Panel with Direct LDL reflex    Medicare annual wellness visit, subsequent - Primary    Flank pain     UA ordered to assess for any hematuria which could be a sign of nephrolithiasis  If UA is normal and abdominal pain continues further imaging can be performed on the abdomen to assess this           Relevant Orders    UA w Reflex to Microscopic w Reflex to Culture -Lab Collect    Myalgia     CRP, ESR, CRISTIANO, and RF ordered to assess for any signs of rheumatoid arthritis or other causes of polyarthralgia or polymyalgia  Patient was advised to continue Tylenol as needed for pain  Relevant Orders    C-reactive protein    Sedimentation rate, automated    Antinuclear Antibodies (CRISTIANO), IFA    RF Screen w/ Reflex to Titer          Depression Screening and Follow-up Plan: Patient was screened for depression during today's encounter  They screened negative with a PHQ-2 score of 2  Preventive health issues were discussed with patient, and age appropriate screening tests were ordered as noted in patient's After Visit Summary  Personalized health advice and appropriate referrals for health education or preventive services given if needed, as noted in patient's After Visit Summary       History of Present Illness:     Patient presents for Medicare Annual Wellness visit    Patient Care Team:  Ruby Chatterjee PA-C as PCP - General (Family Medicine)  Bennett Marrufo MD  [facility]   Simran Dumont, RN as Outpatient Care Manager (Cardiology)  Tatyana Heller, RN as  ([address])     Problem List:     Patient Active Problem List   Diagnosis    Peripheral vascular disease ([facility] [address] )    Glaucoma, open angle, severe stage    Arteriosclerosis of coronary artery    Asthma    Aortic stenosis    Hyperlipidemia    Female bladder prolapse    Advanced age   Lucy Byrne Allergic rhinitis    Ambulatory dysfunction    Anxiety disorder    General weakness    Irritable bowel    Trigeminal neuralgia    Hallucination    Dyspnea on minimal exertion    Chest pain    Hypothyroidism    Chronic diastolic congestive heart failure (HCC)    Insomnia    Gastroesophageal reflux disease without esophagitis    Bilateral cold feet    Female cystocele    Diverticulosis    Dizziness    Hearing loss    Hiatal hernia    Low back pain    Mild cognitive impairment    Osteoarthritis    Overactive bladder    Shakiness    Pain of both hip joints    Candidal intertrigo    Vitamin D deficiency    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HCC)    Paroxysmal nocturnal dyspnea    Orthopnea    Shortness of breath    Chronic headache    Neoplasm of face    Stable angina (HCC)    Atypical facial pain    Medicare annual wellness visit, subsequent    Diarrhea    Left hip pain    Constipation    Bilateral impacted cerumen    Conductive hearing loss, bilateral    Supraorbital neuralgia    Myofascial muscle pain    Cranial neuralgia    Recurrent UTI    Essential hypertension    Hyponatremia    Vertigo    Chronic nonintractable headache    Fall    Tail bone pain    Flank pain    Stage 3b chronic kidney disease (HCC)    Myalgia      Past Medical and Surgical History:     Past Medical History:   Diagnosis Date    Asthma     Atypical chest pain     CAD (coronary artery disease)     Candidal intertrigo     CHF (congestive heart failure) (HCC)     Depression     Diabetes mellitus (HCC)     Diabetic neuropathy (HCC)     Diverticulitis     Dyslipidemia     Female bladder prolapse     Gastric ulcer     Glaucoma     HTN (hypertension)     Hyperlipidemia     Hypothyroidism 4/18/2016    RAD (reactive airway disease)      Past Surgical History:   Procedure Laterality Date    COLONOSCOPY      ESOPHAGOGASTRODUODENOSCOPY  2011    HYSTERECTOMY      TONSILLECTOMY        Family History:     Family History   Problem Relation Age of Onset   Emilia Splinter Breast cancer Mother     Hypothyroidism Mother     Hypertension Father     Breast cancer Sister     Thyroid disease Sister     Hypertension Sister       Social History:     Social History     Socioeconomic History    Marital status:       Spouse name: None    Number of children: 2    Years of education: None    Highest education level: None   Occupational History    Occupation: Retired   Tobacco Use    Smoking status: Never Smoker    Smokeless tobacco: Never Used   Vaping Use    Vaping Use: Never used   Substance and Sexual Activity    Alcohol use: No     Comment: Social Alcohol Use -- as per allscripts (pt denies all alcohol use)    Drug use: No    Sexual activity: Not Currently   Other Topics Concern    None   Social History Narrative    Lived with adult children    Caffeine Use     Social Determinants of Health     Financial Resource Strain: Not on file   Food Insecurity: Not on file   Transportation Needs: Not on file   Physical Activity: Not on file   Stress: Not on file   Social Connections: Not on file   Intimate Partner Violence: Not on file   Housing Stability: Not on file      Medications and Allergies:     Current Outpatient Medications   Medication Sig Dispense Refill    acetaminophen (TYLENOL) 325 mg tablet Take 2 tablets (650 mg total) by mouth every 6 (six) hours as needed for mild pain 20 tablet 0    amLODIPine (NORVASC) 5 mg tablet Take 1 tablet (5 mg total) by mouth daily 90 tablet 3    aspirin 81 MG tablet Take 81 mg by mouth daily   Cholecalciferol (VITAMIN D3) 2000 units capsule Take 2,000 Units by mouth daily       Diclofenac Sodium (Voltaren) 1 % Voltaren 1 % topical gel      dorzolamide (TRUSOPT) 2 % ophthalmic solution Administer 1 drop to both eyes 3 (three) times a day        famotidine (PEPCID) 20 mg tablet Take 1 tablet (20 mg total) by mouth 2 (two) times a day (Patient taking differently: Take 20 mg by mouth if needed  ) 60 tablet 0    furosemide (LASIX) 20 mg tablet TAKE 1 TABLET IN THE MORNING  AND TAKE 2 TABLETS IN THE EVENING 270 tablet 11    gabapentin (NEURONTIN) 600 MG tablet TAKE 1/2 TABLET IN THE MORNING AND AFTERNOON AND TAKE 1 TABLET AT BEDTIME 180 tablet 0    Incontinence Supply Disposable (SELECT DISPOSABLE UNDERWEAR LG) MISC       Lancets (ACCU-CHEK SOFT TOUCH) lancets Testing 1 time daily              Dx: E11 9 100 each 3    latanoprost (XALATAN) 0 005 % ophthalmic solution Apply 1 drop to eye daily at bedtime Both eyes 7 5 mL 1    levothyroxine 75 mcg tablet TAKE 1 TABLET EVERY DAY 90 tablet 3    Misc  Devices (Transport Chair) MISC Use if needed (with outings outside of the house ) 1 each 0    omeprazole (PriLOSEC) 40 MG capsule Take 1 capsule (40 mg total) by mouth daily 90 capsule 3    potassium chloride (K-DUR,KLOR-CON) 20 mEq tablet TAKE 1 TABLET EVERY DAY 90 tablet 2    sitaGLIPtin (Januvia) 50 mg tablet Take 1 tablet (50 mg total) by mouth daily 90 tablet 1    vitamin B-12 (CYANOCOBALAMIN) 250 MCG tablet Take 250 mcg by mouth daily      calcium carbonate (TUMS) 500 mg chewable tablet Chew 1 tablet (500 mg total) 2 (two) times a day as needed for indigestion or heartburn (Patient not taking: Reported on 5/9/2022 ) 20 tablet 0    fluticasone-vilanterol (BREO ELLIPTA) 100-25 mcg/inh inhaler Inhale 1 puff daily Rinse mouth after use  (Patient not taking: Reported on 5/9/2022 ) 1 Inhaler 5    Melatonin 5 MG CAPS Take 2 capsules (10 mg total) by mouth daily at bedtime (Patient not taking: Reported on 5/9/2022 ) 30 capsule 0    OLANZapine (ZyPREXA) 5 mg tablet Take 0 5 tablets (2 5 mg total) by mouth daily (Patient not taking: Reported on 5/9/2022 ) 30 tablet 0    OXcarbazepine (TRILEPTAL) 150 mg tablet TAKE 1 TABLET qhs (PLEASE CALL WITH DIZZINESS) (Patient not taking: Reported on 5/9/2022 ) 90 tablet 0    sucralfate (CARAFATE) 1 g tablet Take 1 tablet (1 g total) by mouth 4 (four) times a day (before meals and at bedtime) for 10 days 40 tablet 0     No current facility-administered medications for this visit  Allergies   Allergen Reactions    Levaquin [Levofloxacin] Myalgia    Statins      Other reaction(s): Weakness  Category:  Adverse Reaction;       Immunizations:     Immunization History   Administered Date(s) Administered    COVID-19 PFIZER VACCINE 0 3 ML IM 03/09/2021, 03/30/2021    INFLUENZA 09/25/2014, 10/23/2015, 12/28/2016, 10/04/2017    Influenza Quadrivalent, 6-35 Months IM 10/23/2015    Influenza Split High Dose Preservative Free IM 12/28/2016, 10/04/2017    Influenza, high dose seasonal 0 7 mL 10/12/2018, 02/11/2021, 11/09/2021    Pneumococcal Conjugate 13-Valent 12/28/2016    Pneumococcal Polysaccharide PPV23 01/01/2009      Health Maintenance: There are no preventive care reminders to display for this patient  Topic Date Due    COVID-19 Vaccine (3 - Booster for Pfizer series) 08/30/2021      Medicare Health Risk Assessment:     /70   Pulse 68   Ht 5' 6" (1 676 m)   Wt 73 9 kg (163 lb)   BMI 26 31 kg/m²      Prakash Quinteros is here for her Subsequent Wellness visit  Health Risk Assessment:   Patient rates overall health as good  Patient feels that their physical health rating is slightly worse  Patient is satisfied with their life  Eyesight was rated as slightly worse  Hearing was rated as slightly worse  Patient feels that their emotional and mental health rating is slightly worse  Patients states they are never, rarely angry  Patient states they are never, rarely unusually tired/fatigued  Pain experienced in the last 7 days has been some  Patient's pain rating has been 7/10  Patient states that she has experienced no weight loss or gain in last 6 months  Depression Screening:   PHQ-2 Score: 2      Fall Risk Screening: In the past year, patient has experienced: no history of falling in past year      Urinary Incontinence Screening:   Patient has not leaked urine accidently in the last six months  Home Safety:  Patient does not have trouble with stairs inside or outside of their home  Patient has working smoke alarms and has working carbon monoxide detector  Home safety hazards include: none  Nutrition:   Current diet is Diabetic  Medications:   Patient is currently taking over-the-counter supplements  OTC medications include: see medication list  Patient is not able to manage medications  Activities of Daily Living (ADLs)/Instrumental Activities of Daily Living (IADLs):   Walk and transfer into and out of bed and chair?: Yes  Dress and groom yourself?: Yes    Bathe or shower yourself?: No    Feed yourself? Yes  Do your laundry/housekeeping?: No  Manage your money, pay your bills and track your expenses?: No  Make your own meals?: No    Do your own shopping?: No    Previous Hospitalizations:   Any hospitalizations or ED visits within the last 12 months?: Yes    How many hospitalizations have you had in the last year?: 1-2    Advance Care Planning:   Living will: No    Durable POA for healthcare: No    Advanced directive: No    Advanced directive counseling given: Yes      Cognitive Screening:   Provider or family/friend/caregiver concerned regarding cognition?: No    PREVENTIVE SCREENINGS      Cardiovascular Screening:    General: History Lipid Disorder and Risks and Benefits Discussed    Due for: Lipid Panel      Diabetes Screening:     General: History Diabetes and Screening Current      Colorectal Cancer Screening:     General: Screening Not Indicated      Breast Cancer Screening:     General: Patient Declines      Cervical Cancer Screening:    General: Screening Not Indicated      Osteoporosis Screening:    General: Patient Declines      Abdominal Aortic Aneurysm (AAA) Screening:        General: Screening Not Indicated      Lung Cancer Screening:     General: Screening Not Indicated      Hepatitis C Screening:    General: Screening Current    Screening, Brief Intervention, and Referral to Treatment (SBIRT)    Screening  Typical number of drinks in a day: 0  Typical number of drinks in a week: 0  Interpretation: Low risk drinking behavior      AUDIT-C Screenin) How often did you have a drink containing alcohol in the past year? never  2) How many drinks did you have on a typical day when you were drinking in the past year? 0  3) How often did you have 6 or more drinks on one occasion in the past year? never    AUDIT-C Score: 0  Interpretation: Score 0-2 (female): Negative screen for alcohol misuse    Single Item Drug Screening:  How often have you used an illegal drug (including marijuana) or a prescription medication for non-medical reasons in the past year? never    Single Item Drug Screen Score: 0  Interpretation: Negative screen for possible drug use disorder    Other Counseling Topics:   Car/seat belt/driving safety, skin self-exam, sunscreen and calcium and vitamin D intake and regular weightbearing exercise         DEVEN Villela

## 2022-05-09 NOTE — ASSESSMENT & PLAN NOTE
CRP, ESR, CRISTIANO, and RF ordered to assess for any signs of rheumatoid arthritis or other causes of polyarthralgia or polymyalgia  Patient was advised to continue Tylenol as needed for pain

## 2022-05-09 NOTE — ASSESSMENT & PLAN NOTE
Lab Results   Component Value Date    EGFR 33 03/08/2022    EGFR 31 02/17/2022    EGFR 35 12/09/2021    CREATININE 1 33 (H) 03/08/2022    CREATININE 1 37 (H) 02/17/2022    CREATININE 1 44 (H) 12/09/2021     Patient was advised to continue only Tylenol as needed for pain and to avoid all NSAIDs  She was also advised to drink at least 8 8 oz glasses of water per day to prevent dehydration

## 2022-05-10 ENCOUNTER — TELEPHONE (OUTPATIENT)
Dept: ADMINISTRATIVE | Facility: OTHER | Age: 87
End: 2022-05-10

## 2022-05-10 NOTE — TELEPHONE ENCOUNTER
----- Message from J Carlos Barrrea sent at 5/9/2022  6:48 PM EDT -----  Regarding: eye exam  05/09/22 6:48 PM    Hello, our patient Kun Benjamin has had Diabetic Eye Examcompleted/performed  Please assist in updating the patient chart by making an External outreach to Dr Eleni Chau facility located in [facility]  The date of service is 2022      Thank you,  Flor Novak  [facility] PRIMARY [facility]ARE

## 2022-05-10 NOTE — LETTER
Diabetic Foot Exam Form    Date Requested: 22  Patient: Francisco Bravo  Patient : 1922   Referring Provider: Heaven Thompson PA-C    Diabetic Foot Exam Performed with shoes and socks removed        Yes         No     Date of Diabetic Foot Exam ______________________________  Risk Score ____________________________________________    Left Foot       Visual Inspection         Monofilament Testing Sensory Exam        Pedal Pulses         Additional Comments         Right Foot      Visual Inspection         Monofilament Testing Sensory Exam       Pedal Pulses         Additional Comments         Comments __________________________________________________________    Practice Providing Exam ______________________________________________    Exam Performed By (print name) _______________________________________      Provider Signature ___________________________________________________      These reports are needed for  compliance  Please fax this completed form and a copy of the Diabetic Foot Exam report to our office located at [place] [address] as soon as possible via {[id number]} attention {VBIQANAANNE:[id number]}    We thank you for your assistance in treating our mutual patient

## 2022-05-10 NOTE — TELEPHONE ENCOUNTER
Upon review of the In Basket request and the patient's chart, initial outreach has been made via fax, please see Contacts section for details       Thank you  Edward Ro MA

## 2022-05-10 NOTE — TELEPHONE ENCOUNTER
----- Message from Magda Bryan sent at 5/9/2022  6:46 PM EDT -----  Regarding: foot exam  05/09/22 6:47 PM    Hello, our patient Archie Carey has had Diabetic Foot Exam completed/performed  Please assist in updating the patient chart by making an External outreach to Dr Holland Chirinos facility located in [facility]  The date of service is 2021 or 2022(Patient unsure)      Thank you,  Flor Cameron [facility] PRIMARY [facility]ARE

## 2022-05-10 NOTE — LETTER
Diabetic Eye Exam Form    Date Requested: 05/10/22  Patient: Bronson Pyle  Patient : 1922   Referring Provider: Sharlette Habermann, PA-C    DIABETIC Eye Exam Date _______________________________    Type of Exam MUST be documented for Diabetic Eye Exams  Please CHECK ONE  Retinal Exam       Dilated Retinal Exam       OCT       Optomap-Iris Exam      Fundus Photography     Left Eye - Please check Retinopathy AND Type or No Retinopathy      Exam did show retinopathy    Exam did not show retinopathy         Mild     Proliferative           Moderate    Severe            None         Right Eye - Please check Retinopathy AND Type or No Retinopathy     Exam did show retinopathy    Exam did not show retinopathy         Mild     Proliferative        Moderate    Severe        None       Comments __________________________________________________________    Practice Providing Exam ______________________________________________    Exam Performed By (print name) _______________________________________      Provider Signature ___________________________________________________    These reports are needed for  compliance  Please fax this completed form and a copy of the Diabetic Eye Exam report to our office located at [place] [address] as soon as possible via [phone] ambar Michelle: Phone [phone]  We thank you for your assistance in treating our mutual patient

## 2022-05-10 NOTE — LETTER
Diabetic Eye Exam Form    Date Requested: 05/10/22  Patient: Signe Siemens  Patient : 1922   Referring Provider: Angela Fang PA-C    DIABETIC Eye Exam Date _______________________________    Type of Exam MUST be documented for Diabetic Eye Exams  Please CHECK ONE  Retinal Exam       Dilated Retinal Exam       OCT       Optomap-Iris Exam      Fundus Photography     Left Eye - Please check Retinopathy AND Type or No Retinopathy      Exam did show retinopathy    Exam did not show retinopathy         Mild     Proliferative           Moderate    Severe            None         Right Eye - Please check Retinopathy AND Type or No Retinopathy     Exam did show retinopathy    Exam did not show retinopathy         Mild     Proliferative        Moderate    Severe        None       Comments __________________________________________________________    Practice Providing Exam ______________________________________________    Exam Performed By (print name) _______________________________________      Provider Signature ___________________________________________________    These reports are needed for  compliance  Please fax this completed form and a copy of the Diabetic Eye Exam report to our office located at [place] [address] as soon as possible via [phone] ambar Garner: Phone [phone]  We thank you for your assistance in treating our mutual patient

## 2022-05-13 ENCOUNTER — OFFICE VISIT (OUTPATIENT)
Dept: PHYSICAL THERAPY | Facility: CLINIC | Age: 87
End: 2022-05-13
Payer: COMMERCIAL

## 2022-05-13 DIAGNOSIS — R26.2 AMBULATORY DYSFUNCTION: Primary | ICD-10-CM

## 2022-05-13 DIAGNOSIS — M54.50 ACUTE MIDLINE LOW BACK PAIN WITHOUT SCIATICA: ICD-10-CM

## 2022-05-13 PROCEDURE — 97110 THERAPEUTIC EXERCISES: CPT

## 2022-05-13 PROCEDURE — 97112 NEUROMUSCULAR REEDUCATION: CPT

## 2022-05-13 NOTE — PROGRESS NOTES
Daily Note     Today's date: 2022  Patient name: Elise Lundy  : 1922  MRN: [id number]  Referring provider: Edward Correia  Dx:   Encounter Diagnosis     ICD-10-CM    1  Ambulatory dysfunction  R26 2    2  Acute midline low back pain without sciatica  M54 50        Start Time: 1328          Subjective: Pt reports her back is feeling ok today  Objective: See treatment diary below      Assessment: Tolerated treatment well  Patient required initial cuing for SLR introduced this session  She continues to have difficulty with STS, requiring assist when she does not use UEs  HS curls and lateral walks were progressed with increased resistance; verbal cuing was provided to avoid ER compensation during lateral walks  Continue to progress pt as tolerated next visit  Plan: Continue per plan of care        Precautions: GERD, diverticulosis, irritable bowel, DM type 2 with peripheral neuropathy, hypothyroidism, asthma, aortic stenosis, CHF, HTN, PVD, arteriosclerosis of coronary artery, stable angina, trigeminal neuralgia, hearing loss, conductive hearing loss bilateral, cranial neuralgia, OA, candidal intertrigo, bladder prolapse, recurrent UTI, general weakness, glaucoma, hyperlipidemia, anxiety, dizziness, hiatal hernia, low back pain, mild cognitive impairment, paroxysmal nocturnal dyspnea, neoplasm of face, hyponatremia, vertigo, **h/o fall**      Manuals 4/6 4/15 4/22 4/29 5/13        FOTO     nv                                               Neuro Re-Ed 4/6 4/15 4/22 4/29 5/13        bridges nv 2x10 2x10 2x10 1x15        hooklying clams nv 2x10 ptb 1x15 ptb PTB 2x10         SLS nv            sidelying abduction nv            TA isometric  1x10 5" hold           Adduction iso, seated   1x10 5" hold  5" 2x10         SLR     2x10        Ther Ex 4/6 4/15 4/22 4/29 5/13        Standing abduction 1x10; HEP            Standing extension 1x10; HEP 2x10 each no weight           Standing  1x10; HEP 1x20           Thoracic extensions 1x10; HEP            Lateral walks nv 3x ptb           Leg press nv 35# 1x10, 55# 1x10 65# 2x10 65# 2x10 65# 2x10        bike   6' 5' 5'        HS curls   2x10 each ptb 2x10 ea PTB gtb 2x10        Ther Activity 4/6 4/15 4/22 4/29 5/13        Mini squats nv            STS  3# 2x5 3# 2x5 2x5 2x5        Pt edu  NS NS          Lateral walks nv    gtb 4x at table        Gait Training 4/6 4/15 4/22 4/29 5/13                                  Modalities 4/6 4/15 4/22 4/29 5/13

## 2022-05-14 DIAGNOSIS — I35.0 NONRHEUMATIC AORTIC VALVE STENOSIS: Chronic | ICD-10-CM

## 2022-05-17 RX ORDER — FUROSEMIDE 20 MG/1
TABLET ORAL
Qty: 270 TABLET | Refills: 1 | Status: SHIPPED | OUTPATIENT
Start: 2022-05-17

## 2022-05-17 NOTE — TELEPHONE ENCOUNTER
As a follow-up, a second attempt has been made for outreach via fax, please see Contacts section for details      Thank you  Brianna Fernandez MA

## 2022-05-18 ENCOUNTER — HOSPITAL ENCOUNTER (OUTPATIENT)
Dept: NON INVASIVE DIAGNOSTICS | Facility: HOSPITAL | Age: 87
Discharge: HOME | End: 2022-05-18
Payer: COMMERCIAL

## 2022-05-18 DIAGNOSIS — I73.9 PERIPHERAL VASCULAR DISEASE (HCC): ICD-10-CM

## 2022-05-18 PROCEDURE — 93923 UPR/LXTR ART STDY 3+ LVLS: CPT | Performed by: SURGERY

## 2022-05-18 PROCEDURE — 93923 UPR/LXTR ART STDY 3+ LVLS: CPT

## 2022-05-19 NOTE — TELEPHONE ENCOUNTER
Upon review of the In Basket request we were able to locate, review, and update the patient chart as requested for Diabetic Eye Exam and Diabetic Foot Exam     Any additional questions or concerns should be emailed to the Practice Liaisons via [email or web address] email, please do not reply via In Basket      Thank you  Anastasia Layne MA

## 2022-05-20 ENCOUNTER — APPOINTMENT (OUTPATIENT)
Dept: PHYSICAL THERAPY | Facility: CLINIC | Age: 87
End: 2022-05-20
Payer: COMMERCIAL

## 2022-05-20 ENCOUNTER — TELEPHONE (OUTPATIENT)
Dept: HOME HEALTH SERVICES | Facility: HOME HEALTHCARE | Age: 87
End: 2022-05-20

## 2022-05-20 NOTE — PROGRESS NOTES
Daily Note     Today's date: 2022  Patient name: Radha Rosales  : 1922  MRN: [id number]  Referring provider: Shereen Fraga PA-C  Dx: No diagnosis found  Subjective: ***      Objective: See treatment diary below      Assessment: Tolerated treatment {Tolerated treatment :[id number]}  Patient {assessment:[id number]}      Plan: Continue per plan of care        Precautions: GERD, diverticulosis, irritable bowel, DM type 2 with peripheral neuropathy, hypothyroidism, asthma, aortic stenosis, CHF, HTN, PVD, arteriosclerosis of coronary artery, stable angina, trigeminal neuralgia, hearing loss, conductive hearing loss bilateral, cranial neuralgia, OA, candidal intertrigo, bladder prolapse, recurrent UTI, general weakness, glaucoma, hyperlipidemia, anxiety, dizziness, hiatal hernia, low back pain, mild cognitive impairment, paroxysmal nocturnal dyspnea, neoplasm of face, hyponatremia, vertigo, **h/o fall**      Manuals 4/6 4/15 4/22 4/29 5/13 5/20       FOTO     nv                                               Neuro Re-Ed 4/6 4/15 4/22 4/29 5/13 5/20       bridges nv 2x10 2x10 2x10 1x15        hooklying clams nv 2x10 ptb 1x15 ptb PTB 2x10         SLS nv            sidelying abduction nv            TA isometric  1x10 5" hold           Adduction iso, seated   1x10 5" hold  5" 2x10         SLR     2x10        Ther Ex 4/6 4/15 4/22 4/29 5/13 5/20       Standing abduction 1x10; HEP            Standing extension 1x10; HEP 2x10 each no weight           Standing marches 1x10; HEP 1x20           Thoracic extensions 1x10; HEP            Lateral walks nv 3x ptb           Leg press nv 35# 1x10, 55# 1x10 65# 2x10 65# 2x10 65# 2x10        bike   6' 5' 5'        HS curls   2x10 each ptb 2x10 ea PTB gtb 2x10        Ther Activity 4/6 4/15 4/22 4/29 5/13 5/20       Mini squats nv            STS  3# 2x5 3# 2x5 2x5 2x5        Pt edu  NS NS          Lateral walks nv    gtb 4x at table        Gait Training 4/6 4/15 4/22 4/29 5/13 5/20                                 Modalities 4/6 4/15 4/22 4/29 5/13 5/20

## 2022-05-23 ENCOUNTER — HOME CARE VISIT (OUTPATIENT)
Dept: HOME HEALTH SERVICES | Facility: HOME HEALTHCARE | Age: 87
End: 2022-05-23

## 2022-05-23 VITALS
HEART RATE: 78 BPM | TEMPERATURE: 97.9 F | OXYGEN SATURATION: 96 % | WEIGHT: 163 LBS | SYSTOLIC BLOOD PRESSURE: 110 MMHG | RESPIRATION RATE: 18 BRPM | BODY MASS INDEX: 26.31 KG/M2 | DIASTOLIC BLOOD PRESSURE: 52 MMHG

## 2022-05-24 ENCOUNTER — HOME CARE VISIT (OUTPATIENT)
Dept: HOME HEALTH SERVICES | Facility: HOME HEALTHCARE | Age: 87
End: 2022-05-24

## 2022-05-24 NOTE — Clinical Note
Good morning  This is just an Tez Cole  Home health is now in [address], so you will probably see more communications like this on home health patients  Trace Moran  ----- Message -----  From: Hawa Shirley PA-C  Sent: 5/25/2022   8:35 AM EDT  To: Mary Brewer, RN      Hello,  I have never received a message such as this  Do I need to place any orders or is this just an FYI?   Shakeel Palm  ----- Message -----  From: Mary Brewer, RN  Sent: 5/24/2022   9:27 PM EDT  To: Hawa Shirley PA-C    Home Health need continues for: Heart failure  DM2  ASCVD  Primary diagnoses/co-morbidities/recent procedures in past 60 days that impact current episode: Heart failure DM2 ASCVD  Current level of functional ability: ambulates with walker  Homebound status and living arrangements: Lives on first floor with daughter  Goals accomplished and/or measurable progress toward unmet goals in past 60 days: Patient did PT to help with strengthening and mobility  Focus of care for next 60 days for each discipline ordered: HF     Skin integrity/wound status: No wounds   Skin dry and intact  Code status:   Most recent fall risk:  Slid out of chair to fall while undressing  No injury    Plan of Care confirmed with Patient and daughter on 5/23/22

## 2022-05-25 ENCOUNTER — APPOINTMENT (OUTPATIENT)
Dept: LAB | Facility: CLINIC | Age: 87
End: 2022-05-25
Payer: COMMERCIAL

## 2022-05-25 DIAGNOSIS — R10.9 FLANK PAIN: ICD-10-CM

## 2022-05-25 DIAGNOSIS — N18.32 STAGE 3B CHRONIC KIDNEY DISEASE (HCC): ICD-10-CM

## 2022-05-25 DIAGNOSIS — E11.42 DM TYPE 2 WITH DIABETIC PERIPHERAL NEUROPATHY (HCC): ICD-10-CM

## 2022-05-25 LAB
BACTERIA UR QL AUTO: ABNORMAL /HPF
BILIRUB UR QL STRIP: NEGATIVE
CLARITY UR: ABNORMAL
COLOR UR: ABNORMAL
CREAT UR-MCNC: 76.2 MG/DL
GLUCOSE UR STRIP-MCNC: NEGATIVE MG/DL
HGB UR QL STRIP.AUTO: ABNORMAL
KETONES UR STRIP-MCNC: NEGATIVE MG/DL
LEUKOCYTE ESTERASE UR QL STRIP: ABNORMAL
MICROALBUMIN UR-MCNC: 149 MG/L (ref 0–20)
MICROALBUMIN/CREAT 24H UR: 196 MG/G CREATININE (ref 0–30)
MUCOUS THREADS UR QL AUTO: ABNORMAL
NITRITE UR QL STRIP: POSITIVE
NON-SQ EPI CELLS URNS QL MICRO: ABNORMAL /HPF
PH UR STRIP.AUTO: 6 [PH]
PROT UR STRIP-MCNC: ABNORMAL MG/DL
RBC #/AREA URNS AUTO: ABNORMAL /HPF
SP GR UR STRIP.AUTO: 1.01 (ref 1–1.03)
UROBILINOGEN UR STRIP-ACNC: <2 MG/DL
WBC #/AREA URNS AUTO: ABNORMAL /HPF
WBC CLUMPS # UR AUTO: PRESENT /UL

## 2022-05-25 PROCEDURE — 87186 SC STD MICRODIL/AGAR DIL: CPT

## 2022-05-25 PROCEDURE — 81001 URINALYSIS AUTO W/SCOPE: CPT

## 2022-05-25 PROCEDURE — 87077 CULTURE AEROBIC IDENTIFY: CPT

## 2022-05-25 PROCEDURE — 82570 ASSAY OF URINE CREATININE: CPT | Performed by: NURSE PRACTITIONER

## 2022-05-25 PROCEDURE — 82043 UR ALBUMIN QUANTITATIVE: CPT | Performed by: NURSE PRACTITIONER

## 2022-05-25 PROCEDURE — 87086 URINE CULTURE/COLONY COUNT: CPT

## 2022-05-25 NOTE — CASE COMMUNICATION
Home Health need continues for: Heart failure  DM2  ASCVD  Primary diagnoses/co-morbidities/recent procedures in past 60 days that impact current episode: Heart failure DM2 ASCVD  Current level of functional ability: ambulates with walker  Homebound status and living arrangements: Lives on first floor with daughter  Goals accomplished and/or measurable progress toward unmet goals in past 60 days: Patient did PT to help with strengthenin g and mobility  Focus of care for next 60 days for each discipline ordered: HF     Skin integrity/wound status: No wounds   Skin dry and intact  Code status:   Most recent fall risk:  Slid out of chair to fall while undressing  No injury    Plan of Care confirmed with Patient and daughter on 5/23/22

## 2022-05-27 ENCOUNTER — APPOINTMENT (OUTPATIENT)
Dept: PHYSICAL THERAPY | Facility: CLINIC | Age: 87
End: 2022-05-27
Payer: COMMERCIAL

## 2022-05-28 LAB — BACTERIA UR CULT: ABNORMAL

## 2022-05-31 ENCOUNTER — TELEPHONE (OUTPATIENT)
Dept: FAMILY MEDICINE CLINIC | Facility: CLINIC | Age: 87
End: 2022-05-31

## 2022-05-31 DIAGNOSIS — N30.01 ACUTE CYSTITIS WITH HEMATURIA: ICD-10-CM

## 2022-05-31 DIAGNOSIS — N30.01 ACUTE CYSTITIS WITH HEMATURIA: Primary | ICD-10-CM

## 2022-05-31 PROCEDURE — G0179 MD RECERTIFICATION HHA PT: HCPCS | Performed by: PHYSICIAN ASSISTANT

## 2022-05-31 RX ORDER — CEPHALEXIN 250 MG/1
250 CAPSULE ORAL EVERY 8 HOURS SCHEDULED
Qty: 15 CAPSULE | Refills: 0 | Status: SHIPPED | OUTPATIENT
Start: 2022-05-31 | End: 2022-06-05

## 2022-05-31 RX ORDER — CEPHALEXIN 250 MG/1
250 CAPSULE ORAL EVERY 8 HOURS SCHEDULED
Qty: 15 CAPSULE | Refills: 0 | Status: SHIPPED | OUTPATIENT
Start: 2022-05-31 | End: 2022-05-31 | Stop reason: SDUPTHER

## 2022-05-31 NOTE — TELEPHONE ENCOUNTER
Is patient having any other symptoms such as fever, chills, burning with urination, increased frequency, urgency, or is she noting blood in her urine?

## 2022-05-31 NOTE — TELEPHONE ENCOUNTER
----- Message from Willie Carey MD sent at 5/29/2022  6:12 PM EDT -----  Urine abnl, is pt having symptoms of a urinary infection, if so should contact on-call provider for prescription or send a message through My Chart requesting prescription

## 2022-05-31 NOTE — TELEPHONE ENCOUNTER
As per patient the only symptoms at this time is lower back pain  Patient wanted to know if a prescription will be sent

## 2022-05-31 NOTE — TELEPHONE ENCOUNTER
Patient notified of antibiotic sent  Rx needs to be resent to [address] [place] [address], it was sent to pt mail order pharmacy

## 2022-05-31 NOTE — TELEPHONE ENCOUNTER
Patient was started on a 5 day course of Keflex  We can do a repeat urine dip at her follow-up office visit with Dr Abhay Hickey

## 2022-06-02 NOTE — TELEPHONE ENCOUNTER
Pt is calling back requesting to speak to a nurse because she is still not feeling well  She is still having urinary frequency, and is "passing something white" in the urine  She states she has been taking the medication, she only took 2 days' worth so far  Please call pt at [phone] to discuss  Thanks

## 2022-06-02 NOTE — TELEPHONE ENCOUNTER
Unfortunately, due to the patient's kidney function we are limited in what antibiotics we can use to treat her UTI  She has only been taking the antibiotic for 48 hours at this point and sometimes it can take some more time to treat the symptoms  Patient should continue the antibiotics as directed for the whole 5 day course

## 2022-06-06 ENCOUNTER — APPOINTMENT (OUTPATIENT)
Dept: LAB | Facility: CLINIC | Age: 87
End: 2022-06-06
Payer: COMMERCIAL

## 2022-06-06 ENCOUNTER — OFFICE VISIT (OUTPATIENT)
Dept: FAMILY MEDICINE CLINIC | Facility: CLINIC | Age: 87
End: 2022-06-06
Payer: COMMERCIAL

## 2022-06-06 VITALS
OXYGEN SATURATION: 95 % | WEIGHT: 161 LBS | HEIGHT: 66 IN | SYSTOLIC BLOOD PRESSURE: 124 MMHG | BODY MASS INDEX: 25.88 KG/M2 | DIASTOLIC BLOOD PRESSURE: 60 MMHG | HEART RATE: 75 BPM

## 2022-06-06 DIAGNOSIS — K59.00 CONSTIPATION, UNSPECIFIED CONSTIPATION TYPE: ICD-10-CM

## 2022-06-06 DIAGNOSIS — N18.32 TYPE 2 DIABETES MELLITUS WITH STAGE 3B CHRONIC KIDNEY DISEASE, WITHOUT LONG-TERM CURRENT USE OF INSULIN (HCC): ICD-10-CM

## 2022-06-06 DIAGNOSIS — M62.830 LUMBAR PARASPINAL MUSCLE SPASM: ICD-10-CM

## 2022-06-06 DIAGNOSIS — E11.22 TYPE 2 DIABETES MELLITUS WITH STAGE 3B CHRONIC KIDNEY DISEASE, WITHOUT LONG-TERM CURRENT USE OF INSULIN (HCC): ICD-10-CM

## 2022-06-06 DIAGNOSIS — N18.32 STAGE 3B CHRONIC KIDNEY DISEASE (HCC): ICD-10-CM

## 2022-06-06 DIAGNOSIS — I20.9 ANGINA PECTORIS (HCC): ICD-10-CM

## 2022-06-06 DIAGNOSIS — I50.32 CHRONIC DIASTOLIC CONGESTIVE HEART FAILURE (HCC): Chronic | ICD-10-CM

## 2022-06-06 DIAGNOSIS — M54.9 BACK PAIN, UNSPECIFIED BACK LOCATION, UNSPECIFIED BACK PAIN LATERALITY, UNSPECIFIED CHRONICITY: Primary | ICD-10-CM

## 2022-06-06 DIAGNOSIS — R31.0 GROSS HEMATURIA: ICD-10-CM

## 2022-06-06 PROBLEM — N18.9 CKD (CHRONIC KIDNEY DISEASE): Status: ACTIVE | Noted: 2022-05-09

## 2022-06-06 PROBLEM — E11.9 TYPE 2 DIABETES MELLITUS (HCC): Status: ACTIVE | Noted: 2019-10-05

## 2022-06-06 LAB
ALBUMIN SERPL BCP-MCNC: 3.2 G/DL (ref 3.5–5)
ALP SERPL-CCNC: 89 U/L (ref 46–116)
ALT SERPL W P-5'-P-CCNC: 14 U/L (ref 12–78)
ANION GAP SERPL CALCULATED.3IONS-SCNC: 8 MMOL/L (ref 4–13)
AST SERPL W P-5'-P-CCNC: 14 U/L (ref 5–45)
BILIRUB SERPL-MCNC: 0.34 MG/DL (ref 0.2–1)
BUN SERPL-MCNC: 23 MG/DL (ref 5–25)
CALCIUM ALBUM COR SERPL-MCNC: 10.2 MG/DL (ref 8.3–10.1)
CALCIUM SERPL-MCNC: 9.6 MG/DL (ref 8.3–10.1)
CHLORIDE SERPL-SCNC: 105 MMOL/L (ref 100–108)
CO2 SERPL-SCNC: 26 MMOL/L (ref 21–32)
CREAT SERPL-MCNC: 1.2 MG/DL (ref 0.6–1.3)
GFR SERPL CREATININE-BSD FRML MDRD: 37 ML/MIN/1.73SQ M
GLUCOSE SERPL-MCNC: 136 MG/DL (ref 65–140)
POTASSIUM SERPL-SCNC: 4 MMOL/L (ref 3.5–5.3)
PROT SERPL-MCNC: 7.3 G/DL (ref 6.4–8.2)
SL AMB  POCT GLUCOSE, UA: NEGATIVE
SL AMB LEUKOCYTE ESTERASE,UA: ABNORMAL
SL AMB POCT BILIRUBIN,UA: NEGATIVE
SL AMB POCT BLOOD,UA: ABNORMAL
SL AMB POCT CLARITY,UA: ABNORMAL
SL AMB POCT COLOR,UA: YELLOW
SL AMB POCT KETONES,UA: NEGATIVE
SL AMB POCT NITRITE,UA: POSITIVE
SL AMB POCT PH,UA: 5.5
SL AMB POCT SPECIFIC GRAVITY,UA: 1.01
SL AMB POCT URINE PROTEIN: NEGATIVE
SL AMB POCT UROBILINOGEN: 0.2
SODIUM SERPL-SCNC: 139 MMOL/L (ref 136–145)

## 2022-06-06 PROCEDURE — 80053 COMPREHEN METABOLIC PANEL: CPT

## 2022-06-06 PROCEDURE — 87077 CULTURE AEROBIC IDENTIFY: CPT | Performed by: FAMILY MEDICINE

## 2022-06-06 PROCEDURE — 87186 SC STD MICRODIL/AGAR DIL: CPT | Performed by: FAMILY MEDICINE

## 2022-06-06 PROCEDURE — 87086 URINE CULTURE/COLONY COUNT: CPT | Performed by: FAMILY MEDICINE

## 2022-06-06 PROCEDURE — 81002 URINALYSIS NONAUTO W/O SCOPE: CPT | Performed by: FAMILY MEDICINE

## 2022-06-06 PROCEDURE — 36415 COLL VENOUS BLD VENIPUNCTURE: CPT

## 2022-06-06 PROCEDURE — 99214 OFFICE O/P EST MOD 30 MIN: CPT | Performed by: FAMILY MEDICINE

## 2022-06-06 NOTE — ASSESSMENT & PLAN NOTE
Wt Readings from Last 3 Encounters:   06/06/22 73 kg (161 lb)   05/23/22 73 9 kg (163 lb)   05/09/22 73 9 kg (163 lb)     Patient does have a history of CHF  Her weight is down a bit today  No changes at the moment  Reviewed about concerns about over hydration with CHF

## 2022-06-06 NOTE — ASSESSMENT & PLAN NOTE
Patient does have constipation issues, and these have been going on for quite a while  Recommend add fiber supplement, as well as increasing water intake  Fiber supplement such as Metamucil  Precautions with increased water too much, as she does have CHF

## 2022-06-06 NOTE — ASSESSMENT & PLAN NOTE
Lab Results   Component Value Date    EGFR 33 03/08/2022    EGFR 31 02/17/2022    EGFR 35 12/09/2021    CREATININE 1 33 (H) 03/08/2022    CREATININE 1 37 (H) 02/17/2022    CREATININE 1 44 (H) 12/09/2021   Recheck labs  No change

## 2022-06-06 NOTE — ASSESSMENT & PLAN NOTE
Patient indicated that she has discomfort in the lumbar paraspinal muscles  There is clearly some irritation in places  I would recommend that she continue with Voltaren gel topical, but also add heat in the morning, ice in the evening, and PT  She has not been to formal physical therapy in about 3 weeks secondary to it hurts      Again, I would not preferred to have her take systemic medications at this time

## 2022-06-06 NOTE — ASSESSMENT & PLAN NOTE
Lab Results   Component Value Date    HGBA1C 6 4 05/09/2022   Patient has diabetes, but her last A1c was excellent  She also has microalbumin to creatinine that is elevated, and CKD  Recommend follow-up with Nephrology at some point

## 2022-06-06 NOTE — PROGRESS NOTES
Assessment and Plan:    Problem List Items Addressed This Visit     [facility]est[facility] failure) ([facility] )     Wt Readings from Last 3 Encounters:   06/06/22 73 kg (161 lb)   05/23/22 73 9 kg (163 lb)   05/09/22 73 9 kg (163 lb)     Patient does have a history of CHF  Her weight is down a bit today  No changes at the moment  Reviewed about concerns about over hydration with CHF  Relevant Orders    Comprehensive metabolic panel    Ambulatory Referral to Cardiology    CKD (chronic kidney disease)     Lab Results   Component Value Date    EGFR 33 03/08/2022    EGFR 31 02/17/2022    EGFR 35 12/09/2021    CREATININE 1 33 (H) 03/08/2022    CREATININE 1 37 (H) 02/17/2022    CREATININE 1 44 (H) 12/09/2021   Recheck labs  No change  Relevant Orders    Comprehensive metabolic panel    Ambulatory Referral to Cardiology    Constipation     Patient does have constipation issues, and these have been going on for quite a while  Recommend add fiber supplement, as well as increasing water intake  Fiber supplement such as Metamucil  Precautions with increased water too much, as she does have CHF  Lumbar paraspinal muscle spasm     Patient indicated that she has discomfort in the lumbar paraspinal muscles  There is clearly some irritation in places  I would recommend that she continue with Voltaren gel topical, but also add heat in the morning, ice in the evening, and PT  She has not been to formal physical therapy in about 3 weeks secondary to it hurts      Again, I would not preferred to have her take systemic medications at this time  Type 2 diabetes mellitus ([facility] )       Lab Results   Component Value Date    HGBA1C 6 4 05/09/2022   Patient has diabetes, but her last A1c was excellent  She also has microalbumin to creatinine that is elevated, and CKD  Recommend follow-up with Nephrology at some point             Relevant Orders    Comprehensive metabolic panel    Ambulatory Referral to Cardiology      Other Visit Diagnoses     Back pain, unspecified back location, unspecified back pain laterality, unspecified chronicity    -  Primary    Relevant Orders    POCT urine dip (Completed)    Urine culture    [facility]        Patient is having increased angina symptoms lately  Recommend follow-up with Cardiology  She wondered if there could be someone else who could give her a 2nd     Relevant Orders    Ambulatory Referral to Cardiology    Gross hematuria        Relevant Orders    Urine culture                 Diagnoses and all orders for this visit:    Back pain, unspecified back location, unspecified back pain laterality, unspecified chronicity  -     POCT urine dip  -     Cancel: Urine culture; Future  -     Urine culture; Future  -     Urine culture    Lumbar paraspinal muscle spasm    Constipation, unspecified constipation type    Chronic diastolic congestive heart failure (HCC)  -     Comprehensive metabolic panel; Future  -     Ambulatory Referral to Cardiology; Future    Type 2 diabetes mellitus with stage 3b chronic kidney disease, without long-term current use of insulin ([facility])  -     Comprehensive metabolic panel; Future  -     Ambulatory Referral to Cardiology; Future    Stage 3b chronic kidney disease ([facility] )  -     Comprehensive metabolic panel; Future  -     Ambulatory Referral to Cardiology; Future    [facility]  Comments:  Patient is having increased angina symptoms lately  Recommend follow-up with Cardiology  She wondered if there could be someone else who could give her a 2nd   Orders:  -     Ambulatory Referral to Cardiology; Future    Gross hematuria  -     Cancel: Urine culture; Future  -     Urine culture; Future  -     Urine culture            Subjective:      Patient ID: Luis Arora is a 80 y o  female  CC:    Chief Complaint   Patient presents with    Follow-up     Patient present today for a follow up on her lab results      Hip Pain Left hip pain for the past week  HPI:    Patient is here today because she has some significant hip pain  It is on the left side  Quite significant  Patient reports that her pain started in her back, then radiated into the hip  Pain is not crampy  It is an ache that is constant  Patient did try Tylenol for this  She also tried Voltaren gel  She also tried Aspercreme  Patient have lab orders previously, but they were not performed yet  They were ordered previously, but for some reason they did not draw the blood at her last lab visit, just a urine test then  Patient has been in physical therapy, but has not been there for 3 weeks secondary to increased pain in the back  Patient also mentioned that she is having some problems with moving her bowels  They are not as regular as she would like  She is really not having a normal bowel movement  She feels like she has to go, but then nothing  Reviewed about water, fiber  She goes for 3 days, but prior was daily BM  She also mention that she feels a bandlike pressure across the abdomen when she takes a deep breath  It is also across the lower chest   Has been for a bit, but she can not delineate exactly how long  Saturday, she noted left foot having increased swelling  Was stiff  No feeling as well, and felt cold  She usually feels cold  Went to sleep, and then when she woke up, she noted chest pressure  Felt like a plaque on her chest           The following portions of the patient's history were reviewed and updated as appropriate: allergies, current medications, past family history, past medical history, past social history, past surgical history and problem list       Review of Systems   Unable to perform ROS: Dementia         Data to review:   Microalbumin to creatinine ratio was 196       Objective:    Vitals:    06/06/22 1329   BP: 124/60   BP Location: Left arm   Patient Position: Sitting   Cuff Size: Large   Pulse: 75 SpO2: 95%   Weight: 73 kg (161 lb)   Height: 5' 6" (1 676 m)        Physical Exam  Vitals and nursing note reviewed  Constitutional:       Appearance: Normal appearance  HENT:      Head: Normocephalic  Cardiovascular:      Rate and Rhythm: Normal rate and regular rhythm  Pulses: Normal pulses  Carotid pulses are 2+ on the right side and 2+ on the left side  Heart sounds: Normal heart sounds  No murmur heard  No friction rub  No gallop  Pulmonary:      Effort: Pulmonary effort is normal  No respiratory distress  Breath sounds: Normal breath sounds  No stridor  No wheezing, rhonchi or rales  Chest:      Chest wall: No tenderness  Musculoskeletal:        Arms:    Neurological:      Mental Status: She is alert

## 2022-06-07 ENCOUNTER — TELEPHONE (OUTPATIENT)
Dept: FAMILY MEDICINE CLINIC | Facility: CLINIC | Age: 87
End: 2022-06-07

## 2022-06-07 NOTE — TELEPHONE ENCOUNTER
PATIENT DAUGHTER CALLED RETURNING PHONE CALL ABOUT LAB RESULTS, PLEASE CALL ZAIDA REGARDING MOM LAB RESULTS

## 2022-06-09 ENCOUNTER — OFFICE VISIT (OUTPATIENT)
Dept: FAMILY MEDICINE CLINIC | Facility: CLINIC | Age: 87
End: 2022-06-09
Payer: COMMERCIAL

## 2022-06-09 VITALS
TEMPERATURE: 97.5 F | HEIGHT: 62 IN | WEIGHT: 161.4 LBS | RESPIRATION RATE: 14 BRPM | SYSTOLIC BLOOD PRESSURE: 138 MMHG | HEART RATE: 77 BPM | DIASTOLIC BLOOD PRESSURE: 60 MMHG | BODY MASS INDEX: 29.7 KG/M2

## 2022-06-09 DIAGNOSIS — K59.00 CONSTIPATION, UNSPECIFIED CONSTIPATION TYPE: Primary | ICD-10-CM

## 2022-06-09 DIAGNOSIS — M25.559 HIP PAIN: ICD-10-CM

## 2022-06-09 DIAGNOSIS — R26.9 ABNORMAL GAIT: ICD-10-CM

## 2022-06-09 DIAGNOSIS — N18.32 STAGE 3B CHRONIC KIDNEY DISEASE (HCC): ICD-10-CM

## 2022-06-09 DIAGNOSIS — N30.01 ACUTE CYSTITIS WITH HEMATURIA: ICD-10-CM

## 2022-06-09 LAB
BACTERIA UR CULT: ABNORMAL
BACTERIA UR CULT: ABNORMAL

## 2022-06-09 PROCEDURE — 99214 OFFICE O/P EST MOD 30 MIN: CPT | Performed by: FAMILY MEDICINE

## 2022-06-09 PROCEDURE — 1036F TOBACCO NON-USER: CPT | Performed by: FAMILY MEDICINE

## 2022-06-09 PROCEDURE — 1160F RVW MEDS BY RX/DR IN RCRD: CPT | Performed by: FAMILY MEDICINE

## 2022-06-09 NOTE — ASSESSMENT & PLAN NOTE
Patient still has some issues with constipation, but she did have a bowel movement recently  Now has fiber supplementation  Recommend that she continue on that

## 2022-06-09 NOTE — ASSESSMENT & PLAN NOTE
Patient is having some issues with her gait, as well as muscle spasms in the back  There did not appear to be anything specific with disc issues  Has been in before  She also has home care coming  Reviewed about [facility], and the ability for them to see her in her own home and work with her there to match her activities of daily living with the physical therapy activities that she would need to do

## 2022-06-09 NOTE — PROGRESS NOTES
Assessment and Plan:    Problem List Items Addressed This Visit     Abnormal gait     Patient is having some issues with her gait, as well as muscle spasms in the back  There did not appear to be anything specific with disc issues  Has been in before  She also has home care coming  Reviewed about [facility], and the ability for them to see her in her own home and work with her there to match her activities of daily living with the physical therapy activities that she would need to do  Relevant Orders    Ambulatory Referral to Physical Therapy    CKD (chronic kidney disease)     Lab Results   Component Value Date    EGFR 37 06/06/2022    EGFR 33 03/08/2022    EGFR 31 02/17/2022    CREATININE 1 20 06/06/2022    CREATININE 1 33 (H) 03/08/2022    CREATININE 1 37 (H) 02/17/2022   Last GFR was slightly improved versus prior  Maintain hydration  Continue to follow  Constipation - Primary     Patient still has some issues with constipation, but she did have a bowel movement recently  Now has fiber supplementation  Recommend that she continue on that  Other Visit Diagnoses     Acute cystitis with hematuria        Culture still pending  Urine dip did show some blood, but no other specific findings  Will need to wait for culture  Hip pain        Relevant Orders    Ambulatory Referral to Physical Therapy                 Diagnoses and all orders for this visit:    Constipation, unspecified constipation type    Stage 3b chronic kidney disease ([facility] )    Acute cystitis with hematuria  Comments:  Culture still pending  Urine dip did show some blood, but no other specific findings  Will need to wait for culture  Abnormal gait  -     Ambulatory Referral to Physical Therapy; Future    Hip pain  -     Ambulatory Referral to Physical Therapy; Future            Subjective:      Patient ID: Kun Benjamin is a 80 y o  female      CC:    Chief Complaint   Patient presents with    Follow-up Patient in office for follow up       HPI:    Patient is here to follow-up on several issues  With regard to recent UTI, culture at this point is pending  I do not see results yet  This was from the 6th of June  CMP was reviewed  Kidney function is actually slightly improved versus prior checks  Patient was actually mostly concerned because she has a significant amount of discomfort in the hip  All her pain is on the left  She has some pains up the legs at times  Aches with this as well  Using a walker at home to help walking, but the pain is not really different  On further discussion, she is having some formication issues  The following portions of the patient's history were reviewed and updated as appropriate: allergies, current medications, past family history, past medical history, past social history, past surgical history and problem list       Review of Systems   Constitutional: Negative for chills and fever  HENT: Negative for ear pain and sore throat  Eyes: Negative for pain and visual disturbance  Respiratory: Negative for cough and shortness of breath  Cardiovascular: Negative for chest pain and palpitations  Gastrointestinal: Negative for abdominal pain and vomiting  Genitourinary: Negative for dysuria and hematuria  Musculoskeletal: Negative for arthralgias and back pain  Skin: Negative for color change and rash  Neurological: Negative for seizures and syncope  All other systems reviewed and are negative  Data to review:   Sodium 139, potassium 4 0, calcium 9 6  Creatinine 1 20, GFR 37  AST 14, ALT 14  Blood sugar 136  This was a random blood sugar        Objective:    Vitals:    06/09/22 1311   BP: 138/60   BP Location: Left arm   Patient Position: Sitting   Cuff Size: Adult   Pulse: 77   Resp: 14   Temp: 97 5 °F (36 4 °C)   TempSrc: Temporal   Weight: 73 2 kg (161 lb 6 4 oz)   Height: 5' 2" (1 575 m)        Physical Exam  Vitals and nursing note reviewed     Musculoskeletal:      Lumbar back: Normal         Back:

## 2022-06-09 NOTE — ASSESSMENT & PLAN NOTE
Lab Results   Component Value Date    EGFR 37 06/06/2022    EGFR 33 03/08/2022    EGFR 31 02/17/2022    CREATININE 1 20 06/06/2022    CREATININE 1 33 (H) 03/08/2022    CREATININE 1 37 (H) 02/17/2022   Last GFR was slightly improved versus prior  Maintain hydration  Continue to follow

## 2022-06-09 NOTE — PATIENT INSTRUCTIONS
Problem List Items Addressed This Visit       Abnormal gait     Patient is having some issues with her gait, as well as muscle spasms in the back  There did not appear to be anything specific with disc issues  Has been in before  She also has home care coming  Reviewed about [facility], and the ability for them to see her in her own home and work with her there to match her activities of daily living with the physical therapy activities that she would need to do  Relevant Orders    Ambulatory Referral to Physical Therapy    CKD (chronic kidney disease)     Lab Results   Component Value Date    EGFR 37 06/06/2022    EGFR 33 03/08/2022    EGFR 31 02/17/2022    CREATININE 1 20 06/06/2022    CREATININE 1 33 (H) 03/08/2022    CREATININE 1 37 (H) 02/17/2022   Last GFR was slightly improved versus prior  Maintain hydration  Continue to follow  Constipation - Primary     Patient still has some issues with constipation, but she did have a bowel movement recently  Now has fiber supplementation  Recommend that she continue on that  Other Visit Diagnoses       Acute cystitis with hematuria        Culture still pending  Urine dip did show some blood, but no other specific findings  Will need to wait for culture  Hip pain        Relevant Orders    Ambulatory Referral to Physical Therapy            COVID 19 Instructions    [facility] was advised to limit contact with others to essential tasks such as getting food, medications, and medical care  Proper handwashing reviewed, and Hand sanitzer when washing is not available  If the patient develops symptoms of COVID 19, the patient should call the office as soon as possible  For 8680-3104 Flu season, it is strongly recommended that Flu Vaccinations be obtained  Virtual Visits:  [place]: This works on smart phones (any phone with Internet browsing capability)    You should get a text message when the provider is ready to see you  Click on the link in the text message, and the call should start  You will need to type in your name, and allow camera and microphone access  This is HIPPA compliant, and secure  If you have not already done so, get immunized to COVID 19  We are committed to getting you vaccinated as soon as possible and will be closely following CDC and [facility]  guidelines as they are released and revised  Please refer to our COVID-19 vaccine webpage for the most up to date information on the vaccine and our distribution efforts  This site will also have the most up to date recommendations for COVID booster vaccine  [employer] tn    Call [phone] ([phone]), option 7    OUR NEW LOCATION:    [address], [facility] [address], [place], [address]  Fax: [phone]    Lab services and OB/GYN are at this location as well

## 2022-06-10 ENCOUNTER — TELEPHONE (OUTPATIENT)
Dept: NEUROLOGY | Facility: CLINIC | Age: 87
End: 2022-06-10

## 2022-06-10 DIAGNOSIS — N30.01 ACUTE CYSTITIS WITH HEMATURIA: Primary | ICD-10-CM

## 2022-06-10 RX ORDER — CEPHALEXIN 500 MG/1
500 CAPSULE ORAL EVERY 8 HOURS SCHEDULED
Qty: 21 CAPSULE | Refills: 0 | Status: SHIPPED | OUTPATIENT
Start: 2022-06-10 | End: 2022-06-10 | Stop reason: SDUPTHER

## 2022-06-10 RX ORDER — CEPHALEXIN 500 MG/1
500 CAPSULE ORAL EVERY 8 HOURS SCHEDULED
Qty: 21 CAPSULE | Refills: 0 | Status: SHIPPED | OUTPATIENT
Start: 2022-06-10 | End: 2022-06-21

## 2022-06-10 NOTE — TELEPHONE ENCOUNTER
pt called and states dull pain to the top of her head that is going down to her eyes and over to her ears  pain is not that very bad but very annoying  7/10  this has been going on for a while but just decided to call       gabapentin 600mg 1/2 tab in am, 1/2 tab noon and 1 tab hs  states that she does not take oxcarbazepine-as she is under the impression that this was for dizziness  states that she has dizziness constantly as soon as she lays her head down at night   per last office-  For facial pain and headache continue oxcarbazepine 150 mg q h s , do not increase the dose or take during the day to prevent dizziness and gait instability/falls    reviewed this with pt      please advise  [phone] to leave detailed message

## 2022-06-13 ENCOUNTER — TELEPHONE (OUTPATIENT)
Dept: FAMILY MEDICINE CLINIC | Facility: CLINIC | Age: 87
End: 2022-06-13

## 2022-06-13 NOTE — TELEPHONE ENCOUNTER
Stop the Keflex, and will then reevaluate  If she is still having UTI symptoms, will need to try a different medication

## 2022-06-13 NOTE — TELEPHONE ENCOUNTER
Patient is having side affects from medication keflex, per patient very weak, nauseous, per patient she feels awful  Please call patient

## 2022-06-14 NOTE — TELEPHONE ENCOUNTER
Patient clarified that tylenol does help relieve headache and she will continue; she will also try trileptal at bedtime  She prefers to remain on above and gabapentin; not receptive to namenda at this time as happy with current regimen

## 2022-06-14 NOTE — TELEPHONE ENCOUNTER
Sorry to the pt for the late reply  Does tylenol help when she has a HA? Trileptal is ok to take every night, I believe this helped to prevent some facial pain  Could you recommend replacing that with namenda or adding namenda, for headache (with benefits of memory)  Thanks

## 2022-06-18 ENCOUNTER — HOSPITAL ENCOUNTER (INPATIENT)
Facility: HOSPITAL | Age: 87
LOS: 3 days | Discharge: HOME | DRG: 690 | End: 2022-06-21
Attending: EMERGENCY MEDICINE | Admitting: HOSPITALIST
Payer: COMMERCIAL

## 2022-06-18 ENCOUNTER — APPOINTMENT (EMERGENCY)
Dept: CT IMAGING | Facility: HOSPITAL | Age: 87
DRG: 690 | End: 2022-06-18
Payer: COMMERCIAL

## 2022-06-18 DIAGNOSIS — K21.9 GASTROESOPHAGEAL REFLUX DISEASE WITHOUT ESOPHAGITIS: ICD-10-CM

## 2022-06-18 DIAGNOSIS — N39.0 UTI (URINARY TRACT INFECTION): Primary | ICD-10-CM

## 2022-06-18 LAB
ALBUMIN SERPL BCP-MCNC: 3.1 G/DL (ref 3.5–5)
ALP SERPL-CCNC: 110 U/L (ref 46–116)
ALT SERPL W P-5'-P-CCNC: 18 U/L (ref 12–78)
ANION GAP SERPL CALCULATED.3IONS-SCNC: 9 MMOL/L (ref 4–13)
AST SERPL W P-5'-P-CCNC: 17 U/L (ref 5–45)
BACTERIA UR QL AUTO: ABNORMAL /HPF
BASOPHILS # BLD AUTO: 0.06 THOUSANDS/ΜL (ref 0–0.1)
BASOPHILS NFR BLD AUTO: 1 % (ref 0–1)
BILIRUB SERPL-MCNC: 0.27 MG/DL (ref 0.2–1)
BILIRUB UR QL STRIP: NEGATIVE
BUN SERPL-MCNC: 19 MG/DL (ref 5–25)
CALCIUM ALBUM COR SERPL-MCNC: 9.6 MG/DL (ref 8.3–10.1)
CALCIUM SERPL-MCNC: 8.9 MG/DL (ref 8.3–10.1)
CHLORIDE SERPL-SCNC: 98 MMOL/L (ref 100–108)
CLARITY UR: ABNORMAL
CO2 SERPL-SCNC: 25 MMOL/L (ref 21–32)
COLOR UR: YELLOW
CREAT SERPL-MCNC: 1.07 MG/DL (ref 0.6–1.3)
EOSINOPHIL # BLD AUTO: 0.2 THOUSAND/ΜL (ref 0–0.61)
EOSINOPHIL NFR BLD AUTO: 4 % (ref 0–6)
ERYTHROCYTE [DISTWIDTH] IN BLOOD BY AUTOMATED COUNT: 13.2 % (ref 11.6–15.1)
GFR SERPL CREATININE-BSD FRML MDRD: 43 ML/MIN/1.73SQ M
GLUCOSE SERPL-MCNC: 151 MG/DL (ref 65–140)
GLUCOSE SERPL-MCNC: 181 MG/DL (ref 65–140)
GLUCOSE UR STRIP-MCNC: NEGATIVE MG/DL
HCT VFR BLD AUTO: 40 % (ref 34.8–46.1)
HGB BLD-MCNC: 13.2 G/DL (ref 11.5–15.4)
HGB UR QL STRIP.AUTO: ABNORMAL
IMM GRANULOCYTES # BLD AUTO: 0.02 THOUSAND/UL (ref 0–0.2)
IMM GRANULOCYTES NFR BLD AUTO: 0 % (ref 0–2)
KETONES UR STRIP-MCNC: NEGATIVE MG/DL
LEUKOCYTE ESTERASE UR QL STRIP: ABNORMAL
LIPASE SERPL-CCNC: 114 U/L (ref 73–393)
LYMPHOCYTES # BLD AUTO: 0.72 THOUSANDS/ΜL (ref 0.6–4.47)
LYMPHOCYTES NFR BLD AUTO: 14 % (ref 14–44)
MCH RBC QN AUTO: 30.7 PG (ref 26.8–34.3)
MCHC RBC AUTO-ENTMCNC: 33 G/DL (ref 31.4–37.4)
MCV RBC AUTO: 93 FL (ref 82–98)
MONOCYTES # BLD AUTO: 0.57 THOUSAND/ΜL (ref 0.17–1.22)
MONOCYTES NFR BLD AUTO: 11 % (ref 4–12)
NEUTROPHILS # BLD AUTO: 3.72 THOUSANDS/ΜL (ref 1.85–7.62)
NEUTS SEG NFR BLD AUTO: 70 % (ref 43–75)
NITRITE UR QL STRIP: POSITIVE
NON-SQ EPI CELLS URNS QL MICRO: ABNORMAL /HPF
NRBC BLD AUTO-RTO: 0 /100 WBCS
PH UR STRIP.AUTO: 6 [PH]
PLATELET # BLD AUTO: 179 THOUSANDS/UL (ref 149–390)
PMV BLD AUTO: 10.8 FL (ref 8.9–12.7)
POTASSIUM SERPL-SCNC: 3.7 MMOL/L (ref 3.5–5.3)
PROT SERPL-MCNC: 7.3 G/DL (ref 6.4–8.2)
PROT UR STRIP-MCNC: ABNORMAL MG/DL
RBC # BLD AUTO: 4.3 MILLION/UL (ref 3.81–5.12)
RBC #/AREA URNS AUTO: ABNORMAL /HPF
SODIUM SERPL-SCNC: 132 MMOL/L (ref 136–145)
SP GR UR STRIP.AUTO: 1.02 (ref 1–1.03)
UROBILINOGEN UR QL STRIP.AUTO: 0.2 E.U./DL
WBC # BLD AUTO: 5.29 THOUSAND/UL (ref 4.31–10.16)
WBC #/AREA URNS AUTO: ABNORMAL /HPF

## 2022-06-18 PROCEDURE — 36415 COLL VENOUS BLD VENIPUNCTURE: CPT | Performed by: EMERGENCY MEDICINE

## 2022-06-18 PROCEDURE — 96365 THER/PROPH/DIAG IV INF INIT: CPT

## 2022-06-18 PROCEDURE — 99285 EMERGENCY DEPT VISIT HI MDM: CPT | Performed by: EMERGENCY MEDICINE

## 2022-06-18 PROCEDURE — 80053 COMPREHEN METABOLIC PANEL: CPT | Performed by: EMERGENCY MEDICINE

## 2022-06-18 PROCEDURE — 96375 TX/PRO/DX INJ NEW DRUG ADDON: CPT

## 2022-06-18 PROCEDURE — 87086 URINE CULTURE/COLONY COUNT: CPT | Performed by: PHYSICIAN ASSISTANT

## 2022-06-18 PROCEDURE — 83690 ASSAY OF LIPASE: CPT | Performed by: EMERGENCY MEDICINE

## 2022-06-18 PROCEDURE — 81001 URINALYSIS AUTO W/SCOPE: CPT | Performed by: EMERGENCY MEDICINE

## 2022-06-18 PROCEDURE — 99223 1ST HOSP IP/OBS HIGH 75: CPT | Performed by: HOSPITALIST

## 2022-06-18 PROCEDURE — 99285 EMERGENCY DEPT VISIT HI MDM: CPT

## 2022-06-18 PROCEDURE — 87186 SC STD MICRODIL/AGAR DIL: CPT | Performed by: PHYSICIAN ASSISTANT

## 2022-06-18 PROCEDURE — 87077 CULTURE AEROBIC IDENTIFY: CPT | Performed by: PHYSICIAN ASSISTANT

## 2022-06-18 PROCEDURE — 85025 COMPLETE CBC W/AUTO DIFF WBC: CPT | Performed by: EMERGENCY MEDICINE

## 2022-06-18 PROCEDURE — 74176 CT ABD & PELVIS W/O CONTRAST: CPT

## 2022-06-18 PROCEDURE — 82948 REAGENT STRIP/BLOOD GLUCOSE: CPT

## 2022-06-18 PROCEDURE — G1004 CDSM NDSC: HCPCS

## 2022-06-18 RX ORDER — AMLODIPINE BESYLATE 5 MG/1
5 TABLET ORAL DAILY
Status: DISCONTINUED | OUTPATIENT
Start: 2022-06-19 | End: 2022-06-21 | Stop reason: HOSPADM

## 2022-06-18 RX ORDER — ACETAMINOPHEN 325 MG/1
650 TABLET ORAL EVERY 8 HOURS SCHEDULED
Status: DISCONTINUED | OUTPATIENT
Start: 2022-06-18 | End: 2022-06-21 | Stop reason: HOSPADM

## 2022-06-18 RX ORDER — HEPARIN SODIUM 5000 [USP'U]/ML
5000 INJECTION, SOLUTION INTRAVENOUS; SUBCUTANEOUS EVERY 8 HOURS SCHEDULED
Status: DISCONTINUED | OUTPATIENT
Start: 2022-06-18 | End: 2022-06-21 | Stop reason: HOSPADM

## 2022-06-18 RX ORDER — ONDANSETRON 2 MG/ML
4 INJECTION INTRAMUSCULAR; INTRAVENOUS EVERY 6 HOURS PRN
Status: DISCONTINUED | OUTPATIENT
Start: 2022-06-18 | End: 2022-06-21 | Stop reason: HOSPADM

## 2022-06-18 RX ORDER — CALCIUM CARBONATE 200(500)MG
1000 TABLET,CHEWABLE ORAL DAILY PRN
Status: DISCONTINUED | OUTPATIENT
Start: 2022-06-18 | End: 2022-06-21 | Stop reason: HOSPADM

## 2022-06-18 RX ORDER — LEVOTHYROXINE SODIUM 0.07 MG/1
75 TABLET ORAL
Status: DISCONTINUED | OUTPATIENT
Start: 2022-06-19 | End: 2022-06-21 | Stop reason: HOSPADM

## 2022-06-18 RX ORDER — FENTANYL CITRATE 50 UG/ML
25 INJECTION, SOLUTION INTRAMUSCULAR; INTRAVENOUS ONCE
Status: COMPLETED | OUTPATIENT
Start: 2022-06-18 | End: 2022-06-18

## 2022-06-18 RX ORDER — ASPIRIN 81 MG/1
81 TABLET, CHEWABLE ORAL DAILY
Status: DISCONTINUED | OUTPATIENT
Start: 2022-06-19 | End: 2022-06-21 | Stop reason: HOSPADM

## 2022-06-18 RX ORDER — INSULIN LISPRO 100 [IU]/ML
1-5 INJECTION, SOLUTION INTRAVENOUS; SUBCUTANEOUS
Status: DISCONTINUED | OUTPATIENT
Start: 2022-06-18 | End: 2022-06-21 | Stop reason: HOSPADM

## 2022-06-18 RX ORDER — LATANOPROST 50 UG/ML
1 SOLUTION/ DROPS OPHTHALMIC
Status: DISCONTINUED | OUTPATIENT
Start: 2022-06-18 | End: 2022-06-21 | Stop reason: HOSPADM

## 2022-06-18 RX ORDER — POLYETHYLENE GLYCOL 3350 17 G/17G
17 POWDER, FOR SOLUTION ORAL DAILY
Status: DISCONTINUED | OUTPATIENT
Start: 2022-06-19 | End: 2022-06-21 | Stop reason: HOSPADM

## 2022-06-18 RX ORDER — LANOLIN ALCOHOL/MO/W.PET/CERES
6 CREAM (GRAM) TOPICAL
Refills: 0 | Status: DISCONTINUED | OUTPATIENT
Start: 2022-06-18 | End: 2022-06-21 | Stop reason: HOSPADM

## 2022-06-18 RX ORDER — INSULIN LISPRO 100 [IU]/ML
1-5 INJECTION, SOLUTION INTRAVENOUS; SUBCUTANEOUS
Status: DISCONTINUED | OUTPATIENT
Start: 2022-06-19 | End: 2022-06-21 | Stop reason: HOSPADM

## 2022-06-18 RX ORDER — POTASSIUM CHLORIDE 20 MEQ/1
20 TABLET, EXTENDED RELEASE ORAL DAILY
Status: DISCONTINUED | OUTPATIENT
Start: 2022-06-19 | End: 2022-06-21 | Stop reason: HOSPADM

## 2022-06-18 RX ORDER — GABAPENTIN 300 MG/1
300 CAPSULE ORAL 3 TIMES DAILY
Status: DISCONTINUED | OUTPATIENT
Start: 2022-06-18 | End: 2022-06-21 | Stop reason: HOSPADM

## 2022-06-18 RX ADMIN — CEFEPIME HYDROCHLORIDE 1000 MG: 1 INJECTION, POWDER, FOR SOLUTION INTRAMUSCULAR; INTRAVENOUS at 23:07

## 2022-06-18 RX ADMIN — LATANOPROST 1 DROP: 50 SOLUTION OPHTHALMIC at 23:03

## 2022-06-18 RX ADMIN — SODIUM CHLORIDE 250 ML: 0.9 INJECTION, SOLUTION INTRAVENOUS at 23:06

## 2022-06-18 RX ADMIN — HEPARIN SODIUM 5000 UNITS: 5000 INJECTION INTRAVENOUS; SUBCUTANEOUS at 23:03

## 2022-06-18 RX ADMIN — ACETAMINOPHEN 325MG 650 MG: 325 TABLET ORAL at 22:59

## 2022-06-18 RX ADMIN — INSULIN LISPRO 1 UNITS: 100 INJECTION, SOLUTION INTRAVENOUS; SUBCUTANEOUS at 23:02

## 2022-06-18 RX ADMIN — FENTANYL CITRATE 25 MCG: 50 INJECTION, SOLUTION INTRAMUSCULAR; INTRAVENOUS at 15:35

## 2022-06-18 RX ADMIN — DEXTROSE 1000 MG: 50 INJECTION, SOLUTION INTRAVENOUS at 17:57

## 2022-06-18 RX ADMIN — GABAPENTIN 300 MG: 300 CAPSULE ORAL at 22:59

## 2022-06-18 RX ADMIN — MELATONIN 6 MG: at 22:59

## 2022-06-18 NOTE — ED PROVIDER NOTES
History  Chief Complaint   Patient presents with    Abdominal Pain     Patient reports intermittent b/l upper abdominal pain, b/l flank pain for 2 weeks  A 55-year-old female with past medical history of CAD, asthma, hypertension, hyperlipidemia, CHF, hypothyroidism, diabetes, glaucoma, CKD and aortic stenosis; presents with bilateral flank pain (L>R) radiating into the abdomen that has gotten progressively worse for the past 2 weeks  Patient has taken Tylenol without relief  Patient otherwise denies fever, chills, chest pain, shortness of breath, nausea, vomiting, diarrhea, dysuria, urinary frequency/urgency, hematuria, peripheral edema and rashes  Patient has been seen by her PCP for the symptoms, initially having a urine culture on 5/31 which was positive for Pseudomonas  Pt completed a five day course of Keflex at that time  Pt states when symptoms persisted she was re-evaluated by her PCP, have a UA on 6/6 which was positive for leuks and nitrites with subsequent urine culture also positive for Pseudomonas  Patient was started on a second course of Keflex however stop taking it after the second day due to side effects  Patient presents today due to worsening symptoms  A/P:  Bilateral flank pain, radiating into the abdomen  Reproducible left CVA tenderness, benign abdominal exam   Concern for ascending urinary tract infection given recent positive cultures  Will check lab work and CT scan for further evaluation  Will treat symptomatically  History provided by:  Patient, medical records and relative (Daughter)  Abdominal Pain    Prior to Admission Medications   Prescriptions Last Dose Informant Patient Reported? Taking?    Cholecalciferol (VITAMIN D3) 2000 units capsule 6/18/2022 at Unknown time Self Yes Yes   Sig: Take 2,000 Units by mouth daily    Diclofenac Sodium (VOLTAREN) 1 % Not Taking at Unknown time  Yes No   Sig: Voltaren 1 % topical gel   Patient not taking: Reported on 6/18/2022 Incontinence Supply Disposable (SELECT DISPOSABLE UNDERWEAR LG) MISC Unknown at Unknown time Self Yes No   Lancets (ACCU-CHEK SOFT TOUCH) lancets Unknown at Unknown time Self No No   Sig: Testing 1 time daily  Dx: E11 9   Melatonin 5 MG CAPS Not Taking at Unknown time  No No   Sig: Take 2 capsules (10 mg total) by mouth daily at bedtime   Patient not taking: No sig reported   Misc  Devices (Transport Chair) MISC Unknown at Unknown time  No No   Sig: Use if needed (with outings outside of the house )   OLANZapine (ZyPREXA) 5 mg tablet Not Taking at Unknown time  No No   Sig: Take 0 5 tablets (2 5 mg total) by mouth daily   Patient not taking: No sig reported   OXcarbazepine (TRILEPTAL) 150 mg tablet 6/17/2022 at Unknown time Child No Yes   Sig: TAKE 1 TABLET qhs (PLEASE CALL WITH DIZZINESS)   Patient taking differently: Take 150 mg by mouth as needed TAKE 1 TABLET qhs (PLEASE CALL WITH DIZZINESS)   acetaminophen (TYLENOL) 325 mg tablet 6/18/2022 at Unknown time Self No Yes   Sig: Take 2 tablets (650 mg total) by mouth every 6 (six) hours as needed for mild pain   amLODIPine (NORVASC) 5 mg tablet 6/18/2022 at Unknown time  No Yes   Sig: Take 1 tablet (5 mg total) by mouth daily   aspirin 81 MG tablet 6/18/2022 at Unknown time Self Yes Yes   Sig: Take 81 mg by mouth daily  cephalexin (KEFLEX) 500 mg capsule   No No   Sig: Take 1 capsule (500 mg total) by mouth every 8 (eight) hours for 7 days   dorzolamide (TRUSOPT) 2 % ophthalmic solution 6/18/2022 at Unknown time Self Yes Yes   Sig: Administer 1 drop to both eyes 3 (three) times a day     famotidine (PEPCID) 20 mg tablet Not Taking at Unknown time  No No   Sig: Take 1 tablet (20 mg total) by mouth 2 (two) times a day   Patient not taking: Reported on 6/18/2022   fluticasone-vilanterol (BREO ELLIPTA) 100-25 mcg/inh inhaler Not Taking at Unknown time Child No No   Sig: Inhale 1 puff daily Rinse mouth after use     Patient not taking: Reported on 6/18/2022 furosemide (LASIX) 20 mg tablet Not Taking at Unknown time  No No   Sig: TAKE 1 TABLET IN THE MORNING  AND TAKE 2 TABLETS IN THE EVENING   Patient not taking: Reported on 6/18/2022   gabapentin (NEURONTIN) 600 MG tablet 6/18/2022 at Unknown time  No Yes   Sig: TAKE 1/2 TABLET IN THE MORNING AND AFTERNOON AND TAKE 1 TABLET AT BEDTIME   latanoprost (XALATAN) 0 005 % ophthalmic solution 6/17/2022 at Unknown time Self No Yes   Sig: Apply 1 drop to eye daily at bedtime Both eyes   levothyroxine 75 mcg tablet 6/18/2022 at Unknown time  No Yes   Sig: TAKE 1 TABLET EVERY DAY   omeprazole (PriLOSEC) 40 MG capsule Not Taking at Unknown time  No No   Sig: Take 1 capsule (40 mg total) by mouth daily   Patient not taking: Reported on 6/18/2022   potassium chloride (K-DUR,KLOR-CON) 20 mEq tablet 6/18/2022 at Unknown time  No Yes   Sig: TAKE 1 TABLET EVERY DAY   sitaGLIPtin (Januvia) 50 mg tablet 6/18/2022 at Unknown time  No Yes   Sig: Take 1 tablet (50 mg total) by mouth daily   sucralfate (CARAFATE) 1 g tablet   No No   Sig: Take 1 tablet (1 g total) by mouth 4 (four) times a day (before meals and at bedtime) for 10 days   vitamin B-12 (CYANOCOBALAMIN) 250 MCG tablet 6/18/2022 at Unknown time  Yes Yes   Sig: Take 250 mcg by mouth daily      Facility-Administered Medications: None       Past Medical History:   Diagnosis Date    Asthma     Atypical chest pain     CAD (coronary artery disease)     Candidal intertrigo     [facility]     Depression     Diabetes mellitus (HCC)     Diabetic neuropathy ([facility] [address] )     Diverticulitis     Dyslipidemia     Female bladder prolapse     Gastric ulcer     Glaucoma     HTN (hypertension)     Hyperlipidemia     Hypothyroidism 4/18/2016    RAD (reactive airway disease)        Past Surgical History:   Procedure Laterality Date    COLONOSCOPY      ESOPHAGOGASTRODUODENOSCOPY  2011    HYSTERECTOMY      TONSILLECTOMY         Family History   Problem Relation Age of Onset   Nguyen Meza Breast cancer Mother     Hypothyroidism Mother     Hypertension Father     Breast cancer Sister     Thyroid disease Sister     Hypertension Sister      I have reviewed and agree with the history as documented  E-Cigarette/Vaping    E-Cigarette Use Never User      E-Cigarette/Vaping Substances    Nicotine No     THC No     CBD No     Flavoring No     Other No     Unknown No      Social History     Tobacco Use    Smoking status: Never Smoker    Smokeless tobacco: Never Used   Vaping Use    Vaping Use: Never used   Substance Use Topics    Alcohol use: No     Comment: Social Alcohol Use -- as per allscripts (pt denies all alcohol use)    Drug use: No       Review of Systems   Gastrointestinal: Positive for abdominal pain  Genitourinary: Positive for flank pain  All other systems reviewed and are negative  Physical Exam  Physical Exam  General Appearance: alert and oriented, nad, non toxic appearing  Skin:  Warm, dry, intact  No cyanosis  HEENT: Atraumatic, normocephalic  No eye drainage  Normal hearing  Moist mucous membranes  Neck: Supple, trachea midline  Cardiac: RRR; no murmurs, rub, gallops  No pedal edema, 2+ pulses  Pulmonary: lungs CTAB; no wheezes, rales, rhonchi  Gastrointestinal: abdomen soft, nontender, nondistended; no guarding or rebound tenderness; good bowel sounds, no mass or bruits  Left CVA tenderness, right CVA nontender  Extremities:  No deformities    No calf tenderness, no clubbing  Neuro:  no focal motor or sensory deficits, CN 2-12 grossly intact  Psych:  Normal mood and affect, normal judgement and insight      Vital Signs  ED Triage Vitals [06/18/22 1431]   Temperature Pulse Respirations Blood Pressure SpO2   97 6 °F (36 4 °C) 77 16 155/76 97 %      Temp Source Heart Rate Source Patient Position - Orthostatic VS BP Location FiO2 (%)   Oral Monitor Sitting Right arm --      Pain Score       10 - Worst Possible Pain           Vitals: 06/18/22 1431 06/18/22 1705 06/18/22 2010   BP: 155/76 146/85 166/74   Pulse: 77 68 65   Patient Position - Orthostatic VS: Sitting Lying Sitting         Visual Acuity      ED Medications  Medications   amLODIPine (NORVASC) tablet 5 mg (has no administration in time range)   aspirin chewable tablet 81 mg (has no administration in time range)   latanoprost (XALATAN) 0 005 % ophthalmic solution 1 drop (has no administration in time range)   melatonin tablet 6 mg (has no administration in time range)   acetaminophen (TYLENOL) tablet 650 mg (has no administration in time range)   Diclofenac Sodium (VOLTAREN) 1 % topical gel 2 g (has no administration in time range)   gabapentin (NEURONTIN) capsule 300 mg (has no administration in time range)   levothyroxine tablet 75 mcg (has no administration in time range)   potassium chloride (K-DUR,KLOR-CON) CR tablet 20 mEq (has no administration in time range)   calcium carbonate (TUMS) chewable tablet 1,000 mg (has no administration in time range)   ondansetron (ZOFRAN) injection 4 mg (has no administration in time range)   polyethylene glycol (MIRALAX) packet 17 g (has no administration in time range)   sodium chloride 0 9 % bolus 250 mL (has no administration in time range)   heparin (porcine) subcutaneous injection 5,000 Units (has no administration in time range)   cefepime (MAXIPIME) 1,000 mg in dextrose 5 % 50 mL IVPB (has no administration in time range)   insulin lispro (HumaLOG) 100 units/mL subcutaneous injection 1-5 Units (has no administration in time range)   insulin lispro (HumaLOG) 100 units/mL subcutaneous injection 1-5 Units (has no administration in time range)   fentanyl citrate (PF) 100 MCG/2ML 25 mcg (25 mcg Intravenous Given 6/18/22 1535)   ceftriaxone (ROCEPHIN) 1 g/50 mL in dextrose IVPB (0 mg Intravenous Stopped 6/18/22 1851)       Diagnostic Studies  Results Reviewed     Procedure Component Value Units Date/Time    Urine culture [[id number]] Collected: 06/18/22 1748    Lab Status:  In process Specimen: Urine, Clean Catch Updated: 06/18/22 2129    Urinalysis with microscopic [[id number]]  (Abnormal) Collected: 06/18/22 1748    Lab Status: Final result Specimen: Urine, Clean Catch Updated: 06/18/22 1815     Clarity, UA Cloudy     Color, UA Yellow     Specific Gravity, UA 1 020     pH, UA 6 0     Glucose, UA Negative mg/dl      Ketones, UA Negative mg/dl      Blood, UA Small     Protein, UA Trace mg/dl      Nitrite, UA Positive     Bilirubin, UA Negative     Urobilinogen, UA 0 2 E U /dl      Leukocytes, UA Moderate     WBC, UA Innumerable /hpf      RBC, UA 4-10 /hpf      Bacteria, UA Moderate /hpf      Epithelial Cells Moderate /hpf     Comprehensive metabolic panel [[id number]]  (Abnormal) Collected: 06/18/22 1535    Lab Status: Final result Specimen: Blood from Arm, Left Updated: 06/18/22 1612     Sodium 132 mmol/L      Potassium 3 7 mmol/L      Chloride 98 mmol/L      CO2 25 mmol/L      ANION GAP 9 mmol/L      BUN 19 mg/dL      Creatinine 1 07 mg/dL      Glucose 151 mg/dL      Calcium 8 9 mg/dL      Corrected Calcium 9 6 mg/dL      AST 17 U/L      ALT 18 U/L      Alkaline Phosphatase 110 U/L      Total Protein 7 3 g/dL      Albumin 3 1 g/dL      Total Bilirubin 0 27 mg/dL      eGFR 43 ml/min/1 73sq m     Narrative:      [place] guidelines for Chronic Kidney Disease (CKD):     Stage 1 with normal or high GFR (GFR > 90 mL/min/1 73 square meters)    Stage 2 Mild CKD (GFR = 60-89 mL/min/1 73 square meters)    Stage 3A Moderate CKD (GFR = 45-59 mL/min/1 73 square meters)    Stage 3B Moderate CKD (GFR = 30-44 mL/min/1 73 square meters)    Stage 4 Severe CKD (GFR = 15-29 mL/min/1 73 square meters)    Stage 5 End Stage CKD (GFR <15 mL/min/1 73 square meters)  Note: GFR calculation is accurate only with a steady state creatinine    Lipase [[id number]]  (Normal) Collected: 06/18/22 1535    Lab Status: Final result Specimen: Blood from Arm, Left Updated: 06/18/22 1612     Lipase 114 u/L     CBC and differential [[id number]] Collected: 06/18/22 1535    Lab Status: Final result Specimen: Blood from Arm, Left Updated: 06/18/22 1543     WBC 5 29 Thousand/uL      RBC 4 30 Million/uL      Hemoglobin 13 2 g/dL      Hematocrit 40 0 %      MCV 93 fL      MCH 30 7 pg      MCHC 33 0 g/dL      RDW 13 2 %      MPV 10 8 fL      Platelets 524 Thousands/uL      nRBC 0 /100 WBCs      Neutrophils Relative 70 %      Immat GRANS % 0 %      Lymphocytes Relative 14 %      Monocytes Relative 11 %      Eosinophils Relative 4 %      Basophils Relative 1 %      Neutrophils Absolute 3 72 Thousands/µL      Immature Grans Absolute 0 02 Thousand/uL      Lymphocytes Absolute 0 72 Thousands/µL      Monocytes Absolute 0 57 Thousand/µL      Eosinophils Absolute 0 20 Thousand/µL      Basophils Absolute 0 06 Thousands/µL                  CT abdomen pelvis wo contrast   Final Result by Naresh Hendricks MD (06/18 1720)      1  Circumferential bladder wall thickening, suggesting cystitis in the appropriate clinical and laboratory setting  2  No hydronephrosis or urinary tract calculus   3  Colonic diverticulosis without evidence of acute diverticulitis or bowel obstruction               Workstation performed: [id number]                    Procedures  Procedures         ED Course  ED Course as of 06/18/22 2208   Sat Jun 18, 2022   1628 Labs WNL   1743 CT abdomen pelvis wo contrast  1 ) Circumferential bladder wall thickening, suggesting cystitis in the appropriate clinical and laboratory setting   2 ) No hydronephrosis or urinary tract calculus  3 ) Colonic diverticulosis without evidence of acute diverticulitis or bowel obstruction    Pending UA however given recent positive urine cultures and persistent symptoms regardless of PO antibiotics, will start IV antibiotics and proceed with admission  Pt does not meet SIRS criteria  1750 Pt updated on lab results thus far, pending UA    Pt reports pain has resolved at this time  1817 Leukocytes, UA(!): Moderate  UA leuks and nitrite positive  Micro with moderate bacteria and innumerable WBC's - likely persistent UTI, possibly pyelo given bilateral flank pain  Will admit for failed outpatient treatment  200 Updated pt's daughter via phone, she is in agreement with treatment plan  Initial Sepsis Screening     Row Name 06/18/22 8456                Is the patient's history suggestive of a new or worsening infection? Yes (Proceed)  -AM        Suspected source of infection urinary tract infection  -AM        Are two or more of the following signs & symptoms of infection both present and new to the patient? No  -AM        Indicate SIRS criteria --        If the answer is yes to both questions, suspicion of sepsis is present --        If severe sepsis is present AND tissue hypoperfusion perists in the hour after fluid resuscitation or lactate > 4, the patient meets criteria for SEPTIC SHOCK --        Are any of the following organ dysfunction criteria present within 6 hours of suspected infection and SIRS criteria that are NOT considered to be chronic conditions?  --        Organ dysfunction --        Date of presentation of severe sepsis --        Time of presentation of severe sepsis --        Tissue hypoperfusion persists in the hour after crystalloid fluid administration, evidenced, by either: --        Was hypotension present within one hour of the conclusion of crystalloid fluid administration? --        Date of presentation of septic shock --        Time of presentation of septic shock --              User Key  (r) = Recorded By, (t) = Taken By, (c) = Cosigned By    [address] Name Provider Type    JUAN ALBERTO Randolph DO Physician                              MDM    Disposition  Final diagnoses:   UTI (urinary tract infection)     Time reflects when diagnosis was documented in both MDM as applicable and the Disposition within this note     Time User Action Codes Description Comment    6/18/2022  6:40 PM Linda Ritchie Add [N39 0] UTI (urinary tract infection)       ED Disposition     ED Disposition   Admit    Condition   Stable    Date/Time   Sat Jun 18, 2022  6:40 PM    Comment   Case was discussed with KISHAN and the patient's admission status was agreed to be Admission Status: inpatient status to the service of Dr Solange Arvizu  Follow-up Information    None         Current Discharge Medication List      CONTINUE these medications which have NOT CHANGED    Details   acetaminophen (TYLENOL) 325 mg tablet Take 2 tablets (650 mg total) by mouth every 6 (six) hours as needed for mild pain  Qty: 20 tablet, Refills: 0    Associated Diagnoses: Pyelonephritis      amLODIPine (NORVASC) 5 mg tablet Take 1 tablet (5 mg total) by mouth daily  Qty: 90 tablet, Refills: 3    Associated Diagnoses: Nonrheumatic aortic valve stenosis      aspirin 81 MG tablet Take 81 mg by mouth daily        Cholecalciferol (VITAMIN D3) 2000 units capsule Take 2,000 Units by mouth daily       dorzolamide (TRUSOPT) 2 % ophthalmic solution Administer 1 drop to both eyes 3 (three) times a day        gabapentin (NEURONTIN) 600 MG tablet TAKE 1/2 TABLET IN THE MORNING AND AFTERNOON AND TAKE 1 TABLET AT BEDTIME  Qty: 180 tablet, Refills: 0    Associated Diagnoses: Cranial neuralgia      latanoprost (XALATAN) 0 005 % ophthalmic solution Apply 1 drop to eye daily at bedtime Both eyes  Qty: 7 5 mL, Refills: 1    Associated Diagnoses: Open-angle glaucoma, severe stage, unspecified laterality, unspecified open-angle glaucoma type      levothyroxine 75 mcg tablet TAKE 1 TABLET EVERY DAY  Qty: 90 tablet, Refills: 3    Associated Diagnoses: Hypothyroidism, unspecified type      OXcarbazepine (TRILEPTAL) 150 mg tablet TAKE 1 TABLET qhs (PLEASE CALL WITH DIZZINESS)  Qty: 90 tablet, Refills: 0    Associated Diagnoses: Atypical facial pain      potassium chloride (K-DUR,KLOR-CON) 20 mEq tablet TAKE 1 TABLET EVERY DAY  Qty: 90 tablet, Refills: 2    Associated Diagnoses: Nonrheumatic aortic valve stenosis      sitaGLIPtin (Januvia) 50 mg tablet Take 1 tablet (50 mg total) by mouth daily  Qty: 90 tablet, Refills: 1    Associated Diagnoses: Type 2 diabetes mellitus without complication, without long-term current use of insulin ([facility])      vitamin B-12 (CYANOCOBALAMIN) 250 MCG tablet Take 250 mcg by mouth daily      Diclofenac Sodium (VOLTAREN) 1 % Voltaren 1 % topical gel      famotidine (PEPCID) 20 mg tablet Take 1 tablet (20 mg total) by mouth 2 (two) times a day  Qty: 60 tablet, Refills: 0    Associated Diagnoses: Gastroesophageal reflux disease without esophagitis      fluticasone-vilanterol (BREO ELLIPTA) 100-25 mcg/inh inhaler Inhale 1 puff daily Rinse mouth after use  Qty: 1 Inhaler, Refills: 5    Associated Diagnoses: Dyspnea on minimal exertion      furosemide (LASIX) 20 mg tablet TAKE 1 TABLET IN THE MORNING  AND TAKE 2 TABLETS IN THE EVENING  Qty: 270 tablet, Refills: 1    Associated Diagnoses: Nonrheumatic aortic valve stenosis      Incontinence Supply Disposable (SELECT DISPOSABLE UNDERWEAR LG) MISC       Lancets (ACCU-CHEK SOFT TOUCH) lancets Testing 1 time daily  Dx: E11 9  Qty: 100 each, Refills: 3    Associated Diagnoses: 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HCC)      Melatonin 5 MG CAPS Take 2 capsules (10 mg total) by mouth daily at bedtime  Qty: 30 capsule, Refills: 0    Associated Diagnoses: Epigastric pain      Misc  Devices (Transport Chair) MISC Use if needed (with outings outside of the house )  Qty: 1 each, Refills: 0    Associated Diagnoses: Ambulatory dysfunction; Vertigo; Fall, subsequent encounter; Peripheral vascular disease ([address] ); Chronic diastolic congestive heart failure ([address] );  Osteoarthritis of multiple joints, unspecified osteoarthritis type; Shakiness      OLANZapine (ZyPREXA) 5 mg tablet Take 0 5 tablets (2 5 mg total) by mouth daily  Qty: 30 tablet, Refills: 0    Associated Diagnoses: Hallucination      omeprazole (PriLOSEC) 40 MG capsule Take 1 capsule (40 mg total) by mouth daily  Qty: 90 capsule, Refills: 3    Associated Diagnoses: Gastroesophageal reflux disease without esophagitis      sucralfate (CARAFATE) 1 g tablet Take 1 tablet (1 g total) by mouth 4 (four) times a day (before meals and at bedtime) for 10 days  Qty: 40 tablet, Refills: 0    Associated Diagnoses: Epigastric pain         STOP taking these medications       cephalexin (KEFLEX) 500 mg capsule Comments:   Reason for Stopping:               No discharge procedures on file      PDMP Review       Value Time User    PDMP Reviewed  Yes 11/18/2020  1:31 PM Bar Robbins PA-C          ED Provider  Electronically Signed by           Angelo Rodriguez DO  06/18/22 0801

## 2022-06-19 ENCOUNTER — APPOINTMENT (INPATIENT)
Dept: RADIOLOGY | Facility: HOSPITAL | Age: 87
DRG: 690 | End: 2022-06-19
Payer: COMMERCIAL

## 2022-06-19 LAB
ANION GAP SERPL CALCULATED.3IONS-SCNC: 7 MMOL/L (ref 4–13)
BASOPHILS # BLD AUTO: 0.04 THOUSANDS/ΜL (ref 0–0.1)
BASOPHILS NFR BLD AUTO: 1 % (ref 0–1)
BUN SERPL-MCNC: 18 MG/DL (ref 5–25)
CALCIUM SERPL-MCNC: 8.8 MG/DL (ref 8.3–10.1)
CHLORIDE SERPL-SCNC: 103 MMOL/L (ref 100–108)
CO2 SERPL-SCNC: 22 MMOL/L (ref 21–32)
CREAT SERPL-MCNC: 1.1 MG/DL (ref 0.6–1.3)
EOSINOPHIL # BLD AUTO: 0.21 THOUSAND/ΜL (ref 0–0.61)
EOSINOPHIL NFR BLD AUTO: 6 % (ref 0–6)
ERYTHROCYTE [DISTWIDTH] IN BLOOD BY AUTOMATED COUNT: 13.2 % (ref 11.6–15.1)
GFR SERPL CREATININE-BSD FRML MDRD: 41 ML/MIN/1.73SQ M
GLUCOSE SERPL-MCNC: 124 MG/DL (ref 65–140)
GLUCOSE SERPL-MCNC: 129 MG/DL (ref 65–140)
GLUCOSE SERPL-MCNC: 130 MG/DL (ref 65–140)
GLUCOSE SERPL-MCNC: 132 MG/DL (ref 65–140)
GLUCOSE SERPL-MCNC: 144 MG/DL (ref 65–140)
HCT VFR BLD AUTO: 37 % (ref 34.8–46.1)
HGB BLD-MCNC: 11.8 G/DL (ref 11.5–15.4)
IMM GRANULOCYTES # BLD AUTO: 0.03 THOUSAND/UL (ref 0–0.2)
IMM GRANULOCYTES NFR BLD AUTO: 1 % (ref 0–2)
LYMPHOCYTES # BLD AUTO: 0.77 THOUSANDS/ΜL (ref 0.6–4.47)
LYMPHOCYTES NFR BLD AUTO: 20 % (ref 14–44)
MCH RBC QN AUTO: 29.9 PG (ref 26.8–34.3)
MCHC RBC AUTO-ENTMCNC: 31.9 G/DL (ref 31.4–37.4)
MCV RBC AUTO: 94 FL (ref 82–98)
MONOCYTES # BLD AUTO: 0.49 THOUSAND/ΜL (ref 0.17–1.22)
MONOCYTES NFR BLD AUTO: 13 % (ref 4–12)
NEUTROPHILS # BLD AUTO: 2.26 THOUSANDS/ΜL (ref 1.85–7.62)
NEUTS SEG NFR BLD AUTO: 59 % (ref 43–75)
NRBC BLD AUTO-RTO: 0 /100 WBCS
PLATELET # BLD AUTO: 155 THOUSANDS/UL (ref 149–390)
PMV BLD AUTO: 10.6 FL (ref 8.9–12.7)
POTASSIUM SERPL-SCNC: 3.9 MMOL/L (ref 3.5–5.3)
RBC # BLD AUTO: 3.94 MILLION/UL (ref 3.81–5.12)
SODIUM SERPL-SCNC: 132 MMOL/L (ref 136–145)
WBC # BLD AUTO: 3.8 THOUSAND/UL (ref 4.31–10.16)

## 2022-06-19 PROCEDURE — 71045 X-RAY EXAM CHEST 1 VIEW: CPT

## 2022-06-19 PROCEDURE — 82948 REAGENT STRIP/BLOOD GLUCOSE: CPT

## 2022-06-19 PROCEDURE — 80048 BASIC METABOLIC PNL TOTAL CA: CPT | Performed by: PHYSICIAN ASSISTANT

## 2022-06-19 PROCEDURE — 85025 COMPLETE CBC W/AUTO DIFF WBC: CPT | Performed by: PHYSICIAN ASSISTANT

## 2022-06-19 PROCEDURE — 99232 SBSQ HOSP IP/OBS MODERATE 35: CPT | Performed by: INTERNAL MEDICINE

## 2022-06-19 RX ORDER — TRAMADOL HYDROCHLORIDE 50 MG/1
50 TABLET ORAL EVERY 6 HOURS PRN
Status: DISCONTINUED | OUTPATIENT
Start: 2022-06-19 | End: 2022-06-21 | Stop reason: HOSPADM

## 2022-06-19 RX ORDER — LIDOCAINE 50 MG/G
1 PATCH TOPICAL DAILY
Status: DISCONTINUED | OUTPATIENT
Start: 2022-06-19 | End: 2022-06-21 | Stop reason: HOSPADM

## 2022-06-19 RX ORDER — IPRATROPIUM BROMIDE AND ALBUTEROL SULFATE 2.5; .5 MG/3ML; MG/3ML
3 SOLUTION RESPIRATORY (INHALATION) EVERY 6 HOURS PRN
Status: DISCONTINUED | OUTPATIENT
Start: 2022-06-19 | End: 2022-06-21 | Stop reason: HOSPADM

## 2022-06-19 RX ORDER — FUROSEMIDE 20 MG/1
20 TABLET ORAL
Status: DISCONTINUED | OUTPATIENT
Start: 2022-06-19 | End: 2022-06-21 | Stop reason: HOSPADM

## 2022-06-19 RX ORDER — DORZOLAMIDE HCL 20 MG/ML
1 SOLUTION/ DROPS OPHTHALMIC 3 TIMES DAILY
Status: DISCONTINUED | OUTPATIENT
Start: 2022-06-19 | End: 2022-06-21 | Stop reason: HOSPADM

## 2022-06-19 RX ADMIN — DORZOLAMIDE HYDROCHLORIDE 1 DROP: 20 SOLUTION/ DROPS OPHTHALMIC at 21:11

## 2022-06-19 RX ADMIN — ACETAMINOPHEN 325MG 650 MG: 325 TABLET ORAL at 05:24

## 2022-06-19 RX ADMIN — LEVOTHYROXINE SODIUM 75 MCG: 75 TABLET ORAL at 05:24

## 2022-06-19 RX ADMIN — DICLOFENAC SODIUM 2 G: 10 GEL TOPICAL at 17:06

## 2022-06-19 RX ADMIN — AMLODIPINE BESYLATE 5 MG: 5 TABLET ORAL at 08:43

## 2022-06-19 RX ADMIN — CEFEPIME HYDROCHLORIDE 1000 MG: 1 INJECTION, POWDER, FOR SOLUTION INTRAMUSCULAR; INTRAVENOUS at 22:58

## 2022-06-19 RX ADMIN — ACETAMINOPHEN 325MG 650 MG: 325 TABLET ORAL at 21:10

## 2022-06-19 RX ADMIN — HEPARIN SODIUM 5000 UNITS: 5000 INJECTION INTRAVENOUS; SUBCUTANEOUS at 05:23

## 2022-06-19 RX ADMIN — HEPARIN SODIUM 5000 UNITS: 5000 INJECTION INTRAVENOUS; SUBCUTANEOUS at 14:37

## 2022-06-19 RX ADMIN — DICLOFENAC SODIUM 2 G: 10 GEL TOPICAL at 11:31

## 2022-06-19 RX ADMIN — LIDOCAINE 1 PATCH: 50 PATCH CUTANEOUS at 09:56

## 2022-06-19 RX ADMIN — GABAPENTIN 300 MG: 300 CAPSULE ORAL at 17:05

## 2022-06-19 RX ADMIN — LATANOPROST 1 DROP: 50 SOLUTION OPHTHALMIC at 21:11

## 2022-06-19 RX ADMIN — ASPIRIN 81 MG CHEWABLE TABLET 81 MG: 81 TABLET CHEWABLE at 08:41

## 2022-06-19 RX ADMIN — GABAPENTIN 300 MG: 300 CAPSULE ORAL at 21:11

## 2022-06-19 RX ADMIN — DORZOLAMIDE HYDROCHLORIDE 1 DROP: 20 SOLUTION/ DROPS OPHTHALMIC at 17:06

## 2022-06-19 RX ADMIN — HEPARIN SODIUM 5000 UNITS: 5000 INJECTION INTRAVENOUS; SUBCUTANEOUS at 21:10

## 2022-06-19 RX ADMIN — CEFEPIME HYDROCHLORIDE 1000 MG: 1 INJECTION, POWDER, FOR SOLUTION INTRAMUSCULAR; INTRAVENOUS at 11:29

## 2022-06-19 RX ADMIN — DICLOFENAC SODIUM 2 G: 10 GEL TOPICAL at 08:41

## 2022-06-19 RX ADMIN — ACETAMINOPHEN 325MG 650 MG: 325 TABLET ORAL at 14:37

## 2022-06-19 RX ADMIN — FUROSEMIDE 20 MG: 20 TABLET ORAL at 17:05

## 2022-06-19 RX ADMIN — DICLOFENAC SODIUM 2 G: 10 GEL TOPICAL at 21:11

## 2022-06-19 RX ADMIN — MELATONIN 6 MG: at 21:10

## 2022-06-19 RX ADMIN — GABAPENTIN 300 MG: 300 CAPSULE ORAL at 08:41

## 2022-06-19 RX ADMIN — POTASSIUM CHLORIDE 20 MEQ: 1500 TABLET, EXTENDED RELEASE ORAL at 08:41

## 2022-06-19 NOTE — UTILIZATION REVIEW
Initial Clinical Review    Admission: Date/Time/Statement:   Admission Orders (From admission, onward)     Ordered        06/18/22 1841  Inpatient Admission  Once                      Orders Placed This Encounter   Procedures    Inpatient Admission     Standing Status:   Standing     Number of Occurrences:   1     Order Specific Question:   Level of Care     Answer:   Med Surg [16]     Order Specific Question:   Estimated length of stay     Answer:   More than 2 Midnights     Order Specific Question:   Certification     Answer:   I certify that inpatient services are medically necessary for this patient for a duration of greater than two midnights  See H&P and MD Progress Notes for additional information about the patient's course of treatment  ED Arrival Information     Expected   -    Arrival   6/18/2022 14:19    Acuity   Urgent            Means of arrival   Walk-In    Escorted by   Family Member    Service   Hospitalist    Admission type   Urgent            Arrival complaint   hip and back pain           Chief Complaint   Patient presents with    Abdominal Pain     Patient reports intermittent b/l upper abdominal pain, b/l flank pain for 2 weeks  Initial Presentation: 80 y o  female  Presented to the ED with complaint bilateral flank pain  She reports the pain is worse on the left than is on the right  She does report the pain radiates into her lower abdomen  She reports is been ongoing for the past 2 weeks but is getting progressively worse  She does report she was diagnosed with urinary tract infection and completed 2 courses of antibiotics without improvement her symptoms  PMH: asthma, CAD, CHF, depression, DM, HTN, hyperlipidemia, hypothyroid  PE: L flank tenderness  Plan: Inpatient admission for evaluation and treatment of UTI and hyponatremia (Na 132): recheck urine culture, IV Rocephin given in the ED, will switch to IV cefepime   IV fluids, recheck Na level in am      Date: 6/19   Day 2: Internal Medicine: follow up urine cultures, continue IV cefepime  Pt continues with c/o lower back pain  Trial scheduled Tylenol, lidocaine patch and tramadol prn  PT/OT eval      ED Triage Vitals [06/18/22 1431]   Temperature Pulse Respirations Blood Pressure SpO2   97 6 °F (36 4 °C) 77 16 155/76 97 %      Temp Source Heart Rate Source Patient Position - Orthostatic VS BP Location FiO2 (%)   Oral Monitor Sitting Right arm --      Pain Score       10 - Worst Possible Pain          Wt Readings from Last 1 Encounters:   06/09/22 73 2 kg (161 lb 6 4 oz)     Additional Vital Signs:     Date/Time Temp Pulse Resp BP MAP (mmHg) SpO2 O2 Device   06/19/22 08:42:32 -- 69 -- 134/62 86 95 % --   06/19/22 07:12:46 -- 74 -- 157/76 103 93 % None (Room air)   06/18/22 23:36:05 97 5 °F (36 4 °C) 66 18 127/59 82 94 % --   06/18/22 2010 96 8 °F (36 °C) Abnormal  65 18 166/74 -- 95 % None (Room air)   06/18/22 1705 -- 68 18 146/85 -- 96 % None (Room air)   06/18/22 1500 -- -- -- -- -- -- None (Room air)       Pertinent Labs/Diagnostic Test Results:   CT abdomen pelvis wo contrast   Final Result by Dianna Do MD (06/18 1720)      1  Circumferential bladder wall thickening, suggesting cystitis in the appropriate clinical and laboratory setting  2  No hydronephrosis or urinary tract calculus   3   Colonic diverticulosis without evidence of acute diverticulitis or bowel obstruction               Workstation performed: [id number]               Results from last 7 days   Lab Units 06/19/22  0520 06/18/22  1535   WBC Thousand/uL 3 80* 5 29   HEMOGLOBIN g/dL 11 8 13 2   HEMATOCRIT % 37 0 40 0   PLATELETS Thousands/uL 155 179   NEUTROS ABS Thousands/µL 2 26 3 72         Results from last 7 days   Lab Units 06/19/22  0520 06/18/22  1535   SODIUM mmol/L 132* 132*   POTASSIUM mmol/L 3 9 3 7   CHLORIDE mmol/L 103 98*   CO2 mmol/L 22 25   ANION GAP mmol/L 7 9   BUN mg/dL 18 19   CREATININE mg/dL 1 10 1 07   EGFR ml/min/1 73sq m 41 43 CALCIUM mg/dL 8 8 8 9     Results from last 7 days   Lab Units 06/18/22  1535   AST U/L 17   ALT U/L 18   ALK PHOS U/L 110   TOTAL PROTEIN g/dL 7 3   ALBUMIN g/dL 3 1*   TOTAL BILIRUBIN mg/dL 0 27     Results from last 7 days   Lab Units 06/19/22  0713 06/18/22  2140   POC GLUCOSE mg/dl 132 181*     Results from last 7 days   Lab Units 06/19/22  0520 06/18/22  1535   GLUCOSE RANDOM mg/dL 124 151*           Results from last 7 days   Lab Units 06/18/22  1535   LIPASE u/L 114                 Results from last 7 days   Lab Units 06/18/22  1748   CLARITY UA  Cloudy   COLOR UA  Yellow   SPEC GRAV UA  1 020   PH UA  6 0   GLUCOSE UA mg/dl Negative   KETONES UA mg/dl Negative   BLOOD UA  Small*   PROTEIN UA mg/dl Trace*   NITRITE UA  Positive*   BILIRUBIN UA  Negative   UROBILINOGEN UA E U /dl 0 2   LEUKOCYTES UA  Moderate*   WBC UA /hpf Innumerable*   RBC UA /hpf 4-10*   BACTERIA UA /hpf Moderate*   EPITHELIAL CELLS WET PREP /hpf Moderate*         ED Treatment:   Medication Administration from 06/18/2022 1419 to 06/18/2022 2009       Date/Time Order Dose Route Action     06/18/2022 1535 fentanyl citrate (PF) 100 MCG/2ML 25 mcg 25 mcg Intravenous Given     06/18/2022 1757 ceftriaxone (ROCEPHIN) 1 g/50 mL in dextrose IVPB 1,000 mg Intravenous New Bag        Past Medical History:   Diagnosis Date    Asthma     Atypical chest pain     CAD (coronary artery disease)     Candidal intertrigo     [facility]     Depression     Diabetes mellitus (HCC)     Diabetic neuropathy ([facility] )     Diverticulitis     Dyslipidemia     Female bladder prolapse     Gastric ulcer     Glaucoma     HTN (hypertension)     Hyperlipidemia     Hypothyroidism 4/18/2016    RAD (reactive airway disease)      Present on Admission:   Essential hypertension   Type 2 diabetes mellitus (HCC)   Lumbar paraspinal muscle spasm   Hypothyroidism   CKD (chronic kidney disease)   Hyponatremia      Admitting Diagnosis: Back pain [M54 9]  UTI (urinary tract infection) [N39 0]  Hip pain [M25 559]  Age/Sex: 80 y o  female  Admission Orders:  Scheduled Medications:  acetaminophen, 650 mg, Oral, Q8H [address]  amLODIPine, 5 mg, Oral, Daily  aspirin, 81 mg, Oral, Daily  cefepime, 1,000 mg, Intravenous, Q12H  Diclofenac Sodium, 2 g, Topical, 4x Daily  furosemide, 20 mg, Oral, BID (diuretic)  gabapentin, 300 mg, Oral, TID  heparin (porcine), 5,000 Units, Subcutaneous, Q8H JONATAN  insulin lispro, 1-5 Units, Subcutaneous, TID AC  insulin lispro, 1-5 Units, Subcutaneous, HS  latanoprost, 1 drop, Both Eyes, HS  levothyroxine, 75 mcg, Oral, Early Morning  lidocaine, 1 patch, Topical, Daily  melatonin, 6 mg, Oral, HS  polyethylene glycol, 17 g, Oral, Daily  potassium chloride, 20 mEq, Oral, Daily      Continuous IV Infusions:     PRN Meds:  calcium carbonate, 1,000 mg, Oral, Daily PRN  ondansetron, 4 mg, Intravenous, Q6H PRN  traMADol, 50 mg, Oral, Q6H PRN        None    Network Utilization Review Department  ATTENTION: Please call with any questions or concerns to [phone] and carefully listen to the prompts so that you are directed to the right person  All voicemails are confidential   Nichole Taylor all requests for admission clinical reviews, approved or denied determinations and any other requests to dedicated fax number below belonging to the campus where the patient is receiving treatment   List of dedicated fax numbers for the Facilities:  [address] DENIALS (Administrative/Medical Necessity) [phone]   [address] (Maternity/NICU/Pediatrics) [phone]   [address] [place] [address] [address] Eduardo Menjivar [id number] [facility] [place] [address] [phone]

## 2022-06-19 NOTE — PLAN OF CARE
Problem: MOBILITY - ADULT  Goal: Maintain or return to baseline ADL function  Description: INTERVENTIONS:  -  Assess patient's ability to carry out ADLs; assess patient's baseline for ADL function and identify physical deficits which impact ability to perform ADLs (bathing, care of mouth/teeth, toileting, grooming, dressing, etc )  - Assess/evaluate cause of self-care deficits   - Assess range of motion  - Assess patient's mobility; develop plan if impaired  - Assess patient's need for assistive devices and provide as appropriate  - Encourage maximum independence but intervene and supervise when necessary  - Involve family in performance of ADLs  - Assess for home care needs following discharge   - Consider OT consult to assist with ADL evaluation and planning for discharge  - Provide patient education as appropriate  Outcome: Progressing  Goal: Maintains/Returns to pre admission functional level  Description: INTERVENTIONS:  - Perform BMAT or MOVE assessment daily    - Set and communicate daily mobility goal to care team and patient/family/caregiver  - Collaborate with rehabilitation services on mobility goals if consulted  - Perform Range of Motion 3 times a day  - Reposition patient every 2 hours    - Dangle patient 3 times a day  - Stand patient 3 times a day  - Ambulate patient 3 times a day  - Out of bed to chair 3 times a day   - Out of bed for meals 3 times a day  - Out of bed for toileting  - Record patient progress and toleration of activity level   Outcome: Progressing     Problem: PAIN - ADULT  Goal: Verbalizes/displays adequate comfort level or baseline comfort level  Description: Interventions:  - Encourage patient to monitor pain and request assistance  - Assess pain using appropriate pain scale  - Administer analgesics based on type and severity of pain and evaluate response  - Implement non-pharmacological measures as appropriate and evaluate response  - Consider cultural and social influences on pain and pain management  - Notify physician/advanced practitioner if interventions unsuccessful or patient reports new pain  Outcome: Progressing     Problem: INFECTION - ADULT  Goal: Absence or prevention of progression during hospitalization  Description: INTERVENTIONS:  - Assess and monitor for signs and symptoms of infection  - Monitor lab/diagnostic results  - Monitor all insertion sites, i e  indwelling lines, tubes, and drains  - Monitor endotracheal if appropriate and nasal secretions for changes in amount and color  - Roby appropriate cooling/warming therapies per order  - Administer medications as ordered  - Instruct and encourage patient and family to use good hand hygiene technique  - Identify and instruct in appropriate isolation precautions for identified infection/condition  Outcome: Progressing  Goal: Absence of fever/infection during neutropenic period  Description: INTERVENTIONS:  - Monitor WBC    Outcome: Progressing     Problem: GENITOURINARY - ADULT  Goal: Maintains or returns to baseline urinary function  Description: INTERVENTIONS:  - Assess urinary function  - Encourage oral fluids to ensure adequate hydration if ordered  - Administer IV fluids as ordered to ensure adequate hydration  - Administer ordered medications as needed  - Offer frequent toileting  - Follow urinary retention protocol if ordered  Outcome: Progressing  Goal: Absence of urinary retention  Description: INTERVENTIONS:  - Assess patients ability to void and empty bladder  - Monitor I/O  - Bladder scan as needed  - Discuss with physician/AP medications to alleviate retention as needed  - Discuss catheterization for long term situations as appropriate  Outcome: Progressing  Goal: Urinary catheter remains patent  Description: INTERVENTIONS:  - Assess patency of urinary catheter  - If patient has a chronic perez, consider changing catheter if non-functioning  - Follow guidelines for intermittent irrigation of non-functioning urinary catheter  Outcome: Progressing

## 2022-06-19 NOTE — H&P
[facility]  H&P- Brian Sellers 9/24/1922, 80 y o  female MRN: [id number]  Unit/Bed#: E5 -01 Encounter: [id number]  Primary Care Provider: Yoseph Fonseca PA-C   Date and time admitted to hospital: 6/18/2022  2:48 PM    * UTI (urinary tract infection)  Assessment & Plan  · Patient presented to ED with complaint of bilateral flank pain  · CT with evidence of circumferential bladder wall thickening suggesting cystitis  · UA with innumerable wbc's, nitrite positive  · Prior urine culture growing Pseudomonas  · Recheck urine culture  · Received IV Rocephin in the ED, will switch to IV cefepime    Lumbar paraspinal muscle spasm  Assessment & Plan  · Patient complaining of lower back pain, may be related to UTI verses muscle spasm  · Trial scheduled Tylenol  · PT/OT consults    CKD (chronic kidney disease)  Assessment & Plan  Lab Results   Component Value Date    EGFR 43 06/18/2022    EGFR 37 06/06/2022    EGFR 33 03/08/2022    CREATININE 1 07 06/18/2022    CREATININE 1 20 06/06/2022    CREATININE 1 33 (H) 03/08/2022     · Creatinine stable at 1 07    Hyponatremia  Assessment & Plan  · Sodium 132 at time of admission  · Likely due to poor oral intake  · IV fluids  · Recheck in a m  Essential hypertension  Assessment & Plan  · BP slightly elevated at time of admission  · Continue Norvasc    Type 2 diabetes mellitus ([facility] [address] )  Assessment & Plan  Lab Results   Component Value Date    HGBA1C 6 4 05/09/2022       No results for input(s): POCGLU in the last 72 hours  Blood Sugar Average: Last 72 hrs:  ·  maintained outpatient on Januvia, hold while inpatient  · Sliding scale insulin coverage with Accu-Cheks    Hypothyroidism  Assessment & Plan  · Continue daily levothyroxine    VTE Pharmacologic Prophylaxis: VTE Score: 4 Moderate Risk (Score 3-4) - Pharmacological DVT Prophylaxis Ordered: heparin  Code Status: Prior DNR DNI  Discussion with family: None       Anticipated Length of Stay: Patient will be admitted on an inpatient basis with an anticipated length of stay of greater than 2 midnights secondary to Urinary tract infection  Total Time for Visit, including Counseling / Coordination of Care: 60 minutes Greater than 50% of this total time spent on direct patient counseling and coordination of care  Chief Complaint:  Flank pain    History of Present Illness:  Tiffanie Bravo is a 80 y o  female with a PMH of CAD, CHF, diabetes, hypertension, hyperlipidemia who presents with flank pain  Patient presented to the hospital with complaint bilateral flank pain  She reports the pain is worse on the left than is on the right  She does report the pain radiates into her lower abdomen  She reports is been ongoing for the past 2 weeks but is getting progressively worse  Took Tylenol without relief  She does report she was diagnosed with urinary tract infection and completed 2 courses of antibiotics without improvement her symptoms  She denies any recent fevers or chills  Denies any associated chest pain, shortness of breath  Review of Systems:  Review of Systems   Constitutional: Positive for appetite change  Negative for chills and fever  HENT: Negative for trouble swallowing  Eyes: Negative for visual disturbance  Respiratory: Negative for cough and shortness of breath  Cardiovascular: Negative for chest pain and leg swelling  Gastrointestinal: Negative for abdominal pain, nausea and vomiting  Genitourinary: Positive for flank pain  Musculoskeletal: Negative for back pain and gait problem  Skin: Negative for rash  Neurological: Negative for dizziness and weakness  Psychiatric/Behavioral: Negative for confusion         Past Medical and Surgical History:   Past Medical History:   Diagnosis Date    Asthma     Atypical chest pain     CAD (coronary artery disease)     Candidal intertrigo     CHF (congestive heart failure) (HCC)     Depression     [email or web address]     Diabetic neuropathy ([email or web address]     Diverticulitis     Dyslipidemia     Female bladder prolapse     Gastric ulcer     Glaucoma     HTN (hypertension)     Hyperlipidemia     Hypothyroidism 4/18/2016    RAD (reactive airway disease)        Past Surgical History:   Procedure Laterality Date    COLONOSCOPY      ESOPHAGOGASTRODUODENOSCOPY  2011    HYSTERECTOMY      TONSILLECTOMY         Meds/Allergies:  Prior to Admission medications    Medication Sig Start Date End Date Taking? Authorizing Provider   acetaminophen (TYLENOL) 325 mg tablet Take 2 tablets (650 mg total) by mouth every 6 (six) hours as needed for mild pain 1/24/21  Yes Chris Bell MD   amLODIPine (NORVASC) 5 mg tablet Take 1 tablet (5 mg total) by mouth daily 6/1/21  Yes Ananda Geronimo PA-C   aspirin 81 MG tablet Take 81 mg by mouth daily  Yes Historical Provider, MD   Cholecalciferol (VITAMIN D3) 2000 units capsule Take 2,000 Units by mouth daily    Yes Historical Provider, MD   Diclofenac Sodium (VOLTAREN) 1 % Voltaren 1 % topical gel   Yes Historical Provider, MD   dorzolamide (TRUSOPT) 2 % ophthalmic solution Administer 1 drop to both eyes 3 (three) times a day     Yes Historical Provider, MD   gabapentin (NEURONTIN) 600 MG tablet TAKE 1/2 TABLET IN THE MORNING AND AFTERNOON AND TAKE 1 TABLET AT BEDTIME 3/5/22  Yes Giancarlo Salazar PA-C   Incontinence Supply Disposable (SELECT DISPOSABLE UNDERWEAR LG) MISC  6/14/19  Yes Historical Provider, MD   Lancets (ACCU-CHEK SOFT TOUCH) lancets Testing 1 time daily  Dx: E11 9 4/28/20  Yes Ananda Geronimo PA-C   latanoprost (XALATAN) 0 005 % ophthalmic solution Apply 1 drop to eye daily at bedtime Both eyes 3/16/19  Yes Denny Zamudio MD   levothyroxine 75 mcg tablet TAKE 1 TABLET EVERY DAY 9/8/21  Yes Ananda Geronimo PA-C   Misc   Devices (Transport Chair) MISC Use if needed (with outings outside of the house ) 4/26/22  Yes Ananda Geronimo PA-C OXcarbazepine (TRILEPTAL) 150 mg tablet TAKE 1 TABLET qhs (PLEASE CALL WITH DIZZINESS)  Patient taking differently: Take 150 mg by mouth as needed TAKE 1 TABLET qhs (PLEASE CALL WITH DIZZINESS) 3/11/22  Yes Allie Mccormack PA-C   potassium chloride (K-DUR,KLOR-CON) 20 mEq tablet TAKE 1 TABLET EVERY DAY 9/30/21  Yes Shelia Cardozo DO   sitaGLIPtin (Januvia) 50 mg tablet Take 1 tablet (50 mg total) by mouth daily 1/6/22  Yes Chucho Gore PA-C   vitamin B-12 (CYANOCOBALAMIN) 250 MCG tablet Take 250 mcg by mouth daily   Yes Mayuri Watson MD   calcium carbonate (TUMS) 500 mg chewable tablet Chew 1 tablet (500 mg total) 2 (two) times a day as needed for indigestion or heartburn  Patient not taking: No sig reported 3/11/22   Allie Mccormack PA-C   cephalexin (KEFLEX) 500 mg capsule Take 1 capsule (500 mg total) by mouth every 8 (eight) hours for 7 days 6/10/22 6/17/22  Dean Argueta MD   famotidine (PEPCID) 20 mg tablet Take 1 tablet (20 mg total) by mouth 2 (two) times a day  Patient not taking: Reported on 6/18/2022 3/11/22   Allie Mccormack PA-C   fluticasone-vilanterol (BREO ELLIPTA) 100-25 mcg/inh inhaler Inhale 1 puff daily Rinse mouth after use    Patient not taking: Reported on 6/18/2022 10/15/19   Shelia Cardozo DO   furosemide (LASIX) 20 mg tablet TAKE 1 TABLET IN THE MORNING  AND TAKE 2 TABLETS IN THE EVENING  Patient not taking: Reported on 6/18/2022 5/17/22   Shelia Cardozo DO   Melatonin 5 MG CAPS Take 2 capsules (10 mg total) by mouth daily at bedtime  Patient not taking: No sig reported 3/9/22   Sophie Ball MD   OLANZapine (ZyPREXA) 5 mg tablet Take 0 5 tablets (2 5 mg total) by mouth daily  Patient not taking: No sig reported 3/9/22   Sophie Ball MD   omeprazole (PriLOSEC) 40 MG capsule Take 1 capsule (40 mg total) by mouth daily  Patient not taking: Reported on 6/18/2022 6/1/21   Chucho Gore PA-C   sucralfate (CARAFATE) 1 g tablet Take 1 tablet (1 g total) by mouth 4 (four) times a day (before meals and at bedtime) for 10 days 3/11/22 3/21/22  Laura Burrell PA-C     I have reviewed home medications with patient personally  Allergies: Allergies   Allergen Reactions    Levaquin [Levofloxacin] Myalgia    Statins      Other reaction(s): Weakness  Category: Adverse Reaction;        Social History:  Marital Status:    Occupation:  Unknown  Patient Pre-hospital Living Situation: Home  Patient Pre-hospital Level of Mobility: walks  Patient Pre-hospital Diet Restrictions:  None  Substance Use History:   Social History     Substance and Sexual Activity   Alcohol Use No    Comment: Social Alcohol Use -- as per allscripts (pt denies all alcohol use)     Social History     Tobacco Use   Smoking Status Never Smoker   Smokeless Tobacco Never Used     Social History     Substance and Sexual Activity   Drug Use No       Family History:  Family History   Problem Relation Age of Onset    Breast cancer Mother     Hypothyroidism Mother     Hypertension Father     Breast cancer Sister     Thyroid disease Sister     Hypertension Sister        Physical Exam:     Vitals:   Blood Pressure: 166/74 (06/18/22 2010)  Pulse: 65 (06/18/22 2010)  Temperature: (!) 96 8 °F (36 °C) (06/18/22 2010)  Temp Source: Axillary (06/18/22 2010)  Respirations: 18 (06/18/22 2010)  SpO2: 95 % (06/18/22 2010)    Physical Exam  Vitals reviewed  Constitutional:       General: She is not in acute distress  HENT:      Head: Normocephalic and atraumatic  Eyes:      General: No scleral icterus  Conjunctiva/sclera: Conjunctivae normal    Cardiovascular:      Rate and Rhythm: Normal rate and regular rhythm  Heart sounds: No murmur heard  Pulmonary:      Effort: Pulmonary effort is normal  No respiratory distress  Breath sounds: Normal breath sounds  Abdominal:      General: Bowel sounds are normal  There is no distension  Palpations: Abdomen is soft  Tenderness:  There is no abdominal tenderness  Musculoskeletal:         General: Tenderness (Left flank) present  Cervical back: Neck supple  Right lower leg: No edema  Left lower leg: No edema  Skin:     General: Skin is warm and dry  Neurological:      Mental Status: She is alert and oriented to person, place, and time  Psychiatric:         Mood and Affect: Mood normal          Behavior: Behavior normal           Additional Data:     Lab Results:  Results from last 7 days   Lab Units 06/18/22  1535   WBC Thousand/uL 5 29   HEMOGLOBIN g/dL 13 2   HEMATOCRIT % 40 0   PLATELETS Thousands/uL 179   NEUTROS PCT % 70   LYMPHS PCT % 14   MONOS PCT % 11   EOS PCT % 4     Results from last 7 days   Lab Units 06/18/22  1535   SODIUM mmol/L 132*   POTASSIUM mmol/L 3 7   CHLORIDE mmol/L 98*   CO2 mmol/L 25   BUN mg/dL 19   CREATININE mg/dL 1 07   ANION GAP mmol/L 9   CALCIUM mg/dL 8 9   ALBUMIN g/dL 3 1*   TOTAL BILIRUBIN mg/dL 0 27   ALK PHOS U/L 110   ALT U/L 18   AST U/L 17   GLUCOSE RANDOM mg/dL 151*                        Imaging: Reviewed radiology reports from this admission including: abdominal/pelvic CT  CT abdomen pelvis wo contrast   Final Result by Syd Lema MD (06/18 1720)      1  Circumferential bladder wall thickening, suggesting cystitis in the appropriate clinical and laboratory setting  2  No hydronephrosis or urinary tract calculus   3  Colonic diverticulosis without evidence of acute diverticulitis or bowel obstruction               Workstation performed: [id number]             EKG and Other Studies Reviewed on Admission:   · EKG: No EKG obtained  ** Please Note: This note has been constructed using a voice recognition system   **

## 2022-06-19 NOTE — ASSESSMENT & PLAN NOTE
· Sodium 132 at time of admission  · Likely due to poor oral intake  · IV fluids  · Recheck in randa torres

## 2022-06-19 NOTE — ASSESSMENT & PLAN NOTE
Lab Results   Component Value Date    EGFR 43 06/18/2022    EGFR 37 06/06/2022    EGFR 33 03/08/2022    CREATININE 1 07 06/18/2022    CREATININE 1 20 06/06/2022    CREATININE 1 33 (H) 03/08/2022     · Creatinine stable at 1 07

## 2022-06-19 NOTE — ASSESSMENT & PLAN NOTE
This is a 80-year-old female with history of chronic diastolic CHF, CAD, diabetes mellitus, hypertension, hyperlipidemia presented with flank pain      · CT with evidence of circumferential bladder wall thickening suggesting cystitis  · UA with innumerable wbc's, nitrite positive  · Prior urine culture growing Pseudomonas  · Will follow up urine cultures  · Continue cefepime

## 2022-06-19 NOTE — ASSESSMENT & PLAN NOTE
Lab Results   Component Value Date    EGFR 41 06/19/2022    EGFR 43 06/18/2022    EGFR 37 06/06/2022    CREATININE 1 10 06/19/2022    CREATININE 1 07 06/18/2022    CREATININE 1 20 06/06/2022     · Creatinine stable at 1 07

## 2022-06-19 NOTE — ASSESSMENT & PLAN NOTE
Lab Results   Component Value Date    HGBA1C 6 4 05/09/2022       Recent Labs     06/18/22  2140 06/19/22  0713   POCGLU 181* 132       Blood Sugar Average: Last 72 hrs:  · (P) 156 5 maintained outpatient on Januvia, hold while inpatient  · Sliding scale insulin coverage with Accu-Cheks

## 2022-06-19 NOTE — ASSESSMENT & PLAN NOTE
· Patient complaining of lower back pain, may be related to UTI verses muscle spasm  · Trial scheduled Tylenol  · PT/OT consults

## 2022-06-19 NOTE — ASSESSMENT & PLAN NOTE
Wt Readings from Last 3 Encounters:   06/09/22 73 2 kg (161 lb 6 4 oz)   06/06/22 73 kg (161 lb)   05/23/22 73 9 kg (163 lb)     · History of chronic diastolic CHF  · Maintained on furosemide 20 mg in the a m  And 40 mg in the p m    · Patient hyponatremia, appears overall euvolemic will resume at lower dose of furosemide 20 mg b i d   · Monitor I and O, daily weight

## 2022-06-19 NOTE — ASSESSMENT & PLAN NOTE
Lab Results   Component Value Date    HGBA1C 6 4 05/09/2022       No results for input(s): POCGLU in the last 72 hours      Blood Sugar Average: Last 72 hrs:  ·  maintained outpatient on Januvia, hold while inpatient  · Sliding scale insulin coverage with Accu-Cheks

## 2022-06-19 NOTE — UTILIZATION REVIEW
Inpatient Admission Authorization Request   NOTIFICATION OF INPATIENT ADMISSION/INPATIENT AUTHORIZATION REQUEST   SERVICING FACILITY:   [address] [place] [address], [facility], [facility]  Tax ID: [id number]  NPI: [id number]  Place of Service: Inpatient [address] [facility]  [address]  Place of Service Code: 24     ATTENDING PROVIDER:  Attending Name and NPI#: Gina Macma [[id number]]  Address: [address] [place] [address], [place] [address] [address] [facility]  Phone: [phone]     UTILIZATION REVIEW CONTACT:  Norah Pope Utilization   Network Utilization Review Department  Phone: [phone]  Fax: [phone]  Email: [email or web address]     PHYSICIAN ADVISORY SERVICES:  FOR UPOW-RP-SEYU REVIEW - MEDICAL NECESSITY DENIAL  Phone: [phone]  Fax: [phone]  Email: [email or web address]     TYPE OF REQUEST:  Inpatient Status     ADMISSION INFORMATION:  ADMISSION DATE/TIME: 6/18/22  6:41 PM  PATIENT DIAGNOSIS CODE/DESCRIPTION:  Back pain [M54 9]  UTI (urinary tract infection) [N39 0]  Hip pain [M25 559]  DISCHARGE DATE/TIME: No discharge date for patient encounter  IMPORTANT INFORMATION:  Please contact the Norah Pope directly with any questions or concerns regarding this request  Department voicemails are confidential     Send requests for admission clinical reviews, concurrent reviews, approvals, and administrative denials due to lack of clinical to fax [phone]

## 2022-06-19 NOTE — PLAN OF CARE
Problem: MOBILITY - ADULT  Goal: Maintain or return to baseline ADL function  Description: INTERVENTIONS:  -  Assess patient's ability to carry out ADLs; assess patient's baseline for ADL function and identify physical deficits which impact ability to perform ADLs (bathing, care of mouth/teeth, toileting, grooming, dressing, etc )  - Assess/evaluate cause of self-care deficits   - Assess range of motion  - Assess patient's mobility; develop plan if impaired  - Assess patient's need for assistive devices and provide as appropriate  - Encourage maximum independence but intervene and supervise when necessary  - Involve family in performance of ADLs  - Assess for home care needs following discharge   - Consider OT consult to assist with ADL evaluation and planning for discharge  - Provide patient education as appropriate  Outcome: Progressing  Goal: Maintains/Returns to pre admission functional level  Description: INTERVENTIONS:  - Perform BMAT or MOVE assessment daily    - Set and communicate daily mobility goal to care team and patient/family/caregiver  - Collaborate with rehabilitation services on mobility goals if consulted  - Perform Range of Motion  times a day  - Reposition patient every  hours    - Dangle patient  times a day  - Stand patient  times a day  - Ambulate patient  times a day  - Out of bed to chair  times a day   - Out of bed for meals  times a day  - Out of bed for toileting  - Record patient progress and toleration of activity level   Outcome: Progressing     Problem: PAIN - ADULT  Goal: Verbalizes/displays adequate comfort level or baseline comfort level  Description: Interventions:  - Encourage patient to monitor pain and request assistance  - Assess pain using appropriate pain scale  - Administer analgesics based on type and severity of pain and evaluate response  - Implement non-pharmacological measures as appropriate and evaluate response  - Consider cultural and social influences on pain and pain management  - Notify physician/advanced practitioner if interventions unsuccessful or patient reports new pain  Outcome: Progressing     Problem: INFECTION - ADULT  Goal: Absence or prevention of progression during hospitalization  Description: INTERVENTIONS:  - Assess and monitor for signs and symptoms of infection  - Monitor lab/diagnostic results  - Monitor all insertion sites, i e  indwelling lines, tubes, and drains  - Monitor endotracheal if appropriate and nasal secretions for changes in amount and color  - [place] appropriate cooling/warming therapies per order  - Administer medications as ordered  - Instruct and encourage patient and family to use good hand hygiene technique  - Identify and instruct in appropriate isolation precautions for identified infection/condition  Outcome: Progressing  Goal: Absence of fever/infection during neutropenic period  Description: INTERVENTIONS:  - Monitor WBC    Outcome: Progressing     Problem: GENITOURINARY - ADULT  Goal: Maintains or returns to baseline urinary function  Description: INTERVENTIONS:  - Assess urinary function  - Encourage oral fluids to ensure adequate hydration if ordered  - Administer IV fluids as ordered to ensure adequate hydration  - Administer ordered medications as needed  - Offer frequent toileting  - Follow urinary retention protocol if ordered  Outcome: Progressing  Goal: Absence of urinary retention  Description: INTERVENTIONS:  - Assess patients ability to void and empty bladder  - Monitor I/O  - Bladder scan as needed  - Discuss with physician/AP medications to alleviate retention as needed  - Discuss catheterization for long term situations as appropriate  Outcome: Progressing  Goal: Urinary catheter remains patent  Description: INTERVENTIONS:  - Assess patency of urinary catheter  - If patient has a chronic perez, consider changing catheter if non-functioning  - Follow guidelines for intermittent irrigation of non-functioning urinary catheter  Outcome: Progressing     Problem: MOBILITY - ADULT  Goal: Maintain or return to baseline ADL function  Description: INTERVENTIONS:  -  Assess patient's ability to carry out ADLs; assess patient's baseline for ADL function and identify physical deficits which impact ability to perform ADLs (bathing, care of mouth/teeth, toileting, grooming, dressing, etc )  - Assess/evaluate cause of self-care deficits   - Assess range of motion  - Assess patient's mobility; develop plan if impaired  - Assess patient's need for assistive devices and provide as appropriate  - Encourage maximum independence but intervene and supervise when necessary  - Involve family in performance of ADLs  - Assess for home care needs following discharge   - Consider OT consult to assist with ADL evaluation and planning for discharge  - Provide patient education as appropriate  Outcome: Progressing  Goal: Maintains/Returns to pre admission functional level  Description: INTERVENTIONS:  - Perform BMAT or MOVE assessment daily    - Set and communicate daily mobility goal to care team and patient/family/caregiver  - Collaborate with rehabilitation services on mobility goals if consulted  - Perform Range of Motion  times a day  - Reposition patient every  hours    - Dangle patient  times a day  - Stand patient times a day  - Ambulate patient times a day  - Out of bed to chair  times a day   - Out of bed for meaL times a day  - Out of bed for toileting  - Record patient progress and toleration of activity level   Outcome: Progressing     Problem: PAIN - ADULT  Goal: Verbalizes/displays adequate comfort level or baseline comfort level  Description: Interventions:  - Encourage patient to monitor pain and request assistance  - Assess pain using appropriate pain scale  - Administer analgesics based on type and severity of pain and evaluate response  - Implement non-pharmacological measures as appropriate and evaluate response  - Consider cultural and social influences on pain and pain management  - Notify physician/advanced practitioner if interventions unsuccessful or patient reports new pain  Outcome: Progressing     Problem: INFECTION - ADULT  Goal: Absence or prevention of progression during hospitalization  Description: INTERVENTIONS:  - Assess and monitor for signs and symptoms of infection  - Monitor lab/diagnostic results  - Monitor all insertion sites, i e  indwelling lines, tubes, and drains  - Monitor endotracheal if appropriate and nasal secretions for changes in amount and color  - [place] appropriate cooling/warming therapies per order  - Administer medications as ordered  - Instruct and encourage patient and family to use good hand hygiene technique  - Identify and instruct in appropriate isolation precautions for identified infection/condition  Outcome: Progressing  Goal: Absence of fever/infection during neutropenic period  Description: INTERVENTIONS:  - Monitor WBC    Outcome: Progressing     Problem: GENITOURINARY - ADULT  Goal: Maintains or returns to baseline urinary function  Description: INTERVENTIONS:  - Assess urinary function  - Encourage oral fluids to ensure adequate hydration if ordered  - Administer IV fluids as ordered to ensure adequate hydration  - Administer ordered medications as needed  - Offer frequent toileting  - Follow urinary retention protocol if ordered  Outcome: Progressing  Goal: Absence of urinary retention  Description: INTERVENTIONS:  - Assess patients ability to void and empty bladder  - Monitor I/O  - Bladder scan as needed  - Discuss with physician/AP medications to alleviate retention as needed  - Discuss catheterization for long term situations as appropriate  Outcome: Progressing  Goal: Urinary catheter remains patent  Description: INTERVENTIONS:  - Assess patency of urinary catheter  - If patient has a chronic perez, consider changing catheter if non-functioning  - Follow guidelines for intermittent irrigation of non-functioning urinary catheter  Outcome: Progressing

## 2022-06-19 NOTE — ASSESSMENT & PLAN NOTE
· Patient presented to ED with complaint of bilateral flank pain  · CT with evidence of circumferential bladder wall thickening suggesting cystitis  · UA with innumerable wbc's, nitrite positive  · Prior urine culture growing Pseudomonas  · Recheck urine culture  · Received IV Rocephin in the ED, will switch to IV cefepime

## 2022-06-19 NOTE — ASSESSMENT & PLAN NOTE
· Patient complaining of lower back pain, may be related to UTI verses muscle spasm  · Trial scheduled Tylenol, lidocaine patch, tramadol prn  · PT/OT eval

## 2022-06-20 LAB
ANION GAP SERPL CALCULATED.3IONS-SCNC: 6 MMOL/L (ref 4–13)
ATRIAL RATE: 68 BPM
ATRIAL RATE: 68 BPM
BASOPHILS # BLD AUTO: 0.05 THOUSANDS/ΜL (ref 0–0.1)
BASOPHILS NFR BLD AUTO: 1 % (ref 0–1)
BUN SERPL-MCNC: 16 MG/DL (ref 5–25)
CALCIUM SERPL-MCNC: 9 MG/DL (ref 8.3–10.1)
CARDIAC TROPONIN I PNL SERPL HS: 11 NG/L (ref 8–18)
CHLORIDE SERPL-SCNC: 102 MMOL/L (ref 100–108)
CO2 SERPL-SCNC: 26 MMOL/L (ref 21–32)
CREAT SERPL-MCNC: 1.12 MG/DL (ref 0.6–1.3)
EOSINOPHIL # BLD AUTO: 0.22 THOUSAND/ΜL (ref 0–0.61)
EOSINOPHIL NFR BLD AUTO: 5 % (ref 0–6)
ERYTHROCYTE [DISTWIDTH] IN BLOOD BY AUTOMATED COUNT: 13.3 % (ref 11.6–15.1)
GFR SERPL CREATININE-BSD FRML MDRD: 40 ML/MIN/1.73SQ M
GLUCOSE SERPL-MCNC: 122 MG/DL (ref 65–140)
GLUCOSE SERPL-MCNC: 125 MG/DL (ref 65–140)
GLUCOSE SERPL-MCNC: 129 MG/DL (ref 65–140)
GLUCOSE SERPL-MCNC: 133 MG/DL (ref 65–140)
GLUCOSE SERPL-MCNC: 134 MG/DL (ref 65–140)
HCT VFR BLD AUTO: 39.5 % (ref 34.8–46.1)
HGB BLD-MCNC: 12.8 G/DL (ref 11.5–15.4)
IMM GRANULOCYTES # BLD AUTO: 0.03 THOUSAND/UL (ref 0–0.2)
IMM GRANULOCYTES NFR BLD AUTO: 1 % (ref 0–2)
LYMPHOCYTES # BLD AUTO: 1.12 THOUSANDS/ΜL (ref 0.6–4.47)
LYMPHOCYTES NFR BLD AUTO: 25 % (ref 14–44)
MCH RBC QN AUTO: 30.3 PG (ref 26.8–34.3)
MCHC RBC AUTO-ENTMCNC: 32.4 G/DL (ref 31.4–37.4)
MCV RBC AUTO: 93 FL (ref 82–98)
MONOCYTES # BLD AUTO: 0.52 THOUSAND/ΜL (ref 0.17–1.22)
MONOCYTES NFR BLD AUTO: 11 % (ref 4–12)
NEUTROPHILS # BLD AUTO: 2.61 THOUSANDS/ΜL (ref 1.85–7.62)
NEUTS SEG NFR BLD AUTO: 57 % (ref 43–75)
NRBC BLD AUTO-RTO: 0 /100 WBCS
P AXIS: 0 DEGREES
P AXIS: 24 DEGREES
PLATELET # BLD AUTO: 172 THOUSANDS/UL (ref 149–390)
PMV BLD AUTO: 10.2 FL (ref 8.9–12.7)
POTASSIUM SERPL-SCNC: 4.3 MMOL/L (ref 3.5–5.3)
PR INTERVAL: 244 MS
PR INTERVAL: 252 MS
QRS AXIS: 57 DEGREES
QRS AXIS: 58 DEGREES
QRSD INTERVAL: 110 MS
QRSD INTERVAL: 110 MS
QT INTERVAL: 408 MS
QT INTERVAL: 420 MS
QTC INTERVAL: 433 MS
QTC INTERVAL: 446 MS
RBC # BLD AUTO: 4.23 MILLION/UL (ref 3.81–5.12)
SODIUM SERPL-SCNC: 134 MMOL/L (ref 136–145)
T WAVE AXIS: -11 DEGREES
T WAVE AXIS: 3 DEGREES
VENTRICULAR RATE: 68 BPM
VENTRICULAR RATE: 68 BPM
WBC # BLD AUTO: 4.55 THOUSAND/UL (ref 4.31–10.16)

## 2022-06-20 PROCEDURE — 93010 ELECTROCARDIOGRAM REPORT: CPT | Performed by: INTERNAL MEDICINE

## 2022-06-20 PROCEDURE — 82948 REAGENT STRIP/BLOOD GLUCOSE: CPT

## 2022-06-20 PROCEDURE — 84484 ASSAY OF TROPONIN QUANT: CPT | Performed by: INTERNAL MEDICINE

## 2022-06-20 PROCEDURE — 80048 BASIC METABOLIC PNL TOTAL CA: CPT | Performed by: INTERNAL MEDICINE

## 2022-06-20 PROCEDURE — 97163 PT EVAL HIGH COMPLEX 45 MIN: CPT

## 2022-06-20 PROCEDURE — 93005 ELECTROCARDIOGRAM TRACING: CPT

## 2022-06-20 PROCEDURE — 97166 OT EVAL MOD COMPLEX 45 MIN: CPT

## 2022-06-20 PROCEDURE — 99232 SBSQ HOSP IP/OBS MODERATE 35: CPT | Performed by: INTERNAL MEDICINE

## 2022-06-20 PROCEDURE — 85025 COMPLETE CBC W/AUTO DIFF WBC: CPT | Performed by: INTERNAL MEDICINE

## 2022-06-20 RX ORDER — ALBUTEROL SULFATE 90 UG/1
2 AEROSOL, METERED RESPIRATORY (INHALATION) EVERY 4 HOURS PRN
Status: DISCONTINUED | OUTPATIENT
Start: 2022-06-20 | End: 2022-06-21 | Stop reason: HOSPADM

## 2022-06-20 RX ORDER — METHOCARBAMOL 500 MG/1
500 TABLET, FILM COATED ORAL EVERY 6 HOURS PRN
Status: DISCONTINUED | OUTPATIENT
Start: 2022-06-20 | End: 2022-06-21 | Stop reason: HOSPADM

## 2022-06-20 RX ADMIN — CALCIUM CARBONATE (ANTACID) CHEW TAB 500 MG 1000 MG: 500 CHEW TAB at 14:59

## 2022-06-20 RX ADMIN — DICLOFENAC SODIUM 2 G: 10 GEL TOPICAL at 17:44

## 2022-06-20 RX ADMIN — GABAPENTIN 300 MG: 300 CAPSULE ORAL at 10:19

## 2022-06-20 RX ADMIN — DICLOFENAC SODIUM 2 G: 10 GEL TOPICAL at 10:23

## 2022-06-20 RX ADMIN — ACETAMINOPHEN 325MG 650 MG: 325 TABLET ORAL at 05:20

## 2022-06-20 RX ADMIN — HEPARIN SODIUM 5000 UNITS: 5000 INJECTION INTRAVENOUS; SUBCUTANEOUS at 22:19

## 2022-06-20 RX ADMIN — LEVOTHYROXINE SODIUM 75 MCG: 75 TABLET ORAL at 05:20

## 2022-06-20 RX ADMIN — HEPARIN SODIUM 5000 UNITS: 5000 INJECTION INTRAVENOUS; SUBCUTANEOUS at 13:49

## 2022-06-20 RX ADMIN — LATANOPROST 1 DROP: 50 SOLUTION OPHTHALMIC at 22:19

## 2022-06-20 RX ADMIN — ASPIRIN 81 MG CHEWABLE TABLET 81 MG: 81 TABLET CHEWABLE at 10:19

## 2022-06-20 RX ADMIN — POTASSIUM CHLORIDE 20 MEQ: 1500 TABLET, EXTENDED RELEASE ORAL at 10:20

## 2022-06-20 RX ADMIN — CEFEPIME HYDROCHLORIDE 1000 MG: 1 INJECTION, POWDER, FOR SOLUTION INTRAMUSCULAR; INTRAVENOUS at 22:19

## 2022-06-20 RX ADMIN — ACETAMINOPHEN 325MG 650 MG: 325 TABLET ORAL at 13:49

## 2022-06-20 RX ADMIN — HEPARIN SODIUM 5000 UNITS: 5000 INJECTION INTRAVENOUS; SUBCUTANEOUS at 05:20

## 2022-06-20 RX ADMIN — ACETAMINOPHEN 325MG 650 MG: 325 TABLET ORAL at 22:20

## 2022-06-20 RX ADMIN — DORZOLAMIDE HYDROCHLORIDE 1 DROP: 20 SOLUTION/ DROPS OPHTHALMIC at 17:44

## 2022-06-20 RX ADMIN — GABAPENTIN 300 MG: 300 CAPSULE ORAL at 17:43

## 2022-06-20 RX ADMIN — FUROSEMIDE 20 MG: 20 TABLET ORAL at 10:20

## 2022-06-20 RX ADMIN — AMLODIPINE BESYLATE 5 MG: 5 TABLET ORAL at 10:20

## 2022-06-20 RX ADMIN — DORZOLAMIDE HYDROCHLORIDE 1 DROP: 20 SOLUTION/ DROPS OPHTHALMIC at 22:21

## 2022-06-20 RX ADMIN — LIDOCAINE 1 PATCH: 50 PATCH CUTANEOUS at 10:24

## 2022-06-20 RX ADMIN — FUROSEMIDE 20 MG: 20 TABLET ORAL at 17:43

## 2022-06-20 RX ADMIN — DORZOLAMIDE HYDROCHLORIDE 1 DROP: 20 SOLUTION/ DROPS OPHTHALMIC at 10:23

## 2022-06-20 RX ADMIN — DICLOFENAC SODIUM 2 G: 10 GEL TOPICAL at 22:21

## 2022-06-20 RX ADMIN — MELATONIN 6 MG: at 22:19

## 2022-06-20 RX ADMIN — CEFEPIME HYDROCHLORIDE 1000 MG: 1 INJECTION, POWDER, FOR SOLUTION INTRAMUSCULAR; INTRAVENOUS at 11:03

## 2022-06-20 NOTE — OCCUPATIONAL THERAPY NOTE
Occupational Therapy Evaluation     Patient Name: Brissa Fu  EYQKS'Z Date: 6/20/2022  Problem List  Principal Problem:    UTI (urinary tract infection)  Active Problems:    Hypothyroidism    CHF (congestive heart failure) ([facility])    Type 2 diabetes mellitus ([facility] )    Essential hypertension    Hyponatremia    CKD (chronic kidney disease)    Lumbar paraspinal muscle spasm    Past Medical History  Past Medical History:   Diagnosis Date    Asthma     Atypical chest pain     CAD (coronary artery disease)     Candidal intertrigo     CHF (congestive heart failure) ([facility])     Depression     Diabetes mellitus ([facility] )     Diabetic neuropathy ([facility] )     Diverticulitis     Dyslipidemia     Female bladder prolapse     Gastric ulcer     Glaucoma     HTN (hypertension)     Hyperlipidemia     Hypothyroidism 4/18/2016    RAD (reactive airway disease)      Past Surgical History  Past Surgical History:   Procedure Laterality Date    COLONOSCOPY      ESOPHAGOGASTRODUODENOSCOPY  2011    HYSTERECTOMY      TONSILLECTOMY           06/20/22 1007   OT Last Visit   OT Visit Date 06/20/22   Note Type   Note type Evaluation   Restrictions/Precautions   Weight Bearing Precautions Per Order No   Other Precautions Fall Risk;Pain;Multiple lines;O2;Bed Alarm; Chair Alarm  (edelmira)   Pain Assessment   Pain Assessment Tool 0-10   Pain Score 7   Pain Location/Orientation Location: Abdomen;Orientation: Bilateral;Location: Back  (mid back)   Hospital Pain Intervention(s) Ambulation/increased activity;Repositioned; Emotional support   Home Living   Type of [address] Two level;Performs ADLs on one level; Able to live on main level with bedroom/bathroom  (2 half STAR, 1st floor setup)   Bathroom Shower/Tub Walk-in shower   Bathroom Toilet Standard   Bathroom Equipment Grab bars in shower; Shower chair   Bathroom Accessibility Accessible   Home Equipment Walker;Cane   Additional Comments pt reports inside home uses  and in community uses [employer] Prior Function   Level of Umbarger Needs assistance with IADLs; Independent with ADLs and functional mobility; Needs assistance with ADLs and functional mobility  (assist w/ showers)   Lives With Daughter   Receives Help From Family   ADL Assistance Needs assistance  (assist for showers only)   IADLs Needs assistance   Falls in the last 6 months 0   Vocational Retired   Comments pt daughter provides transport and does cooking/cleaning; independent w/ medication management   Lifestyle   Autonomy per pt independent w/ Dressing, assist w/ showers, independent w/ functional transfers and mobility w/ RW or SPC   Reciprocal Relationships daughter   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 (difficulty now w/ small print w/ vision)   Subjective   Subjective "I would feel better if I didn't have m pain:   ADL   Where Assessed Chair   Eating Assistance 5  Supervision/Setup   Grooming Assistance 5  Supervision/Setup   UB [address] 5  Supervision/Setup   LB [address] 4  Minimal Assistance   [address] 5  Supervision/Setup   LB Dressing Assistance 5  [address]  4  Minimal Assistance   Bed Mobility   Supine to Sit 5  Supervision   Additional items Increased time required; Bedrails;HOB elevated   Transfers   Sit to Stand 5  Supervision   Additional items Increased time required;Armrests   Stand to Sit 5  Supervision   Additional items Increased time required;Armrests; Verbal cues   Functional Mobility   Functional Mobility 4  Minimal assistance   Additional Comments contact guard assist to supervision   Additional items Rolling walker   Balance   Static Sitting Good   Dynamic Sitting Fair +   Static Standing Fair   Dynamic Standing [employer] [address] -   Activity Tolerance   Activity Tolerance Patient tolerated treatment well;Patient limited by fatigue;Patient limited by pain   Medical Staff Made Aware PT [place]: Pt seen for co-session with skilled Physical therapist 2* clinically unstable presentation, medical complexity, performance deficits/functional limitations, limited activity tolerance and present impairments which are a regression from patient patient's baseline and impacting overall occupational performance   Nurse Made Aware appropriate to see per ZEN FONG   RUE Assessment   RUE Assessment WFL  (4/5)   LUE Assessment   LUE Assessment WFL  (4/5)   Hand Function   Gross Motor Coordination Functional   Fine Motor Coordination Functional   Sensation   Light Touch No apparent deficits   Additional Comments reports intermittent shocking sensation in UEs and LEs for months   Proprioception   Proprioception No apparent deficits   Vision-Basic Assessment   Current Vision   (reports difficulty w/ small print)   Vision - Complex Assessment   Ocular Range of Motion WFL   Acuity Able to read clock/calendar on wall without difficulty   Perception   Inattention/Neglect Appears intact   Cognition   Overall Cognitive Status WFL   Arousal/Participation Responsive; Alert; Cooperative   Attention Within functional limits   Orientation Level Oriented X4   Memory Within functional limits   Following Commands Follows all commands and directions without difficulty   Comments engages in appropriate conversations, pleasant and cooperative   Assessment   Limitation Decreased ADL status; Decreased Safe judgement during ADL;Decreased UE strength;Decreased cognition;Decreased endurance;Decreased self-care trans;Decreased high-level ADLs   Prognosis Good   Assessment Pt is a 80 y o  female seen for OT evaluation s/p admit to [facility] on 6/18/2022 w/ UTI (urinary tract infection) and flank pain  Comorbidities affecting pt's functional performance at time of assessment include: CHF, CAD, DM II, HTN, hyperlipidemia, CKD  Personal factors affecting pt at time of IE include:steps to enter environment, increased pain   Prior to admission, pt was living w/ daughter and reports independent w/ dressing and assist w/ showers, independent w/ functional transfers w/ RW, assist w/ IADLs, and assist w/ transport  Upon evaluation: Pt requires supervision bed mobility, supervision sit<>stand transfers, supervision functional mobility w/ RW, supervision-min assist LB ADLs, setup UB ADLs 2* the following deficits impacting occupational performance: increased pain, impaired balance, impaired functional reach, decreased activity tolerance  Pt to benefit from continued skilled OT tx while in the hospital to address deficits as defined above and maximize level of functional independence w ADL's and functional mobility  Occupational Performance areas to address include: grooming, bathing/shower, toilet hygiene, dressing, health maintenance, functional mobility, clothing management, cleaning and meal prep, AD training  From OT standpoint, recommendation at time of d/c would be home w/ family support  The patient's raw score on the AM-PAC Daily Activity inpatient short form is 21, standardized score is 44 27, greater than 39 4  Patients at this level are likely to benefit from discharge to home  Please refer to the recommendation of the Occupational Therapist for safe discharge planning  Goals   Patient Goals "to feel better"   LTG Time Frame 10-14   Long Term Goal please see below goals   Plan   Treatment Interventions ADL retraining;Functional transfer training;UE strengthening/ROM; Endurance training;Cognitive reorientation;Equipment evaluation/education;Patient/family training; Compensatory technique education; Energy conservation; Activityengagement   Goal Expiration Date 07/04/22   OT Frequency 1-2x/wk   Recommendation   OT Discharge Recommendation No rehabilitation needs  (HOME w/ continued family support/assist)   OT - OK to Discharge Yes  (when medically stable)   AM-PAC Daily Activity Inpatient   Lower Body Dressing 3   Bathing 3   Toileting 3   Upper Body Dressing 4   Grooming 4   Eating 4   Daily Activity Raw Score 21   Daily Activity Standardized Score (Calc for Raw Score >=11) 44 27   AM-PAC Applied Cognition Inpatient   Following a Speech/Presentation 4   Understanding Ordinary Conversation 4   Taking Medications 4   Remembering Where Things Are Placed or Put Away 3   Remembering List of 4-5 Errands 3   Taking Care of Complicated Tasks 3   Applied Cognition Raw Score 21   Applied Cognition Standardized Score 44 3     Occupational Therapy Goals to be met in 10-14 days:  1) Pt will improve activity tolerance to G for 30 min txment sessions to enhance ADLS  2) Pt will complete ADLs/self care/dressing w/ mod I   3) Pt will complete toileting w/ mod I w/ G hygiene/thoroughness using DME PRN  4) Pt will improve functional transfers on/off all surfaces using DME PRN w/ G balance/safety including toileting w/ mod I  5) Pt will improve fx'l mobility during I/ADl/leisure tasks using DME PRN w/ g balance/safety w/ mod I  6) Pt will engage in ongoing cognitive assessment w/ G participation to A w/ safe d/c planning/recommendations  7) Pt will demonstrate G carryover of pt/caregiver education and training as appropriate w/ mod I  w/ G tolerance  8) Pt will demonstrate 100% carryover of E C  techniques w/ mod I t/o fx'l I/ADL/leisure tasks w/o cues s/p skilled education  9) Pt will demonstrate improved b/l UE strength by 1 MMT grade to enhance ADLS and functional transfers    Documentation completed by: Alicia Askew MS, OTR/L

## 2022-06-20 NOTE — PLAN OF CARE
Problem: MOBILITY - ADULT  Goal: Maintain or return to baseline ADL function  Description: INTERVENTIONS:  -  Assess patient's ability to carry out ADLs; assess patient's baseline for ADL function and identify physical deficits which impact ability to perform ADLs (bathing, care of mouth/teeth, toileting, grooming, dressing, etc )  - Assess/evaluate cause of self-care deficits   - Assess range of motion  - Assess patient's mobility; develop plan if impaired  - Assess patient's need for assistive devices and provide as appropriate  - Encourage maximum independence but intervene and supervise when necessary  - Involve family in performance of ADLs  - Assess for home care needs following discharge   - Consider OT consult to assist with ADL evaluation and planning for discharge  - Provide patient education as appropriate  Outcome: Progressing  Goal: Maintains/Returns to pre admission functional level  Description: INTERVENTIONS:  - Perform BMAT or MOVE assessment daily    - Set and communicate daily mobility goal to care team and patient/family/caregiver  - Collaborate with rehabilitation services on mobility goals if consulted  - Perform Range of Motion  times a day  - Reposition patient every  hours    - Dangle patient  times a day  - Stand patient  times a day  - Ambulate patient  times a day  - Out of bed to chair  times a day   - Out of bed for meals times a day  - Out of bed for toileting  - Record patient progress and toleration of activity level   Outcome: Progressing     Problem: PAIN - ADULT  Goal: Verbalizes/displays adequate comfort level or baseline comfort level  Description: Interventions:  - Encourage patient to monitor pain and request assistance  - Assess pain using appropriate pain scale  - Administer analgesics based on type and severity of pain and evaluate response  - Implement non-pharmacological measures as appropriate and evaluate response  - Consider cultural and social influences on pain and pain management  - Notify physician/advanced practitioner if interventions unsuccessful or patient reports new pain  Outcome: Progressing     Problem: INFECTION - ADULT  Goal: Absence or prevention of progression during hospitalization  Description: INTERVENTIONS:  - Assess and monitor for signs and symptoms of infection  - Monitor lab/diagnostic results  - Monitor all insertion sites, i e  indwelling lines, tubes, and drains  - Monitor endotracheal if appropriate and nasal secretions for changes in amount and color  - [place] appropriate cooling/warming therapies per order  - Administer medications as ordered  - Instruct and encourage patient and family to use good hand hygiene technique  - Identify and instruct in appropriate isolation precautions for identified infection/condition  Outcome: Progressing  Goal: Absence of fever/infection during neutropenic period  Description: INTERVENTIONS:  - Monitor WBC    Outcome: Progressing     Problem: GENITOURINARY - ADULT  Goal: Maintains or returns to baseline urinary function  Description: INTERVENTIONS:  - Assess urinary function  - Encourage oral fluids to ensure adequate hydration if ordered  - Administer IV fluids as ordered to ensure adequate hydration  - Administer ordered medications as needed  - Offer frequent toileting  - Follow urinary retention protocol if ordered  Outcome: Progressing  Goal: Absence of urinary retention  Description: INTERVENTIONS:  - Assess patients ability to void and empty bladder  - Monitor I/O  - Bladder scan as needed  - Discuss with physician/AP medications to alleviate retention as needed  - Discuss catheterization for long term situations as appropriate  Outcome: Progressing  Goal: Urinary catheter remains patent  Description: INTERVENTIONS:  - Assess patency of urinary catheter  - If patient has a chronic perez, consider changing catheter if non-functioning  - Follow guidelines for intermittent irrigation of non-functioning urinary catheter  Outcome: Progressing

## 2022-06-20 NOTE — ASSESSMENT & PLAN NOTE
Wt Readings from Last 3 Encounters:   06/20/22 73 2 kg (161 lb 6 oz)   06/09/22 73 2 kg (161 lb 6 4 oz)   06/06/22 73 kg (161 lb)     · History of chronic diastolic CHF  · Maintained on furosemide 20 mg in the a m  And 40 mg in the p m  · Lasix resumed at lower dose 20 mg b i d   During hospital stay  · Patient appears euvolemic  · Monitor I and O, daily weight

## 2022-06-20 NOTE — PROGRESS NOTES
[facility]  Progress Note - Mikie aLmar 9/24/1922, 80 y o  female MRN: [id number]  Unit/Bed#: E5 -01 Encounter: [id number]  Primary Care Provider: Sal Arellano PA-C   Date and time admitted to hospital: 6/18/2022  2:48 PM    * UTI (urinary tract infection)  Assessment & Plan  This is a 40-year-old female with history of chronic diastolic CHF, CAD, diabetes mellitus, hypertension, hyperlipidemia presented with flank pain  · CT with evidence of circumferential bladder wall thickening suggesting cystitis  · UA with innumerable wbc's, nitrite positive  · Flank pain appears to be acute on chronic lumbar back pain likely worsened in setting of infection  · Prior urine culture growing Pseudomonas  · Continue cefepime  · Follow-up urine cultures    Lumbar paraspinal muscle spasm  Assessment & Plan  · Patient has chronic low back pain which is worse in setting of acute infection  · Trial scheduled Tylenol, lidocaine patch, tramadol prn  · Will add on low-dose muscle relaxant  · Appreciate PT/OT evaluation    CKD (chronic kidney disease)  Assessment & Plan  Lab Results   Component Value Date    EGFR 40 06/20/2022    EGFR 41 06/19/2022    EGFR 43 06/18/2022    CREATININE 1 12 06/20/2022    CREATININE 1 10 06/19/2022    CREATININE 1 07 06/18/2022     · Stable at baseline    Essential hypertension  Assessment & Plan  · Continue Norvasc    Type 2 diabetes mellitus ([address] )  Assessment & Plan  Well controlled  Continue sliding scale    CHF (congestive heart failure) (HCC)  Assessment & Plan  Wt Readings from Last 3 Encounters:   06/20/22 73 2 kg (161 lb 6 oz)   06/09/22 73 2 kg (161 lb 6 4 oz)   06/06/22 73 kg (161 lb)     · History of chronic diastolic CHF  · Maintained on furosemide 20 mg in the a m  And 40 mg in the p m  · Lasix resumed at lower dose 20 mg b i d   During hospital stay  · Patient appears euvolemic  · Monitor I and O, daily weight    Hypothyroidism  Assessment & Plan  · Continue daily levothyroxine    Addendum:  Patient complaining of chest pain this afternoon  Appears to be more epigastric  She describes sharp poking epigastric tenderness  Symptoms improved with Tums  EKG unremarkable  Random troponin unimpressive  Continue supportive care with tums  VTE Pharmacologic Prophylaxis:  Heparin    Patient Centered Rounds:  Patient care rounds were performed with nursing    Education and Discussions with Family / Patient:  Updated mother at the bedside    Time Spent for Care: 30  More than 50% of total time spent on counseling and coordination of care as described above  Current Length of Stay: 2 day(s)    Current Patient Status: Inpatient   Certification Statement: The patient will continue to require additional inpatient hospital stay due to need for IV medications    Discharge Plan:  Potential discharge in next 24 hours pending improvement    Code Status: Level 3 - DNAR and DNI      Subjective:   Patient seen and evaluated at bedside  Continues to have flank pain  No fevers or chills  Objective:     Vitals:   Temp (24hrs), Av 9 °F (36 1 °C), Min:96 5 °F (35 8 °C), Max:97 2 °F (36 2 °C)    Temp:  [96 5 °F (35 8 °C)-97 2 °F (36 2 °C)] 96 5 °F (35 8 °C)  HR:  [57-68] 57  Resp:  [18] 18  BP: (123-151)/(55-62) 130/62  SpO2:  [93 %-97 %] 93 %  Body mass index is 29 52 kg/m²  Input and Output Summary (last 24 hours): Intake/Output Summary (Last 24 hours) at 2022 1302  Last data filed at 2022 1209  Gross per 24 hour   Intake 380 ml   Output 300 ml   Net 80 ml       Physical Exam:     Physical Exam  Vitals reviewed  Constitutional:       General: She is not in acute distress  Appearance: She is well-developed  She is not ill-appearing, toxic-appearing or diaphoretic  HENT:      Head: Normocephalic and atraumatic  Mouth/Throat:      Pharynx: No oropharyngeal exudate  Eyes:      General: No scleral icterus       Conjunctiva/sclera: Conjunctivae normal    Cardiovascular:      Rate and Rhythm: Normal rate and regular rhythm  Heart sounds: Normal heart sounds  Pulmonary:      Effort: Pulmonary effort is normal  No respiratory distress  Breath sounds: Normal breath sounds  No wheezing or rales  Abdominal:      General: There is no distension  Palpations: Abdomen is soft  Tenderness: There is no abdominal tenderness  There is no guarding or rebound  Musculoskeletal:         General: No swelling, tenderness or deformity  Skin:     General: Skin is warm and dry  Neurological:      General: No focal deficit present  Mental Status: She is alert  Mental status is at baseline  Psychiatric:         Mood and Affect: Mood normal          Behavior: Behavior normal          Thought Content: Thought content normal          Judgment: Judgment normal          Additional Data:     Labs: I have reviewed pertinent results     Results from last 7 days   Lab Units 06/20/22  0511   WBC Thousand/uL 4 55   HEMOGLOBIN g/dL 12 8   HEMATOCRIT % 39 5   PLATELETS Thousands/uL 172   NEUTROS PCT % 57   LYMPHS PCT % 25   MONOS PCT % 11   EOS PCT % 5     Results from last 7 days   Lab Units 06/20/22  0511 06/19/22  0520 06/18/22  1535   SODIUM mmol/L 134*   < > 132*   POTASSIUM mmol/L 4 3   < > 3 7   CHLORIDE mmol/L 102   < > 98*   CO2 mmol/L 26   < > 25   BUN mg/dL 16   < > 19   CREATININE mg/dL 1 12   < > 1 07   ANION GAP mmol/L 6   < > 9   CALCIUM mg/dL 9 0   < > 8 9   ALBUMIN g/dL  --   --  3 1*   TOTAL BILIRUBIN mg/dL  --   --  0 27   ALK PHOS U/L  --   --  110   ALT U/L  --   --  18   AST U/L  --   --  17   GLUCOSE RANDOM mg/dL 122   < > 151*    < > = values in this interval not displayed           Results from last 7 days   Lab Units 06/20/22  1108 06/20/22  0719 06/19/22  2150 06/19/22  1538 06/19/22  1152 06/19/22  0713 06/18/22  2140   POC GLUCOSE mg/dl 134 125 130 129 144* 132 181*                 Imaging: I have reviewed pertinent imaging       Recent Cultures (last 7 days):     Results from last 7 days   Lab Units 06/18/22  1748   URINE CULTURE  70,000-79,000 cfu/ml Oxidase Positive gram negative angel*       Last 24 Hours Medication List:   Current Facility-Administered Medications   Medication Dose Route Frequency Provider Last Rate    acetaminophen  650 mg Oral Q8H Eloy Nipper, PA-C      albuterol  2 puff Inhalation Q4H PRN Jose Villalpando DO      amLODIPine  5 mg Oral Daily Chaya Row, PA-C      aspirin  81 mg Oral Daily [place] Row, PA-C      calcium carbonate  1,000 mg Oral Daily PRN [place] Row, PA-C      cefepime  1,000 mg Intravenous Q12H [place] Row, PA-C 1,000 mg (06/20/22 1103)    Diclofenac Sodium  2 g Topical 4x Daily [place] Row, PA-C      dorzolamide  1 drop Both Eyes TID So Parmar MD      furosemide  20 mg Oral BID (diuretic) So Parmar MD      gabapentin  300 mg Oral TID [place] Row, PA-C      heparin (porcine)  5,000 Units Subcutaneous [place], [place]      insulin lispro  1-5 Units Subcutaneous TID AC [place] Row, PA-C      insulin lispro  1-5 Units Subcutaneous HS [place] Row, PA-C      ipratropium-albuterol  3 mL Nebulization Q6H PRN So Parmar MD      latanoprost  1 drop Both Eyes HS [place] Row, PA-C      levothyroxine  75 mcg Oral Early Morning Chaya Row, PA-C      lidocaine  1 patch Topical Daily So Parmar MD      melatonin  6 mg Oral HS Chaya Row, PA-C      methocarbamol  500 mg Oral Q6H PRN Jose Villalpando DO      ondansetron  4 mg Intravenous Q6H PRN Chaya Row, PA-C      polyethylene glycol  17 g Oral Daily [place] Row, PA-C      potassium chloride  20 mEq Oral Daily [place] Row, PA-C      traMADol  50 mg Oral Q6H PRN So Parmar MD          Today, Patient Was Seen By: Jose Villalpando DO    ** Please Note: Dictation voice to text software may have been used in the creation of this document   **

## 2022-06-20 NOTE — PLAN OF CARE
Problem: OCCUPATIONAL THERAPY ADULT  Goal: Performs self-care activities at highest level of function for planned discharge setting  See evaluation for individualized goals  Description: Treatment Interventions: ADL retraining, Functional transfer training, UE strengthening/ROM, Endurance training, Cognitive reorientation, Equipment evaluation/education, Patient/family training, Compensatory technique education, Energy conservation, Activityengagement          See flowsheet documentation for full assessment, interventions and recommendations  Note: Limitation: Decreased ADL status, Decreased Safe judgement during ADL, Decreased UE strength, Decreased cognition, Decreased endurance, Decreased self-care trans, Decreased high-level ADLs  Prognosis: Good  Assessment: Pt is a 80 y o  female seen for OT evaluation s/p admit to [facility] on 6/18/2022 w/ UTI (urinary tract infection) and flank pain  Comorbidities affecting pt's functional performance at time of assessment include: CHF, CAD, DM II, HTN, hyperlipidemia, CKD  Personal factors affecting pt at time of IE include:steps to enter environment, increased pain  Prior to admission, pt was living w/ daughter and reports independent w/ dressing and assist w/ showers, independent w/ functional transfers w/ RW, assist w/ IADLs, and assist w/ transport  Upon evaluation: Pt requires supervision bed mobility, supervision sit<>stand transfers, supervision functional mobility w/ RW, supervision-min assist LB ADLs, setup UB ADLs 2* the following deficits impacting occupational performance: increased pain, impaired balance, impaired functional reach, decreased activity tolerance  Pt to benefit from continued skilled OT tx while in the hospital to address deficits as defined above and maximize level of functional independence w ADL's and functional mobility   Occupational Performance areas to address include: grooming, bathing/shower, toilet hygiene, dressing, health maintenance, functional mobility, clothing management, cleaning and meal prep, AD training  From OT standpoint, recommendation at time of d/c would be home w/ family support  The patient's raw score on the AM-PAC Daily Activity inpatient short form is 21, standardized score is 44 27, greater than 39 4  Patients at this level are likely to benefit from discharge to home  Please refer to the recommendation of the Occupational Therapist for safe discharge planning       OT Discharge Recommendation: No rehabilitation needs (HOME w/ continued family support/assist)  OT - OK to Discharge: Yes (when medically stable)

## 2022-06-20 NOTE — ASSESSMENT & PLAN NOTE
· Patient has chronic low back pain which is worse in setting of acute infection  · Trial scheduled Tylenol, lidocaine patch, tramadol prn  · Will add on low-dose muscle relaxant  · Appreciate PT/OT evaluation

## 2022-06-20 NOTE — ASSESSMENT & PLAN NOTE
Lab Results   Component Value Date    EGFR 40 06/20/2022    EGFR 41 06/19/2022    EGFR 43 06/18/2022    CREATININE 1 12 06/20/2022    CREATININE 1 10 06/19/2022    CREATININE 1 07 06/18/2022     · Stable at baseline

## 2022-06-20 NOTE — CASE MANAGEMENT
Case Management Assessment    Patient name Bishop Juares  Location [facility] /E5 -* MRN [id number]  : 1922 Date 2022       Current Admission Date: 2022  Current Admission Diagnosis:UTI (urinary tract infection)   Patient Active Problem List    Diagnosis Date Noted    Abnormal gait 2022    Lumbar paraspinal muscle spasm 2022    CKD (chronic kidney disease) 2022    Myalgia 2022    Flank pain 2022    Fall 2021    Tail bone pain 2021    Chronic nonintractable headache 2021    Vertigo 2021    Essential hypertension 2021    Hyponatremia 2021    UTI (urinary tract infection) 2021    Supraorbital neuralgia 2021    Myofascial muscle pain 2021    Cranial neuralgia 2021    Conductive hearing loss, bilateral 2021    Constipation 2021    Bilateral impacted cerumen 2021    Left hip pain 2021    Medicare annual wellness visit, subsequent 2020    Diarrhea 2020    Atypical facial pain 2020    Stable angina ([address] ) 2020    Neoplasm of face 2020    Chronic headache 2019    Paroxysmal nocturnal dyspnea 2019    Orthopnea 2019    Shortness of breath 2019    Type 2 diabetes mellitus ([address] ) 10/05/2019    Vitamin D deficiency 2019    Pain of both hip joints 2019    Candidal intertrigo 2019    Gastroesophageal reflux disease without esophagitis 2018    Insomnia 10/12/2018    CHF (congestive heart failure) ([address] ) 2018    Chest pain     Dyspnea on minimal exertion 2018    Shakiness 2017    Allergic rhinitis 2017    Hyperlipidemia     Female bladder prolapse     Bilateral cold feet 2016    Aortic stenosis 10/21/2016    Arteriosclerosis of coronary artery     Asthma     Anxiety disorder 2016    Osteoarthritis 2016    Hypothyroidism 04/18/2016    Trigeminal neuralgia 03/16/2016    Peripheral vascular disease (HCC)     Glaucoma, open angle, severe stage     Overactive bladder 01/20/2016    Hallucination 11/17/2015    General weakness 11/03/2015    Low back pain 11/03/2015    Hearing loss 07/27/2015    Mild cognitive impairment 07/27/2015    Dizziness 07/02/2015    Advanced age 06/03/2015    Irritable bowel 04/24/2015    Female cystocele 04/24/2015    Diverticulosis 04/03/2015    Hiatal hernia 04/03/2015    Ambulatory dysfunction 04/02/2015      LOS (days): 2  Geometric Mean LOS (GMLOS) (days): 2 90  Days to GMLOS:1 2     OBJECTIVE:    Risk of Unplanned Readmission Score: 19 06         Current admission status: Inpatient       Preferred Pharmacy:   [facility] ([facility] [facility]) [address] [address]  Phone: [phone] Fax: [phone]    [place] [id number]  Phone: [phone] Fax: [phone]    Primary Care Provider: Nicolas Gibbs PA-C    Primary Insurance: [facility]  Secondary Insurance:     ASSESSMENT:  [address] [facility], [address] [facility] Representative - Daughter   Primary Phone: [phone] (Home)  Mobile Phone: [phone]               Advance Directives  Does patient have a [address] [place] [address] Avenue?: No  Does patient have Advance Directives?: No  Primary Contact: Clara Taylor [phone]    Readmission Root Cause  30 Day Readmission: No    Patient Information  Admitted from[de-identified] Home  Mental Status: Alert  During Assessment patient was accompanied by: Daughter  Assessment information provided by[de-identified] Daughter, Patient  Primary Caregiver: Self  Support Systems: [id number] 18 entry access options   Select all that apply : Stairs  Number of steps to enter home : 1  Type of Current Residence: [address] [place] home  Upon entering residence, is there a bedroom on the main floor (no further steps)?: Yes  Upon entering residence, is there a bathroom on the main floor (no further steps)?: Yes  Living Arrangements: Lives w/ Daughter    Activities of Daily Living Prior to Admission  Functional Status: Independent  Completes ADLs independently?: Yes  Ambulates independently?: Yes  Does patient use assisted devices?: Yes  Assisted Devices (DME) used: David Kins, Home Oxygen concentrator, Shower Chair, Bedside Commode, [address]  DME Company Name (respiratory supplies): [employer]  O2 Rate(s): 2 liters @ HS  Does patient currently own DME?: Yes  What DME does the patient currently own?: Straight Cane, Walker, Shower Chair, Home Oxygen concentrator, Bedside Commode  Does patient have a history of Outpatient Therapy (PT/OT)?: No  Does the patient have a history of Short-Term Rehab?: No  Does patient have a history of HHC?: Yes ([facility])  Does patient currently have [facility]?: No    Patient Information Continued  Does patient have prescription coverage?: Yes  Does patient have a history of substance abuse?: No  Does patient have a history of Mental Health Diagnosis?: Yes (anxiety)  Has patient received inpatient treatment related to mental health in the last 2 years?: No    Means of Transportation  Means of Transport to [facility][de-identified] Family transport

## 2022-06-20 NOTE — PHYSICAL THERAPY NOTE
PT EVALUATION 09:45-10:05 ( 20 minutes)  -Incorrect flowsheet time  80 y o     [id number]    Back pain [M54 9]  UTI (urinary tract infection) [N39 0]  Hip pain [M25 559]    Past Medical History:   Diagnosis Date    Asthma     Atypical chest pain     CAD (coronary artery disease)     Candidal intertrigo     CHF (congestive heart failure) ([facility])     Depression     Diabetes mellitus (HCC)     Diabetic neuropathy ([facility] [address] )     Diverticulitis     Dyslipidemia     Female bladder prolapse     Gastric ulcer     Glaucoma     HTN (hypertension)     Hyperlipidemia     Hypothyroidism 4/18/2016    RAD (reactive airway disease)          Past Surgical History:   Procedure Laterality Date    COLONOSCOPY      ESOPHAGOGASTRODUODENOSCOPY  2011    HYSTERECTOMY      TONSILLECTOMY        06/20/22 0900   PT Last Visit   PT Visit Date 06/20/22   Note Type   Note type Evaluation   Pain Assessment   Pain Score 7   Pain Location/Orientation Location: Abdomen;Orientation: Bilateral   Restrictions/Precautions   Weight Bearing Precautions Per Order No   Other Precautions Fall Risk;O2;Pain;Multiple lines  ([place])   Home Living   Type of [address] [place] [address] Two level; Able to live on main level with bedroom/bathroom;Stairs to enter without rails  (2 half STAR)   Bathroom Shower/Tub Walk-in shower   Bathroom Toilet Standard   Bathroom Equipment Shower chair;Grab bars in [employer] Walker;Cane   Additional Comments resides with daughter who is retired and available to patient prn  Prior Function   Level of [place] Needs assistance with ADLs and functional mobility; Needs assistance with IADLs   Lives With Daughter   Receives Help From Family   ADL Assistance Independent  (A to shower)   IADLs Needs assistance   Falls in the last 6 months 0   Vocational Retired   Comments Use of RW in home, cane if going out of home     General   Additional Pertinent History Pt is 81 y/o female admitted with UTI and back pain  PT consulted  Family/Caregiver Present No   Cognition   Overall Cognitive Status WFL   Arousal/Participation Alert   Orientation Level Oriented X4   Following Commands Follows all commands and directions without difficulty   Comments Pleasant  Subjective   Subjective I had been feeling better, however the abd pain that goes around the back is increased again  RUE Assessment   RUE Assessment WFL  (as observed with functional reach/grasp )   LUE Assessment   LUE Assessment WFL  (as observed with functional reach/grasp )   RLE Assessment   RLE Assessment WFL  (grossly 4-/5)   LLE Assessment   LLE Assessment WFL  (grossly 4-  /5)   Coordination   Movements are Fluid and Coordinated 1   Light Touch   RLE Light Touch Grossly intact   LLE Light Touch Grossly intact   Bed Mobility   Supine to Sit 5  Supervision   Additional items Increased time required; Bedrails;HOB elevated   Additional Comments Increasd time to complete  RA O2 sat 95%   Transfers   Sit to Stand 5  Supervision   Additional items Increased time required;Verbal cues   Stand to Sit 5  Supervision   Additional items Increased time required;Armrests   Additional Comments Cues for hand placement  Ambulation/Elevation   Gait pattern Decreased foot clearance; Inconsistent mello; Short stride   Gait Assistance 5  Supervision   Additional items Verbal cues   Assistive Device Rolling walker   Distance Amb with RW 35'x1, 45'x1   + SMITH  RA O2 sat 93% despite  Ambulation/Elevation Additional Comments SMITH with ambulation  93% RA     Balance   Static Sitting Good   Dynamic Sitting Fair +   Static Standing Fair   Dynamic Standing Fair -   Ambulatory Fair -   Endurance Deficit   Endurance Deficit Yes   Endurance Deficit Description SMITH, fatigue, pain   Activity Tolerance   Activity Tolerance Patient limited by fatigue;Patient tolerated treatment well   Medical Staff Made Aware NurseYousuf:   Nurse Made Aware yes   Assessment Prognosis Good   Problem List Decreased strength;Decreased endurance; Impaired balance;Decreased mobility; Impaired vision;Pain   Assessment Kelli Scott is a 80 y o  female with a PMH of CAD, CHF, diabetes, hypertension, hyperlipidemia who presents with flank pain  Admitted with UTI, LBP, hyponatremia  PT consulted  Up and OOB as tolerated orders  Prior to admission resides with daughter who is available 25* as needed  Independent with use of RW in home, cane in community  A to shower  No hx of falls reported  Currently presents with mild functional limitations compared to baseline given decreased activity tolerance with observed SMITH, fatigue, pain  Impaired balance, general strength, functional mobility and locomotion requiring RW support  Gait slowed, decreased foot clearance and step length   + SMITH  RA O2 sat 93% despite  Given impairments will benefit from skilled PT in order to optimize outcomes and facilitate return to independence  The patient's AM-PAC Basic Mobility Inpatient Short Form Raw Score is 20  A Raw score of greater than 16 suggests the patient may benefit from discharge to home  Please also refer to the recommendation of the Physical Therapist for safe discharge planning  Anticipate given support in home environment, will be able to return home with support of daughter at d/c  Goals   Patient Goals to feel better   [facility] Expiration Date 06/30/22   Short Term Goal #1 10 days: 1)  Pt will perform bed mobility with Key demonstrating appropriate technique 100% of the time in order to improve function  2)  Perform all transfers with Key demonstrating safe and appropriate technique 100% of the time in order to improve ability to negotiate safely in home environment  3) Amb with least restrictive AD > 150'x1 with mod I in order to demonstrate ability to negotiate in home environment  4)  Improve overall strength and balance 1/2 grade in order to optimize ability to perform functional tasks and reduce fall risk  5) Increase activity tolerance to 45 minutes in order to improve endurance to functional tasks  6)  Negotiate stairs using most appropriate technique and [place] in order to be able to negotiate safely into home environment  7) PT for ongoing patient and family/caregiver education, DME needs and d/c planning in order to promote highest level of function in least restrictive environment  Plan   Treatment/Interventions Functional transfer training;LE strengthening/ROM; Elevations; Therapeutic exercise; Endurance training;Patient/family training;Equipment eval/education; Bed mobility;Gait training; Compensatory technique education;Continued evaluation;Spoke to nursing;OT;Family   PT Frequency 2-3x/wk   Recommendation   PT Discharge Recommendation No rehabilitation needs  (home with fam support  Has 25* support of daughter)   AM-PAC Basic Mobility Inpatient   Turning in Bed Without Bedrails 3   Lying on Back to Sitting on Edge of Flat Bed 3   Moving Bed to Chair 4   Standing Up From Chair 4   Walk in Room 3   Climb 3-5 Stairs 3   Basic Mobility Inpatient Raw Score 20   Basic Mobility Standardized Score 43 99   Highest Level Of Mobility   JH-HLM Goal 6: Walk 10 steps or more   JH-HLM Achieved 7: Walk 25 feet or more   End of Consult   Patient Position at End of Consult Bedside chair;Bed/Chair alarm activated; All needs within reach     Hx/personal factors: co-morbidities, advanced age, mutliple lines, telemetry, use of AD, pain, fall risk, and assist w/ ADL's  Examination: dec mobility, dec balance, dec endurance, dec amb, risk for falls, pain, assessed body system, balance, endurance, amb, D/C disposition & fall risk  Clinical: unpredictable (ongoing medical status, abnormal lab values, risk for falls, and pain mgt)  Complexity: high             Mayank Duck, PT

## 2022-06-20 NOTE — ASSESSMENT & PLAN NOTE
This is a 80-year-old female with history of chronic diastolic CHF, CAD, diabetes mellitus, hypertension, hyperlipidemia presented with flank pain      · CT with evidence of circumferential bladder wall thickening suggesting cystitis  · UA with innumerable wbc's, nitrite positive  · Flank pain appears to be acute on chronic lumbar back pain likely worsened in setting of infection  · Prior urine culture growing Pseudomonas  · Continue cefepime  · Follow-up urine cultures

## 2022-06-20 NOTE — PLAN OF CARE
Problem: PHYSICAL THERAPY ADULT  Goal: Performs mobility at highest level of function for planned discharge setting  See evaluation for individualized goals  Description: Treatment/Interventions: Functional transfer training, LE strengthening/ROM, Elevations, Therapeutic exercise, Endurance training, Patient/family training, Equipment eval/education, Bed mobility, Gait training, Compensatory technique education, Continued evaluation, Spoke to nursing, OT, Family          See flowsheet documentation for full assessment, interventions and recommendations  6/20/2022 1100 by Celestina Winchester PT  Note: Prognosis: Good  Problem List: Decreased strength, Decreased endurance, Impaired balance, Decreased mobility, Impaired vision, Pain  Assessment: Brissa Fu is a 80 y o  female with a PMH of CAD, CHF, diabetes, hypertension, hyperlipidemia who presents with flank pain  Admitted with UTI, LBP, hyponatremia  PT consulted  Up and OOB as tolerated orders  Prior to admission resides with daughter who is available 25* as needed  Independent with use of RW in home, cane in community  A to shower  No hx of falls reported  Currently presents with mild functional limitations compared to baseline given decreased activity tolerance with observed SMITH, fatigue, pain  Impaired balance, general strength, functional mobility and locomotion requiring RW support  Gait slowed, decreased foot clearance and step length   + SMITH  RA O2 sat 93% despite  Given impairments will benefit from skilled PT in order to optimize outcomes and facilitate return to independence  The patient's AM-PAC Basic Mobility Inpatient Short Form Raw Score is 20  A Raw score of greater than 16 suggests the patient may benefit from discharge to home  Please also refer to the recommendation of the Physical Therapist for safe discharge planning  Anticipate given support in home environment, will be able to return home with support of daughter at d/c  PT Discharge Recommendation: No rehabilitation needs (home with fam support  Has 25* support of daughter)          See flowsheet documentation for full assessment  6/20/2022 1100 by Мария Bishop PT  Note: Prognosis: Good  Problem List: Decreased strength, Decreased endurance, Impaired balance, Decreased mobility, Impaired vision, Pain  Assessment: Smooth Cowan is a 80 y o  female with a PMH of CAD, CHF, diabetes, hypertension, hyperlipidemia who presents with flank pain  Admitted with UTI, LBP, hyponatremia  PT consulted  Up and OOB as tolerated orders  Prior to admission resides with daughter who is available 25* as needed  Independent with use of RW in home, cane in community  A to shower  No hx of falls reported  Currently presents with mild functional limitations compared to baseline given decreased activity tolerance with observed SMITH, fatigue, pain  Impaired balance, general strength, functional mobility and locomotion requiring RW support  Gait slowed, decreased foot clearance and step length   + SMITH  RA O2 sat 93% despite  Given impairments will benefit from skilled PT in order to optimize outcomes and facilitate return to independence  The patient's AM-PAC Basic Mobility Inpatient Short Form Raw Score is 20  A Raw score of greater than 16 suggests the patient may benefit from discharge to home  Please also refer to the recommendation of the Physical Therapist for safe discharge planning  Anticipate given support in home environment, will be able to return home with support of daughter at d/c            PT Discharge Recommendation: No rehabilitation needs (home with fam support  Has 25* support of daughter)          See flowsheet documentation for full assessment

## 2022-06-21 ENCOUNTER — TELEPHONE (OUTPATIENT)
Dept: FAMILY MEDICINE CLINIC | Facility: CLINIC | Age: 87
End: 2022-06-21

## 2022-06-21 VITALS
WEIGHT: 166.45 LBS | HEART RATE: 69 BPM | RESPIRATION RATE: 18 BRPM | TEMPERATURE: 96.7 F | BODY MASS INDEX: 30.44 KG/M2 | SYSTOLIC BLOOD PRESSURE: 139 MMHG | DIASTOLIC BLOOD PRESSURE: 62 MMHG | OXYGEN SATURATION: 95 %

## 2022-06-21 DIAGNOSIS — K21.9 GASTROESOPHAGEAL REFLUX DISEASE WITHOUT ESOPHAGITIS: ICD-10-CM

## 2022-06-21 LAB
ANION GAP SERPL CALCULATED.3IONS-SCNC: 7 MMOL/L (ref 4–13)
BACTERIA UR CULT: ABNORMAL
BUN SERPL-MCNC: 16 MG/DL (ref 5–25)
CALCIUM SERPL-MCNC: 9.2 MG/DL (ref 8.3–10.1)
CHLORIDE SERPL-SCNC: 104 MMOL/L (ref 100–108)
CO2 SERPL-SCNC: 24 MMOL/L (ref 21–32)
CREAT SERPL-MCNC: 0.94 MG/DL (ref 0.6–1.3)
ERYTHROCYTE [DISTWIDTH] IN BLOOD BY AUTOMATED COUNT: 13.3 % (ref 11.6–15.1)
GFR SERPL CREATININE-BSD FRML MDRD: 50 ML/MIN/1.73SQ M
GLUCOSE SERPL-MCNC: 114 MG/DL (ref 65–140)
GLUCOSE SERPL-MCNC: 136 MG/DL (ref 65–140)
GLUCOSE SERPL-MCNC: 177 MG/DL (ref 65–140)
HCT VFR BLD AUTO: 36.5 % (ref 34.8–46.1)
HGB BLD-MCNC: 12 G/DL (ref 11.5–15.4)
MCH RBC QN AUTO: 30.2 PG (ref 26.8–34.3)
MCHC RBC AUTO-ENTMCNC: 32.9 G/DL (ref 31.4–37.4)
MCV RBC AUTO: 92 FL (ref 82–98)
PLATELET # BLD AUTO: 170 THOUSANDS/UL (ref 149–390)
PMV BLD AUTO: 10.9 FL (ref 8.9–12.7)
POTASSIUM SERPL-SCNC: 4.2 MMOL/L (ref 3.5–5.3)
RBC # BLD AUTO: 3.97 MILLION/UL (ref 3.81–5.12)
SODIUM SERPL-SCNC: 135 MMOL/L (ref 136–145)
WBC # BLD AUTO: 4.76 THOUSAND/UL (ref 4.31–10.16)

## 2022-06-21 PROCEDURE — 80048 BASIC METABOLIC PNL TOTAL CA: CPT | Performed by: INTERNAL MEDICINE

## 2022-06-21 PROCEDURE — 97116 GAIT TRAINING THERAPY: CPT

## 2022-06-21 PROCEDURE — 99239 HOSP IP/OBS DSCHRG MGMT >30: CPT | Performed by: INTERNAL MEDICINE

## 2022-06-21 PROCEDURE — 85027 COMPLETE CBC AUTOMATED: CPT | Performed by: INTERNAL MEDICINE

## 2022-06-21 PROCEDURE — 97530 THERAPEUTIC ACTIVITIES: CPT

## 2022-06-21 PROCEDURE — 97110 THERAPEUTIC EXERCISES: CPT

## 2022-06-21 PROCEDURE — 82948 REAGENT STRIP/BLOOD GLUCOSE: CPT

## 2022-06-21 RX ORDER — FAMOTIDINE 20 MG/1
20 TABLET, FILM COATED ORAL 2 TIMES DAILY
Qty: 60 TABLET | Refills: 0 | Status: SHIPPED | OUTPATIENT
Start: 2022-06-21 | End: 2022-06-21 | Stop reason: SDUPTHER

## 2022-06-21 RX ORDER — CIPROFLOXACIN 250 MG/1
250 TABLET, FILM COATED ORAL EVERY 12 HOURS SCHEDULED
Qty: 10 TABLET | Refills: 0 | Status: SHIPPED | OUTPATIENT
Start: 2022-06-21 | End: 2022-06-26

## 2022-06-21 RX ORDER — MUSCLE RUB CREAM 100; 150 MG/G; MG/G
CREAM TOPICAL 4 TIMES DAILY PRN
Status: DISCONTINUED | OUTPATIENT
Start: 2022-06-21 | End: 2022-06-21 | Stop reason: HOSPADM

## 2022-06-21 RX ORDER — PANTOPRAZOLE SODIUM 40 MG/1
40 TABLET, DELAYED RELEASE ORAL
Status: DISCONTINUED | OUTPATIENT
Start: 2022-06-21 | End: 2022-06-21 | Stop reason: HOSPADM

## 2022-06-21 RX ORDER — MAGNESIUM HYDROXIDE/ALUMINUM HYDROXICE/SIMETHICONE 120; 1200; 1200 MG/30ML; MG/30ML; MG/30ML
30 SUSPENSION ORAL ONCE
Status: COMPLETED | OUTPATIENT
Start: 2022-06-21 | End: 2022-06-21

## 2022-06-21 RX ORDER — FAMOTIDINE 20 MG/1
20 TABLET, FILM COATED ORAL 2 TIMES DAILY
Qty: 180 TABLET | Refills: 3 | Status: SHIPPED | OUTPATIENT
Start: 2022-06-21 | End: 2022-09-19

## 2022-06-21 RX ORDER — FAMOTIDINE 20 MG/1
20 TABLET, FILM COATED ORAL 2 TIMES DAILY
Qty: 60 TABLET | Refills: 0 | Status: SHIPPED | OUTPATIENT
Start: 2022-06-21 | End: 2022-06-21

## 2022-06-21 RX ADMIN — DORZOLAMIDE HYDROCHLORIDE 1 DROP: 20 SOLUTION/ DROPS OPHTHALMIC at 09:30

## 2022-06-21 RX ADMIN — DICLOFENAC SODIUM 2 G: 10 GEL TOPICAL at 11:33

## 2022-06-21 RX ADMIN — INSULIN LISPRO 1 UNITS: 100 INJECTION, SOLUTION INTRAVENOUS; SUBCUTANEOUS at 09:30

## 2022-06-21 RX ADMIN — AMLODIPINE BESYLATE 5 MG: 5 TABLET ORAL at 09:30

## 2022-06-21 RX ADMIN — ACETAMINOPHEN 325MG 650 MG: 325 TABLET ORAL at 15:05

## 2022-06-21 RX ADMIN — PANTOPRAZOLE SODIUM 40 MG: 40 TABLET, DELAYED RELEASE ORAL at 11:33

## 2022-06-21 RX ADMIN — HEPARIN SODIUM 5000 UNITS: 5000 INJECTION INTRAVENOUS; SUBCUTANEOUS at 05:21

## 2022-06-21 RX ADMIN — METHOCARBAMOL 500 MG: 500 TABLET ORAL at 15:05

## 2022-06-21 RX ADMIN — DICLOFENAC SODIUM 2 G: 10 GEL TOPICAL at 09:30

## 2022-06-21 RX ADMIN — POTASSIUM CHLORIDE 20 MEQ: 1500 TABLET, EXTENDED RELEASE ORAL at 09:30

## 2022-06-21 RX ADMIN — LEVOTHYROXINE SODIUM 75 MCG: 75 TABLET ORAL at 05:21

## 2022-06-21 RX ADMIN — ACETAMINOPHEN 325MG 650 MG: 325 TABLET ORAL at 05:20

## 2022-06-21 RX ADMIN — ALUMINUM HYDROXIDE, MAGNESIUM HYDROXIDE, AND SIMETHICONE 30 ML: 200; 200; 20 SUSPENSION ORAL at 11:33

## 2022-06-21 RX ADMIN — ASPIRIN 81 MG CHEWABLE TABLET 81 MG: 81 TABLET CHEWABLE at 09:30

## 2022-06-21 RX ADMIN — FUROSEMIDE 20 MG: 20 TABLET ORAL at 09:30

## 2022-06-21 RX ADMIN — GABAPENTIN 300 MG: 300 CAPSULE ORAL at 09:30

## 2022-06-21 RX ADMIN — CEFEPIME HYDROCHLORIDE 1000 MG: 1 INJECTION, POWDER, FOR SOLUTION INTRAMUSCULAR; INTRAVENOUS at 11:33

## 2022-06-21 RX ADMIN — HEPARIN SODIUM 5000 UNITS: 5000 INJECTION INTRAVENOUS; SUBCUTANEOUS at 15:06

## 2022-06-21 NOTE — PLAN OF CARE
Problem: MOBILITY - ADULT  Goal: Maintain or return to baseline ADL function  Description: INTERVENTIONS:  -  Assess patient's ability to carry out ADLs; assess patient's baseline for ADL function and identify physical deficits which impact ability to perform ADLs (bathing, care of mouth/teeth, toileting, grooming, dressing, etc )  - Assess/evaluate cause of self-care deficits   - Assess range of motion  - Assess patient's mobility; develop plan if impaired  - Assess patient's need for assistive devices and provide as appropriate  - Encourage maximum independence but intervene and supervise when necessary  - Involve family in performance of ADLs  - Assess for home care needs following discharge   - Consider OT consult to assist with ADL evaluation and planning for discharge  - Provide patient education as appropriate  Outcome: Progressing  Goal: Maintains/Returns to pre admission functional level  Description: INTERVENTIONS:  - Perform BMAT or MOVE assessment daily    - Set and communicate daily mobility goal to care team and patient/family/caregiver     - Collaborate with rehabilitation services on mobility goals if consulted  -- Out of bed for toileting  - Record patient progress and toleration of activity level   Outcome: Progressing     Problem: PAIN - ADULT  Goal: Verbalizes/displays adequate comfort level or baseline comfort level  Description: Interventions:  - Encourage patient to monitor pain and request assistance  - Assess pain using appropriate pain scale  - Administer analgesics based on type and severity of pain and evaluate response  - Implement non-pharmacological measures as appropriate and evaluate response  - Consider cultural and social influences on pain and pain management  - Notify physician/advanced practitioner if interventions unsuccessful or patient reports new pain  Outcome: Progressing     Problem: INFECTION - ADULT  Goal: Absence or prevention of progression during hospitalization  Description: INTERVENTIONS:  - Assess and monitor for signs and symptoms of infection  - Monitor lab/diagnostic results  - Monitor all insertion sites, i e  indwelling lines, tubes, and drains  - Monitor endotracheal if appropriate and nasal secretions for changes in amount and color  - [place] appropriate cooling/warming therapies per order  - Administer medications as ordered  - Instruct and encourage patient and family to use good hand hygiene technique  - Identify and instruct in appropriate isolation precautions for identified infection/condition  Outcome: Progressing  Goal: Absence of fever/infection during neutropenic period  Description: INTERVENTIONS:  - Monitor WBC    Outcome: Progressing     Problem: GENITOURINARY - ADULT  Goal: Maintains or returns to baseline urinary function  Description: INTERVENTIONS:  - Assess urinary function  - Encourage oral fluids to ensure adequate hydration if ordered  - Administer IV fluids as ordered to ensure adequate hydration  - Administer ordered medications as needed  - Offer frequent toileting  - Follow urinary retention protocol if ordered  Outcome: Progressing  Goal: Absence of urinary retention  Description: INTERVENTIONS:  - Assess patients ability to void and empty bladder  - Monitor I/O  - Bladder scan as needed  - Discuss with physician/AP medications to alleviate retention as needed  - Discuss catheterization for long term situations as appropriate  Outcome: Progressing  Goal: Urinary catheter remains patent  Description: INTERVENTIONS:  - Assess patency of urinary catheter  - If patient has a chronic perez, consider changing catheter if non-functioning  - Follow guidelines for intermittent irrigation of non-functioning urinary catheter  Outcome: Progressing

## 2022-06-21 NOTE — TELEPHONE ENCOUNTER
----- Message from Pk Armstrong DO sent at 6/21/2022 12:19 PM EDT -----  Thank you for allowing us to participate in the care of your patient, Johana Pool, who was hospitalized from 6/18/2022 through 6/21/2022 with the admitting diagnosis of UTI  She received IV cefepime and was transitioned to ciprofloxacin  If you have any additional questions or would like to discuss further, please feel free to contact me      Osmin Brown [facility] Internal Medicine, Hospitalist  [phone]

## 2022-06-21 NOTE — PLAN OF CARE
Problem: PHYSICAL THERAPY ADULT  Goal: Performs mobility at highest level of function for planned discharge setting  See evaluation for individualized goals  Description: Treatment/Interventions: Functional transfer training, LE strengthening/ROM, Elevations, Therapeutic exercise, Endurance training, Patient/family training, Equipment eval/education, Bed mobility, Gait training, Compensatory technique education, Continued evaluation, Spoke to nursing, OT, Family          See flowsheet documentation for full assessment, interventions and recommendations  Outcome: Progressing  Note: Prognosis: Good  Problem List: Decreased strength, Decreased endurance, Impaired balance, Decreased mobility, Impaired vision, Pain  Assessment: Pt seen for PT treatment session this date with interventions consisting of gait training w/ emphasis on improving pt's ability to ambulate level surfaces x 150 ft with close S provided by therapist with RW, Therapeutic exercise consisting of: AROM 15 reps B LE in sitting position and therapeutic activity consisting of training: sit<>stand transfers  Pt agreeable to PT treatment session upon arrival, pt found seated OOB in chair, in no apparent distress and responsive  In comparison to previous session, pt with improvements in distance ambulated  Post session: pt returned back to recliner, chair alarm engaged, all needs in reach and RN notified of session findings/recommendations  Continue to recommend no rehabilitation needs home with family support at time of d/c in order to maximize pt's functional independence and safety w/ mobility  Pt continues to be functioning below baseline level  PT will continue to see pt during current hospitalization in order to address the deficits listed above and provide interventions consistent w/ POC in effort to achieve STGs             PT Discharge Recommendation: No rehabilitation needs (home with family support)          See flowsheet documentation for full assessment

## 2022-06-21 NOTE — DISCHARGE SUMMARY
[address]  Discharge- Tiffanie Bravo 9/24/1922, [address] [facility] y o  female MRN: [id number]  Unit/Bed#: E5 -01 Encounter: [id number]  Primary Care Provider: Ventura Painter PA-C   Date and time admitted to hospital: 6/18/2022  2:48 PM    * UTI (urinary tract infection)  Assessment & Plan  This is a 44-year-old female with history of chronic diastolic CHF, CAD, diabetes mellitus, hypertension, hyperlipidemia presented with flank pain  · CT with evidence of circumferential bladder wall thickening suggesting cystitis  No findings of pyelonephritis  · UA with innumerable wbc's, nitrite positive  · Flank pain appears to be acute on chronic lumbar back pain likely worsened in setting of infection  · Prior urine culture growing Pseudomonas  · Patient clinically improved with cefepime  · On chart review patient has allergy listed to fluoroquinolones  On review of chart it appears that patient was prescribed Levaquin and developed worsening of her chronic myalgias  This is unlikely a true allergy  Discussed this with patient and daughter  We have limited options for oral therapy  · Will discharged on ciprofloxacin    Essential hypertension  Assessment & Plan  · Continue Norvasc    Type 2 diabetes mellitus (HCC)  Assessment & Plan  Well controlled    GERD (gastroesophageal reflux disease)  Assessment & Plan  Patient episodes of epigastric abdominal discomfort during hospital stay  She also had previous admission for ACS rule out which appears to be more likely a GI related    Symptoms resolved with Tums and Mylanta  At that time she was discharged on omeprazole, Pepcid, sucralfate  She no longer takes Pepcid  Recommended resuming omeprazole as well as Pepcid  Will give referral to GI as an outpatient    CHF (congestive heart failure) ([facility] )  Assessment & Plan  Wt Readings from Last 3 Encounters:   06/21/22 75 5 kg (166 lb 7 2 oz)   06/09/22 73 2 kg (161 lb 6 4 oz)   06/06/22 73 kg (161 lb) · History of chronic diastolic CHF  · Maintained on furosemide 20 mg in the a m  And 40 mg in the p m  · Patient appears euvolemic    Hypothyroidism  Assessment & Plan  · Continue daily levothyroxine      Discharging Physician / Practitioner: Geena Muniz DO  PCP: Giuseppe Gruber PA-C  Admission Date:   Admission Orders (From admission, onward)     Ordered        06/18/22 1841  Inpatient Admission  Once                      Discharge Date: 06/21/22    Medical Problems             Resolved Problems  Date Reviewed: 6/19/2022   None                 Consultations During Hospital Stay: None    Procedures Performed: None    Significant Findings / Test Results:     CT abdomen pelvis wo contrast    Result Date: 6/18/2022  Impression: 1  Circumferential bladder wall thickening, suggesting cystitis in the appropriate clinical and laboratory setting  2  No hydronephrosis or urinary tract calculus 3  Colonic diverticulosis without evidence of acute diverticulitis or bowel obstruction Workstation performed: [id number]     XR chest portable    Result Date: 6/20/2022  Impression: No acute cardiopulmonary disease  Workstation performed: [id number]       Incidental Findings: None    Test Results Pending at Discharge (will require follow up): None     Outpatient Tests Requested: None    Reason for Admission: UTI    Hospital Course:     Yris Hamm is a 80 y o  female With past medical history of CAD, CHF, insulin-dependent type 2 diabetes, hypertension, hyperlipidemia presents the hospital with bilateral back pain  She has history of chronic low back pain and spasms  She was recently diagnosed with urinary tract infection and completed course of antibiotics without improvement  Presentation CT scan significant for cystitis  Patient's cultures grew Pseudomonas  Patient's symptoms improved with IV cefepime  Discharged on oral antibiotics to complete course for cystitis      Please see above list of diagnoses and related plan for additional information  Condition at Discharge: stable     Discharge Day Visit / Exam:     Subjective:    Patient seen and evaluated at bedside  Reports some reflux symptoms following breakfast     Vitals: Blood Pressure: 139/62 (06/21/22 0755)  Pulse: 69 (06/21/22 0755)  Temperature: (!) 96 7 °F (35 9 °C) (06/21/22 0755)  Temp Source: Oral (06/20/22 1535)  Respirations: 18 (06/20/22 1535)  Weight - Scale: 75 5 kg (166 lb 7 2 oz) (06/21/22 0600)  SpO2: 95 % (06/21/22 0755)  Exam:   Physical Exam  Vitals reviewed  Constitutional:       General: She is not in acute distress  Appearance: She is well-developed  She is not ill-appearing, toxic-appearing or diaphoretic  HENT:      Head: Normocephalic and atraumatic  Mouth/Throat:      Mouth: Mucous membranes are moist    Eyes:      General: No scleral icterus  Conjunctiva/sclera: Conjunctivae normal    Cardiovascular:      Rate and Rhythm: Normal rate and regular rhythm  Heart sounds: Normal heart sounds  Pulmonary:      Effort: Pulmonary effort is normal  No respiratory distress  Breath sounds: Normal breath sounds  No wheezing or rales  Abdominal:      General: There is no distension  Palpations: Abdomen is soft  Tenderness: There is no abdominal tenderness  There is no guarding or rebound  Musculoskeletal:         General: No swelling, tenderness or deformity  Skin:     General: Skin is warm and dry  Neurological:      General: No focal deficit present  Mental Status: She is alert  Mental status is at baseline  Psychiatric:         Mood and Affect: Mood normal          Behavior: Behavior normal          Thought Content: Thought content normal          Judgment: Judgment normal            Discharge instructions/Information to patient and family:   See after visit summary for information provided to patient and family        Provisions for Follow-Up Care:  See after visit summary for information related to follow-up care and any pertinent home health orders  Disposition:     Home     Discharge Statement:  I spent 60 minutes discharging the patient  This time was spent on the day of discharge  I had direct contact with the patient on the day of discharge  Greater than 50% of the total time was spent examining patient, answering all patient questions, arranging and discussing plan of care with patient as well as directly providing post-discharge instructions  Additional time then spent on discharge activities  Discharge Medications:  See after visit summary for reconciled discharge medications provided to patient and family        ** Please Note: This note has been constructed using a voice recognition system **

## 2022-06-21 NOTE — PHYSICAL THERAPY NOTE
PHYSICAL THERAPY TREATMENT NOTE  NAME:  Lin Murphy  DATE: 06/21/22    Length Of Stay: 3  Performed at least 2 patient identifiers during session: Name and ID bracelet    TREATMENT:      06/21/22 1122   PT Last Visit   PT Visit Date 06/21/22   Note Type   Note Type Treatment   Pain Assessment   Pain Assessment Tool 0-10   Pain Score 8   Pain Location/Orientation Location: Abdomen   Pain Onset/Description Onset: Ongoing   Patient's Stated Pain Goal No pain   Hospital Pain Intervention(s) Ambulation/increased activity;Repositioned; Emotional support   Restrictions/Precautions   Weight Bearing Precautions Per Order No   Other Precautions Fall Risk;Pain   General   Chart Reviewed Yes   Response to Previous Treatment Patient with no complaints from previous session  Family/Caregiver Present No   Cognition   Overall Cognitive Status WFL   Arousal/Participation Alert; Responsive; Cooperative   Attention Within functional limits   Orientation Level Oriented X4   Memory Within functional limits   Following Commands Follows all commands and directions without difficulty   Comments pt agreeable to PT session   Bed Mobility   Additional Comments pt OOB in chair upon arrival   Transfers   Sit to Stand 5  Supervision   Additional items Increased time required;Verbal cues   Stand to Sit 5  Supervision   Additional items Increased time required;Verbal cues   Additional Comments cues for safety and proper hand placement   Ambulation/Elevation   Gait pattern Decreased foot clearance; Short stride; Inconsistent mello   Gait Assistance 5  Supervision   Additional items Verbal cues   Assistive Device Rolling walker   Distance 150 ftx1   Balance   Static Sitting Good   Dynamic Sitting Fair +   Static Standing Fair   Dynamic Standing Fair   Ambulatory Fair -   Endurance Deficit   Endurance Deficit Yes   Endurance Deficit Description SMITH, fatigue   Activity Tolerance   Activity Tolerance Patient tolerated treatment well;Patient limited by fatigue   Nurse Made Aware yes   Exercises   Quad Sets Sitting;15 reps;AROM; Bilateral   Heelslides Sitting;15 reps;AROM; Bilateral   Glute Sets Sitting;15 reps;AROM; Bilateral   Hip Abduction Sitting;15 reps;AROM; Bilateral   Hip Adduction Sitting;15 reps;AROM; Bilateral   Knee AROM Long Arc Quad Sitting;15 reps;AROM; Bilateral   Ankle Pumps Sitting;15 reps;AROM; Bilateral   Marching Sitting;15 reps;AROM; Bilateral   Assessment   Prognosis Good   Problem List Decreased strength;Decreased endurance; Impaired balance;Decreased mobility; Impaired vision;Pain   Assessment Pt seen for PT treatment session this date with interventions consisting of gait training w/ emphasis on improving pt's ability to ambulate level surfaces x 150 ft with close S provided by therapist with RW, Therapeutic exercise consisting of: AROM 15 reps B LE in sitting position and therapeutic activity consisting of training: sit<>stand transfers  Pt agreeable to PT treatment session upon arrival, pt found seated OOB in chair, in no apparent distress and responsive  In comparison to previous session, pt with improvements in distance ambulated  Post session: pt returned back to recliner, chair alarm engaged, all needs in reach and RN notified of session findings/recommendations  Continue to recommend no rehabilitation needs home with family support at time of d/c in order to maximize pt's functional independence and safety w/ mobility  Pt continues to be functioning below baseline level  PT will continue to see pt during current hospitalization in order to address the deficits listed above and provide interventions consistent w/ POC in effort to achieve STGs  Plan   Treatment/Interventions Functional transfer training;LE strengthening/ROM; Therapeutic exercise; Endurance training;Patient/family training;Bed mobility;Gait training; Compensatory technique education;Spoke to nursing   Progress Progressing toward goals   PT Frequency 2-3x/wk Recommendation   PT Discharge Recommendation No rehabilitation needs  (home with family support)   AM-PAC Basic Mobility Inpatient   Turning in Bed Without Bedrails 3   Lying on Back to Sitting on Edge of Flat Bed 3   Moving Bed to Chair 4   Standing Up From Chair 4   Walk in Room 3   Climb 3-5 Stairs 3   Basic Mobility Inpatient Raw Score 20   Basic Mobility Standardized Score 43 99   Highest Level Of Mobility   JH-HLM Goal 6: Walk 10 steps or more   JH-HLM Achieved 7: Walk 25 feet or more   Education   Education Provided Mobility training;Assistive device   Patient Demonstrates acceptance/verbal understanding   End of Consult   Patient Position at End of Consult Bedside chair;Bed/Chair alarm activated; All needs within reach       The patient's AM-PAC Basic Mobility Inpatient Short Form Raw Score is 20  A Raw score of greater than 16 suggests the patient may benefit from discharge to home  Please also refer to the recommendation of the Physical Therapist for safe discharge planning        Ferdinand Mcadams PTA,PTA

## 2022-06-21 NOTE — ASSESSMENT & PLAN NOTE
Patient episodes of epigastric abdominal discomfort during hospital stay  She also had previous admission for ACS rule out which appears to be more likely a GI related    Symptoms resolved with Tums and Mylanta  At that time she was discharged on omeprazole, Pepcid, sucralfate  She no longer takes Pepcid  Recommended resuming omeprazole as well as Pepcid  Will give referral to GI as an outpatient

## 2022-06-21 NOTE — PLAN OF CARE
Problem: MOBILITY - ADULT  Goal: Maintain or return to baseline ADL function  Description: INTERVENTIONS:  -  Assess patient's ability to carry out ADLs; assess patient's baseline for ADL function and identify physical deficits which impact ability to perform ADLs (bathing, care of mouth/teeth, toileting, grooming, dressing, etc )  - Assess/evaluate cause of self-care deficits   - Assess range of motion  - Assess patient's mobility; develop plan if impaired  - Assess patient's need for assistive devices and provide as appropriate  - Encourage maximum independence but intervene and supervise when necessary  - Involve family in performance of ADLs  - Assess for home care needs following discharge   - Consider OT consult to assist with ADL evaluation and planning for discharge  - Provide patient education as appropriate  Outcome: Progressing  Goal: Maintains/Returns to pre admission functional level  Description: INTERVENTIONS:  - Perform BMAT or MOVE assessment daily    - Set and communicate daily mobility goal to care team and patient/family/caregiver  - Collaborate with rehabilitation services on mobility goals if consulted  - Perform Range of Motion 3 times a day  - Reposition patient every 2 hours    - Dangle patient 3 times a day  - Stand patient 3 times a day  - Ambulate patient 3 times a day  - Out of bed to chair 3 times a day   - Out of bed for meals 3 times a day  - Out of bed for toileting  - Record patient progress and toleration of activity level   Outcome: Progressing     Problem: PAIN - ADULT  Goal: Verbalizes/displays adequate comfort level or baseline comfort level  Description: Interventions:  - Encourage patient to monitor pain and request assistance  - Assess pain using appropriate pain scale  - Administer analgesics based on type and severity of pain and evaluate response  - Implement non-pharmacological measures as appropriate and evaluate response  - Consider cultural and social influences on pain and pain management  - Notify physician/advanced practitioner if interventions unsuccessful or patient reports new pain  Outcome: Progressing     Problem: INFECTION - ADULT  Goal: Absence or prevention of progression during hospitalization  Description: INTERVENTIONS:  - Assess and monitor for signs and symptoms of infection  - Monitor lab/diagnostic results  - Monitor all insertion sites, i e  indwelling lines, tubes, and drains  - Monitor endotracheal if appropriate and nasal secretions for changes in amount and color  - Hammond appropriate cooling/warming therapies per order  - Administer medications as ordered  - Instruct and encourage patient and family to use good hand hygiene technique  - Identify and instruct in appropriate isolation precautions for identified infection/condition  Outcome: Progressing  Goal: Absence of fever/infection during neutropenic period  Description: INTERVENTIONS:  - Monitor WBC    Outcome: Progressing     Problem: GENITOURINARY - ADULT  Goal: Maintains or returns to baseline urinary function  Description: INTERVENTIONS:  - Assess urinary function  - Encourage oral fluids to ensure adequate hydration if ordered  - Administer IV fluids as ordered to ensure adequate hydration  - Administer ordered medications as needed  - Offer frequent toileting  - Follow urinary retention protocol if ordered  Outcome: Progressing  Goal: Absence of urinary retention  Description: INTERVENTIONS:  - Assess patients ability to void and empty bladder  - Monitor I/O  - Bladder scan as needed  - Discuss with physician/AP medications to alleviate retention as needed  - Discuss catheterization for long term situations as appropriate  Outcome: Progressing  Goal: Urinary catheter remains patent  Description: INTERVENTIONS:  - Assess patency of urinary catheter  - If patient has a chronic perez, consider changing catheter if non-functioning  - Follow guidelines for intermittent irrigation of non-functioning urinary catheter  Outcome: Progressing

## 2022-06-21 NOTE — DISCHARGE INSTRUCTIONS
Dear Merlyn Boas,     It was our pleasure to care for you here at [facility], [facility]  It is our hope that we were always able to exceed the expected standards for your care during your stay  You were hospitalized due to UTI  You were cared for on the 5th floor under the service of Nataly Echols DO with the [facility] who covers for your primary care physician (PCP), Gonzalez Cheung PA-C, while you were hospitalized  If you have any questions or concerns related to this hospitalization, you may contact us at [phone]  For follow up as well as medication refills, we recommend that you follow up with your primary care physician  A registered nurse will reach out to you by phone within a few days after your discharge to answer any additional questions that you may have after going home  However, at this time we provide for you here, the most important instructions / recommendations at discharge:     Notable Medication Adjustments -   Ciprofloxacin x5 days  Testing Required after Discharge -   No further testing at this time  Incidental findings that Require Follow Up   Non  Important Follow Up Information -   Please follow-up with GI to discuss chronic gastritis  Other Instructions -   Use ice, Tylenol, heat, Voltaren gel, gentle stretching for low back spasm  Please review this entire after visit summary as additional general instructions including medication list, appointments, activity, diet, any pertinent wound care, and other additional recommendations from your care team that may be provided for you        Sincerely,     Nataly Echols DO

## 2022-06-21 NOTE — ASSESSMENT & PLAN NOTE
Wt Readings from Last 3 Encounters:   06/21/22 75 5 kg (166 lb 7 2 oz)   06/09/22 73 2 kg (161 lb 6 4 oz)   06/06/22 73 kg (161 lb)     · History of chronic diastolic CHF  · Maintained on furosemide 20 mg in the a m  And 40 mg in the p m    · Patient appears euvolemic

## 2022-06-21 NOTE — RESTORATIVE TECHNICIAN NOTE
Restorative Technician Note      Patient Name: Lin Murphy     Restorative Tech Visit Date: 6/21/2022    Pt refused amb and sated that she is in pain  Nurse was notified

## 2022-06-21 NOTE — ASSESSMENT & PLAN NOTE
This is a 66-year-old female with history of chronic diastolic CHF, CAD, diabetes mellitus, hypertension, hyperlipidemia presented with flank pain  · CT with evidence of circumferential bladder wall thickening suggesting cystitis  No findings of pyelonephritis  · UA with innumerable wbc's, nitrite positive  · Flank pain appears to be acute on chronic lumbar back pain likely worsened in setting of infection  · Prior urine culture growing Pseudomonas  · Patient clinically improved with cefepime  · On chart review patient has allergy listed to fluoroquinolones  On review of chart it appears that patient was prescribed Levaquin and developed worsening of her chronic myalgias  This is unlikely a true allergy  Discussed this with patient and daughter  We have limited options for oral therapy    · Will discharged on ciprofloxacin

## 2022-06-21 NOTE — NURSING NOTE
IV removed  Discharge instructions reviewed with pt and daughter  Questions answered  Discharged to home  0

## 2022-06-22 ENCOUNTER — HOME CARE VISIT (OUTPATIENT)
Dept: HOME HEALTH SERVICES | Facility: HOME HEALTHCARE | Age: 87
End: 2022-06-22

## 2022-06-22 ENCOUNTER — TRANSITIONAL CARE MANAGEMENT (OUTPATIENT)
Dept: FAMILY MEDICINE CLINIC | Facility: CLINIC | Age: 87
End: 2022-06-22

## 2022-06-23 ENCOUNTER — RA CDI HCC (OUTPATIENT)
Dept: OTHER | Facility: HOSPITAL | Age: 87
End: 2022-06-23

## 2022-06-23 NOTE — PROGRESS NOTES
[facility]  coding opportunities          Chart Reviewed number of suggestions sent to Provider: 2    [phone]  [phone]     Patients Insurance     Medicare Insurance: [facility] Ad[employer]age

## 2022-06-28 ENCOUNTER — OFFICE VISIT (OUTPATIENT)
Dept: FAMILY MEDICINE CLINIC | Facility: CLINIC | Age: 87
End: 2022-06-28
Payer: COMMERCIAL

## 2022-06-28 VITALS
HEIGHT: 62 IN | BODY MASS INDEX: 29.26 KG/M2 | DIASTOLIC BLOOD PRESSURE: 58 MMHG | TEMPERATURE: 97.6 F | HEART RATE: 80 BPM | SYSTOLIC BLOOD PRESSURE: 110 MMHG | WEIGHT: 159 LBS

## 2022-06-28 DIAGNOSIS — I50.9 ACUTE CONGESTIVE HEART FAILURE, UNSPECIFIED HEART FAILURE TYPE (HCC): ICD-10-CM

## 2022-06-28 DIAGNOSIS — K21.9 GASTROESOPHAGEAL REFLUX DISEASE, UNSPECIFIED WHETHER ESOPHAGITIS PRESENT: ICD-10-CM

## 2022-06-28 DIAGNOSIS — E11.22 TYPE 2 DIABETES MELLITUS WITH STAGE 3B CHRONIC KIDNEY DISEASE, WITHOUT LONG-TERM CURRENT USE OF INSULIN (HCC): ICD-10-CM

## 2022-06-28 DIAGNOSIS — N18.32 TYPE 2 DIABETES MELLITUS WITH STAGE 3B CHRONIC KIDNEY DISEASE, WITHOUT LONG-TERM CURRENT USE OF INSULIN (HCC): ICD-10-CM

## 2022-06-28 DIAGNOSIS — E03.9 ACQUIRED HYPOTHYROIDISM: ICD-10-CM

## 2022-06-28 DIAGNOSIS — N30.00 ACUTE CYSTITIS WITHOUT HEMATURIA: Primary | ICD-10-CM

## 2022-06-28 DIAGNOSIS — N18.32 STAGE 3B CHRONIC KIDNEY DISEASE (HCC): ICD-10-CM

## 2022-06-28 DIAGNOSIS — I10 ESSENTIAL HYPERTENSION: Chronic | ICD-10-CM

## 2022-06-28 DIAGNOSIS — M51.36 DISC DEGENERATION, LUMBAR: ICD-10-CM

## 2022-06-28 PROBLEM — M51.369 DISC DEGENERATION, LUMBAR: Status: ACTIVE | Noted: 2022-06-28

## 2022-06-28 LAB
SL AMB  POCT GLUCOSE, UA: NORMAL
SL AMB LEUKOCYTE ESTERASE,UA: NORMAL
SL AMB POCT BILIRUBIN,UA: NORMAL
SL AMB POCT BLOOD,UA: NORMAL
SL AMB POCT CLARITY,UA: CLEAR
SL AMB POCT COLOR,UA: YELLOW
SL AMB POCT KETONES,UA: NORMAL
SL AMB POCT NITRITE,UA: NORMAL
SL AMB POCT PH,UA: 7
SL AMB POCT SPECIFIC GRAVITY,UA: 1.01
SL AMB POCT URINE PROTEIN: NORMAL
SL AMB POCT UROBILINOGEN: 0.2

## 2022-06-28 PROCEDURE — 99496 TRANSJ CARE MGMT HIGH F2F 7D: CPT | Performed by: PHYSICIAN ASSISTANT

## 2022-06-28 PROCEDURE — 81002 URINALYSIS NONAUTO W/O SCOPE: CPT | Performed by: PHYSICIAN ASSISTANT

## 2022-06-28 NOTE — PROGRESS NOTES
Assessment/Plan:     GERD (gastroesophageal reflux disease)  Patient to resume omeprazole and Pepcid as an outpatient according to in patient discharge instructions  Essential hypertension  Well controlled continue current medication  UTI (urinary tract infection)  Patient was discharged on Cipro secondary to limited oral options with other allergies  She is finish this course of treatment as of yesterday and her in house urien dip is completely normal today  No further work up needed  Hypothyroidism  Patient was given a TSH order to be done in February which was not done therefore her last TSH was actually a year ago  TSH ordered today to get done soon  Call with results  Patient is to continue taking her levothyroxine 75 mcg once daily  Disc degeneration, lumbar  Patient encouraged to take her gabapentin as directed and also her Trileptal as directed as she has not been compliant  If not working enough can adjust with next neurology appointment  Also pt encouraged to restart using her lidoderm patches  Type 2 diabetes mellitus (HCC)  Pt does see endo however her last A1C was below 7 and she is still on Januvia at the age of 80  I feel this med should be stopped  Will check A1C and if still below 7 will recommend  Lab Results   Component Value Date    HGBA1C 6 4 05/09/2022       CHF (congestive heart failure) (HCC)  Wt Readings from Last 3 Encounters:   06/28/22 72 1 kg (159 lb)   06/21/22 75 5 kg (166 lb 7 2 oz)   06/09/22 73 2 kg (161 lb 6 4 oz)   Weight very stable today  CKD (chronic kidney disease)  Lab Results   Component Value Date    EGFR 50 06/21/2022    EGFR 40 06/20/2022    EGFR 41 06/19/2022    CREATININE 0 94 06/21/2022    CREATININE 1 12 06/20/2022    CREATININE 1 10 06/19/2022   Avoid NSAIDS             Diagnoses and all orders for this visit:    Acute cystitis without hematuria  -     POCT urine dip    Gastroesophageal reflux disease, unspecified whether esophagitis present    Essential hypertension    Acquired hypothyroidism  -     TSH, 3rd generation; Future    Type 2 diabetes mellitus with stage 3b chronic kidney disease, without long-term current use of insulin (HCC)  -     Hemoglobin A1C; Future    Disc degeneration, lumbar    Acute congestive heart failure, unspecified heart failure type ([facility] [address] )    Stage 3b chronic kidney disease (HCC)       Subjective:     Patient ID: Mega English is a [address] y o  female  Patient here today accompanied by her daughter for follow-up to [facility] visit for hospitalization for not feeling well and being diagnosed with UTI  Patient presented to the emergency room on 06/18 diagnosed with the UTI with positive nitrates was treated with antibiotic also found to have an increasing GERD symptoms and was started on omeprazole and Pepcid and was discharged on the 21st of June for follow-up with us later  Pts main c/o today is pain in her mid to lwo back which radiates around both sides to the front  She does have a hx of severe spinal stenosis and DDD  She has run out of topical patches and does not take her gabapentin regularly  Review of Systems   Constitutional: Negative  HENT: Negative  Eyes: Negative  Respiratory: Negative  Cardiovascular: Negative  Gastrointestinal: Negative  Endocrine: Negative  Genitourinary: Negative  Musculoskeletal: Positive for back pain  Skin: Negative  Allergic/Immunologic: Negative  Neurological: Negative  Hematological: Negative  Psychiatric/Behavioral: Negative  Objective:     Physical Exam  Vitals and nursing note reviewed  Constitutional:       General: She is not in acute distress  Appearance: She is well-developed  She is not diaphoretic  HENT:      Head: Normocephalic and atraumatic  Eyes:      General:         Right eye: No discharge  Left eye: No discharge        Conjunctiva/sclera: Conjunctivae normal    Neck:      Vascular: No carotid bruit  Cardiovascular:      Rate and Rhythm: Normal rate and regular rhythm  Heart sounds: Normal heart sounds  No murmur heard  No friction rub  No gallop  Pulmonary:      Effort: Pulmonary effort is normal  No respiratory distress  Breath sounds: Normal breath sounds  No wheezing or rales  Musculoskeletal:      Cervical back: Neck supple  Comments: PT in a WC  Skin:     General: Skin is warm and dry  Neurological:      Mental Status: She is alert and oriented to person, place, and time  Psychiatric:         Behavior: Behavior is cooperative  Cognition and Memory: Cognition is impaired  Memory is impaired  She exhibits impaired recent memory  Judgment: Judgment normal       Comments: Pt  asking the same question multiple times throughout the visit  Vitals:    06/28/22 1435   BP: 110/58   Pulse: 80   Temp: 97 6 °F (36 4 °C)   Weight: 72 1 kg (159 lb)   Height: 5' 2" (1 575 m)       Transitional Care Management Review:  Yosi Saleem is a 80 y o  female here for TCM follow up  During the TCM phone call patient stated:    TCM Call (since 5/28/2022)     Date and time call was made  6/22/2022  4:23 PM    Hospital care reviewed  Records reviewed    Patient was hospitialized at  [facility]    Date of Admission  06/18/22    Date of discharge  06/21/22    Diagnosis  UTI    Disposition  Home    Current Symptoms  None      TCM Call (since 5/28/2022)     Post hospital issues  None    Scheduled for follow up?   Yes    I have advised the patient to call PCP with any new or worsening symptoms  [place], [place]

## 2022-06-28 NOTE — ASSESSMENT & PLAN NOTE
Patient was discharged on Cipro secondary to limited oral options with other allergies  She is finish this course of treatment as of yesterday and her in house urien dip is completely normal today  No further work up needed

## 2022-06-28 NOTE — ASSESSMENT & PLAN NOTE
Patient encouraged to take her gabapentin as directed and also her Trileptal as directed as she has not been compliant  If not working enough can adjust with next neurology appointment  Also pt encouraged to restart using her lidoderm patches

## 2022-06-28 NOTE — ASSESSMENT & PLAN NOTE
Patient was given a TSH order to be done in February which was not done therefore her last TSH was actually a year ago  TSH ordered today to get done soon  Call with results  Patient is to continue taking her levothyroxine 75 mcg once daily

## 2022-06-28 NOTE — ASSESSMENT & PLAN NOTE
Patient to resume omeprazole and Pepcid as an outpatient according to in patient discharge instructions

## 2022-06-29 NOTE — ASSESSMENT & PLAN NOTE
Wt Readings from Last 3 Encounters:   06/28/22 72 1 kg (159 lb)   06/21/22 75 5 kg (166 lb 7 2 oz)   06/09/22 73 2 kg (161 lb 6 4 oz)   Weight very stable today

## 2022-06-29 NOTE — ASSESSMENT & PLAN NOTE
Lab Results   Component Value Date    EGFR 50 06/21/2022    EGFR 40 06/20/2022    EGFR 41 06/19/2022    CREATININE 0 94 06/21/2022    CREATININE 1 12 06/20/2022    CREATININE 1 10 06/19/2022   Avoid NSAIDS

## 2022-06-29 NOTE — ASSESSMENT & PLAN NOTE
Pt does see endo however her last A1C was below 7 and she is still on Januvia at the age of 80  I feel this med should be stopped  Will check A1C and if still below 7 will recommend     Lab Results   Component Value Date    HGBA1C 6 4 05/09/2022

## 2022-06-30 ENCOUNTER — HOME CARE VISIT (OUTPATIENT)
Dept: HOME HEALTH SERVICES | Facility: HOME HEALTHCARE | Age: 87
End: 2022-06-30

## 2022-06-30 VITALS
BODY MASS INDEX: 29.08 KG/M2 | OXYGEN SATURATION: 96 % | SYSTOLIC BLOOD PRESSURE: 130 MMHG | TEMPERATURE: 97.6 F | DIASTOLIC BLOOD PRESSURE: 62 MMHG | WEIGHT: 159 LBS | HEART RATE: 68 BPM | RESPIRATION RATE: 20 BRPM

## 2022-06-30 PROCEDURE — G0299 HHS/HOSPICE OF RN EA 15 MIN: HCPCS

## 2022-07-01 ENCOUNTER — LAB (OUTPATIENT)
Dept: LAB | Facility: CLINIC | Age: 87
End: 2022-07-01
Payer: COMMERCIAL

## 2022-07-01 DIAGNOSIS — E03.9 ACQUIRED HYPOTHYROIDISM: ICD-10-CM

## 2022-07-01 LAB — TSH SERPL DL<=0.05 MIU/L-ACNC: 0.83 UIU/ML (ref 0.45–4.5)

## 2022-07-01 PROCEDURE — 84443 ASSAY THYROID STIM HORMONE: CPT

## 2022-07-01 PROCEDURE — 36415 COLL VENOUS BLD VENIPUNCTURE: CPT

## 2022-07-02 NOTE — RESULT ENCOUNTER NOTE
Please let pt know her thyroid level is normal  Can we see if lab ran an A1C as they were supposed to on this specimen  Thank you

## 2022-07-05 ENCOUNTER — TELEPHONE (OUTPATIENT)
Dept: FAMILY MEDICINE CLINIC | Facility: CLINIC | Age: 87
End: 2022-07-05

## 2022-07-05 ENCOUNTER — OFFICE VISIT (OUTPATIENT)
Dept: FAMILY MEDICINE CLINIC | Facility: CLINIC | Age: 87
End: 2022-07-05
Payer: COMMERCIAL

## 2022-07-05 VITALS
HEART RATE: 66 BPM | OXYGEN SATURATION: 96 % | DIASTOLIC BLOOD PRESSURE: 60 MMHG | TEMPERATURE: 96.6 F | HEIGHT: 62 IN | SYSTOLIC BLOOD PRESSURE: 148 MMHG | BODY MASS INDEX: 30.39 KG/M2 | WEIGHT: 165.13 LBS

## 2022-07-05 DIAGNOSIS — R10.9 ABDOMINAL PAIN, UNSPECIFIED ABDOMINAL LOCATION: ICD-10-CM

## 2022-07-05 DIAGNOSIS — N30.00 ACUTE CYSTITIS WITHOUT HEMATURIA: Primary | ICD-10-CM

## 2022-07-05 LAB
SL AMB  POCT GLUCOSE, UA: NORMAL
SL AMB LEUKOCYTE ESTERASE,UA: NORMAL
SL AMB POCT BILIRUBIN,UA: NORMAL
SL AMB POCT BLOOD,UA: NORMAL
SL AMB POCT CLARITY,UA: NORMAL
SL AMB POCT COLOR,UA: YELLOW
SL AMB POCT KETONES,UA: NORMAL
SL AMB POCT NITRITE,UA: NORMAL
SL AMB POCT PH,UA: 6
SL AMB POCT SPECIFIC GRAVITY,UA: 1.01
SL AMB POCT URINE PROTEIN: NORMAL
SL AMB POCT UROBILINOGEN: 0.2

## 2022-07-05 PROCEDURE — 99214 OFFICE O/P EST MOD 30 MIN: CPT | Performed by: FAMILY MEDICINE

## 2022-07-05 PROCEDURE — 81002 URINALYSIS NONAUTO W/O SCOPE: CPT | Performed by: FAMILY MEDICINE

## 2022-07-05 NOTE — PATIENT INSTRUCTIONS
Problem List Items Addressed This Visit       UTI (urinary tract infection) - Primary     Had UTI in hospital   Current UA was negative  Despite this, sh has symptoms consistent with UTI  Would recommend follow-up with ultrasound  If negative, consider cardiac follow-up, though that in the past has also been negative  Relevant Orders    US abdomen complete    XR chest pa & lateral    CBC and differential    POCT urine dip (Completed)          Other Visit Diagnoses       Abdominal pain, unspecified abdominal location        Patient does have some abdominal pain in the lower abdomen, as well as posterior, consistent with kidney stone  Check ultrasound  Relevant Orders    US abdomen complete    XR chest pa & lateral    CBC and differential            COVID 19 Instructions    Mary Morse was advised to limit contact with others to essential tasks such as getting food, medications, and medical care  Proper handwashing reviewed, and Hand sanitzer when washing is not available  If the patient develops symptoms of COVID 19, the patient should call the office as soon as possible  For 7366-4816 Flu season, it is strongly recommended that Flu Vaccinations be obtained  Virtual Visits:  Cameron: This works on smart phones (any phone with Internet browsing capability)  You should get a text message when the provider is ready to see you  Click on the link in the text message, and the call should start  You will need to type in your name, and allow camera and microphone access  This is HIPPA compliant, and secure  If you have not already done so, get immunized to COVID 19  We are committed to getting you vaccinated as soon as possible and will be closely following CDC and [facility]  guidelines as they are released and revised  Please refer to our COVID-19 vaccine webpage for the most up to date information on the vaccine and our distribution efforts      This site will also have the most up to date recommendations for COVID booster vaccine  Bird desai    Call [phone]), option 7    OUR NEW LOCATION:    [address], [facility] [address], [place], [address]  Fax: [phone]    Lab services and OB/GYN are at this location as well

## 2022-07-05 NOTE — TELEPHONE ENCOUNTER
----- Message from Hood Lee PA-C sent at 7/2/2022  9:50 AM EDT -----  Please let pt know her thyroid level is normal  Can we see if lab ran an A1C as they were supposed to on this specimen  Thank you

## 2022-07-05 NOTE — PROGRESS NOTES
Assessment and Plan:    Problem List Items Addressed This Visit     UTI (urinary tract infection) - Primary     Had UTI in hospital   Current UA was negative  Despite this, sh has symptoms consistent with UTI  Would recommend follow-up with ultrasound  If negative, consider cardiac follow-up, though that in the past has also been negative  Relevant Orders    US abdomen complete    XR chest pa & lateral    CBC and differential    POCT urine dip (Completed)      Other Visit Diagnoses     Abdominal pain, unspecified abdominal location        Patient does have some abdominal pain in the lower abdomen, as well as posterior, consistent with kidney stone  Check ultrasound  Relevant Orders    US abdomen complete    XR chest pa & lateral    CBC and differential                 Diagnoses and all orders for this visit:    Acute cystitis without hematuria  -     US abdomen complete; Future  -     XR chest pa & lateral; Future  -     CBC and differential; Future  -     POCT urine dip    Abdominal pain, unspecified abdominal location  Comments:  Patient does have some abdominal pain in the lower abdomen, as well as posterior, consistent with kidney stone  Check ultrasound  Orders:  -     US abdomen complete; Future  -     XR chest pa & lateral; Future  -     CBC and differential; Future            Subjective:      Patient ID: Estrella Hanks is a 80 y o  female  CC:    Chief Complaint   Patient presents with    Abdominal Pain     Mid abdominal pain and lower abdominal pain  Mid ab pain x 3 weeks off and on, lower ab pain x 7 months  mgb       HPI:    Please see the CC  She is concerned that the pain she is having is related to the heart  "My Chest is coming together "  Notes this pain when she breaths  Is not just related to the breathing  Comes and goes with this pain  Back pain and lower back pain with this  Patient reports that symptoms that she has related to UTI    This was like she was admitted for recently  The following portions of the patient's history were reviewed and updated as appropriate: allergies, current medications, past family history, past medical history, past social history, past surgical history and problem list       Review of Systems   Constitutional: Negative  HENT: Negative  Eyes: Negative  Respiratory: Negative  Cardiovascular: Negative  Gastrointestinal: Negative  Endocrine: Negative  Genitourinary:        +CVAT     Musculoskeletal: Positive for back pain  Skin: Negative  Allergic/Immunologic: Negative  Neurological: Negative  Hematological: Negative  Psychiatric/Behavioral: Negative  Data to review:       Objective:    Vitals:    07/05/22 1429   BP: 148/60   BP Location: Left arm   Patient Position: Sitting   Cuff Size: Standard   Pulse: 66   Temp: (!) 96 6 °F (35 9 °C)   TempSrc: Temporal   SpO2: 96%   Weight: 74 9 kg (165 lb 2 oz)   Height: 5' 2" (1 575 m)        Physical Exam  Vitals and nursing note reviewed  Constitutional:       Appearance: Normal appearance  HENT:      Head: Normocephalic  Cardiovascular:      Rate and Rhythm: Normal rate and regular rhythm  Pulses: Normal pulses  Carotid pulses are 2+ on the right side and 2+ on the left side  Heart sounds: Normal heart sounds  No murmur heard  No friction rub  No gallop  Pulmonary:      Effort: Pulmonary effort is normal  No respiratory distress  Breath sounds: Normal breath sounds  No stridor  No wheezing, rhonchi or rales  Chest:      Chest wall: No tenderness  Abdominal:      General: Abdomen is flat  Bowel sounds are normal       Palpations: Abdomen is soft  Tenderness: There is right CVA tenderness and left CVA tenderness (worse than right)  Neurological:      Mental Status: She is alert

## 2022-07-05 NOTE — TELEPHONE ENCOUNTER
Spoke to Parisa at [facility], she advises that A1C cannot be added due to thyroid only being drawn and A1C requires a lavender tube  Patient was notified that she needs to redo her blood work for her A1C

## 2022-07-05 NOTE — ASSESSMENT & PLAN NOTE
Had UTI in hospital   Current UA was negative  Despite this, sh has symptoms consistent with UTI  Would recommend follow-up with ultrasound  If negative, consider cardiac follow-up, though that in the past has also been negative

## 2022-07-08 ENCOUNTER — HOSPITAL ENCOUNTER (OUTPATIENT)
Dept: ULTRASOUND IMAGING | Facility: HOSPITAL | Age: 87
Discharge: HOME | End: 2022-07-08
Payer: COMMERCIAL

## 2022-07-08 ENCOUNTER — APPOINTMENT (OUTPATIENT)
Dept: LAB | Facility: HOSPITAL | Age: 87
End: 2022-07-08
Payer: COMMERCIAL

## 2022-07-08 ENCOUNTER — HOSPITAL ENCOUNTER (OUTPATIENT)
Dept: RADIOLOGY | Facility: HOSPITAL | Age: 87
Discharge: HOME | End: 2022-07-08
Payer: COMMERCIAL

## 2022-07-08 DIAGNOSIS — N30.00 ACUTE CYSTITIS WITHOUT HEMATURIA: ICD-10-CM

## 2022-07-08 DIAGNOSIS — R10.9 ABDOMINAL PAIN, UNSPECIFIED ABDOMINAL LOCATION: ICD-10-CM

## 2022-07-08 DIAGNOSIS — N30.00 ACUTE CYSTITIS WITHOUT HEMATURIA: Primary | ICD-10-CM

## 2022-07-08 LAB
BASOPHILS # BLD AUTO: 0.04 THOUSANDS/ΜL (ref 0–0.1)
BASOPHILS NFR BLD AUTO: 1 % (ref 0–1)
EOSINOPHIL # BLD AUTO: 0.21 THOUSAND/ΜL (ref 0–0.61)
EOSINOPHIL NFR BLD AUTO: 5 % (ref 0–6)
ERYTHROCYTE [DISTWIDTH] IN BLOOD BY AUTOMATED COUNT: 13.3 % (ref 11.6–15.1)
HCT VFR BLD AUTO: 37.5 % (ref 34.8–46.1)
HGB BLD-MCNC: 12 G/DL (ref 11.5–15.4)
IMM GRANULOCYTES # BLD AUTO: 0.02 THOUSAND/UL (ref 0–0.2)
IMM GRANULOCYTES NFR BLD AUTO: 0 % (ref 0–2)
LYMPHOCYTES # BLD AUTO: 0.66 THOUSANDS/ΜL (ref 0.6–4.47)
LYMPHOCYTES NFR BLD AUTO: 15 % (ref 14–44)
MCH RBC QN AUTO: 29.7 PG (ref 26.8–34.3)
MCHC RBC AUTO-ENTMCNC: 32 G/DL (ref 31.4–37.4)
MCV RBC AUTO: 93 FL (ref 82–98)
MONOCYTES # BLD AUTO: 0.56 THOUSAND/ΜL (ref 0.17–1.22)
MONOCYTES NFR BLD AUTO: 13 % (ref 4–12)
NEUTROPHILS # BLD AUTO: 2.98 THOUSANDS/ΜL (ref 1.85–7.62)
NEUTS SEG NFR BLD AUTO: 66 % (ref 43–75)
NRBC BLD AUTO-RTO: 0 /100 WBCS
PLATELET # BLD AUTO: 226 THOUSANDS/UL (ref 149–390)
PMV BLD AUTO: 9.9 FL (ref 8.9–12.7)
RBC # BLD AUTO: 4.04 MILLION/UL (ref 3.81–5.12)
WBC # BLD AUTO: 4.47 THOUSAND/UL (ref 4.31–10.16)

## 2022-07-08 PROCEDURE — 76857 US EXAM PELVIC LIMITED: CPT

## 2022-07-08 PROCEDURE — 76700 US EXAM ABDOM COMPLETE: CPT

## 2022-07-08 PROCEDURE — 85025 COMPLETE CBC W/AUTO DIFF WBC: CPT

## 2022-07-08 PROCEDURE — 71046 X-RAY EXAM CHEST 2 VIEWS: CPT

## 2022-07-08 PROCEDURE — 36415 COLL VENOUS BLD VENIPUNCTURE: CPT

## 2022-07-08 NOTE — PROGRESS NOTES
Receive [place] text from Radiology  They would like the patient to have ultrasound of the bladder ordered specifically if the patient has issues with that, as ultrasound abdomen does not include bladder  Entered order  Informed radiology staff via [facility] text

## 2022-07-19 ENCOUNTER — OFFICE VISIT (OUTPATIENT)
Dept: FAMILY MEDICINE CLINIC | Facility: CLINIC | Age: 87
End: 2022-07-19
Payer: COMMERCIAL

## 2022-07-19 VITALS
DIASTOLIC BLOOD PRESSURE: 58 MMHG | HEART RATE: 60 BPM | SYSTOLIC BLOOD PRESSURE: 124 MMHG | WEIGHT: 162 LBS | BODY MASS INDEX: 29.81 KG/M2 | HEIGHT: 62 IN

## 2022-07-19 DIAGNOSIS — N28.89 PELVIECTASIS OF KIDNEY: Primary | ICD-10-CM

## 2022-07-19 PROCEDURE — 99213 OFFICE O/P EST LOW 20 MIN: CPT | Performed by: FAMILY MEDICINE

## 2022-07-19 PROCEDURE — 1160F RVW MEDS BY RX/DR IN RCRD: CPT | Performed by: FAMILY MEDICINE

## 2022-07-19 PROCEDURE — 1111F DSCHRG MED/CURRENT MED MERGE: CPT | Performed by: FAMILY MEDICINE

## 2022-07-19 NOTE — PROGRESS NOTES
Assessment and Plan:    Problem List Items Addressed This Visit     Pelviectasis of kidney - Primary     Patient has had recurrent UTI symptoms, and with the most recent ultrasound did show some changes of fluid in the collecting system  Would recommend follow-up with urology for possible evaluation for cystoscopy, or other treatments as appropriate per Urology  Given that this has had frequent infections, I would recommend that she follow-up with the urologist for it  Relevant Orders    Ambulatory Referral to Urology                 Diagnoses and all orders for this visit:    Pelviectasis of kidney  -     Ambulatory Referral to Urology; Future            Subjective:      Patient ID: Milo Richards is a 80 y o  female  CC:    Chief Complaint   Patient presents with    Follow-up     Patient is here to go over U/S results  ak       HPI:    Patient did have ultrasound of the kidney and bladder recently  That did show some changes in the kidney itself with regard to fluid  At this point, she is feeling okay  Reviewed about hydronephrosis, as well as the concerns about recurrent infections  The following portions of the patient's history were reviewed and updated as appropriate: allergies, current medications, past family history, past medical history, past social history, past surgical history and problem list       Review of Systems   Constitutional: Negative  HENT: Negative  Respiratory: Negative  Cardiovascular: Negative  Gastrointestinal: Negative  Data to review:       Objective:    Vitals:    07/19/22 1540   BP: 124/58   Pulse: 60   Weight: 73 5 kg (162 lb)   Height: 5' 2" (1 575 m)        Physical Exam  Vitals and nursing note reviewed

## 2022-07-19 NOTE — PATIENT INSTRUCTIONS
Problem List Items Addressed This Visit       Pelviectasis of kidney - Primary     Patient has had recurrent UTI symptoms, and with the most recent ultrasound did show some changes of fluid in the collecting system  Would recommend follow-up with urology for possible evaluation for cystoscopy, or other treatments as appropriate per Urology  Given that this has had frequent infections, I would recommend that she follow-up with the urologist for it  Relevant Orders    Ambulatory Referral to Urology            COVID 19 Instructions    Leah Deras was advised to limit contact with others to essential tasks such as getting food, medications, and medical care  Proper handwashing reviewed, and Hand sanitzer when washing is not available  If the patient develops symptoms of COVID 19, the patient should call the office as soon as possible  For 3037-3532 Flu season, it is strongly recommended that Flu Vaccinations be obtained  Virtual Visits:  Cameron: This works on smart phones (any phone with Internet browsing capability)  You should get a text message when the provider is ready to see you  Click on the link in the text message, and the call should start  You will need to type in your name, and allow camera and microphone access  This is HIPPA compliant, and secure  If you have not already done so, get immunized to COVID 19  We are committed to getting you vaccinated as soon as possible and will be closely following CDC and [facility]  guidelines as they are released and revised  Please refer to our COVID-19 vaccine webpage for the most up to date information on the vaccine and our distribution efforts  This site will also have the most up to date recommendations for COVID booster vaccine      Bird desai    Call [phone] ([phone]), option 7    OUR NEW LOCATION:    [address], [address] [place] [address], [facility]  Fax: [phone]    Lab services and OB/GYN are at this location as well

## 2022-07-19 NOTE — ASSESSMENT & PLAN NOTE
Patient has had recurrent UTI symptoms, and with the most recent ultrasound did show some changes of fluid in the collecting system  Would recommend follow-up with urology for possible evaluation for cystoscopy, or other treatments as appropriate per Urology  Given that this has had frequent infections, I would recommend that she follow-up with the urologist for it

## 2022-07-25 ENCOUNTER — HOME CARE VISIT (OUTPATIENT)
Dept: HOME HEALTH SERVICES | Facility: HOME HEALTHCARE | Age: 87
End: 2022-07-25

## 2022-07-25 ENCOUNTER — HOME CARE VISIT (OUTPATIENT)
Dept: HOME HEALTH SERVICES | Facility: HOME HEALTHCARE | Age: 87
End: 2022-07-25
Payer: COMMERCIAL

## 2022-07-25 VITALS
SYSTOLIC BLOOD PRESSURE: 120 MMHG | RESPIRATION RATE: 20 BRPM | HEART RATE: 76 BPM | DIASTOLIC BLOOD PRESSURE: 60 MMHG | TEMPERATURE: 96.6 F | OXYGEN SATURATION: 96 % | WEIGHT: 162 LBS | BODY MASS INDEX: 29.63 KG/M2

## 2022-07-25 PROCEDURE — G0299 HHS/HOSPICE OF RN EA 15 MIN: HCPCS

## 2022-08-02 PROCEDURE — G0179 MD RECERTIFICATION HHA PT: HCPCS | Performed by: PHYSICIAN ASSISTANT

## 2022-08-05 DIAGNOSIS — G52.9 CRANIAL NEURALGIA: ICD-10-CM

## 2022-08-05 DIAGNOSIS — K21.9 GASTROESOPHAGEAL REFLUX DISEASE WITHOUT ESOPHAGITIS: ICD-10-CM

## 2022-08-05 RX ORDER — OMEPRAZOLE 40 MG/1
40 CAPSULE, DELAYED RELEASE ORAL DAILY
Qty: 90 CAPSULE | Refills: 3 | Status: SHIPPED | OUTPATIENT
Start: 2022-08-05 | End: 2022-09-19

## 2022-08-05 RX ORDER — GABAPENTIN 600 MG/1
TABLET ORAL
Qty: 180 TABLET | Refills: 0 | Status: SHIPPED | OUTPATIENT
Start: 2022-08-05 | End: 2022-08-05 | Stop reason: SDUPTHER

## 2022-08-05 RX ORDER — GABAPENTIN 600 MG/1
TABLET ORAL
Qty: 60 TABLET | Refills: 0 | Status: SHIPPED | OUTPATIENT
Start: 2022-08-05 | End: 2022-09-14 | Stop reason: SDUPTHER

## 2022-08-05 NOTE — TELEPHONE ENCOUNTER
James vm to refill gabapentin, req cb when filled [phone]  Sent today; patient aware  Patient is out of med, please send 30-day supply to local pharmacy, thank you   js lisa  Takes 600 mg tabs, TAKE 1/2 TABLET IN THE MORNING AND AFTERNOON AND TAKE 1 TABLET AT BEDTIME    Please sign if in agreement, thank you

## 2022-08-11 ENCOUNTER — TELEPHONE (OUTPATIENT)
Dept: NEUROLOGY | Facility: CLINIC | Age: 87
End: 2022-08-11

## 2022-08-11 NOTE — TELEPHONE ENCOUNTER
"Patient left  for Zohra Snehal.  Patient states she has pains and a \"swirling sensation when I close my eyes\"    Has been taking Gabapentin 600 mg tablets 1/2 tab in the am and take 1 tab at HS    Oxcarbazepine 150 mg tablet 1 tab at HS prn      Patient is asking for a CB# [phone]      "

## 2022-08-15 NOTE — TELEPHONE ENCOUNTER
Reached voicemail.  Left message if still experiencing sensations reported last week please return call.

## 2022-08-17 DIAGNOSIS — E03.9 HYPOTHYROIDISM, UNSPECIFIED TYPE: ICD-10-CM

## 2022-08-17 DIAGNOSIS — I35.0 NONRHEUMATIC AORTIC VALVE STENOSIS: Chronic | ICD-10-CM

## 2022-08-17 RX ORDER — POTASSIUM CHLORIDE 20 MEQ/1
TABLET, EXTENDED RELEASE ORAL
Qty: 90 TABLET | Refills: 0 | Status: SHIPPED | OUTPATIENT
Start: 2022-08-17

## 2022-08-18 ENCOUNTER — OFFICE VISIT (OUTPATIENT)
Dept: UROLOGY | Facility: CLINIC | Age: 87
End: 2022-08-18
Payer: COMMERCIAL

## 2022-08-18 VITALS
DIASTOLIC BLOOD PRESSURE: 80 MMHG | BODY MASS INDEX: 29.81 KG/M2 | SYSTOLIC BLOOD PRESSURE: 130 MMHG | HEIGHT: 62 IN | WEIGHT: 162 LBS | HEART RATE: 71 BPM

## 2022-08-18 DIAGNOSIS — N28.89 PELVIECTASIS OF KIDNEY: Primary | ICD-10-CM

## 2022-08-18 LAB
POST-VOID RESIDUAL VOLUME, ML POC: 155 ML
SL AMB  POCT GLUCOSE, UA: ABNORMAL
SL AMB LEUKOCYTE ESTERASE,UA: ABNORMAL
SL AMB POCT BILIRUBIN,UA: ABNORMAL
SL AMB POCT BLOOD,UA: ABNORMAL
SL AMB POCT CLARITY,UA: CLEAR
SL AMB POCT COLOR,UA: YELLOW
SL AMB POCT KETONES,UA: ABNORMAL
SL AMB POCT NITRITE,UA: ABNORMAL
SL AMB POCT PH,UA: 5
SL AMB POCT SPECIFIC GRAVITY,UA: 1.01
SL AMB POCT URINE PROTEIN: ABNORMAL
SL AMB POCT UROBILINOGEN: 0.2

## 2022-08-18 PROCEDURE — 1160F RVW MEDS BY RX/DR IN RCRD: CPT | Performed by: NURSE PRACTITIONER

## 2022-08-18 PROCEDURE — 51798 US URINE CAPACITY MEASURE: CPT | Performed by: NURSE PRACTITIONER

## 2022-08-18 PROCEDURE — 99202 OFFICE O/P NEW SF 15 MIN: CPT | Performed by: NURSE PRACTITIONER

## 2022-08-18 PROCEDURE — 81002 URINALYSIS NONAUTO W/O SCOPE: CPT | Performed by: NURSE PRACTITIONER

## 2022-08-18 RX ORDER — LEVOTHYROXINE SODIUM 0.07 MG/1
TABLET ORAL
Qty: 90 TABLET | Refills: 3 | Status: SHIPPED | OUTPATIENT
Start: 2022-08-18

## 2022-08-18 NOTE — PATIENT INSTRUCTIONS
BLADDER HEALTH    WHAT IS CONSIDERED NORMAL? The average bladder can hold about 2 cups of urine before it needs to be emptied  The normal range of voiding urine is 6 to 8 times during a 24 hour period  As we get older, our bladder capacity can get smaller and we may need to pass urine more frequently but usually not more than every 2 hours  Urine should flow easily without discomfort in a good, steady stream until the bladder is empty  No pushing or straining is necessary to empty the bladder  An urge is a signal that you feel as the bladder stretches to fill with urine  Urges can be felt even if the bladder is not full  Urges are not commands to go to the toilet, merely a signal and can be controlled  WHAT ARE GOOD BLADDER HABITS? Take your time when emptying your bladder  Dont strain or push to empty your bladder  Make sure you empty your bladder completely each time you pass urine  Do not rush the process  Consistently ignoring the urge to go (waiting more than 4 hours between toileting) or urinating too infrequently may be convenient but not healthy for your bladder  Avoid going to the toilet just in case or more often than every 2 hours  It is usually not necessary to go when you feel the first urge  Try to go only when your bladder is full  Urgency and frequency of urination can be improved by retraining the bladder and spacing your fluid intake throughout the day  Practice good toilet habits  Dont let your bladder control your life  TIPS TO MAINTAIN GOOD BLADDER HABITS  Maintain a good fluid intake  Depending on your body size and environment, drink 6 -8 cups (8 oz each) of fluid per day unless otherwise advised by your doctor  Not enough fluid creates a foul odor and dark color of the urine  Limit the amount of caffeine (coffee, cola, chocolate or tea) and citrus foods that you consume as these foods can be associated with increased sensation of urinary urgency and frequency  Limit the amount of alcohol you drink  Alcohol increases urine production and makes it difficult for the brain to coordinate bladder control  Avoid constipation by maintaining a balanced diet of dietary fiber  Cigarette smoking is also irritating to the bladder surface and is associated with bladder cancer  In addition, the coughing associated with smoking may lead to increased incontinent episodes because of the increased pressure  HOW DIET CAN AFFECT YOUR BLADDER  Although there is no particular "diet" that can cure bladder control, there are certain dietary suggestions you can use to help control the problem  There are 2 points to consider when evaluating how your habits and diet may affect your bladder:    Foods and fluids can irritate the bladder  Some foods and beverages are thought to contribute to bladder leakage and irritability  However their effect on the bladder is not completely understood and you may want to see if eliminating one or all of these items improves your bladder control  If you are unable to give them up completely, it is recommended that you use the following items in moderation:  Acidic beverages and foods (orange juice, grapefruit juice, lemonade etc)  Alcoholic beverages  Vinegar  Coffee (regular and decaf)  Tea (regular and decaf)  Caffeinated beverages  Carbonated beverages          Drinking enough and the right kinds of fluids  Many people with bladder control issues decrease their intake of liquids in hope that they will need to urinate less frequently or have less urinary leakage  You should not restrict fluids to control your bladder  While a decrease in liquid intake does result in a decrease in the volume of urine, the smaller amount of urine may be more highly concentrated  Highly concentrated, dark yellow urine is irritating to the bladder surface and may actually cause you to go to the bathroom more frequently        It also encourages the growth of bacteria, which may lead to infections resulting in incontinence  Substitutions for Bladder Irritants: water is always the best beverage choice  Grape and apple juice are thirst quenchers are good selections and are not as irritating to the bladder    Low acid fruits:  Pears, apricots, papaya, watermelon  For coffee drinkers: KAVA®, Postum®, Kristina®, Kaffree Bryanna®  For tea drinkers:  non-citrus or herbal and sun brewed tea

## 2022-08-18 NOTE — PROGRESS NOTES
Assessment and plan:     Pelviectasis of kidney  · Ultrasound kidney and bladder with mild left pelviectasis without arnav hydronephrosis 07/08/2022  · Not noted on CT scan June 2022  ·  mL   · Schedule ultrasound kidney bladder with PVR 6 months  · BMP 6 months  · Follow-up 6 months          DEVEN Hanna    History of Present Illness     Dionisio Roman is a 80 y o  new patient who presents with her daughter for mild pelviectasis without arnav hydronephrosis  She had an ultrasound due to left lower abdominal discomfort and back discomfort  Prior to that she had a CT scan 06/18/2022 with circumferential bladder wall thickening, no hydronephrosis or urinary tract calculi  She reports intermittent left-sided flank pain for years which improves with use of lidocaine patch  She has a creat of 0 94 (6/2022)  She only urinates about 2 times a day and nocturia x 1-2  She urinates a lot when she does go  She has incontinence some times at night  She feels her flow is long and slow  Prior hysterectomy  Has recurrent UTI hx   Denies gross hematuria  She has diabetes with a1c 6 4    At 3 positive urine cultures this year for Pseudomonas aeruginosa and 3 positive urine cultures in 2021 for Pseudomonas aeruginosa  Her symptoms are cloudy urine with a strong smell, no change in mentation or urinary symptoms      Laboratory     Lab Results   Component Value Date    BUN 16 06/21/2022    CREATININE 0 94 06/21/2022       No components found for: GFR    Lab Results   Component Value Date    GLUCOSE 161 (H) 01/27/2016    CALCIUM 9 2 06/21/2022     10/15/2015    K 4 2 06/21/2022    CO2 24 06/21/2022     06/21/2022       Lab Results   Component Value Date    WBC 4 47 07/08/2022    HGB 12 0 07/08/2022    HCT 37 5 07/08/2022    MCV 93 07/08/2022     07/08/2022       No results found for: PSA    Recent Results (from the past 1 hour(s))   POCT urine dip    Collection Time: 08/18/22  2:02 PM   Result Value Ref Range    LEUKOCYTE ESTERASE,UA small     NITRITE,UA neg     SL AMB POCT UROBILINOGEN 0 2     POCT URINE PROTEIN neg      PH,UA 5 0     BLOOD,UA neg     SPECIFIC GRAVITY,UA 1 010     KETONES,UA neg     BILIRUBIN,UA neg     GLUCOSE, UA neg      COLOR,UA yellow     CLARITY,UA clear    POCT Measure PVR    Collection Time: 08/18/22  2:03 PM   Result Value Ref Range    POST-VOID RESIDUAL VOLUME, ML  mL       @RESULT(URINEMICROSCOPIC)@    @RESULT(URINECULTURE)@    Radiology   ABDOMEN ULTRASOUND, COMPLETE 7/8/2022     INDICATION:   N30 00: Acute cystitis without hematuria  R10 9: Unspecified abdominal pain      COMPARISON:  Renal ultrasound 5/18/2021  CT abdomen pelvis 5/3/2021      TECHNIQUE:   Real-time ultrasound of the abdomen was performed with a curvilinear transducer with both volumetric sweeps and still imaging techniques      FINDINGS:     PANCREAS:  Visualized portions of the pancreas are unremarkable      AORTA AND IVC:  Visualized portions are normal for patient age  The proximal and mid portions of the abdominal aorta are obscured by bowel gas     LIVER:  Size:  Within normal range  The liver measures 13 8 cm in the midclavicular line  Contour:  Surface contour is smooth  Parenchyma:  Echogenicity and echotexture are within normal limits  No liver mass identified  Limited imaging of the main portal vein shows it to be patent and hepatopetal      BILIARY:  No gallbladder findings  No intrahepatic biliary dilatation  CBD measures 8 0 mm  This is within normal range for the patient's age and appears stable from prior CT of May 2021  No evidence of choledocholithiasis within the visualized portions of the common bile duct      KIDNEY:   Right kidney measures 9 2 x 4 3 x 3 9  cm  Volume 80 8 mL  Kidney within normal limits      Left kidney measures 9 9 x 4 0 x 4 1 cm  Volume 85 5 mL  Mild pelviectasis without arnav hydronephrosis      SPLEEN:   Measures 10 2 x 10 2 x 3 3 cm   Volume 180 1 mL  Normal in size  Focal calcification likely represents a small granuloma      ASCITES:  None      IMPRESSION:  1  Mild left pelviectasis without arnav hydronephrosis  Otherwise unremarkable abdominal ultrasound         CT ABDOMEN AND PELVIS WITHOUT IV CONTRAST 6/18/2022     INDICATION:   Abdominal pain, acute, nonlocalized  bilateral flank pain, radiating to the bilateral abd      COMPARISON:  3/8/2022     TECHNIQUE:  CT examination of the abdomen and pelvis was performed without intravenous contrast  This examination was performed without intravenous contrast in the context of the critical nationwide Omnipaque shortage  Axial, sagittal, and coronal 2D   reformatted images were created from the source data and submitted for interpretation       Radiation dose length product (DLP) for this visit:  428 mGy-cm   This examination, like all CT scans performed in the [facility], was performed utilizing techniques to minimize radiation dose exposure, including the use of iterative   reconstruction and automated exposure control       Enteric contrast was not administered       FINDINGS:     ABDOMEN     LOWER CHEST:  No acute finding     LIVER/BILIARY TREE:  Unremarkable      GALLBLADDER:  Contracted, otherwise unremarkable     SPLEEN:  Unremarkable      PANCREAS:  Unremarkable      ADRENAL GLANDS:  Unremarkable      KIDNEYS/URETERS:  Unremarkable  No hydronephrosis      STOMACH AND BOWEL:  Scattered colonic diverticula most pronounced in the sigmoid colon  No bowel obstruction or acute inflammation     APPENDIX:  No findings to suggest appendicitis      ABDOMINOPELVIC CAVITY:  No ascites  No pneumoperitoneum  No lymphadenopathy      VESSELS:  Tortuous ectatic aorta and iliac vessels with diffuse atherosclerotic change     PELVIS     REPRODUCTIVE ORGANS:  Surgical changes of prior hysterectomy      URINARY BLADDER:  Circumferential bladder wall thickening    No bladder calculus      ABDOMINAL WALL/INGUINAL REGIONS:  Ventral adipose-containing hernia     OSSEOUS STRUCTURES:  No acute fracture or destructive osseous lesion      IMPRESSION:     1  Circumferential bladder wall thickening, suggesting cystitis in the appropriate clinical and laboratory setting  2  No hydronephrosis or urinary tract calculus  3  Colonic diverticulosis without evidence of acute diverticulitis or bowel obstruction  Review of Systems     Review of Systems   Constitutional: Negative for activity change, appetite change, chills, fatigue, fever and unexpected weight change  HENT: Positive for hearing loss  Negative for facial swelling  Eyes: Negative for discharge  Respiratory: Positive for shortness of breath  Negative for cough  Uses oxygen at night   Cardiovascular: Negative for chest pain and leg swelling  Gastrointestinal: Negative  Negative for abdominal distention, abdominal pain, constipation, diarrhea, nausea and vomiting  Endocrine: Negative  Genitourinary: Positive for flank pain  Negative for decreased urine volume, difficulty urinating, dysuria, enuresis, frequency, genital sores, hematuria and urgency  Musculoskeletal: Positive for back pain and gait problem  Negative for myalgias  Skin: Negative for pallor and rash  Allergic/Immunologic: Negative  Negative for immunocompromised state  Neurological: Positive for headaches  Negative for facial asymmetry and speech difficulty  Psychiatric/Behavioral: Negative for agitation and confusion  Allergies     Allergies   Allergen Reactions    Levaquin [Levofloxacin] Myalgia    Statins      Other reaction(s): Weakness  Category: Adverse Reaction;        Physical Exam     Physical Exam  Vitals reviewed  Constitutional:       General: She is not in acute distress  Appearance: Normal appearance  She is obese  She is not ill-appearing, toxic-appearing or diaphoretic  HENT:      Head: Normocephalic and atraumatic     Eyes:      General: No scleral icterus  Cardiovascular:      Rate and Rhythm: Normal rate  Pulmonary:      Effort: Pulmonary effort is normal  No respiratory distress  Abdominal:      General: Abdomen is flat  There is no distension  Palpations: Abdomen is soft  Tenderness: There is no abdominal tenderness  There is no right CVA tenderness, left CVA tenderness, guarding or rebound  Musculoskeletal:         General: No swelling  Cervical back: Normal range of motion  Skin:     General: Skin is warm and dry  Coloration: Skin is not jaundiced or pale  Findings: No rash  Neurological:      General: No focal deficit present  Mental Status: She is alert and oriented to person, place, and time  Gait: Gait abnormal       Comments: Ambulates with cane   Psychiatric:         Mood and Affect: Mood normal          Behavior: Behavior normal          Thought Content: Thought content normal          Judgment: Judgment normal          Vital Signs     Vitals:    08/18/22 1258   BP: 130/80   Pulse: 71   Weight: 73 5 kg (162 lb)   Height: 5' 2" (1 575 m)       Current Medications       Current Outpatient Medications:     acetaminophen (TYLENOL) 325 mg tablet, Take 2 tablets (650 mg total) by mouth every 6 (six) hours as needed for mild pain, Disp: 20 tablet, Rfl: 0    amLODIPine (NORVASC) 5 mg tablet, Take 1 tablet (5 mg total) by mouth daily, Disp: 90 tablet, Rfl: 3    aspirin 81 MG tablet, Take 81 mg by mouth daily  , Disp: , Rfl:     Cholecalciferol (VITAMIN D3) 2000 units capsule, Take 2,000 Units by mouth daily , Disp: , Rfl:     dorzolamide (TRUSOPT) 2 % ophthalmic solution, Administer 1 drop to both eyes 3 (three) times a day  , Disp: , Rfl:     famotidine (PEPCID) 20 mg tablet, Take 1 tablet (20 mg total) by mouth 2 (two) times a day, Disp: 180 tablet, Rfl: 3    furosemide (LASIX) 20 mg tablet, TAKE 1 TABLET IN THE MORNING  AND TAKE 2 TABLETS IN THE EVENING (Patient taking differently: TAKE 1 TABLET IN THE MORNING  AND TAKE 2 TABLETS IN THE EVENING), Disp: 270 tablet, Rfl: 1    gabapentin (NEURONTIN) 600 MG tablet, TAKE 1/2 TABLET IN THE MORNING AND AFTERNOON AND TAKE 1 TABLET AT BEDTIME, Disp: 60 tablet, Rfl: 0    Incontinence Supply Disposable (SELECT DISPOSABLE UNDERWEAR LG) MISC, , Disp: , Rfl:     Lancets (ACCU-CHEK SOFT TOUCH) lancets, Testing 1 time daily  Dx: E11 9, Disp: 100 each, Rfl: 3    latanoprost (XALATAN) 0 005 % ophthalmic solution, Apply 1 drop to eye daily at bedtime Both eyes, Disp: 7 5 mL, Rfl: 1    levothyroxine 75 mcg tablet, TAKE 1 TABLET EVERY DAY, Disp: 90 tablet, Rfl: 3    Misc   Devices (Transport Chair) MISC, Use if needed (with outings outside of the house ), Disp: 1 each, Rfl: 0    omeprazole (PriLOSEC) 40 MG capsule, TAKE 1 CAPSULE (40 MG TOTAL) BY MOUTH DAILY, Disp: 90 capsule, Rfl: 3    potassium chloride (K-DUR,KLOR-CON) 20 mEq tablet, TAKE 1 TABLET EVERY DAY, Disp: 90 tablet, Rfl: 0    sitaGLIPtin (Januvia) 50 mg tablet, Take 1 tablet (50 mg total) by mouth daily, Disp: 90 tablet, Rfl: 1    vitamin B-12 (CYANOCOBALAMIN) 250 MCG tablet, Take 250 mcg by mouth daily, Disp: , Rfl:     OXcarbazepine (TRILEPTAL) 150 mg tablet, TAKE 1 TABLET qhs (PLEASE CALL WITH DIZZINESS) (Patient not taking: Reported on 8/18/2022), Disp: 90 tablet, Rfl: 0    Active Problems     Patient Active Problem List   Diagnosis    Peripheral vascular disease ([facility] [address] )    Glaucoma, open angle, severe stage    Arteriosclerosis of coronary artery    Asthma    Aortic stenosis    Hyperlipidemia    Female bladder prolapse    Advanced age   [place] Allergic rhinitis    Ambulatory dysfunction    Anxiety disorder    General weakness    Irritable bowel    Trigeminal neuralgia    Hallucination    Dyspnea on minimal exertion    Chest pain    Hypothyroidism    CHF (congestive heart failure) ([facility])    Insomnia    GERD (gastroesophageal reflux disease)    Bilateral cold feet    Female cystocele    Diverticulosis    Dizziness    Hearing loss    Hiatal hernia    Low back pain    Mild cognitive impairment    Osteoarthritis    Overactive bladder    Shakiness    Pain of both hip joints    Candidal intertrigo    Vitamin D deficiency    Type 2 diabetes mellitus (HCC)    Paroxysmal nocturnal dyspnea    Orthopnea    Shortness of breath    Chronic headache    Neoplasm of face    Stable angina (HCC)    Atypical facial pain    Medicare annual wellness visit, subsequent    Diarrhea    Left hip pain    Constipation    Bilateral impacted cerumen    Conductive hearing loss, bilateral    Supraorbital neuralgia    Myofascial muscle pain    Cranial neuralgia    UTI (urinary tract infection)    Essential hypertension    Hyponatremia    Vertigo    Chronic nonintractable headache    Fall    Tail bone pain    Flank pain    CKD (chronic kidney disease)    Myalgia    Lumbar paraspinal muscle spasm    Abnormal gait    Disc degeneration, lumbar    Pelviectasis of kidney       Past Medical History     Past Medical History:   Diagnosis Date    Asthma     Atypical chest pain     CAD (coronary artery disease)     Candidal intertrigo     [facility]est[facility] failure) ([facility])     Depression     Diabetes mellitus ([address] )     Diabetic neuropathy ([facility] [address] )     Diverticulitis     Dyslipidemia     Female bladder prolapse     Gastric ulcer     Glaucoma     HTN (hypertension)     Hyperlipidemia     Hypothyroidism 4/18/2016    RAD (reactive airway disease)        Surgical History     Past Surgical History:   Procedure Laterality Date    COLONOSCOPY      ESOPHAGOGASTRODUODENOSCOPY  2011    HYSTERECTOMY      TONSILLECTOMY         Family History     Family History   Problem Relation Age of Onset    Breast cancer Mother     Hypothyroidism Mother     Hypertension Father     Breast cancer Sister     Thyroid disease Sister     Hypertension Sister        Social History Social History     Social History     Tobacco Use   Smoking Status Never Smoker   Smokeless Tobacco Never Used       Past Surgical History:   Procedure Laterality Date    COLONOSCOPY      ESOPHAGOGASTRODUODENOSCOPY  2011    HYSTERECTOMY      TONSILLECTOMY           The following portions of the patient's history were reviewed and updated as appropriate: allergies, current medications, past family history, past medical history, past social history, past surgical history and problem list    Please note :  Voice dictation software has been used to create this document  There may be inadvertent transcription errors      [id number] [id number] Yaw Murry

## 2022-08-18 NOTE — ASSESSMENT & PLAN NOTE
· Ultrasound kidney and bladder with mild left pelviectasis without arnav hydronephrosis 07/08/2022  · Not noted on CT scan June 2022  ·  mL   · Schedule ultrasound kidney bladder with PVR 6 months  · BMP 6 months  · Follow-up 6 months

## 2022-08-19 ENCOUNTER — TELEPHONE (OUTPATIENT)
Dept: FAMILY MEDICINE CLINIC | Facility: CLINIC | Age: 87
End: 2022-08-19

## 2022-08-19 DIAGNOSIS — E11.9 TYPE 2 DIABETES MELLITUS WITHOUT COMPLICATION, WITHOUT LONG-TERM CURRENT USE OF INSULIN (HCC): ICD-10-CM

## 2022-08-19 NOTE — TELEPHONE ENCOUNTER
Riley Rizvi called in because she needs a refill on her Januvia and she has been waiting since June   Please send to mail order

## 2022-08-22 ENCOUNTER — HOME CARE VISIT (OUTPATIENT)
Dept: HOME HEALTH SERVICES | Facility: HOME HEALTHCARE | Age: 87
End: 2022-08-22

## 2022-08-22 VITALS
RESPIRATION RATE: 20 BRPM | TEMPERATURE: 97.9 F | SYSTOLIC BLOOD PRESSURE: 120 MMHG | HEART RATE: 80 BPM | BODY MASS INDEX: 29.45 KG/M2 | OXYGEN SATURATION: 97 % | WEIGHT: 161 LBS | DIASTOLIC BLOOD PRESSURE: 64 MMHG

## 2022-08-22 PROCEDURE — G0299 HHS/HOSPICE OF RN EA 15 MIN: HCPCS

## 2022-08-30 DIAGNOSIS — I35.0 NONRHEUMATIC AORTIC VALVE STENOSIS: Chronic | ICD-10-CM

## 2022-08-30 RX ORDER — AMLODIPINE BESYLATE 5 MG/1
5 TABLET ORAL DAILY
Qty: 90 TABLET | Refills: 3 | Status: SHIPPED | OUTPATIENT
Start: 2022-08-30

## 2022-09-14 ENCOUNTER — OFFICE VISIT (OUTPATIENT)
Dept: FAMILY MEDICINE CLINIC | Facility: CLINIC | Age: 87
End: 2022-09-14
Payer: COMMERCIAL

## 2022-09-14 VITALS
BODY MASS INDEX: 29.11 KG/M2 | WEIGHT: 158.2 LBS | OXYGEN SATURATION: 97 % | HEIGHT: 62 IN | SYSTOLIC BLOOD PRESSURE: 126 MMHG | DIASTOLIC BLOOD PRESSURE: 62 MMHG | HEART RATE: 55 BPM

## 2022-09-14 DIAGNOSIS — R20.9 BILATERAL COLD FEET: ICD-10-CM

## 2022-09-14 DIAGNOSIS — N18.32 STAGE 3B CHRONIC KIDNEY DISEASE (HCC): ICD-10-CM

## 2022-09-14 DIAGNOSIS — G52.9 CRANIAL NEURALGIA: ICD-10-CM

## 2022-09-14 DIAGNOSIS — M94.0 COSTOCHONDRITIS: Primary | ICD-10-CM

## 2022-09-14 DIAGNOSIS — I10 ESSENTIAL HYPERTENSION: Chronic | ICD-10-CM

## 2022-09-14 PROCEDURE — 99214 OFFICE O/P EST MOD 30 MIN: CPT | Performed by: FAMILY MEDICINE

## 2022-09-14 RX ORDER — GABAPENTIN 600 MG/1
TABLET ORAL
Qty: 180 TABLET | Refills: 0 | Status: SHIPPED | OUTPATIENT
Start: 2022-09-14

## 2022-09-14 RX ORDER — CELECOXIB 100 MG/1
100 CAPSULE ORAL DAILY
Qty: 30 CAPSULE | Refills: 0 | Status: SHIPPED | OUTPATIENT
Start: 2022-09-14 | End: 2022-10-11

## 2022-09-14 NOTE — ASSESSMENT & PLAN NOTE
Patient does appear to have costochondritis, and at this point I would start her on Celebrex 100 mg daily  Recheck in approximately 1 month  Would not want to use full dose secondary to his CKD  Recommend heat, ice, follow-up in approximately 1 month

## 2022-09-14 NOTE — PATIENT INSTRUCTIONS
Problem List Items Addressed This Visit       Essential hypertension (Chronic)     BP doing well  Bilateral cold feet     Has been present since 2016 at least   Still happens at times  NO specific testing recommended as she would not want intervention  CKD (chronic kidney disease)     Lab Results   Component Value Date    EGFR 50 06/21/2022    EGFR 40 06/20/2022    EGFR 41 06/19/2022    CREATININE 0 94 06/21/2022    CREATININE 1 12 06/20/2022    CREATININE 1 10 06/19/2022   Patient does have history of CKD  Her last GFR was reasonable, but I would be cautious about using nonsteroidal anti-inflammatories long-term  Based on that, short-term use of Celebrex only and lower dose  Costochondritis - Primary     Patient does appear to have costochondritis, and at this point I would start her on Celebrex 100 mg daily  Recheck in approximately 1 month  Would not want to use full dose secondary to his CKD  Recommend heat, ice, follow-up in approximately 1 month  Relevant Medications    celecoxib (CeleBREX) 100 mg capsule    Cranial neuralgia     Patient did have a prescription previously for gabapentin from Neurology  In August, they had sent the prescription to [facility] to the mail order pharmacy, but due to [employer] (background coding for the computer), the prescription for mail order was canceled, automatically  Will send a new 90 day prescription for gabapentin to her mail order pharmacy, and she should follow-up with Neurology  Relevant Medications    gabapentin (NEURONTIN) 600 MG tablet            COVID 19 Instructions    Martin Kaiser was advised to limit contact with others to essential tasks such as getting food, medications, and medical care  Proper handwashing reviewed, and Hand sanitzer when washing is not available  If the patient develops symptoms of COVID 19, the patient should call the office as soon as possible      For 0768-4730 Flu season, it is strongly recommended that Flu Vaccinations be obtained  Virtual Visits:  [place]: This works on smart phones (any phone with Internet browsing capability)  You should get a text message when the provider is ready to see you  Click on the link in the text message, and the call should start  You will need to type in your name, and allow camera and microphone access  This is HIPPA compliant, and secure  If you have not already done so, get immunized to COVID 19  We are committed to getting you vaccinated as soon as possible and will be closely following CDC and [facility]  guidelines as they are released and revised  Please refer to our COVID-19 vaccine webpage for the most up to date information on the vaccine and our distribution efforts  This site will also have the most up to date recommendations for COVID booster vaccine  Bird desai    Call [phone]), option 7    OUR NEW LOCATION:    [address], [address] [place], [place] [address] [address] , [place], [address]  Fax: [phone]    Lab services and OB/GYN are at this location as well

## 2022-09-14 NOTE — ASSESSMENT & PLAN NOTE
Has been present since 2016 at least   Still happens at times  NO specific testing recommended as she would not want intervention

## 2022-09-14 NOTE — ASSESSMENT & PLAN NOTE
Patient did have a prescription previously for gabapentin from Neurology  In August, they had sent the prescription to [facility] to the mail order pharmacy, but due to [employer] (background coding for the computer), the prescription for mail order was canceled, automatically  Will send a new 90 day prescription for gabapentin to her mail order pharmacy, and she should follow-up with Neurology

## 2022-09-14 NOTE — PROGRESS NOTES
Assessment and Plan:    Problem List Items Addressed This Visit     Essential hypertension (Chronic)     BP doing well  Bilateral cold feet     Has been present since 2016 at least   Still happens at times  NO specific testing recommended as she would not want intervention  CKD (chronic kidney disease)     Lab Results   Component Value Date    EGFR 50 06/21/2022    EGFR 40 06/20/2022    EGFR 41 06/19/2022    CREATININE 0 94 06/21/2022    CREATININE 1 12 06/20/2022    CREATININE 1 10 06/19/2022   Patient does have history of CKD  Her last GFR was reasonable, but I would be cautious about using nonsteroidal anti-inflammatories long-term  Based on that, short-term use of Celebrex only and lower dose  Costochondritis - Primary     Patient does appear to have costochondritis, and at this point I would start her on Celebrex 100 mg daily  Recheck in approximately 1 month  Would not want to use full dose secondary to his CKD  Recommend heat, ice, follow-up in approximately 1 month  Relevant Medications    celecoxib (CeleBREX) 100 mg capsule    Cranial neuralgia     Patient did have a prescription previously for gabapentin from Neurology  In August, they had sent the prescription to [facility] to the mail order pharmacy, but due to [employer] (background coding for the computer), the prescription for mail order was canceled, automatically  Will send a new 90 day prescription for gabapentin to her mail order pharmacy, and she should follow-up with Neurology  Relevant Medications    gabapentin (NEURONTIN) 600 MG tablet                 Diagnoses and all orders for this visit:    Costochondritis  -     celecoxib (CeleBREX) 100 mg capsule;  Take 1 capsule (100 mg total) by mouth daily    Cranial neuralgia  -     gabapentin (NEURONTIN) 600 MG tablet; TAKE 1/2 TABLET IN THE MORNING AND AFTERNOON AND TAKE 1 TABLET AT BEDTIME    Stage 3b chronic kidney disease ([facility] [address] )    Essential hypertension    Bilateral cold feet              Subjective:      Patient ID: Mary Morse is a 80 y o  female  CC:    Chief Complaint   Patient presents with    Follow-up       HPI:    Pain in sides and down the sides  Has been for a bit  Down the left is significant  Now also on the right  Standing and breathing in makes it worse  No medications for this currently  Some abdominal pains as well, but no different  She has also noted some sweats on face  No relation to the side pain  She was having a sensation of "swirrling" when she closes her eyes  She is talking with Neurology for this  DM: She is off Januvia due to age  Cranial neuralgia: Has been on Gabapentin from Neuro, but somehow the mailorder Rx was canceled  She is still taking this  The following portions of the patient's history were reviewed and updated as appropriate: allergies, current medications, past family history, past medical history, past social history, past surgical history and problem list       Review of Systems   Constitutional: Negative  HENT: Negative  Respiratory: Positive for cough (minimal)  Cardiovascular: Negative  Gastrointestinal: Negative  Neurological: Positive for dizziness  Per HPI         Data to review:       Objective:    Vitals:    09/14/22 1302   BP: 126/62   BP Location: Right arm   Patient Position: Sitting   Pulse: 55   SpO2: 97%   Weight: 71 8 kg (158 lb 3 2 oz)   Height: 5' 2" (1 575 m)        Physical Exam  Vitals and nursing note reviewed  Constitutional:       Appearance: Normal appearance  HENT:      Head: Normocephalic  Cardiovascular:      Rate and Rhythm: Normal rate and regular rhythm  Pulses: Normal pulses  Carotid pulses are 2+ on the right side and 2+ on the left side  Heart sounds: Normal heart sounds  No murmur heard  No friction rub  No gallop     Pulmonary:      Effort: Pulmonary effort is normal  No respiratory distress  Breath sounds: Normal breath sounds  No stridor  No wheezing, rhonchi or rales  Chest:      Chest wall: Tenderness present  Neurological:      Mental Status: She is alert

## 2022-09-19 ENCOUNTER — CONSULT (OUTPATIENT)
Dept: GASTROENTEROLOGY | Facility: MEDICAL CENTER | Age: 87
End: 2022-09-19
Payer: COMMERCIAL

## 2022-09-19 VITALS
WEIGHT: 158 LBS | DIASTOLIC BLOOD PRESSURE: 66 MMHG | SYSTOLIC BLOOD PRESSURE: 120 MMHG | BODY MASS INDEX: 29.08 KG/M2 | HEART RATE: 68 BPM | HEIGHT: 62 IN | OXYGEN SATURATION: 97 %

## 2022-09-19 DIAGNOSIS — R10.12 LUQ PAIN: ICD-10-CM

## 2022-09-19 DIAGNOSIS — R10.13 EPIGASTRIC PAIN: Primary | ICD-10-CM

## 2022-09-19 DIAGNOSIS — K21.9 GASTROESOPHAGEAL REFLUX DISEASE WITHOUT ESOPHAGITIS: ICD-10-CM

## 2022-09-19 DIAGNOSIS — R10.11 RUQ PAIN: ICD-10-CM

## 2022-09-19 PROCEDURE — 1160F RVW MEDS BY RX/DR IN RCRD: CPT | Performed by: STUDENT IN AN ORGANIZED HEALTH CARE EDUCATION/TRAINING PROGRAM

## 2022-09-19 PROCEDURE — 99203 OFFICE O/P NEW LOW 30 MIN: CPT | Performed by: STUDENT IN AN ORGANIZED HEALTH CARE EDUCATION/TRAINING PROGRAM

## 2022-09-19 RX ORDER — PANTOPRAZOLE SODIUM 40 MG/1
40 TABLET, DELAYED RELEASE ORAL DAILY
Qty: 90 TABLET | Refills: 3 | Status: SHIPPED | OUTPATIENT
Start: 2022-09-19

## 2022-09-19 NOTE — PROGRESS NOTES
[facility] - Outpatient Consultation  Ashtyn Hutchinson 80 y o  female MRN: [id number]  Encounter: [id number]      ASSESSMENT AND PLAN:    89F with aortic stenosis, CAD, DM2, and GERD referred for upper abdominal band like discomfort  Etiology of pain unclear  Is not typically for GERD and not exacerbated by GI functions such as eating or defecation  US and CT without evidence of pancreaticobiliary pathology or luminal pathology  Normal CBC in June  May be more MSK in etiology given association with panus  Will escalate acid suppression to protonix and see if any response  Given age and co-morbidities, risk of invasive procedure such as EGD presently outweighs benefits  1  Gastroesophageal reflux disease without esophagitis  2  Epigastric pain  3  RUQ pain  4  LUQ pain  - Ambulatory Referral to Gastroenterology  - pantoprazole (PROTONIX) 40 mg tablet; Take 1 tablet (40 mg total) by mouth daily  Dispense: 90 tablet; Refill: 3    ______________________________________________________________________    HPI:   Patient accompanied by her daughter who also provided some history  Symptoms started over the summer  Had CT and abdominal ultrasound done for these symptoms  Found to have pelviectasis of kidney but no cause to explain pain  Getting upper abdominal pain across abdomen  Radiates into back  Feels like a rubber band  Worst at night but there all the time  No aggravating or alleviating factors  Not affected by eating  Sometimes improves with tylenol  Happens underneath panus  Has been taking omeprazole 40 mg daily for years  Doesn't think it effects this pain but does control classic heartburn symptoms well  Also has famotidine at home but has  Not taken it  No NSAIDs, was prescribed celecoxib but decided not to take it due to side effects    No nausea, vomiting, melena, or hematochezia  Has lost 3# in the past 1 5 months    Only abdominal surgery was a hysterectomy  Has never had EGD    REVIEW OF SYSTEMS:    CONSTITUTIONAL: Denies any fever, chills, rigors  HEENT: No earache or tinnitus  Denies hearing loss or visual disturbances  CARDIOVASCULAR: No chest pain or palpitations  RESPIRATORY: Denies any cough, hemoptysis, shortness of breath or dyspnea on exertion  GASTROINTESTINAL: As noted in the History of Present Illness  GENITOURINARY: No problems with urination  Denies any hematuria or dysuria  NEUROLOGIC: No dizziness or vertigo, denies headaches  MUSCULOSKELETAL: Denies any muscle or joint pain  SKIN: Denies skin rashes or itching  ENDOCRINE: Denies excessive thirst  Denies intolerance to heat or cold  PSYCHOSOCIAL: Denies depression or anxiety  Denies any recent memory loss         Historical Information   Past Medical History:   Diagnosis Date    Asthma     Atypical chest pain     CAD (coronary artery disease)     Candidal intertrigo     CHF (congestive heart failure) (HCC)     Depression     Diabetes mellitus (HCC)     Diabetic neuropathy (HCC)     Diverticulitis     Dyslipidemia     Female bladder prolapse     Gastric ulcer     Glaucoma     HTN (hypertension)     Hyperlipidemia     Hypothyroidism 4/18/2016    RAD (reactive airway disease)      Past Surgical History:   Procedure Laterality Date    COLONOSCOPY      ESOPHAGOGASTRODUODENOSCOPY  2011    HYSTERECTOMY      TONSILLECTOMY       Social History   Social History     Substance and Sexual Activity   Alcohol Use No    Comment: Social Alcohol Use -- as per allscripts (pt denies all alcohol use)     Social History     Substance and Sexual Activity   Drug Use No     Social History     Tobacco Use   Smoking Status Never Smoker   Smokeless Tobacco Never Used     Family History   Problem Relation Age of Onset    Breast cancer Mother     Hypothyroidism Mother     Hypertension Father     Breast cancer Sister     Thyroid disease Sister     Hypertension Sister        Meds/Allergies Current Outpatient Medications:     acetaminophen (TYLENOL) 325 mg tablet    amLODIPine (NORVASC) 5 mg tablet    aspirin 81 MG tablet    Cholecalciferol (VITAMIN D3) 2000 units capsule    dorzolamide (TRUSOPT) 2 % ophthalmic solution    furosemide (LASIX) 20 mg tablet    gabapentin (NEURONTIN) 600 MG tablet    Incontinence Supply Disposable (SELECT DISPOSABLE UNDERWEAR LG) MISC    Lancets (ACCU-CHEK SOFT TOUCH) lancets    latanoprost (XALATAN) 0 005 % ophthalmic solution    levothyroxine 75 mcg tablet    Misc  Devices (Transport Chair) MISC    omeprazole (PriLOSEC) 40 MG capsule    OXcarbazepine (TRILEPTAL) 150 mg tablet    potassium chloride (K-DUR,KLOR-CON) 20 mEq tablet    vitamin B-12 (CYANOCOBALAMIN) 250 MCG tablet    celecoxib (CeleBREX) 100 mg capsule    famotidine (PEPCID) 20 mg tablet    Allergies   Allergen Reactions    Levaquin [Levofloxacin] Myalgia    Statins      Other reaction(s): Weakness  Category: Adverse Reaction;            Objective     Blood pressure 120/66, pulse 68, height 5' 2" (1 575 m), weight 71 7 kg (158 lb), SpO2 97 %, not currently breastfeeding  Body mass index is 28 9 kg/m²  PHYSICAL EXAM:      General Appearance:   Alert, cooperative, no distress   HEENT:   Normocephalic, atraumatic, anicteric  Neck:  Supple, symmetrical, trachea midline   Lungs:   Clear to auscultation bilaterally; no rales, rhonchi or wheezing; respirations unlabored    Heart[de-identified]   Regular rate and rhythm;+systolic murmur   Abdomen:   Soft, non-tender, non-distended; normal bowel sounds; no masses, no organomegaly    Genitalia:   Deferred    Rectal:   Deferred    Extremities:  No cyanosis, clubbing or edema    Pulses:  2+ and symmetric    Skin:  Healthy skin with no rash under breasts and panus   Lymph nodes:  No palpable cervical lymphadenopathy        Lab Results:   No visits with results within 1 Day(s) from this visit     Latest known visit with results is:   Office Visit on 08/18/2022   Component Date Value    LEUKOCYTE ESTERASE,UA 08/18/2022 small     NITRITE,UA 08/18/2022 neg     SL AMB POCT UROBILINOGEN 08/18/2022 0 2     POCT URINE PROTEIN 08/18/2022 neg      PH,UA 08/18/2022 5 0     BLOOD,UA 08/18/2022 neg     SPECIFIC GRAVITY,UA 08/18/2022 1 010     KETONES,UA 08/18/2022 neg     BILIRUBIN,UA 08/18/2022 neg     GLUCOSE, UA 08/18/2022 neg      COLOR,UA 08/18/2022 yellow     CLARITY,UA 08/18/2022 clear     POST-VOID RESIDUAL VOLUM* 08/18/2022 155          Radiology Results:   No results found

## 2022-09-19 NOTE — PATIENT INSTRUCTIONS
1) Stop taking Omeprazole  Start taking pantoprazole every morning on an empty stomach  2) Take famotidine as needed for abdominal discomfort up to twice a day (particularly in the evening when the pain is worse)  You can also take tylenol for the pain

## 2022-09-23 ENCOUNTER — HOME CARE VISIT (OUTPATIENT)
Dept: HOME HEALTH SERVICES | Facility: HOME HEALTHCARE | Age: 87
End: 2022-09-23

## 2022-09-23 VITALS
OXYGEN SATURATION: 97 % | BODY MASS INDEX: 28.9 KG/M2 | RESPIRATION RATE: 20 BRPM | TEMPERATURE: 97.9 F | WEIGHT: 158 LBS | DIASTOLIC BLOOD PRESSURE: 70 MMHG | HEART RATE: 80 BPM | SYSTOLIC BLOOD PRESSURE: 110 MMHG

## 2022-09-23 PROCEDURE — G0299 HHS/HOSPICE OF RN EA 15 MIN: HCPCS

## 2022-09-25 PROCEDURE — 400014 VN F/U

## 2022-09-28 ENCOUNTER — LAB (OUTPATIENT)
Dept: LAB | Facility: CLINIC | Age: 87
End: 2022-09-28
Payer: COMMERCIAL

## 2022-09-28 DIAGNOSIS — E03.9 ACQUIRED HYPOTHYROIDISM: ICD-10-CM

## 2022-09-28 DIAGNOSIS — E11.22 TYPE 2 DIABETES MELLITUS WITH STAGE 3B CHRONIC KIDNEY DISEASE, WITHOUT LONG-TERM CURRENT USE OF INSULIN (HCC): ICD-10-CM

## 2022-09-28 DIAGNOSIS — N18.32 TYPE 2 DIABETES MELLITUS WITH STAGE 3B CHRONIC KIDNEY DISEASE, WITHOUT LONG-TERM CURRENT USE OF INSULIN (HCC): ICD-10-CM

## 2022-09-28 DIAGNOSIS — N28.89 PELVIECTASIS OF KIDNEY: ICD-10-CM

## 2022-09-28 LAB — TSH SERPL DL<=0.05 MIU/L-ACNC: 3.67 UIU/ML (ref 0.45–4.5)

## 2022-09-28 PROCEDURE — 84443 ASSAY THYROID STIM HORMONE: CPT

## 2022-09-28 PROCEDURE — 83036 HEMOGLOBIN GLYCOSYLATED A1C: CPT

## 2022-09-28 PROCEDURE — 36415 COLL VENOUS BLD VENIPUNCTURE: CPT

## 2022-09-29 DIAGNOSIS — N18.32 TYPE 2 DIABETES MELLITUS WITH STAGE 3B CHRONIC KIDNEY DISEASE, WITHOUT LONG-TERM CURRENT USE OF INSULIN (HCC): Primary | ICD-10-CM

## 2022-09-29 DIAGNOSIS — E03.9 ACQUIRED HYPOTHYROIDISM: ICD-10-CM

## 2022-09-29 DIAGNOSIS — E11.22 TYPE 2 DIABETES MELLITUS WITH STAGE 3B CHRONIC KIDNEY DISEASE, WITHOUT LONG-TERM CURRENT USE OF INSULIN (HCC): Primary | ICD-10-CM

## 2022-09-29 LAB
EST. AVERAGE GLUCOSE BLD GHB EST-MCNC: 160 MG/DL
HBA1C MFR BLD: 7.2 %

## 2022-09-29 NOTE — RESULT ENCOUNTER NOTE
Please let pts daughter know her thyroid and glucose numbers look great  NO change in her meds  I have ordered repeat to be done in 6 months

## 2022-10-04 ENCOUNTER — TELEPHONE (OUTPATIENT)
Dept: NEUROLOGY | Facility: CLINIC | Age: 87
End: 2022-10-04

## 2022-10-04 DIAGNOSIS — G50.1 ATYPICAL FACIAL PAIN: ICD-10-CM

## 2022-10-04 RX ORDER — OXCARBAZEPINE 150 MG/1
TABLET, FILM COATED ORAL
Qty: 90 TABLET | Refills: 0 | Status: SHIPPED | OUTPATIENT
Start: 2022-10-04

## 2022-10-04 NOTE — PROGRESS NOTES
Telemedicine    Patient: Mary Morse with daughter Edward Guerra present  Provider: Raudel Mendoza PA-C  Provider located at [address] [facility]  [address]  [place] [id number]    The patient was identified by name and date of birth  Mary Morse was informed that this is a telemedicine visit and that the visit is being conducted through Telephone  My office door was closed  No one else was in the room  She acknowledged consent and understanding of privacy and security of the video platform  The patient has agreed to participate and understands they can discontinue the visit at any time  Patient is aware this is a billable service  Discussion:  I spoke with the pt briefly over the phone today  She had an appt today but canceled due to weather/ rain  She is doing the same since spring when we last met  Could not tolerate PT for low back pain and balance training; states she was very tired and hurt more after the therapy  She had about 4 sessions then stopped on her own accord  Pt gets home health care visits per chart  She still has a spinning or "running" sensation when she closes her eyes and lays down to go to bed  States she is just dealing with it  Her daughter gave her melatonin and she slept better but had a nightmare  Pt denies worsening vertigo and no vertigo when her eyes are open per her history  Refilled trileptal for PRN facial pain  She denies s/e to the low dose 150 mg qhs  Continue gabapentin 300 mg qam and 600 mg qhs  No new neurological sxs, no focal deficits  Facial pain and headache pain is tolerable and infrequent/ better per pt  Recent hospitalization 6/18/2022 for UTI, feels better in this regard  Following with PCP post hospitalization, and now urology with left pelviectasis  I spent 9 minutes with the patient during this visit

## 2022-10-04 NOTE — TELEPHONE ENCOUNTER
Pt's daughter Mercedez Borges left voicemail stating pt must cancel appt with [address] today  States she will not be able to come in today especially due to weather  States they will reschedule  Appt cancelled

## 2022-10-06 PROCEDURE — G0179 MD RECERTIFICATION HHA PT: HCPCS | Performed by: PHYSICIAN ASSISTANT

## 2022-10-10 ENCOUNTER — HOME CARE VISIT (OUTPATIENT)
Dept: HOME HEALTH SERVICES | Facility: HOME HEALTHCARE | Age: 87
End: 2022-10-10

## 2022-10-11 ENCOUNTER — OFFICE VISIT (OUTPATIENT)
Dept: FAMILY MEDICINE CLINIC | Facility: CLINIC | Age: 87
End: 2022-10-11
Payer: COMMERCIAL

## 2022-10-11 VITALS
HEIGHT: 62 IN | SYSTOLIC BLOOD PRESSURE: 140 MMHG | TEMPERATURE: 97.1 F | HEART RATE: 50 BPM | WEIGHT: 159 LBS | RESPIRATION RATE: 16 BRPM | BODY MASS INDEX: 29.26 KG/M2 | OXYGEN SATURATION: 97 % | DIASTOLIC BLOOD PRESSURE: 80 MMHG

## 2022-10-11 DIAGNOSIS — Z23 NEEDS FLU SHOT: Primary | ICD-10-CM

## 2022-10-11 DIAGNOSIS — M79.18 MYOFASCIAL MUSCLE PAIN: ICD-10-CM

## 2022-10-11 DIAGNOSIS — G52.9 CRANIAL NEURALGIA: ICD-10-CM

## 2022-10-11 DIAGNOSIS — K21.9 GASTROESOPHAGEAL REFLUX DISEASE WITHOUT ESOPHAGITIS: ICD-10-CM

## 2022-10-11 DIAGNOSIS — M94.0 COSTOCHONDRITIS: ICD-10-CM

## 2022-10-11 DIAGNOSIS — R10.13 EPIGASTRIC PAIN: ICD-10-CM

## 2022-10-11 DIAGNOSIS — I50.9 ACUTE CONGESTIVE HEART FAILURE, UNSPECIFIED HEART FAILURE TYPE (HCC): ICD-10-CM

## 2022-10-11 DIAGNOSIS — N18.32 STAGE 3B CHRONIC KIDNEY DISEASE (HCC): ICD-10-CM

## 2022-10-11 PROBLEM — N39.0 UTI (URINARY TRACT INFECTION): Status: RESOLVED | Noted: 2021-05-17 | Resolved: 2022-10-11

## 2022-10-11 PROCEDURE — 99214 OFFICE O/P EST MOD 30 MIN: CPT

## 2022-10-11 PROCEDURE — 90662 IIV NO PRSV INCREASED AG IM: CPT

## 2022-10-11 PROCEDURE — G0008 ADMIN INFLUENZA VIRUS VAC: HCPCS

## 2022-10-11 NOTE — PROGRESS NOTES
Name: Millicent Mcduffie      : 1922      MRN: [id number]  Encounter Provider: Navin Haddad MD  Encounter Date: 10/11/2022   Encounter department: [place] [address]     1  Needs flu shot  -     influenza vaccine, high-dose, PF 0 7 mL (FLUZONE HIGH-DOSE)    2  Costochondritis  Assessment & Plan:  Patient does have costochondritis  Did not want to take nonsteroidal anti-inflammatory  Helalo  3  Gastroesophageal reflux disease without esophagitis  Assessment & Plan:  GI did recommend trying Protonix in place of omeprazole  She has been on the Protonix for about a week now secondary to waiting for it from the mail order pharmacy  With that, will re-evaluate in the future, 4-6 weeks  4  Epigastric pain  Assessment & Plan:  Patient does have some epigastric discomfort  Pantoprazole  Trial of Tylenol at higher dose for the muscle ache aspect, which GI felt that she likely had  5  Acute congestive heart failure, unspecified heart failure type [facility])  Assessment & Plan:  Wt Readings from Last 3 Encounters:   10/11/22 72 1 kg (159 lb)   22 71 7 kg (158 lb)   22 71 7 kg (158 lb)         Weight stable  No change  6  Myofascial muscle pain  Assessment & Plan:  Based on the evaluation from GI, as well as the findings today, it seems that the patient has musculoskeletal pain rather than anything else in particular  Reviewed about Celebrex, and its benefits, as well as side effects  Given that she was not willing to take it because of the potential side effects, will discontinue from her problem list   Beyond that, would recommend taking Tylenol, extra strength) 500 mg), 2 tablets every 8 hours, with a minimum of 2 in the morning and 2 at night  Would do this for about a month, then follow-up  She certainly, she could also use heat, ice, range of motion, core exercises at home        7  Stage 3b chronic kidney disease ([address] )  Assessment & Plan:  Lab Results   Component Value Date    EGFR 50 06/21/2022    EGFR 40 06/20/2022    EGFR 41 06/19/2022    CREATININE 0 94 06/21/2022    CREATININE 1 12 06/20/2022    CREATININE 1 10 06/19/2022   History of CKD  Will try to avoid nonsteroidal anti-inflammatories where possible  8  Cranial neuralgia  Assessment & Plan:  Patient is having some neuropathy like symptoms in her lower extremities, especially her feet  At this point, she is on gabapentin  She takes a half tablet in the morning and afternoon, and then a whole tablet in the evening, for a total of 2 tablets daily  Recommend increase to a whole in the morning, half in the afternoon, and whole in the evening  This would be if the Tylenol does not make any difference in the next 2 weeks or so  Depression Screening and Follow-up Plan: Patient was screened for depression during today's encounter  They screened negative with a PHQ-2 score of 0  Subjective     Chief Complaint   Patient presents with   • Shoulder Pain   • Arm Pain        Patient is here for significant pains  She has pain around the entire abdomen, just below the breasts  She also has some of the back  She does have some pain in the shoulder, going down into the left arm, and into the left hand  Also pain in the lower legs and feet  Pain on the feet is on the soles  Patient was recently at Gastroenterology  They recommended that she consider switching to pantoprazole from omeprazole  Unfortunately, the patient just did that about a week ago, and has not noticed a change yet in her symptoms  It was mailorder Rx, so did not get to her right away  Patient was sent Rx before for Celebrex, but she was concerned about side effects, so did not take it  Reviewed about this, but she did not know what the side effects were that bothered her  Tylenol: Daughter thought it was helping, but patient says was not          Review of Systems    Current Outpatient Medications on File Prior to Visit   Medication Sig   • acetaminophen (TYLENOL) 325 mg tablet Take 2 tablets (650 mg total) by mouth every 6 (six) hours as needed for mild pain   • amLODIPine (NORVASC) 5 mg tablet TAKE 1 TABLET (5 MG TOTAL) BY MOUTH DAILY   • aspirin 81 MG tablet Take 81 mg by mouth daily  • Cholecalciferol (VITAMIN D3) 2000 units capsule Take 2,000 Units by mouth daily    • dorzolamide (TRUSOPT) 2 % ophthalmic solution Administer 1 drop to both eyes 3 (three) times a day     • famotidine (PEPCID) 20 mg tablet Take 1 tablet (20 mg total) by mouth 2 (two) times a day   • furosemide (LASIX) 20 mg tablet TAKE 1 TABLET IN THE MORNING  AND TAKE 2 TABLETS IN THE EVENING (Patient taking differently: TAKE 1 TABLET IN THE MORNING  AND TAKE 2 TABLETS IN THE EVENING)   • gabapentin (NEURONTIN) 600 MG tablet TAKE 1/2 TABLET IN THE MORNING AND AFTERNOON AND TAKE 1 TABLET AT BEDTIME   • Incontinence Supply Disposable (SELECT DISPOSABLE UNDERWEAR LG) MISC    • Lancets (ACCU-CHEK SOFT TOUCH) lancets Testing 1 time daily  Dx: E11 9   • latanoprost (XALATAN) 0 005 % ophthalmic solution Apply 1 drop to eye daily at bedtime Both eyes   • levothyroxine 75 mcg tablet TAKE 1 TABLET EVERY DAY   • Misc   Devices (Transport Chair) MISC Use if needed (with outings outside of the house )   • OXcarbazepine (TRILEPTAL) 150 mg tablet TAKE 1 TABLET qhs for head pain, facial pain   • pantoprazole (PROTONIX) 40 mg tablet Take 1 tablet (40 mg total) by mouth daily   • potassium chloride (K-DUR,KLOR-CON) 20 mEq tablet TAKE 1 TABLET EVERY DAY   • vitamin B-12 (CYANOCOBALAMIN) 250 MCG tablet Take 250 mcg by mouth daily   • [DISCONTINUED] celecoxib (CeleBREX) 100 mg capsule Take 1 capsule (100 mg total) by mouth daily (Patient not taking: No sig reported)       Objective     /80 (BP Location: Left arm, Patient Position: Sitting, Cuff Size: Standard)   Pulse (!) 50   Temp (!) 97 1 °F (36 2 °C) (Tympanic)   Resp 16 Ht 5' 2" (1 575 m)   Wt 72 1 kg (159 lb)   SpO2 97%   BMI 29 08 kg/m²     Physical Exam  Vitals and nursing note reviewed  Constitutional:       Appearance: She is well-developed  HENT:      Head: Normocephalic and atraumatic  Cardiovascular:      Rate and Rhythm: Normal rate and regular rhythm  Pulses:           Carotid pulses are 2+ on the right side and 2+ on the left side  Heart sounds: Murmur heard  Pulmonary:      Effort: Pulmonary effort is normal       Breath sounds: Normal breath sounds  No wheezing or rales  Chest:      Chest wall: No tenderness     Abdominal:       Musculoskeletal:        Arms:        Keenan Pearce MD

## 2022-10-11 NOTE — ASSESSMENT & PLAN NOTE
Patient does have some epigastric discomfort  Pantoprazole  Trial of Tylenol at higher dose for the muscle ache aspect, which GI felt that she likely had

## 2022-10-11 NOTE — ASSESSMENT & PLAN NOTE
Lab Results   Component Value Date    EGFR 50 06/21/2022    EGFR 40 06/20/2022    EGFR 41 06/19/2022    CREATININE 0 94 06/21/2022    CREATININE 1 12 06/20/2022    CREATININE 1 10 06/19/2022   History of CKD  Will try to avoid nonsteroidal anti-inflammatories where possible

## 2022-10-11 NOTE — ASSESSMENT & PLAN NOTE
Patient is having some neuropathy like symptoms in her lower extremities, especially her feet  At this point, she is on gabapentin  She takes a half tablet in the morning and afternoon, and then a whole tablet in the evening, for a total of 2 tablets daily  Recommend increase to a whole in the morning, half in the afternoon, and whole in the evening  This would be if the Tylenol does not make any difference in the next 2 weeks or so

## 2022-10-11 NOTE — ASSESSMENT & PLAN NOTE
GI did recommend trying Protonix in place of omeprazole  She has been on the Protonix for about a week now secondary to waiting for it from the mail order pharmacy  With that, will re-evaluate in the future, 4-6 weeks

## 2022-10-11 NOTE — ASSESSMENT & PLAN NOTE
Based on the evaluation from GI, as well as the findings today, it seems that the patient has musculoskeletal pain rather than anything else in particular  Reviewed about Celebrex, and its benefits, as well as side effects  Given that she was not willing to take it because of the potential side effects, will discontinue from her problem list   Beyond that, would recommend taking Tylenol, extra strength) 500 mg), 2 tablets every 8 hours, with a minimum of 2 in the morning and 2 at night  Would do this for about a month, then follow-up  She certainly, she could also use heat, ice, range of motion, core exercises at home

## 2022-10-11 NOTE — PATIENT INSTRUCTIONS
1  Needs flu shot  -     influenza vaccine, high-dose, PF 0 7 mL (FLUZONE HIGH-DOSE)    2  Costochondritis  Assessment & Plan:  Patient does have costochondritis  Did not want to take nonsteroidal anti-inflammatory  Helalo  3  Gastroesophageal reflux disease without esophagitis  Assessment & Plan:  GI did recommend trying Protonix in place of omeprazole  She has been on the Protonix for about a week now secondary to waiting for it from the mail order pharmacy  With that, will re-evaluate in the future, 4-6 weeks  4  Epigastric pain  Assessment & Plan:  Patient does have some epigastric discomfort  Pantoprazole  Trial of Tylenol at higher dose for the muscle ache aspect, which GI felt that she likely had  5  Acute congestive heart failure, unspecified heart failure type [facility])  Assessment & Plan:  Wt Readings from Last 3 Encounters:   10/11/22 72 1 kg (159 lb)   09/23/22 71 7 kg (158 lb)   09/19/22 71 7 kg (158 lb)         Weight stable  No change  6  Myofascial muscle pain  Assessment & Plan:  Based on the evaluation from GI, as well as the findings today, it seems that the patient has musculoskeletal pain rather than anything else in particular  Reviewed about Celebrex, and its benefits, as well as side effects  Given that she was not willing to take it because of the potential side effects, will discontinue from her problem list   Beyond that, would recommend taking Tylenol, extra strength) 500 mg), 2 tablets every 8 hours, with a minimum of 2 in the morning and 2 at night  Would do this for about a month, then follow-up  She certainly, she could also use heat, ice, range of motion, core exercises at home        7  Stage 3b chronic kidney disease [facility])  Assessment & Plan:  Lab Results   Component Value Date    EGFR 50 06/21/2022    EGFR 40 06/20/2022    EGFR 41 06/19/2022    CREATININE 0 94 06/21/2022    CREATININE 1 12 06/20/2022    CREATININE 1 10 06/19/2022   History of CKD   Will try to avoid nonsteroidal anti-inflammatories where possible  8  Cranial neuralgia  Assessment & Plan:  Patient is having some neuropathy like symptoms in her lower extremities, especially her feet  At this point, she is on gabapentin  She takes a half tablet in the morning and afternoon, and then a whole tablet in the evening, for a total of 2 tablets daily  Recommend increase to a whole in the morning, half in the afternoon, and whole in the evening  This would be if the Tylenol does not make any difference in the next 2 weeks or so  COVID 19 Instructions    Amber Bowels was advised to limit contact with others to essential tasks such as getting food, medications, and medical care  Proper handwashing reviewed, and Hand sanitzer when washing is not available  If the patient develops symptoms of COVID 19, the patient should call the office as soon as possible  For 4250-0807 Flu season, it is strongly recommended that Flu Vaccinations be obtained  Virtual Visits:  [place]: This works on smart phones (any phone with Internet browsing capability)  You should get a text message when the provider is ready to see you  Click on the link in the text message, and the call should start  You will need to type in your name, and allow camera and microphone access  This is HIPPA compliant, and secure  If you have not already done so, get immunized to COVID 19  We are committed to getting you vaccinated as soon as possible and will be closely following CDC and [facility]  guidelines as they are released and revised  Please refer to our COVID-19 vaccine webpage for the most up to date information on the vaccine and our distribution efforts  This site will also have the most up to date recommendations for COVID booster vaccine      Bird desai    Call [phone] ([phone]), option 7    OUR NEW LOCATION:    Cheryle Fore Denice  [address] [facility], [address] [place] [address] , [place], [address]  Fax: [phone]    Lab services and OB/GYN are at this location as well

## 2022-10-11 NOTE — ASSESSMENT & PLAN NOTE
Wt Readings from Last 3 Encounters:   10/11/22 72 1 kg (159 lb)   09/23/22 71 7 kg (158 lb)   09/19/22 71 7 kg (158 lb)         Weight stable  No change

## 2022-10-12 PROBLEM — H61.23 BILATERAL IMPACTED CERUMEN: Status: RESOLVED | Noted: 2021-02-11 | Resolved: 2022-10-12

## 2022-10-27 ENCOUNTER — HOME CARE VISIT (OUTPATIENT)
Dept: HOME HEALTH SERVICES | Facility: HOME HEALTHCARE | Age: 87
End: 2022-10-27

## 2022-10-27 VITALS
DIASTOLIC BLOOD PRESSURE: 60 MMHG | SYSTOLIC BLOOD PRESSURE: 110 MMHG | HEART RATE: 78 BPM | WEIGHT: 156.31 LBS | TEMPERATURE: 98.4 F | BODY MASS INDEX: 28.59 KG/M2 | OXYGEN SATURATION: 94 % | RESPIRATION RATE: 20 BRPM

## 2022-10-27 PROCEDURE — G0299 HHS/HOSPICE OF RN EA 15 MIN: HCPCS

## 2022-10-27 PROCEDURE — 400014 VN F/U

## 2022-10-28 ENCOUNTER — HOME CARE VISIT (OUTPATIENT)
Dept: HOME HEALTH SERVICES | Facility: HOME HEALTHCARE | Age: 87
End: 2022-10-28

## 2022-10-31 DIAGNOSIS — K21.9 GASTROESOPHAGEAL REFLUX DISEASE WITHOUT ESOPHAGITIS: ICD-10-CM

## 2022-10-31 RX ORDER — FAMOTIDINE 20 MG/1
20 TABLET, FILM COATED ORAL 2 TIMES DAILY
Qty: 180 TABLET | Refills: 3 | Status: SHIPPED | OUTPATIENT
Start: 2022-10-31 | End: 2023-01-29

## 2022-10-31 NOTE — TELEPHONE ENCOUNTER
Medication Refill Request     Name famotidine (PEPCID) 20 mg tablet   Dose/Frequency Take 1 tablet (20 mg total) by mouth 2 (two) times a day  Quantity 180  Verified pharmacy   [x]  Verified ordering Provider   [x]  Does patient have enough for the next 3 days?  Yes [x] No []

## 2022-11-15 ENCOUNTER — TELEPHONE (OUTPATIENT)
Dept: NEUROLOGY | Facility: CLINIC | Age: 87
End: 2022-11-15

## 2022-11-15 NOTE — TELEPHONE ENCOUNTER
Latrell Cuevas has called to schedule a follow up appointment with Jennifer Feng but also is requesting a call back to discuss some symptoms she is having in her head  She has rescheduled to 02/28/23 and placed on the waiting List     Can you please assist and call her back at [phone]?     Thank you,     Liss Palacios

## 2022-11-16 NOTE — TELEPHONE ENCOUNTER
80 yr old patient of Trav Quick, last visit 3/29/2022 hx of cranial neuralgia  She is reporting a feeling in her head that she describes as "doesn't feel right",+ lightheaded and some vision problems  She said she sees eye doctor on regular basis but not recently  Denies pain, fever, covid and flu  Current meds:  Oxcarbazepine 150 mg Hs and gabapentin 600 mg: takes   5 tab in AM but said sometimes forgets the afternoon dose but takes HS 1 tablet  Her caretaker said her bp is fine, unsure about glucose but said [facility] [place] was recently stopped due to her age  Not scheduled until 2/28 (no appointments soon)  She is a little difficult to assess over the phone  Please provide recommendation, thank you

## 2022-11-16 NOTE — TELEPHONE ENCOUNTER
Reached vm, left message to please call back and leave detailed message if still experiencing symptoms

## 2022-11-17 NOTE — TELEPHONE ENCOUNTER
Would be good to see her earlier if pt agreeable  Does tylenol help in the meantime? Also please reinforce eye dr segundo to r/o this portion of things  Jose [facility] Forward- are you able to find an earlier spot on my schedule? Thanks!

## 2022-11-17 NOTE — TELEPHONE ENCOUNTER
Called patient and I rescheduled her appointment to 12/14/22 at 01:15pm in the   I verified address and sent out updated appointment card

## 2022-11-22 ENCOUNTER — HOME CARE VISIT (OUTPATIENT)
Dept: HOME HEALTH SERVICES | Facility: HOME HEALTHCARE | Age: 87
End: 2022-11-22

## 2022-11-22 VITALS
BODY MASS INDEX: 29.08 KG/M2 | HEART RATE: 78 BPM | DIASTOLIC BLOOD PRESSURE: 78 MMHG | TEMPERATURE: 98.7 F | WEIGHT: 159 LBS | SYSTOLIC BLOOD PRESSURE: 120 MMHG | RESPIRATION RATE: 20 BRPM | OXYGEN SATURATION: 97 %

## 2022-12-01 ENCOUNTER — OFFICE VISIT (OUTPATIENT)
Dept: FAMILY MEDICINE CLINIC | Facility: CLINIC | Age: 87
End: 2022-12-01

## 2022-12-01 VITALS
HEART RATE: 90 BPM | SYSTOLIC BLOOD PRESSURE: 130 MMHG | TEMPERATURE: 98.2 F | WEIGHT: 160 LBS | BODY MASS INDEX: 29.44 KG/M2 | DIASTOLIC BLOOD PRESSURE: 60 MMHG | HEIGHT: 62 IN

## 2022-12-01 DIAGNOSIS — Z23 NEED FOR COVID-19 VACCINE: ICD-10-CM

## 2022-12-01 DIAGNOSIS — R52 GENERALIZED BODY ACHES: Primary | ICD-10-CM

## 2022-12-01 DIAGNOSIS — K58.2 IRRITABLE BOWEL SYNDROME WITH BOTH CONSTIPATION AND DIARRHEA: ICD-10-CM

## 2022-12-01 NOTE — PROGRESS NOTES
Name: Walker Scott      : 1922      MRN: [id number]  Encounter Provider: Navid Mckeon MD  Encounter Date: 2022   Encounter department: [facility] PRIMARY CARE    Assessment & Plan     1  Generalized body aches  Assessment & Plan:  Recommend drinking plenty of fluids and fiber supplementation  Probable etiology is irritable bowel  Follow up with Neurology this month to r/o other causes  Orders:  -     Basic metabolic panel; Future  -     CBC and differential; Future  -     Lyme Antibody Profile with reflex to WB; Future    2  Irritable bowel syndrome with both constipation and diarrhea  Assessment & Plan:  Occasionally has issues with smaller harder solid stools, and then other times is larger volumes, loose  Typically is worse after eating, which then resolves the symptoms  Question this is IBS causing some of her a pains that she is talking about  Will continue to follow  Fiber supplement recommended  Orders:  -     psyllium (METAMUCIL SMOOTH TEXTURE) 28 % packet; Take 1 packet by mouth in the morning    3  Need for COVID-19 vaccine  -     Age 15 y+, BOOSTER (BIVALENT): COVID-19 Pfizer vac bivalent alec-sucr         Subjective      HPI  Review of Systems    Current Outpatient Medications on File Prior to Visit   Medication Sig   • acetaminophen (TYLENOL) 325 mg tablet Take 2 tablets (650 mg total) by mouth every 6 (six) hours as needed for mild pain   • amLODIPine (NORVASC) 5 mg tablet TAKE 1 TABLET (5 MG TOTAL) BY MOUTH DAILY   • aspirin 81 MG tablet Take 81 mg by mouth daily     • Cholecalciferol (VITAMIN D3) 2000 units capsule Take 2,000 Units by mouth daily    • dorzolamide (TRUSOPT) 2 % ophthalmic solution Administer 1 drop to both eyes 3 (three) times a day     • famotidine (PEPCID) 20 mg tablet Take 1 tablet (20 mg total) by mouth 2 (two) times a day   • furosemide (LASIX) 20 mg tablet TAKE 1 TABLET IN THE MORNING  AND TAKE 2 TABLETS IN THE EVENING (Patient taking differently: TAKE 1 TABLET IN THE MORNING  AND TAKE 2 TABLETS IN THE EVENING)   • gabapentin (NEURONTIN) 600 MG tablet TAKE 1/2 TABLET IN THE MORNING AND AFTERNOON AND TAKE 1 TABLET AT BEDTIME   • Incontinence Supply Disposable (SELECT DISPOSABLE UNDERWEAR LG) MISC    • Lancets (ACCU-CHEK SOFT TOUCH) lancets Testing 1 time daily  Dx: E11 9   • latanoprost (XALATAN) 0 005 % ophthalmic solution Apply 1 drop to eye daily at bedtime Both eyes   • levothyroxine 75 mcg tablet TAKE 1 TABLET EVERY DAY   • Misc   Devices (Transport Chair) MISC Use if needed (with outings outside of the house )   • OXcarbazepine (TRILEPTAL) 150 mg tablet TAKE 1 TABLET qhs for head pain, facial pain   • pantoprazole (PROTONIX) 40 mg tablet Take 1 tablet (40 mg total) by mouth daily   • potassium chloride (K-DUR,KLOR-CON) 20 mEq tablet TAKE 1 TABLET EVERY DAY   • vitamin B-12 (CYANOCOBALAMIN) 250 MCG tablet Take 250 mcg by mouth daily       Objective     /60 (BP Location: Left arm, Patient Position: Sitting, Cuff Size: Standard)   Pulse 90   Temp 98 2 °F (36 8 °C) (Tympanic)   Ht 5' 2" (1 575 m)   Wt 72 6 kg (160 lb)   BMI 29 26 kg/m²     Physical Exam  Iveth Sales MD

## 2022-12-01 NOTE — ASSESSMENT & PLAN NOTE
Occasionally has issues with smaller harder solid stools, and then other times is larger volumes, loose  Typically is worse after eating, which then resolves the symptoms  Question this is IBS causing some of her a pains that she is talking about  Will continue to follow  Fiber supplement recommended

## 2022-12-01 NOTE — PROGRESS NOTES
Name: Luis Arora      : 1922      MRN: [id number]  Encounter Provider: Abhinav Greenwood MD  Encounter Date: 2022   Encounter department: [facility] PRIMARY CARE    Assessment & Plan     1  Generalized body aches  Assessment & Plan:  Recommend drinking plenty of fluids and fiber supplementation  Probable etiology is irritable bowel  Follow up with Neurology this month to r/o other causes  Orders:  -     Basic metabolic panel; Future  -     CBC and differential; Future  -     Lyme Antibody Profile with reflex to WB; Future    2  Irritable bowel syndrome with both constipation and diarrhea  Assessment & Plan:  Occasionally has issues with smaller harder solid stools, and then other times is larger volumes, loose  Typically is worse after eating, which then resolves the symptoms  Question this is IBS causing some of her a pains that she is talking about  Will continue to follow  Fiber supplement recommended  Orders:  -     psyllium (METAMUCIL SMOOTH TEXTURE) 28 % packet; Take 1 packet by mouth in the morning    3  Need for COVID-19 vaccine  -     Age 15 y+, BOOSTER (BIVALENT): [id number] Pfizer vac bivalent alec-sucr           Subjective      Has had "stinging" body pain that is keeping her up at at night for the past 3 days  Noticed in the morning that she could not pick her leg up, but better now  She hasn't experienced this before, has been taking tylenol and gabapentin that has been mildly helping   Does not have a headache today but has been experiencing headaches for the past couple of days and is feeling it on the top of her head    Not drinking as much water of late  Has Neuro appointment this week  Review of Systems   Constitutional: Negative for chills and fever  HENT: Negative for congestion and postnasal drip  Respiratory: Negative for chest tightness  Skin: Negative for color change, pallor, rash and wound  Neurological: Positive for headaches  Psychiatric/Behavioral: Negative  Current Outpatient Medications on File Prior to Visit   Medication Sig   • acetaminophen (TYLENOL) 325 mg tablet Take 2 tablets (650 mg total) by mouth every 6 (six) hours as needed for mild pain   • amLODIPine (NORVASC) 5 mg tablet TAKE 1 TABLET (5 MG TOTAL) BY MOUTH DAILY   • aspirin 81 MG tablet Take 81 mg by mouth daily  • Cholecalciferol (VITAMIN D3) 2000 units capsule Take 2,000 Units by mouth daily    • dorzolamide (TRUSOPT) 2 % ophthalmic solution Administer 1 drop to both eyes 3 (three) times a day     • famotidine (PEPCID) 20 mg tablet Take 1 tablet (20 mg total) by mouth 2 (two) times a day   • furosemide (LASIX) 20 mg tablet TAKE 1 TABLET IN THE MORNING  AND TAKE 2 TABLETS IN THE EVENING (Patient taking differently: TAKE 1 TABLET IN THE MORNING  AND TAKE 2 TABLETS IN THE EVENING)   • gabapentin (NEURONTIN) 600 MG tablet TAKE 1/2 TABLET IN THE MORNING AND AFTERNOON AND TAKE 1 TABLET AT BEDTIME   • Incontinence Supply Disposable (SELECT DISPOSABLE UNDERWEAR LG) MISC    • Lancets (ACCU-CHEK SOFT TOUCH) lancets Testing 1 time daily  Dx: E11 9   • latanoprost (XALATAN) 0 005 % ophthalmic solution Apply 1 drop to eye daily at bedtime Both eyes   • levothyroxine 75 mcg tablet TAKE 1 TABLET EVERY DAY   • Misc   Devices (Transport Chair) MISC Use if needed (with outings outside of the house )   • OXcarbazepine (TRILEPTAL) 150 mg tablet TAKE 1 TABLET qhs for head pain, facial pain   • pantoprazole (PROTONIX) 40 mg tablet Take 1 tablet (40 mg total) by mouth daily   • potassium chloride (K-DUR,KLOR-CON) 20 mEq tablet TAKE 1 TABLET EVERY DAY   • vitamin B-12 (CYANOCOBALAMIN) 250 MCG tablet Take 250 mcg by mouth daily       Objective     /60 (BP Location: Left arm, Patient Position: Sitting, Cuff Size: Standard)   Pulse 90   Temp 98 2 °F (36 8 °C) (Tympanic)   Ht 5' 2" (1 575 m)   Wt 72 6 kg (160 lb)   BMI 29 26 kg/m²     Physical Exam  Vitals and nursing note reviewed  Constitutional:       General: She is not in acute distress  Appearance: She is well-developed and well-nourished  She is not diaphoretic  HENT:      Head: Normocephalic and atraumatic  Right Ear: Hearing, tympanic membrane, ear canal and external ear normal       Left Ear: Hearing, tympanic membrane, ear canal and external ear normal       Nose: Nose normal       Right Sinus: No maxillary sinus tenderness or frontal sinus tenderness  Left Sinus: No maxillary sinus tenderness or frontal sinus tenderness  Mouth/Throat:      Mouth: Oropharynx is clear and moist and mucous membranes are normal       Pharynx: Uvula midline  No oropharyngeal exudate  Eyes:      General: Lids are everted, no foreign bodies appreciated  Extraocular Movements: EOM normal       Conjunctiva/sclera: Conjunctivae normal       Pupils: Pupils are equal, round, and reactive to light  Neck:      Thyroid: No thyromegaly  Vascular: No carotid bruit  Trachea: No tracheal deviation  Cardiovascular:      Rate and Rhythm: Normal rate and regular rhythm  Pulses: Intact distal pulses  Carotid pulses are 2+ on the right side and 2+ on the left side  Heart sounds: Normal heart sounds  No murmur heard  No friction rub  No gallop  Pulmonary:      Effort: Pulmonary effort is normal  No respiratory distress  Breath sounds: Normal breath sounds  No wheezing or rales  Abdominal:      General: Bowel sounds are normal  There is no distension  Palpations: Abdomen is soft  Tenderness: There is no abdominal tenderness  Musculoskeletal:      Cervical back: Normal range of motion and neck supple  Lymphadenopathy:      Cervical: No cervical adenopathy  Skin:     General: Skin is warm and dry  Neurological:      Mental Status: She is alert and oriented to person, place, and time        Coordination: Coordination normal    Psychiatric:         Mood and Affect: Mood and affect normal          Behavior: Behavior normal          Thought Content:  Thought content normal          Judgment: Judgment normal        Cristian Tirado MD

## 2022-12-01 NOTE — ASSESSMENT & PLAN NOTE
Patient has had headaches for the past 3 days  Recommend drinking plenty of fluids and tylenol as needed  Pt reports that she has not been drinking a lot of water  Possibly dehydrates  Check CMP

## 2022-12-01 NOTE — ASSESSMENT & PLAN NOTE
Recommend drinking plenty of fluids and fiber supplementation  Probable etiology is irritable bowel  Follow up with Neurology this month to r/o other causes

## 2022-12-01 NOTE — PATIENT INSTRUCTIONS
Add metamucil daily  1  Generalized body aches  Assessment & Plan:  Recommend drinking plenty of fluids and fiber supplementation  Probable etiology is irritable bowel  Follow up with Neurology this month to r/o other causes  Orders:  -     Basic metabolic panel; Future  -     CBC and differential; Future  -     Lyme Antibody Profile with reflex to WB; Future    2  Irritable bowel syndrome with both constipation and diarrhea  Assessment & Plan:  Occasionally has issues with smaller harder solid stools, and then other times is larger volumes, loose  Typically is worse after eating, which then resolves the symptoms  Question this is IBS causing some of her a pains that she is talking about  Will continue to follow  Fiber supplement recommended  Orders:  -     psyllium (METAMUCIL SMOOTH TEXTURE) 28 % packet; Take 1 packet by mouth in the morning    3  Need for COVID-19 vaccine  -     Age 15 y+, BOOSTER (BIVALENT): [id number] Pfizer vac bivalent alec-sucr      COVID 19 Instructions    Sawyer Liner was advised to limit contact with others to essential tasks such as getting food, medications, and medical care  Proper handwashing reviewed, and Hand sanitzer when washing is not available  If the patient develops symptoms of COVID 19, the patient should call the office as soon as possible  For 9380-0720 Flu season, it is strongly recommended that Flu Vaccinations be obtained  Virtual Visits:  [place]: This works on smart phones (any phone with Internet browsing capability)  You should get a text message when the provider is ready to see you  Click on the link in the text message, and the call should start  You will need to type in your name, and allow camera and microphone access  This is HIPPA compliant, and secure  If you have not already done so, get immunized to COVID 19        We are committed to getting you vaccinated as soon as possible and will be closely following CDC and [facility]  guidelines as they are released and revised  Please refer to our COVID-19 vaccine webpage for the most up to date information on the vaccine and our distribution efforts  This site will also have the most up to date recommendations for COVID booster vaccine  [place] [place]    Call [phone] ([phone]), option 7    OUR NEW LOCATION:    [address], [facility] [address], [place], [address]  Fax: [phone]    Lab services and OB/GYN are at this location as well

## 2022-12-01 NOTE — ASSESSMENT & PLAN NOTE
Patient has been having some pain in her lower legs  Neurology appointment this month  Encouraged to follow with neurology

## 2022-12-05 ENCOUNTER — APPOINTMENT (OUTPATIENT)
Dept: LAB | Facility: CLINIC | Age: 87
End: 2022-12-05

## 2022-12-05 DIAGNOSIS — R52 GENERALIZED BODY ACHES: ICD-10-CM

## 2022-12-05 LAB
ANION GAP SERPL CALCULATED.3IONS-SCNC: 6 MMOL/L (ref 4–13)
BASOPHILS # BLD AUTO: 0.06 THOUSANDS/ÂΜL (ref 0–0.1)
BASOPHILS NFR BLD AUTO: 1 % (ref 0–1)
BUN SERPL-MCNC: 25 MG/DL (ref 5–25)
CALCIUM SERPL-MCNC: 9.4 MG/DL (ref 8.3–10.1)
CHLORIDE SERPL-SCNC: 104 MMOL/L (ref 96–108)
CO2 SERPL-SCNC: 27 MMOL/L (ref 21–32)
CREAT SERPL-MCNC: 1.27 MG/DL (ref 0.6–1.3)
EOSINOPHIL # BLD AUTO: 0.18 THOUSAND/ÂΜL (ref 0–0.61)
EOSINOPHIL NFR BLD AUTO: 4 % (ref 0–6)
ERYTHROCYTE [DISTWIDTH] IN BLOOD BY AUTOMATED COUNT: 13.5 % (ref 11.6–15.1)
GFR SERPL CREATININE-BSD FRML MDRD: 34 ML/MIN/1.73SQ M
GLUCOSE P FAST SERPL-MCNC: 183 MG/DL (ref 65–99)
HCT VFR BLD AUTO: 39.5 % (ref 34.8–46.1)
HGB BLD-MCNC: 12.4 G/DL (ref 11.5–15.4)
IMM GRANULOCYTES # BLD AUTO: 0.02 THOUSAND/UL (ref 0–0.2)
IMM GRANULOCYTES NFR BLD AUTO: 0 % (ref 0–2)
LYMPHOCYTES # BLD AUTO: 0.87 THOUSANDS/ÂΜL (ref 0.6–4.47)
LYMPHOCYTES NFR BLD AUTO: 17 % (ref 14–44)
MCH RBC QN AUTO: 29.8 PG (ref 26.8–34.3)
MCHC RBC AUTO-ENTMCNC: 31.4 G/DL (ref 31.4–37.4)
MCV RBC AUTO: 95 FL (ref 82–98)
MONOCYTES # BLD AUTO: 0.54 THOUSAND/ÂΜL (ref 0.17–1.22)
MONOCYTES NFR BLD AUTO: 11 % (ref 4–12)
NEUTROPHILS # BLD AUTO: 3.37 THOUSANDS/ÂΜL (ref 1.85–7.62)
NEUTS SEG NFR BLD AUTO: 67 % (ref 43–75)
NRBC BLD AUTO-RTO: 0 /100 WBCS
PLATELET # BLD AUTO: 208 THOUSANDS/UL (ref 149–390)
PMV BLD AUTO: 11.4 FL (ref 8.9–12.7)
POTASSIUM SERPL-SCNC: 3.8 MMOL/L (ref 3.5–5.3)
RBC # BLD AUTO: 4.16 MILLION/UL (ref 3.81–5.12)
SODIUM SERPL-SCNC: 137 MMOL/L (ref 135–147)
WBC # BLD AUTO: 5.04 THOUSAND/UL (ref 4.31–10.16)

## 2022-12-06 LAB — B BURGDOR IGG+IGM SER-ACNC: <0.2 AI

## 2022-12-19 ENCOUNTER — HOME CARE VISIT (OUTPATIENT)
Dept: HOME HEALTH SERVICES | Facility: HOME HEALTHCARE | Age: 87
End: 2022-12-19

## 2022-12-20 ENCOUNTER — OFFICE VISIT (OUTPATIENT)
Dept: NEUROLOGY | Facility: CLINIC | Age: 87
End: 2022-12-20

## 2022-12-20 VITALS
OXYGEN SATURATION: 96 % | DIASTOLIC BLOOD PRESSURE: 70 MMHG | RESPIRATION RATE: 18 BRPM | TEMPERATURE: 97.5 F | HEART RATE: 65 BPM | SYSTOLIC BLOOD PRESSURE: 140 MMHG

## 2022-12-20 VITALS
WEIGHT: 160 LBS | HEART RATE: 61 BPM | DIASTOLIC BLOOD PRESSURE: 67 MMHG | TEMPERATURE: 97.1 F | HEIGHT: 62 IN | BODY MASS INDEX: 29.44 KG/M2 | SYSTOLIC BLOOD PRESSURE: 147 MMHG

## 2022-12-20 DIAGNOSIS — R26.2 AMBULATORY DYSFUNCTION: ICD-10-CM

## 2022-12-20 DIAGNOSIS — G50.1 ATYPICAL FACIAL PAIN: Primary | ICD-10-CM

## 2022-12-20 DIAGNOSIS — G52.9 CRANIAL NEURALGIA: ICD-10-CM

## 2022-12-20 DIAGNOSIS — R42 VERTIGO: ICD-10-CM

## 2022-12-20 NOTE — PROGRESS NOTES
Patient ID: Nehal De La Garza is a 80 y o  female  Assessment/Plan:     Diagnoses and all orders for this visit:    Atypical facial pain    Cranial neuralgia    Ambulatory dysfunction    Vertigo         The patient states that she gets a swirling or running sensation in her head and eyes which is fairly difficult for her to describe, but her and her daughter both agree that it could be related to peripheral vertigo which she has had for quite some time  This also could be related to some proprioception deficits  We tried physical therapy but she did not tolerate this due to worsening symptoms  The symptoms are not overly bothersome and only occur when she closes her eyes and lays on her right side in bed  She typically can fall asleep without problems but she notices this symptom before she falls asleep  When she opens her eyes and goes about her day she does not notice the symptom  I briefly discussed trying a short version of the Epley maneuver at home which they were agreeable to trying  I also asked her to stop the oxcarbazepine 150 mg at night, as I am not sure if this is contributing to some dizzy sensations  Her cranial neuralgia and other headaches have resolved and are not a problem at this time  She should contact me if she needs further trigger point injections as these have worked in the past     She should continue gabapentin: 300 mg every morning, 300 mg q  noon and 600 mg nightly  Will consider eliminating the middle dose due to some blurry vision in the middle of the day, which also can be simply related to her glaucoma  She has the complaint of stiffness if she sits too long  The patient was encouraged to get up and walk with her walker several times per day to avoid deconditioning and becoming too stiff  The patient should not hesitate to call me prior to her follow up with any questions or concerns  Subjective:    HPI    Ms Nehal De La Garza is a very pleasant 8-year-old female who is here for neurological follow-up, accompanied by her daughter who transported her here  The patient lives with her daughter at home  Her daughter provides most of the history where needed  She states that Mya [place] has been feeling fairly well from a neurologic perspective  She has not had recurrent supraorbital neuralgia or cranial neuralgia  No headaches to complain of  She continues the same medications including gabapentin: 300 mg every morning, 300 mg q  noon and 600 mg nightly  She does not have side effects except on further questioning the patient does admit to possible worsening of blurred vision and mild dizziness after taking the noon dose  She also does have glaucoma from what her daughter states and continues to follow with an eye doctor  The patient does have difficulty seeing things far away and also close up  Sometimes she has difficulty seeing her daughter and her daughter states "I am right here," and the patient then recognizes that she is near her  She continues to have a running sensation or swirling sensation as she would describe it at times after she closes her eyes when laying in bed  She lays on her right side typically  She feels this sensation only after she closes her eyes, then she opens her eyes and it resolves  The sensation can come back when she closes her eyes and can last until she falls asleep  She does not have nausea or vomiting  She did not have full spinning sensation  I did recommend therapy in the past for vertigo but the patient states that she did not tolerate therapy  She felt worse with therapy  She is currently tolerating the current symptoms and it is not affecting her functioning or sleep function in any way  She does not have vertigo or spinning or any of these sensations during the day  She continues oxcarbazepine 150 mg nightly  Home health visits have ended      She continues seeing Dr Mak Benavidez, ophthalmologist, on a regular basis  12/5/2022 labs: BMP unremarkable except for glucose of 183, GFR 34  CBC with differential unremarkable, Lyme unremarkable  Labs 9/2022: TSH normal, A1c 7 2%  Prior documentation:  Could not tolerate PT for low back pain and balance training; states she was very tired and hurt more after the therapy  She had about 4 sessions then stopped on her own accord      She still has a spinning or "running" sensation when she closes her eyes and lays down to go to bed  States she is just dealing with it  Her daughter gave her melatonin and she slept better but had a nightmare  Pt denies worsening vertigo and no vertigo when her eyes are open per her history  The following portions of the patient's history were reviewed and updated as appropriate:   She  has a past medical history of Asthma, Atypical chest pain, CAD (coronary artery disease), Candidal intertrigo, CHF (congestive heart failure) ([facility] [address] ), Depression, Diabetes mellitus ([facility] [address] ), Diabetic neuropathy ([facility] [address] ), Diverticulitis, Dyslipidemia, Female bladder prolapse, Gastric ulcer, Glaucoma, HTN (hypertension), Hyperlipidemia, Hypothyroidism (4/18/2016), and RAD (reactive airway disease)    She   Patient Active Problem List    Diagnosis Date Noted   • Generalized body aches 12/01/2022   • Epigastric pain 10/11/2022   • Costochondritis 09/14/2022   • Pelviectasis of kidney 07/19/2022   • Disc degeneration, lumbar 06/28/2022   • Abnormal gait 06/09/2022   • Lumbar paraspinal muscle spasm 06/06/2022   • CKD (chronic kidney disease) 05/09/2022   • Myalgia 05/09/2022   • Flank pain 03/08/2022   • Fall 11/09/2021   • Tail bone pain 11/09/2021   • Chronic nonintractable headache 08/01/2021   • Vertigo 06/27/2021   • Essential hypertension 05/19/2021   • Hyponatremia 05/19/2021   • 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   • Myofascial muscle pain 03/25/2021   • Cranial neuralgia 03/25/2021   • Conductive hearing loss, bilateral 03/04/2021   • Constipation 02/11/2021   • Left hip pain 01/19/2021   • Medicare annual wellness visit, subsequent 08/21/2020   • Diarrhea 08/21/2020   • Atypical facial pain 03/29/2020   • Stable angina ([place] [address] ) 02/18/2020   • 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   • Chronic headache 11/14/2019   • Paroxysmal nocturnal dyspnea 11/11/2019   • Orthopnea 11/11/2019   • Shortness of breath 11/11/2019   • Type 2 diabetes mellitus ([place] [address] ) 10/05/2019   • Vitamin D deficiency 08/22/2019   • Pain of both hip joints 06/21/2019   • Candidal intertrigo 06/21/2019   • Headache 01/09/2019   • GERD (gastroesophageal reflux disease) 11/06/2018   • Insomnia 10/12/2018   • CHF (congestive heart failure) ([place] [address] ) 08/05/2018   • Dyspnea on minimal exertion 02/17/2018   • Shakiness 05/23/2017   • Allergic rhinitis 05/02/2017   • Hyperlipidemia    • Female bladder prolapse    • Bilateral cold feet 12/28/2016   • Aortic stenosis 10/21/2016   • Arteriosclerosis of coronary artery    • Asthma    • Anxiety disorder 08/17/2016   • Osteoarthritis 06/21/2016   • Hypothyroidism 04/18/2016   • Trigeminal neuralgia 03/16/2016   • Peripheral vascular disease ([place] [address] )    • Glaucoma, open angle, severe stage    • Overactive bladder 01/20/2016   • Hallucination 11/17/2015   • General weakness 11/03/2015   • Low back pain 11/03/2015   • Hearing loss 07/27/2015   • Mild cognitive impairment 07/27/2015   • Dizziness 07/02/2015   • Advanced age 06/03/2015   • Irritable bowel 04/24/2015   • Female cystocele 04/24/2015   • Diverticulosis 04/03/2015   • Hiatal hernia 04/03/2015   • Ambulatory dysfunction 04/02/2015     She  has a past surgical history that includes Hysterectomy; Colonoscopy; Esophagogastroduodenoscopy (2011); and Tonsillectomy  Her family history includes Breast cancer in her mother and sister; Hypertension in her father and sister; Hypothyroidism in her mother; Thyroid disease in her sister  She  reports that she has never smoked   She has never used smokeless tobacco  She reports that she does not drink alcohol and does not use drugs  Current Outpatient Medications   Medication Sig Dispense Refill   • acetaminophen (TYLENOL) 325 mg tablet Take 2 tablets (650 mg total) by mouth every 6 (six) hours as needed for mild pain 20 tablet 0   • amLODIPine (NORVASC) 5 mg tablet TAKE 1 TABLET (5 MG TOTAL) BY MOUTH DAILY 90 tablet 3   • aspirin 81 MG tablet Take 81 mg by mouth daily  • Cholecalciferol (VITAMIN D3) 2000 units capsule Take 2,000 Units by mouth daily      • dorzolamide (TRUSOPT) 2 % ophthalmic solution Administer 1 drop to both eyes 3 (three) times a day       • famotidine (PEPCID) 20 mg tablet Take 1 tablet (20 mg total) by mouth 2 (two) times a day 180 tablet 3   • furosemide (LASIX) 20 mg tablet TAKE 1 TABLET IN THE MORNING  AND TAKE 2 TABLETS IN THE EVENING (Patient taking differently: TAKE 1 TABLET IN THE MORNING  AND TAKE 2 TABLETS IN THE EVENING) 270 tablet 1   • gabapentin (NEURONTIN) 600 MG tablet TAKE 1/2 TABLET IN THE MORNING AND AFTERNOON AND TAKE 1 TABLET AT BEDTIME 180 tablet 0   • Incontinence Supply Disposable (SELECT DISPOSABLE UNDERWEAR LG) MISC      • Lancets (ACCU-CHEK SOFT TOUCH) lancets Testing 1 time daily  Dx: E11 9 100 each 3   • latanoprost (XALATAN) 0 005 % ophthalmic solution Apply 1 drop to eye daily at bedtime Both eyes 7 5 mL 1   • levothyroxine 75 mcg tablet TAKE 1 TABLET EVERY DAY 90 tablet 3   • oxygen gas Inhale 2 L/min daily at bedtime  Indications: Shortness of breath     • potassium chloride (K-DUR,KLOR-CON) 20 mEq tablet TAKE 1 TABLET EVERY DAY (Patient taking differently: 20 mEq if needed) 90 tablet 0   • psyllium (METAMUCIL SMOOTH TEXTURE) 28 % packet Take 1 packet by mouth in the morning (Patient taking differently: Take 1 packet by mouth if needed) 30 packet 11   • vitamin B-12 (CYANOCOBALAMIN) 250 MCG tablet Take 250 mcg by mouth daily     • Misc   Devices (Transport Chair) MISC Use if needed (with outings outside of the house ) (Patient not taking: Reported on 12/20/2022) 1 each 0   • pantoprazole (PROTONIX) 40 mg tablet Take 1 tablet (40 mg total) by mouth daily (Patient not taking: Reported on 12/20/2022) 90 tablet 3     No current facility-administered medications for this visit  She is allergic to levaquin [levofloxacin] and statins            Objective:    Blood pressure 147/67, pulse 61, temperature (!) 97 1 °F (36 2 °C), height 5' 2" (1 575 m), weight 72 6 kg (160 lb), not currently breastfeeding  Body mass index is 29 26 kg/m²  Physical Exam    Neurological Exam  On neurologic exam, the patient is alert and oriented to time and place  Speech is fluent and articulate, and the patient follows commands appropriately  Judgment and affect appear normal  Pupils are equally round and reactive to light and extraocular muscles are intact without nystagmus  Face is symmetric, and tongue, uvula, and palate are midline  Hearing is intact  Reflexes 1+ in both biceps, trace in the lower extremities  Nonfocal throughout  She is resting in a wheelchair  She can lift her limbs against gravity  There is mild generalized weakness in all 4 extremities but no focal weakness  I did not ask her to stand or ambulate today  ROS:    Review of Systems   Constitutional: Negative  Negative for appetite change and fever  HENT: Negative  Negative for hearing loss, tinnitus, trouble swallowing and voice change  Eyes: Negative  Negative for photophobia, pain and visual disturbance  Respiratory: Negative  Negative for shortness of breath  Cardiovascular: Negative  Negative for palpitations  Gastrointestinal: Negative  Negative for nausea and vomiting  Endocrine: Negative  Negative for cold intolerance  Genitourinary: Negative  Negative for dysuria, frequency and urgency  Musculoskeletal: Negative  Negative for gait problem, myalgias and neck pain  Skin: Negative  Negative for rash  Allergic/Immunologic: Negative  Neurological: Positive for headaches  Negative for dizziness, tremors, seizures, syncope, facial asymmetry, speech difficulty, weakness, light-headedness and numbness  Has started getting some HA on the top of her head   Hematological: Negative  Does not bruise/bleed easily  Psychiatric/Behavioral: Negative  Negative for confusion, hallucinations and sleep disturbance  All other systems reviewed and are negative  The following portions of the patient's history were reviewed and updated as appropriate: allergies, current medications/ medication history, past family history, past medical history, past social history, past surgical history and problem list     Review of systems was reviewed and otherwise unremarkable from a neurological perspective

## 2022-12-30 ENCOUNTER — APPOINTMENT (EMERGENCY)
Dept: CT IMAGING | Facility: HOSPITAL | Age: 87
End: 2022-12-30

## 2022-12-30 ENCOUNTER — HOSPITAL ENCOUNTER (INPATIENT)
Facility: HOSPITAL | Age: 87
LOS: 1 days | Discharge: HOME | End: 2023-01-02
Attending: EMERGENCY MEDICINE | Admitting: INTERNAL MEDICINE

## 2022-12-30 DIAGNOSIS — I35.0 NONRHEUMATIC AORTIC VALVE STENOSIS: Chronic | ICD-10-CM

## 2022-12-30 DIAGNOSIS — R10.9 ABDOMINAL PAIN: ICD-10-CM

## 2022-12-30 DIAGNOSIS — R19.7 DIARRHEA: ICD-10-CM

## 2022-12-30 DIAGNOSIS — R07.9 CHEST PAIN: Primary | ICD-10-CM

## 2022-12-30 LAB
ALBUMIN SERPL BCP-MCNC: 3.2 G/DL (ref 3.5–5)
ALP SERPL-CCNC: 112 U/L (ref 46–116)
ALT SERPL W P-5'-P-CCNC: 14 U/L (ref 12–78)
ANION GAP SERPL CALCULATED.3IONS-SCNC: 13 MMOL/L (ref 4–13)
AST SERPL W P-5'-P-CCNC: 18 U/L (ref 5–45)
BASOPHILS # BLD AUTO: 0.04 THOUSANDS/ÂΜL (ref 0–0.1)
BASOPHILS NFR BLD AUTO: 1 % (ref 0–1)
BILIRUB SERPL-MCNC: 0.36 MG/DL (ref 0.2–1)
BUN SERPL-MCNC: 15 MG/DL (ref 5–25)
CALCIUM ALBUM COR SERPL-MCNC: 9.9 MG/DL (ref 8.3–10.1)
CALCIUM SERPL-MCNC: 9.3 MG/DL (ref 8.3–10.1)
CARDIAC TROPONIN I PNL SERPL HS: 20 NG/L
CHLORIDE SERPL-SCNC: 104 MMOL/L (ref 96–108)
CO2 SERPL-SCNC: 22 MMOL/L (ref 21–32)
CREAT SERPL-MCNC: 0.96 MG/DL (ref 0.6–1.3)
EOSINOPHIL # BLD AUTO: 0.12 THOUSAND/ÂΜL (ref 0–0.61)
EOSINOPHIL NFR BLD AUTO: 2 % (ref 0–6)
ERYTHROCYTE [DISTWIDTH] IN BLOOD BY AUTOMATED COUNT: 13.2 % (ref 11.6–15.1)
FLUAV RNA RESP QL NAA+PROBE: NEGATIVE
FLUBV RNA RESP QL NAA+PROBE: NEGATIVE
GFR SERPL CREATININE-BSD FRML MDRD: 48 ML/MIN/1.73SQ M
GLUCOSE SERPL-MCNC: 164 MG/DL (ref 65–140)
HCT VFR BLD AUTO: 38.5 % (ref 34.8–46.1)
HGB BLD-MCNC: 12.5 G/DL (ref 11.5–15.4)
IMM GRANULOCYTES # BLD AUTO: 0.02 THOUSAND/UL (ref 0–0.2)
IMM GRANULOCYTES NFR BLD AUTO: 0 % (ref 0–2)
LIPASE SERPL-CCNC: 103 U/L (ref 73–393)
LYMPHOCYTES # BLD AUTO: 1.06 THOUSANDS/ÂΜL (ref 0.6–4.47)
LYMPHOCYTES NFR BLD AUTO: 21 % (ref 14–44)
MCH RBC QN AUTO: 30 PG (ref 26.8–34.3)
MCHC RBC AUTO-ENTMCNC: 32.5 G/DL (ref 31.4–37.4)
MCV RBC AUTO: 93 FL (ref 82–98)
MONOCYTES # BLD AUTO: 0.54 THOUSAND/ÂΜL (ref 0.17–1.22)
MONOCYTES NFR BLD AUTO: 11 % (ref 4–12)
NEUTROPHILS # BLD AUTO: 3.38 THOUSANDS/ÂΜL (ref 1.85–7.62)
NEUTS SEG NFR BLD AUTO: 65 % (ref 43–75)
NRBC BLD AUTO-RTO: 0 /100 WBCS
PLATELET # BLD AUTO: 197 THOUSANDS/UL (ref 149–390)
PMV BLD AUTO: 11.3 FL (ref 8.9–12.7)
POTASSIUM SERPL-SCNC: 4 MMOL/L (ref 3.5–5.3)
PROT SERPL-MCNC: 7.4 G/DL (ref 6.4–8.4)
RBC # BLD AUTO: 4.16 MILLION/UL (ref 3.81–5.12)
RSV RNA RESP QL NAA+PROBE: NEGATIVE
SARS-COV-2 RNA RESP QL NAA+PROBE: NEGATIVE
SODIUM SERPL-SCNC: 139 MMOL/L (ref 135–147)
WBC # BLD AUTO: 5.16 THOUSAND/UL (ref 4.31–10.16)

## 2022-12-30 RX ADMIN — SODIUM CHLORIDE 500 ML: 0.9 INJECTION, SOLUTION INTRAVENOUS at 21:22

## 2022-12-30 RX ADMIN — IOHEXOL 100 ML: 350 INJECTION, SOLUTION INTRAVENOUS at 21:58

## 2022-12-31 ENCOUNTER — APPOINTMENT (EMERGENCY)
Dept: RADIOLOGY | Facility: HOSPITAL | Age: 87
End: 2022-12-31

## 2022-12-31 ENCOUNTER — HOME CARE VISIT (OUTPATIENT)
Dept: HOME HEALTH SERVICES | Facility: HOME HEALTHCARE | Age: 87
End: 2022-12-31

## 2022-12-31 PROBLEM — K52.9 COLITIS: Status: ACTIVE | Noted: 2022-12-31

## 2022-12-31 LAB
2HR DELTA HS TROPONIN: 53 NG/L
4HR DELTA HS TROPONIN: 67 NG/L
ATRIAL RATE: 267 BPM
ATRIAL RATE: 75 BPM
ATRIAL RATE: 80 BPM
ATRIAL RATE: 86 BPM
BILIRUB UR QL STRIP: NEGATIVE
CARDIAC TROPONIN I PNL SERPL HS: 56 NG/L (ref 8–18)
CARDIAC TROPONIN I PNL SERPL HS: 73 NG/L
CARDIAC TROPONIN I PNL SERPL HS: 87 NG/L
CLARITY UR: CLEAR
COLOR UR: YELLOW
GLUCOSE UR STRIP-MCNC: NEGATIVE MG/DL
HGB UR QL STRIP.AUTO: NEGATIVE
KETONES UR STRIP-MCNC: NEGATIVE MG/DL
LEUKOCYTE ESTERASE UR QL STRIP: NEGATIVE
NITRITE UR QL STRIP: NEGATIVE
P AXIS: 169 DEGREES
P AXIS: 42 DEGREES
P AXIS: 52 DEGREES
PH UR STRIP.AUTO: 6.5 [PH]
PLATELET # BLD AUTO: 179 THOUSANDS/UL (ref 149–390)
PMV BLD AUTO: 10.9 FL (ref 8.9–12.7)
PR INTERVAL: 212 MS
PR INTERVAL: 220 MS
PR INTERVAL: 258 MS
PROT UR STRIP-MCNC: NEGATIVE MG/DL
QRS AXIS: -3 DEGREES
QRS AXIS: -6 DEGREES
QRS AXIS: -9 DEGREES
QRS AXIS: 18 DEGREES
QRSD INTERVAL: 112 MS
QRSD INTERVAL: 114 MS
QT INTERVAL: 346 MS
QT INTERVAL: 354 MS
QT INTERVAL: 382 MS
QT INTERVAL: 394 MS
QTC INTERVAL: 414 MS
QTC INTERVAL: 423 MS
QTC INTERVAL: 439 MS
QTC INTERVAL: 440 MS
SP GR UR STRIP.AUTO: 1.01 (ref 1–1.03)
T WAVE AXIS: 47 DEGREES
T WAVE AXIS: 53 DEGREES
T WAVE AXIS: 73 DEGREES
T WAVE AXIS: 76 DEGREES
UROBILINOGEN UR QL STRIP.AUTO: 0.2 E.U./DL
VENTRICULAR RATE: 75 BPM
VENTRICULAR RATE: 80 BPM
VENTRICULAR RATE: 86 BPM
VENTRICULAR RATE: 86 BPM

## 2022-12-31 RX ORDER — HEPARIN SODIUM 5000 [USP'U]/ML
5000 INJECTION, SOLUTION INTRAVENOUS; SUBCUTANEOUS EVERY 8 HOURS SCHEDULED
Status: DISCONTINUED | OUTPATIENT
Start: 2022-12-31 | End: 2023-01-02 | Stop reason: HOSPADM

## 2022-12-31 RX ORDER — GABAPENTIN 300 MG/1
300 CAPSULE ORAL 3 TIMES DAILY
Status: DISCONTINUED | OUTPATIENT
Start: 2022-12-31 | End: 2023-01-02 | Stop reason: HOSPADM

## 2022-12-31 RX ORDER — AMLODIPINE BESYLATE 5 MG/1
5 TABLET ORAL DAILY
Status: DISCONTINUED | OUTPATIENT
Start: 2022-12-31 | End: 2023-01-02 | Stop reason: HOSPADM

## 2022-12-31 RX ORDER — FAMOTIDINE 20 MG/1
20 TABLET, FILM COATED ORAL 2 TIMES DAILY
Status: DISCONTINUED | OUTPATIENT
Start: 2022-12-31 | End: 2023-01-02 | Stop reason: HOSPADM

## 2022-12-31 RX ORDER — GABAPENTIN 300 MG/1
300 CAPSULE ORAL
Status: DISCONTINUED | OUTPATIENT
Start: 2022-12-31 | End: 2023-01-02 | Stop reason: HOSPADM

## 2022-12-31 RX ORDER — PANTOPRAZOLE SODIUM 40 MG/1
40 TABLET, DELAYED RELEASE ORAL
Status: DISCONTINUED | OUTPATIENT
Start: 2022-12-31 | End: 2023-01-02 | Stop reason: HOSPADM

## 2022-12-31 RX ORDER — LEVOTHYROXINE SODIUM 0.07 MG/1
75 TABLET ORAL
Status: DISCONTINUED | OUTPATIENT
Start: 2022-12-31 | End: 2023-01-02 | Stop reason: HOSPADM

## 2022-12-31 RX ORDER — LATANOPROST 50 UG/ML
1 SOLUTION/ DROPS OPHTHALMIC
Status: DISCONTINUED | OUTPATIENT
Start: 2022-12-31 | End: 2023-01-02 | Stop reason: HOSPADM

## 2022-12-31 RX ADMIN — HEPARIN SODIUM 5000 UNITS: 5000 INJECTION INTRAVENOUS; SUBCUTANEOUS at 14:45

## 2022-12-31 RX ADMIN — LEVOTHYROXINE SODIUM 75 MCG: 75 TABLET ORAL at 05:42

## 2022-12-31 RX ADMIN — HEPARIN SODIUM 5000 UNITS: 5000 INJECTION INTRAVENOUS; SUBCUTANEOUS at 05:42

## 2022-12-31 RX ADMIN — GABAPENTIN 300 MG: 300 CAPSULE ORAL at 09:09

## 2022-12-31 RX ADMIN — FAMOTIDINE 20 MG: 20 TABLET, FILM COATED ORAL at 09:09

## 2022-12-31 RX ADMIN — AMLODIPINE BESYLATE 5 MG: 5 TABLET ORAL at 09:09

## 2022-12-31 RX ADMIN — GABAPENTIN 300 MG: 300 CAPSULE ORAL at 22:04

## 2022-12-31 RX ADMIN — PANTOPRAZOLE SODIUM 40 MG: 40 TABLET, DELAYED RELEASE ORAL at 05:42

## 2022-12-31 RX ADMIN — HEPARIN SODIUM 5000 UNITS: 5000 INJECTION INTRAVENOUS; SUBCUTANEOUS at 22:04

## 2022-12-31 RX ADMIN — GABAPENTIN 300 MG: 300 CAPSULE ORAL at 15:19

## 2022-12-31 RX ADMIN — FAMOTIDINE 20 MG: 20 TABLET, FILM COATED ORAL at 17:41

## 2022-12-31 RX ADMIN — LATANOPROST 1 DROP: 50 SOLUTION OPHTHALMIC at 22:04

## 2022-12-31 NOTE — ED PROVIDER NOTES
History  Chief Complaint   Patient presents with   • Flu Symptoms     Pt c/o body aches, diarrhea since this morning  Patient is a 8-year-old female with a past medical history of CHF, CKD, IBS, and diabetes who presented with body aches and diarrhea  Her symptoms began yesterday  She is having bilateral abdominal pain that radiates to her back  She is also complaining of epigastric pain  She has had several episodes of nonbloody diarrhea  She states that every time she has anything to eat or drink she has an episode of diarrhea  She has had greater than 5 episodes  She has tried Tylenol which helps with the abdominal pain but once the Tylenol wears off the abdominal pain returns  She has no recent antibiotic use or travel  She denies history of abdominal surgery, known sick contacts, or urinary symptoms  Prior to Admission Medications   Prescriptions Last Dose Informant Patient Reported? Taking? Cholecalciferol (VITAMIN D3) 2000 units capsule   Yes No   Sig: Take 2,000 Units by mouth daily    Incontinence Supply Disposable (SELECT DISPOSABLE UNDERWEAR LG) MISC   Yes No   Lancets (ACCU-CHEK SOFT TOUCH) lancets   No No   Sig: Testing 1 time daily  Dx: E11 9   Misc  Devices (Transport Chair) MISC   No No   Sig: Use if needed (with outings outside of the house )   Patient not taking: Reported on 12/20/2022   acetaminophen (TYLENOL) 325 mg tablet   No No   Sig: Take 2 tablets (650 mg total) by mouth every 6 (six) hours as needed for mild pain   amLODIPine (NORVASC) 5 mg tablet   No No   Sig: TAKE 1 TABLET (5 MG TOTAL) BY MOUTH DAILY   aspirin 81 MG tablet   Yes No   Sig: Take 81 mg by mouth daily     dorzolamide (TRUSOPT) 2 % ophthalmic solution   Yes No   Sig: Administer 1 drop to both eyes 3 (three) times a day     famotidine (PEPCID) 20 mg tablet   No No   Sig: Take 1 tablet (20 mg total) by mouth 2 (two) times a day   furosemide (LASIX) 20 mg tablet   No No   Sig: TAKE 1 TABLET IN THE MORNING  AND TAKE 2 TABLETS IN THE EVENING   Patient taking differently: TAKE 1 TABLET IN THE MORNING  AND TAKE 2 TABLETS IN THE EVENING   gabapentin (NEURONTIN) 600 MG tablet   No No   Sig: TAKE 1/2 TABLET IN THE MORNING AND AFTERNOON AND TAKE 1 TABLET AT BEDTIME   latanoprost (XALATAN) 0 005 % ophthalmic solution   No No   Sig: Apply 1 drop to eye daily at bedtime Both eyes   levothyroxine 75 mcg tablet   No No   Sig: TAKE 1 TABLET EVERY DAY   oxygen gas   Yes No   Sig: Inhale 2 L/min daily at bedtime   Indications: Shortness of breath   pantoprazole (PROTONIX) 40 mg tablet   No No   Sig: Take 1 tablet (40 mg total) by mouth daily   Patient not taking: Reported on 12/20/2022   potassium chloride (K-DUR,KLOR-CON) 20 mEq tablet   No No   Sig: TAKE 1 TABLET EVERY DAY   Patient taking differently: 20 mEq if needed   psyllium (METAMUCIL SMOOTH TEXTURE) 28 % packet   No No   Sig: Take 1 packet by mouth in the morning   Patient taking differently: Take 1 packet by mouth if needed   vitamin B-12 (CYANOCOBALAMIN) 250 MCG tablet   Yes No   Sig: Take 250 mcg by mouth daily      Facility-Administered Medications: None       Past Medical History:   Diagnosis Date   • Asthma    • Atypical chest pain    • CAD (coronary artery disease)    • Candidal intertrigo    • CHF (congestive heart failure) ([facility])    • Depression    • Diabetes mellitus ([facility])    • Diabetic neuropathy ([facility])    • Diverticulitis    • Dyslipidemia    • Female bladder prolapse    • Gastric ulcer    • Glaucoma    • HTN (hypertension)    • Hyperlipidemia    • Hypothyroidism 4/18/2016   • RAD (reactive airway disease)        Past Surgical History:   Procedure Laterality Date   • COLONOSCOPY     • ESOPHAGOGASTRODUODENOSCOPY  2011   • HYSTERECTOMY     • TONSILLECTOMY         Family History   Problem Relation Age of Onset   • Breast cancer Mother    • Hypothyroidism Mother    • Hypertension Father    • Breast cancer Sister    • Thyroid disease Sister    • Hypertension Sister      I have reviewed and agree with the history as documented  E-Cigarette/Vaping   • E-Cigarette Use Never User      E-Cigarette/Vaping Substances   • Nicotine No    • THC No    • CBD No    • Flavoring No    • Other No    • Unknown No      Social History     Tobacco Use   • Smoking status: Never   • Smokeless tobacco: Never   Vaping Use   • Vaping Use: Never used   Substance Use Topics   • Alcohol use: No     Comment: Social Alcohol Use -- as per allscripts (pt denies all alcohol use)   • Drug use: No        Review of Systems   Constitutional: Negative for chills and fever  HENT: Negative for ear pain and sore throat  Eyes: Negative for pain and visual disturbance  Respiratory: Negative for cough and shortness of breath  Cardiovascular: Negative for chest pain and palpitations  Gastrointestinal: Positive for abdominal pain and diarrhea  Negative for vomiting  Genitourinary: Negative for dysuria and hematuria  Musculoskeletal: Positive for back pain  Negative for arthralgias  Skin: Negative for color change and rash  Neurological: Negative for seizures and syncope  All other systems reviewed and are negative  Physical Exam  ED Triage Vitals [12/30/22 2022]   Temp Pulse Respirations BP SpO2   -- 91 20 -- 97 %      Temp src Heart Rate Source Patient Position - Orthostatic VS BP Location FiO2 (%)   -- Monitor Lying Right arm --      Pain Score       --             Orthostatic Vital Signs  Vitals:    12/30/22 2022   Pulse: 91   Patient Position - Orthostatic VS: Lying       Physical Exam  Vitals and nursing note reviewed  Constitutional:       General: She is not in acute distress  Appearance: She is well-developed  HENT:      Head: Normocephalic and atraumatic  Nose: No congestion or rhinorrhea  Eyes:      Extraocular Movements: Extraocular movements intact        Conjunctiva/sclera: Conjunctivae normal    Cardiovascular:      Rate and Rhythm: Normal rate and regular rhythm  Heart sounds: No murmur heard  Pulmonary:      Effort: Pulmonary effort is normal  No respiratory distress  Breath sounds: Normal breath sounds  Abdominal:      Palpations: Abdomen is soft  Tenderness: There is abdominal tenderness  There is no guarding or rebound  Comments: TTP diffusely throughout abdomen  Musculoskeletal:         General: No swelling  Cervical back: Neck supple  Right lower leg: No edema  Left lower leg: No edema  Lymphadenopathy:      Cervical: No cervical adenopathy  Skin:     General: Skin is warm and dry  Capillary Refill: Capillary refill takes less than 2 seconds  Neurological:      Mental Status: She is alert  Sensory: No sensory deficit  Motor: No weakness     Psychiatric:         Mood and Affect: Mood normal          ED Medications  Medications - No data to display    Diagnostic Studies  Results Reviewed     None                 No orders to display         Procedures  ECG 12 Lead Documentation Only    Date/Time: 12/30/2022 9:23 PM  Performed by: Sherry Cramer MD  Authorized by: Sherry Cramer MD     ECG reviewed by me, the ED Provider: yes    Patient location:  ED  Previous ECG:     Previous ECG:  Compared to current    Comparison ECG info:  PVCs    Similarity:  Changes noted  Rate:     ECG rate:  86    ECG rate assessment: normal    Rhythm:     Rhythm: sinus rhythm    Ectopy:     Ectopy: PVCs      PVCs:  Infrequent  QRS:     QRS axis:  Normal    QRS intervals:  Normal  Conduction:     Conduction: abnormal      Abnormal conduction: 1st degree    ST segments:     ST segments:  Normal  T waves:     T waves: normal            ED Course  ED Course as of 12/31/22 0117   Fri Dec 30, 2022   2143 WBC: 5 16   2147 Glucose, Random(!): 164   2150 Lipase: 103   2214 SARS-COV-2: Negative   2214 INFLU A PCR: Negative   2214 INFLU B PCR: Negative   2214 RSV PCR: Negative   2232 Patient is now complaining of chest pain that she says started yesterday, is intermittent, non-radiating, and is worse with palpation  A troponin was ordered  2336 hs TnI 0hr: 20                                       MDM  Number of Diagnoses or Management Options  Diagnosis management comments: Patient is 8-year-old female past medical history of CHF, CKD, IBS, diabetes who presented with body aches and diarrhea  Differential dx: Viral illness, diverticulitis, appendicitis     Patient has diffuse abdominal pain so workup will include EKG, CBC, CMP, lipase, UA, and CT of abdomen/pelvis  She will be given 500mL of normal saline due to her history of CHF  She took Tylenol about 2 hours prior to arrival and does not require anything for pain at the moment  She will be re-evaluated frequently  Disposition  Final diagnoses:   None     ED Disposition     None      Follow-up Information    None         Patient's Medications   Discharge Prescriptions    No medications on file     No discharge procedures on file  PDMP Review       Value Time User    PDMP Reviewed  Yes 11/18/2020  1:31 PM Verona Benitez PA-C           ED Provider  Attending physically available and evaluated Rachel Stein  THANIA managed the patient along with the ED Attending      Electronically Signed by

## 2022-12-31 NOTE — UTILIZATION REVIEW
Initial Clinical Review    Pt initially admitted as Observation on 12/31/22  Changed to Inpatient on 1/1/23  Pt requiring continued stay d/t CAIT requiring IVF and ongoing monitoring  Admission: Date/Time/Statement:   Admission Orders (From admission, onward)     Ordered        01/01/23 0925  Inpatient Admission  Once            12/31/22 0122  Place in Observation  Once                      Orders Placed This Encounter   Procedures   • Inpatient Admission     Standing Status:   Standing     Number of Occurrences:   1     Order Specific Question:   Level of Care     Answer:   Med Surg [16]     Order Specific Question:   Estimated length of stay     Answer:   More than 2 Midnights     Order Specific Question:   Certification     Answer:   I certify that inpatient services are medically necessary for this patient for a duration of greater than two midnights  See H&P and MD Progress Notes for additional information about the patient's course of treatment  ED Arrival Information     Expected   -    Arrival   12/30/2022 20:20    Acuity   Urgent            Means of arrival   Ambulance    Escorted by   [place] ([facility])    Service   Hospitalist    Admission type   Emergency            Arrival complaint   abdominal pain           Chief Complaint   Patient presents with   • Flu Symptoms     Pt c/o body aches, diarrhea since this morning  Initial Presentation: 80 y o  female who presented by EMS to [address] [address] ED  Admitted in observation status for evaluation and treatment of epigastric pain  PMHx: Asthma, CAD, CHF, T2DM, Glaucoma, HTN, HLD, hypothyroidism  Presented w/ chest pain, nausea, loose non-bloody stool  On exam, chest wall tenderness  CT showed possible colitis  Plan: trend EKG, trops, supportive care, monitor off ABX, Trend labs, replete electrolytes as needed; continue PTA meds  01/01/23 Changed to Inpatient Status: Pt w/ resolved diarrhea, tolerated PO diet   CAIT, Crt 1 64 today  On exam, murmur, decreased breath sounds  Plan: IVF, Trend labs, replete electrolytes as needed; continue PTA meds, supportive care  ED Triage Vitals   Temperature Pulse Respirations Blood Pressure SpO2   12/30/22 2047 12/30/22 2022 12/30/22 2022 12/30/22 2026 12/30/22 2022   97 5 °F (36 4 °C) 91 20 156/67 97 %      Temp Source Heart Rate Source Patient Position - Orthostatic VS BP Location FiO2 (%)   12/30/22 2047 12/30/22 2022 12/30/22 2022 12/30/22 2022 --   Oral Monitor Lying Right arm       Pain Score       12/31/22 0911       6          Wt Readings from Last 1 Encounters:   12/31/22 71 8 kg (158 lb 4 6 oz)     Additional Vital Signs:   Date/Time Temp Pulse Resp BP MAP (mmHg) SpO2 O2 Device   01/01/23 07:52:21 98 °F (36 7 °C) 68 18 127/61 83 95 % --   01/01/23 03:21:25 97 2 °F (36 2 °C)   Abnormal  62 20 127/59 82 94 % None (Room air)   12/31/22 22:47:58 97 1 °F (36 2 °C)   Abnormal  68 20 99/55 70 95 % None (Room air)   12/31/22 19:37:31 97 4 °F (36 3 °C)   Abnormal  78 20 102/52 69 94 % None (Room air)   12/31/22 15:26:38 -- 96 -- -- -- 97 % None (Room air)   12/31/22 1449 -- 77 18 110/55 79 98 % None (Room air)   12/31/22 1245 -- 62 17 135/60 86 95 % None (Room air)   12/31/22 1045 -- 68 18 131/60 86 96 % None (Room air)     Date/Time Pulse Resp BP MAP (mmHg) SpO2 O2 Device   12/31/22 0911 75 18 156/94 -- 96 % None (Room air)   12/31/22 0909 -- -- 156/94 -- -- --   12/31/22 0645 70 19 151/66 95 95 % None (Room air)   12/31/22 0545 76 17 152/70 100 94 % None (Room air)   12/31/22 0445 74 18 150/69 99 -- --   12/31/22 0345 76 15 168/77 110 91 % None (Room air)   12/31/22 0245 74 17 170/72 103 95 % None (Room air)   12/31/22 0145 80 22 182/102 Abnormal  130 94 % None (Room air)   12/30/22 2257 83 18 155/67 -- 92 % None (Room air)      Pertinent Labs/Diagnostic Test Results:   XR chest 1 view portable   Final Result by Reina Lazar MD (12/31 1224)      Mild pulmonary venous congestion  Workstation performed: [id number]         CT abdomen pelvis w contrast   Final Result by Linda Webb MD (12/30 2244)      Mild diffuse thickening of the colon may be due to nonspecific colitis  No focal inflammatory change to suggest diverticulitis              Workstation performed: [id number]           Results from last 7 days   Lab Units 12/30/22 2118   SARS-COV-2  Negative     Results from last 7 days   Lab Units 01/01/23  0516 12/31/22  0542 12/30/22 2118   WBC Thousand/uL 5 29  --  5 16   HEMOGLOBIN g/dL 11 2*  --  12 5   HEMATOCRIT % 34 8  --  38 5   PLATELETS Thousands/uL 190 179 197   NEUTROS ABS Thousands/µL  --   --  3 38         Results from last 7 days   Lab Units 01/01/23  0516 12/30/22 2118   SODIUM mmol/L 142 139   POTASSIUM mmol/L 4 7 4 0   CHLORIDE mmol/L 109* 104   CO2 mmol/L 18* 22   ANION GAP mmol/L 15* 13   BUN mg/dL 21 15   CREATININE mg/dL 1 64* 0 96   EGFR ml/min/1 73sq m 25 48   CALCIUM mg/dL 9 1 9 3     Results from last 7 days   Lab Units 01/01/23  0516 12/30/22  2118   AST U/L 37 18   ALT U/L 9* 14   ALK PHOS U/L 93 112   TOTAL PROTEIN g/dL 6 4 7 4   ALBUMIN g/dL 2 7* 3 2*   TOTAL BILIRUBIN mg/dL 0 18* 0 36         Results from last 7 days   Lab Units 01/01/23  0516 12/30/22 2118   GLUCOSE RANDOM mg/dL 159* 164*     Results from last 7 days   Lab Units 12/31/22  0310 12/31/22  0045 12/30/22  2258   HS TNI 0HR ng/L  --   --  20   HS TNI 2HR ng/L  --  73*  --    HSTNI D2 ng/L  --  53*  --    HS TNI 4HR ng/L 87*  --   --    HSTNI D4 ng/L 67*  --   --      Results from last 7 days   Lab Units 12/30/22  2118   LIPASE u/L 103     Results from last 7 days   Lab Units 12/30/22  2344   CLARITY UA  Clear   COLOR UA  Yellow   SPEC GRAV UA  1 010   PH UA  6 5   GLUCOSE UA mg/dl Negative   KETONES UA mg/dl Negative   BLOOD UA  Negative   PROTEIN UA mg/dl Negative   NITRITE UA  Negative   BILIRUBIN UA  Negative   UROBILINOGEN UA E U /dl 0 2   LEUKOCYTES UA  Negative     Results from last 7 days   Lab Units 12/30/22 2118   INFLUENZA A PCR  Negative   INFLUENZA B PCR  Negative   RSV PCR  Negative         ED Treatment:   Medication Administration from 12/30/2022 2019 to 12/31/2022 7636       Date/Time Order Dose Route Action     12/30/2022 2122 EST sodium chloride 0 9 % bolus 500 mL 500 mL Intravenous New Bag     12/30/2022 2158 EST iohexol (OMNIPAQUE) 350 MG/ML injection (SINGLE-DOSE) 100 mL 100 mL Intravenous Given     12/31/2022 0909 EST amLODIPine (NORVASC) tablet 5 mg 5 mg Oral Given     12/31/2022 1336 EST famotidine (PEPCID) tablet 20 mg 20 mg Oral Given     12/31/2022 6304 EST gabapentin (NEURONTIN) capsule 300 mg 300 mg Oral Given     12/31/2022 0542 EST levothyroxine tablet 75 mcg 75 mcg Oral Given     12/31/2022 0542 EST pantoprazole (PROTONIX) EC tablet 40 mg 40 mg Oral Given     12/31/2022 0542 EST heparin (porcine) subcutaneous injection 5,000 Units 5,000 Units Subcutaneous Given        Past Medical History:   Diagnosis Date   • Asthma    • Atypical chest pain    • CAD (coronary artery disease)    • Candidal intertrigo    • CHF (congestive heart failure) ([facility])    • Depression    • Diabetes mellitus ([facility] )    • Diabetic neuropathy ([facility] )    • Diverticulitis    • Dyslipidemia    • Female bladder prolapse    • Gastric ulcer    • Glaucoma    • HTN (hypertension)    • Hyperlipidemia    • Hypothyroidism 4/18/2016   • RAD (reactive airway disease)      Present on Admission:  • Arteriosclerosis of coronary artery  • Epigastric pain  • Essential hypertension  • Hyperlipidemia  • GERD (gastroesophageal reflux disease)  • Type 2 diabetes mellitus ([facility] )  • Aortic stenosis  • CKD (chronic kidney disease)      Admitting Diagnosis: Diarrhea [R19 7]  Chest pain [R07 9]  Abdominal pain [R10 9]  Age/Sex: 80 y o  female  Admission Orders:  Clear Liquid Diet  Telemetry  SCDs      Scheduled Medications:  amLODIPine, 5 mg, Oral, Daily  famotidine, 20 mg, Oral, BID  gabapentin, 300 mg, Oral, TID  gabapentin, 300 mg, Oral, HS  heparin (porcine), 5,000 Units, Subcutaneous, Q8H JONATAN  latanoprost, 1 drop, Both Eyes, HS  levothyroxine, 75 mcg, Oral, Early Morning  pantoprazole, 40 mg, Oral, Early Morning    Continuous IV Infusions: none    PRN Meds: none      Network Utilization Review Department  ATTENTION: Please call with any questions or concerns to [phone] and carefully listen to the prompts so that you are directed to the right person  All voicemails are confidential   Rose Mary Gasca all requests for admission clinical reviews, approved or denied determinations and any other requests to dedicated fax number below belonging to the campus where the patient is receiving treatment   List of dedicated fax numbers for the Facilities:  [address] DENIALS (Administrative/Medical Necessity) [phone]   [address] (Maternity/NICU/Pediatrics) [phone]   [address] Larios [phone]   [facility] 77 [phone]   [address] [address] Medical [place] [place] [address] [facility] [place] [facility] [phone]   1556 [facility] 134 815 [facility] [phone]

## 2022-12-31 NOTE — ASSESSMENT & PLAN NOTE
Mild nonspecific colitis on ct a/p  No s/sx of sepsis    Pt w/nonbloody non melanotic diarrhea  Supportive care monitor off abx

## 2022-12-31 NOTE — ASSESSMENT & PLAN NOTE
Lab Results   Component Value Date    HGBA1C 7 2 (H) 09/28/2022   well controlled by hgb a1c  Monitor off ssi/poc bs

## 2022-12-31 NOTE — PLAN OF CARE
Problem: Prexisting or High Potential for Compromised Skin Integrity  Goal: Skin integrity is maintained or improved  Description: INTERVENTIONS:  - Identify patients at risk for skin breakdown  - Assess and monitor skin integrity  - Assess and monitor nutrition and hydration status  - Monitor labs   - Assess for incontinence   - Turn and reposition patient  - Assist with mobility/ambulation  - Relieve pressure over bony prominences  - Avoid friction and shearing  - Provide appropriate hygiene as needed including keeping skin clean and dry  - Evaluate need for skin moisturizer/barrier cream  - Collaborate with interdisciplinary team   - Patient/family teaching  - Consider wound care consult   Outcome: Progressing     Problem: PAIN - ADULT  Goal: Verbalizes/displays adequate comfort level or baseline comfort level  Description: Interventions:  - Encourage patient to monitor pain and request assistance  - Assess pain using appropriate pain scale  - Administer analgesics based on type and severity of pain and evaluate response  - Implement non-pharmacological measures as appropriate and evaluate response  - Consider cultural and social influences on pain and pain management  - Notify physician/advanced practitioner if interventions unsuccessful or patient reports new pain  Outcome: Progressing     Problem: INFECTION - ADULT  Goal: Absence or prevention of progression during hospitalization  Description: INTERVENTIONS:  - Assess and monitor for signs and symptoms of infection  - Monitor lab/diagnostic results  - Monitor all insertion sites, i e  indwelling lines, tubes, and drains  - Monitor endotracheal if appropriate and nasal secretions for changes in amount and color  - [place] appropriate cooling/warming therapies per order  - Administer medications as ordered  - Instruct and encourage patient and family to use good hand hygiene technique  - Identify and instruct in appropriate isolation precautions for identified infection/condition  Outcome: Progressing     Problem: DISCHARGE PLANNING  Goal: Discharge to home or other facility with appropriate resources  Description: INTERVENTIONS:  - Identify barriers to discharge w/patient and caregiver  - Arrange for needed discharge resources and transportation as appropriate  - Identify discharge learning needs (meds, wound care, etc )  - Arrange for interpretive services to assist at discharge as needed  - Refer to Case Management Department for coordinating discharge planning if the patient needs post-hospital services based on physician/advanced practitioner order or complex needs related to functional status, cognitive ability, or social support system  Outcome: Progressing     Problem: Knowledge Deficit  Goal: Patient/family/caregiver demonstrates understanding of disease process, treatment plan, medications, and discharge instructions  Description: Complete learning assessment and assess knowledge base    Interventions:  - Provide teaching at level of understanding  - Provide teaching via preferred learning methods  Outcome: Progressing     Problem: CARDIOVASCULAR - ADULT  Goal: Maintains optimal cardiac output and hemodynamic stability  Description: INTERVENTIONS:  - Monitor I/O, vital signs and rhythm  - Monitor for S/S and trends of decreased cardiac output  - Administer and titrate ordered vasoactive medications to optimize hemodynamic stability  - Assess quality of pulses, skin color and temperature  - Assess for signs of decreased coronary artery perfusion  - Instruct patient to report change in severity of symptoms  Outcome: Progressing  Goal: Absence of cardiac dysrhythmias or at baseline rhythm  Description: INTERVENTIONS:  - Continuous cardiac monitoring, vital signs, obtain 12 lead EKG if ordered  - Administer antiarrhythmic and heart rate control medications as ordered  - Monitor electrolytes and administer replacement therapy as ordered  Outcome: Progressing     Problem: GASTROINTESTINAL - ADULT  Goal: Minimal or absence of nausea and/or vomiting  Description: INTERVENTIONS:  - Administer IV fluids if ordered to ensure adequate hydration  - Maintain NPO status until nausea and vomiting are resolved  - Nasogastric tube if ordered  - Administer ordered antiemetic medications as needed  - Provide nonpharmacologic comfort measures as appropriate  - Advance diet as tolerated, if ordered  - Consider nutrition services referral to assist patient with adequate nutrition and appropriate food choices  Outcome: Progressing  Goal: Maintains or returns to baseline bowel function  Description: INTERVENTIONS:  - Assess bowel function  - Encourage oral fluids to ensure adequate hydration  - Administer IV fluids if ordered to ensure adequate hydration  - Administer ordered medications as needed  - Encourage mobilization and activity  - Consider nutritional services referral to assist patient with adequate nutrition and appropriate food choices  Outcome: Progressing  Goal: Maintains adequate nutritional intake  Description: INTERVENTIONS:  - Monitor percentage of each meal consumed  - Identify factors contributing to decreased intake, treat as appropriate  - Assist with meals as needed  - Monitor I&O, weight, and lab values if indicated  - Obtain nutrition services referral as needed  Outcome: Progressing  Goal: Oral mucous membranes remain intact  Description: INTERVENTIONS  - Assess oral mucosa and hygiene practices  - Implement preventative oral hygiene regimen  - Implement oral medicated treatments as ordered  - Initiate Nutrition services referral as needed  Outcome: Progressing 08-Sep-2018 19:50

## 2022-12-31 NOTE — ED PROVIDER NOTES
History  Chief Complaint   Patient presents with   • Flu Symptoms     Pt c/o body aches, diarrhea since this morning  Patient is 8-year-old female with a past medical history of CHF, CKD, IBS, diabetes who presented with body aches and diarrhea  Her symptoms began yesterday  She is having bilateral abdominal pain that radiates to her back  She is also complaining of epigastric pain  She has had several episodes of nonbloody diarrhea  She states that every time she has anything to eat or drink she has an episode of diarrhea  She has had greater than 5 episodes  She has tried Tylenol which helps with the abdominal pain but once the Tylenol wears off the abdominal pain returns  She has no recent antibiotic use or travel  She denies history of abdominal surgery, known sick contacts, or urinary symptoms  She is also complaining of intermittent, non-radiating, substernal chest pain that began yesterday  She noticed the pain while laying down  There are no modifying factors  She does not currently have chest pain  She denies associated shortness of breath  Prior to Admission Medications   Prescriptions Last Dose Informant Patient Reported? Taking? Cholecalciferol (VITAMIN D3) 2000 units capsule   Yes No   Sig: Take 2,000 Units by mouth daily    Incontinence Supply Disposable (SELECT DISPOSABLE UNDERWEAR LG) MISC   Yes No   Lancets (ACCU-CHEK SOFT TOUCH) lancets   No No   Sig: Testing 1 time daily  Dx: E11 9   Misc  Devices (Transport Chair) MISC   No No   Sig: Use if needed (with outings outside of the house )   Patient not taking: Reported on 12/20/2022   acetaminophen (TYLENOL) 325 mg tablet   No No   Sig: Take 2 tablets (650 mg total) by mouth every 6 (six) hours as needed for mild pain   amLODIPine (NORVASC) 5 mg tablet   No No   Sig: TAKE 1 TABLET (5 MG TOTAL) BY MOUTH DAILY   aspirin 81 MG tablet   Yes No   Sig: Take 81 mg by mouth daily     dorzolamide (TRUSOPT) 2 % ophthalmic solution   Yes No   Sig: Administer 1 drop to both eyes 3 (three) times a day     famotidine (PEPCID) 20 mg tablet   No No   Sig: Take 1 tablet (20 mg total) by mouth 2 (two) times a day   furosemide (LASIX) 20 mg tablet   No No   Sig: TAKE 1 TABLET IN THE MORNING  AND TAKE 2 TABLETS IN THE EVENING   Patient taking differently: TAKE 1 TABLET IN THE MORNING  AND TAKE 2 TABLETS IN THE EVENING   gabapentin (NEURONTIN) 600 MG tablet   No No   Sig: TAKE 1/2 TABLET IN THE MORNING AND AFTERNOON AND TAKE 1 TABLET AT BEDTIME   latanoprost (XALATAN) 0 005 % ophthalmic solution   No No   Sig: Apply 1 drop to eye daily at bedtime Both eyes   levothyroxine 75 mcg tablet   No No   Sig: TAKE 1 TABLET EVERY DAY   oxygen gas   Yes No   Sig: Inhale 2 L/min daily at bedtime   Indications: Shortness of breath   pantoprazole (PROTONIX) 40 mg tablet   No No   Sig: Take 1 tablet (40 mg total) by mouth daily   Patient not taking: Reported on 12/20/2022   potassium chloride (K-DUR,KLOR-CON) 20 mEq tablet   No No   Sig: TAKE 1 TABLET EVERY DAY   Patient taking differently: 20 mEq if needed   psyllium (METAMUCIL SMOOTH TEXTURE) 28 % packet   No No   Sig: Take 1 packet by mouth in the morning   Patient taking differently: Take 1 packet by mouth if needed   vitamin B-12 (CYANOCOBALAMIN) 250 MCG tablet   Yes No   Sig: Take 250 mcg by mouth daily      Facility-Administered Medications: None       Past Medical History:   Diagnosis Date   • Asthma    • Atypical chest pain    • CAD (coronary artery disease)    • Candidal intertrigo    • CHF (congestive heart failure) ([facility])    • Depression    • Diabetes mellitus ([facility])    • Diabetic neuropathy ([facility])    • Diverticulitis    • Dyslipidemia    • Female bladder prolapse    • Gastric ulcer    • Glaucoma    • HTN (hypertension)    • Hyperlipidemia    • Hypothyroidism 4/18/2016   • RAD (reactive airway disease)        Past Surgical History:   Procedure Laterality Date   • COLONOSCOPY     • ESOPHAGOGASTRODUODENOSCOPY  2011   • HYSTERECTOMY     • TONSILLECTOMY         Family History   Problem Relation Age of Onset   • Breast cancer Mother    • Hypothyroidism Mother    • Hypertension Father    • Breast cancer Sister    • Thyroid disease Sister    • Hypertension Sister      I have reviewed and agree with the history as documented  E-Cigarette/Vaping   • E-Cigarette Use Never User      E-Cigarette/Vaping Substances   • Nicotine No    • THC No    • CBD No    • Flavoring No    • Other No    • Unknown No      Social History     Tobacco Use   • Smoking status: Never   • Smokeless tobacco: Never   Vaping Use   • Vaping Use: Never used   Substance Use Topics   • Alcohol use: No     Comment: Social Alcohol Use -- as per allscripts (pt denies all alcohol use)   • Drug use: No        Review of Systems   Constitutional: Negative for chills and fever  HENT: Negative for ear pain and sore throat  Eyes: Negative for pain and visual disturbance  Respiratory: Negative for cough and shortness of breath  Cardiovascular: Negative for chest pain and palpitations  Gastrointestinal: Positive for abdominal pain and diarrhea  Negative for vomiting  Genitourinary: Negative for dysuria and hematuria  Musculoskeletal: Positive for back pain  Negative for arthralgias  Skin: Negative for color change and rash  Neurological: Negative for seizures and syncope  All other systems reviewed and are negative        Physical Exam  ED Triage Vitals   Temperature Pulse Respirations Blood Pressure SpO2   12/30/22 2047 12/30/22 2022 12/30/22 2022 12/30/22 2026 12/30/22 2022   97 5 °F (36 4 °C) 91 20 156/67 97 %      Temp Source Heart Rate Source Patient Position - Orthostatic VS BP Location FiO2 (%)   12/30/22 2047 12/30/22 2022 12/30/22 2022 12/30/22 2022 --   Oral Monitor Lying Right arm       Pain Score       --                    Orthostatic Vital Signs  Vitals:    12/30/22 2022 12/30/22 2026 12/30/22 2257   BP: 156/67 155/67   Pulse: 91  83   Patient Position - Orthostatic VS: Lying Lying Lying       Physical Exam  Vitals and nursing note reviewed  Constitutional:       General: She is not in acute distress  Appearance: She is well-developed  HENT:      Head: Normocephalic and atraumatic  Nose: No congestion or rhinorrhea  Eyes:      Extraocular Movements: Extraocular movements intact  Conjunctiva/sclera: Conjunctivae normal    Cardiovascular:      Rate and Rhythm: Normal rate and regular rhythm  Heart sounds: No murmur heard  Pulmonary:      Effort: Pulmonary effort is normal  No respiratory distress  Breath sounds: Normal breath sounds  Abdominal:      Palpations: Abdomen is soft  Tenderness: There is abdominal tenderness  There is no guarding or rebound  Comments: TTP diffusely throughout abdomen  Musculoskeletal:         General: No swelling  Cervical back: Neck supple  Right lower leg: No edema  Left lower leg: No edema  Lymphadenopathy:      Cervical: No cervical adenopathy  Skin:     General: Skin is warm and dry  Capillary Refill: Capillary refill takes less than 2 seconds  Neurological:      Mental Status: She is alert  Sensory: No sensory deficit  Motor: No weakness  Psychiatric:         Mood and Affect: Mood normal          ED Medications  Medications   sodium chloride 0 9 % bolus 500 mL (0 mL Intravenous Stopped 12/30/22 2346)   iohexol (OMNIPAQUE) 350 MG/ML injection (SINGLE-DOSE) 100 mL (100 mL Intravenous Given 12/30/22 2158)       Diagnostic Studies  Results Reviewed     Procedure Component Value Units Date/Time    HS Troponin I 2hr [[id number]] Collected: 12/31/22 0045    Lab Status:  In process Specimen: Blood from Arm, Left Updated: 12/31/22 0048    UA w Reflex to Microscopic w Reflex to Culture [[id number]] Collected: 12/30/22 2344    Lab Status: Final result Specimen: Urine, Clean Catch Updated: 12/31/22 0008     Color, UA Yellow     Clarity, UA Clear     Specific Gravity, UA 1 010     pH, UA 6 5     Leukocytes, UA Negative     Nitrite, UA Negative     Protein, UA Negative mg/dl      Glucose, UA Negative mg/dl      Ketones, UA Negative mg/dl      Urobilinogen, UA 0 2 E U /dl      Bilirubin, UA Negative     Occult Blood, UA Negative    HS Troponin I 4hr [[id number]]     Lab Status: No result Specimen: Blood     HS Troponin 0hr (reflex protocol) [[id number]]  (Normal) Collected: 12/30/22 2258    Lab Status: Final result Specimen: Blood from Arm, Right Updated: 12/30/22 2328     hs TnI 0hr 20 ng/L     COVID/FLU/RSV [[id number]]  (Normal) Collected: 12/30/22 2118    Lab Status: Final result Specimen: Nares from Nose Updated: 12/30/22 2208     SARS-CoV-2 Negative     INFLUENZA A PCR Negative     INFLUENZA B PCR Negative     RSV PCR Negative    Narrative:      FOR PEDIATRIC PATIENTS - copy/paste COVID Guidelines URL to browser: [email or web address]  "[employer]x    SARS-CoV-2 assay is a Nucleic Acid Amplification assay intended for the  qualitative detection of nucleic acid from SARS-CoV-2 in nasopharyngeal  swabs  Results are for the presumptive identification of SARS-CoV-2 RNA  Positive results are indicative of infection with SARS-CoV-2, the virus  causing COVID-19, but do not rule out bacterial infection or co-infection  with other viruses  Laboratories within the [place] and its  territories are required to report all positive results to the appropriate  public health authorities  Negative results do not preclude SARS-CoV-2  infection and should not be used as the sole basis for treatment or other  patient management decisions  Negative results must be combined with  clinical observations, patient history, and epidemiological information  This test has not been FDA cleared or approved  This test has been authorized by FDA under an Emergency Use Authorization  (EUA)   This test is only authorized for the duration of time the  declaration that circumstances exist justifying the authorization of the  emergency use of an in vitro diagnostic tests for detection of SARS-CoV-2  virus and/or diagnosis of COVID-19 infection under section 564(b)(1) of  the Act, 21 U  S C  351DPF-7(N)(3), unless the authorization is terminated  or revoked sooner  The test has been validated but independent review by FDA  and CLIA is pending  Test performed using [employer] Gene[employer]pert: This RT-PCR assay targets N2,  a region unique to SARS-CoV-2  A conserved region in the E-gene was chosen  for pan-Sarbecovirus detection which includes SARS-CoV-2  According to CMS-2020-01-R, this platform meets the definition of high-throughput technology      CMP [[id number]]  (Abnormal) Collected: 12/30/22 2118    Lab Status: Final result Specimen: Blood from Arm, Left Updated: 12/30/22 2143     Sodium 139 mmol/L      Potassium 4 0 mmol/L      Chloride 104 mmol/L      CO2 22 mmol/L      ANION GAP 13 mmol/L      BUN 15 mg/dL      Creatinine 0 96 mg/dL      Glucose 164 mg/dL      Calcium 9 3 mg/dL      Corrected Calcium 9 9 mg/dL      AST 18 U/L      ALT 14 U/L      Alkaline Phosphatase 112 U/L      Total Protein 7 4 g/dL      Albumin 3 2 g/dL      Total Bilirubin 0 36 mg/dL      eGFR 48 ml/min/1 73sq m     Narrative:      [place] guidelines for Chronic Kidney Disease (CKD):   •  Stage 1 with normal or high GFR (GFR > 90 mL/min/1 73 square meters)  •  Stage 2 Mild CKD (GFR = 60-89 mL/min/1 73 square meters)  •  Stage 3A Moderate CKD (GFR = 45-59 mL/min/1 73 square meters)  •  Stage 3B Moderate CKD (GFR = 30-44 mL/min/1 73 square meters)  •  Stage 4 Severe CKD (GFR = 15-29 mL/min/1 73 square meters)  •  Stage 5 End Stage CKD (GFR <15 mL/min/1 73 square meters)  Note: GFR calculation is accurate only with a steady state creatinine    Lipase [[id number]]  (Normal) Collected: 12/30/22 2118    Lab Status: Final result Specimen: Blood from Arm, Left Updated: 12/30/22 2143     Lipase 103 u/L     CBC and differential [[id number]] Collected: 12/30/22 2118    Lab Status: Final result Specimen: Blood from Arm, Left Updated: 12/30/22 2129     WBC 5 16 Thousand/uL      RBC 4 16 Million/uL      Hemoglobin 12 5 g/dL      Hematocrit 38 5 %      MCV 93 fL      MCH 30 0 pg      MCHC 32 5 g/dL      RDW 13 2 %      MPV 11 3 fL      Platelets 446 Thousands/uL      nRBC 0 /100 WBCs      Neutrophils Relative 65 %      Immat GRANS % 0 %      Lymphocytes Relative 21 %      Monocytes Relative 11 %      Eosinophils Relative 2 %      Basophils Relative 1 %      Neutrophils Absolute 3 38 Thousands/µL      Immature Grans Absolute 0 02 Thousand/uL      Lymphocytes Absolute 1 06 Thousands/µL      Monocytes Absolute 0 54 Thousand/µL      Eosinophils Absolute 0 12 Thousand/µL      Basophils Absolute 0 04 Thousands/µL                  CT abdomen pelvis w contrast   Final Result by Rosy Houston MD (12/30 2244)      Mild diffuse thickening of the colon may be due to nonspecific colitis  No focal inflammatory change to suggest diverticulitis              Workstation performed: [id number]         XR chest 1 view portable    (Results Pending)         Procedures  ECG 12 Lead Documentation Only    Date/Time: 12/30/2022 9:23 PM  Performed by: Tyra Sabillon MD  Authorized by: Tyra Sabillon MD     ECG reviewed by me, the ED Provider: yes    Patient location:  ED  Previous ECG:     Previous ECG:  Compared to current    Comparison ECG info:  PVCs    Similarity:  Changes noted  Rate:     ECG rate:  86    ECG rate assessment: normal    Rhythm:     Rhythm: sinus rhythm    Ectopy:     Ectopy: PVCs      PVCs:  Infrequent  QRS:     QRS axis:  Normal    QRS intervals:  Normal  Conduction:     Conduction: abnormal      Abnormal conduction: 1st degree    ST segments:     ST segments:  Normal  T waves:     T waves: normal            ED Course  ED Course as of 12/31/22 0202   Fri Dec 30, 2022 2143 WBC: 5 16   2147 Glucose, Random(!): 164   2150 Lipase: 103   2214 SARS-COV-2: Negative   2214 INFLU A PCR: Negative   2214 INFLU B PCR: Negative   2214 RSV PCR: Negative   2232 Patient is now complaining of chest pain that she says started yesterday, is intermittent, non-radiating, and is worse with palpation  A troponin was ordered  2336 hs TnI 0hr: 20   Sat Dec 31, 2022   0131 Patient's daughter aware of admission for observation             HEART Risk Score    Flowsheet Row Most Recent Value   Heart Score Risk Calculator    History 1 Filed at: 12/31/2022 0116   ECG 0 Filed at: 12/31/2022 2971   Age 2 Filed at: 12/31/2022 0116   Risk Factors 2 Filed at: 12/31/2022 0116   Troponin 1 Filed at: 12/31/2022 0116   HEART Score 6 Filed at: 12/31/2022 0116                      SBIRT 22yo+    Flowsheet Row Most Recent Value   SBIRT (23 yo +)    In order to provide better care to our patients, we are screening all of our patients for alcohol and drug use  Would it be okay to ask you these screening questions? No Filed at: 12/30/2022 2025                MDM  Number of Diagnoses or Management Options  Abdominal pain  Chest pain  Diarrhea  Diagnosis management comments: Patient is a 8-year-old female past medical history of CHF, CKD, IBS, and diabetes who presented with body aches and diarrhea  Differential dx: Viral illness, diverticulitis, appendicitis     Patient has diffuse abdominal pain so workup will include EKG, CBC, CMP, lipase, UA, and CT of abdomen/pelvis  She will be given 500mL of normal saline due to her history of CHF  She took Tylenol about 2 hours prior to arrival and does not require anything for pain at the moment  She will be re-evaluated frequently  While here, the patient mentioned that she is also having chest pain  A troponin and CXR were ordered  Her initial troponin was a 20  Other labs were unremarkable  She will be admitted to medicine for observation  Disposition  Final diagnoses:   Diarrhea   Abdominal pain   Chest pain     Time reflects when diagnosis was documented in both MDM as applicable and the Disposition within this note     Time User Action Codes Description Comment    12/31/2022  1:14 AM Margarita Saucer Add [R19 7] Diarrhea     12/31/2022  1:15 AM Margarita Saucer Add [R10 9] Abdominal pain     12/31/2022  1:16 AM Margarita Saucer Add [R07 9] Chest pain     12/31/2022  1:16 AM Margarita Saucer Modify [R19 7] Diarrhea     12/31/2022  1:16 AM Margarita Saucer Modify [R07 9] Chest pain       ED Disposition     ED Disposition   Admit    Condition   Stable    Date/Time   Sat Dec 31, 2022  1:14 AM    Comment   Case was discussed with KISHAN and the patient's admission status was agreed to be Admission Status: observation status to the service of Dr Collette Fitch   Follow-up Information    None         Patient's Medications   Discharge Prescriptions    No medications on file     No discharge procedures on file  PDMP Review       Value Time User    PDMP Reviewed  Yes 11/18/2020  1:31 PM Nehal Winston PA-C           ED Provider  Attending physically available and evaluated Milo Brownosh  I managed the patient along with the ED Attending      Electronically Signed by         Bridgett Klinefelter, MD  12/31/22 5182       Bridgett Klinefelter, MD  12/31/22 6440

## 2022-12-31 NOTE — H&P
[facility]  H&P- Jamir Rivas 9/24/1922, 80 y o  female MRN: [id number]  Unit/Bed#: ED 31 Encounter: [id number]  Primary Care Provider: Della Helms PA-C   Date and time admitted to hospital: 12/30/2022  8:20 PM    * Epigastric pain  Assessment & Plan  Cp r/o  Suspect 2* mild colitis on ct a/p  Reproducible w/palpation  Does have hx of suspected stable angina followed by cardiology previously  ekg w/o ischemic changes in 2+ leads  D/w er provider we will check cxr portable  Trend ekgs/troponins admit to observation    Colitis  Assessment & Plan  Mild nonspecific colitis on ct a/p  No s/sx of sepsis  Pt w/nonbloody non melanotic diarrhea  Supportive care monitor off abx    CKD (chronic kidney disease)  Assessment & Plan  Lab Results   Component Value Date    EGFR 48 12/30/2022    EGFR 34 12/05/2022    EGFR 50 06/21/2022    CREATININE 0 96 12/30/2022    CREATININE 1 27 12/05/2022    CREATININE 0 94 06/21/2022   creatinine at baseline  Continue to monitor w/iv contrast    Essential hypertension  Assessment & Plan  Continue norvasc, lasix    Type 2 diabetes mellitus ([facility] [address] )  Assessment & Plan    Lab Results   Component Value Date    HGBA1C 7 2 (H) 09/28/2022   well controlled by hgb a1c  Monitor off ssi/poc bs    GERD (gastroesophageal reflux disease)  Assessment & Plan  Continue op regimen    Hyperlipidemia  Assessment & Plan  Check flp for risk reduction    Aortic stenosis  Assessment & Plan  Severe stenosis noted previously by ct surgery          No hx of valvuloplasty  Monitor volume status closely    Arteriosclerosis of coronary artery  Assessment & Plan  Suspected and managed conservatively by cardiology  If recurrence oef cp s/sx or elevated troponin consider cardiology consultation        VTE Prophylaxis: Heparin  / sequential compression device   Code Status: level 3 dni/dnr  POLST: There is no POLST form on file for this patient (pre-hospital)  Discussion with family: Anticipated Length of Stay:  Patient will be admitted on an Observation basis with an anticipated length of stay of  Less than 2 midnights  Justification for Hospital Stay:  acs r/o    Total Time for Visit, including Counseling / Coordination of Care: 45 minutes  Greater than 50% of this total time spent on direct patient counseling and coordination of care  Chief Complaint:   cp    History of Present Illness:    Farhad Blanton is a 80 y o  female who presents with pmh of suspected cad, htn niddm, ckd aortic stenosis, gerd coming to hospital for cp  Pt notes 1-2 days of loose to watery stool which was non bloody nonmelanotic but narrow in caliber  Nausea but no vomiting  No fevers/chills  She also noted substernal/right sided epigastric/ chest pain  Came to ed for evaluation and was found to have mild colitis on ct a/p  She denies any reheated food, potato/pasta salad, undercooked chicken/pork and has only been eating veggies the last few days  No chinese food  No sick contacts  We will admit for cp acs r/o  Review of Systems:    Review of Systems   Constitutional: Negative for appetite change, chills and fever  Respiratory: Negative for shortness of breath  Cardiovascular: Positive for chest pain  Gastrointestinal: Positive for abdominal pain, diarrhea and nausea  All other systems reviewed and are negative        Past Medical and Surgical History:     Past Medical History:   Diagnosis Date   • Asthma    • Atypical chest pain    • CAD (coronary artery disease)    • Candidal intertrigo    • CHF (congestive heart failure) (HCC)    • Depression    • Diabetes mellitus (HCC)    • Diabetic neuropathy (HCC)    • Diverticulitis    • Dyslipidemia    • Female bladder prolapse    • Gastric ulcer    • Glaucoma    • HTN (hypertension)    • Hyperlipidemia    • Hypothyroidism 4/18/2016   • RAD (reactive airway disease)        Past Surgical History:   Procedure Laterality Date   • COLONOSCOPY     • ESOPHAGOGASTRODUODENOSCOPY  2011   • HYSTERECTOMY     • TONSILLECTOMY         Meds/Allergies:    Prior to Admission medications    Medication Sig Start Date End Date Taking? Authorizing Provider   acetaminophen (TYLENOL) 325 mg tablet Take 2 tablets (650 mg total) by mouth every 6 (six) hours as needed for mild pain 1/24/21   Rodriguez Burgess MD   amLODIPine (NORVASC) 5 mg tablet TAKE 1 TABLET (5 MG TOTAL) BY MOUTH DAILY 8/30/22   Bladimir Styles PA-C   aspirin 81 MG tablet Take 81 mg by mouth daily  Historical Provider, MD   Cholecalciferol (VITAMIN D3) 2000 units capsule Take 2,000 Units by mouth daily     Historical Provider, MD   dorzolamide (TRUSOPT) 2 % ophthalmic solution Administer 1 drop to both eyes 3 (three) times a day      Historical Provider, MD   famotidine (PEPCID) 20 mg tablet Take 1 tablet (20 mg total) by mouth 2 (two) times a day 10/31/22 1/29/23  Bladimir Styles PA-C   furosemide (LASIX) 20 mg tablet TAKE 1 TABLET IN THE MORNING  AND TAKE 2 TABLETS IN THE EVENING  Patient taking differently: TAKE 1 TABLET IN THE MORNING  AND TAKE 2 TABLETS IN THE EVENING 5/17/22   Shelia Cardozo,    gabapentin (NEURONTIN) 600 MG tablet TAKE 1/2 TABLET IN THE MORNING AND AFTERNOON AND TAKE 1 TABLET AT BEDTIME 9/14/22   Fanny Pedro MD   Incontinence Supply Disposable (SELECT DISPOSABLE UNDERWEAR LG) MISC  6/14/19   Historical Provider, MD   Lancets (ACCU-CHEK SOFT TOUCH) lancets Testing 1 time daily  Dx: E11 9 4/28/20   Bladimir Styles PA-C   latanoprost (XALATAN) 0 005 % ophthalmic solution Apply 1 drop to eye daily at bedtime Both eyes 3/16/19   Anisha Crawford MD   levothyroxine 75 mcg tablet TAKE 1 TABLET EVERY DAY 8/18/22   Fanny Pedro MD   Misc  Devices (Transport Chair) MISC Use if needed (with outings outside of the house )  Patient not taking: Reported on 12/20/2022 4/26/22   Bladimir Styles PA-C   oxygen gas Inhale 2 L/min daily at bedtime   Indications: Shortness of breath    Historical Provider, MD   pantoprazole (PROTONIX) 40 mg tablet Take 1 tablet (40 mg total) by mouth daily  Patient not taking: Reported on 12/20/2022 9/19/22   Marino Kimball MD   potassium chloride (K-DUR,KLOR-CON) 20 mEq tablet TAKE 1 TABLET EVERY DAY  Patient taking differently: 20 mEq if needed 8/17/22   Shelia Cardozo,    psyllium (METAMUCIL SMOOTH TEXTURE) 28 % packet Take 1 packet by mouth in the morning  Patient taking differently: Take 1 packet by mouth if needed 12/1/22   Jam Maxwell MD   vitamin B-12 (CYANOCOBALAMIN) 250 MCG tablet Take 250 mcg by mouth daily    Historical Provider, MD     I have reviewed home medications with patient personally  Allergies: Allergies   Allergen Reactions   • Levaquin [Levofloxacin] Myalgia   • Statins      Other reaction(s): Weakness  Category: Adverse Reaction;        Social History:     Marital Status:    Occupation:   Patient Pre-hospital Living Situation:   Patient Pre-hospital Level of Mobility:   Patient Pre-hospital Diet Restrictions:   Substance Use History:   Social History     Substance and Sexual Activity   Alcohol Use No    Comment: Social Alcohol Use -- as per allscripts (pt denies all alcohol use)     Social History     Tobacco Use   Smoking Status Never   Smokeless Tobacco Never     Social History     Substance and Sexual Activity   Drug Use No       Family History:    Family History   Problem Relation Age of Onset   • Breast cancer Mother    • Hypothyroidism Mother    • Hypertension Father    • Breast cancer Sister    • Thyroid disease Sister    • Hypertension Sister        Physical Exam:     Vitals:   Blood Pressure: 155/67 (12/30/22 2257)  Pulse: 83 (12/30/22 2257)  Temperature: 97 5 °F (36 4 °C) (12/30/22 2047)  Temp Source: Oral (12/30/22 2047)  Respirations: 18 (12/30/22 2257)  SpO2: 92 % (12/30/22 2257)    Physical Exam  Vitals reviewed  Constitutional:       General: She is not in acute distress       Appearance: She is not ill-appearing, toxic-appearing or diaphoretic  HENT:      Head: Normocephalic and atraumatic  Right Ear: External ear normal       Left Ear: External ear normal    Eyes:      Extraocular Movements: Extraocular movements intact  Cardiovascular:      Rate and Rhythm: Regular rhythm  Heart sounds: No murmur heard  No friction rub  No gallop  Pulmonary:      Breath sounds: No wheezing, rhonchi or rales  Chest:      Chest wall: Tenderness present  Abdominal:      General: There is no distension  Palpations: There is no mass  Tenderness: There is no abdominal tenderness  There is no guarding or rebound  Hernia: No hernia is present  Comments: No hernandez's sign   Musculoskeletal:      Right lower leg: No edema  Left lower leg: No edema  Skin:     General: Skin is warm and dry  Neurological:      Mental Status: She is alert  Mental status is at baseline  Psychiatric:         Mood and Affect: Mood normal          (  Be Sure to Include Physical Exam: Delete this entire line when you have entered your exam)    Additional Data:     Lab Results: I have personally reviewed pertinent reports  Results from last 7 days   Lab Units 12/30/22  2118   WBC Thousand/uL 5 16   HEMOGLOBIN g/dL 12 5   HEMATOCRIT % 38 5   PLATELETS Thousands/uL 197   NEUTROS PCT % 65   LYMPHS PCT % 21   MONOS PCT % 11   EOS PCT % 2     Results from last 7 days   Lab Units 12/30/22  2118   SODIUM mmol/L 139   POTASSIUM mmol/L 4 0   CHLORIDE mmol/L 104   CO2 mmol/L 22   BUN mg/dL 15   CREATININE mg/dL 0 96   ANION GAP mmol/L 13   CALCIUM mg/dL 9 3   ALBUMIN g/dL 3 2*   TOTAL BILIRUBIN mg/dL 0 36   ALK PHOS U/L 112   ALT U/L 14   AST U/L 18   GLUCOSE RANDOM mg/dL 164*                       Imaging: I have personally reviewed pertinent reports  CT abdomen pelvis w contrast   Final Result by Jasmeet Chappell MD (12/30 3628)      Mild diffuse thickening of the colon may be due to nonspecific colitis  No focal inflammatory change to suggest diverticulitis  Workstation performed: [id number]         XR chest 1 view portable    (Results Pending)       EKG, Pathology, and Other Studies Reviewed on Admission:   · EKG: nsr no ischemic changes in 2+ contiguous leads    Allscripts / Epic Records Reviewed: Yes     ** Please Note: This note has been constructed using a voice recognition system   **

## 2022-12-31 NOTE — ASSESSMENT & PLAN NOTE
Cp r/o  Suspect 2* mild colitis on ct a/p  Reproducible w/palpation    Does have hx of suspected stable angina followed by cardiology previously  ekg w/o ischemic changes in 2+ leads  D/w er provider we will check cxr portable  Trend ekgs/troponins admit to observation

## 2022-12-31 NOTE — ASSESSMENT & PLAN NOTE
Suspected and managed conservatively by cardiology  If recurrence oef cp s/sx or elevated troponin consider cardiology consultation

## 2022-12-31 NOTE — ASSESSMENT & PLAN NOTE
Severe stenosis noted previously by ct surgery          No hx of valvuloplasty  Monitor volume status closely

## 2022-12-31 NOTE — ASSESSMENT & PLAN NOTE
Lab Results   Component Value Date    EGFR 48 12/30/2022    EGFR 34 12/05/2022    EGFR 50 06/21/2022    CREATININE 0 96 12/30/2022    CREATININE 1 27 12/05/2022    CREATININE 0 94 06/21/2022   creatinine at baseline  Continue to monitor w/iv contrast

## 2023-01-01 ENCOUNTER — TELEPHONE (OUTPATIENT)
Dept: FAMILY MEDICINE CLINIC | Facility: CLINIC | Age: 88
End: 2023-01-01

## 2023-01-01 ENCOUNTER — OFFICE VISIT (OUTPATIENT)
Dept: FAMILY MEDICINE CLINIC | Facility: CLINIC | Age: 88
End: 2023-01-01
Payer: COMMERCIAL

## 2023-01-01 ENCOUNTER — HOME CARE VISIT (OUTPATIENT)
Dept: HOME HEALTH SERVICES | Facility: HOME HEALTHCARE | Age: 88
End: 2023-01-01

## 2023-01-01 VITALS
SYSTOLIC BLOOD PRESSURE: 118 MMHG | WEIGHT: 150.13 LBS | OXYGEN SATURATION: 97 % | TEMPERATURE: 97.6 F | DIASTOLIC BLOOD PRESSURE: 78 MMHG | HEART RATE: 88 BPM | BODY MASS INDEX: 27.46 KG/M2

## 2023-01-01 DIAGNOSIS — G31.84 MILD COGNITIVE IMPAIRMENT: ICD-10-CM

## 2023-01-01 DIAGNOSIS — E11.22 TYPE 2 DIABETES MELLITUS WITH STAGE 3B CHRONIC KIDNEY DISEASE, WITHOUT LONG-TERM CURRENT USE OF INSULIN (HCC): ICD-10-CM

## 2023-01-01 DIAGNOSIS — I10 PRIMARY HYPERTENSION: ICD-10-CM

## 2023-01-01 DIAGNOSIS — E87.1 HYPONATREMIA: ICD-10-CM
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
## 2023-01-01 DIAGNOSIS — E03.9 ACQUIRED HYPOTHYROIDISM: ICD-10-CM

## 2023-01-01 DIAGNOSIS — G50.0 TRIGEMINAL NEURALGIA: ICD-10-CM

## 2023-01-01 DIAGNOSIS — N18.32 STAGE 3B CHRONIC KIDNEY DISEASE (HCC): ICD-10-CM

## 2023-01-01 DIAGNOSIS — R30.0 DYSURIA: ICD-10-CM

## 2023-01-01 DIAGNOSIS — N18.32 TYPE 2 DIABETES MELLITUS WITH STAGE 3B CHRONIC KIDNEY DISEASE, WITHOUT LONG-TERM CURRENT USE OF INSULIN (HCC): ICD-10-CM

## 2023-01-01 DIAGNOSIS — I50.32 CHRONIC DIASTOLIC CONGESTIVE HEART FAILURE (HCC): ICD-10-CM

## 2023-01-01 PROBLEM — N17.9 AKI (ACUTE KIDNEY INJURY) (HCC): Status: ACTIVE | Noted: 2022-05-09

## 2023-01-01 LAB
ALBUMIN SERPL BCP-MCNC: 2.7 G/DL (ref 3.5–5)
ALP SERPL-CCNC: 93 U/L (ref 46–116)
ALT SERPL W P-5'-P-CCNC: 9 U/L (ref 12–78)
ANION GAP SERPL CALCULATED.3IONS-SCNC: 15 MMOL/L (ref 4–13)
AST SERPL W P-5'-P-CCNC: 37 U/L (ref 5–45)
BILIRUB SERPL-MCNC: 0.18 MG/DL (ref 0.2–1)
BUN SERPL-MCNC: 21 MG/DL (ref 5–25)
CALCIUM ALBUM COR SERPL-MCNC: 10.1 MG/DL (ref 8.3–10.1)
CALCIUM SERPL-MCNC: 9.1 MG/DL (ref 8.3–10.1)
CHLORIDE SERPL-SCNC: 109 MMOL/L (ref 96–108)
CO2 SERPL-SCNC: 18 MMOL/L (ref 21–32)
CREAT SERPL-MCNC: 1.64 MG/DL (ref 0.6–1.3)
ERYTHROCYTE [DISTWIDTH] IN BLOOD BY AUTOMATED COUNT: 13.5 % (ref 11.6–15.1)
GFR SERPL CREATININE-BSD FRML MDRD: 25 ML/MIN/1.73SQ M
GLUCOSE SERPL-MCNC: 159 MG/DL (ref 65–140)
HCT VFR BLD AUTO: 34.8 % (ref 34.8–46.1)
HGB BLD-MCNC: 11.2 G/DL (ref 11.5–15.4)
MCH RBC QN AUTO: 30.1 PG (ref 26.8–34.3)
MCHC RBC AUTO-ENTMCNC: 32.2 G/DL (ref 31.4–37.4)
MCV RBC AUTO: 94 FL (ref 82–98)
PLATELET # BLD AUTO: 190 THOUSANDS/UL (ref 149–390)
PMV BLD AUTO: 11 FL (ref 8.9–12.7)
POTASSIUM SERPL-SCNC: 4.7 MMOL/L (ref 3.5–5.3)
PROT SERPL-MCNC: 6.4 G/DL (ref 6.4–8.4)
RBC # BLD AUTO: 3.72 MILLION/UL (ref 3.81–5.12)
SL AMB POCT GLUCOSE BLD: 233
SODIUM SERPL-SCNC: 142 MMOL/L (ref 135–147)
WBC # BLD AUTO: 5.29 THOUSAND/UL (ref 4.31–10.16)

## 2023-01-01 PROCEDURE — G2211 COMPLEX E/M VISIT ADD ON: HCPCS | Performed by: FAMILY MEDICINE

## 2023-01-01 PROCEDURE — 82948 REAGENT STRIP/BLOOD GLUCOSE: CPT | Performed by: FAMILY MEDICINE

## 2023-01-01 PROCEDURE — 99214 OFFICE O/P EST MOD 30 MIN: CPT | Performed by: FAMILY MEDICINE

## 2023-01-01 RX ORDER — SODIUM CHLORIDE 9 MG/ML
75 INJECTION, SOLUTION INTRAVENOUS CONTINUOUS
Status: DISCONTINUED | OUTPATIENT
Start: 2023-01-01 | End: 2023-01-02 | Stop reason: HOSPADM

## 2023-01-01 RX ORDER — SENNOSIDES 8.6 MG
650 CAPSULE ORAL EVERY 8 HOURS
Qty: 42 TABLET | Refills: 0
Start: 2023-01-01 | End: 2023-01-01

## 2023-01-01 RX ADMIN — HEPARIN SODIUM 5000 UNITS: 5000 INJECTION INTRAVENOUS; SUBCUTANEOUS at 14:43

## 2023-01-01 RX ADMIN — LATANOPROST 1 DROP: 50 SOLUTION OPHTHALMIC at 21:20

## 2023-01-01 RX ADMIN — HEPARIN SODIUM 5000 UNITS: 5000 INJECTION INTRAVENOUS; SUBCUTANEOUS at 21:21

## 2023-01-01 RX ADMIN — FAMOTIDINE 20 MG: 20 TABLET, FILM COATED ORAL at 17:04

## 2023-01-01 RX ADMIN — GABAPENTIN 300 MG: 300 CAPSULE ORAL at 17:04

## 2023-01-01 RX ADMIN — AMLODIPINE BESYLATE 5 MG: 5 TABLET ORAL at 08:37

## 2023-01-01 RX ADMIN — GABAPENTIN 300 MG: 300 CAPSULE ORAL at 08:37

## 2023-01-01 RX ADMIN — PANTOPRAZOLE SODIUM 40 MG: 40 TABLET, DELAYED RELEASE ORAL at 05:09

## 2023-01-01 RX ADMIN — GABAPENTIN 300 MG: 300 CAPSULE ORAL at 21:20

## 2023-01-01 RX ADMIN — SODIUM CHLORIDE 75 ML/HR: 0.9 INJECTION, SOLUTION INTRAVENOUS at 14:40

## 2023-01-01 RX ADMIN — LEVOTHYROXINE SODIUM 75 MCG: 75 TABLET ORAL at 05:09

## 2023-01-01 RX ADMIN — FAMOTIDINE 20 MG: 20 TABLET, FILM COATED ORAL at 08:37

## 2023-01-01 RX ADMIN — HEPARIN SODIUM 5000 UNITS: 5000 INJECTION INTRAVENOUS; SUBCUTANEOUS at 05:09

## 2023-01-01 NOTE — ASSESSMENT & PLAN NOTE
· CAIT on CKD 3 secondary to diarrhea  · Holding furosemide  · Start IV fluids and recheck labs in a m      Results from last 7 days   Lab Units 01/01/23  0516 12/30/22  2118   BUN mg/dL 21 15   CREATININE mg/dL 1 64* 0 96   EGFR ml/min/1 73sq m 25 48

## 2023-01-01 NOTE — PLAN OF CARE
Problem: Prexisting or High Potential for Compromised Skin Integrity  Goal: Skin integrity is maintained or improved  Description: INTERVENTIONS:  - Identify patients at risk for skin breakdown  - Assess and monitor skin integrity  - Assess and monitor nutrition and hydration status  - Monitor labs   - Assess for incontinence   - Turn and reposition patient  - Assist with mobility/ambulation  - Relieve pressure over bony prominences  - Avoid friction and shearing  - Provide appropriate hygiene as needed including keeping skin clean and dry  - Evaluate need for skin moisturizer/barrier cream  - Collaborate with interdisciplinary team   - Patient/family teaching  - Consider wound care consult   Outcome: Progressing     Problem: PAIN - ADULT  Goal: Verbalizes/displays adequate comfort level or baseline comfort level  Description: Interventions:  - Encourage patient to monitor pain and request assistance  - Assess pain using appropriate pain scale  - Administer analgesics based on type and severity of pain and evaluate response  - Implement non-pharmacological measures as appropriate and evaluate response  - Consider cultural and social influences on pain and pain management  - Notify physician/advanced practitioner if interventions unsuccessful or patient reports new pain  Outcome: Progressing     Problem: INFECTION - ADULT  Goal: Absence or prevention of progression during hospitalization  Description: INTERVENTIONS:  - Assess and monitor for signs and symptoms of infection  - Monitor lab/diagnostic results  - Monitor all insertion sites, i e  indwelling lines, tubes, and drains  - Monitor endotracheal if appropriate and nasal secretions for changes in amount and color  - [place] appropriate cooling/warming therapies per order  - Administer medications as ordered  - Instruct and encourage patient and family to use good hand hygiene technique  - Identify and instruct in appropriate isolation precautions for identified infection/condition  Outcome: Progressing     Problem: DISCHARGE PLANNING  Goal: Discharge to home or other facility with appropriate resources  Description: INTERVENTIONS:  - Identify barriers to discharge w/patient and caregiver  - Arrange for needed discharge resources and transportation as appropriate  - Identify discharge learning needs (meds, wound care, etc )  - Arrange for interpretive services to assist at discharge as needed  - Refer to Case Management Department for coordinating discharge planning if the patient needs post-hospital services based on physician/advanced practitioner order or complex needs related to functional status, cognitive ability, or social support system  Outcome: Progressing     Problem: Knowledge Deficit  Goal: Patient/family/caregiver demonstrates understanding of disease process, treatment plan, medications, and discharge instructions  Description: Complete learning assessment and assess knowledge base    Interventions:  - Provide teaching at level of understanding  - Provide teaching via preferred learning methods  Outcome: Progressing     Problem: CARDIOVASCULAR - ADULT  Goal: Maintains optimal cardiac output and hemodynamic stability  Description: INTERVENTIONS:  - Monitor I/O, vital signs and rhythm  - Monitor for S/S and trends of decreased cardiac output  - Administer and titrate ordered vasoactive medications to optimize hemodynamic stability  - Assess quality of pulses, skin color and temperature  - Assess for signs of decreased coronary artery perfusion  - Instruct patient to report change in severity of symptoms  Outcome: Progressing  Goal: Absence of cardiac dysrhythmias or at baseline rhythm  Description: INTERVENTIONS:  - Continuous cardiac monitoring, vital signs, obtain 12 lead EKG if ordered  - Administer antiarrhythmic and heart rate control medications as ordered  - Monitor electrolytes and administer replacement therapy as ordered  Outcome: Progressing     Problem: GASTROINTESTINAL - ADULT  Goal: Minimal or absence of nausea and/or vomiting  Description: INTERVENTIONS:  - Administer IV fluids if ordered to ensure adequate hydration  - Maintain NPO status until nausea and vomiting are resolved  - Nasogastric tube if ordered  - Administer ordered antiemetic medications as needed  - Provide nonpharmacologic comfort measures as appropriate  - Advance diet as tolerated, if ordered  - Consider nutrition services referral to assist patient with adequate nutrition and appropriate food choices  Outcome: Progressing  Goal: Maintains or returns to baseline bowel function  Description: INTERVENTIONS:  - Assess bowel function  - Encourage oral fluids to ensure adequate hydration  - Administer IV fluids if ordered to ensure adequate hydration  - Administer ordered medications as needed  - Encourage mobilization and activity  - Consider nutritional services referral to assist patient with adequate nutrition and appropriate food choices  Outcome: Progressing  Goal: Maintains adequate nutritional intake  Description: INTERVENTIONS:  - Monitor percentage of each meal consumed  - Identify factors contributing to decreased intake, treat as appropriate  - Assist with meals as needed  - Monitor I&O, weight, and lab values if indicated  - Obtain nutrition services referral as needed  Outcome: Progressing  Goal: Oral mucous membranes remain intact  Description: INTERVENTIONS  - Assess oral mucosa and hygiene practices  - Implement preventative oral hygiene regimen  - Implement oral medicated treatments as ordered  - Initiate Nutrition services referral as needed  Outcome: Progressing     Problem: MOBILITY - ADULT  Goal: Maintain or return to baseline ADL function  Description: INTERVENTIONS:  -  Assess patient's ability to carry out ADLs; assess patient's baseline for ADL function and identify physical deficits which impact ability to perform ADLs (bathing, care of mouth/teeth, toileting, grooming, dressing, etc )  - Assess/evaluate cause of self-care deficits   - Assess range of motion  - Assess patient's mobility; develop plan if impaired  - Assess patient's need for assistive devices and provide as appropriate  - Encourage maximum independence but intervene and supervise when necessary  - Involve family in performance of ADLs  - Assess for home care needs following discharge   - Consider OT consult to assist with ADL evaluation and planning for discharge  - Provide patient education as appropriate  Outcome: Progressing  Goal: Maintains/Returns to pre admission functional level  Description: INTERVENTIONS:  - Perform BMAT or MOVE assessment daily    - Set and communicate daily mobility goal to care team and patient/family/caregiver     - Collaborate with rehabilitation services on mobility goals if consulted  - Out of bed for toileting  - Record patient progress and toleration of activity level   Outcome: Progressing

## 2023-01-01 NOTE — PLAN OF CARE
Problem: Prexisting or High Potential for Compromised Skin Integrity  Goal: Skin integrity is maintained or improved  Description: INTERVENTIONS:  - Identify patients at risk for skin breakdown  - Assess and monitor skin integrity  - Assess and monitor nutrition and hydration status  - Monitor labs   - Assess for incontinence   - Turn and reposition patient  - Assist with mobility/ambulation  - Relieve pressure over bony prominences  - Avoid friction and shearing  - Provide appropriate hygiene as needed including keeping skin clean and dry  - Evaluate need for skin moisturizer/barrier cream  - Collaborate with interdisciplinary team   - Patient/family teaching  - Consider wound care consult   Outcome: Progressing     Problem: PAIN - ADULT  Goal: Verbalizes/displays adequate comfort level or baseline comfort level  Description: Interventions:  - Encourage patient to monitor pain and request assistance  - Assess pain using appropriate pain scale  - Administer analgesics based on type and severity of pain and evaluate response  - Implement non-pharmacological measures as appropriate and evaluate response  - Consider cultural and social influences on pain and pain management  - Notify physician/advanced practitioner if interventions unsuccessful or patient reports new pain  Outcome: Progressing     Problem: INFECTION - ADULT  Goal: Absence or prevention of progression during hospitalization  Description: INTERVENTIONS:  - Assess and monitor for signs and symptoms of infection  - Monitor lab/diagnostic results  - Monitor all insertion sites, i e  indwelling lines, tubes, and drains  - Monitor endotracheal if appropriate and nasal secretions for changes in amount and color  - [place] appropriate cooling/warming therapies per order  - Administer medications as ordered  - Instruct and encourage patient and family to use good hand hygiene technique  - Identify and instruct in appropriate isolation precautions for identified infection/condition  Outcome: Progressing     Problem: DISCHARGE PLANNING  Goal: Discharge to home or other facility with appropriate resources  Description: INTERVENTIONS:  - Identify barriers to discharge w/patient and caregiver  - Arrange for needed discharge resources and transportation as appropriate  - Identify discharge learning needs (meds, wound care, etc )  - Arrange for interpretive services to assist at discharge as needed  - Refer to Case Management Department for coordinating discharge planning if the patient needs post-hospital services based on physician/advanced practitioner order or complex needs related to functional status, cognitive ability, or social support system  Outcome: Progressing     Problem: Knowledge Deficit  Goal: Patient/family/caregiver demonstrates understanding of disease process, treatment plan, medications, and discharge instructions  Description: Complete learning assessment and assess knowledge base    Interventions:  - Provide teaching at level of understanding  - Provide teaching via preferred learning methods  Outcome: Progressing     Problem: CARDIOVASCULAR - ADULT  Goal: Maintains optimal cardiac output and hemodynamic stability  Description: INTERVENTIONS:  - Monitor I/O, vital signs and rhythm  - Monitor for S/S and trends of decreased cardiac output  - Administer and titrate ordered vasoactive medications to optimize hemodynamic stability  - Assess quality of pulses, skin color and temperature  - Assess for signs of decreased coronary artery perfusion  - Instruct patient to report change in severity of symptoms  Outcome: Progressing  Goal: Absence of cardiac dysrhythmias or at baseline rhythm  Description: INTERVENTIONS:  - Continuous cardiac monitoring, vital signs, obtain 12 lead EKG if ordered  - Administer antiarrhythmic and heart rate control medications as ordered  - Monitor electrolytes and administer replacement therapy as ordered  Outcome: Progressing     Problem: GASTROINTESTINAL - ADULT  Goal: Minimal or absence of nausea and/or vomiting  Description: INTERVENTIONS:  - Administer IV fluids if ordered to ensure adequate hydration  - Maintain NPO status until nausea and vomiting are resolved  - Nasogastric tube if ordered  - Administer ordered antiemetic medications as needed  - Provide nonpharmacologic comfort measures as appropriate  - Advance diet as tolerated, if ordered  - Consider nutrition services referral to assist patient with adequate nutrition and appropriate food choices  Outcome: Progressing  Goal: Maintains or returns to baseline bowel function  Description: INTERVENTIONS:  - Assess bowel function  - Encourage oral fluids to ensure adequate hydration  - Administer IV fluids if ordered to ensure adequate hydration  - Administer ordered medications as needed  - Encourage mobilization and activity  - Consider nutritional services referral to assist patient with adequate nutrition and appropriate food choices  Outcome: Progressing  Goal: Maintains adequate nutritional intake  Description: INTERVENTIONS:  - Monitor percentage of each meal consumed  - Identify factors contributing to decreased intake, treat as appropriate  - Assist with meals as needed  - Monitor I&O, weight, and lab values if indicated  - Obtain nutrition services referral as needed  Outcome: Progressing  Goal: Oral mucous membranes remain intact  Description: INTERVENTIONS  - Assess oral mucosa and hygiene practices  - Implement preventative oral hygiene regimen  - Implement oral medicated treatments as ordered  - Initiate Nutrition services referral as needed  Outcome: Progressing

## 2023-01-01 NOTE — ASSESSMENT & PLAN NOTE
· Presentation for abdominal pain and diarrhea found to have mild colitis  · Improved and tolerating solid diet  · Stable off antibiotics

## 2023-01-01 NOTE — ASSESSMENT & PLAN NOTE
Lab Results   Component Value Date    HGBA1C 7 2 (H) 09/28/2022     Results from last 7 days   Lab Units 01/01/23  0516 12/30/22  2118   GLUCOSE RANDOM mg/dL 159* 164*     · Diet controlled    Monitor with a m  labs

## 2023-01-01 NOTE — PROGRESS NOTES
[facility]  Progress Note - Nicole Kamara 9/24/1922, 80 y o  female MRN: [id number]  [id number] Encounter: [id number]  Primary Care Provider: Elo Collazo PA-C   Date and time admitted to hospital: 12/30/2022  8:20 PM    * Colitis  Assessment & Plan  · Presentation for abdominal pain and diarrhea found to have mild colitis  · Improved and tolerating solid diet  · Stable off antibiotics    [facility]  Assessment & Plan  · CAIT on CKD 3 secondary to diarrhea  · Holding furosemide  · Start IV fluids and recheck labs in a m  Results from last 7 days   Lab Units 01/01/23  0516 12/30/22  2118   BUN mg/dL 21 15   CREATININE mg/dL 1 64* 0 96   EGFR ml/min/1 73sq m 25 48       Essential hypertension  Assessment & Plan  · Continue amlodipine    Type 2 diabetes mellitus ([facility] 75 )  Assessment & Plan    Lab Results   Component Value Date    HGBA1C 7 2 (H) 09/28/2022     Results from last 7 days   Lab Units 01/01/23  0516 12/30/22  2118   GLUCOSE RANDOM mg/dL 159* 164*     · Diet controlled  Monitor with a m  labs    Hypothyroidism  Assessment & Plan  · Continue levothyroxine      VTE Pharmacologic Prophylaxis:   Moderate Risk (Score 3-4) - Pharmacological DVT Prophylaxis Ordered: heparin  Patient Centered Rounds: I have performed bedside rounds with nursing staff today  Discussions with Specialists or Other Care Team Provider:     Education and Discussions with Family / Patient: Updated  (daughter) via phone  Time Spent for Care:  mins  More than 50% of total time spent on counseling and coordination of care as described above  Current Length of Stay: 0 day(s)  Current Patient Status: Inpatient   Certification Statement: The patient will continue to require additional inpatient hospital stay due to kidney injury  Discharge Plan / Estimated Discharge Date: Anticipate discharge tomorrow to home      Code Status: Level 3 - DNAR and DNI      Subjective:   Patient seen and examined  Resolved diarrhea  Tolerating solid diet  Kidney injury noted this morning  Objective:   Vitals: Blood pressure 127/61, pulse 68, temperature 98 °F (36 7 °C), resp  rate 18, weight 71 8 kg (158 lb 4 6 oz), SpO2 95 %, not currently breastfeeding  Intake/Output Summary (Last 24 hours) at 1/1/2023 1419  Last data filed at 12/31/2022 1526  Gross per 24 hour   Intake 200 ml   Output --   Net 200 ml       Physical Exam  Vitals reviewed  Constitutional:       General: She is not in acute distress  HENT:      Head: Atraumatic  Eyes:      Extraocular Movements: Extraocular movements intact  Cardiovascular:      Rate and Rhythm: Regular rhythm  Heart sounds: Murmur heard  Pulmonary:      Breath sounds: Decreased breath sounds present  No wheezing  Abdominal:      Palpations: Abdomen is soft  Tenderness: There is no guarding or rebound  Musculoskeletal:         General: No swelling  Skin:     General: Skin is warm  Neurological:      General: No focal deficit present  Mental Status: She is alert     Psychiatric:         Mood and Affect: Mood normal        Additional Data:   Labs:  Results from last 7 days   Lab Units 01/01/23  0516 12/31/22  0542 12/30/22  2118   WBC Thousand/uL 5 29  --  5 16   HEMOGLOBIN g/dL 11 2*  --  12 5   PLATELETS Thousands/uL 190 179 197   MCV fL 94  --  93     Results from last 7 days   Lab Units 01/01/23  0516 12/30/22  2118   SODIUM mmol/L 142 139   POTASSIUM mmol/L 4 7 4 0   CHLORIDE mmol/L 109* 104   CO2 mmol/L 18* 22   ANION GAP mmol/L 15* 13   BUN mg/dL 21 15   CREATININE mg/dL 1 64* 0 96   CALCIUM mg/dL 9 1 9 3   ALBUMIN g/dL 2 7* 3 2*   TOTAL BILIRUBIN mg/dL 0 18* 0 36   ALK PHOS U/L 93 112   ALT U/L 9* 14   AST U/L 37 18   EGFR ml/min/1 73sq m 25 48   GLUCOSE RANDOM mg/dL 159* 164*             Results from last 7 days   Lab Units 12/31/22  0310 12/31/22  0045 12/30/22  2258   HS TNI 0HR ng/L  --   -- 20   HS TNI 2HR ng/L  --  73*  --    HS TNI 4HR ng/L 87*  --   --                           * I Have Reviewed All Lab Data Listed Above  Cultures:   Results from last 7 days   Lab Units 12/30/22 2118   INFLUENZA A PCR  Negative       Results from last 7 days   Lab Units 12/30/22 2118   SARS-COV-2  Negative   INFLUENZA A PCR  Negative   INFLUENZA B PCR  Negative   RSV PCR  Negative           Lines/Drains:  Invasive Devices     Peripheral Intravenous Line  Duration           Peripheral IV 12/30/22 Left;Ventral (anterior) Forearm 1 day              Telemetry:   Telemetry Orders (From admission, onward)             48 Hour Telemetry Monitoring  Continuous x 48 hours        References:    Telemetry Guidelines   Question:  Reason for 48 Hour Telemetry  Answer:  Acute MI, chest pain - R/O MI, or unstable angina                  Imaging:  Imaging Reports Reviewed Today Include:   XR chest 1 view portable    Result Date: 12/31/2022  Impression: Mild pulmonary venous congestion  Workstation performed: [id number]     CT abdomen pelvis w contrast    Result Date: 12/30/2022  Impression: Mild diffuse thickening of the colon may be due to nonspecific colitis  No focal inflammatory change to suggest diverticulitis   Workstation performed: [id number]       Scheduled Meds:  Current Facility-Administered Medications   Medication Dose Route Frequency Provider Last Rate   • amLODIPine  5 mg Oral Daily Joyce Martinez PA-C     • famotidine  20 mg Oral BID Joyce Martinez PA-C     • gabapentin  300 mg Oral TID Joyce Martinez PA-C     • gabapentin  300 mg Oral HS Joyce Martinez PA-C     • heparin (porcine)  5,000 Units Subcutaneous Q8H [facility] Joyce Martinez PA-C     • latanoprost  1 drop Both Eyes HS Joyce Martinez PA-C     • levothyroxine  75 mcg Oral Early Morning Joyce Martinez PA-C     • pantoprazole  40 mg Oral Early Morning Joyce Potts PA-C         Today, Patient Was Seen By: Bret Bae DO    ** Please Note: Dictation voice to text software may have been used in the creation of this document   **

## 2023-01-02 ENCOUNTER — TELEPHONE (OUTPATIENT)
Dept: FAMILY MEDICINE CLINIC | Facility: CLINIC | Age: 88
End: 2023-01-02

## 2023-01-02 VITALS
BODY MASS INDEX: 28.95 KG/M2 | WEIGHT: 158.29 LBS | HEART RATE: 69 BPM | TEMPERATURE: 97.5 F | SYSTOLIC BLOOD PRESSURE: 136 MMHG | DIASTOLIC BLOOD PRESSURE: 61 MMHG | RESPIRATION RATE: 18 BRPM | OXYGEN SATURATION: 97 %

## 2023-01-02 LAB
ALBUMIN SERPL BCP-MCNC: 2.5 G/DL (ref 3.5–5)
ALP SERPL-CCNC: 91 U/L (ref 46–116)
ALT SERPL W P-5'-P-CCNC: 12 U/L (ref 12–78)
ANION GAP SERPL CALCULATED.3IONS-SCNC: 11 MMOL/L (ref 4–13)
AST SERPL W P-5'-P-CCNC: 17 U/L (ref 5–45)
BILIRUB SERPL-MCNC: 0.18 MG/DL (ref 0.2–1)
BUN SERPL-MCNC: 19 MG/DL (ref 5–25)
CALCIUM ALBUM COR SERPL-MCNC: 9.6 MG/DL (ref 8.3–10.1)
CALCIUM SERPL-MCNC: 8.4 MG/DL (ref 8.3–10.1)
CHLORIDE SERPL-SCNC: 111 MMOL/L (ref 96–108)
CO2 SERPL-SCNC: 21 MMOL/L (ref 21–32)
CREAT SERPL-MCNC: 1.12 MG/DL (ref 0.6–1.3)
ERYTHROCYTE [DISTWIDTH] IN BLOOD BY AUTOMATED COUNT: 13.6 % (ref 11.6–15.1)
GFR SERPL CREATININE-BSD FRML MDRD: 40 ML/MIN/1.73SQ M
GLUCOSE SERPL-MCNC: 176 MG/DL (ref 65–140)
HCT VFR BLD AUTO: 33 % (ref 34.8–46.1)
HGB BLD-MCNC: 10.6 G/DL (ref 11.5–15.4)
MCH RBC QN AUTO: 29.9 PG (ref 26.8–34.3)
MCHC RBC AUTO-ENTMCNC: 32.1 G/DL (ref 31.4–37.4)
MCV RBC AUTO: 93 FL (ref 82–98)
PLATELET # BLD AUTO: 176 THOUSANDS/UL (ref 149–390)
PMV BLD AUTO: 11 FL (ref 8.9–12.7)
POTASSIUM SERPL-SCNC: 3.9 MMOL/L (ref 3.5–5.3)
PROT SERPL-MCNC: 6 G/DL (ref 6.4–8.4)
RBC # BLD AUTO: 3.54 MILLION/UL (ref 3.81–5.12)
SODIUM SERPL-SCNC: 143 MMOL/L (ref 135–147)
WBC # BLD AUTO: 4.48 THOUSAND/UL (ref 4.31–10.16)

## 2023-01-02 RX ORDER — FUROSEMIDE 20 MG/1
TABLET ORAL
Qty: 270 TABLET | Refills: 0
Start: 2023-01-03

## 2023-01-02 RX ADMIN — PANTOPRAZOLE SODIUM 40 MG: 40 TABLET, DELAYED RELEASE ORAL at 06:09

## 2023-01-02 RX ADMIN — HEPARIN SODIUM 5000 UNITS: 5000 INJECTION INTRAVENOUS; SUBCUTANEOUS at 06:09

## 2023-01-02 RX ADMIN — AMLODIPINE BESYLATE 5 MG: 5 TABLET ORAL at 08:13

## 2023-01-02 RX ADMIN — SODIUM CHLORIDE 75 ML/HR: 0.9 INJECTION, SOLUTION INTRAVENOUS at 06:09

## 2023-01-02 RX ADMIN — GABAPENTIN 300 MG: 300 CAPSULE ORAL at 08:13

## 2023-01-02 RX ADMIN — LEVOTHYROXINE SODIUM 75 MCG: 75 TABLET ORAL at 06:09

## 2023-01-02 RX ADMIN — FAMOTIDINE 20 MG: 20 TABLET, FILM COATED ORAL at 08:13

## 2023-01-02 NOTE — DISCHARGE SUMMARY
[facility]  Discharge- Dionisio Roman 9/24/1922, 80 y o  female MRN: [id number]  [facility] [id number] Encounter: [id number]  Primary Care Provider: Daisy Beltran PA-C   Date and time admitted to hospital: 12/30/2022  8:20 PM    * Colitis  Assessment & Plan  · Presentation for abdominal pain and diarrhea found to have mild colitis  · Likely viral  · Stable off antibiotics  · Pain and diarrhea has resolved and she is tolerating full diet    CAIT [facility]acute kidney [facility]) ([facility] )  Assessment & Plan  · CAIT on CKD 3 secondary to diarrhea and home diuretics  · Resolved with IV fluids  · Instructed patient to resume diuretics 1/3/2023    Type 2 diabetes mellitus ([facility] 75 )  Assessment & Plan    Lab Results   Component Value Date    HGBA1C 7 2 (H) 09/28/2022     Results from last 7 days   Lab Units 01/02/23  0449 01/01/23  0516 12/30/22  2118   GLUCOSE RANDOM mg/dL 176* 159* 164*     · Diet controlled  · A1c at goal      Discharging Physician / Practitioner: Zaida Chen DO  PCP: Daisy Beltran PA-C  Admission Date:   Admission Orders (From admission, onward)     Ordered        01/01/23 0925  Inpatient Admission  Once            12/31/22 0122  Place in Observation  Once                      Discharge Date: 01/02/23    Medical Problems     Resolved Problems  Date Reviewed: 1/1/2023   None         Consultations During Hospital Stay: None    Procedures Performed: None    Significant Findings / Test Results:     XR chest 1 view portable    Result Date: 12/31/2022  Impression: Mild pulmonary venous congestion  Workstation performed: [id number]     CT abdomen pelvis w contrast    Result Date: 12/30/2022  Impression: Mild diffuse thickening of the colon may be due to nonspecific colitis  No focal inflammatory change to suggest diverticulitis  Workstation performed: [id number]       Incidental Findings: None    Test Results Pending at Discharge (will require follow up):  None     Outpatient Tests Requested: None    Reason for Admission: Colitis     Hospital Course:     Marcela Barrera is a 80 y o  female With CAD, hyperlipidemia, GERD, type 2 diabetes, CKD, hypertension presented to hospital with epigastric pain and diarrhea  CT with evidence of nonspecific colitis  Patient was admitted and treated with supportive care  Patient developed CAIT and required IV fluids with resolution  Day of discharge patient was without abdominal pain and tolerating full diet  Please see above list of diagnoses and related plan for additional information  Condition at Discharge: stable     Discharge Day Visit / Exam:     Subjective:    Patient seen and evaluated at bedside  She feels well  She is tolerating full diet  Vitals: Blood Pressure: 136/61 (01/02/23 0736)  Pulse: 69 (01/02/23 0736)  Temperature: 97 5 °F (36 4 °C) (01/02/23 0736)  Temp Source: Oral (01/02/23 0736)  Respirations: 18 (01/02/23 0736)  Weight - Scale: 71 8 kg (158 lb 4 6 oz) (12/31/22 1500)  SpO2: 97 % (01/02/23 0736)  Exam:   Physical Exam  Vitals reviewed  Constitutional:       General: She is not in acute distress  Appearance: She is well-developed  She is not ill-appearing, toxic-appearing or diaphoretic  HENT:      Head: Normocephalic and atraumatic  Mouth/Throat:      Mouth: Mucous membranes are moist    Eyes:      General: No scleral icterus  Extraocular Movements: Extraocular movements intact  Cardiovascular:      Rate and Rhythm: Normal rate and regular rhythm  Heart sounds: Normal heart sounds  Pulmonary:      Effort: Pulmonary effort is normal  No respiratory distress  Breath sounds: Normal breath sounds  No wheezing or rales  Abdominal:      General: There is no distension  Palpations: Abdomen is soft  Tenderness: There is no abdominal tenderness  There is no guarding or rebound  Musculoskeletal:         General: No swelling, tenderness or deformity     Skin:     General: Skin is warm and dry  Neurological:      General: No focal deficit present  Mental Status: She is alert  Mental status is at baseline  Psychiatric:         Mood and Affect: Mood normal          Behavior: Behavior normal          Thought Content: Thought content normal          Judgment: Judgment normal            Discharge instructions/Information to patient and family:   See after visit summary for information provided to patient and family  Provisions for Follow-Up Care:  See after visit summary for information related to follow-up care and any pertinent home health orders  Disposition:     Home     Discharge Statement:  I spent 60 minutes discharging the patient  This time was spent on the day of discharge  I had direct contact with the patient on the day of discharge  Greater than 50% of the total time was spent examining patient, answering all patient questions, arranging and discussing plan of care with patient as well as directly providing post-discharge instructions  Additional time then spent on discharge activities  Discharge Medications:  See after visit summary for reconciled discharge medications provided to patient and family        ** Please Note: This note has been constructed using a voice recognition system **

## 2023-01-02 NOTE — PLAN OF CARE
Problem: Prexisting or High Potential for Compromised Skin Integrity  Goal: Skin integrity is maintained or improved  Description: INTERVENTIONS:  - Identify patients at risk for skin breakdown  - Assess and monitor skin integrity  - Assess and monitor nutrition and hydration status  - Monitor labs   - Assess for incontinence   - Turn and reposition patient  - Assist with mobility/ambulation  - Relieve pressure over bony prominences  - Avoid friction and shearing  - Provide appropriate hygiene as needed including keeping skin clean and dry  - Evaluate need for skin moisturizer/barrier cream  - Collaborate with interdisciplinary team   - Patient/family teaching  - Consider wound care consult   Outcome: Progressing     Problem: PAIN - ADULT  Goal: Verbalizes/displays adequate comfort level or baseline comfort level  Description: Interventions:  - Encourage patient to monitor pain and request assistance  - Assess pain using appropriate pain scale  - Administer analgesics based on type and severity of pain and evaluate response  - Implement non-pharmacological measures as appropriate and evaluate response  - Consider cultural and social influences on pain and pain management  - Notify physician/advanced practitioner if interventions unsuccessful or patient reports new pain  Outcome: Progressing     Problem: INFECTION - ADULT  Goal: Absence or prevention of progression during hospitalization  Description: INTERVENTIONS:  - Assess and monitor for signs and symptoms of infection  - Monitor lab/diagnostic results  - Monitor all insertion sites, i e  indwelling lines, tubes, and drains  - Monitor endotracheal if appropriate and nasal secretions for changes in amount and color  - [place] appropriate cooling/warming therapies per order  - Administer medications as ordered  - Instruct and encourage patient and family to use good hand hygiene technique  - Identify and instruct in appropriate isolation precautions for identified infection/condition  Outcome: Progressing     Problem: DISCHARGE PLANNING  Goal: Discharge to home or other facility with appropriate resources  Description: INTERVENTIONS:  - Identify barriers to discharge w/patient and caregiver  - Arrange for needed discharge resources and transportation as appropriate  - Identify discharge learning needs (meds, wound care, etc )  - Arrange for interpretive services to assist at discharge as needed  - Refer to Case Management Department for coordinating discharge planning if the patient needs post-hospital services based on physician/advanced practitioner order or complex needs related to functional status, cognitive ability, or social support system  Outcome: Progressing     Problem: Knowledge Deficit  Goal: Patient/family/caregiver demonstrates understanding of disease process, treatment plan, medications, and discharge instructions  Description: Complete learning assessment and assess knowledge base    Interventions:  - Provide teaching at level of understanding  - Provide teaching via preferred learning methods  Outcome: Progressing     Problem: CARDIOVASCULAR - ADULT  Goal: Maintains optimal cardiac output and hemodynamic stability  Description: INTERVENTIONS:  - Monitor I/O, vital signs and rhythm  - Monitor for S/S and trends of decreased cardiac output  - Administer and titrate ordered vasoactive medications to optimize hemodynamic stability  - Assess quality of pulses, skin color and temperature  - Assess for signs of decreased coronary artery perfusion  - Instruct patient to report change in severity of symptoms  Outcome: Progressing  Goal: Absence of cardiac dysrhythmias or at baseline rhythm  Description: INTERVENTIONS:  - Continuous cardiac monitoring, vital signs, obtain 12 lead EKG if ordered  - Administer antiarrhythmic and heart rate control medications as ordered  - Monitor electrolytes and administer replacement therapy as ordered  Outcome: Progressing     Problem: GASTROINTESTINAL - ADULT  Goal: Minimal or absence of nausea and/or vomiting  Description: INTERVENTIONS:  - Administer IV fluids if ordered to ensure adequate hydration  - Maintain NPO status until nausea and vomiting are resolved  - Nasogastric tube if ordered  - Administer ordered antiemetic medications as needed  - Provide nonpharmacologic comfort measures as appropriate  - Advance diet as tolerated, if ordered  - Consider nutrition services referral to assist patient with adequate nutrition and appropriate food choices  Outcome: Progressing  Goal: Maintains or returns to baseline bowel function  Description: INTERVENTIONS:  - Assess bowel function  - Encourage oral fluids to ensure adequate hydration  - Administer IV fluids if ordered to ensure adequate hydration  - Administer ordered medications as needed  - Encourage mobilization and activity  - Consider nutritional services referral to assist patient with adequate nutrition and appropriate food choices  Outcome: Progressing  Goal: Maintains adequate nutritional intake  Description: INTERVENTIONS:  - Monitor percentage of each meal consumed  - Identify factors contributing to decreased intake, treat as appropriate  - Assist with meals as needed  - Monitor I&O, weight, and lab values if indicated  - Obtain nutrition services referral as needed  Outcome: Progressing  Goal: Oral mucous membranes remain intact  Description: INTERVENTIONS  - Assess oral mucosa and hygiene practices  - Implement preventative oral hygiene regimen  - Implement oral medicated treatments as ordered  - Initiate Nutrition services referral as needed  Outcome: Progressing     Problem: MOBILITY - ADULT  Goal: Maintain or return to baseline ADL function  Description: INTERVENTIONS:  -  Assess patient's ability to carry out ADLs; assess patient's baseline for ADL function and identify physical deficits which impact ability to perform ADLs (bathing, care of mouth/teeth, toileting, grooming, dressing, etc )  - Assess/evaluate cause of self-care deficits   - Assess range of motion  - Assess patient's mobility; develop plan if impaired  - Assess patient's need for assistive devices and provide as appropriate  - Encourage maximum independence but intervene and supervise when necessary  - Involve family in performance of ADLs  - Assess for home care needs following discharge   - Consider OT consult to assist with ADL evaluation and planning for discharge  - Provide patient education as appropriate  Outcome: Progressing  Goal: Maintains/Returns to pre admission functional level  Description: INTERVENTIONS:  - Perform BMAT or MOVE assessment daily    - Set and communicate daily mobility goal to care team and patient/family/caregiver  - Collaborate with rehabilitation services on mobility goals if consulted  - Perform Range of Motion  times a day  - Reposition patient every hours    - Dangle patient  times a day  - Stand patient  times a day  - Ambulate patient  times a day  - Out of bed to chair  times a day   - Out of bed for meals  times a day  - Out of bed for toileting  - Record patient progress and toleration of activity level   Outcome: Progressing     Problem: Potential for Falls  Goal: Patient will remain free of falls  Description: INTERVENTIONS:  - Educate patient/family on patient safety including physical limitations  - Instruct patient to call for assistance with activity   - Consult OT/PT to assist with strengthening/mobility   - Keep Call bell within reach  - Keep bed low and locked with side rails adjusted as appropriate  - Keep care items and personal belongings within reach  - Initiate and maintain comfort rounds  - Make Fall Risk Sign visible to staff  - Offer Toileting every  Hours, in advance of need  - Initiate/Maintain alarm  - Obtain necessary fall risk management equipment:   - Apply yellow socks and bracelet for high fall risk patients  - Consider moving patient to room near nurses station  Outcome: Progressing

## 2023-01-02 NOTE — PLAN OF CARE
Problem: Prexisting or High Potential for Compromised Skin Integrity  Goal: Skin integrity is maintained or improved  Description: INTERVENTIONS:  - Identify patients at risk for skin breakdown  - Assess and monitor skin integrity  - Assess and monitor nutrition and hydration status  - Monitor labs   - Assess for incontinence   - Turn and reposition patient  - Assist with mobility/ambulation  - Relieve pressure over bony prominences  - Avoid friction and shearing  - Provide appropriate hygiene as needed including keeping skin clean and dry  - Evaluate need for skin moisturizer/barrier cream  - Collaborate with interdisciplinary team   - Patient/family teaching  - Consider wound care consult   Outcome: Progressing     Problem: PAIN - ADULT  Goal: Verbalizes/displays adequate comfort level or baseline comfort level  Description: Interventions:  - Encourage patient to monitor pain and request assistance  - Assess pain using appropriate pain scale  - Administer analgesics based on type and severity of pain and evaluate response  - Implement non-pharmacological measures as appropriate and evaluate response  - Consider cultural and social influences on pain and pain management  - Notify physician/advanced practitioner if interventions unsuccessful or patient reports new pain  Outcome: Progressing     Problem: INFECTION - ADULT  Goal: Absence or prevention of progression during hospitalization  Description: INTERVENTIONS:  - Assess and monitor for signs and symptoms of infection  - Monitor lab/diagnostic results  - Monitor all insertion sites, i e  indwelling lines, tubes, and drains  - Monitor endotracheal if appropriate and nasal secretions for changes in amount and color  - Hankamer appropriate cooling/warming therapies per order  - Administer medications as ordered  - Instruct and encourage patient and family to use good hand hygiene technique  - Identify and instruct in appropriate isolation precautions for identified infection/condition  Outcome: Progressing     Problem: DISCHARGE PLANNING  Goal: Discharge to home or other facility with appropriate resources  Description: INTERVENTIONS:  - Identify barriers to discharge w/patient and caregiver  - Arrange for needed discharge resources and transportation as appropriate  - Identify discharge learning needs (meds, wound care, etc )  - Arrange for interpretive services to assist at discharge as needed  - Refer to Case Management Department for coordinating discharge planning if the patient needs post-hospital services based on physician/advanced practitioner order or complex needs related to functional status, cognitive ability, or social support system  Outcome: Progressing     Problem: Knowledge Deficit  Goal: Patient/family/caregiver demonstrates understanding of disease process, treatment plan, medications, and discharge instructions  Description: Complete learning assessment and assess knowledge base    Interventions:  - Provide teaching at level of understanding  - Provide teaching via preferred learning methods  Outcome: Progressing     Problem: CARDIOVASCULAR - ADULT  Goal: Maintains optimal cardiac output and hemodynamic stability  Description: INTERVENTIONS:  - Monitor I/O, vital signs and rhythm  - Monitor for S/S and trends of decreased cardiac output  - Administer and titrate ordered vasoactive medications to optimize hemodynamic stability  - Assess quality of pulses, skin color and temperature  - Assess for signs of decreased coronary artery perfusion  - Instruct patient to report change in severity of symptoms  Outcome: Progressing  Goal: Absence of cardiac dysrhythmias or at baseline rhythm  Description: INTERVENTIONS:  - Continuous cardiac monitoring, vital signs, obtain 12 lead EKG if ordered  - Administer antiarrhythmic and heart rate control medications as ordered  - Monitor electrolytes and administer replacement therapy as ordered  Outcome: Progressing     Problem: GASTROINTESTINAL - ADULT  Goal: Minimal or absence of nausea and/or vomiting  Description: INTERVENTIONS:  - Administer IV fluids if ordered to ensure adequate hydration  - Maintain NPO status until nausea and vomiting are resolved  - Nasogastric tube if ordered  - Administer ordered antiemetic medications as needed  - Provide nonpharmacologic comfort measures as appropriate  - Advance diet as tolerated, if ordered  - Consider nutrition services referral to assist patient with adequate nutrition and appropriate food choices  Outcome: Progressing  Goal: Maintains or returns to baseline bowel function  Description: INTERVENTIONS:  - Assess bowel function  - Encourage oral fluids to ensure adequate hydration  - Administer IV fluids if ordered to ensure adequate hydration  - Administer ordered medications as needed  - Encourage mobilization and activity  - Consider nutritional services referral to assist patient with adequate nutrition and appropriate food choices  Outcome: Progressing  Goal: Maintains adequate nutritional intake  Description: INTERVENTIONS:  - Monitor percentage of each meal consumed  - Identify factors contributing to decreased intake, treat as appropriate  - Assist with meals as needed  - Monitor I&O, weight, and lab values if indicated  - Obtain nutrition services referral as needed  Outcome: Progressing  Goal: Oral mucous membranes remain intact  Description: INTERVENTIONS  - Assess oral mucosa and hygiene practices  - Implement preventative oral hygiene regimen  - Implement oral medicated treatments as ordered  - Initiate Nutrition services referral as needed  Outcome: Progressing     Problem: MOBILITY - ADULT  Goal: Maintain or return to baseline ADL function  Description: INTERVENTIONS:  -  Assess patient's ability to carry out ADLs; assess patient's baseline for ADL function and identify physical deficits which impact ability to perform ADLs (bathing, care of mouth/teeth, toileting, grooming, dressing, etc )  - Assess/evaluate cause of self-care deficits   - Assess range of motion  - Assess patient's mobility; develop plan if impaired  - Assess patient's need for assistive devices and provide as appropriate  - Encourage maximum independence but intervene and supervise when necessary  - Involve family in performance of ADLs  - Assess for home care needs following discharge   - Consider OT consult to assist with ADL evaluation and planning for discharge  - Provide patient education as appropriate  Outcome: Progressing  Goal: Maintains/Returns to pre admission functional level  Description: INTERVENTIONS:  - Perform BMAT or MOVE assessment daily    - Set and communicate daily mobility goal to care team and patient/family/caregiver  - Collaborate with rehabilitation services on mobility goals if consulted  - Perform Range of Motion  times a day  - Reposition patient every  hours    - Dangle patient  times a day  - Stand patient times a day  - Ambulate patient  times a day  - Out of bed to chair  times a day   - Out of bed for meals  times a day  - Out of bed for toileting  - Record patient progress and toleration of activity level   Outcome: Progressing

## 2023-01-02 NOTE — ASSESSMENT & PLAN NOTE
Lab Results   Component Value Date    HGBA1C 7 2 (H) 09/28/2022     Results from last 7 days   Lab Units 01/02/23  0449 01/01/23  0516 12/30/22  2118   GLUCOSE RANDOM mg/dL 176* 159* 164*     · Diet controlled  · A1c at goal

## 2023-01-02 NOTE — ASSESSMENT & PLAN NOTE
· Presentation for abdominal pain and diarrhea found to have mild colitis  · Likely viral  · Stable off antibiotics  · Pain and diarrhea has resolved and she is tolerating full diet

## 2023-01-02 NOTE — ASSESSMENT & PLAN NOTE
· CAIT on CKD 3 secondary to diarrhea and home diuretics  · Resolved with IV fluids  · Instructed patient to resume diuretics 1/3/2023

## 2023-01-02 NOTE — TELEPHONE ENCOUNTER
----- Message from Estelita Scott DO sent at 1/2/2023  9:38 AM EST -----  Thank you for allowing us to participate in the care of your patient, Ayden Rome, who was hospitalized from 12/30/2022 through 1/2/2023 with the admitting diagnosis of colitis  Symptoms improved with supportive care  If you have any additional questions or would like to discuss further, please feel free to contact me      Osmin Coker [facility] Internal [facility], Hospitalist  [phone]

## 2023-01-02 NOTE — UTILIZATION REVIEW
Continued Stay Review    Date: 01/02/2022 Day 2 IP                      Current Patient Class: IP Current Level of Care: MS    HPI:89 y o  female initially admitted on 01/01 IP    Assessment/Plan: pt's CAIT resolved w/ IVF  Pain and diarrhea resolved  Tolerating diet  On exam, abd soft, nd, nt  Stable off abx  Resume home diuretics tomorrow       Vital Signs: /61 (BP Location: Right arm)   Pulse 69   Temp 97 5 °F (36 4 °C) (Oral)   Resp 18   Wt 71 8 kg (158 lb 4 6 oz)   SpO2 97%   BMI 28 95 kg/m²       Pertinent Labs/Diagnostic Results:   Results from last 7 days   Lab Units 12/30/22 2118   SARS-COV-2  Negative     Results from last 7 days   Lab Units 01/02/23 0449 01/01/23  0516 12/31/22  0542 12/30/22 2118   WBC Thousand/uL 4 48 5 29  --  5 16   HEMOGLOBIN g/dL 10 6* 11 2*  --  12 5   HEMATOCRIT % 33 0* 34 8  --  38 5   PLATELETS Thousands/uL 176 190 179 197   NEUTROS ABS Thousands/µL  --   --   --  3 38         Results from last 7 days   Lab Units 01/02/23 0449 01/01/23  0516 12/30/22 2118   SODIUM mmol/L 143 142 139   POTASSIUM mmol/L 3 9 4 7 4 0   CHLORIDE mmol/L 111* 109* 104   CO2 mmol/L 21 18* 22   ANION GAP mmol/L 11 15* 13   BUN mg/dL 19 21 15   CREATININE mg/dL 1 12 1 64* 0 96   EGFR ml/min/1 73sq m 40 25 48   CALCIUM mg/dL 8 4 9 1 9 3     Results from last 7 days   Lab Units 01/02/23 0449 01/01/23  0516 12/30/22 2118   AST U/L 17 37 18   ALT U/L 12 9* 14   ALK PHOS U/L 91 93 112   TOTAL PROTEIN g/dL 6 0* 6 4 7 4   ALBUMIN g/dL 2 5* 2 7* 3 2*   TOTAL BILIRUBIN mg/dL 0 18* 0 18* 0 36         Results from last 7 days   Lab Units 01/02/23 0449 01/01/23  0516 12/30/22 2118   GLUCOSE RANDOM mg/dL 176* 159* 164*           Results from last 7 days   Lab Units 12/31/22  0310 12/31/22  0045 12/30/22  2258   HS TNI 0HR ng/L  --   --  20   HS TNI 2HR ng/L  --  73*  --    HSTNI D2 ng/L  --  53*  --    HS TNI 4HR ng/L 87*  --   --    HSTNI D4 ng/L 67*  --   --            Results from last 7 days Lab Units 12/30/22  2118   LIPASE u/L 103       Results from last 7 days   Lab Units 12/30/22  2344   CLARITY UA  Clear   COLOR UA  Yellow   SPEC GRAV UA  1 010   PH UA  6 5   GLUCOSE UA mg/dl Negative   KETONES UA mg/dl Negative   BLOOD UA  Negative   PROTEIN UA mg/dl Negative   NITRITE UA  Negative   BILIRUBIN UA  Negative   UROBILINOGEN UA E U /dl 0 2   LEUKOCYTES UA  Negative     Results from last 7 days   Lab Units 12/30/22  2118   INFLUENZA A PCR  Negative   INFLUENZA B PCR  Negative   RSV PCR  Negative         Medications:   Scheduled Medications:  amLODIPine, 5 mg, Oral, Daily  famotidine, 20 mg, Oral, BID  gabapentin, 300 mg, Oral, TID  gabapentin, 300 mg, Oral, HS  heparin (porcine), 5,000 Units, Subcutaneous, Q8H JONATAN  latanoprost, 1 drop, Both Eyes, HS  levothyroxine, 75 mcg, Oral, Early Morning  pantoprazole, 40 mg, Oral, Early Morning      Continuous IV Infusions:  sodium chloride, 75 mL/hr, Intravenous, Continuous      PRN Meds:       Discharge Plan: TBD    Network Utilization Review Department  ATTENTION: Please call with any questions or concerns to [phone] and carefully listen to the prompts so that you are directed to the right person  All voicemails are confidential   Josselin Patterson all requests for admission clinical reviews, approved or denied determinations and any other requests to dedicated fax number below belonging to the campus where the patient is receiving treatment   List of dedicated fax numbers for the Facilities:  [address] DENIALS (Administrative/Medical Necessity) [phone]   [address] (Maternity/NICU/Pediatrics) [phone]   [address] Larios [phone]   [facility]  [phone]   [facility] 150 [facility] [place] [address] [place] [address] [place] [phone]   [address] [place] [address]   [address] [place] [address] [address] [facility] 134 815 [facility] [phone]

## 2023-01-02 NOTE — DISCHARGE INSTR - AVS FIRST PAGE
Dear Martin Kaiser,     It was our pleasure to care for you here at [facility]  It is our hope that we were always able to exceed the expected standards for your care during your stay  You were hospitalized due to colitis and elevated creatinine  You were cared for on the 2ndfloor under the service of Chico Son DO with the [facility] who covers for your primary care physician (PCP), Fabiola Cordero PA-C, while you were hospitalized  If you have any questions or concerns related to this hospitalization, you may contact us at [phone]  For follow up as well as medication refills, we recommend that you follow up with your primary care physician  A registered nurse will reach out to you by phone within a few days after your discharge to answer any additional questions that you may have after going home  However, at this time we provide for you here, the most important instructions / recommendations at discharge:     Notable Medication Adjustments -   Continue to hold your Lasix today 1/2  Resume Lasix as previously prescribed 1/3  Resume all other medications as previously prescribed  Testing Required after Discharge -   None  Important Follow Up Information -   Please see your primary care provider in 1 to 2 weeks for hospital follow-up  Please review this entire after visit summary as additional general instructions including medication list, appointments, activity, diet, any pertinent wound care, and other additional recommendations from your care team that may be provided for you        Sincerely,     Chico Son DO

## 2023-01-03 NOTE — UTILIZATION REVIEW
NOTIFICATION OF INPATIENT ADMISSION   AUTHORIZATION REQUEST   SERVICING FACILITY:   [address] [place] [address], [address]   Tax ID: [id number]  NPI: [id number] ATTENDING PROVIDER:  Attending Name and NPI#: Yana Lopez [[id number]]  Address: [address] [place] [address], [address] [facility]  Phone: [phone]     ADMISSION INFORMATION:  Place of Service: Inpatient [facility]  [address]  Place of Service Code: 21  Inpatient Admission Date/Time: 1/1/23  9:25 AM  Discharge Date/Time: 1/2/2023  1:36 PM  Admitting Diagnosis Code/Description:  Diarrhea [R19 7]  Chest pain [R07 9]  Abdominal pain [R10 9]     UTILIZATION REVIEW CONTACT:  Minnie Arreola Utilization   Network Utilization Review Department  Phone: [phone]  Fax: [phone]  Email: [email or web address]  Contact for approvals/pending authorizations, clinical reviews, and discharge  PHYSICIAN ADVISORY SERVICES:  Medical Necessity Denial & Nkei-jq-Tr[employer] Review  Phone: [phone]  Fax: [phone]  Email: [email or web address]          Marsha Jackson RN  Registered Nurse  Specialty:  Utilization Review  Utilization Review     Addendum  Date of Service:  1/1/2023  9:24 AM     Expand All Collapse All  Initial Clinical Review     Pt initially admitted as Observation on 12/31/22  Changed to Inpatient on 1/1/23   Pt requiring continued stay d/t CAIT requiring IVF and ongoing monitoring         Admission: Date/Time/Statement:       Admission Orders (From admission, onward)       Ordered         01/01/23 0925   Inpatient Admission  Once             12/31/22 0122   Place in Observation  Once                         Orders Placed This Encounter   Procedures   • Inpatient Admission       Standing Status:   Standing       Number of Occurrences:   1       Order Specific Question:   Level of Care       Answer:   Med Surg [16]       Order Specific Question:   Estimated length of stay       Answer:   More than 2 Midnights       Order Specific Question:   Certification       Answer:   I certify that inpatient services are medically necessary for this patient for a duration of greater than two midnights  See H&P and MD Progress Notes for additional information about the patient's course of treatment                ED Arrival Information               Expected   -    Arrival   12/30/2022 20:20    Acuity   Urgent              Means of arrival   Ambulance    Escorted by   [facility] ([address])    Service   Hospitalist    Admission type   Emergency              Arrival complaint   abdominal pain                  Chief Complaint   Patient presents with   • Flu Symptoms       Pt c/o body aches, diarrhea since this morning           Initial Presentation: 80 y o  female who presented by EMS to [address] [address] ED  Admitted in observation status for evaluation and treatment of epigastric pain  PMHx: Asthma, CAD, CHF, T2DM, Glaucoma, HTN, HLD, hypothyroidism  Presented w/ chest pain, nausea, loose non-bloody stool  On exam, chest wall tenderness  CT showed possible colitis  Plan: trend EKG, trops, supportive care, monitor off ABX, Trend labs, replete electrolytes as needed; continue PTA meds          01/01/23 Changed to Inpatient Status: Pt w/ resolved diarrhea, tolerated PO diet  CAIT, Crt 1 64 today  On exam, murmur, decreased breath sounds   Plan: IVF, Trend labs, replete electrolytes as needed; continue PTA meds, supportive care             ED Triage Vitals   Temperature Pulse Respirations Blood Pressure SpO2   12/30/22 2047 12/30/22 2022 12/30/22 2022 12/30/22 2026 12/30/22 2022   97 5 °F (36 4 °C) 91 20 156/67 97 %       Temp Source Heart Rate Source Patient Position - Orthostatic VS BP Location FiO2 (%)   12/30/22 2047 12/30/22 2022 12/30/22 2022 12/30/22 2022 --   Oral Monitor Lying Right arm         Pain Score           12/31/22 0911           6                  Wt Readings from Last 1 Encounters:   12/31/22 71 8 kg (158 lb 4 6 oz)      Additional Vital Signs:   Date/Time Temp Pulse Resp BP MAP (mmHg) SpO2 O2 Device   01/01/23 07:52:21 98 °F (36 7 °C) 68 18 127/61 83 95 % --   01/01/23 03:21:25 97 2 °F (36 2 °C)   Abnormal  62 20 127/59 82 94 % None (Room air)   12/31/22 22:47:58 97 1 °F (36 2 °C)   Abnormal  68 20 99/55 70 95 % None (Room air)   12/31/22 19:37:31 97 4 °F (36 3 °C)   Abnormal  78 20 102/52 69 94 % None (Room air)   12/31/22 15:26:38 -- 96 -- -- -- 97 % None (Room air)   12/31/22 1449 -- 77 18 110/55 79 98 % None (Room air)   12/31/22 1245 -- 62 17 135/60 86 95 % None (Room air)   12/31/22 1045 -- 68 18 131/60 86 96 % None (Room air)      Date/Time Pulse Resp BP MAP (mmHg) SpO2 O2 Device   12/31/22 0911 75 18 156/94 -- 96 % None (Room air)   12/31/22 0909 -- -- 156/94 -- -- --   12/31/22 0645 70 19 151/66 95 95 % None (Room air)   12/31/22 0545 76 17 152/70 100 94 % None (Room air)   12/31/22 0445 74 18 150/69 99 -- --   12/31/22 0345 76 15 168/77 110 91 % None (Room air)   12/31/22 0245 74 17 170/72 103 95 % None (Room air)   12/31/22 0145 80 22 182/102 Abnormal  130 94 % None (Room air)   12/30/22 2257 83 18 155/67 -- 92 % None (Room air)      Pertinent Labs/Diagnostic Test Results:   XR chest 1 view portable   Final Result by Cele Ortiz MD (12/31 1224)       Mild pulmonary venous congestion                        Workstation performed: [id number]           CT abdomen pelvis w contrast   Final Result by Savana Buckley MD (12/30 2244)       Mild diffuse thickening of the colon may be due to nonspecific colitis  No focal inflammatory change to suggest diverticulitis                 Workstation performed: [id number]                   Results from last 7 days   Lab Units 12/30/22 2118   SARS-COV-2   Negative             Results from last 7 days   Lab Units 01/01/23  0516 12/31/22  0542 12/30/22  2118   WBC Thousand/uL 5 29  --  5 16   HEMOGLOBIN g/dL 11 2*  --  12 5 HEMATOCRIT % 34 8  --  38 5   PLATELETS Thousands/uL 190 179 197   NEUTROS ABS Thousands/µL  --   --  3 38                Results from last 7 days   Lab Units 01/01/23  0516 12/30/22  2118   SODIUM mmol/L 142 139   POTASSIUM mmol/L 4 7 4 0   CHLORIDE mmol/L 109* 104   CO2 mmol/L 18* 22   ANION GAP mmol/L 15* 13   BUN mg/dL 21 15   CREATININE mg/dL 1 64* 0 96   EGFR ml/min/1 73sq m 25 48   CALCIUM mg/dL 9 1 9 3            Results from last 7 days   Lab Units 01/01/23  0516 12/30/22  2118   AST U/L 37 18   ALT U/L 9* 14   ALK PHOS U/L 93 112   TOTAL PROTEIN g/dL 6 4 7 4   ALBUMIN g/dL 2 7* 3 2*   TOTAL BILIRUBIN mg/dL 0 18* 0 36                Results from last 7 days   Lab Units 01/01/23  0516 12/30/22  2118   GLUCOSE RANDOM mg/dL 159* 164*             Results from last 7 days   Lab Units 12/31/22  0310 12/31/22  0045 12/30/22  2258   HS TNI 0HR ng/L  --   --  20   HS TNI 2HR ng/L  --  73*  --    HSTNI D2 ng/L  --  53*  --    HS TNI 4HR ng/L 87*  --   --    HSTNI D4 ng/L 67*  --   --            Results from last 7 days   Lab Units 12/30/22  2118   LIPASE u/L 103           Results from last 7 days   Lab Units 12/30/22  2344   CLARITY UA   Clear   COLOR UA   Yellow   SPEC GRAV UA   1 010   PH UA   6 5   GLUCOSE UA mg/dl Negative   KETONES UA mg/dl Negative   BLOOD UA   Negative   PROTEIN UA mg/dl Negative   NITRITE UA   Negative   BILIRUBIN UA   Negative   UROBILINOGEN UA E U /dl 0 2   LEUKOCYTES UA   Negative           Results from last 7 days   Lab Units 12/30/22 2118   INFLUENZA A PCR   Negative   INFLUENZA B PCR   Negative   RSV PCR   Negative            ED Treatment:             Medication Administration from 12/30/2022 2019 to 12/31/2022 0924        Date/Time Order Dose Route Action       12/30/2022 2122 EST sodium chloride 0 9 % bolus 500 mL 500 mL Intravenous New Bag       12/30/2022 2158 EST iohexol (OMNIPAQUE) 350 MG/ML injection (SINGLE-DOSE) 100 mL 100 mL Intravenous Given       12/31/2022 0909 EST amLODIPine (NORVASC) tablet 5 mg 5 mg Oral Given       12/31/2022 0909 EST famotidine (PEPCID) tablet 20 mg 20 mg Oral Given       12/31/2022 0909 EST gabapentin (NEURONTIN) capsule 300 mg 300 mg Oral Given       12/31/2022 0542 EST levothyroxine tablet 75 mcg 75 mcg Oral Given       12/31/2022 0542 EST pantoprazole (PROTONIX) EC tablet 40 mg 40 mg Oral Given       12/31/2022 0542 EST heparin (porcine) subcutaneous injection 5,000 Units 5,000 Units Subcutaneous Given          Medical History        Past Medical History:   Diagnosis Date   • Asthma     • Atypical chest pain     • CAD (coronary artery disease)     • Candidal intertrigo     • CHF (congestive heart failure) ([facility])     • Depression     • Diabetes mellitus ([facility] )     • Diabetic neuropathy ([facility])     • Diverticulitis     • Dyslipidemia     • Female bladder prolapse     • Gastric ulcer     • Glaucoma     • HTN (hypertension)     • Hyperlipidemia     • Hypothyroidism 4/18/2016   • RAD (reactive airway disease)           Present on Admission:  • Arteriosclerosis of coronary artery  • Epigastric pain  • Essential hypertension  • Hyperlipidemia  • GERD (gastroesophageal reflux disease)  • Type 2 diabetes mellitus ([facility])  • Aortic stenosis  • CKD (chronic kidney disease)        Admitting Diagnosis: Diarrhea [R19 7]  Chest pain [R07 9]  Abdominal pain [R10 9]  Age/Sex: 80 y o  female  Admission Orders:  Clear Liquid Diet  Telemetry         SCDs      Scheduled Medications:  amLODIPine, 5 mg, Oral, Daily  famotidine, 20 mg, Oral, BID  gabapentin, 300 mg, Oral, TID  gabapentin, 300 mg, Oral, HS  heparin (porcine), 5,000 Units, Subcutaneous, Q8H JONATAN  latanoprost, 1 drop, Both Eyes, HS  levothyroxine, 75 mcg, Oral, Early Morning  pantoprazole, 40 mg, Oral, Early Morning     Continuous IV Infusions: none     PRN Meds: none        Network Utilization Review Department  ATTENTION: Please call with any questions or concerns to [phone] and carefully listen to the prompts so that you are directed to the right person  All voicemails are confidential   Jonathan Miller all requests for admission clinical reviews, approved or denied determinations and any other requests to dedicated fax number below belonging to the campus where the patient is receiving treatment   List of dedicated fax numbers for the Facilities:  [address] DENIALS (Administrative/Medical Necessity) [phone]   [address] (Maternity/NICU/Pediatrics) [phone]   1 Tracy Larios [phone]   [facility] [address] [phone]   [facility] [address] Ren Palacio  [phone]   [address] [place] [address] [phone]                    Revision History

## 2023-01-04 ENCOUNTER — TRANSITIONAL CARE MANAGEMENT (OUTPATIENT)
Dept: FAMILY MEDICINE CLINIC | Facility: CLINIC | Age: 88
End: 2023-01-04

## 2023-01-06 ENCOUNTER — OFFICE VISIT (OUTPATIENT)
Dept: FAMILY MEDICINE CLINIC | Facility: CLINIC | Age: 88
End: 2023-01-06

## 2023-01-06 VITALS
WEIGHT: 161.2 LBS | RESPIRATION RATE: 14 BRPM | HEART RATE: 67 BPM | DIASTOLIC BLOOD PRESSURE: 74 MMHG | HEIGHT: 62 IN | BODY MASS INDEX: 29.66 KG/M2 | TEMPERATURE: 97.1 F | SYSTOLIC BLOOD PRESSURE: 124 MMHG

## 2023-01-06 DIAGNOSIS — N18.32 TYPE 2 DIABETES MELLITUS WITH STAGE 3B CHRONIC KIDNEY DISEASE, WITHOUT LONG-TERM CURRENT USE OF INSULIN (HCC): ICD-10-CM

## 2023-01-06 DIAGNOSIS — E11.22 TYPE 2 DIABETES MELLITUS WITH STAGE 3B CHRONIC KIDNEY DISEASE, WITHOUT LONG-TERM CURRENT USE OF INSULIN (HCC): Primary | ICD-10-CM

## 2023-01-06 DIAGNOSIS — K52.9 COLITIS: ICD-10-CM

## 2023-01-06 DIAGNOSIS — I25.119 ATHEROSCLEROSIS OF NATIVE CORONARY ARTERY OF NATIVE HEART WITH ANGINA PECTORIS (HCC): ICD-10-CM

## 2023-01-06 DIAGNOSIS — I73.9 PERIPHERAL VASCULAR DISEASE (HCC): ICD-10-CM

## 2023-01-06 DIAGNOSIS — E11.22 TYPE 2 DIABETES MELLITUS WITH STAGE 3B CHRONIC KIDNEY DISEASE, WITHOUT LONG-TERM CURRENT USE OF INSULIN (HCC): ICD-10-CM

## 2023-01-06 DIAGNOSIS — I50.9 ACUTE CONGESTIVE HEART FAILURE, UNSPECIFIED HEART FAILURE TYPE (HCC): ICD-10-CM

## 2023-01-06 DIAGNOSIS — H91.93 BILATERAL HEARING LOSS, UNSPECIFIED HEARING LOSS TYPE: ICD-10-CM

## 2023-01-06 DIAGNOSIS — N17.9 AKI (ACUTE KIDNEY INJURY) (HCC): Primary | ICD-10-CM

## 2023-01-06 DIAGNOSIS — N18.32 TYPE 2 DIABETES MELLITUS WITH STAGE 3B CHRONIC KIDNEY DISEASE, WITHOUT LONG-TERM CURRENT USE OF INSULIN (HCC): Primary | ICD-10-CM

## 2023-01-06 DIAGNOSIS — I10 ESSENTIAL HYPERTENSION: Chronic | ICD-10-CM

## 2023-01-06 RX ORDER — SULFASALAZINE 500 MG/1
500 TABLET, DELAYED RELEASE ORAL 2 TIMES DAILY
Qty: 60 TABLET | Refills: 1 | Status: SHIPPED | OUTPATIENT
Start: 2023-01-06

## 2023-01-06 NOTE — ASSESSMENT & PLAN NOTE
Patient does have some hearing loss issues that continue to be a problem  When I speak with her without a mask, she understands quite well  With a mask it is quite difficult for her to understand  Will continue to observe  Has had hearing aids, but does not use them

## 2023-01-06 NOTE — ASSESSMENT & PLAN NOTE
Lab Results   Component Value Date    HGBA1C 7 2 (H) 09/28/2022   Stable at the moment  Patient will be due for labs in the future

## 2023-01-06 NOTE — PROGRESS NOTES
Assessment & Plan     1  CAIT (acute kidney injury) [facility])  Assessment & Plan:  Patient does have acute kidney injury that was noted in the hospital   Believe this diagnosis on the chart for about 6 months  She likely does have CKD, with slightly decreased GFR overall, i e  her baseline is lower  For now, maintain hydration and blood pressure control  2  Colitis  Assessment & Plan:  Patient does have colitis, but it was listed as likely being viral rather than any other concern  Would recommend consider inflammatory bowel disease, and therefore would recommend GI  She is currently having more discomfort again, so I do feel that seeing GI would be appropriate  Based on that, would recommend start with sulfasalazine to reduce inflammation  Recommendation to start is 3 to 4 g 3 times daily, but would prefer 1 g twice daily, and then increase slowly  This should help reduce some inflammation, and therefore if it is the cause of her pain would improve that somewhat  Patient is quite hesitant to have a colonoscopy, so I will defer that discussion further to GI  Orders:  -     sulfaSALAzine (AZULFIDINE-EN) 500 mg EC tablet; Take 1 tablet (500 mg total) by mouth 2 (two) times a day  -     Ambulatory Referral to Gastroenterology; Future    3  Essential hypertension  Assessment & Plan:  Blood pressure is doing reasonably well  No change  4  Bilateral hearing loss, unspecified hearing loss type  Assessment & Plan:  Patient does have some hearing loss issues that continue to be a problem  When I speak with her without a mask, she understands quite well  With a mask it is quite difficult for her to understand  Will continue to observe  Has had hearing aids, but does not use them        5  Type 2 diabetes mellitus with stage 3b chronic kidney disease, without long-term current use of insulin ([facility])  Assessment & Plan:    Lab Results   Component Value Date    HGBA1C 7 2 (H) 09/28/2022   Stable at the moment  Patient will be due for labs in the future  6  Acute congestive heart failure, unspecified heart failure type [facility])  Assessment & Plan:  Wt Readings from Last 3 Encounters:   01/06/23 73 1 kg (161 lb 3 2 oz)   12/31/22 71 8 kg (158 lb 4 6 oz)   12/20/22 72 6 kg (160 lb)     Patient does not currently have acute heart failure  7  Peripheral vascular disease ([address] )  Assessment & Plan:  No concerns at the moment  8  Atherosclerosis of native coronary artery of native heart with angina pectoris [facility])         Subjective     Transitional Care Management Review:   Mary Chris is a 80 y o  female here for TCM follow up  During the TCM phone call patient stated:  TCM Call     Date and time call was made  1/4/2023 11:38 AM    Hospital care reviewed  Records reviewed    Patient was hospitialized at  [facility] - CLOS[facility]    Date of Admission  12/30/22    Date of discharge  01/02/23    Diagnosis  Chest pain, Colitis    Disposition  Home    Were the patients medications reviewed and updated  No    Current Symptoms  None      TCM Call     Post hospital issues  None    Should patient be enrolled in anticoag monitoring? No    Scheduled for follow up? Yes    Patients specialists  Cardiologist    Cardiologist name  Dr Delilah Smith    Did you obtain your prescribed medications  Yes    Do you need help managing your prescriptions or medications  No    Is transportation to your appointment needed  No    I have advised the patient to call PCP with any new or worsening symptoms  Justine Ames, Practice Administrator    Living  [place]    The type of support provided  Physical; Emotional    Do you have social support  Yes, as much as I need    Are you recieving any outpatient services  No    Are you recieving home care services  Yes    Types of home care services  Nurse visit        Patient is being seen for transition of care    She was recently in the hospital   She is now discharged  She had minor dehydration, as well as colitis, which was felt to be viral   Nothing specific with this, other than IV fluids  She also had some dehydration based on her oral diuretics  She had improved with pain by discharge  The pain that she initiall had was gone on discharge, but back now  No diarrhea, though  Review of Systems   Constitutional: Negative for chills and fever  HENT: Negative for ear pain and sore throat  Eyes: Negative for pain and visual disturbance  Respiratory: Negative for cough and shortness of breath  Cardiovascular: Negative for chest pain and palpitations  Gastrointestinal: Negative for abdominal pain and vomiting  Genitourinary: Negative for dysuria and hematuria  Musculoskeletal: Positive for back pain (waist)  Negative for arthralgias  Skin: Negative for color change and rash  Neurological: Negative for seizures and syncope  All other systems reviewed and are negative        Objective     /74 (BP Location: Left arm, Patient Position: Sitting, Cuff Size: Adult)   Pulse 67   Temp (!) 97 1 °F (36 2 °C) (Temporal)   Resp 14   Ht 5' 2" (1 575 m)   Wt 73 1 kg (161 lb 3 2 oz)   BMI 29 48 kg/m²      Physical Exam  Medications have been reviewed by provider in current encounter    Navin Haddad MD

## 2023-01-06 NOTE — PATIENT INSTRUCTIONS
1  CAIT (acute kidney injury) [facility])  Assessment & Plan:  Patient does have acute kidney injury that was noted in the hospital   Believe this diagnosis on the chart for about 6 months  She likely does have CKD, with slightly decreased GFR overall, i e  her baseline is lower  For now, maintain hydration and blood pressure control  2  Colitis  Assessment & Plan:  Patient does have colitis, but it was listed as likely being viral rather than any other concern  Would recommend consider inflammatory bowel disease, and therefore would recommend GI  She is currently having more discomfort again, so I do feel that seeing GI would be appropriate  Based on that, would recommend start with sulfasalazine to reduce inflammation  Recommendation to start is 3 to 4 g 3 times daily, but would prefer 1 g twice daily, and then increase slowly  This should help reduce some inflammation, and therefore if it is the cause of her pain would improve that somewhat  Patient is quite hesitant to have a colonoscopy, so I will defer that discussion further to GI  Orders:  -     sulfaSALAzine (AZULFIDINE-EN) 500 mg EC tablet; Take 1 tablet (500 mg total) by mouth 2 (two) times a day  -     Ambulatory Referral to Gastroenterology; Future    3  Essential hypertension  Assessment & Plan:  Blood pressure is doing reasonably well  No change  4  Bilateral hearing loss, unspecified hearing loss type  Assessment & Plan:  Patient does have some hearing loss issues that continue to be a problem  When I speak with her without a mask, she understands quite well  With a mask it is quite difficult for her to understand  Will continue to observe  Has had hearing aids, but does not use them  5  Type 2 diabetes mellitus with stage 3b chronic kidney disease, without long-term current use of insulin ([facility])  Assessment & Plan:    Lab Results   Component Value Date    HGBA1C 7 2 (H) 09/28/2022   Stable at the moment    Patient will be due for labs in the future  6  Acute congestive heart failure, unspecified heart failure type [facility])  Assessment & Plan:  Wt Readings from Last 3 Encounters:   01/06/23 73 1 kg (161 lb 3 2 oz)   12/31/22 71 8 kg (158 lb 4 6 oz)   12/20/22 72 6 kg (160 lb)     Patient does not currently have acute heart failure  7  Peripheral vascular disease ([address] )  Assessment & Plan:  No concerns at the moment  8  Atherosclerosis of native coronary artery of native heart with angina pectoris [facility])        COVID 19 Instructions    Mary Morse was advised to limit contact with others to essential tasks such as getting food, medications, and medical care  Proper handwashing reviewed, and Hand sanitzer when washing is not available  If the patient develops symptoms of COVID 19, the patient should call the office as soon as possible  For 9532-4660 Flu season, it is strongly recommended that Flu Vaccinations be obtained  Virtual Visits:  [place]: This works on smart phones (any phone with Internet browsing capability)  You should get a text message when the provider is ready to see you  Click on the link in the text message, and the call should start  You will need to type in your name, and allow camera and microphone access  This is HIPPA compliant, and secure  If you have not already done so, get immunized to COVID 19  We are committed to getting you vaccinated as soon as possible and will be closely following CDC and [facility]  guidelines as they are released and revised  Please refer to our COVID-19 vaccine webpage for the most up to date information on the vaccine and our distribution efforts  This site will also have the most up to date recommendations for COVID booster vaccine      Bird desai    Call [phone] ([phone]), option 7    OUR NEW LOCATION:    [address] [place], [place], [address]  Fax: [phone]    Lab services and OB/GYN are at this location as well

## 2023-01-06 NOTE — ASSESSMENT & PLAN NOTE
Patient does have acute kidney injury that was noted in the hospital   Believe this diagnosis on the chart for about 6 months  She likely does have CKD, with slightly decreased GFR overall, i e  her baseline is lower  For now, maintain hydration and blood pressure control

## 2023-01-06 NOTE — ASSESSMENT & PLAN NOTE
Patient does have colitis, but it was listed as likely being viral rather than any other concern  Would recommend consider inflammatory bowel disease, and therefore would recommend GI  She is currently having more discomfort again, so I do feel that seeing GI would be appropriate  Based on that, would recommend start with sulfasalazine to reduce inflammation  Recommendation to start is 3 to 4 g 3 times daily, but would prefer 1 g twice daily, and then increase slowly  This should help reduce some inflammation, and therefore if it is the cause of her pain would improve that somewhat  Patient is quite hesitant to have a colonoscopy, so I will defer that discussion further to GI

## 2023-01-06 NOTE — ASSESSMENT & PLAN NOTE
Wt Readings from Last 3 Encounters:   01/06/23 73 1 kg (161 lb 3 2 oz)   12/31/22 71 8 kg (158 lb 4 6 oz)   12/20/22 72 6 kg (160 lb)     Patient does not currently have acute heart failure

## 2023-01-07 ENCOUNTER — HOME CARE VISIT (OUTPATIENT)
Dept: HOME HEALTH SERVICES | Facility: HOME HEALTHCARE | Age: 88
End: 2023-01-07

## 2023-01-08 VITALS
RESPIRATION RATE: 20 BRPM | TEMPERATURE: 97 F | DIASTOLIC BLOOD PRESSURE: 80 MMHG | SYSTOLIC BLOOD PRESSURE: 142 MMHG | HEART RATE: 84 BPM

## 2023-01-12 ENCOUNTER — HOME CARE VISIT (OUTPATIENT)
Dept: HOME HEALTH SERVICES | Facility: HOME HEALTHCARE | Age: 88
End: 2023-01-12

## 2023-01-13 ENCOUNTER — HOME CARE VISIT (OUTPATIENT)
Dept: HOME HEALTH SERVICES | Facility: HOME HEALTHCARE | Age: 88
End: 2023-01-13

## 2023-01-16 VITALS
TEMPERATURE: 97.3 F | DIASTOLIC BLOOD PRESSURE: 70 MMHG | RESPIRATION RATE: 18 BRPM | HEART RATE: 83 BPM | SYSTOLIC BLOOD PRESSURE: 120 MMHG | OXYGEN SATURATION: 95 %

## 2023-01-18 ENCOUNTER — HOME CARE VISIT (OUTPATIENT)
Dept: HOME HEALTH SERVICES | Facility: HOME HEALTHCARE | Age: 88
End: 2023-01-18

## 2023-01-20 VITALS
TEMPERATURE: 97.6 F | RESPIRATION RATE: 18 BRPM | OXYGEN SATURATION: 95 % | DIASTOLIC BLOOD PRESSURE: 66 MMHG | SYSTOLIC BLOOD PRESSURE: 110 MMHG | HEART RATE: 98 BPM

## 2023-01-27 ENCOUNTER — OFFICE VISIT (OUTPATIENT)
Dept: FAMILY MEDICINE CLINIC | Facility: CLINIC | Age: 88
End: 2023-01-27

## 2023-01-27 VITALS
SYSTOLIC BLOOD PRESSURE: 132 MMHG | HEART RATE: 90 BPM | WEIGHT: 161 LBS | HEIGHT: 62 IN | BODY MASS INDEX: 29.63 KG/M2 | OXYGEN SATURATION: 96 % | DIASTOLIC BLOOD PRESSURE: 78 MMHG

## 2023-01-27 DIAGNOSIS — G52.9 CRANIAL NEURALGIA: ICD-10-CM

## 2023-01-27 DIAGNOSIS — G50.0 TRIGEMINAL NEURALGIA: Primary | ICD-10-CM

## 2023-01-27 RX ORDER — GABAPENTIN 600 MG/1
TABLET ORAL
Qty: 180 TABLET | Refills: 1 | Status: SHIPPED | OUTPATIENT
Start: 2023-01-27

## 2023-01-27 NOTE — PATIENT INSTRUCTIONS
1  Trigeminal neuralgia  Assessment & Plan:  PT keeps forgetting to take her afternoon dose of neurontin so will change to whole 600 mg twice daily instead of half a m  half afternoon and full evening  Explained to the patient that this should really help with all of her aches and pains that are due to her age this diagnosis and spinal stenosis  2  Cranial neuralgia  -     gabapentin (NEURONTIN) 600 MG tablet; PT to take 1 tab twice daily as she forgets to take the 1/2 in the afternoon anyway

## 2023-01-27 NOTE — PROGRESS NOTES
Name: Homero Rodriguez      : 1922      MRN: [id number]  Encounter Provider: Emmett Spears PA-C  Encounter Date: 2023   Encounter department: [facility]    Assessment & Plan     1  Trigeminal neuralgia  Assessment & Plan:  PT keeps forgetting to take her afternoon dose of neurontin so will change to whole 600 mg twice daily instead of half a m  half afternoon and full evening  Explained to the patient that this should really help with all of her aches and pains that are due to her age this diagnosis and spinal stenosis  2  Cranial neuralgia  -     gabapentin (NEURONTIN) 600 MG tablet; PT to take 1 tab twice daily as she forgets to take the 1/2 in the afternoon anyway  Subjective      PT here today accompanied by her daughter with multiple c/o in many parts of her body: ears, cheek, arm, side  Per daughter waking her up at night to get the nurse to clean up her vomit but there is none  Patient does have gabapentin 600 mg that she is instructed to take half tablet a m  and afternoon and then a full tablet p m  before bed however she never takes the middle dose because she forgets as it is the daughter  Per daughter patient is no more confused than her baseline  Patient does not remember that she has trigeminal neuralgia  She is in a wheelchair today from the car to the office room back to the car otherwise using a cane  Review of Systems   Constitutional: Negative  HENT: Negative  Eyes: Negative  Respiratory: Negative  Cardiovascular: Negative  Gastrointestinal: Negative  Endocrine: Negative  Genitourinary: Negative  Musculoskeletal: Negative  Skin: Negative  Allergic/Immunologic: Negative  Neurological: Negative  Hematological: Negative  Psychiatric/Behavioral: Negative          Current Outpatient Medications on File Prior to Visit   Medication Sig   • acetaminophen (TYLENOL) 325 mg tablet Take 2 tablets (650 mg total) by mouth every 6 (six) hours as needed for mild pain   • amLODIPine (NORVASC) 5 mg tablet TAKE 1 TABLET (5 MG TOTAL) BY MOUTH DAILY   • aspirin 81 MG tablet Take 81 mg by mouth daily  • Cholecalciferol (VITAMIN D3) 2000 units capsule Take 2,000 Units by mouth daily    • dorzolamide (TRUSOPT) 2 % ophthalmic solution Administer 1 drop to both eyes 3 (three) times a day     • famotidine (PEPCID) 20 mg tablet Take 1 tablet (20 mg total) by mouth 2 (two) times a day   • furosemide (LASIX) 20 mg tablet TAKE 1 TABLET IN THE MORNING  AND TAKE 2 TABLETS IN THE EVENING Do not start before January 3, 2023  • latanoprost (XALATAN) 0 005 % ophthalmic solution Apply 1 drop to eye daily at bedtime Both eyes   • levothyroxine 75 mcg tablet TAKE 1 TABLET EVERY DAY   • oxygen gas Inhale 2 L/min daily at bedtime  Indications: Shortness of breath   • pantoprazole (PROTONIX) 40 mg tablet Take 1 tablet (40 mg total) by mouth daily   • potassium chloride (K-DUR,KLOR-CON) 20 mEq tablet TAKE 1 TABLET EVERY DAY (Patient taking differently: 20 mEq if needed)   • psyllium (METAMUCIL SMOOTH TEXTURE) 28 % packet Take 1 packet by mouth in the morning (Patient taking differently: Take 1 packet by mouth if needed)   • sulfaSALAzine (AZULFIDINE-EN) 500 mg EC tablet Take 1 tablet (500 mg total) by mouth 2 (two) times a day   • vitamin B-12 (CYANOCOBALAMIN) 250 MCG tablet Take 250 mcg by mouth daily   • [DISCONTINUED] gabapentin (NEURONTIN) 600 MG tablet TAKE 1/2 TABLET IN THE MORNING AND AFTERNOON AND TAKE 1 TABLET AT BEDTIME   • Incontinence Supply Disposable (SELECT DISPOSABLE UNDERWEAR LG) MISC    • Lancets (ACCU-CHEK SOFT TOUCH) lancets Testing 1 time daily  Dx: E11 9   • Misc   Devices (Transport Chair) MISC Use if needed (with outings outside of the house )       Objective     /78 (BP Location: Right arm, Patient Position: Sitting, Cuff Size: Standard)   Pulse 90   Ht 5' 2" (1 575 m)   Wt 73 kg (161 lb)   SpO2 96%   BMI 29 45 kg/m²     Physical Exam  Vitals and nursing note reviewed  Constitutional:       General: She is not in acute distress  Appearance: She is well-developed  She is not diaphoretic  HENT:      Head: Normocephalic and atraumatic  Right Ear: External ear normal  Decreased hearing noted  Left Ear: External ear normal  Decreased hearing noted  Ears:      Comments: Minimal wax bilateral canals  Mouth/Throat:      Pharynx: Oropharynx is clear  Eyes:      General:         Right eye: No discharge  Left eye: No discharge  Conjunctiva/sclera: Conjunctivae normal    Neck:      Vascular: No carotid bruit  Cardiovascular:      Rate and Rhythm: Normal rate and regular rhythm  Heart sounds: Normal heart sounds  No murmur heard  No friction rub  No gallop  Pulmonary:      Effort: Pulmonary effort is normal  No respiratory distress  Breath sounds: Normal breath sounds  No wheezing or rales  Musculoskeletal:      Cervical back: Neck supple  Lymphadenopathy:      Cervical: No cervical adenopathy  Skin:     General: Skin is warm and dry  Neurological:      Mental Status: She is alert and oriented to person, place, and time     Psychiatric:         Judgment: Judgment normal        Ever TYRONE Madden

## 2023-02-03 ENCOUNTER — HOME CARE VISIT (OUTPATIENT)
Dept: HOME HEALTH SERVICES | Facility: HOME HEALTHCARE | Age: 88
End: 2023-02-03

## 2023-02-07 VITALS
TEMPERATURE: 97.3 F | DIASTOLIC BLOOD PRESSURE: 70 MMHG | OXYGEN SATURATION: 97 % | RESPIRATION RATE: 18 BRPM | SYSTOLIC BLOOD PRESSURE: 138 MMHG

## 2023-02-07 DIAGNOSIS — E87.1 HYPONATREMIA: ICD-10-CM

## 2023-02-07 DIAGNOSIS — G50.1 ATYPICAL FACIAL PAIN: Primary | ICD-10-CM

## 2023-02-07 NOTE — TELEPHONE ENCOUNTER
At the last visit, I told her to stop the trileptal at night to see if this would help the spinning sensation at night. Is this what is causing the increase in pain and if so, she may benefit from restarting it.   Did spinning sens go away with stopping it?

## 2023-02-07 NOTE — TELEPHONE ENCOUNTER
Received VM transcription:    I'd like to speak to Zohra Scott.  --------------------------------------------------------    Wants to tell her that her head is hurting her very bad, says pain is going down her face to left side of her nose. Pt says she just recently looked at the side effects of gabapentin and noticed she may be experiencing some of those side effects. She says she doesn't have the paper with list of side effects and can't recall which side effects she is referring to. Pt is asking if she could give her something else. Says she took Tylenol but not effective.    Pt also asking if [facility] contacted us regarding oxcarbazepine 150 mg 1 tab at HS. Pt requesting new script.      MK - Please advise. Rx entered. Please review and sign if in agreement.

## 2023-02-07 NOTE — TELEPHONE ENCOUNTER
Addendum- also agree that gabapentin can contribute to her blurry vision- can hold the middle dose.

## 2023-02-08 ENCOUNTER — APPOINTMENT (EMERGENCY)
Dept: CT IMAGING | Facility: HOSPITAL | Age: 88
End: 2023-02-08

## 2023-02-08 ENCOUNTER — HOSPITAL ENCOUNTER (EMERGENCY)
Facility: HOSPITAL | Age: 88
Discharge: HOME | End: 2023-02-09
Attending: EMERGENCY MEDICINE

## 2023-02-08 VITALS
DIASTOLIC BLOOD PRESSURE: 60 MMHG | OXYGEN SATURATION: 93 % | TEMPERATURE: 97.8 F | SYSTOLIC BLOOD PRESSURE: 132 MMHG | HEART RATE: 80 BPM | RESPIRATION RATE: 17 BRPM

## 2023-02-08 DIAGNOSIS — K52.9 COLITIS: ICD-10-CM

## 2023-02-08 DIAGNOSIS — R51.9 HEADACHE: Primary | ICD-10-CM

## 2023-02-08 LAB
ANION GAP SERPL CALCULATED.3IONS-SCNC: 8 MMOL/L (ref 4–13)
BASOPHILS # BLD AUTO: 0.04 THOUSANDS/ÂΜL (ref 0–0.1)
BASOPHILS NFR BLD AUTO: 1 % (ref 0–1)
BILIRUB UR QL STRIP: NEGATIVE
BUN SERPL-MCNC: 16 MG/DL (ref 5–25)
CALCIUM SERPL-MCNC: 9.2 MG/DL (ref 8.4–10.2)
CHLORIDE SERPL-SCNC: 97 MMOL/L (ref 96–108)
CLARITY UR: NORMAL
CO2 SERPL-SCNC: 25 MMOL/L (ref 21–32)
COLOR UR: YELLOW
CREAT SERPL-MCNC: 1.09 MG/DL (ref 0.6–1.3)
EOSINOPHIL # BLD AUTO: 0.08 THOUSAND/ÂΜL (ref 0–0.61)
EOSINOPHIL NFR BLD AUTO: 2 % (ref 0–6)
ERYTHROCYTE [DISTWIDTH] IN BLOOD BY AUTOMATED COUNT: 12.9 % (ref 11.6–15.1)
GFR SERPL CREATININE-BSD FRML MDRD: 41 ML/MIN/1.73SQ M
GLUCOSE SERPL-MCNC: 162 MG/DL (ref 65–140)
GLUCOSE SERPL-MCNC: 178 MG/DL (ref 65–140)
GLUCOSE UR STRIP-MCNC: NEGATIVE MG/DL
HCT VFR BLD AUTO: 37.4 % (ref 34.8–46.1)
HGB BLD-MCNC: 12.3 G/DL (ref 11.5–15.4)
HGB UR QL STRIP.AUTO: NEGATIVE
IMM GRANULOCYTES # BLD AUTO: 0.02 THOUSAND/UL (ref 0–0.2)
IMM GRANULOCYTES NFR BLD AUTO: 1 % (ref 0–2)
KETONES UR STRIP-MCNC: NEGATIVE MG/DL
LEUKOCYTE ESTERASE UR QL STRIP: NEGATIVE
LYMPHOCYTES # BLD AUTO: 0.7 THOUSANDS/ÂΜL (ref 0.6–4.47)
LYMPHOCYTES NFR BLD AUTO: 16 % (ref 14–44)
MCH RBC QN AUTO: 30 PG (ref 26.8–34.3)
MCHC RBC AUTO-ENTMCNC: 32.9 G/DL (ref 31.4–37.4)
MCV RBC AUTO: 91 FL (ref 82–98)
MONOCYTES # BLD AUTO: 0.63 THOUSAND/ÂΜL (ref 0.17–1.22)
MONOCYTES NFR BLD AUTO: 15 % (ref 4–12)
NEUTROPHILS # BLD AUTO: 2.79 THOUSANDS/ÂΜL (ref 1.85–7.62)
NEUTS SEG NFR BLD AUTO: 65 % (ref 43–75)
NITRITE UR QL STRIP: NEGATIVE
NRBC BLD AUTO-RTO: 0 /100 WBCS
PH UR STRIP.AUTO: 7 [PH] (ref 4.5–8)
PLATELET # BLD AUTO: 205 THOUSANDS/UL (ref 149–390)
PMV BLD AUTO: 10.5 FL (ref 8.9–12.7)
POTASSIUM SERPL-SCNC: 4.5 MMOL/L (ref 3.5–5.3)
PROT UR STRIP-MCNC: NEGATIVE MG/DL
RBC # BLD AUTO: 4.1 MILLION/UL (ref 3.81–5.12)
SODIUM SERPL-SCNC: 130 MMOL/L (ref 135–147)
SP GR UR STRIP.AUTO: 1.02 (ref 1–1.03)
UROBILINOGEN UR QL STRIP.AUTO: 0.2 E.U./DL
WBC # BLD AUTO: 4.26 THOUSAND/UL (ref 4.31–10.16)

## 2023-02-08 RX ORDER — DEXAMETHASONE SODIUM PHOSPHATE 4 MG/ML
8 INJECTION, SOLUTION INTRA-ARTICULAR; INTRALESIONAL; INTRAMUSCULAR; INTRAVENOUS; SOFT TISSUE ONCE
Status: COMPLETED | OUTPATIENT
Start: 2023-02-08 | End: 2023-02-08

## 2023-02-08 RX ORDER — METOCLOPRAMIDE HYDROCHLORIDE 5 MG/ML
10 INJECTION INTRAMUSCULAR; INTRAVENOUS ONCE
Status: COMPLETED | OUTPATIENT
Start: 2023-02-08 | End: 2023-02-08

## 2023-02-08 RX ORDER — MAGNESIUM SULFATE HEPTAHYDRATE 40 MG/ML
2 INJECTION, SOLUTION INTRAVENOUS ONCE
Status: COMPLETED | OUTPATIENT
Start: 2023-02-08 | End: 2023-02-08

## 2023-02-08 RX ADMIN — METOCLOPRAMIDE 10 MG: 5 INJECTION, SOLUTION INTRAMUSCULAR; INTRAVENOUS at 22:24

## 2023-02-08 RX ADMIN — DEXAMETHASONE SODIUM PHOSPHATE 8 MG: 4 INJECTION INTRA-ARTICULAR; INTRALESIONAL; INTRAMUSCULAR; INTRAVENOUS; SOFT TISSUE at 22:21

## 2023-02-08 RX ADMIN — MAGNESIUM SULFATE HEPTAHYDRATE 2 G: 40 INJECTION, SOLUTION INTRAVENOUS at 22:47

## 2023-02-08 RX ADMIN — SODIUM CHLORIDE 1000 ML: 0.9 INJECTION, SOLUTION INTRAVENOUS at 22:02

## 2023-02-08 RX ADMIN — IOHEXOL 100 ML: 350 INJECTION, SOLUTION INTRAVENOUS at 22:11

## 2023-02-09 NOTE — ED NOTES
Patient transported to [address] [place] [address], [address] [place] [address]  02/08/23 [id number]

## 2023-02-09 NOTE — TELEPHONE ENCOUNTER
Patient and patient's daughter aware of recommendations.    I could not get a clear response as to if she trialed off the trileptal but I believe she said she did try it but restarted it?    She reports she went to the ED yesterday for headache and coltis; Said headache has now subsided.

## 2023-02-09 NOTE — DISCHARGE INSTRUCTIONS
Full liquid diet while you are still having any pain  Once the pain has resolved advance to a low fiber diet  And then slowly resume a regular diet  Return for fever more than 100 4, persistent vomiting or any concerns

## 2023-02-10 ENCOUNTER — TELEPHONE (OUTPATIENT)
Dept: NEUROLOGY | Facility: CLINIC | Age: 88
End: 2023-02-10

## 2023-02-10 RX ORDER — OXCARBAZEPINE 150 MG/1
TABLET, FILM COATED ORAL
Qty: 90 TABLET | Refills: 0 | Status: CANCELLED | OUTPATIENT
Start: 2023-02-10

## 2023-02-10 NOTE — TELEPHONE ENCOUNTER
Tiny- or clinical- pt sodium was low. Can you please ask the pt to repeat BMP at her earliest convenience prior to restart trileptal? I would really like to hold off on restarting this at all costs, UNLESS the pt is adamant about restarting it.    Please confirm she is holding the noon gabapentin and only now taking BID. Thanks.

## 2023-02-10 NOTE — TELEPHONE ENCOUNTER
I confirmed with patient that she is holding trileptal and only taking gabapentin bid.  She said she will go for bw tomorrow.

## 2023-02-10 NOTE — TELEPHONE ENCOUNTER
Message from [facility] AUTHORITY on 2/10:    Mariel Goodness- or clinical- pt sodium was low  Can you please ask the pt to repeat BMP at her earliest convenience prior to restart trileptal? I would really like to hold off on restarting this at all costs, UNLESS the pt is adamant about restarting it      Please confirm she is holding the noon gabapentin and only now taking BID  Thanks  I confirmed with patient that she is holding trileptal and only taking gabapentin bid  She said she will go for bw tomorrow

## 2023-02-13 ENCOUNTER — PATIENT OUTREACH (OUTPATIENT)
Dept: INTERNAL MEDICINE CLINIC | Age: 88
End: 2023-02-13

## 2023-02-13 ENCOUNTER — APPOINTMENT (OUTPATIENT)
Dept: LAB | Facility: CLINIC | Age: 88
End: 2023-02-13

## 2023-02-13 ENCOUNTER — EPISODE CHANGES (OUTPATIENT)
Dept: CASE MANAGEMENT | Facility: OTHER | Age: 88
End: 2023-02-13

## 2023-02-13 DIAGNOSIS — N18.32 TYPE 2 DIABETES MELLITUS WITH STAGE 3B CHRONIC KIDNEY DISEASE, WITHOUT LONG-TERM CURRENT USE OF INSULIN (HCC): ICD-10-CM

## 2023-02-13 DIAGNOSIS — E87.1 HYPONATREMIA: ICD-10-CM

## 2023-02-13 DIAGNOSIS — E03.9 ACQUIRED HYPOTHYROIDISM: ICD-10-CM

## 2023-02-13 DIAGNOSIS — E11.22 TYPE 2 DIABETES MELLITUS WITH STAGE 3B CHRONIC KIDNEY DISEASE, WITHOUT LONG-TERM CURRENT USE OF INSULIN (HCC): ICD-10-CM

## 2023-02-13 LAB
ALBUMIN SERPL BCP-MCNC: 3 G/DL (ref 3.5–5)
ALP SERPL-CCNC: 101 U/L (ref 46–116)
ALT SERPL W P-5'-P-CCNC: 13 U/L (ref 12–78)
ANION GAP SERPL CALCULATED.3IONS-SCNC: 6 MMOL/L (ref 4–13)
AST SERPL W P-5'-P-CCNC: 16 U/L (ref 5–45)
BILIRUB SERPL-MCNC: 0.31 MG/DL (ref 0.2–1)
BUN SERPL-MCNC: 17 MG/DL (ref 5–25)
CALCIUM ALBUM COR SERPL-MCNC: 10.1 MG/DL (ref 8.3–10.1)
CALCIUM SERPL-MCNC: 9.3 MG/DL (ref 8.3–10.1)
CHLORIDE SERPL-SCNC: 104 MMOL/L (ref 96–108)
CO2 SERPL-SCNC: 24 MMOL/L (ref 21–32)
CREAT SERPL-MCNC: 1.17 MG/DL (ref 0.6–1.3)
GFR SERPL CREATININE-BSD FRML MDRD: 38 ML/MIN/1.73SQ M
GLUCOSE P FAST SERPL-MCNC: 214 MG/DL (ref 65–99)
POTASSIUM SERPL-SCNC: 4 MMOL/L (ref 3.5–5.3)
PROT SERPL-MCNC: 6.9 G/DL (ref 6.4–8.4)
SODIUM SERPL-SCNC: 134 MMOL/L (ref 135–147)
T4 FREE SERPL-MCNC: 1.09 NG/DL (ref 0.76–1.46)
TSH SERPL DL<=0.05 MIU/L-ACNC: 7.98 UIU/ML (ref 0.45–4.5)

## 2023-02-13 NOTE — PROGRESS NOTES
Chart review completed for the following sections:  • Recent Vital Signs  • Allergies/Contradictions  • Medication Review   • History   • SDOH   • Problem List  • Immunizations  • Past hospitalizations and major procedures, including surgery  • Significant past illnesses and treatment history including: History and Physical, Other provider notes and Medications/Infusions/Transfusions  • Relevant past medications related to the patient's condition

## 2023-02-14 LAB
EST. AVERAGE GLUCOSE BLD GHB EST-MCNC: 212 MG/DL
HBA1C MFR BLD: 9 %

## 2023-02-15 ENCOUNTER — PATIENT OUTREACH (OUTPATIENT)
Dept: FAMILY MEDICINE CLINIC | Facility: CLINIC | Age: 88
End: 2023-02-15

## 2023-02-15 ENCOUNTER — HOME CARE VISIT (OUTPATIENT)
Dept: HOME HEALTH SERVICES | Facility: HOME HEALTHCARE | Age: 88
End: 2023-02-15

## 2023-02-15 NOTE — PROGRESS NOTES
Spoke with pt's daughter Bethanie Edmond  She states that pt does very well for her age of 80 yrs old  Daughter is her caregiver and takes pt to appts  Pt takes her medications  Discussed recent lab work and reviewed that her HBA1C is 9  0  Ex[plained the purpose/meaning of A1C  Bethanie Edmond states that pt loves eating carbohydrates like potatoes, Buns, sweets and bread  Bethanie Edmond does not offer this but pt looks for it  She doesn't care much for a lot of proteins but will eat vegetables   She declines outreach for the [employer]

## 2023-02-16 VITALS
RESPIRATION RATE: 18 BRPM | DIASTOLIC BLOOD PRESSURE: 70 MMHG | TEMPERATURE: 97.6 F | SYSTOLIC BLOOD PRESSURE: 140 MMHG | HEART RATE: 78 BPM | OXYGEN SATURATION: 97 %

## 2023-02-21 DIAGNOSIS — E03.9 HYPOTHYROIDISM, UNSPECIFIED TYPE: ICD-10-CM

## 2023-02-21 DIAGNOSIS — E11.22 TYPE 2 DIABETES MELLITUS WITH STAGE 3B CHRONIC KIDNEY DISEASE, WITHOUT LONG-TERM CURRENT USE OF INSULIN (HCC): Primary | ICD-10-CM

## 2023-02-21 DIAGNOSIS — N18.32 TYPE 2 DIABETES MELLITUS WITH STAGE 3B CHRONIC KIDNEY DISEASE, WITHOUT LONG-TERM CURRENT USE OF INSULIN (HCC): Primary | ICD-10-CM

## 2023-02-21 DIAGNOSIS — E03.9 ACQUIRED HYPOTHYROIDISM: ICD-10-CM

## 2023-02-21 RX ORDER — LEVOTHYROXINE SODIUM 88 UG/1
88 TABLET ORAL DAILY
Qty: 90 TABLET | Refills: 3 | Status: SHIPPED | OUTPATIENT
Start: 2023-02-21

## 2023-02-22 NOTE — RESULT ENCOUNTER NOTE
Please call patient's daughter and let her know that:  1  Patient's thyroid is not controlled on her current strength  I am going to increase her thyroid supplement from 75 to 88 mcg   2   Diabetes is higher than usual with a hemoglobin A1c of 9 and a fasting sugar of 214  Because of patient's age and her kidney function we would like to not start her on any medications for this at this time but patient should decrease simple sweets and carbs  I have ordered labs to be done in 3 months

## 2023-02-24 ENCOUNTER — TELEPHONE (OUTPATIENT)
Dept: GASTROENTEROLOGY | Facility: CLINIC | Age: 88
End: 2023-02-24

## 2023-02-24 NOTE — TELEPHONE ENCOUNTER
Patients GI provider:  Dr Governor Davidson    Number to return call: [phone]    Reason for call: Pt calling because she would like an appointment with Dr Governor Davidson  Advised nothing until May first and offered to check the PA schedule   She only wants to see Dr Governor Davidson and does not want to wait until May    Scheduled procedure/appointment date if applicable: N/A

## 2023-02-24 NOTE — TELEPHONE ENCOUNTER
Added to waitlist as high   Spoke to daughter and scheduled f/u currently for next available and added to [facility]  -  [facility]

## 2023-03-06 ENCOUNTER — HOME CARE VISIT (OUTPATIENT)
Dept: HOME HEALTH SERVICES | Facility: HOME HEALTHCARE | Age: 88
End: 2023-03-06

## 2023-03-07 VITALS
OXYGEN SATURATION: 97 % | TEMPERATURE: 97.6 F | SYSTOLIC BLOOD PRESSURE: 120 MMHG | HEART RATE: 60 BPM | DIASTOLIC BLOOD PRESSURE: 70 MMHG | RESPIRATION RATE: 18 BRPM

## 2023-03-08 ENCOUNTER — OFFICE VISIT (OUTPATIENT)
Dept: GASTROENTEROLOGY | Facility: MEDICAL CENTER | Age: 88
End: 2023-03-08

## 2023-03-08 VITALS
SYSTOLIC BLOOD PRESSURE: 118 MMHG | BODY MASS INDEX: 29.45 KG/M2 | HEART RATE: 77 BPM | DIASTOLIC BLOOD PRESSURE: 60 MMHG | TEMPERATURE: 99 F | WEIGHT: 161 LBS

## 2023-03-08 DIAGNOSIS — R10.13 EPIGASTRIC PAIN: ICD-10-CM

## 2023-03-08 DIAGNOSIS — K59.00 CONSTIPATION, UNSPECIFIED CONSTIPATION TYPE: ICD-10-CM

## 2023-03-08 DIAGNOSIS — R10.33 PERIUMBILICAL ABDOMINAL PAIN: Primary | ICD-10-CM

## 2023-03-08 NOTE — PATIENT INSTRUCTIONS
Start taking miralax every evening to help with constipation  If this gives you diarrhea, you can decrease to half a dose daily or every other day  If you are still constipated on one dose a day, you can increase to taking it twice a day  Give you are still constipated on two doses a day, call and let me know as you may need something stronger

## 2023-03-08 NOTE — PROGRESS NOTES
[facility] - Outpatient Follow-up Note  Avril Hanks 80 y o  female MRN: [id number]  Encounter: [id number]          ASSESSMENT AND PLAN:    89F with aortic stenosis, CAD, DM2, and GERD here for follow-up of ED visit for flank pain  CT demonstrated at that time possible thickening of the transverse and left colon  She reports she had no symptoms to suggest colitis at that time and the CT may be an over read given there was no oral contrast  Did have colonoscopy 12/30/22 that was read as mild diffuse colon thickening but again with no oral contrast   We discussed that we could pursue a colonoscopy however given that she has no symptoms (diarrhea, blood in stool) and her advanced age, I do not think that this will improve her overall quality of life or longevity  She is in agreement to avoid invasive procedures  She is reporting some periumbilical abdominal pain and constipation over the past few days  Will check an abdominal x-ray to see if she has significant retained stool that may benefit from a bowel cleanse  2  Epigastric pain  -Continue pantoprazole and famotidine    3  Constipation, unspecified constipation type  1  Periumbilical abdominal pain  - XR abdomen 1 view kub; Future  -Levothyroxine dose recently increased, follow-up with PCP to ensure new dosage is correct    ______________________________________________________________________    SUBJECTIVE:    Feels terrible today  A lot of pain across abdomen  Pain gets worse with eating  Has been having constipation  Tried taking metamucil twice  Today had good BM for first time in a long time  Felt worse after BM  No blood in stool  No diarrhea prior to ED visit  No weight loss  Taking pantoprazole every morning and famotidine twice a day  TSH was high in February  Increased levothyroxine    Reviewed 2/8/2023 ED visit for flank pain    CT 2/8/2023 with IV but no oral contrast demonstrated thickening of the transverse, descending and sigmoid colon  Last seen 9/19/2022 by me for upper abdominal discomfort  Was prescribed pantoprazole with improvement of symptoms  REVIEW OF SYSTEMS IS OTHERWISE NEGATIVE  Historical Information   Past Medical History:   Diagnosis Date   • Asthma    • Atypical chest pain    • CAD (coronary artery disease)    • Candidal intertrigo    • CHF (congestive heart failure) (HCC)    • Depression    • Diabetes mellitus (HCC)    • Diabetic neuropathy (HCC)    • Diverticulitis    • Dyslipidemia    • Female bladder prolapse    • Gastric ulcer    • Glaucoma    • HTN (hypertension)    • Hyperlipidemia    • Hypothyroidism 4/18/2016   • RAD (reactive airway disease)      Past Surgical History:   Procedure Laterality Date   • COLONOSCOPY     • ESOPHAGOGASTRODUODENOSCOPY  2011   • HYSTERECTOMY     • TONSILLECTOMY       Social History   Social History     Substance and Sexual Activity   Alcohol Use No    Comment: Social Alcohol Use -- as per allscripts (pt denies all alcohol use)     Social History     Substance and Sexual Activity   Drug Use No     Social History     Tobacco Use   Smoking Status Never   Smokeless Tobacco Never     Family History   Problem Relation Age of Onset   • Breast cancer Mother    • Hypothyroidism Mother    • Hypertension Father    • Breast cancer Sister    • Thyroid disease Sister    • Hypertension Sister        Meds/Allergies       Current Outpatient Medications:   •  acetaminophen (TYLENOL) 325 mg tablet  •  amLODIPine (NORVASC) 5 mg tablet  •  aspirin 81 MG tablet  •  Cholecalciferol (VITAMIN D3) 2000 units capsule  •  dorzolamide (TRUSOPT) 2 % ophthalmic solution  •  furosemide (LASIX) 20 mg tablet  •  gabapentin (NEURONTIN) 600 MG tablet  •  Incontinence Supply Disposable (SELECT DISPOSABLE UNDERWEAR LG) MISC  •  Lancets (ACCU-CHEK SOFT TOUCH) lancets  •  latanoprost (XALATAN) 0 005 % ophthalmic solution  •  levothyroxine 88 mcg tablet  •  Misc   Devices (Transport Chair) MISC  •  oxygen gas  •  pantoprazole (PROTONIX) 40 mg tablet  •  potassium chloride (K-DUR,KLOR-CON) 20 mEq tablet  •  psyllium (METAMUCIL SMOOTH TEXTURE) 28 % packet  •  sulfaSALAzine (AZULFIDINE-EN) 500 mg EC tablet  •  vitamin B-12 (CYANOCOBALAMIN) 250 MCG tablet  •  famotidine (PEPCID) 20 mg tablet    Allergies   Allergen Reactions   • Levaquin [Levofloxacin] Myalgia   • Statins      Other reaction(s): Weakness  Category: Adverse Reaction;            Objective     Blood pressure 118/60, pulse 77, temperature 99 °F (37 2 °C), weight 73 kg (161 lb), not currently breastfeeding  Body mass index is 29 45 kg/m²  PHYSICAL EXAM:      General Appearance:   Alert, cooperative, no distress  HEENT:   Normocephalic, atraumatic, anicteric  Hard of hearing, daughter frequently has to repeat things for her    Neck:  Supple, symmetrical, trachea midline   Lungs:   Clear to auscultation bilaterally; no rales, rhonchi or wheezing; respirations unlabored    Heart[de-identified]   Regular rate and rhythm; 3/6 systolic murmur   Abdomen:   Soft, non-tender, non-distended; normal bowel sounds; no masses, no organomegaly    Genitalia:   Deferred    Rectal:   Deferred    Extremities:  No cyanosis, clubbing or edema    Pulses:  2+ and symmetric    Skin:  No jaundice, rashes, or lesions    Lymph nodes:  No palpable cervical lymphadenopathy        Lab Results:   No visits with results within 1 Day(s) from this visit     Latest known visit with results is:   Lab on 02/13/2023   Component Date Value   • Hemoglobin A1C 02/13/2023 9 0 (H)    • EAG 02/13/2023 212    • Sodium 02/13/2023 134 (L)    • Potassium 02/13/2023 4 0    • Chloride 02/13/2023 104    • CO2 02/13/2023 24    • ANION GAP 02/13/2023 6    • BUN 02/13/2023 17    • Creatinine 02/13/2023 1 17    • Glucose, Fasting 02/13/2023 214 (H)    • Calcium 02/13/2023 9 3    • Corrected Calcium 02/13/2023 10 1    • AST 02/13/2023 16    • ALT 02/13/2023 13    • Alkaline Phosphatase 02/13/2023 101    • Total Protein 02/13/2023 6 9    • Albumin 02/13/2023 3 0 (L)    • Total Bilirubin 02/13/2023 0 31    • eGFR 02/13/2023 38    • TSH 3RD Renée Police 02/13/2023 7 980 (H)    • Free T4 02/13/2023 1 09          Radiology Results:   CT abdomen pelvis with contrast    Result Date: 2/8/2023  Narrative: CT ABDOMEN AND PELVIS WITH IV CONTRAST INDICATION:   LUQ abdominal pain flank pain, hx of colitis  COMPARISON:  December 30, 2022 TECHNIQUE:  CT examination of the abdomen and pelvis was performed  Axial, sagittal, and coronal 2D reformatted images were created from the source data and submitted for interpretation  Radiation dose length product (DLP) for this visit:  673 mGy-cm   This examination, like all CT scans performed in the [facility], was performed utilizing techniques to minimize radiation dose exposure, including the use of iterative reconstruction and automated exposure control  IV Contrast:  100 mL of iohexol (OMNIPAQUE) Enteric Contrast:  Enteric contrast was not administered  FINDINGS: ABDOMEN LOWER CHEST:  No clinically significant abnormality identified in the visualized lower chest  LIVER/BILIARY TREE:  Unremarkable  GALLBLADDER:  No calcified gallstones  No pericholecystic inflammatory change  SPLEEN:  Unremarkable  PANCREAS:  Unremarkable  ADRENAL GLANDS:  Unremarkable  KIDNEYS/URETERS:  Unremarkable  No hydronephrosis  STOMACH AND BOWEL:  Thickening of the transverse colon, descending colon and the sigmoid colon is visualized  Colonic diverticulosis without evidence of acute diverticulitis  APPENDIX:  No findings to suggest appendicitis  ABDOMINOPELVIC CAVITY:  No ascites  No pneumoperitoneum  No lymphadenopathy  VESSELS:  Atherosclerotic changes are present  No evidence of aneurysm  PELVIS REPRODUCTIVE ORGANS:  Surgical changes of prior hysterectomy  URINARY BLADDER:  Thickening of the urinary bladder wall is seen   ABDOMINAL WALL/INGUINAL REGIONS:  Small fat-containing umbilical wall hernia  OSSEOUS STRUCTURES:  Scoliosis of the spine is seen  Degenerative changes in the spine are visualized  Impression: Diffuse thickening of the colonic wall as described above likely representing colitis  Follow-up with gastroenterology is recommended  Thickening of the urinary bladder wall which may represent cystitis  Follow-up with urology is recommended  The study was marked in [facility] for immediate notification   Workstation performed: [id number]

## 2023-03-12 DIAGNOSIS — K52.9 COLITIS: ICD-10-CM

## 2023-03-13 RX ORDER — SULFASALAZINE 500 MG/1
TABLET, DELAYED RELEASE ORAL
Qty: 60 TABLET | Refills: 1 | Status: SHIPPED | OUTPATIENT
Start: 2023-03-13 | End: 2023-03-15

## 2023-03-14 ENCOUNTER — HOSPITAL ENCOUNTER (OUTPATIENT)
Dept: RADIOLOGY | Facility: HOSPITAL | Age: 88
Discharge: HOME | End: 2023-03-14

## 2023-03-14 ENCOUNTER — TELEPHONE (OUTPATIENT)
Dept: FAMILY MEDICINE CLINIC | Facility: CLINIC | Age: 88
End: 2023-03-14

## 2023-03-14 DIAGNOSIS — R10.33 PERIUMBILICAL ABDOMINAL PAIN: ICD-10-CM

## 2023-03-14 DIAGNOSIS — K59.00 CONSTIPATION, UNSPECIFIED CONSTIPATION TYPE: ICD-10-CM

## 2023-03-14 NOTE — TELEPHONE ENCOUNTER
My name is Luis Manuel Cornejo, birthdate [phone]  I just came in and there was a call that my daughter took that my daughter took someone from the [facility]  It's cold  Concerning medication and which she gave, she told me what it was, but it was completely I didn't understand what she was saying  So what I am calling for is to have [facility] called Back  And refer the message to me  Thank you  As I said, my number is [phone]

## 2023-03-28 ENCOUNTER — OFFICE VISIT (OUTPATIENT)
Dept: FAMILY MEDICINE CLINIC | Facility: CLINIC | Age: 88
End: 2023-03-28

## 2023-03-28 VITALS
SYSTOLIC BLOOD PRESSURE: 102 MMHG | BODY MASS INDEX: 27.62 KG/M2 | TEMPERATURE: 97.3 F | DIASTOLIC BLOOD PRESSURE: 60 MMHG | HEART RATE: 74 BPM | OXYGEN SATURATION: 94 % | WEIGHT: 151 LBS

## 2023-03-28 DIAGNOSIS — R07.9 CHEST PAIN, UNSPECIFIED TYPE: Primary | ICD-10-CM

## 2023-03-28 DIAGNOSIS — M48.062 SPINAL STENOSIS OF LUMBAR REGION WITH NEUROGENIC CLAUDICATION: ICD-10-CM

## 2023-03-28 DIAGNOSIS — G50.0 TRIGEMINAL NEURALGIA: ICD-10-CM

## 2023-03-28 DIAGNOSIS — R52 GENERALIZED BODY ACHES: ICD-10-CM

## 2023-03-28 DIAGNOSIS — H61.23 BILATERAL IMPACTED CERUMEN: ICD-10-CM

## 2023-03-28 NOTE — ASSESSMENT & PLAN NOTE
Pt again declining PM consult and the possibility of injections  I am recommending regular tylenol in addition to her other meds three times a day  She is already on gabapentin 600 mg twice daily

## 2023-03-28 NOTE — PROGRESS NOTES
Name: Carmen Frye      : 1922      MRN: [id number]  Encounter Provider: Jania Hoskins PA-C  Encounter Date: 3/28/2023   Encounter department: [facility] PRIMARY CARE    Assessment & Plan     1  Chest pain, unspecified type  Comments:  EKG was without acute illness and patient states she no longer has the chest pain  It is likely not cardiac related as patient is having pain in various areas  Orders:  -     POCT ECG    2  Generalized body aches  Assessment & Plan:  Secondary to age  Continue Neurontin  3  Trigeminal neuralgia  Assessment & Plan:  Pain improved with adjustment of gabapentin at the last visit  4  Spinal stenosis of lumbar region with neurogenic claudication  Assessment & Plan:  Pt again declining PM consult and the possibility of injections  I am recommending regular tylenol in addition to her other meds three times a day  She is already on gabapentin 600 mg twice daily  5  Bilateral impacted cerumen  Assessment & Plan:  Removed with utensil today  Orders:  -     Ear cerumen removal      BMI Counseling: Body mass index is 27 62 kg/m²  The BMI is above normal  Nutrition recommendations include decreasing portion sizes, encouraging healthy choices of fruits and vegetables, decreasing fast food intake, consuming healthier snacks and limiting drinks that contain sugar  Rationale for BMI follow-up plan is due to patient being overweight or obese  Ear cerumen removal    Date/Time: 3/28/2023 4:04 PM  Performed by: Alessia Romero PA-C  Authorized by: Alessia Romero PA-C   Universal Protocol:  Consent: Verbal consent obtained    Consent given by: patient  Patient understanding: patient states understanding of the procedure being performed  Patient identity confirmed: verbally with patient      Patient location:  Clinic  Procedure details:     Local anesthetic:  None    Location:  L ear and R ear    Procedure type: curette      Approach:  Natural orifice  Post-procedure details:     Complication:  None    Hearing quality:  Improved    Patient tolerance of procedure: Tolerated well, no immediate complications  Comments:      Moderate amount of wax removed from bilateral canals  Subjective      Patient here today accompanied by her daughter with complaints of pain all over her body in various places including her right neck abdomen chest low back  At the last visit we did change patient's gabapentin to only twice a day and adjusted the dose and patient states that this did help her and she no longer has the head pain that she was experiencing  Daughter states that she does not take Tylenol on a regular basis but when she does take it it does help her  Patient again declines pain management evaluation and possible injections for her spinal stenosis which is the biggest complaint of pain for her  She was in the emergency room not too long ago and a abdominal x-ray showed normal gas pattern no obstruction  Review of Systems   Constitutional: Negative  HENT: Negative  Eyes: Negative  Respiratory: Negative  Cardiovascular: Negative  Gastrointestinal: Negative  Endocrine: Negative  Genitourinary: Negative  Musculoskeletal: Positive for back pain  Skin: Negative  Allergic/Immunologic: Negative  Neurological: Negative  Hematological: Negative  Psychiatric/Behavioral: Negative  Current Outpatient Medications on File Prior to Visit   Medication Sig   • acetaminophen (TYLENOL) 325 mg tablet Take 2 tablets (650 mg total) by mouth every 6 (six) hours as needed for mild pain   • amLODIPine (NORVASC) 5 mg tablet TAKE 1 TABLET (5 MG TOTAL) BY MOUTH DAILY   • aspirin 81 MG tablet Take 81 mg by mouth daily     • Cholecalciferol (VITAMIN D3) 2000 units capsule Take 2,000 Units by mouth daily    • dorzolamide (TRUSOPT) 2 % ophthalmic solution Administer 1 drop to both eyes 3 (three) times a day     • famotidine (PEPCID) 20 mg tablet Take 1 tablet (20 mg total) by mouth 2 (two) times a day   • furosemide (LASIX) 20 mg tablet TAKE 1 TABLET IN THE MORNING  AND TAKE 2 TABLETS IN THE EVENING Do not start before January 3, 2023  • gabapentin (NEURONTIN) 600 MG tablet PT to take 1 tab twice daily as she forgets to take the 1/2 in the afternoon anyway  • Incontinence Supply Disposable (SELECT DISPOSABLE UNDERWEAR LG) MISC    • Lancets (ACCU-CHEK SOFT TOUCH) lancets Testing 1 time daily  Dx: E11 9   • latanoprost (XALATAN) 0 005 % ophthalmic solution Apply 1 drop to eye daily at bedtime Both eyes   • levothyroxine 88 mcg tablet Take 1 tablet (88 mcg total) by mouth daily   • Misc  Devices (Transport Chair) MISC Use if needed (with outings outside of the house )   • oxygen gas Inhale 2 L/min daily at bedtime  Indications: Shortness of breath   • pantoprazole (PROTONIX) 40 mg tablet Take 1 tablet (40 mg total) by mouth daily   • potassium chloride (K-DUR,KLOR-CON) 20 mEq tablet TAKE 1 TABLET EVERY DAY (Patient taking differently: 20 mEq if needed)   • psyllium (METAMUCIL SMOOTH TEXTURE) 28 % packet Take 1 packet by mouth in the morning (Patient taking differently: Take 1 packet by mouth if needed)   • vitamin B-12 (CYANOCOBALAMIN) 250 MCG tablet Take 250 mcg by mouth daily       Objective     /60 (BP Location: Right arm, Patient Position: Sitting, Cuff Size: Standard)   Pulse 74   Temp (!) 97 3 °F (36 3 °C) (Temporal)   Wt 68 5 kg (151 lb) Comment: per pt at home  SpO2 94%   BMI 27 62 kg/m²     Physical Exam  Vitals and nursing note reviewed  Constitutional:       General: She is not in acute distress  Appearance: She is well-developed  She is not diaphoretic  HENT:      Head: Normocephalic and atraumatic  Eyes:      General:         Right eye: No discharge  Left eye: No discharge  Conjunctiva/sclera: Conjunctivae normal    Neck:      Vascular: No carotid bruit     Cardiovascular:      Rate and Rhythm: Normal rate and regular rhythm  Heart sounds: Murmur heard  Systolic murmur is present with a grade of 4/6  No friction rub  No gallop  Pulmonary:      Effort: Pulmonary effort is normal  No respiratory distress  Breath sounds: Normal breath sounds  No wheezing or rales  Abdominal:      General: Abdomen is protuberant  Bowel sounds are normal       Palpations: Abdomen is soft  Musculoskeletal:      Cervical back: Neck supple  Right lower leg: No edema  Left lower leg: No edema  Skin:     General: Skin is warm and dry  Neurological:      Mental Status: She is alert and oriented to person, place, and time     Psychiatric:         Judgment: Judgment normal        Connie Blake PA-C

## 2023-03-28 NOTE — PATIENT INSTRUCTIONS
1  Chest pain, unspecified type  -     POCT ECG    2  Generalized body aches    3  Trigeminal neuralgia    4  Spinal stenosis of lumbar region with neurogenic claudication  Assessment & Plan:  Pt again declining PM consult and the possibility of injections  I am recommending regular tylenol in addition to her other meds three times a day  She is already on gabapentin 600 mg twice daily

## 2023-03-30 ENCOUNTER — OFFICE VISIT (OUTPATIENT)
Dept: UROLOGY | Facility: CLINIC | Age: 88
End: 2023-03-30

## 2023-03-30 VITALS
HEART RATE: 74 BPM | BODY MASS INDEX: 29.08 KG/M2 | OXYGEN SATURATION: 98 % | DIASTOLIC BLOOD PRESSURE: 74 MMHG | SYSTOLIC BLOOD PRESSURE: 140 MMHG | WEIGHT: 159 LBS

## 2023-03-30 DIAGNOSIS — N28.89 PELVIECTASIS OF KIDNEY: Primary | ICD-10-CM

## 2023-03-30 NOTE — PATIENT INSTRUCTIONS
BLADDER HEALTH    WHAT IS CONSIDERED NORMAL? The average bladder can hold about 2 cups of urine before it needs to be emptied  The normal range of voiding urine is 6 to 8 times during a 24 hour period  As we get older, our bladder capacity can get smaller and we may need to pass urine more frequently but usually not more than every 2 hours  Urine should flow easily without discomfort in a good, steady stream until the bladder is empty  No pushing or straining is necessary to empty the bladder  An urge is a signal that you feel as the bladder stretches to fill with urine  Urges can be felt even if the bladder is not full  Urges are not commands to go to the toilet, merely a signal and can be controlled  WHAT ARE GOOD BLADDER HABITS? Take your time when emptying your bladder  Don’t strain or push to empty your bladder  Make sure you empty your bladder completely each time you pass urine  Do not rush the process  Consistently ignoring the urge to go (waiting more than 4 hours between toileting) or urinating too infrequently may be convenient but not healthy for your bladder  Avoid going to the toilet “just in case” or more often than every 2 hours  It is usually not necessary to go when you feel the first urge  Try to go only when your bladder is full  Urgency and frequency of urination can be improved by retraining the bladder and spacing your fluid intake throughout the day  Practice good toilet habits  Don’t let your bladder control your life  TIPS TO MAINTAIN GOOD BLADDER HABITS  Maintain a good fluid intake  Depending on your body size and environment, drink 6 -8 cups (8 oz each) of fluid per day unless otherwise advised by your doctor  Not enough fluid creates a foul odor and dark color of the urine  Limit the amount of caffeine (coffee, cola, chocolate or tea) and citrus foods that you consume as these foods can be associated with increased sensation of urinary urgency and frequency  "    Limit the amount of alcohol you drink  Alcohol increases urine production and makes it difficult for the brain to coordinate bladder control  Avoid constipation by maintaining a balanced diet of dietary fiber  Cigarette smoking is also irritating to the bladder surface and is associated with bladder cancer  In addition, the coughing associated with smoking may lead to increased incontinent episodes because of the increased pressure  HOW DIET CAN AFFECT YOUR BLADDER  Although there is no particular \"diet\" that can cure bladder control, there are certain dietary suggestions you can use to help control the problem  There are 2 points to consider when evaluating how your habits and diet may affect your bladder:    Foods and fluids can irritate the bladder  Some foods and beverages are thought to contribute to bladder leakage and irritability  However their effect on the bladder is not completely understood and you may want to see if eliminating one or all of these items improves your bladder control  If you are unable to give them up completely, it is recommended that you use the following items in moderation:  Acidic beverages and foods (orange juice, grapefruit juice, lemonade etc)  Alcoholic beverages  Vinegar  Coffee (regular and decaf)  Tea (regular and decaf)  Caffeinated beverages  Carbonated beverages          Drinking enough and the right kinds of fluids  Many people with bladder control issues decrease their intake of liquids in hope that they will need to urinate less frequently or have less urinary leakage  You should not restrict fluids to control your bladder  While a decrease in liquid intake does result in a decrease in the volume of urine, the smaller amount of urine may be more highly concentrated  Highly concentrated, dark yellow urine is irritating to the bladder surface and may actually cause you to go to the bathroom more frequently        It also encourages the growth " of bacteria, which may lead to infections resulting in incontinence  Substitutions for Bladder Irritants: water is always the best beverage choice  Grape and apple juice are thirst quenchers are good selections and are not as irritating to the bladder    Low acid fruits:  Pears, apricots, papaya, watermelon  For coffee drinkers: KAVA®, Postum®, Kristina®, Kaffree Bryanna®  For tea drinkers:  non-citrus or herbal and sun brewed tea

## 2023-03-30 NOTE — PROGRESS NOTES
Assessment and plan:     Pelviectasis of kidney  · Resolved on recent ct scan  · Follow up as needed      DEVEN Barbour    History of Present Illness     Fabio Padilla is a 80 y o  female who presents for a 6 month follow up with repeat imaging  She establish care with me August 2022 for mild pelviectasis without arnav hydronephrosis  She had an ultrasound due to left lower abdominal discomfort and back discomfort  Prior to that she had a CT scan 06/18/2022 with circumferential bladder wall thickening, no hydronephrosis or urinary tract calculi  She reports intermittent left-sided flank pain for years which improves with use of lidocaine patch  Denies any urinary symptom or worsening of confusion  Denies any urinary incontinence  Does have a history of prior hysterectomy  Denies gross hematuria  She has diabetes with a1c 6 4    She had 3 positive urine cultures for Pseudomonas aeruginosa in 2022 and in 2021  She has not had any recurrent UTI or symptoms of UTI  Last urine testing was completed December 2022 and was unremarkable  Last positive culture was June 2022  Laboratory     Lab Results   Component Value Date    BUN 17 02/13/2023    CREATININE 1 17 02/13/2023       No components found for: GFR    Lab Results   Component Value Date    GLUCOSE 161 (H) 01/27/2016    CALCIUM 9 3 02/13/2023     10/15/2015    K 4 0 02/13/2023    CO2 24 02/13/2023     02/13/2023       Lab Results   Component Value Date    WBC 4 26 (L) 02/08/2023    HGB 12 3 02/08/2023    HCT 37 4 02/08/2023    MCV 91 02/08/2023     02/08/2023       No results found for: PSA    No results found for this or any previous visit (from the past 1 hour(s))      @RESULT(URINEMICROSCOPIC)@    @RESULT(URINECULTURE)@    Radiology     CT ABDOMEN AND PELVIS WITH IV CONTRAST 2/8/2023     INDICATION:   LUQ abdominal pain  flank pain, hx of colitis      COMPARISON:  December 30, 2022     TECHNIQUE:  CT examination of the abdomen and pelvis was performed  Axial, sagittal, and coronal 2D reformatted images were created from the source data and submitted for interpretation      Radiation dose length product (DLP) for this visit:  673 mGy-cm   This examination, like all CT scans performed in the [facility], was performed utilizing techniques to minimize radiation dose exposure, including the use of iterative   reconstruction and automated exposure control      IV Contrast:  100 mL of iohexol (OMNIPAQUE)  Enteric Contrast:  Enteric contrast was not administered      FINDINGS:     ABDOMEN     LOWER CHEST:  No clinically significant abnormality identified in the visualized lower chest      LIVER/BILIARY TREE:  Unremarkable      GALLBLADDER:  No calcified gallstones  No pericholecystic inflammatory change      SPLEEN:  Unremarkable      PANCREAS:  Unremarkable      ADRENAL GLANDS:  Unremarkable      KIDNEYS/URETERS:  Unremarkable  No hydronephrosis      STOMACH AND BOWEL:  Thickening of the transverse colon, descending colon and the sigmoid colon is visualized  Colonic diverticulosis without evidence of acute diverticulitis      APPENDIX:  No findings to suggest appendicitis      ABDOMINOPELVIC CAVITY:  No ascites  No pneumoperitoneum  No lymphadenopathy      VESSELS:  Atherosclerotic changes are present  No evidence of aneurysm      PELVIS     REPRODUCTIVE ORGANS:  Surgical changes of prior hysterectomy      URINARY BLADDER:  Thickening of the urinary bladder wall is seen      ABDOMINAL WALL/INGUINAL REGIONS:  Small fat-containing umbilical wall hernia      OSSEOUS STRUCTURES:  Scoliosis of the spine is seen  Degenerative changes in the spine are visualized      IMPRESSION:     Diffuse thickening of the colonic wall as described above likely representing colitis  Follow-up with gastroenterology is recommended      Thickening of the urinary bladder wall which may represent cystitis    Follow-up with urology is recommended      The study was marked in EPIC for immediate notification  Review of Systems     Review of Systems   Constitutional: Negative for activity change, appetite change, chills, fatigue, fever and unexpected weight change  HENT: Negative for facial swelling  Eyes: Negative for discharge  Respiratory: Negative  Negative for cough and shortness of breath  Cardiovascular: Negative for chest pain and leg swelling  Gastrointestinal: Positive for constipation  Negative for abdominal distention, abdominal pain, diarrhea, nausea and vomiting  Endocrine: Negative  Genitourinary: Negative  Negative for decreased urine volume, difficulty urinating, dysuria, enuresis, flank pain, frequency, genital sores, hematuria and urgency  Musculoskeletal: Negative for back pain and myalgias  Skin: Negative for pallor and rash  Allergic/Immunologic: Negative  Negative for immunocompromised state  Neurological: Negative for facial asymmetry and speech difficulty  Psychiatric/Behavioral: Negative for agitation and confusion  Allergies     Allergies   Allergen Reactions   • Levaquin [Levofloxacin] Myalgia   • Statins      Other reaction(s): Weakness  Category: Adverse Reaction;        Physical Exam     Physical Exam  Vitals reviewed  Constitutional:       General: She is not in acute distress  Appearance: Normal appearance  She is not ill-appearing, toxic-appearing or diaphoretic  HENT:      Head: Normocephalic and atraumatic  Eyes:      General: No scleral icterus  Cardiovascular:      Rate and Rhythm: Normal rate  Pulmonary:      Effort: Pulmonary effort is normal  No respiratory distress  Abdominal:      General: Abdomen is flat  There is no distension  Palpations: Abdomen is soft  Musculoskeletal:         General: No swelling  Cervical back: Normal range of motion  Skin:     General: Skin is warm and dry  Coloration: Skin is not jaundiced or pale  Findings: No rash  Neurological:      General: No focal deficit present  Mental Status: She is alert and oriented to person, place, and time  Gait: Gait abnormal       Comments: In wheelchair   Psychiatric:         Mood and Affect: Mood normal          Behavior: Behavior normal          Thought Content: Thought content normal          Judgment: Judgment normal          Vital Signs     Vitals:    03/30/23 1103   BP: 140/74   Pulse: 74   SpO2: 98%   Weight: 72 1 kg (159 lb)       Current Medications       Current Outpatient Medications:   •  acetaminophen (TYLENOL) 325 mg tablet, Take 2 tablets (650 mg total) by mouth every 6 (six) hours as needed for mild pain, Disp: 20 tablet, Rfl: 0  •  amLODIPine (NORVASC) 5 mg tablet, TAKE 1 TABLET (5 MG TOTAL) BY MOUTH DAILY, Disp: 90 tablet, Rfl: 3  •  aspirin 81 MG tablet, Take 81 mg by mouth daily  , Disp: , Rfl:   •  Cholecalciferol (VITAMIN D3) 2000 units capsule, Take 2,000 Units by mouth daily , Disp: , Rfl:   •  dorzolamide (TRUSOPT) 2 % ophthalmic solution, Administer 1 drop to both eyes 3 (three) times a day  , Disp: , Rfl:   •  furosemide (LASIX) 20 mg tablet, TAKE 1 TABLET IN THE MORNING  AND TAKE 2 TABLETS IN THE EVENING Do not start before January 3, 2023 , Disp: 270 tablet, Rfl: 0  •  gabapentin (NEURONTIN) 600 MG tablet, PT to take 1 tab twice daily as she forgets to take the 1/2 in the afternoon anyway  , Disp: 180 tablet, Rfl: 1  •  Incontinence Supply Disposable (SELECT DISPOSABLE UNDERWEAR LG) MISC, , Disp: , Rfl:   •  Lancets (ACCU-CHEK SOFT TOUCH) lancets, Testing 1 time daily  Dx: E11 9, Disp: 100 each, Rfl: 3  •  latanoprost (XALATAN) 0 005 % ophthalmic solution, Apply 1 drop to eye daily at bedtime Both eyes, Disp: 7 5 mL, Rfl: 1  •  levothyroxine 88 mcg tablet, Take 1 tablet (88 mcg total) by mouth daily, Disp: 90 tablet, Rfl: 3  •  Misc   Devices (Transport Chair) MISC, Use if needed (with outings outside of the house ), Disp: 1 each, Rfl: 0  •  oxygen gas, Inhale 2 L/min daily at bedtime   Indications: Shortness of breath, Disp: , Rfl:   •  pantoprazole (PROTONIX) 40 mg tablet, Take 1 tablet (40 mg total) by mouth daily, Disp: 90 tablet, Rfl: 3  •  potassium chloride (K-DUR,KLOR-CON) 20 mEq tablet, TAKE 1 TABLET EVERY DAY (Patient taking differently: 20 mEq if needed), Disp: 90 tablet, Rfl: 0  •  psyllium (METAMUCIL SMOOTH TEXTURE) 28 % packet, Take 1 packet by mouth in the morning (Patient taking differently: Take 1 packet by mouth if needed), Disp: 30 packet, Rfl: 11  •  vitamin B-12 (CYANOCOBALAMIN) 250 MCG tablet, Take 250 mcg by mouth daily, Disp: , Rfl:   •  famotidine (PEPCID) 20 mg tablet, Take 1 tablet (20 mg total) by mouth 2 (two) times a day, Disp: 180 tablet, Rfl: 3    Active Problems     Patient Active Problem List   Diagnosis   • Peripheral vascular disease ([facility])   • Glaucoma, open angle, severe stage   • Arteriosclerosis of coronary artery   • Asthma   • Aortic stenosis   • Hyperlipidemia   • Advanced age   • Allergic rhinitis   • Ambulatory dysfunction   • Anxiety disorder   • General weakness   • Irritable bowel   • Trigeminal neuralgia   • Hallucination   • Dyspnea on minimal exertion   • Hypothyroidism   • CHF (congestive heart failure) ([facility])   • Insomnia   • GERD (gastroesophageal reflux disease)   • Bilateral cold feet   • Female cystocele   • Diverticulosis   • Dizziness   • Hearing loss   • Hiatal hernia   • Low back pain   • Mild cognitive impairment   • Osteoarthritis   • Overactive bladder   • Shakiness   • Headache   • Pain of both hip joints   • Candidal intertrigo   • Vitamin D deficiency   • Type 2 diabetes mellitus ([facility])   • Paroxysmal nocturnal dyspnea   • Orthopnea   • Shortness of breath   • Chronic headache   • Neoplasm of face   • Stable angina ([facility])   • Atypical facial pain   • Medicare annual wellness visit, subsequent   • Diarrhea   • Left hip pain   • Constipation   • Bilateral impacted cerumen   • Conductive hearing loss, bilateral   • Supraorbital neuralgia   • Myofascial muscle pain   • Cranial neuralgia   • Essential hypertension   • Hyponatremia   • Vertigo   • Chronic nonintractable headache   • Fall   • Tail bone pain   • Flank pain   • CAIT (acute kidney injury) ([facility] )   • Myalgia   • Lumbar paraspinal muscle spasm   • Abnormal gait   • Disc degeneration, lumbar   • Pelviectasis of kidney   • Costochondritis   • Epigastric pain   • Generalized body aches   • Colitis   • Spinal stenosis of lumbar region with neurogenic claudication       Past Medical History     Past Medical History:   Diagnosis Date   • Asthma    • Atypical chest pain    • CAD (coronary artery disease)    • Candidal intertrigo    • CHF (congestive heart failure) ([facility])    • Depression    • Diabetes mellitus ([facility] )    • Diabetic neuropathy ([facility])    • Diverticulitis    • Dyslipidemia    • Female bladder prolapse    • Gastric ulcer    • Glaucoma    • HTN (hypertension)    • Hyperlipidemia    • Hypothyroidism 4/18/2016   • RAD (reactive airway disease)        Surgical History     Past Surgical History:   Procedure Laterality Date   • COLONOSCOPY     • ESOPHAGOGASTRODUODENOSCOPY  2011   • HYSTERECTOMY     • TONSILLECTOMY         Family History     Family History   Problem Relation Age of Onset   • Breast cancer Mother    • Hypothyroidism Mother    • Hypertension Father    • Breast cancer Sister    • Thyroid disease Sister    • Hypertension Sister        Social History     Social History     Social History     Tobacco Use   Smoking Status Never   Smokeless Tobacco Never       Past Surgical History:   Procedure Laterality Date   • COLONOSCOPY     • ESOPHAGOGASTRODUODENOSCOPY  2011   • HYSTERECTOMY     • TONSILLECTOMY           The following portions of the patient's history were reviewed and updated as appropriate: allergies, current medications, past family history, past medical history, past social history, past surgical history and problem list    Please note :  Voice dictation software has been used to create this document  There may be inadvertent transcription errors      [id number] [address]

## 2023-04-24 ENCOUNTER — TELEPHONE (OUTPATIENT)
Dept: CARDIOLOGY CLINIC | Facility: CLINIC | Age: 88
End: 2023-04-24

## 2023-04-24 ENCOUNTER — OFFICE VISIT (OUTPATIENT)
Dept: FAMILY MEDICINE CLINIC | Facility: CLINIC | Age: 88
End: 2023-04-24

## 2023-04-24 VITALS
HEART RATE: 72 BPM | BODY MASS INDEX: 28.44 KG/M2 | WEIGHT: 155.5 LBS | SYSTOLIC BLOOD PRESSURE: 118 MMHG | DIASTOLIC BLOOD PRESSURE: 62 MMHG
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
## 2023-04-24 NOTE — ASSESSMENT & PLAN NOTE
No evidence of fx  Small area of green bruise L mid back with good non painful inspiration  PT is encouraged to use her walker both in and out of the house  NO imaging is needed at this time  Continue patches , voltaren gel, heat as needed and time  Daughter and pt in agreement

## 2023-04-24 NOTE — TELEPHONE ENCOUNTER
Daughter called for refill of lasix pt was last seen 6/20202 and cancelled LOV on 10/2020  Referred to PCP for refills since they see on regular basis  LM for daughter on cell phone

## 2023-04-24 NOTE — PROGRESS NOTES
Name: Kaiden Tadeo      : 1922      MRN: [id number]  Encounter Provider: Frank Todd PA-C  Encounter Date: 2023   Encounter department: [facility] PRIMARY CARE    Assessment & Plan     1  Fall, initial encounter  Assessment & Plan:  No evidence of fx  Small area of green bruise L mid back with good non painful inspiration  PT is encouraged to use her walker both in and out of the house  NO imaging is needed at this time  Continue patches , voltaren gel, heat as needed and time  Daughter and pt in agreement  Subjective      Pt here today accompanied by her daughter  She was seen in the ER for back pain on  for L side rib back pain  CT abdomin and pelvis was conducted and the only acute finding despite WNL urine was probable acute cystitis for which levaquin was given  Pt got home and fell after her knees gave out landing on her back backwards w/o hitting her head  Pt has a walker but does not like to use it outside the home  Also pt is c/o little hard things coming out of her nose when she can get them out   Also pt is c/o sometimes drooling whiles she sleeps  Review of Systems   Constitutional: Negative  HENT: Negative  Eyes: Negative  Respiratory: Negative  Cardiovascular: Negative  Gastrointestinal: Negative  Endocrine: Negative  Genitourinary: Negative  Musculoskeletal: Positive for back pain  Skin: Negative  Allergic/Immunologic: Negative  Neurological: Negative  Hematological: Negative  Psychiatric/Behavioral: Negative  Current Outpatient Medications on File Prior to Visit   Medication Sig   • acetaminophen (TYLENOL) 325 mg tablet Take 2 tablets (650 mg total) by mouth every 6 (six) hours as needed for mild pain   • amLODIPine (NORVASC) 5 mg tablet TAKE 1 TABLET (5 MG TOTAL) BY MOUTH DAILY   • aspirin 81 MG tablet Take 81 mg by mouth daily     • Cholecalciferol (VITAMIN D3) 2000 units capsule Take 2,000 Units by mouth daily    • dorzolamide (TRUSOPT) 2 % ophthalmic solution Administer 1 drop to both eyes 3 (three) times a day     • furosemide (LASIX) 20 mg tablet TAKE 1 TABLET IN THE MORNING  AND TAKE 2 TABLETS IN THE EVENING Do not start before January 3, 2023  • gabapentin (NEURONTIN) 600 MG tablet PT to take 1 tab twice daily as she forgets to take the 1/2 in the afternoon anyway  • Incontinence Supply Disposable (SELECT DISPOSABLE UNDERWEAR LG) MISC    • Lancets (ACCU-CHEK SOFT TOUCH) lancets Testing 1 time daily  Dx: E11 9   • latanoprost (XALATAN) 0 005 % ophthalmic solution Apply 1 drop to eye daily at bedtime Both eyes   • levothyroxine 88 mcg tablet Take 1 tablet (88 mcg total) by mouth daily   • Misc  Devices (Transport Chair) MISC Use if needed (with outings outside of the house )   • oxygen gas Inhale 2 L/min daily at bedtime  Indications: Shortness of breath   • pantoprazole (PROTONIX) 40 mg tablet Take 1 tablet (40 mg total) by mouth daily   • potassium chloride (K-DUR,KLOR-CON) 20 mEq tablet TAKE 1 TABLET EVERY DAY (Patient taking differently: 20 mEq if needed)   • psyllium (METAMUCIL SMOOTH TEXTURE) 28 % packet Take 1 packet by mouth in the morning (Patient taking differently: Take 1 packet by mouth if needed)   • vitamin B-12 (CYANOCOBALAMIN) 250 MCG tablet Take 250 mcg by mouth daily   • famotidine (PEPCID) 20 mg tablet Take 1 tablet (20 mg total) by mouth 2 (two) times a day       Objective     /62   Pulse 72   Wt 70 5 kg (155 lb 8 oz)   BMI 28 44 kg/m²     Physical Exam  Vitals and nursing note reviewed  Constitutional:       General: She is not in acute distress  Appearance: She is well-developed  She is not diaphoretic  HENT:      Head: Normocephalic and atraumatic  Nose: Nose normal       Mouth/Throat:      Pharynx: Oropharynx is clear  Eyes:      General:         Right eye: No discharge  Left eye: No discharge        Conjunctiva/sclera: Conjunctivae normal    Neck:      Vascular: No carotid bruit  Cardiovascular:      Rate and Rhythm: Normal rate and regular rhythm  Heart sounds: Normal heart sounds  No murmur heard  No friction rub  No gallop  Pulmonary:      Effort: Pulmonary effort is normal  No respiratory distress  Breath sounds: Normal breath sounds  No wheezing or rales  Musculoskeletal:        Arms:       Cervical back: Neck supple  Comments: Very faint circular bruise roughly 2-1/2 cm left mid  to palpation  Breathing deep breathes normally and w/o pain  Skin:     General: Skin is warm and dry  Neurological:      Mental Status: She is alert and oriented to person, place, and time     Psychiatric:         Judgment: Judgment normal        Frank Todd PA-C

## 2023-04-24 NOTE — PATIENT INSTRUCTIONS
1  Fall, initial encounter  Assessment & Plan:  No evidence of fx  Small area of green bruise L mid back with good non painful inspiration  PT is encouraged to use her walker both in and out of the house  NO imaging is needed at this time  Continue patches , voltaren gel, heat as needed and time  Daughter and pt in agreement

## 2023-04-25 DIAGNOSIS — I35.0 NONRHEUMATIC AORTIC VALVE STENOSIS: Chronic | ICD-10-CM

## 2023-04-25 RX ORDER — FUROSEMIDE 20 MG/1
TABLET ORAL
Qty: 270 TABLET | Refills: 0 | Status: SHIPPED | OUTPATIENT
Start: 2023-04-25

## 2023-05-01 ENCOUNTER — TELEPHONE (OUTPATIENT)
Dept: NEUROLOGY | Facility: CLINIC | Age: 88
End: 2023-05-01

## 2023-05-01 DIAGNOSIS — R42 VERTIGO: Primary | ICD-10-CM

## 2023-05-01 DIAGNOSIS — G47.00 INSOMNIA: ICD-10-CM

## 2023-05-01 NOTE — TELEPHONE ENCOUNTER
Received VM  Pt is feeling lightheaded  ___________________________________    Phone call to pt  Pt stated she began feeling lightheaded a few days ago  Symptom was coming and going  Today the lightheadedness has been all day  Nothing make it better or worse  Doesn't change when pt stands up or changes positions  Denies headache, visual changes, n/v or dizziness  Pt's continues to experience constant vertigo stating it fells like she is on a train  When pt lays down and closes her eyes the vertigo gets worse  Today pt took Gabapentin 600 mg,  levothyoxine, Lasix, and K-dur famotidine and 2 extra strength Tylenol which helped a little  Pt lives with her daughter  Advised pt to go to the ED to be evaluated  Pt stated she does not want to go to the ED today but rather wait to hear from [facility]  Advised if the light headedness gets worse or new symptoms arise, to call 911  Pt voiced understanding  Last OV 12-20-22  Next OV 6-27-23    [facility], please advise  Thank you! CB# [phone] OK to leave a detailed message  [facility] please advise

## 2023-05-02 NOTE — TELEPHONE ENCOUNTER
For vertigo, we recommended PT  I can contact someone to do it in the home if possible  If tylenol helps, recommend to continue that PRN  Wondering if Seroquel will help  If she has agitation, trouble sleeping, can try this med at bed time  Does she have headaches with the vertigo? Can pt see Dr Juares Last or me again in the near future? KIRSTEN Roberts

## 2023-05-03 ENCOUNTER — TELEPHONE (OUTPATIENT)
Dept: NEUROLOGY | Facility: CLINIC | Age: 88
End: 2023-05-03

## 2023-05-03 RX ORDER — QUETIAPINE FUMARATE 50 MG/1
50 TABLET, FILM COATED ORAL
Qty: 90 TABLET | Refills: 0 | Status: SHIPPED | OUTPATIENT
Start: 2023-05-03

## 2023-05-03 NOTE — TELEPHONE ENCOUNTER
"Per home health message to me:  \"Medicare PDGM guidelines for home health services require a visit within the last 80  This referral will be canceled  Once patient is seen(either in person or with a telehealth visit(must be video), resubmit the referral  Any questions please call   Thank you     We also do not provide vestibular therapy  You could instead consider sending the referral to [facility]  as they bill Medicare part B as an OP and provide services in the home and do NOT need to adhere to the same guidelines as home health related to homebound status and F2F requirements  \"      Clinical- would pt consider [facility] through [facility] if this was covered?   "

## 2023-05-03 NOTE — TELEPHONE ENCOUNTER
"Marya Beltran PA-C routed conversation to [facility] Clinical Team 5 1 hour ago (11:40 AM)     Marya Beltran PA-C 1 hour ago (11:40 AM)     Per home health message to me:  \"Medicare PD guidelines for home health services require a visit within the last 80  This referral will be canceled  Once patient is seen(either in person or with a telehealth visit(must be video), resubmit the referral  Any questions please call   Thank you     We also do not provide vestibular therapy  You could instead consider sending the referral to [facility]  as they bill Medicare part B as an OP and provide services in the home and do NOT need to adhere to the same guidelines as home health related to homebound status and F2F requirements  \"        Clinical- would pt consider [facility] if this was covered?           "

## 2023-05-03 NOTE — TELEPHONE ENCOUNTER
seroquel sent to [facility] ordered  Does this referral go straight to [address] [place] [address]?

## 2023-05-03 NOTE — TELEPHONE ENCOUNTER
Pt made aware of below  pt agreeable to [facility]  she does has trouble sleeping  she is agreeable to seroquel  no headaches with vertigo, just constant vertigo  [address] doesn't have any sooner appts    Pt already scheduled for 6/27

## 2023-05-04 NOTE — TELEPHONE ENCOUNTER
Per note on the referral-  Note    [facility] [[id number]] (Referred To Provider)  Letter sent: [facility] [[id number]]  Fax number: [phone]  Sent: Fax        Called and advised pt of the below and above  She verbalized clear understanding  [facility] that [facility]  Per [facility] contact # [phone]  Advised pt to contact this #   She verbalized understanding

## 2023-05-05 ENCOUNTER — TELEPHONE (OUTPATIENT)
Dept: GASTROENTEROLOGY | Facility: MEDICAL CENTER | Age: 88
End: 2023-05-05

## 2023-05-09 NOTE — TELEPHONE ENCOUNTER
5/8/23 3:57    Pt left a VM requesting an order for an MRI  Transcribed VM:   This is Luzma Barrios, calling to speak to [facility]  I am calling to ask her to schedule, an appointment for her  Filiberto Webb I at [facility]  Because as I said, I'd like to just find out this was going on in my head  And she did mention something about this before  I can be reached At [phone]  Is Luzma Blood  9/24/1922  Thank you

## 2023-05-10 NOTE — TELEPHONE ENCOUNTER
Pt called because she missed her appointment for today  She states they would not have taken an appointment for 7:40am  She would like a call back to reschedule at [phone]

## 2023-05-11 NOTE — TELEPHONE ENCOUNTER
I can order an MRI for her but I am not sure that she needs it considering imaging has always been fine  For reassurance, will order it, but can we meet in person or do telemed visit first?    Is pt getting any benefit from seroquel, therapies for vertigo? Thanks

## 2023-05-11 NOTE — TELEPHONE ENCOUNTER
Called and advised pt of all of the below  She verbalized clear understanding  Pt declined in person or virtual video visit  She is ok not getting an MRI for now  Pt states that she did not start seroquel yet bec of the possible side effects that could happen  Her vertigo is still there  Not worse but it's still there

## 2023-05-27 PROBLEM — H61.23 BILATERAL IMPACTED CERUMEN: Status: RESOLVED | Noted: 2021-02-11 | Resolved: 2023-05-27

## 2023-06-27 ENCOUNTER — TELEPHONE (OUTPATIENT)
Dept: NEUROLOGY | Facility: CLINIC | Age: 88
End: 2023-06-27

## 2023-06-27 NOTE — TELEPHONE ENCOUNTER
Pts daughter called to r/s appt due to pt having a bad day  R/s to 11/14 @ 1:45 with [address] [facility] in [place]  Pts daughter is asking that [address] call pt at home today if possible     Thank you

## 2023-06-29 NOTE — TELEPHONE ENCOUNTER
Called [phone], spoke to pt's dtr Rajesh Choi re: below  States that [facility] already spoke w/ her mom   No other questions or concerns at this time

## 2023-07-11 DIAGNOSIS — I35.0 NONRHEUMATIC AORTIC VALVE STENOSIS: Chronic | ICD-10-CM

## 2023-07-11 RX ORDER — FUROSEMIDE 20 MG/1
TABLET ORAL
Qty: 270 TABLET | Refills: 0 | OUTPATIENT
Start: 2023-07-11

## 2023-08-04 DIAGNOSIS — I35.0 NONRHEUMATIC AORTIC VALVE STENOSIS: Chronic | ICD-10-CM

## 2023-08-04 RX ORDER — FUROSEMIDE 20 MG/1
TABLET ORAL
Qty: 270 TABLET | Refills: 0 | Status: SHIPPED | OUTPATIENT
Start: 2023-08-04 | End: 2023-08-09 | Stop reason: SDUPTHER

## 2023-08-09 DIAGNOSIS — I35.0 NONRHEUMATIC AORTIC VALVE STENOSIS: Chronic | ICD-10-CM

## 2023-08-09 RX ORDER — FUROSEMIDE 20 MG/1
TABLET ORAL
Qty: 270 TABLET | Refills: 1 | Status: SHIPPED | OUTPATIENT
Start: 2023-08-09

## 2023-08-09 NOTE — TELEPHONE ENCOUNTER
Larissa from the pharmacy called in because pt's last renewed medication was sent over to [facility] pharmacy instead of [facility]. Larissa called in to have this changed over as soon as possible. The name of the medication is called furosemide. Thank you.

## 2023-08-11 DIAGNOSIS — G47.00 INSOMNIA: ICD-10-CM

## 2023-08-11 RX ORDER — QUETIAPINE FUMARATE 50 MG/1
50 TABLET, FILM COATED ORAL
Qty: 90 TABLET | Refills: 0 | Status: SHIPPED | OUTPATIENT
Start: 2023-08-11

## 2023-08-17 DIAGNOSIS — K21.9 GASTROESOPHAGEAL REFLUX DISEASE WITHOUT ESOPHAGITIS: ICD-10-CM

## 2023-08-17 RX ORDER — FAMOTIDINE 20 MG/1
20 TABLET, FILM COATED ORAL 2 TIMES DAILY
Qty: 180 TABLET | Refills: 3 | Status: ON HOLD | OUTPATIENT
Start: 2023-08-17 | End: 2023-11-15

## 2023-08-17 NOTE — TELEPHONE ENCOUNTER
I'm calling to for to make your prescription. My name is Jet Noguera, 9/24/1922. The prescription I want filled is for famotidine 20 mg tablet. I didn't know what else she asked me for. My number is [phone].

## 2023-08-22 ENCOUNTER — TELEPHONE (OUTPATIENT)
Dept: NEUROLOGY | Facility: CLINIC | Age: 88
End: 2023-08-22

## 2023-08-22 NOTE — ASSESSMENT & PLAN NOTE
Lab Results   Component Value Date    EGFR 50 06/21/2022    EGFR 40 06/20/2022    EGFR 41 06/19/2022    CREATININE 0 94 06/21/2022    CREATININE 1 12 06/20/2022    CREATININE 1 10 06/19/2022   Patient does have history of CKD  Her last GFR was reasonable, but I would be cautious about using nonsteroidal anti-inflammatories long-term  Based on that, short-term use of Celebrex only and lower dose  Initiate Treatment: keto+tmc BID 2 weeks for flare Detail Level: Zone Render In Strict Bullet Format?: No Plan: Zeasorb as prevention

## 2023-08-22 NOTE — TELEPHONE ENCOUNTER
received vm from 8/21 at 5:13pm-i'd like to speak to [facility]. My name is Susy Jain Dec. [phone]. This to me is important. Thank you.-  --------------------------------------------------------  Called pt. States that She received a medication a couple days ago and she did not request it  She received seroquel. Per chart review, there was an electronic refill request sent from the pharm which was approved. Made her aware of this. She states that she is not taking this. She tried it in the past and she read the side effects and she spoke to you regarding this and is not taking it. She also states that you were going to see if you could see her sooner than her nov appt.   I do not see any open appts except botox and NP appt's    Please advise on appt  [phone] to leave detailed message

## 2023-08-23 NOTE — TELEPHONE ENCOUNTER
Called and spoke with Vane Bernstein offered an 8am spot but that's too early for her, I asked if she wanted to go to our [facility] [place] office and she couldn't do that, she asked to be put on the cancellation list.

## 2023-08-27 ENCOUNTER — APPOINTMENT (EMERGENCY)
Dept: RADIOLOGY | Facility: HOSPITAL | Age: 88
End: 2023-08-27
Payer: COMMERCIAL

## 2023-08-27 ENCOUNTER — APPOINTMENT (EMERGENCY)
Dept: CT IMAGING | Facility: HOSPITAL | Age: 88
End: 2023-08-27
Payer: COMMERCIAL

## 2023-08-27 ENCOUNTER — HOSPITAL ENCOUNTER (INPATIENT)
Facility: HOSPITAL | Age: 88
LOS: 2 days | Discharge: HOME | End: 2023-08-29
Attending: EMERGENCY MEDICINE | Admitting: STUDENT IN AN ORGANIZED HEALTH CARE EDUCATION/TRAINING PROGRAM
Payer: COMMERCIAL

## 2023-08-27 DIAGNOSIS — K92.2 GI BLEED: Primary | ICD-10-CM

## 2023-08-27 DIAGNOSIS — R53.83 FATIGUE: ICD-10-CM

## 2023-08-27 DIAGNOSIS — E03.9 HYPOTHYROIDISM, UNSPECIFIED TYPE: ICD-10-CM

## 2023-08-27 DIAGNOSIS — K59.00 CONSTIPATION, UNSPECIFIED CONSTIPATION TYPE: ICD-10-CM

## 2023-08-27 PROBLEM — I50.32 CHRONIC DIASTOLIC CONGESTIVE HEART FAILURE (HCC): Status: ACTIVE | Noted: 2018-08-05

## 2023-08-27 LAB
2HR DELTA HS TROPONIN: 0 NG/L
4HR DELTA HS TROPONIN: 1 NG/L
ABO GROUP BLD: NORMAL
ABO GROUP BLD: NORMAL
ALBUMIN SERPL BCP-MCNC: 3.8 G/DL (ref 3.5–5)
ALP SERPL-CCNC: 94 U/L (ref 34–104)
ALT SERPL W P-5'-P-CCNC: 5 U/L (ref 7–52)
ANION GAP SERPL CALCULATED.3IONS-SCNC: 7 MMOL/L
APTT PPP: 29 SECONDS (ref 23–37)
AST SERPL W P-5'-P-CCNC: 9 U/L (ref 13–39)
ATRIAL RATE: 56 BPM
BASOPHILS # BLD AUTO: 0.04 THOUSANDS/ÂΜL (ref 0–0.1)
BASOPHILS NFR BLD AUTO: 1 % (ref 0–1)
BILIRUB DIRECT SERPL-MCNC: 0.06 MG/DL (ref 0–0.2)
BILIRUB SERPL-MCNC: 0.43 MG/DL (ref 0.2–1)
BLD GP AB SCN SERPL QL: NEGATIVE
BNP SERPL-MCNC: 355 PG/ML (ref 0–100)
BUN SERPL-MCNC: 18 MG/DL (ref 5–25)
CALCIUM SERPL-MCNC: 9.3 MG/DL (ref 8.4–10.2)
CARDIAC TROPONIN I PNL SERPL HS: 14 NG/L
CARDIAC TROPONIN I PNL SERPL HS: 14 NG/L
CARDIAC TROPONIN I PNL SERPL HS: 15 NG/L
CHLORIDE SERPL-SCNC: 103 MMOL/L (ref 96–108)
CO2 SERPL-SCNC: 26 MMOL/L (ref 21–32)
CREAT SERPL-MCNC: 1.12 MG/DL (ref 0.6–1.3)
EOSINOPHIL # BLD AUTO: 0.19 THOUSAND/ÂΜL (ref 0–0.61)
EOSINOPHIL NFR BLD AUTO: 3 % (ref 0–6)
ERYTHROCYTE [DISTWIDTH] IN BLOOD BY AUTOMATED COUNT: 15.4 % (ref 11.6–15.1)
GFR SERPL CREATININE-BSD FRML MDRD: 40 ML/MIN/1.73SQ M
GLUCOSE SERPL-MCNC: 141 MG/DL (ref 65–140)
GLUCOSE SERPL-MCNC: 195 MG/DL (ref 65–140)
GLUCOSE SERPL-MCNC: 196 MG/DL (ref 65–140)
HCT VFR BLD AUTO: 35.1 % (ref 34.8–46.1)
HGB BLD-MCNC: 10.3 G/DL (ref 11.5–15.4)
IMM GRANULOCYTES # BLD AUTO: 0.01 THOUSAND/UL (ref 0–0.2)
IMM GRANULOCYTES NFR BLD AUTO: 0 % (ref 0–2)
INR PPP: 1.09 (ref 0.84–1.19)
LIPASE SERPL-CCNC: 19 U/L (ref 11–82)
LYMPHOCYTES # BLD AUTO: 0.92 THOUSANDS/ÂΜL (ref 0.6–4.47)
LYMPHOCYTES NFR BLD AUTO: 17 % (ref 14–44)
MAGNESIUM SERPL-MCNC: 2 MG/DL (ref 1.9–2.7)
MCH RBC QN AUTO: 24.3 PG (ref 26.8–34.3)
MCHC RBC AUTO-ENTMCNC: 29.3 G/DL (ref 31.4–37.4)
MCV RBC AUTO: 83 FL (ref 82–98)
MONOCYTES # BLD AUTO: 0.52 THOUSAND/ÂΜL (ref 0.17–1.22)
MONOCYTES NFR BLD AUTO: 9 % (ref 4–12)
NEUTROPHILS # BLD AUTO: 3.83 THOUSANDS/ÂΜL (ref 1.85–7.62)
NEUTS SEG NFR BLD AUTO: 70 % (ref 43–75)
NRBC BLD AUTO-RTO: 0 /100 WBCS
P AXIS: 30 DEGREES
PLATELET # BLD AUTO: 220 THOUSANDS/UL (ref 149–390)
PMV BLD AUTO: 10.7 FL (ref 8.9–12.7)
POTASSIUM SERPL-SCNC: 4.3 MMOL/L (ref 3.5–5.3)
PR INTERVAL: 214 MS
PROT SERPL-MCNC: 6.9 G/DL (ref 6.4–8.4)
PROTHROMBIN TIME: 14.2 SECONDS (ref 11.6–14.5)
QRS AXIS: -9 DEGREES
QRSD INTERVAL: 110 MS
QT INTERVAL: 434 MS
QTC INTERVAL: 418 MS
RBC # BLD AUTO: 4.23 MILLION/UL (ref 3.81–5.12)
RH BLD: POSITIVE
RH BLD: POSITIVE
SARS-COV-2 RNA RESP QL NAA+PROBE: NEGATIVE
SODIUM SERPL-SCNC: 136 MMOL/L (ref 135–147)
SPECIMEN EXPIRATION DATE: NORMAL
T WAVE AXIS: 70 DEGREES
VENTRICULAR RATE: 56 BPM
WBC # BLD AUTO: 5.51 THOUSAND/UL (ref 4.31–10.16)

## 2023-08-27 PROCEDURE — 85025 COMPLETE CBC W/AUTO DIFF WBC: CPT | Performed by: EMERGENCY MEDICINE

## 2023-08-27 PROCEDURE — 96361 HYDRATE IV INFUSION ADD-ON: CPT

## 2023-08-27 PROCEDURE — 82948 REAGENT STRIP/BLOOD GLUCOSE: CPT

## 2023-08-27 PROCEDURE — 86901 BLOOD TYPING SEROLOGIC RH(D): CPT | Performed by: EMERGENCY MEDICINE

## 2023-08-27 PROCEDURE — 99223 1ST HOSP IP/OBS HIGH 75: CPT | Performed by: STUDENT IN AN ORGANIZED HEALTH CARE EDUCATION/TRAINING PROGRAM

## 2023-08-27 PROCEDURE — 99285 EMERGENCY DEPT VISIT HI MDM: CPT | Performed by: EMERGENCY MEDICINE

## 2023-08-27 PROCEDURE — 99222 1ST HOSP IP/OBS MODERATE 55: CPT | Performed by: INTERNAL MEDICINE

## 2023-08-27 PROCEDURE — 70450 CT HEAD/BRAIN W/O DYE: CPT

## 2023-08-27 PROCEDURE — 71046 X-RAY EXAM CHEST 2 VIEWS: CPT

## 2023-08-27 PROCEDURE — 93005 ELECTROCARDIOGRAM TRACING: CPT

## 2023-08-27 PROCEDURE — 85610 PROTHROMBIN TIME: CPT | Performed by: EMERGENCY MEDICINE

## 2023-08-27 PROCEDURE — G1004 CDSM NDSC: HCPCS

## 2023-08-27 PROCEDURE — 83880 ASSAY OF NATRIURETIC PEPTIDE: CPT | Performed by: EMERGENCY MEDICINE

## 2023-08-27 PROCEDURE — 80076 HEPATIC FUNCTION PANEL: CPT | Performed by: EMERGENCY MEDICINE

## 2023-08-27 PROCEDURE — 86900 BLOOD TYPING SEROLOGIC ABO: CPT | Performed by: EMERGENCY MEDICINE

## 2023-08-27 PROCEDURE — 84484 ASSAY OF TROPONIN QUANT: CPT | Performed by: EMERGENCY MEDICINE

## 2023-08-27 PROCEDURE — 85730 THROMBOPLASTIN TIME PARTIAL: CPT | Performed by: EMERGENCY MEDICINE

## 2023-08-27 PROCEDURE — 80048 BASIC METABOLIC PNL TOTAL CA: CPT | Performed by: EMERGENCY MEDICINE

## 2023-08-27 PROCEDURE — 96374 THER/PROPH/DIAG INJ IV PUSH: CPT

## 2023-08-27 PROCEDURE — 99285 EMERGENCY DEPT VISIT HI MDM: CPT

## 2023-08-27 PROCEDURE — 74176 CT ABD & PELVIS W/O CONTRAST: CPT

## 2023-08-27 PROCEDURE — 83735 ASSAY OF MAGNESIUM: CPT | Performed by: EMERGENCY MEDICINE

## 2023-08-27 PROCEDURE — 93010 ELECTROCARDIOGRAM REPORT: CPT | Performed by: INTERNAL MEDICINE

## 2023-08-27 PROCEDURE — 87635 SARS-COV-2 COVID-19 AMP PRB: CPT | Performed by: EMERGENCY MEDICINE

## 2023-08-27 PROCEDURE — 83690 ASSAY OF LIPASE: CPT | Performed by: EMERGENCY MEDICINE

## 2023-08-27 PROCEDURE — 86850 RBC ANTIBODY SCREEN: CPT | Performed by: EMERGENCY MEDICINE

## 2023-08-27 PROCEDURE — 36415 COLL VENOUS BLD VENIPUNCTURE: CPT | Performed by: EMERGENCY MEDICINE

## 2023-08-27 PROCEDURE — C9113 INJ PANTOPRAZOLE SODIUM, VIA: HCPCS | Performed by: EMERGENCY MEDICINE

## 2023-08-27 RX ORDER — NYSTATIN 100000 [USP'U]/G
POWDER TOPICAL 2 TIMES DAILY
Status: DISCONTINUED | OUTPATIENT
Start: 2023-08-27 | End: 2023-08-29 | Stop reason: HOSPADM

## 2023-08-27 RX ORDER — MELATONIN
2000 DAILY
Status: DISCONTINUED | OUTPATIENT
Start: 2023-08-27 | End: 2023-08-29 | Stop reason: HOSPADM

## 2023-08-27 RX ORDER — FAMOTIDINE 20 MG/1
40 TABLET, FILM COATED ORAL DAILY
Status: DISCONTINUED | OUTPATIENT
Start: 2023-08-27 | End: 2023-08-27

## 2023-08-27 RX ORDER — INSULIN LISPRO 100 [IU]/ML
1-5 INJECTION, SOLUTION INTRAVENOUS; SUBCUTANEOUS
Status: DISCONTINUED | OUTPATIENT
Start: 2023-08-27 | End: 2023-08-29 | Stop reason: HOSPADM

## 2023-08-27 RX ORDER — FUROSEMIDE 20 MG/1
20 TABLET ORAL DAILY
Status: DISCONTINUED | OUTPATIENT
Start: 2023-08-28 | End: 2023-08-29 | Stop reason: HOSPADM

## 2023-08-27 RX ORDER — PANTOPRAZOLE SODIUM 40 MG/10ML
40 INJECTION, POWDER, LYOPHILIZED, FOR SOLUTION INTRAVENOUS ONCE
Status: COMPLETED | OUTPATIENT
Start: 2023-08-27 | End: 2023-08-27

## 2023-08-27 RX ORDER — GABAPENTIN 300 MG/1
300 CAPSULE ORAL 3 TIMES DAILY
Status: DISCONTINUED | OUTPATIENT
Start: 2023-08-27 | End: 2023-08-29 | Stop reason: HOSPADM

## 2023-08-27 RX ORDER — LATANOPROST 50 UG/ML
1 SOLUTION/ DROPS OPHTHALMIC
Status: DISCONTINUED | OUTPATIENT
Start: 2023-08-27 | End: 2023-08-29 | Stop reason: HOSPADM

## 2023-08-27 RX ORDER — FAMOTIDINE 20 MG/1
10 TABLET, FILM COATED ORAL DAILY
Status: DISCONTINUED | OUTPATIENT
Start: 2023-08-27 | End: 2023-08-29 | Stop reason: HOSPADM

## 2023-08-27 RX ORDER — DORZOLAMIDE HCL 20 MG/ML
1 SOLUTION/ DROPS OPHTHALMIC 3 TIMES DAILY
Status: DISCONTINUED | OUTPATIENT
Start: 2023-08-27 | End: 2023-08-29 | Stop reason: HOSPADM

## 2023-08-27 RX ORDER — AMLODIPINE BESYLATE 5 MG/1
5 TABLET ORAL DAILY
Status: DISCONTINUED | OUTPATIENT
Start: 2023-08-28 | End: 2023-08-29 | Stop reason: HOSPADM

## 2023-08-27 RX ORDER — GABAPENTIN 300 MG/1
300 CAPSULE ORAL
Status: DISCONTINUED | OUTPATIENT
Start: 2023-08-27 | End: 2023-08-29 | Stop reason: HOSPADM

## 2023-08-27 RX ORDER — PANTOPRAZOLE SODIUM 40 MG/1
40 TABLET, DELAYED RELEASE ORAL
Status: DISCONTINUED | OUTPATIENT
Start: 2023-08-28 | End: 2023-08-29 | Stop reason: HOSPADM

## 2023-08-27 RX ORDER — ACETAMINOPHEN 325 MG/1
650 TABLET ORAL EVERY 6 HOURS PRN
Status: DISCONTINUED | OUTPATIENT
Start: 2023-08-27 | End: 2023-08-29 | Stop reason: HOSPADM

## 2023-08-27 RX ORDER — POLYETHYLENE GLYCOL 3350 17 G/17G
17 POWDER, FOR SOLUTION ORAL DAILY
Status: DISCONTINUED | OUTPATIENT
Start: 2023-08-27 | End: 2023-08-29 | Stop reason: HOSPADM

## 2023-08-27 RX ORDER — LEVOTHYROXINE SODIUM 0.07 MG/1
75 TABLET ORAL
Status: DISCONTINUED | OUTPATIENT
Start: 2023-08-28 | End: 2023-08-29 | Stop reason: HOSPADM

## 2023-08-27 RX ORDER — ACETAMINOPHEN 325 MG/1
650 TABLET ORAL ONCE
Status: COMPLETED | OUTPATIENT
Start: 2023-08-27 | End: 2023-08-27

## 2023-08-27 RX ADMIN — DORZOLAMIDE HCL 1 DROP: 20 SOLUTION/ DROPS OPHTHALMIC at 17:16

## 2023-08-27 RX ADMIN — DORZOLAMIDE HCL 1 DROP: 20 SOLUTION/ DROPS OPHTHALMIC at 21:29

## 2023-08-27 RX ADMIN — FAMOTIDINE 10 MG: 20 TABLET ORAL at 15:13

## 2023-08-27 RX ADMIN — PANTOPRAZOLE SODIUM 40 MG: 40 INJECTION, POWDER, FOR SOLUTION INTRAVENOUS at 10:05

## 2023-08-27 RX ADMIN — LATANOPROST 1 DROP: 50 SOLUTION OPHTHALMIC at 21:29

## 2023-08-27 RX ADMIN — SODIUM CHLORIDE 500 ML: 0.9 INJECTION, SOLUTION INTRAVENOUS at 08:02

## 2023-08-27 RX ADMIN — ACETAMINOPHEN 650 MG: 325 TABLET ORAL at 08:00

## 2023-08-27 RX ADMIN — GABAPENTIN 300 MG: 300 CAPSULE ORAL at 21:29

## 2023-08-27 RX ADMIN — NYSTATIN: 100000 POWDER TOPICAL at 13:14

## 2023-08-27 RX ADMIN — INSULIN LISPRO 1 UNITS: 100 INJECTION, SOLUTION INTRAVENOUS; SUBCUTANEOUS at 17:16

## 2023-08-27 RX ADMIN — Medication 2000 UNITS: at 15:13

## 2023-08-27 RX ADMIN — POLYETHYLENE GLYCOL 3350 17 G: 17 POWDER, FOR SOLUTION ORAL at 13:15

## 2023-08-27 NOTE — H&P
[address] [facility]  H&P  Name: Anthony Mcqueen 80 y.o. female I MRN: [id number]  Unit/Bed#: [place] [id number] [facility] 212-02 I Date of Admission: 8/27/2023   Date of Service: 8/27/2023 I Hospital Day: 0      Assessment/Plan   * GI bleed  Assessment & Plan  8 year old female presented to our ED due to GI bleed and abdominal pain  · GI recommendations appreciated. No inpatient intervention planned at this time. They suspect that her Minimal blood per rectum most likely from benign anorectal etiology. Recommend bowel regimen with daily MiraLAX to prevent straining per her reports of intermittent constipation. · CT without any acute pathology, continue supportive care  · Monitor hemoglobin    Essential hypertension  Assessment & Plan  Controlled  · Continue Amlodipine    Acute cystitis with hematuria  Assessment & Plan  Patient complaining of frequency, no dysuria. · Urinalysis ordered    Type 2 diabetes mellitus ([address])  Assessment & Plan  Lab Results   Component Value Date    HGBA1C 9.0 (H) 02/13/2023       No results for input(s): "POCGLU" in the last 72 hours. Blood Sugar Average: Last 72 hrs:     Given advanced age, will put her on regular diet for now  Sliding scale, monitor fingersticks    GERD (gastroesophageal reflux disease)  Assessment & Plan  Continue PPI / Famotidine    Chronic diastolic congestive heart failure (HCC)  Assessment & Plan  Wt Readings from Last 3 Encounters:   04/24/23 70.5 kg (155 lb 8 oz)   04/19/23 70.6 kg (155 lb 10.3 oz)   03/30/23 72.1 kg (159 lb)     Not in acute exacerbation. Continue lasix. X-ray without any acute abnormalities. Hypothyroidism  Assessment & Plan  Continue Levothyroxine 75mcg daily    Ambulatory dysfunction  Assessment & Plan  PT/OT evaluation    Aortic stenosis  Assessment & Plan  Severe stenosis, likely playing a role to her chronic shortness of breath.      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 We would not consider a transapical approach in her age. Given her age and comorbidities and calcification of her aorta I recommended continued medical therapy and no further procedure."            VTE Prophylaxis: anemia  Code Status: dnr/dni  Discussion with family: daughter    Anticipated Length of Stay:  Patient will be admitted on an Inpatient basis with an anticipated length of stay of  > 2 midnights. Justification for Hospital Stay: hemoglobin monitoring, pt/ot evaluation, abdominal pain    Chief Complaint:   GI bleed    History of Present Illness:    Shirley Hsu is a 80 y.o. female who presents with GI bleed and abdominal.    Patient is a 80-year-old female with symptomatic aortic stenosis (not a surgical candidate), GERD, type 2 diabetes mellitus, and hypothyroidism. Patient reports that she developed abdominal pain overnight. This was associated with some dizziness and seem to have resulted in bright red blood per rectum. She was sent brought in by her daughter to our ED for further evaluation. Imaging findings were performed which generally did not show any acute abnormalities. GI evaluated her and suspected that the minimal blood per rectum was most likely as a result of benign anorectal etiology. Admission was requested for further monitoring overnight and PT OT evaluation given her weakness. Her shortness of breath on review of systems was notably chronic likely as a result of her CHF and severe aortic stenosis. She was previously evaluated by CT surgery and deemed a poor candidate for intervention. Review of Systems:    Review of Systems   Constitutional: Negative for chills and fever. HENT: Negative for congestion. Respiratory: Positive for shortness of breath. Negative for cough and wheezing.     Cardiovascular: Negative for chest pain and palpitations. Gastrointestinal: Negative for abdominal pain, diarrhea, nausea and vomiting. Skin: Negative for color change and pallor. Neurological: Negative for headaches. Psychiatric/Behavioral: Negative for confusion. Past Medical and Surgical History:     Past Medical History:   Diagnosis Date   • Asthma    • Atypical chest pain    • CAD (coronary artery disease)    • Candidal intertrigo    • CHF (congestive heart failure) ([facility])    • Depression    • Diabetes mellitus ([address])    • Diabetic neuropathy (HCC)    • Diverticulitis    • Dyslipidemia    • Female bladder prolapse    • Gastric ulcer    • Glaucoma    • HTN (hypertension)    • Hyperlipidemia    • Hypothyroidism 4/18/2016   • RAD (reactive airway disease)        Past Surgical History:   Procedure Laterality Date   • COLONOSCOPY     • ESOPHAGOGASTRODUODENOSCOPY  2011   • HYSTERECTOMY     • TONSILLECTOMY         Meds/Allergies:    Prior to Admission medications    Medication Sig Start Date End Date Taking? Authorizing Provider   amLODIPine (NORVASC) 5 mg tablet TAKE 1 TABLET (5 MG TOTAL) BY MOUTH DAILY 8/30/22  Yes Yosef Almaraz PA-C   aspirin 81 MG tablet Take 81 mg by mouth daily. Yes Historical Provider, MD   Cholecalciferol (VITAMIN D3) 2000 units capsule Take 2,000 Units by mouth daily    Yes Historical Provider, MD   dorzolamide (TRUSOPT) 2 % ophthalmic solution Administer 1 drop to both eyes 3 (three) times a day     Yes Historical Provider, MD   famotidine (PEPCID) 20 mg tablet Take 1 tablet (20 mg total) by mouth 2 (two) times a day 8/17/23 11/15/23 Yes Yosef Almaraz PA-C   furosemide (LASIX) 20 mg tablet TAKE 1 TABLET BY MOUTH IN THE MORNING AND 2 TABLETS IN THE EVENING 8/9/23  Yes Yosef Almaraz PA-C   gabapentin (NEURONTIN) 600 MG tablet PT to take 1 tab twice daily as she forgets to take the 1/2 in the afternoon anyway.  1/27/23  Yes Yosef Almaraz PA-C   latanoprost (XALATAN) 0.005 % ophthalmic solution Apply 1 drop to eye daily at bedtime Both eyes 3/16/19  Yes Veto Begum MD   levothyroxine 88 mcg tablet Take 1 tablet (88 mcg total) by mouth daily  Patient taking differently: Take 75 mcg by mouth daily 2/21/23  Yes Carina Dalton PA-C   pantoprazole (PROTONIX) 40 mg tablet Take 1 tablet (40 mg total) by mouth daily 9/19/22  Yes Leonel Cruz MD   potassium chloride (K-DUR,KLOR-CON) 20 mEq tablet TAKE 1 TABLET EVERY DAY  Patient taking differently: 20 mEq if needed 8/17/22  Yes Shelia Vega,    acetaminophen (TYLENOL) 325 mg tablet Take 2 tablets (650 mg total) by mouth every 6 (six) hours as needed for mild pain 1/24/21   Dustin Palacio MD   Incontinence Supply Disposable (SELECT DISPOSABLE UNDERWEAR LG) MISC  6/14/19   Historical Provider, MD   Lancets (ACCU-CHEK SOFT TOUCH) lancets Testing 1 time daily. Dx: E11.9 4/28/20   Carina Dalton PA-C   Misc. Devices (Transport Chair) MISC Use if needed (with outings outside of the house.) 4/26/22   Carina Dalton PA-C   oxygen gas Inhale 2 L/min daily at bedtime. Indications: Shortness of breath    Historical Provider, MD   psyllium (METAMUCIL SMOOTH TEXTURE) 28 % packet Take 1 packet by mouth in the morning  Patient taking differently: Take 1 packet by mouth if needed 12/1/22   Margarita Melchor MD   QUEtiapine (SEROquel) 50 mg tablet TAKE 1 TABLET(50 MG) BY MOUTH DAILY AT BEDTIME 8/11/23   Carmita Morales PA-C   vitamin B-12 (CYANOCOBALAMIN) 250 MCG tablet Take 250 mcg by mouth daily    Historical Provider, MD     I have reviewed home medications with patient personally. Allergies: Allergies   Allergen Reactions   • Levaquin [Levofloxacin] Myalgia   • Statins      Other reaction(s): Weakness  Category: Adverse Reaction;        Social History:     Marital Status:     Substance Use History:   Social History     Substance and Sexual Activity   Alcohol Use No    Comment: Social Alcohol Use -- as per allscripts (pt denies all alcohol use)     Social History     Tobacco Use   Smoking Status Never   Smokeless Tobacco Never     Social History     Substance and Sexual Activity   Drug Use No       Family History:    Family History   Problem Relation Age of Onset   • Breast cancer Mother    • Hypothyroidism Mother    • Hypertension Father    • Breast cancer Sister    • Thyroid disease Sister    • Hypertension Sister        Physical Exam:     Vitals:   Blood Pressure: 113/65 (08/27/23 1438)  Pulse: 69 (08/27/23 1438)  Temperature: (!) 97.3 °F (36.3 °C) (08/27/23 1438)  Temp Source: Oral (08/27/23 0709)  Respirations: 17 (08/27/23 1438)  SpO2: 95 % (08/27/23 1438)    Physical Exam  Vitals reviewed. HENT:      Head: Normocephalic. Nose: Nose normal.      Mouth/Throat:      Mouth: Mucous membranes are moist.   Eyes:      General: No scleral icterus. Cardiovascular:      Rate and Rhythm: Normal rate. Heart sounds: Murmur heard. Pulmonary:      Effort: Pulmonary effort is normal. No respiratory distress. Breath sounds: No wheezing or rales. Abdominal:      General: There is no distension. Palpations: Abdomen is soft. Tenderness: There is abdominal tenderness. Musculoskeletal:      Right lower leg: No edema. Left lower leg: No edema. Skin:     General: Skin is warm. Neurological:      Mental Status: She is alert and oriented to person, place, and time. Psychiatric:         Mood and Affect: Mood normal.         Behavior: Behavior normal.       Additional Data:     Lab Results: I have personally reviewed pertinent reports.       Results from last 7 days   Lab Units 08/27/23  0758   WBC Thousand/uL 5.51   HEMOGLOBIN g/dL 10.3*   HEMATOCRIT % 35.1   PLATELETS Thousands/uL 220   NEUTROS PCT % 70   LYMPHS PCT % 17   MONOS PCT % 9   EOS PCT % 3     Results from last 7 days   Lab Units 08/27/23  0758   SODIUM mmol/L 136   POTASSIUM mmol/L 4.3   CHLORIDE mmol/L 103   CO2 mmol/L 26   BUN mg/dL 18   CREATININE mg/dL 1.12   ANION GAP mmol/L 7   CALCIUM mg/dL 9.3   ALBUMIN g/dL 3.8   TOTAL BILIRUBIN mg/dL 0.43   ALK PHOS U/L 94   ALT U/L 5*   AST U/L 9*   GLUCOSE RANDOM mg/dL 196*     Results from last 7 days   Lab Units 08/27/23  0758   INR  1.09                   Imaging: I have personally reviewed pertinent reports. CT abdomen pelvis wo contrast   Final Result by Brielle Oakley MD (08/27 0680)      No acute intra-abdominal pathology identified. Sigmoid diverticulosis. No evidence of diverticulitis. Chronic findings, as per the body of the report. Workstation performed: [id number]         CT head without contrast   Final Result by Brielle Oakley MD (08/27 0802)      No acute intracranial abnormality. Workstation performed: [id number]         XR chest 2 views   Final Result by Leonel Radford MD (08/27 5525)      No acute cardiopulmonary disease. Workstation performed: [id number]             EKG, Pathology, and Other Studies Reviewed on Admission:   · EKG: sinus bradycardia, LVH, no acute ischemic changes    Allscripts / Epic Records Reviewed: Yes     ** Please Note: This note has been constructed using a voice recognition system.  **

## 2023-08-27 NOTE — ASSESSMENT & PLAN NOTE
Wt Readings from Last 3 Encounters:   04/24/23 70.5 kg (155 lb 8 oz)   04/19/23 70.6 kg (155 lb 10.3 oz)   03/30/23 72.1 kg (159 lb)     Not in acute exacerbation. Continue lasix. X-ray without any acute abnormalities.

## 2023-08-27 NOTE — PLAN OF CARE
Problem: MOBILITY - ADULT  Goal: Maintain or return to baseline ADL function  Description: INTERVENTIONS:  -  Assess patient's ability to carry out ADLs; assess patient's baseline for ADL function and identify physical deficits which impact ability to perform ADLs (bathing, care of mouth/teeth, toileting, grooming, dressing, etc.)  - Assess/evaluate cause of self-care deficits   - Assess range of motion  - Assess patient's mobility; develop plan if impaired  - Assess patient's need for assistive devices and provide as appropriate  - Encourage maximum independence but intervene and supervise when necessary  - Involve family in performance of ADLs  - Assess for home care needs following discharge   - Consider OT consult to assist with ADL evaluation and planning for discharge  - Provide patient education as appropriate  Outcome: Progressing  Goal: Maintains/Returns to pre admission functional level  Description: INTERVENTIONS:  - Perform BMAT or MOVE assessment daily.   - Set and communicate daily mobility goal to care team and patient/family/caregiver.    - Collaborate with rehabilitation services on mobility goals if consulted  - Ambulate patient 4 times a day  - Out of bed for toileting  - Record patient progress and toleration of activity level   Outcome: Progressing     Problem: PAIN - ADULT  Goal: Verbalizes/displays adequate comfort level or baseline comfort level  Description: Interventions:  - Encourage patient to monitor pain and request assistance  - Assess pain using appropriate pain scale  - Administer analgesics based on type and severity of pain and evaluate response  - Implement non-pharmacological measures as appropriate and evaluate response  - Consider cultural and social influences on pain and pain management  - Notify physician/advanced practitioner if interventions unsuccessful or patient reports new pain  Outcome: Progressing     Problem: DISCHARGE PLANNING  Goal: Discharge to home or other facility with appropriate resources  Description: INTERVENTIONS:  - Identify barriers to discharge w/patient and caregiver  - Arrange for needed discharge resources and transportation as appropriate  - Identify discharge learning needs (meds, wound care, etc.)  - Arrange for interpretive services to assist at discharge as needed  - Refer to Case Management Department for coordinating discharge planning if the patient needs post-hospital services based on physician/advanced practitioner order or complex needs related to functional status, cognitive ability, or social support system  Outcome: Progressing     Problem: Knowledge Deficit  Goal: Patient/family/caregiver demonstrates understanding of disease process, treatment plan, medications, and discharge instructions  Description: Complete learning assessment and assess knowledge base.   Interventions:  - Provide teaching at level of understanding  - Provide teaching via preferred learning methods  Outcome: Progressing     Problem: GASTROINTESTINAL - ADULT  Goal: Minimal or absence of nausea and/or vomiting  Description: INTERVENTIONS:  - Administer IV fluids if ordered to ensure adequate hydration  - Maintain NPO status until nausea and vomiting are resolved  - Nasogastric tube if ordered  - Administer ordered antiemetic medications as needed  - Provide nonpharmacologic comfort measures as appropriate  - Advance diet as tolerated, if ordered  - Consider nutrition services referral to assist patient with adequate nutrition and appropriate food choices  Outcome: Progressing  Goal: Maintains or returns to baseline bowel function  Description: INTERVENTIONS:  - Assess bowel function  - Encourage oral fluids to ensure adequate hydration  - Administer IV fluids if ordered to ensure adequate hydration  - Administer ordered medications as needed  - Encourage mobilization and activity  - Consider nutritional services referral to assist patient with adequate nutrition and appropriate food choices  Outcome: Progressing  Goal: Maintains adequate nutritional intake  Description: INTERVENTIONS:  - Monitor percentage of each meal consumed  - Identify factors contributing to decreased intake, treat as appropriate  - Assist with meals as needed  - Monitor I&O, weight, and lab values if indicated  - Obtain nutrition services referral as needed  Outcome: Progressing     Problem: METABOLIC, FLUID AND ELECTROLYTES - ADULT  Goal: Electrolytes maintained within normal limits  Description: INTERVENTIONS:  - Monitor labs and assess patient for signs and symptoms of electrolyte imbalances  - Administer electrolyte replacement as ordered  - Monitor response to electrolyte replacements, including repeat lab results as appropriate  - Instruct patient on fluid and nutrition as appropriate  Outcome: Progressing     Problem: SKIN/TISSUE INTEGRITY - ADULT  Goal: Incision(s), wounds(s) or drain site(s) healing without S/S of infection  Description: INTERVENTIONS  - Assess and document dressing, incision, wound bed, drain sites and surrounding tissue  - Provide patient and family education    Outcome: Progressing

## 2023-08-27 NOTE — ASSESSMENT & PLAN NOTE
Severe stenosis, likely playing a role to her chronic shortness of breath. 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 We would not consider a transapical approach in her age.  Given her age and comorbidities and calcification of her aorta I recommended continued medical therapy and no further procedure."

## 2023-08-27 NOTE — ASSESSMENT & PLAN NOTE
8 year old female presented to our ED due to GI bleed and abdominal pain  · GI recommendations appreciated. No inpatient intervention planned at this time. They suspect that her Minimal blood per rectum most likely from benign anorectal etiology. Recommend bowel regimen with daily MiraLAX to prevent straining per her reports of intermittent constipation.   · CT without any acute pathology, continue supportive care  · Monitor hemoglobin

## 2023-08-27 NOTE — ED PROVIDER NOTES
History  Chief Complaint   Patient presents with   • Weakness - Generalized     Pt reports weakness x2 days. Pt reports generalized body aches. Pt reports headache, and neck pain. Per daughter pt with bright red blood in stool last night     81 YO female presents with fatigue beginning yesterday. Daughter assists with history. Patient has been more run down and has complained of generalized body aches for the last day, this has been constant. She has had an associated headache, states she has had similar in the past and had received botox injections for this but it has been some time. She additionally notes pain in the epigastrium which has been constant and some lower abdominal discomfort, she has additionally noticed some shortness of breath. Patient has had mild constipation which she describes as decreased caliber of stool. Daughter states she noticed brightish blood in the toilet and on paper after a bowel movement yesterday. Patient does not take anticoagulation. She has been able to ambulate at baseline, using a walker around the house and a cane when outside. Patient lives in a single floor home with daughter. Pt denies F/C/N/V, no dysuria, burning on urination or blood in urine. History provided by:  Patient and relative   used: No        Prior to Admission Medications   Prescriptions Last Dose Informant Patient Reported? Taking? Cholecalciferol (VITAMIN D3) 2000 units capsule  Child Yes Yes   Sig: Take 2,000 Units by mouth daily    Incontinence Supply Disposable (SELECT DISPOSABLE UNDERWEAR LG) MISC  Child Yes No   Lancets (ACCU-CHEK SOFT TOUCH) lancets  Child No No   Sig: Testing 1 time daily. Dx: E11.9   Misc.  Devices (Transport Chair) MISC  Child No No   Sig: Use if needed (with outings outside of the house.)   QUEtiapine (SEROquel) 50 mg tablet   No No   Sig: TAKE 1 TABLET(50 MG) BY MOUTH DAILY AT BEDTIME   acetaminophen (TYLENOL) 325 mg tablet  Child No No Sig: Take 2 tablets (650 mg total) by mouth every 6 (six) hours as needed for mild pain   amLODIPine (NORVASC) 5 mg tablet  Child No Yes   Sig: TAKE 1 TABLET (5 MG TOTAL) BY MOUTH DAILY   aspirin 81 MG tablet  Child Yes Yes   Sig: Take 81 mg by mouth daily. dorzolamide (TRUSOPT) 2 % ophthalmic solution  Child Yes Yes   Sig: Administer 1 drop to both eyes 3 (three) times a day     famotidine (PEPCID) 20 mg tablet   No Yes   Sig: Take 1 tablet (20 mg total) by mouth 2 (two) times a day   furosemide (LASIX) 20 mg tablet   No Yes   Sig: TAKE 1 TABLET BY MOUTH IN THE MORNING AND 2 TABLETS IN THE EVENING   gabapentin (NEURONTIN) 600 MG tablet  Child No Yes   Sig: PT to take 1 tab twice daily as she forgets to take the 1/2 in the afternoon anyway. latanoprost (XALATAN) 0.005 % ophthalmic solution  Child No Yes   Sig: Apply 1 drop to eye daily at bedtime Both eyes   levothyroxine 88 mcg tablet  Child No Yes   Sig: Take 1 tablet (88 mcg total) by mouth daily   Patient taking differently: Take 75 mcg by mouth daily   oxygen gas  Child Yes No   Sig: Inhale 2 L/min daily at bedtime.  Indications: Shortness of breath   pantoprazole (PROTONIX) 40 mg tablet  Child No Yes   Sig: Take 1 tablet (40 mg total) by mouth daily   potassium chloride (K-DUR,KLOR-CON) 20 mEq tablet  Child No Yes   Sig: TAKE 1 TABLET EVERY DAY   Patient taking differently: 20 mEq if needed   psyllium (METAMUCIL SMOOTH TEXTURE) 28 % packet  Child No No   Sig: Take 1 packet by mouth in the morning   Patient taking differently: Take 1 packet by mouth if needed   vitamin B-12 (CYANOCOBALAMIN) 250 MCG tablet  Child Yes No   Sig: Take 250 mcg by mouth daily      Facility-Administered Medications: None       Past Medical History:   Diagnosis Date   • Asthma    • Atypical chest pain    • CAD (coronary artery disease)    • Candidal intertrigo    • CHF (congestive heart failure) ([facility])    • Depression    • Diabetes mellitus ([address])    • Diabetic neuropathy ([facility])    • Diverticulitis    • Dyslipidemia    • Female bladder prolapse    • Gastric ulcer    • Glaucoma    • HTN (hypertension)    • Hyperlipidemia    • Hypothyroidism 4/18/2016   • RAD (reactive airway disease)        Past Surgical History:   Procedure Laterality Date   • COLONOSCOPY     • ESOPHAGOGASTRODUODENOSCOPY  2011   • HYSTERECTOMY     • TONSILLECTOMY         Family History   Problem Relation Age of Onset   • Breast cancer Mother    • Hypothyroidism Mother    • Hypertension Father    • Breast cancer Sister    • Thyroid disease Sister    • Hypertension Sister      I have reviewed and agree with the history as documented. E-Cigarette/Vaping   • E-Cigarette Use Never User      E-Cigarette/Vaping Substances   • Nicotine No    • THC No    • CBD No    • Flavoring No    • Other No    • Unknown No      Social History     Tobacco Use   • Smoking status: Never   • Smokeless tobacco: Never   Vaping Use   • Vaping Use: Never used   Substance Use Topics   • Alcohol use: No     Comment: Social Alcohol Use -- as per allscripts (pt denies all alcohol use)   • Drug use: No       Review of Systems   Constitutional: Positive for fatigue. Negative for chills and fever. HENT: Negative for dental problem. Eyes: Negative for visual disturbance. Respiratory: Positive for shortness of breath. Cardiovascular: Positive for chest pain. Gastrointestinal: Positive for abdominal pain and blood in stool. Negative for diarrhea and vomiting. Genitourinary: Negative for dysuria and frequency. Musculoskeletal: Positive for myalgias. Negative for arthralgias. Skin: Negative for rash. Neurological: Positive for headaches. Negative for dizziness, weakness and light-headedness. Psychiatric/Behavioral: Negative for agitation, behavioral problems and confusion. All other systems reviewed and are negative. Physical Exam  Physical Exam  Vitals and nursing note reviewed. Constitutional:       Appearance: Normal appearance. HENT:      Head: Normocephalic and atraumatic. Eyes:      Extraocular Movements: Extraocular movements intact. Conjunctiva/sclera: Conjunctivae normal.   Cardiovascular:      Rate and Rhythm: Normal rate and regular rhythm. Pulses: Normal pulses. Heart sounds: Normal heart sounds. Pulmonary:      Effort: Pulmonary effort is normal.   Abdominal:      General: There is no distension. Tenderness: There is abdominal tenderness. Musculoskeletal:         General: Normal range of motion. Cervical back: Normal range of motion. Skin:     Findings: No rash. Neurological:      General: No focal deficit present. Mental Status: She is alert. Cranial Nerves: No cranial nerve deficit.    Psychiatric:         Mood and Affect: Mood normal.         Vital Signs  ED Triage Vitals   Temperature Pulse Respirations Blood Pressure SpO2   08/27/23 0709 08/27/23 0709 08/27/23 0709 08/27/23 0709 08/27/23 0709   97.8 °F (36.6 °C) 71 22 144/63 99 %      Temp Source Heart Rate Source Patient Position - Orthostatic VS BP Location FiO2 (%)   08/27/23 0709 08/27/23 0709 08/27/23 0709 08/27/23 0709 --   Oral Monitor Sitting Right arm       Pain Score       08/27/23 1122       No Pain           Vitals:    08/27/23 0709 08/27/23 1122 08/27/23 1438   BP: 144/63 104/65 113/65   Pulse: 71 69 69   Patient Position - Orthostatic VS: Sitting  Lying         Visual Acuity      ED Medications  Medications   polyethylene glycol (MIRALAX) packet 17 g (17 g Oral Given 8/27/23 1315)   nystatin (MYCOSTATIN) powder ( Topical Given 8/27/23 1314)   amLODIPine (NORVASC) tablet 5 mg (has no administration in time range)   aspirin (ECOTRIN LOW STRENGTH) EC tablet 81 mg (has no administration in time range)   cholecalciferol (VITAMIN D3) tablet 2,000 Units (2,000 Units Oral Given 8/27/23 1513)   dorzolamide (TRUSOPT) ophthalmic solution 1 drop (has no administration in time range)   furosemide (LASIX) tablet 20 mg (has no administration in time range)   gabapentin (NEURONTIN) capsule 300 mg (300 mg Oral Not Given 8/27/23 1512)   gabapentin (NEURONTIN) capsule 300 mg (has no administration in time range)   latanoprost (XALATAN) 0.005 % ophthalmic solution 1 drop (has no administration in time range)   pantoprazole (PROTONIX) EC tablet 40 mg (has no administration in time range)   levothyroxine tablet 75 mcg (has no administration in time range)   acetaminophen (TYLENOL) tablet 650 mg (has no administration in time range)   famotidine (PEPCID) tablet 10 mg (10 mg Oral Given 8/27/23 1513)   insulin lispro (HumaLOG) 100 units/mL subcutaneous injection 1-5 Units (has no administration in time range)   sodium chloride 0.9 % bolus 500 mL (500 mL Intravenous New Bag 8/27/23 0802)   acetaminophen (TYLENOL) tablet 650 mg (650 mg Oral Given 8/27/23 0800)   pantoprazole (PROTONIX) injection 40 mg (40 mg Intravenous Given 8/27/23 1005)       Diagnostic Studies  Results Reviewed     Procedure Component Value Units Date/Time    HS Troponin I 4hr [[id number]]  (Normal) Collected: 08/27/23 1204    Lab Status: Final result Specimen: Blood from Arm, Left Updated: 08/27/23 1231     hs TnI 4hr 15 ng/L      Delta 4hr hsTnI 1 ng/L     HS Troponin I 2hr [[id number]]  (Normal) Collected: 08/27/23 0952    Lab Status: Final result Specimen: Blood from Arm, Right Updated: 08/27/23 1018     hs TnI 2hr 14 ng/L      Delta 2hr hsTnI 0 ng/L     COVID only [[id number]]  (Normal) Collected: 08/27/23 0758    Lab Status: Final result Specimen: Nares from Nose Updated: 08/27/23 0924     SARS-CoV-2 Negative    Narrative:      FOR PEDIATRIC PATIENTS - copy/paste COVID Guidelines URL to browser: [email or web address]    SARS-CoV-2 assay is a Nucleic Acid Amplification assay intended for the  qualitative detection of nucleic acid from SARS-CoV-2 in nasopharyngeal  swabs.  Results are for the presumptive identification of SARS-CoV-2 RNA. Positive results are indicative of infection with SARS-CoV-2, the virus  causing COVID-19, but do not rule out bacterial infection or co-infection  with other viruses. Laboratories within the [facility] and its  territories are required to report all positive results to the appropriate  public health authorities. Negative results do not preclude SARS-CoV-2  infection and should not be used as the sole basis for treatment or other  patient management decisions. Negative results must be combined with  clinical observations, patient history, and epidemiological information. This test has not been FDA cleared or approved. This test has been authorized by FDA under an Emergency Use Authorization  (EUA). This test is only authorized for the duration of time the  declaration that circumstances exist justifying the authorization of the  emergency use of an in vitro diagnostic tests for detection of SARS-CoV-2  virus and/or diagnosis of COVID-19 infection under section 564(b)(1) of  the Act, 21 U. S.C. 021NKJ-5(A)(8), unless the authorization is terminated  or revoked sooner. The test has been validated but independent review by FDA  and CLIA is pending. Test performed using [employer] GeneXpert: This RT-PCR assay targets N2,  a region unique to SARS-CoV-2. A conserved region in the E-gene was chosen  for pan-Sarbecovirus detection which includes SARS-CoV-2. According to CMS-2020-01-R, this platform meets the definition of high-throughput technology.     Yael Wang [[id number]]  (Normal) Collected: 08/27/23 0758    Lab Status: Final result Specimen: Blood from Arm, Right Updated: 08/27/23 0916     Protime 14.2 seconds      INR 1.09    APTT [[id number]]  (Normal) Collected: 08/27/23 0758    Lab Status: Final result Specimen: Blood from Arm, Right Updated: 08/27/23 0916     PTT 29 seconds     HS Troponin 0hr (reflex protocol) [[id number]]  (Normal) Collected: 08/27/23 0758    Lab Status: Final result Specimen: Blood from Arm, Right Updated: 08/27/23 0914     hs TnI 0hr 14 ng/L     B-Type Natriuretic Peptide(BNP) [[id number]]  (Abnormal) Collected: 08/27/23 0758    Lab Status: Final result Specimen: Blood from Arm, Right Updated: 08/27/23 0913      pg/mL     Basic metabolic panel [[id number]]  (Abnormal) Collected: 08/27/23 0758    Lab Status: Final result Specimen: Blood from Arm, Right Updated: 08/27/23 0909     Sodium 136 mmol/L      Potassium 4.3 mmol/L      Chloride 103 mmol/L      CO2 26 mmol/L      ANION GAP 7 mmol/L      BUN 18 mg/dL      Creatinine 1.12 mg/dL      Glucose 196 mg/dL      Calcium 9.3 mg/dL      eGFR 40 ml/min/1.73sq m     Narrative:      [place] guidelines for Chronic Kidney Disease (CKD):   •  Stage 1 with normal or high GFR (GFR > 90 mL/min/1.73 square meters)  •  Stage 2 Mild CKD (GFR = 60-89 mL/min/1.73 square meters)  •  Stage 3A Moderate CKD (GFR = 45-59 mL/min/1.73 square meters)  •  Stage 3B Moderate CKD (GFR = 30-44 mL/min/1.73 square meters)  •  Stage 4 Severe CKD (GFR = 15-29 mL/min/1.73 square meters)  •  Stage 5 End Stage CKD (GFR <15 mL/min/1.73 square meters)  Note: GFR calculation is accurate only with a steady state creatinine    Hepatic function panel [[id number]]  (Abnormal) Collected: 08/27/23 0758    Lab Status: Final result Specimen: Blood from Arm, Right Updated: 08/27/23 0909     Total Bilirubin 0.43 mg/dL      Bilirubin, Direct 0.06 mg/dL      Alkaline Phosphatase 94 U/L      AST 9 U/L      ALT 5 U/L      Total Protein 6.9 g/dL      Albumin 3.8 g/dL     Magnesium [[id number]]  (Normal) Collected: 08/27/23 0758    Lab Status: Final result Specimen: Blood from Arm, Right Updated: 08/27/23 0909     Magnesium 2.0 mg/dL     Lipase [[id number]]  (Normal) Collected: 08/27/23 0758    Lab Status: Final result Specimen: Blood from Arm, Right Updated: 08/27/23 0909     Lipase 19 u/L     CBC and differential [[id number]]  (Abnormal) Collected: 08/27/23 0758    Lab Status: Final result Specimen: Blood from Arm, Right Updated: 08/27/23 0850     WBC 5.51 Thousand/uL      RBC 4.23 Million/uL      Hemoglobin 10.3 g/dL      Hematocrit 35.1 %      MCV 83 fL      MCH 24.3 pg      MCHC 29.3 g/dL      RDW 15.4 %      MPV 10.7 fL      Platelets 197 Thousands/uL      nRBC 0 /100 WBCs      Neutrophils Relative 70 %      Immat GRANS % 0 %      Lymphocytes Relative 17 %      Monocytes Relative 9 %      Eosinophils Relative 3 %      Basophils Relative 1 %      Neutrophils Absolute 3.83 Thousands/µL      Immature Grans Absolute 0.01 Thousand/uL      Lymphocytes Absolute 0.92 Thousands/µL      Monocytes Absolute 0.52 Thousand/µL      Eosinophils Absolute 0.19 Thousand/µL      Basophils Absolute 0.04 Thousands/µL                  CT abdomen pelvis wo contrast   Final Result by Juhi He MD (08/27 0396)      No acute intra-abdominal pathology identified. Sigmoid diverticulosis. No evidence of diverticulitis. Chronic findings, as per the body of the report. Workstation performed: [id number]         CT head without contrast   Final Result by Juhi He MD (08/27 0802)      No acute intracranial abnormality. Workstation performed: [id number]         XR chest 2 views   Final Result by Guerrero Haley MD (08/27 1145)      No acute cardiopulmonary disease. Workstation performed: [id number]                    Procedures  Procedures         ED Course  ED Course as of 08/27/23 1518   Sun Aug 27, 2023   0858 Hemoglobin(!): 10.3  11.6 four months ago. SBIRT 20yo+    Flowsheet Row Most Recent Value   Initial Alcohol Screen: US AUDIT-C     1. How often do you have a drink containing alcohol? 0 Filed at: 08/27/2023 0819   2. How many drinks containing alcohol do you have on a typical day you are drinking? 0 Filed at: 08/27/2023 0819   3b. FEMALE Any Age, or MALE 65+:  How often do you have 4 or more drinks on one occassion? 0 Filed at: 08/27/2023 0819   Audit-C Score 0 Filed at: 08/27/2023 9520   KAMRAN: How many times in the past year have you. .. Used an illegal drug or used a prescription medication for non-medical reasons? Never Filed at: 08/27/2023 0351                    Medical Decision Making  1. Fatigue - Will order ECG and troponin to rule out acute MI, CBC for leukocytosis and anemia, metabolic panel for electrolyte abnormalities and dehydration,  LFT's to assess GB dysfunction, lipase for pancreatitis, TSH, CXR for PNA, BNP for CHF, COVID swab, urine to assess potential urinary tract infection. Will add PT/PTT and type/screen for potential GI bleed. Will CT head due to headache and CT abdomen/pelvis. Give gentle hydration, acetaminophen for aches. Fatigue: acute illness or injury  GI bleed: acute illness or injury  Amount and/or Complexity of Data Reviewed  Labs: ordered. Decision-making details documented in ED Course. Radiology: ordered. Risk  OTC drugs. Prescription drug management. Decision regarding hospitalization.           Disposition  Final diagnoses:   GI bleed   Fatigue     Time reflects when diagnosis was documented in both MDM as applicable and the Disposition within this note     Time User Action Codes Description Comment    8/27/2023 10:08 AM Srinivasan MARR Add [K92.2] GI bleed     8/27/2023 10:09 AM Srinivasan MARR Add [R53.83] Fatigue       ED Disposition     ED Disposition   Admit    Condition   Stable    Date/Time   Sun Aug 27, 2023 10:09 AM    Comment   Case was discussed with KISHAN and the patient's admission status was agreed to be Admission Status: inpatient status to the service of Dr. Levon Phalen .           Follow-up Information    None         Current Discharge Medication List      CONTINUE these medications which have NOT CHANGED    Details   amLODIPine (NORVASC) 5 mg tablet TAKE 1 TABLET (5 MG TOTAL) BY MOUTH DAILY  Qty: 90 tablet, Refills: 3    Associated Diagnoses: Nonrheumatic aortic valve stenosis      aspirin 81 MG tablet Take 81 mg by mouth daily. Cholecalciferol (VITAMIN D3) 2000 units capsule Take 2,000 Units by mouth daily       dorzolamide (TRUSOPT) 2 % ophthalmic solution Administer 1 drop to both eyes 3 (three) times a day        famotidine (PEPCID) 20 mg tablet Take 1 tablet (20 mg total) by mouth 2 (two) times a day  Qty: 180 tablet, Refills: 3    Associated Diagnoses: Gastroesophageal reflux disease without esophagitis      furosemide (LASIX) 20 mg tablet TAKE 1 TABLET BY MOUTH IN THE MORNING AND 2 TABLETS IN THE EVENING  Qty: 270 tablet, Refills: 1    Associated Diagnoses: Nonrheumatic aortic valve stenosis      gabapentin (NEURONTIN) 600 MG tablet PT to take 1 tab twice daily as she forgets to take the 1/2 in the afternoon anyway.   Qty: 180 tablet, Refills: 1    Associated Diagnoses: Cranial neuralgia      latanoprost (XALATAN) 0.005 % ophthalmic solution Apply 1 drop to eye daily at bedtime Both eyes  Qty: 7.5 mL, Refills: 1    Associated Diagnoses: Open-angle glaucoma, severe stage, unspecified laterality, unspecified open-angle glaucoma type      levothyroxine 88 mcg tablet Take 1 tablet (88 mcg total) by mouth daily  Qty: 90 tablet, Refills: 3    Associated Diagnoses: Hypothyroidism, unspecified type      pantoprazole (PROTONIX) 40 mg tablet Take 1 tablet (40 mg total) by mouth daily  Qty: 90 tablet, Refills: 3    Associated Diagnoses: Gastroesophageal reflux disease without esophagitis; Epigastric pain      potassium chloride (K-DUR,KLOR-CON) 20 mEq tablet TAKE 1 TABLET EVERY DAY  Qty: 90 tablet, Refills: 0    Associated Diagnoses: Nonrheumatic aortic valve stenosis      acetaminophen (TYLENOL) 325 mg tablet Take 2 tablets (650 mg total) by mouth every 6 (six) hours as needed for mild pain  Qty: 20 tablet, Refills: 0    Associated Diagnoses: Pyelonephritis      Incontinence Supply Disposable (SELECT DISPOSABLE UNDERWEAR LG) MISC       Lancets (ACCU-CHEK SOFT TOUCH) lancets Testing 1 time daily. Dx: E11.9  Qty: 100 each, Refills: 3    Associated Diagnoses: 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[address])      Misc. Devices (Transport Chair) MISC Use if needed (with outings outside of the house.)  Qty: 1 each, Refills: 0    Associated Diagnoses: Ambulatory dysfunction; Vertigo; Fall, subsequent encounter; Peripheral vascular disease ([address]); Chronic diastolic congestive heart failure ([address]); Osteoarthritis of multiple joints, unspecified osteoarthritis type; Shakiness      oxygen gas Inhale 2 L/min daily at bedtime. 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     PDMP Review       Value Time User    PDMP Reviewed  Yes 11/18/2020  1:31 PM Remus Riedel, PA-C          ED Provider  Electronically Signed by           Perla Parker MD  08/27/23 4120

## 2023-08-27 NOTE — ED NOTES
Patient transported to [facility]0 Mitchell Bernstein RN  08/27/23 8992

## 2023-08-27 NOTE — ASSESSMENT & PLAN NOTE
Lab Results   Component Value Date    HGBA1C 9.0 (H) 02/13/2023       No results for input(s): "POCGLU" in the last 72 hours.     Blood Sugar Average: Last 72 hrs:     Given advanced age, will put her on regular diet for now  Sliding scale, monitor fingersticks

## 2023-08-27 NOTE — PLAN OF CARE
Problem: MOBILITY - ADULT  Goal: Maintain or return to baseline ADL function  Description: INTERVENTIONS:  -  Assess patient's ability to carry out ADLs; assess patient's baseline for ADL function and identify physical deficits which impact ability to perform ADLs (bathing, care of mouth/teeth, toileting, grooming, dressing, etc.)  - Assess/evaluate cause of self-care deficits   - Assess range of motion  - Assess patient's mobility; develop plan if impaired  - Assess patient's need for assistive devices and provide as appropriate  - Encourage maximum independence but intervene and supervise when necessary  - Involve family in performance of ADLs  - Assess for home care needs following discharge   - Consider OT consult to assist with ADL evaluation and planning for discharge  - Provide patient education as appropriate  Outcome: Progressing  Goal: Maintains/Returns to pre admission functional level  Description: INTERVENTIONS:  - Perform BMAT or MOVE assessment daily.   - Set and communicate daily mobility goal to care team and patient/family/caregiver.    - Collaborate with rehabilitation services on mobility goals if consulted  - Ambulate patient 4 times a day  - Out of bed for toileting  - Record patient progress and toleration of activity level   Outcome: Progressing     Problem: PAIN - ADULT  Goal: Verbalizes/displays adequate comfort level or baseline comfort level  Description: Interventions:  - Encourage patient to monitor pain and request assistance  - Assess pain using appropriate pain scale  - Administer analgesics based on type and severity of pain and evaluate response  - Implement non-pharmacological measures as appropriate and evaluate response  - Consider cultural and social influences on pain and pain management  - Notify physician/advanced practitioner if interventions unsuccessful or patient reports new pain  Outcome: Progressing     Problem: Knowledge Deficit  Goal: Patient/family/caregiver demonstrates understanding of disease process, treatment plan, medications, and discharge instructions  Description: Complete learning assessment and assess knowledge base.   Interventions:  - Provide teaching at level of understanding  - Provide teaching via preferred learning methods  Outcome: Progressing     Problem: METABOLIC, FLUID AND ELECTROLYTES - ADULT  Goal: Electrolytes maintained within normal limits  Description: INTERVENTIONS:  - Monitor labs and assess patient for signs and symptoms of electrolyte imbalances  - Administer electrolyte replacement as ordered  - Monitor response to electrolyte replacements, including repeat lab results as appropriate  - Instruct patient on fluid and nutrition as appropriate  Outcome: Progressing     Problem: SKIN/TISSUE INTEGRITY - ADULT  Goal: Incision(s), wounds(s) or drain site(s) healing without S/S of infection  Description: INTERVENTIONS  - Assess and document dressing, incision, wound bed, drain sites and surrounding tissue  - Provide patient and family education    Outcome: Progressing

## 2023-08-27 NOTE — CONSULTS
Consult Service: Gastroenterology      PATIENT INFORMATION      Tigist Ricketts 80 y.o. female MRN: [id number]  Unit/Bed#: ED-23 Encounter: [id number]  PCP: Willie Christianson PA-C  Date of Admission:  8/27/2023  Date of Consultation: 08/27/23  Requesting Physician: Melyssa Rankin MD       ASSESSMENTS & PLAN   Tigist Ricketts is a 80 y.o. old female with PMH of  hysterectomy, AS, CAD, T2DM, GERD presenting with BRBPR and dizziness, with GI consulted for this. Bright red blood per rectum  • Low suspicion for clinically significant ongoing GI bleeding suspect probable hemorrhoidal bleeding  • Trend CBC  • Outpatient follow-up  • Per 2021 rectal exam, small hemorrhoids    We will sign off, please call us back if additional questions or concerns. Kyung Miller    HISTORY OF PRESENT ILLNESS      Tigist Ricketts is a 80 y.o. female with PMH of hysterectomy, AS, CAD, T2DM, GERD presenting with BRBPR and dizziness, with GI consulted for this. Noted one spot of bright red on her underwear. Endorsing weakness. No change in bowel habits. Endorsing flank pain, which she has had previously since at least 2022. Endorsed weight loss, on review stable weight from last year. Hemoglobin 10.3 from prior value of 11.6 in April. Normocytic. BUN leslie CTAP without contrast performed today, no acute intra-abdominal pathology identified. Sigmoid diverticulosis without evidence of diverticulitis. l. LFTs, INR, lipase normal.     REVIEW OF SYSTEMS     A thorough 12-point review of systems has been conducted. Pertinent positives and negatives are mentioned in the history of present illness.        PAST MEDICAL & SURGICAL HISTORY      Past Medical History:   Diagnosis Date   • Asthma    • Atypical chest pain    • CAD (coronary artery disease)    • Candidal intertrigo    • CHF (congestive heart failure) ([address])    • Depression    • Diabetes mellitus ([address])    • Diabetic neuropathy ([address])    • Diverticulitis    • Dyslipidemia    • Female bladder prolapse    • Gastric ulcer    • Glaucoma    • HTN (hypertension)    • Hyperlipidemia    • Hypothyroidism 4/18/2016   • RAD (reactive airway disease)        Past Surgical History:   Procedure Laterality Date   • COLONOSCOPY     • ESOPHAGOGASTRODUODENOSCOPY  2011   • HYSTERECTOMY     • TONSILLECTOMY           MEDICATIONS & ALLERGIES       Medications:   Prior to Admission medications    Medication Sig Start Date End Date Taking? Authorizing Provider   amLODIPine (NORVASC) 5 mg tablet TAKE 1 TABLET (5 MG TOTAL) BY MOUTH DAILY 8/30/22  Yes Maya Giang PA-C   aspirin 81 MG tablet Take 81 mg by mouth daily. Yes Historical Provider, MD   Cholecalciferol (VITAMIN D3) 2000 units capsule Take 2,000 Units by mouth daily    Yes Historical Provider, MD   dorzolamide (TRUSOPT) 2 % ophthalmic solution Administer 1 drop to both eyes 3 (three) times a day     Yes Historical Provider, MD   famotidine (PEPCID) 20 mg tablet Take 1 tablet (20 mg total) by mouth 2 (two) times a day 8/17/23 11/15/23 Yes Maya Giang PA-C   furosemide (LASIX) 20 mg tablet TAKE 1 TABLET BY MOUTH IN THE MORNING AND 2 TABLETS IN THE EVENING 8/9/23  Yes Maya Giang PA-C   gabapentin (NEURONTIN) 600 MG tablet PT to take 1 tab twice daily as she forgets to take the 1/2 in the afternoon anyway.  1/27/23  Yes Maya Giang PA-C   latanoprost (XALATAN) 0.005 % ophthalmic solution Apply 1 drop to eye daily at bedtime Both eyes 3/16/19  Yes Naty Singh MD   levothyroxine 88 mcg tablet Take 1 tablet (88 mcg total) by mouth daily 2/21/23  Yes Maya Giang PA-C   pantoprazole (PROTONIX) 40 mg tablet Take 1 tablet (40 mg total) by mouth daily 9/19/22  Yes Jean-Paul Mcclain MD   potassium chloride (K-DUR,KLOR-CON) 20 mEq tablet TAKE 1 TABLET EVERY DAY  Patient taking differently: 20 mEq if needed 8/17/22  Yes Shelia Cardozo DO   acetaminophen (TYLENOL) 325 mg tablet Take 2 tablets (650 mg total) by mouth every 6 (six) hours as needed for mild pain 1/24/21   Federica Benjamin MD   Incontinence Supply Disposable (SELECT DISPOSABLE UNDERWEAR LG) MISC  6/14/19   Historical Provider, MD   Lancets (ACCU-CHEK SOFT TOUCH) lancets Testing 1 time daily. Dx: E11.9 4/28/20   Nirmala Agrawal PA-C   Misc. Devices (Transport Chair) MISC Use if needed (with outings outside of the house.) 4/26/22   Nirmala Agrawal PA-C   oxygen gas Inhale 2 L/min daily at bedtime. Indications: Shortness of breath    Historical Provider, MD   psyllium (METAMUCIL SMOOTH TEXTURE) 28 % packet Take 1 packet by mouth in the morning  Patient taking differently: Take 1 packet by mouth if needed 12/1/22   Tyra Ojeda MD   QUEtiapine (SEROquel) 50 mg tablet TAKE 1 TABLET(50 MG) BY MOUTH DAILY AT BEDTIME 8/11/23   Veronika Duarte PA-C   vitamin B-12 (CYANOCOBALAMIN) 250 MCG tablet Take 250 mcg by mouth daily    Historical Provider, MD       Allergies: Allergies   Allergen Reactions   • Levaquin [Levofloxacin] Myalgia   • Statins      Other reaction(s): Weakness  Category: Adverse Reaction;          SOCIAL HISTORY      Marital Status:      Substance Use History:   Social History     Substance and Sexual Activity   Alcohol Use No    Comment: Social Alcohol Use -- as per allscripts (pt denies all alcohol use)     Social History     Tobacco Use   Smoking Status Never   Smokeless Tobacco Never     Social History     Substance and Sexual Activity   Drug Use No         FAMILY HISTORY      Non-Contributory      PHYSICAL EXAM     Vitals:   Blood Pressure: 144/63 (08/27/23 0709)  Pulse: 71 (08/27/23 0709)  Temperature: 97.8 °F (36.6 °C) (08/27/23 0709)  Temp Source: Oral (08/27/23 0709)  Respirations: 22 (08/27/23 0709)  SpO2: 99 % (08/27/23 0709)    Physical Exam:   GENERAL: NAD  HEENT:  NC/AT, No Scleral Icterus  CARDIAC:  Regular Rate  PULMONARY:  Non-Labored Breathing, No Respiratory Distress  ABDOMEN: Soft, No Rebound/Guarding/Rigidity, No Organomegaly, No Tenderness  EXTREMITIES:  No Edema, Cyanosis, or Clubbing  NEUROLOGIC:  Alert  SKIN:  No Rashes or Erythema    ADDITIONAL DATA     Lab Results:       Lab Units 08/27/23  0758 04/19/23  1442 02/13/23  1117 02/08/23 2121 01/02/23  0449 01/01/23  0516   SODIUM mmol/L 136 136 134* 130* 143 142   POTASSIUM mmol/L 4.3 4.7 4.0 4.5 3.9 4.7   CHLORIDE mmol/L 103 107 104 97 111* 109*   CO2 mmol/L 26 22 24 25 21 18*   BUN mg/dL 18 9 17 16 19 21   CREATININE mg/dL 1.12 0.87 1.17 1.09 1.12 1.64*   GLUCOSE RANDOM mg/dL 196* 142*  --  178* 176* 159*   CALCIUM mg/dL 9.3 8.9 9.3 9.2 8.4 9.1   MAGNESIUM mg/dL 2.0  --   --   --   --   --             Lab Units 08/27/23  0758 04/19/23  1442 02/13/23  1117 01/02/23 0449 01/01/23  0516 06/06/22  1419 03/08/22  1656   TOTAL PROTEIN g/dL 6.9 6.6 6.9 6.0* 6.4   < > 7.7   ALBUMIN g/dL 3.8 3.5 3.0* 2.5* 2.7*   < > 3.7   TOTAL BILIRUBIN mg/dL 0.43 0.41 0.31 0.18* 0.18*   < > 0.45   BILIRUBIN DIRECT mg/dL 0.06  --   --   --   --   --  0.11   AST U/L 9* 18 16 17 37   < > 21   ALT U/L 5* 5* 13 12 9*   < > 19   ALK PHOS U/L 94 85 101 91 93   < > 106    < > = values in this interval not displayed.            Lab Units 08/27/23 0758 04/19/23  1334 02/08/23 2121 01/02/23  0449 01/01/23  0516   WBC Thousand/uL 5.51 4.53 4.26* 4.48 5.29   HEMOGLOBIN g/dL 10.3* 11.6 12.3 10.6* 11.2*   HEMATOCRIT % 35.1 37.3 37.4 33.0* 34.8   PLATELETS Thousands/uL 220 152 205 176 190   MCV fL 83 88 91 93 94       No results found for: "IRON", "TIBC", "FERRITIN"    Lab Results   Component Value Date    INR 1.09 08/27/2023    INR 1.08 05/19/2021    INR 1.15 01/24/2021    PROTIME 14.2 08/27/2023    PROTIME 13.8 05/19/2021    PROTIME 14.5 01/24/2021         Microbiology Results:        Imaging:  Procedure: CT abdomen pelvis wo contrast    Result Date: 8/27/2023  Narrative: CT ABDOMEN AND PELVIS WITHOUT IV CONTRAST INDICATION:   Abdominal pain, acute, nonlocalized Abdominal pain. COMPARISON:  None. TECHNIQUE:  CT examination of the abdomen and pelvis was performed without intravenous contrast. Multiplanar 2D reformatted images were created from the source data. This examination, like all CT scans performed in the [facility], was performed utilizing techniques to minimize radiation dose exposure, including the use of iterative reconstruction and automated exposure control. Radiation dose length product (DLP) for this visit:  443 mGy-cm Enteric contrast was administered. FINDINGS: ABDOMEN LOWER CHEST: Coronary artery calcifications. Aortic valve calcifications. Mild fibrotic changes with some traction bronchiectasis. LIVER/BILIARY TREE:  Unremarkable. GALLBLADDER:  No calcified gallstones. No pericholecystic inflammatory change. SPLEEN: Normal in size. Punctate calcified granuloma PANCREAS:  Unremarkable. ADRENAL GLANDS:  Unremarkable. KIDNEYS/URETERS:  Unremarkable. No hydronephrosis. STOMACH AND BOWEL: No evidence of bowel obstruction or gross inflammatory process. Sigmoid diverticulosis without evidence of diverticulitis. APPENDIX:  No findings to suggest appendicitis. ABDOMINOPELVIC CAVITY:  No ascites. No pneumoperitoneum. No lymphadenopathy. VESSELS: Atherosclerotic changes are present. No evidence of aneurysm. PELVIS REPRODUCTIVE ORGANS:  Unremarkable for patient's age. URINARY BLADDER:  Unremarkable. ABDOMINAL WALL/INGUINAL REGIONS: There is a small fat-containing umbilical hernia. OSSEOUS STRUCTURES: No acute fracture or destructive osseous lesion. Spinal degenerative changes are noted. Impression: No acute intra-abdominal pathology identified. Sigmoid diverticulosis. No evidence of diverticulitis. Chronic findings, as per the body of the report. Workstation performed: [id number]     Procedure: CT head without contrast    Result Date: 8/27/2023  Narrative: CT BRAIN - WITHOUT CONTRAST INDICATION:   Head trauma, moderate-severe head pain. COMPARISON: 2/17/2022 TECHNIQUE:  CT examination of the brain was performed. Multiplanar 2D reformatted images were created from the source data. Radiation dose length product (DLP) for this visit:  845 mGy-cm . This examination, like all CT scans performed in the [facility], was performed utilizing techniques to minimize radiation dose exposure, including the use of iterative reconstruction and automated exposure control. IMAGE QUALITY:  Diagnostic. FINDINGS: PARENCHYMA: Decreased attenuation is noted in periventricular and subcortical white matter demonstrating an appearance that is statistically most likely to represent mild microangiopathic change. No CT signs of acute infarction. No intracranial mass, mass effect or midline shift. No acute parenchymal hemorrhage. VENTRICLES AND EXTRA-AXIAL SPACES:  Normal for the patient's age. VISUALIZED ORBITS: Status post bilateral cataract surgery. PARANASAL SINUSES: Minimal mucosal thickening in the right maxillary sinus. CALVARIUM AND EXTRACRANIAL SOFT TISSUES:  Normal.     Impression: No acute intracranial abnormality. Workstation performed: [id number]     Narrative/Impressions - 3 day look back     EKG, Pathology, and Other Studies Reviewed on Admission:   · EKG: Reviewed    Counseling / Coordination of Care Time: 30 total mins spent n consult. Greater than 50% of total time spent on patient counseling and coordination of care. ............................................................................................................................................... Juan Miguel Cruz M.D.  PGY-5 Gastroenterology Fellow  [facility]  Available on [email or web address]  Recommendations not final until attending attestation

## 2023-08-27 NOTE — ED NOTES
Patient transported to [facility] Artur Brianda, [address] [place] [address]  08/27/23 0113

## 2023-08-28 LAB
ANION GAP SERPL CALCULATED.3IONS-SCNC: 5 MMOL/L
ATRIAL RATE: 64 BPM
BACTERIA UR QL AUTO: ABNORMAL /HPF
BILIRUB UR QL STRIP: NEGATIVE
BUDDING YEAST: PRESENT
BUN SERPL-MCNC: 18 MG/DL (ref 5–25)
CALCIUM SERPL-MCNC: 9.1 MG/DL (ref 8.4–10.2)
CHLORIDE SERPL-SCNC: 107 MMOL/L (ref 96–108)
CLARITY UR: CLEAR
CO2 SERPL-SCNC: 26 MMOL/L (ref 21–32)
COLOR UR: ABNORMAL
CREAT SERPL-MCNC: 1.11 MG/DL (ref 0.6–1.3)
ERYTHROCYTE [DISTWIDTH] IN BLOOD BY AUTOMATED COUNT: 15.5 % (ref 11.6–15.1)
GFR SERPL CREATININE-BSD FRML MDRD: 40 ML/MIN/1.73SQ M
GLUCOSE SERPL-MCNC: 146 MG/DL (ref 65–140)
GLUCOSE SERPL-MCNC: 158 MG/DL (ref 65–140)
GLUCOSE SERPL-MCNC: 176 MG/DL (ref 65–140)
GLUCOSE SERPL-MCNC: 181 MG/DL (ref 65–140)
GLUCOSE SERPL-MCNC: 185 MG/DL (ref 65–140)
GLUCOSE UR STRIP-MCNC: NEGATIVE MG/DL
HCT VFR BLD AUTO: 32.1 % (ref 34.8–46.1)
HGB BLD-MCNC: 9.5 G/DL (ref 11.5–15.4)
HGB UR QL STRIP.AUTO: NEGATIVE
KETONES UR STRIP-MCNC: NEGATIVE MG/DL
LEUKOCYTE ESTERASE UR QL STRIP: ABNORMAL
MAGNESIUM SERPL-MCNC: 2 MG/DL (ref 1.9–2.7)
MCH RBC QN AUTO: 24.5 PG (ref 26.8–34.3)
MCHC RBC AUTO-ENTMCNC: 29.6 G/DL (ref 31.4–37.4)
MCV RBC AUTO: 83 FL (ref 82–98)
NITRITE UR QL STRIP: NEGATIVE
NON-SQ EPI CELLS URNS QL MICRO: ABNORMAL /HPF
P AXIS: 85 DEGREES
PH UR STRIP.AUTO: 7 [PH]
PHOSPHATE SERPL-MCNC: 3.5 MG/DL (ref 2.3–4.1)
PLATELET # BLD AUTO: 211 THOUSANDS/UL (ref 149–390)
PMV BLD AUTO: 11.1 FL (ref 8.9–12.7)
POTASSIUM SERPL-SCNC: 4.5 MMOL/L (ref 3.5–5.3)
PR INTERVAL: 190 MS
PROT UR STRIP-MCNC: NEGATIVE MG/DL
QRS AXIS: -5 DEGREES
QRSD INTERVAL: 104 MS
QT INTERVAL: 400 MS
QTC INTERVAL: 412 MS
RBC # BLD AUTO: 3.87 MILLION/UL (ref 3.81–5.12)
RBC #/AREA URNS AUTO: ABNORMAL /HPF
SODIUM SERPL-SCNC: 138 MMOL/L (ref 135–147)
SP GR UR STRIP.AUTO: 1.01 (ref 1–1.03)
T WAVE AXIS: 86 DEGREES
TSH SERPL DL<=0.05 MIU/L-ACNC: 1.19 UIU/ML (ref 0.45–4.5)
UROBILINOGEN UR STRIP-ACNC: <2 MG/DL
VENTRICULAR RATE: 64 BPM
WBC # BLD AUTO: 5.57 THOUSAND/UL (ref 4.31–10.16)
WBC #/AREA URNS AUTO: ABNORMAL /HPF

## 2023-08-28 PROCEDURE — 81001 URINALYSIS AUTO W/SCOPE: CPT | Performed by: STUDENT IN AN ORGANIZED HEALTH CARE EDUCATION/TRAINING PROGRAM

## 2023-08-28 PROCEDURE — 84100 ASSAY OF PHOSPHORUS: CPT | Performed by: STUDENT IN AN ORGANIZED HEALTH CARE EDUCATION/TRAINING PROGRAM

## 2023-08-28 PROCEDURE — 97162 PT EVAL MOD COMPLEX 30 MIN: CPT

## 2023-08-28 PROCEDURE — 84443 ASSAY THYROID STIM HORMONE: CPT | Performed by: STUDENT IN AN ORGANIZED HEALTH CARE EDUCATION/TRAINING PROGRAM

## 2023-08-28 PROCEDURE — 97166 OT EVAL MOD COMPLEX 45 MIN: CPT

## 2023-08-28 PROCEDURE — 80048 BASIC METABOLIC PNL TOTAL CA: CPT | Performed by: STUDENT IN AN ORGANIZED HEALTH CARE EDUCATION/TRAINING PROGRAM

## 2023-08-28 PROCEDURE — 82948 REAGENT STRIP/BLOOD GLUCOSE: CPT

## 2023-08-28 PROCEDURE — 85027 COMPLETE CBC AUTOMATED: CPT | Performed by: STUDENT IN AN ORGANIZED HEALTH CARE EDUCATION/TRAINING PROGRAM

## 2023-08-28 PROCEDURE — 99232 SBSQ HOSP IP/OBS MODERATE 35: CPT | Performed by: PHYSICIAN ASSISTANT

## 2023-08-28 PROCEDURE — 83735 ASSAY OF MAGNESIUM: CPT | Performed by: STUDENT IN AN ORGANIZED HEALTH CARE EDUCATION/TRAINING PROGRAM

## 2023-08-28 PROCEDURE — 93010 ELECTROCARDIOGRAM REPORT: CPT | Performed by: INTERNAL MEDICINE

## 2023-08-28 RX ORDER — CEPHALEXIN 500 MG/1
500 CAPSULE ORAL EVERY 8 HOURS SCHEDULED
Status: DISCONTINUED | OUTPATIENT
Start: 2023-08-28 | End: 2023-08-29 | Stop reason: HOSPADM

## 2023-08-28 RX ADMIN — ASPIRIN 81 MG: 81 TABLET, COATED ORAL at 08:06

## 2023-08-28 RX ADMIN — ACETAMINOPHEN 650 MG: 325 TABLET ORAL at 22:59

## 2023-08-28 RX ADMIN — GABAPENTIN 300 MG: 300 CAPSULE ORAL at 21:43

## 2023-08-28 RX ADMIN — AMLODIPINE BESYLATE 5 MG: 5 TABLET ORAL at 08:06

## 2023-08-28 RX ADMIN — CEPHALEXIN 500 MG: 500 CAPSULE ORAL at 14:17

## 2023-08-28 RX ADMIN — GABAPENTIN 300 MG: 300 CAPSULE ORAL at 21:50

## 2023-08-28 RX ADMIN — DORZOLAMIDE HCL 1 DROP: 20 SOLUTION/ DROPS OPHTHALMIC at 08:07

## 2023-08-28 RX ADMIN — LATANOPROST 1 DROP: 50 SOLUTION OPHTHALMIC at 21:43

## 2023-08-28 RX ADMIN — GABAPENTIN 300 MG: 300 CAPSULE ORAL at 08:06

## 2023-08-28 RX ADMIN — FUROSEMIDE 20 MG: 20 TABLET ORAL at 08:06

## 2023-08-28 RX ADMIN — CEPHALEXIN 500 MG: 500 CAPSULE ORAL at 21:43

## 2023-08-28 RX ADMIN — INSULIN LISPRO 1 UNITS: 100 INJECTION, SOLUTION INTRAVENOUS; SUBCUTANEOUS at 17:26

## 2023-08-28 RX ADMIN — GABAPENTIN 300 MG: 300 CAPSULE ORAL at 17:27

## 2023-08-28 RX ADMIN — Medication 2000 UNITS: at 08:06

## 2023-08-28 RX ADMIN — NYSTATIN: 100000 POWDER TOPICAL at 08:07

## 2023-08-28 RX ADMIN — DORZOLAMIDE HCL 1 DROP: 20 SOLUTION/ DROPS OPHTHALMIC at 17:27

## 2023-08-28 RX ADMIN — PANTOPRAZOLE SODIUM 40 MG: 40 TABLET, DELAYED RELEASE ORAL at 05:04

## 2023-08-28 RX ADMIN — LEVOTHYROXINE SODIUM 75 MCG: 75 TABLET ORAL at 05:04

## 2023-08-28 RX ADMIN — FAMOTIDINE 10 MG: 20 TABLET ORAL at 08:06

## 2023-08-28 RX ADMIN — NYSTATIN: 100000 POWDER TOPICAL at 17:28

## 2023-08-28 RX ADMIN — INSULIN LISPRO 1 UNITS: 100 INJECTION, SOLUTION INTRAVENOUS; SUBCUTANEOUS at 12:17

## 2023-08-28 RX ADMIN — DORZOLAMIDE HCL 1 DROP: 20 SOLUTION/ DROPS OPHTHALMIC at 21:43

## 2023-08-28 NOTE — ASSESSMENT & PLAN NOTE
Lab Results   Component Value Date    HGBA1C 9.0 (H) 02/13/2023       Recent Labs     08/27/23 2056 08/28/23  0718 08/28/23  1127 08/28/23  1553   POCGLU 141* 146* 185* 176*       Blood Sugar Average: Last 72 hrs:  (P) 168.6   Given advanced age, will put her on regular diet here

## 2023-08-28 NOTE — ASSESSMENT & PLAN NOTE
8 year old female presented to our ED due to GI bleed and abdominal pain  · GI recommendations appreciated. No inpatient intervention planned at this time. They suspect that her Minimal blood per rectum most likely from benign anorectal etiology. Recommend bowel regimen with daily MiraLAX to prevent straining per her reports of intermittent constipation.   · She had a bowel movement last evening without blood present  · Continue to avoid constipation  · CT without any acute pathology, continue supportive care  · Monitor hemoglobin  · Check iron panel and B12  · Patient feeling weak today waiting for PT and OT consult  · Check H&H in a.m.  · Hopefully home in 24 hours    Lab Results   Component Value Date    HGB 9.5 (L) 08/28/2023    HGB 10.3 (L) 08/27/2023    HGB 11.6 04/19/2023    HGB 12.3 02/08/2023    HGB 10.6 (L) 01/02/2023

## 2023-08-28 NOTE — PROGRESS NOTES
[address] [facility]  Progress Note  Name: Charlie Lo  MRN: [id number]  Unit/Bed#: [facility] 2 [address] [facility] [address]02 I Date of Admission: 8/27/2023   Date of Service: 8/28/2023 I Hospital Day: 1    Assessment/Plan   * GI bleed  Assessment & Plan  8 year old female presented to our ED due to GI bleed and abdominal pain  · GI recommendations appreciated. No inpatient intervention planned at this time. They suspect that her Minimal blood per rectum most likely from benign anorectal etiology. Recommend bowel regimen with daily MiraLAX to prevent straining per her reports of intermittent constipation. · She had a bowel movement last evening without blood present  · Continue to avoid constipation  · CT without any acute pathology, continue supportive care  · Monitor hemoglobin  · Check iron panel and B12  · Patient feeling weak today waiting for PT and OT consult  · Check H&H in a.m.  · Hopefully home in 24 hours    Lab Results   Component Value Date    HGB 9.5 (L) 08/28/2023    HGB 10.3 (L) 08/27/2023    HGB 11.6 04/19/2023    HGB 12.3 02/08/2023    HGB 10.6 (L) 01/02/2023         Essential hypertension  Assessment & Plan  · Continue Norvasc 5 mg daily  · Monitor vital signs per unit routine    Acute cystitis with hematuria  Assessment & Plan  Patient complaining of frequency, no dysuria.   · Urinalysis: Large leukocytes, 2-4 RBCs, innumerable bacteria and WBCs  · Given symptoms we will treat for 3-day course of Keflex    Type 2 diabetes mellitus [facility])  Assessment & Plan  Lab Results   Component Value Date    HGBA1C 9.0 (H) 02/13/2023       Recent Labs     08/27/23  1650 08/27/23  2056 08/28/23  0718 08/28/23  1127   POCGLU 195* 141* 146* 185*       Blood Sugar Average: Last 72 hrs:  (P) 166.75   Given advanced age, will put her on regular diet   Sliding scale, accuchecks     GERD (gastroesophageal reflux disease)  Assessment & Plan  Continue PPI / Famotidine    Chronic diastolic congestive heart failure [facility])  Assessment & Plan  Wt Readings from Last 3 Encounters:   08/28/23 68.1 kg (150 lb 2.1 oz)   04/24/23 70.5 kg (155 lb 8 oz)   04/19/23 70.6 kg (155 lb 10.3 oz)     Euvolemic   continue lasix 20 mg daily        Hypothyroidism  Assessment & Plan  Continue Levothyroxine 75mcg daily    Ambulatory dysfunction  Assessment & Plan  · PT/OT evaluation pending   · Lives with daughter prior to admission    Aortic stenosis  Assessment & Plan  Severe stenosis, likely playing a role to her chronic shortness of breath. 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 We would not consider a transapical approach in her age. Given her age and comorbidities and calcification of her aorta I recommended continued medical therapy and no further procedure."           VTE Pharmacologic Prophylaxis:   Moderate Risk (Score 3-4) - Pharmacological DVT Prophylaxis Contraindicated. Sequential Compression Devices Ordered. Patient Centered Rounds: I performed bedside rounds with nursing staff today. Discussions with Specialists or Other Care Team Provider: CM    Education and Discussions with Family / Patient: left daughter voice message . Total Time Spent on Date of Encounter in care of patient: 25 minutes This time was spent on one or more of the following: performing physical exam; counseling and coordination of care; obtaining or reviewing history; documenting in the medical record; reviewing/ordering tests, medications or procedures; communicating with other healthcare professionals and discussing with patient's family/caregivers.     Current Length of Stay: 1 day(s)  Current Patient Status: Inpatient   Certification Statement: The patient will continue to require additional inpatient hospital stay due to ambualtory dysfunction, GI bleed  Discharge Plan: Anticipate discharge tomorrow to discharge location to be determined pending rehab evaluations. Code Status: Level 3 - DNAR and DNI    Subjective:   Patient still feels weak today. Had BM last evening without blood present. no melena. Had LLQ pain this AM that resolved after a few minutes, self limiting. She tolerated her breakfast without difficulty. No further pain. No N/V. No CP or increased SOB from her baselien SMITH. No lightheadedness or dizziness. She lives with her daughter prior to arrival.     Objective:     Vitals:   Temp (24hrs), Av.9 °F (36.6 °C), Min:97.3 °F (36.3 °C), Max:98.6 °F (37 °C)    Temp:  [97.3 °F (36.3 °C)-98.6 °F (37 °C)] 97.9 °F (36.6 °C)  HR:  [66-69] 66  Resp:  [16-17] 16  BP: (113-134)/(61-65) 134/61  SpO2:  [93 %-95 %] 95 %  Body mass index is 27.46 kg/m². Input and Output Summary (last 24 hours):   No intake or output data in the 24 hours ending 23 1210    Physical Exam:   Physical Exam  Vitals and nursing note reviewed. Cardiovascular:      Rate and Rhythm: Normal rate and regular rhythm. Pulmonary:      Effort: Pulmonary effort is normal.      Breath sounds: Normal breath sounds. Comments: Decreased throughout on room air   Abdominal:      General: Bowel sounds are normal. There is no distension. Palpations: Abdomen is soft. Tenderness: There is no abdominal tenderness. There is no guarding or rebound. Musculoskeletal:      Right lower leg: No edema. Left lower leg: No edema. Skin:     General: Skin is warm. Neurological:      Mental Status: She is alert. Mental status is at baseline.    Psychiatric:         Mood and Affect: Mood normal.          Additional Data:     Labs:  Results from last 7 days   Lab Units 23  0457 23  0758   WBC Thousand/uL 5.57 5.51   HEMOGLOBIN g/dL 9.5* 10.3*   HEMATOCRIT % 32.1* 35.1   PLATELETS Thousands/uL 211 220   NEUTROS PCT %  --  70   LYMPHS PCT %  --  17   MONOS PCT %  -- 9   EOS PCT %  --  3     Results from last 7 days   Lab Units 08/28/23  0457 08/27/23  0758   SODIUM mmol/L 138 136   POTASSIUM mmol/L 4.5 4.3   CHLORIDE mmol/L 107 103   CO2 mmol/L 26 26   BUN mg/dL 18 18   CREATININE mg/dL 1.11 1.12   ANION GAP mmol/L 5 7   CALCIUM mg/dL 9.1 9.3   ALBUMIN g/dL  --  3.8   TOTAL BILIRUBIN mg/dL  --  0.43   ALK PHOS U/L  --  94   ALT U/L  --  5*   AST U/L  --  9*   GLUCOSE RANDOM mg/dL 158* 196*     Results from last 7 days   Lab Units 08/27/23  0758   INR  1.09     Results from last 7 days   Lab Units 08/28/23  1127 08/28/23  0718 08/27/23  2056 08/27/23  1650   POC GLUCOSE mg/dl 185* 146* 141* 195*               Lines/Drains:  Invasive Devices     Peripheral Intravenous Line  Duration           Peripheral IV 08/27/23 Right Antecubital 1 day                      Imaging: Reviewed radiology reports from this admission including: abdominal/pelvic CT and CT head    Recent Cultures (last 7 days):         Last 24 Hours Medication List:   Current Facility-Administered Medications   Medication Dose Route Frequency Provider Last Rate   • acetaminophen  650 mg Oral Q6H PRN Tracey Hunt MD     • amLODIPine  5 mg Oral Daily Tracey Hunt MD     • aspirin  81 mg Oral Daily Tracey Hunt MD     • cephalexin  500 mg Oral Q8H [address] [facility]     • cholecalciferol  2,000 Units Oral Daily Tracey Hunt MD     • dorzolamide  1 drop Both Eyes TID Tracey Hunt MD     • famotidine  10 mg Oral Daily Tracey Hunt MD     • furosemide  20 mg Oral Daily Tracey Hunt MD     • gabapentin  300 mg Oral TID Tracey Hunt MD     • gabapentin  300 mg Oral HS Tracey Hunt MD     • insulin lispro  1-5 Units Subcutaneous TID AC Tracey Hunt MD     • latanoprost  1 drop Both Eyes HS Tracey Hunt MD     • levothyroxine  75 mcg Oral Early Morning Tracey Hunt MD     • nystatin   Topical BID Tracey Hunt MD     • pantoprazole  40 mg Oral Daily Before Breakfast Tracey Hunt MD     • polyethylene glycol 17 g Oral Daily Zoey Grayling, DO          Today, Patient Was Seen By: Alberto Villalba PA-C    **Please Note: This note may have been constructed using a voice recognition system. **

## 2023-08-28 NOTE — UTILIZATION REVIEW
Initial Clinical Review    Admission: Date/Time/Statement:   Admission Orders (From admission, onward)     Ordered        08/27/23 1009  INPATIENT ADMISSION  Once                      Orders Placed This Encounter   Procedures   • INPATIENT ADMISSION     Standing Status:   Standing     Number of Occurrences:   1     Order Specific Question:   Level of Care     Answer:   Med Surg [16]     Order Specific Question:   Estimated length of stay     Answer:   More than 2 Midnights     Order Specific Question:   Certification     Answer:   I certify that inpatient services are medically necessary for this patient for a duration of greater than two midnights. See H&P and MD Progress Notes for additional information about the patient's course of treatment. ED Arrival Information     Expected   -    Arrival   8/27/2023 07:00    Acuity   Urgent            Means of arrival   Wheelchair    Escorted by   Family Member    Service   Hospitalist    Admission type   Emergency            Arrival complaint   Weakness            Chief Complaint   Patient presents with   • Weakness - Generalized     Pt reports weakness x2 days. Pt reports generalized body aches. Pt reports headache, and neck pain. Per daughter pt with bright red blood in stool last night       Initial Presentation: [address] [place] [address] 83 y.o. female with PMH of DM2, GERD, HTN, CHF, aortic stenosis and hypothyroidism who presents to the ED from home for evaluation of fatigue, abdominal pain and rectal bleeding. Patient reports that she developed abdominal pain overnight, associated with some dizziness and bright red blood per rectum.  PE: AOx3, abdominal tenderness. 8/27 Inpatient admission for evaluation and treatment of GI bleed:  GI consulted, monitor hemoglobin. Check UA. PT/OT eval.     8/27 Gastroenterology consult: Minimal blood per rectum most likely from benign anorectal etiology.   Recommend bowel regimen with daily MiraLAX to prevent straining per her reports of intermittent constipation. Monitor for any recurrence of blood in the stool. No plans for colonoscopy. 8/28 Internal Medicine: Patient feeling weak today,  PT/OT consult. Had bowel movement last evening without blood present. Check iron panel and B12, check H&H in a.m. UA large leukocytes, 2-4 RBCs, innumerable bacteria and WBCs. Treat for three day course of Keflex.      8/29  Day 3: Has surpassed a 2nd midnight with active treatments and services, which include continued monitoring of H&H.         ED Triage Vitals   Temperature Pulse Respirations Blood Pressure SpO2   08/27/23 0709 08/27/23 0709 08/27/23 0709 08/27/23 0709 08/27/23 0709   97.8 °F (36.6 °C) 71 22 144/63 99 %      Temp Source Heart Rate Source Patient Position - Orthostatic VS BP Location FiO2 (%)   08/27/23 0709 08/27/23 0709 08/27/23 0709 08/27/23 0709 --   Oral Monitor Sitting Right arm       Pain Score       08/27/23 1122       No Pain          Wt Readings from Last 1 Encounters:   08/29/23 67 kg (147 lb 11.3 oz)     Additional Vital Signs:    Date/Time Temp Pulse Resp BP MAP (mmHg) SpO2 O2 Device Patient Position - Orthostatic VS   08/29/23 07:26:02 97.5 °F (36.4 °C) 65 15 129/58 82 92 % -- --   08/28/23 23:02:22 -- 73 -- 134/59 84 94 % -- --   08/28/23 19:36:24 98.8 °F (37.1 °C) 77 16 135/58 84 95 % None (Room air) Lying   08/28/23 19:12:10 98.4 °F (36.9 °C) 85 16 118/70 86 94 % -- --   08/28/23 15:27:24 97.7 °F (36.5 °C) 74 16 115/56 76 95 % -- Lying     Date/Time Temp Pulse Resp BP MAP (mmHg) SpO2 O2 Device Patient Position - Orthostatic VS   08/28/23 0720 97.9 °F (36.6 °C) 66 16 134/61 85 95 % -- --   08/27/23 20:45:24 98.6 °F (37 °C) 68 16 117/63 81 93 % -- Lying   08/27/23 14:38:15 97.3 °F (36.3 °C) Abnormal  69 17 113/65 81 95 % -- Lying   08/27/23 11:22:49 97.4 °F (36.3 °C) Abnormal  69 16 104/65 78 99 % --        Pertinent Labs/Diagnostic Test Results:       CT abdomen pelvis wo contrast   Final Result by Nika Evans MD (08/27 7019)      No acute intra-abdominal pathology identified. Sigmoid diverticulosis. No evidence of diverticulitis. Chronic findings, as per the body of the report. CT head without contrast   Final Result by Scot Hidalgo MD (08/27 0802)      No acute intracranial abnormality. XR chest 2 views   Final Result by Gilmar Lemus MD (08/27 1145)      No acute cardiopulmonary disease.            8/27 EKG:    Normal sinus rhythm  Left ventricular hypertrophy with repolarization abnormality  Abnormal ECG  When compared with ECG of 31-DEC-2022 03:07,  No significant change was found      Results from last 7 days   Lab Units 08/27/23  0758   SARS-COV-2  Negative     Results from last 7 days   Lab Units 08/29/23  0456 08/28/23  0457 08/27/23  0758   WBC Thousand/uL 4.94 5.57 5.51   HEMOGLOBIN g/dL 9.3* 9.5* 10.3*   HEMATOCRIT % 30.5* 32.1* 35.1   PLATELETS Thousands/uL 183 211 220   NEUTROS ABS Thousands/µL  --   --  3.83         Results from last 7 days   Lab Units 08/28/23  0457 08/27/23  0758   SODIUM mmol/L 138 136   POTASSIUM mmol/L 4.5 4.3   CHLORIDE mmol/L 107 103   CO2 mmol/L 26 26   ANION GAP mmol/L 5 7   BUN mg/dL 18 18   CREATININE mg/dL 1.11 1.12   EGFR ml/min/1.73sq m 40 40   CALCIUM mg/dL 9.1 9.3   MAGNESIUM mg/dL 2.0 2.0   PHOSPHORUS mg/dL 3.5  --      Results from last 7 days   Lab Units 08/27/23  0758   AST U/L 9*   ALT U/L 5*   ALK PHOS U/L 94   TOTAL PROTEIN g/dL 6.9   ALBUMIN g/dL 3.8   TOTAL BILIRUBIN mg/dL 0.43   BILIRUBIN DIRECT mg/dL 0.06     Results from last 7 days   Lab Units 08/29/23  0723 08/28/23  2104 08/28/23  1553 08/28/23  1127 08/28/23  0718 08/27/23  2056 08/27/23  1650   POC GLUCOSE mg/dl 187* 181* 176* 185* 146* 141* 195*     Results from last 7 days   Lab Units 08/28/23  0457 08/27/23  0758   GLUCOSE RANDOM mg/dL 158* 196*             Results from last 7 days   Lab Units 08/27/23  1204 08/27/23  0952 08/27/23  0758   HS TNI 0HR ng/L  --   --  14   HS TNI 2HR ng/L --  14  --    HSTNI D2 ng/L  --  0  --    HS TNI 4HR ng/L 15  --   --    HSTNI D4 ng/L 1  --   --          Results from last 7 days   Lab Units 08/27/23  0758   PROTIME seconds 14.2   INR  1.09   PTT seconds 29     Results from last 7 days   Lab Units 08/28/23  0457   TSH 3RD GENERATON uIU/mL 1.187         Results from last 7 days   Lab Units 08/27/23  0758   BNP pg/mL 355*             Results from last 7 days   Lab Units 08/27/23  0758   LIPASE u/L 19       Results from last 7 days   Lab Units 08/28/23  0502   CLARITY UA  Clear   COLOR UA  Light Yellow   SPEC GRAV UA  1.010   PH UA  7.0   GLUCOSE UA mg/dl Negative   KETONES UA mg/dl Negative   BLOOD UA  Negative   PROTEIN UA mg/dl Negative   NITRITE UA  Negative   BILIRUBIN UA  Negative   UROBILINOGEN UA (BE) mg/dl <2.0   LEUKOCYTES UA  Large*   WBC UA /hpf Innumerable*   RBC UA /hpf 2-4*   BACTERIA UA /hpf Innumerable*   EPITHELIAL CELLS WET PREP /hpf Occasional           ED Treatment:   Medication Administration from 08/27/2023 0700 to 08/27/2023 1104       Date/Time Order Dose Route Action     08/27/2023 0802 EDT sodium chloride 0.9 % bolus 500 mL 500 mL Intravenous New Bag     08/27/2023 0800 EDT acetaminophen (TYLENOL) tablet 650 mg 650 mg Oral Given     08/27/2023 1005 EDT pantoprazole (PROTONIX) injection 40 mg 40 mg Intravenous Given        Past Medical History:   Diagnosis Date   • Asthma    • Atypical chest pain    • CAD (coronary artery disease)    • Candidal intertrigo    • CHF (congestive heart failure) (HCC)    • Depression    • Diabetes mellitus (HCC)    • Diabetic neuropathy (HCC)    • Diverticulitis    • Dyslipidemia    • Female bladder prolapse    • Gastric ulcer    • Glaucoma    • HTN (hypertension)    • Hyperlipidemia    • Hypothyroidism 4/18/2016   • RAD (reactive airway disease)      Present on Admission:  • Ambulatory dysfunction  • Aortic stenosis  • Acute cystitis with hematuria  • Chronic diastolic congestive heart failure (HCC)  • Essential hypertension  • GERD (gastroesophageal reflux disease)  • Hypothyroidism  • Type 2 diabetes mellitus (HCC)      Admitting Diagnosis: Fatigue [R53.83]  Weakness [R53.1]  GI bleed [K92.2]  Age/Sex: 80 y.o. female       Admission Orders:  SCD, PT/OT eval.         Scheduled Medications:      amLODIPine, 5 mg, Oral, Daily  aspirin, 81 mg, Oral, Daily  cephalexin, 500 mg, Oral, Q8H JONATAN  cholecalciferol, 2,000 Units, Oral, Daily  dorzolamide, 1 drop, Both Eyes, TID  famotidine, 10 mg, Oral, Daily  furosemide, 20 mg, Oral, Daily  gabapentin, 300 mg, Oral, TID  gabapentin, 300 mg, Oral, HS  insulin lispro, 1-5 Units, Subcutaneous, TID AC  latanoprost, 1 drop, Both Eyes, HS  levothyroxine, 75 mcg, Oral, Early Morning  nystatin, , Topical, BID  pantoprazole, 40 mg, Oral, Daily Before Breakfast  polyethylene glycol, 17 g, Oral, Daily      Continuous IV Infusions: None. PRN Meds:      acetaminophen, 650 mg, Oral, Q6H PRN        IP CONSULT TO GASTROENTEROLOGY    Network Utilization Review Department  ATTENTION: Please call with any questions or concerns to [phone] and carefully listen to the prompts so that you are directed to the right person. All voicemails are confidential.  Bruna Gabby all requests for admission clinical reviews, approved or denied determinations and any other requests to dedicated fax number below belonging to the campus where the patient is receiving treatment.  List of dedicated fax numbers for the Facilities:  [place] DENIALS (Administrative/Medical Necessity) [phone]   [facility] (Maternity/NICU/Pediatrics) [phone]   [facility] [address] [facility] [phone] [address] [address] [address]  [facility] [phone]

## 2023-08-28 NOTE — ASSESSMENT & PLAN NOTE
8 year old female presented to our ED due to GI bleed and abdominal pain  · GI recommendations appreciated. No inpatient intervention planned at this time. They suspect that her Minimal blood per rectum most likely from benign anorectal etiology. Recommend bowel regimen with daily MiraLAX to prevent straining per her reports of intermittent constipation.   · She had a bowel movement last evening without blood present  · Continue to avoid constipation as noted above   · CT without any acute pathology, continue supportive care  · Check iron panel and B12 - folow up with PCP   · outpt PCP follow up , message sent for outpt follow up  · Return for any evidence of ongoing bleeding     Lab Results   Component Value Date    HGB 9.3 (L) 08/29/2023    HGB 9.5 (L) 08/28/2023    HGB 10.3 (L) 08/27/2023    HGB 11.6 04/19/2023    HGB 12.3 02/08/2023

## 2023-08-28 NOTE — ASSESSMENT & PLAN NOTE
Lab Results   Component Value Date    HGBA1C 9.0 (H) 02/13/2023       Recent Labs     08/27/23  1650 08/27/23 2056 08/28/23  0718 08/28/23  1127   POCGLU 195* 141* 146* 185*       Blood Sugar Average: Last 72 hrs:  (P) 166.75   Given advanced age, will put her on regular diet   Sliding scale, accuchecks

## 2023-08-28 NOTE — ASSESSMENT & PLAN NOTE
Wt Readings from Last 3 Encounters:   08/28/23 68.1 kg (150 lb 2.1 oz)   04/24/23 70.5 kg (155 lb 8 oz)   04/19/23 70.6 kg (155 lb 10.3 oz)     Euvolemic   continue lasix 20 mg daily

## 2023-08-28 NOTE — PHYSICAL THERAPY NOTE
PHYSICAL THERAPY EVALUATION          Patient Name: Sonny العلي  LLWKL'F Date: 8/28/2023  PT EVALUATION    80 y.o.    [id number]    Fatigue [R53.83]  Weakness [R53.1]  GI bleed [K92.2]    Past Medical History:   Diagnosis Date    Asthma     Atypical chest pain     CAD (coronary artery disease)     Candidal intertrigo     CHF (congestive heart failure) ([address])     Depression     Diabetes mellitus ([address])     Diabetic neuropathy ([address])     Diverticulitis     Dyslipidemia     Female bladder prolapse     Gastric ulcer     Glaucoma     HTN (hypertension)     Hyperlipidemia     Hypothyroidism 4/18/2016    RAD (reactive airway disease)      Past Surgical History:   Procedure Laterality Date    COLONOSCOPY      ESOPHAGOGASTRODUODENOSCOPY  2011    HYSTERECTOMY      TONSILLECTOMY          08/28/23 1126   PT Last Visit   PT Visit Date 08/28/23   Note Type   Note type Evaluation   Pain Assessment   Pain Assessment Tool 0-10   Pain Score No Pain   Restrictions/Precautions   Other Precautions Chair Alarm; Bed Alarm; Fall Risk   Home Living   Type of [address] [place] [address] Road to live on main level with bedroom/bathroom;Stairs to enter without rails   Bathroom Shower/Tub Walk-in shower   [address] chair   Home Equipment Walker;Cane   Additional Comments 2 STAR   Prior Function   Level of [place] Independent with ADLs; Independent with functional mobility; Needs assistance with IADLS   Lives With Daughter   Receives Help From Family   IADLs Family/Friend/Other provides transportation; Family/Friend/Other provides medication management; Family/Friend/Other provides meals   Falls in the last 6 months 0   General   Additional Pertinent History pt admitted 8/27/23 for GI bleed.  PMHx significant for CAD, CHF, depression, DM w neuropathy   Cognition   Overall Cognitive Status WFL   Arousal/Participation Cooperative   Orientation Level Oriented X4   Memory Within functional limits Following Commands Follows all commands and directions without difficulty   RLE Assessment   RLE Assessment WFL   LLE Assessment   LLE Assessment WFL   Bed Mobility   Supine to Sit 5  Supervision   Additional items HOB elevated; Increased time required   Sit to Supine 5  Supervision   Additional items HOB elevated; Increased time required   Transfers   Sit to Stand 5  Supervision   Additional items Increased time required; Other  (RW)   Stand to Sit 5  Supervision   Additional items Other;Bedrails; Impulsive  (RW)   Ambulation/Elevation   Gait pattern Forward Flexion; Excessively slow  (increased lateral sway)   Gait Assistance 5  Supervision   Additional items Assist x 1   Assistive Device Rolling walker   Distance 110'   Balance   Static Standing Fair   Dynamic Standing [facility] -   Endurance Deficit   Endurance Deficit No   Activity Tolerance   Activity Tolerance Patient tolerated treatment well   Medical Staff Made Aware Joel Hammond OT   Nurse Made Aware Ela Martinez RN   Assessment   Prognosis Good   Problem List Impaired balance; Impaired judgement;Decreased safety awareness   Assessment Haylie Moore is a 80 y.o. female admitted to [address] [place] [address] on 8/27/2023 for GI bleed. PT was consulted and pt was seen on 8/28/2023 for mobility assessment and d/c planning. Pt presents w fall risk, masimo monitoring. At baseline is indep for ADLs (w exception of showers) and mobility w RW in the home and cane in community. Pt is currently functioning at a supervision assistance x1 level for bed mobility, transfers and ambulation w RW. Pt demonstrated no significant deficits of BLE strength, balance w use of DME or endurance ambulating household distances. Noted occ impulsive behaviors, specifically leaving walker and furniture cruising. Does have S from dtr at home. Appears to be functioning at baseline. Would benefit from continued mobilization w nsg/ restorative. At this time PT recommendations for d/c are home.    Barriers to Discharge None   Plan   PT Frequency   (d/c PT; maintain on restorative)   Recommendation   PT Discharge Recommendation No rehabilitation needs   AM-PAC Basic Mobility Inpatient   Turning in Flat Bed Without Bedrails 4   Lying on Back to Sitting on Edge of Flat Bed Without Bedrails 3   Moving Bed to Chair 3   Standing Up From Chair Using Arms 3   Walk in Room 3   Climb 3-5 Stairs With Railing 3   Basic Mobility Inpatient Raw Score 19   Basic Mobility Standardized Score 42.48   Highest Level Of Mobility   JH-HLM Goal 6: Walk 10 steps or more   JH-HLM Achieved 7: Walk 25 feet or more   End of Consult   Patient Position at End of Consult Supine;Bed/Chair alarm activated; All needs within reach   History: co - morbidities, fall risk, use of assistive device, assist for adl's/iadl's  Exam: impairments in systems including neuromuscular (balance, gait, transfers, motor function), AM-PAC, cognition  Clinical: stable/unpredictable  Complexity: moderate      Emaline Chi, PT

## 2023-08-28 NOTE — RESTORATIVE TECHNICIAN NOTE
Restorative Technician Note      Patient Name: Kelsy Rahman     Restorative Tech Visit Date: 08/28/23  Note Type: Mobility  Patient Position Upon Consult: Seated edge of bed  Activity Performed: Ambulated  Assistive Device: Roller walker  Patient Position at End of Consult: Seated edge of bed;  All needs within reach

## 2023-08-28 NOTE — ASSESSMENT & PLAN NOTE
· PT/OT evaluation recommending d/c home  · Lives with daughter   · Discussed plan with daughter over the phone, plan for d/c home with daughter at 6 today

## 2023-08-28 NOTE — OCCUPATIONAL THERAPY NOTE
Occupational Therapy Evaluation     Patient Name: Jun JAMES Date: 8/28/2023  Problem List  Principal Problem:    GI bleed  Active Problems: Aortic stenosis    Ambulatory dysfunction    Hypothyroidism    Chronic diastolic congestive heart failure (HCC)    GERD (gastroesophageal reflux disease)    Type 2 diabetes mellitus ([address])    Acute cystitis with hematuria    Essential hypertension    Past Medical History  Past Medical History:   Diagnosis Date    Asthma     Atypical chest pain     CAD (coronary artery disease)     Candidal intertrigo     CHF (congestive heart failure) (HCC)     Depression     Diabetes mellitus ([address])     Diabetic neuropathy ([address])     Diverticulitis     Dyslipidemia     Female bladder prolapse     Gastric ulcer     Glaucoma     HTN (hypertension)     Hyperlipidemia     Hypothyroidism 4/18/2016    RAD (reactive airway disease)      Past Surgical History  Past Surgical History:   Procedure Laterality Date    COLONOSCOPY      ESOPHAGOGASTRODUODENOSCOPY  2011    HYSTERECTOMY      TONSILLECTOMY           08/28/23 1118   OT Last Visit   OT Visit Date 08/28/23   Note Type   Note type Evaluation   Additional Comments Pt greeted in supine and agreeable to skilled OT evaluation. Pain Assessment   Pain Assessment Tool 0-10   Pain Score No Pain   Restrictions/Precautions   Weight Bearing Precautions Per Order No   Other Precautions Chair Alarm; Bed Alarm; Fall Risk   Home Living   Type of [address] to live on main level with bedroom/bathroom;Stairs to enter without rails  (2 STAR)   Bathroom Shower/Tub Walk-in shower   Bathroom Toilet Standard   Bathroom Equipment Shower chair   Home Equipment Walker;Cane   Prior Function   Level of [place] Independent with ADLs; Independent with functional mobility; Needs assistance with IADLS   Lives With Daughter   Receives Help From Family   IADLs Family/Friend/Other provides transportation; Family/Friend/Other provides meals; Family/Friend/Other provides medication management   Falls in the last 6 months 0   Vocational Retired   Comments Prior to admission, pt lives with daughter in a multi level home with a 1st floor set up. Walk in shower with grab bars and bench, standard toilets. Pt was I with ADLs and mobility. A with showers only. Ambulated with RW/ furniture walks. Reports 0 falls in the past 6 months. ADL   Where Assessed Edge of bed   Eating Assistance 6  Modified independent   Grooming Assistance 6  Modified Independent   UB Bathing Assistance 5  Supervision/Setup   LB Bathing Assistance 5  Supervision/Setup   UB Dressing Assistance 5  Supervision/Setup   LB Dressing Assistance 5  Supervision/Setup   Toileting Assistance  5  Supervision/Setup   Bed Mobility   Supine to Sit 5  Supervision   Sit to Supine 5  Supervision   Transfers   Sit to Stand 5  Supervision   Stand to Sit 5  Supervision   Additional Comments cues for hand placement and best tech. Functional Mobility   Functional Mobility 5  Supervision   Additional Comments RW, functional distances.    Additional items Rolling walker   Balance   Static Sitting Good   Dynamic Sitting Fair +   Static Standing Fair   Dynamic Standing Fair -   Ambulatory Fair -   Activity Tolerance   Activity Tolerance Patient tolerated treatment well   Medical Staff Made Aware ZEFERINO Puga   Nurse Made Aware HETAL Still   RUE Assessment   RUE Assessment WFL   LUE Assessment   LUE Assessment WFL   Hand Function   Gross Motor Coordination Functional   Fine Motor Coordination Functional   Psychosocial   Psychosocial (WDL) WDL   Cognition   Overall Cognitive Status WFL   Arousal/Participation Cooperative   Attention Attends with cues to redirect   Orientation Level Oriented X4   Memory Within functional limits   Following Commands Follows all commands and directions without difficulty   Assessment   Assessment Shirley Hsu is a 80 y.o. female seen for OT evaluation s/p admit to [facility] on 8/27/2023 w/ GI bleed. Comorbidities affecting pt's functional performance at time of assessment include: aortic stenosis, GERD, type 2 diabetes mellitus, and hypothyroidism. . Pt with active OT orders and activity orders for Up and OOB as tolerated. Personal factors affecting pt at time of IE include:STAR home environment and fall risk . Prior to admission, pt lives with daughter in a multi level home with a 1st floor set up. Walk in shower with grab bars and bench, standard toilets. Pt was I with ADLs and mobility. A with showers only. Ambulated with RW/ furniture walks. Reports 0 falls in the past 6 months. Upon evaluation: Pt currently requires supervision for UB ADLs, supervision for LB ADLs, supervision for toileting, supervision for bed mobility, supervision for functional transfers, and supervision with RW mobility 2* the following deficits impacting occupational performance:weakness. Pt is currently at their functional baseline and no skilled OT is warranted at this time. From OT standpoint, recommendation at time of d/c would be home with no rehab needs- continued family support. Plan   OT Frequency Eval only  (DC OT)   Recommendation   OT Discharge Recommendation No rehabilitation needs   Additional Comments  The patient's raw score on the AM-PAC Daily Activity Inpatient Short Form is 22. A raw score of greater than or equal to 19 suggests the patient may benefit from discharge to home. Please refer to the recommendation of the Occupational Therapist for safe discharge planning. AM-PAC Daily Activity Inpatient   Lower Body Dressing 3   Bathing 3   Toileting 4   Upper Body Dressing 4   Grooming 4   Eating 4   Daily Activity Raw Score 22   Daily Activity Standardized Score (Calc for Raw Score >=11) 47. 1   AM-[facility] Applied Cognition Inpatient   Following a Speech/Presentation 4   Understanding Ordinary Conversation 4   Taking Medications 4   Remembering Where Things Are Placed or Put Away 3   Remembering List of 4-5 Errands 3   Taking Care of Complicated Tasks 3   Applied Cognition Raw Score 21   Applied Cognition Standardized Score 44.3   [place], [place]

## 2023-08-28 NOTE — ASSESSMENT & PLAN NOTE
Patient complaining of frequency, no dysuria.   · Urinalysis: Large leukocytes, 2-4 RBCs, innumerable bacteria and WBCs  · Given symptoms we will treat for 3-day course of Keflex

## 2023-08-29 ENCOUNTER — TELEPHONE (OUTPATIENT)
Dept: FAMILY MEDICINE CLINIC | Facility: CLINIC | Age: 88
End: 2023-08-29

## 2023-08-29 VITALS
DIASTOLIC BLOOD PRESSURE: 58 MMHG | OXYGEN SATURATION: 92 % | RESPIRATION RATE: 15 BRPM | SYSTOLIC BLOOD PRESSURE: 129 MMHG | HEART RATE: 65 BPM | TEMPERATURE: 97.5 F | WEIGHT: 147.71 LBS | BODY MASS INDEX: 27.18 KG/M2 | HEIGHT: 62 IN

## 2023-08-29 LAB
ERYTHROCYTE [DISTWIDTH] IN BLOOD BY AUTOMATED COUNT: 15.8 % (ref 11.6–15.1)
FERRITIN SERPL-MCNC: 12 NG/ML (ref 11–307)
GLUCOSE SERPL-MCNC: 187 MG/DL (ref 65–140)
HCT VFR BLD AUTO: 30.5 % (ref 34.8–46.1)
HGB BLD-MCNC: 9.3 G/DL (ref 11.5–15.4)
IRON SATN MFR SERPL: 7 % (ref 15–50)
IRON SERPL-MCNC: 19 UG/DL (ref 50–212)
MCH RBC QN AUTO: 25.1 PG (ref 26.8–34.3)
MCHC RBC AUTO-ENTMCNC: 30.5 G/DL (ref 31.4–37.4)
MCV RBC AUTO: 82 FL (ref 82–98)
PLATELET # BLD AUTO: 183 THOUSANDS/UL (ref 149–390)
PMV BLD AUTO: 11 FL (ref 8.9–12.7)
RBC # BLD AUTO: 3.71 MILLION/UL (ref 3.81–5.12)
TIBC SERPL-MCNC: 257 UG/DL (ref 250–450)
UIBC SERPL-MCNC: 238 UG/DL (ref 155–355)
VIT B12 SERPL-MCNC: 307 PG/ML (ref 180–914)
WBC # BLD AUTO: 4.94 THOUSAND/UL (ref 4.31–10.16)

## 2023-08-29 PROCEDURE — 99239 HOSP IP/OBS DSCHRG MGMT >30: CPT | Performed by: PHYSICIAN ASSISTANT

## 2023-08-29 PROCEDURE — 83550 IRON BINDING TEST: CPT | Performed by: PHYSICIAN ASSISTANT

## 2023-08-29 PROCEDURE — 82948 REAGENT STRIP/BLOOD GLUCOSE: CPT

## 2023-08-29 PROCEDURE — 82728 ASSAY OF FERRITIN: CPT | Performed by: PHYSICIAN ASSISTANT

## 2023-08-29 PROCEDURE — 85027 COMPLETE CBC AUTOMATED: CPT | Performed by: PHYSICIAN ASSISTANT

## 2023-08-29 PROCEDURE — 83540 ASSAY OF IRON: CPT | Performed by: PHYSICIAN ASSISTANT

## 2023-08-29 PROCEDURE — 82607 VITAMIN B-12: CPT | Performed by: PHYSICIAN ASSISTANT

## 2023-08-29 RX ORDER — LEVOTHYROXINE SODIUM 0.07 MG/1
75 TABLET ORAL DAILY
Start: 2023-08-29

## 2023-08-29 RX ORDER — POLYETHYLENE GLYCOL 3350 17 G/17G
17 POWDER, FOR SOLUTION ORAL DAILY
Refills: 0
Start: 2023-08-29 | End: 2023-09-28

## 2023-08-29 RX ORDER — CEPHALEXIN 500 MG/1
500 CAPSULE ORAL EVERY 8 HOURS SCHEDULED
Qty: 6 CAPSULE | Refills: 0 | Status: SHIPPED | OUTPATIENT
Start: 2023-08-29 | End: 2023-08-31

## 2023-08-29 RX ADMIN — Medication 2000 UNITS: at 08:06

## 2023-08-29 RX ADMIN — GABAPENTIN 300 MG: 300 CAPSULE ORAL at 08:07

## 2023-08-29 RX ADMIN — FAMOTIDINE 10 MG: 20 TABLET ORAL at 08:10

## 2023-08-29 RX ADMIN — POLYETHYLENE GLYCOL 3350 17 G: 17 POWDER, FOR SOLUTION ORAL at 10:15

## 2023-08-29 RX ADMIN — AMLODIPINE BESYLATE 5 MG: 5 TABLET ORAL at 08:07

## 2023-08-29 RX ADMIN — NYSTATIN: 100000 POWDER TOPICAL at 08:10

## 2023-08-29 RX ADMIN — INSULIN LISPRO 1 UNITS: 100 INJECTION, SOLUTION INTRAVENOUS; SUBCUTANEOUS at 08:05

## 2023-08-29 RX ADMIN — DORZOLAMIDE HCL 1 DROP: 20 SOLUTION/ DROPS OPHTHALMIC at 08:10

## 2023-08-29 RX ADMIN — CEPHALEXIN 500 MG: 500 CAPSULE ORAL at 05:30

## 2023-08-29 RX ADMIN — FUROSEMIDE 20 MG: 20 TABLET ORAL at 08:07

## 2023-08-29 RX ADMIN — LEVOTHYROXINE SODIUM 75 MCG: 75 TABLET ORAL at 05:30

## 2023-08-29 RX ADMIN — PANTOPRAZOLE SODIUM 40 MG: 40 TABLET, DELAYED RELEASE ORAL at 05:30

## 2023-08-29 RX ADMIN — ASPIRIN 81 MG: 81 TABLET, COATED ORAL at 08:07

## 2023-08-29 NOTE — PLAN OF CARE
Problem: MOBILITY - ADULT  Goal: Maintain or return to baseline ADL function  Description: INTERVENTIONS:  -  Assess patient's ability to carry out ADLs; assess patient's baseline for ADL function and identify physical deficits which impact ability to perform ADLs (bathing, care of mouth/teeth, toileting, grooming, dressing, etc.)  - Assess/evaluate cause of self-care deficits   - Assess range of motion  - Assess patient's mobility; develop plan if impaired  - Assess patient's need for assistive devices and provide as appropriate  - Encourage maximum independence but intervene and supervise when necessary  - Involve family in performance of ADLs  - Assess for home care needs following discharge   - Consider OT consult to assist with ADL evaluation and planning for discharge  - Provide patient education as appropriate  Outcome: Progressing  Goal: Maintains/Returns to pre admission functional level  Description: INTERVENTIONS:  - Perform BMAT or MOVE assessment daily.   - Set and communicate daily mobility goal to care team and patient/family/caregiver.    - Collaborate with rehabilitation services on mobility goals if consulted  - Ambulate patient 4 times a day  - Out of bed for toileting  - Record patient progress and toleration of activity level   Outcome: Progressing     Problem: PAIN - ADULT  Goal: Verbalizes/displays adequate comfort level or baseline comfort level  Description: Interventions:  - Encourage patient to monitor pain and request assistance  - Assess pain using appropriate pain scale  - Administer analgesics based on type and severity of pain and evaluate response  - Implement non-pharmacological measures as appropriate and evaluate response  - Consider cultural and social influences on pain and pain management  - Notify physician/advanced practitioner if interventions unsuccessful or patient reports new pain  Outcome: Progressing     Problem: DISCHARGE PLANNING  Goal: Discharge to home or other facility with appropriate resources  Description: INTERVENTIONS:  - Identify barriers to discharge w/patient and caregiver  - Arrange for needed discharge resources and transportation as appropriate  - Identify discharge learning needs (meds, wound care, etc.)  - Arrange for interpretive services to assist at discharge as needed  - Refer to Case Management Department for coordinating discharge planning if the patient needs post-hospital services based on physician/advanced practitioner order or complex needs related to functional status, cognitive ability, or social support system  Outcome: Progressing     Problem: Knowledge Deficit  Goal: Patient/family/caregiver demonstrates understanding of disease process, treatment plan, medications, and discharge instructions  Description: Complete learning assessment and assess knowledge base.   Interventions:  - Provide teaching at level of understanding  - Provide teaching via preferred learning methods  Outcome: Progressing     Problem: GASTROINTESTINAL - ADULT  Goal: Minimal or absence of nausea and/or vomiting  Description: INTERVENTIONS:  - Administer IV fluids if ordered to ensure adequate hydration  - Maintain NPO status until nausea and vomiting are resolved  - Nasogastric tube if ordered  - Administer ordered antiemetic medications as needed  - Provide nonpharmacologic comfort measures as appropriate  - Advance diet as tolerated, if ordered  - Consider nutrition services referral to assist patient with adequate nutrition and appropriate food choices  Outcome: Progressing  Goal: Maintains or returns to baseline bowel function  Description: INTERVENTIONS:  - Assess bowel function  - Encourage oral fluids to ensure adequate hydration  - Administer IV fluids if ordered to ensure adequate hydration  - Administer ordered medications as needed  - Encourage mobilization and activity  - Consider nutritional services referral to assist patient with adequate nutrition and appropriate food choices  Outcome: Progressing  Goal: Maintains adequate nutritional intake  Description: INTERVENTIONS:  - Monitor percentage of each meal consumed  - Identify factors contributing to decreased intake, treat as appropriate  - Assist with meals as needed  - Monitor I&O, weight, and lab values if indicated  - Obtain nutrition services referral as needed  Outcome: Progressing     Problem: METABOLIC, FLUID AND ELECTROLYTES - ADULT  Goal: Electrolytes maintained within normal limits  Description: INTERVENTIONS:  - Monitor labs and assess patient for signs and symptoms of electrolyte imbalances  - Administer electrolyte replacement as ordered  - Monitor response to electrolyte replacements, including repeat lab results as appropriate  - Instruct patient on fluid and nutrition as appropriate  Outcome: Progressing     Problem: SKIN/TISSUE INTEGRITY - ADULT  Goal: Incision(s), wounds(s) or drain site(s) healing without S/S of infection  Description: INTERVENTIONS  - Assess and document dressing, incision, wound bed, drain sites and surrounding tissue  - Provide patient and family education    Outcome: Progressing     Problem: Prexisting or High Potential for Compromised Skin Integrity  Goal: Skin integrity is maintained or improved  Description: INTERVENTIONS:  - Identify patients at risk for skin breakdown  - Assess and monitor skin integrity  - Assess and monitor nutrition and hydration status  - Monitor labs   - Assess for incontinence   - Turn and reposition patient  - Assist with mobility/ambulation  - Relieve pressure over bony prominences  - Avoid friction and shearing  - Provide appropriate hygiene as needed including keeping skin clean and dry  - Evaluate need for skin moisturizer/barrier cream  - Collaborate with interdisciplinary team   - Patient/family teaching  - Consider wound care consult   Outcome: Progressing

## 2023-08-29 NOTE — NURSING NOTE
Pt d/c at this time, accompanied by daughter. Discharge instructions given to both daughter and pt and they stated they understood instructions. Pt took all personal belongings with her.

## 2023-08-29 NOTE — DISCHARGE SUMMARY
[facility]  Discharge- Katarzyna Pen 9/24/1922, 80 y.o. female MRN: [id number]  Unit/Bed#: [address] [place] 212-02 Encounter: [id number]  Primary Care Provider: Ion Faustin PA-C   Date and time admitted to hospital: 8/27/2023  7:10 AM    * GI bleed  Assessment & Plan  8 year old female presented to our ED due to GI bleed and abdominal pain  · GI recommendations appreciated. No inpatient intervention planned at this time. They suspect that her Minimal blood per rectum most likely from benign anorectal etiology. Recommend bowel regimen with daily MiraLAX to prevent straining per her reports of intermittent constipation. · She had a bowel movement last evening without blood present  · Continue to avoid constipation as noted above   · CT without any acute pathology, continue supportive care  · Check iron panel and B12 - folow up with PCP   · outpt PCP follow up , message sent for outpt follow up  · Return for any evidence of ongoing bleeding     Lab Results   Component Value Date    HGB 9.3 (L) 08/29/2023    HGB 9.5 (L) 08/28/2023    HGB 10.3 (L) 08/27/2023    HGB 11.6 04/19/2023    HGB 12.3 02/08/2023         Essential hypertension  Assessment & Plan  · Continue Norvasc 5 mg daily  · VS stable     Acute cystitis with hematuria  Assessment & Plan  Patient complaining of frequency, no dysuria.   · Urinalysis: Large leukocytes, 2-4 RBCs, innumerable bacteria and WBCs  · Given symptoms we will treat for 3-day course of Keflex    Type 2 diabetes mellitus [facility])  Assessment & Plan  Lab Results   Component Value Date    HGBA1C 9.0 (H) 02/13/2023       Recent Labs     08/27/23  2056 08/28/23  0718 08/28/23  1127 08/28/23  1553   POCGLU 141* 146* 185* 176*       Blood Sugar Average: Last 72 hrs:  (P) 168.6   Given advanced age, will put her on regular diet here     GERD (gastroesophageal reflux disease)  Assessment & Plan  Continue PPI / Famotidine    Chronic diastolic congestive heart failure [facility])  Assessment & Plan  Wt Readings from Last 3 Encounters:   08/28/23 68.1 kg (150 lb 2.1 oz)   04/24/23 70.5 kg (155 lb 8 oz)   04/19/23 70.6 kg (155 lb 10.3 oz)     Euvolemic   continue lasix 20 mg daily        Hypothyroidism  Assessment & Plan  Continue Levothyroxine 75mcg daily    Ambulatory dysfunction  Assessment & Plan  · PT/OT evaluation recommending d/c home  · Lives with daughter   · Discussed plan with daughter over the phone, plan for d/c home with daughter at 6 today     Aortic stenosis  Assessment & Plan  Severe stenosis, likely playing a role to her chronic shortness of breath. 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 We would not consider a transapical approach in her age. Given her age and comorbidities and calcification of her aorta I recommended continued medical therapy and no further procedure."      Medical Problems     Resolved Problems  Date Reviewed: 8/29/2023   None       Discharging Physician / Practitioner: Neeru Bhat PA-C  PCP: Jacob Hernández PA-C  Admission Date:   Admission Orders (From admission, onward)     Ordered        08/27/23 1009  [address]  Once                      Discharge Date: 08/29/23    Consultations During Hospital Stay:  · GI    Procedures Performed:   · none    Significant Findings / Test Results:   · CT a/P   FINDINGS:     ABDOMEN     LOWER CHEST: Coronary artery calcifications. Aortic valve calcifications. Mild fibrotic changes with some traction bronchiectasis.     LIVER/BILIARY TREE:  Unremarkable.     GALLBLADDER:  No calcified gallstones. No pericholecystic inflammatory change.     SPLEEN: Normal in size. Punctate calcified granuloma     PANCREAS:  Unremarkable.     ADRENAL GLANDS:  Unremarkable.     KIDNEYS/URETERS:  Unremarkable.  No hydronephrosis.     STOMACH AND BOWEL: No evidence of bowel obstruction or gross inflammatory process. Sigmoid diverticulosis without evidence of diverticulitis.     APPENDIX:  No findings to suggest appendicitis.     ABDOMINOPELVIC CAVITY:  No ascites. No pneumoperitoneum. No lymphadenopathy.     VESSELS: Atherosclerotic changes are present. No evidence of aneurysm.     PELVIS     REPRODUCTIVE ORGANS:  Unremarkable for patient's age.     URINARY BLADDER:  Unremarkable.     ABDOMINAL WALL/INGUINAL REGIONS: There is a small fat-containing umbilical hernia.     OSSEOUS STRUCTURES: No acute fracture or destructive osseous lesion. Spinal degenerative changes are noted.     IMPRESSION:     No acute intra-abdominal pathology identified. Sigmoid diverticulosis. No evidence of diverticulitis. Chronic findings, as per the body of the report. · CT head:   FINDINGS:     PARENCHYMA: Decreased attenuation is noted in periventricular and subcortical white matter demonstrating an appearance that is statistically most likely to represent mild microangiopathic change.     No CT signs of acute infarction. No intracranial mass, mass effect or midline shift. No acute parenchymal hemorrhage.     VENTRICLES AND EXTRA-AXIAL SPACES:  Normal for the patient's age.     VISUALIZED ORBITS: Status post bilateral cataract surgery.     PARANASAL SINUSES: Minimal mucosal thickening in the right maxillary sinus.     CALVARIUM AND EXTRACRANIAL SOFT TISSUES:  Normal.     IMPRESSION:     No acute intracranial abnormality. · CXR  FINDINGS:     Cardiomediastinal silhouette normal.     No acute disease. Minimal bibasilar reticulation due to age-related fibrosis. No effusion or pneumothorax. Mild benign elevation of the right hemidiaphragm.     Upper abdomen normal. Calcified loose body right glenohumeral joint.     IMPRESSION:     No acute cardiopulmonary disease.     Incidental Findings:   · none   Test Results Pending at Discharge (will require follow up): · Iron panel and B12     Outpatient Tests Requested:  · PCP     Complications:      Reason for Admission: GI bleed     Hospital Course:   Kash Wakefield is a 80 y.o. female patient who originally presented to the hospital on 8/27/2023 due to rectal bleeding. She was seen in consultation with Gastroenterology. No inpatient intervention planned at this time. They suspect that her Minimal blood per rectum most likely from benign anorectal etiology. Recommend bowel regimen with daily MiraLAX to prevent straining per her reports of intermittent constipation. She had a normal bowel movement without bleeding prior to discharge and overall hemoglobin stable without need for blood transfusion. She will be discharged home today with her daughter whom she lives with. Return for any reoccurrence of bleeding. Please see above list of diagnoses and related plan for additional information. Condition at Discharge: stable    Discharge Day Visit / Exam:   Subjective:  Feeling well. Occasionally wakes up with LLQ pain that resolves on its own. no CP or SOB. Normal BM last evening. No fevers, chill.s feels ready to go home. Vitals: Blood Pressure: 129/58 (08/29/23 0726)  Pulse: 65 (08/29/23 0726)  Temperature: 97.5 °F (36.4 °C) (08/29/23 0726)  Temp Source: Oral (08/28/23 1936)  Respirations: 15 (08/29/23 0726)  Height: 5' 2" (157.5 cm) (08/27/23 1455)  Weight - Scale: 67 kg (147 lb 11.3 oz) (08/29/23 0600)  SpO2: 92 % (08/29/23 0726)  Exam:   Physical Exam  Vitals and nursing note reviewed. Cardiovascular:      Rate and Rhythm: Normal rate and regular rhythm. Pulmonary:      Effort: Pulmonary effort is normal. No respiratory distress. Breath sounds: Normal breath sounds. Comments: Decreased throughout   Abdominal:      General: Bowel sounds are normal. There is no distension. Palpations: Abdomen is soft. Tenderness: There is no abdominal tenderness. There is no guarding. Musculoskeletal:      Right lower leg: No edema. Left lower leg: No edema. Skin:     General: Skin is warm. Neurological:      Mental Status: She is alert. Mental status is at baseline. Discussion with Family: Updated  (daughter) via phone. Discharge instructions/Information to patient and family:   See after visit summary for information provided to patient and family. Provisions for Follow-Up Care:  See after visit summary for information related to follow-up care and any pertinent home health orders. Disposition:   Home    Planned Readmission: none     Discharge Statement:  I spent 40 minutes discharging the patient. This time was spent on the day of discharge. I had direct contact with the patient on the day of discharge. Greater than 50% of the total time was spent examining patient, answering all patient questions, arranging and discussing plan of care with patient as well as directly providing post-discharge instructions. Additional time then spent on discharge activities. Discharge Medications:  See after visit summary for reconciled discharge medications provided to patient and/or family.       **Please Note: This note may have been constructed using a voice recognition system**

## 2023-08-29 NOTE — TELEPHONE ENCOUNTER
----- Message from Roopa Hodge PA-C sent at 8/29/2023  7:47 AM EDT -----  Thank you for allowing us to participate in the care of your patient, Katarzyna Richter, who was hospitalized from 8/27/2023 through 8/29/2023 with the admitting diagnosis of rectal bleeding. She was seen by GI with no plan for scope for eval likely benign anorectal bleeding from hemorrhoids and/or constipation. Her hemoglobin was stable prior to discharge without need for blood transfusion. If you have any additional questions or would like to discuss further, please feel free to contact me.     Roopa Hodge PA-C  [facility] Internal Medicine, Hospitalist  [phone]

## 2023-08-29 NOTE — RESTORATIVE TECHNICIAN NOTE
Restorative Technician Note      Patient Name: Tonny Carty     Restorative Tech Visit Date: 08/29/23  Note Type: Mobility  Patient Position Upon Consult: Supine  Activity Performed: Ambulated  Assistive Device: Roller walker  Patient Position at End of Consult: Supine;  All needs within reach

## 2023-08-29 NOTE — PLAN OF CARE
Problem: MOBILITY - ADULT  Goal: Maintain or return to baseline ADL function  Description: INTERVENTIONS:  -  Assess patient's ability to carry out ADLs; assess patient's baseline for ADL function and identify physical deficits which impact ability to perform ADLs (bathing, care of mouth/teeth, toileting, grooming, dressing, etc.)  - Assess/evaluate cause of self-care deficits   - Assess range of motion  - Assess patient's mobility; develop plan if impaired  - Assess patient's need for assistive devices and provide as appropriate  - Encourage maximum independence but intervene and supervise when necessary  - Involve family in performance of ADLs  - Assess for home care needs following discharge   - Consider OT consult to assist with ADL evaluation and planning for discharge  - Provide patient education as appropriate  Outcome: Progressing     Problem: PAIN - ADULT  Goal: Verbalizes/displays adequate comfort level or baseline comfort level  Description: Interventions:  - Encourage patient to monitor pain and request assistance  - Assess pain using appropriate pain scale  - Administer analgesics based on type and severity of pain and evaluate response  - Implement non-pharmacological measures as appropriate and evaluate response  - Consider cultural and social influences on pain and pain management  - Notify physician/advanced practitioner if interventions unsuccessful or patient reports new pain  Outcome: Progressing     Problem: DISCHARGE PLANNING  Goal: Discharge to home or other facility with appropriate resources  Description: INTERVENTIONS:  - Identify barriers to discharge w/patient and caregiver  - Arrange for needed discharge resources and transportation as appropriate  - Identify discharge learning needs (meds, wound care, etc.)  - Arrange for interpretive services to assist at discharge as needed  - Refer to Case Management Department for coordinating discharge planning if the patient needs post-hospital services based on physician/advanced practitioner order or complex needs related to functional status, cognitive ability, or social support system  Outcome: Progressing

## 2023-08-30 ENCOUNTER — TRANSITIONAL CARE MANAGEMENT (OUTPATIENT)
Dept: FAMILY MEDICINE CLINIC | Facility: CLINIC | Age: 88
End: 2023-08-30

## 2023-08-30 DIAGNOSIS — Z71.89 COMPLEX CARE COORDINATION: Primary | ICD-10-CM

## 2023-08-30 NOTE — UTILIZATION REVIEW
NOTIFICATION OF ADMISSION DISCHARGE   This is a Notification of Discharge from [facility]. Please be advised that this patient has been discharge from our facility. Below you will find the admission and discharge date and time including the patient’s disposition. UTILIZATION REVIEW CONTACT:  Rohit Chaudhry  Utilization   Network Utilization Review Department  Phone: [phone] carefully listen to the prompts. All voicemails are confidential.  Email: [email or web address]     ADMISSION INFORMATION  PRESENTATION DATE: 8/27/2023  7:10 AM  OBERVATION ADMISSION DATE:   INPATIENT ADMISSION DATE: 8/27/23 10:09 AM   DISCHARGE DATE: 8/29/2023 11:10 AM   DISPOSITION:Home/Self Care    IMPORTANT INFORMATION:  Send all requests for admission clinical reviews, approved or denied determinations and any other requests to dedicated fax number below belonging to the campus where the patient is receiving treatment.  List of dedicated fax numbers:  [place] DENIALS (Administrative/Medical Necessity) [phone] [address] [facility] (Maternity/NICU/Pediatrics) [phone]   [facility] [phone]   [facility] [phone]   [address] [facility] [phone]   [address] [place] [address] [phone]   [facility] [phone]   [address] [place], [place] [address]e [address] [facility] [phone]   [address] [place] [address] [phone] [address] [phone] [address] [facility] [address] [facility] [phone]

## 2023-08-31 ENCOUNTER — RA CDI HCC (OUTPATIENT)
Dept: OTHER | Facility: HOSPITAL | Age: 88
End: 2023-08-31

## 2023-08-31 NOTE — PROGRESS NOTES
[address] [facility] coding opportunities          Chart Reviewed number of suggestions sent to Provider: 2  E11.42  E11.51  E11.65  I13.0     Patients Insurance     Medicare Insurance: [facility] Advantage

## 2023-09-01 ENCOUNTER — PATIENT OUTREACH (OUTPATIENT)
Dept: FAMILY MEDICINE CLINIC | Facility: CLINIC | Age: 88
End: 2023-09-01

## 2023-09-01 NOTE — PROGRESS NOTES
Spoke with pts daughter who states that pt is doing very well. They do not have visiting nurses and pt still goes to her appts. Pt is independent of IADL's, uses a walker for inside and a cane when she goes out. Pt will be 80 yrs old this month. Daughter states that pt gets disgruntled when she awakens some days and feels sore or stiff. Daughter is pt's caregiver and they live together. She declines need for outreach at this time.

## 2023-09-05 ENCOUNTER — OFFICE VISIT (OUTPATIENT)
Dept: FAMILY MEDICINE CLINIC | Facility: CLINIC | Age: 88
End: 2023-09-05
Payer: COMMERCIAL

## 2023-09-05 VITALS
HEART RATE: 74 BPM | DIASTOLIC BLOOD PRESSURE: 62 MMHG | SYSTOLIC BLOOD PRESSURE: 118 MMHG | BODY MASS INDEX: 28.16 KG/M2 | OXYGEN SATURATION: 93 % | WEIGHT: 153 LBS | HEIGHT: 62 IN

## 2023-09-05 DIAGNOSIS — E11.22 TYPE 2 DIABETES MELLITUS WITH STAGE 3B CHRONIC KIDNEY DISEASE, WITHOUT LONG-TERM CURRENT USE OF INSULIN (HCC): Primary | ICD-10-CM

## 2023-09-05 DIAGNOSIS — N18.32 TYPE 2 DIABETES MELLITUS WITH STAGE 3B CHRONIC KIDNEY DISEASE, WITHOUT LONG-TERM CURRENT USE OF INSULIN (HCC): Primary | ICD-10-CM

## 2023-09-05 DIAGNOSIS — K27.4 GASTROINTESTINAL HEMORRHAGE ASSOCIATED WITH PEPTIC ULCER: ICD-10-CM

## 2023-09-05 DIAGNOSIS — K59.00 CONSTIPATION, UNSPECIFIED CONSTIPATION TYPE: ICD-10-CM

## 2023-09-05 DIAGNOSIS — E03.9 ACQUIRED HYPOTHYROIDISM: ICD-10-CM

## 2023-09-05 LAB — SL AMB POCT HEMOGLOBIN AIC: 9.4 (ref ?–6.5)

## 2023-09-05 PROCEDURE — 99496 TRANSJ CARE MGMT HIGH F2F 7D: CPT | Performed by: PHYSICIAN ASSISTANT

## 2023-09-05 PROCEDURE — 83036 HEMOGLOBIN GLYCOSYLATED A1C: CPT | Performed by: PHYSICIAN ASSISTANT

## 2023-09-05 RX ORDER — METFORMIN HYDROCHLORIDE 500 MG/1
500 TABLET, EXTENDED RELEASE ORAL 2 TIMES DAILY WITH MEALS
Qty: 180 TABLET | Refills: 1 | Status: SHIPPED | OUTPATIENT
Start: 2023-09-05 | End: 2024-03-03

## 2023-09-05 NOTE — PATIENT INSTRUCTIONS
Problem List Items Addressed This Visit          Digestive    GI bleed     CBC upon discharge was 9.3 trended down from 10.6 we will recheck a CBC this week. Iron is low. Patient does tend to have constipation so I would only start oral iron if needed. GI would like hemoglobin to range 10-12. Relevant Orders    CBC and Platelet    CBC and differential       Endocrine    Hypothyroidism    Relevant Orders    TSH, 3rd generation with Free T4 reflex    Type 2 diabetes mellitus (HCC) - Primary     A1c is 9.4 today she is off all medications at this point in time I need to add back metformin 500 mg twice daily as her GFR is 40. Continue fasting and 2-hour postprandial readings from glucometer. Recheck 3 months. Lab Results   Component Value Date    HGBA1C 9.4 (A) 09/05/2023            Relevant Medications    metFORMIN (GLUCOPHAGE-XR) 500 mg 24 hr tablet    Other Relevant Orders    POCT hemoglobin A1c (Completed)    Albumin / creatinine urine ratio    Comprehensive metabolic panel    Hemoglobin A1C    CBC and differential       Other    Constipation     Pt. to use Miralax as needed for constipation.

## 2023-09-05 NOTE — ASSESSMENT & PLAN NOTE
A1c is 9.4 today she is off all medications at this point in time I need to add back metformin 500 mg twice daily as her GFR is 40. Continue fasting and 2-hour postprandial readings from glucometer. Recheck 3 months.   Lab Results   Component Value Date    HGBA1C 9.4 (A) 09/05/2023

## 2023-09-05 NOTE — PROGRESS NOTES
Assessment & Plan     1. Type 2 diabetes mellitus with stage 3b chronic kidney disease, without long-term current use of insulin ([facility])  Assessment & Plan:  A1c is 9.4 today she is off all medications at this point in time I need to add back metformin 500 mg twice daily as her GFR is 40. Continue fasting and 2-hour postprandial readings from glucometer. Recheck 3 months. Lab Results   Component Value Date    HGBA1C 9.4 (A) 09/05/2023       Orders:  -     POCT hemoglobin A1c  -     metFORMIN (GLUCOPHAGE-XR) 500 mg 24 hr tablet; Take 1 tablet (500 mg total) by mouth 2 (two) times a day with meals  -     Albumin / creatinine urine ratio; Future; Expected date: 12/05/2023  -     Comprehensive metabolic panel; Future; Expected date: 12/05/2023  -     Hemoglobin A1C; Future; Expected date: 12/05/2023  -     CBC and differential    2. Constipation, unspecified constipation type  Assessment & Plan:  Pt. to use Miralax as needed for constipation. 3. Gastrointestinal hemorrhage associated with peptic ulcer  Assessment & Plan:  Status post admission for 3 days. It was determined that it was only secondary to anorectal bleeding and not true GI bleed per se. CBC upon discharge was 9.3 trended down from 10.6 we will recheck a CBC this week. Iron is low. Patient does tend to have constipation so I would only start oral iron if needed. GI would like hemoglobin to range 10-12. Call with CBC results and decide next step then. Orders:  -     CBC and Platelet; Future; Expected date: 12/05/2023  -     CBC and differential    4. Acquired hypothyroidism  -     TSH, 3rd generation with Free T4 reflex; Future; Expected date: 12/05/2023         Subjective     Transitional Care Management Review:   Carolynne Jeans is a 80 y.o. female here for TCM follow up.      During the TCM phone call patient stated:  TCM Call     Date and time call was made  8/30/2023  7:18 PM    Hospital care reviewed  Records reviewed    Patient was hospitialized at  [facility] - CLOSED    Date of Admission  08/27/23    Date of discharge  08/29/23    Diagnosis  GI Bleed    Disposition  Home    Were the patients medications reviewed and updated  No    Current Symptoms  None      TCM Call     Post hospital issues  None    Should patient be enrolled in anticoag monitoring? No    Scheduled for follow up? Yes    Patients specialists  Cardiologist    Cardiologist name  Dr. Tayler Stark    Did you obtain your prescribed medications  Yes    Do you need help managing your prescriptions or medications  No    Is transportation to your appointment needed  No    I have advised the patient to call PCP with any new or worsening symptoms  [address]    The type of support provided  Physical; Emotional    Do you have social support  Yes, as much as I need    Are you recieving any outpatient services  No    Are you recieving home care services  Yes    Types of home care services  Nurse visit        ARIEL visit. She is here today companied by her daughter.    "Mago Ball, who was hospitalized from 8/27/2023 through 8/29/2023 with the admitting diagnosis of rectal bleeding. She was seen by GI with no plan for scope for eval likely benign anorectal bleeding from hemorrhoids and/or constipation. Her hemoglobin was stable prior to discharge without need for blood transfusion. "    Patient states that she only took the MiraLAX 1 time after she was discharged and then has not needed it as she has been very regular with constipation. She is not complaining about seeing blood in the stool and is going on a regular basis she does state that her appetite is down from her normal.    Complaining of drooling while sleeping. Sugars at home have been elevated fasting was 202 2-hour postprandial was 320 this week. Review of Systems   Constitutional: Negative. HENT: Negative. Eyes: Negative. Respiratory: Negative. Cardiovascular: Negative. Gastrointestinal: Negative. Endocrine: Negative. Genitourinary: Negative. Musculoskeletal: Negative. Skin: Negative. Allergic/Immunologic: Negative. Neurological: Negative. Hematological: Negative. Psychiatric/Behavioral: Negative. Objective     /62 (BP Location: Left arm, Patient Position: Sitting, Cuff Size: Standard)   Pulse 74   Ht 5' 2" (1.575 m)   Wt 69.4 kg (153 lb)   SpO2 93%   BMI 27.98 kg/m²      Physical Exam  Vitals and nursing note reviewed. Constitutional:       Appearance: Normal appearance. She is well-developed. HENT:      Head: Normocephalic and atraumatic. Eyes:      General: Lids are normal.      Conjunctiva/sclera: Conjunctivae normal.      Pupils: Pupils are equal, round, and reactive to light. Cardiovascular:      Rate and Rhythm: Normal rate and regular rhythm. Heart sounds: No murmur heard. Pulmonary:      Effort: Pulmonary effort is normal.      Breath sounds: Normal breath sounds. Abdominal:      General: Abdomen is protuberant. Bowel sounds are normal.      Palpations: Abdomen is soft. Tenderness: There is no abdominal tenderness. Skin:     General: Skin is warm and dry. Neurological:      General: No focal deficit present. Mental Status: She is alert. Coordination: Coordination is intact. Psychiatric:         Mood and Affect: Mood normal.         Behavior: Behavior normal. Behavior is cooperative. Thought Content:  Thought content normal.         Judgment: Judgment normal.       Medications have been reviewed by provider in current encounter    Willie Christianson PA-C

## 2023-09-05 NOTE — ASSESSMENT & PLAN NOTE
Status post admission for 3 days. It was determined that it was only secondary to anorectal bleeding and not true GI bleed per se. CBC upon discharge was 9.3 trended down from 10.6 we will recheck a CBC this week. Iron is low. Patient does tend to have constipation so I would only start oral iron if needed. GI would like hemoglobin to range 10-12. Call with CBC results and decide next step then.

## 2023-09-06 ENCOUNTER — APPOINTMENT (OUTPATIENT)
Dept: LAB | Facility: CLINIC | Age: 88
End: 2023-09-06
Payer: COMMERCIAL

## 2023-09-06 ENCOUNTER — TELEPHONE (OUTPATIENT)
Dept: ADMINISTRATIVE | Facility: OTHER | Age: 88
End: 2023-09-06

## 2023-09-06 DIAGNOSIS — E11.22 TYPE 2 DIABETES MELLITUS WITH STAGE 3B CHRONIC KIDNEY DISEASE, WITHOUT LONG-TERM CURRENT USE OF INSULIN (HCC): ICD-10-CM

## 2023-09-06 DIAGNOSIS — N18.32 TYPE 2 DIABETES MELLITUS WITH STAGE 3B CHRONIC KIDNEY DISEASE, WITHOUT LONG-TERM CURRENT USE OF INSULIN (HCC): ICD-10-CM

## 2023-09-06 DIAGNOSIS — I35.0 NONRHEUMATIC AORTIC VALVE STENOSIS: Chronic | ICD-10-CM

## 2023-09-06 LAB
ALBUMIN SERPL BCP-MCNC: 3.3 G/DL (ref 3.5–5)
ALP SERPL-CCNC: 86 U/L (ref 34–104)
ALT SERPL W P-5'-P-CCNC: 6 U/L (ref 7–52)
ANION GAP SERPL CALCULATED.3IONS-SCNC: 9 MMOL/L
AST SERPL W P-5'-P-CCNC: 9 U/L (ref 13–39)
BASOPHILS # BLD AUTO: 0.04 THOUSANDS/ÂΜL (ref 0–0.1)
BASOPHILS NFR BLD AUTO: 1 % (ref 0–1)
BILIRUB SERPL-MCNC: 0.3 MG/DL (ref 0.2–1)
BUN SERPL-MCNC: 21 MG/DL (ref 5–25)
CALCIUM ALBUM COR SERPL-MCNC: 9.4 MG/DL (ref 8.3–10.1)
CALCIUM SERPL-MCNC: 8.8 MG/DL (ref 8.4–10.2)
CHLORIDE SERPL-SCNC: 104 MMOL/L (ref 96–108)
CO2 SERPL-SCNC: 27 MMOL/L (ref 21–32)
CREAT SERPL-MCNC: 1.2 MG/DL (ref 0.6–1.3)
EOSINOPHIL # BLD AUTO: 0.22 THOUSAND/ÂΜL (ref 0–0.61)
EOSINOPHIL NFR BLD AUTO: 4 % (ref 0–6)
ERYTHROCYTE [DISTWIDTH] IN BLOOD BY AUTOMATED COUNT: 15.7 % (ref 11.6–15.1)
EST. AVERAGE GLUCOSE BLD GHB EST-MCNC: 235 MG/DL
GFR SERPL CREATININE-BSD FRML MDRD: 37 ML/MIN/1.73SQ M
GLUCOSE P FAST SERPL-MCNC: 222 MG/DL (ref 65–99)
HBA1C MFR BLD: 9.8 %
HCT VFR BLD AUTO: 32.1 % (ref 34.8–46.1)
HGB BLD-MCNC: 9.4 G/DL (ref 11.5–15.4)
IMM GRANULOCYTES # BLD AUTO: 0.03 THOUSAND/UL (ref 0–0.2)
IMM GRANULOCYTES NFR BLD AUTO: 1 % (ref 0–2)
LYMPHOCYTES # BLD AUTO: 1.1 THOUSANDS/ÂΜL (ref 0.6–4.47)
LYMPHOCYTES NFR BLD AUTO: 19 % (ref 14–44)
MCH RBC QN AUTO: 24.6 PG (ref 26.8–34.3)
MCHC RBC AUTO-ENTMCNC: 29.3 G/DL (ref 31.4–37.4)
MCV RBC AUTO: 84 FL (ref 82–98)
MONOCYTES # BLD AUTO: 0.56 THOUSAND/ÂΜL (ref 0.17–1.22)
MONOCYTES NFR BLD AUTO: 10 % (ref 4–12)
NEUTROPHILS # BLD AUTO: 3.85 THOUSANDS/ÂΜL (ref 1.85–7.62)
NEUTS SEG NFR BLD AUTO: 65 % (ref 43–75)
NRBC BLD AUTO-RTO: 0 /100 WBCS
PLATELET # BLD AUTO: 242 THOUSANDS/UL (ref 149–390)
PMV BLD AUTO: 12 FL (ref 8.9–12.7)
POTASSIUM SERPL-SCNC: 4 MMOL/L (ref 3.5–5.3)
PROT SERPL-MCNC: 6 G/DL (ref 6.4–8.4)
RBC # BLD AUTO: 3.82 MILLION/UL (ref 3.81–5.12)
SODIUM SERPL-SCNC: 140 MMOL/L (ref 135–147)
WBC # BLD AUTO: 5.8 THOUSAND/UL (ref 4.31–10.16)

## 2023-09-06 PROCEDURE — 80053 COMPREHEN METABOLIC PANEL: CPT

## 2023-09-06 PROCEDURE — 36415 COLL VENOUS BLD VENIPUNCTURE: CPT

## 2023-09-06 PROCEDURE — 85025 COMPLETE CBC W/AUTO DIFF WBC: CPT | Performed by: PHYSICIAN ASSISTANT

## 2023-09-06 PROCEDURE — 83036 HEMOGLOBIN GLYCOSYLATED A1C: CPT

## 2023-09-06 NOTE — LETTER
Diabetic Foot Exam Form    Date Requested: 23  Patient: Sal Calderon  Patient : 1922   Referring Provider: Vivien Monaco PA-C    Diabetic Foot Exam Performed with shoes and socks removed        Yes         No     Date of Diabetic Foot Exam ______________________________  Risk Score ____________________________________________    Left Foot       Visual Inspection         Monofilament Testing Sensory Exam        Pedal Pulses         Additional Comments         Right Foot      Visual Inspection         Monofilament Testing Sensory Exam       Pedal Pulses         Additional Comments         Comments __________________________________________________________    Practice Providing Exam ______________________________________________    Exam Performed By (print name) _______________________________________      Provider Signature ___________________________________________________      These reports are needed for  compliance. Please fax this completed form and a copy of the Diabetic Foot Exam report to our office located at [address] [place] [address] as soon as possible via Fax [phone] attention Rachel Gibbs: Phone [phone]    We thank you for your assistance in treating our mutual patient.

## 2023-09-06 NOTE — TELEPHONE ENCOUNTER
----- Message from Jesús Rodriguez sent at 9/5/2023  2:30 PM EDT -----  Regarding: DIABETIC FOOT EXAM  09/05/23 2:31 PM    Hello, our patient Anisa Cash has had Diabetic Foot Exam completed/performed. Please assist in updating the patient chart by making an External outreach to [facility] facility located in [facility]. The date of service 2 weeks ago. .    Thank you,  TRISTON Rodriguez  [facility]

## 2023-09-06 NOTE — LETTER
Diabetic Foot Exam Form    Date Requested: 23  Patient: Shirley Hsu  Patient : 1922   Referring Provider: Danni Clarke PA-C    Diabetic Foot Exam Performed with shoes and socks removed        Yes         No     Date of Diabetic Foot Exam ______________________________  Risk Score ____________________________________________    Left Foot       Visual Inspection         Monofilament Testing Sensory Exam        Pedal Pulses         Additional Comments         Right Foot      Visual Inspection         Monofilament Testing Sensory Exam       Pedal Pulses         Additional Comments         Comments __________________________________________________________    Practice Providing Exam ______________________________________________    Exam Performed By (print name) _______________________________________      Provider Signature ___________________________________________________      These reports are needed for  compliance. Please fax this completed form and a copy of the Diabetic Foot Exam report to our office located at [address] [place] [address] as soon as possible via Fax [phone] ambar Ramírez: Phone [phone]    We thank you for your assistance in treating our mutual patient.

## 2023-09-06 NOTE — TELEPHONE ENCOUNTER
Upon review of the In Basket request and the patient's chart, initial outreach has been made via fax to facility. Please see Contacts section for details.      Thank you  Morris Sylvester

## 2023-09-07 ENCOUNTER — TELEPHONE (OUTPATIENT)
Dept: FAMILY MEDICINE CLINIC | Facility: CLINIC | Age: 88
End: 2023-09-07

## 2023-09-07 DIAGNOSIS — N18.32 TYPE 2 DIABETES MELLITUS WITH STAGE 3B CHRONIC KIDNEY DISEASE, WITHOUT LONG-TERM CURRENT USE OF INSULIN (HCC): Primary | ICD-10-CM

## 2023-09-07 DIAGNOSIS — K27.4 GASTROINTESTINAL HEMORRHAGE ASSOCIATED WITH PEPTIC ULCER: Primary | ICD-10-CM

## 2023-09-07 DIAGNOSIS — E11.22 TYPE 2 DIABETES MELLITUS WITH STAGE 3B CHRONIC KIDNEY DISEASE, WITHOUT LONG-TERM CURRENT USE OF INSULIN (HCC): Primary | ICD-10-CM

## 2023-09-07 RX ORDER — POTASSIUM CHLORIDE 20 MEQ/1
TABLET, EXTENDED RELEASE ORAL
Qty: 90 TABLET | Refills: 0 | OUTPATIENT
Start: 2023-09-07

## 2023-09-07 NOTE — RESULT ENCOUNTER NOTE
Please let pt know that her CBC is stable but still below 10. She already has an order to repeat in Dec with her other labs but I would like to also repeat it in one month to insure stability. I am not recommending iron supplements at this time due to stability and fact pt has constipation issues already. Order was placed. Thank you.

## 2023-09-07 NOTE — TELEPHONE ENCOUNTER
Called and lm for Macedonian Carvalho with Ayah's response. Told her to call back if she has any questions.  I also called and spoke to TYESHA VALDEZ and the meter at home no longer works

## 2023-09-07 NOTE — TELEPHONE ENCOUNTER
Yes. At pts last visit she was started on new meds and as ked to check her glucose 1-2 times a day. Pt's daughter stated she already had a glucometer but if it is needed per pt she can have a new one as long as it is covered. Thank you.

## 2023-09-07 NOTE — TELEPHONE ENCOUNTER
The following message was left on the vm line, please advise thank you           Faith,. My name is Bienvenido Hernandez. I'm a nurse with [employer]. I'm calling regarding Aaliyah Quinn. Her date of birth is 9/24 of 1922. I am trying to request a diabetic meter, testing strips and supplies for this member through [employer], but I'm unsure whether her doctor would like her to check her blood sugars at home. Would you have someone please call me back at [phone] and my extension is [phone]. I just need to speak to someone there first. Thank you so much. Have a great day.  gisel Shelton.

## 2023-09-12 DIAGNOSIS — N18.32 TYPE 2 DIABETES MELLITUS WITH STAGE 3B CHRONIC KIDNEY DISEASE, WITHOUT LONG-TERM CURRENT USE OF INSULIN (HCC): ICD-10-CM

## 2023-09-12 DIAGNOSIS — E11.22 TYPE 2 DIABETES MELLITUS WITH STAGE 3B CHRONIC KIDNEY DISEASE, WITHOUT LONG-TERM CURRENT USE OF INSULIN (HCC): ICD-10-CM

## 2023-09-12 DIAGNOSIS — E11.42 DM TYPE 2 WITH DIABETIC PERIPHERAL NEUROPATHY (HCC): Chronic | ICD-10-CM

## 2023-09-12 RX ORDER — LANCETS
EACH MISCELLANEOUS
Qty: 100 EACH | Refills: 3 | Status: SHIPPED | OUTPATIENT
Start: 2023-09-12

## 2023-09-12 RX ORDER — ISOPROPYL ALCOHOL 0.75 G/1
SWAB TOPICAL DAILY
Qty: 100 EACH | Refills: 5 | Status: SHIPPED | OUTPATIENT
Start: 2023-09-12

## 2023-09-14 ENCOUNTER — APPOINTMENT (OUTPATIENT)
Dept: LAB | Facility: CLINIC | Age: 88
End: 2023-09-14
Payer: COMMERCIAL

## 2023-09-14 ENCOUNTER — TELEPHONE (OUTPATIENT)
Dept: NEUROLOGY | Facility: CLINIC | Age: 88
End: 2023-09-14

## 2023-09-14 DIAGNOSIS — N18.32 TYPE 2 DIABETES MELLITUS WITH STAGE 3B CHRONIC KIDNEY DISEASE, WITHOUT LONG-TERM CURRENT USE OF INSULIN (HCC): ICD-10-CM

## 2023-09-14 DIAGNOSIS — E11.22 TYPE 2 DIABETES MELLITUS WITH STAGE 3B CHRONIC KIDNEY DISEASE, WITHOUT LONG-TERM CURRENT USE OF INSULIN (HCC): ICD-10-CM

## 2023-09-14 LAB
CREAT UR-MCNC: 71.8 MG/DL
MICROALBUMIN UR-MCNC: <7 MG/L
MICROALBUMIN/CREAT 24H UR: <10 MG/G CREATININE (ref 0–30)

## 2023-09-14 PROCEDURE — 82043 UR ALBUMIN QUANTITATIVE: CPT

## 2023-09-14 PROCEDURE — 82570 ASSAY OF URINE CREATININE: CPT

## 2023-09-14 NOTE — TELEPHONE ENCOUNTER
received vm from 9/13 at 12:19pm-Yes. My name is Aaliyah Quinn. 9/24/1922. I would like to speak to Mayco Barker. Concerning my, my head, there seems like is something Wrong with it and I would like to discuss it with her. if she could call as soon as possible. Thank you I think that's all. [phone]  --------------------------------------------------  Called and spoke to pt's daughter, she states that pt is sleeping right now.     She will have pt call back or I advised that they could send a [employer] message if they would like

## 2023-09-15 ENCOUNTER — TELEPHONE (OUTPATIENT)
Dept: FAMILY MEDICINE CLINIC | Facility: CLINIC | Age: 88
End: 2023-09-15

## 2023-09-18 ENCOUNTER — TELEPHONE (OUTPATIENT)
Dept: CARDIOLOGY CLINIC | Facility: CLINIC | Age: 88
End: 2023-09-18

## 2023-09-18 DIAGNOSIS — N18.32 TYPE 2 DIABETES MELLITUS WITH STAGE 3B CHRONIC KIDNEY DISEASE, WITHOUT LONG-TERM CURRENT USE OF INSULIN (HCC): Primary | ICD-10-CM

## 2023-09-18 DIAGNOSIS — E11.22 TYPE 2 DIABETES MELLITUS WITH STAGE 3B CHRONIC KIDNEY DISEASE, WITHOUT LONG-TERM CURRENT USE OF INSULIN (HCC): Primary | ICD-10-CM

## 2023-09-18 RX ORDER — BLOOD-GLUCOSE METER
EACH MISCELLANEOUS 2 TIMES DAILY
Qty: 1 KIT | Refills: 0 | Status: SHIPPED | OUTPATIENT
Start: 2023-09-18

## 2023-09-18 RX ORDER — LANCETS
EACH MISCELLANEOUS
Qty: 200 EACH | Refills: 1 | Status: SHIPPED | OUTPATIENT
Start: 2023-09-18

## 2023-09-18 RX ORDER — BLOOD SUGAR DIAGNOSTIC
STRIP MISCELLANEOUS
Qty: 200 EACH | Refills: 1 | Status: SHIPPED | OUTPATIENT
Start: 2023-09-18

## 2023-09-18 NOTE — TELEPHONE ENCOUNTER
Please advise
The following voicemail was left on the message line. "Good afternoon. My name is. This message is for Day Godoy. My name is Mitchel Babinski a nurse with [facility] and my callback number is [phone] and my extension is [phone]. . I am following up with Zhen Farley for Miss Mahad Mcclain new diabetic meter and all her testing supplies to go along with it and problem with the prescription and thank you for sending the prescription to [facility]. It's for an Accu check guide me meter and the testing strips for Accu Chek guide and all and the lancets and alcohol pads. Everything was received except for the prescription authorizing the new meter, the ACCU Check guide me meter. So I contact [employer] to this afternoon. They said they never got an order for the meter, just the other items and so they re faxed a prescription request to your office for authorization. I'm just following up. Just make sure you get that prescription and realize that it's for the ACU Check Guide Me meter. The member had a another meter in the past and it's no longer available through [employer] and so we need to move her to this new meter to actually check. Guide me if you have any questions or just need to leave them. Voicemail message. I have a secure voicemail and if you have any questions please don't hesitate to call me. I'll be happy to help. Thank you and have a great day.  gisel Shelton.
GENERAL: NAD, speaks in full sentences, no signs of respiratory distress  HEAD:  Atraumatic, Normocephalic  EYES: EOMI, PERRLA, non-icteric, no injected sclera  NECK: Supple, No JVD  CHEST/LUNG: Clear to auscultation bilaterally; No wheeze; No crackles; No accessory muscles used  HEART: Regular rate and rhythm; No murmurs;   ABDOMEN: Soft, Nontender, Nondistended; Bowel sounds present; No guarding  EXTREMITIES:  2+ Peripheral Pulses, No cyanosis or edema  PSYCH: AAOx3  NEUROLOGY: non-focal  SKIN: No rashes or lesions

## 2023-09-21 NOTE — TELEPHONE ENCOUNTER
As a follow-up, a second attempt has been made for outreach via fax to facility. Please see Contacts section for details.     Thank you  Yaz Zamorano

## 2023-09-29 NOTE — TELEPHONE ENCOUNTER
As a final attempt, a third outreach has been made via telephone call to facility. Please see Contacts section for details. This encounter will be closed and completed by end of day. Should we receive the requested information because of previous outreach attempts, the requested patient's chart will be updated appropriately.      Thank you  Traci Arrieta

## 2023-10-04 NOTE — TELEPHONE ENCOUNTER
Upon review of the In Basket request we were able to locate, review, and update the patient chart as requested for Diabetic Foot Exam.    Any additional questions or concerns should be emailed to the Practice Liaisons via the appropriate education email address, please do not reply via In Basket.     Thank you  Nando Hearn

## 2023-10-05 DIAGNOSIS — I35.0 NONRHEUMATIC AORTIC VALVE STENOSIS: Chronic | ICD-10-CM

## 2023-10-05 RX ORDER — AMLODIPINE BESYLATE 5 MG/1
TABLET ORAL
Qty: 90 TABLET | Refills: 3 | Status: SHIPPED | OUTPATIENT
Start: 2023-10-05

## 2023-10-12 NOTE — PROGRESS NOTES
Daily Note     Today's date: 2022  Patient name: Walker Scott  : 1922  MRN: [id number]  Referring provider: Marisa Guzman PA-C  Dx: No diagnosis found  Subjective: ***      Objective: See treatment diary below      Assessment: Tolerated treatment {Tolerated treatment :5281924309}  Patient {assessment:[id number]}      Plan: Continue per plan of care        Precautions: GERD, diverticulosis, irritable bowel, DM type 2 with peripheral neuropathy, hypothyroidism, asthma, aortic stenosis, CHF, HTN, PVD, arteriosclerosis of coronary artery, stable angina, trigeminal neuralgia, hearing loss, conductive hearing loss bilateral, cranial neuralgia, OA, candidal intertrigo, bladder prolapse, recurrent UTI, general weakness, glaucoma, hyperlipidemia, anxiety, dizziness, hiatal hernia, low back pain, mild cognitive impairment, paroxysmal nocturnal dyspnea, neoplasm of face, hyponatremia, vertigo, **h/o fall**      Manuals 4/6 4/15 4/22 4/29 5/13 5/27       FOTO     nv                                               Neuro Re-Ed 4/6 4/15 4/22 4/29 5/13 5/27       bridges nv 2x10 2x10 2x10 1x15        hooklying clams nv 2x10 ptb 1x15 ptb PTB 2x10         SLS nv            sidelying abduction nv            TA isometric  1x10 5" hold           Adduction iso, seated   1x10 5" hold  5" 2x10         SLR     2x10        Ther Ex 4/6 4/15 4/22 4/29 5/13 5/27       Standing abduction 1x10; HEP            Standing extension 1x10; HEP 2x10 each no weight           Standing marches 1x10; HEP 1x20           Thoracic extensions 1x10; HEP            Lateral walks nv 3x ptb           Leg press nv 35# 1x10, 55# 1x10 65# 2x10 65# 2x10 65# 2x10        bike   6' 5' 5'        HS curls   2x10 each ptb 2x10 ea PTB gtb 2x10        Ther Activity 4/6 4/15 4/22 4/29 5/13 5/27       Mini squats nv            STS  3# 2x5 3# 2x5 2x5 2x5        Pt edu  NS NS          Lateral walks nv    gtb 4x at table        Gait Training 4/6 4/15 Discharge instructions explained to pt. Pt verbalized understanding. All questions and concerns answered. Sanjay     4/22 4/29 5/13 5/27                                 Modalities 4/6 4/15 4/22 4/29 5/13 5/27

## 2023-10-17 ENCOUNTER — TELEPHONE (OUTPATIENT)
Dept: FAMILY MEDICINE CLINIC | Facility: CLINIC | Age: 88
End: 2023-10-17

## 2023-10-17 DIAGNOSIS — N18.32 TYPE 2 DIABETES MELLITUS WITH STAGE 3B CHRONIC KIDNEY DISEASE, WITHOUT LONG-TERM CURRENT USE OF INSULIN (HCC): Primary | ICD-10-CM

## 2023-10-17 DIAGNOSIS — E11.22 TYPE 2 DIABETES MELLITUS WITH STAGE 3B CHRONIC KIDNEY DISEASE, WITHOUT LONG-TERM CURRENT USE OF INSULIN (HCC): Primary | ICD-10-CM

## 2023-10-17 NOTE — TELEPHONE ENCOUNTER
I tried to call Sherrie Abarca to get more info regarding her reaction to Metformin but no ans.  Please review vm below

## 2023-10-17 NOTE — TELEPHONE ENCOUNTER
My name is Augustin Mora 9/24/1922. I would like to speak to Emilia Lamar, She was put on Metformin   I took it and I felt I had a reaction, but prior to that and someone had called to , find out how I was doing with this and I told her  about this reaction and she told me that I should continue with it. I did not continue with it because I didn't want to go through that again, but after a while I did start with it and I did feel funny so I didn't do it again. But here recently couple of days I took it as prescribed with meals and shortly after I have bowel movements and I don't want to take this again. I want to know if this i is a reaction and I  am feeling weak. I can't come in  my number, [phone]. This is for Emilia Lamar. And also it seems like I have like a sweat. It's a threat in my joints when is lay down and to get up and have to sit shit and decided to bed for a while to my head clears OK bye bye.

## 2023-10-17 NOTE — TELEPHONE ENCOUNTER
Please let pt know that more frequent stools are a side effect of metformin. She can stop the metformin and I have called a new medication, Link [place] which is only one time a day to her local pharmacy to take instead. Thank you.

## 2023-10-26 PROBLEM — N30.01 ACUTE CYSTITIS WITH HEMATURIA: Status: RESOLVED | Noted: 2021-02-11 | Resolved: 2023-10-26

## 2023-10-27 PROBLEM — I10 HYPERTENSION: Status: ACTIVE | Noted: 2021-05-19

## 2023-10-27 PROBLEM — N18.9 CKD (CHRONIC KIDNEY DISEASE): Status: ACTIVE | Noted: 2023-10-27

## 2023-10-27 NOTE — ASSESSMENT & PLAN NOTE
Continues with some discomfort, as well as some feelings under her head.  No specific change right now.

## 2023-10-27 NOTE — PATIENT INSTRUCTIONS
1. Fall, initial encounter  Assessment & Plan:  Patient did have a fall, so with that I would need to rule out hip and pelvic fracture.  Extremely unlikely given her level of discomfort at this point, i.e. almost none.  She is doing quite well as far as mobility otherwise.  Consider PT/OT, especially in the home.    Tylenol arthritis, 2 every 8 hours.    Orders:  -     POCT blood glucose  -     XR hip/pelv 2-3 vws left if performed; Future; Expected date: 10/27/2023  -     XR femur 2 vw left; Future; Expected date: 10/27/2023  -     Ambulatory Referral to Physical Therapy; Future  -     Ambulatory Referral to Occupational Therapy; Future  -     acetaminophen (TYLENOL) 650 mg CR tablet; Take 1 tablet (650 mg total) by mouth every 8 (eight) hours for 14 days  -     UA w Reflex to Microscopic w Reflex to Culture -Lab Collect; Future; Expected date: 10/27/2023    2. Chronic diastolic congestive heart failure (HCC)  Assessment & Plan:  Wt Readings from Last 3 Encounters:   10/27/23 68.1 kg (150 lb 2 oz)   09/05/23 69.4 kg (153 lb)   08/29/23 67 kg (147 lb 11.3 oz)     Patient did have Scheff before.  Her weight seems to be fairly stable.  No specific changes at the moment.  No specific swelling noted either.  Continue to follow-up with office visit, consider cardiology at some point.            3. Primary hypertension  Assessment & Plan:  Blood pressure today is quite reasonable.  As she was standing for the entire episode of the fall, it is unlikely to have been orthostasis, though that is certainly possible.  No specific change at the moment.      4. Stage 3b chronic kidney disease (HCC)  Assessment & Plan:  Lab Results   Component Value Date    EGFR 37 09/06/2023    EGFR 40 08/28/2023    EGFR 40 08/27/2023    CREATININE 1.20 09/06/2023    CREATININE 1.11 08/28/2023    CREATININE 1.12 08/27/2023   Last blood work shows GFR to be low.  It is stable, however.  Avoid anti-inflammatories such as Aleve, Advil,  aspirin.    Orders:  -     POCT urine dip    5. Type 2 diabetes mellitus with stage 3b chronic kidney disease, without long-term current use of insulin ([facility])  Assessment & Plan:    Lab Results   Component Value Date    HGBA1C 9.8 (H) 09/06/2023     Patient's sugar currently is quite a bit elevated.  With that, will need to reevaluate at a specific office visit to discuss her diabetes.  Currently on Jardiance, which also will help with heart disease.  No other medications.    Patient did trial metformin, but discontinued secondary to side effects.    Orders:  -     POCT blood glucose    6. Mild cognitive impairment  Assessment & Plan:  Patient does have cognitive impairment from before.  Somewhat difficult as far as physical therapy and Occupational Therapy would be concerned, i.e. difficulty in retaining recommendations.    Orders:  -     POCT urine dip  -     Ambulatory Referral to Physical Therapy; Future  -     Ambulatory Referral to Occupational Therapy; Future    7. Hyponatremia  Assessment & Plan:  Before.  Would recommend blood work at some point.      8. Acquired hypothyroidism  Assessment & Plan:  Patient does have hypothyroidism.  Would recommend recheck at some point.      9. Trigeminal neuralgia  Assessment & Plan:  Continues with some discomfort, as well as some feelings under her head.  No specific change right now.      10. Dysuria  Comments:  Changes in urine noted.  Urinalysis recommended.  Patient unable to void in office.  Will order outpatient test.  Orders:  -     UA w Reflex to Microscopic w Reflex to Culture -Lab Collect; Future; Expected date: 10/27/2023        COVID 19 Instructions    Priya Zarate was advised to limit contact with others to essential tasks such as getting food, medications, and medical care.    Proper handwashing reviewed, and Hand sanitzer when washing is not available.    If the patient develops symptoms of COVID 19, the patient should call the office as soon as  possible.    It is strongly recommended that Flu Vaccinations be obtained.      Virtual Visits:  Cameron: This works on smart phones (any phone with Internet browsing capability).  You should get a text message when the provider is ready to see you.  Click on the link in the text message, and the call should start.  You will need to type in your name, and allow camera and microphone access.  This is HIPPA compliant, and secure.      If you have not already done so, get immunized to COVID 19.      We are committed to getting you vaccinated as soon as possible and will be closely following CDC and [facility] guidelines as they are released and revised.  Please refer to our COVID-19 vaccine webpage for the most up to date information on the vaccine and our distribution efforts.    This site will also have the most up to date recommendations for COVID booster vaccine.    https://www.[facility].org/covid-19/protect-yourself/covid-19-vaccine    Call [phone] ([phone]), option 7    You can also visit https://www.vaccines.gov/ to find vaccines in your area.    OUR LOCATION:    [facility]  [address] [place] [address], [address]  [place], [place], [address] [phone]  Fax: [phone]    Lab services, Rheumatology, and OB/GYN are at this location as well.

## 2023-10-27 NOTE — ASSESSMENT & PLAN NOTE
Lab Results   Component Value Date    EGFR 37 09/06/2023    EGFR 40 08/28/2023    EGFR 40 08/27/2023    CREATININE 1.20 09/06/2023    CREATININE 1.11 08/28/2023    CREATININE 1.12 08/27/2023   Last blood work shows GFR to be low.  It is stable, however.  Avoid anti-inflammatories such as Aleve, Advil, aspirin.

## 2023-10-27 NOTE — ASSESSMENT & PLAN NOTE
Blood pressure today is quite reasonable.  As she was standing for the entire episode of the fall, it is unlikely to have been orthostasis, though that is certainly possible.  No specific change at the moment.

## 2023-10-27 NOTE — ASSESSMENT & PLAN NOTE
Patient did have a fall, so with that I would need to rule out hip and pelvic fracture.  Extremely unlikely given her level of discomfort at this point, i.e. almost none.  She is doing quite well as far as mobility otherwise.  Consider PT/OT, especially in the home.    Tylenol arthritis, 2 every 8 hours.

## 2023-10-27 NOTE — ASSESSMENT & PLAN NOTE
Lab Results   Component Value Date    HGBA1C 9.8 (H) 09/06/2023     Patient's sugar currently is quite a bit elevated.  With that, will need to reevaluate at a specific office visit to discuss her diabetes.  Currently on Jardiance, which also will help with heart disease.  No other medications.    Patient did trial metformin, but discontinued secondary to side effects.

## 2023-10-27 NOTE — PROGRESS NOTES
Name: Priya Zarate      : 1922      MRN: [id number]  Encounter Provider: Caden Rivera MD  Encounter Date: 10/27/2023   Encounter department: [facility] PR[facility]    Assessment & Plan     1. Fall, initial encounter  Assessment & Plan:  Patient did have a fall, so with that I would need to rule out hip and pelvic fracture.  Extremely unlikely given her level of discomfort at this point, i.e. almost none.  She is doing quite well as far as mobility otherwise.  Consider PT/OT, especially in the home.    Tylenol arthritis, 2 every 8 hours.    Orders:  -     POCT blood glucose  -     XR hip/pelv 2-3 vws left if performed; Future; Expected date: 10/27/2023  -     XR femur 2 vw left; Future; Expected date: 10/27/2023  -     Ambulatory Referral to Physical Therapy; Future  -     Ambulatory Referral to Occupational Therapy; Future  -     acetaminophen (TYLENOL) 650 mg CR tablet; Take 1 tablet (650 mg total) by mouth every 8 (eight) hours for 14 days  -     UA w Reflex to Microscopic w Reflex to Culture -Lab Collect; Future; Expected date: 10/27/2023    2. Chronic diastolic congestive heart failure (HCC)  Assessment & Plan:  Wt Readings from Last 3 Encounters:   10/27/23 68.1 kg (150 lb 2 oz)   23 69.4 kg (153 lb)   23 67 kg (147 lb 11.3 oz)     Patient did have Scheff before.  Her weight seems to be fairly stable.  No specific changes at the moment.  No specific swelling noted either.  Continue to follow-up with office visit, consider cardiology at some point.            3. Primary hypertension  Assessment & Plan:  Blood pressure today is quite reasonable.  As she was standing for the entire episode of the fall, it is unlikely to have been orthostasis, though that is certainly possible.  No specific change at the moment.      4. Stage 3b chronic kidney disease (HCC)  Assessment & Plan:  Lab Results   Component Value Date    EGFR 37 2023    EGFR 40 2023    EGFR 40  08/27/2023    CREATININE 1.20 09/06/2023    CREATININE 1.11 08/28/2023    CREATININE 1.12 08/27/2023   Last blood work shows GFR to be low.  It is stable, however.  Avoid anti-inflammatories such as Aleve, Advil, aspirin.    Orders:  -     POCT urine dip    5. Type 2 diabetes mellitus with stage 3b chronic kidney disease, without long-term current use of insulin (HCC)  Assessment & Plan:    Lab Results   Component Value Date    HGBA1C 9.8 (H) 09/06/2023     Patient's sugar currently is quite a bit elevated.  With that, will need to reevaluate at a specific office visit to discuss her diabetes.  Currently on Jardiance, which also will help with heart disease.  No other medications.    Patient did trial metformin, but discontinued secondary to side effects.    Orders:  -     POCT blood glucose    6. Mild cognitive impairment  Assessment & Plan:  Patient does have cognitive impairment from before.  Somewhat difficult as far as physical therapy and Occupational Therapy would be concerned, i.e. difficulty in retaining recommendations.    Orders:  -     POCT urine dip  -     Ambulatory Referral to Physical Therapy; Future  -     Ambulatory Referral to Occupational Therapy; Future    7. Hyponatremia  Assessment & Plan:  Before.  Would recommend blood work at some point.      8. Acquired hypothyroidism  Assessment & Plan:  Patient does have hypothyroidism.  Would recommend recheck at some point.      9. Trigeminal neuralgia  Assessment & Plan:  Continues with some discomfort, as well as some feelings under her head.  No specific change right now.      10. Dysuria  Comments:  Changes in urine noted.  Urinalysis recommended.  Patient unable to void in office.  Will order outpatient test.  Orders:  -     UA w Reflex to Microscopic w Reflex to Culture -Lab Collect; Future; Expected date: 10/27/2023           Subjective      Chief Complaint   Patient presents with   • Fall     Pt fell on 10/19 on her left side and she states now  her right side. Her daughter states she feels her mom has a UTI because her urine is cloudy.  mgb       Patient is here because she fell recently.  Was on the right side.  She has some irritation with that.  Also has some changes in urination as noted in the chief complaint.    Changes in urination started about a week and a half ago.  That was cloudy urine.  No specific odor with the urine.  No burning with urination.  No frequency.  No urgency either.  It is just a question of cloudy urine.  No blood noted either.    Fall: Patient was at home.  She was going from the table to the chair.  With only a couple of steps, she then fell.  Landed on her left side.  She thought that her leg was underneath her at that point.  Patient was having problems getting up after being on the ground.  She was able to slide herself up into the lounge chair, and then get herself up.  She did require some assistance to do this as well.  Daughter did not witness the accident, but in the aftermath she did not notice any specific problems in the home.  She was planning on going out that day, so she was wearing socks and sneakers.    Patient was standing the entire time, so she did not have any change in position.  She does have a history of heart failure, and has been fairly stable with that.    Diabetes: Reviewed her last A1c which was quite elevated.  She did not check her sugar at the time of the fall.  She had not taken her medication right before this fall either.  She did not have breakfast prior to the fall either.    The after the fall, the patient did not go to have evaluation.  This was about a week ago.    Denies any bruising currently.  Some discomfort in the hip and thigh on the left.  Some also by the knee.  Since the fall, she has noted a little more discomfort with getting up, but overall no specific changes.  She was having trouble getting up from seated prior, and it continues.  She did mention that she is a little slower  now than what she was as far as getting up, and again there is a little bit of discomfort with that, but nothing severe.      Review of Systems   Constitutional: Negative.    HENT: Negative.     Respiratory: Negative.     Cardiovascular: Negative.    Gastrointestinal: Negative.    Musculoskeletal:  Positive for myalgias.        Per HPI       Current Outpatient Medications on File Prior to Visit   Medication Sig   • Accu-Chek Softclix Lancets lancets Testing 1 time daily.            Dx: E11.9   • Alcohol Swabs (B-D SINGLE USE SWABS REGULAR) PADS Use in the morning   • amLODIPine (NORVASC) 5 mg tablet TAKE 1 TABLET (5 MG TOTAL)  DAILY   • aspirin 81 MG tablet Take 81 mg by mouth daily.   • Blood Glucose Monitoring Suppl (Accu-Chek Guide) w/Device KIT Use 2 (two) times a day   • Cholecalciferol (VITAMIN D3) 2000 units capsule Take 2,000 Units by mouth daily    • dorzolamide (TRUSOPT) 2 % ophthalmic solution Administer 1 drop to both eyes 3 (three) times a day     • Empagliflozin (JARDIANCE) 10 MG TABS tablet Take 1 tablet (10 mg total) by mouth daily   • famotidine (PEPCID) 20 mg tablet Take 1 tablet (20 mg total) by mouth 2 (two) times a day   • furosemide (LASIX) 20 mg tablet TAKE 1 TABLET BY MOUTH IN THE MORNING AND 2 TABLETS IN THE EVENING   • gabapentin (NEURONTIN) 600 MG tablet PT to take 1 tab twice daily as she forgets to take the 1/2 in the afternoon anyway.   • glucose blood (Accu-Chek Guide) test strip Use as instructed   • glucose blood test strip Use 1 each 2 (two) times a day Test Fasting and 2 hour postprandial daily   • Incontinence Supply Disposable (SELECT DISPOSABLE UNDERWEAR LG) MISC    • Lancets (accu-chek multiclix) lancets Use as instructed   • latanoprost (XALATAN) 0.005 % ophthalmic solution Apply 1 drop to eye daily at bedtime Both eyes   • levothyroxine 75 mcg tablet Take 1 tablet (75 mcg total) by mouth daily   • Misc. Devices (Transport Chair) MISC Use if needed (with outings outside of the  house.)   • oxygen gas Inhale 2 L/min daily at bedtime. Indications: Shortness of breath   • pantoprazole (PROTONIX) 40 mg tablet TAKE 1 TABLET EVERY DAY   • polyethylene glycol (MIRALAX) 17 g packet Take 17 g by mouth daily   • potassium chloride (K-DUR,KLOR-CON) 20 mEq tablet TAKE 1 TABLET EVERY DAY (Patient taking differently: 20 mEq if needed)   • psyllium (METAMUCIL SMOOTH TEXTURE) 28 % packet Take 1 packet by mouth in the morning   • QUEtiapine (SEROquel) 50 mg tablet TAKE 1 TABLET(50 MG) BY MOUTH DAILY AT BEDTIME   • vitamin B-12 (CYANOCOBALAMIN) 250 MCG tablet Take 250 mcg by mouth daily   • [DISCONTINUED] acetaminophen (TYLENOL) 325 mg tablet Take 2 tablets (650 mg total) by mouth every 6 (six) hours as needed for mild pain       Objective     /78 (BP Location: Right arm, Patient Position: Sitting, Cuff Size: Standard)   Pulse 88   Temp 97.6 °F (36.4 °C) (Temporal)   Wt 68.1 kg (150 lb 2 oz)   SpO2 97%   BMI 27.46 kg/m²     Physical Exam  Vitals and nursing note reviewed.   Constitutional:       Appearance: She is well-developed.   HENT:      Head: Normocephalic and atraumatic.   Cardiovascular:      Rate and Rhythm: Normal rate and regular rhythm.      Pulses:           Carotid pulses are 2+ on the right side and 2+ on the left side.     Heart sounds: Normal heart sounds.   Pulmonary:      Effort: Pulmonary effort is normal.      Breath sounds: Normal breath sounds. No wheezing or rales.   Chest:      Chest wall: No tenderness.   Musculoskeletal:      Right hip: Normal.      Left hip: Normal.      Right upper leg: Normal.      Left upper leg: Normal.      Right knee: Normal.      Left knee: Normal.       Caden Rivera MD

## 2023-10-27 NOTE — ASSESSMENT & PLAN NOTE
Patient does have cognitive impairment from before.  Somewhat difficult as far as physical therapy and Occupational Therapy would be concerned, i.e. difficulty in retaining recommendations.

## 2023-10-27 NOTE — ASSESSMENT & PLAN NOTE
Wt Readings from Last 3 Encounters:   10/27/23 68.1 kg (150 lb 2 oz)   09/05/23 69.4 kg (153 lb)   08/29/23 67 kg (147 lb 11.3 oz)     Patient did have Scheff before.  Her weight seems to be fairly stable.  No specific changes at the moment.  No specific swelling noted either.  Continue to follow-up with office visit, consider cardiology at some point.

## 2023-10-30 ENCOUNTER — LAB (OUTPATIENT)
Dept: LAB | Facility: CLINIC | Age: 88
End: 2023-10-30
Payer: COMMERCIAL
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
## 2023-10-30 DIAGNOSIS — R30.0 DYSURIA: ICD-10-CM

## 2023-10-30 LAB
BACTERIA UR QL AUTO: ABNORMAL /HPF
BILIRUB UR QL STRIP: NEGATIVE
CLARITY UR: ABNORMAL
COLOR UR: ABNORMAL
GLUCOSE UR STRIP-MCNC: ABNORMAL MG/DL
HGB UR QL STRIP.AUTO: ABNORMAL
KETONES UR STRIP-MCNC: NEGATIVE MG/DL
LEUKOCYTE ESTERASE UR QL STRIP: ABNORMAL
MUCOUS THREADS UR QL AUTO: ABNORMAL
NITRITE UR QL STRIP: NEGATIVE
NON-SQ EPI CELLS URNS QL MICRO: ABNORMAL /HPF
PH UR STRIP.AUTO: 5.5 [PH]
PROT UR STRIP-MCNC: ABNORMAL MG/DL
RBC #/AREA URNS AUTO: ABNORMAL /HPF
SP GR UR STRIP.AUTO: 1.01 (ref 1–1.03)
UROBILINOGEN UR STRIP-ACNC: <2 MG/DL
WBC #/AREA URNS AUTO: ABNORMAL /HPF
WBC CLUMPS # UR AUTO: PRESENT /UL

## 2023-10-30 PROCEDURE — 87086 URINE CULTURE/COLONY COUNT: CPT

## 2023-10-30 PROCEDURE — 81001 URINALYSIS AUTO W/SCOPE: CPT

## 2023-10-31 DIAGNOSIS — R30.0 DYSURIA: Primary | ICD-10-CM

## 2023-10-31 RX ORDER — SULFAMETHOXAZOLE AND TRIMETHOPRIM 800; 160 MG/1; MG/1
1 TABLET ORAL EVERY 12 HOURS SCHEDULED
Qty: 14 TABLET | Refills: 0 | Status: SHIPPED | OUTPATIENT
Start: 2023-10-31 | End: 2023-11-07

## 2023-10-31 RX ORDER — SULFAMETHOXAZOLE AND TRIMETHOPRIM 800; 160 MG/1; MG/1
1 TABLET ORAL EVERY 12 HOURS SCHEDULED
Qty: 14 TABLET | Refills: 0 | Status: SHIPPED | OUTPATIENT
Start: 2023-10-31 | End: 2023-10-31 | Stop reason: SDUPTHER

## 2023-10-31 NOTE — RESULT ENCOUNTER NOTE
Tests were not normal, and should follow at  your scheduled Office visit. Please call about this, if [employer] message is not available or not read by patient.

## 2023-11-01 DIAGNOSIS — G52.9 CRANIAL NEURALGIA: ICD-10-CM

## 2023-11-01 LAB — BACTERIA UR CULT: NORMAL

## 2023-11-01 RX ORDER — GABAPENTIN 600 MG/1
TABLET ORAL
Qty: 180 TABLET | Refills: 10 | Status: SHIPPED | OUTPATIENT
Start: 2023-11-01

## 2023-11-01 NOTE — RESULT ENCOUNTER NOTE
Tests were not normal, and should follow at  your scheduled Office visit. Please call about this, if [employer] message is not available or not read by patient.

## 2023-11-04 ENCOUNTER — HOSPITAL ENCOUNTER (EMERGENCY)
Facility: HOSPITAL | Age: 88
Discharge: HOME | End: 2023-11-04
Attending: EMERGENCY MEDICINE
Payer: COMMERCIAL

## 2023-11-04 VITALS
RESPIRATION RATE: 18 BRPM | HEART RATE: 61 BPM | SYSTOLIC BLOOD PRESSURE: 112 MMHG | TEMPERATURE: 97.5 F | DIASTOLIC BLOOD PRESSURE: 56 MMHG | OXYGEN SATURATION: 96 %

## 2023-11-04 DIAGNOSIS — M79.602 LEFT ARM PAIN: Primary | ICD-10-CM

## 2023-11-04 DIAGNOSIS — N17.9 AKI (ACUTE KIDNEY INJURY) (HCC): ICD-10-CM

## 2023-11-04 LAB
ANION GAP SERPL CALCULATED.3IONS-SCNC: 10 MMOL/L
ANION GAP SERPL CALCULATED.3IONS-SCNC: 10 MMOL/L
BASOPHILS # BLD AUTO: 0.04 THOUSANDS/ÂΜL (ref 0–0.1)
BASOPHILS NFR BLD AUTO: 1 % (ref 0–1)
BUN SERPL-MCNC: 25 MG/DL (ref 5–25)
BUN SERPL-MCNC: 26 MG/DL (ref 5–25)
CALCIUM SERPL-MCNC: 8.9 MG/DL (ref 8.4–10.2)
CALCIUM SERPL-MCNC: 9.1 MG/DL (ref 8.4–10.2)
CARDIAC TROPONIN I PNL SERPL HS: 15 NG/L (ref 8–18)
CARDIAC TROPONIN I PNL SERPL HS: 16 NG/L (ref 8–18)
CHLORIDE SERPL-SCNC: 101 MMOL/L (ref 96–108)
CHLORIDE SERPL-SCNC: 102 MMOL/L (ref 96–108)
CK SERPL-CCNC: 33 U/L (ref 26–192)
CO2 SERPL-SCNC: 24 MMOL/L (ref 21–32)
CO2 SERPL-SCNC: 25 MMOL/L (ref 21–32)
CREAT SERPL-MCNC: 2.03 MG/DL (ref 0.6–1.3)
CREAT SERPL-MCNC: 2.06 MG/DL (ref 0.6–1.3)
CRP SERPL QL: 26.8 MG/L
EOSINOPHIL # BLD AUTO: 0.2 THOUSAND/ÂΜL (ref 0–0.61)
EOSINOPHIL NFR BLD AUTO: 4 % (ref 0–6)
ERYTHROCYTE [DISTWIDTH] IN BLOOD BY AUTOMATED COUNT: 16.4 % (ref 11.6–15.1)
GFR SERPL CREATININE-BSD FRML MDRD: 19 ML/MIN/1.73SQ M
GFR SERPL CREATININE-BSD FRML MDRD: 19 ML/MIN/1.73SQ M
GLUCOSE SERPL-MCNC: 229 MG/DL (ref 65–140)
GLUCOSE SERPL-MCNC: 253 MG/DL (ref 65–140)
HCT VFR BLD AUTO: 31.6 % (ref 34.8–46.1)
HGB BLD-MCNC: 9.1 G/DL (ref 11.5–15.4)
IMM GRANULOCYTES # BLD AUTO: 0.04 THOUSAND/UL (ref 0–0.2)
IMM GRANULOCYTES NFR BLD AUTO: 1 % (ref 0–2)
LYMPHOCYTES # BLD AUTO: 0.9 THOUSANDS/ÂΜL (ref 0.6–4.47)
LYMPHOCYTES NFR BLD AUTO: 17 % (ref 14–44)
MCH RBC QN AUTO: 23.3 PG (ref 26.8–34.3)
MCHC RBC AUTO-ENTMCNC: 28.8 G/DL (ref 31.4–37.4)
MCV RBC AUTO: 81 FL (ref 82–98)
MONOCYTES # BLD AUTO: 0.5 THOUSAND/ÂΜL (ref 0.17–1.22)
MONOCYTES NFR BLD AUTO: 9 % (ref 4–12)
NEUTROPHILS # BLD AUTO: 3.79 THOUSANDS/ÂΜL (ref 1.85–7.62)
NEUTS SEG NFR BLD AUTO: 68 % (ref 43–75)
NRBC BLD AUTO-RTO: 0 /100 WBCS
PLATELET # BLD AUTO: 247 THOUSANDS/UL (ref 149–390)
PMV BLD AUTO: 10.8 FL (ref 8.9–12.7)
POTASSIUM SERPL-SCNC: 3.8 MMOL/L (ref 3.5–5.3)
POTASSIUM SERPL-SCNC: 3.9 MMOL/L (ref 3.5–5.3)
RBC # BLD AUTO: 3.9 MILLION/UL (ref 3.81–5.12)
SODIUM SERPL-SCNC: 136 MMOL/L (ref 135–147)
SODIUM SERPL-SCNC: 136 MMOL/L (ref 135–147)
WBC # BLD AUTO: 5.47 THOUSAND/UL (ref 4.31–10.16)

## 2023-11-04 PROCEDURE — 86140 C-REACTIVE PROTEIN: CPT | Performed by: EMERGENCY MEDICINE

## 2023-11-04 PROCEDURE — 96360 HYDRATION IV INFUSION INIT: CPT

## 2023-11-04 PROCEDURE — 80048 BASIC METABOLIC PNL TOTAL CA: CPT | Performed by: EMERGENCY MEDICINE

## 2023-11-04 PROCEDURE — 99285 EMERGENCY DEPT VISIT HI MDM: CPT | Performed by: EMERGENCY MEDICINE

## 2023-11-04 PROCEDURE — 36415 COLL VENOUS BLD VENIPUNCTURE: CPT | Performed by: EMERGENCY MEDICINE

## 2023-11-04 PROCEDURE — 85025 COMPLETE CBC W/AUTO DIFF WBC: CPT | Performed by: EMERGENCY MEDICINE

## 2023-11-04 PROCEDURE — 99284 EMERGENCY DEPT VISIT MOD MDM: CPT

## 2023-11-04 PROCEDURE — 82550 ASSAY OF CK (CPK): CPT | Performed by: EMERGENCY MEDICINE

## 2023-11-04 PROCEDURE — 84484 ASSAY OF TROPONIN QUANT: CPT | Performed by: EMERGENCY MEDICINE

## 2023-11-04 RX ADMIN — SODIUM CHLORIDE 1000 ML: 0.9 INJECTION, SOLUTION INTRAVENOUS at 13:53

## 2023-11-04 NOTE — ED PROVIDER NOTES
History  Chief Complaint   Patient presents with    Arm Pain     Left arm pain started last night and this morning has increasing pain. Patient says she feels weak. Cristina Arias is a 8 y.o.  year old female  Past Medical History:  No date: Asthma  No date: Atypical chest pain  No date: CAD (coronary artery disease)  No date: Candidal intertrigo  No date: CHF (congestive heart failure) ([facility])  No date: Depression  No date: Diabetes mellitus ([address])  No date: Diabetic neuropathy ([address])  No date: Diverticulitis  No date: Dyslipidemia  No date: Female bladder prolapse  No date: Gastric ulcer  No date: Glaucoma  No date: HTN (hypertension)  No date: Hyperlipidemia  4/18/2016: Hypothyroidism  No date: RAD (reactive airway disease)  Social History    Tobacco Use      Smoking status: Never      Smokeless tobacco: Never    Vaping Use      Vaping Use: Never used    Alcohol use: No      Comment: Social Alcohol Use -- as per allscripts (pt denies all alcohol use)    Drug use: No    Patient presents with:  Arm Pain: Left arm pain started last night and this morning has increasing pain. Patient says she feels weak. Very pleasant 8-year-old female presents emergency department with left shoulder and elbow pain that started last night. It did last most of the night. After taking some Tylenol the pain has diminished. No history of trauma or injury to the arm. No prior history of problems with that arm as well. No weakness associated with it. History obtained directly from the patient. Arm Pain  Associated symptoms: no abdominal pain, no chest pain, no cough, no ear pain, no fever, no rash, no shortness of breath, no sore throat and no vomiting        Prior to Admission Medications   Prescriptions Last Dose Informant Patient Reported? Taking? Accu-Chek Softclix Lancets lancets   No No   Sig: Testing 1 time daily.             Dx: E11.9   Alcohol Swabs (B-D SINGLE USE SWABS REGULAR) PADS   No No   Sig: Use in the morning   Blood Glucose Monitoring Suppl (Accu-Chek Guide) w/Device KIT   No No   Sig: Use 2 (two) times a day   Cholecalciferol (VITAMIN D3) 2000 units capsule  Child Yes No   Sig: Take 2,000 Units by mouth daily    Empagliflozin (JARDIANCE) 10 MG TABS tablet   No No   Sig: Take 1 tablet (10 mg total) by mouth daily   Incontinence Supply Disposable (SELECT DISPOSABLE UNDERWEAR LG) MISC  Child Yes No   Lancets (accu-chek multiclix) lancets   No No   Sig: Use as instructed   Misc. Devices (Transport Chair) MISC  Child No No   Sig: Use if needed (with outings outside of the house.)   QUEtiapine (SEROquel) 50 mg tablet   No No   Sig: TAKE 1 TABLET(50 MG) BY MOUTH DAILY AT BEDTIME   acetaminophen (TYLENOL) 650 mg CR tablet   No No   Sig: Take 1 tablet (650 mg total) by mouth every 8 (eight) hours for 14 days   amLODIPine (NORVASC) 5 mg tablet   No No   Sig: TAKE 1 TABLET (5 MG TOTAL)  DAILY   aspirin 81 MG tablet  Child Yes No   Sig: Take 81 mg by mouth daily. dorzolamide (TRUSOPT) 2 % ophthalmic solution  Child Yes No   Sig: Administer 1 drop to both eyes 3 (three) times a day     famotidine (PEPCID) 20 mg tablet   No No   Sig: Take 1 tablet (20 mg total) by mouth 2 (two) times a day   furosemide (LASIX) 20 mg tablet   No No   Sig: TAKE 1 TABLET BY MOUTH IN THE MORNING AND 2 TABLETS IN THE EVENING   gabapentin (NEURONTIN) 600 MG tablet   No No   Sig: TAKE 1/2 TABLET IN THE MORNING AND AFTERNOON AND TAKE 1 TABLET AT BEDTIME   glucose blood (Accu-Chek Guide) test strip   No No   Sig: Use as instructed   glucose blood test strip   No No   Sig: Use 1 each 2 (two) times a day Test Fasting and 2 hour postprandial daily   latanoprost (XALATAN) 0.005 % ophthalmic solution  Child No No   Sig: Apply 1 drop to eye daily at bedtime Both eyes   levothyroxine 75 mcg tablet   No No   Sig: Take 1 tablet (75 mcg total) by mouth daily   oxygen gas  Child Yes No   Sig: Inhale 2 L/min daily at bedtime.  Indications: Shortness of breath   pantoprazole (PROTONIX) 40 mg tablet   No No   Sig: TAKE 1 TABLET EVERY DAY   polyethylene glycol (MIRALAX) 17 g packet   No No   Sig: Take 17 g by mouth daily   potassium chloride (K-DUR,KLOR-CON) 20 mEq tablet  Child No No   Sig: TAKE 1 TABLET EVERY DAY   Patient taking differently: 20 mEq if needed   psyllium (METAMUCIL SMOOTH TEXTURE) 28 % packet  Child No No   Sig: Take 1 packet by mouth in the morning   sulfamethoxazole-trimethoprim (Bactrim DS) 800-160 mg per tablet   No No   Sig: Take 1 tablet by mouth every 12 (twelve) hours for 7 days   vitamin B-12 (CYANOCOBALAMIN) 250 MCG tablet  Child Yes No   Sig: Take 250 mcg by mouth daily      Facility-Administered Medications: None       Past Medical History:   Diagnosis Date    Asthma     Atypical chest pain     CAD (coronary artery disease)     Candidal intertrigo     CHF (congestive heart failure) (HCC)     Depression     Diabetes mellitus (HCC)     Diabetic neuropathy (HCC)     Diverticulitis     Dyslipidemia     Female bladder prolapse     Gastric ulcer     Glaucoma     HTN (hypertension)     Hyperlipidemia     Hypothyroidism 4/18/2016    RAD (reactive airway disease)        Past Surgical History:   Procedure Laterality Date    COLONOSCOPY      ESOPHAGOGASTRODUODENOSCOPY  2011    HYSTERECTOMY      TONSILLECTOMY         Family History   Problem Relation Age of Onset    Breast cancer Mother     Hypothyroidism Mother     Hypertension Father     Breast cancer Sister     Thyroid disease Sister     Hypertension Sister      I have reviewed and agree with the history as documented.     E-Cigarette/Vaping    E-Cigarette Use Never User      E-Cigarette/Vaping Substances    Nicotine No     THC No     CBD No     Flavoring No     Other No     Unknown No      Social History     Tobacco Use    Smoking status: Never    Smokeless tobacco: Never   Vaping Use    Vaping Use: Never used   Substance Use Topics    Alcohol use: No     Comment: Social Alcohol Use -- as per allscripts (pt denies all alcohol use)    Drug use: No       Review of Systems   Constitutional:  Negative for chills and fever. HENT:  Negative for ear pain and sore throat. Eyes:  Negative for pain and visual disturbance. Respiratory:  Negative for cough and shortness of breath. Cardiovascular:  Negative for chest pain and palpitations. Gastrointestinal:  Negative for abdominal pain and vomiting. Genitourinary:  Negative for dysuria and hematuria. Musculoskeletal:  Negative for arthralgias and back pain. Skin:  Negative for color change and rash. Neurological:  Positive for weakness. Negative for seizures and syncope. All other systems reviewed and are negative. Physical Exam  Physical Exam  Vitals reviewed. Constitutional:       Appearance: Normal appearance. She is normal weight. HENT:      Head: Normocephalic. Right Ear: External ear normal.      Left Ear: External ear normal.      Mouth/Throat:      Mouth: Mucous membranes are moist.   Eyes:      Conjunctiva/sclera: Conjunctivae normal.      Pupils: Pupils are equal, round, and reactive to light. Cardiovascular:      Rate and Rhythm: Normal rate. Pulmonary:      Effort: Pulmonary effort is normal.      Breath sounds: Normal breath sounds. Abdominal:      General: Abdomen is flat. Bowel sounds are normal.   Musculoskeletal:      Left upper arm: No swelling, edema, deformity, lacerations, tenderness or bony tenderness. Cervical back: Normal range of motion and neck supple. No rigidity or tenderness. Right lower leg: No edema. Left lower leg: No edema. Comments: The location of the pain seems to be the proximal left forearm and the shoulder area however on exam there is no swelling no joint effusion. No discoloration. Distal temperature is normal.  2 good capillary refill. Skin:     Capillary Refill: Capillary refill takes less than 2 seconds. Neurological:      General: No focal deficit present. Mental Status: She is alert and oriented to person, place, and time. Psychiatric:         Mood and Affect: Mood normal.         Thought Content:  Thought content normal.         Vital Signs  ED Triage Vitals [11/04/23 1222]   Temperature Pulse Respirations Blood Pressure SpO2   97.5 °F (36.4 °C) 71 18 126/60 99 %      Temp Source Heart Rate Source Patient Position - Orthostatic VS BP Location FiO2 (%)   Oral Monitor Sitting Right arm --      Pain Score       8           Vitals:    11/04/23 1222   BP: 126/60   Pulse: 71   Patient Position - Orthostatic VS: Sitting         Visual Acuity      ED Medications  Medications   sodium chloride 0.9 % bolus 1,000 mL (1,000 mL Intravenous New Bag 11/4/23 1353)       Diagnostic Studies  Results Reviewed       Procedure Component Value Units Date/Time    High Sensitivity Troponin I Random [[id number]]  (Normal) Collected: 11/04/23 1348    Lab Status: Final result Specimen: Blood from Arm, Left Updated: 11/04/23 1419     HS TnI random 15 ng/L     Basic metabolic panel [[id number]]  (Abnormal) Collected: 11/04/23 1348    Lab Status: Final result Specimen: Blood from Arm, Left Updated: 11/04/23 1411     Sodium 136 mmol/L      Potassium 3.9 mmol/L      Chloride 101 mmol/L      CO2 25 mmol/L      ANION GAP 10 mmol/L      BUN 25 mg/dL      Creatinine 2.06 mg/dL      Glucose 229 mg/dL      Calcium 9.1 mg/dL      eGFR 19 ml/min/1.73sq m     Narrative:      [facility] guidelines for Chronic Kidney Disease (CKD):     Stage 1 with normal or high GFR (GFR > 90 mL/min/1.73 square meters)    Stage 2 Mild CKD (GFR = 60-89 mL/min/1.73 square meters)    Stage 3A Moderate CKD (GFR = 45-59 mL/min/1.73 square meters)    Stage 3B Moderate CKD (GFR = 30-44 mL/min/1.73 square meters)    Stage 4 Severe CKD (GFR = 15-29 mL/min/1.73 square meters)    Stage 5 End Stage CKD (GFR <15 mL/min/1.73 square meters)  Note: GFR calculation is accurate only with a steady state creatinine High Sensitivity Troponin I Random [[id number]]  (Normal) Collected: 11/04/23 1254    Lab Status: Final result Specimen: Blood from Arm, Right Updated: 11/04/23 1330     HS TnI random 16 ng/L     Basic metabolic panel [[id number]]  (Abnormal) Collected: 11/04/23 1254    Lab Status: Final result Specimen: Blood from Arm, Right Updated: 11/04/23 1322     Sodium 136 mmol/L      Potassium 3.8 mmol/L      Chloride 102 mmol/L      CO2 24 mmol/L      ANION GAP 10 mmol/L      BUN 26 mg/dL      Creatinine 2.03 mg/dL      Glucose 253 mg/dL      Calcium 8.9 mg/dL      eGFR 19 ml/min/1.73sq m     Narrative:      [place] guidelines for Chronic Kidney Disease (CKD):     Stage 1 with normal or high GFR (GFR > 90 mL/min/1.73 square meters)    Stage 2 Mild CKD (GFR = 60-89 mL/min/1.73 square meters)    Stage 3A Moderate CKD (GFR = 45-59 mL/min/1.73 square meters)    Stage 3B Moderate CKD (GFR = 30-44 mL/min/1.73 square meters)    Stage 4 Severe CKD (GFR = 15-29 mL/min/1.73 square meters)    Stage 5 End Stage CKD (GFR <15 mL/min/1.73 square meters)  Note: GFR calculation is accurate only with a steady state creatinine    CK [[id number]]  (Normal) Collected: 11/04/23 1254    Lab Status: Final result Specimen: Blood from Arm, Right Updated: 11/04/23 1322     Total CK 33 U/L     C-reactive protein [[id number]]  (Abnormal) Collected: 11/04/23 1254    Lab Status: Final result Specimen: Blood from Arm, Right Updated: 11/04/23 1322     CRP 26.8 mg/L     CBC and differential [[id number]]  (Abnormal) Collected: 11/04/23 1254    Lab Status: Final result Specimen: Blood from Arm, Right Updated: 11/04/23 1303     WBC 5.47 Thousand/uL      RBC 3.90 Million/uL      Hemoglobin 9.1 g/dL      Hematocrit 31.6 %      MCV 81 fL      MCH 23.3 pg      MCHC 28.8 g/dL      RDW 16.4 %      MPV 10.8 fL      Platelets 641 Thousands/uL      nRBC 0 /100 WBCs      Neutrophils Relative 68 %      Immat GRANS % 1 %      Lymphocytes Relative 17 %      Monocytes Relative 9 %      Eosinophils Relative 4 %      Basophils Relative 1 %      Neutrophils Absolute 3.79 Thousands/µL      Immature Grans Absolute 0.04 Thousand/uL      Lymphocytes Absolute 0.90 Thousands/µL      Monocytes Absolute 0.50 Thousand/µL      Eosinophils Absolute 0.20 Thousand/µL      Basophils Absolute 0.04 Thousands/µL                    No orders to display              Procedures  Procedures         ED Course  ED Course as of 11/04/23 1428   Sat Nov 04, 2023   1323 C-REACTIVE PROTEIN(!): 26.8   1323 Total CK: 33   1323 BUN(!): 26   1323 Creatinine(!): 2.03   1323 Hemoglobin(!): 9.1   1323 Acute renal insufficiency is new compared to old labs. Hemoglobin 9.1 is chronic. 26 CAIT is new. Recommended stopping lasix until follow up with PMD   1327 Glucose, Random(!): 253   1333 HS TnI random: 16                               SBIRT 22yo+      Flowsheet Row Most Recent Value   Initial Alcohol Screen: US AUDIT-C     1. How often do you have a drink containing alcohol? 0 Filed at: 11/04/2023 1303   2. How many drinks containing alcohol do you have on a typical day you are drinking? 0 Filed at: 11/04/2023 1303   3b. FEMALE Any Age, or MALE 65+: How often do you have 4 or more drinks on one occassion? 0 Filed at: 11/04/2023 1303   Audit-C Score 0 Filed at: 11/04/2023 1303   KAMRAN: How many times in the past year have you. .. Used an illegal drug or used a prescription medication for non-medical reasons? Never Filed at: 11/04/2023 1303                      Medical Decision Making  EKG interpreted by me. (Performed to assess for ischemia, arrhythmia)  NSR @ rate of 61, normal axis, no ST elevations or depressions or other signs of ischemia. First-degree AV block. Compared to previous EKG dated 08/27/23, no significant change. Patient clinical presentation is benign.     Meaning patient's vital signs are normal and stable ED Triage Vitals [11/04/23 1222]  Temperature: 97.5 °F (36.4 °C)  Pulse: 71  Respirations: 18  Blood Pressure: 126/60  SpO2: 99 %  Temp Source: Oral  Heart Rate Source: Monitor  Patient Position - Orthostatic VS: Sitting  BP Location: Right arm  FiO2 (%): n/a  Pain Score: 8. Patient in no distress. Chief complaint, vital signs, physical examination does not suggest an acute medical emergency at this time. Patient's BUN and creatinine was elevated which is acute compared to baseline. Troponin was 16 which is nonnegative. We will keep the patient in the emergency department due to 2-hour troponin. While we wait for the troponin we will go ahead and give a liter of IV fluids and recheck the BMP with a troponin. Patient at this time is eating lunch. Delta troponin improved. Patient got IV fluids in the emergency department. Repeat BUN and was improved. Amount and/or Complexity of Data Reviewed  Labs: ordered. Decision-making details documented in ED Course. Risk  OTC drugs. Disposition  Final diagnoses:   Left arm pain   CAIT (acute kidney injury) ([address])     Time reflects when diagnosis was documented in both MDM as applicable and the Disposition within this note       Time User Action Codes Description Comment    11/4/2023  2:28 PM Da Inman Add [M79.602] Left arm pain     11/4/2023  2:28 PM Da Inman Add [N17.9] CAIT (acute kidney injury) [facility])           ED Disposition       ED Disposition   Discharge    Condition   Stable    Date/Time   Sat Nov 4, 2023 1428    [facility] discharge to home/self care. Follow-up Information       Follow up With Specialties Details Why [address], PA-C Family Medicine, Physician Assistant In 1 week stop taking lasix until f/u with  [facility] [id number] [phone]              Patient's Medications   Discharge Prescriptions    No medications on file       No discharge procedures on file.     PDMP Review Value Time User    PDMP Reviewed  Yes 11/18/2020  1:31 PM Adama Cha PA-C            ED Provider  Electronically Signed by             Alba Moran MD  11/04/23 3180

## 2023-11-04 NOTE — DISCHARGE INSTRUCTIONS
A  personal message from Dr. Rosalie Serrano,  Thank you so much for allowing me to care for you today. I pride myself in the care and attention I give all my patients. I hope you were a witness to this tonight. If for any reason your condition does not improve or worsens, or you have a question that was not answered during your visit you can feel free to text me on my personal phone #  # [phone]. I will answer to your message and continue your care past your emergency room visit. Please understand that although you are being discharged because your condition has been deemed stable and able to be managed on an outpatient setting. However your condition may worsen as part of the natural progression of the illness/condition, if this occurs please come back to the emergency department for a repeat evaluation.

## 2023-11-04 NOTE — ED NOTES
Per pt request, pt's daughter was called and message was left stating that pt is ready for d/c.       Luis Pablo RN  11/04/23 [id number]

## 2023-11-05 LAB
ATRIAL RATE: 61 BPM
P AXIS: 22 DEGREES
PR INTERVAL: 216 MS
QRS AXIS: 24 DEGREES
QRSD INTERVAL: 116 MS
QT INTERVAL: 444 MS
QTC INTERVAL: 446 MS
T WAVE AXIS: 74 DEGREES
VENTRICULAR RATE: 61 BPM

## 2023-11-05 PROCEDURE — 93010 ELECTROCARDIOGRAM REPORT: CPT | Performed by: INTERNAL MEDICINE

## 2023-11-06 ENCOUNTER — VBI (OUTPATIENT)
Dept: FAMILY MEDICINE CLINIC | Facility: CLINIC | Age: 88
End: 2023-11-06

## 2023-11-06 NOTE — TELEPHONE ENCOUNTER
11/06/23 10:32 AM    Patient contacted post ED visit, [facility] department spoke with patient/caregiver and outreach was successful. Thank you.   Brittny Sky  [facility]

## 2023-11-14 ENCOUNTER — OFFICE VISIT (OUTPATIENT)
Dept: NEUROLOGY | Facility: CLINIC | Age: 88
End: 2023-11-14
Payer: COMMERCIAL

## 2023-11-14 VITALS
BODY MASS INDEX: 27.46 KG/M2 | SYSTOLIC BLOOD PRESSURE: 106 MMHG | DIASTOLIC BLOOD PRESSURE: 53 MMHG | HEART RATE: 86 BPM | HEIGHT: 62 IN

## 2023-11-14 DIAGNOSIS — R42 VERTIGO: ICD-10-CM

## 2023-11-14 DIAGNOSIS — G52.9 CRANIAL NEURALGIA: ICD-10-CM

## 2023-11-14 DIAGNOSIS — M79.18 MYOFASCIAL MUSCLE PAIN: Primary | ICD-10-CM

## 2023-11-14 PROCEDURE — 99214 OFFICE O/P EST MOD 30 MIN: CPT | Performed by: PHYSICIAN ASSISTANT

## 2023-11-14 PROCEDURE — 20552 NJX 1/MLT TRIGGER POINT 1/2: CPT | Performed by: PHYSICIAN ASSISTANT

## 2023-11-14 RX ORDER — LIDOCAINE HYDROCHLORIDE 10 MG/ML
2.5 INJECTION, SOLUTION INFILTRATION; PERINEURAL ONCE
Status: COMPLETED | OUTPATIENT
Start: 2023-11-14 | End: 2023-11-14

## 2023-11-14 RX ORDER — BUPIVACAINE HYDROCHLORIDE 2.5 MG/ML
2.5 INJECTION, SOLUTION EPIDURAL; INFILTRATION; INTRACAUDAL ONCE
Status: COMPLETED | OUTPATIENT
Start: 2023-11-14 | End: 2023-11-14

## 2023-11-14 RX ADMIN — LIDOCAINE HYDROCHLORIDE 2.5 ML: 10 INJECTION, SOLUTION INFILTRATION; PERINEURAL at 14:53

## 2023-11-14 RX ADMIN — BUPIVACAINE HYDROCHLORIDE 2.5 ML: 2.5 INJECTION, SOLUTION EPIDURAL; INFILTRATION; INTRACAUDAL at 14:52

## 2023-11-14 NOTE — PROGRESS NOTES
Patient ID: Priya Zarate is a 89 y.o. female.    Assessment/Plan:         Diagnoses and all orders for this visit:    Myofascial muscle pain  -     lidocaine (XYLOCAINE) 1 % injection 2.5 mL  -     bupivacaine (PF) (MARCAINE) 0.25 % injection 2.5 mL  -     Trigger Injection 1 or 2 muscles    Vertigo    Cranial neuralgia         Ms. Priya Zarate is here for neurological follow-up for history of headaches and cranial neuralgia, with a little bit more neck tension and pain in the back of the head recently.  I did provide trigger point injections as noted.  Trying to prevent polypharmacy.  She continues to have a swirling sensation but only at nighttime and when she opens her eyes it goes away.  She should continue melatonin at night for sleep function.  We can add meclizine at night to if needed to see if this helps.  I do not want to put her through more imaging if not needed, but if she continues to have bothersome nighttime sxs such as dizziness, CTA head and neck may be helpful.  The patient should not hesitate to call me prior to her follow up with any questions or concerns.     Universal Protocol:  Consent: The procedure was performed in an emergent situation. Verbal consent obtained.  Risks and benefits: risks, benefits and alternatives were discussed  Consent given by: patient  Procedure Details  Location(s):  Patient tolerance: patient tolerated the procedure well with no immediate complications  Additional procedure details: Trigger point injections were performed on an emergent basis and are a part of ongoing therapy.    Risks, benefits, alternatives, infection, bleeding and allergic reaction were discussed with the patient. Verbal consent was obtained prior to the procedure and is detailed in the patient's record.    Trigger point injections were performed in the following locations using a mixture of 2.5 mL 0.25% bupivacaine + 2.5 mL of 1% lidocaine, without steroid, using a 30 gauge, 1/2 inch  "needle: Bilateral cervical paraspinal muscles and both upper traps.            Subjective:    HPI    Ms. Priya Zarate is here with her daughter for neurological follow-up.    The patient lives with her daughter at home.  Her daughter provides some of the history where needed.      The patient has had pain in back of her head and down into her neck, a lot of swirling, its constant. When she lays her head down at night is when it starts, but not when she is up and around.    She continues the same medications including gabapentin, B12 but did not continue Trileptal as she did not think this was helpful.  I also discontinued it at one of the last visits to prevent dizziness.    She does have glaucoma from what her daughter states and continues to follow with an eye doctor.  The patient does have difficulty seeing things far away and also close up.  Sometimes she has difficulty seeing her daughter and her daughter states \"I am right here,\" and the patient then recognizes that she is near her.     She continues to have a running sensation or swirling sensation as she would describe it at times after she closes her eyes when laying in bed.  She lays on her right side typically.  She feels this sensation only after she closes her eyes, then she opens her eyes and it resolves.  The sensation can come back when she closes her eyes and can last until she falls asleep.  She does not have nausea or vomiting.  She did not have full spinning sensation.  I did recommend therapy in the past for vertigo but the patient states that she did not tolerate therapy.  She felt worse with therapy.  She is currently tolerating the current symptoms and it is not affecting her functioning or sleep function in any way.  She does not have vertigo or spinning or any of these sensations during the day.      She continues seeing Dr. Stauffer, ophthalmologist, on a regular basis.     Prior documentation:  Could not tolerate PT for low back pain and " "balance training; states she was very tired and hurt more after the therapy. She had about 4 sessions then stopped on her own accord.     She still has a spinning or \"running\" sensation when she closes her eyes and lays down to go to bed. States she is just dealing with it. Her daughter gave her melatonin and she slept better but had a nightmare. Pt denies worsening vertigo and no vertigo when her eyes are open per her history.    The following portions of the patient's history were reviewed and updated as appropriate: She  has a past medical history of Asthma, Atypical chest pain, CAD (coronary artery disease), Candidal intertrigo, CHF (congestive heart failure) (Formerly [facility]), Depression, Diabetes mellitus ([facility]), Diabetic neuropathy ([facility]), Diverticulitis, Dyslipidemia, Female bladder prolapse, Gastric ulcer, Glaucoma, HTN (hypertension), Hyperlipidemia, Hypothyroidism (4/18/2016), and RAD (reactive airway disease).  She   Patient Active Problem List    Diagnosis Date Noted    Acute pulmonary embolism without acute cor pulmonale (Formerly [facility]) 01/14/2024    Generalized abdominal pain 01/14/2024    Acute cystitis without hematuria 01/14/2024    Iron deficiency anemia 12/13/2023    Anemia due to stage 3b chronic kidney disease  12/04/2023    CKD (chronic kidney disease) 10/27/2023    GI bleed 08/27/2023    Spinal stenosis of lumbar region with neurogenic claudication 03/28/2023    Colitis 12/31/2022    Generalized body aches 12/01/2022    Costochondritis 09/14/2022    Pelviectasis of kidney 07/19/2022    Disc degeneration, lumbar 06/28/2022    Abnormal gait 06/09/2022    Lumbar paraspinal muscle spasm 06/06/2022    CAIT (acute kidney injury) (Former[facility]) 05/09/2022    Myalgia 05/09/2022    Tail bone pain 11/09/2021    Chronic nonintractable headache 08/01/2021    Vertigo 06/27/2021    Hypertension 05/19/2021    Hyponatremia 05/19/2021    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    Myofascial muscle pain 03/25/2021    Cranial neuralgia 03/25/2021    " Conductive hearing loss, bilateral 03/04/2021    History of recurrent UTIs 02/11/2021    Constipation 02/11/2021    Left hip pain 01/19/2021    Medicare annual wellness visit, subsequent 08/21/2020    Diarrhea 08/21/2020    Atypical facial pain 03/29/2020    Stable angina 02/18/2020    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    Chronic headache 11/14/2019    Paroxysmal nocturnal dyspnea 11/11/2019    Orthopnea 11/11/2019    Shortness of breath 11/11/2019    Type 2 diabetes mellitus with hyperglycemia, without long-term current use of insulin (HCC) 10/05/2019    Vitamin D deficiency 08/22/2019    Pain of both hip joints 06/21/2019    Candidal intertrigo 06/21/2019    Headache 01/09/2019    GERD (gastroesophageal reflux disease) 11/06/2018    Insomnia 10/12/2018    Chronic diastolic congestive heart failure (HCC) 08/05/2018    Dyspnea on minimal exertion 02/17/2018    Shakiness 05/23/2017    Allergic rhinitis 05/02/2017    Hyperlipidemia     Bilateral cold feet 12/28/2016    Aortic stenosis 10/21/2016    Arteriosclerosis of coronary artery     Asthma     Anxiety disorder 08/17/2016    Osteoarthritis 06/21/2016    Acquired hypothyroidism 04/18/2016    Trigeminal neuralgia 03/16/2016    Peripheral vascular disease (HCC)     Glaucoma, open angle, severe stage     Overactive bladder 01/20/2016    Hallucination 11/17/2015    Other fatigue 11/03/2015    Low back pain 11/03/2015    Hearing loss 07/27/2015    Mild cognitive impairment 07/27/2015    Dizziness 07/02/2015    Advanced age 06/03/2015    Irritable bowel 04/24/2015    Female cystocele 04/24/2015    Diverticulosis 04/03/2015    Hiatal hernia 04/03/2015    Ambulatory dysfunction 04/02/2015     She  has a past surgical history that includes Hysterectomy; Colonoscopy; Esophagogastroduodenoscopy (2011); and Tonsillectomy.  Her family history includes Breast cancer in her mother and sister; Hypertension in her father and sister; Hypothyroidism in her mother; Thyroid disease in her  sister.  She  reports that she has never smoked. She has never used smokeless tobacco. She reports that she does not drink alcohol and does not use drugs.  No current facility-administered medications for this visit.     No current outpatient medications on file.     Facility-Administered Medications Ordered in Other Visits   Medication Dose Route Frequency Provider Last Rate Last Admin    acetaminophen (TYLENOL) tablet 650 mg  650 mg Oral Q6H PRN DEVEN Ravi        albuterol (PROVENTIL HFA,VENTOLIN HFA) inhaler 2 puff  2 puff Inhalation Q6H PRN DEVEN Ravi        amLODIPine (NORVASC) tablet 5 mg  5 mg Oral Daily DEVEN Ravi        aspirin (ECOTRIN LOW STRENGTH) EC tablet 81 mg  81 mg Oral Daily DEVEN Ravi   81 mg at 01/14/24 0957    cefTRIAXone (ROCEPHIN) IVPB (premix in dextrose) 1,000 mg 50 mL  1,000 mg Intravenous Q24H DEVEN Ravi 100 mL/hr at 01/14/24 0453 1,000 mg at 01/14/24 0453    cholecalciferol (VITAMIN D3) tablet 1,000 Units  1,000 Units Oral Daily DEVEN Ravi   1,000 Units at 01/14/24 0953    cyanocobalamin (VITAMIN B-12) tablet 250 mcg  250 mcg Oral Daily DEVEN Ravi   250 mcg at 01/14/24 0953    Diclofenac Sodium (VOLTAREN) 1 % topical gel 2 g  2 g Topical 4x Daily DEVEN Ravi   2 g at 01/14/24 1001    enoxaparin (LOVENOX) subcutaneous injection 40 mg  40 mg Subcutaneous Daily DEVEN Ravi   40 mg at 01/14/24 0953    famotidine (PEPCID) tablet 10 mg  10 mg Oral HS DEVEN Ravi        gabapentin (NEURONTIN) capsule 300 mg  300 mg Oral BID DEVEN Ravi   300 mg at 01/14/24 0953    insulin lispro (HumaLOG) 100 units/mL subcutaneous injection 1-5 Units  1-5 Units Subcutaneous 4x Daily (AC & HS) DEVEN Ravi        iron sucrose (VENOFER) 300 mg in sodium chloride 0.9 % 250 mL IVPB  300 mg Intravenous Daily Marina Cardozo DO   300 mg at 01/14/24 0954     "latanoprost (XALATAN) 0.005 % ophthalmic solution 1 drop  1 drop Both Eyes HS Ayana Hammond DEVEN        levothyroxine tablet 75 mcg  75 mcg Oral Early Morning Ayana Hammond DEVEN   75 mcg at 01/14/24 0500    pantoprazole (PROTONIX) EC tablet 40 mg  40 mg Oral Daily Ayana HammondDEVEN   40 mg at 01/14/24 0953    polyethylene glycol (MIRALAX) packet 17 g  17 g Oral BID Marina Cardozo, DO        senna (SENOKOT) tablet 8.6 mg  1 tablet Oral BID Mraina Cardozo, DO        sodium chloride 0.9 % infusion  50 mL/hr Intravenous Continuous Ayana HammondDEVEN 50 mL/hr at 01/14/24 0503 50 mL/hr at 01/14/24 0503     She is allergic to levaquin [levofloxacin], bactrim [sulfamethoxazole-trimethoprim], metformin, and statins..         Objective:    Blood pressure 106/53, pulse 86, height 5' 2\" (1.575 m), not currently breastfeeding.  Body mass index is 27.46 kg/m².    Physical Exam    Neurological Exam  On neurologic exam, the patient is alert and oriented to time and place. Speech is fluent and articulate, and the patient follows commands appropriately. Judgment and affect appear normal. Pupils are equally round and reactive to light and extraocular muscles are intact without nystagmus. Face is symmetric, and tongue, uvula, and palate are midline.  Hearing is intact.  Reflexes 1+ in both biceps, trace in the lower extremities.  Nonfocal throughout.  She is resting in a wheelchair.  She can lift her limbs against gravity.  There is mild generalized weakness in all 4 extremities but no focal weakness.  Ambulation was not checked today.    ROS:    Review of Systems   Constitutional:  Negative for appetite change, fatigue and fever.   HENT: Negative.  Negative for hearing loss, tinnitus, trouble swallowing and voice change.    Eyes:  Positive for visual disturbance. Negative for photophobia and pain.   Respiratory: Negative.  Negative for shortness of breath.    Cardiovascular: Negative.  Negative for palpitations. "   Gastrointestinal: Negative.  Negative for nausea and vomiting.   Endocrine: Negative.  Negative for cold intolerance.   Genitourinary: Negative.  Negative for dysuria, frequency and urgency.   Musculoskeletal:  Negative for back pain, gait problem, myalgias and neck pain.   Skin: Negative.  Negative for rash.   Allergic/Immunologic: Negative.    Neurological:  Positive for headaches. Negative for dizziness, tremors, seizures, syncope, facial asymmetry, speech difficulty, weakness, light-headedness and numbness.   Hematological: Negative.  Does not bruise/bleed easily.   Psychiatric/Behavioral: Negative.  Negative for confusion, hallucinations and sleep disturbance.      ROS reviewed.

## 2023-11-30 ENCOUNTER — APPOINTMENT (EMERGENCY)
Dept: CT IMAGING | Facility: HOSPITAL | Age: 88
End: 2023-11-30
Payer: COMMERCIAL

## 2023-11-30 ENCOUNTER — HOSPITAL ENCOUNTER (EMERGENCY)
Facility: HOSPITAL | Age: 88
Discharge: HOME | End: 2023-11-30
Attending: EMERGENCY MEDICINE
Payer: COMMERCIAL

## 2023-11-30 VITALS
HEART RATE: 80 BPM | TEMPERATURE: 97.8 F | SYSTOLIC BLOOD PRESSURE: 150 MMHG | OXYGEN SATURATION: 98 % | RESPIRATION RATE: 20 BRPM | DIASTOLIC BLOOD PRESSURE: 70 MMHG

## 2023-11-30 DIAGNOSIS — R82.71 BACTERIA IN URINE: ICD-10-CM

## 2023-11-30 DIAGNOSIS — M54.9 BACK PAIN: Primary | ICD-10-CM

## 2023-11-30 LAB
2HR DELTA HS TROPONIN: 6 NG/L
ALBUMIN SERPL BCP-MCNC: 3.6 G/DL (ref 3.5–5)
ALP SERPL-CCNC: 79 U/L (ref 34–104)
ALT SERPL W P-5'-P-CCNC: 4 U/L (ref 7–52)
ANION GAP SERPL CALCULATED.3IONS-SCNC: 11 MMOL/L
AST SERPL W P-5'-P-CCNC: 9 U/L (ref 13–39)
ATRIAL RATE: 70 BPM
ATRIAL RATE: 80 BPM
BACTERIA UR QL AUTO: ABNORMAL /HPF
BASOPHILS # BLD AUTO: 0.03 THOUSANDS/ÂΜL (ref 0–0.1)
BASOPHILS NFR BLD AUTO: 1 % (ref 0–1)
BILIRUB SERPL-MCNC: 0.4 MG/DL (ref 0.2–1)
BILIRUB UR QL STRIP: NEGATIVE
BUN SERPL-MCNC: 12 MG/DL (ref 5–25)
CALCIUM SERPL-MCNC: 9.1 MG/DL (ref 8.4–10.2)
CARDIAC TROPONIN I PNL SERPL HS: 20 NG/L
CARDIAC TROPONIN I PNL SERPL HS: 26 NG/L
CHLORIDE SERPL-SCNC: 110 MMOL/L (ref 96–108)
CLARITY UR: ABNORMAL
CO2 SERPL-SCNC: 19 MMOL/L (ref 21–32)
COLOR UR: COLORLESS
CREAT SERPL-MCNC: 1.04 MG/DL (ref 0.6–1.3)
EOSINOPHIL # BLD AUTO: 0.12 THOUSAND/ÂΜL (ref 0–0.61)
EOSINOPHIL NFR BLD AUTO: 3 % (ref 0–6)
ERYTHROCYTE [DISTWIDTH] IN BLOOD BY AUTOMATED COUNT: 17.2 % (ref 11.6–15.1)
GFR SERPL CREATININE-BSD FRML MDRD: 43 ML/MIN/1.73SQ M
GLUCOSE SERPL-MCNC: 152 MG/DL (ref 65–140)
GLUCOSE UR STRIP-MCNC: ABNORMAL MG/DL
HCT VFR BLD AUTO: 32.9 % (ref 34.8–46.1)
HGB BLD-MCNC: 9.6 G/DL (ref 11.5–15.4)
HGB UR QL STRIP.AUTO: ABNORMAL
IMM GRANULOCYTES # BLD AUTO: 0.03 THOUSAND/UL (ref 0–0.2)
IMM GRANULOCYTES NFR BLD AUTO: 1 % (ref 0–2)
KETONES UR STRIP-MCNC: ABNORMAL MG/DL
LEUKOCYTE ESTERASE UR QL STRIP: ABNORMAL
LIPASE SERPL-CCNC: 16 U/L (ref 11–82)
LYMPHOCYTES # BLD AUTO: 0.81 THOUSANDS/ÂΜL (ref 0.6–4.47)
LYMPHOCYTES NFR BLD AUTO: 17 % (ref 14–44)
MCH RBC QN AUTO: 23.4 PG (ref 26.8–34.3)
MCHC RBC AUTO-ENTMCNC: 29.2 G/DL (ref 31.4–37.4)
MCV RBC AUTO: 80 FL (ref 82–98)
MONOCYTES # BLD AUTO: 0.42 THOUSAND/ÂΜL (ref 0.17–1.22)
MONOCYTES NFR BLD AUTO: 9 % (ref 4–12)
MUCOUS THREADS UR QL AUTO: ABNORMAL
NEUTROPHILS # BLD AUTO: 3.36 THOUSANDS/ÂΜL (ref 1.85–7.62)
NEUTS SEG NFR BLD AUTO: 69 % (ref 43–75)
NITRITE UR QL STRIP: NEGATIVE
NON-SQ EPI CELLS URNS QL MICRO: ABNORMAL /HPF
NRBC BLD AUTO-RTO: 0 /100 WBCS
P AXIS: 35 DEGREES
P AXIS: 39 DEGREES
PH UR STRIP.AUTO: 5 [PH]
PLATELET # BLD AUTO: 268 THOUSANDS/UL (ref 149–390)
PMV BLD AUTO: 11.3 FL (ref 8.9–12.7)
POTASSIUM SERPL-SCNC: 4.1 MMOL/L (ref 3.5–5.3)
PR INTERVAL: 198 MS
PR INTERVAL: 224 MS
PROT SERPL-MCNC: 6.7 G/DL (ref 6.4–8.4)
PROT UR STRIP-MCNC: ABNORMAL MG/DL
QRS AXIS: -11 DEGREES
QRS AXIS: 13 DEGREES
QRSD INTERVAL: 110 MS
QRSD INTERVAL: 110 MS
QT INTERVAL: 396 MS
QT INTERVAL: 412 MS
QTC INTERVAL: 444 MS
QTC INTERVAL: 456 MS
RBC # BLD AUTO: 4.11 MILLION/UL (ref 3.81–5.12)
RBC #/AREA URNS AUTO: ABNORMAL /HPF
SODIUM SERPL-SCNC: 140 MMOL/L (ref 135–147)
SP GR UR STRIP.AUTO: 1.02 (ref 1–1.03)
T WAVE AXIS: 64 DEGREES
T WAVE AXIS: 92 DEGREES
UROBILINOGEN UR STRIP-ACNC: <2 MG/DL
VENTRICULAR RATE: 70 BPM
VENTRICULAR RATE: 80 BPM
WBC # BLD AUTO: 4.77 THOUSAND/UL (ref 4.31–10.16)
WBC #/AREA URNS AUTO: ABNORMAL /HPF

## 2023-11-30 PROCEDURE — 87086 URINE CULTURE/COLONY COUNT: CPT | Performed by: EMERGENCY MEDICINE

## 2023-11-30 PROCEDURE — G1004 CDSM NDSC: HCPCS

## 2023-11-30 PROCEDURE — 71250 CT THORAX DX C-: CPT

## 2023-11-30 PROCEDURE — 99284 EMERGENCY DEPT VISIT MOD MDM: CPT | Performed by: EMERGENCY MEDICINE

## 2023-11-30 PROCEDURE — 87077 CULTURE AEROBIC IDENTIFY: CPT | Performed by: EMERGENCY MEDICINE

## 2023-11-30 PROCEDURE — 81001 URINALYSIS AUTO W/SCOPE: CPT | Performed by: EMERGENCY MEDICINE

## 2023-11-30 PROCEDURE — 80053 COMPREHEN METABOLIC PANEL: CPT | Performed by: EMERGENCY MEDICINE

## 2023-11-30 PROCEDURE — 36415 COLL VENOUS BLD VENIPUNCTURE: CPT

## 2023-11-30 PROCEDURE — 74176 CT ABD & PELVIS W/O CONTRAST: CPT

## 2023-11-30 PROCEDURE — 93005 ELECTROCARDIOGRAM TRACING: CPT

## 2023-11-30 PROCEDURE — 85025 COMPLETE CBC W/AUTO DIFF WBC: CPT | Performed by: EMERGENCY MEDICINE

## 2023-11-30 PROCEDURE — 83690 ASSAY OF LIPASE: CPT | Performed by: EMERGENCY MEDICINE

## 2023-11-30 PROCEDURE — 99284 EMERGENCY DEPT VISIT MOD MDM: CPT

## 2023-11-30 PROCEDURE — 87186 SC STD MICRODIL/AGAR DIL: CPT | Performed by: EMERGENCY MEDICINE

## 2023-11-30 PROCEDURE — 84484 ASSAY OF TROPONIN QUANT: CPT | Performed by: EMERGENCY MEDICINE

## 2023-11-30 RX ORDER — SULFAMETHOXAZOLE AND TRIMETHOPRIM 800; 160 MG/1; MG/1
1 TABLET ORAL ONCE
Status: COMPLETED | OUTPATIENT
Start: 2023-11-30 | End: 2023-11-30

## 2023-11-30 RX ORDER — SULFAMETHOXAZOLE AND TRIMETHOPRIM 800; 160 MG/1; MG/1
1 TABLET ORAL 2 TIMES DAILY
Qty: 6 TABLET | Refills: 0 | Status: SHIPPED | OUTPATIENT
Start: 2023-11-30 | End: 2023-12-03

## 2023-11-30 RX ORDER — LIDOCAINE 50 MG/G
1 PATCH TOPICAL ONCE
Status: DISCONTINUED | OUTPATIENT
Start: 2023-11-30 | End: 2023-11-30 | Stop reason: HOSPADM

## 2023-11-30 RX ADMIN — LIDOCAINE 1 PATCH: 700 PATCH TOPICAL at 14:06

## 2023-11-30 RX ADMIN — SULFAMETHOXAZOLE AND TRIMETHOPRIM 1 TABLET: 800; 160 TABLET ORAL at 16:59

## 2023-11-30 NOTE — ED PROVIDER NOTES
History  Chief Complaint   Patient presents with    Flank Pain     Left flank pain x1 week. Taking Tylenol without relief. Reports loose stools yesterday. 81 YO female presents with lower back pain for the last week. She states this has been constant, radiating to the Left, denies worsening symptoms with ambulation. Patient walks with a walker, states she has continued to be able to ambulate. She has no weakness or numbness, no fevers or dysuria. She does complain of a discomfort around the lower chest, states this feels like a band wrapping around the chest. She did have a pain in the central chest last week that radiated down to the vagina, states this improved and she has not had similar prior to and since. Pt denies CP/SOB/F/C/N/V/D/C, no dysuria, burning on urination or blood in urine. History provided by:  Patient   used: No        Prior to Admission Medications   Prescriptions Last Dose Informant Patient Reported? Taking? Accu-Chek Softclix Lancets lancets   No No   Sig: Testing 1 time daily. Dx: E11.9   Alcohol Swabs (B-D SINGLE USE SWABS REGULAR) PADS   No No   Sig: Use in the morning   Blood Glucose Monitoring Suppl (Accu-Chek Guide) w/Device KIT   No Yes   Sig: Use 2 (two) times a day   Cholecalciferol (VITAMIN D3) 2000 units capsule  Child Yes Yes   Sig: Take 2,000 Units by mouth daily    Empagliflozin (JARDIANCE) 10 MG TABS tablet   No Yes   Sig: Take 1 tablet (10 mg total) by mouth daily   Incontinence Supply Disposable (SELECT DISPOSABLE UNDERWEAR LG) MISC  Child Yes No   Lancets (accu-chek multiclix) lancets   No No   Sig: Use as instructed   Misc. Devices (Transport Chair) MISC  Child No No   Sig: Use if needed (with outings outside of the house.)   amLODIPine (NORVASC) 5 mg tablet   No Yes   Sig: TAKE 1 TABLET (5 MG TOTAL)  DAILY   aspirin 81 MG tablet  Child Yes Yes   Sig: Take 81 mg by mouth daily.    furosemide (LASIX) 20 mg tablet   No Yes   Sig: TAKE 1 TABLET BY MOUTH IN THE MORNING AND 2 TABLETS IN THE EVENING   gabapentin (NEURONTIN) 600 MG tablet   No Yes   Sig: TAKE 1/2 TABLET IN THE MORNING AND AFTERNOON AND TAKE 1 TABLET AT BEDTIME   glucose blood (Accu-Chek Guide) test strip   No No   Sig: Use as instructed   glucose blood test strip   No No   Sig: Use 1 each 2 (two) times a day Test Fasting and 2 hour postprandial daily   latanoprost (XALATAN) 0.005 % ophthalmic solution  Child No Yes   Sig: Apply 1 drop to eye daily at bedtime Both eyes   levothyroxine 75 mcg tablet   No Yes   Sig: Take 1 tablet (75 mcg total) by mouth daily   oxygen gas  Child Yes No   Sig: Inhale 2 L/min daily at bedtime. Indications: Shortness of breath   pantoprazole (PROTONIX) 40 mg tablet   No Yes   Sig: TAKE 1 TABLET EVERY DAY   potassium chloride (K-DUR,KLOR-CON) 20 mEq tablet  Child No Yes   Sig: TAKE 1 TABLET EVERY DAY   Patient taking differently: 20 mEq if needed   vitamin B-12 (CYANOCOBALAMIN) 250 MCG tablet  Child Yes Yes   Sig: Take 250 mcg by mouth daily      Facility-Administered Medications: None       Past Medical History:   Diagnosis Date    Asthma     Atypical chest pain     CAD (coronary artery disease)     Candidal intertrigo     CHF (congestive heart failure) (HCC)     Depression     Diabetes mellitus (HCC)     Diabetic neuropathy (HCC)     Diverticulitis     Dyslipidemia     Female bladder prolapse     Gastric ulcer     Glaucoma     HTN (hypertension)     Hyperlipidemia     Hypothyroidism 4/18/2016    RAD (reactive airway disease)        Past Surgical History:   Procedure Laterality Date    COLONOSCOPY      ESOPHAGOGASTRODUODENOSCOPY  2011    HYSTERECTOMY      TONSILLECTOMY         Family History   Problem Relation Age of Onset    Breast cancer Mother     Hypothyroidism Mother     Hypertension Father     Breast cancer Sister     Thyroid disease Sister     Hypertension Sister      I have reviewed and agree with the history as documented.     E-Cigarette/Vaping E-Cigarette Use Never User      E-Cigarette/Vaping Substances    Nicotine No     THC No     CBD No     Flavoring No     Other No     Unknown No      Social History     Tobacco Use    Smoking status: Never    Smokeless tobacco: Never   Vaping Use    Vaping Use: Never used   Substance Use Topics    Alcohol use: No     Comment: Social Alcohol Use -- as per allscripts (pt denies all alcohol use)    Drug use: No       Review of Systems   Constitutional:  Negative for chills, fatigue and fever. HENT:  Negative for dental problem. Eyes:  Negative for visual disturbance. Respiratory:  Negative for shortness of breath. Cardiovascular:  Positive for chest pain. Gastrointestinal:  Negative for abdominal pain, diarrhea and vomiting. Genitourinary:  Negative for dysuria and frequency. Musculoskeletal:  Positive for back pain. Negative for arthralgias. Skin:  Negative for rash. Neurological:  Negative for dizziness, weakness and light-headedness. Psychiatric/Behavioral:  Negative for agitation, behavioral problems and confusion. All other systems reviewed and are negative. Physical Exam  Physical Exam  Vitals and nursing note reviewed. Constitutional:       Appearance: Normal appearance. HENT:      Head: Normocephalic and atraumatic. Eyes:      Extraocular Movements: Extraocular movements intact. Conjunctiva/sclera: Conjunctivae normal.   Cardiovascular:      Rate and Rhythm: Normal rate and regular rhythm. Pulses: Normal pulses. Heart sounds: Normal heart sounds. Pulmonary:      Effort: Pulmonary effort is normal.   Abdominal:      General: There is no distension. Musculoskeletal:         General: Normal range of motion. Cervical back: Normal range of motion. Skin:     Findings: No rash. Neurological:      General: No focal deficit present. Mental Status: She is alert. Cranial Nerves: No cranial nerve deficit.    Psychiatric:         Mood and Affect: Mood normal.         Vital Signs  ED Triage Vitals [11/30/23 1154]   Temperature Pulse Respirations Blood Pressure SpO2   97.8 °F (36.6 °C) 84 18 143/64 98 %      Temp Source Heart Rate Source Patient Position - Orthostatic VS BP Location FiO2 (%)   Oral Monitor Lying Left arm --      Pain Score       10 - Worst Possible Pain           Vitals:    11/30/23 1154 11/30/23 1407 11/30/23 1545 11/30/23 1600   BP: 143/64 140/61 153/78 151/66   Pulse: 84 68 84 82   Patient Position - Orthostatic VS: Lying            Visual Acuity      ED Medications  Medications   lidocaine (LIDODERM) 5 % patch 1 patch (1 patch Topical Medication Applied 11/30/23 1406)   sulfamethoxazole-trimethoprim (BACTRIM DS) 800-160 mg per tablet 1 tablet (has no administration in time range)       Diagnostic Studies  Results Reviewed       Procedure Component Value Units Date/Time    Urine Microscopic [[id number]]  (Abnormal) Collected: 11/30/23 1538    Lab Status: Final result Specimen: Urine, Clean Catch Updated: 11/30/23 1627     RBC, UA 4-10 /hpf      WBC, UA Innumerable /hpf      Epithelial Cells Occasional /hpf      Bacteria, UA Innumerable /hpf      MUCUS THREADS Moderate    Urine culture [[id number]] Collected: 11/30/23 1538    Lab Status:  In process Specimen: Urine, Clean Catch Updated: 11/30/23 1627    HS Troponin I 2hr [[id number]]  (Normal) Collected: 11/30/23 1557    Lab Status: Final result Specimen: Blood from Arm, Right Updated: 11/30/23 1623     hs TnI 2hr 26 ng/L      Delta 2hr hsTnI 6 ng/L     UA w Reflex to Microscopic w Reflex to Culture [[id number]]  (Abnormal) Collected: 11/30/23 1538    Lab Status: Final result Specimen: Urine, Clean Catch Updated: 11/30/23 1622     Color, UA Colorless     Clarity, UA Turbid     Specific Gravity, UA 1.019     pH, UA 5.0     Leukocytes, UA Large     Nitrite, UA Negative     Protein, UA Trace mg/dl      Glucose, UA >=1000 (1%) mg/dl      Ketones, UA 10 (1+) mg/dl      Urobilinogen, UA <2.0 mg/dl Bilirubin, UA Negative     Occult Blood, UA Trace    HS Troponin I 4hr [[id number]]     Lab Status: No result Specimen: Blood     HS Troponin 0hr (reflex protocol) [[id number]]  (Normal) Collected: 11/30/23 1406    Lab Status: Final result Specimen: Blood from Arm, Right Updated: 11/30/23 1436     hs TnI 0hr 20 ng/L     Comprehensive metabolic panel [[id number]]  (Abnormal) Collected: 11/30/23 1229    Lab Status: Final result Specimen: Blood from Arm, Right Updated: 11/30/23 1253     Sodium 140 mmol/L      Potassium 4.1 mmol/L      Chloride 110 mmol/L      CO2 19 mmol/L      ANION GAP 11 mmol/L      BUN 12 mg/dL      Creatinine 1.04 mg/dL      Glucose 152 mg/dL      Calcium 9.1 mg/dL      AST 9 U/L      ALT 4 U/L      Alkaline Phosphatase 79 U/L      Total Protein 6.7 g/dL      Albumin 3.6 g/dL      Total Bilirubin 0.40 mg/dL      eGFR 43 ml/min/1.73sq m     Narrative:      [place] guidelines for Chronic Kidney Disease (CKD):     Stage 1 with normal or high GFR (GFR > 90 mL/min/1.73 square meters)    Stage 2 Mild CKD (GFR = 60-89 mL/min/1.73 square meters)    Stage 3A Moderate CKD (GFR = 45-59 mL/min/1.73 square meters)    Stage 3B Moderate CKD (GFR = 30-44 mL/min/1.73 square meters)    Stage 4 Severe CKD (GFR = 15-29 mL/min/1.73 square meters)    Stage 5 End Stage CKD (GFR <15 mL/min/1.73 square meters)  Note: GFR calculation is accurate only with a steady state creatinine    Lipase [[id number]]  (Normal) Collected: 11/30/23 1229    Lab Status: Final result Specimen: Blood from Arm, Right Updated: 11/30/23 1253     Lipase 16 u/L     CBC and differential [[id number]]  (Abnormal) Collected: 11/30/23 1229    Lab Status: Final result Specimen: Blood from Arm, Right Updated: 11/30/23 1235     WBC 4.77 Thousand/uL      RBC 4.11 Million/uL      Hemoglobin 9.6 g/dL      Hematocrit 32.9 %      MCV 80 fL      MCH 23.4 pg      MCHC 29.2 g/dL      RDW 17.2 %      MPV 11.3 fL      Platelets 087 Thousands/uL      nRBC 0 /100 WBCs      Neutrophils Relative 69 %      Immat GRANS % 1 %      Lymphocytes Relative 17 %      Monocytes Relative 9 %      Eosinophils Relative 3 %      Basophils Relative 1 %      Neutrophils Absolute 3.36 Thousands/µL      Immature Grans Absolute 0.03 Thousand/uL      Lymphocytes Absolute 0.81 Thousands/µL      Monocytes Absolute 0.42 Thousand/µL      Eosinophils Absolute 0.12 Thousand/µL      Basophils Absolute 0.03 Thousands/µL                    CT chest abdomen pelvis wo contrast   Final Result by Eliazar Staton MD (11/30 8654)      1. No acute abdominopelvic pathology   2. Multilevel degenerative change of the spine. No acute fracture. Workstation performed: [id number]                    Procedures  Procedures         ED Course  ED Course as of 11/30/23 1701   Thu Nov 30, 2023   1310 Creatinine: 1.04  2.06 three weeks ago. 1348 Hemoglobin(!): 9.6  9.1 three weeks ago. 1438 hs TnI 0hr: 20  14 three months ago. 1532 Patient ambulating with assistance of walker, no difficulty. [id number] Spoke with patient's daughter who lives with her, she states this back pain is not a new issue, she will complain of pain and moan while at home but appears comfortable when distracted. Medical Decision Making  1. Back pain - Mild back pain, mostly lower, will check CBC, metabolic panel for electrolyte abnormalities and dehydration, CT of C/A/P. Will try lidocaine patch over back. Will order ECG and troponin to rule out acute MI. Problems Addressed:  Back pain: acute illness or injury  Bacteria in urine: acute illness or injury    Amount and/or Complexity of Data Reviewed  Independent Historian:      Details: Discussed with daughter. Labs: ordered. Decision-making details documented in ED Course. Radiology: ordered. Risk  Prescription drug management.              Disposition  Final diagnoses:   Back pain   Bacteria in urine Time reflects when diagnosis was documented in both MDM as applicable and the Disposition within this note       Time User Action Codes Description Comment    11/30/2023  4:31 PM Simi Nurse, [address] [M54.9] Back pain     11/30/2023  4:31 PM Deven Lundberg [R82.71] Bacteria in urine           ED Disposition       ED Disposition   Discharge    Condition   Stable    Date/Time   Thu Nov 30, 2023  4:31 PM    [facility] discharge to home/self care. Follow-up Information       Follow up With Specialties Details Why [address], PA-C Family Medicine, Physician Assistant   [address] [facility],First Floor  [address] [facility] [address] [phone]              Patient's Medications   Discharge Prescriptions    DICLOFENAC SODIUM (VOLTAREN) 1 %    Apply 2 g topically 4 (four) times a day       Start Date: 11/30/2023End Date: --       Order Dose: 2 g       Quantity: 50 g    Refills: 0    SULFAMETHOXAZOLE-TRIMETHOPRIM (BACTRIM DS) 800-160 MG PER TABLET    Take 1 tablet by mouth 2 (two) times a day for 3 days smx-tmp DS (BACTRIM) 800-160 mg tabs (1tab q12 D10)       Start Date: 11/30/2023End Date: 12/3/2023       Order Dose: 1 tablet       Quantity: 6 tablet    Refills: 0       No discharge procedures on file.     PDMP Review         Value Time User    PDMP Reviewed  Yes 11/18/2020  1:31 PM Forrest Martin PA-C            ED Provider  Electronically Signed by             Marti Hong MD  11/30/23 7344

## 2023-11-30 NOTE — DISCHARGE INSTRUCTIONS
Return to the ER if your chest pain changes in quality or location, or becomes associated with shortness of breath, lightheadedness, vomiting or sweating. Take the Bactrim twice daily for the next 3 days, make sure to finish off the entire course. Apply the Voltaren to your back 4 times daily, you can also apply ice and heat.

## 2023-12-01 ENCOUNTER — VBI (OUTPATIENT)
Dept: FAMILY MEDICINE CLINIC | Facility: CLINIC | Age: 88
End: 2023-12-01

## 2023-12-01 DIAGNOSIS — Z71.89 COORDINATION OF COMPLEX CARE: Primary | ICD-10-CM

## 2023-12-01 NOTE — TELEPHONE ENCOUNTER
12/01/23 8:46 AM    Patient contacted post ED visit, [facility] department spoke with patient/caregiver and outreach was successful. Thank you.   NOHEMI SARMIENTO  [facility]

## 2023-12-02 LAB
BACTERIA UR CULT: ABNORMAL
BACTERIA UR CULT: ABNORMAL

## 2023-12-02 RX ORDER — CEFDINIR 300 MG/1
300 CAPSULE ORAL EVERY 12 HOURS SCHEDULED
Qty: 8 CAPSULE | Refills: 0 | Status: SHIPPED | OUTPATIENT
Start: 2023-12-02 | End: 2023-12-04 | Stop reason: SDUPTHER

## 2023-12-04 ENCOUNTER — OFFICE VISIT (OUTPATIENT)
Dept: FAMILY MEDICINE CLINIC | Facility: CLINIC | Age: 88
End: 2023-12-04
Payer: COMMERCIAL

## 2023-12-04 VITALS
OXYGEN SATURATION: 97 % | WEIGHT: 148 LBS | HEART RATE: 87 BPM | TEMPERATURE: 98.7 F | SYSTOLIC BLOOD PRESSURE: 120 MMHG | DIASTOLIC BLOOD PRESSURE: 50 MMHG | BODY MASS INDEX: 27.23 KG/M2 | HEIGHT: 62 IN

## 2023-12-04 DIAGNOSIS — E11.22 TYPE 2 DIABETES MELLITUS WITH STAGE 3B CHRONIC KIDNEY DISEASE, WITHOUT LONG-TERM CURRENT USE OF INSULIN (HCC): ICD-10-CM

## 2023-12-04 DIAGNOSIS — E78.2 MIXED HYPERLIPIDEMIA: ICD-10-CM

## 2023-12-04 DIAGNOSIS — I50.32 CHRONIC DIASTOLIC CONGESTIVE HEART FAILURE (HCC): ICD-10-CM

## 2023-12-04 DIAGNOSIS — E55.9 VITAMIN D DEFICIENCY: ICD-10-CM

## 2023-12-04 DIAGNOSIS — N30.01 ACUTE CYSTITIS WITH HEMATURIA: ICD-10-CM

## 2023-12-04 DIAGNOSIS — R82.71 BACTERIA IN URINE: ICD-10-CM

## 2023-12-04 DIAGNOSIS — N18.32 TYPE 2 DIABETES MELLITUS WITH STAGE 3B CHRONIC KIDNEY DISEASE, WITHOUT LONG-TERM CURRENT USE OF INSULIN (HCC): ICD-10-CM

## 2023-12-04 DIAGNOSIS — R53.83 OTHER FATIGUE: ICD-10-CM

## 2023-12-04 DIAGNOSIS — R19.7 DIARRHEA, UNSPECIFIED TYPE: ICD-10-CM

## 2023-12-04 DIAGNOSIS — N18.32 ANEMIA DUE TO STAGE 3B CHRONIC KIDNEY DISEASE: ICD-10-CM

## 2023-12-04 DIAGNOSIS — Z00.00 MEDICARE ANNUAL WELLNESS VISIT, SUBSEQUENT: Primary | ICD-10-CM

## 2023-12-04 DIAGNOSIS — D63.1 ANEMIA DUE TO STAGE 3B CHRONIC KIDNEY DISEASE: ICD-10-CM

## 2023-12-04 PROCEDURE — G0439 PPPS, SUBSEQ VISIT: HCPCS | Performed by: NURSE PRACTITIONER

## 2023-12-04 PROCEDURE — 99215 OFFICE O/P EST HI 40 MIN: CPT | Performed by: NURSE PRACTITIONER

## 2023-12-04 RX ORDER — SULFAMETHOXAZOLE AND TRIMETHOPRIM 800; 160 MG/1; MG/1
1 TABLET ORAL EVERY 12 HOURS SCHEDULED
Qty: 8 TABLET | Refills: 0 | Status: CANCELLED | OUTPATIENT
Start: 2023-12-04 | End: 2023-12-08

## 2023-12-04 RX ORDER — CEFDINIR 300 MG/1
300 CAPSULE ORAL EVERY 12 HOURS SCHEDULED
Qty: 6 CAPSULE | Refills: 0 | Status: SHIPPED | OUTPATIENT
Start: 2023-12-04 | End: 2023-12-07

## 2023-12-04 NOTE — ASSESSMENT & PLAN NOTE
Wt Readings from Last 3 Encounters:   12/04/23 67.1 kg (148 lb)   10/27/23 68.1 kg (150 lb 2 oz)   09/05/23 69.4 kg (153 lb)     Patient's weight has remained stable. Cardiology referral was placed for follow-up regarding this.

## 2023-12-04 NOTE — ASSESSMENT & PLAN NOTE
Vitamin D level was ordered to be completed prior to next office visit to assess the status of this.

## 2023-12-04 NOTE — ASSESSMENT & PLAN NOTE
4 extra days of cefdinir were ordered so that the patient can complete a 7-day course of the antibiotic. A repeat UA was also ordered to be completed after the patient finishes her antibiotic to assess for resolution of infection.

## 2023-12-04 NOTE — ASSESSMENT & PLAN NOTE
Lab Results   Component Value Date    HGBA1C 9.8 (H) 09/06/2023     Repeat hemoglobin A1c was ordered to be completed prior to next office visit to assess the status of this. Patient was advised that if her sugar levels have worsened this could be contributing to her increased fatigue and diarrhea.

## 2023-12-04 NOTE — ASSESSMENT & PLAN NOTE
Lab Results   Component Value Date    EGFR 43 11/30/2023    EGFR 19 11/04/2023    EGFR 19 11/04/2023    CREATININE 1.04 11/30/2023    CREATININE 2.06 (H) 11/04/2023    CREATININE 2.03 (H) 11/04/2023     Iron panel was ordered to assess for iron deficiency anemia. Patient was advised that if iron deficiency is present this could be contributing to her increased fatigue.

## 2023-12-04 NOTE — PROGRESS NOTES
Assessment and Plan:     Problem List Items Addressed This Visit     Hyperlipidemia     Repeat lipid panel was ordered to be completed prior to next office visit. Relevant Orders    Lipid Panel with Direct LDL reflex    Other fatigue     Multiple labs were ordered to assess for different causes of patient's fatigue. Relevant Orders    Lyme Total AB W Reflex to IGM/IGG    Chronic diastolic congestive heart failure (HCC)     Wt Readings from Last 3 Encounters:   12/04/23 67.1 kg (148 lb)   10/27/23 68.1 kg (150 lb 2 oz)   09/05/23 69.4 kg (153 lb)     Patient's weight has remained stable. Cardiology referral was placed for follow-up regarding this. Relevant Orders    Ambulatory Referral to Cardiology    Vitamin D deficiency     Vitamin D level was ordered to be completed prior to next office visit to assess the status of this. Relevant Orders    Vitamin D 25 hydroxy    Type 2 diabetes mellitus ([address])       Lab Results   Component Value Date    HGBA1C 9.8 (H) 09/06/2023     Repeat hemoglobin A1c was ordered to be completed prior to next office visit to assess the status of this. Patient was advised that if her sugar levels have worsened this could be contributing to her increased fatigue and diarrhea. Relevant Orders    HEMOGLOBIN A1C W/ EAG ESTIMATION    Medicare annual wellness visit, subsequent - Primary    Diarrhea     Patient was advised that she will most likely experience some form of diarrhea while taking antibiotics. She was advised to begin Metamucil daily to help formed stools. If diarrhea continues after finishing antibiotics this can be further assessed. Acute cystitis with hematuria     4 extra days of cefdinir were ordered so that the patient can complete a 7-day course of the antibiotic. A repeat UA was also ordered to be completed after the patient finishes her antibiotic to assess for resolution of infection.          Relevant Orders    UA w Reflex to Microscopic w Reflex to Culture -Lab Collect    Anemia due to stage 3b chronic kidney disease      Lab Results   Component Value Date    EGFR 43 11/30/2023    EGFR 19 11/04/2023    EGFR 19 11/04/2023    CREATININE 1.04 11/30/2023    CREATININE 2.06 (H) 11/04/2023    CREATININE 2.03 (H) 11/04/2023     Iron panel was ordered to assess for iron deficiency anemia. Patient was advised that if iron deficiency is present this could be contributing to her increased fatigue. Relevant Orders    Iron Panel (Includes Ferritin, Iron Sat%, Iron, and TIBC)   Other Visit Diagnoses     Bacteria in urine        Relevant Medications    cefdinir (OMNICEF) 300 mg capsule            Depression Screening and Follow-up Plan: Patient was screened for depression during today's encounter. They screened negative with a PHQ-2 score of 2. Falls Plan of Care: balance, strength, and gait training instructions were provided. Home safety education provided. Preventive health issues were discussed with patient, and age appropriate screening tests were ordered as noted in patient's After Visit Summary. Personalized health advice and appropriate referrals for health education or preventive services given if needed, as noted in patient's After Visit Summary. History of Present Illness:     Patient presents for a Medicare Wellness Visit    UTI: Patient was seen in the ED on 11/30/2023 due to symptoms of lower back pain. Patient's urine dip at that time did show hematuria as well as white blood cells and bacteria. Patient did have a CT of the chest, abdomen, and pelvis completed which did show degenerative changes of the lumbar spine but no other abnormalities. Patient's urine culture did show Klebsiella pneumonia and patient was prescribed a 3-day course of Bactrim.   The patient reports that she only took 1 dose of the Bactrim and began to experience hallucinations so this was discontinued and she was prescribed a 4-day course of cefdinir instead. The patient reports that she has been tolerating the cefdinir. She currently denies any dysuria symptoms. Fatigue: Patient reports that recently she has been having increased fatigue. Of note, patient's most recent CBC did show a hemoglobin of 9.6. Patient does have a history of CKD. Type II diabetes: Patient's most recent hemoglobin A1c was 9.8 showing poor control. Patient is currently managed on Jardiance 10 mg daily. Diarrhea: The patient reports that over the past few weeks she has been experiencing recurrent diarrhea. She reports that this sometimes occurs after eating but not always. She denies noting any hematochezia or melena. Hyperlipidemia: Patient has not had a lipid panel completed in some time. Patient is not currently taking cholesterol medication. CHF: Patient denies any current cardiac symptoms but would like a referral to cardiology for follow-up regarding her CHF. Patient was seen by Dr. Papo Peña with [facility] cardiology in the past however, she is requesting a second opinion at this time. Fatigue  Associated symptoms include fatigue. Pertinent negatives include no abdominal pain, arthralgias, chest pain, chills, coughing, fever, headaches, myalgias, nausea, numbness, rash, sore throat, vomiting or weakness. Diarrhea   Pertinent negatives include no abdominal pain, arthralgias, chills, coughing, fever, headaches, myalgias or vomiting. Patient Care Team:  Maya Giang PA-C as PCP - General (Family Medicine)  MD Omer Nguyen, RN as  ([facility])     Review of Systems:     Review of Systems   Constitutional:  Positive for fatigue. Negative for chills and fever. HENT:  Negative for ear pain and sore throat. Eyes:  Negative for pain and visual disturbance. Respiratory:  Negative for cough, chest tightness, shortness of breath and wheezing.     Cardiovascular:  Negative for chest pain, palpitations and leg swelling. Gastrointestinal:  Positive for diarrhea (recurrent). Negative for abdominal pain, blood in stool, constipation, nausea and vomiting. Endocrine: Negative for cold intolerance and heat intolerance. Genitourinary:  Negative for decreased urine volume, difficulty urinating, dysuria, hematuria and urgency. Musculoskeletal:  Negative for arthralgias, back pain and myalgias. Skin:  Negative for color change and rash. Allergic/Immunologic: Negative for environmental allergies. Neurological:  Negative for dizziness, seizures, syncope, weakness, light-headedness, numbness and headaches. Hematological:  Negative for adenopathy. Psychiatric/Behavioral:  Negative for confusion. The patient is not nervous/anxious. All other systems reviewed and are negative.        Problem List:     Patient Active Problem List   Diagnosis   • Peripheral vascular disease ([address])   • Glaucoma, open angle, severe stage   • Arteriosclerosis of coronary artery   • Asthma   • Aortic stenosis   • Hyperlipidemia   • Advanced age   • Allergic rhinitis   • Ambulatory dysfunction   • Anxiety disorder   • Other fatigue   • Irritable bowel   • Trigeminal neuralgia   • Hallucination   • Dyspnea on minimal exertion   • Hypothyroidism   • Chronic diastolic congestive heart failure (HCC)   • Insomnia   • GERD (gastroesophageal reflux disease)   • Bilateral cold feet   • Female cystocele   • Diverticulosis   • Dizziness   • Hearing loss   • Hiatal hernia   • Low back pain   • Mild cognitive impairment   • Osteoarthritis   • Overactive bladder   • Shakiness   • Headache   • Pain of both hip joints   • Candidal intertrigo   • Vitamin D deficiency   • Type 2 diabetes mellitus (HCC)   • Paroxysmal nocturnal dyspnea   • Orthopnea   • Shortness of breath   • Chronic headache   • Neoplasm of face   • Stable angina   • Atypical facial pain   • Medicare annual wellness visit, subsequent   • Diarrhea   • Left hip pain   • Acute cystitis with hematuria   • Constipation   • Conductive hearing loss, bilateral   • Supraorbital neuralgia   • Myofascial muscle pain   • Cranial neuralgia   • Hypertension   • Hyponatremia   • Vertigo   • Chronic nonintractable headache   • Fall   • Tail bone pain   • Flank pain   • CAIT (acute kidney injury) ([address])   • Myalgia   • Lumbar paraspinal muscle spasm   • Abnormal gait   • Disc degeneration, lumbar   • Pelviectasis of kidney   • Costochondritis   • Epigastric pain   • Generalized body aches   • Colitis   • Spinal stenosis of lumbar region with neurogenic claudication   • GI bleed   • CKD (chronic kidney disease)   • Anemia due to stage 3b chronic kidney disease       Past Medical and Surgical History:     Past Medical History:   Diagnosis Date   • Asthma    • Atypical chest pain    • CAD (coronary artery disease)    • Candidal intertrigo    • CHF (congestive heart failure) (HCC)    • Depression    • Diabetes mellitus (HCC)    • Diabetic neuropathy (HCC)    • Diverticulitis    • Dyslipidemia    • Female bladder prolapse    • Gastric ulcer    • Glaucoma    • HTN (hypertension)    • Hyperlipidemia    • Hypothyroidism 4/18/2016   • RAD (reactive airway disease)      Past Surgical History:   Procedure Laterality Date   • COLONOSCOPY     • ESOPHAGOGASTRODUODENOSCOPY  2011   • HYSTERECTOMY     • TONSILLECTOMY        Family History:     Family History   Problem Relation Age of Onset   • Breast cancer Mother    • Hypothyroidism Mother    • Hypertension Father    • Breast cancer Sister    • Thyroid disease Sister    • Hypertension Sister       Social History:     Social History     Socioeconomic History   • Marital status:       Spouse name: None   • Number of children: 2   • Years of education: None   • Highest education level: None   Occupational History   • Occupation: Retired   Tobacco Use   • Smoking status: Never   • Smokeless tobacco: Never   Vaping Use   • Vaping Use: Never used Substance and Sexual Activity   • Alcohol use: No     Comment: Social Alcohol Use -- as per allscripts (pt denies all alcohol use)   • Drug use: No   • Sexual activity: Not Currently   Other Topics Concern   • None   Social History Narrative    Lived with adult children    Caffeine Use     Social Determinants of Health     Financial Resource Strain: Low Risk  (12/4/2023)    Overall Financial Resource Strain (CARDIA)    • Difficulty of Paying Living Expenses: Not hard at all   Food Insecurity: No Food Insecurity (4/19/2023)    Hunger Vital Sign    • Worried About Running Out of Food in the Last Year: Never true    • Ran Out of Food in the Last Year: Never true   Transportation Needs: No Transportation Needs (12/4/2023)    PRAPARE - Transportation    • Lack of Transportation (Medical): No    • Lack of Transportation (Non-Medical): No   Physical Activity: Not on file   Stress: Not on file   Social Connections: Not on file   Intimate Partner Violence: Not on file   Housing Stability: Low Risk  (4/19/2023)    Housing Stability Vital Sign    • Unable to Pay for Housing in the Last Year: No    • Number of Places Lived in the Last Year: 1    • Unstable Housing in the Last Year: No      Medications and Allergies:     Current Outpatient Medications   Medication Sig Dispense Refill   • Accu-Chek Softclix Lancets lancets Testing 1 time daily. Dx: E11.9 100 each 3   • Alcohol Swabs (B-D SINGLE USE SWABS REGULAR) PADS Use in the morning 100 each 5   • amLODIPine (NORVASC) 5 mg tablet TAKE 1 TABLET (5 MG TOTAL)  DAILY 90 tablet 3   • aspirin 81 MG tablet Take 81 mg by mouth daily.      • Blood Glucose Monitoring Suppl (Accu-Chek Guide) w/Device KIT Use 2 (two) times a day 1 kit 0   • cefdinir (OMNICEF) 300 mg capsule Take 1 capsule (300 mg total) by mouth every 12 (twelve) hours for 3 days 6 capsule 0   • Cholecalciferol (VITAMIN D3) 2000 units capsule Take 2,000 Units by mouth daily      • Diclofenac Sodium (VOLTAREN) 1 % Apply 2 g topically 4 (four) times a day 50 g 0   • Empagliflozin (JARDIANCE) 10 MG TABS tablet Take 1 tablet (10 mg total) by mouth daily 30 tablet 5   • furosemide (LASIX) 20 mg tablet TAKE 1 TABLET BY MOUTH IN THE MORNING AND 2 TABLETS IN THE EVENING 270 tablet 1   • gabapentin (NEURONTIN) 600 MG tablet TAKE 1/2 TABLET IN THE MORNING AND AFTERNOON AND TAKE 1 TABLET AT BEDTIME 180 tablet 10   • glucose blood (Accu-Chek Guide) test strip Use as instructed 200 each 1   • glucose blood test strip Use 1 each 2 (two) times a day Test Fasting and 2 hour postprandial daily 200 each 3   • Incontinence Supply Disposable (SELECT DISPOSABLE UNDERWEAR LG) MISC      • Lancets (accu-chek multiclix) lancets Use as instructed 200 each 1   • latanoprost (XALATAN) 0.005 % ophthalmic solution Apply 1 drop to eye daily at bedtime Both eyes 7.5 mL 1   • levothyroxine 75 mcg tablet Take 1 tablet (75 mcg total) by mouth daily     • levothyroxine 88 mcg tablet      • Misc. Devices (Transport Chair) MISC Use if needed (with outings outside of the house.) 1 each 0   • oxygen gas Inhale 2 L/min daily at bedtime. Indications: Shortness of breath     • pantoprazole (PROTONIX) 40 mg tablet TAKE 1 TABLET EVERY DAY 90 tablet 1   • potassium chloride (K-DUR,KLOR-CON) 20 mEq tablet TAKE 1 TABLET EVERY DAY (Patient taking differently: 20 mEq if needed) 90 tablet 0   • vitamin B-12 (CYANOCOBALAMIN) 250 MCG tablet Take 250 mcg by mouth daily       No current facility-administered medications for this visit. Allergies   Allergen Reactions   • Levaquin [Levofloxacin] Myalgia   • Bactrim [Sulfamethoxazole-Trimethoprim] Hallucinations   • Metformin Other (See Comments)     Side effect, did not like. • Statins      Other reaction(s): Weakness  Category:  Adverse Reaction;       Immunizations:     Immunization History   Administered Date(s) Administered   • COVID-19 PFIZER VACCINE 0.3 ML IM 03/09/2021, 03/30/2021   • COVID-19 Pfizer Vac BIVALENT Domingo-sucrose 12 Yr+ IM 12/01/2022   • INFLUENZA 09/25/2014, 10/23/2015, 12/28/2016, 10/04/2017   • Influenza Quadrivalent, 6-35 Months IM 10/23/2015   • Influenza Split High Dose Preservative Free IM 12/28/2016, 10/04/2017   • Influenza, high dose seasonal 0.7 mL 10/12/2018, 02/11/2021, 11/09/2021, 10/11/2022   • Pneumococcal Conjugate 13-Valent 12/28/2016   • Pneumococcal Polysaccharide PPV23 01/01/2009      Health Maintenance: There are no preventive care reminders to display for this patient. Topic Date Due   • COVID-19 Vaccine (4 - Pfizer series) 04/01/2023   • Influenza Vaccine (1) 09/01/2023      Medicare Screening Tests and Risk Assessments:     Hola Leo is here for her Subsequent Wellness visit. Health Risk Assessment:   Patient rates overall health as fair. Patient feels that their physical health rating is slightly worse. Patient is satisfied with their life. Eyesight was rated as slightly worse. Hearing was rated as slightly worse. Patient feels that their emotional and mental health rating is same. Patients states they are never, rarely angry. Patient states they are never, rarely unusually tired/fatigued. Pain experienced in the last 7 days has been a lot. Patient's pain rating has been 10/10. Patient states that she has experienced weight loss or gain in last 6 months. Depression Screening:   PHQ-2 Score: 2      Fall Risk Screening: In the past year, patient has experienced: history of falling in past year    Number of falls: 2 or more  Injured during fall?: No    Feels unsteady when standing or walking?: Yes    Worried about falling?: No      Urinary Incontinence Screening:   Patient has not leaked urine accidently in the last six months. Home Safety:  Patient has trouble with stairs inside or outside of their home. Patient has working smoke alarms and has working carbon monoxide detector. Home safety hazards include: none. Nutrition:   Current diet is Other (please comment). Does not eat alot    Medications:   Patient is currently taking over-the-counter supplements. OTC medications include: see medication list. Patient is able to manage medications. Activities of Daily Living (ADLs)/Instrumental Activities of Daily Living (IADLs):   Walk and transfer into and out of bed and chair?: No  Dress and groom yourself?: No    Bathe or shower yourself?: Yes    Feed yourself? Yes  Do your laundry/housekeeping?: No  Manage your money, pay your bills and track your expenses?: Yes  Make your own meals?: No    Do your own shopping?: No    Previous Hospitalizations:   Any hospitalizations or ED visits within the last 12 months?: Yes    How many hospitalizations have you had in the last year?: more than 4    Advance Care Planning:   Living will: No    Durable POA for healthcare: No    Advanced directive: No    Advanced directive counseling given: Yes      Cognitive Screening:   Provider or family/friend/caregiver concerned regarding cognition?: No    PREVENTIVE SCREENINGS      Cardiovascular Screening:    General: History Lipid Disorder and Risks and Benefits Discussed    Due for: Lipid Panel      Diabetes Screening:     General: History Diabetes and Risks and Benefits Discussed    Due for: Blood Glucose      Colorectal Cancer Screening:     General: Screening Not Indicated      Breast Cancer Screening:     General: Screening Not Indicated      Cervical Cancer Screening:    General: Screening Not Indicated      Osteoporosis Screening:    General: Screening Not Indicated      Abdominal Aortic Aneurysm (AAA) Screening:        General: Screening Not Indicated      Lung Cancer Screening:     General: Screening Not Indicated      Hepatitis C Screening:    General: Screening Current    Screening, Brief Intervention, and Referral to Treatment (SBIRT)    Screening  Typical number of drinks in a day: 0  Typical number of drinks in a week: 0  Interpretation: Low risk drinking behavior.     Single Item Drug Screening:  How often have you used an illegal drug (including marijuana) or a prescription medication for non-medical reasons in the past year? never    Single Item Drug Screen Score: 0  Interpretation: Negative screen for possible drug use disorder    Other Counseling Topics:   Car/seat belt/driving safety, skin self-exam, sunscreen and calcium and vitamin D intake and regular weightbearing exercise. No results found. Physical Exam:     /50 (BP Location: Right arm, Patient Position: Sitting, Cuff Size: Adult)   Pulse 87   Temp 98.7 °F (37.1 °C) (Tympanic)   Ht 5' 2" (1.575 m)   Wt 67.1 kg (148 lb)   SpO2 97%   BMI 27.07 kg/m²     Physical Exam  Vitals and nursing note reviewed. Constitutional:       General: She is not in acute distress. Appearance: Normal appearance. She is well-developed. She is not ill-appearing. HENT:      Head: Normocephalic. Right Ear: Hearing normal. There is impacted cerumen. Left Ear: Hearing normal. There is impacted cerumen. Mouth/Throat:      Pharynx: Oropharynx is clear. Uvula midline. No pharyngeal swelling, oropharyngeal exudate, posterior oropharyngeal erythema or uvula swelling. Tonsils: No tonsillar exudate or tonsillar abscesses. Eyes:      Conjunctiva/sclera: Conjunctivae normal.   Cardiovascular:      Rate and Rhythm: Normal rate and regular rhythm. Pulses: Normal pulses. Carotid pulses are 2+ on the right side and 2+ on the left side. Radial pulses are 2+ on the right side and 2+ on the left side. Posterior tibial pulses are 2+ on the right side and 2+ on the left side. Heart sounds: Normal heart sounds. No murmur heard. Pulmonary:      Effort: Pulmonary effort is normal. No respiratory distress. Breath sounds: Normal breath sounds. No decreased breath sounds, wheezing, rhonchi or rales. Abdominal:      General: Abdomen is flat. Bowel sounds are normal. There is no distension. Palpations: Abdomen is soft. Tenderness: There is no abdominal tenderness. There is no guarding. Musculoskeletal:         General: No swelling. Normal range of motion. Cervical back: Normal range of motion and neck supple. Right lower leg: No edema. Left lower leg: No edema. Skin:     General: Skin is warm and dry. Capillary Refill: Capillary refill takes less than 2 seconds. Neurological:      General: No focal deficit present. Mental Status: She is alert and oriented to person, place, and time. Psychiatric:         Mood and Affect: Mood normal.         Behavior: Behavior normal.         Thought Content: Thought content normal.         Judgment: Judgment normal.     I have spent a total time of 60 minutes on 12/04/23 in caring for this patient including Diagnostic results, Prognosis, Risks and benefits of tx options, Instructions for management, Patient and family education, Importance of tx compliance, Risk factor reductions, Impressions, Counseling / Coordination of care, Documenting in the medical record, Reviewing / ordering tests, medicine, procedures  , and Obtaining or reviewing history  . Reviewing outside providers notes, ordering medications, ordering lab work, ordering referrals, and completing AWV.     DEVEN Nicholson

## 2023-12-04 NOTE — ASSESSMENT & PLAN NOTE
Patient was advised that she will most likely experience some form of diarrhea while taking antibiotics. She was advised to begin Metamucil daily to help formed stools. If diarrhea continues after finishing antibiotics this can be further assessed.

## 2023-12-06 ENCOUNTER — TELEPHONE (OUTPATIENT)
Dept: CARDIOLOGY CLINIC | Facility: CLINIC | Age: 88
End: 2023-12-06

## 2023-12-07 ENCOUNTER — TELEPHONE (OUTPATIENT)
Dept: FAMILY MEDICINE CLINIC | Facility: CLINIC | Age: 88
End: 2023-12-07

## 2023-12-07 NOTE — TELEPHONE ENCOUNTER
I never advised the patient to stop taking furosemide so I am not sure why she discontinued the medication. I would recommend that she restart taking the medication as directed.

## 2023-12-07 NOTE — TELEPHONE ENCOUNTER
Patient seen Ruddy Dee on the 4th of December and she wanted to speak to him regarding her medication that the ER gave her and he raised the dose of it. I think it is the Cefdinir. She thinks she is having a reaction to it. SOB and can't move her bowels and not passing a lot of urine.

## 2023-12-07 NOTE — TELEPHONE ENCOUNTER
Called Davy Fregoso and notified of Ike's recommendations. Spoke to daughter and she said that mom stopped taking the furosemide twice a day is when urine output decreased.

## 2023-12-07 NOTE — TELEPHONE ENCOUNTER
I did not change the dosage of the cefdinir. I only prescribed 4 additional days of the medication so that she can complete a 7-day course. If she is having shortness of breath and decreased urine output I would recommend she be evaluated in the ED.

## 2023-12-12 ENCOUNTER — LAB (OUTPATIENT)
Dept: LAB | Facility: CLINIC | Age: 88
End: 2023-12-12
Payer: COMMERCIAL

## 2023-12-12 DIAGNOSIS — E78.2 MIXED HYPERLIPIDEMIA: ICD-10-CM

## 2023-12-12 DIAGNOSIS — E55.9 VITAMIN D DEFICIENCY: ICD-10-CM

## 2023-12-12 DIAGNOSIS — K27.4 GASTROINTESTINAL HEMORRHAGE ASSOCIATED WITH PEPTIC ULCER: ICD-10-CM

## 2023-12-12 DIAGNOSIS — R53.83 OTHER FATIGUE: ICD-10-CM

## 2023-12-12 DIAGNOSIS — N30.01 ACUTE CYSTITIS WITH HEMATURIA: ICD-10-CM

## 2023-12-12 DIAGNOSIS — N18.32 ANEMIA DUE TO STAGE 3B CHRONIC KIDNEY DISEASE: ICD-10-CM

## 2023-12-12 DIAGNOSIS — N18.32 TYPE 2 DIABETES MELLITUS WITH STAGE 3B CHRONIC KIDNEY DISEASE, WITHOUT LONG-TERM CURRENT USE OF INSULIN (HCC): ICD-10-CM

## 2023-12-12 DIAGNOSIS — D63.1 ANEMIA DUE TO STAGE 3B CHRONIC KIDNEY DISEASE: ICD-10-CM

## 2023-12-12 DIAGNOSIS — E11.22 TYPE 2 DIABETES MELLITUS WITH STAGE 3B CHRONIC KIDNEY DISEASE, WITHOUT LONG-TERM CURRENT USE OF INSULIN (HCC): ICD-10-CM

## 2023-12-12 LAB
25(OH)D3 SERPL-MCNC: 45.6 NG/ML (ref 30–100)
ALBUMIN SERPL BCP-MCNC: 3.7 G/DL (ref 3.5–5)
ALP SERPL-CCNC: 79 U/L (ref 34–104)
ALT SERPL W P-5'-P-CCNC: 6 U/L (ref 7–52)
ANION GAP SERPL CALCULATED.3IONS-SCNC: 13 MMOL/L
AST SERPL W P-5'-P-CCNC: 11 U/L (ref 13–39)
BASOPHILS # BLD AUTO: 0.04 THOUSANDS/ÂΜL (ref 0–0.1)
BASOPHILS NFR BLD AUTO: 1 % (ref 0–1)
BILIRUB SERPL-MCNC: 0.37 MG/DL (ref 0.2–1)
BUN SERPL-MCNC: 20 MG/DL (ref 5–25)
CALCIUM SERPL-MCNC: 8.8 MG/DL (ref 8.4–10.2)
CHLORIDE SERPL-SCNC: 103 MMOL/L (ref 96–108)
CHOLEST SERPL-MCNC: 185 MG/DL
CO2 SERPL-SCNC: 24 MMOL/L (ref 21–32)
CREAT SERPL-MCNC: 1.17 MG/DL (ref 0.6–1.3)
EOSINOPHIL # BLD AUTO: 0.18 THOUSAND/ÂΜL (ref 0–0.61)
EOSINOPHIL NFR BLD AUTO: 3 % (ref 0–6)
ERYTHROCYTE [DISTWIDTH] IN BLOOD BY AUTOMATED COUNT: 17.6 % (ref 11.6–15.1)
FERRITIN SERPL-MCNC: 14 NG/ML (ref 11–307)
GFR SERPL CREATININE-BSD FRML MDRD: 38 ML/MIN/1.73SQ M
GLUCOSE P FAST SERPL-MCNC: 186 MG/DL (ref 65–99)
HCT VFR BLD AUTO: 32.1 % (ref 34.8–46.1)
HDLC SERPL-MCNC: 47 MG/DL
HGB BLD-MCNC: 9.4 G/DL (ref 11.5–15.4)
IMM GRANULOCYTES # BLD AUTO: 0.06 THOUSAND/UL (ref 0–0.2)
IMM GRANULOCYTES NFR BLD AUTO: 1 % (ref 0–2)
IRON SATN MFR SERPL: 4 % (ref 15–50)
IRON SERPL-MCNC: 11 UG/DL (ref 50–212)
LDLC SERPL CALC-MCNC: 103 MG/DL (ref 0–100)
LYMPHOCYTES # BLD AUTO: 0.85 THOUSANDS/ÂΜL (ref 0.6–4.47)
LYMPHOCYTES NFR BLD AUTO: 14 % (ref 14–44)
MCH RBC QN AUTO: 23 PG (ref 26.8–34.3)
MCHC RBC AUTO-ENTMCNC: 29.3 G/DL (ref 31.4–37.4)
MCV RBC AUTO: 79 FL (ref 82–98)
MONOCYTES # BLD AUTO: 0.6 THOUSAND/ÂΜL (ref 0.17–1.22)
MONOCYTES NFR BLD AUTO: 10 % (ref 4–12)
NEUTROPHILS # BLD AUTO: 4.17 THOUSANDS/ÂΜL (ref 1.85–7.62)
NEUTS SEG NFR BLD AUTO: 71 % (ref 43–75)
NRBC BLD AUTO-RTO: 0 /100 WBCS
PLATELET # BLD AUTO: 264 THOUSANDS/UL (ref 149–390)
PMV BLD AUTO: 11.7 FL (ref 8.9–12.7)
POTASSIUM SERPL-SCNC: 3.8 MMOL/L (ref 3.5–5.3)
PROT SERPL-MCNC: 6.6 G/DL (ref 6.4–8.4)
RBC # BLD AUTO: 4.09 MILLION/UL (ref 3.81–5.12)
SODIUM SERPL-SCNC: 140 MMOL/L (ref 135–147)
TIBC SERPL-MCNC: 276 UG/DL (ref 250–450)
TRIGL SERPL-MCNC: 173 MG/DL
UIBC SERPL-MCNC: 265 UG/DL (ref 155–355)
WBC # BLD AUTO: 5.9 THOUSAND/UL (ref 4.31–10.16)

## 2023-12-12 PROCEDURE — 83540 ASSAY OF IRON: CPT

## 2023-12-12 PROCEDURE — 36415 COLL VENOUS BLD VENIPUNCTURE: CPT

## 2023-12-12 PROCEDURE — 82306 VITAMIN D 25 HYDROXY: CPT

## 2023-12-12 PROCEDURE — 82728 ASSAY OF FERRITIN: CPT

## 2023-12-12 PROCEDURE — 86618 LYME DISEASE ANTIBODY: CPT

## 2023-12-12 PROCEDURE — 83550 IRON BINDING TEST: CPT

## 2023-12-12 PROCEDURE — 80061 LIPID PANEL: CPT

## 2023-12-12 PROCEDURE — 85025 COMPLETE CBC W/AUTO DIFF WBC: CPT

## 2023-12-12 PROCEDURE — 80053 COMPREHEN METABOLIC PANEL: CPT

## 2023-12-12 PROCEDURE — 83036 HEMOGLOBIN GLYCOSYLATED A1C: CPT

## 2023-12-13 ENCOUNTER — APPOINTMENT (OUTPATIENT)
Dept: LAB | Facility: CLINIC | Age: 88
End: 2023-12-13
Payer: COMMERCIAL

## 2023-12-13 DIAGNOSIS — N18.30 TYPE 2 DIABETES MELLITUS WITH STAGE 3 CHRONIC KIDNEY DISEASE, WITHOUT LONG-TERM CURRENT USE OF INSULIN, UNSPECIFIED WHETHER STAGE 3A OR 3B CKD (HCC): Primary | ICD-10-CM

## 2023-12-13 DIAGNOSIS — D50.9 IRON DEFICIENCY ANEMIA, UNSPECIFIED IRON DEFICIENCY ANEMIA TYPE: Primary | ICD-10-CM

## 2023-12-13 DIAGNOSIS — E11.22 TYPE 2 DIABETES MELLITUS WITH STAGE 3 CHRONIC KIDNEY DISEASE, WITHOUT LONG-TERM CURRENT USE OF INSULIN, UNSPECIFIED WHETHER STAGE 3A OR 3B CKD (HCC): Primary | ICD-10-CM

## 2023-12-13 LAB
B BURGDOR IGG+IGM SER QL IA: NEGATIVE
BACTERIA UR QL AUTO: ABNORMAL /HPF
BILIRUB UR QL STRIP: NEGATIVE
CLARITY UR: ABNORMAL
COLOR UR: ABNORMAL
EST. AVERAGE GLUCOSE BLD GHB EST-MCNC: 223 MG/DL
GLUCOSE UR STRIP-MCNC: ABNORMAL MG/DL
HBA1C MFR BLD: 9.4 %
HGB UR QL STRIP.AUTO: ABNORMAL
HYALINE CASTS #/AREA URNS LPF: ABNORMAL /LPF
KETONES UR STRIP-MCNC: NEGATIVE MG/DL
LEUKOCYTE ESTERASE UR QL STRIP: ABNORMAL
MUCOUS THREADS UR QL AUTO: ABNORMAL
NITRITE UR QL STRIP: NEGATIVE
NON-SQ EPI CELLS URNS QL MICRO: ABNORMAL /HPF
PH UR STRIP.AUTO: 5.5 [PH]
PROT UR STRIP-MCNC: ABNORMAL MG/DL
RBC #/AREA URNS AUTO: ABNORMAL /HPF
SP GR UR STRIP.AUTO: 1.01 (ref 1–1.03)
UROBILINOGEN UR STRIP-ACNC: <2 MG/DL
WBC #/AREA URNS AUTO: ABNORMAL /HPF

## 2023-12-13 PROCEDURE — 87086 URINE CULTURE/COLONY COUNT: CPT

## 2023-12-13 PROCEDURE — 81001 URINALYSIS AUTO W/SCOPE: CPT

## 2023-12-13 RX ORDER — GLIPIZIDE 2.5 MG/1
2.5 TABLET, EXTENDED RELEASE ORAL DAILY
Qty: 30 TABLET | Refills: 5 | Status: SHIPPED | OUTPATIENT
Start: 2023-12-13

## 2023-12-13 NOTE — RESULT ENCOUNTER NOTE
Sorry! This one test just came back and the rest of pts labs were normal. Please let pt know that her glucose is too high and I am adding a new once daily med for her to take and I have placed new labs for her to do fasting in 3 months. Thank you.

## 2023-12-14 ENCOUNTER — TELEPHONE (OUTPATIENT)
Dept: NEUROLOGY | Facility: CLINIC | Age: 88
End: 2023-12-14

## 2023-12-14 NOTE — TELEPHONE ENCOUNTER
Recd vm 12/14 11:16 AM     This is Rayspan. 9/24/1922. I am leaving this message for Ashley Meyers  to let her know that this point my head is swirling  and its going down to my neck. And just wanted her to know about it.  And the telephone number is [phone].    _______________  Last office visit 11/14 Ashley Meyers, trigger point injections;  office note documents:   "Has had pain in back of her head and down into her neck, a lot of swirling, its constant"    patient is 8 years old

## 2023-12-15 ENCOUNTER — PATIENT OUTREACH (OUTPATIENT)
Dept: FAMILY MEDICINE CLINIC | Facility: CLINIC | Age: 88
End: 2023-12-15

## 2023-12-15 DIAGNOSIS — Z71.89 COMPLEX CARE COORDINATION: Primary | ICD-10-CM

## 2023-12-15 LAB — BACTERIA UR CULT: NORMAL

## 2023-12-15 NOTE — TELEPHONE ENCOUNTER
Called and spoke to pt. States that she was having head pain yesterday but not too much today. And she also sees colors (blue and pink) and really can't see out of her eyes. This occurs when she has the head pain. This is new for her. It first started a couple days ago. States that everything seems pink when she gets up in the morning for the last few morning. This last about 30 min or so and then clears up.   Gabapentin 600mg 1 tab in am and 1 tab at hs-pcp prescribes    Any recommendations  [phone] to leave detailed message

## 2023-12-15 NOTE — PROGRESS NOTES
Called pt to determine if she would be interested in care management. Spoke with pt and her daughter Salazar Chatman. They live together. Salazar Chatman is 76 yr. and does the shopping, cooking, cleaning. Pt is basically helped in every aspect by her daughter, as well. They did have an eval by area on aging and pt was denied waiver due to income. They are interested in help for pt primarily for all aspects of care. Pt's daughter pours meds and pt is also aware of when to take them. Salazar Chatman states that pt likes her carbohydrates. She will often eat oatmeal with toast for breakfast, cranberry juice and a yogurt. Then potatoes for lunch. Etc. Daughter is frustrated on pts choices. Explained options for substitution and to omit one of the meal choices if she is having carbs for a meal. Also options when shopping for groceries. Also discussed recent UTI and s/s with daughter and importance of keeping glucose lower. Pt then went on to state that she placed feeling a sensation hard to explain that is throughout her body. She describes it as feeling "like there is a ring around her body and things going through her whole body down to her feet." Will place referral for [address] [place] [address] resources for in home caregiver help.

## 2023-12-18 NOTE — TELEPHONE ENCOUNTER
Called pt and advised of the below. She verbalized understanding. States that she has upcoming appt w/ her eye doctor next month and she will discuss it with him.  Pt states that she is not taking tylenol. She just takes gabapentin. States that she does not get HA too often. She is not interested trying new med for this.   Nothing further is needed from our office at this time.     vivian

## 2023-12-18 NOTE — TELEPHONE ENCOUNTER
Did the pt see her eye dr about this?  Her visual sxs may be related to her eye condition- either MD or glaucoma.  Regarding pain/ HA- are these becoming more frequent and does she need anything different for this besides tylenol?

## 2023-12-18 NOTE — TELEPHONE ENCOUNTER
Called [phone], spoke to pt's dtr Coby and states that pt is in the BR right now. Advised Coby that I will call pt back in 30 min or later. She verbalized understanding.

## 2023-12-19 ENCOUNTER — PATIENT OUTREACH (OUTPATIENT)
Dept: FAMILY MEDICINE CLINIC | Facility: CLINIC | Age: 88
End: 2023-12-19

## 2023-12-19 NOTE — PROGRESS NOTES
Incoming message received from CM RN Lorena Danielson stating patient requesting contact number for home care agencies.     CMOC completed a chart review prior to contacting patient.    Called patient to discuss referral received.    Referral need: contact information of home care agencies     Patient identity verify by name and date of birth.     Introduced myself and explained my role.Patient agrees to Case management out [facility] services.    Pt interest to private pay for a home health aid since she was denied waiver due to being over income.     Call patient and spoke to pt and daughter, the following agencies contact numbers where provided.     [facility]  Located in: [place]  Address: [address] [facility] [address], [place] [address]  Phone: [phone]    [facility]  Address: [address] [facility] [address], [place] [address]  Phone: [phone]    No other needs concerns identify at the time of call. Advice patient to call if she needs any additional home care agencies contact numbers. Verbalized understanding and was agreeable to the plan.     No further contact indicated.

## 2023-12-21 ENCOUNTER — OFFICE VISIT (OUTPATIENT)
Dept: FAMILY MEDICINE CLINIC | Facility: CLINIC | Age: 88
End: 2023-12-21
Payer: COMMERCIAL

## 2023-12-21 ENCOUNTER — TELEPHONE (OUTPATIENT)
Dept: FAMILY MEDICINE CLINIC | Facility: CLINIC | Age: 88
End: 2023-12-21

## 2023-12-21 VITALS
DIASTOLIC BLOOD PRESSURE: 60 MMHG | HEART RATE: 94 BPM | SYSTOLIC BLOOD PRESSURE: 106 MMHG | WEIGHT: 153 LBS | BODY MASS INDEX: 27.98 KG/M2 | TEMPERATURE: 97.6 F | OXYGEN SATURATION: 100 %

## 2023-12-21 DIAGNOSIS — K21.9 GASTROESOPHAGEAL REFLUX DISEASE, UNSPECIFIED WHETHER ESOPHAGITIS PRESENT: Primary | ICD-10-CM

## 2023-12-21 DIAGNOSIS — J45.40 MODERATE PERSISTENT ASTHMA WITHOUT COMPLICATION: ICD-10-CM

## 2023-12-21 PROCEDURE — 99214 OFFICE O/P EST MOD 30 MIN: CPT | Performed by: NURSE PRACTITIONER

## 2023-12-21 RX ORDER — ALBUTEROL SULFATE 90 UG/1
2 AEROSOL, METERED RESPIRATORY (INHALATION) EVERY 6 HOURS PRN
Qty: 18 G | Refills: 1 | Status: SHIPPED | OUTPATIENT
Start: 2023-12-21

## 2023-12-21 RX ORDER — FAMOTIDINE 40 MG/1
40 TABLET, FILM COATED ORAL
COMMUNITY
End: 2023-12-22 | Stop reason: SDUPTHER

## 2023-12-21 NOTE — ASSESSMENT & PLAN NOTE
Patient does have symptoms consistent with worsening GERD.  Since patient is already taking pantoprazole and Pepcid at their maximum dosages I did offer the patient Carafate 4 times daily for continued treatment.  The patient was not interested in taking this medication at this time.  I did inform the patient it is very unlikely that a repeat EGD would be completed due to her age and comorbidities.  I did speak with patient about common trigger foods and precautions to hopefully prevent worsening GERD symptoms.

## 2023-12-21 NOTE — TELEPHONE ENCOUNTER
Patient called into the office stating she was told to call back and let john know how many mg of famotidine she was taking. She is taking 20 mg and would like it to be switch to 40 mg.Please advise.Thank You

## 2023-12-21 NOTE — PROGRESS NOTES
Name: Priya Zarate      : 1922      MRN: [id number]  Encounter Provider: DEVEN Velásquez  Encounter Date: 2023   Encounter department: [facility]    Assessment & Plan     1. Gastroesophageal reflux disease, unspecified whether esophagitis present  Assessment & Plan:  Patient does have symptoms consistent with worsening GERD.  Since patient is already taking pantoprazole and Pepcid at their maximum dosages I did offer the patient Carafate 4 times daily for continued treatment.  The patient was not interested in taking this medication at this time.  I did inform the patient it is very unlikely that a repeat EGD would be completed due to her age and comorbidities.  I did speak with patient about common trigger foods and precautions to hopefully prevent worsening GERD symptoms.      2. Moderate persistent asthma without complication  Assessment & Plan:  Well-controlled with as needed use of albuterol inhaler.    Orders:  -     albuterol (PROVENTIL HFA,VENTOLIN HFA) 90 mcg/act inhaler; Inhale 2 puffs every 6 (six) hours as needed for wheezing or shortness of breath         Subjective      GERD: The patient is currently managed on pantoprazole 40 mg daily and Pepcid 40 mg at bedtime for her GERD and noted hiatal hernia.  The patient reports that she does frequently experience abdominal pain and throat pain which occur while laying down at night to sleep.  She also reports intermittent episodes of epigastric burning after eating.    Asthma: Well-controlled with as needed use of albuterol inhaler.  Patient does currently use 2 L of oxygen while sleeping at night.  She reports intermittently needing to use albuterol throughout the day.      Review of Systems   Constitutional:  Negative for chills and fever.   HENT:  Negative for ear pain and sore throat.    Eyes:  Negative for pain and visual disturbance.   Respiratory:  Negative for cough, chest tightness, shortness of breath and  wheezing.    Cardiovascular:  Negative for chest pain, palpitations and leg swelling.   Gastrointestinal:  Negative for abdominal pain, constipation, diarrhea, nausea and vomiting.   Endocrine: Negative for cold intolerance and heat intolerance.   Genitourinary:  Negative for decreased urine volume, dysuria and hematuria.   Musculoskeletal:  Negative for arthralgias, back pain and myalgias.   Skin:  Negative for color change and rash.   Allergic/Immunologic: Negative for environmental allergies.   Neurological:  Negative for dizziness, seizures, syncope, weakness, light-headedness, numbness and headaches.   Hematological:  Negative for adenopathy.   Psychiatric/Behavioral:  Negative for confusion. The patient is not nervous/anxious.    All other systems reviewed and are negative.      Current Outpatient Medications on File Prior to Visit   Medication Sig   • Accu-Chek Softclix Lancets lancets Testing 1 time daily.            Dx: E11.9   • Alcohol Swabs (B-D SINGLE USE SWABS REGULAR) PADS Use in the morning   • amLODIPine (NORVASC) 5 mg tablet TAKE 1 TABLET (5 MG TOTAL)  DAILY   • aspirin 81 MG tablet Take 81 mg by mouth daily.   • Blood Glucose Monitoring Suppl (Accu-Chek Guide) w/Device KIT Use 2 (two) times a day   • Cholecalciferol (VITAMIN D3) 2000 units capsule Take 2,000 Units by mouth daily    • Diclofenac Sodium (VOLTAREN) 1 % Apply 2 g topically 4 (four) times a day   • Empagliflozin (JARDIANCE) 10 MG TABS tablet Take 1 tablet (10 mg total) by mouth daily   • famotidine (PEPCID) 40 MG tablet Take 40 mg by mouth daily at bedtime   • furosemide (LASIX) 20 mg tablet TAKE 1 TABLET BY MOUTH IN THE MORNING AND 2 TABLETS IN THE EVENING   • gabapentin (NEURONTIN) 600 MG tablet TAKE 1/2 TABLET IN THE MORNING AND AFTERNOON AND TAKE 1 TABLET AT BEDTIME   • glipiZIDE (GLUCOTROL XL) 2.5 mg 24 hr tablet Take 1 tablet (2.5 mg total) by mouth daily   • glucose blood (Accu-Chek Guide) test strip Use as instructed   •  glucose blood test strip Use 1 each 2 (two) times a day Test Fasting and 2 hour postprandial daily   • Incontinence Supply Disposable (SELECT DISPOSABLE UNDERWEAR LG) MISC    • Lancets (accu-chek multiclix) lancets Use as instructed   • latanoprost (XALATAN) 0.005 % ophthalmic solution Apply 1 drop to eye daily at bedtime Both eyes   • levothyroxine 75 mcg tablet Take 1 tablet (75 mcg total) by mouth daily   • levothyroxine 88 mcg tablet    • Misc. Devices (Transport Chair) MISC Use if needed (with outings outside of the house.)   • oxygen gas Inhale 2 L/min daily at bedtime. Indications: Shortness of breath   • pantoprazole (PROTONIX) 40 mg tablet TAKE 1 TABLET EVERY DAY   • potassium chloride (K-DUR,KLOR-CON) 20 mEq tablet TAKE 1 TABLET EVERY DAY (Patient taking differently: 20 mEq if needed)   • vitamin B-12 (CYANOCOBALAMIN) 250 MCG tablet Take 250 mcg by mouth daily       Objective     /60 (BP Location: Left arm, Patient Position: Sitting, Cuff Size: Large)   Pulse 94   Temp 97.6 °F (36.4 °C) (Temporal)   Wt 69.4 kg (153 lb) Comment: per daughter  SpO2 100%   BMI 27.98 kg/m²     Physical Exam  Vitals and nursing note reviewed.   Constitutional:       General: She is not in acute distress.     Appearance: Normal appearance. She is not ill-appearing.   HENT:      Head: Normocephalic.   Eyes:      Conjunctiva/sclera: Conjunctivae normal.   Cardiovascular:      Rate and Rhythm: Normal rate and regular rhythm.      Pulses: Normal pulses.           Carotid pulses are 2+ on the right side and 2+ on the left side.       Radial pulses are 2+ on the right side and 2+ on the left side.        Posterior tibial pulses are 2+ on the right side and 2+ on the left side.      Heart sounds: Normal heart sounds. No murmur heard.  Pulmonary:      Effort: Pulmonary effort is normal. No respiratory distress.      Breath sounds: Normal breath sounds. No decreased breath sounds, wheezing, rhonchi or rales.   Abdominal:       General: Abdomen is flat. Bowel sounds are normal. There is no distension.      Palpations: Abdomen is soft.      Tenderness: There is no abdominal tenderness. There is no guarding.   Musculoskeletal:         General: Normal range of motion.      Cervical back: Normal range of motion.      Right lower leg: No edema.      Left lower leg: No edema.   Skin:     General: Skin is warm and dry.      Capillary Refill: Capillary refill takes less than 2 seconds.   Neurological:      General: No focal deficit present.      Mental Status: She is alert and oriented to person, place, and time.   Psychiatric:         Mood and Affect: Mood normal.         Behavior: Behavior normal.         Thought Content: Thought content normal.         Judgment: Judgment normal.       DEVEN Velásquez

## 2023-12-22 DIAGNOSIS — K21.9 GASTROESOPHAGEAL REFLUX DISEASE, UNSPECIFIED WHETHER ESOPHAGITIS PRESENT: Primary | ICD-10-CM

## 2023-12-22 RX ORDER — FAMOTIDINE 40 MG/1
40 TABLET, FILM COATED ORAL
Qty: 90 TABLET | Refills: 1 | Status: SHIPPED | OUTPATIENT
Start: 2023-12-22

## 2024-01-01 ENCOUNTER — HOME CARE VISIT (OUTPATIENT)
Dept: HOME HEALTH SERVICES | Facility: HOME HEALTHCARE | Age: 89
End: 2024-01-01
Payer: MEDICARE

## 2024-01-01 ENCOUNTER — HOME CARE VISIT (OUTPATIENT)
Dept: HOME HOSPICE | Facility: HOSPICE | Age: 89
End: 2024-01-01
Payer: MEDICARE

## 2024-01-01 VITALS — SYSTOLIC BLOOD PRESSURE: 98 MMHG | RESPIRATION RATE: 16 BRPM | HEART RATE: 92 BPM | DIASTOLIC BLOOD PRESSURE: 56 MMHG

## 2024-01-01 PROCEDURE — G0156 HHCP-SVS OF AIDE,EA 15 MIN: HCPCS

## 2024-01-01 PROCEDURE — G0299 HHS/HOSPICE OF RN EA 15 MIN: HCPCS

## 2024-01-01 PROCEDURE — G0155 HHCP-SVS OF CSW,EA 15 MIN: HCPCS

## 2024-01-02 ENCOUNTER — OFFICE VISIT (OUTPATIENT)
Dept: FAMILY MEDICINE CLINIC | Facility: CLINIC | Age: 89
End: 2024-01-02
Payer: COMMERCIAL

## 2024-01-02 VITALS
HEART RATE: 80 BPM | HEIGHT: 62 IN | WEIGHT: 147 LBS | TEMPERATURE: 97.6 F | SYSTOLIC BLOOD PRESSURE: 112 MMHG | DIASTOLIC BLOOD PRESSURE: 56 MMHG | BODY MASS INDEX: 27.05 KG/M2 | OXYGEN SATURATION: 92 %

## 2024-01-02 DIAGNOSIS — E11.22 TYPE 2 DIABETES MELLITUS WITH STAGE 3B CHRONIC KIDNEY DISEASE, WITHOUT LONG-TERM CURRENT USE OF INSULIN (HCC): ICD-10-CM

## 2024-01-02 DIAGNOSIS — N18.32 TYPE 2 DIABETES MELLITUS WITH STAGE 3B CHRONIC KIDNEY DISEASE, WITHOUT LONG-TERM CURRENT USE OF INSULIN (HCC): ICD-10-CM

## 2024-01-02 DIAGNOSIS — I10 PRIMARY HYPERTENSION: ICD-10-CM

## 2024-01-02 DIAGNOSIS — G50.1 ATYPICAL FACIAL PAIN: ICD-10-CM

## 2024-01-02 DIAGNOSIS — I50.32 CHRONIC DIASTOLIC CONGESTIVE HEART FAILURE (HCC): ICD-10-CM

## 2024-01-02 DIAGNOSIS — I73.9 PERIPHERAL VASCULAR DISEASE (HCC): ICD-10-CM

## 2024-01-02 DIAGNOSIS — N30.01 ACUTE CYSTITIS WITH HEMATURIA: Primary | ICD-10-CM

## 2024-01-02 DIAGNOSIS — D63.1 ANEMIA DUE TO STAGE 3B CHRONIC KIDNEY DISEASE: ICD-10-CM

## 2024-01-02 DIAGNOSIS — Z87.440 HISTORY OF RECURRENT UTIS: ICD-10-CM

## 2024-01-02 DIAGNOSIS — N18.32 ANEMIA DUE TO STAGE 3B CHRONIC KIDNEY DISEASE: ICD-10-CM

## 2024-01-02 DIAGNOSIS — R44.3 HALLUCINATION: ICD-10-CM

## 2024-01-02 DIAGNOSIS — Z23 NEED FOR COVID-19 VACCINE: ICD-10-CM
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
## 2024-01-02 PROBLEM — R10.13 EPIGASTRIC PAIN: Status: RESOLVED | Noted: 2022-10-11 | Resolved: 2024-01-02

## 2024-01-02 PROBLEM — R10.9 FLANK PAIN: Status: RESOLVED | Noted: 2022-03-08 | Resolved: 2024-01-02

## 2024-01-02 PROCEDURE — 90480 ADMN SARSCOV2 VAC 1/ONLY CMP: CPT | Performed by: PHYSICIAN ASSISTANT

## 2024-01-02 PROCEDURE — 91320 SARSCV2 VAC 30MCG TRS-SUC IM: CPT | Performed by: PHYSICIAN ASSISTANT

## 2024-01-02 PROCEDURE — 99214 OFFICE O/P EST MOD 30 MIN: CPT | Performed by: PHYSICIAN ASSISTANT

## 2024-01-02 NOTE — ASSESSMENT & PLAN NOTE
Lab Results   Component Value Date    EGFR 38 12/12/2023    EGFR 43 11/30/2023    EGFR 19 11/04/2023    CREATININE 1.17 12/12/2023    CREATININE 1.04 11/30/2023    CREATININE 2.06 (H) 11/04/2023   Patient does have stable chronic condition anemia.  At the last visit the provider ordered a hematology consult.  March blood work.

## 2024-01-02 NOTE — ASSESSMENT & PLAN NOTE
Wt Readings from Last 3 Encounters:   01/02/24 66.7 kg (147 lb)   12/21/23 69.4 kg (153 lb)   12/04/23 67.1 kg (148 lb)     Weight is very stable today no peripheral edema.  Has cardiology for 6 February.

## 2024-01-02 NOTE — PATIENT INSTRUCTIONS
Problem List Items Addressed This Visit          Endocrine    Type 2 diabetes mellitus (HCC)     Patient tolerating the new sulfonylurea that she was placed on she does have blood work orders for March.  Lab Results   Component Value Date    HGBA1C 9.4 (H) 12/12/2023               Cardiovascular and Mediastinum    Peripheral vascular disease (HCC)     Currently asymptomatic.         Chronic diastolic congestive heart failure (HCC)     Wt Readings from Last 3 Encounters:   01/02/24 66.7 kg (147 lb)   12/21/23 69.4 kg (153 lb)   12/04/23 67.1 kg (148 lb)     Weight is very stable today no peripheral edema.  Has cardiology for 6 February.               Hypertension     Blood pressure is excellent today.            Genitourinary    Anemia due to stage 3b chronic kidney disease      Lab Results   Component Value Date    EGFR 38 12/12/2023    EGFR 43 11/30/2023    EGFR 19 11/04/2023    CREATININE 1.17 12/12/2023    CREATININE 1.04 11/30/2023    CREATININE 2.06 (H) 11/04/2023   Patient does have stable chronic condition anemia.  At the last visit the provider ordered a hematology consult.  March blood work.            Other    Hallucination     Continues to have them intermittently.         Atypical facial pain     On gabapentin has neurology appointment for April.         History of recurrent UTIs - Primary     Other Visit Diagnoses       Need for COVID-19 vaccine        Relevant Orders    COVID-19 Pfizer mRNA vaccine 12 yr and older (GRAY cap vial or pre-filled syringe) (Completed)

## 2024-01-02 NOTE — ASSESSMENT & PLAN NOTE
Patient tolerating the new sulfonylurea that she was placed on she does have blood work orders for March.  Lab Results   Component Value Date    HGBA1C 9.4 (H) 12/12/2023

## 2024-01-02 NOTE — PROGRESS NOTES
Name: Priya Zarate      : 1922      MRN: [id number]  Encounter Provider: Ayah Azul PA-C  Encounter Date: 2024   Encounter department: [facility]    Assessment & Plan     1. Acute cystitis with hematuria  -     UA w Reflex to Microscopic w Reflex to Culture    2. Chronic diastolic congestive heart failure (HCC)  Assessment & Plan:  Wt Readings from Last 3 Encounters:   24 66.7 kg (147 lb)   23 69.4 kg (153 lb)   23 67.1 kg (148 lb)     Weight is very stable today no peripheral edema.  Has cardiology for .            3. Peripheral vascular disease (HCC)  Assessment & Plan:  Currently asymptomatic.      4. Type 2 diabetes mellitus with stage 3b chronic kidney disease, without long-term current use of insulin (HCC)  Assessment & Plan:  Patient tolerating the new sulfonylurea that she was placed on she does have blood work orders for March.  Lab Results   Component Value Date    HGBA1C 9.4 (H) 2023       5. Need for COVID-19 vaccine  -     COVID-19 Pfizer mRNA vaccine 12 yr and older (GRAY cap vial or pre-filled syringe)    6. Primary hypertension  Assessment & Plan:  Blood pressure is excellent today.      7. History of recurrent UTIs    8. Hallucination  Assessment & Plan:  Continues to have them intermittently.      9. Anemia due to stage 3b chronic kidney disease   Assessment & Plan:  Lab Results   Component Value Date    EGFR 38 2023    EGFR 43 2023    EGFR 19 2023    CREATININE 1.17 2023    CREATININE 1.04 2023    CREATININE 2.06 (H) 2023   Patient does have stable chronic condition anemia.  At the last visit the provider ordered a hematology consult.  March blood work.      10. Atypical facial pain  Assessment & Plan:  On gabapentin has neurology appointment for April.             Subjective      Patient presents with:  Various issues to follow-up with.  She is again accompanied by her daughter who  states that her mom is starting to kind of see things and people such as for example 1 day she told her to come here and look out the window across the street she was seeing people dancing and could even tell her what they were wearing although she complains of not being able to see well.  Mom always wakes up in the middle of the night asking for help for various reasons.  Patient has a list of items she wants to touch base on today.  She states that she wants to check her urine because she was told in the hospital that there was a gel and it although she is having no UTI symptoms.  Daughter wants a order for a urinary culture and a container so that when her mom is symptomatic with a UTI she can bring it to the lab instead of coming in as she does get them often.    Patient is tolerating her diabetic medication well.  Does have blood work for March.      Priya Zarate is here for chronic conditions f/u. Pt. had labs done prior to today's visit which included Recent Results (from the past 672 hour(s))  -Comprehensive metabolic panel:   Collection Time: 12/12/23 11:29 AM       Result                      Value             Ref Range           Sodium                      140               135 - 147 mm*       Potassium                   3.8               3.5 - 5.3 mm*       Chloride                    103               96 - 108 mmo*       CO2                         24                21 - 32 mmol*       ANION GAP                   13                mmol/L              BUN                         20                5 - 25 mg/dL        Creatinine                  1.17              0.60 - 1.30 *       Glucose, Fasting            186 (H)           65 - 99 mg/dL       Calcium                     8.8               8.4 - 10.2 m*       AST                         11 (L)            13 - 39 U/L         ALT                         6 (L)             7 - 52 U/L          Alkaline Phosphatase        79                34 - 104 U/L         Total Protein               6.6               6.4 - 8.4 g/*       Albumin                     3.7               3.5 - 5.0 g/*       Total Bilirubin             0.37              0.20 - 1.00 *       eGFR                        38                ml/min/1.73s*  -Hemoglobin A1C:   Collection Time: 12/12/23 11:29 AM       Result                      Value             Ref Range           Hemoglobin A1C              9.4 (H)           Normal 4.0-5*       EAG                         223               mg/dl          -CBC and differential:   Collection Time: 12/12/23 11:29 AM       Result                      Value             Ref Range           WBC                         5.90              4.31 - 10.16*       RBC                         4.09              3.81 - 5.12 *       Hemoglobin                  9.4 (L)           11.5 - 15.4 *       Hematocrit                  32.1 (L)          34.8 - 46.1 %       MCV                         79 (L)            82 - 98 fL          MCH                         23.0 (L)          26.8 - 34.3 *       MCHC                        29.3 (L)          31.4 - 37.4 *       RDW                         17.6 (H)          11.6 - 15.1 %       MPV                         11.7              8.9 - 12.7 fL       Platelets                   264               149 - 390 Th*       nRBC                        0                 /100 WBCs           Neutrophils Relative        71                43 - 75 %           Immat GRANS %               1                 0 - 2 %             Lymphocytes Relative        14                14 - 44 %           Monocytes Relative          10                4 - 12 %            Eosinophils Relative        3                 0 - 6 %             Basophils Relative          1                 0 - 1 %             Neutrophils Absolute        4.17              1.85 - 7.62 *       Immature Grans Absolute     0.06              0.00 - 0.20 *       Lymphocytes Absolute        0.85               0.60 - 4.47 *       Monocytes Absolute          0.60              0.17 - 1.22 *       Eosinophils Absolute        0.18              0.00 - 0.61 *       Basophils Absolute          0.04              0.00 - 0.10 *  -Vitamin D 25 hydroxy:   Collection Time: 12/12/23 11:29 AM       Result                      Value             Ref Range           Vit D, 25-Hydroxy           45.6              30.0 - 100.0*  -Lipid Panel with Direct LDL reflex:   Collection Time: 12/12/23 11:29 AM       Result                      Value             Ref Range           Cholesterol                 185               See Comment *       Triglycerides               173 (H)           See Comment *       HDL, Direct                 47 (L)            >=50 mg/dL          LDL Calculated              103 (H)           0 - 100 mg/dL  -TIBC Panel (incl. Iron, TIBC, % Iron Saturation):   Collection Time: 12/12/23 11:29 AM       Result                      Value             Ref Range           Iron Saturation             4 (L)             15 - 50 %           TIBC                        276               250 - 450 ug*       Iron                        11 (L)            50 - 212 ug/*       UIBC                        265               155 - 355 ug*  -Ferritin:   Collection Time: 12/12/23 11:29 AM       Result                      Value             Ref Range           Ferritin                    14                11 - 307 ng/*  -Lyme Total AB W Reflex to IGM/IGG:   Collection Time: 12/12/23 11:29 AM       Result                      Value             Ref Range           Lyme Total Antibodies       Negative          Negative       -UA w Reflex to Microscopic w Reflex to Culture -Lab Collect:   Collection Time: 12/13/23 10:38 AM  Specimen: Urine       Result                      Value             Ref Range           Color, UA                   Light Yellow                          Clarity, UA                 Turbid                                Specific  Hyrum, UA        1.012             1.003 - 1.030       pH, UA                      5.5               4.5, 5.0, 5.*       Leukocytes, UA              Large (A)         Negative            Nitrite, UA                 Negative          Negative            Protein, UA                 Trace (A)         Negative mg/*       Glucose, UA                                   Negative mg/*   200 (1/5%) (A)       Ketones, UA                 Negative          Negative mg/*       Urobilinogen, UA            <2.0              <2.0 mg/dl m*       Bilirubin, UA               Negative          Negative            Occult Blood, UA            Small (A)         Negative       -Urine Microscopic:   Collection Time: 12/13/23 10:38 AM       Result                      Value             Ref Range           RBC, UA                     4-10 (A)          None Seen, 1*       WBC, UA                     10-20 (A)         None Seen, 1*       Epithelial Cells            Moderate (A)      None Seen, O*       Bacteria, UA                                  None Seen, O*   Innumerable (A)       MUCUS THREADS                                 None Seen       Occasional (A)       Hyaline Casts, UA           3-5 (A)           None Seen /l*  -Urine culture:   Collection Time: 12/13/23 10:38 AM  Specimen: Urine       Result                      Value             Ref Range           Urine Culture                                                 30,000-39,000 cfu/ml            Review of Systems   Constitutional: Negative.    HENT: Negative.     Eyes: Negative.    Respiratory: Negative.     Cardiovascular: Negative.    Gastrointestinal: Negative.    Endocrine: Negative.    Genitourinary: Negative.    Musculoskeletal: Negative.    Skin: Negative.    Allergic/Immunologic: Negative.    Neurological: Negative.    Hematological: Negative.    Psychiatric/Behavioral: Negative.         Current Outpatient Medications on File Prior to Visit   Medication Sig   • albuterol  (PROVENTIL HFA,VENTOLIN HFA) 90 mcg/act inhaler Inhale 2 puffs every 6 (six) hours as needed for wheezing or shortness of breath   • amLODIPine (NORVASC) 5 mg tablet TAKE 1 TABLET (5 MG TOTAL)  DAILY   • aspirin 81 MG tablet Take 81 mg by mouth daily.   • Cholecalciferol (VITAMIN D3) 2000 units capsule Take 2,000 Units by mouth daily    • Diclofenac Sodium (VOLTAREN) 1 % Apply 2 g topically 4 (four) times a day   • Empagliflozin (JARDIANCE) 10 MG TABS tablet Take 1 tablet (10 mg total) by mouth daily   • famotidine (PEPCID) 40 MG tablet Take 1 tablet (40 mg total) by mouth daily at bedtime   • furosemide (LASIX) 20 mg tablet TAKE 1 TABLET BY MOUTH IN THE MORNING AND 2 TABLETS IN THE EVENING   • gabapentin (NEURONTIN) 600 MG tablet TAKE 1/2 TABLET IN THE MORNING AND AFTERNOON AND TAKE 1 TABLET AT BEDTIME   • glipiZIDE (GLUCOTROL XL) 2.5 mg 24 hr tablet Take 1 tablet (2.5 mg total) by mouth daily   • latanoprost (XALATAN) 0.005 % ophthalmic solution Apply 1 drop to eye daily at bedtime Both eyes   • levothyroxine 75 mcg tablet Take 1 tablet (75 mcg total) by mouth daily   • levothyroxine 88 mcg tablet    • pantoprazole (PROTONIX) 40 mg tablet TAKE 1 TABLET EVERY DAY   • potassium chloride (K-DUR,KLOR-CON) 20 mEq tablet TAKE 1 TABLET EVERY DAY (Patient taking differently: 20 mEq if needed)   • vitamin B-12 (CYANOCOBALAMIN) 250 MCG tablet Take 250 mcg by mouth daily   • Accu-Chek Softclix Lancets lancets Testing 1 time daily.            Dx: E11.9   • Alcohol Swabs (B-D SINGLE USE SWABS REGULAR) PADS Use in the morning   • Blood Glucose Monitoring Suppl (Accu-Chek Guide) w/Device KIT Use 2 (two) times a day   • glucose blood (Accu-Chek Guide) test strip Use as instructed   • glucose blood test strip Use 1 each 2 (two) times a day Test Fasting and 2 hour postprandial daily   • Incontinence Supply Disposable (SELECT DISPOSABLE UNDERWEAR LG) MISC    • Lancets (accu-chek multiclix) lancets Use as instructed   • Misc. Devices  "(Transport Chair) MISC Use if needed (with outings outside of the house.)   • oxygen gas Inhale 2 L/min daily at bedtime. Indications: Shortness of breath (Patient not taking: Reported on 1/2/2024)       Objective     /56 (BP Location: Right arm, Patient Position: Sitting, Cuff Size: Standard)   Pulse 80   Temp 97.6 °F (36.4 °C) (Tympanic)   Ht 5' 2\" (1.575 m)   Wt 66.7 kg (147 lb)   SpO2 92%   BMI 26.89 kg/m²     Physical Exam  Vitals and nursing note reviewed.   Constitutional:       General: She is not in acute distress.     Appearance: She is well-developed. She is not diaphoretic.   HENT:      Head: Normocephalic and atraumatic.      Nose: Nose normal.   Eyes:      General:         Right eye: No discharge.         Left eye: No discharge.      Conjunctiva/sclera: Conjunctivae normal.   Neck:      Vascular: No carotid bruit.   Cardiovascular:      Rate and Rhythm: Normal rate and regular rhythm.      Heart sounds: Normal heart sounds. No murmur heard.     No friction rub. No gallop.   Pulmonary:      Effort: Pulmonary effort is normal. No respiratory distress.      Breath sounds: Normal breath sounds. No wheezing or rales.   Musculoskeletal:      Cervical back: Neck supple.   Skin:     General: Skin is warm and dry.   Neurological:      Mental Status: She is alert and oriented to person, place, and time.   Psychiatric:         Judgment: Judgment normal.       Ayah Azul PA-C    "

## 2024-01-12 ENCOUNTER — TELEPHONE (OUTPATIENT)
Age: 89
End: 2024-01-12

## 2024-01-13 ENCOUNTER — HOSPITAL ENCOUNTER (INPATIENT)
Facility: HOSPITAL | Age: 89
LOS: 5 days | Discharge: HOME HEALTH | DRG: 299 | End: 2024-01-19
Attending: EMERGENCY MEDICINE | Admitting: INTERNAL MEDICINE
Payer: COMMERCIAL

## 2024-01-13 ENCOUNTER — APPOINTMENT (EMERGENCY)
Dept: RADIOLOGY | Facility: HOSPITAL | Age: 89
DRG: 299 | End: 2024-01-13
Payer: COMMERCIAL

## 2024-01-13 ENCOUNTER — APPOINTMENT (EMERGENCY)
Dept: CT IMAGING | Facility: HOSPITAL | Age: 89
DRG: 299 | End: 2024-01-13
Payer: COMMERCIAL

## 2024-01-13 DIAGNOSIS — N39.0 UTI (URINARY TRACT INFECTION): ICD-10-CM

## 2024-01-13 DIAGNOSIS — K56.1 COLONIC INTUSSUSCEPTION (HCC): ICD-10-CM

## 2024-01-13 DIAGNOSIS — I26.94 MULTIPLE SUBSEGMENTAL PULMONARY EMBOLI WITHOUT ACUTE COR PULMONALE (HCC): Primary | ICD-10-CM

## 2024-01-13 DIAGNOSIS — R93.5 ABNORMAL CT OF THE ABDOMEN: ICD-10-CM

## 2024-01-13 DIAGNOSIS — I82.432 ACUTE DEEP VEIN THROMBOSIS (DVT) OF POPLITEAL VEIN OF LEFT LOWER EXTREMITY (HCC): ICD-10-CM

## 2024-01-13 DIAGNOSIS — K56.1 INTUSSUSCEPTION OF COLON (HCC): ICD-10-CM

## 2024-01-13 LAB
2HR DELTA HS TROPONIN: 7 NG/L
ALBUMIN SERPL BCP-MCNC: 3.6 G/DL (ref 3.5–5)
ALP SERPL-CCNC: 66 U/L (ref 34–104)
ALT SERPL W P-5'-P-CCNC: 8 U/L (ref 7–52)
ANION GAP SERPL CALCULATED.3IONS-SCNC: 7 MMOL/L
APTT PPP: 33 SECONDS (ref 23–37)
AST SERPL W P-5'-P-CCNC: 13 U/L (ref 13–39)
BACTERIA UR QL AUTO: ABNORMAL /HPF
BASOPHILS # BLD AUTO: 0.03 THOUSANDS/ÂΜL (ref 0–0.1)
BASOPHILS NFR BLD AUTO: 1 % (ref 0–1)
BILIRUB SERPL-MCNC: 0.3 MG/DL (ref 0.2–1)
BILIRUB UR QL STRIP: NEGATIVE
BNP SERPL-MCNC: 679 PG/ML (ref 0–100)
BUN SERPL-MCNC: 18 MG/DL (ref 5–25)
CALCIUM SERPL-MCNC: 8.6 MG/DL (ref 8.4–10.2)
CARDIAC TROPONIN I PNL SERPL HS: 16 NG/L
CARDIAC TROPONIN I PNL SERPL HS: 23 NG/L
CHLORIDE SERPL-SCNC: 109 MMOL/L (ref 96–108)
CLARITY UR: ABNORMAL
CO2 SERPL-SCNC: 21 MMOL/L (ref 21–32)
COLOR UR: YELLOW
CREAT SERPL-MCNC: 0.98 MG/DL (ref 0.6–1.3)
EOSINOPHIL # BLD AUTO: 0.09 THOUSAND/ÂΜL (ref 0–0.61)
EOSINOPHIL NFR BLD AUTO: 2 % (ref 0–6)
ERYTHROCYTE [DISTWIDTH] IN BLOOD BY AUTOMATED COUNT: 17.5 % (ref 11.6–15.1)
FLUAV RNA RESP QL NAA+PROBE: NEGATIVE
FLUBV RNA RESP QL NAA+PROBE: NEGATIVE
GFR SERPL CREATININE-BSD FRML MDRD: 47 ML/MIN/1.73SQ M
GLUCOSE SERPL-MCNC: 135 MG/DL (ref 65–140)
GLUCOSE UR STRIP-MCNC: ABNORMAL MG/DL
HCT VFR BLD AUTO: 29 % (ref 34.8–46.1)
HGB BLD-MCNC: 8.2 G/DL (ref 11.5–15.4)
HGB UR QL STRIP.AUTO: NEGATIVE
IMM GRANULOCYTES # BLD AUTO: 0.02 THOUSAND/UL (ref 0–0.2)
IMM GRANULOCYTES NFR BLD AUTO: 0 % (ref 0–2)
INR PPP: 1.16 (ref 0.84–1.19)
KETONES UR STRIP-MCNC: NEGATIVE MG/DL
LACTATE SERPL-SCNC: 0.9 MMOL/L (ref 0.5–2)
LEUKOCYTE ESTERASE UR QL STRIP: ABNORMAL
LIPASE SERPL-CCNC: 15 U/L (ref 11–82)
LYMPHOCYTES # BLD AUTO: 0.98 THOUSANDS/ÂΜL (ref 0.6–4.47)
LYMPHOCYTES NFR BLD AUTO: 21 % (ref 14–44)
MCH RBC QN AUTO: 21.8 PG (ref 26.8–34.3)
MCHC RBC AUTO-ENTMCNC: 28.3 G/DL (ref 31.4–37.4)
MCV RBC AUTO: 77 FL (ref 82–98)
MONOCYTES # BLD AUTO: 0.61 THOUSAND/ÂΜL (ref 0.17–1.22)
MONOCYTES NFR BLD AUTO: 13 % (ref 4–12)
MUCOUS THREADS UR QL AUTO: ABNORMAL
NEUTROPHILS # BLD AUTO: 2.95 THOUSANDS/ÂΜL (ref 1.85–7.62)
NEUTS SEG NFR BLD AUTO: 63 % (ref 43–75)
NITRITE UR QL STRIP: NEGATIVE
NON-SQ EPI CELLS URNS QL MICRO: ABNORMAL /HPF
NRBC BLD AUTO-RTO: 0 /100 WBCS
PH UR STRIP.AUTO: 5.5 [PH]
PLATELET # BLD AUTO: 272 THOUSANDS/UL (ref 149–390)
PMV BLD AUTO: 10.7 FL (ref 8.9–12.7)
POTASSIUM SERPL-SCNC: 4.4 MMOL/L (ref 3.5–5.3)
PROT SERPL-MCNC: 6.5 G/DL (ref 6.4–8.4)
PROT UR STRIP-MCNC: ABNORMAL MG/DL
PROTHROMBIN TIME: 15 SECONDS (ref 11.6–14.5)
RBC # BLD AUTO: 3.76 MILLION/UL (ref 3.81–5.12)
RBC #/AREA URNS AUTO: ABNORMAL /HPF
RSV RNA RESP QL NAA+PROBE: NEGATIVE
SARS-COV-2 RNA RESP QL NAA+PROBE: NEGATIVE
SODIUM SERPL-SCNC: 137 MMOL/L (ref 135–147)
SP GR UR STRIP.AUTO: 1.03 (ref 1–1.03)
UROBILINOGEN UR STRIP-ACNC: <2 MG/DL
WBC # BLD AUTO: 4.68 THOUSAND/UL (ref 4.31–10.16)
WBC #/AREA URNS AUTO: ABNORMAL /HPF
WBC CLUMPS # UR AUTO: PRESENT /UL

## 2024-01-13 PROCEDURE — 87086 URINE CULTURE/COLONY COUNT: CPT

## 2024-01-13 PROCEDURE — 80053 COMPREHEN METABOLIC PANEL: CPT

## 2024-01-13 PROCEDURE — 99285 EMERGENCY DEPT VISIT HI MDM: CPT

## 2024-01-13 PROCEDURE — 0241U HB NFCT DS VIR RESP RNA 4 TRGT: CPT

## 2024-01-13 PROCEDURE — 83880 ASSAY OF NATRIURETIC PEPTIDE: CPT

## 2024-01-13 PROCEDURE — 85730 THROMBOPLASTIN TIME PARTIAL: CPT

## 2024-01-13 PROCEDURE — 83690 ASSAY OF LIPASE: CPT

## 2024-01-13 PROCEDURE — 85610 PROTHROMBIN TIME: CPT

## 2024-01-13 PROCEDURE — 71275 CT ANGIOGRAPHY CHEST: CPT

## 2024-01-13 PROCEDURE — 93005 ELECTROCARDIOGRAM TRACING: CPT

## 2024-01-13 PROCEDURE — 85025 COMPLETE CBC W/AUTO DIFF WBC: CPT

## 2024-01-13 PROCEDURE — 81001 URINALYSIS AUTO W/SCOPE: CPT

## 2024-01-13 PROCEDURE — 36415 COLL VENOUS BLD VENIPUNCTURE: CPT

## 2024-01-13 PROCEDURE — 96374 THER/PROPH/DIAG INJ IV PUSH: CPT

## 2024-01-13 PROCEDURE — 74177 CT ABD & PELVIS W/CONTRAST: CPT

## 2024-01-13 PROCEDURE — G1004 CDSM NDSC: HCPCS

## 2024-01-13 PROCEDURE — 71045 X-RAY EXAM CHEST 1 VIEW: CPT

## 2024-01-13 PROCEDURE — 83605 ASSAY OF LACTIC ACID: CPT

## 2024-01-13 PROCEDURE — 84484 ASSAY OF TROPONIN QUANT: CPT

## 2024-01-13 RX ORDER — FENTANYL CITRATE 50 UG/ML
25 INJECTION, SOLUTION INTRAMUSCULAR; INTRAVENOUS ONCE
Status: COMPLETED | OUTPATIENT
Start: 2024-01-13 | End: 2024-01-13

## 2024-01-13 RX ORDER — ACETAMINOPHEN 325 MG/1
650 TABLET ORAL ONCE
Status: COMPLETED | OUTPATIENT
Start: 2024-01-13 | End: 2024-01-13

## 2024-01-13 RX ORDER — LIDOCAINE 50 MG/G
1 PATCH TOPICAL ONCE
Status: COMPLETED | OUTPATIENT
Start: 2024-01-13 | End: 2024-01-14

## 2024-01-13 RX ADMIN — IOHEXOL 100 ML: 350 INJECTION, SOLUTION INTRAVENOUS at 23:34

## 2024-01-13 RX ADMIN — FENTANYL CITRATE 25 MCG: 0.05 INJECTION, SOLUTION INTRAMUSCULAR; INTRAVENOUS at 22:54

## 2024-01-13 RX ADMIN — ACETAMINOPHEN 325MG 650 MG: 325 TABLET ORAL at 20:49

## 2024-01-13 RX ADMIN — LIDOCAINE 1 PATCH: 50 PATCH CUTANEOUS at 23:02

## 2024-01-14 ENCOUNTER — APPOINTMENT (INPATIENT)
Dept: CT IMAGING | Facility: HOSPITAL | Age: 89
DRG: 299 | End: 2024-01-14
Payer: COMMERCIAL

## 2024-01-14 ENCOUNTER — APPOINTMENT (INPATIENT)
Dept: NON INVASIVE DIAGNOSTICS | Facility: HOSPITAL | Age: 89
DRG: 299 | End: 2024-01-14
Payer: COMMERCIAL

## 2024-01-14 PROBLEM — R82.71 BACTERIURIA: Status: ACTIVE | Noted: 2024-01-14

## 2024-01-14 PROBLEM — I26.99 ACUTE PULMONARY EMBOLISM WITHOUT ACUTE COR PULMONALE (HCC): Status: ACTIVE | Noted: 2024-01-14

## 2024-01-14 PROBLEM — E11.65 TYPE 2 DIABETES MELLITUS WITH HYPERGLYCEMIA, WITHOUT LONG-TERM CURRENT USE OF INSULIN (HCC): Status: ACTIVE | Noted: 2019-10-05

## 2024-01-14 PROBLEM — R10.84 GENERALIZED ABDOMINAL PAIN: Status: ACTIVE | Noted: 2024-01-14

## 2024-01-14 PROBLEM — N30.00 ACUTE CYSTITIS WITHOUT HEMATURIA: Status: ACTIVE | Noted: 2024-01-14

## 2024-01-14 LAB
4HR DELTA HS TROPONIN: 7 NG/L
ALBUMIN SERPL BCP-MCNC: 3.3 G/DL (ref 3.5–5)
ALP SERPL-CCNC: 64 U/L (ref 34–104)
ALT SERPL W P-5'-P-CCNC: 6 U/L (ref 7–52)
ANION GAP SERPL CALCULATED.3IONS-SCNC: 7 MMOL/L
AST SERPL W P-5'-P-CCNC: 11 U/L (ref 13–39)
ATRIAL RATE: 74 BPM
ATRIAL RATE: 85 BPM
BASOPHILS # BLD AUTO: 0.04 THOUSANDS/ÂΜL (ref 0–0.1)
BASOPHILS NFR BLD AUTO: 1 % (ref 0–1)
BILIRUB SERPL-MCNC: 0.29 MG/DL (ref 0.2–1)
BUN SERPL-MCNC: 15 MG/DL (ref 5–25)
CALCIUM ALBUM COR SERPL-MCNC: 9 MG/DL (ref 8.3–10.1)
CALCIUM SERPL-MCNC: 8.4 MG/DL (ref 8.4–10.2)
CARDIAC TROPONIN I PNL SERPL HS: 23 NG/L
CHLORIDE SERPL-SCNC: 110 MMOL/L (ref 96–108)
CO2 SERPL-SCNC: 21 MMOL/L (ref 21–32)
CREAT SERPL-MCNC: 0.89 MG/DL (ref 0.6–1.3)
EOSINOPHIL # BLD AUTO: 0.11 THOUSAND/ÂΜL (ref 0–0.61)
EOSINOPHIL NFR BLD AUTO: 2 % (ref 0–6)
ERYTHROCYTE [DISTWIDTH] IN BLOOD BY AUTOMATED COUNT: 17.9 % (ref 11.6–15.1)
GFR SERPL CREATININE-BSD FRML MDRD: 52 ML/MIN/1.73SQ M
GLUCOSE SERPL-MCNC: 114 MG/DL (ref 65–140)
GLUCOSE SERPL-MCNC: 124 MG/DL (ref 65–140)
GLUCOSE SERPL-MCNC: 131 MG/DL (ref 65–140)
GLUCOSE SERPL-MCNC: 147 MG/DL (ref 65–140)
GLUCOSE SERPL-MCNC: 148 MG/DL (ref 65–140)
HCT VFR BLD AUTO: 29.2 % (ref 34.8–46.1)
HGB BLD-MCNC: 8.2 G/DL (ref 11.5–15.4)
IMM GRANULOCYTES # BLD AUTO: 0.03 THOUSAND/UL (ref 0–0.2)
IMM GRANULOCYTES NFR BLD AUTO: 1 % (ref 0–2)
LACTATE SERPL-SCNC: 0.9 MMOL/L (ref 0.5–2)
LYMPHOCYTES # BLD AUTO: 0.81 THOUSANDS/ÂΜL (ref 0.6–4.47)
LYMPHOCYTES NFR BLD AUTO: 15 % (ref 14–44)
MCH RBC QN AUTO: 21.6 PG (ref 26.8–34.3)
MCHC RBC AUTO-ENTMCNC: 28.1 G/DL (ref 31.4–37.4)
MCV RBC AUTO: 77 FL (ref 82–98)
MONOCYTES # BLD AUTO: 0.56 THOUSAND/ÂΜL (ref 0.17–1.22)
MONOCYTES NFR BLD AUTO: 11 % (ref 4–12)
NEUTROPHILS # BLD AUTO: 3.76 THOUSANDS/ÂΜL (ref 1.85–7.62)
NEUTS SEG NFR BLD AUTO: 70 % (ref 43–75)
NRBC BLD AUTO-RTO: 0 /100 WBCS
P AXIS: 107 DEGREES
P AXIS: 65 DEGREES
PLATELET # BLD AUTO: 280 THOUSANDS/UL (ref 149–390)
PMV BLD AUTO: 10.9 FL (ref 8.9–12.7)
POTASSIUM SERPL-SCNC: 4.1 MMOL/L (ref 3.5–5.3)
PR INTERVAL: 216 MS
PROT SERPL-MCNC: 6.3 G/DL (ref 6.4–8.4)
QRS AXIS: 28 DEGREES
QRS AXIS: 53 DEGREES
QRSD INTERVAL: 104 MS
QRSD INTERVAL: 110 MS
QT INTERVAL: 392 MS
QT INTERVAL: 394 MS
QTC INTERVAL: 425 MS
QTC INTERVAL: 465 MS
RBC # BLD AUTO: 3.79 MILLION/UL (ref 3.81–5.12)
SODIUM SERPL-SCNC: 138 MMOL/L (ref 135–147)
T WAVE AXIS: 106 DEGREES
T WAVE AXIS: 78 DEGREES
VENTRICULAR RATE: 71 BPM
VENTRICULAR RATE: 84 BPM
WBC # BLD AUTO: 5.31 THOUSAND/UL (ref 4.31–10.16)

## 2024-01-14 PROCEDURE — 99285 EMERGENCY DEPT VISIT HI MDM: CPT

## 2024-01-14 PROCEDURE — 82948 REAGENT STRIP/BLOOD GLUCOSE: CPT

## 2024-01-14 PROCEDURE — 93010 ELECTROCARDIOGRAM REPORT: CPT | Performed by: INTERNAL MEDICINE

## 2024-01-14 PROCEDURE — 99223 1ST HOSP IP/OBS HIGH 75: CPT | Performed by: SURGERY

## 2024-01-14 PROCEDURE — 84484 ASSAY OF TROPONIN QUANT: CPT

## 2024-01-14 PROCEDURE — 83605 ASSAY OF LACTIC ACID: CPT | Performed by: SURGERY

## 2024-01-14 PROCEDURE — 85025 COMPLETE CBC W/AUTO DIFF WBC: CPT | Performed by: NURSE PRACTITIONER

## 2024-01-14 PROCEDURE — 99222 1ST HOSP IP/OBS MODERATE 55: CPT | Performed by: INTERNAL MEDICINE

## 2024-01-14 PROCEDURE — 99223 1ST HOSP IP/OBS HIGH 75: CPT | Performed by: HOSPITALIST

## 2024-01-14 PROCEDURE — 36415 COLL VENOUS BLD VENIPUNCTURE: CPT

## 2024-01-14 PROCEDURE — 93005 ELECTROCARDIOGRAM TRACING: CPT

## 2024-01-14 PROCEDURE — 74177 CT ABD & PELVIS W/CONTRAST: CPT

## 2024-01-14 PROCEDURE — 80053 COMPREHEN METABOLIC PANEL: CPT | Performed by: NURSE PRACTITIONER

## 2024-01-14 RX ORDER — SODIUM CHLORIDE 9 MG/ML
50 INJECTION, SOLUTION INTRAVENOUS CONTINUOUS
Status: DISPENSED | OUTPATIENT
Start: 2024-01-14 | End: 2024-01-15

## 2024-01-14 RX ORDER — CEFTRIAXONE 1 G/50ML
1000 INJECTION, SOLUTION INTRAVENOUS EVERY 24 HOURS
Status: DISCONTINUED | OUTPATIENT
Start: 2024-01-14 | End: 2024-01-15

## 2024-01-14 RX ORDER — PANTOPRAZOLE SODIUM 40 MG/1
40 TABLET, DELAYED RELEASE ORAL DAILY
Status: DISCONTINUED | OUTPATIENT
Start: 2024-01-14 | End: 2024-01-19 | Stop reason: HOSPADM

## 2024-01-14 RX ORDER — ACETAMINOPHEN 325 MG/1
650 TABLET ORAL EVERY 6 HOURS PRN
Status: DISCONTINUED | OUTPATIENT
Start: 2024-01-14 | End: 2024-01-19 | Stop reason: HOSPADM

## 2024-01-14 RX ORDER — LATANOPROST 50 UG/ML
1 SOLUTION/ DROPS OPHTHALMIC
Status: DISCONTINUED | OUTPATIENT
Start: 2024-01-14 | End: 2024-01-19 | Stop reason: HOSPADM

## 2024-01-14 RX ORDER — ENOXAPARIN SODIUM 100 MG/ML
40 INJECTION SUBCUTANEOUS DAILY
Status: DISCONTINUED | OUTPATIENT
Start: 2024-01-14 | End: 2024-01-15

## 2024-01-14 RX ORDER — CEPHALEXIN 250 MG/1
500 CAPSULE ORAL ONCE
Status: COMPLETED | OUTPATIENT
Start: 2024-01-14 | End: 2024-01-14

## 2024-01-14 RX ORDER — SENNOSIDES 8.6 MG
1 TABLET ORAL 2 TIMES DAILY
Status: DISCONTINUED | OUTPATIENT
Start: 2024-01-14 | End: 2024-01-19 | Stop reason: HOSPADM

## 2024-01-14 RX ORDER — INSULIN LISPRO 100 [IU]/ML
1-5 INJECTION, SOLUTION INTRAVENOUS; SUBCUTANEOUS
Status: DISCONTINUED | OUTPATIENT
Start: 2024-01-14 | End: 2024-01-19 | Stop reason: HOSPADM

## 2024-01-14 RX ORDER — LEVOTHYROXINE SODIUM 0.07 MG/1
75 TABLET ORAL
Status: DISCONTINUED | OUTPATIENT
Start: 2024-01-14 | End: 2024-01-19 | Stop reason: HOSPADM

## 2024-01-14 RX ORDER — MELATONIN
1000 DAILY
Status: DISCONTINUED | OUTPATIENT
Start: 2024-01-14 | End: 2024-01-19 | Stop reason: HOSPADM

## 2024-01-14 RX ORDER — FAMOTIDINE 20 MG/1
10 TABLET, FILM COATED ORAL
Status: DISCONTINUED | OUTPATIENT
Start: 2024-01-14 | End: 2024-01-19 | Stop reason: HOSPADM

## 2024-01-14 RX ORDER — POLYETHYLENE GLYCOL 3350 17 G/17G
17 POWDER, FOR SOLUTION ORAL 2 TIMES DAILY
Status: DISCONTINUED | OUTPATIENT
Start: 2024-01-14 | End: 2024-01-19 | Stop reason: HOSPADM

## 2024-01-14 RX ORDER — AMLODIPINE BESYLATE 5 MG/1
5 TABLET ORAL DAILY
Status: DISCONTINUED | OUTPATIENT
Start: 2024-01-14 | End: 2024-01-19 | Stop reason: HOSPADM

## 2024-01-14 RX ORDER — GABAPENTIN 300 MG/1
300 CAPSULE ORAL 2 TIMES DAILY
Status: DISCONTINUED | OUTPATIENT
Start: 2024-01-14 | End: 2024-01-19 | Stop reason: HOSPADM

## 2024-01-14 RX ORDER — ALBUTEROL SULFATE 90 UG/1
2 AEROSOL, METERED RESPIRATORY (INHALATION) EVERY 6 HOURS PRN
Status: DISCONTINUED | OUTPATIENT
Start: 2024-01-14 | End: 2024-01-19 | Stop reason: HOSPADM

## 2024-01-14 RX ADMIN — IOHEXOL 50 ML: 240 INJECTION, SOLUTION INTRATHECAL; INTRAVASCULAR; INTRAVENOUS; ORAL at 15:41

## 2024-01-14 RX ADMIN — PANTOPRAZOLE SODIUM 40 MG: 40 TABLET, DELAYED RELEASE ORAL at 09:53

## 2024-01-14 RX ADMIN — Medication 1000 UNITS: at 09:53

## 2024-01-14 RX ADMIN — ENOXAPARIN SODIUM 40 MG: 40 INJECTION SUBCUTANEOUS at 09:53

## 2024-01-14 RX ADMIN — IRON SUCROSE 300 MG: 20 INJECTION, SOLUTION INTRAVENOUS at 09:54

## 2024-01-14 RX ADMIN — DICLOFENAC SODIUM TOPICAL GEL, 1% 2 G: 10 GEL TOPICAL at 21:28

## 2024-01-14 RX ADMIN — ASPIRIN 81 MG: 81 TABLET, COATED ORAL at 09:57

## 2024-01-14 RX ADMIN — DICLOFENAC SODIUM TOPICAL GEL, 1% 2 G: 10 GEL TOPICAL at 10:01

## 2024-01-14 RX ADMIN — SODIUM CHLORIDE 50 ML/HR: 0.9 INJECTION, SOLUTION INTRAVENOUS at 05:03

## 2024-01-14 RX ADMIN — GABAPENTIN 300 MG: 300 CAPSULE ORAL at 09:53

## 2024-01-14 RX ADMIN — FAMOTIDINE 10 MG: 20 TABLET ORAL at 21:25

## 2024-01-14 RX ADMIN — CEFTRIAXONE 1000 MG: 1 INJECTION, SOLUTION INTRAVENOUS at 04:53

## 2024-01-14 RX ADMIN — GABAPENTIN 300 MG: 300 CAPSULE ORAL at 17:06

## 2024-01-14 RX ADMIN — LEVOTHYROXINE SODIUM 75 MCG: 75 TABLET ORAL at 05:00

## 2024-01-14 RX ADMIN — CYANOCOBALAMIN TAB 500 MCG 250 MCG: 500 TAB at 09:53

## 2024-01-14 RX ADMIN — CEPHALEXIN 500 MG: 250 CAPSULE ORAL at 01:28

## 2024-01-14 RX ADMIN — IOHEXOL 100 ML: 350 INJECTION, SOLUTION INTRAVENOUS at 15:42

## 2024-01-14 RX ADMIN — DICLOFENAC SODIUM TOPICAL GEL, 1% 2 G: 10 GEL TOPICAL at 17:06

## 2024-01-14 RX ADMIN — LATANOPROST 1 DROP: 50 SOLUTION/ DROPS OPHTHALMIC at 21:25

## 2024-01-14 NOTE — ED PROVIDER NOTES
History  Chief Complaint   Patient presents with    Abdominal Pain     Pt arrives via EMS. Pt complaint of abd pain 10/10 described as tightness. Pt reports excessive gas and loose stools immediately after eating. Pt denies chest pain but reports trouble taking a deep breath in.      The patient is a 89 YOF who presents to the ED for evaluation of epigastric abdominal pain described as a tightness that began today. She reports having loose stools, and excessive gas after eating dinner as well. She endorses associated shortness of breath, and trouble taking deep breaths. She otherwise denies chest pain, nausea, vomiting, melena, hematochezia, dysuria, hematuria, fever, chills.         Prior to Admission Medications   Prescriptions Last Dose Informant Patient Reported? Taking?   Accu-Chek Softclix Lancets lancets   No Yes   Sig: Testing 1 time daily.            Dx: E11.9   Alcohol Swabs (B-D SINGLE USE SWABS REGULAR) PADS   No Yes   Sig: Use in the morning   Blood Glucose Monitoring Suppl (Accu-Chek Guide) w/Device KIT   No Yes   Sig: Use 2 (two) times a day   Cholecalciferol (VITAMIN D3) 2000 units capsule  Child Yes Yes   Sig: Take 2,000 Units by mouth daily    Diclofenac Sodium (VOLTAREN) 1 %   No Yes   Sig: Apply 2 g topically 4 (four) times a day   Empagliflozin (JARDIANCE) 10 MG TABS tablet   No Yes   Sig: Take 1 tablet (10 mg total) by mouth daily   Incontinence Supply Disposable (SELECT DISPOSABLE UNDERWEAR LG) MISC  Child Yes Yes   Lancets (accu-chek multiclix) lancets   No Yes   Sig: Use as instructed   Misc. Devices (Transport Chair) MISC  Child No Yes   Sig: Use if needed (with outings outside of the house.)   albuterol (PROVENTIL HFA,VENTOLIN HFA) 90 mcg/act inhaler   No No   Sig: Inhale 2 puffs every 6 (six) hours as needed for wheezing or shortness of breath   amLODIPine (NORVASC) 5 mg tablet   No Yes   Sig: TAKE 1 TABLET (5 MG TOTAL)  DAILY   aspirin 81 MG tablet  Child Yes Yes   Sig: Take 81 mg by  mouth daily.   famotidine (PEPCID) 40 MG tablet Unknown  No No   Sig: Take 1 tablet (40 mg total) by mouth daily at bedtime   furosemide (LASIX) 20 mg tablet   No Yes   Sig: TAKE 1 TABLET BY MOUTH IN THE MORNING AND 2 TABLETS IN THE EVENING   gabapentin (NEURONTIN) 600 MG tablet   No Yes   Sig: TAKE 1/2 TABLET IN THE MORNING AND AFTERNOON AND TAKE 1 TABLET AT BEDTIME   glipiZIDE (GLUCOTROL XL) 2.5 mg 24 hr tablet Unknown  No No   Sig: Take 1 tablet (2.5 mg total) by mouth daily   Patient not taking: Reported on 1/13/2024   glucose blood (Accu-Chek Guide) test strip   No Yes   Sig: Use as instructed   glucose blood test strip   No Yes   Sig: Use 1 each 2 (two) times a day Test Fasting and 2 hour postprandial daily   latanoprost (XALATAN) 0.005 % ophthalmic solution  Child No Yes   Sig: Apply 1 drop to eye daily at bedtime Both eyes   levothyroxine 75 mcg tablet Unknown  No No   Sig: Take 1 tablet (75 mcg total) by mouth daily   levothyroxine 88 mcg tablet   Yes Yes   oxygen gas Not Taking Child Yes No   Sig: Inhale 2 L/min daily at bedtime. Indications: Shortness of breath   Patient not taking: Reported on 1/2/2024   pantoprazole (PROTONIX) 40 mg tablet   No Yes   Sig: TAKE 1 TABLET EVERY DAY   potassium chloride (K-DUR,KLOR-CON) 20 mEq tablet Not Taking Child No No   Sig: TAKE 1 TABLET EVERY DAY   Patient not taking: Reported on 1/13/2024   vitamin B-12 (CYANOCOBALAMIN) 250 MCG tablet  Child Yes Yes   Sig: Take 250 mcg by mouth daily      Facility-Administered Medications: None       Past Medical History:   Diagnosis Date    Asthma     Atypical chest pain     CAD (coronary artery disease)     Candidal intertrigo     CHF (congestive heart failure) (HCC)     Depression     Diabetes mellitus (HCC)     Diabetic neuropathy (HCC)     Diverticulitis     Dyslipidemia     Female bladder prolapse     Gastric ulcer     Glaucoma     HTN (hypertension)     Hyperlipidemia     Hypothyroidism 4/18/2016    RAD (reactive airway  disease)        Past Surgical History:   Procedure Laterality Date    COLONOSCOPY      ESOPHAGOGASTRODUODENOSCOPY  2011    HYSTERECTOMY      TONSILLECTOMY         Family History   Problem Relation Age of Onset    Breast cancer Mother     Hypothyroidism Mother     Hypertension Father     Breast cancer Sister     Thyroid disease Sister     Hypertension Sister      I have reviewed and agree with the history as documented.    E-Cigarette/Vaping    E-Cigarette Use Never User      E-Cigarette/Vaping Substances    Nicotine No     THC No     CBD No     Flavoring No     Other No     Unknown No      Social History     Tobacco Use    Smoking status: Never    Smokeless tobacco: Never   Vaping Use    Vaping status: Never Used   Substance Use Topics    Alcohol use: No     Comment: Social Alcohol Use -- as per allscripts (pt denies all alcohol use)    Drug use: No       Review of Systems   Constitutional:  Negative for chills and fever.   HENT:  Negative for congestion and rhinorrhea.    Respiratory:  Positive for shortness of breath. Negative for cough.    Cardiovascular:  Negative for chest pain and leg swelling.   Gastrointestinal:  Positive for abdominal pain and diarrhea. Negative for constipation, nausea and vomiting.   Genitourinary:  Negative for dysuria and flank pain.   Musculoskeletal:  Negative for arthralgias and myalgias.   Skin:  Negative for rash and wound.   Neurological:  Negative for dizziness, weakness, numbness and headaches.   Psychiatric/Behavioral:  Negative for behavioral problems.        Physical Exam  Physical Exam  Vitals and nursing note reviewed.   Constitutional:       General: She is not in acute distress.     Appearance: She is well-developed.   HENT:      Head: Normocephalic and atraumatic.   Eyes:      Conjunctiva/sclera: Conjunctivae normal.   Cardiovascular:      Rate and Rhythm: Normal rate and regular rhythm.      Heart sounds: No murmur heard.  Pulmonary:      Effort: Pulmonary effort is  normal. Tachypnea present. No respiratory distress.      Breath sounds: Normal breath sounds.   Abdominal:      Palpations: Abdomen is soft.      Tenderness: There is abdominal tenderness in the epigastric area. There is no guarding or rebound.   Musculoskeletal:         General: No swelling.      Cervical back: Neck supple.   Skin:     General: Skin is warm and dry.      Capillary Refill: Capillary refill takes less than 2 seconds.   Neurological:      Mental Status: She is alert.   Psychiatric:         Mood and Affect: Mood normal.         Vital Signs  ED Triage Vitals   Temperature Pulse Respirations Blood Pressure SpO2   01/13/24 2007 01/13/24 2007 01/13/24 2007 01/13/24 2125 01/13/24 2007   97.6 °F (36.4 °C) 81 (!) 24 150/68 100 %      Temp Source Heart Rate Source Patient Position - Orthostatic VS BP Location FiO2 (%)   01/13/24 2007 01/13/24 2007 01/13/24 2007 01/13/24 2007 --   Oral Monitor Lying Right arm       Pain Score       01/13/24 2007       10 - Worst Possible Pain           Vitals:    01/14/24 0045 01/14/24 0130 01/14/24 0145 01/14/24 0215   BP: 145/69  167/76 158/72   Pulse: 82 82 74 72   Patient Position - Orthostatic VS:   Lying Lying         Visual Acuity      ED Medications  Medications   lidocaine (LIDODERM) 5 % patch 1 patch (1 patch Topical Medication Applied 1/13/24 2302)   acetaminophen (TYLENOL) tablet 650 mg (650 mg Oral Given 1/13/24 2049)   fentanyl citrate (PF) 100 MCG/2ML 25 mcg (25 mcg Intravenous Given 1/13/24 2254)   iohexol (OMNIPAQUE) 350 MG/ML injection (MULTI-DOSE) 100 mL (100 mL Intravenous Given 1/13/24 2334)   cephalexin (KEFLEX) capsule 500 mg (500 mg Oral Given 1/14/24 0128)       Diagnostic Studies  Results Reviewed       Procedure Component Value Units Date/Time    HS Troponin I 4hr [[id number]]  (Normal) Collected: 01/14/24 0044    Lab Status: Final result Specimen: Blood from Arm, Left Updated: 01/14/24 0117     hs TnI 4hr 23 ng/L      Delta 4hr hsTnI 7 ng/L     HS  Troponin I 2hr [[id number]]  (Normal) Collected: 01/13/24 2304    Lab Status: Final result Specimen: Blood from Arm, Left Updated: 01/13/24 2335     hs TnI 2hr 23 ng/L      Delta 2hr hsTnI 7 ng/L     Urine Microscopic [[id number]]  (Abnormal) Collected: 01/13/24 2304    Lab Status: Final result Specimen: Urine, Clean Catch Updated: 01/13/24 2331     RBC, UA 4-10 /hpf      WBC, UA Innumerable /hpf      Epithelial Cells Occasional /hpf      Bacteria, UA Occasional /hpf      MUCUS THREADS Innumerable     WBC Clumps Present    Urine culture [[id number]] Collected: 01/13/24 2304    Lab Status: In process Specimen: Urine, Clean Catch Updated: 01/13/24 2331    UA w Reflex to Microscopic w Reflex to Culture [[id number]]  (Abnormal) Collected: 01/13/24 2304    Lab Status: Final result Specimen: Urine, Clean Catch Updated: 01/13/24 2323     Color, UA Yellow     Clarity, UA Turbid     Specific Gravity, UA 1.026     pH, UA 5.5     Leukocytes, UA Large     Nitrite, UA Negative     Protein, UA Trace mg/dl      Glucose, UA 1000 (1%) mg/dl      Ketones, UA Negative mg/dl      Urobilinogen, UA <2.0 mg/dl      Bilirubin, UA Negative     Occult Blood, UA Negative    FLU/RSV/COVID - if FLU/RSV clinically relevant [[id number]]  (Normal) Collected: 01/13/24 2037    Lab Status: Final result Specimen: Nares from Nose Updated: 01/13/24 2143     SARS-CoV-2 Negative     INFLUENZA A PCR Negative     INFLUENZA B PCR Negative     RSV PCR Negative    Narrative:      FOR PEDIATRIC PATIENTS - copy/paste COVID Guidelines URL to browser: [email or web address]    SARS-CoV-2 assay is a Nucleic Acid Amplification assay intended for the  qualitative detection of nucleic acid from SARS-CoV-2 in nasopharyngeal  swabs. Results are for the presumptive identification of SARS-CoV-2 RNA.    Positive results are indicative of infection with SARS-CoV-2, the virus  causing COVID-19, but do not rule out bacterial  infection or co-infection  with other viruses. Laboratories within the [place] and its  territories are required to report all positive results to the appropriate  public health authorities. Negative results do not preclude SARS-CoV-2  infection and should not be used as the sole basis for treatment or other  patient management decisions. Negative results must be combined with  clinical observations, patient history, and epidemiological information.  This test has not been FDA cleared or approved.    This test has been authorized by FDA under an Emergency Use Authorization  (EUA). This test is only authorized for the duration of time the  declaration that circumstances exist justifying the authorization of the  emergency use of an in vitro diagnostic tests for detection of SARS-CoV-2  virus and/or diagnosis of COVID-19 infection under section 564(b)(1) of  the Act, 21 U.S.C. 360bbb-3(b)(1), unless the authorization is terminated  or revoked sooner. The test has been validated but independent review by FDA  and CLIA is pending.    Test performed using [employer] GeneXpert: This RT-PCR assay targets N2,  a region unique to SARS-CoV-2. A conserved region in the E-gene was chosen  for pan-Sarbecovirus detection which includes SARS-CoV-2.    According to CMS-2020-01-R, this platform meets the definition of high-throughput technology.    HS Troponin 0hr (reflex protocol) [[id number]]  (Normal) Collected: 01/13/24 2037    Lab Status: Final result Specimen: Blood from Arm, Left Updated: 01/13/24 2126     hs TnI 0hr 16 ng/L     B-Type Natriuretic Peptide(BNP) [[id number]]  (Abnormal) Collected: 01/13/24 2037    Lab Status: Final result Specimen: Blood from Arm, Left Updated: 01/13/24 2125      pg/mL     Comprehensive metabolic panel [[id number]]  (Abnormal) Collected: 01/13/24 2037    Lab Status: Final result Specimen: Blood from Arm, Left Updated: 01/13/24 2119     Sodium 137 mmol/L      Potassium 4.4 mmol/L       Chloride 109 mmol/L      CO2 21 mmol/L      ANION GAP 7 mmol/L      BUN 18 mg/dL      Creatinine 0.98 mg/dL      Glucose 135 mg/dL      Calcium 8.6 mg/dL      AST 13 U/L      ALT 8 U/L      Alkaline Phosphatase 66 U/L      Total Protein 6.5 g/dL      Albumin 3.6 g/dL      Total Bilirubin 0.30 mg/dL      eGFR 47 ml/min/1.73sq m     Narrative:      National Kidney Disease Foundation guidelines for Chronic Kidney Disease (CKD):     Stage 1 with normal or high GFR (GFR > 90 mL/min/1.73 square meters)    Stage 2 Mild CKD (GFR = 60-89 mL/min/1.73 square meters)    Stage 3A Moderate CKD (GFR = 45-59 mL/min/1.73 square meters)    Stage 3B Moderate CKD (GFR = 30-44 mL/min/1.73 square meters)    Stage 4 Severe CKD (GFR = 15-29 mL/min/1.73 square meters)    Stage 5 End Stage CKD (GFR <15 mL/min/1.73 square meters)  Note: GFR calculation is accurate only with a steady state creatinine    Lipase [[id number]]  (Normal) Collected: 01/13/24 2037    Lab Status: Final result Specimen: Blood from Arm, Left Updated: 01/13/24 2119     Lipase 15 u/L     Protime-INR [[id number]]  (Abnormal) Collected: 01/13/24 2037    Lab Status: Final result Specimen: Blood from Arm, Left Updated: 01/13/24 2119     Protime 15.0 seconds      INR 1.16    APTT [[id number]]  (Normal) Collected: 01/13/24 2037    Lab Status: Final result Specimen: Blood from Arm, Left Updated: 01/13/24 2119     PTT 33 seconds     Lactic acid, plasma (w/reflex if result > 2.0) [[id number]]  (Normal) Collected: 01/13/24 2037    Lab Status: Final result Specimen: Blood from Arm, Left Updated: 01/13/24 2118     LACTIC ACID 0.9 mmol/L     Narrative:      Result may be elevated if tourniquet was used during collection.    CBC and differential [[id number]]  (Abnormal) Collected: 01/13/24 2037    Lab Status: Final result Specimen: Blood from Arm, Left Updated: 01/13/24 2059     WBC 4.68 Thousand/uL      RBC 3.76 Million/uL      Hemoglobin 8.2 g/dL      Hematocrit 29.0 %      MCV 77 fL       MCH 21.8 pg      MCHC 28.3 g/dL      RDW 17.5 %      MPV 10.7 fL      Platelets 272 Thousands/uL      nRBC 0 /100 WBCs      Neutrophils Relative 63 %      Immat GRANS % 0 %      Lymphocytes Relative 21 %      Monocytes Relative 13 %      Eosinophils Relative 2 %      Basophils Relative 1 %      Neutrophils Absolute 2.95 Thousands/µL      Immature Grans Absolute 0.02 Thousand/uL      Lymphocytes Absolute 0.98 Thousands/µL      Monocytes Absolute 0.61 Thousand/µL      Eosinophils Absolute 0.09 Thousand/µL      Basophils Absolute 0.03 Thousands/µL                    PE Study with CT abdomen & pelvis with contrast   Final Result by Jules Aguilera MD (01/14 0042)      1.  Very low clot burden acute nonocclusive pulmonary embolism in the left lower lobe anterior basal segmental pulmonary artery extending into subsegmental branches. No additional pulmonary emboli identified. No imaging evidence of right heart strain.   2.  No pneumonia, pneumothorax, or pleural/pericardial effusions.   3.  No thoracic or abdominal aortic aneurysm/dissection. Extensive calcific atherosclerosis.   4.  No evidence for bowel obstruction or mesenteric inflammation seen. Terminal ileum and cecum located in the anterior subhepatic region, similar compared to the prior study. Appendix not definitively visualized but no pericecal inflammatory changes. No    free air or free fluid in the abdomen/pelvis. [place]-colonic intussusception in the mid to distal transverse colon region noted without definitive focal lead point mass identified. This may be related to abnormal/discoordinated peristalsis or other    etiology. Consider follow up oral contrast enhanced examination (after waiting minimal of 3-4 hours for oral contrast to traverse into the distal colon) to ensure resolution. Extensive sigmoid diverticulosis without evidence for diverticulitis.   5.  Small amount of nonspecific soft tissue density material just inferior from the aortic bifurcation  and left common iliac artery bifurcation regions again seen, similar compared to the prior exam. Differential considerations include but not limited to    lymphoid tissue, chronic hematoma/blood products, retroperitoneal fibrosis, scar tissue, or other etiology. No active contrast extravasation in this region to suggest an active vascular leak or hemorrhage.   6.  Mildly distended with subtle diffuse wall thickening and mucosal hyperenhancement. Recommend correlation with urinalysis for cystitis.            Findings discussed with HANS Alcantara at 12:32 a.m.      Workstation performed: [id number]         XR chest 1 view portable    (Results Pending)              Procedures  Procedures         ED Course  ED Course as of 01/14/24 0318   Sat Jan 13, 2024 2030 Will obtain imaging pending GFR   2101 Hemoglobin(!): 8.2   2104 WBC: 4.68   2123 Creatinine: 0.98   2123 eGFR: 47       SBIRT 22yo+      Flowsheet Row Most Recent Value   Initial Alcohol Screen: US AUDIT-C     1. How often do you have a drink containing alcohol? 0 Filed at: 01/13/2024 2011   2. How many drinks containing alcohol do you have on a typical day you are drinking?  0 Filed at: 01/13/2024 2011   3a. Male UNDER 65: How often do you have five or more drinks on one occasion? 0 Filed at: 01/13/2024 2011   3b. FEMALE Any Age, or MALE 65+: How often do you have 4 or more drinks on one occassion? 0 Filed at: 01/13/2024 2011   Audit-C Score 0 Filed at: 01/13/2024 2011   KAMRAN: How many times in the past year have you...    Used an illegal drug or used a prescription medication for non-medical reasons? Never Filed at: 01/13/2024 2011          Initial EKG normal sinus rhythm with 1st degree AV block at 76 bpm, normal axis, Qtc 425, no ST elevation or depression as interpreted by me    Repeat EKG normal sinus rhythm with first-degree AV block at 84 bpm, QTc 465, PVC, no ST elevation or depression as interpreted by me      Medical Decision Making  DDX  including but not limited to: pneumonia, pleural effusion, CHF, PE, PTX, ACS, MI, COVID 19, anemia, metabolic abnormality, renal failure, appendicitis, gastroenteritis, gastritis, PUD, GERD, gastroparesis, hepatitis, pancreatitis, colitis, enteritis, food poisoning, mesenteric adenitis, IBD, IBS, ileus, bowel obstruction, intussusception, volvulus, cholecystitis, biliary colic, choledocholithiasis, perforated viscus, splenic etiology, constipation, renal colic, pyelonephritis, UTI.    Will obtain EKG, troponin to evaluate for ACS.  Will obtain chest x-ray to evaluate for cardiopulmonary abnormality including pneumonia, pneumothorax.  Will obtain CBC to evaluate for leukocytosis, anemia.  Will obtain CMP to evaluate kidney function, for electrolyte disturbance.  Will obtain coags.  Will obtain BNP.  Will obtain COVID/flu/RSV testing.  Will obtain UA to evaluate for UTI.  Will obtain lipase to evaluate for pancreatitis.  Will obtain CT.    UA consistent with UTI; keflex given. CTA reveals subsegmental PE s without R heart strain, intussusception likely 2/2 discoordinated peristalsis.  Discussed findings with ED attending, KISHAN.  Will hold off on additional imaging at this time given patient comfortable.  Will hold off on anticoagulation given patient's age.  Will continue to monitor patient's symptoms.  Will admit    At the time of admission, the patient is in no acute distress. I discussed with the patient the diagnosis, treatment plan, and plan for admission; they were given the opportunity to ask questions and verbalized understanding. They agree with plan. ED RN updated patient's family.    Problems Addressed:  Colonic intussusception (HCC): acute illness or injury  Multiple subsegmental pulmonary emboli without acute cor pulmonale (HCC): acute illness or injury  UTI (urinary tract infection): acute illness or injury    Amount and/or Complexity of Data Reviewed  External Data Reviewed: labs, radiology, ECG and  notes.  Labs: ordered. Decision-making details documented in ED Course.  Radiology: ordered. Decision-making details documented in ED Course.  ECG/medicine tests: ordered and independent interpretation performed. Decision-making details documented in ED Course.    Risk  OTC drugs.  Prescription drug management.  Decision regarding hospitalization.             Disposition  Final diagnoses:   Multiple subsegmental pulmonary emboli without acute cor pulmonale (HCC)   UTI (urinary tract infection)   Colonic intussusception (HCC)   Abnormal CT of the abdomen     Time reflects when diagnosis was documented in both MDM as applicable and the Disposition within this note       Time User Action Codes Description Comment    1/14/2024  3:06 AM Azucena Gonzalez Add [I26.94] Multiple subsegmental pulmonary emboli without acute cor pulmonale (HCC)     1/14/2024  3:06 AM Azucena Gonzalez Add [N39.0] UTI (urinary tract infection)     1/14/2024  3:07 AM Azucena Gonzalez Add [K56.1] Colonic intussusception (HCC)     1/14/2024  3:16 AM Azucena Gonzalez Add [R93.5] Abnormal CT of the abdomen           ED Disposition       ED Disposition   Admit    Condition   Stable    Date/Time   Sun Jan 14, 2024 0307    Comment   Case was discussed with KISHAN and the patient's admission status was agreed to be Admission Status: inpatient status to the service of Dr. Reynoso .               Follow-up Information    None         Patient's Medications   Discharge Prescriptions    No medications on file       No discharge procedures on file.    PDMP Review         Value Time User    PDMP Reviewed  Yes 11/18/2020  1:31 PM Ayah Azul PA-C            ED Provider  Electronically Signed by         Azucena Gonzalez PA-C  01/14/24 8565

## 2024-01-14 NOTE — QUICK NOTE
Patient seen and examined at bedside.  Patient expressed no acute concerns today for the surgery team.  No events overnight.  Patient reports overall feeling better today and has experienced improvement in her abdominal pain.  Patient tolerated a clear liquid diet for breakfast without pain.  Patient is having bowel function with 1 bowel movement this morning, nonbloody.  Patient reports she makes her own medical decisions and is interested in pursuing surgery if that becomes necessary in the future. On exam, abdomen soft, nondistended, mild tenderness on the right abdomen, previously tender in the left abdomen.    Plan  Continue to monitor abdominal exam

## 2024-01-14 NOTE — ASSESSMENT & PLAN NOTE
"Lab Results   Component Value Date    HGBA1C 9.4 (H) 12/12/2023     [facility] inpatient     No results for input(s): \"POCGLU\" in the last 72 hours.    Blood Sugar Average: Last 72 hrs:      "

## 2024-01-14 NOTE — ASSESSMENT & PLAN NOTE
Reports generalized and epigastric abdominal pain  Hx of abdominal pain ,GERD and colitis  CTA: No evidence of bowel obstruction or mesenteric inflammation. Terminal ileum and cecum located in the anterior subhepatic region, similar compared to the prior study. No free air or free fluid.   [place]-colonic intussusception in the mid to distal transverse colon region noted without definitive focal lead point mass identified. This may be related to abnormal/discoordinated peristalsis or other etiology. Consider follow up oral contrast enhanced examination to ensure resolution.   Extensive sigmoid diverticulosis without evidence for diverticulitis.   Small amount of nonspecific soft tissue density material just inferior from the aortic bifurcation and left common iliac artery bifurcation regions again seen, similar compared to the prior exam.  Differential considerations include but not limited to lymphoid tissue, chronic hematoma/blood products, retroperitoneal fibrosis, scar tissue, or other etiology. No active contrast extravasation in this region to suggest an active vascular leak or hemorrhage.  Patient afraid about repeating CT scan stating she didn't like the way the contrast made her feel. Will defer for now and reassess in a.m.  Continue PPI and pepcid  Serial abdominal exams  Clear liquid diet for now. Monitor diet tolerance  Will consult GI and surgery

## 2024-01-14 NOTE — PLAN OF CARE
Problem: Potential for Falls  Goal: Patient will remain free of falls  Description: INTERVENTIONS:  - Educate patient/family on patient safety including physical limitations  - Instruct patient to call for assistance with activity   - Consult OT/PT to assist with strengthening/mobility   - Keep Call bell within reach  - Keep bed low and locked with side rails adjusted as appropriate  - Keep care items and personal belongings within reach  - Initiate and maintain comfort rounds  - Make Fall Risk Sign visible to staff  - Apply yellow socks and bracelet for high fall risk patients  - Consider moving patient to room near nurses station  Outcome: Progressing     Problem: PAIN - ADULT  Goal: Verbalizes/displays adequate comfort level or baseline comfort level  Description: Interventions:  - Encourage patient to monitor pain and request assistance  - Assess pain using appropriate pain scale  - Administer analgesics based on type and severity of pain and evaluate response  - Implement non-pharmacological measures as appropriate and evaluate response  - Consider cultural and social influences on pain and pain management  - Notify physician/advanced practitioner if interventions unsuccessful or patient reports new pain  Outcome: Progressing     Problem: INFECTION - ADULT  Goal: Absence or prevention of progression during hospitalization  Description: INTERVENTIONS:  - Assess and monitor for signs and symptoms of infection  - Monitor lab/diagnostic results  - Monitor all insertion sites, i.e. indwelling lines, tubes, and drains  - Monitor endotracheal if appropriate and nasal secretions for changes in amount and color  - [place] appropriate cooling/warming therapies per order  - Administer medications as ordered  - Instruct and encourage patient and family to use good hand hygiene technique  - Identify and instruct in appropriate isolation precautions for identified infection/condition  Outcome: Progressing     Problem:  SAFETY ADULT  Goal: Patient will remain free of falls  Description: INTERVENTIONS:  - Educate patient/family on patient safety including physical limitations  - Instruct patient to call for assistance with activity   - Consult OT/PT to assist with strengthening/mobility   - Keep Call bell within reach  - Keep bed low and locked with side rails adjusted as appropriate  - Keep care items and personal belongings within reach  - Initiate and maintain comfort rounds  - Make Fall Risk Sign visible to staff  - Apply yellow socks and bracelet for high fall risk patients  - Consider moving patient to room near nurses station  Outcome: Progressing     Problem: DISCHARGE PLANNING  Goal: Discharge to home or other facility with appropriate resources  Description: INTERVENTIONS:  - Identify barriers to discharge w/patient and caregiver  - Arrange for needed discharge resources and transportation as appropriate  - Identify discharge learning needs (meds, wound care, etc.)  - Arrange for interpretive services to assist at discharge as needed  - Refer to Case Management Department for coordinating discharge planning if the patient needs post-hospital services based on physician/advanced practitioner order or complex needs related to functional status, cognitive ability, or social support system  Outcome: Progressing     Problem: CARDIOVASCULAR - ADULT  Goal: Maintains optimal cardiac output and hemodynamic stability  Description: INTERVENTIONS:  - Monitor I/O, vital signs and rhythm  - Monitor for S/S and trends of decreased cardiac output  - Administer and titrate ordered vasoactive medications to optimize hemodynamic stability  - Assess quality of pulses, skin color and temperature  - Assess for signs of decreased coronary artery perfusion  - Instruct patient to report change in severity of symptoms  Outcome: Progressing  Goal: Absence of cardiac dysrhythmias or at baseline rhythm  Description: INTERVENTIONS:  - Continuous  cardiac monitoring, vital signs, obtain 12 lead EKG if ordered  - Administer antiarrhythmic and heart rate control medications as ordered  - Monitor electrolytes and administer replacement therapy as ordered  Outcome: Progressing     Problem: GASTROINTESTINAL - ADULT  Goal: Minimal or absence of nausea and/or vomiting  Description: INTERVENTIONS:  - Administer IV fluids if ordered to ensure adequate hydration  - Maintain NPO status until nausea and vomiting are resolved  - Nasogastric tube if ordered  - Administer ordered antiemetic medications as needed  - Provide nonpharmacologic comfort measures as appropriate  - Advance diet as tolerated, if ordered  - Consider nutrition services referral to assist patient with adequate nutrition and appropriate food choices  Outcome: Progressing  Goal: Maintains or returns to baseline bowel function  Description: INTERVENTIONS:  - Assess bowel function  - Encourage oral fluids to ensure adequate hydration  - Administer IV fluids if ordered to ensure adequate hydration  - Administer ordered medications as needed  - Encourage mobilization and activity  - Consider nutritional services referral to assist patient with adequate nutrition and appropriate food choices  Outcome: Progressing  Goal: Maintains adequate nutritional intake  Description: INTERVENTIONS:  - Monitor percentage of each meal consumed  - Identify factors contributing to decreased intake, treat as appropriate  - Assist with meals as needed  - Monitor I&O, weight, and lab values if indicated  - Obtain nutrition services referral as needed  Outcome: Progressing     Problem: METABOLIC, FLUID AND ELECTROLYTES - ADULT  Goal: Electrolytes maintained within normal limits  Description: INTERVENTIONS:  - Monitor labs and assess patient for signs and symptoms of electrolyte imbalances  - Administer electrolyte replacement as ordered  - Monitor response to electrolyte replacements, including repeat lab results as  appropriate  - Instruct patient on fluid and nutrition as appropriate  Outcome: Progressing  Goal: Fluid balance maintained  Description: INTERVENTIONS:  - Monitor labs   - Monitor I/O and WT  - Instruct patient on fluid and nutrition as appropriate  - Assess for signs & symptoms of volume excess or deficit  Outcome: Progressing  Goal: Glucose maintained within target range  Description: INTERVENTIONS:  - Monitor Blood Glucose as ordered  - Assess for signs and symptoms of hyperglycemia and hypoglycemia  - Administer ordered medications to maintain glucose within target range  - Assess nutritional intake and initiate nutrition service referral as needed  Outcome: Progressing

## 2024-01-14 NOTE — CONSULTS
Consultation -  [facility]  Priya Zarate 89 y.o. female MRN: [id number]  Unit/Bed#: E5 -01 Encounter: [id number]            Inpatient consult to gastroenterology     Date/Time  1/14/2024 11:58 AM     Performed by  Marina Cardozo DO   Authorized by  DEVEN Ravi             Reason for Consult / Principal Problem:     Abnormal CT  [place]-colonic intussusception    ASSESSMENT AND PLAN:      89-year-old female with history significant for aortic stenosis, chronic iron deficiency anemia, hypothyroidism, and type 2 diabetes, who presented to the hospital with abdominal pain with associated epigastric and back pain, found to have an UTI and acute PE.  She was also found to have a 4 cm colocolonic intussusception on CT imaging of the abdomen pelvis for which we are consulted.    She remains clinically stable with complete resolution of her abdominal pain.  I discussed with the patient and her daughter at the bedside the importance of a repeat CT with p.o. contrast to evaluate for resolution of this intussusception and they are agreeable.      Repeat CT abdomen pelvis with p.o. contrast.  No indication for colonoscopy at this time.    Rest of care per primary team.  Thank you for this consultation.   ______________________________________________________________________    HPI: Mylene Zarate is a pleasant 89-year-old female with history significant for aortic stenosis, chronic iron deficiency anemia, hypothyroidism, and type 2 diabetes, whom we are asked to see in consultation for colocolonic intussusception.    Patient initially presented to the hospital with sudden onset abdominal pain with associated epigastric and back pain along with decreased appetite.  On initial evaluation, she was found to have a UTI along with an acute PE.  CT imaging of the abdomen and pelvis revealed concern for a 4 cm colocolonic intussusception.    On my evaluation, patient denies any further abdominal  pain.  She reports this pain suddenly started yesterday and has since resolved.  She denies any chest pain, palpitations, nausea, vomiting, unintentional weight loss, hematochezia, melena.  She reports having a colonoscopy many years ago which was unremarkable and we do not have records of.  Prior EGD in 2011 which we also do not have records of.    REVIEW OF SYSTEMS:  10 point ROS reviewed and negative except otherwise noted in the HPI above.     Historical Information   Past Medical History:   Diagnosis Date    Asthma     Atypical chest pain     CAD (coronary artery disease)     Candidal intertrigo     CHF (congestive heart failure) (HCC)     Depression     Diabetes mellitus (HCC)     Diabetic neuropathy (HCC)     Diverticulitis     Dyslipidemia     Female bladder prolapse     Gastric ulcer     Glaucoma     HTN (hypertension)     Hyperlipidemia     Hypothyroidism 4/18/2016    RAD (reactive airway disease)      Past Surgical History:   Procedure Laterality Date    COLONOSCOPY      ESOPHAGOGASTRODUODENOSCOPY  2011    HYSTERECTOMY      TONSILLECTOMY       Social History   Social History     Substance and Sexual Activity   Alcohol Use No    Comment: Social Alcohol Use -- as per allscripts (pt denies all alcohol use)     Social History     Substance and Sexual Activity   Drug Use No     Social History     Tobacco Use   Smoking Status Never   Smokeless Tobacco Never     Family History   Problem Relation Age of Onset    Breast cancer Mother     Hypothyroidism Mother     Hypertension Father     Breast cancer Sister     Thyroid disease Sister     Hypertension Sister        Meds/Allergies     Medications Prior to Admission   Medication    Accu-Chek Softclix Lancets lancets    Alcohol Swabs (B-D SINGLE USE SWABS REGULAR) PADS    amLODIPine (NORVASC) 5 mg tablet    aspirin 81 MG tablet    Blood Glucose Monitoring Suppl (Accu-Chek Guide) w/Device KIT    Cholecalciferol (VITAMIN D3) 2000 units capsule    Diclofenac Sodium  (VOLTAREN) 1 %    Empagliflozin (JARDIANCE) 10 MG TABS tablet    gabapentin (NEURONTIN) 600 MG tablet    glucose blood (Accu-Chek Guide) test strip    glucose blood test strip    Incontinence Supply Disposable (SELECT DISPOSABLE UNDERWEAR LG) MISC    Lancets (accu-chek multiclix) lancets    latanoprost (XALATAN) 0.005 % ophthalmic solution    Misc. Devices (Transport Chair) MISC    vitamin B-12 (CYANOCOBALAMIN) 250 MCG tablet    albuterol (PROVENTIL HFA,VENTOLIN HFA) 90 mcg/act inhaler    famotidine (PEPCID) 40 MG tablet    furosemide (LASIX) 20 mg tablet    glipiZIDE (GLUCOTROL XL) 2.5 mg 24 hr tablet    levothyroxine 75 mcg tablet    levothyroxine 88 mcg tablet    oxygen gas    pantoprazole (PROTONIX) 40 mg tablet    potassium chloride (K-DUR,KLOR-CON) 20 mEq tablet     Current Facility-Administered Medications   Medication Dose Route Frequency    acetaminophen (TYLENOL) tablet 650 mg  650 mg Oral Q6H PRN    albuterol (PROVENTIL HFA,VENTOLIN HFA) inhaler 2 puff  2 puff Inhalation Q6H PRN    amLODIPine (NORVASC) tablet 5 mg  5 mg Oral Daily    aspirin (ECOTRIN LOW STRENGTH) EC tablet 81 mg  81 mg Oral Daily    cefTRIAXone (ROCEPHIN) IVPB (premix in dextrose) 1,000 mg 50 mL  1,000 mg Intravenous Q24H    cholecalciferol (VITAMIN D3) tablet 1,000 Units  1,000 Units Oral Daily    cyanocobalamin (VITAMIN B-12) tablet 250 mcg  250 mcg Oral Daily    Diclofenac Sodium (VOLTAREN) 1 % topical gel 2 g  2 g Topical 4x Daily    enoxaparin (LOVENOX) subcutaneous injection 40 mg  40 mg Subcutaneous Daily    famotidine (PEPCID) tablet 10 mg  10 mg Oral HS    gabapentin (NEURONTIN) capsule 300 mg  300 mg Oral BID    insulin lispro (HumaLOG) 100 units/mL subcutaneous injection 1-5 Units  1-5 Units Subcutaneous 4x Daily (AC & HS)    iron sucrose (VENOFER) 300 mg in sodium chloride 0.9 % 250 mL IVPB  300 mg Intravenous Daily    latanoprost (XALATAN) 0.005 % ophthalmic solution 1 drop  1 drop Both Eyes HS    levothyroxine tablet 75 mcg  " 75 mcg Oral Early Morning    pantoprazole (PROTONIX) EC tablet 40 mg  40 mg Oral Daily    sodium chloride 0.9 % infusion  50 mL/hr Intravenous Continuous       Allergies   Allergen Reactions    Levaquin [Levofloxacin] Myalgia    Bactrim [Sulfamethoxazole-Trimethoprim] Hallucinations    Metformin Other (See Comments)     Side effect, did not like.      Statins      Other reaction(s): Weakness  Category: Adverse Reaction;          Objective     Blood pressure 97/78, pulse 71, temperature 97.7 °F (36.5 °C), resp. rate 16, height 5' 2\" (1.575 m), weight 68.8 kg (151 lb 10.8 oz), SpO2 94%, not currently breastfeeding. Body mass index is 27.74 kg/m².    PHYSICAL EXAM:    General: Well-appearing, NAD  Eyes: no conjunctival icterus or pallor  Abdominal: Soft, non-tender, non-distended  Neuro: alert and oriented  Psych: Normal affect    Lab Results:   Admission on 01/13/2024   Component Date Value    WBC 01/13/2024 4.68     RBC 01/13/2024 3.76 (L)     Hemoglobin 01/13/2024 8.2 (L)     Hematocrit 01/13/2024 29.0 (L)     MCV 01/13/2024 77 (L)     MCH 01/13/2024 21.8 (L)     MCHC 01/13/2024 28.3 (L)     RDW 01/13/2024 17.5 (H)     MPV 01/13/2024 10.7     Platelets 01/13/2024 272     nRBC 01/13/2024 0     Neutrophils Relative 01/13/2024 63     Immat GRANS % 01/13/2024 0     Lymphocytes Relative 01/13/2024 21     Monocytes Relative 01/13/2024 13 (H)     Eosinophils Relative 01/13/2024 2     Basophils Relative 01/13/2024 1     Neutrophils Absolute 01/13/2024 2.95     Immature Grans Absolute 01/13/2024 0.02     Lymphocytes Absolute 01/13/2024 0.98     Monocytes Absolute 01/13/2024 0.61     Eosinophils Absolute 01/13/2024 0.09     Basophils Absolute 01/13/2024 0.03     Protime 01/13/2024 15.0 (H)     INR 01/13/2024 1.16     PTT 01/13/2024 33     Sodium 01/13/2024 137     Potassium 01/13/2024 4.4     Chloride 01/13/2024 109 (H)     CO2 01/13/2024 21     ANION GAP 01/13/2024 7     BUN 01/13/2024 18     Creatinine 01/13/2024 0.98     " Glucose 01/13/2024 135     Calcium 01/13/2024 8.6     AST 01/13/2024 13     ALT 01/13/2024 8     Alkaline Phosphatase 01/13/2024 66     Total Protein 01/13/2024 6.5     Albumin 01/13/2024 3.6     Total Bilirubin 01/13/2024 0.30     eGFR 01/13/2024 47     LACTIC ACID 01/13/2024 0.9     hs TnI 0hr 01/13/2024 16     BNP 01/13/2024 679 (H)     Color, UA 01/13/2024 Yellow     Clarity, UA 01/13/2024 Turbid     Specific Gravity, UA 01/13/2024 1.026     pH, UA 01/13/2024 5.5     Leukocytes, UA 01/13/2024 Large (A)     Nitrite, UA 01/13/2024 Negative     Protein, UA 01/13/2024 Trace (A)     Glucose, UA 01/13/2024 1000 (1%) (A)     Ketones, UA 01/13/2024 Negative     Urobilinogen, UA 01/13/2024 <2.0     Bilirubin, UA 01/13/2024 Negative     Occult Blood, UA 01/13/2024 Negative     SARS-CoV-2 01/13/2024 Negative     INFLUENZA A PCR 01/13/2024 Negative     INFLUENZA B PCR 01/13/2024 Negative     RSV PCR 01/13/2024 Negative     Lipase 01/13/2024 15     hs TnI 2hr 01/13/2024 23     Delta 2hr hsTnI 01/13/2024 7     hs TnI 4hr 01/14/2024 23     Delta 4hr hsTnI 01/14/2024 7     Ventricular Rate 01/13/2024 71     Atrial Rate 01/13/2024 74     QRSD Interval 01/13/2024 104     QT Interval 01/13/2024 392     QTC Interval 01/13/2024 425     P [place] 01/13/2024 107     QRS [place] 01/13/2024 28     T Wave [place] 01/13/2024 78     RBC, UA 01/13/2024 4-10 (A)     WBC, UA 01/13/2024 Innumerable (A)     Epithelial Cells 01/13/2024 Occasional     Bacteria, UA 01/13/2024 Occasional     MUCUS THREADS 01/13/2024 Innumerable (A)     WBC Clumps 01/13/2024 Present     Ventricular Rate 01/14/2024 84     Atrial Rate 01/14/2024 85     PA Interval 01/14/2024 216     QRSD Interval 01/14/2024 110     QT Interval 01/14/2024 394     QTC Interval 01/14/2024 465     P Axis 01/14/2024 65     QRS Axis 01/14/2024 53     T Wave [place] 01/14/2024 106     Sodium 01/14/2024 138     Potassium 01/14/2024 4.1     Chloride 01/14/2024 110 (H)     CO2 01/14/2024 21     ANION GAP  01/14/2024 7     BUN 01/14/2024 15     Creatinine 01/14/2024 0.89     Glucose 01/14/2024 148 (H)     Calcium 01/14/2024 8.4     Corrected Calcium 01/14/2024 9.0     AST 01/14/2024 11 (L)     ALT 01/14/2024 6 (L)     Alkaline Phosphatase 01/14/2024 64     Total Protein 01/14/2024 6.3 (L)     Albumin 01/14/2024 3.3 (L)     Total Bilirubin 01/14/2024 0.29     eGFR 01/14/2024 52     WBC 01/14/2024 5.31     RBC 01/14/2024 3.79 (L)     Hemoglobin 01/14/2024 8.2 (L)     Hematocrit 01/14/2024 29.2 (L)     MCV 01/14/2024 77 (L)     MCH 01/14/2024 21.6 (L)     MCHC 01/14/2024 28.1 (L)     RDW 01/14/2024 17.9 (H)     MPV 01/14/2024 10.9     Platelets 01/14/2024 280     nRBC 01/14/2024 0     Neutrophils Relative 01/14/2024 70     Immat GRANS % 01/14/2024 1     Lymphocytes Relative 01/14/2024 15     Monocytes Relative 01/14/2024 11     Eosinophils Relative 01/14/2024 2     Basophils Relative 01/14/2024 1     Neutrophils Absolute 01/14/2024 3.76     Immature Grans Absolute 01/14/2024 0.03     Lymphocytes Absolute 01/14/2024 0.81     Monocytes Absolute 01/14/2024 0.56     Eosinophils Absolute 01/14/2024 0.11     Basophils Absolute 01/14/2024 0.04     POC Glucose 01/14/2024 147 (H)     LACTIC ACID 01/14/2024 0.9     POC Glucose 01/14/2024 124        Imaging Studies: I have personally reviewed pertinent imaging studies.    Marina Cardozo D.O.  Fellow, PGY-5  Division of Gastroenterology & Hepatology  [facility]  1/14/2024 11:52 AM

## 2024-01-14 NOTE — ASSESSMENT & PLAN NOTE
Hx recurrent UTI  CT: Mildly distended with subtle diffuse wall thickening and mucosal hyperenhancement. Recommend correlation with urinalysis for cystitis   UA INNUMERABLE WBC, large leuks, neg nitrites  Will treat with IV CTX  Follow urine cultures

## 2024-01-14 NOTE — ASSESSMENT & PLAN NOTE
CTA:   Very low clot burden acute nonocclusive pulmonary embolism in the left lower lobe anterior basal segmental pulmonary artery extending into subsegmental branches. No additional pulmonary emboli identified. No imaging evidence of right heart strain.   No hypoxia or dyspnea. Reports chronic intermittent SOB at baseline. Uses 2LNC at hs  Patient reports a few falls at home last year. Will defer anticoagulation given advanced age. AM attending can discuss with daughter and make final determination   Will check duplex and echo  Outpatient f/u with cardiology for zio monitoring

## 2024-01-14 NOTE — CONSULTS
H&P - General Surgery  : [facility] Surgery Resident role on [employer]  Priya Zarate 89 y.o. female MRN: [id number]  Unit/Bed#: E5 -01 Encounter: [id number]        Assessment:  89 y.o. year old female with colo-colo intussusception    Plan:  Low suspicion for obstruction, not clinically obstructed  Will discuss best contrast study to evaluate intussusception   CT w/ PO contrast, CT w/ rectal contrast, barium enema vs other  Generally, colonoscopy would be recommended for evaluation if she still has intussusception on imaging   Given advanced age, family discussion should be held regarding goals of care and how aggressive family would want to be    HPI:  Priya Zarate is a 89 y.o. female with a history of diastolic heart failure, diabetes, hypertension, hyperlipidemia, coronary artery disease, CKD 3, anemia.  Patient presented to the emergency department last night with abdominal pain.  Patient is a poor historian, but per review of emergency room notes, her pain started yesterday and was described as tight in nature.  This was associated with loose stools after eating.    CT scan notable for PE as well as [place] intussusception, as well as a soft tissue density near the aortic bifurcation (note that soft tissue density is stable from prior, but the intussusception is new).  Patient was admitted to medicine for PE, general surgery was consulted for intussusception.    Physical Exam  Constitutional:       General: She is not in acute distress.     Appearance: Normal appearance.   HENT:      Head: Normocephalic and atraumatic.      Right Ear: External ear normal.      Left Ear: External ear normal.      Nose: Nose normal.      Mouth/Throat:      Mouth: Mucous membranes are moist.      Pharynx: Oropharynx is clear.   Eyes:      General:         Right eye: No discharge.         Left eye: No discharge.      Extraocular Movements: Extraocular movements intact.      Conjunctiva/sclera:  "Conjunctivae normal.      Pupils: Pupils are equal, round, and reactive to light.   Cardiovascular:      Rate and Rhythm: Normal rate.      Pulses: Normal pulses.      Heart sounds: Normal heart sounds.   Pulmonary:      Effort: Pulmonary effort is normal. No respiratory distress.   Abdominal:      General: Abdomen is flat. There is no distension.      Palpations: Abdomen is soft.      Tenderness: There is no abdominal tenderness. There is no guarding or rebound.   Musculoskeletal:         General: No swelling, tenderness or signs of injury.      Cervical back: Normal range of motion and neck supple. No rigidity or tenderness.   Skin:     Coloration: Skin is not jaundiced.      Findings: No lesion.   Neurological:      Mental Status: She is alert.      Comments: Verbalizes in response to questions but her responses are unintelligible    Psychiatric:         Mood and Affect: Mood normal.         Behavior: Behavior normal.            Review of Systems   Unable to perform ROS: Dementia        Objective       No intake or output data in the 24 hours ending 01/14/24 0822    First Vitals:   Blood Pressure: 150/68 (01/13/24 2125)  Pulse: 81 (01/13/24 2007)  Temperature: 97.6 °F (36.4 °C) (01/13/24 2007)  Temp Source: Oral (01/13/24 2007)  Respirations: (!) 24 (01/13/24 2007)  Height: 5' 2\" (157.5 cm) (01/13/24 2007)  Weight - Scale: 68.8 kg (151 lb 10.8 oz) (01/13/24 2007)  SpO2: 100 % (01/13/24 2007)    Current Vitals:   Blood Pressure: 147/71 (01/14/24 0653)  Pulse: 72 (01/14/24 0653)  Temperature: 97.7 °F (36.5 °C) (01/14/24 0653)  Temp Source: Oral (01/13/24 2007)  Respirations: 16 (01/14/24 0419)  Height: 5' 2\" (157.5 cm) (01/13/24 2007)  Weight - Scale: 68.8 kg (151 lb 10.8 oz) (01/13/24 2007)  SpO2: 93 % (01/14/24 0653)    Invasive Devices       Peripheral Intravenous Line  Duration             Peripheral IV 01/13/24 Dorsal (posterior);Left Forearm <1 day    Peripheral IV 01/13/24 Left Antecubital <1 day         "            Imaging: I have personally reviewed pertinent reports.      PE Study with CT abdomen & pelvis with contrast    Result Date: 1/14/2024  Impression: 1.  Very low clot burden acute nonocclusive pulmonary embolism in the left lower lobe anterior basal segmental pulmonary artery extending into subsegmental branches. No additional pulmonary emboli identified. No imaging evidence of right heart strain. 2.  No pneumonia, pneumothorax, or pleural/pericardial effusions. 3.  No thoracic or abdominal aortic aneurysm/dissection. Extensive calcific atherosclerosis. 4.  No evidence for bowel obstruction or mesenteric inflammation seen. Terminal ileum and cecum located in the anterior subhepatic region, similar compared to the prior study. Appendix not definitively visualized but no pericecal inflammatory changes. No  free air or free fluid in the abdomen/pelvis. [place]-colonic intussusception in the mid to distal transverse colon region noted without definitive focal lead point mass identified. This may be related to abnormal/discoordinated peristalsis or other etiology. Consider follow up oral contrast enhanced examination (after waiting minimal of 3-4 hours for oral contrast to traverse into the distal colon) to ensure resolution. Extensive sigmoid diverticulosis without evidence for diverticulitis. 5.  Small amount of nonspecific soft tissue density material just inferior from the aortic bifurcation and left common iliac artery bifurcation regions again seen, similar compared to the prior exam. Differential considerations include but not limited to  lymphoid tissue, chronic hematoma/blood products, retroperitoneal fibrosis, scar tissue, or other etiology. No active contrast extravasation in this region to suggest an active vascular leak or hemorrhage. 6.  Mildly distended with subtle diffuse wall thickening and mucosal hyperenhancement. Recommend correlation with urinalysis for cystitis. Findings discussed with PA  Azucena Alcantara at 12:32 a.m. Workstation performed: [id number]       EKG, Pathology, and Other Studies: I have personally reviewed pertinent reports.      Historical Information   Past Medical History:   Diagnosis Date    Asthma     Atypical chest pain     CAD (coronary artery disease)     Candidal intertrigo     CHF (congestive heart failure) (HCC)     Depression     Diabetes mellitus (HCC)     Diabetic neuropathy (HCC)     Diverticulitis     Dyslipidemia     Female bladder prolapse     Gastric ulcer     Glaucoma     HTN (hypertension)     Hyperlipidemia     Hypothyroidism 4/18/2016    RAD (reactive airway disease)      Past Surgical History:   Procedure Laterality Date    COLONOSCOPY      ESOPHAGOGASTRODUODENOSCOPY  2011    HYSTERECTOMY      TONSILLECTOMY       Social History   Social History     Substance and Sexual Activity   Alcohol Use No    Comment: Social Alcohol Use -- as per allscripts (pt denies all alcohol use)     Social History     Substance and Sexual Activity   Drug Use No     Social History     Tobacco Use   Smoking Status Never   Smokeless Tobacco Never     Family History   Problem Relation Age of Onset    Breast cancer Mother     Hypothyroidism Mother     Hypertension Father     Breast cancer Sister     Thyroid disease Sister     Hypertension Sister        Meds/Allergies   all current active meds have been reviewed, current meds:   Current Facility-Administered Medications   Medication Dose Route Frequency    acetaminophen (TYLENOL) tablet 650 mg  650 mg Oral Q6H PRN    albuterol (PROVENTIL HFA,VENTOLIN HFA) inhaler 2 puff  2 puff Inhalation Q6H PRN    amLODIPine (NORVASC) tablet 5 mg  5 mg Oral Daily    aspirin (ECOTRIN LOW STRENGTH) EC tablet 81 mg  81 mg Oral Daily    cefTRIAXone (ROCEPHIN) IVPB (premix in dextrose) 1,000 mg 50 mL  1,000 mg Intravenous Q24H    cholecalciferol (VITAMIN D3) tablet 1,000 Units  1,000 Units Oral Daily    cyanocobalamin (VITAMIN B-12) tablet 250 mcg  250 mcg Oral  Daily    Diclofenac Sodium (VOLTAREN) 1 % topical gel 2 g  2 g Topical 4x Daily    enoxaparin (LOVENOX) subcutaneous injection 40 mg  40 mg Subcutaneous Daily    famotidine (PEPCID) tablet 10 mg  10 mg Oral HS    gabapentin (NEURONTIN) capsule 300 mg  300 mg Oral BID    insulin lispro (HumaLOG) 100 units/mL subcutaneous injection 1-5 Units  1-5 Units Subcutaneous 4x Daily (AC & HS)    latanoprost (XALATAN) 0.005 % ophthalmic solution 1 drop  1 drop Both Eyes HS    levothyroxine tablet 75 mcg  75 mcg Oral Early Morning    lidocaine (LIDODERM) 5 % patch 1 patch  1 patch Topical Once    pantoprazole (PROTONIX) EC tablet 40 mg  40 mg Oral Daily    sodium chloride 0.9 % infusion  50 mL/hr Intravenous Continuous    and PTA meds:   Prior to Admission Medications   Prescriptions Last Dose Informant Patient Reported? Taking?   Accu-Chek Softclix Lancets lancets   No Yes   Sig: Testing 1 time daily.            Dx: E11.9   Alcohol Swabs (B-D SINGLE USE SWABS REGULAR) PADS   No Yes   Sig: Use in the morning   Blood Glucose Monitoring Suppl (Accu-Chek Guide) w/Device KIT   No Yes   Sig: Use 2 (two) times a day   Cholecalciferol (VITAMIN D3) 2000 units capsule 1/13/2024 Child Yes Yes   Sig: Take 2,000 Units by mouth daily    Diclofenac Sodium (VOLTAREN) 1 %   No Yes   Sig: Apply 2 g topically 4 (four) times a day   Empagliflozin (JARDIANCE) 10 MG TABS tablet   No Yes   Sig: Take 1 tablet (10 mg total) by mouth daily   Incontinence Supply Disposable (SELECT DISPOSABLE UNDERWEAR LG) MISC  Child Yes Yes   Lancets (accu-chek multiclix) lancets   No Yes   Sig: Use as instructed   Misc. Devices (Transport Chair) MISC  Child No Yes   Sig: Use if needed (with outings outside of the house.)   albuterol (PROVENTIL HFA,VENTOLIN HFA) 90 mcg/act inhaler   No No   Sig: Inhale 2 puffs every 6 (six) hours as needed for wheezing or shortness of breath   amLODIPine (NORVASC) 5 mg tablet 1/13/2024  No Yes   Sig: TAKE 1 TABLET (5 MG TOTAL)  DAILY    aspirin 81 MG tablet 1/13/2024 Child Yes Yes   Sig: Take 81 mg by mouth daily.   famotidine (PEPCID) 40 MG tablet Unknown  No No   Sig: Take 1 tablet (40 mg total) by mouth daily at bedtime   furosemide (LASIX) 20 mg tablet More than a month  No No   Sig: TAKE 1 TABLET BY MOUTH IN THE MORNING AND 2 TABLETS IN THE EVENING   gabapentin (NEURONTIN) 600 MG tablet 1/13/2024  No Yes   Sig: TAKE 1/2 TABLET IN THE MORNING AND AFTERNOON AND TAKE 1 TABLET AT BEDTIME   Patient taking differently: Take 600 mg by mouth 2 (two) times a day TAKE MORNING AND  TAKE 1 TABLET AT BEDTIME   glipiZIDE (GLUCOTROL XL) 2.5 mg 24 hr tablet Unknown  No No   Sig: Take 1 tablet (2.5 mg total) by mouth daily   Patient not taking: Reported on 1/13/2024   glucose blood (Accu-Chek Guide) test strip   No Yes   Sig: Use as instructed   glucose blood test strip   No Yes   Sig: Use 1 each 2 (two) times a day Test Fasting and 2 hour postprandial daily   latanoprost (XALATAN) 0.005 % ophthalmic solution  Child No Yes   Sig: Apply 1 drop to eye daily at bedtime Both eyes   levothyroxine 75 mcg tablet Unknown  No No   Sig: Take 1 tablet (75 mcg total) by mouth daily   levothyroxine 88 mcg tablet Unknown  Yes No   oxygen gas Not Taking Child Yes No   Sig: Inhale 2 L/min daily at bedtime. Indications: Shortness of breath   Patient not taking: Reported on 1/2/2024   pantoprazole (PROTONIX) 40 mg tablet Unknown  No No   Sig: TAKE 1 TABLET EVERY DAY   potassium chloride (K-DUR,KLOR-CON) 20 mEq tablet Not Taking Child No No   Sig: TAKE 1 TABLET EVERY DAY   Patient not taking: Reported on 1/13/2024   vitamin B-12 (CYANOCOBALAMIN) 250 MCG tablet 1/13/2024 Child Yes Yes   Sig: Take 250 mcg by mouth daily      Facility-Administered Medications: None     Allergies   Allergen Reactions    Levaquin [Levofloxacin] Myalgia    Bactrim [Sulfamethoxazole-Trimethoprim] Hallucinations    Metformin Other (See Comments)     Side effect, did not like.      Statins      Other  reaction(s): Weakness  Category: Adverse Reaction;        Lab Results: I have personally reviewed pertinent lab results.  , CBC:   Lab Results   Component Value Date    WBC 5.31 01/14/2024    HGB 8.2 (L) 01/14/2024    HCT 29.2 (L) 01/14/2024    MCV 77 (L) 01/14/2024     01/14/2024    RBC 3.79 (L) 01/14/2024    MCH 21.6 (L) 01/14/2024    MCHC 28.1 (L) 01/14/2024    RDW 17.9 (H) 01/14/2024    MPV 10.9 01/14/2024    NRBC 0 01/14/2024   , CMP:   Lab Results   Component Value Date    SODIUM 138 01/14/2024    K 4.1 01/14/2024     (H) 01/14/2024    CO2 21 01/14/2024    BUN 15 01/14/2024    CREATININE 0.89 01/14/2024    CALCIUM 8.4 01/14/2024    AST 11 (L) 01/14/2024    ALT 6 (L) 01/14/2024    ALKPHOS 64 01/14/2024    EGFR 52 01/14/2024       Counseling / Coordination of Care  Total floor / unit time spent today 25 minutes.  Greater than 50% of total time was spent with the patient and / or family counseling and / or coordination of care.    Inpatient consult to Acute Care Surgery  Consult performed by: Jamarcus Alva MD  Consult ordered by: DEVEN Ravi MD  1/14/2024 8:22 AM

## 2024-01-14 NOTE — H&P
[facility]  H&P  Name: Priya Zarate 89 y.o. female I MRN: [id number]  Unit/Bed#: E5 -01 I Date of Admission: 1/13/2024   Date of Service: 1/14/2024 I Hospital Day: 0      Assessment/Plan   * Acute pulmonary embolism without acute cor pulmonale (HCC)  Assessment & Plan  CTA:   Very low clot burden acute nonocclusive pulmonary embolism in the left lower lobe anterior basal segmental pulmonary artery extending into subsegmental branches. No additional pulmonary emboli identified. No imaging evidence of right heart strain.   No hypoxia or dyspnea. Reports chronic intermittent SOB at baseline. Uses 2LNC at hs  Patient reports a few falls at home last year. Will defer anticoagulation given advanced age. AM attending can discuss with daughter and make final determination   Will check duplex and echo  Outpatient f/u with cardiology for zio monitoring    Generalized abdominal pain  Assessment & Plan  Reports generalized and epigastric abdominal pain  Hx of abdominal pain ,GERD and colitis  CTA: No evidence of bowel obstruction or mesenteric inflammation. Terminal ileum and cecum located in the anterior subhepatic region, similar compared to the prior study. No free air or free fluid.   [place]-colonic intussusception in the mid to distal transverse colon region noted without definitive focal lead point mass identified. This may be related to abnormal/discoordinated peristalsis or other etiology. Consider follow up oral contrast enhanced examination to ensure resolution.   Extensive sigmoid diverticulosis without evidence for diverticulitis.   Small amount of nonspecific soft tissue density material just inferior from the aortic bifurcation and left common iliac artery bifurcation regions again seen, similar compared to the prior exam.  Differential considerations include but not limited to lymphoid tissue, chronic hematoma/blood products, retroperitoneal fibrosis, scar tissue, or other  "etiology. No active contrast extravasation in this region to suggest an active vascular leak or hemorrhage.  Patient afraid about repeating CT scan stating she didn't like the way the contrast made her feel. Will defer for now and reassess in a.m.  Continue PPI and pepcid  Serial abdominal exams  Clear liquid diet for now. Monitor diet tolerance  Will consult GI and surgery    Acute cystitis without hematuria  Assessment & Plan  Hx recurrent UTI  CT: Mildly distended with subtle diffuse wall thickening and mucosal hyperenhancement. Recommend correlation with urinalysis for cystitis   UA INNUMERABLE WBC, large leuks, neg nitrites  Will treat with IV CTX  Follow urine cultures    Acquired hypothyroidism  Assessment & Plan  Levothyroxine 75mcg daily     Iron deficiency anemia  Assessment & Plan  Chronic anemia. Hg at baseline    Myofascial muscle pain  Assessment & Plan  Maintained on gabapentin. Follows with neurology    Type 2 diabetes mellitus with hyperglycemia, without long-term current use of insulin (HCC)  Assessment & Plan  Lab Results   Component Value Date    HGBA1C 9.4 (H) 12/12/2023     ISS inpatient     No results for input(s): \"POCGLU\" in the last 72 hours.    Blood Sugar Average: Last 72 hrs:        Hallucination  Assessment & Plan  Per outpatient PMD note, daughter reports mother sometimes has hallucinations         VTE Prophylaxis: Enoxaparin (Lovenox)  / sequential compression device   Code Status: DNR/I  POLST:   Discussion with family:     Anticipated Length of Stay:  Patient will be admitted on an Inpatient basis with an anticipated length of stay of  > 2 midnights.   Justification for Hospital Stay: abnormal ct abd, uti, PE    Total Time for Visit, including Counseling / Coordination of Care: 60 minutes.  Greater than 50% of this total time spent on direct patient counseling and coordination of care.    Chief Complaint:   \"pain\"    History of Present Illness:    Priya JODY Zarate is a 89 y.o. " "female who presents with c/o pain in abdomen, epigastric, back and all around her \"torso.\" Reports loss of appetite and brown loose BM today. Denies nausea, emesis, constipation, melena or hematochezia. Reports pelvic pressure today. Denies fever or chills. Reports chronic and intermittent SOB at baseline, uses O2 hs. Denies B/LLE pain or swelling. Reports a few falls at home last year. Resides with daughter.     Review of Systems:    Review of Systems   Constitutional:  Positive for appetite change.        RICKY   Respiratory:  Positive for shortness of breath.         Chronic intermittent SOB   Cardiovascular: Negative.    Gastrointestinal:  Positive for abdominal pain and diarrhea. Negative for constipation, nausea and vomiting.   Genitourinary:  Positive for pelvic pain. Negative for dysuria and hematuria.   Musculoskeletal:  Positive for back pain.   Skin: Negative.    Neurological: Negative.    Psychiatric/Behavioral:  Negative for confusion.        Past Medical and Surgical History:     Past Medical History:   Diagnosis Date    Asthma     Atypical chest pain     CAD (coronary artery disease)     Candidal intertrigo     CHF (congestive heart failure) (HCC)     Depression     Diabetes mellitus (HCC)     Diabetic neuropathy (HCC)     Diverticulitis     Dyslipidemia     Female bladder prolapse     Gastric ulcer     Glaucoma     HTN (hypertension)     Hyperlipidemia     Hypothyroidism 4/18/2016    RAD (reactive airway disease)        Past Surgical History:   Procedure Laterality Date    COLONOSCOPY      ESOPHAGOGASTRODUODENOSCOPY  2011    HYSTERECTOMY      TONSILLECTOMY         Meds/Allergies:    Prior to Admission medications    Medication Sig Start Date End Date Taking? Authorizing Provider   Accu-Chek Softclix Lancets lancets Testing 1 time daily.            Dx: E11.9 9/12/23  Yes Ayah Azul PA-C   Alcohol Swabs (B-D SINGLE USE SWABS REGULAR) PADS Use in the morning 9/12/23  Yes Ayah Azul PA-C "   amLODIPine (NORVASC) 5 mg tablet TAKE 1 TABLET (5 MG TOTAL)  DAILY 10/5/23  Yes Ayah Azul PA-C   aspirin 81 MG tablet Take 81 mg by mouth daily.   Yes Historical Provider, MD   Blood Glucose Monitoring Suppl (Accu-Chek Guide) w/Device KIT Use 2 (two) times a day 9/18/23  Yes Ayah Azul PA-C   Cholecalciferol (VITAMIN D3) 2000 units capsule Take 2,000 Units by mouth daily    Yes Historical Provider, MD   Diclofenac Sodium (VOLTAREN) 1 % Apply 2 g topically 4 (four) times a day 11/30/23  Yes Juan Manuel Soto MD   Empagliflozin (JARDIANCE) 10 MG TABS tablet Take 1 tablet (10 mg total) by mouth daily 10/17/23 4/14/24 Yes Ayah Azul PA-C   gabapentin (NEURONTIN) 600 MG tablet TAKE 1/2 TABLET IN THE MORNING AND AFTERNOON AND TAKE 1 TABLET AT BEDTIME  Patient taking differently: Take 600 mg by mouth 2 (two) times a day TAKE MORNING AND  TAKE 1 TABLET AT BEDTIME 11/1/23  Yes Ayah Azul PA-C   glucose blood (Accu-Chek Guide) test strip Use as instructed 9/18/23  Yes Ayah Azul PA-C   glucose blood test strip Use 1 each 2 (two) times a day Test Fasting and 2 hour postprandial daily 9/14/23  Yes Ayah Azul PA-C   Incontinence Supply Disposable (SELECT DISPOSABLE UNDERWEAR LG) MISC  6/14/19  Yes Historical Provider, MD   Lancets (accu-chek multiclix) lancets Use as instructed 9/18/23  Yes Ayah Azul PA-C   latanoprost (XALATAN) 0.005 % ophthalmic solution Apply 1 drop to eye daily at bedtime Both eyes 3/16/19  Yes Arlin Zuñiga MD   Misc. Devices (Transport Chair) MISC Use if needed (with outings outside of the house.) 4/26/22  Yes Ayah Azul PA-C   vitamin B-12 (CYANOCOBALAMIN) 250 MCG tablet Take 250 mcg by mouth daily   Yes Historical Provider, MD   albuterol (PROVENTIL HFA,VENTOLIN HFA) 90 mcg/act inhaler Inhale 2 puffs every 6 (six) hours as needed for wheezing or shortness of breath 12/21/23   DEVEN Velásquez   famotidine (PEPCID) 40 MG tablet Take  1 tablet (40 mg total) by mouth daily at bedtime 12/22/23   DEVEN Velásquez   furosemide (LASIX) 20 mg tablet TAKE 1 TABLET BY MOUTH IN THE MORNING AND 2 TABLETS IN THE EVENING 8/9/23   Ayah Azul PA-C   glipiZIDE (GLUCOTROL XL) 2.5 mg 24 hr tablet Take 1 tablet (2.5 mg total) by mouth daily  Patient not taking: Reported on 1/13/2024 12/13/23   Ayah Azul PA-C   levothyroxine 75 mcg tablet Take 1 tablet (75 mcg total) by mouth daily 8/29/23   Ana Lilia Gaspar PA-C   levothyroxine 88 mcg tablet  11/2/23   Historical Provider, MD   oxygen gas Inhale 2 L/min daily at bedtime. Indications: Shortness of breath  Patient not taking: Reported on 1/2/2024    Historical Provider, MD   pantoprazole (PROTONIX) 40 mg tablet TAKE 1 TABLET EVERY DAY 10/5/23   Nicole Clancy MD   potassium chloride (K-DUR,KLOR-CON) 20 mEq tablet TAKE 1 TABLET EVERY DAY  Patient not taking: Reported on 1/13/2024 8/17/22   Shelia Cardozo, DO     I have reviewed home medications using allscripts.    Allergies:   Allergies   Allergen Reactions    Levaquin [Levofloxacin] Myalgia    Bactrim [Sulfamethoxazole-Trimethoprim] Hallucinations    Metformin Other (See Comments)     Side effect, did not like.      Statins      Other reaction(s): Weakness  Category: Adverse Reaction;        Social History:     Marital Status:    Occupation: retired  Patient Pre-hospital Living Situation: lives w daughter  Patient Pre-hospital Level of Mobility: walker  Patient Pre-hospital Diet Restrictions: reports normal diet  Substance Use History:   Social History     Substance and Sexual Activity   Alcohol Use No    Comment: Social Alcohol Use -- as per allscripts (pt denies all alcohol use)     Social History     Tobacco Use   Smoking Status Never   Smokeless Tobacco Never     Social History     Substance and Sexual Activity   Drug Use No       Family History:    Family History   Problem Relation Age of Onset    Breast cancer Mother     Hypothyroidism  "Mother     Hypertension Father     Breast cancer Sister     Thyroid disease Sister     Hypertension Sister        Physical Exam:     Vitals:   Blood Pressure: 146/68 (01/14/24 0419)  Pulse: 77 (01/14/24 0419)  Temperature: 97.5 °F (36.4 °C) (01/14/24 0419)  Temp Source: Oral (01/13/24 2007)  Respirations: 16 (01/14/24 0419)  Height: 5' 2\" (157.5 cm) (01/13/24 2007)  Weight - Scale: 68.8 kg (151 lb 10.8 oz) (01/13/24 2007)  SpO2: 96 % (01/14/24 0419)    Physical Exam  Constitutional:       General: She is not in acute distress.     Appearance: Normal appearance. She is normal weight. She is not ill-appearing, toxic-appearing or diaphoretic.   HENT:      Head: Normocephalic and atraumatic.      Mouth/Throat:      Mouth: Mucous membranes are moist.   Eyes:      Conjunctiva/sclera: Conjunctivae normal.   Cardiovascular:      Rate and Rhythm: Normal rate and regular rhythm.      Heart sounds: Murmur heard.      Comments: Loud murmur  Pulmonary:      Effort: Pulmonary effort is normal.      Breath sounds: Normal breath sounds.   Abdominal:      General: Bowel sounds are normal.      Palpations: Abdomen is soft.      Tenderness: There is abdominal tenderness.      Comments: Mild reproducible tenderness   Musculoskeletal:      Right lower leg: Edema present.      Left lower leg: Edema present.      Comments: 1+B/LLE edema   Skin:     General: Skin is warm and dry.   Neurological:      Mental Status: She is alert.   Psychiatric:         Mood and Affect: Mood normal.         Behavior: Behavior normal.         Thought Content: Thought content normal.         Judgment: Judgment normal.      Comments: Mild cognitive decline       Additional Data:     Lab Results: I have personally reviewed pertinent reports.      Results from last 7 days   Lab Units 01/13/24  2037   WBC Thousand/uL 4.68   HEMOGLOBIN g/dL 8.2*   HEMATOCRIT % 29.0*   PLATELETS Thousands/uL 272   NEUTROS PCT % 63   LYMPHS PCT % 21   MONOS PCT % 13*   EOS PCT % 2 "     Results from last 7 days   Lab Units 01/13/24 2037   SODIUM mmol/L 137   POTASSIUM mmol/L 4.4   CHLORIDE mmol/L 109*   CO2 mmol/L 21   BUN mg/dL 18   CREATININE mg/dL 0.98   ANION GAP mmol/L 7   CALCIUM mg/dL 8.6   ALBUMIN g/dL 3.6   TOTAL BILIRUBIN mg/dL 0.30   ALK PHOS U/L 66   ALT U/L 8   AST U/L 13   GLUCOSE RANDOM mg/dL 135     Results from last 7 days   Lab Units 01/13/24 2037   INR  1.16             Results from last 7 days   Lab Units 01/13/24 2037   LACTIC ACID mmol/L 0.9       Imaging: I have personally reviewed pertinent reports.      PE Study with CT abdomen & pelvis with contrast   Final Result by Jules Aguilera MD (01/14 0042)      1.  Very low clot burden acute nonocclusive pulmonary embolism in the left lower lobe anterior basal segmental pulmonary artery extending into subsegmental branches. No additional pulmonary emboli identified. No imaging evidence of right heart strain.   2.  No pneumonia, pneumothorax, or pleural/pericardial effusions.   3.  No thoracic or abdominal aortic aneurysm/dissection. Extensive calcific atherosclerosis.   4.  No evidence for bowel obstruction or mesenteric inflammation seen. Terminal ileum and cecum located in the anterior subhepatic region, similar compared to the prior study. Appendix not definitively visualized but no pericecal inflammatory changes. No    free air or free fluid in the abdomen/pelvis. [place]-colonic intussusception in the mid to distal transverse colon region noted without definitive focal lead point mass identified. This may be related to abnormal/discoordinated peristalsis or other    etiology. Consider follow up oral contrast enhanced examination (after waiting minimal of 3-4 hours for oral contrast to traverse into the distal colon) to ensure resolution. Extensive sigmoid diverticulosis without evidence for diverticulitis.   5.  Small amount of nonspecific soft tissue density material just inferior from the aortic bifurcation and left  common iliac artery bifurcation regions again seen, similar compared to the prior exam. Differential considerations include but not limited to    lymphoid tissue, chronic hematoma/blood products, retroperitoneal fibrosis, scar tissue, or other etiology. No active contrast extravasation in this region to suggest an active vascular leak or hemorrhage.   6.  Mildly distended with subtle diffuse wall thickening and mucosal hyperenhancement. Recommend correlation with urinalysis for cystitis.            Findings discussed with HANS Alcantara at 12:32 a.m.      Workstation performed: [id number]         XR chest 1 view portable    (Results Pending)    VAS VENOUS DUPLEX - LOWER LIMB BILATERAL    (Results Pending)       EKG, Pathology, and Other Studies Reviewed on Admission:   CT    Allscripts / Epic Records Reviewed: Yes     ** Please Note: This note has been constructed using a voice recognition system. **

## 2024-01-15 ENCOUNTER — APPOINTMENT (INPATIENT)
Dept: NON INVASIVE DIAGNOSTICS | Facility: HOSPITAL | Age: 89
DRG: 299 | End: 2024-01-15
Payer: COMMERCIAL

## 2024-01-15 ENCOUNTER — PATIENT OUTREACH (OUTPATIENT)
Dept: FAMILY MEDICINE CLINIC | Facility: CLINIC | Age: 89
End: 2024-01-15

## 2024-01-15 PROBLEM — K56.1 INTUSSUSCEPTION OF COLON (HCC): Status: ACTIVE | Noted: 2024-01-14

## 2024-01-15 PROBLEM — I82.432 ACUTE DEEP VEIN THROMBOSIS (DVT) OF POPLITEAL VEIN OF LEFT LOWER EXTREMITY (HCC): Status: ACTIVE | Noted: 2024-01-15

## 2024-01-15 PROBLEM — Z71.89 GOALS OF CARE, COUNSELING/DISCUSSION: Status: ACTIVE | Noted: 2024-01-15

## 2024-01-15 LAB
ANION GAP SERPL CALCULATED.3IONS-SCNC: 7 MMOL/L
APTT PPP: 190 SECONDS (ref 23–37)
APTT PPP: 47 SECONDS (ref 23–37)
ATRIAL RATE: 70 BPM
BACTERIA UR CULT: NORMAL
BUN SERPL-MCNC: 10 MG/DL (ref 5–25)
CALCIUM SERPL-MCNC: 8.3 MG/DL (ref 8.4–10.2)
CHLORIDE SERPL-SCNC: 106 MMOL/L (ref 96–108)
CO2 SERPL-SCNC: 20 MMOL/L (ref 21–32)
CREAT SERPL-MCNC: 0.88 MG/DL (ref 0.6–1.3)
ERYTHROCYTE [DISTWIDTH] IN BLOOD BY AUTOMATED COUNT: 17.8 % (ref 11.6–15.1)
ERYTHROCYTE [DISTWIDTH] IN BLOOD BY AUTOMATED COUNT: 18 % (ref 11.6–15.1)
GFR SERPL CREATININE-BSD FRML MDRD: 53 ML/MIN/1.73SQ M
GLUCOSE SERPL-MCNC: 114 MG/DL (ref 65–140)
GLUCOSE SERPL-MCNC: 114 MG/DL (ref 65–140)
GLUCOSE SERPL-MCNC: 122 MG/DL (ref 65–140)
GLUCOSE SERPL-MCNC: 211 MG/DL (ref 65–140)
GLUCOSE SERPL-MCNC: 91 MG/DL (ref 65–140)
HCT VFR BLD AUTO: 29.7 % (ref 34.8–46.1)
HCT VFR BLD AUTO: 29.9 % (ref 34.8–46.1)
HGB BLD-MCNC: 8.5 G/DL (ref 11.5–15.4)
HGB BLD-MCNC: 8.6 G/DL (ref 11.5–15.4)
INR PPP: 1.26 (ref 0.84–1.19)
MCH RBC QN AUTO: 22.1 PG (ref 26.8–34.3)
MCH RBC QN AUTO: 22.3 PG (ref 26.8–34.3)
MCHC RBC AUTO-ENTMCNC: 28.6 G/DL (ref 31.4–37.4)
MCHC RBC AUTO-ENTMCNC: 28.8 G/DL (ref 31.4–37.4)
MCV RBC AUTO: 77 FL (ref 82–98)
MCV RBC AUTO: 78 FL (ref 82–98)
PLATELET # BLD AUTO: 293 THOUSANDS/UL (ref 149–390)
PLATELET # BLD AUTO: 294 THOUSANDS/UL (ref 149–390)
PMV BLD AUTO: 10.6 FL (ref 8.9–12.7)
PMV BLD AUTO: 10.7 FL (ref 8.9–12.7)
POTASSIUM SERPL-SCNC: 4.1 MMOL/L (ref 3.5–5.3)
PROTHROMBIN TIME: 16 SECONDS (ref 11.6–14.5)
QRS AXIS: 37 DEGREES
QRSD INTERVAL: 110 MS
QT INTERVAL: 378 MS
QTC INTERVAL: 425 MS
RBC # BLD AUTO: 3.85 MILLION/UL (ref 3.81–5.12)
RBC # BLD AUTO: 3.85 MILLION/UL (ref 3.81–5.12)
SODIUM SERPL-SCNC: 133 MMOL/L (ref 135–147)
T WAVE AXIS: 90 DEGREES
VENTRICULAR RATE: 76 BPM
WBC # BLD AUTO: 5.56 THOUSAND/UL (ref 4.31–10.16)
WBC # BLD AUTO: 5.76 THOUSAND/UL (ref 4.31–10.16)

## 2024-01-15 PROCEDURE — 97163 PT EVAL HIGH COMPLEX 45 MIN: CPT

## 2024-01-15 PROCEDURE — 99232 SBSQ HOSP IP/OBS MODERATE 35: CPT | Performed by: INTERNAL MEDICINE

## 2024-01-15 PROCEDURE — 85027 COMPLETE CBC AUTOMATED: CPT

## 2024-01-15 PROCEDURE — 93010 ELECTROCARDIOGRAM REPORT: CPT | Performed by: INTERNAL MEDICINE

## 2024-01-15 PROCEDURE — 97167 OT EVAL HIGH COMPLEX 60 MIN: CPT

## 2024-01-15 PROCEDURE — 85610 PROTHROMBIN TIME: CPT

## 2024-01-15 PROCEDURE — 82948 REAGENT STRIP/BLOOD GLUCOSE: CPT

## 2024-01-15 PROCEDURE — 93970 EXTREMITY STUDY: CPT

## 2024-01-15 PROCEDURE — 85730 THROMBOPLASTIN TIME PARTIAL: CPT | Performed by: INTERNAL MEDICINE

## 2024-01-15 PROCEDURE — 80048 BASIC METABOLIC PNL TOTAL CA: CPT

## 2024-01-15 PROCEDURE — 85730 THROMBOPLASTIN TIME PARTIAL: CPT

## 2024-01-15 PROCEDURE — 93970 EXTREMITY STUDY: CPT | Performed by: SURGERY

## 2024-01-15 PROCEDURE — 99232 SBSQ HOSP IP/OBS MODERATE 35: CPT | Performed by: SURGERY

## 2024-01-15 PROCEDURE — 99232 SBSQ HOSP IP/OBS MODERATE 35: CPT

## 2024-01-15 RX ORDER — HEPARIN SODIUM 10000 [USP'U]/100ML
3-30 INJECTION, SOLUTION INTRAVENOUS
Status: DISCONTINUED | OUTPATIENT
Start: 2024-01-15 | End: 2024-01-19

## 2024-01-15 RX ADMIN — LATANOPROST 1 DROP: 50 SOLUTION/ DROPS OPHTHALMIC at 21:12

## 2024-01-15 RX ADMIN — HEPARIN SODIUM 18 UNITS/KG/HR: 10000 INJECTION, SOLUTION INTRAVENOUS at 13:12

## 2024-01-15 RX ADMIN — FAMOTIDINE 10 MG: 20 TABLET ORAL at 21:12

## 2024-01-15 RX ADMIN — CYANOCOBALAMIN TAB 500 MCG 250 MCG: 500 TAB at 09:26

## 2024-01-15 RX ADMIN — GABAPENTIN 300 MG: 300 CAPSULE ORAL at 17:18

## 2024-01-15 RX ADMIN — LEVOTHYROXINE SODIUM 75 MCG: 75 TABLET ORAL at 05:00

## 2024-01-15 RX ADMIN — INSULIN LISPRO 2 UNITS: 100 INJECTION, SOLUTION INTRAVENOUS; SUBCUTANEOUS at 16:30

## 2024-01-15 RX ADMIN — DICLOFENAC SODIUM TOPICAL GEL, 1% 2 G: 10 GEL TOPICAL at 21:12

## 2024-01-15 RX ADMIN — CEFTRIAXONE 1000 MG: 1 INJECTION, SOLUTION INTRAVENOUS at 04:54

## 2024-01-15 RX ADMIN — SENNOSIDES 8.6 MG: 8.6 TABLET, FILM COATED ORAL at 17:18

## 2024-01-15 RX ADMIN — GABAPENTIN 300 MG: 300 CAPSULE ORAL at 09:26

## 2024-01-15 RX ADMIN — PANTOPRAZOLE SODIUM 40 MG: 40 TABLET, DELAYED RELEASE ORAL at 09:26

## 2024-01-15 RX ADMIN — ASPIRIN 81 MG: 81 TABLET, COATED ORAL at 09:26

## 2024-01-15 RX ADMIN — DICLOFENAC SODIUM TOPICAL GEL, 1% 2 G: 10 GEL TOPICAL at 11:26

## 2024-01-15 RX ADMIN — Medication 1000 UNITS: at 09:26

## 2024-01-15 RX ADMIN — ENOXAPARIN SODIUM 40 MG: 40 INJECTION SUBCUTANEOUS at 09:26

## 2024-01-15 RX ADMIN — DICLOFENAC SODIUM TOPICAL GEL, 1% 2 G: 10 GEL TOPICAL at 09:38

## 2024-01-15 RX ADMIN — IRON SUCROSE 300 MG: 20 INJECTION, SOLUTION INTRAVENOUS at 09:38

## 2024-01-15 RX ADMIN — AMLODIPINE BESYLATE 5 MG: 5 TABLET ORAL at 09:26

## 2024-01-15 NOTE — ASSESSMENT & PLAN NOTE
Venous duplex showing left popliteal vein occulusion   PE found on admission  Started on heparin drip

## 2024-01-15 NOTE — PLAN OF CARE
Problem: PHYSICAL THERAPY ADULT  Goal: Performs mobility at highest level of function for planned discharge setting.  See evaluation for individualized goals.  Description: Treatment/Interventions: Functional transfer training, LE strengthening/ROM, Elevations, Therapeutic exercise, Endurance training, Patient/family training, Bed mobility, Gait training, Spoke to nursing, OT  Equipment Recommended: Walker (pt has)       See flowsheet documentation for full assessment, interventions and recommendations.  Note: Prognosis: Fair  Problem List: Decreased strength, Decreased endurance, Impaired balance, Decreased mobility, Impaired hearing, Impaired judgement  Assessment: Pt. 89 y.o.female presented w/ c/o severe abdominal pain. CT showed cecal intussuception & acute PE. Pt admitted for Acute pulmonary embolism without acute cor pulmonale (HCC) w/ UTI (urinary tract infection) & Colonic intussusception. Pt referred to PT for mobility assessment & D/C planning w/ orders of Up and OOB as tolerated . Please see above for information re: home set-up & PLOF as well as objective findings during PT assessment. PTA, pt reports being  fairly I w/ functional mobility w/ rollator; requires assistance w/ ADL's at baseline . On eval, pt functioning below baseline hence will continue skilled PT to improve function & safety. Pt require S for transfers however require minAx1 for amb w/ RW + cues for techniques & safety. Gait deviations as above but no gross LOB noted. Pt require cues to stay within RW [place]. Pt reports B/L knee weakness & feels like her knees are giving out during amb hence dec amb tolerance. Pt notes her current gait is not at baseline. The patient's AM-PAC Basic Mobility Inpatient Short Form Raw Score is 18. A Raw score of greater than 16 suggests the patient may benefit from discharge to home. Please also refer to the recommendation of the Physical Therapist for safe discharge planning. From PT standpoint, will  "recommend Level III (minimum resource intensity) rehab services at D/C. No SOB & dizziness reported t/o session. Nsg staff most recent vital signs as follows: /80   Pulse 76   Temp 97.7 °F (36.5 °C) (Oral)   Resp 18   Ht 5' 2\" (1.575 m)   Wt 68.8 kg (151 lb 10.8 oz)   SpO2 97%   BMI 27.74 kg/m² . At end of session, pt OOB in chair in stable condition, call bell & phone in reach, chair alarm activated, all lines intact. Fall precautions reinforced w/ good understanding. CM to follow. Nsg staff to continue to mobilized pt (OOB in chair for all meals & ambulate in room/unit) as tolerated to prevent further decline in function. Will also recommend Restorative for daily amb &/or daily OOB in chair as appropriate. Nsg notified.     Barriers to Discharge: Inaccessible home environment  Barriers to Discharge Comments: STAR    Rehab Resource Intensity Level, PT: III (Minimum Resource Intensity)    See flowsheet documentation for full assessment.        "

## 2024-01-15 NOTE — PROGRESS NOTES
Progress Note -  [facility]  Priya JODY Zarate 89 y.o. female MRN: [id number]  Unit/Bed#: E5 -01 Encounter: [id number]      ASSESSMENT AND PLAN:      89-year-old female with history significant for aortic stenosis, chronic iron deficiency anemia, hypothyroidism, and type 2 diabetes, who presented to the hospital with abdominal pain with associated epigastric and back pain, found to have an UTI and acute PE.  She was also found to have a 4 cm colocolonic intussusception on CT imaging of the abdomen pelvis for which we are consulted.     She remains clinically stable and asymptomatic without recurrence in abdominal pain. She is also maintaining her bowel movements and tolerating PO intake. Repeat CT abdomen/pelvis with contrast to evaluate for resolution of the 4cm colocolonic intussusception demonstrated persistent distal transverse colocolonic intussusception without definite lead point or underlying mass.  However given persistence, cannot definitively rule out the possibility of a lesion such as a polyp or a lipoma creating a lead point.  When I discussed possible procedures with the patient and her daughter yesterday, they were of the mindset of avoiding any aggressive interventions.  Given the persistence of this intussusception, further investigation with a colonoscopy would be warranted.  However given her advanced age and possible need for further interventions should we find a lead point, we will plan to have a family meeting to discuss goals of care and expectations with family, primary team, surgery, and GI tomorrow prior to proceeding with colonoscopy for further evaluation.    She was also noted to have potential trace free air adjacent to the liver margin concerning for possible perforation which may warrant repeat CT imaging in 24 hours to evaluate for resolution or increased free air.    Continue clear liquid diet for now.  Continue to monitor closely for any changes in  abdominal exam.  Continue bowel regimen.  Plan for family meeting tomorrow to discuss GOC and expectations.    Rest of care per primary team.    ______________________________________________________________________    Subjective:  Denies any acute complaints. Tolerating PO intake without further abdominal pain. She is also moving her bowels.     REVIEW OF SYSTEMS:  10 point ROS reviewed and negative except otherwise noted in the HPI above.     Historical Information   Past Medical History:   Diagnosis Date    Asthma     Atypical chest pain     CAD (coronary artery disease)     Candidal intertrigo     CHF (congestive heart failure) (HCC)     Depression     Diabetes mellitus (HCC)     Diabetic neuropathy (HCC)     Diverticulitis     Dyslipidemia     Female bladder prolapse     Gastric ulcer     Glaucoma     HTN (hypertension)     Hyperlipidemia     Hypothyroidism 4/18/2016    RAD (reactive airway disease)      Past Surgical History:   Procedure Laterality Date    COLONOSCOPY      ESOPHAGOGASTRODUODENOSCOPY  2011    HYSTERECTOMY      TONSILLECTOMY       Social History   Social History     Substance and Sexual Activity   Alcohol Use No    Comment: Social Alcohol Use -- as per allscripts (pt denies all alcohol use)     Social History     Substance and Sexual Activity   Drug Use No     Social History     Tobacco Use   Smoking Status Never   Smokeless Tobacco Never     Family History   Problem Relation Age of Onset    Breast cancer Mother     Hypothyroidism Mother     Hypertension Father     Breast cancer Sister     Thyroid disease Sister     Hypertension Sister        Meds/Allergies     Medications Prior to Admission   Medication    Accu-Chek Softclix Lancets lancets    Alcohol Swabs (B-D SINGLE USE SWABS REGULAR) PADS    amLODIPine (NORVASC) 5 mg tablet    aspirin 81 MG tablet    Blood Glucose Monitoring Suppl (Accu-Chek Guide) w/Device KIT    Cholecalciferol (VITAMIN D3) 2000 units capsule    Diclofenac Sodium  (VOLTAREN) 1 %    Empagliflozin (JARDIANCE) 10 MG TABS tablet    gabapentin (NEURONTIN) 600 MG tablet    glucose blood (Accu-Chek Guide) test strip    glucose blood test strip    Incontinence Supply Disposable (SELECT DISPOSABLE UNDERWEAR LG) MISC    Lancets (accu-chek multiclix) lancets    latanoprost (XALATAN) 0.005 % ophthalmic solution    Misc. Devices (Transport Chair) MISC    vitamin B-12 (CYANOCOBALAMIN) 250 MCG tablet    albuterol (PROVENTIL HFA,VENTOLIN HFA) 90 mcg/act inhaler    famotidine (PEPCID) 40 MG tablet    furosemide (LASIX) 20 mg tablet    glipiZIDE (GLUCOTROL XL) 2.5 mg 24 hr tablet    levothyroxine 75 mcg tablet    levothyroxine 88 mcg tablet    oxygen gas    pantoprazole (PROTONIX) 40 mg tablet    potassium chloride (K-DUR,KLOR-CON) 20 mEq tablet     Current Facility-Administered Medications   Medication Dose Route Frequency    acetaminophen (TYLENOL) tablet 650 mg  650 mg Oral Q6H PRN    albuterol (PROVENTIL HFA,VENTOLIN HFA) inhaler 2 puff  2 puff Inhalation Q6H PRN    amLODIPine (NORVASC) tablet 5 mg  5 mg Oral Daily    aspirin (ECOTRIN LOW STRENGTH) EC tablet 81 mg  81 mg Oral Daily    cholecalciferol (VITAMIN D3) tablet 1,000 Units  1,000 Units Oral Daily    cyanocobalamin (VITAMIN B-12) tablet 250 mcg  250 mcg Oral Daily    Diclofenac Sodium (VOLTAREN) 1 % topical gel 2 g  2 g Topical 4x Daily    famotidine (PEPCID) tablet 10 mg  10 mg Oral HS    gabapentin (NEURONTIN) capsule 300 mg  300 mg Oral BID    heparin (porcine) 25,000 units in 0.45% NaCl 250 mL infusion (premix)  3-30 Units/kg/hr (Order-Specific) Intravenous Titrated    insulin lispro (HumaLOG) 100 units/mL subcutaneous injection 1-5 Units  1-5 Units Subcutaneous 4x Daily (AC & HS)    iron sucrose (VENOFER) 300 mg in sodium chloride 0.9 % 250 mL IVPB  300 mg Intravenous Daily    latanoprost (XALATAN) 0.005 % ophthalmic solution 1 drop  1 drop Both Eyes HS    levothyroxine tablet 75 mcg  75 mcg Oral Early Morning    pantoprazole  "(PROTONIX) EC tablet 40 mg  40 mg Oral Daily    polyethylene glycol (MIRALAX) packet 17 g  17 g Oral BID    senna (SENOKOT) tablet 8.6 mg  1 tablet Oral BID       Allergies   Allergen Reactions    Levaquin [Levofloxacin] Myalgia    Bactrim [Sulfamethoxazole-Trimethoprim] Hallucinations    Metformin Other (See Comments)     Side effect, did not like.      Statins      Other reaction(s): Weakness  Category: Adverse Reaction;          Objective     Blood pressure 141/80, pulse 76, temperature 97.7 °F (36.5 °C), temperature source Oral, resp. rate 18, height 5' 2\" (1.575 m), weight 68.8 kg (151 lb 10.8 oz), SpO2 97%, not currently breastfeeding. Body mass index is 27.74 kg/m².    PHYSICAL EXAM:    General: Well-appearing, NAD  Eyes: no conjunctival icterus or pallor  Abdominal: Soft, non-tender, non-distended  Extremities: Warm, no deformities, no edema  Neuro: alert and oriented  Psych: Normal affect      Lab Results:   Admission on 01/13/2024   Component Date Value    WBC 01/13/2024 4.68     RBC 01/13/2024 3.76 (L)     Hemoglobin 01/13/2024 8.2 (L)     Hematocrit 01/13/2024 29.0 (L)     MCV 01/13/2024 77 (L)     MCH 01/13/2024 21.8 (L)     MCHC 01/13/2024 28.3 (L)     RDW 01/13/2024 17.5 (H)     MPV 01/13/2024 10.7     Platelets 01/13/2024 272     nRBC 01/13/2024 0     Neutrophils Relative 01/13/2024 63     Immat GRANS % 01/13/2024 0     Lymphocytes Relative 01/13/2024 21     Monocytes Relative 01/13/2024 13 (H)     Eosinophils Relative 01/13/2024 2     Basophils Relative 01/13/2024 1     Neutrophils Absolute 01/13/2024 2.95     Immature Grans Absolute 01/13/2024 0.02     Lymphocytes Absolute 01/13/2024 0.98     Monocytes Absolute 01/13/2024 0.61     Eosinophils Absolute 01/13/2024 0.09     Basophils Absolute 01/13/2024 0.03     Protime 01/13/2024 15.0 (H)     INR 01/13/2024 1.16     PTT 01/13/2024 33     Sodium 01/13/2024 137     Potassium 01/13/2024 4.4     Chloride 01/13/2024 109 (H)     CO2 01/13/2024 21     ANION " GAP 01/13/2024 7     BUN 01/13/2024 18     Creatinine 01/13/2024 0.98     Glucose 01/13/2024 135     Calcium 01/13/2024 8.6     AST 01/13/2024 13     ALT 01/13/2024 8     Alkaline Phosphatase 01/13/2024 66     Total Protein 01/13/2024 6.5     Albumin 01/13/2024 3.6     Total Bilirubin 01/13/2024 0.30     eGFR 01/13/2024 47     LACTIC ACID 01/13/2024 0.9     hs TnI 0hr 01/13/2024 16     BNP 01/13/2024 679 (H)     Color, UA 01/13/2024 Yellow     Clarity, UA 01/13/2024 Turbid     Specific Gravity, UA 01/13/2024 1.026     pH, UA 01/13/2024 5.5     Leukocytes, UA 01/13/2024 Large (A)     Nitrite, UA 01/13/2024 Negative     Protein, UA 01/13/2024 Trace (A)     Glucose, UA 01/13/2024 1000 (1%) (A)     Ketones, UA 01/13/2024 Negative     Urobilinogen, UA 01/13/2024 <2.0     Bilirubin, UA 01/13/2024 Negative     Occult Blood, UA 01/13/2024 Negative     SARS-CoV-2 01/13/2024 Negative     INFLUENZA A PCR 01/13/2024 Negative     INFLUENZA B PCR 01/13/2024 Negative     RSV PCR 01/13/2024 Negative     Lipase 01/13/2024 15     hs TnI 2hr 01/13/2024 23     Delta 2hr hsTnI 01/13/2024 7     hs TnI 4hr 01/14/2024 23     Delta 4hr hsTnI 01/14/2024 7     Ventricular Rate 01/13/2024 71     Atrial Rate 01/13/2024 74     QRSD Interval 01/13/2024 104     QT Interval 01/13/2024 392     QTC Interval 01/13/2024 425     P [place] 01/13/2024 107     QRS [place] 01/13/2024 28     T Wave [place] 01/13/2024 78     RBC, UA 01/13/2024 4-10 (A)     WBC, UA 01/13/2024 Innumerable (A)     Epithelial Cells 01/13/2024 Occasional     Bacteria, UA 01/13/2024 Occasional     MUCUS THREADS 01/13/2024 Innumerable (A)     WBC Clumps 01/13/2024 Present     Urine Culture 01/13/2024 No Growth <1000 cfu/mL     Ventricular Rate 01/14/2024 84     Atrial Rate 01/14/2024 85     IA Interval 01/14/2024 216     QRSD Interval 01/14/2024 110     QT Interval 01/14/2024 394     QTC Interval 01/14/2024 465     P Axis 01/14/2024 65     QRS Axis 01/14/2024 53     T Wave [place] 01/14/2024  106     Sodium 01/14/2024 138     Potassium 01/14/2024 4.1     Chloride 01/14/2024 110 (H)     CO2 01/14/2024 21     ANION GAP 01/14/2024 7     BUN 01/14/2024 15     Creatinine 01/14/2024 0.89     Glucose 01/14/2024 148 (H)     Calcium 01/14/2024 8.4     Corrected Calcium 01/14/2024 9.0     AST 01/14/2024 11 (L)     ALT 01/14/2024 6 (L)     Alkaline Phosphatase 01/14/2024 64     Total Protein 01/14/2024 6.3 (L)     Albumin 01/14/2024 3.3 (L)     Total Bilirubin 01/14/2024 0.29     eGFR 01/14/2024 52     WBC 01/14/2024 5.31     RBC 01/14/2024 3.79 (L)     Hemoglobin 01/14/2024 8.2 (L)     Hematocrit 01/14/2024 29.2 (L)     MCV 01/14/2024 77 (L)     MCH 01/14/2024 21.6 (L)     MCHC 01/14/2024 28.1 (L)     RDW 01/14/2024 17.9 (H)     MPV 01/14/2024 10.9     Platelets 01/14/2024 280     nRBC 01/14/2024 0     Neutrophils Relative 01/14/2024 70     Immat GRANS % 01/14/2024 1     Lymphocytes Relative 01/14/2024 15     Monocytes Relative 01/14/2024 11     Eosinophils Relative 01/14/2024 2     Basophils Relative 01/14/2024 1     Neutrophils Absolute 01/14/2024 3.76     Immature Grans Absolute 01/14/2024 0.03     Lymphocytes Absolute 01/14/2024 0.81     Monocytes Absolute 01/14/2024 0.56     Eosinophils Absolute 01/14/2024 0.11     Basophils Absolute 01/14/2024 0.04     POC Glucose 01/14/2024 147 (H)     LACTIC ACID 01/14/2024 0.9     POC Glucose 01/14/2024 124     POC Glucose 01/14/2024 114     POC Glucose 01/14/2024 131     WBC 01/15/2024 5.56     RBC 01/15/2024 3.85     Hemoglobin 01/15/2024 8.5 (L)     Hematocrit 01/15/2024 29.7 (L)     MCV 01/15/2024 77 (L)     MCH 01/15/2024 22.1 (L)     MCHC 01/15/2024 28.6 (L)     RDW 01/15/2024 17.8 (H)     Platelets 01/15/2024 293     MPV 01/15/2024 10.6     Sodium 01/15/2024 133 (L)     Potassium 01/15/2024 4.1     Chloride 01/15/2024 106     CO2 01/15/2024 20 (L)     ANION GAP 01/15/2024 7     BUN 01/15/2024 10     Creatinine 01/15/2024 0.88     Glucose 01/15/2024 122     Calcium  01/15/2024 8.3 (L)     eGFR 01/15/2024 53     POC Glucose 01/15/2024 114     Ventricular Rate 01/13/2024 76     Atrial Rate 01/13/2024 70     QRSD Interval 01/13/2024 110     QT Interval 01/13/2024 378     QTC Interval 01/13/2024 425     QRS [place] 01/13/2024 37     T Wave [place] 01/13/2024 90     POC Glucose 01/15/2024 114     WBC 01/15/2024 5.76     RBC 01/15/2024 3.85     Hemoglobin 01/15/2024 8.6 (L)     Hematocrit 01/15/2024 29.9 (L)     MCV 01/15/2024 78 (L)     MCH 01/15/2024 22.3 (L)     MCHC 01/15/2024 28.8 (L)     RDW 01/15/2024 18.0 (H)     Platelets 01/15/2024 294     MPV 01/15/2024 10.7     PTT 01/15/2024 47 (H)     Protime 01/15/2024 16.0 (H)     INR 01/15/2024 1.26 (H)        Imaging Studies: I have personally reviewed pertinent imaging studies.    Marina Cardozo D.O.  Fellow, PGY-5  Division of Gastroenterology & Hepatology  Available on [facility]  1/15/2024 1:42 PM

## 2024-01-15 NOTE — PLAN OF CARE
Problem: Potential for Falls  Goal: Patient will remain free of falls  Description: INTERVENTIONS:  - Educate patient/family on patient safety including physical limitations  - Instruct patient to call for assistance with activity   - Consult OT/PT to assist with strengthening/mobility   - Keep Call bell within reach  - Keep bed low and locked with side rails adjusted as appropriate  - Keep care items and personal belongings within reach  - Initiate and maintain comfort rounds  - Make Fall Risk Sign visible to staff  - Apply yellow socks and bracelet for high fall risk patients  - Consider moving patient to room near nurses station  Outcome: Progressing     Problem: PAIN - ADULT  Goal: Verbalizes/displays adequate comfort level or baseline comfort level  Description: Interventions:  - Encourage patient to monitor pain and request assistance  - Assess pain using appropriate pain scale  - Administer analgesics based on type and severity of pain and evaluate response  - Implement non-pharmacological measures as appropriate and evaluate response  - Consider cultural and social influences on pain and pain management  - Notify physician/advanced practitioner if interventions unsuccessful or patient reports new pain  Outcome: Progressing     Problem: INFECTION - ADULT  Goal: Absence or prevention of progression during hospitalization  Description: INTERVENTIONS:  - Assess and monitor for signs and symptoms of infection  - Monitor lab/diagnostic results  - Monitor all insertion sites, i.e. indwelling lines, tubes, and drains  - Monitor endotracheal if appropriate and nasal secretions for changes in amount and color  - [place] appropriate cooling/warming therapies per order  - Administer medications as ordered  - Instruct and encourage patient and family to use good hand hygiene technique  - Identify and instruct in appropriate isolation precautions for identified infection/condition  Outcome: Progressing     Problem:  SAFETY ADULT  Goal: Patient will remain free of falls  Description: INTERVENTIONS:  - Educate patient/family on patient safety including physical limitations  - Instruct patient to call for assistance with activity   - Consult OT/PT to assist with strengthening/mobility   - Keep Call bell within reach  - Keep bed low and locked with side rails adjusted as appropriate  - Keep care items and personal belongings within reach  - Initiate and maintain comfort rounds  - Make Fall Risk Sign visible to staff  - Apply yellow socks and bracelet for high fall risk patients  - Consider moving patient to room near nurses station  Outcome: Progressing     Problem: DISCHARGE PLANNING  Goal: Discharge to home or other facility with appropriate resources  Description: INTERVENTIONS:  - Identify barriers to discharge w/patient and caregiver  - Arrange for needed discharge resources and transportation as appropriate  - Identify discharge learning needs (meds, wound care, etc.)  - Arrange for interpretive services to assist at discharge as needed  - Refer to Case Management Department for coordinating discharge planning if the patient needs post-hospital services based on physician/advanced practitioner order or complex needs related to functional status, cognitive ability, or social support system  Outcome: Progressing     Problem: Knowledge Deficit  Goal: Patient/family/caregiver demonstrates understanding of disease process, treatment plan, medications, and discharge instructions  Description: Complete learning assessment and assess knowledge base.  Interventions:  - Provide teaching at level of understanding  - Provide teaching via preferred learning methods  Outcome: Progressing     Problem: CARDIOVASCULAR - ADULT  Goal: Maintains optimal cardiac output and hemodynamic stability  Description: INTERVENTIONS:  - Monitor I/O, vital signs and rhythm  - Monitor for S/S and trends of decreased cardiac output  - Administer and titrate  ordered vasoactive medications to optimize hemodynamic stability  - Assess quality of pulses, skin color and temperature  - Assess for signs of decreased coronary artery perfusion  - Instruct patient to report change in severity of symptoms  Outcome: Progressing  Goal: Absence of cardiac dysrhythmias or at baseline rhythm  Description: INTERVENTIONS:  - Continuous cardiac monitoring, vital signs, obtain 12 lead EKG if ordered  - Administer antiarrhythmic and heart rate control medications as ordered  - Monitor electrolytes and administer replacement therapy as ordered  Outcome: Progressing     Problem: GASTROINTESTINAL - ADULT  Goal: Minimal or absence of nausea and/or vomiting  Description: INTERVENTIONS:  - Administer IV fluids if ordered to ensure adequate hydration  - Maintain NPO status until nausea and vomiting are resolved  - Nasogastric tube if ordered  - Administer ordered antiemetic medications as needed  - Provide nonpharmacologic comfort measures as appropriate  - Advance diet as tolerated, if ordered  - Consider nutrition services referral to assist patient with adequate nutrition and appropriate food choices  Outcome: Progressing  Goal: Maintains or returns to baseline bowel function  Description: INTERVENTIONS:  - Assess bowel function  - Encourage oral fluids to ensure adequate hydration  - Administer IV fluids if ordered to ensure adequate hydration  - Administer ordered medications as needed  - Encourage mobilization and activity  - Consider nutritional services referral to assist patient with adequate nutrition and appropriate food choices  Outcome: Progressing  Goal: Maintains adequate nutritional intake  Description: INTERVENTIONS:  - Monitor percentage of each meal consumed  - Identify factors contributing to decreased intake, treat as appropriate  - Assist with meals as needed  - Monitor I&O, weight, and lab values if indicated  - Obtain nutrition services referral as needed  Outcome:  Progressing     Problem: METABOLIC, FLUID AND ELECTROLYTES - ADULT  Goal: Electrolytes maintained within normal limits  Description: INTERVENTIONS:  - Monitor labs and assess patient for signs and symptoms of electrolyte imbalances  - Administer electrolyte replacement as ordered  - Monitor response to electrolyte replacements, including repeat lab results as appropriate  - Instruct patient on fluid and nutrition as appropriate  Outcome: Progressing  Goal: Fluid balance maintained  Description: INTERVENTIONS:  - Monitor labs   - Monitor I/O and WT  - Instruct patient on fluid and nutrition as appropriate  - Assess for signs & symptoms of volume excess or deficit  Outcome: Progressing  Goal: Glucose maintained within target range  Description: INTERVENTIONS:  - Monitor Blood Glucose as ordered  - Assess for signs and symptoms of hyperglycemia and hypoglycemia  - Administer ordered medications to maintain glucose within target range  - Assess nutritional intake and initiate nutrition service referral as needed  Outcome: Progressing

## 2024-01-15 NOTE — ASSESSMENT & PLAN NOTE
Lab Results   Component Value Date    HGBA1C 9.4 (H) 12/12/2023     ISS inpatient     Recent Labs     01/14/24  1602 01/14/24  2125 01/15/24  0738 01/15/24  1108   POCGLU 114 131 114 114       Blood Sugar Average: Last 72 hrs:  (P) 124

## 2024-01-15 NOTE — UTILIZATION REVIEW
NOTIFICATION OF INPATIENT ADMISSION   AUTHORIZATION REQUEST   SERVICING FACILITY:   [place] [address]  Tax ID: [id number]  NPI: [id number] ATTENDING PROVIDER:  Attending Name and NPI#: Dionicio Stauffer Md [[id number]]  Address: [address] [place] [address]  Phone: [phone]     ADMISSION INFORMATION:  Place of Service: Inpatient [facility]  Place of Service Code: 21  Inpatient Admission Date/Time: 1/14/24  3:18 AM  Discharge Date/Time: No discharge date for patient encounter.  Admitting Diagnosis Code/Description:  UTI (urinary tract infection) [N39.0]  Abdominal pain [R10.9]  Colonic intussusception (HCC) [K56.1]  Abnormal CT of the abdomen [R93.5]  Multiple subsegmental pulmonary emboli without acute cor pulmonale (HCC) [I26.94]     UTILIZATION REVIEW CONTACT:  Winsome Mccain, Utilization   Network Utilization Review Department  Phone: [phone]  Fax [phone]  Email: [email or web address]  Contact for approvals/pending authorizations, clinical reviews, and discharge.     PHYSICIAN ADVISORY SERVICES:  Medical Necessity Denial & Mbpk-[id number] Review  Phone: [phone]  Fax: [phone]  Email: [email or web address]     DISCHARGE SUPPORT TEAM:  For Patients Discharge Needs & Updates  Phone: [phone] opt. 2 Fax: [phone]  Email: [email or web address]

## 2024-01-15 NOTE — PROGRESS NOTES
Critical access hospital  Progress Note  Name: Priya Zarate I  MRN: [id number]  Unit/Bed#: E5 -01 I Date of Admission: 1/13/2024   Date of Service: 1/15/2024 I Hospital Day: 1    Assessment/Plan   * Intussusception of colon (HCC)  Assessment & Plan  Reports generalized and epigastric abdominal pain  Hx of abdominal pain ,GERD and colitis  CTA: No evidence of bowel obstruction or mesenteric inflammation. Terminal ileum and cecum located in the anterior subhepatic region, similar compared to the prior study. No free air or free fluid.   Waelder-colonic intussusception in the mid to distal transverse colon region noted without definitive focal lead point mass identified.  Extensive sigmoid diverticulosis without evidence for diverticulitis.   Small amount of nonspecific soft tissue density material just inferior from the aortic bifurcation and left common iliac artery bifurcation regions again seen, similar compared to the prior exam  Repeat CT abdomen pelvis with continued intussuseption and new trace free air seen, consider repeat CT   Continue PPI and pepcid  Serial abdominal exams  Clear liquid diet for now. Monitor diet tolerance  GI and general surgery following     Acute deep vein thrombosis (DVT) of popliteal vein of left lower extremity (HCC)  Assessment & Plan  Venous duplex showing left popliteal vein occulusion   PE found on admission  Started on heparin drip     Acute cystitis without hematuria  Assessment & Plan  Hx recurrent UTI  CT: Mildly distended with subtle diffuse wall thickening and mucosal hyperenhancement. Recommend correlation with urinalysis for cystitis   UA with WBC, large leuks, neg nitrites  Started on IV antibiotics, discontinue urine culture with no growth       Acute pulmonary embolism without acute cor pulmonale (HCC)  Assessment & Plan  CTA: Very low clot burden acute nonocclusive pulmonary embolism in the left lower lobe anterior basal segmental pulmonary artery  extending into subsegmental branches. No additional pulmonary emboli identified. No imaging evidence of right heart strain.   No hypoxia or dyspnea. Reports chronic intermittent SOB at baseline. Uses 2LNC at hs  Patient had few falls at home last year.  Echo ordered   Venous duplex with left popliteal vein  Start heparin drip, if no bleeding transition to eliquis and also consider pradaxa given better reversal agents   Outpatient f/u with cardiology for zio monitoring    Goals of care, counseling/discussion  Assessment & Plan  Discussed with daughter and patient goals of care   Planning for family meeting tomorrow at 10 AM with patient's daughter and son along with Gi, surgery and internal medicine team   Both patient and daughter have stated that they would want everything done including surgery. Discussed further with daughter and the families main goal is to control their mother's pain  We discussed risk of bleeding on heparin drip and anticoagulation at this time they wish to proceed     Myofascial muscle pain  Assessment & Plan  Maintained on gabapentin. Follows with neurology    Type 2 diabetes mellitus with hyperglycemia, without long-term current use of insulin (HCC)  Assessment & Plan  Lab Results   Component Value Date    HGBA1C 9.4 (H) 12/12/2023     ISS inpatient     Recent Labs     01/14/24  1602 01/14/24  2125 01/15/24  0738 01/15/24  1108   POCGLU 114 131 114 114       Blood Sugar Average: Last 72 hrs:  (P) 124      Acquired hypothyroidism  Assessment & Plan  Levothyroxine 75mcg daily     Aortic stenosis  Assessment & Plan  Repeat echo ordered   Severe aortic stenosis though to be contributing to patient's SOB  Recommended medical management     Iron deficiency anemia  Assessment & Plan  Chronic anemia. Hg at baseline    Hallucination  Assessment & Plan  Per outpatient PMD note, daughter reports mother sometimes has hallucinations         VTE Pharmacologic Prophylaxis: VTE Score: 6 High Risk (Score  >/= 5) - Pharmacological DVT Prophylaxis Ordered: heparin drip. Sequential Compression Devices Ordered.    Mobility:   Basic Mobility Inpatient Raw Score: 18  JH-HLM Goal: 6: Walk 10 steps or more  JH-HLM Achieved: 7: Walk 25 feet or more  HLM Goal achieved. Continue to encourage appropriate mobility.    Patient Centered Rounds: I performed bedside rounds with nursing staff today.   Discussions with Specialists or Other Care Team Provider: ely    Education and Discussions with Family / Patient: Updated  (daughter) via phone.    Total Time Spent on Date of Encounter in care of patient: 30 mins. This time was spent on one or more of the following: performing physical exam; counseling and coordination of care; obtaining or reviewing history; documenting in the medical record; reviewing/ordering tests, medications or procedures; communicating with other healthcare professionals and discussing with patient's family/caregivers.    Current Length of Stay: 1 day(s)  Current Patient Status: Inpatient   Certification Statement: The patient will continue to require additional inpatient hospital stay due to pe, DVT and intusseption  Discharge Plan: Anticipate discharge in 48-72 hrs to home with home services.    Code Status: Level 3 - DNAR and DNI    Subjective:   Patient was seen laying in chair at bedside today. She reports that she is in no pain. She denies any chest pain, abdominal pain or discomfort. She is worried about everything going on.     Objective:     Vitals:   Temp (24hrs), Av.8 °F (36.6 °C), Min:97.5 °F (36.4 °C), Max:98.2 °F (36.8 °C)    Temp:  [97.5 °F (36.4 °C)-98.2 °F (36.8 °C)] 97.5 °F (36.4 °C)  HR:  [75-82] 78  Resp:  [14-18] 18  BP: (111-152)/(58-80) 118/58  SpO2:  [94 %-97 %] 96 %  Body mass index is 27.74 kg/m².     Input and Output Summary (last 24 hours):     Intake/Output Summary (Last 24 hours) at 1/15/2024 1618  Last data filed at 1/15/2024 1300  Gross per 24 hour   Intake 960 ml    Output 800 ml   Net 160 ml       Physical Exam:   Physical Exam  Vitals and nursing note reviewed.   Constitutional:       Appearance: Normal appearance.   HENT:      Head: Normocephalic and atraumatic.   Eyes:      General: No scleral icterus.  Cardiovascular:      Rate and Rhythm: Normal rate and regular rhythm.   Pulmonary:      Effort: Pulmonary effort is normal.      Breath sounds: Normal breath sounds.   Abdominal:      General: Abdomen is flat. Bowel sounds are normal.      Palpations: Abdomen is soft.   Musculoskeletal:      Right lower leg: No edema.      Left lower leg: No edema.   Skin:     General: Skin is warm and dry.   Neurological:      Mental Status: She is alert. Mental status is at baseline.   Psychiatric:         Mood and Affect: Mood normal.         Behavior: Behavior normal.         Additional Data:     Labs:  Results from last 7 days   Lab Units 01/15/24  1244 01/15/24  0925 01/14/24  0551   WBC Thousand/uL 5.76   < > 5.31   HEMOGLOBIN g/dL 8.6*   < > 8.2*   HEMATOCRIT % 29.9*   < > 29.2*   PLATELETS Thousands/uL 294   < > 280   NEUTROS PCT %  --   --  70   LYMPHS PCT %  --   --  15   MONOS PCT %  --   --  11   EOS PCT %  --   --  2    < > = values in this interval not displayed.     Results from last 7 days   Lab Units 01/15/24  0925 01/14/24  0551   SODIUM mmol/L 133* 138   POTASSIUM mmol/L 4.1 4.1   CHLORIDE mmol/L 106 110*   CO2 mmol/L 20* 21   BUN mg/dL 10 15   CREATININE mg/dL 0.88 0.89   ANION GAP mmol/L 7 7   CALCIUM mg/dL 8.3* 8.4   ALBUMIN g/dL  --  3.3*   TOTAL BILIRUBIN mg/dL  --  0.29   ALK PHOS U/L  --  64   ALT U/L  --  6*   AST U/L  --  11*   GLUCOSE RANDOM mg/dL 122 148*     Results from last 7 days   Lab Units 01/15/24  1244   INR  1.26*     Results from last 7 days   Lab Units 01/15/24  1528 01/15/24  1108 01/15/24  0738 01/14/24  2125 01/14/24  1602 01/14/24  1119 01/14/24  0653   POC GLUCOSE mg/dl 211* 114 114 131 114 124 147*         Results from last 7 days   Lab Units  01/14/24  0752 01/13/24 2037   LACTIC ACID mmol/L 0.9 0.9       Lines/Drains:  Invasive Devices       Peripheral Intravenous Line  Duration             Peripheral IV 01/13/24 Dorsal (posterior);Left Forearm 1 day    Peripheral IV 01/13/24 Left Antecubital 1 day                          Imaging: No pertinent imaging reviewed.    Recent Cultures (last 7 days):   Results from last 7 days   Lab Units 01/13/24  2304   URINE CULTURE  No Growth <1000 cfu/mL       Last 24 Hours Medication List:   Current Facility-Administered Medications   Medication Dose Route Frequency Provider Last Rate    acetaminophen  650 mg Oral Q6H PRN DEVEN Ravi      albuterol  2 puff Inhalation Q6H PRN DEVEN Ravi      amLODIPine  5 mg Oral Daily DEVEN Ravi      aspirin  81 mg Oral Daily DEVEN Ravi      cholecalciferol  1,000 Units Oral Daily DEVEN Ravi      vitamin B-12  250 mcg Oral Daily DEVEN Ravi      Diclofenac Sodium  2 g Topical 4x Daily DEVEN Ravi      famotidine  10 mg Oral HS DEVEN Ravi      gabapentin  300 mg Oral BID DEVEN Ravi      heparin (porcine)  3-30 Units/kg/hr (Order-Specific) Intravenous Titrated Dionicio Stauffer MD 18 Units/kg/hr (01/15/24 1312)    insulin lispro  1-5 Units Subcutaneous 4x Daily (AC & HS) DEVEN Ravi      iron sucrose  300 mg Intravenous Daily Marina Cardozo, DO      latanoprost  1 drop Both Eyes HS DEVEN Ravi      levothyroxine  75 mcg Oral Early Morning DEVEN Ravi      pantoprazole  40 mg Oral Daily DEVEN Ravi      polyethylene glycol  17 g Oral BID Marina Cardozo, DO      senna  1 tablet Oral BID Marina Cardozo, DO          Today, Patient Was Seen By: Deepthi Diego PA-C    **Please Note: This note may have been constructed using a voice recognition system.**

## 2024-01-15 NOTE — PLAN OF CARE
Problem: Potential for Falls  Goal: Patient will remain free of falls  Description: INTERVENTIONS:  - Educate patient/family on patient safety including physical limitations  - Instruct patient to call for assistance with activity   - Consult OT/PT to assist with strengthening/mobility   - Keep Call bell within reach  - Keep bed low and locked with side rails adjusted as appropriate  - Keep care items and personal belongings within reach  - Initiate and maintain comfort rounds  - Make Fall Risk Sign visible to staff    Problem: PAIN - ADULT  Goal: Verbalizes/displays adequate comfort level or baseline comfort level  Description: Interventions:  - Encourage patient to monitor pain and request assistance  - Assess pain using appropriate pain scale  - Administer analgesics based on type and severity of pain and evaluate response  - Implement non-pharmacological measures as appropriate and evaluate response  - Consider cultural and social influences on pain and pain management  - Notify physician/advanced practitioner if interventions unsuccessful or patient reports new pain  Outcome: Progressing     Problem: INFECTION - ADULT  Goal: Absence or prevention of progression during hospitalization  Description: INTERVENTIONS:  - Assess and monitor for signs and symptoms of infection  - Monitor lab/diagnostic results  - Monitor all insertion sites, i.e. indwelling lines, tubes, and drains  - Monitor endotracheal if appropriate and nasal secretions for changes in amount and color  - [place] appropriate cooling/warming therapies per order  - Administer medications as ordered  - Instruct and encourage patient and family to use good hand hygiene technique  - Identify and instruct in appropriate isolation precautions for identified infection/condition  Outcome: Progressing     Problem: SAFETY ADULT  Goal: Patient will remain free of falls  Description: INTERVENTIONS:  - Educate patient/family on patient safety including  physical limitations  - Instruct patient to call for assistance with activity   - Consult OT/PT to assist with strengthening/mobility   - Keep Call bell within reach  - Keep bed low and locked with side rails adjusted as appropriate  - Keep care items and personal belongings within reach  - Initiate and maintain comfort rounds  - Make Fall Risk Sign visible to staff  - Apply yellow socks and bracelet for high fall risk patients  - Consider moving patient to room near nurses station  Outcome: Progressing  Goal: Maintain or return to baseline ADL function  Description: INTERVENTIONS:  -  Assess patient's ability to carry out ADLs; assess patient's baseline for ADL function and identify physical deficits which impact ability to perform ADLs (bathing, care of mouth/teeth, toileting, grooming, dressing, etc.)  - Assess/evaluate cause of self-care deficits   - Assess range of motion  - Assess patient's mobility; develop plan if impaired  - Assess patient's need for assistive devices and provide as appropriate  - Encourage maximum independence but intervene and supervise when necessary  - Involve family in performance of ADLs  - Assess for home care needs following discharge   - Consider OT consult to assist with ADL evaluation and planning for discharge  - Provide patient education as appropriate  Outcome: Progressing  Goal: Maintains/Returns to pre admission functional level  Description: INTERVENTIONS:  - Perform AM-PAC 6 Click Basic Mobility/ Daily Activity assessment daily.  - Set and communicate daily mobility goal to care team and patient/family/caregiver.   - Collaborate with rehabilitation services on mobility goals if consulted  - Stand patient 3 times a day  - Ambulate patient 3 times a day  - Out of bed to chair 3 times a day   - Out of bed for meals 3  Problem: DISCHARGE PLANNING  Goal: Discharge to home or other facility with appropriate resources  Description: INTERVENTIONS:  - Identify barriers to discharge  w/patient and caregiver  - Arrange for needed discharge resources and transportation as appropriate  - Identify discharge learning needs (meds, wound care, etc.)  - Arrange for interpretive services to assist at discharge as needed  - Refer to Case Management Department for coordinating discharge planning if the patient needs post-hospital services based on physician/advanced practitioner order or complex needs related to functional status, cognitive ability, or social support system  Outcome: Progressing     Problem: Knowledge Deficit  Goal: Patient/family/caregiver demonstrates understanding of disease process, treatment plan, medications, and discharge instructions  Description: Complete learning assessment and assess knowledge base.  Interventions:  - Provide teaching at level of understanding  - Provide teaching via preferred learning methods  Outcome: Progressing     Problem: GASTROINTESTINAL - ADULT  Goal: Minimal or absence of nausea and/or vomiting  Description: INTERVENTIONS:  - Administer IV fluids if ordered to ensure adequate hydration  - Maintain NPO status until nausea and vomiting are resolved  - Nasogastric tube if ordered  - Administer ordered antiemetic medications as needed  - Provide nonpharmacologic comfort measures as appropriate  - Advance diet as tolerated, if ordered  - Consider nutrition services referral to assist patient with adequate nutrition and appropriate food choices  Outcome: Progressing  Goal: Maintains or returns to baseline bowel function  Description: INTERVENTIONS:  - Assess bowel function  - Encourage oral fluids to ensure adequate hydration  - Administer IV fluids if ordered to ensure adequate hydration  - Administer ordered medications as needed  - Encourage mobilization and activity  - Consider nutritional services referral to assist patient with adequate nutrition and appropriate food choices  Outcome: Progressing  Goal: Maintains adequate nutritional  intake  Description: INTERVENTIONS:  - Monitor percentage of each meal consumed  - Identify factors contributing to decreased intake, treat as appropriate  - Assist with meals as needed  - Monitor I&O, weight, and lab values if indicated  - Obtain nutrition services referral as needed  Outcome: Progressing     Problem: METABOLIC, FLUID AND ELECTROLYTES - ADULT  Goal: Electrolytes maintained within normal limits  Description: INTERVENTIONS:  - Monitor labs and assess patient for signs and symptoms of electrolyte imbalances  - Administer electrolyte replacement as ordered  - Monitor response to electrolyte replacements, including repeat lab results as appropriate  - Instruct patient on fluid and nutrition as appropriate  Outcome: Progressing  Goal: Fluid balance maintained  Description: INTERVENTIONS:  - Monitor labs   - Monitor I/O and WT  - Instruct patient on fluid and nutrition as appropriate  - Assess for signs & symptoms of volume excess or deficit  Outcome: Progressing  Goal: Glucose maintained within target range  Description: INTERVENTIONS:  - Monitor Blood Glucose as ordered  - Assess for signs and symptoms of hyperglycemia and hypoglycemia  - Administer ordered medications to maintain glucose within target range  - Assess nutritional intake and initiate nutrition service referral as needed  Outcome: Progressing    times a day  - Out of bed for toileting  - Record patient progress and toleration of activity level   Outcome: Progressing   - Apply yellow socks and bracelet for high fall risk patients  - Consider moving patient to room near nurses station  Outcome: Progressing     Problem: Prexisting or High Potential for Compromised Skin Integrity  Goal: Skin integrity is maintained or improved  Description: INTERVENTIONS:  - Identify patients at risk for skin breakdown  - Assess and monitor skin integrity  - Assess and monitor nutrition and hydration status  - Monitor labs   - Assess for incontinence   - Turn  and reposition patient  - Assist with mobility/ambulation  - Relieve pressure over bony prominences  - Avoid friction and shearing  - Provide appropriate hygiene as needed including keeping skin clean and dry  - Evaluate need for skin moisturizer/barrier cream  - Collaborate with interdisciplinary team   - Patient/family teaching  - Consider wound care consult   Outcome: Progressing

## 2024-01-15 NOTE — ASSESSMENT & PLAN NOTE
Discussed with daughter and patient goals of care   Planning for family meeting tomorrow with patient's daughter and son along with Gi, surgery and internal medicine team   Both patient and daughter have stated that they would want everything done including surgery. Discussed further with daughter and the families main goal is to control their mother's pain  We discussed risk of bleeding on heparin drip and anticoagulation at this time they wish to proceed

## 2024-01-15 NOTE — UTILIZATION REVIEW
"Initial Clinical Review    Admission: Date/Time/Statement:   Admission Orders (From admission, onward)       Ordered        01/14/24 0318  INPATIENT ADMISSION  Once                          Orders Placed This Encounter   Procedures    INPATIENT ADMISSION     Standing Status:   Standing     Number of Occurrences:   1     Order Specific Question:   Level of Care     Answer:   Med Surg [16]     Order Specific Question:   Estimated length of stay     Answer:   More than 2 Midnights     Order Specific Question:   Certification     Answer:   I certify that inpatient services are medically necessary for this patient for a duration of greater than two midnights. See H&P and MD Progress Notes for additional information about the patient's course of treatment.     ED Arrival Information       Expected   -    Arrival   1/13/2024 20:03    Acuity   Urgent              Means of arrival   Ambulance    Escorted by   [facility] ([facility])    Service   Hospitalist    Admission type   Emergency              Arrival complaint   Pain             Chief Complaint   Patient presents with    Abdominal Pain     Pt arrives via EMS. Pt complaint of abd pain 10/10 described as tightness. Pt reports excessive gas and loose stools immediately after eating. Pt denies chest pain but reports trouble taking a deep breath in.        Initial Presentation: 89 y.o. female to ED by ems admitted Inpatient d/t Intussception of colon. Hx of abdominal pain ,GERD and colitis. Presented with acute 10/10 abdominal pain. c/o pain in abdomen, epigastric, back and all around her \"torso.\" Reports loss of appetite and brown loose BM today. Denies nausea, emesis, constipation, melena or hematochezia. Reports pelvic pressure today. No SOB, but incidentally found to have a small pulmonary embolism. Seen by surgery and GI. Both recommend conservative management. And it looks like this has already resolved on its own.chronic intermittent SOB at baseline. Uses " 2LNC at hs.CTA: No evidence of bowel obstruction or mesenteric inflammation. Terminal ileum and cecum located in the anterior subhepatic region, similar compared to the prior study. No free air or free fluid. [place]-colonic intussusception in the mid to distal transverse colon region noted without definitive focal lead point mass identified. This may be related to abnormal/discoordinated peristalsis or other etiology. Consider follow up oral contrast enhanced examination to ensure resolution. Extensive sigmoid diverticulosis without evidence for diverticulitis. Small amount of nonspecific soft tissue density material just inferior from the aortic bifurcation and left common iliac artery bifurcation regions again seen, similar compared to the prior exam.  Differential considerations include but not limited to lymphoid tissue, chronic hematoma/blood products, retroperitoneal fibrosis, scar tissue, or other etiology. No active contrast extravasation in this region to suggest an active vascular leak or hemorrhage.CT: Mildly distended with subtle diffuse wall thickening and mucosal hyperenhancement. Recommend correlation with urinalysis for cystitis. UA INNUMERABLE WBC, large leuks, neg nitrites.PPI and pepcid.IV antibiotic. IVF.pt/ot.    1/14 Surgery consult: colo-colo intussusception.CT with rectal contrast for barium enema to better evaluate the colocolonic intussusception.  CT with oral contrast was ordered by gastroenterology and she is currently having bowel movements and no pain.  Given age would recommend conservative management, there does not appear to be any underlying mass but possibly there is a large polyp at the lead point.     1/14 GI consult:resenting with acute onset abdominal pain rating to the back which is since improved since admission, abdominal imaging concerning for possible colocolonic intussusception.Given advanced age, colonoscopy would unlikely benefit this patient and would be for diagnostic  purposes to rule out colonic mass or other abnormality to resultant intussusception.  Repeat imaging is reasonable given improvement in abdominal pain to see if intussusception is persistent.      Date: 1/14   Day 2: No events overnight.  Patient reports overall feeling better today and has experienced improvement in her abdominal pain.  Patient tolerated a clear liquid diet for breakfast without pain.  Patient is having bowel function with 1 bowel movement this morning, nonbloody.  Patient reports she makes her own medical decisions and is interested in pursuing surgery if that becomes necessary in the future. On exam, abdomen soft, nondistended, mild tenderness on the right abdomen, previously tender in the left abdomen. PPI and pepcid.IV antibiotic. IVF.pt/ot.    Date: 1/15    Day 3: Has surpassed a 2nd midnight with active treatments and services, which include PPI and pepcid.IV antibiotic. IVF. No acute events overnight.  Patient seen and examined at bedside.  Patient denies pain at this time.  Patient expressed no acute concerns.  Patient denies nausea vomiting fevers chills and shortness of breath.  Patient is urinating passing flatus and having bowel movements at this time. Vitals signs within normal limits and stable on room air.pt/ot.    ED Triage Vitals   Temperature Pulse Respirations Blood Pressure SpO2   01/13/24 2007 01/13/24 2007 01/13/24 2007 01/13/24 2125 01/13/24 2007   97.6 °F (36.4 °C) 81 (!) 24 150/68 100 %      Temp Source Heart Rate Source Patient Position - Orthostatic VS BP Location FiO2 (%)   01/13/24 2007 01/13/24 2007 01/13/24 2007 01/13/24 2007 --   Oral Monitor Lying Right arm       Pain Score       01/13/24 2007       10 - Worst Possible Pain          Wt Readings from Last 1 Encounters:   01/13/24 68.8 kg (151 lb 10.8 oz)     Additional Vital Signs:   01/15/24 09:17:09 -- 76 -- 141/80 100 97 % -- -- -- --   01/15/24 07:36:35 97.7 °F (36.5 °C) 78 18 152/74 100 96 % -- -- None (Room  air) Sitting   01/14/24 23:51:28 98.2 °F (36.8 °C) 75 14 111/58 76 94 % -- -- -- --   01/14/24 1751 -- 82 -- -- -- 94 % 28 2 L/min Nasal cannula Lying   01/14/24 16:03:19 97.6 °F (36.4 °C) 78 -- 137/69 92 96 % -- -- None (Room air) Lying   01/14/24 09:51:48 -- 71 -- 97/78 84 94 % -- -- -- --   01/14/24 06:53:44 97.7 °F (36.5 °C) 72 -- 147/71 96 93 % -- -- -- --   01/14/24 04:19:52 97.5 °F (36.4 °C) 77 16 146/68 94 96 % -- -- -- --   01/14/24 0215 -- 72 -- 158/72 103 92 % -- -- None (Room air) Lying   01/14/24 0145 -- 74 16 167/76 109 93 % -- -- None (Room air) Lying   01/14/24 0130 -- 82 -- -- -- 94 % -- -- -- --   01/14/24 0045 -- 82 -- 145/69 99 95 % -- -- -- --   01/13/24 2345 -- 72 18 153/69 99 95 % -- -- None (Room air) Lying   01/13/24 2145 -- 70 20 141/65 93 95 % -- -- None (Room air) Lying   01/13/24 2125 -- -- -- 150/68 -- -- -- -- -- Lying   01/13/24 2007 97.6 °F (36.4 °C) 81 24 Abnormal  -- -- 100 % -- -- None (Room air) Lying     Pertinent Labs/Diagnostic Test Results:   1/14 echo:pending      1/14 EKG:Sinus rhythm with 1st degree A-V block with Possible Premature atrial complexes with Aberrant conduction  Nonspecific ST and T wave abnormality  Prolonged QT  Abnormal ECG  When compared with ECG of 13-JAN-2024 22:39, (unconfirmed)  Aberrant conduction is now Present  RI interval has increased  Confirmed by Charles An ([id number]) on 1/14/2024 9:24:21 AM    1/13 EKG:  Normal sinus rhythm  Normal ECG  When compared with ECG of 30-NOV-2023 15:56,  RI interval has decreased  Confirmed by Charles An ([id number]) on 1/14/2024 9:22:14 AM    1/13 EKG:  Normal sinus rhythm  Normal ECG  When compared with ECG of 13-JAN-2024 19:12, (unconfirmed)  Significant changes have occurred  Confirmed by Charles An ([id number]) on 1/15/2024 7:51:06 AM      CT abdomen pelvis w contrast   Final Result by Tera Downs MD (01/15 5128)      1. Persistence of distal transverse colocolonic intussusception. No definite lead point although  the colonic wall does appear somewhat thickened without surrounding inflammatory changes. Given persistence, the possibility of a lesion creating a lead    point should be excluded.   2. Small amount of soft tissue density posterior to the aortic bifurcation and left proximal common iliac vessels (2/94) may represent mild adenopathy   3. Questionable trace amount of free air noted adjacent to the liver margin which cannot be definitively connected to adjacent bowel. Therefore, perforation of a hollow viscus is not excluded although no definite source is seen. Colonic diverticulosis is    present but no focal diverticulitis pattern is noted   4. Bladder wall thickening particularly on the right which should be correlated for hematuria and/or clinical concern of cystitis.      I personally discussed this study with DR BRAEDEN FARFAN on 1/15/2024 8:30 AM.                  Workstation performed: [id number]         PE Study with CT abdomen & pelvis with contrast   Final Result by Jules Aguilera MD (01/14 0042)      1.  Very low clot burden acute nonocclusive pulmonary embolism in the left lower lobe anterior basal segmental pulmonary artery extending into subsegmental branches. No additional pulmonary emboli identified. No imaging evidence of right heart strain.   2.  No pneumonia, pneumothorax, or pleural/pericardial effusions.   3.  No thoracic or abdominal aortic aneurysm/dissection. Extensive calcific atherosclerosis.   4.  No evidence for bowel obstruction or mesenteric inflammation seen. Terminal ileum and cecum located in the anterior subhepatic region, similar compared to the prior study. Appendix not definitively visualized but no pericecal inflammatory changes. No    free air or free fluid in the abdomen/pelvis. [place]-colonic intussusception in the mid to distal transverse colon region noted without definitive focal lead point mass identified. This may be related to abnormal/discoordinated peristalsis or other     etiology. Consider follow up oral contrast enhanced examination (after waiting minimal of 3-4 hours for oral contrast to traverse into the distal colon) to ensure resolution. Extensive sigmoid diverticulosis without evidence for diverticulitis.   5.  Small amount of nonspecific soft tissue density material just inferior from the aortic bifurcation and left common iliac artery bifurcation regions again seen, similar compared to the prior exam. Differential considerations include but not limited to    lymphoid tissue, chronic hematoma/blood products, retroperitoneal fibrosis, scar tissue, or other etiology. No active contrast extravasation in this region to suggest an active vascular leak or hemorrhage.   6.  Mildly distended with subtle diffuse wall thickening and mucosal hyperenhancement. Recommend correlation with urinalysis for cystitis.            Findings discussed with HANS Alcantara at 12:32 a.m.      Workstation performed: [id number]         XR chest 1 view portable    (Results Pending)    VAS VENOUS DUPLEX - LOWER LIMB BILATERAL    (Results Pending)     Results from last 7 days   Lab Units 01/13/24 2037   SARS-COV-2  Negative     Results from last 7 days   Lab Units 01/15/24  0925 01/14/24  0551 01/13/24 2037   WBC Thousand/uL 5.56 5.31 4.68   HEMOGLOBIN g/dL 8.5* 8.2* 8.2*   HEMATOCRIT % 29.7* 29.2* 29.0*   PLATELETS Thousands/uL 293 280 272   NEUTROS ABS Thousands/µL  --  3.76 2.95         Results from last 7 days   Lab Units 01/15/24  0925 01/14/24  0551 01/13/24 2037   SODIUM mmol/L 133* 138 137   POTASSIUM mmol/L 4.1 4.1 4.4   CHLORIDE mmol/L 106 110* 109*   CO2 mmol/L 20* 21 21   ANION GAP mmol/L 7 7 7   BUN mg/dL 10 15 18   CREATININE mg/dL 0.88 0.89 0.98   EGFR ml/min/1.73sq m 53 52 47   CALCIUM mg/dL 8.3* 8.4 8.6     Results from last 7 days   Lab Units 01/14/24  0551 01/13/24 2037   AST U/L 11* 13   ALT U/L 6* 8   ALK PHOS U/L 64 66   TOTAL PROTEIN g/dL 6.3* 6.5   ALBUMIN g/dL 3.3* 3.6    TOTAL BILIRUBIN mg/dL 0.29 0.30     Results from last 7 days   Lab Units 01/15/24  0738 01/14/24  2125 01/14/24  1602 01/14/24  1119 01/14/24  0653   POC GLUCOSE mg/dl 114 131 114 124 147*     Results from last 7 days   Lab Units 01/15/24  0925 01/14/24  0551 01/13/24 2037   GLUCOSE RANDOM mg/dL 122 148* 135       Results from last 7 days   Lab Units 01/14/24  0044 01/13/24  2304 01/13/24 2037   HS TNI 0HR ng/L  --   --  16   HS TNI 2HR ng/L  --  23  --    HSTNI D2 ng/L  --  7  --    HS TNI 4HR ng/L 23  --   --    HSTNI D4 ng/L 7  --   --          Results from last 7 days   Lab Units 01/13/24 2037   PROTIME seconds 15.0*   INR  1.16   PTT seconds 33             Results from last 7 days   Lab Units 01/14/24  0752 01/13/24 2037   LACTIC ACID mmol/L 0.9 0.9             Results from last 7 days   Lab Units 01/13/24 2037   BNP pg/mL 679*       Results from last 7 days   Lab Units 01/13/24 2037   LIPASE u/L 15       Results from last 7 days   Lab Units 01/13/24 2304   CLARITY UA  Turbid   COLOR UA  Yellow   SPEC GRAV UA  1.026   PH UA  5.5   GLUCOSE UA mg/dl 1000 (1%)*   KETONES UA mg/dl Negative   BLOOD UA  Negative   PROTEIN UA mg/dl Trace*   NITRITE UA  Negative   BILIRUBIN UA  Negative   UROBILINOGEN UA (BE) mg/dl <2.0   LEUKOCYTES UA  Large*   WBC UA /hpf Innumerable*   RBC UA /hpf 4-10*   BACTERIA UA /hpf Occasional   EPITHELIAL CELLS WET PREP /hpf Occasional   MUCUS THREADS  Innumerable*     Results from last 7 days   Lab Units 01/13/24 2037   INFLUENZA A PCR  Negative   INFLUENZA B PCR  Negative   RSV PCR  Negative       Results from last 7 days   Lab Units 01/13/24 2304   URINE CULTURE  No Growth <1000 cfu/mL         ED Treatment:   Medication Administration from 01/13/2024 2002 to 01/14/2024 0415         Date/Time Order Dose Route Action Action by Comments     01/13/2024 2049 EST acetaminophen (TYLENOL) tablet 650 mg 650 mg Oral Given       01/13/2024 8367 EST lidocaine (LIDODERM) 5 % patch 1 patch 1  patch Topical Medication Applied       01/13/2024 2254 EST fentanyl citrate (PF) 100 MCG/2ML 25 mcg 25 mcg Intravenous Given                  01/14/2024 0128 EST cephalexin (KEFLEX) capsule 500 mg 500 mg Oral Given            Past Medical History:   Diagnosis Date    Asthma     Atypical chest pain     CAD (coronary artery disease)     Candidal intertrigo     CHF (congestive heart failure) (HCC)     Depression     Diabetes mellitus (HCC)     Diabetic neuropathy (HCC)     Diverticulitis     Dyslipidemia     Female bladder prolapse     Gastric ulcer     Glaucoma     HTN (hypertension)     Hyperlipidemia     Hypothyroidism 4/18/2016    RAD (reactive airway disease)      Present on Admission:   Acute pulmonary embolism without acute cor pulmonale (HCC)   Generalized abdominal pain   Acute cystitis without hematuria   Type 2 diabetes mellitus with hyperglycemia, without long-term current use of insulin (HCC)   Iron deficiency anemia   Acquired hypothyroidism   Myofascial muscle pain   Hallucination      Admitting Diagnosis: UTI (urinary tract infection) [N39.0]  Abdominal pain [R10.9]  Colonic intussusception (HCC) [K56.1]  Abnormal CT of the abdomen [R93.5]  Multiple subsegmental pulmonary emboli without acute cor pulmonale (HCC) [I26.94]  Age/Sex: 89 y.o. female  Admission Orders:  Scheduled Medications:  amLODIPine, 5 mg, Oral, Daily  aspirin, 81 mg, Oral, Daily  cefTRIAXone, 1,000 mg, Intravenous, Q24H  cholecalciferol, 1,000 Units, Oral, Daily  vitamin B-12, 250 mcg, Oral, Daily  Diclofenac Sodium, 2 g, Topical, 4x Daily  enoxaparin, 40 mg, Subcutaneous, Daily  famotidine, 10 mg, Oral, HS  gabapentin, 300 mg, Oral, BID  insulin lispro, 1-5 Units, Subcutaneous, 4x Daily (AC & HS)  iron sucrose, 300 mg, Intravenous, Daily  latanoprost, 1 drop, Both Eyes, HS  levothyroxine, 75 mcg, Oral, Early Morning  pantoprazole, 40 mg, Oral, Daily  polyethylene glycol, 17 g, Oral, BID  senna, 1 tablet, Oral, BID      Continuous IV  Infusions:  sodium chloride 0.9 % infusion  Rate: 50 mL/hr Dose: 50 mL/hr  Freq: Continuous Route: IV  Indications of Use: IV Hydration  Last Dose: 50 mL/hr (01/14/24 0503)  Start: 01/14/24 0500 End: 01/15/24 0502            0503      0502-D/C'd               PRN Meds:  acetaminophen, 650 mg, Oral, Q6H PRN  albuterol, 2 puff, Inhalation, Q6H PRN    Npo  IS  I&O  SCD  PT/OT  CHO DIET  OOB  TELE      IP CONSULT TO GASTROENTEROLOGY  IP CONSULT TO ACUTE CARE SURGERY    Network Utilization Review Department  ATTENTION: Please call with any questions or concerns to [phone] and carefully listen to the prompts so that you are directed to the right person. All voicemails are confidential.   For Discharge needs, contact Care Management DC Support Team at [phone] opt. [phone]  Send all requests for admission clinical reviews, approved or denied determinations and any other requests to dedicated fax number below belonging to the campus where the patient is receiving treatment. List of dedicated fax numbers for the Facilities:  FACILITY NAME UR FAX NUMBER   ADMISSION DENIALS (Administrative/Medical Necessity) [phone]   DISCHARGE SUPPORT TEAM (NETWORK) [phone]   PARENT CHILD HEALTH (Maternity/NICU/Pediatrics) [phone]   [facility] [phone]   [facility] [phone]   [facility] [phone]   [facility] [phone]   [facility] [phone]   [facility] [phone]   [facility] [phone]   [facility] [phone]   [facility] [phone]   [facility] [phone]   [facility] [phone]

## 2024-01-15 NOTE — ASSESSMENT & PLAN NOTE
Reports generalized and epigastric abdominal pain  Hx of abdominal pain ,GERD and colitis  CTA: No evidence of bowel obstruction or mesenteric inflammation. Terminal ileum and cecum located in the anterior subhepatic region, similar compared to the prior study. No free air or free fluid.   [place]-colonic intussusception in the mid to distal transverse colon region noted without definitive focal lead point mass identified.  Extensive sigmoid diverticulosis without evidence for diverticulitis.   Small amount of nonspecific soft tissue density material just inferior from the aortic bifurcation and left common iliac artery bifurcation regions again seen, similar compared to the prior exam  Repeat CT abdomen pelvis with continued intussuseption and new trace free air seen, consider repeat CT   Continue PPI and pepcid  Serial abdominal exams  Clear liquid diet for now. Monitor diet tolerance  GI and general surgery following

## 2024-01-15 NOTE — ASSESSMENT & PLAN NOTE
Reports generalized and epigastric abdominal pain  Hx of abdominal pain ,GERD and colitis  CTA: No evidence of bowel obstruction or mesenteric inflammation. Terminal ileum and cecum located in the anterior subhepatic region, similar compared to the prior study. No free air or free fluid.   [place]-colonic intussusception in the mid to distal transverse colon region noted without definitive focal lead point mass identified.  Extensive sigmoid diverticulosis without evidence for diverticulitis.   Small amount of nonspecific soft tissue density material just inferior from the aortic bifurcation and left common iliac artery bifurcation regions again seen, similar compared to the prior exam  Repeat CT abdomen pelvis with continued intussuseption and new trace free air seen, consider repeat CT   Continue PPI and pepcid  Serial abdominal exams  Clear liquid diet for now. Monitor diet tolerance  GI and general surgery following

## 2024-01-15 NOTE — PLAN OF CARE
Problem: OCCUPATIONAL THERAPY ADULT  Goal: Performs self-care activities at highest level of function for planned discharge setting.  See evaluation for individualized goals.  Description: Treatment Interventions: ADL retraining, Functional transfer training, UE strengthening/ROM, Endurance training, Cognitive reorientation, Patient/family training, Equipment evaluation/education, Compensatory technique education, Energy conservation, Activityengagement  Equipment Recommended: Bedside commode       See flowsheet documentation for full assessment, interventions and recommendations.   Note: Limitation: Decreased ADL status, Decreased UE ROM, Decreased UE strength, Decreased cognition, Decreased Safe judgement during ADL, Decreased endurance, Decreased high-level ADLs, Decreased self-care trans  Prognosis: Fair  Assessment: Pt is a 89 y.o. female who [facility] with abdominal pain. Pt with diagnosis of acute PE without acute cor pulmonale, generalized abdominal pain, and acute cystitis without hematuria. Pt  has a past medical history of Asthma, Atypical chest pain, CAD (coronary artery disease), Candidal intertrigo, CHF (congestive heart failure) ([facility]), Depression, Diabetes mellitus ([facility]), Diabetic neuropathy ([facility]), Diverticulitis, Dyslipidemia, Female bladder prolapse, Gastric ulcer, Glaucoma, HTN (hypertension), Hyperlipidemia, Hypothyroidism (4/18/2016), and RAD (reactive airway disease). Pt greeted up in recliner chair for OT evaluation on 01/15/24. Pt resides with daughter in a 2SH with 3 STAR. PTA, Pt reports being I with most ADLs, however, assist from daughter for showering/GILL of ADLs. Pt completes functional mobility with rollator and has assist with all IADLs. Pt reports that her daughter is always home to assist with ADLs. Pt demonstrating the following occupational performance levels: S with UB ADLs, min A with LB ADLs, S-min A with functional transfers, and min A with functional  mobility with RW. Limitations that impact functional performance include decreased ADL status, decreased UE ROM, decreased UE strength, decreased safe judgement during ADLs, decreased cognition, decreased endurance, decreased self care transfers, decreased high level ADLs, and pain. Occupational performance areas to address ADL retraining, functional transfer training, UE strengthening/ROM, endurance training, cognitive reorientation, Pt/caregiver education, equipment evaluation/education, compensatory technique education, energy conservation, and activity engagement . Pt would benefit from continued skilled OT services while in hospital to maximize independence with ADLs. Will continue to follow Pt's progress. Pt would benefit from level III resources (minimal intensity) upon DC to maximize safety and independence with ADLs and functional tasks of choice.     Rehab Resource Intensity Level, OT: III (Minimum Resource Intensity)

## 2024-01-15 NOTE — ASSESSMENT & PLAN NOTE
CTA: Very low clot burden acute nonocclusive pulmonary embolism in the left lower lobe anterior basal segmental pulmonary artery extending into subsegmental branches. No additional pulmonary emboli identified. No imaging evidence of right heart strain.   No hypoxia or dyspnea. Reports chronic intermittent SOB at baseline. Uses 2LNC at hs  Patient had few falls at home last year.  Echo ordered   Venous duplex with left popliteal vein  Start heparin drip, if no bleeding transition to eliquis and also consider pradaxa given better reversal agents   Outpatient f/u with cardiology for zio monitoring

## 2024-01-15 NOTE — PHYSICAL THERAPY NOTE
PT EVALUATION    Pt. Name: Priya Zarate  Pt. Age: 89 y.o.  MRN: [id number]  LENGTH OF STAY: 1      Admitting Diagnoses:   UTI (urinary tract infection) [N39.0]  Abdominal pain [R10.9]  Colonic intussusception (HCC) [K56.1]  Abnormal CT of the abdomen [R93.5]  Multiple subsegmental pulmonary emboli without acute cor pulmonale (HCC) [I26.94]    Past Medical History:   Diagnosis Date    Asthma     Atypical chest pain     CAD (coronary artery disease)     Candidal intertrigo     CHF (congestive heart failure) (HCC)     Depression     Diabetes mellitus (HCC)     Diabetic neuropathy (HCC)     Diverticulitis     Dyslipidemia     Female bladder prolapse     Gastric ulcer     Glaucoma     HTN (hypertension)     Hyperlipidemia     Hypothyroidism 4/18/2016    RAD (reactive airway disease)        Past Surgical History:   Procedure Laterality Date    COLONOSCOPY      ESOPHAGOGASTRODUODENOSCOPY  2011    HYSTERECTOMY      TONSILLECTOMY         Imaging Studies:   VAS VENOUS DUPLEX - LOWER LIMB BILATERAL   Final Result by Tera Moran MD (01/15 4579)      CT abdomen pelvis w contrast   Final Result by Tera Downs MD (01/15 4864)      1. Persistence of distal transverse colocolonic intussusception. No definite lead point although the colonic wall does appear somewhat thickened without surrounding inflammatory changes. Given persistence, the possibility of a lesion creating a lead    point should be excluded.   2. Small amount of soft tissue density posterior to the aortic bifurcation and left proximal common iliac vessels (2/94) may represent mild adenopathy   3. Questionable trace amount of free air noted adjacent to the liver margin which cannot be definitively connected to adjacent bowel. Therefore, perforation of a hollow viscus is not excluded although no definite source is seen. Colonic diverticulosis is    present but no focal diverticulitis pattern is noted   4. Bladder wall thickening particularly on the  right which should be correlated for hematuria and/or clinical concern of cystitis.      I personally discussed this study with DR BRAEDEN FARFAN on 1/15/2024 8:30 AM.                  Workstation performed: [id number]         PE Study with CT abdomen & pelvis with contrast   Final Result by Jules Aguilera MD (01/14 0042)      1.  Very low clot burden acute nonocclusive pulmonary embolism in the left lower lobe anterior basal segmental pulmonary artery extending into subsegmental branches. No additional pulmonary emboli identified. No imaging evidence of right heart strain.   2.  No pneumonia, pneumothorax, or pleural/pericardial effusions.   3.  No thoracic or abdominal aortic aneurysm/dissection. Extensive calcific atherosclerosis.   4.  No evidence for bowel obstruction or mesenteric inflammation seen. Terminal ileum and cecum located in the anterior subhepatic region, similar compared to the prior study. Appendix not definitively visualized but no pericecal inflammatory changes. No    free air or free fluid in the abdomen/pelvis. [place]-colonic intussusception in the mid to distal transverse colon region noted without definitive focal lead point mass identified. This may be related to abnormal/discoordinated peristalsis or other    etiology. Consider follow up oral contrast enhanced examination (after waiting minimal of 3-4 hours for oral contrast to traverse into the distal colon) to ensure resolution. Extensive sigmoid diverticulosis without evidence for diverticulitis.   5.  Small amount of nonspecific soft tissue density material just inferior from the aortic bifurcation and left common iliac artery bifurcation regions again seen, similar compared to the prior exam. Differential considerations include but not limited to    lymphoid tissue, chronic hematoma/blood products, retroperitoneal fibrosis, scar tissue, or other etiology. No active contrast extravasation in this region to suggest an active vascular leak  or hemorrhage.   6.  Mildly distended with subtle diffuse wall thickening and mucosal hyperenhancement. Recommend correlation with urinalysis for cystitis.            Findings discussed with HANS Alcantara at 12:32 a.m.      Workstation performed: [id number]         XR chest 1 view portable   Final Result by Mario Valle MD (01/15 1146)      No acute cardiopulmonary disease.      I have personally reviewed this study including all images.  / R.J.F.                  Resident: OWEN Mattson I, the attending radiologist, have reviewed the images and agree with the final report above.      Workstation performed: [id number]               01/15/24 1330   PT Last Visit   PT Visit Date 01/15/24   Note Type   Note type Evaluation   Pain Assessment   Pain Score No Pain   Restrictions/Precautions   Weight Bearing Precautions Per Order No   Other Precautions Chair Alarm;Bed Alarm;Multiple lines;Fall Risk;Hard of hearing   Home Living   Type of Home House   Home Layout Two level;Able to live on main level with bedroom/bathroom;Performs ADLs on one level;Stairs to enter with rails  (2-3STE w/ HR; pt has 1st floor set up w/ bed/bath access)   Bathroom Shower/Tub Walk-in shower   Bathroom Toilet Standard   Bathroom Equipment Tub transfer bench   Home Equipment Walker  (rollator)   Prior Function   Level of [place] Independent with functional mobility;Needs assistance with ADLs;Needs assistance with IADLS  (ambulates w/ rollator)   Lives With Daughter   Receives Help From Family   Falls in the last 6 months 1 to 4  (4x)   Vocational Retired   Comments (-) ; (-) home alone   General   Family/Caregiver Present No   Cognition   Arousal/Participation Alert   Orientation Level Oriented to person;Oriented to place;Oriented to time   Following Commands Follows one step commands without difficulty   Comments pleasant & cooperative; [place]   Subjective   Subjective Pt agreeable to PT/OT evals.   RUE Assessment   RUE  Assessment WFL  (3+/5 grossly except shoulder 3-/5; shoulder WFL PROM)   LUE Assessment   LUE Assessment WFL  (3+/5 grossly)   RLE Assessment   RLE Assessment WFL  (3+/5 grossly)   LLE Assessment   LLE Assessment WFL  (3+/5 grossly)   Coordination   Movements are Fluid and Coordinated 1   Sensation WFL   Bed Mobility   Supine to Sit Unable to assess   Sit to Supine Unable to assess   Additional Comments pt OOB in chair pre & post-session   Transfers   Sit to Stand 5  Supervision   Additional items Armrests;Increased time required;Verbal cues   Stand to Sit 5  Supervision   Additional items Armrests;Increased time required;Verbal cues   Additional Comments cues for techniques & safety   Ambulation/Elevation   Gait pattern Forward Flexion;Wide KATALINA;Decreased foot clearance;Excessively slow   Gait Assistance 4  Minimal assist   Additional items Assist x 1;Verbal cues;Tactile cues   Assistive Device Rolling walker   Distance 100'x1   Ambulation/Elevation Additional Comments unsteady gait but no gross LOB noted; pt require cues to stay within RW KATALINA especially during turns; pt reports B/L knee weakness & feels like her knees are giving out & notes her current gait is not at baseline   Balance   Static Sitting Fair +   Dynamic Sitting Fair   Static Standing Fair -  (w/ RW)   Dynamic Standing Poor +  (w/ RW)   Ambulatory Poor +  (w/ RW)   Endurance Deficit   Endurance Deficit Yes   Endurance Deficit Description pt c/o B/L knee weakness   Activity Tolerance   Activity Tolerance Treatment limited secondary to medical complications (Comment);Other (Comment)  (B/L knee weakness)   Nurse Made Aware yes, [place]   Assessment   Prognosis Fair   Problem List Decreased strength;Decreased endurance;Impaired balance;Decreased mobility;Impaired hearing;Impaired judgement   Assessment Pt. 89 y.o.female presented w/ c/o severe abdominal pain. CT showed cecal intussuception & acute PE. Pt admitted for Acute pulmonary embolism without  "acute cor pulmonale (HCC) w/ UTI (urinary tract infection) & Colonic intussusception. Pt referred to PT for mobility assessment & D/C planning w/ orders of Up and OOB as tolerated . Please see above for information re: home set-up & PLOF as well as objective findings during PT assessment. PTA, pt reports being  fairly I w/ functional mobility w/ rollator; requires assistance w/ ADL's at baseline . On eval, pt functioning below baseline hence will continue skilled PT to improve function & safety. Pt require S for transfers however require minAx1 for amb w/ RW + cues for techniques & safety. Gait deviations as above but no gross LOB noted. Pt require cues to stay within RW KATALINA. Pt reports B/L knee weakness & feels like her knees are giving out during amb hence dec amb tolerance. Pt notes her current gait is not at baseline. The patient's AM-PAC Basic Mobility Inpatient Short Form Raw Score is 18. A Raw score of greater than 16 suggests the patient may benefit from discharge to home. Please also refer to the recommendation of the Physical Therapist for safe discharge planning. From PT standpoint, will recommend Level III (minimum resource intensity) rehab services at D/C. No SOB & dizziness reported t/o session. Nsg staff most recent vital signs as follows: /80   Pulse 76   Temp 97.7 °F (36.5 °C) (Oral)   Resp 18   Ht 5' 2\" (1.575 m)   Wt 68.8 kg (151 lb 10.8 oz)   SpO2 97%   BMI 27.74 kg/m² . At end of session, pt OOB in chair in stable condition, call bell & phone in reach, chair alarm activated, all lines intact. Fall precautions reinforced w/ good understanding. CM to follow. Nsg staff to continue to mobilized pt (OOB in chair for all meals & ambulate in room/unit) as tolerated to prevent further decline in function. Will also recommend Restorative for daily amb &/or daily OOB in chair as appropriate. Nsg notified.    Barriers to Discharge Inaccessible home environment   Barriers to Discharge Comments " STAR   Goals   Patient Goals to get better   [facility] Expiration Date 01/25/24   Short Term Goal #1 Goals to be met in 10 days; pt will be able to: 1) inc strength & balance by 1/2 grade to improve overall functional mobility & dec fall risk; 2) inc bed mobility to modified I for pt to be able to get in/OOB safely w/ proper techniques 100% of the time, to dec caregiver burden & safely function at home; 3) inc transfers to modified I for pt to transition safely from one surface to another w/o % of the time, to dec caregiver burden & safely function at home; 4) inc amb w/ RW approx. >250' w/ S for pt to ambulate community distances w/o any % of the time, to dec caregiver burden & safely function at home; 5) negotiate stairs w/ S for inc safety during stair mgt inside/outside of home & dec caregiver burden; 6) pt/caregiver ed   PT Treatment Day 0   Plan   Treatment/Interventions Functional transfer training;LE strengthening/ROM;Elevations;Therapeutic exercise;Endurance training;Patient/family training;Bed mobility;Gait training;Spoke to nursing;OT   PT Frequency 3-5x/wk   Discharge Recommendation   Rehab Resource Intensity Level, PT III (Minimum Resource Intensity)   Equipment Recommended Walker  (pt has)   Additional Comments restorative for daily amb   AM-PAC Basic Mobility Inpatient   Turning in Flat Bed Without Bedrails 3   Lying on Back to Sitting on Edge of Flat Bed Without Bedrails 3   Moving Bed to Chair 3   Standing Up From Chair Using Arms 3   Walk in Room 3   Climb 3-5 Stairs With Railing 3   Basic Mobility Inpatient Raw Score 18   Basic Mobility Standardized Score 41.05   Highest Level Of Mobility   JH-HLM Goal 6: Walk 10 steps or more   JH-HLM Achieved 7: Walk 25 feet or more   End of Consult   Patient Position at End of Consult Bedside chair;Bed/Chair alarm activated;All needs within reach   End of Consult Comments Pt in stable condition. All needs in reach. All lines intact. Chair alarm  activated.   Hx/personal factors: co-morbidities, inaccessible home, advanced age, mutliple lines, use of AD, h/o of falls, fall risk, and assist w/ ADL's  Examination: dec mobility, dec balance, dec endurance, dec amb, risk for falls  Clinical: unpredictable (ongoing medical status, abnormal lab values, and risk for falls)  Complexity: high    Lisandro Montana

## 2024-01-15 NOTE — OCCUPATIONAL THERAPY NOTE
Occupational Therapy Evaluation     Patient Name: Priya Zarate  Today's Date: 1/15/2024  Problem List  Principal Problem:    Acute pulmonary embolism without acute cor pulmonale (HCC)  Active Problems:    Aortic stenosis    Hallucination    Acquired hypothyroidism    Type 2 diabetes mellitus with hyperglycemia, without long-term current use of insulin (HCC)    Myofascial muscle pain    Iron deficiency anemia    Generalized abdominal pain    Acute cystitis without hematuria    Acute deep vein thrombosis (DVT) of popliteal vein of left lower extremity ([facility])    Goals of care, counseling/discussion    Past Medical History  Past Medical History:   Diagnosis Date    Asthma     Atypical chest pain     CAD (coronary artery disease)     Candidal intertrigo     CHF (congestive heart failure) (HCC)     Depression     Diabetes mellitus (HCC)     Diabetic neuropathy (HCC)     Diverticulitis     Dyslipidemia     Female bladder prolapse     Gastric ulcer     Glaucoma     HTN (hypertension)     Hyperlipidemia     Hypothyroidism 4/18/2016    RAD (reactive airway disease)      Past Surgical History  Past Surgical History:   Procedure Laterality Date    COLONOSCOPY      ESOPHAGOGASTRODUODENOSCOPY  2011    HYSTERECTOMY      TONSILLECTOMY        01/15/24 1410   OT Last Visit   OT Visit Date 01/15/24   Note Type   Note type Evaluation   Pain Assessment   Pain Assessment Tool 0-10   Pain Score No Pain   Restrictions/Precautions   Weight Bearing Precautions Per Order No   Other Precautions Pain;Fall Risk;Visual impairment;Chair Alarm;Bed Alarm;Multiple lines;Hard of hearing   Home Living   Type of Home House   Home Layout Two level;Performs ADLs on one level;Able to live on main level with bedroom/bathroom;Stairs to enter with rails   Bathroom Shower/Tub Walk-in shower   Bathroom Toilet Standard   Bathroom Equipment Tub transfer bench;Grab bars in shower   Home Equipment Walker  (rollator)   Additional Comments Pt resides with  daughter in a 2SH with 3 STAR.   Prior Function   Level of [place] Independent with functional mobility;Needs assistance with ADLs;Needs assistance with IADLS   Lives With Daughter   Receives Help From Family   IADLs Family/Friend/Other provides transportation;Family/Friend/Other provides meals;Family/Friend/Other provides medication management   Falls in the last 6 months 1 to 4  (x4)   Vocational Retired   Comments PTA, Pt reports being I with most ADLs, however, assist from daughter for showering/GILL of ADLs. Pt completes functional mobility with rollator and has assist with all IADLs. Pt reports that her daughter is always home to assist with ADLs.   Lifestyle   Autonomy A with ADLs and functional mobility with rollator   Reciprocal Relationships Supportive daughter   Service to Others Retired   Intrinsic Gratification Enjoys reading/TV, however, has been having difficultly 2* to glacoma   ADL   Where Assessed Chair   Eating Assistance 7  Independent   Grooming Assistance 5  Supervision/Setup   UB Bathing Assistance 5  Supervision/Setup   LB Bathing Assistance 4  Minimal Assistance   UB Dressing Assistance 5  Supervision/Setup   LB Dressing Assistance 4  Minimal Assistance   Toileting Assistance  4  Minimal Assistance   Toileting Deficit Setup;Supervison/safety;Increased time to complete;Bedside commode;Perineal hygiene  (min A with transfer, min A hygiene)   Functional Assistance 4  Minimal Assistance   Functional Deficit Increased time to complete;Setup;Supervision/safety   Bed Mobility   Supine to Sit Unable to assess   Sit to Supine Unable to assess   Additional Comments Pt greeted OOB in recliner chair.   Transfers   Sit to Stand 5  Supervision   Additional items Increased time required;Verbal cues   Stand to Sit 5  Supervision   Additional items Increased time required;Verbal cues   Toilet transfer 4  Minimal assistance   Additional items Assist x 1;Increased time required;Commode  (min A with transfer  to commode 2* to safety)   Additional Comments with RW- Pt requires cues for safety/technique   Functional Mobility   Functional Mobility 4  Minimal assistance   Additional Comments Min AX1 with RW- short within room distances. Pt completed further functional mobility with PT prior to OT session.   Additional items Rolling walker   Balance   Static Sitting Fair +   Dynamic Sitting Fair   Static Standing Fair -   Dynamic Standing Poor +   Ambulatory Poor +   Activity Tolerance   Activity Tolerance Patient tolerated treatment well   Medical Staff Made Aware Lisandro PT- via TT   Nurse Made Aware RN cleared/updated.   RUE Assessment   RUE Assessment WFL  (3-/5 shoulder, distally grossly 3+/5, generalized weakness)   LUE Assessment   LUE Assessment WFL  (3/5 shoulder, distally grossly 3+/5, generalized weakness)   Hand Function   Gross Motor Coordination Functional   Fine Motor Coordination Functional   Sensation   Light Touch No apparent deficits   Vision-Basic Assessment   Visual History Glaucoma   Psychosocial   Psychosocial (WDL) WDL   Cognition   Overall Cognitive Status WFL   Arousal/Participation Cooperative;Alert   Attention Attends with cues to redirect   Orientation Level Oriented X4   Memory Within functional limits   Following Commands Follows one step commands without difficulty   Comments Pt pleasant and cooperative during OT session. Pt limited by [place] and benefits from one-step simple commands.   Assessment   Limitation Decreased ADL status;Decreased UE ROM;Decreased UE strength;Decreased cognition;Decreased Safe judgement during ADL;Decreased endurance;Decreased high-level ADLs;Decreased self-care trans   Prognosis Fair   Assessment Pt is a 89 y.o. female who presented to [facility] on 1/13/2024 with abdominal pain. Pt with diagnosis of acute PE without acute cor pulmonale, generalized abdominal pain, and acute cystitis without hematuria. Pt  has a past medical history of Asthma, Atypical chest pain, CAD  "(coronary artery disease), Candidal intertrigo, CHF (congestive heart failure) (HCC), Depression, Diabetes mellitus (HCC), Diabetic neuropathy (HCC), Diverticulitis, Dyslipidemia, Female bladder prolapse, Gastric ulcer, Glaucoma, HTN (hypertension), Hyperlipidemia, Hypothyroidism (4/18/2016), and RAD (reactive airway disease). Pt greeted up in recliner chair for OT evaluation on 01/15/24. Pt resides with daughter in a 2SH with 3 STRA. PTA, Pt reports being I with most ADLs, however, assist from daughter for showering/GILL of ADLs. Pt completes functional mobility with rollator and has assist with all IADLs. Pt reports that her daughter is always home to assist with ADLs. Pt demonstrating the following occupational performance levels: S with UB ADLs, min A with LB ADLs, S-min A with functional transfers, and min A with functional mobility with RW. Limitations that impact functional performance include decreased ADL status, decreased UE ROM, decreased UE strength, decreased safe judgement during ADLs, decreased cognition, decreased endurance, decreased self care transfers, decreased high level ADLs, and pain. Occupational performance areas to address ADL retraining, functional transfer training, UE strengthening/ROM, endurance training, cognitive reorientation, Pt/caregiver education, equipment evaluation/education, compensatory technique education, energy conservation, and activity engagement . Pt would benefit from continued skilled OT services while in hospital to maximize independence with ADLs. Will continue to follow Pt's progress. Pt would benefit from level III resources (minimal intensity) upon DC to maximize safety and independence with ADLs and functional tasks of choice.   Goals   Patient Goals To eat her \"liquid lunch.\"   LTG Time Frame 10-14   Long Term Goal #1 See goals listed below.   Plan   Treatment Interventions ADL retraining;Functional transfer training;UE strengthening/ROM;Endurance " training;Cognitive reorientation;Patient/family training;Equipment evaluation/education;Compensatory technique education;Energy conservation;Activityengagement   Goal Expiration Date 01/29/24   OT Frequency 2-3x/wk   Discharge Recommendation   Rehab Resource Intensity Level, OT III (Minimum Resource Intensity)   Equipment Recommended Bedside commode   Commode Type Standard   Additional Comments 2 The patient's raw score on the AM-PAC Daily Activity Inpatient Short Form is 19. A raw score of greater than or equal to 19 suggests the patient may benefit from discharge to home. Please refer to the recommendation of the Occupational Therapist for safe discharge planning.   AM-PAC Daily Activity Inpatient   Lower Body Dressing 3   Bathing 3   Toileting 3   Upper Body Dressing 3   Grooming 3   Eating 4   Daily Activity Raw Score 19   Daily Activity Standardized Score (Calc for Raw Score >=11) 40.22   -PAC Applied Cognition Inpatient   Following a Speech/Presentation 3   Understanding Ordinary Conversation 4   Taking Medications 3   Remembering Where Things Are Placed or Put Away 3   Remembering List of 4-5 Errands 2   Taking Care of Complicated Tasks 1   Applied Cognition Raw Score 16   Applied Cognition Standardized Score 35.03   End of Consult   Education Provided Yes   Patient Position at End of Consult Bedside chair;All needs within reach;Bed/Chair alarm activated   Nurse Communication Nurse aware of consult       GOALS:  Pt will complete UB ADLs with S in order to maximize participation with ADLs.   Pt will complete LB ADLs with S in order to maximize safety with ADLs.   Pt will complete toileting routine (transfer, hygiene, and clothing management) with S in order to return to prior level of function.  Pt will complete bed mobility with I in order to maximize participation with ADLs.   Pt will complete functional transfers at I level in order to increase participation with ADLs.  Pt will increase dynamic standing  balance to F in order to increase safety with ADLs.  Pt will increase standing tolerance x10 min in order to increase participation with ADLs.   Pt will complete functional mobility with AD PRN for item retrieval task at Key level in order to increase participation with ADLs.  Pt will demonstrate G energy conservation techniques with ADLs/IADLs in order to reduce the risk of falls.  Pt will be attentive 100% of the time for ongoing functional/formal cognitive assessment to assist with safe dc planning prn.    Kaye Boyd MS, OTR/L

## 2024-01-15 NOTE — ASSESSMENT & PLAN NOTE
Lab Results   Component Value Date    HGBA1C 9.4 (H) 12/12/2023     ISS inpatient     Recent Labs     01/14/24  2125 01/15/24  0738 01/15/24  1108 01/15/24  1528   POCGLU 131 114 114 211*         Blood Sugar Average: Last 72 hrs:  (P) [id number]

## 2024-01-15 NOTE — ASSESSMENT & PLAN NOTE
Discussed with daughter and patient goals of care   Planning for family meeting tomorrow at 10 AM with patient's daughter and son along with Gi, surgery and internal medicine team   Both patient and daughter have stated that they would want everything done including surgery. Discussed further with daughter and the families main goal is to control their mother's pain  We discussed risk of bleeding on heparin drip and anticoagulation at this time they wish to proceed

## 2024-01-15 NOTE — ASSESSMENT & PLAN NOTE
Hx recurrent UTI  CT: Mildly distended with subtle diffuse wall thickening and mucosal hyperenhancement. Recommend correlation with urinalysis for cystitis   UA with WBC, large leuks, neg nitrites  Started on IV antibiotics, discontinue urine culture with no growth

## 2024-01-15 NOTE — ASSESSMENT & PLAN NOTE
Repeat echo ordered   Severe aortic stenosis though to be contributing to patient's SOB  Recommended medical management

## 2024-01-15 NOTE — PROGRESS NOTES
Received incoming ADT alert regarding pt admission at [facility] on 1/13 and current. Chart review performed. Will follow.

## 2024-01-15 NOTE — PROGRESS NOTES
"Progress Note -General surgery  : General surgery Resident on [facility]     Priya JODY Zarate 89 y.o. female MRN: [id number]  Unit/Bed#: E5 -01 Encounter: [id number]    Assessment  89 y.o. female with [employer] intussusception    Vitals signs within normal limits and stable on room air    Urine output 100 cc documented plus unmeasured    WBC pending from 5.3  Hemoglobin pending from 8.2  Creatinine pending from 0.89    Plan:  Continue clear liquid diet per medicine  Monitor abdominal exam  Follow-up GI recommendations  PT/OT  Encourage ambulation/out of bed, 3 times daily  Pain control as needed  Antiemetic as needed  Rest of care per primary team  Disposition Plan -per primary team    Subjective/Objective     Subjective: No acute events overnight.  Patient seen and examined at bedside.  Patient denies pain at this time.  Patient expressed no acute concerns.  Patient denies nausea vomiting fevers chills and shortness of breath.  Patient is urinating passing flatus and having bowel movements at this time.      Objective:     Physical Exam:  GEN: NAD, resting comfortably  HEENT: MMM  CV: Well-perfused regular rate  Lung: Normal effort on room air  Ab: Soft, nontender, nondistended  Extrem: No lower extremity edema bilaterally  Neuro: A+Ox3     Vitals: Temp:  [97.6 °F (36.4 °C)-98.2 °F (36.8 °C)] 97.7 °F (36.5 °C)  HR:  [71-82] 78  Resp:  [14-18] 18  BP: ()/(58-78) 152/74  Body mass index is 27.74 kg/m².    I/O         01/13 0701 01/14 0700 01/14 0701  01/15 0700 01/15 0701 01/16 0700    Urine (mL/kg/hr)  500 (0.3)     Total Output  500     Net  -500                      Lab, Imaging and other studies: I have personally reviewed pertinent reports.  , CBC with diff: No results found for: \"WBC\", \"HGB\", \"HCT\", \"MCV\", \"PLT\", \"ADJUSTEDWBC\", \"RBC\", \"MCH\", \"MCHC\", \"RDW\", \"MPV\", \"NRBC\", BMP/CMP: No results found for: \"SODIUM\", \"K\", \"CL\", \"CO2\", \"ANIONGAP\", \"BUN\", \"CREATININE\", \"GLUCOSE\", " "\"CALCIUM\", \"AST\", \"ALT\", \"ALKPHOS\", \"PROT\", \"BILITOT\", \"EGFR\"  VTE Mechanical Prophylaxis: sequential compression device    Xavier Luke MD  1/15/2024 7:50 AM    "

## 2024-01-16 ENCOUNTER — APPOINTMENT (INPATIENT)
Dept: NON INVASIVE DIAGNOSTICS | Facility: HOSPITAL | Age: 89
DRG: 299 | End: 2024-01-16
Payer: COMMERCIAL

## 2024-01-16 LAB
AORTIC ROOT: 2.8 CM
AORTIC VALVE MEAN VELOCITY: 34.3 M/S
APICAL FOUR CHAMBER EJECTION FRACTION: 66 %
APTT PPP: 114 SECONDS (ref 23–37)
APTT PPP: 139 SECONDS (ref 23–37)
APTT PPP: 179 SECONDS (ref 23–37)
ASCENDING AORTA: 4 CM
AV AREA BY CONTINUOUS VTI: 0.5 CM2
AV AREA PEAK VELOCITY: 0.4 CM2
AV LVOT MEAN GRADIENT: 1 MMHG
AV LVOT PEAK GRADIENT: 2 MMHG
AV MEAN GRADIENT: 50 MMHG
AV PEAK GRADIENT: 79 MMHG
AV REGURGITATION PRESSURE HALF TIME: 370 MS
AV VALVE AREA: 0.47 CM2
AV VELOCITY RATIO: 0.14
BSA FOR ECHO PROCEDURE: 1.7 M2
DOP CALC AO PEAK VEL: 4.45 M/S
DOP CALC AO VTI: 113.54 CM
DOP CALC LVOT AREA: 3.14 CM2
DOP CALC LVOT CARDIAC INDEX: 2.2 L/MIN/M2
DOP CALC LVOT CARDIAC OUTPUT: 3.74 L/MIN
DOP CALC LVOT DIAMETER: 2 CM
DOP CALC LVOT PEAK VEL VTI: 17.07 CM
DOP CALC LVOT PEAK VEL: 0.63 M/S
DOP CALC LVOT STROKE INDEX: 32.4 ML/M2
DOP CALC LVOT STROKE VOLUME: 53.6
E WAVE DECELERATION TIME: 138 MS
E/A RATIO: 0.62
FRACTIONAL SHORTENING: 25 (ref 28–44)
GLUCOSE SERPL-MCNC: 107 MG/DL (ref 65–140)
GLUCOSE SERPL-MCNC: 129 MG/DL (ref 65–140)
GLUCOSE SERPL-MCNC: 166 MG/DL (ref 65–140)
GLUCOSE SERPL-MCNC: 98 MG/DL (ref 65–140)
INTERVENTRICULAR SEPTUM IN DIASTOLE (PARASTERNAL SHORT AXIS VIEW): 1.3 CM
INTERVENTRICULAR SEPTUM: 1.3 CM (ref 0.6–1.1)
LAAS-AP2: 31.4 CM2
LAAS-AP4: 23.8 CM2
LEFT ATRIUM SIZE: 4.6 CM
LEFT ATRIUM VOLUME (MOD BIPLANE): 113 ML
LEFT ATRIUM VOLUME INDEX (MOD BIPLANE): 66.5 ML/M2
LEFT INTERNAL DIMENSION IN SYSTOLE: 3.6 CM (ref 2.1–4)
LEFT VENTRICLE DIASTOLIC VOLUME (MOD BIPLANE): 87 ML
LEFT VENTRICLE DIASTOLIC VOLUME INDEX (MOD BIPLANE): 51.2 ML/M2
LEFT VENTRICLE SYSTOLIC VOLUME (MOD BIPLANE): 35 ML
LEFT VENTRICLE SYSTOLIC VOLUME INDEX (MOD BIPLANE): 20.6 ML/M2
LEFT VENTRICULAR INTERNAL DIMENSION IN DIASTOLE: 4.8 CM (ref 3.5–6)
LEFT VENTRICULAR POSTERIOR WALL IN END DIASTOLE: 1.2 CM
LEFT VENTRICULAR STROKE VOLUME: 54 ML
LV EF: 60 %
LVSV (TEICH): 54 ML
MV E'TISSUE VEL-SEP: 5 CM/S
MV PEAK A VEL: 0.97 M/S
MV PEAK E VEL: 60 CM/S
MV STENOSIS PRESSURE HALF TIME: 40 MS
MV VALVE AREA P 1/2 METHOD: 5.5
RIGHT ATRIUM AREA SYSTOLE A4C: 16.1 CM2
RIGHT VENTRICLE ID DIMENSION: 3.8 CM
SL CV AV DECELERATION TIME RETROGRADE: 1275 MS
SL CV AV PEAK GRADIENT RETROGRADE: 57 MMHG
SL CV LEFT ATRIUM LENGTH A2C: 6.9 CM
SL CV LV EF: 52
SL CV PED ECHO LEFT VENTRICLE DIASTOLIC VOLUME (MOD BIPLANE) 2D: 108 ML
SL CV PED ECHO LEFT VENTRICLE SYSTOLIC VOLUME (MOD BIPLANE) 2D: 54 ML
TR MAX PG: 30 MMHG
TR PEAK VELOCITY: 2.8 M/S
TRICUSPID ANNULAR PLANE SYSTOLIC EXCURSION: 2.6 CM
TRICUSPID VALVE PEAK REGURGITATION VELOCITY: 2.76 M/S

## 2024-01-16 PROCEDURE — 85730 THROMBOPLASTIN TIME PARTIAL: CPT | Performed by: INTERNAL MEDICINE

## 2024-01-16 PROCEDURE — 82948 REAGENT STRIP/BLOOD GLUCOSE: CPT

## 2024-01-16 PROCEDURE — 93306 TTE W/DOPPLER COMPLETE: CPT

## 2024-01-16 PROCEDURE — 99231 SBSQ HOSP IP/OBS SF/LOW 25: CPT | Performed by: SURGERY

## 2024-01-16 PROCEDURE — 99232 SBSQ HOSP IP/OBS MODERATE 35: CPT | Performed by: INTERNAL MEDICINE

## 2024-01-16 RX ADMIN — IRON SUCROSE 300 MG: 20 INJECTION, SOLUTION INTRAVENOUS at 10:00

## 2024-01-16 RX ADMIN — ASPIRIN 81 MG: 81 TABLET, COATED ORAL at 09:59

## 2024-01-16 RX ADMIN — Medication 1000 UNITS: at 10:00

## 2024-01-16 RX ADMIN — HEPARIN SODIUM 10 UNITS/KG/HR: 10000 INJECTION, SOLUTION INTRAVENOUS at 16:57

## 2024-01-16 RX ADMIN — GABAPENTIN 300 MG: 300 CAPSULE ORAL at 18:42

## 2024-01-16 RX ADMIN — CYANOCOBALAMIN TAB 500 MCG 250 MCG: 500 TAB at 09:59

## 2024-01-16 RX ADMIN — PANTOPRAZOLE SODIUM 40 MG: 40 TABLET, DELAYED RELEASE ORAL at 10:00

## 2024-01-16 RX ADMIN — FAMOTIDINE 10 MG: 20 TABLET ORAL at 23:29

## 2024-01-16 RX ADMIN — LATANOPROST 1 DROP: 50 SOLUTION/ DROPS OPHTHALMIC at 21:50

## 2024-01-16 RX ADMIN — SENNOSIDES 8.6 MG: 8.6 TABLET, FILM COATED ORAL at 10:00

## 2024-01-16 RX ADMIN — DICLOFENAC SODIUM TOPICAL GEL, 1% 2 G: 10 GEL TOPICAL at 12:18

## 2024-01-16 RX ADMIN — SENNOSIDES 8.6 MG: 8.6 TABLET, FILM COATED ORAL at 18:42

## 2024-01-16 RX ADMIN — AMLODIPINE BESYLATE 5 MG: 5 TABLET ORAL at 09:59

## 2024-01-16 RX ADMIN — LEVOTHYROXINE SODIUM 75 MCG: 75 TABLET ORAL at 05:11

## 2024-01-16 RX ADMIN — INSULIN LISPRO 1 UNITS: 100 INJECTION, SOLUTION INTRAVENOUS; SUBCUTANEOUS at 16:58

## 2024-01-16 RX ADMIN — GABAPENTIN 300 MG: 300 CAPSULE ORAL at 10:00

## 2024-01-16 NOTE — PROGRESS NOTES
"Progress Note - General Surgery   Priya Zarate 89 y.o. female MRN: [id number]  Unit/Bed#: E5 -01 Encounter: [id number]    Assessment:  89-year-old female with colocolonic intussusception, found to have DVT of left popliteal vein, PE initially found on admission    Plan:  DM clears  Discuss goals of care at family meeting today 1/16  Monitor abdominal exams  Continue heparin gtt.  PT/OT  Out of bed and ambulation 3 times daily  Antiemetics as needed  Analgesia as needed  Rest of care per primary  Will discuss with attending further plans    Subjective/Objective     Subjective: No acute events overnight.  No nausea or vomiting.  Passing bowel movements having flatus.  Tolerating diet.  Patient reports no pain    Objective: AVSS on 2 L nasal cannula    Blood pressure 124/54, pulse 70, temperature 97.5 °F (36.4 °C), resp. rate 17, height 5' 2\" (1.575 m), weight 68.8 kg (151 lb 10.8 oz), SpO2 94%, not currently breastfeeding.,Body mass index is 27.74 kg/m².    UOP: 400 cc x 1 unmeasured      Invasive Devices       Peripheral Intravenous Line  Duration             Peripheral IV 01/13/24 Dorsal (posterior);Left Forearm 2 days    Peripheral IV 01/13/24 Left Antecubital 2 days                    Physical Exam:  General: No acute distress, alert and oriented  CV: RRR  Lungs: Normal work of breathing   Abdomen: Soft nondistended nontender.  Skin: Warm, dry    Lab, Imaging and other studies:  Recent Labs     01/13/24 2037 01/14/24  0551 01/15/24  0925 01/15/24  1244   WBC 4.68 5.31 5.56 5.76   HGB 8.2* 8.2* 8.5* 8.6*    280 293 294   SODIUM 137 138 133*  --    K 4.4 4.1 4.1  --    * 110* 106  --    CO2 21 21 20*  --    BUN 18 15 10  --    CREATININE 0.98 0.89 0.88  --    GLUC 135 148* 122  --    CALCIUM 8.6 8.4 8.3*  --    AST 13 11*  --   --    ALT 8 6*  --   --    ALKPHOS 66 64  --   --    TBILI 0.30 0.29  --   --        " 5

## 2024-01-16 NOTE — CASE MANAGEMENT
Case Management Assessment & Discharge Planning Note    Patient name Priya Zarate  Location [facility] /E5 -* MRN [id number]  : 1922 Date 2024       Current Admission Date: 2024  Current Admission Diagnosis:Intussusception of colon (HCC)   Patient Active Problem List    Diagnosis Date Noted    Acute deep vein thrombosis (DVT) of popliteal vein of left lower extremity ([facility]) 01/15/2024    Goals of care, counseling/discussion 01/15/2024    Acute pulmonary embolism without acute cor pulmonale ([facility]) 2024    Intussusception of colon ([facility]) 2024    Acute cystitis without hematuria 2024    Iron deficiency anemia 2023    Anemia due to stage 3b chronic kidney disease  2023    CKD (chronic kidney disease) 10/27/2023    GI bleed 2023    Spinal stenosis of lumbar region with neurogenic claudication 2023    Colitis 2022    Generalized body aches 2022    Costochondritis 2022    Pelviectasis of kidney 2022    Disc degeneration, lumbar 2022    Abnormal gait 2022    Lumbar paraspinal muscle spasm 2022    CAIT (acute kidney injury) ([facility]) 2022    Myalgia 2022    Tail bone pain 2021    Chronic nonintractable headache 2021    Vertigo 2021    Hypertension 2021    Hyponatremia 2021    Supraorbital neuralgia 2021    Myofascial muscle pain 2021    Cranial neuralgia 2021    Conductive hearing loss, bilateral 2021    History of recurrent UTIs 2021    Constipation 2021    Left hip pain 2021    Medicare annual wellness visit, subsequent 2020    Diarrhea 2020    Atypical facial pain 2020    Stable angina 2020    Neoplasm of face 2020    Chronic headache 2019    Paroxysmal nocturnal dyspnea 2019    Orthopnea 2019    Shortness of breath 2019    Type 2 diabetes mellitus with hyperglycemia, without  long-term current use of insulin (HCC) 10/05/2019    Vitamin D deficiency 08/22/2019    Pain of both hip joints 06/21/2019    Candidal intertrigo 06/21/2019    Headache 01/09/2019    GERD (gastroesophageal reflux disease) 11/06/2018    Insomnia 10/12/2018    Chronic diastolic congestive heart failure (HCC) 08/05/2018    Dyspnea on minimal exertion 02/17/2018    Shakiness 05/23/2017    Allergic rhinitis 05/02/2017    Hyperlipidemia     Bilateral cold feet 12/28/2016    Aortic stenosis 10/21/2016    Arteriosclerosis of coronary artery     Asthma     Anxiety disorder 08/17/2016    Osteoarthritis 06/21/2016    Acquired hypothyroidism 04/18/2016    Trigeminal neuralgia 03/16/2016    Peripheral vascular disease (HCC)     Glaucoma, open angle, severe stage     Overactive bladder 01/20/2016    Hallucination 11/17/2015    Other fatigue 11/03/2015    Low back pain 11/03/2015    Hearing loss 07/27/2015    Mild cognitive impairment 07/27/2015    Dizziness 07/02/2015    Advanced age 06/03/2015    Irritable bowel 04/24/2015    Female cystocele 04/24/2015    Diverticulosis 04/03/2015    Hiatal hernia 04/03/2015    Ambulatory dysfunction 04/02/2015      LOS (days): 2  Geometric Mean LOS (GMLOS) (days):   Days to GMLOS:     OBJECTIVE:    Risk of Unplanned Readmission Score: 27.7         Current admission status: Inpatient       Preferred Pharmacy:   [facility] - [facility] [address]  [address] [address]  Phone: [phone] Fax: [phone]    [id number]  [place] - [facility]   [address] [facility] [place] [address]  Phone: [phone] Fax: [phone]    Primary Care Provider: Ayah Azul PA-C    Primary Insurance: [employer] RE[employer]  Secondary Insurance:     ASSESSMENT:  Active Health Care Proxies       Coby Zarate Health Care Representative - Daughter   Primary Phone: [phone] (Home)  Mobile Phone: [phone]                 Advance  Directives  Does patient have a Health Care POA?: No  Was patient offered paperwork?: Yes  Does patient currently have a Health Care decision maker?: Yes, please see Health Care Proxy section  Does patient have Advance Directives?: No  Was patient offered paperwork?: Yes  Primary Contact: Coby Zarate (Daughter)  [phone]              Patient Information  Admitted from:: Home  Mental Status: Alert  During Assessment patient was accompanied by: Daughter  Assessment information provided by:: Daughter  Primary Caregiver: Family  Caregiver's Name:: Coby Zarate (Daughter)  [phone] (  Caregiver's Relationship to Patient:: Family Member  Caregiver's Telephone Number:: Coby Zarate (Daughter)  [phone] (  Support Systems: Daughter, Family members, Children  [facility] of Residence: [place]  What city do you live in?: [place]  Home entry access options. Select all that apply.: Stairs  Number of steps to enter home.: 3  Do the steps have railings?: Yes  Type of Current Residence: 2 story home  Upon entering residence, is there a bedroom on the main floor (no further steps)?: Yes  Upon entering residence, is there a bathroom on the main floor (no further steps)?: Yes  Living Arrangements: Lives w/ Daughter    Activities of Daily Living Prior to Admission  Functional Status: Assistance  Completes ADLs independently?: No  Level of ADL dependence: Assistance  Ambulates independently?: No  Level of ambulatory dependence: Assistance  Does patient use assisted devices?: Yes  Assisted Devices (DME) used: Wheelchair, Bedside Commode, Portable Oxygen concentrator, Portable Oxygen tanks  DME Company Name (respiratory supplies): [employer]  O2 Rate(s): 2L at night only and removes after 2-3 hrs per DTR  Does patient currently own DME?: Yes  What DME does the patient currently own?: Walker, Bedside Commode, Portable Oxygen concentrator, Portable Oxygen tanks  Does patient have a history of Outpatient Therapy (PT/OT)?: No  Does  the patient have a history of Short-Term Rehab?: No  Does patient have a history of HHC?: Yes (Year ago)  Does patient currently have HHC?: No         Patient Information Continued  Income Source: Pension/detention  Does patient have prescription coverage?: Yes  Does patient receive dialysis treatments?: No  Does patient have a history of substance abuse?: No  Does patient have a history of Mental Health Diagnosis?: No    PHQ 2/9 Screening   Reviewed PHQ 2/9 Depression Screening Score?: No    Means of Transportation  Means of Transport to Appts:: Family transport      Housing Stability: Low Risk  (1/16/2024)    Housing Stability Vital Sign     Unable to Pay for Housing in the Last Year: No     Number of Places Lived in the Last Year: 1     Unstable Housing in the Last Year: No   Food Insecurity: No Food Insecurity (1/16/2024)    Hunger Vital Sign     Worried About Running Out of Food in the Last Year: Never true     Ran Out of Food in the Last Year: Never true   Transportation Needs: No Transportation Needs (1/16/2024)    PRAPARE - Transportation     Lack of Transportation (Medical): No     Lack of Transportation (Non-Medical): No   Utilities: Not At Risk (1/16/2024)    [facility] Utilities     Threatened with loss of utilities: No       DISCHARGE DETAILS:    Discharge planning discussed with:: Coby Zarate (Daughter)  [phone] (  Freedom of Choice: Yes  Comments - Freedom of Choice: Pending  CM contacted family/caregiver?: Yes  Were Treatment Team discharge recommendations reviewed with patient/caregiver?: Yes     Were patient/caregiver advised of the risks associated with not following Treatment Team discharge recommendations?: Yes    Contacts  Patient Contacts: Coby Zarate (Daughter)  [phone]  Relationship to Patient:: Family  Contact Method: Phone  Phone Number: Coby Zarate (Daughter)  [phone] (    Requested Home Health Care         Is the patient interested in HHC at discharge?: No    Would you  like to participate in our [facility] service program?  : Patient would like more information    Treatment Team Recommendation: Home  Discharge Destination Plan:: Home  Transport at Discharge : Family         Additional Comments: CM spoke with patient's DTR Coby [phone] States patient lives with her. She needs assist with ADLS and ambulation. Utilizes a Walker and has Home Oxygen at 2L just at night but does not use past 2-3 hrs per Daughter. Patient currently doesn't have HHC or an Aide as of last year as she prefers family care only. Per Lobo there is a family care meeting tomorrow and they may want CM to arrange transport home as she is unable to get her in the home as there are roughly 3 stairs to enter. Lobo would also like HHC services, but wants CM to wait until family GOC meeting with MD to discuss referrals.. CM will continue to monitor for d/c needs.

## 2024-01-16 NOTE — PROGRESS NOTES
[facility]  Progress Note  Name: Priya Zarate I  MRN: [id number]  Unit/Bed#: E5 -01 I Date of Admission: 1/13/2024   Date of Service: 1/16/2024 I Hospital Day: 2    Assessment/Plan   Goals of care, counseling/discussion  Assessment & Plan  Discussed with daughter and patient goals of care   Planning for family meeting tomorrow with patient's daughter and son along with Gi, surgery and internal medicine team   Both patient and daughter have stated that they would want everything done including surgery. Discussed further with daughter and the families main goal is to control their mother's pain  We discussed risk of bleeding on heparin drip and anticoagulation at this time they wish to proceed     Acute deep vein thrombosis (DVT) of popliteal vein of left lower extremity (HCC)  Assessment & Plan  Venous duplex showing left popliteal vein occulusion   PE found on admission  Started on heparin drip     Acute cystitis without hematuria  Assessment & Plan  Hx recurrent UTI  CT: Mildly distended with subtle diffuse wall thickening and mucosal hyperenhancement. Recommend correlation with urinalysis for cystitis   UA with WBC, large leuks, neg nitrites  Started on IV antibiotics, discontinue urine culture with no growth       Acute pulmonary embolism without acute cor pulmonale (HCC)  Assessment & Plan  CTA: Very low clot burden acute nonocclusive pulmonary embolism in the left lower lobe anterior basal segmental pulmonary artery extending into subsegmental branches. No additional pulmonary emboli identified. No imaging evidence of right heart strain.   No hypoxia or dyspnea. Reports chronic intermittent SOB at baseline. Uses 2LNC at hs  Patient had few falls at home last year.  Echo ordered   Venous duplex with left popliteal vein  Start heparin drip, if no bleeding transition to eliquis and also consider pradaxa given better reversal agents   Outpatient f/u with cardiology for micaela  monitoring    Iron deficiency anemia  Assessment & Plan  Chronic anemia. Hg at baseline    Myofascial muscle pain  Assessment & Plan  Maintained on gabapentin. Follows with neurology    Type 2 diabetes mellitus with hyperglycemia, without long-term current use of insulin (HCC)  Assessment & Plan  Lab Results   Component Value Date    HGBA1C 9.4 (H) 12/12/2023     ISS inpatient     Recent Labs     01/14/24  2125 01/15/24  0738 01/15/24  1108 01/15/24  1528   POCGLU 131 114 114 211*         Blood Sugar Average: Last 72 hrs:  (P) [id number]      Acquired hypothyroidism  Assessment & Plan  Levothyroxine 75mcg daily     Hallucination  Assessment & Plan  Per outpatient PMD note, daughter reports mother sometimes has hallucinations    Aortic stenosis  Assessment & Plan  Repeat echo ordered   Severe aortic stenosis though to be contributing to patient's SOB  Recommended medical management     * Intussusception of colon (HCC)  Assessment & Plan  Reports generalized and epigastric abdominal pain  Hx of abdominal pain ,GERD and colitis  CTA: No evidence of bowel obstruction or mesenteric inflammation. Terminal ileum and cecum located in the anterior subhepatic region, similar compared to the prior study. No free air or free fluid.   [place]-colonic intussusception in the mid to distal transverse colon region noted without definitive focal lead point mass identified.  Extensive sigmoid diverticulosis without evidence for diverticulitis.   Small amount of nonspecific soft tissue density material just inferior from the aortic bifurcation and left common iliac artery bifurcation regions again seen, similar compared to the prior exam  Repeat CT abdomen pelvis with continued intussuseption and new trace free air seen, consider repeat CT   Continue PPI and pepcid  Serial abdominal exams  Clear liquid diet for now. Monitor diet tolerance  GI and general surgery following                VTE Pharmacologic Prophylaxis: VTE Score:  6 High Risk (Score >/= 5) - Pharmacological DVT Prophylaxis Ordered: heparin drip. Sequential Compression Devices Ordered.    Mobility:   Basic Mobility Inpatient Raw Score: 18  JH-HLM Goal: 6: Walk 10 steps or more  JH-HLM Achieved: 6: Walk 10 steps or more  HLM Goal achieved. Continue to encourage appropriate mobility.    Patient Centered Rounds: I performed bedside rounds with nursing staff today.   Discussions with Specialists or Other Care Team Provider: General surgery, gastroenterology    Education and Discussions with Family / Patient: Updated  (daughter) via phone.    Total Time Spent on Date of Encounter in care of patient: 45 mins. This time was spent on one or more of the following: performing physical exam; counseling and coordination of care; obtaining or reviewing history; documenting in the medical record; reviewing/ordering tests, medications or procedures; communicating with other healthcare professionals and discussing with patient's family/caregivers.    Current Length of Stay: 2 day(s)  Current Patient Status: Inpatient   Certification Statement: The patient will continue to require additional inpatient hospital stay due to treatment of a deception of colon  Discharge Plan: Anticipate discharge in 24-48 hrs to discharge location to be determined pending rehab evaluations.    Code Status: Level 3 - DNAR and DNI    Subjective:   Patient seen and examined bedside, no acute distress or discomfort noted.  Patient denied tenderness to palpation of abdomen and stated that she felt well.  Very pleasant 89-year-old who presented with 10/10 abdominal pain and subsequently found to have intussusception of the colon.  On review of imaging, this does not appear to be acute acute issue.  In discussion with general surgery and gastroenterology, both services are inclined not to perform any invasive procedures.  Recommend conservative management versus colectomy or colonoscopy.  Family meeting to  be held tomorrow at 10 AM to discuss medical management, have discussed with daughter today over the phone who voiced understanding of our concerns regarding invasive procedures and recommendation of conservative management.  Patient also noted to have small PE with additional recommendations to monitor off anticoagulation.  Primary team agrees with that recommendation, but unfortunately she was subsequently found to have occlusive left lower extremity DVT.  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  Hemoglobin appears stable and will discuss with family anticoagulation moving forward.    Objective:     Vitals:   Temp (24hrs), Av.6 °F (36.4 °C), Min:97.5 °F (36.4 °C), Max:97.9 °F (36.6 °C)    Temp:  [97.5 °F (36.4 °C)-97.9 °F (36.6 °C)] 97.5 °F (36.4 °C)  HR:  [63-78] 70  Resp:  [17-18] 17  BP: (118-124)/(54-59) 124/54  SpO2:  [92 %-96 %] 94 %  Body mass index is 27.62 kg/m².     Input and Output Summary (last 24 hours):     Intake/Output Summary (Last 24 hours) at 2024 1335  Last data filed at 1/15/2024 1749  Gross per 24 hour   Intake 420 ml   Output --   Net 420 ml       Physical Exam:   Physical Exam  Vitals and nursing note reviewed.   Constitutional:       General: She is not in acute distress.     Appearance: She is well-developed.   HENT:      Head: Normocephalic and atraumatic.   Eyes:      Conjunctiva/sclera: Conjunctivae normal.   Cardiovascular:      Rate and Rhythm: Normal rate.   Pulmonary:      Effort: Pulmonary effort is normal. No respiratory distress.   Abdominal:      General: There is no distension.      Palpations: Abdomen is soft.      Tenderness: There is no abdominal tenderness. There is no guarding.   Musculoskeletal:         General: No swelling.      Cervical back: Neck supple.   Skin:     General: Skin is warm and dry.   Neurological:      Mental Status: She is alert.   Psychiatric:         Mood and  Affect: Mood normal.            Additional Data:     Labs:  Results from last 7 days   Lab Units 01/15/24  1244 01/15/24  0925 01/14/24  0551   WBC Thousand/uL 5.76   < > 5.31   HEMOGLOBIN g/dL 8.6*   < > 8.2*   HEMATOCRIT % 29.9*   < > 29.2*   PLATELETS Thousands/uL 294   < > 280   NEUTROS PCT %  --   --  70   LYMPHS PCT %  --   --  15   MONOS PCT %  --   --  11   EOS PCT %  --   --  2    < > = values in this interval not displayed.     Results from last 7 days   Lab Units 01/15/24  0925 01/14/24  0551   SODIUM mmol/L 133* 138   POTASSIUM mmol/L 4.1 4.1   CHLORIDE mmol/L 106 110*   CO2 mmol/L 20* 21   BUN mg/dL 10 15   CREATININE mg/dL 0.88 0.89   ANION GAP mmol/L 7 7   CALCIUM mg/dL 8.3* 8.4   ALBUMIN g/dL  --  3.3*   TOTAL BILIRUBIN mg/dL  --  0.29   ALK PHOS U/L  --  64   ALT U/L  --  6*   AST U/L  --  11*   GLUCOSE RANDOM mg/dL 122 148*     Results from last 7 days   Lab Units 01/15/24  1244   INR  1.26*     Results from last 7 days   Lab Units 01/16/24  1122 01/16/24  0751 01/15/24  2057 01/15/24  1528 01/15/24  1108 01/15/24  0738 01/14/24  2125 01/14/24  1602 01/14/24  1119 01/14/24  0653   POC GLUCOSE mg/dl 129 107 91 211* 114 114 131 114 124 147*         Results from last 7 days   Lab Units 01/14/24  0752 01/13/24  2037   LACTIC ACID mmol/L 0.9 0.9       Lines/Drains:  Invasive Devices       Peripheral Intravenous Line  Duration             Peripheral IV 01/13/24 Dorsal (posterior);Left Forearm 2 days    Peripheral IV 01/13/24 Left Antecubital 2 days                          Imaging: Reviewed radiology reports from this admission including: ultrasound(s); CTA chest    Recent Cultures (last 7 days):   Results from last 7 days   Lab Units 01/13/24  2304   URINE CULTURE  No Growth <1000 cfu/mL       Last 24 Hours Medication List:   Current Facility-Administered Medications   Medication Dose Route Frequency Provider Last Rate    acetaminophen  650 mg Oral Q6H PRN DEVEN Ravi      albuterol  2 puff  Inhalation Q6H PRN DEVEN Ravi      amLODIPine  5 mg Oral Daily DEVEN Ravi      aspirin  81 mg Oral Daily DEVEN Ravi      cholecalciferol  1,000 Units Oral Daily Ayana Hammond, DEVEN      vitamin B-12  250 mcg Oral Daily Ayana Hammond, DEVEN      Diclofenac Sodium  2 g Topical 4x Daily DEVEN Ravi      famotidine  10 mg Oral HS DEVEN Ravi      gabapentin  300 mg Oral BID DEVEN Ravi      heparin (porcine)  3-30 Units/kg/hr (Order-Specific) Intravenous Titrated Dionicio Stauffer MD 12 Units/kg/hr (01/16/24 9628)    insulin lispro  1-5 Units Subcutaneous 4x Daily (AC & HS) DEVEN Ravi      latanoprost  1 drop Both Eyes HS DEVEN Ravi      levothyroxine  75 mcg Oral Early Morning DEVEN Ravi      pantoprazole  40 mg Oral Daily DEVEN Ravi      polyethylene glycol  17 g Oral BID Marina Cardozo, DO      senna  1 tablet Oral BID Marina Cardozo, DO          Today, Patient Was Seen By: Dionicio Stauffer MD    **Please Note: This note may have been constructed using a voice recognition system.**

## 2024-01-16 NOTE — PLAN OF CARE
Problem: Potential for Falls  Goal: Patient will remain free of falls  Description: INTERVENTIONS:  - Educate patient/family on patient safety including physical limitations  - Instruct patient to call for assistance with activity   - Consult OT/PT to assist with strengthening/mobility   - Keep Call bell within reach  - Keep bed low and locked with side rails adjusted as appropriate  - Keep care items and personal belongings within reach  - Initiate and maintain comfort rounds  - Make Fall Risk Sign visible to staff  -  Problem: PAIN - ADULT  Goal: Verbalizes/displays adequate comfort level or baseline comfort level  Description: Interventions:  - Encourage patient to monitor pain and request assistance  - Assess pain using appropriate pain scale  - Administer analgesics based on type and severity of pain and evaluate response  - Implement non-pharmacological measures as appropriate and evaluate response  - Consider cultural and social influences on pain and pain management  - Notify physician/advanced practitioner if interventions unsuccessful or patient reports new pain  Outcome: Progressing     Problem: INFECTION - ADULT  Goal: Absence or prevention of progression during hospitalization  Description: INTERVENTIONS:  - Assess and monitor for signs and symptoms of infection  - Monitor lab/diagnostic results  - Monitor all insertion sites, i.e. indwelling lines, tubes, and drains  - Monitor endotracheal if appropriate and nasal secretions for changes in amount and color  - [place] appropriate cooling/warming therapies per order  - Administer medications as ordered  - Instruct and encourage patient and family to use good hand hygiene technique  - Identify and instruct in appropriate isolation precautions for identified infection/condition  Outcome: Progressing     Problem: SAFETY ADULT  Goal: Patient will remain free of falls  Description: INTERVENTIONS:  - Educate patient/family on patient safety including  physical limitations  - Instruct patient to call for assistance with activity   - Consult OT/PT to assist with strengthening/mobility   - Keep Call bell within reach  - Keep bed low and locked with side rails adjusted as appropriate  - Keep care items and personal belongings within reach  - Initiate and maintain comfort rounds  - Make Fall Risk Sign visible to staff  - Apply yellow socks and bracelet for high fall risk patients  - Consider moving patient to room near nurses station  Outcome: Progressing  Goal: Maintain or return to baseline ADL function  Description: INTERVENTIONS:  -  Assess patient's ability to carry out ADLs; assess patient's baseline for ADL function and identify physical deficits which impact ability to perform ADLs (bathing, care of mouth/teeth, toileting, grooming, dressing, etc.)  - Assess/evaluate cause of self-care deficits   - Assess range of motion  - Assess patient's mobility; develop plan if impaired  - Assess patient's need for assistive devices and provide as appropriate  - Encourage maximum independence but intervene and supervise when necessary  - Involve family in performance of ADLs  - Assess for home care needs following discharge   - Consider OT consult to assist with ADL evaluation and planning for discharge  - Provide patient education as appropriate  Outcome: Progressing  Goal: Maintains/Returns to pre admission functional level  Description: INTERVENTIONS:  - Perform AM-PAC 6 Click Basic Mobility/ Daily Activity assessment daily.  - Set and communicate daily mobility goal to care team and patient/family/caregiver.   - Collaborate with rehabilitation services on mobility goals if consulted  - Stand patient 3 times a day  - Ambulate patient 3 times a day  - Out of bed to chair 3 times a day   - Out of bed for meals 3  Problem: DISCHARGE PLANNING  Goal: Discharge to home or other facility with appropriate resources  Description: INTERVENTIONS:  - Identify barriers to discharge  w/patient and caregiver  - Arrange for needed discharge resources and transportation as appropriate  - Identify discharge learning needs (meds, wound care, etc.)  - Arrange for interpretive services to assist at discharge as needed  - Refer to Case Management Department for coordinating discharge planning if the patient needs post-hospital services based on physician/advanced practitioner order or complex needs related to functional status, cognitive ability, or social support system  Outcome: Progressing     Problem: CARDIOVASCULAR - ADULT  Goal: Maintains optimal cardiac output and hemodynamic stability  Description: INTERVENTIONS:  - Monitor I/O, vital signs and rhythm  - Monitor for S/S and trends of decreased cardiac output  - Administer and titrate ordered vasoactive medications to optimize hemodynamic stability  - Assess quality of pulses, skin color and temperature  - Assess for signs of decreased coronary artery perfusion  - Instruct patient to report change in severity of symptoms  Outcome: Progressing  Goal: Absence of cardiac dysrhythmias or at baseline rhythm  Description: INTERVENTIONS:  - Continuous cardiac monitoring, vital signs, obtain 12 lead EKG if ordered  - Administer antiarrhythmic and heart rate control medications as ordered  - Monitor electrolytes and administer replacement therapy as ordered  Outcome: Progressing     Problem: GASTROINTESTINAL - ADULT  Goal: Minimal or absence of nausea and/or vomiting  Description: INTERVENTIONS:  - Administer IV fluids if ordered to ensure adequate hydration  - Maintain NPO status until nausea and vomiting are resolved  - Nasogastric tube if ordered  - Administer ordered antiemetic medications as needed  - Provide nonpharmacologic comfort measures as appropriate  - Advance diet as tolerated, if ordered  - Consider nutrition services referral to assist patient with adequate nutrition and appropriate food choices  Outcome: Progressing  Goal: Maintains or  returns to baseline bowel function  Description: INTERVENTIONS:  - Assess bowel function  - Encourage oral fluids to ensure adequate hydration  - Administer IV fluids if ordered to ensure adequate hydration  - Administer ordered medications as needed  - Encourage mobilization and activity  - Consider nutritional services referral to assist patient with adequate nutrition and appropriate food choices  Outcome: Progressing  Goal: Maintains adequate nutritional intake  Description: INTERVENTIONS:  - Monitor percentage of each meal consumed  - Identify factors contributing to decreased intake, treat as appropriate  - Assist with meals as needed  - Monitor I&O, weight, and lab values if indicated  - Obtain nutrition services referral as needed  Outcome: Progressing     Problem: METABOLIC, FLUID AND ELECTROLYTES - ADULT  Goal: Electrolytes maintained within normal limits  Description: INTERVENTIONS:  - Monitor labs and assess patient for signs and symptoms of electrolyte imbalances  - Administer electrolyte replacement as ordered  - Monitor response to electrolyte replacements, including repeat lab results as appropriate  - Instruct patient on fluid and nutrition as appropriate  Outcome: Progressing  Goal: Fluid balance maintained  Description: INTERVENTIONS:  - Monitor labs   - Monitor I/O and WT  - Instruct patient on fluid and nutrition as appropriate  - Assess for signs & symptoms of volume excess or deficit  Outcome: Progressing  Goal: Glucose maintained within target range  Description: INTERVENTIONS:  - Monitor Blood Glucose as ordered  - Assess for signs and symptoms of hyperglycemia and hypoglycemia  - Administer ordered medications to maintain glucose within target range  - Assess nutritional intake and initiate nutrition service referral as needed  Outcome: Progressing    times a day  - Out of bed for toileting  - Record patient progress and toleration of activity level   Outcome: Progressing   - Apply yellow  socks and bracelet for high fall risk patients  - Consider moving patient to room near nurses station  Outcome: Progressing

## 2024-01-17 LAB
ALBUMIN SERPL BCP-MCNC: 3 G/DL (ref 3.5–5)
ALP SERPL-CCNC: 62 U/L (ref 34–104)
ALT SERPL W P-5'-P-CCNC: 6 U/L (ref 7–52)
ANION GAP SERPL CALCULATED.3IONS-SCNC: 7 MMOL/L
APTT PPP: 52 SECONDS (ref 23–37)
APTT PPP: 58 SECONDS (ref 23–37)
APTT PPP: 77 SECONDS (ref 23–37)
AST SERPL W P-5'-P-CCNC: 11 U/L (ref 13–39)
BASOPHILS # BLD AUTO: 0.04 THOUSANDS/ÂΜL (ref 0–0.1)
BASOPHILS NFR BLD AUTO: 1 % (ref 0–1)
BILIRUB SERPL-MCNC: 0.26 MG/DL (ref 0.2–1)
BUN SERPL-MCNC: 8 MG/DL (ref 5–25)
CALCIUM ALBUM COR SERPL-MCNC: 9.1 MG/DL (ref 8.3–10.1)
CALCIUM SERPL-MCNC: 8.3 MG/DL (ref 8.4–10.2)
CHLORIDE SERPL-SCNC: 112 MMOL/L (ref 96–108)
CO2 SERPL-SCNC: 20 MMOL/L (ref 21–32)
CREAT SERPL-MCNC: 0.85 MG/DL (ref 0.6–1.3)
EOSINOPHIL # BLD AUTO: 0.23 THOUSAND/ÂΜL (ref 0–0.61)
EOSINOPHIL NFR BLD AUTO: 5 % (ref 0–6)
ERYTHROCYTE [DISTWIDTH] IN BLOOD BY AUTOMATED COUNT: 18.1 % (ref 11.6–15.1)
GFR SERPL CREATININE-BSD FRML MDRD: 56 ML/MIN/1.73SQ M
GLUCOSE SERPL-MCNC: 111 MG/DL (ref 65–140)
GLUCOSE SERPL-MCNC: 115 MG/DL (ref 65–140)
GLUCOSE SERPL-MCNC: 123 MG/DL (ref 65–140)
GLUCOSE SERPL-MCNC: 143 MG/DL (ref 65–140)
GLUCOSE SERPL-MCNC: 198 MG/DL (ref 65–140)
HCT VFR BLD AUTO: 27.3 % (ref 34.8–46.1)
HGB BLD-MCNC: 8.1 G/DL (ref 11.5–15.4)
IMM GRANULOCYTES # BLD AUTO: 0.04 THOUSAND/UL (ref 0–0.2)
IMM GRANULOCYTES NFR BLD AUTO: 1 % (ref 0–2)
LYMPHOCYTES # BLD AUTO: 0.84 THOUSANDS/ÂΜL (ref 0.6–4.47)
LYMPHOCYTES NFR BLD AUTO: 17 % (ref 14–44)
MAGNESIUM SERPL-MCNC: 2 MG/DL (ref 1.9–2.7)
MCH RBC QN AUTO: 22.8 PG (ref 26.8–34.3)
MCHC RBC AUTO-ENTMCNC: 29.7 G/DL (ref 31.4–37.4)
MCV RBC AUTO: 77 FL (ref 82–98)
MONOCYTES # BLD AUTO: 0.61 THOUSAND/ÂΜL (ref 0.17–1.22)
MONOCYTES NFR BLD AUTO: 13 % (ref 4–12)
NEUTROPHILS # BLD AUTO: 3.06 THOUSANDS/ÂΜL (ref 1.85–7.62)
NEUTS SEG NFR BLD AUTO: 63 % (ref 43–75)
NRBC BLD AUTO-RTO: 0 /100 WBCS
PHOSPHATE SERPL-MCNC: 2.8 MG/DL (ref 2.3–4.1)
PLATELET # BLD AUTO: 272 THOUSANDS/UL (ref 149–390)
PMV BLD AUTO: 10.3 FL (ref 8.9–12.7)
POTASSIUM SERPL-SCNC: 3.7 MMOL/L (ref 3.5–5.3)
PROT SERPL-MCNC: 5.5 G/DL (ref 6.4–8.4)
RBC # BLD AUTO: 3.55 MILLION/UL (ref 3.81–5.12)
SODIUM SERPL-SCNC: 139 MMOL/L (ref 135–147)
WBC # BLD AUTO: 4.82 THOUSAND/UL (ref 4.31–10.16)

## 2024-01-17 PROCEDURE — 82948 REAGENT STRIP/BLOOD GLUCOSE: CPT

## 2024-01-17 PROCEDURE — 85730 THROMBOPLASTIN TIME PARTIAL: CPT | Performed by: INTERNAL MEDICINE

## 2024-01-17 PROCEDURE — 80053 COMPREHEN METABOLIC PANEL: CPT | Performed by: INTERNAL MEDICINE

## 2024-01-17 PROCEDURE — 99232 SBSQ HOSP IP/OBS MODERATE 35: CPT | Performed by: INTERNAL MEDICINE

## 2024-01-17 PROCEDURE — 83735 ASSAY OF MAGNESIUM: CPT | Performed by: INTERNAL MEDICINE

## 2024-01-17 PROCEDURE — 97110 THERAPEUTIC EXERCISES: CPT

## 2024-01-17 PROCEDURE — 99231 SBSQ HOSP IP/OBS SF/LOW 25: CPT | Performed by: SURGERY

## 2024-01-17 PROCEDURE — 85025 COMPLETE CBC W/AUTO DIFF WBC: CPT | Performed by: INTERNAL MEDICINE

## 2024-01-17 PROCEDURE — 84100 ASSAY OF PHOSPHORUS: CPT | Performed by: INTERNAL MEDICINE

## 2024-01-17 PROCEDURE — 97116 GAIT TRAINING THERAPY: CPT

## 2024-01-17 RX ADMIN — GABAPENTIN 300 MG: 300 CAPSULE ORAL at 18:14

## 2024-01-17 RX ADMIN — Medication 1000 UNITS: at 08:50

## 2024-01-17 RX ADMIN — FAMOTIDINE 10 MG: 20 TABLET ORAL at 21:16

## 2024-01-17 RX ADMIN — PANTOPRAZOLE SODIUM 40 MG: 40 TABLET, DELAYED RELEASE ORAL at 08:50

## 2024-01-17 RX ADMIN — GABAPENTIN 300 MG: 300 CAPSULE ORAL at 08:50

## 2024-01-17 RX ADMIN — LATANOPROST 1 DROP: 50 SOLUTION/ DROPS OPHTHALMIC at 21:19

## 2024-01-17 RX ADMIN — INSULIN LISPRO 1 UNITS: 100 INJECTION, SOLUTION INTRAVENOUS; SUBCUTANEOUS at 21:14

## 2024-01-17 RX ADMIN — CYANOCOBALAMIN TAB 500 MCG 250 MCG: 500 TAB at 08:50

## 2024-01-17 RX ADMIN — DICLOFENAC SODIUM TOPICAL GEL, 1% 2 G: 10 GEL TOPICAL at 21:20

## 2024-01-17 RX ADMIN — ASPIRIN 81 MG: 81 TABLET, COATED ORAL at 08:50

## 2024-01-17 RX ADMIN — AMLODIPINE BESYLATE 5 MG: 5 TABLET ORAL at 08:50

## 2024-01-17 RX ADMIN — LEVOTHYROXINE SODIUM 75 MCG: 75 TABLET ORAL at 05:29

## 2024-01-17 NOTE — PHYSICAL THERAPY NOTE
PT PROGRESS NOTE    Name: Priya Zarate  AGE: 89 y.o.  MRN: [id number]  LENGTH OF STAY: 3    Admitting Dx:  UTI (urinary tract infection) [N39.0]  Abdominal pain [R10.9]  Colonic intussusception (HCC) [K56.1]  Abnormal CT of the abdomen [R93.5]  Multiple subsegmental pulmonary emboli without acute cor pulmonale (HCC) [I26.94]      Patient Active Problem List   Diagnosis    Peripheral vascular disease (HCC)    Glaucoma, open angle, severe stage    Arteriosclerosis of coronary artery    Asthma    Aortic stenosis    Hyperlipidemia    Advanced age    Allergic rhinitis    Ambulatory dysfunction    Anxiety disorder    Other fatigue    Irritable bowel    Trigeminal neuralgia    Hallucination    Dyspnea on minimal exertion    Acquired hypothyroidism    Chronic diastolic congestive heart failure (HCC)    Insomnia    GERD (gastroesophageal reflux disease)    Bilateral cold feet    Female cystocele    Diverticulosis    Dizziness    Hearing loss    Hiatal hernia    Low back pain    Mild cognitive impairment    Osteoarthritis    Overactive bladder    Shakiness    Headache    Pain of both hip joints    Candidal intertrigo    Vitamin D deficiency    Type 2 diabetes mellitus with hyperglycemia, without long-term current use of insulin ([facility])    Paroxysmal nocturnal dyspnea    Orthopnea    Shortness of breath    Chronic headache    Neoplasm of face    Stable angina    Atypical facial pain    Medicare annual wellness visit, subsequent    Diarrhea    Left hip pain    History of recurrent UTIs    Constipation    Conductive hearing loss, bilateral    Supraorbital neuralgia    Myofascial muscle pain    Cranial neuralgia    Hypertension    Hyponatremia    Vertigo    Chronic nonintractable headache    Tail bone pain    CAIT (acute kidney injury) ([facility])    Myalgia    Lumbar paraspinal muscle spasm    Abnormal gait    Disc degeneration, lumbar    Pelviectasis of kidney    Costochondritis    Generalized body aches    Colitis    Spinal  stenosis of lumbar region with neurogenic claudication    GI bleed    CKD (chronic kidney disease)    Anemia due to stage 3b chronic kidney disease     Iron deficiency anemia    Acute pulmonary embolism without acute cor pulmonale (HCC)    Intussusception of colon (HCC)    Acute cystitis without hematuria    Acute deep vein thrombosis (DVT) of popliteal vein of left lower extremity (HCC)    Goals of care, counseling/discussion               01/17/24 1455   PT Last Visit   PT Visit Date 01/17/24   Note Type   Note Type Treatment   Pain Assessment   Pain Score No Pain   Restrictions/Precautions   Weight Bearing Precautions Per Order No   Other Precautions Chair Alarm;Bed Alarm;Multiple lines;Fall Risk   General   Chart Reviewed Yes   Response to Previous Treatment Patient with no complaints from previous session.   Family/Caregiver Present No   Cognition   Overall Cognitive Status WFL   Arousal/Participation Alert;Cooperative   Attention Attends with cues to redirect   Orientation Level Oriented to person;Oriented to place;Oriented to time   Following Commands Follows one step commands without difficulty   Comments pleasant & cooperative   Subjective   Subjective Pt agreeable to PT tx.   Bed Mobility   Supine to Sit 5  Supervision   Additional items HOB elevated;Increased time required;Verbal cues   Sit to Supine 5  Supervision   Additional items Increased time required;Verbal cues   Additional Comments cues for techniques & safety   Transfers   Sit to Stand 5  Supervision   Additional items Increased time required;Verbal cues   Stand to Sit 5  Supervision   Additional items Increased time required;Verbal cues   Toilet transfer 4  Minimal assistance   Additional items Assist x 1;Increased time required;Verbal cues;Standard toilet   Additional Comments cues for hand placement   Ambulation/Elevation   Gait pattern Forward Flexion;Wide [place];Decreased foot clearance;Short stride;Excessively slow   Gait Assistance 4   "Minimal assist   Additional items Assist x 1;Verbal cues;Tactile cues   Assistive Device Rolling walker   Distance 150'x1   Ambulation/Elevation Additional Comments no gross LOB noted; cues to stay within RW [place] especially during turns   Balance   Static Sitting Fair +   Dynamic Sitting Fair   Static Standing Fair -  (w/ RW)   Dynamic Standing Poor +  (w/ RW)   Ambulatory Poor +  (w/ RW)   Endurance Deficit   Endurance Deficit Yes   Endurance Deficit Description fatigue   Activity Tolerance   Activity Tolerance Patient limited by fatigue;Treatment limited secondary to medical complications (Comment)   Nurse Made Aware yes   Exercises   Heelslides Supine;10 reps;AROM;Bilateral   Hip Flexion Supine;10 reps;AROM;Bilateral  (SLR)   Hip Abduction Supine;10 reps;AROM;Bilateral   Hip Adduction Supine;10 reps;AROM;Bilateral   Knee AROM Short Arc Quad Supine;10 reps;AROM;Bilateral   Ankle Pumps Supine;10 reps;AROM;Bilateral   Assessment   Prognosis Good   Problem List Decreased strength;Decreased endurance;Impaired balance;Decreased mobility;Decreased safety awareness;Impaired hearing   Assessment Pt initially admitted for acute PE, now (+) for L DVT per chart. Pt cleared to continue PT/OT intereventions. Improved mobility & activity tolerance noted this tx session. Pt require S for bed mobility & transfers; CGA/minAx1 for amb w/ RW + cues for techniques & safety. Gait deviations as above but no gross LOB noted.  Pt tolerated above mentioned thera.ex well. Pt require cues for proper exercise techniques and performance. Pt (+) loose bowel incontinence. Require assistance for pericare & brief change. Please see flowsheet above for objective findings & levels of assistance required for all functional tasks during this tx session. Nsg staff most recent vital signs as follows: /60 (BP Location: Right arm)   Pulse 70   Temp 98.3 °F (36.8 °C) (Oral)   Resp 18   Ht 5' 2\" (1.575 m)   Wt 68.5 kg (151 lb)   SpO2 92%   BMI " 27.62 kg/m² . Will continue PT per POC. At end of session, pt back in bed in stable condition, call bell & phone in reach, bed alarm activated, all lines intact. Fall precautions reinforced w/ good understanding. The patient's AM-PAC Basic Mobility Inpatient Short Form Raw Score is 19. A Raw score of greater than 16 suggests the patient may benefit from discharge to home. Please also refer to the recommendation of the Physical Therapist for safe discharge planning. From PT standpoint, will continue to recommend Level III (minimum resource intensity) rehab services & inc family support at D/C. CM to follow. Nsg staff to continue to mobilized pt (OOB in chair for all meals & ambulate in room/unit) as tolerated to prevent decline in function. Will continue to recommend [facility] for daily amb &/or daily OOB in chair as appropriate. Nsg notified.   Goals   Patient Goals to get better   STG Expiration Date 01/25/24   PT Treatment Day 1   Plan   Treatment/Interventions Functional transfer training;LE strengthening/ROM;Elevations;Therapeutic exercise;Endurance training;Patient/family training;Bed mobility;Gait training;Spoke to nursing;OT   Progress Progressing toward goals   PT Frequency 3-5x/wk   Discharge Recommendation   Rehab Resource Intensity Level, PT III (Minimum Resource Intensity)   Equipment Recommended Walker  (pt has)   Additional Comments restorative for daily amb   AM-PAC Basic Mobility Inpatient   Turning in Flat Bed Without Bedrails 4   Lying on Back to Sitting on Edge of Flat Bed Without Bedrails 3   Moving Bed to Chair 3   Standing Up From Chair Using Arms 3   Walk in Room 3   Climb 3-5 Stairs With Railing 3   Basic Mobility Inpatient Raw Score 19   Basic Mobility Standardized Score 42.48   Highest Level Of Mobility   JH-HLM Goal 6: Walk 10 steps or more   JH-HLM Achieved 7: Walk 25 feet or more   Education   Education Provided Mobility training;Home exercise program;Assistive device   Patient  Demonstrates acceptance/verbal understanding;Reinforcement needed   End of Consult   Patient Position at End of Consult Supine;Bed/Chair alarm activated;All needs within reach   End of Consult Comments Pt in stable condition. All needs in reach. Bed alarm activated. All lines intact.   Lisandro Montana

## 2024-01-17 NOTE — ASSESSMENT & PLAN NOTE
Hx recurrent UTI  CT: Mildly distended with subtle diffuse wall thickening and mucosal hyperenhancement. Recommend correlation with urinalysis for cystitis   UA with WBC, large leuks, neg nitrites  Started on IV antibiotics, discontinued 2/2 urine culture with no growth

## 2024-01-17 NOTE — PLAN OF CARE
Problem: Potential for Falls  Goal: Patient will remain free of falls  Description: INTERVENTIONS:  - Educate patient/family on patient safety including physical limitations  - Instruct patient to call for assistance with activity   - Consult OT/PT to assist with strengthening/mobility   - Keep Call bell within reach  - Keep bed low and locked with side rails adjusted as appropriate  - Keep care items and personal belongings within reach  - Initiate and maintain comfort rounds  - Make Fall Risk Sign visible to staff  - Offer Toileting every 2 Hours, in advance of need  - Initiate/Maintain bed/chair alarm  - Apply yellow socks and bracelet for high fall risk patients  - Consider moving patient to room near nurses station  1/17/2024 0900 by Aubree Peace  Outcome: Progressing  1/16/2024 1939 by Aubree Peace  Outcome: Progressing     Problem: Prexisting or High Potential for Compromised Skin Integrity  Goal: Skin integrity is maintained or improved  Description: INTERVENTIONS:  - Identify patients at risk for skin breakdown  - Assess and monitor skin integrity  - Assess and monitor nutrition and hydration status  - Monitor labs   - Assess for incontinence   - Turn and reposition patient  - Assist with mobility/ambulation  - Relieve pressure over bony prominences  - Avoid friction and shearing  - Provide appropriate hygiene as needed including keeping skin clean and dry  - Evaluate need for skin moisturizer/barrier cream  - Collaborate with interdisciplinary team   - Patient/family teaching  - Consider wound care consult   1/17/2024 0900 by Aubree Peace  Outcome: Progressing  1/16/2024 1939 by Aubree Peace  Outcome: Progressing     Problem: PAIN - ADULT  Goal: Verbalizes/displays adequate comfort level or baseline comfort level  Description: Interventions:  - Encourage patient to monitor pain and request assistance  - Assess pain using appropriate pain scale  - Administer analgesics based on type and severity  of pain and evaluate response  - Implement non-pharmacological measures as appropriate and evaluate response  - Consider cultural and social influences on pain and pain management  - Notify physician/advanced practitioner if interventions unsuccessful or patient reports new pain  1/17/2024 0900 by Aubree Peace  Outcome: Progressing  1/16/2024 1939 by Aubree Peace  Outcome: Progressing     Problem: INFECTION - ADULT  Goal: Absence or prevention of progression during hospitalization  Description: INTERVENTIONS:  - Assess and monitor for signs and symptoms of infection  - Monitor lab/diagnostic results  - Monitor all insertion sites, i.e. indwelling lines, tubes, and drains  - Monitor endotracheal if appropriate and nasal secretions for changes in amount and color  - [place] appropriate cooling/warming therapies per order  - Administer medications as ordered  - Instruct and encourage patient and family to use good hand hygiene technique  - Identify and instruct in appropriate isolation precautions for identified infection/condition  1/17/2024 0900 by Aubree Peace  Outcome: Progressing  1/16/2024 1939 by Aubree Peace  Outcome: Progressing     Problem: SAFETY ADULT  Goal: Patient will remain free of falls  Description: INTERVENTIONS:  - Educate patient/family on patient safety including physical limitations  - Instruct patient to call for assistance with activity   - Consult OT/PT to assist with strengthening/mobility   - Keep Call bell within reach  - Keep bed low and locked with side rails adjusted as appropriate  - Keep care items and personal belongings within reach  - Initiate and maintain comfort rounds  - Make Fall Risk Sign visible to staff  - Offer Toileting every 2 Hours, in advance of need  - Initiate/Maintain bed/chair alarm  - Apply yellow socks and bracelet for high fall risk patients  - Consider moving patient to room near nurses station  1/17/2024 0900 by Aubree Peace  Outcome:  Progressing  1/16/2024 1939 by Aubree Peace  Outcome: Progressing  Goal: Maintain or return to baseline ADL function  Description: INTERVENTIONS:  -  Assess patient's ability to carry out ADLs; assess patient's baseline for ADL function and identify physical deficits which impact ability to perform ADLs (bathing, care of mouth/teeth, toileting, grooming, dressing, etc.)  - Assess/evaluate cause of self-care deficits   - Assess range of motion  - Assess patient's mobility; develop plan if impaired  - Assess patient's need for assistive devices and provide as appropriate  - Encourage maximum independence but intervene and supervise when necessary  - Involve family in performance of ADLs  - Assess for home care needs following discharge   - Consider OT consult to assist with ADL evaluation and planning for discharge  - Provide patient education as appropriate  1/17/2024 0900 by Aubree Peace  Outcome: Progressing  1/16/2024 1939 by Aubree Peace  Outcome: Progressing  Goal: Maintains/Returns to pre admission functional level  Description: INTERVENTIONS:  - Perform AM-PAC 6 Click Basic Mobility/ Daily Activity assessment daily.  - Set and communicate daily mobility goal to care team and patient/family/caregiver.   - Collaborate with rehabilitation services on mobility goals if consulted  - Perform Range of Motion 3 times a day.  - Reposition patient every 2 hours.  - Dangle patient 3 times a day  - Stand patient 3 times a day  - Ambulate patient 3 times a day  - Out of bed to chair 3 times a day   - Out of bed for meals 3 times a day  - Out of bed for toileting  - Record patient progress and toleration of activity level   1/17/2024 0900 by Aubree Peace  Outcome: Progressing  1/16/2024 1939 by Aubree Peace  Outcome: Progressing     Problem: DISCHARGE PLANNING  Goal: Discharge to home or other facility with appropriate resources  Description: INTERVENTIONS:  - Identify barriers to discharge w/patient and  caregiver  - Arrange for needed discharge resources and transportation as appropriate  - Identify discharge learning needs (meds, wound care, etc.)  - Arrange for interpretive services to assist at discharge as needed  - Refer to Case Management Department for coordinating discharge planning if the patient needs post-hospital services based on physician/advanced practitioner order or complex needs related to functional status, cognitive ability, or social support system  1/17/2024 0900 by Aubree Peace  Outcome: Progressing  1/16/2024 1939 by Aubree Peace  Outcome: Progressing     Problem: Knowledge Deficit  Goal: Patient/family/caregiver demonstrates understanding of disease process, treatment plan, medications, and discharge instructions  Description: Complete learning assessment and assess knowledge base.  Interventions:  - Provide teaching at level of understanding  - Provide teaching via preferred learning methods  1/17/2024 0900 by Aubree Peace  Outcome: Progressing  1/16/2024 1939 by Aubree Peace  Outcome: Progressing     Problem: CARDIOVASCULAR - ADULT  Goal: Maintains optimal cardiac output and hemodynamic stability  Description: INTERVENTIONS:  - Monitor I/O, vital signs and rhythm  - Monitor for S/S and trends of decreased cardiac output  - Administer and titrate ordered vasoactive medications to optimize hemodynamic stability  - Assess quality of pulses, skin color and temperature  - Assess for signs of decreased coronary artery perfusion  - Instruct patient to report change in severity of symptoms  1/17/2024 0900 by Aubree Peace  Outcome: Progressing  1/16/2024 1939 by Aubree Peace  Outcome: Progressing  Goal: Absence of cardiac dysrhythmias or at baseline rhythm  Description: INTERVENTIONS:  - Continuous cardiac monitoring, vital signs, obtain 12 lead EKG if ordered  - Administer antiarrhythmic and heart rate control medications as ordered  - Monitor electrolytes and administer replacement  therapy as ordered  1/17/2024 0900 by Aubree Peace  Outcome: Progressing  1/16/2024 1939 by Aubree Peace  Outcome: Progressing     Problem: GASTROINTESTINAL - ADULT  Goal: Minimal or absence of nausea and/or vomiting  Description: INTERVENTIONS:  - Administer IV fluids if ordered to ensure adequate hydration  - Maintain NPO status until nausea and vomiting are resolved  - Nasogastric tube if ordered  - Administer ordered antiemetic medications as needed  - Provide nonpharmacologic comfort measures as appropriate  - Advance diet as tolerated, if ordered  - Consider nutrition services referral to assist patient with adequate nutrition and appropriate food choices  1/17/2024 0900 by Aubree Peace  Outcome: Progressing  1/16/2024 1939 by Aubree Peace  Outcome: Progressing  Goal: Maintains or returns to baseline bowel function  Description: INTERVENTIONS:  - Assess bowel function  - Encourage oral fluids to ensure adequate hydration  - Administer IV fluids if ordered to ensure adequate hydration  - Administer ordered medications as needed  - Encourage mobilization and activity  - Consider nutritional services referral to assist patient with adequate nutrition and appropriate food choices  1/17/2024 0900 by Aubree Peace  Outcome: Progressing  1/16/2024 1939 by Aubree Peace  Outcome: Progressing  Goal: Maintains adequate nutritional intake  Description: INTERVENTIONS:  - Monitor percentage of each meal consumed  - Identify factors contributing to decreased intake, treat as appropriate  - Assist with meals as needed  - Monitor I&O, weight, and lab values if indicated  - Obtain nutrition services referral as needed  1/17/2024 0900 by Aubree Peace  Outcome: Progressing  1/16/2024 1939 by Aubree Peace  Outcome: Progressing     Problem: METABOLIC, FLUID AND ELECTROLYTES - ADULT  Goal: Electrolytes maintained within normal limits  Description: INTERVENTIONS:  - Monitor labs and assess patient for signs and  symptoms of electrolyte imbalances  - Administer electrolyte replacement as ordered  - Monitor response to electrolyte replacements, including repeat lab results as appropriate  - Instruct patient on fluid and nutrition as appropriate  1/17/2024 0900 by Aubree Peace  Outcome: Progressing  1/16/2024 1939 by Aubree Peace  Outcome: Progressing  Goal: Fluid balance maintained  Description: INTERVENTIONS:  - Monitor labs   - Monitor I/O and WT  - Instruct patient on fluid and nutrition as appropriate  - Assess for signs & symptoms of volume excess or deficit  1/17/2024 0900 by Aubree Peace  Outcome: Progressing  1/16/2024 1939 by Aubree Peace  Outcome: Progressing  Goal: Glucose maintained within target range  Description: INTERVENTIONS:  - Monitor Blood Glucose as ordered  - Assess for signs and symptoms of hyperglycemia and hypoglycemia  - Administer ordered medications to maintain glucose within target range  - Assess nutritional intake and initiate nutrition service referral as needed  1/17/2024 0900 by Aubree Peace  Outcome: Progressing  1/16/2024 1939 by Aubree Peace  Outcome: Progressing

## 2024-01-17 NOTE — ASSESSMENT & PLAN NOTE
Venous duplex showing left popliteal vein occulusion   PE found on admission  Started on heparin drip     Note: Primary team understand risk/benefit involved with anticoagulation in this 89-year-old female with history of falls.  Low clot burden for PE and agree with not starting anticoagulation at that time; but now with chronic DVT of right lower extremity and acute DVT of left lower extremity makes consideration of anticoagulation much more pertinent.  Currently on heparin drip; will discuss with vascular surgery to help inform decision on starting Eliquis moving forward.

## 2024-01-17 NOTE — ASSESSMENT & PLAN NOTE
Lab Results   Component Value Date    HGBA1C 9.4 (H) 12/12/2023     ISS inpatient     Recent Labs     01/16/24  0751 01/16/24  1122 01/16/24  1543 01/16/24 2038   POCGLU 107 129 166* 98         Blood Sugar Average: Last 72 hrs:  (P) [id number]  SSI; Subcutaneous Insulin Order Set  Blood Glucose checks TIDWM and QHS (Q6H for NPO patients)  Hold oral medications  Blood Glucose goal while inpatient is 140-180  Reduce basal insulin by 25-50% while inpatient  Consistent Carbohydrate Diet when able to tolerate normal diet

## 2024-01-17 NOTE — ASSESSMENT & PLAN NOTE
Reports generalized and epigastric abdominal pain  Hx of abdominal pain ,GERD and colitis  CTA: No evidence of bowel obstruction or mesenteric inflammation. Terminal ileum and cecum located in the anterior subhepatic region, similar compared to the prior study. No free air or free fluid.   Hooppole-colonic intussusception in the mid to distal transverse colon region noted without definitive focal lead point mass identified.  Extensive sigmoid diverticulosis without evidence for diverticulitis.   Small amount of nonspecific soft tissue density material just inferior from the aortic bifurcation and left common iliac artery bifurcation regions again seen, similar compared to the prior exam  Repeat CT abdomen pelvis with continued intussuseption and new trace free air seen, consider repeat CT   Continue PPI and pepcid  Serial abdominal exams  Currently on surgical light diet; advance as tolerated  GI and general surgery following; for now both services recommend conservative measures  1/17-family meeting today; the patient able to tolerate diet, likely stable for discharge in the next 24 to 48 hours

## 2024-01-17 NOTE — PROGRESS NOTES
"Progress Note - General Surgery   Priya Zarate 89 y.o. female MRN: [id number]  Unit/Bed#: E5 -01 Encounter: [id number]    Assessment:  89-year-old female with colocolonic intussusception, found to have DVT of left popliteal vein, PE initially found on admission    Vital stable, afebrile  WBC 4.82 from 5.76  Hemoglobin 8.1 from 8.6  Creatinine 0.85    Plan:  -Clears, can advance diet as tolerated  -Family meeting today  -On heparin drip for PE  -Pain control  -Encourage ambulation  -Care per primary team    Subjective/Objective   Subjective:   Doing very well, denies any abdominal pain, tolerating clears without nausea and emesis and states she is hungry and would like more to eat, having bowel movements, voiding.    Objective:     Blood pressure 128/60, pulse 70, temperature 98.3 °F (36.8 °C), temperature source Oral, resp. rate 18, height 5' 2\" (1.575 m), weight 68.5 kg (151 lb), SpO2 92%, not currently breastfeeding.,Body mass index is 27.62 kg/m².      Intake/Output Summary (Last 24 hours) at 1/17/2024 0839  Last data filed at 1/17/2024 0828  Gross per 24 hour   Intake 840 ml   Output 600 ml   Net 240 ml       Invasive Devices       Peripheral Intravenous Line  Duration             Peripheral IV 01/13/24 Dorsal (posterior);Left Forearm 3 days    Peripheral IV 01/13/24 Left Antecubital 3 days                    Physical Exam:   General: NAD  Skin: Warm, dry, anicteric  HEENT: Normocephalic, atraumatic  CV: RRR, no m/r/g  Pulm: CTA b/l, no inc WOB  Abd: Soft, ND/NT  MSK: Symmetric, no edema, no tenderness, no deformity  Neuro: AOx3, GCS 15     Lab, Imaging and other studies:I have personally reviewed pertinent lab results.  , CBC:   Lab Results   Component Value Date    WBC 4.82 01/17/2024    HGB 8.1 (L) 01/17/2024    HCT 27.3 (L) 01/17/2024    MCV 77 (L) 01/17/2024     01/17/2024    RBC 3.55 (L) 01/17/2024    MCH 22.8 (L) 01/17/2024    MCHC 29.7 (L) 01/17/2024    RDW 18.1 (H) 01/17/2024    MPV " 10.3 01/17/2024    NRBC 0 01/17/2024   , CMP:   Lab Results   Component Value Date    SODIUM 139 01/17/2024    K 3.7 01/17/2024     (H) 01/17/2024    CO2 20 (L) 01/17/2024    BUN 8 01/17/2024    CREATININE 0.85 01/17/2024    CALCIUM 8.3 (L) 01/17/2024    AST 11 (L) 01/17/2024    ALT 6 (L) 01/17/2024    ALKPHOS 62 01/17/2024    EGFR 56 01/17/2024     VTE Pharmacologic Prophylaxis: Heparin  VTE Mechanical Prophylaxis: sequential compression device

## 2024-01-17 NOTE — PROGRESS NOTES
[facility] Note -  [facility]  Priya JODY Zarate 89 y.o. female MRN: [id number]  Unit/Bed#: E5 -01 Encounter: [id number]      ASSESSMENT AND PLAN:      89-year-old female with history significant for aortic stenosis, chronic iron deficiency anemia, hypothyroidism, and type 2 diabetes, who presented to the hospital with abdominal pain with associated epigastric and back pain, found to have an UTI and acute PE.  She was also found to have a 4 cm colocolonic intussusception on CT imaging of the abdomen pelvis for which we are consulted.     She remains clinically stable and asymptomatic without recurrence in abdominal pain. She is also maintaining her bowel movements and tolerating PO intake. Repeat CT abdomen/pelvis with contrast to evaluate for resolution of the 4cm colocolonic intussusception demonstrated persistent distal transverse colocolonic intussusception without definite lead point or underlying mass.  However given persistence, cannot definitively rule out the possibility of a lesion such as a polyp or a lipoma creating a lead point.  When I discussed possible procedures with the patient and her daughter yesterday, they were of the mindset of avoiding any aggressive interventions.  Given the persistence of this intussusception, further investigation with a colonoscopy would be warranted.  However given her advanced age and possible need for further interventions should we find a lead point    Patient remains asymptomatic and tolerating p.o. intake.  Our team alongside the primary team had a lengthy discussion with the patient, her daughter, and her son at the bedside this morning regarding presence of this colocolonic intussusception and potential management options.  We discussed there are 3 main management options of which the most conservative would be to continue to monitor her closely as we continue to advance her diet followed by more aggressive options including further  evaluation with a colonoscopy which is likely limited given absence of discrete findings on CT imaging or surgical intervention.  At this point in time, we expressed it may be best to continue with conservative management and monitor as we continue to advance her diet given she is now clinically asymptomatic and given the risks of proceeding with any other aggressive intervention at this time in the setting of her advanced age.  Patient along with her son and daughter conveyed verbal understanding and agreed conservative management with close monitoring.    Continue full liquid diet for now  Continue to monitor closely for any changes in abdominal exam.  Continue bowel regimen.  We will hold off on any endoscopic evaluation at this time.    Rest of care per primary team.    ______________________________________________________________________    Subjective: Denies acute complaints.  Abdominal pain has completely resolved and she is tolerating oral intake.    REVIEW OF SYSTEMS:  10 point ROS reviewed and negative except otherwise noted in the HPI above.     Historical Information   Past Medical History:   Diagnosis Date    Asthma     Atypical chest pain     CAD (coronary artery disease)     Candidal intertrigo     CHF (congestive heart failure) (HCC)     Depression     Diabetes mellitus (HCC)     Diabetic neuropathy (HCC)     Diverticulitis     Dyslipidemia     Female bladder prolapse     Gastric ulcer     Glaucoma     HTN (hypertension)     Hyperlipidemia     Hypothyroidism 4/18/2016    RAD (reactive airway disease)      Past Surgical History:   Procedure Laterality Date    COLONOSCOPY      ESOPHAGOGASTRODUODENOSCOPY  2011    HYSTERECTOMY      TONSILLECTOMY       Social History   Social History     Substance and Sexual Activity   Alcohol Use No    Comment: Social Alcohol Use -- as per allscripts (pt denies all alcohol use)     Social History     Substance and Sexual Activity   Drug Use No     Social History      Tobacco Use   Smoking Status Never   Smokeless Tobacco Never     Family History   Problem Relation Age of Onset    Breast cancer Mother     Hypothyroidism Mother     Hypertension Father     Breast cancer Sister     Thyroid disease Sister     Hypertension Sister        Meds/Allergies     Medications Prior to Admission   Medication    Accu-Chek Softclix Lancets lancets    Alcohol Swabs (B-D SINGLE USE SWABS REGULAR) PADS    amLODIPine (NORVASC) 5 mg tablet    aspirin 81 MG tablet    Blood Glucose Monitoring Suppl (Accu-Chek Guide) w/Device KIT    Cholecalciferol (VITAMIN D3) 2000 units capsule    Diclofenac Sodium (VOLTAREN) 1 %    Empagliflozin (JARDIANCE) 10 MG TABS tablet    gabapentin (NEURONTIN) 600 MG tablet    glucose blood (Accu-Chek Guide) test strip    glucose blood test strip    Incontinence Supply Disposable (SELECT DISPOSABLE UNDERWEAR LG) MISC    Lancets (accu-chek multiclix) lancets    latanoprost (XALATAN) 0.005 % ophthalmic solution    Misc. Devices (Transport Chair) MISC    vitamin B-12 (CYANOCOBALAMIN) 250 MCG tablet    albuterol (PROVENTIL HFA,VENTOLIN HFA) 90 mcg/act inhaler    famotidine (PEPCID) 40 MG tablet    furosemide (LASIX) 20 mg tablet    glipiZIDE (GLUCOTROL XL) 2.5 mg 24 hr tablet    levothyroxine 75 mcg tablet    levothyroxine 88 mcg tablet    oxygen gas    pantoprazole (PROTONIX) 40 mg tablet    potassium chloride (K-DUR,KLOR-CON) 20 mEq tablet     Current Facility-Administered Medications   Medication Dose Route Frequency    acetaminophen (TYLENOL) tablet 650 mg  650 mg Oral Q6H PRN    albuterol (PROVENTIL HFA,VENTOLIN HFA) inhaler 2 puff  2 puff Inhalation Q6H PRN    amLODIPine (NORVASC) tablet 5 mg  5 mg Oral Daily    aspirin (ECOTRIN LOW STRENGTH) EC tablet 81 mg  81 mg Oral Daily    cholecalciferol (VITAMIN D3) tablet 1,000 Units  1,000 Units Oral Daily    cyanocobalamin (VITAMIN B-12) tablet 250 mcg  250 mcg Oral Daily    Diclofenac Sodium (VOLTAREN) 1 % topical gel 2 g  2 g  "Topical 4x Daily    famotidine (PEPCID) tablet 10 mg  10 mg Oral HS    gabapentin (NEURONTIN) capsule 300 mg  300 mg Oral BID    heparin (porcine) 25,000 units in 0.45% NaCl 250 mL infusion (premix)  3-30 Units/kg/hr (Order-Specific) Intravenous Titrated    insulin lispro (HumaLOG) 100 units/mL subcutaneous injection 1-5 Units  1-5 Units Subcutaneous 4x Daily (AC & HS)    latanoprost (XALATAN) 0.005 % ophthalmic solution 1 drop  1 drop Both Eyes HS    levothyroxine tablet 75 mcg  75 mcg Oral Early Morning    pantoprazole (PROTONIX) EC tablet 40 mg  40 mg Oral Daily    polyethylene glycol (MIRALAX) packet 17 g  17 g Oral BID    senna (SENOKOT) tablet 8.6 mg  1 tablet Oral BID       Allergies   Allergen Reactions    Levaquin [Levofloxacin] Myalgia    Bactrim [Sulfamethoxazole-Trimethoprim] Hallucinations    Metformin Other (See Comments)     Side effect, did not like.      Statins      Other reaction(s): Weakness  Category: Adverse Reaction;          Objective     Blood pressure 128/56, pulse 71, temperature 98.2 °F (36.8 °C), temperature source Oral, resp. rate 17, height 5' 2\" (1.575 m), weight 68.5 kg (151 lb), SpO2 95%, not currently breastfeeding. Body mass index is 27.62 kg/m².    PHYSICAL EXAM:    General: Well-appearing, NAD  Eyes: no conjunctival icterus or pallor  Abdominal: Soft, non-tender, non-distended  Extremities: Warm, no deformities, no edema  Neuro: alert and oriented  Psych: Normal affect      Lab Results:   No results displayed because visit has over 200 results.          Imaging Studies: I have personally reviewed pertinent imaging studies.    Marina Cardozo D.O.  Fellow, PGY-5  Division of Gastroenterology & Hepatology  Available on [facility]  1/17/2024 4:47 PM      "

## 2024-01-17 NOTE — ASSESSMENT & PLAN NOTE
Repeat echo obtained and findings noted  Severe aortic stenosis though to be contributing to patient's SOB  Follows with cardiology in the outpatient setting   Recommended medical management

## 2024-01-17 NOTE — PROGRESS NOTES
[facility]  Progress Note  Name: Priya Zarate I  MRN: [id number]  Unit/Bed#: E5 -01 I Date of Admission: 1/13/2024   Date of Service: 1/17/2024 I Hospital Day: 3    Assessment/Plan   * Intussusception of colon (HCC)  Assessment & Plan  Reports generalized and epigastric abdominal pain  Hx of abdominal pain ,GERD and colitis  CTA: No evidence of bowel obstruction or mesenteric inflammation. Terminal ileum and cecum located in the anterior subhepatic region, similar compared to the prior study. No free air or free fluid.   [place]-colonic intussusception in the mid to distal transverse colon region noted without definitive focal lead point mass identified.  Extensive sigmoid diverticulosis without evidence for diverticulitis.   Small amount of nonspecific soft tissue density material just inferior from the aortic bifurcation and left common iliac artery bifurcation regions again seen, similar compared to the prior exam  Repeat CT abdomen pelvis with continued intussuseption and new trace free air seen, consider repeat CT   Continue PPI and pepcid  Serial abdominal exams  Currently on surgical light diet; advance as tolerated  GI and general surgery following; for now both services recommend conservative measures  1/17-family meeting today; the patient able to tolerate diet, likely stable for discharge in the next 24 to 48 hours    Goals of care, counseling/discussion  Assessment & Plan  Discussed with daughter and patient goals of care   Planning for family meeting tomorrow with patient's daughter and son along with Gi, surgery and internal medicine team   Both patient and daughter have stated that they would want everything done including surgery. Discussed further with daughter and the families main goal is to control their mother's pain  We discussed risk of bleeding on heparin drip and anticoagulation at this time they wish to proceed     Acute deep vein thrombosis (DVT) of  popliteal vein of left lower extremity (HCC)  Assessment & Plan  Venous duplex showing left popliteal vein occulusion   PE found on admission  Started on heparin drip     Note: Primary team understand risk/benefit involved with anticoagulation in this 89-year-old female with history of falls.  Low clot burden for PE and agree with not starting anticoagulation at that time; but now with chronic DVT of right lower extremity and acute DVT of left lower extremity makes consideration of anticoagulation much more pertinent.  Currently on heparin drip; will discuss with vascular surgery to help inform decision on starting Eliquis moving forward.    Acute cystitis without hematuria  Assessment & Plan  Hx recurrent UTI  CT: Mildly distended with subtle diffuse wall thickening and mucosal hyperenhancement. Recommend correlation with urinalysis for cystitis   UA with WBC, large leuks, neg nitrites  Started on IV antibiotics, discontinued 2/2 urine culture with no growth       Acute pulmonary embolism without acute cor pulmonale ([facility])  Assessment & Plan  CTA: Very low clot burden acute nonocclusive pulmonary embolism in the left lower lobe anterior basal segmental pulmonary artery extending into subsegmental branches. No additional pulmonary emboli identified. No imaging evidence of right heart strain.   No hypoxia or dyspnea. Reports chronic intermittent SOB at baseline. Uses 2LNC at hs  Patient had few falls at home last year.  Echo findings noted  Venous duplex with left popliteal vein  Start heparin drip, if no bleeding transition to eliquis and also consider pradaxa given better reversal agents   Outpatient f/u with cardiology for zio monitoring    Iron deficiency anemia  Assessment & Plan  Chronic anemia. Hg at baseline    Myofascial muscle pain  Assessment & Plan  Maintained on gabapentin. Follows with neurology    Type 2 diabetes mellitus with hyperglycemia, without long-term current use of insulin ([facility])  Assessment &  Plan  Lab Results   Component Value Date    HGBA1C 9.4 (H) 12/12/2023     ISS inpatient     Recent Labs     01/16/24  0751 01/16/24  1122 01/16/24  1543 01/16/24  2038   POCGLU 107 129 166* 98         Blood Sugar Average: Last 72 hrs:  (P) [id number]  SSI; Subcutaneous Insulin Order Set  Blood Glucose checks TIDWM and QHS (Q6H for NPO patients)  Hold oral medications  Blood Glucose goal while inpatient is 140-180  Reduce basal insulin by 25-50% while inpatient  Consistent Carbohydrate Diet when able to tolerate normal diet      Acquired hypothyroidism  Assessment & Plan  Levothyroxine 75mcg daily   Most recent TSH-1.187    Hallucination  Assessment & Plan  Per outpatient PMD note, daughter reports mother sometimes has hallucinations    Aortic stenosis  Assessment & Plan  Repeat echo obtained and findings noted  Severe aortic stenosis though to be contributing to patient's SOB  Follows with cardiology in the outpatient setting   Recommended medical management              VTE Pharmacologic Prophylaxis:   Pharmacologic: Heparin Drip  Mechanical VTE Prophylaxis in Place: Yes    Patient Centered Rounds: I have performed bedside rounds with nursing staff today.    Discussions with Specialists or Other Care Team Provider: Gastroenterology    Education and Discussions with Family / Patient: Discussed treatment plan with family and patient who agree with current plan; encouraged to ask questions and participate.      Time Spent for Care: 45 minutes.  More than 50% of total time spent on counseling and coordination of care as described above.    Current Length of Stay: 3 day(s)    Current Patient Status: Inpatient   Certification Statement: The patient will continue to require additional inpatient hospital stay due to treatment of intussusception and PE/DVT    Discharge Plan: To be determined, likely 24 hours    Code Status: Level 3 - DNAR and DNI      Subjective:   Patient seen and examined bedside, no acute  distress or discomfort noted.  Exam of abdomen was benign and patient reports eating multiple meals with no problems.  Having bowel movements and both GI and surgery recommending against any procedure such as colonoscopy or colectomy.  All of this was discussed with patient's daughter at bedside and son who is on video call.  Will continue to treat PE and acute DVT with heparin drip and will likely transition to Eliquis 2.5 mg twice daily on discharge.    Objective:     Vitals:   Temp (24hrs), Av °F (36.7 °C), Min:97.5 °F (36.4 °C), Max:98.3 °F (36.8 °C)    Temp:  [97.5 °F (36.4 °C)-98.3 °F (36.8 °C)] 98.2 °F (36.8 °C)  HR:  [66-79] 71  Resp:  [16-18] 17  BP: (119-128)/(56-60) 128/56  SpO2:  [92 %-95 %] 95 %  Body mass index is 27.62 kg/m².     Input and Output Summary (last 24 hours):       Intake/Output Summary (Last 24 hours) at 2024 1858  Last data filed at 2024 1217  Gross per 24 hour   Intake 720 ml   Output 200 ml   Net 520 ml       Physical Exam:     Physical Exam  Vitals and nursing note reviewed.   Constitutional:       General: She is not in acute distress.     Appearance: She is well-developed.   HENT:      Head: Normocephalic and atraumatic.   Eyes:      Conjunctiva/sclera: Conjunctivae normal.   Cardiovascular:      Rate and Rhythm: Normal rate.   Pulmonary:      Effort: Pulmonary effort is normal. No respiratory distress.   Abdominal:      General: There is no distension.      Palpations: Abdomen is soft.      Tenderness: There is no abdominal tenderness. There is no guarding.   Musculoskeletal:         General: No swelling.      Cervical back: Neck supple.   Skin:     General: Skin is warm and dry.   Neurological:      Mental Status: She is alert.   Psychiatric:         Mood and Affect: Mood normal.           Additional Data:     Labs:    Results from last 7 days   Lab Units 24  0350   WBC Thousand/uL 4.82   HEMOGLOBIN g/dL 8.1*   HEMATOCRIT % 27.3*   PLATELETS Thousands/uL 272    NEUTROS PCT % 63   LYMPHS PCT % 17   MONOS PCT % 13*   EOS PCT % 5     Results from last 7 days   Lab Units 01/17/24  0408   SODIUM mmol/L 139   POTASSIUM mmol/L 3.7   CHLORIDE mmol/L 112*   CO2 mmol/L 20*   BUN mg/dL 8   CREATININE mg/dL 0.85   ANION GAP mmol/L 7   CALCIUM mg/dL 8.3*   ALBUMIN g/dL 3.0*   TOTAL BILIRUBIN mg/dL 0.26   ALK PHOS U/L 62   ALT U/L 6*   AST U/L 11*   GLUCOSE RANDOM mg/dL 115     Results from last 7 days   Lab Units 01/15/24  1244   INR  1.26*     Results from last 7 days   Lab Units 01/17/24  1625 01/17/24  1117 01/17/24  0705 01/16/24  2038 01/16/24  1543 01/16/24  1122 01/16/24  0751 01/15/24  2057 01/15/24  1528 01/15/24  1108 01/15/24  0738 01/14/24  2125   POC GLUCOSE mg/dl 143* 123 111 98 166* 129 107 91 211* 114 114 131         Results from last 7 days   Lab Units 01/14/24  0752 01/13/24  2037   LACTIC ACID mmol/L 0.9 0.9           * I Have Reviewed All Lab Data Listed Above.  * Additional Pertinent Lab Tests Reviewed: All Labs Within Last 24 Hours Reviewed    Imaging:    Imaging Reports Reviewed Today Include:   Imaging Personally Reviewed by Myself Includes:      Recent Cultures (last 7 days):     Results from last 7 days   Lab Units 01/13/24  2304   URINE CULTURE  No Growth <1000 cfu/mL       Last 24 Hours Medication List:   Current Facility-Administered Medications   Medication Dose Route Frequency Provider Last Rate    acetaminophen  650 mg Oral Q6H PRN DEVEN Ravi      albuterol  2 puff Inhalation Q6H PRN DEVEN Ravi      amLODIPine  5 mg Oral Daily DEVEN Ravi      aspirin  81 mg Oral Daily DEVEN Ravi      cholecalciferol  1,000 Units Oral Daily DEVEN Ravi      vitamin B-12  250 mcg Oral Daily DEVEN Ravi      Diclofenac Sodium  2 g Topical 4x Daily DEVEN Ravi      famotidine  10 mg Oral HS DEVEN Ravi      gabapentin  300 mg Oral BID DEVEN Ravi       heparin (porcine)  3-30 Units/kg/hr (Order-Specific) Intravenous Titrated Dionicio Stauffer MD 9 Units/kg/hr (01/17/24 1106)    insulin lispro  1-5 Units Subcutaneous 4x Daily (AC & HS) DEVEN Ravi      latanoprost  1 drop Both Eyes HS DEVEN Ravi      levothyroxine  75 mcg Oral Early Morning DEVEN Ravi      pantoprazole  40 mg Oral Daily DEVEN Ravi      polyethylene glycol  17 g Oral BID Marina Cardozo, DO      senna  1 tablet Oral BID Marina Cardozo, DO          Today, Patient Was Seen By: Dionicio Stauffer MD    ** Please Note: Dictation voice to text software may have been used in the creation of this document. **

## 2024-01-17 NOTE — PLAN OF CARE
"  Problem: PHYSICAL THERAPY ADULT  Goal: Performs mobility at highest level of function for planned discharge setting.  See evaluation for individualized goals.  Description: Treatment/Interventions: Functional transfer training, LE strengthening/ROM, Elevations, Therapeutic exercise, Endurance training, Patient/family training, Bed mobility, Gait training, Spoke to nursing, OT  Equipment Recommended: Walker (pt has)       See flowsheet documentation for full assessment, interventions and recommendations.  Outcome: Progressing  Note: Prognosis: Good  Problem List: Decreased strength, Decreased endurance, Impaired balance, Decreased mobility, Decreased safety awareness, Impaired hearing  Assessment: Pt initially admitted for acute PE, now (+) for L DVT per chart. Pt cleared to continue PT/OT intereventions. Improved mobility & activity tolerance noted this tx session. Pt require S for bed mobility & transfers; CGA/minAx1 for amb w/ RW + cues for techniques & safety. Gait deviations as above but no gross LOB noted.  Pt tolerated above mentioned thera.ex well. Pt require cues for proper exercise techniques and performance. Pt (+) loose bowel incontinence. Require assistance for pericare & brief change. Please see flowsheet above for objective findings & levels of assistance required for all functional tasks during this tx session. Nsg staff most recent vital signs as follows: /60 (BP Location: Right arm)   Pulse 70   Temp 98.3 °F (36.8 °C) (Oral)   Resp 18   Ht 5' 2\" (1.575 m)   Wt 68.5 kg (151 lb)   SpO2 92%   BMI 27.62 kg/m² . Will continue PT per POC. At end of session, pt back in bed in stable condition, call bell & phone in reach, bed alarm activated, all lines intact. Fall precautions reinforced w/ good understanding. The patient's AM-PAC Basic Mobility Inpatient Short Form Raw Score is 19. A Raw score of greater than 16 suggests the patient may benefit from discharge to home. Please also refer to " the recommendation of the Physical Therapist for safe discharge planning. From PT standpoint, will continue to recommend Level III (minimum resource intensity) rehab services & inc family support at D/C. CM to follow. Nsg staff to continue to mobilized pt (OOB in chair for all meals & ambulate in room/unit) as tolerated to prevent decline in function. Will continue to recommend [facility] for daily amb &/or daily OOB in chair as appropriate. Nsg notified.    Barriers to Discharge: Inaccessible home environment  Barriers to Discharge Comments: STAR    Rehab Resource Intensity Level, PT: III (Minimum Resource Intensity)    See flowsheet documentation for full assessment.

## 2024-01-17 NOTE — PLAN OF CARE
Problem: Potential for Falls  Goal: Patient will remain free of falls  Description: INTERVENTIONS:  - Educate patient/family on patient safety including physical limitations  - Instruct patient to call for assistance with activity   - Consult OT/PT to assist with strengthening/mobility   - Keep Call bell within reach  - Keep bed low and locked with side rails adjusted as appropriate  - Keep care items and personal belongings within reach  - Initiate and maintain comfort rounds  - Make Fall Risk Sign visible to staff  - Offer Toileting every 2 Hours, in advance of need  - Initiate/Maintain bed alarm  - Apply yellow socks and bracelet for high fall risk patients  - Consider moving patient to room near nurses station  Outcome: Progressing     Problem: Prexisting or High Potential for Compromised Skin Integrity  Goal: Skin integrity is maintained or improved  Description: INTERVENTIONS:  - Identify patients at risk for skin breakdown  - Assess and monitor skin integrity  - Assess and monitor nutrition and hydration status  - Monitor labs   - Assess for incontinence   - Turn and reposition patient  - Assist with mobility/ambulation  - Relieve pressure over bony prominences  - Avoid friction and shearing  - Provide appropriate hygiene as needed including keeping skin clean and dry  - Evaluate need for skin moisturizer/barrier cream  - Collaborate with interdisciplinary team   - Patient/family teaching  - Consider wound care consult   Outcome: Progressing     Problem: PAIN - ADULT  Goal: Verbalizes/displays adequate comfort level or baseline comfort level  Description: Interventions:  - Encourage patient to monitor pain and request assistance  - Assess pain using appropriate pain scale  - Administer analgesics based on type and severity of pain and evaluate response  - Implement non-pharmacological measures as appropriate and evaluate response  - Consider cultural and social influences on pain and pain management  -  Notify physician/advanced practitioner if interventions unsuccessful or patient reports new pain  Outcome: Progressing     Problem: INFECTION - ADULT  Goal: Absence or prevention of progression during hospitalization  Description: INTERVENTIONS:  - Assess and monitor for signs and symptoms of infection  - Monitor lab/diagnostic results  - Monitor all insertion sites, i.e. indwelling lines, tubes, and drains  - Monitor endotracheal if appropriate and nasal secretions for changes in amount and color  - [place] appropriate cooling/warming therapies per order  - Administer medications as ordered  - Instruct and encourage patient and family to use good hand hygiene technique  - Identify and instruct in appropriate isolation precautions for identified infection/condition  Outcome: Progressing     Problem: SAFETY ADULT  Goal: Patient will remain free of falls  Description: INTERVENTIONS:  - Educate patient/family on patient safety including physical limitations  - Instruct patient to call for assistance with activity   - Consult OT/PT to assist with strengthening/mobility   - Keep Call bell within reach  - Keep bed low and locked with side rails adjusted as appropriate  - Keep care items and personal belongings within reach  - Initiate and maintain comfort rounds  - Make Fall Risk Sign visible to staff  - Offer Toileting every 2 Hours, in advance of need  - Initiate/Maintain bed alarm  - Apply yellow socks and bracelet for high fall risk patients  - Consider moving patient to room near nurses station  Outcome: Progressing  Goal: Maintain or return to baseline ADL function  Description: INTERVENTIONS:  -  Assess patient's ability to carry out ADLs; assess patient's baseline for ADL function and identify physical deficits which impact ability to perform ADLs (bathing, care of mouth/teeth, toileting, grooming, dressing, etc.)  - Assess/evaluate cause of self-care deficits   - Assess range of motion  - Assess patient's  mobility; develop plan if impaired  - Assess patient's need for assistive devices and provide as appropriate  - Encourage maximum independence but intervene and supervise when necessary  - Involve family in performance of ADLs  - Assess for home care needs following discharge   - Consider OT consult to assist with ADL evaluation and planning for discharge  - Provide patient education as appropriate  Outcome: Progressing  Goal: Maintains/Returns to pre admission functional level  Description: INTERVENTIONS:  - Perform AM-PAC 6 Click Basic Mobility/ Daily Activity assessment daily.  - Set and communicate daily mobility goal to care team and patient/family/caregiver.   - Collaborate with rehabilitation services on mobility goals if consulted  - Perform Range of Motion 3 times a day.  - Reposition patient every 2 hours.  - Dangle patient 3 times a day  - Stand patient 3 times a day  - Ambulate patient 3 times a day  - Out of bed to chair 3 times a day   - Out of bed for meals 3 times a day  - Out of bed for toileting  - Record patient progress and toleration of activity level   Outcome: Progressing     Problem: DISCHARGE PLANNING  Goal: Discharge to home or other facility with appropriate resources  Description: INTERVENTIONS:  - Identify barriers to discharge w/patient and caregiver  - Arrange for needed discharge resources and transportation as appropriate  - Identify discharge learning needs (meds, wound care, etc.)  - Arrange for interpretive services to assist at discharge as needed  - Refer to Case Management Department for coordinating discharge planning if the patient needs post-hospital services based on physician/advanced practitioner order or complex needs related to functional status, cognitive ability, or social support system  Outcome: Progressing     Problem: Knowledge Deficit  Goal: Patient/family/caregiver demonstrates understanding of disease process, treatment plan, medications, and discharge  instructions  Description: Complete learning assessment and assess knowledge base.  Interventions:  - Provide teaching at level of understanding  - Provide teaching via preferred learning methods  Outcome: Progressing     Problem: CARDIOVASCULAR - ADULT  Goal: Maintains optimal cardiac output and hemodynamic stability  Description: INTERVENTIONS:  - Monitor I/O, vital signs and rhythm  - Monitor for S/S and trends of decreased cardiac output  - Administer and titrate ordered vasoactive medications to optimize hemodynamic stability  - Assess quality of pulses, skin color and temperature  - Assess for signs of decreased coronary artery perfusion  - Instruct patient to report change in severity of symptoms  Outcome: Progressing  Goal: Absence of cardiac dysrhythmias or at baseline rhythm  Description: INTERVENTIONS:  - Continuous cardiac monitoring, vital signs, obtain 12 lead EKG if ordered  - Administer antiarrhythmic and heart rate control medications as ordered  - Monitor electrolytes and administer replacement therapy as ordered  Outcome: Progressing     Problem: GASTROINTESTINAL - ADULT  Goal: Minimal or absence of nausea and/or vomiting  Description: INTERVENTIONS:  - Administer IV fluids if ordered to ensure adequate hydration  - Maintain NPO status until nausea and vomiting are resolved  - Nasogastric tube if ordered  - Administer ordered antiemetic medications as needed  - Provide nonpharmacologic comfort measures as appropriate  - Advance diet as tolerated, if ordered  - Consider nutrition services referral to assist patient with adequate nutrition and appropriate food choices  Outcome: Progressing  Goal: Maintains or returns to baseline bowel function  Description: INTERVENTIONS:  - Assess bowel function  - Encourage oral fluids to ensure adequate hydration  - Administer IV fluids if ordered to ensure adequate hydration  - Administer ordered medications as needed  - Encourage mobilization and activity  -  Consider nutritional services referral to assist patient with adequate nutrition and appropriate food choices  Outcome: Progressing  Goal: Maintains adequate nutritional intake  Description: INTERVENTIONS:  - Monitor percentage of each meal consumed  - Identify factors contributing to decreased intake, treat as appropriate  - Assist with meals as needed  - Monitor I&O, weight, and lab values if indicated  - Obtain nutrition services referral as needed  Outcome: Progressing     Problem: METABOLIC, FLUID AND ELECTROLYTES - ADULT  Goal: Electrolytes maintained within normal limits  Description: INTERVENTIONS:  - Monitor labs and assess patient for signs and symptoms of electrolyte imbalances  - Administer electrolyte replacement as ordered  - Monitor response to electrolyte replacements, including repeat lab results as appropriate  - Instruct patient on fluid and nutrition as appropriate  Outcome: Progressing  Goal: Fluid balance maintained  Description: INTERVENTIONS:  - Monitor labs   - Monitor I/O and WT  - Instruct patient on fluid and nutrition as appropriate  - Assess for signs & symptoms of volume excess or deficit  Outcome: Progressing  Goal: Glucose maintained within target range  Description: INTERVENTIONS:  - Monitor Blood Glucose as ordered  - Assess for signs and symptoms of hyperglycemia and hypoglycemia  - Administer ordered medications to maintain glucose within target range  - Assess nutritional intake and initiate nutrition service referral as needed  Outcome: Progressing

## 2024-01-17 NOTE — ASSESSMENT & PLAN NOTE
CTA: Very low clot burden acute nonocclusive pulmonary embolism in the left lower lobe anterior basal segmental pulmonary artery extending into subsegmental branches. No additional pulmonary emboli identified. No imaging evidence of right heart strain.   No hypoxia or dyspnea. Reports chronic intermittent SOB at baseline. Uses 2LNC at hs  Patient had few falls at home last year.  Echo findings noted  Venous duplex with left popliteal vein  Start heparin drip, if no bleeding transition to eliquis and also consider pradaxa given better reversal agents   Outpatient f/u with cardiology for zio monitoring

## 2024-01-18 LAB
ANION GAP SERPL CALCULATED.3IONS-SCNC: 5 MMOL/L
APTT PPP: 70 SECONDS (ref 23–37)
APTT PPP: 82 SECONDS (ref 23–37)
BASOPHILS # BLD AUTO: 0.03 THOUSANDS/ÂΜL (ref 0–0.1)
BASOPHILS NFR BLD AUTO: 0 % (ref 0–1)
BUN SERPL-MCNC: 7 MG/DL (ref 5–25)
CALCIUM SERPL-MCNC: 7.8 MG/DL (ref 8.4–10.2)
CHLORIDE SERPL-SCNC: 111 MMOL/L (ref 96–108)
CO2 SERPL-SCNC: 19 MMOL/L (ref 21–32)
CREAT SERPL-MCNC: 0.92 MG/DL (ref 0.6–1.3)
EOSINOPHIL # BLD AUTO: 0.18 THOUSAND/ÂΜL (ref 0–0.61)
EOSINOPHIL NFR BLD AUTO: 2 % (ref 0–6)
ERYTHROCYTE [DISTWIDTH] IN BLOOD BY AUTOMATED COUNT: 19.3 % (ref 11.6–15.1)
GFR SERPL CREATININE-BSD FRML MDRD: 50 ML/MIN/1.73SQ M
GLUCOSE SERPL-MCNC: 115 MG/DL (ref 65–140)
GLUCOSE SERPL-MCNC: 119 MG/DL (ref 65–140)
GLUCOSE SERPL-MCNC: 141 MG/DL (ref 65–140)
GLUCOSE SERPL-MCNC: 156 MG/DL (ref 65–140)
GLUCOSE SERPL-MCNC: 172 MG/DL (ref 65–140)
HCT VFR BLD AUTO: 26.8 % (ref 34.8–46.1)
HGB BLD-MCNC: 7.3 G/DL (ref 11.5–15.4)
IMM GRANULOCYTES # BLD AUTO: 0.05 THOUSAND/UL (ref 0–0.2)
IMM GRANULOCYTES NFR BLD AUTO: 1 % (ref 0–2)
LYMPHOCYTES # BLD AUTO: 0.9 THOUSANDS/ÂΜL (ref 0.6–4.47)
LYMPHOCYTES NFR BLD AUTO: 12 % (ref 14–44)
MCH RBC QN AUTO: 22.1 PG (ref 26.8–34.3)
MCHC RBC AUTO-ENTMCNC: 27.2 G/DL (ref 31.4–37.4)
MCV RBC AUTO: 81 FL (ref 82–98)
MONOCYTES # BLD AUTO: 0.74 THOUSAND/ÂΜL (ref 0.17–1.22)
MONOCYTES NFR BLD AUTO: 10 % (ref 4–12)
NEUTROPHILS # BLD AUTO: 5.63 THOUSANDS/ÂΜL (ref 1.85–7.62)
NEUTS SEG NFR BLD AUTO: 75 % (ref 43–75)
NRBC BLD AUTO-RTO: 0 /100 WBCS
PLATELET # BLD AUTO: 252 THOUSANDS/UL (ref 149–390)
PMV BLD AUTO: 12.2 FL (ref 8.9–12.7)
POTASSIUM SERPL-SCNC: 4 MMOL/L (ref 3.5–5.3)
RBC # BLD AUTO: 3.31 MILLION/UL (ref 3.81–5.12)
SODIUM SERPL-SCNC: 135 MMOL/L (ref 135–147)
WBC # BLD AUTO: 7.53 THOUSAND/UL (ref 4.31–10.16)

## 2024-01-18 PROCEDURE — 97110 THERAPEUTIC EXERCISES: CPT

## 2024-01-18 PROCEDURE — 85025 COMPLETE CBC W/AUTO DIFF WBC: CPT | Performed by: INTERNAL MEDICINE

## 2024-01-18 PROCEDURE — 97535 SELF CARE MNGMENT TRAINING: CPT

## 2024-01-18 PROCEDURE — 99232 SBSQ HOSP IP/OBS MODERATE 35: CPT | Performed by: INTERNAL MEDICINE

## 2024-01-18 PROCEDURE — 80048 BASIC METABOLIC PNL TOTAL CA: CPT | Performed by: INTERNAL MEDICINE

## 2024-01-18 PROCEDURE — 85730 THROMBOPLASTIN TIME PARTIAL: CPT | Performed by: INTERNAL MEDICINE

## 2024-01-18 PROCEDURE — NC001 PR NO CHARGE: Performed by: SURGERY

## 2024-01-18 PROCEDURE — 97530 THERAPEUTIC ACTIVITIES: CPT

## 2024-01-18 PROCEDURE — 82948 REAGENT STRIP/BLOOD GLUCOSE: CPT

## 2024-01-18 RX ADMIN — DICLOFENAC SODIUM TOPICAL GEL, 1% 2 G: 10 GEL TOPICAL at 21:53

## 2024-01-18 RX ADMIN — PANTOPRAZOLE SODIUM 40 MG: 40 TABLET, DELAYED RELEASE ORAL at 08:40

## 2024-01-18 RX ADMIN — HEPARIN SODIUM 11 UNITS/KG/HR: 10000 INJECTION, SOLUTION INTRAVENOUS at 07:26

## 2024-01-18 RX ADMIN — ASPIRIN 81 MG: 81 TABLET, COATED ORAL at 08:41

## 2024-01-18 RX ADMIN — DICLOFENAC SODIUM TOPICAL GEL, 1% 2 G: 10 GEL TOPICAL at 08:40

## 2024-01-18 RX ADMIN — CYANOCOBALAMIN TAB 500 MCG 250 MCG: 500 TAB at 08:41

## 2024-01-18 RX ADMIN — INSULIN LISPRO 1 UNITS: 100 INJECTION, SOLUTION INTRAVENOUS; SUBCUTANEOUS at 21:52

## 2024-01-18 RX ADMIN — Medication 1000 UNITS: at 08:40

## 2024-01-18 RX ADMIN — ALBUTEROL SULFATE 2 PUFF: 90 AEROSOL, METERED RESPIRATORY (INHALATION) at 08:45

## 2024-01-18 RX ADMIN — SENNOSIDES 8.6 MG: 8.6 TABLET, FILM COATED ORAL at 08:40

## 2024-01-18 RX ADMIN — POLYETHYLENE GLYCOL 3350 17 G: 17 POWDER, FOR SOLUTION ORAL at 20:20

## 2024-01-18 RX ADMIN — GABAPENTIN 300 MG: 300 CAPSULE ORAL at 08:41

## 2024-01-18 RX ADMIN — GABAPENTIN 300 MG: 300 CAPSULE ORAL at 17:21

## 2024-01-18 RX ADMIN — LEVOTHYROXINE SODIUM 75 MCG: 75 TABLET ORAL at 05:44

## 2024-01-18 RX ADMIN — INSULIN LISPRO 1 UNITS: 100 INJECTION, SOLUTION INTRAVENOUS; SUBCUTANEOUS at 17:22

## 2024-01-18 RX ADMIN — FAMOTIDINE 10 MG: 20 TABLET ORAL at 21:53

## 2024-01-18 RX ADMIN — LATANOPROST 1 DROP: 50 SOLUTION/ DROPS OPHTHALMIC at 21:52

## 2024-01-18 NOTE — ASSESSMENT & PLAN NOTE
Lab Results   Component Value Date    HGBA1C 9.4 (H) 12/12/2023     ISS inpatient     Recent Labs     01/17/24  1625 01/17/24  2107 01/18/24  0800 01/18/24  1122   POCGLU 143* 198* 119 141*         Blood Sugar Average: Last 72 hrs:  (P) [id number]  SSI; Subcutaneous Insulin Order Set  Blood Glucose checks TIDWM and QHS (Q6H for NPO patients)  Hold oral medications  Blood Glucose goal while inpatient is 140-180  Reduce basal insulin by 25-50% while inpatient  Consistent Carbohydrate Diet when able to tolerate normal diet

## 2024-01-18 NOTE — PROGRESS NOTES
[facility]  Progress Note  Name: Priya Zarate I  MRN: [id number]  Unit/Bed#: E5 -01 I Date of Admission: 1/13/2024   Date of Service: 1/18/2024 I Hospital Day: 4    Assessment/Plan   * Intussusception of colon (HCC)  Assessment & Plan  Reports generalized and epigastric abdominal pain  Hx of abdominal pain ,GERD and colitis  CTA: No evidence of bowel obstruction or mesenteric inflammation. Terminal ileum and cecum located in the anterior subhepatic region, similar compared to the prior study. No free air or free fluid.   Campo-colonic intussusception in the mid to distal transverse colon region noted without definitive focal lead point mass identified.  Extensive sigmoid diverticulosis without evidence for diverticulitis.   Small amount of nonspecific soft tissue density material just inferior from the aortic bifurcation and left common iliac artery bifurcation regions again seen, similar compared to the prior exam  Repeat CT abdomen pelvis with continued intussuseption and new trace free air seen, consider repeat CT   Continue PPI and pepcid  Serial abdominal exams  Currently on surgical light diet; advance as tolerated  GI and general surgery following; for now both services recommend conservative measures  1/17-family meeting today; the patient able to tolerate diet, likely stable for discharge in the next 24 to 48 hours    Goals of care, counseling/discussion  Assessment & Plan  Discussed with daughter and patient goals of care   Planning for family meeting tomorrow with patient's daughter and son along with Gi, surgery and internal medicine team   Both patient and daughter have stated that they would want everything done including surgery. Discussed further with daughter and the families main goal is to control their mother's pain  We discussed risk of bleeding on heparin drip and anticoagulation at this time they wish to proceed     Acute deep vein thrombosis (DVT) of  popliteal vein of left lower extremity (HCC)  Assessment & Plan  Venous duplex showing left popliteal vein occulusion   PE found on admission  Started on heparin drip     Note: Primary team understand risk/benefit involved with anticoagulation in this 89-year-old female with history of falls.  Low clot burden for PE and agree with not starting anticoagulation at that time; but now with chronic DVT of right lower extremity and acute DVT of left lower extremity makes consideration of anticoagulation much more pertinent.  Currently on heparin drip; will discuss with vascular surgery to help inform decision on starting Eliquis moving forward.    Acute pulmonary embolism without acute cor pulmonale (HCC)  Assessment & Plan  CTA: Very low clot burden acute nonocclusive pulmonary embolism in the left lower lobe anterior basal segmental pulmonary artery extending into subsegmental branches. No additional pulmonary emboli identified. No imaging evidence of right heart strain.   No hypoxia or dyspnea. Reports chronic intermittent SOB at baseline. Uses 2LNC at hs  Patient had few falls at home last year.  Echo findings noted  Venous duplex with left popliteal vein  Start heparin drip, if no bleeding transition to eliquis and also consider pradaxa given better reversal agents   Outpatient f/u with cardiology for zio monitoring    Iron deficiency anemia  Assessment & Plan  Chronic anemia. Hg at baseline    Myofascial muscle pain  Assessment & Plan  Maintained on gabapentin. Follows with neurology    Type 2 diabetes mellitus with hyperglycemia, without long-term current use of insulin ([facility])  Assessment & Plan  Lab Results   Component Value Date    HGBA1C 9.4 (H) 12/12/2023     ISS inpatient     Recent Labs     01/17/24  1625 01/17/24  2107 01/18/24  0800 01/18/24  1122   POCGLU 143* 198* 119 141*         Blood Sugar Average: Last 72 hrs:  (P) [id number]  SSI; Subcutaneous Insulin Order Set  Blood Glucose checks TIDWM  and QHS (Q6H for NPO patients)  Hold oral medications  Blood Glucose goal while inpatient is 140-180  Reduce basal insulin by 25-50% while inpatient  Consistent Carbohydrate Diet when able to tolerate normal diet      Acquired hypothyroidism  Assessment & Plan  Levothyroxine 75mcg daily   Most recent TSH-1.187    Hallucination  Assessment & Plan  Per outpatient PMD note, daughter reports mother sometimes has hallucinations    Aortic stenosis  Assessment & Plan  Repeat echo obtained and findings noted  Severe aortic stenosis thought to be contributing to patient's SOB  Follows with cardiology in the outpatient setting   Recommended medical management                VTE Pharmacologic Prophylaxis: VTE Score: 6 High Risk (Score >/= 5) - Pharmacological DVT Prophylaxis Ordered: apixaban (Eliquis). Sequential Compression Devices Ordered.    Mobility:   Basic Mobility Inpatient Raw Score: 19  JH-HLM Goal: 6: Walk 10 steps or more  JH-HLM Achieved: 7: Walk 25 feet or more  HLM Goal achieved. Continue to encourage appropriate mobility.    Patient Centered Rounds: I performed bedside rounds with nursing staff today.   Discussions with Specialists or Other Care Team Provider:     Education and Discussions with Family / Patient: Discussed treatment plan with family and patient who agree with current plan; encouraged to ask questions and participate.      Total Time Spent on Date of Encounter in care of patient: 45 mins. This time was spent on one or more of the following: performing physical exam; counseling and coordination of care; obtaining or reviewing history; documenting in the medical record; reviewing/ordering tests, medications or procedures; communicating with other healthcare professionals and discussing with patient's family/caregivers.    Current Length of Stay: 4 day(s)  Current Patient Status: Inpatient   Certification Statement: The patient will continue to require additional inpatient hospital stay due to  treatment of intussusception   Discharge Plan: Anticipate discharge tomorrow to home.    Code Status: Level 3 - DNAR and DNI    Subjective:   Patient seen and examined at bedside, no acute distress or discomfort noted.  Patient tolerating diet well and labs and vitals are stable.  Hemoglobin is concerning and will repeat in the morning and monitor.  GI and surgery have signed off; will price Eliquis for treatment of PE and DVT.    Objective:     Vitals:   Temp (24hrs), Av °F (36.7 °C), Min:97.7 °F (36.5 °C), Max:98.6 °F (37 °C)    Temp:  [97.7 °F (36.5 °C)-98.6 °F (37 °C)] 98.6 °F (37 °C)  HR:  [66-71] 66  Resp:  [16-18] 18  BP: (103-143)/(51-79) 119/53  SpO2:  [98 %-99 %] 98 %  Body mass index is 27.62 kg/m².     Input and Output Summary (last 24 hours):     Intake/Output Summary (Last 24 hours) at 2024 1618  Last data filed at 2024 0830  Gross per 24 hour   Intake 360 ml   Output 325 ml   Net 35 ml       Physical Exam:   Physical Exam  Vitals and nursing note reviewed.   Constitutional:       General: She is not in acute distress.     Appearance: She is well-developed.   HENT:      Head: Normocephalic and atraumatic.   Eyes:      Conjunctiva/sclera: Conjunctivae normal.   Cardiovascular:      Rate and Rhythm: Normal rate.   Pulmonary:      Effort: Pulmonary effort is normal. No respiratory distress.   Abdominal:      General: There is no distension.      Palpations: Abdomen is soft.      Tenderness: There is no abdominal tenderness. There is no guarding.   Musculoskeletal:         General: No swelling.      Cervical back: Neck supple.   Skin:     General: Skin is warm and dry.   Neurological:      Mental Status: She is alert.   Psychiatric:         Mood and Affect: Mood normal.            Additional Data:     Labs:  Results from last 7 days   Lab Units 24  0452   WBC Thousand/uL 7.53   HEMOGLOBIN g/dL 7.3*   HEMATOCRIT % 26.8*   PLATELETS Thousands/uL 252   NEUTROS PCT % 75   LYMPHS PCT % 12*    MONOS PCT % 10   EOS PCT % 2     Results from last 7 days   Lab Units 01/18/24  0452 01/17/24  0408   SODIUM mmol/L 135 139   POTASSIUM mmol/L 4.0 3.7   CHLORIDE mmol/L 111* 112*   CO2 mmol/L 19* 20*   BUN mg/dL 7 8   CREATININE mg/dL 0.92 0.85   ANION GAP mmol/L 5 7   CALCIUM mg/dL 7.8* 8.3*   ALBUMIN g/dL  --  3.0*   TOTAL BILIRUBIN mg/dL  --  0.26   ALK PHOS U/L  --  62   ALT U/L  --  6*   AST U/L  --  11*   GLUCOSE RANDOM mg/dL 115 115     Results from last 7 days   Lab Units 01/15/24  1244   INR  1.26*     Results from last 7 days   Lab Units 01/18/24  1122 01/18/24  0800 01/17/24  2107 01/17/24  1625 01/17/24  1117 01/17/24  0705 01/16/24  2038 01/16/24  1543 01/16/24  1122 01/16/24  0751 01/15/24  2057 01/15/24  1528   POC GLUCOSE mg/dl 141* 119 198* 143* 123 111 98 166* 129 107 91 211*         Results from last 7 days   Lab Units 01/14/24  0752 01/13/24  2037   LACTIC ACID mmol/L 0.9 0.9       Lines/Drains:  Invasive Devices       Peripheral Intravenous Line  Duration             Peripheral IV 01/13/24 Dorsal (posterior);Left Forearm 4 days                          Imaging: No pertinent imaging reviewed.    Recent Cultures (last 7 days):   Results from last 7 days   Lab Units 01/13/24  2304   URINE CULTURE  No Growth <1000 cfu/mL       Last 24 Hours Medication List:   Current Facility-Administered Medications   Medication Dose Route Frequency Provider Last Rate    acetaminophen  650 mg Oral Q6H PRN DEVEN Ravi      albuterol  2 puff Inhalation Q6H PRN DEVEN Ravi      amLODIPine  5 mg Oral Daily DEVEN Ravi      aspirin  81 mg Oral Daily DEVEN Ravi      cholecalciferol  1,000 Units Oral Daily DEVEN Ravi      vitamin B-12  250 mcg Oral Daily DEVEN Ravi      Diclofenac Sodium  2 g Topical 4x Daily Ayana Manish, CRNP      famotidine  10 mg Oral HS Ayana Hammond, SHANTENP      gabapentin  300 mg Oral BID Ayana Hammond,  CRNP      heparin (porcine)  3-30 Units/kg/hr (Order-Specific) Intravenous Titrated Dionicio Stauffer MD 11 Units/kg/hr (01/18/24 0726)    insulin lispro  1-5 Units Subcutaneous 4x Daily (AC & HS) DEVNE Ravi      latanoprost  1 drop Both Eyes HS DEVEN Ravi      levothyroxine  75 mcg Oral Early Morning DEVEN Ravi      pantoprazole  40 mg Oral Daily DEVEN Ravi      polyethylene glycol  17 g Oral BID Marina Cardozo, DO      senna  1 tablet Oral BID Marina Cardozo, DO          Today, Patient Was Seen By: Dionicio Stauffer MD    **Please Note: This note may have been constructed using a voice recognition system.**

## 2024-01-18 NOTE — ASSESSMENT & PLAN NOTE
Reports generalized and epigastric abdominal pain  Hx of abdominal pain ,GERD and colitis  CTA: No evidence of bowel obstruction or mesenteric inflammation. Terminal ileum and cecum located in the anterior subhepatic region, similar compared to the prior study. No free air or free fluid.   Siletz-colonic intussusception in the mid to distal transverse colon region noted without definitive focal lead point mass identified.  Extensive sigmoid diverticulosis without evidence for diverticulitis.   Small amount of nonspecific soft tissue density material just inferior from the aortic bifurcation and left common iliac artery bifurcation regions again seen, similar compared to the prior exam  Repeat CT abdomen pelvis with continued intussuseption and new trace free air seen, consider repeat CT   Continue PPI and pepcid  Serial abdominal exams  Currently on surgical light diet; advance as tolerated  GI and general surgery following; for now both services recommend conservative measures  1/17-family meeting today; the patient able to tolerate diet, likely stable for discharge in the next 24 to 48 hours

## 2024-01-18 NOTE — PLAN OF CARE
Problem: OCCUPATIONAL THERAPY ADULT  Goal: Performs self-care activities at highest level of function for planned discharge setting.  See evaluation for individualized goals.  Description: Treatment Interventions: ADL retraining, Functional transfer training, UE strengthening/ROM, Endurance training, Cognitive reorientation, Patient/family training, Equipment evaluation/education, Compensatory technique education, Energy conservation, Activityengagement  Equipment Recommended: Bedside commode       See flowsheet documentation for full assessment, interventions and recommendations.   Outcome: Progressing  Note: Limitation: Decreased ADL status, Decreased UE ROM, Decreased UE strength, Decreased cognition, Decreased Safe judgement during ADL, Decreased endurance, Decreased high-level ADLs, Decreased self-care trans  Prognosis: Fair  Assessment: Pt seen for OT f/u tx session this date. Pt completed toileting and grooming tasks w/ SBA and use of RW. Pt standing tolerance ~8 min w UE support.  Completed therex b/l UE in order to increase strength/endurance for ADLs and functional transfers/mobility. Continue to rec level III resource intensity upon D/C.     Rehab Resource Intensity Level, OT: III (Minimum Resource Intensity)

## 2024-01-18 NOTE — PROGRESS NOTES
Progress Note - General Surgery  : [facility] Resident on [facility]    Priya B Elliot 89 y.o. female MRN: [id number]  Unit/Bed#: E5 -01 Encounter: [id number]      Assessment:  89 y.o. female with colo-colo intussusception, patient / family opting for watchful waiting      Plan:  Regular diet  Patient should seek medical attention if she has significant abdominal pain or is unable tolerate a diet        Subjective: No acute complaints, ate dinner last night and had a BM last night. No pain / nausea / emesis      Objective:     Physical Exam:  GEN: NAD   Ab: Soft, NT/ND  Lung: Normal effort   CV: RRR   Extrem: No CCE   Neuro: A+Ox3       I/O         01/16 0701 01/17 0700 01/17 0701 01/18 0700    P.O. 480 600    Total Intake(mL/kg) 480 (7) 600 (8.8)    Urine (mL/kg/hr) 200 (0.1)     Stool 400     Total Output 600     Net -120 +600          Unmeasured Urine Occurrence 1 x 2 x    Unmeasured Stool Occurrence 2 x 2 x            Lab, Imaging and other studies: I have personally reviewed pertinent reports.  , CBC with diff:   Lab Results   Component Value Date    WBC 4.82 01/17/2024    HGB 8.1 (L) 01/17/2024    HCT 27.3 (L) 01/17/2024    MCV 77 (L) 01/17/2024     01/17/2024    RBC 3.55 (L) 01/17/2024    MCH 22.8 (L) 01/17/2024    MCHC 29.7 (L) 01/17/2024    RDW 18.1 (H) 01/17/2024    MPV 10.3 01/17/2024    NRBC 0 01/17/2024   , BMP/CMP:   Lab Results   Component Value Date    SODIUM 139 01/17/2024    K 3.7 01/17/2024     (H) 01/17/2024    CO2 20 (L) 01/17/2024    BUN 8 01/17/2024    CREATININE 0.85 01/17/2024    CALCIUM 8.3 (L) 01/17/2024    AST 11 (L) 01/17/2024    ALT 6 (L) 01/17/2024    ALKPHOS 62 01/17/2024    EGFR 56 01/17/2024           Jamarcus Alva MD  1/17/2024 9:40 PM

## 2024-01-18 NOTE — PLAN OF CARE
Problem: Potential for Falls  Goal: Patient will remain free of falls  Description: INTERVENTIONS:  - Educate patient/family on patient safety including physical limitations  - Instruct patient to call for assistance with activity   - Consult OT/PT to assist with strengthening/mobility   - Keep Call bell within reach  - Keep bed low and locked with side rails adjusted as appropriate  - Keep care items and personal belongings within reach  - Initiate and maintain comfort rounds  - Make Fall Risk Sign visible to staff  - Offer Toileting every 2 Hours, in advance of need  - Initiate/Maintain bed alarm  - Obtain necessary fall risk management equipment: call bell in reach of patient  - Apply yellow socks and bracelet for high fall risk patients  - Consider moving patient to room near nurses station  Outcome: Progressing     Problem: Prexisting or High Potential for Compromised Skin Integrity  Goal: Skin integrity is maintained or improved  Description: INTERVENTIONS:  - Identify patients at risk for skin breakdown  - Assess and monitor skin integrity  - Assess and monitor nutrition and hydration status  - Monitor labs   - Assess for incontinence   - Turn and reposition patient  - Assist with mobility/ambulation  - Relieve pressure over bony prominences  - Avoid friction and shearing  - Provide appropriate hygiene as needed including keeping skin clean and dry  - Evaluate need for skin moisturizer/barrier cream  - Collaborate with interdisciplinary team   - Patient/family teaching  - Consider wound care consult   Outcome: Progressing     Problem: PAIN - ADULT  Goal: Verbalizes/displays adequate comfort level or baseline comfort level  Description: Interventions:  - Encourage patient to monitor pain and request assistance  - Assess pain using appropriate pain scale  - Administer analgesics based on type and severity of pain and evaluate response  - Implement non-pharmacological measures as appropriate and evaluate  response  - Consider cultural and social influences on pain and pain management  - Notify physician/advanced practitioner if interventions unsuccessful or patient reports new pain  Outcome: Progressing     Problem: INFECTION - ADULT  Goal: Absence or prevention of progression during hospitalization  Description: INTERVENTIONS:  - Assess and monitor for signs and symptoms of infection  - Monitor lab/diagnostic results  - Monitor all insertion sites, i.e. indwelling lines, tubes, and drains  - Monitor endotracheal if appropriate and nasal secretions for changes in amount and color  - Jackson appropriate cooling/warming therapies per order  - Administer medications as ordered  - Instruct and encourage patient and family to use good hand hygiene technique  - Identify and instruct in appropriate isolation precautions for identified infection/condition  Outcome: Progressing     Problem: SAFETY ADULT  Goal: Patient will remain free of falls  Description: INTERVENTIONS:  - Educate patient/family on patient safety including physical limitations  - Instruct patient to call for assistance with activity   - Consult OT/PT to assist with strengthening/mobility   - Keep Call bell within reach  - Keep bed low and locked with side rails adjusted as appropriate  - Keep care items and personal belongings within reach  - Initiate and maintain comfort rounds  - Make Fall Risk Sign visible to staff  - Offer Toileting every 2 Hours, in advance of need  - Initiate/Maintain bed alarm  - Obtain necessary fall risk management equipment: call bell in reach of patient  - Apply yellow socks and bracelet for high fall risk patients  - Consider moving patient to room near nurses station  Outcome: Progressing  Goal: Maintain or return to baseline ADL function  Description: INTERVENTIONS:  -  Assess patient's ability to carry out ADLs; assess patient's baseline for ADL function and identify physical deficits which impact ability to perform ADLs  (bathing, care of mouth/teeth, toileting, grooming, dressing, etc.)  - Assess/evaluate cause of self-care deficits   - Assess range of motion  - Assess patient's mobility; develop plan if impaired  - Assess patient's need for assistive devices and provide as appropriate  - Encourage maximum independence but intervene and supervise when necessary  - Involve family in performance of ADLs  - Assess for home care needs following discharge   - Consider OT consult to assist with ADL evaluation and planning for discharge  - Provide patient education as appropriate  Outcome: Progressing  Goal: Maintains/Returns to pre admission functional level  Description: INTERVENTIONS:  - Perform AM-PAC 6 Click Basic Mobility/ Daily Activity assessment daily.  - Set and communicate daily mobility goal to care team and patient/family/caregiver.   - Collaborate with rehabilitation services on mobility goals if consulted  - Perform Range of Motion 3 times a day.  - Reposition patient every 2 hours.  - Dangle patient 3 times a day  - Stand patient 3 times a day  - Ambulate patient 3 times a day  - Out of bed to chair 3 times a day   - Out of bed for meals 3 times a day  - Out of bed for toileting  - Record patient progress and toleration of activity level   Outcome: Progressing     Problem: DISCHARGE PLANNING  Goal: Discharge to home or other facility with appropriate resources  Description: INTERVENTIONS:  - Identify barriers to discharge w/patient and caregiver  - Arrange for needed discharge resources and transportation as appropriate  - Identify discharge learning needs (meds, wound care, etc.)  - Arrange for interpretive services to assist at discharge as needed  - Refer to Case Management Department for coordinating discharge planning if the patient needs post-hospital services based on physician/advanced practitioner order or complex needs related to functional status, cognitive ability, or social support system  Outcome:  Progressing     Problem: Knowledge Deficit  Goal: Patient/family/caregiver demonstrates understanding of disease process, treatment plan, medications, and discharge instructions  Description: Complete learning assessment and assess knowledge base.  Interventions:  - Provide teaching at level of understanding  - Provide teaching via preferred learning methods  Outcome: Progressing     Problem: CARDIOVASCULAR - ADULT  Goal: Maintains optimal cardiac output and hemodynamic stability  Description: INTERVENTIONS:  - Monitor I/O, vital signs and rhythm  - Monitor for S/S and trends of decreased cardiac output  - Administer and titrate ordered vasoactive medications to optimize hemodynamic stability  - Assess quality of pulses, skin color and temperature  - Assess for signs of decreased coronary artery perfusion  - Instruct patient to report change in severity of symptoms  Outcome: Progressing  Goal: Absence of cardiac dysrhythmias or at baseline rhythm  Description: INTERVENTIONS:  - Continuous cardiac monitoring, vital signs, obtain 12 lead EKG if ordered  - Administer antiarrhythmic and heart rate control medications as ordered  - Monitor electrolytes and administer replacement therapy as ordered  Outcome: Progressing     Problem: GASTROINTESTINAL - ADULT  Goal: Minimal or absence of nausea and/or vomiting  Description: INTERVENTIONS:  - Administer IV fluids if ordered to ensure adequate hydration  - Maintain NPO status until nausea and vomiting are resolved  - Nasogastric tube if ordered  - Administer ordered antiemetic medications as needed  - Provide nonpharmacologic comfort measures as appropriate  - Advance diet as tolerated, if ordered  - Consider nutrition services referral to assist patient with adequate nutrition and appropriate food choices  Outcome: Progressing  Goal: Maintains or returns to baseline bowel function  Description: INTERVENTIONS:  - Assess bowel function  - Encourage oral fluids to ensure  adequate hydration  - Administer IV fluids if ordered to ensure adequate hydration  - Administer ordered medications as needed  - Encourage mobilization and activity  - Consider nutritional services referral to assist patient with adequate nutrition and appropriate food choices  Outcome: Progressing  Goal: Maintains adequate nutritional intake  Description: INTERVENTIONS:  - Monitor percentage of each meal consumed  - Identify factors contributing to decreased intake, treat as appropriate  - Assist with meals as needed  - Monitor I&O, weight, and lab values if indicated  - Obtain nutrition services referral as needed  Outcome: Progressing     Problem: METABOLIC, FLUID AND ELECTROLYTES - ADULT  Goal: Electrolytes maintained within normal limits  Description: INTERVENTIONS:  - Monitor labs and assess patient for signs and symptoms of electrolyte imbalances  - Administer electrolyte replacement as ordered  - Monitor response to electrolyte replacements, including repeat lab results as appropriate  - Instruct patient on fluid and nutrition as appropriate  Outcome: Progressing  Goal: Fluid balance maintained  Description: INTERVENTIONS:  - Monitor labs   - Monitor I/O and WT  - Instruct patient on fluid and nutrition as appropriate  - Assess for signs & symptoms of volume excess or deficit  Outcome: Progressing  Goal: Glucose maintained within target range  Description: INTERVENTIONS:  - Monitor Blood Glucose as ordered  - Assess for signs and symptoms of hyperglycemia and hypoglycemia  - Administer ordered medications to maintain glucose within target range  - Assess nutritional intake and initiate nutrition service referral as needed  Outcome: Progressing

## 2024-01-18 NOTE — ASSESSMENT & PLAN NOTE
Repeat echo obtained and findings noted  Severe aortic stenosis thought to be contributing to patient's SOB  Follows with cardiology in the outpatient setting   Recommended medical management

## 2024-01-18 NOTE — OCCUPATIONAL THERAPY NOTE
Occupational Therapy Progress Note     Patient Name: Priya Zarate  Today's Date: 1/18/2024  Problem List  Principal Problem:    Intussusception of colon (HCC)  Active Problems:    Aortic stenosis    Hallucination    Acquired hypothyroidism    Type 2 diabetes mellitus with hyperglycemia, without long-term current use of insulin (HCC)    Myofascial muscle pain    Iron deficiency anemia    Acute pulmonary embolism without acute cor pulmonale (HCC)    Acute deep vein thrombosis (DVT) of popliteal vein of left lower extremity (HCC)    Goals of care, counseling/discussion        01/18/24 0926   OT Last Visit   OT Visit Date 01/18/24   Note Type   Note Type Treatment   Pain Assessment   Pain Assessment Tool 0-10   Pain Score No Pain   Restrictions/Precautions   Weight Bearing Precautions Per Order No   Other Precautions Chair Alarm;Bed Alarm;Multiple lines;Fall Risk   ADL   Where Assessed Edge of bed   Grooming Assistance 5  Supervision/Setup   Grooming Deficit Supervision/safety;Increased time to complete;Verbal cueing;Standing with assistive device;Wash/dry hands;Wash/dry face   Toileting Assistance  5  Supervision/Setup   Toileting Deficit Supervison/safety;Increased time to complete   Bed Mobility   Supine to Sit 5  Supervision   Additional items HOB elevated;Increased time required;Verbal cues   Sit to Supine 5  Supervision   Additional items Increased time required;Verbal cues   Transfers   Sit to Stand 5  Supervision   Additional items Increased time required;Verbal cues;Other  (RW)   Stand to Sit 5  Supervision   Additional items Increased time required;Verbal cues;Other  (RW)   Toilet transfer 5  Supervision   Additional items Increased time required;Standard toilet;Other  (grab bar, RW)   Functional Mobility   Functional Mobility   (CGA)   Additional items Rolling walker   Therapeutic Excerise-Strength   UE Strength Yes   Right Upper Extremity- Strength   R Shoulder Flexion;Extension;ABduction;Horizontal  ABduction   R Elbow Elbow flexion;Elbow extension   R Weight/Reps/Sets 10x2   Left Upper Extremity-Strength   L Shoulder Flexion;Extension;ABduction;Horizontal ABduction   L Elbow Elbow flexion;Elbow extension   L Weights/Reps/Sets 10x2   Cognition   Overall Cognitive Status WFL   Arousal/Participation Alert;Cooperative   Attention Attends with cues to redirect   Orientation Level Oriented to person;Oriented to place;Oriented to time   Following Commands Follows one step commands without difficulty   Activity Tolerance   Activity Tolerance Patient tolerated treatment well   Medical Staff Made Aware Yes   Assessment   Assessment Pt seen for OT f/u tx session this date. Pt completed toileting and grooming tasks w/ SBA and use of RW. Pt standing tolerance ~8 min w UE support.  Completed therex b/l UE in order to increase strength/endurance for ADLs and functional transfers/mobility. Continue to rec level III resource intensity upon D/C.   Plan   Treatment Interventions ADL retraining;Functional transfer training;UE strengthening/ROM;Endurance training;Cognitive reorientation;Patient/family training;Equipment evaluation/education;Compensatory technique education;Energy conservation;Activityengagement   Goal Expiration Date 01/29/24   OT Treatment Day 0   OT Frequency 2-3x/wk   Discharge Recommendation   Rehab Resource Intensity Level, OT III (Minimum Resource Intensity)   Equipment Recommended Bedside commode   Commode Type Standard   AM-PAC Daily Activity Inpatient   Lower Body Dressing 3   Bathing 3   Toileting 3   Upper Body Dressing 3   Grooming 4   Eating 4   Daily Activity Raw Score 20   Daily Activity Standardized Score (Calc for Raw Score >=11) 42.03   AM-PAC Applied Cognition Inpatient   Following a Speech/Presentation 4   Understanding Ordinary Conversation 4   Taking Medications 2   Remembering Where Things Are Placed or Put Away 3   Remembering List of 4-5 Errands 3   Taking Care of Complicated Tasks 2    Applied Cognition Raw Score 18   Applied Cognition Standardized Score 38.07     Deepthi Blair

## 2024-01-18 NOTE — RESTORATIVE TECHNICIAN NOTE
Restorative Technician Note      Patient Name: Priya Zarate     Restorative Tech Visit Date: 01/18/24  Note Type: Mobility  Patient Position Upon Consult: Supine  Mobility / Activity Provided: assisted pt in getting OOB to BSC  Activity Performed: Ambulated  Assistive Device: Other (Comment) (HHA)  Patient Position at End of Consult: Bedside chair; All needs within reach; Bed/Chair alarm activated

## 2024-01-19 ENCOUNTER — TELEPHONE (OUTPATIENT)
Age: 89
End: 2024-01-19

## 2024-01-19 VITALS
HEART RATE: 77 BPM | OXYGEN SATURATION: 95 % | RESPIRATION RATE: 18 BRPM | TEMPERATURE: 98.4 F | DIASTOLIC BLOOD PRESSURE: 44 MMHG | HEIGHT: 62 IN | WEIGHT: 151 LBS | BODY MASS INDEX: 27.79 KG/M2 | SYSTOLIC BLOOD PRESSURE: 97 MMHG

## 2024-01-19 LAB
ANION GAP SERPL CALCULATED.3IONS-SCNC: 6 MMOL/L
APTT PPP: 79 SECONDS (ref 23–37)
BASOPHILS # BLD AUTO: 0.03 THOUSANDS/ÂΜL (ref 0–0.1)
BASOPHILS NFR BLD AUTO: 0 % (ref 0–1)
BUN SERPL-MCNC: 11 MG/DL (ref 5–25)
CALCIUM SERPL-MCNC: 8.1 MG/DL (ref 8.4–10.2)
CHLORIDE SERPL-SCNC: 112 MMOL/L (ref 96–108)
CO2 SERPL-SCNC: 20 MMOL/L (ref 21–32)
CREAT SERPL-MCNC: 1 MG/DL (ref 0.6–1.3)
EOSINOPHIL # BLD AUTO: 0.2 THOUSAND/ÂΜL (ref 0–0.61)
EOSINOPHIL NFR BLD AUTO: 3 % (ref 0–6)
ERYTHROCYTE [DISTWIDTH] IN BLOOD BY AUTOMATED COUNT: 19.7 % (ref 11.6–15.1)
GFR SERPL CREATININE-BSD FRML MDRD: 46 ML/MIN/1.73SQ M
GLUCOSE SERPL-MCNC: 133 MG/DL (ref 65–140)
GLUCOSE SERPL-MCNC: 138 MG/DL (ref 65–140)
GLUCOSE SERPL-MCNC: 162 MG/DL (ref 65–140)
HCT VFR BLD AUTO: 26.9 % (ref 34.8–46.1)
HGB BLD-MCNC: 7.6 G/DL (ref 11.5–15.4)
IMM GRANULOCYTES # BLD AUTO: 0.06 THOUSAND/UL (ref 0–0.2)
IMM GRANULOCYTES NFR BLD AUTO: 1 % (ref 0–2)
LYMPHOCYTES # BLD AUTO: 0.92 THOUSANDS/ÂΜL (ref 0.6–4.47)
LYMPHOCYTES NFR BLD AUTO: 13 % (ref 14–44)
MCH RBC QN AUTO: 22.4 PG (ref 26.8–34.3)
MCHC RBC AUTO-ENTMCNC: 28.3 G/DL (ref 31.4–37.4)
MCV RBC AUTO: 79 FL (ref 82–98)
MONOCYTES # BLD AUTO: 0.78 THOUSAND/ÂΜL (ref 0.17–1.22)
MONOCYTES NFR BLD AUTO: 11 % (ref 4–12)
NEUTROPHILS # BLD AUTO: 5.12 THOUSANDS/ÂΜL (ref 1.85–7.62)
NEUTS SEG NFR BLD AUTO: 72 % (ref 43–75)
NRBC BLD AUTO-RTO: 0 /100 WBCS
PLATELET # BLD AUTO: 280 THOUSANDS/UL (ref 149–390)
PMV BLD AUTO: 10.7 FL (ref 8.9–12.7)
POTASSIUM SERPL-SCNC: 3.9 MMOL/L (ref 3.5–5.3)
RBC # BLD AUTO: 3.4 MILLION/UL (ref 3.81–5.12)
SODIUM SERPL-SCNC: 138 MMOL/L (ref 135–147)
WBC # BLD AUTO: 7.11 THOUSAND/UL (ref 4.31–10.16)

## 2024-01-19 PROCEDURE — 85025 COMPLETE CBC W/AUTO DIFF WBC: CPT | Performed by: INTERNAL MEDICINE

## 2024-01-19 PROCEDURE — 99239 HOSP IP/OBS DSCHRG MGMT >30: CPT | Performed by: INTERNAL MEDICINE

## 2024-01-19 PROCEDURE — 85730 THROMBOPLASTIN TIME PARTIAL: CPT | Performed by: INTERNAL MEDICINE

## 2024-01-19 PROCEDURE — 80048 BASIC METABOLIC PNL TOTAL CA: CPT | Performed by: INTERNAL MEDICINE

## 2024-01-19 PROCEDURE — 97530 THERAPEUTIC ACTIVITIES: CPT

## 2024-01-19 PROCEDURE — 82948 REAGENT STRIP/BLOOD GLUCOSE: CPT

## 2024-01-19 PROCEDURE — 97116 GAIT TRAINING THERAPY: CPT

## 2024-01-19 PROCEDURE — 97110 THERAPEUTIC EXERCISES: CPT

## 2024-01-19 RX ADMIN — ASPIRIN 81 MG: 81 TABLET, COATED ORAL at 09:23

## 2024-01-19 RX ADMIN — DICLOFENAC SODIUM TOPICAL GEL, 1% 2 G: 10 GEL TOPICAL at 09:38

## 2024-01-19 RX ADMIN — LEVOTHYROXINE SODIUM 75 MCG: 75 TABLET ORAL at 07:29

## 2024-01-19 RX ADMIN — Medication 1000 UNITS: at 09:23

## 2024-01-19 RX ADMIN — PANTOPRAZOLE SODIUM 40 MG: 40 TABLET, DELAYED RELEASE ORAL at 07:29

## 2024-01-19 RX ADMIN — POLYETHYLENE GLYCOL 3350 17 G: 17 POWDER, FOR SOLUTION ORAL at 09:23

## 2024-01-19 RX ADMIN — CYANOCOBALAMIN TAB 500 MCG 250 MCG: 500 TAB at 09:23

## 2024-01-19 RX ADMIN — APIXABAN 5 MG: 5 TABLET, FILM COATED ORAL at 09:38

## 2024-01-19 RX ADMIN — INSULIN LISPRO 1 UNITS: 100 INJECTION, SOLUTION INTRAVENOUS; SUBCUTANEOUS at 11:53

## 2024-01-19 RX ADMIN — SENNOSIDES 8.6 MG: 8.6 TABLET, FILM COATED ORAL at 09:23

## 2024-01-19 RX ADMIN — GABAPENTIN 300 MG: 300 CAPSULE ORAL at 09:23

## 2024-01-19 NOTE — PHYSICAL THERAPY NOTE
PHYSICAL THERAPY NOTE          Patient Name: Priya Zarate  Today's Date: 1/19/2024 01/19/24 1050   Note Type   Note Type Treatment   Pain Assessment   Pain Assessment Tool 0-10   Pain Score No Pain   Restrictions/Precautions   Other Precautions Bed Alarm;Chair Alarm;Fall Risk   Cognition   Overall Cognitive Status WFL   Arousal/Participation Alert;Responsive;Cooperative   Attention Within functional limits   Orientation Level Oriented X4   Memory Within functional limits   Following Commands Follows one step commands without difficulty   Subjective   Subjective i FEEL BETTER.  PT AGREEABLE TO PT.   Bed Mobility   Supine to Sit 5  Supervision   Additional items Assist x 1;HOB elevated;Increased time required   Sit to Supine 5  Supervision   Additional items HOB elevated;Increased time required   Transfers   Sit to Stand 5  Supervision   Additional items Assist x 1   Stand to Sit 5  Supervision   Additional items Assist x 1   Ambulation/Elevation   Gait pattern Forward Flexion;Excessively slow;Short stride;Decreased foot clearance   Gait Assistance   (CLOSE SUPERVISION TO CG- MIN ASSIST WITH FATIGUE.)   Additional items Assist x 1   Assistive Device Rolling walker   Distance 153' X1 ONE STANDING REST BREAK.   Ambulation/Elevation Additional Comments INCREASINGLY MORE UNSTEADY GAIT WITH FATIGUE.   Balance   Static Sitting Good   Dynamic Sitting Fair +   Static Standing Fair +  (W RW)   Dynamic Standing   (FAIR - FAIR (-))   Ambulatory   (FAIR - FAIR (-))   Endurance Deficit   Endurance Deficit Description FATIGUE, WEAKNESS DECONDITIONING  (SPO2 ON RA POST AMBUALTION  100%,  BPM)   Activity Tolerance   Activity Tolerance Patient limited by fatigue;Patient tolerated treatment well   Exercises   Heelslides Supine;10 reps;AROM;Bilateral   Hip Flexion Supine;10 reps;AROM;Bilateral  (SLR)   Hip Abduction Supine;10  reps;AROM;Bilateral   Hip Adduction Supine;10 reps;AROM;Bilateral   Knee AROM Short Arc Quad Supine;10 reps;AROM;Bilateral   Ankle Pumps Supine;10 reps;AROM;Bilateral   Assessment   Prognosis Good   Problem List Decreased strength;Decreased endurance;Impaired balance;Decreased mobility;Impaired vision;Impaired hearing   Assessment Pt seen for PT treatment session this date with interventions consisting of bed mobility, transfer training, and gait training, and education provided as needed for safety and direction to improve functional mobility, safety awareness, and activity tolerance. Pt agreeable to PT treatment session upon arrival, pt found SUPINE IN BED . At end of session, pt left SUPINE IN BED with all needs in reach. In comparison to previous session, pt with improvement in ambulation distances, b/l le strength, AM- pac score, and functional mobility. PT  IS FUNCTIONING AT SUPERVISION FOR BED MOBILITY, TRANSFERS AND AMBULATION WITH USE OF RW.  PT  DOES REQUIRE MIN ASSIST AS PT FATIGUES WITH PROGRESSION OF AMBULATION DISTANCES DUE TO IMPAIRED BALANCE AND SMITH.   PT  IS ABLE TO AMBULATE > HOUSEHOLD DISTANCES  WITH USE OF RW. NOTED INCREASINGLY MORE FORWARD FLEXED POSTURE AND DECREASED STRIDES AND FOOT CLEARANCE AS PT BECOMES FATIGUED.  CUES TO MAINTAIN CLOSE DISTANCE TO WALKER AT ALL TIMES REQUIRED.  ONE STANDING REST BREAK REQUIRED DURING GAIT TRAINING. NO GROSS LOB NOTED.  PT  PERFORMS SUPINE B/L LE AROM EXERCISES X 10 REPS.  PT TOLERATED WELL. PT  REPORTS FATIGUE AND SOB POST GAIT TRAINING.    Please refer to endurance deficit section of flowsheet for vitals. Continue to recommend  LEVEL III MINIMAL REHAB RESOURCE INTENSITY  at time of d/c in order to maximize pt's functional independence and safety w/ mobility. Pt continues to be functioning below baseline level. PT will continue to see pt while here in order to address the deficits listed above and provide interventions consistent w/ POC in effort to  achieve STGs.    The patient's AM-[facility] Basic Mobility Inpatient Short Form Raw Score is 20. A raw score greater than 16 suggests the patient may benefit from discharge to home. Please also refer to the recommendation of the Physical Therapist for safe discharge planning.   Goals   Patient Goals TO GO HOME TODAY.   STG Expiration Date 01/25/24   PT Treatment Day 2   Plan   Treatment/Interventions Functional transfer training;LE strengthening/ROM;Therapeutic exercise;Endurance training;Patient/family training;Equipment eval/education;Bed mobility;Gait training;Spoke to nursing   Progress Progressing toward goals   PT Frequency 3-5x/wk   Discharge Recommendation   Rehab Resource Intensity Level, PT III (Minimum Resource Intensity)   AM-PAC Basic Mobility Inpatient   Turning in Flat Bed Without Bedrails 4   Lying on Back to Sitting on Edge of Flat Bed Without Bedrails 3   Moving Bed to Chair 3   Standing Up From Chair Using Arms 4   Walk in Room 3   Climb 3-5 Stairs With Railing 3   Basic Mobility Inpatient Raw Score 20   Basic Mobility Standardized Score 43.99   Highest Level Of Mobility   JH-HLM Goal 6: Walk 10 steps or more   JH-HLM Achieved 7: Walk 25 feet or more   Education   Education Provided Mobility training;Home exercise program;Assistive device   Patient Demonstrates acceptance/verbal understanding   End of Consult   Patient Position at End of Consult Supine;Bed/Chair alarm activated;All needs within reach   End of Consult Comments PT IN STABLE CONDITION POST PT SESSION.   Ekta Magallanes, PTA

## 2024-01-19 NOTE — NURSING NOTE
Reviewed discharge instructions, patient verbalized her understanding. Left via stretcher with transport team.

## 2024-01-19 NOTE — PLAN OF CARE
Problem: Potential for Falls  Goal: Patient will remain free of falls  Description: INTERVENTIONS:  - Educate patient/family on patient safety including physical limitations  - Instruct patient to call for assistance with activity   - Consult OT/PT to assist with strengthening/mobility   - Keep Call bell within reach  - Keep bed low and locked with side rails adjusted as appropriate  - Keep care items and personal belongings within reach  - Initiate and maintain comfort rounds  - Make Fall Risk Sign visible to staff  - Offer Toileting every 2 Hours, in advance of need  - Initiate/Maintain bed alarm  - Obtain necessary fall risk management equipment: call bell in reach of patient  - Apply yellow socks and bracelet for high fall risk patients  - Consider moving patient to room near nurses station  Outcome: Progressing     Problem: Prexisting or High Potential for Compromised Skin Integrity  Goal: Skin integrity is maintained or improved  Description: INTERVENTIONS:  - Identify patients at risk for skin breakdown  - Assess and monitor skin integrity  - Assess and monitor nutrition and hydration status  - Monitor labs   - Assess for incontinence   - Turn and reposition patient  - Assist with mobility/ambulation  - Relieve pressure over bony prominences  - Avoid friction and shearing  - Provide appropriate hygiene as needed including keeping skin clean and dry  - Evaluate need for skin moisturizer/barrier cream  - Collaborate with interdisciplinary team   - Patient/family teaching  - Consider wound care consult   Outcome: Progressing     Problem: PAIN - ADULT  Goal: Verbalizes/displays adequate comfort level or baseline comfort level  Description: Interventions:  - Encourage patient to monitor pain and request assistance  - Assess pain using appropriate pain scale  - Administer analgesics based on type and severity of pain and evaluate response  - Implement non-pharmacological measures as appropriate and evaluate  response  - Consider cultural and social influences on pain and pain management  - Notify physician/advanced practitioner if interventions unsuccessful or patient reports new pain  Outcome: Progressing     Problem: INFECTION - ADULT  Goal: Absence or prevention of progression during hospitalization  Description: INTERVENTIONS:  - Assess and monitor for signs and symptoms of infection  - Monitor lab/diagnostic results  - Monitor all insertion sites, i.e. indwelling lines, tubes, and drains  - Monitor endotracheal if appropriate and nasal secretions for changes in amount and color  - [place] appropriate cooling/warming therapies per order  - Administer medications as ordered  - Instruct and encourage patient and family to use good hand hygiene technique  - Identify and instruct in appropriate isolation precautions for identified infection/condition  Outcome: Progressing     Problem: SAFETY ADULT  Goal: Patient will remain free of falls  Description: INTERVENTIONS:  - Educate patient/family on patient safety including physical limitations  - Instruct patient to call for assistance with activity   - Consult OT/PT to assist with strengthening/mobility   - Keep Call bell within reach  - Keep bed low and locked with side rails adjusted as appropriate  - Keep care items and personal belongings within reach  - Initiate and maintain comfort rounds  - Make Fall Risk Sign visible to staff  - Offer Toileting every 2 Hours, in advance of need  - Initiate/Maintain bed alarm  - Obtain necessary fall risk management equipment: call bell in reach of patient  - Apply yellow socks and bracelet for high fall risk patients  - Consider moving patient to room near nurses station  Outcome: Progressing  Goal: Maintain or return to baseline ADL function  Description: INTERVENTIONS:  -  Assess patient's ability to carry out ADLs; assess patient's baseline for ADL function and identify physical deficits which impact ability to perform ADLs  (bathing, care of mouth/teeth, toileting, grooming, dressing, etc.)  - Assess/evaluate cause of self-care deficits   - Assess range of motion  - Assess patient's mobility; develop plan if impaired  - Assess patient's need for assistive devices and provide as appropriate  - Encourage maximum independence but intervene and supervise when necessary  - Involve family in performance of ADLs  - Assess for home care needs following discharge   - Consider OT consult to assist with ADL evaluation and planning for discharge  - Provide patient education as appropriate  Outcome: Progressing  Goal: Maintains/Returns to pre admission functional level  Description: INTERVENTIONS:  - Perform AM-PAC 6 Click Basic Mobility/ Daily Activity assessment daily.  - Set and communicate daily mobility goal to care team and patient/family/caregiver.   - Collaborate with rehabilitation services on mobility goals if consulted  - Perform Range of Motion 3 times a day.  - Reposition patient every 2 hours.  - Dangle patient 3 times a day  - Stand patient 3 times a day  - Ambulate patient 3 times a day  - Out of bed to chair 3 times a day   - Out of bed for meals 3 times a day  - Out of bed for toileting  - Record patient progress and toleration of activity level   Outcome: Progressing     Problem: DISCHARGE PLANNING  Goal: Discharge to home or other facility with appropriate resources  Description: INTERVENTIONS:  - Identify barriers to discharge w/patient and caregiver  - Arrange for needed discharge resources and transportation as appropriate  - Identify discharge learning needs (meds, wound care, etc.)  - Arrange for interpretive services to assist at discharge as needed  - Refer to Case Management Department for coordinating discharge planning if the patient needs post-hospital services based on physician/advanced practitioner order or complex needs related to functional status, cognitive ability, or social support system  Outcome:  Progressing     Problem: Knowledge Deficit  Goal: Patient/family/caregiver demonstrates understanding of disease process, treatment plan, medications, and discharge instructions  Description: Complete learning assessment and assess knowledge base.  Interventions:  - Provide teaching at level of understanding  - Provide teaching via preferred learning methods  Outcome: Progressing     Problem: CARDIOVASCULAR - ADULT  Goal: Maintains optimal cardiac output and hemodynamic stability  Description: INTERVENTIONS:  - Monitor I/O, vital signs and rhythm  - Monitor for S/S and trends of decreased cardiac output  - Administer and titrate ordered vasoactive medications to optimize hemodynamic stability  - Assess quality of pulses, skin color and temperature  - Assess for signs of decreased coronary artery perfusion  - Instruct patient to report change in severity of symptoms  Outcome: Progressing  Goal: Absence of cardiac dysrhythmias or at baseline rhythm  Description: INTERVENTIONS:  - Continuous cardiac monitoring, vital signs, obtain 12 lead EKG if ordered  - Administer antiarrhythmic and heart rate control medications as ordered  - Monitor electrolytes and administer replacement therapy as ordered  Outcome: Progressing     Problem: GASTROINTESTINAL - ADULT  Goal: Minimal or absence of nausea and/or vomiting  Description: INTERVENTIONS:  - Administer IV fluids if ordered to ensure adequate hydration  - Maintain NPO status until nausea and vomiting are resolved  - Nasogastric tube if ordered  - Administer ordered antiemetic medications as needed  - Provide nonpharmacologic comfort measures as appropriate  - Advance diet as tolerated, if ordered  - Consider nutrition services referral to assist patient with adequate nutrition and appropriate food choices  Outcome: Progressing  Goal: Maintains or returns to baseline bowel function  Description: INTERVENTIONS:  - Assess bowel function  - Encourage oral fluids to ensure  adequate hydration  - Administer IV fluids if ordered to ensure adequate hydration  - Administer ordered medications as needed  - Encourage mobilization and activity  - Consider nutritional services referral to assist patient with adequate nutrition and appropriate food choices  Outcome: Progressing  Goal: Maintains adequate nutritional intake  Description: INTERVENTIONS:  - Monitor percentage of each meal consumed  - Identify factors contributing to decreased intake, treat as appropriate  - Assist with meals as needed  - Monitor I&O, weight, and lab values if indicated  - Obtain nutrition services referral as needed  Outcome: Progressing     Problem: METABOLIC, FLUID AND ELECTROLYTES - ADULT  Goal: Electrolytes maintained within normal limits  Description: INTERVENTIONS:  - Monitor labs and assess patient for signs and symptoms of electrolyte imbalances  - Administer electrolyte replacement as ordered  - Monitor response to electrolyte replacements, including repeat lab results as appropriate  - Instruct patient on fluid and nutrition as appropriate  Outcome: Progressing  Goal: Fluid balance maintained  Description: INTERVENTIONS:  - Monitor labs   - Monitor I/O and WT  - Instruct patient on fluid and nutrition as appropriate  - Assess for signs & symptoms of volume excess or deficit  Outcome: Progressing  Goal: Glucose maintained within target range  Description: INTERVENTIONS:  - Monitor Blood Glucose as ordered  - Assess for signs and symptoms of hyperglycemia and hypoglycemia  - Administer ordered medications to maintain glucose within target range  - Assess nutritional intake and initiate nutrition service referral as needed  Outcome: Progressing

## 2024-01-19 NOTE — ASSESSMENT & PLAN NOTE
CTA: Very low clot burden acute nonocclusive pulmonary embolism in the left lower lobe anterior basal segmental pulmonary artery extending into subsegmental branches. No additional pulmonary emboli identified. No imaging evidence of right heart strain.   No hypoxia or dyspnea. Reports chronic intermittent SOB at baseline. Uses 2LNC at hs  Patient had few falls at home last year.  Echo findings noted  Venous duplex with left popliteal vein  Start heparin drip, if no bleeding transition to [facility]  Outpatient f/u with cardiology for zio monitoring

## 2024-01-19 NOTE — ASSESSMENT & PLAN NOTE
Venous duplex showing left popliteal vein occulusion   PE found on admission  Started on heparin drip     Note: Primary team understand risk/benefit involved with anticoagulation in this 89-year-old female with history of falls.  Low clot burden for PE and agree with not starting anticoagulation at that time; but now with chronic DVT of right lower extremity and acute DVT of left lower extremity makes consideration of anticoagulation much more pertinent.  Have discussed anticoagulation with hematology service who recommended 5 mg twice daily forward.  Medication cost checked, heparin drip discontinued; patient to be started on Eliquis 5 mg twice daily.

## 2024-01-19 NOTE — PLAN OF CARE
Problem: Potential for Falls  Goal: Patient will remain free of falls  Description: INTERVENTIONS:  - Educate patient/family on patient safety including physical limitations  - Instruct patient to call for assistance with activity   - Consult OT/PT to assist with strengthening/mobility   - Keep Call bell within reach  - Keep bed low and locked with side rails adjusted as appropriate  - Keep care items and personal belongings within reach  - Initiate and maintain comfort rounds  - Make Fall Risk Sign visible to staff  - Offer Toileting every  Hours, in advance of need  - Initiate/Maintain alarm  - Obtain necessary fall risk management equipment:   - Apply yellow socks and bracelet for high fall risk patients  - Consider moving patient to room near nurses station  Outcome: Progressing     Problem: Prexisting or High Potential for Compromised Skin Integrity  Goal: Skin integrity is maintained or improved  Description: INTERVENTIONS:  - Identify patients at risk for skin breakdown  - Assess and monitor skin integrity  - Assess and monitor nutrition and hydration status  - Monitor labs   - Assess for incontinence   - Turn and reposition patient  - Assist with mobility/ambulation  - Relieve pressure over bony prominences  - Avoid friction and shearing  - Provide appropriate hygiene as needed including keeping skin clean and dry  - Evaluate need for skin moisturizer/barrier cream  - Collaborate with interdisciplinary team   - Patient/family teaching  - Consider wound care consult   Outcome: Progressing     Problem: PAIN - ADULT  Goal: Verbalizes/displays adequate comfort level or baseline comfort level  Description: Interventions:  - Encourage patient to monitor pain and request assistance  - Assess pain using appropriate pain scale  - Administer analgesics based on type and severity of pain and evaluate response  - Implement non-pharmacological measures as appropriate and evaluate response  - Consider cultural and  social influences on pain and pain management  - Notify physician/advanced practitioner if interventions unsuccessful or patient reports new pain  Outcome: Progressing     Problem: INFECTION - ADULT  Goal: Absence or prevention of progression during hospitalization  Description: INTERVENTIONS:  - Assess and monitor for signs and symptoms of infection  - Monitor lab/diagnostic results  - Monitor all insertion sites, i.e. indwelling lines, tubes, and drains  - Monitor endotracheal if appropriate and nasal secretions for changes in amount and color  - Dana appropriate cooling/warming therapies per order  - Administer medications as ordered  - Instruct and encourage patient and family to use good hand hygiene technique  - Identify and instruct in appropriate isolation precautions for identified infection/condition  Outcome: Progressing     Problem: SAFETY ADULT  Goal: Patient will remain free of falls  Description: INTERVENTIONS:  - Educate patient/family on patient safety including physical limitations  - Instruct patient to call for assistance with activity   - Consult OT/PT to assist with strengthening/mobility   - Keep Call bell within reach  - Keep bed low and locked with side rails adjusted as appropriate  - Keep care items and personal belongings within reach  - Initiate and maintain comfort rounds  - Make Fall Risk Sign visible to staff  - Offer Toileting every  Hours, in advance of need  - Initiate/Maintain alarm  - Obtain necessary fall risk management equipment:   - Apply yellow socks and bracelet for high fall risk patients  - Consider moving patient to room near nurses station  Outcome: Progressing  Goal: Maintain or return to baseline ADL function  Description: INTERVENTIONS:  -  Assess patient's ability to carry out ADLs; assess patient's baseline for ADL function and identify physical deficits which impact ability to perform ADLs (bathing, care of mouth/teeth, toileting, grooming, dressing, etc.)  -  Assess/evaluate cause of self-care deficits   - Assess range of motion  - Assess patient's mobility; develop plan if impaired  - Assess patient's need for assistive devices and provide as appropriate  - Encourage maximum independence but intervene and supervise when necessary  - Involve family in performance of ADLs  - Assess for home care needs following discharge   - Consider OT consult to assist with ADL evaluation and planning for discharge  - Provide patient education as appropriate  Outcome: Progressing  Goal: Maintains/Returns to pre admission functional level  Description: INTERVENTIONS:  - Perform AM-PAC 6 Click Basic Mobility/ Daily Activity assessment daily.  - Set and communicate daily mobility goal to care team and patient/family/caregiver.   - Collaborate with rehabilitation services on mobility goals if consulted  - Perform Range of Motion  times a day.  - Reposition patient every  hours.  - Dangle patient  times a day  - Stand patient  times a day  - Ambulate patient  times a day  - Out of bed to chair  times a day   - Out of bed for meals  times a day  - Out of bed for toileting  - Record patient progress and toleration of activity level   Outcome: Progressing     Problem: DISCHARGE PLANNING  Goal: Discharge to home or other facility with appropriate resources  Description: INTERVENTIONS:  - Identify barriers to discharge w/patient and caregiver  - Arrange for needed discharge resources and transportation as appropriate  - Identify discharge learning needs (meds, wound care, etc.)  - Arrange for interpretive services to assist at discharge as needed  - Refer to Case Management Department for coordinating discharge planning if the patient needs post-hospital services based on physician/advanced practitioner order or complex needs related to functional status, cognitive ability, or social support system  Outcome: Progressing     Problem: Knowledge Deficit  Goal: Patient/family/caregiver demonstrates  understanding of disease process, treatment plan, medications, and discharge instructions  Description: Complete learning assessment and assess knowledge base.  Interventions:  - Provide teaching at level of understanding  - Provide teaching via preferred learning methods  Outcome: Progressing     Problem: CARDIOVASCULAR - ADULT  Goal: Maintains optimal cardiac output and hemodynamic stability  Description: INTERVENTIONS:  - Monitor I/O, vital signs and rhythm  - Monitor for S/S and trends of decreased cardiac output  - Administer and titrate ordered vasoactive medications to optimize hemodynamic stability  - Assess quality of pulses, skin color and temperature  - Assess for signs of decreased coronary artery perfusion  - Instruct patient to report change in severity of symptoms  Outcome: Progressing  Goal: Absence of cardiac dysrhythmias or at baseline rhythm  Description: INTERVENTIONS:  - Continuous cardiac monitoring, vital signs, obtain 12 lead EKG if ordered  - Administer antiarrhythmic and heart rate control medications as ordered  - Monitor electrolytes and administer replacement therapy as ordered  Outcome: Progressing     Problem: GASTROINTESTINAL - ADULT  Goal: Minimal or absence of nausea and/or vomiting  Description: INTERVENTIONS:  - Administer IV fluids if ordered to ensure adequate hydration  - Maintain NPO status until nausea and vomiting are resolved  - Nasogastric tube if ordered  - Administer ordered antiemetic medications as needed  - Provide nonpharmacologic comfort measures as appropriate  - Advance diet as tolerated, if ordered  - Consider nutrition services referral to assist patient with adequate nutrition and appropriate food choices  Outcome: Progressing  Goal: Maintains or returns to baseline bowel function  Description: INTERVENTIONS:  - Assess bowel function  - Encourage oral fluids to ensure adequate hydration  - Administer IV fluids if ordered to ensure adequate hydration  -  Administer ordered medications as needed  - Encourage mobilization and activity  - Consider nutritional services referral to assist patient with adequate nutrition and appropriate food choices  Outcome: Progressing  Goal: Maintains adequate nutritional intake  Description: INTERVENTIONS:  - Monitor percentage of each meal consumed  - Identify factors contributing to decreased intake, treat as appropriate  - Assist with meals as needed  - Monitor I&O, weight, and lab values if indicated  - Obtain nutrition services referral as needed  Outcome: Progressing     Problem: METABOLIC, FLUID AND ELECTROLYTES - ADULT  Goal: Electrolytes maintained within normal limits  Description: INTERVENTIONS:  - Monitor labs and assess patient for signs and symptoms of electrolyte imbalances  - Administer electrolyte replacement as ordered  - Monitor response to electrolyte replacements, including repeat lab results as appropriate  - Instruct patient on fluid and nutrition as appropriate  Outcome: Progressing  Goal: Fluid balance maintained  Description: INTERVENTIONS:  - Monitor labs   - Monitor I/O and WT  - Instruct patient on fluid and nutrition as appropriate  - Assess for signs & symptoms of volume excess or deficit  Outcome: Progressing  Goal: Glucose maintained within target range  Description: INTERVENTIONS:  - Monitor Blood Glucose as ordered  - Assess for signs and symptoms of hyperglycemia and hypoglycemia  - Administer ordered medications to maintain glucose within target range  - Assess nutritional intake and initiate nutrition service referral as needed  Outcome: Progressing

## 2024-01-19 NOTE — DISCHARGE SUMMARY
[facility]  [facility]- Priya Zarate 9/24/1922, 89 y.o. female MRN: [id number]  Unit/Bed#: E5 -01 Encounter: [id number]  Primary Care Provider: Ayah Azul PA-C   Date and time admitted to hospital: 1/13/2024  8:03 PM    * Intussusception of colon (HCC)  Assessment & Plan  Reports generalized and epigastric abdominal pain  Hx of abdominal pain ,GERD and colitis  CTA: No evidence of bowel obstruction or mesenteric inflammation. Terminal ileum and cecum located in the anterior subhepatic region, similar compared to the prior study. No free air or free fluid.   [place]-colonic intussusception in the mid to distal transverse colon region noted without definitive focal lead point mass identified.  Extensive sigmoid diverticulosis without evidence for diverticulitis.   Small amount of nonspecific soft tissue density material just inferior from the aortic bifurcation and left common iliac artery bifurcation regions again seen, similar compared to the prior exam  Repeat CT abdomen pelvis with continued intussuseption and new trace free air seen, consider repeat CT   Continue PPI and pepcid  Serial abdominal exams  Currently on surgical light diet; advance as tolerated  GI and general surgery following; for now both services recommend conservative measures  1/17-family meeting today; the patient able to tolerate diet, likely stable for discharge in the next 24 to 48 hours    Goals of care, counseling/discussion  Assessment & Plan  Discussed with daughter and patient goals of care   Planning for family meeting tomorrow with patient's daughter and son along with Gi, surgery and internal medicine team   Both patient and daughter have stated that they would want everything done including surgery. Discussed further with daughter and the families main goal is to control their mother's pain  We discussed risk of bleeding on heparin drip and anticoagulation at this time they wish to  proceed     Acute deep vein thrombosis (DVT) of popliteal vein of left lower extremity (HCC)  Assessment & Plan  Venous duplex showing left popliteal vein occulusion   PE found on admission  Started on heparin drip     Note: Primary team understand risk/benefit involved with anticoagulation in this 89-year-old female with history of falls.  Low clot burden for PE and agree with not starting anticoagulation at that time; but now with chronic DVT of right lower extremity and acute DVT of left lower extremity makes consideration of anticoagulation much more pertinent.  Have discussed anticoagulation with hematology service who recommended 5 mg twice daily forward.  Medication cost checked, heparin drip discontinued; patient to be started on Eliquis 5 mg twice daily.    Acute pulmonary embolism without acute cor pulmonale (HCC)  Assessment & Plan  CTA: Very low clot burden acute nonocclusive pulmonary embolism in the left lower lobe anterior basal segmental pulmonary artery extending into subsegmental branches. No additional pulmonary emboli identified. No imaging evidence of right heart strain.   No hypoxia or dyspnea. Reports chronic intermittent SOB at baseline. Uses 2LNC at hs  Patient had few falls at home last year.  Echo findings noted  Venous duplex with left popliteal vein  Start heparin drip, if no bleeding transition to eliquis  Outpatient f/u with cardiology for zio monitoring    Iron deficiency anemia  Assessment & Plan  Chronic anemia. Hg at baseline    Myofascial muscle pain  Assessment & Plan  Maintained on gabapentin. Follows with neurology    Type 2 diabetes mellitus with hyperglycemia, without long-term current use of insulin ([facility])  Assessment & Plan  Lab Results   Component Value Date    HGBA1C 9.4 (H) 12/12/2023     ISS inpatient     Recent Labs     01/18/24  1122 01/18/24  1556 01/18/24 2039 01/19/24  0725   POCGLU 141* 156* 172* 138         Blood Sugar Average: Last 72 hrs:  (P)  "[id number]  SSI; Subcutaneous Insulin Order Set  Blood Glucose checks TIDWM and QHS (Q6H for NPO patients)  Hold oral medications  Blood Glucose goal while inpatient is 140-180  Reduce basal insulin by 25-50% while inpatient  Consistent Carbohydrate Diet when able to tolerate normal diet      Acquired hypothyroidism  Assessment & Plan  Levothyroxine 75mcg daily   Most recent TSH-1.187    Hallucination  Assessment & Plan  Per outpatient PMD note, daughter reports mother sometimes has hallucinations    Aortic stenosis  Assessment & Plan  Repeat echo obtained and findings noted  Severe aortic stenosis thought to be contributing to patient's SOB  Follows with cardiology in the outpatient setting   Recommended medical management         Medical Problems       Resolved Problems  Date Reviewed: 1/16/2024   None       Discharging Physician / Practitioner: Dionicio Stauffer MD  PCP: Ayah Azul PA-C  Admission Date:   Admission Orders (From admission, onward)       Ordered        01/14/24 0318  INPATIENT ADMISSION  Once                          Discharge Date: 01/19/24    Consultations During Hospital Stay:  Gastroenterology  General surgery    Procedures Performed:   Venous duplex study lower extremities  CT abdomen pelvis  CTA chest  CXR    Significant Findings / Test Results:   Intussuception of colon  DVT LLE  PE    Incidental Findings:     I reviewed the above mentioned incidental findings with the patient and/or family and they expressed understanding.    Test Results Pending at Discharge (will require follow up):   none     Outpatient Tests Requested:  CBC/CMP    Complications:  None    Reason for Admission: Abdominal pain    Hospital Course:   As per HPI:    \"Priya Zarate is a 89 y.o. female who presents with c/o pain in abdomen, epigastric, back and all around her \"torso.\" Reports loss of appetite and brown loose BM today. Denies nausea, emesis, constipation, melena or hematochezia. Reports pelvic " "pressure today. Denies fever or chills. Reports chronic and intermittent SOB at baseline, uses O2 hs. Denies B/LLE pain or swelling. Reports a few falls at home last year. Resides with daughter. \"    Condition at Discharge: stable    Discharge Day Visit / Exam:   Subjective: Patient is a very pleasant 89-year-old female who came in for abdominal pain.  Imaging showed: Intussusception and gastroenterology and gastroenterology were consulted.  Given patient's age and improvement while inpatient and her underlying symptoms; GI recommended against colonoscopy and surgery obviously recommended against colectomy.  On day of discharge, patient had no abdominal pain and felt generally comfortable.  Had bowel movements and was tolerating diet.  To follow-up in the outpatient setting.  Patient also noted to have PE as well as acute left lower extremity DVT.  Much consideration was given towards anticoagulating this 89-year-old patient and family discussions were held.  Will anticoagulate with Eliquis 5 mg twice daily and have discussed with hematology who agree with this recommendation.  Should patient experience recurrent falls placing her at higher risk than benefit with this medication, it would behoove follow-on providers to discontinue the medication.  Labs vital stable and patient stable for discharge home.  Home health services offered by case management, patient and family politely declined at this time.      Vitals: Blood Pressure: (!) 97/44 (01/19/24 0925)  Pulse: 77 (01/19/24 0925)  Temperature: 98.4 °F (36.9 °C) (01/19/24 0725)  Temp Source: Oral (01/19/24 0725)  Respirations: 18 (01/19/24 0725)  Height: 5' 2\" (157.5 cm) (01/16/24 1020)  Weight - Scale: 68.5 kg (151 lb) (01/16/24 1020)  SpO2: 95 % (01/19/24 0925)  Exam:   Physical Exam  Vitals and nursing note reviewed.   Constitutional:       General: She is not in acute distress.     Appearance: She is well-developed.   HENT:      Head: Normocephalic and " atraumatic.   Eyes:      Conjunctiva/sclera: Conjunctivae normal.   Cardiovascular:      Rate and Rhythm: Normal rate.   Pulmonary:      Effort: Pulmonary effort is normal. No respiratory distress.   Abdominal:      General: There is no distension.      Palpations: Abdomen is soft.      Tenderness: There is no abdominal tenderness. There is no guarding.   Musculoskeletal:         General: No swelling.      Cervical back: Neck supple.   Skin:     General: Skin is warm and dry.   Neurological:      Mental Status: She is alert.   Psychiatric:         Mood and Affect: Mood normal.            Discussion with Family: Updated  (daughter) at bedside.    Discharge instructions/Information to patient and family:   See after visit summary for information provided to patient and family.      Provisions for Follow-Up Care:  See after visit summary for information related to follow-up care and any pertinent home health orders.      Mobility at time of Discharge:   Basic Mobility Inpatient Raw Score: 20  JH-HLM Goal: 6: Walk 10 steps or more  JH-HLM Achieved: 7: Walk 25 feet or more  HLM Goal achieved. Continue to encourage appropriate mobility.     Disposition:   Home    Planned Readmission: None     Discharge Statement:  I spent 45 minutes discharging the patient. This time was spent on the day of discharge. I had direct contact with the patient on the day of discharge. Greater than 50% of the total time was spent examining patient, answering all patient questions, arranging and discussing plan of care with patient as well as directly providing post-discharge instructions.  Additional time then spent on discharge activities.    Discharge Medications:  See after visit summary for reconciled discharge medications provided to patient and/or family.      **Please Note: This note may have been constructed using a voice recognition system**

## 2024-01-19 NOTE — ASSESSMENT & PLAN NOTE
Lab Results   Component Value Date    HGBA1C 9.4 (H) 12/12/2023     ISS inpatient     Recent Labs     01/18/24  1122 01/18/24  1556 01/18/24 2039 01/19/24  0725   POCGLU 141* 156* 172* 138         Blood Sugar Average: Last 72 hrs:  (P) [id number]  SSI; Subcutaneous Insulin Order Set  Blood Glucose checks TIDWM and QHS (Q6H for NPO patients)  Hold oral medications  Blood Glucose goal while inpatient is 140-180  Reduce basal insulin by 25-50% while inpatient  Consistent Carbohydrate Diet when able to tolerate normal diet

## 2024-01-19 NOTE — CASE MANAGEMENT
Case Management Discharge Planning Note    Patient name Priya Zarate  [facility]  /E5 -* MRN [id number]  : 1922 Date 2024       Current Admission Date: 2024  Current Admission Diagnosis:Intussusception of colon (HCC)   Patient Active Problem List    Diagnosis Date Noted    Acute deep vein thrombosis (DVT) of popliteal vein of left lower extremity (HCC) 01/15/2024    Goals of care, counseling/discussion 01/15/2024    Acute pulmonary embolism without acute cor pulmonale ([facility]) 2024    Intussusception of colon ([facility]) 2024    Acute cystitis without hematuria 2024    Iron deficiency anemia 2023    Anemia due to stage 3b chronic kidney disease  2023    CKD (chronic kidney disease) 10/27/2023    GI bleed 2023    Spinal stenosis of lumbar region with neurogenic claudication 2023    Colitis 2022    Generalized body aches 2022    Costochondritis 2022    Pelviectasis of kidney 2022    Disc degeneration, lumbar 2022    Abnormal gait 2022    Lumbar paraspinal muscle spasm 2022    CAIT (acute kidney injury) ([facility]) 2022    Myalgia 2022    Tail bone pain 2021    Chronic nonintractable headache 2021    Vertigo 2021    Hypertension 2021    Hyponatremia 2021    Supraorbital neuralgia 2021    Myofascial muscle pain 2021    Cranial neuralgia 2021    Conductive hearing loss, bilateral 2021    History of recurrent UTIs 2021    Constipation 2021    Left hip pain 2021    Medicare annual wellness visit, subsequent 2020    Diarrhea 2020    Atypical facial pain 2020    Stable angina 2020    Neoplasm of face 2020    Chronic headache 2019    Paroxysmal nocturnal dyspnea 2019    Orthopnea 2019    Shortness of breath 2019    Type 2 diabetes mellitus with hyperglycemia, without long-term current  use of insulin (HCC) 10/05/2019    Vitamin D deficiency 08/22/2019    Pain of both hip joints 06/21/2019    Candidal intertrigo 06/21/2019    Headache 01/09/2019    GERD (gastroesophageal reflux disease) 11/06/2018    Insomnia 10/12/2018    Chronic diastolic congestive heart failure (HCC) 08/05/2018    Dyspnea on minimal exertion 02/17/2018    Shakiness 05/23/2017    Allergic rhinitis 05/02/2017    Hyperlipidemia     Bilateral cold feet 12/28/2016    Aortic stenosis 10/21/2016    Arteriosclerosis of coronary artery     Asthma     Anxiety disorder 08/17/2016    Osteoarthritis 06/21/2016    Acquired hypothyroidism 04/18/2016    Trigeminal neuralgia 03/16/2016    Peripheral vascular disease (HCC)     Glaucoma, open angle, severe stage     Overactive bladder 01/20/2016    Hallucination 11/17/2015    Other fatigue 11/03/2015    Low back pain 11/03/2015    Hearing loss 07/27/2015    Mild cognitive impairment 07/27/2015    Dizziness 07/02/2015    Advanced age 06/03/2015    Irritable bowel 04/24/2015    Female cystocele 04/24/2015    Diverticulosis 04/03/2015    Hiatal hernia 04/03/2015    Ambulatory dysfunction 04/02/2015      LOS (days): 5  Geometric Mean LOS (GMLOS) (days): 4  Days to GMLOS:-1.3     OBJECTIVE:  Risk of Unplanned Readmission Score: 28.22         Current admission status: Inpatient   Preferred Pharmacy:   [facility] - [facility] [address]  [address] [facility] [address]  Phone: [phone] Fax: [phone]    [id number] - [place] - [address]  [address]  [facility] [place] [address]  Phone: [phone] Fax: [phone]    [facility]  [place] - [address],  [address],  [address]ound Floor [place] [facility] [place] [address]  Phone: [phone] Fax: [phone]    Primary Care Provider: Ayah Azul PA-C    Primary Insurance: [facility] REP  Secondary Insurance:     DISCHARGE DETAILS:    Discharge  planning discussed with:: Coby Zarate (Daughter)  [phone]  Freedom of Choice: Yes  Comments - Freedom of Choice: No needs  CM contacted family/caregiver?: Yes  Were Treatment Team discharge recommendations reviewed with patient/caregiver?: Yes  Did patient/caregiver verbalize understanding of patient care needs?: Yes  Were patient/caregiver advised of the risks associated with not following Treatment Team discharge recommendations?: Yes    Contacts  Patient Contacts: Coby Zarate (Daughter)  [phone]  Relationship to Patient:: Family  Contact Method: Phone  Phone Number: Coby Zarate (Daughter)  [phone] (  Reason/Outcome: Discharge Planning    Requested Home Health Care         Is the patient interested in HHC at discharge?: No    DME Referral Provided  Referral made for DME?: No         Would you like to participate in our [facility] service program?  : No - Declined    Treatment Team Recommendation: Home  Discharge Destination Plan:: Home  Transport at Discharge : Wheelchair van  Dispatcher Contacted: Yes        ETA of Transport (Date): 01/19/24  ETA of Transport (Time): 1330     Transfer Mode: Wheelchair  Accompanied by: EMS personnel     IMM Given (Date):: 01/19/24     Family notified:: Coby Zarate (Daughter)  [phone]  Additional Comments: CM spoke with patient and dtr via phone to discuss d/c. Patient is medically cleared. Family asked for [facility] transport home. CM ordered via [employer] pending  time. CM spoke with patient about home services and she declined, states her family support at home is enough. Patient goes from O2-2L to RA, CM ordered O2 for d/c No futher barriers for d/c. CM will continue to monitor for needs.

## 2024-01-19 NOTE — PLAN OF CARE
Problem: PHYSICAL THERAPY ADULT  Goal: Performs mobility at highest level of function for planned discharge setting.  See evaluation for individualized goals.  Description: Treatment/Interventions: Functional transfer training, LE strengthening/ROM, Elevations, Therapeutic exercise, Endurance training, Patient/family training, Bed mobility, Gait training, Spoke to nursing, OT  Equipment Recommended: Walker (pt has)       See flowsheet documentation for full assessment, interventions and recommendations.  Outcome: Progressing  Note: Prognosis: Good  Problem List: Decreased strength, Decreased endurance, Impaired balance, Decreased mobility, Impaired vision, Impaired hearing  Assessment: Pt seen for PT treatment session this date with interventions consisting of bed mobility, transfer training, and gait training, and education provided as needed for safety and direction to improve functional mobility, safety awareness, and activity tolerance. Pt agreeable to PT treatment session upon arrival, pt found SUPINE IN BED . At end of session, pt left SUPINE IN BED with all needs in reach. In comparison to previous session, pt with improvement in ambulation distances, b/l le strength, AM- pac score, and functional mobility. PT  IS FUNCTIONING AT SUPERVISION FOR BED MOBILITY, TRANSFERS AND AMBULATION WITH USE OF RW.  PT  DOES REQUIRE MIN ASSIST AS PT FATIGUES WITH PROGRESSION OF AMBULATION DISTANCES DUE TO IMPAIRED BALANCE AND SMITH.   PT  IS ABLE TO AMBULATE > HOUSEHOLD DISTANCES  WITH USE OF RW. NOTED INCREASINGLY MORE FORWARD FLEXED POSTURE AND DECREASED STRIDES AND FOOT CLEARANCE AS PT BECOMES FATIGUED.  CUES TO MAINTAIN CLOSE DISTANCE TO WALKER AT ALL TIMES REQUIRED.  ONE STANDING REST BREAK REQUIRED DURING GAIT TRAINING. NO GROSS LOB NOTED.  PT  PERFORMS SUPINE B/L LE AROM EXERCISES X 10 REPS.  PT TOLERATED WELL. PT  REPORTS FATIGUE AND SOB POST GAIT TRAINING.    Please refer to endurance deficit section of flowsheet for  vitals. Continue to recommend  LEVEL III MINIMAL REHAB RESOURCE INTENSITY  at time of d/c in order to maximize pt's functional independence and safety w/ mobility. Pt continues to be functioning below baseline level. PT will continue to see pt while here in order to address the deficits listed above and provide interventions consistent w/ POC in effort to achieve STGs.    The patient's AM-PAC Basic Mobility Inpatient Short Form Raw Score is 20. A raw score greater than 16 suggests the patient may benefit from discharge to home. Please also refer to the recommendation of the Physical Therapist for safe discharge planning.  Barriers to Discharge: Inaccessible home environment  Barriers to Discharge Comments: STAR  Rehab Resource Intensity Level, PT: III (Minimum Resource Intensity)    See flowsheet documentation for full assessment.

## 2024-01-19 NOTE — ASSESSMENT & PLAN NOTE
Reports generalized and epigastric abdominal pain  Hx of abdominal pain ,GERD and colitis  CTA: No evidence of bowel obstruction or mesenteric inflammation. Terminal ileum and cecum located in the anterior subhepatic region, similar compared to the prior study. No free air or free fluid.   [place]-colonic intussusception in the mid to distal transverse colon region noted without definitive focal lead point mass identified.  Extensive sigmoid diverticulosis without evidence for diverticulitis.   Small amount of nonspecific soft tissue density material just inferior from the aortic bifurcation and left common iliac artery bifurcation regions again seen, similar compared to the prior exam  Repeat CT abdomen pelvis with continued intussuseption and new trace free air seen, consider repeat CT   Continue PPI and pepcid  Serial abdominal exams  Currently on surgical light diet; advance as tolerated  GI and general surgery following; for now both services recommend conservative measures  1/17-family meeting today; the patient able to tolerate diet, likely stable for discharge in the next 24 to 48 hours

## 2024-01-19 NOTE — TELEPHONE ENCOUNTER
----- Message from Dionicio Stauffer MD sent at 1/19/2024 12:45 PM EST -----  Thank you for allowing us to participate in the care of your patient, Priya Zarate, who was hospitalized from 1/13/2024 through 1/19/2024 with the admitting diagnosis of intussusception.  Presented with abdominal pain and found to have colonic intussusception.  During hospitalization, however, symptoms improved and both acute care surgery as well as gastroenterology recommended conservative management.  On discharge, patient reports symptoms have resolved, but unfortunately, during this hospitalization, was found to have acute left lower extremity DVT, chronic right lower extremity DVT and pulmonary emboli.  Patient is largely asymptomatic from these clots, but was started on Eliquis 5 mg twice daily as recommended by hematology.  Long discussion with family about the risks/benefits of anticoagulation and decision was made to anticoagulate; please review in the outpatient setting.  Otherwise, please see discharge summary for further details.      If you have any additional questions or would like to discuss further, please feel free to contact me.    Dionicio Stauffer MD  [facility], Hospitalist  [phone]

## 2024-01-22 ENCOUNTER — TELEPHONE (OUTPATIENT)
Age: 89
End: 2024-01-22

## 2024-01-22 ENCOUNTER — TRANSITIONAL CARE MANAGEMENT (OUTPATIENT)
Dept: FAMILY MEDICINE CLINIC | Facility: CLINIC | Age: 89
End: 2024-01-22

## 2024-01-22 NOTE — UTILIZATION REVIEW
NOTIFICATION OF ADMISSION DISCHARGE   This is a Notification of Discharge from [facility]. Please be advised that this patient has been discharge from our facility. Below you will find the admission and discharge date and time including the patient’s disposition.   UTILIZATION REVIEW CONTACT:  Winsome Mccain MA  Utilization   Network Utilization Review Department  Phone: [phone] x carefully listen to the prompts. All voicemails are confidential.  Email: [email or web address]     ADMISSION INFORMATION  PRESENTATION DATE: 1/13/2024  8:03 PM  OBERVATION ADMISSION DATE:   INPATIENT ADMISSION DATE: 1/14/24  3:18 AM   DISCHARGE DATE: 1/19/2024  2:51 PM   DISPOSITION:Home/Self Care    Network Utilization Review Department  ATTENTION: Please call with any questions or concerns to [phone] and carefully listen to the prompts so that you are directed to the right person. All voicemails are confidential.   For Discharge needs, contact Care Management DC Support Team at [phone] opt. 2  Send all requests for admission clinical reviews, approved or denied determinations and any other requests to dedicated fax number below belonging to the campus where the patient is receiving treatment. List of dedicated fax numbers for the Facilities:  FACILITY NAME UR FAX NUMBER   ADMISSION DENIALS (Administrative/Medical Necessity) [phone]   DISCHARGE SUPPORT TEAM ([facility]) [phone]   PARENT CHILD HEALTH (Maternity/NICU/Pediatrics) [phone]   [facility] [phone]   [facility] [phone]   [facility] [phone]   [facility] [phone]   [facility] [phone]   [facility] [phone]   [facility] [phone]   [facility]  [phone]   [facility] [phone]   [facility] [phone]   [facility] [phone]

## 2024-01-22 NOTE — TELEPHONE ENCOUNTER
Daughter of patient called, stating she was discharged from the hospital on 1/19.  She would like to set up a telephone appointment with Dr. Azul (soonest available) and then the actual TCM in office in about 2 weeks.  She states patient is 89 and she cannot bring her out in the cold/snow.    She was hospitalized because part of her colon slipped into a different part of her colon.  She also had a blood clot in her lung and her leg.    Also will need a referral to Gastoenterology.     Please call daughterCoby to schedule.  [phone]

## 2024-01-24 ENCOUNTER — TELEPHONE (OUTPATIENT)
Age: 89
End: 2024-01-24

## 2024-01-24 ENCOUNTER — TELEMEDICINE (OUTPATIENT)
Dept: FAMILY MEDICINE CLINIC | Facility: CLINIC | Age: 89
End: 2024-01-24
Payer: COMMERCIAL

## 2024-01-24 VITALS
SYSTOLIC BLOOD PRESSURE: 92 MMHG | HEART RATE: 76 BPM | BODY MASS INDEX: 27.62 KG/M2 | DIASTOLIC BLOOD PRESSURE: 54 MMHG | HEIGHT: 62 IN

## 2024-01-24 DIAGNOSIS — I26.99 ACUTE PULMONARY EMBOLISM WITHOUT ACUTE COR PULMONALE, UNSPECIFIED PULMONARY EMBOLISM TYPE (HCC): ICD-10-CM

## 2024-01-24 DIAGNOSIS — K56.1 INTUSSUSCEPTION OF COLON (HCC): Primary | ICD-10-CM

## 2024-01-24 DIAGNOSIS — I82.432 ACUTE DEEP VEIN THROMBOSIS (DVT) OF POPLITEAL VEIN OF LEFT LOWER EXTREMITY (HCC): ICD-10-CM

## 2024-01-24 DIAGNOSIS — I50.32 CHRONIC DIASTOLIC CONGESTIVE HEART FAILURE (HCC): ICD-10-CM

## 2024-01-24 DIAGNOSIS — E11.65 TYPE 2 DIABETES MELLITUS WITH HYPERGLYCEMIA, WITHOUT LONG-TERM CURRENT USE OF INSULIN (HCC): ICD-10-CM

## 2024-01-24 DIAGNOSIS — I10 PRIMARY HYPERTENSION: ICD-10-CM

## 2024-01-24 PROBLEM — Z00.00 MEDICARE ANNUAL WELLNESS VISIT, SUBSEQUENT: Status: RESOLVED | Noted: 2020-08-21 | Resolved: 2024-01-24

## 2024-01-24 PROCEDURE — 99496 TRANSJ CARE MGMT HIGH F2F 7D: CPT | Performed by: PHYSICIAN ASSISTANT

## 2024-01-24 NOTE — PROGRESS NOTES
Virtual Regular Visit    Verification of patient location:    Patient is located at Home in the following state in which I hold an active license [place]      Assessment/Plan:    Problem List Items Addressed This Visit        Digestive    Intussusception of colon (HCC) - Primary     Currently asymptomatic resolved during admission does have follow-up with gastroenterology on 20 February.  No intervention or surgery was needed and will not be done unless absolutely necessary secondary to high risk.            Endocrine    Type 2 diabetes mellitus with hyperglycemia, without long-term current use of insulin ([facility])     Fasting glucose this morning was 159.  Patient's appetite has not been great since she has been home from admission.  Continue to monitor and continue Jardiance.  Lab Results   Component Value Date    HGBA1C 9.4 (H) 12/12/2023             Cardiovascular and Mediastinum    Chronic diastolic congestive heart failure ([facility])     Wt Readings from Last 3 Encounters:   01/16/24 68.5 kg (151 lb)   01/02/24 66.7 kg (147 lb)   12/21/23 69.4 kg (153 lb)   Unable to weigh patient today patient is taking her morning furosemide but if she does take her double dose of furosemide in the evening it is between 4 and 6 as it keeps her up too late and overnight going to the bathroom frequently.  She does have cardiology follow-up on 6 February.                 Hypertension     Unable to check blood pressure today as a virtual visit.         Acute pulmonary embolism without acute cor pulmonale ([facility])     Unable to check pulse ox as a virtual visit today.  Patient is tolerating Eliquis 5 mg twice daily well without any shortness of breath.  Plan is to keep her on this for at least 3 months.         Acute deep vein thrombosis (DVT) of popliteal vein of left lower extremity ([facility])     Patient is tolerating Eliquis twice daily does have hematology appointment set for 12 February.  No leg pain.                 Reason for visit is   Chief  Complaint   Patient presents with   • Transition of Care Management     DX Intussusception of colon  Per pt everything is ok, c/o stomach pain after eating.    • Diabetes     PT VIRTUAL UNABLE TO DO EYE EXAM     Sugar readings at home 159   • Virtual Regular Visit          Encounter provider Ayah Azul PA-C    Provider located at [address] [address] [phone]      Recent Visits  Date Type Provider Dept   01/19/24 Telephone Ayah Azul PA-C [facility]   Showing recent visits within past 7 days and meeting all other requirements  Today's Visits  Date Type Provider Dept   01/24/24 Telemedicine Ayah Azul PA-C [facility]   Showing today's visits and meeting all other requirements  Future Appointments  No visits were found meeting these conditions.  Showing future appointments within next 150 days and meeting all other requirements       The patient was identified by name and date of birth. Priya Zarate was informed that this is a telemedicine visit and that the visit is being conducted through the [employer] platform. She agrees to proceed..  My office door was closed. No one else was in the room.  She acknowledged consent and understanding of privacy and security of the video platform. The patient has agreed to participate and understands they can discontinue the visit at any time.    Patient is aware this is a billable service.     Subjective  Priya Zarate is a 89 y.o. female pt .      TCM apt. Daughter helping with visit today with her cell phone. Pt w/o c/o today taking new eliquis twice daily. Has apts set up with cardiology hematology gastroenterology and neurology for this spring.  Daughter states that she takes her morning furosemide but then if she takes her second dose of furosemide she does not take it in the evening she takes in the afternoon between 4-6 otherwise she is up all  "night peeing and needs assistance from her daughter to do that.  No longer with any abdominal pain.  Daughter also states that she has not complained about anything such as but not from the hospital.    D/c summary :    \"Thank you for allowing us to participate in the care of your patient, Priya Zarate, who was hospitalized from 1/13/2024 through 1/19/2024 with the admitting diagnosis of intussusception.  Presented with abdominal pain and found to have colonic intussusception.  During hospitalization, however, symptoms improved and both acute care surgery as well as gastroenterology recommended conservative management.  On discharge, patient reports symptoms have resolved, but unfortunately, during this hospitalization, was found to have acute left lower extremity DVT, chronic right lower extremity DVT and pulmonary emboli.  Patient is largely asymptomatic from these clots, but was started on Eliquis 5 mg twice daily as recommended by hematology.  Long discussion with family about the risks/benefits of anticoagulation and decision was made to anticoagulate; please review in the outpatient setting.  Otherwise, please see discharge summary for further details.\"         Past Medical History:   Diagnosis Date   • Asthma    • Atypical chest pain    • CAD (coronary artery disease)    • Candidal intertrigo    • CHF (congestive heart failure) (HCC)    • Depression    • Diabetes mellitus (HCC)    • Diabetic neuropathy (HCC)    • Diverticulitis    • Dyslipidemia    • Female bladder prolapse    • Gastric ulcer    • Glaucoma    • HTN (hypertension)    • Hyperlipidemia    • Hypothyroidism 4/18/2016   • RAD (reactive airway disease)        Past Surgical History:   Procedure Laterality Date   • COLONOSCOPY     • ESOPHAGOGASTRODUODENOSCOPY  2011   • HYSTERECTOMY     • TONSILLECTOMY         Current Outpatient Medications   Medication Sig Dispense Refill   • Accu-Chek Softclix Lancets lancets Testing 1 time daily.            Dx: " E11.9 100 each 3   • albuterol (PROVENTIL HFA,VENTOLIN HFA) 90 mcg/act inhaler Inhale 2 puffs every 6 (six) hours as needed for wheezing or shortness of breath 18 g 1   • Alcohol Swabs (B-D SINGLE USE SWABS REGULAR) PADS Use in the morning 100 each 5   • amLODIPine (NORVASC) 5 mg tablet TAKE 1 TABLET (5 MG TOTAL)  DAILY 90 tablet 3   • apixaban (Eliquis) 5 mg Take 1 tablet (5 mg total) by mouth 2 (two) times a day 60 tablet 0   • aspirin 81 MG tablet Take 81 mg by mouth daily.     • Blood Glucose Monitoring Suppl (Accu-Chek Guide) w/Device KIT Use 2 (two) times a day 1 kit 0   • Cholecalciferol (VITAMIN D3) 2000 units capsule Take 2,000 Units by mouth daily      • Diclofenac Sodium (VOLTAREN) 1 % Apply 2 g topically 4 (four) times a day 50 g 0   • Empagliflozin (JARDIANCE) 10 MG TABS tablet Take 1 tablet (10 mg total) by mouth daily 30 tablet 5   • famotidine (PEPCID) 40 MG tablet Take 1 tablet (40 mg total) by mouth daily at bedtime 90 tablet 1   • furosemide (LASIX) 20 mg tablet TAKE 1 TABLET BY MOUTH IN THE MORNING AND 2 TABLETS IN THE EVENING 270 tablet 1   • gabapentin (NEURONTIN) 600 MG tablet TAKE 1/2 TABLET IN THE MORNING AND AFTERNOON AND TAKE 1 TABLET AT BEDTIME (Patient taking differently: Take 600 mg by mouth 2 (two) times a day TAKE MORNING AND  TAKE 1 TABLET AT BEDTIME) 180 tablet 10   • glucose blood (Accu-Chek Guide) test strip Use as instructed 200 each 1   • glucose blood test strip Use 1 each 2 (two) times a day Test Fasting and 2 hour postprandial daily 200 each 3   • Incontinence Supply Disposable (SELECT DISPOSABLE UNDERWEAR LG) MISC      • Lancets (accu-chek multiclix) lancets Use as instructed 200 each 1   • latanoprost (XALATAN) 0.005 % ophthalmic solution Apply 1 drop to eye daily at bedtime Both eyes 7.5 mL 1   • levothyroxine 75 mcg tablet Take 1 tablet (75 mcg total) by mouth daily     • Misc. Devices (Transport Chair) MISC Use if needed (with outings outside of the house.) 1 each 0   •  "pantoprazole (PROTONIX) 40 mg tablet TAKE 1 TABLET EVERY DAY 90 tablet 1   • oxygen gas Inhale 2 L/min daily at bedtime. Indications: Shortness of breath (Patient not taking: Reported on 1/2/2024)     • vitamin B-12 (CYANOCOBALAMIN) 250 MCG tablet Take 250 mcg by mouth daily       No current facility-administered medications for this visit.        Allergies   Allergen Reactions   • Levaquin [Levofloxacin] Myalgia   • Bactrim [Sulfamethoxazole-Trimethoprim] Hallucinations   • Metformin Other (See Comments)     Side effect, did not like.     • Statins      Other reaction(s): Weakness  Category: Adverse Reaction;        Review of Systems   Constitutional: Negative.    HENT: Negative.     Eyes: Negative.    Respiratory: Negative.     Cardiovascular: Negative.    Gastrointestinal: Negative.    Endocrine: Negative.    Genitourinary: Negative.    Musculoskeletal: Negative.    Skin: Negative.    Allergic/Immunologic: Negative.    Neurological: Negative.    Hematological: Negative.    Psychiatric/Behavioral: Negative.         Video Exam    Vitals:    01/24/24 1028   BP: 92/54   Pulse: 76   Height: 5' 2\" (1.575 m)       Physical Exam  Vitals and nursing note reviewed.   Constitutional:       General: She is not in acute distress.     Appearance: She is well-developed. She is not diaphoretic.   HENT:      Head: Normocephalic and atraumatic.   Eyes:      General:         Right eye: No discharge.         Left eye: No discharge.      Conjunctiva/sclera: Conjunctivae normal.      Pupils: Pupils are equal, round, and reactive to light.   Pulmonary:      Effort: Pulmonary effort is normal. No respiratory distress.   Skin:     General: Skin is warm and dry.   Neurological:      Mental Status: She is alert and oriented to person, place, and time.   Psychiatric:         Behavior: Behavior normal.         Thought Content: Thought content normal.         Judgment: Judgment normal.          Visit Time  Total Visit Duration: 40        "

## 2024-01-24 NOTE — ASSESSMENT & PLAN NOTE
Fasting glucose this morning was 159.  Patient's appetite has not been great since she has been home from admission.  Continue to monitor and continue Jardiance.  Lab Results   Component Value Date    HGBA1C 9.4 (H) 12/12/2023

## 2024-01-24 NOTE — PATIENT INSTRUCTIONS
1. Intussusception of colon ([facility])  Assessment & Plan:  Currently asymptomatic resolved during admission does have follow-up with gastroenterology on 20 February.  No intervention or surgery was needed and will not be done unless absolutely necessary secondary to high risk.      2. Type 2 diabetes mellitus with hyperglycemia, without long-term current use of insulin ([facility])  Assessment & Plan:  Fasting glucose this morning was 159.  Patient's appetite has not been great since she has been home from admission.  Continue to monitor and continue Jardiance.  Lab Results   Component Value Date    HGBA1C 9.4 (H) 12/12/2023         3. Primary hypertension  Assessment & Plan:  Unable to check blood pressure today as a virtual visit.      4. Chronic diastolic congestive heart failure ([facility])  Assessment & Plan:  Wt Readings from Last 3 Encounters:   01/16/24 68.5 kg (151 lb)   01/02/24 66.7 kg (147 lb)   12/21/23 69.4 kg (153 lb)   Unable to weigh patient today patient is taking her morning furosemide but if she does take her double dose of furosemide in the evening it is between 4 and 6 as it keeps her up too late and overnight going to the bathroom frequently.  She does have cardiology follow-up on 6 February.              5. Acute pulmonary embolism without acute cor pulmonale, unspecified pulmonary embolism type ([facility])  Assessment & Plan:  Unable to check pulse ox as a virtual visit today.  Patient is tolerating Eliquis 5 mg twice daily well without any shortness of breath.  Plan is to keep her on this for at least 3 months.      6. Acute deep vein thrombosis (DVT) of popliteal vein of left lower extremity ([facility])  Assessment & Plan:  Patient is tolerating Eliquis twice daily does have hematology appointment set for 12 February.  No leg pain.

## 2024-01-24 NOTE — ASSESSMENT & PLAN NOTE
Unable to check pulse ox as a virtual visit today.  Patient is tolerating Eliquis 5 mg twice daily well without any shortness of breath.  Plan is to keep her on this for at least 3 months.

## 2024-01-24 NOTE — ASSESSMENT & PLAN NOTE
Patient is tolerating Eliquis twice daily does have hematology appointment set for 12 February.  No leg pain.

## 2024-01-24 NOTE — ASSESSMENT & PLAN NOTE
Currently asymptomatic resolved during admission does have follow-up with gastroenterology on 20 February.  No intervention or surgery was needed and will not be done unless absolutely necessary secondary to high risk.

## 2024-01-24 NOTE — TELEPHONE ENCOUNTER
Patients GI provider:  Dr. Delarosa    Number to return call: [phone]    Reason for call: Pt's daughter Coby called and stated that she was told that pt was to see Dr Delarosa before or on 02/02/24. She has the paperwork from her hospital stay and needs a F/U. I do not have an appt before August for Dr Delarosa. Could you please reach out to pt to schedule if anything is available. Pt's daughter would prefer to go to [place] as pt is 89 yrs old    Scheduled procedure/appointment date if applicable: Apt/procedure N/A

## 2024-01-24 NOTE — ASSESSMENT & PLAN NOTE
Wt Readings from Last 3 Encounters:   01/16/24 68.5 kg (151 lb)   01/02/24 66.7 kg (147 lb)   12/21/23 69.4 kg (153 lb)   Unable to weigh patient today patient is taking her morning furosemide but if she does take her double dose of furosemide in the evening it is between 4 and 6 as it keeps her up too late and overnight going to the bathroom frequently.  She does have cardiology follow-up on 6 February.

## 2024-01-25 ENCOUNTER — TELEPHONE (OUTPATIENT)
Age: 89
End: 2024-01-25

## 2024-01-25 ENCOUNTER — OFFICE VISIT (OUTPATIENT)
Dept: GASTROENTEROLOGY | Facility: MEDICAL CENTER | Age: 89
End: 2024-01-25
Payer: COMMERCIAL

## 2024-01-25 VITALS
HEIGHT: 62 IN | BODY MASS INDEX: 27.62 KG/M2 | TEMPERATURE: 98 F | HEART RATE: 81 BPM | SYSTOLIC BLOOD PRESSURE: 99 MMHG | DIASTOLIC BLOOD PRESSURE: 62 MMHG

## 2024-01-25 DIAGNOSIS — R10.10 PAIN OF UPPER ABDOMEN: Primary | ICD-10-CM

## 2024-01-25 DIAGNOSIS — K56.1 INTUSSUSCEPTION OF COLON (HCC): ICD-10-CM

## 2024-01-25 DIAGNOSIS — K59.00 CONSTIPATION, UNSPECIFIED CONSTIPATION TYPE: ICD-10-CM

## 2024-01-25 PROCEDURE — 99214 OFFICE O/P EST MOD 30 MIN: CPT | Performed by: NURSE PRACTITIONER

## 2024-01-25 NOTE — PROGRESS NOTES
"[facility] - Outpatient Follow-up Note  Priya Zarate 89 y.o. female MRN: [id number]  Encounter: [id number]          ASSESSMENT AND PLAN:      1. Intussusception of colon (HCC)    She is feeling okay overall since discharge from the hospital.  Last night she did develop some upper abdomen and back pain that she describes as a \"tightness\" that was better with a BM.  Denies any nausea, vomiting or dysphagia.  BMs are brown and formed almost daily to every other day.  She has been using Metamucil daily and bowels have been more regular.  History of persistent distal transverse colocolonic intussusception without definite lead point or underlying mass.Given the persistence, cannot definitively rule out the possibility of a lesion such as a polyp or lipoma creating a lead point.  Options were discussed with the patient and family while hospitalized.  They were of the mindset of avoiding any aggressive interventions.  Given the persistence of the intussusception, further investigation with a colonoscopy would be warranted.  Given her advanced age and possible need for further interventions should a lead point be found, family and patient would like to hold on any of colonoscopy at this time.  Options were discussed in the hospital with family for management of intussusception which includes; continue to monitor, colonoscopy which is likely limited given absence of discrete findings on CT imaging or surgical intervention.  Family agreed to conservative management to monitor the status at this time.      The role of colonoscopy would be primarily diagnostic, which will likely not change her management.  There is a possibility that colonoscopy could discover and remove a large polyp that acts as the lead point for intussusception.  However, such a polyp is not apparent on her CTs and, in my opinion, it is unlikely that a colonoscopy would be beneficial. In my opinion, the risks of colonoscopy " likely outweigh the benefits. Her family understands that intussusception may recur, at which time we will re-consider our options.    -Continue fiber supplement daily  -Patient is follow-up with her family doctor in regards to her abdominal pain.  Pain may not be GI related.  She is also reporting some upper back pain.  -Follow-up in office in 3 to 4 months or sooner if needed  -MiraLAX 1 capful daily with fiber daily    ______________________________________________________________________    SUBJECTIVE: 89-year-old female here for follow-up after hospitalization.  She does have a past medical history of aortic stenosis, CAD, DM 2, GERD and distal transverse colonic intussusception.  Daughter did accompany patient to her visit today.    She presented to the ER recently for abdominal pain associated epigastric pain and back pain.  Found to have a UTI and acute PE.  She was also found to have 4 cm colocolonic intussusception on CT imaging of abdomen pelvis.  Abdominal pain resolved.  Repeat CT abdomen pelvis demonstrated persistent distal transverse colon colonic intussusception without definite lead point or underlying mass.  Given the persistence, cannot definitively rule out the possibility of a lesion such as a polyp or lipoma creating a lead point.  Options were discussed with the patient and family while hospitalized.  They were of the mindset of avoiding any aggressive interventions.  Given the persistence of the intussusception, further investigation with a colonoscopy would be warranted.  Given her advanced age and possible need for further interventions should only point be found, family and patient would like to hold on any of colonoscopy at this time.  Options were discussed in the hospital with family for management of intussusception which includes; continue to monitor, colonoscopy which is likely limited given absence of discrete findings on CT imaging or surgical intervention.  Family agreed to  "conservative management to monitor the status at this time.    She is feeling okay overall since discharge from the hospital.  Last night she did develop some upper abdomen and back pain that she describes as a \"tightness\" that was better with a BM.  Denies any nausea, vomiting or dysphagia.  BMs are brown and formed almost daily to every other day.  She has been using Metamucil daily and bowels have been more regular.            REVIEW OF SYSTEMS IS OTHERWISE NEGATIVE.  10 point review of systems negative other than per HPI      Historical Information   Past Medical History:   Diagnosis Date   • Asthma    • Atypical chest pain    • CAD (coronary artery disease)    • Candidal intertrigo    • CHF (congestive heart failure) (HCC)    • Depression    • Diabetes mellitus (HCC)    • Diabetic neuropathy (HCC)    • Diverticulitis    • Dyslipidemia    • Female bladder prolapse    • Gastric ulcer    • Glaucoma    • HTN (hypertension)    • Hyperlipidemia    • Hypothyroidism 4/18/2016   • RAD (reactive airway disease)      Past Surgical History:   Procedure Laterality Date   • COLONOSCOPY     • ESOPHAGOGASTRODUODENOSCOPY  2011   • HYSTERECTOMY     • TONSILLECTOMY       Social History   Social History     Substance and Sexual Activity   Alcohol Use No    Comment: Social Alcohol Use -- as per allscripts (pt denies all alcohol use)     Social History     Substance and Sexual Activity   Drug Use No     Social History     Tobacco Use   Smoking Status Never   Smokeless Tobacco Never     Family History   Problem Relation Age of Onset   • Breast cancer Mother    • Hypothyroidism Mother    • Hypertension Father    • Breast cancer Sister    • Thyroid disease Sister    • Hypertension Sister        Meds/Allergies       Current Outpatient Medications:   •  Accu-Chek Softclix Lancets lancets  •  albuterol (PROVENTIL HFA,VENTOLIN HFA) 90 mcg/act inhaler  •  Alcohol Swabs (B-D SINGLE USE SWABS REGULAR) PADS  •  amLODIPine (NORVASC) 5 mg " "tablet  •  apixaban (Eliquis) 5 mg  •  aspirin 81 MG tablet  •  Blood Glucose Monitoring Suppl (Accu-Chek Guide) w/Device KIT  •  Cholecalciferol (VITAMIN D3) 2000 units capsule  •  Diclofenac Sodium (VOLTAREN) 1 %  •  Empagliflozin (JARDIANCE) 10 MG TABS tablet  •  famotidine (PEPCID) 40 MG tablet  •  furosemide (LASIX) 20 mg tablet  •  gabapentin (NEURONTIN) 600 MG tablet  •  glucose blood (Accu-Chek Guide) test strip  •  glucose blood test strip  •  Incontinence Supply Disposable (SELECT DISPOSABLE UNDERWEAR LG) MISC  •  Lancets (accu-chek multiclix) lancets  •  latanoprost (XALATAN) 0.005 % ophthalmic solution  •  levothyroxine 75 mcg tablet  •  Misc. Devices (Transport Chair) MISC  •  oxygen gas  •  pantoprazole (PROTONIX) 40 mg tablet  •  vitamin B-12 (CYANOCOBALAMIN) 250 MCG tablet    Allergies   Allergen Reactions   • Levaquin [Levofloxacin] Myalgia   • Bactrim [Sulfamethoxazole-Trimethoprim] Hallucinations   • Metformin Other (See Comments)     Side effect, did not like.     • Statins      Other reaction(s): Weakness  Category: Adverse Reaction;            Objective     Blood pressure 99/62, pulse 81, temperature 98 °F (36.7 °C), height 5' 2\" (1.575 m), not currently breastfeeding. Body mass index is 27.62 kg/m².      PHYSICAL EXAM:      General Appearance:   Alert, cooperative, no distress   HEENT:   Normocephalic, atraumatic, anicteric.     Neck:  Supple, symmetrical, trachea midline   Lungs:   Clear to auscultation bilaterally; no rales, rhonchi or wheezing; respirations unlabored    Heart::   Regular rate and rhythm; no murmur, rub, or gallop.   Abdomen:   Soft, non-tender, non-distended; normal bowel sounds; no masses, no organomegaly    Genitalia:   Deferred    Rectal:   Deferred    Extremities:  No cyanosis, clubbing or edema    Pulses:  2+ and symmetric    Skin:  No jaundice, rashes, or lesions    Lymph nodes:  No palpable cervical lymphadenopathy        Lab Results:   No visits with results within " 1 Day(s) from this visit.   Latest known visit with results is:   No results displayed because visit has over 200 results.            Radiology Results:   Echo complete w/ contrast if indicated    Result Date: 1/16/2024  Narrative: •  Left Ventricle: Left ventricular cavity size is normal. Wall thickness is mildly increased. The left ventricular ejection fraction is 52% by visual estimation. Systolic function is low normal. Wall motion is normal. Diastolic function is mildly abnormal, consistent with grade I (abnormal) relaxation. •  Left Atrium: The atrium is severely dilated (>48 mL/m2). •  Aortic Valve: The aortic valve is trileaflet. The leaflets are severely calcified. There is severely reduced mobility. There is moderate regurgitation.  AI pressure half-time of 370 ms. There is critical stenosis. The aortic valve peak velocity is 4.45 m/s. The aortic valve mean gradient is 50 mmHg. The dimensionless velocity index is 0.14. The aortic valve area is 0.47 cm2. The stroke volume index is 32.40 ml/m2. •  Mitral Valve: There is mild regurgitation. •  Tricuspid Valve: There is mild regurgitation. •  Aorta: The aortic root is normal in size. The ascending aorta is mildly dilated. The ascending aorta is 4 cm.      VAS VENOUS DUPLEX - LOWER LIMB BILATERAL    Result Date: 1/15/2024  Narrative:  [facility] REPORT CLINICAL: Indications: Patient presents with recent discovery of pulmonary embolism and physician wants to determine potential source.  Patient denies any leg symptoms. Risk Factors The patient has history of HTN, Diabetes (IDDM), CKD and CAD.  She has no history of PE.  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 No evidence of acute deep vein thrombosis. No evidence of superficial thrombophlebitis noted. Doppler evaluation shows a normal response to augmentation maneuvers.. Popliteal, posterior tibial and anterior tibial arterial Doppler waveform's are biphasic.  LEFT LOWER LIMB: Evaluation shows evidence of acute, occlusive, deep vein thrombus in the popliteal vein. No evidence of superficial thrombophlebitis noted. Popliteal, posterior tibial and anterior tibial arterial Doppler waveform's are biphasic.  Technical findings were given to Max Kennedy at 11:35 AM.  SIGNATURE: Electronically Signed by: RHINA BERG MD, RPVI on 2024-01-15 01:38:40 PM    XR chest 1 view portable    Result Date: 1/15/2024  Narrative: CHEST INDICATION:   Shortness of breath. Patient presents to ED for evaluation of epigastric pain with associated shortness of breath. COMPARISON: Chest radiograph 8/27/2023; 7/8/2022. EXAM PERFORMED/VIEWS:  XR CHEST PORTABLE FINDINGS: Cardiomediastinal silhouette appears unremarkable. Atherosclerotic changes of the thoracic aorta. Mild bibasilar atelectasis. Lungs are otherwise clear. Mild central pulmonary vascular prominence is noted. Chronic mild elevation of the right hemidiaphragm. No pneumothorax or pleural effusion. Osseous structures appear within normal limits for patient age.     Impression: No acute cardiopulmonary disease. I have personally reviewed this study including all images.  / MARITZA Resident: OWEN Mattson I, the attending radiologist, have reviewed the images and agree with the final report above. Workstation performed: [id number]     CT abdomen pelvis w contrast    Result Date: 1/15/2024  Narrative: CT ABDOMEN AND PELVIS WITH IV CONTRAST INDICATION: evaluate for resolution of intussusception. Prior CT study had demonstrated distal transverse colon colocolonic intussusception. COMPARISON: 1/13/2024 TECHNIQUE: CT examination of the abdomen and pelvis was performed. Multiplanar 2D  reformatted images were created from the source data. This examination, like all CT scans performed in the [facility], was performed utilizing techniques to minimize radiation dose exposure, including the use of iterative reconstruction and automated exposure control. Radiation dose length product (DLP) for this visit: 399 mGy-cm IV Contrast: 50 mL of iohexol (OMNIPAQUE) 100 mL of iohexol (OMNIPAQUE) Enteric Contrast: Enteric contrast was administered. FINDINGS: ABDOMEN LOWER CHEST: There is mild bronchiectasis at the lung bases. LIVER/BILIARY TREE: No change from prior study. GALLBLADDER: Hyperdense material in the dependent portion of the gallbladder may represent vicarious excretion of contrast agent from prior CT. No pericholecystic inflammation SPLEEN: Unremarkable. PANCREAS: Unremarkable. ADRENAL GLANDS: Unremarkable. KIDNEYS/URETERS: Unremarkable. No hydronephrosis. STOMACH AND BOWEL: There is persistence of distal transverse colocolonic intussusception at the same location as on the prior examination. No obvious lead point. Overall no change in appearance from the earlier study. No surrounding inflammation colonic diverticulosis, no bowel obstruction APPENDIX: No findings to suggest appendicitis. ABDOMINOPELVIC CAVITY: Noted is a trace amount of free air adjacent to the liver (2/40, 602/163). Trace amount of free fluid. The small amount of soft tissue density seen posterior to the aortic bifurcation, and proximal left common iliac vessels has not  changed since earlier. VESSELS: No change from prior PELVIS REPRODUCTIVE ORGANS: Surgical changes of prior hysterectomy. URINARY BLADDER: Bladder wall thickening is seen, which is asymmetrically more prominent on the right than the left. ABDOMINAL WALL/INGUINAL REGIONS: Umbilical adipose containing hernia. OSSEOUS STRUCTURES: No acute fracture or destructive osseous lesion.     Impression: 1. Persistence of distal transverse colocolonic  intussusception. No definite lead point although the colonic wall does appear somewhat thickened without surrounding inflammatory changes. Given persistence, the possibility of a lesion creating a lead point should be excluded. 2. Small amount of soft tissue density posterior to the aortic bifurcation and left proximal common iliac vessels (2/94) may represent mild adenopathy 3. Questionable trace amount of free air noted adjacent to the liver margin which cannot be definitively connected to adjacent bowel. Therefore, perforation of a hollow viscus is not excluded although no definite source is seen. Colonic diverticulosis is  present but no focal diverticulitis pattern is noted 4. Bladder wall thickening particularly on the right which should be correlated for hematuria and/or clinical concern of cystitis. I personally discussed this study with DR BRAEDEN FARFAN on 1/15/2024 8:30 AM. Workstation performed: [id number]     PE Study with CT abdomen & pelvis with contrast    Result Date: 1/14/2024  Narrative: CT PULMONARY ANGIOGRAM OF THE CHEST AND CT ABDOMEN AND PELVIS WITH INTRAVENOUS CONTRAST INDICATION: SOB, epigastric pain, pain with inspiration. COMPARISON: 11/30/2023. TECHNIQUE: CT examination of the chest, abdomen and pelvis was performed. Thin section CT angiographic technique was used in the chest in order to evaluate for pulmonary embolus and coronal 3D MIP postprocessing was performed on the acquisition scanner. Multiplanar 2D reformatted images were created from the source data. This examination, like all CT scans performed in the [facility], was performed utilizing techniques to minimize radiation dose exposure, including the use of iterative reconstruction and automated exposure control. Radiation dose length product (DLP) for this visit: 665.76 mGy-cm IV Contrast: 100 mL of iohexol (OMNIPAQUE) Enteric Contrast: Enteric contrast was not administered. FINDINGS: Exam limited by mild motion  artifact. CHEST PULMONARY ARTERIAL TREE: A few tiny nonocclusive filling defects within the left lower lobe anterior basal segmental pulmonary artery extending into subsegmental branches noted consistent with acute low clot burden peripheral pulmonary embolism. No additional filling defects in the bilateral pulmonary arterial tree. No imaging evidence of right heart strain. RV:LV ratio measured 0.7. LUNGS: Central airways are patent. There is no tracheal or endobronchial lesion. Mild bronchiectasis bilaterally. No lobar airspace consolidation/pneumonia. No suspicious pulmonary parenchymal mass. Small areas of parenchymal scarring in the lingula and medial posterior lung bases. PLEURA: Unremarkable. HEART/AORTA: Heart is unremarkable for patient's age. No pericardial effusions. No thoracic aortic aneurysm or dissection. Visualized proximal thoracic great vessels appear grossly patent. Extensive calcific atherosclerosis of the thoracic aorta, proximal thoracic vessels, and coronary arteries noted. MEDIASTINUM AND MARLENE: No mediastinal mass or lymphadenopathy by size criteria. A few nonspecific subcentimeter mediastinal lymph nodes noted. The visualized thyroid glands appear grossly unremarkable. Esophagus is normal in course and caliber. No significant prevertebral soft tissue swelling or mass.. CHEST WALL AND LOWER NECK: Unremarkable. ABDOMEN LIVER/BILIARY TREE: Unremarkable. GALLBLADDER: No calcified gallstones. No pericholecystic inflammatory change. SPLEEN: Unremarkable. PANCREAS: Unremarkable. ADRENAL GLANDS: Unremarkable. KIDNEYS/URETERS: Unremarkable for age. No hydronephrosis. STOMACH AND BOWEL: Stomach is contracted limiting evaluation. No gross intraluminal mass. The small and large bowel loops are normal in course and caliber without obstruction noted. Terminal ileum is grossly unremarkable. Terminal ileum and cecum are located in the anterior subhepatic region, similar compared to the prior study. Appendix  not definitively identified but no pericecal inflammatory changes. There is a focal approximately 4 cm segment of colocolonic intussusception in the mid to distal transverse colon region noted without definitive focal lead point mass identified. This may be related to abnormal/discoordinated peristalsis or other etiology. Consider follow up oral contrast enhanced examination (after waiting minimal of 3-4 hours for  oral contrast to traverse into the distal colon) to ensure resolution. Extensive sigmoid diverticulosis without evidence for diverticulitis. APPENDIX: No findings to suggest appendicitis. ABDOMINOPELVIC CAVITY: No ascites. No pneumoperitoneum. No lymphadenopathy. VESSELS: Atherosclerotic changes are present.  No evidence of aneurysm. Small amount of nonspecific soft tissue density material just inferior from the aortic bifurcation and left common iliac artery bifurcation regions again seen, similar compared to the prior exam. Differential considerations include but not limited to lymphoid tissue, chronic hematoma/blood products, retroperitoneal fibrosis, scar tissue, or other etiology. No active contrast extravasation in this region to suggest an active vascular leak or hemorrhage. PELVIS REPRODUCTIVE ORGANS: Surgical changes of prior hysterectomy. URINARY BLADDER: Mildly distended with subtle diffuse wall thickening and mucosal hyperenhancement. Recommend correlation with urinalysis for cystitis. No gross intraluminal bladder mass or calculi seen. ABDOMINAL WALL/INGUINAL REGIONS: Unremarkable. OSSEOUS STRUCTURES: No acute fracture or destructive osseous lesion. Multilevel degenerative changes of the thoracolumbar spine and bilateral hip joints. Asymmetrical sclerosis of the left iliac bone with areas of cortical thickening could be due to Paget's disease.     Impression: 1.  Very low clot burden acute nonocclusive pulmonary embolism in the left lower lobe anterior basal segmental pulmonary artery  extending into subsegmental branches. No additional pulmonary emboli identified. No imaging evidence of right heart strain. 2.  No pneumonia, pneumothorax, or pleural/pericardial effusions. 3.  No thoracic or abdominal aortic aneurysm/dissection. Extensive calcific atherosclerosis. 4.  No evidence for bowel obstruction or mesenteric inflammation seen. Terminal ileum and cecum located in the anterior subhepatic region, similar compared to the prior study. Appendix not definitively visualized but no pericecal inflammatory changes. No  free air or free fluid in the abdomen/pelvis. [place]-colonic intussusception in the mid to distal transverse colon region noted without definitive focal lead point mass identified. This may be related to abnormal/discoordinated peristalsis or other etiology. Consider follow up oral contrast enhanced examination (after waiting minimal of 3-4 hours for oral contrast to traverse into the distal colon) to ensure resolution. Extensive sigmoid diverticulosis without evidence for diverticulitis. 5.  Small amount of nonspecific soft tissue density material just inferior from the aortic bifurcation and left common iliac artery bifurcation regions again seen, similar compared to the prior exam. Differential considerations include but not limited to  lymphoid tissue, chronic hematoma/blood products, retroperitoneal fibrosis, scar tissue, or other etiology. No active contrast extravasation in this region to suggest an active vascular leak or hemorrhage. 6.  Mildly distended with subtle diffuse wall thickening and mucosal hyperenhancement. Recommend correlation with urinalysis for cystitis. Findings discussed with HANS Alcantara at 12:32 a.m. Workstation performed: [id number]

## 2024-01-25 NOTE — TELEPHONE ENCOUNTER
Call from Sherita, Nurse Care manager at [facility] following up on fax sent on Tuesday regarding medication review for patient. She wants to know if Dr Azul was able to review with patient  the recommendations on the fax from the pharmacist regarding Furosemide. She doesn't need to be called back but just wants to make sure that Dr Azul review this with patient and her daughter.

## 2024-01-30 ENCOUNTER — PATIENT OUTREACH (OUTPATIENT)
Dept: FAMILY MEDICINE CLINIC | Facility: CLINIC | Age: 89
End: 2024-01-30

## 2024-01-30 NOTE — PROGRESS NOTES
Follow up to [facility] referral and noted that pt had another readmission since last call with pts daughter. Called and spoke with Coby, pts daughter to inquire if she had made contact with HH agencies provided to her. Coby states that she did call a few places but has not heard back from them. She stated that she called on weekends. Provided the information again as well as contact for [facility]. Coby was appreciative of the call and denies further needs at this time.

## 2024-02-05 ENCOUNTER — VBI (OUTPATIENT)
Dept: ADMINISTRATIVE | Facility: OTHER | Age: 89
End: 2024-02-05

## 2024-02-05 PROBLEM — I20.89 STABLE ANGINA: Status: RESOLVED | Noted: 2020-02-18 | Resolved: 2024-02-05

## 2024-02-07 ENCOUNTER — OFFICE VISIT (OUTPATIENT)
Dept: FAMILY MEDICINE CLINIC | Facility: CLINIC | Age: 89
End: 2024-02-07
Payer: COMMERCIAL

## 2024-02-07 VITALS
OXYGEN SATURATION: 100 % | WEIGHT: 141 LBS | BODY MASS INDEX: 25.95 KG/M2 | HEIGHT: 62 IN | DIASTOLIC BLOOD PRESSURE: 60 MMHG | HEART RATE: 80 BPM | RESPIRATION RATE: 18 BRPM | SYSTOLIC BLOOD PRESSURE: 118 MMHG

## 2024-02-07 DIAGNOSIS — I10 PRIMARY HYPERTENSION: ICD-10-CM

## 2024-02-07 DIAGNOSIS — Z87.440 HISTORY OF RECURRENT UTIS: ICD-10-CM

## 2024-02-07 DIAGNOSIS — N39.0 URINARY TRACT INFECTION WITH HEMATURIA, SITE UNSPECIFIED: ICD-10-CM

## 2024-02-07 DIAGNOSIS — R42 VERTIGO: ICD-10-CM

## 2024-02-07 DIAGNOSIS — R31.9 URINARY TRACT INFECTION WITH HEMATURIA, SITE UNSPECIFIED: ICD-10-CM

## 2024-02-07 DIAGNOSIS — E11.65 TYPE 2 DIABETES MELLITUS WITH HYPERGLYCEMIA, WITHOUT LONG-TERM CURRENT USE OF INSULIN (HCC): Primary | ICD-10-CM

## 2024-02-07 PROBLEM — N30.00 ACUTE CYSTITIS WITHOUT HEMATURIA: Status: RESOLVED | Noted: 2024-01-14 | Resolved: 2024-02-07

## 2024-02-07 LAB
SL AMB  POCT GLUCOSE, UA: 1000
SL AMB LEUKOCYTE ESTERASE,UA: ABNORMAL
SL AMB POCT BILIRUBIN,UA: NEGATIVE
SL AMB POCT BLOOD,UA: ABNORMAL
SL AMB POCT CLARITY,UA: ABNORMAL
SL AMB POCT COLOR,UA: YELLOW
SL AMB POCT KETONES,UA: NEGATIVE
SL AMB POCT NITRITE,UA: NEGATIVE
SL AMB POCT PH,UA: 5.5
SL AMB POCT SPECIFIC GRAVITY,UA: 1.02
SL AMB POCT URINE PROTEIN: 100
SL AMB POCT UROBILINOGEN: 0.2

## 2024-02-07 PROCEDURE — 87077 CULTURE AEROBIC IDENTIFY: CPT | Performed by: FAMILY MEDICINE

## 2024-02-07 PROCEDURE — 87086 URINE CULTURE/COLONY COUNT: CPT | Performed by: FAMILY MEDICINE

## 2024-02-07 PROCEDURE — 81002 URINALYSIS NONAUTO W/O SCOPE: CPT | Performed by: FAMILY MEDICINE

## 2024-02-07 PROCEDURE — 99214 OFFICE O/P EST MOD 30 MIN: CPT | Performed by: FAMILY MEDICINE

## 2024-02-07 RX ORDER — NITROFURANTOIN 25; 75 MG/1; MG/1
100 CAPSULE ORAL 2 TIMES DAILY
Qty: 14 CAPSULE | Refills: 0 | Status: SHIPPED | OUTPATIENT
Start: 2024-02-07 | End: 2024-02-14

## 2024-02-07 RX ORDER — MECLIZINE HCL 12.5 MG/1
12.5 TABLET ORAL 3 TIMES DAILY PRN
Qty: 30 TABLET | Refills: 0 | Status: SHIPPED | OUTPATIENT
Start: 2024-02-07 | End: 2024-02-14

## 2024-02-07 NOTE — ASSESSMENT & PLAN NOTE
Lab Results   Component Value Date    HGBA1C 9.4 (H) 12/12/2023   Patient's A1c was 9.4 at her last check.  Because of her advanced age, we did not elect to do a more aggressive approach, as there was certainly concerned about hypoglycemia.  Unfortunately, she does continue to seem to have some more problems, such as the fatigue, and not eating well.  These probably all make a difference with this.  At this point, no specific change until her next blood work, and then consider other options.  Based on geriatric criteria, her A1c goal is less than 8.

## 2024-02-07 NOTE — ASSESSMENT & PLAN NOTE
Increased problems lately.  Feels motion.  Some irritation in the ears as well.  Likely BPPV.  Consider Meclizine 12.5mg. q 8 PRN.

## 2024-02-07 NOTE — PATIENT INSTRUCTIONS
1. Type 2 diabetes mellitus with hyperglycemia, without long-term current use of insulin ([facility])  Assessment & Plan:    Lab Results   Component Value Date    HGBA1C 9.4 (H) 12/12/2023   Patient's A1c was 9.4 at her last check.  Because of her advanced age, we did not elect to do a more aggressive approach, as there was certainly concerned about hypoglycemia.  Unfortunately, she does continue to seem to have some more problems, such as the fatigue, and not eating well.  These probably all make a difference with this.  At this point, no specific change until her next blood work, and then consider other options.  Based on geriatric criteria, her A1c goal is less than 8.      2. History of recurrent UTIs  -     POCT urine dip  -     nitrofurantoin (MACROBID) 100 mg capsule; Take 1 capsule (100 mg total) by mouth 2 (two) times a day for 7 days    3. Urinary tract infection with hematuria, site unspecified  Comments:  Urinalysis positive today.  Leukocyte esterase on dip.  Macrobid.  Send culture.  Orders:  -     Urine culture; Future  -     Urine culture  -     nitrofurantoin (MACROBID) 100 mg capsule; Take 1 capsule (100 mg total) by mouth 2 (two) times a day for 7 days    4. Primary hypertension  Assessment & Plan:  Blood pressure seems to be doing quite well.  No specific change.      5. Vertigo  Assessment & Plan:  Increased problems lately.  Feels motion.  Some irritation in the ears as well.  Likely BPPV.  Consider Meclizine 12.5mg. q 8 PRN.    Orders:  -     meclizine (ANTIVERT) 12.5 MG tablet; Take 1 tablet (12.5 mg total) by mouth 3 (three) times a day as needed for dizziness        COVID 19 Instructions    Priya Zarate was advised to limit contact with others to essential tasks such as getting food, medications, and medical care.    Proper handwashing reviewed, and Hand sanitzer when washing is not available.    If the patient develops symptoms of COVID 19, the patient should call the office as soon as  possible.    It is strongly recommended that Flu Vaccinations be obtained.      Virtual Visits:  Cameron: This works on smart phones (any phone with Internet browsing capability).  You should get a text message when the provider is ready to see you.  Click on the link in the text message, and the call should start.  You will need to type in your name, and allow camera and microphone access.  This is HIPPA compliant, and secure.      If you have not already done so, get immunized to COVID 19.      We are committed to getting you vaccinated as soon as possible and will be closely following CDC and [facility] guidelines as they are released and revised.  Please refer to our COVID-19 vaccine webpage for the most up to date information on the vaccine and our distribution efforts.    This site will also have the most up to date recommendations for COVID booster vaccine.    [email or web address]    Call [phone] ([phone]), option 7    You can also visit https://www.vaccines.gov/ to find vaccines in your area.    OUR LOCATION:    [facility]  [address] [place] [address], [address]  [place], [place], [address] [phone]  Fax: [phone]    Lab services, Rheumatology, and OB/GYN are at this location as well.

## 2024-02-07 NOTE — PROGRESS NOTES
Name: Priya Zarate      : 1922      MRN: [id number]  Encounter Provider: Caden Rivera MD  Encounter Date: 2024   Encounter department: [facility]    Assessment & Plan     1. Type 2 diabetes mellitus with hyperglycemia, without long-term current use of insulin ([facility])  Assessment & Plan:    Lab Results   Component Value Date    HGBA1C 9.4 (H) 2023   Patient's A1c was 9.4 at her last check.  Because of her advanced age, we did not elect to do a more aggressive approach, as there was certainly concerned about hypoglycemia.  Unfortunately, she does continue to seem to have some more problems, such as the fatigue, and not eating well.  These probably all make a difference with this.  At this point, no specific change until her next blood work, and then consider other options.  Based on geriatric criteria, her A1c goal is less than 8.      2. History of recurrent UTIs  -     POCT urine dip  -     nitrofurantoin (MACROBID) 100 mg capsule; Take 1 capsule (100 mg total) by mouth 2 (two) times a day for 7 days    3. Urinary tract infection with hematuria, site unspecified  Comments:  Urinalysis positive today.  Leukocyte esterase on dip.  Macrobid.  Send culture.  Orders:  -     Urine culture; Future  -     Urine culture  -     nitrofurantoin (MACROBID) 100 mg capsule; Take 1 capsule (100 mg total) by mouth 2 (two) times a day for 7 days    4. Primary hypertension  Assessment & Plan:  Blood pressure seems to be doing quite well.  No specific change.      5. Vertigo  Assessment & Plan:  Increased problems lately.  Feels motion.  Some irritation in the ears as well.  Likely BPPV.  Consider Meclizine 12.5mg. q 8 PRN.    Orders:  -     meclizine (ANTIVERT) 12.5 MG tablet; Take 1 tablet (12.5 mg total) by mouth 3 (three) times a day as needed for dizziness           Subjective      Chief Complaint   Patient presents with   • Headache   • Earache       Has some HA symptoms.  Feels  "like something coming down to the eyes.  Feels vertigo often.    Bowels: Has some BM in AM, this AM was better.  On further discussion, the patient reports that she is having looser stools with using Metamucil.  Every time she feels she has to urinate, she also has bowel movement, but it is not always a normal bowel movement, again its mostly loose with an occasional more formed element.  Patient also has Metamucil that she is using, but not using any other stool softeners.    Lately, the urine has been cloudy.  Has been a while.  Reviewed the urinalysis today, which showed a significant amount of sugar, as well as some blood, protein and leukocytes.  Patient is on Jardiance.    Some pain in ankle. Noted this at night.  Felt \"frozen.\"  Has gotten a bit better.  Right foot: Feels \"different things in it.\"    Weak the last few days.  Daughter reports she is not eating well.          Review of Systems   Constitutional: Negative.    HENT: Negative.     Respiratory: Negative.     Cardiovascular: Negative.    Gastrointestinal: Negative.    Genitourinary:         Per HPI     Musculoskeletal: Negative.    Neurological:  Positive for dizziness.       Current Outpatient Medications on File Prior to Visit   Medication Sig   • Accu-Chek Softclix Lancets lancets Testing 1 time daily.            Dx: E11.9   • albuterol (PROVENTIL HFA,VENTOLIN HFA) 90 mcg/act inhaler Inhale 2 puffs every 6 (six) hours as needed for wheezing or shortness of breath   • Alcohol Swabs (B-D SINGLE USE SWABS REGULAR) PADS Use in the morning   • amLODIPine (NORVASC) 5 mg tablet TAKE 1 TABLET (5 MG TOTAL)  DAILY   • apixaban (Eliquis) 5 mg Take 1 tablet (5 mg total) by mouth 2 (two) times a day   • aspirin 81 MG tablet Take 81 mg by mouth daily.   • Blood Glucose Monitoring Suppl (Accu-Chek Guide) w/Device KIT Use 2 (two) times a day   • Cholecalciferol (VITAMIN D3) 2000 units capsule Take 2,000 Units by mouth daily    • Diclofenac Sodium (VOLTAREN) 1 % " "Apply 2 g topically 4 (four) times a day   • Empagliflozin (JARDIANCE) 10 MG TABS tablet Take 1 tablet (10 mg total) by mouth daily   • famotidine (PEPCID) 40 MG tablet Take 1 tablet (40 mg total) by mouth daily at bedtime   • furosemide (LASIX) 20 mg tablet TAKE 1 TABLET BY MOUTH IN THE MORNING AND 2 TABLETS IN THE EVENING   • gabapentin (NEURONTIN) 600 MG tablet TAKE 1/2 TABLET IN THE MORNING AND AFTERNOON AND TAKE 1 TABLET AT BEDTIME (Patient taking differently: Take 600 mg by mouth 2 (two) times a day TAKE MORNING AND  TAKE 1 TABLET AT BEDTIME)   • glucose blood (Accu-Chek Guide) test strip Use as instructed   • glucose blood test strip Use 1 each 2 (two) times a day Test Fasting and 2 hour postprandial daily   • Incontinence Supply Disposable (SELECT DISPOSABLE UNDERWEAR LG) MISC    • Lancets (accu-chek multiclix) lancets Use as instructed   • latanoprost (XALATAN) 0.005 % ophthalmic solution Apply 1 drop to eye daily at bedtime Both eyes   • levothyroxine 75 mcg tablet Take 1 tablet (75 mcg total) by mouth daily   • Misc. Devices (Transport Chair) MISC Use if needed (with outings outside of the house.)   • oxygen gas Inhale 2 L/min daily at bedtime   • pantoprazole (PROTONIX) 40 mg tablet TAKE 1 TABLET EVERY DAY   • vitamin B-12 (CYANOCOBALAMIN) 250 MCG tablet Take 250 mcg by mouth daily       Objective     /60 (BP Location: Left arm, Patient Position: Sitting, Cuff Size: Large)   Pulse 80   Resp 18   Ht 5' 2\" (1.575 m)   Wt 64 kg (141 lb)   SpO2 100%   BMI 25.79 kg/m²     Physical Exam  Vitals and nursing note reviewed.   Constitutional:       Appearance: Normal appearance. She is well-developed.   HENT:      Head: Normocephalic and atraumatic.   Cardiovascular:      Rate and Rhythm: Normal rate and regular rhythm.      Pulses:           Carotid pulses are 2+ on the right side and 2+ on the left side.     Heart sounds: Normal heart sounds.   Pulmonary:      Effort: Pulmonary effort is normal.      " Breath sounds: Normal breath sounds. No wheezing or rales.   Chest:      Chest wall: No tenderness.   Neurological:      Mental Status: She is alert.       Caden Rivera MD

## 2024-02-09 LAB
BACTERIA UR CULT: ABNORMAL
BACTERIA UR CULT: ABNORMAL

## 2024-02-11 ENCOUNTER — APPOINTMENT (EMERGENCY)
Dept: CT IMAGING | Facility: HOSPITAL | Age: 89
DRG: 389 | End: 2024-02-11
Payer: COMMERCIAL

## 2024-02-11 ENCOUNTER — HOSPITAL ENCOUNTER (INPATIENT)
Facility: HOSPITAL | Age: 89
LOS: 3 days | Discharge: HOSPICE | DRG: 389 | End: 2024-02-14
Attending: EMERGENCY MEDICINE | Admitting: STUDENT IN AN ORGANIZED HEALTH CARE EDUCATION/TRAINING PROGRAM
Payer: COMMERCIAL

## 2024-02-11 DIAGNOSIS — R10.9 ABDOMINAL PAIN: Primary | ICD-10-CM

## 2024-02-11 DIAGNOSIS — E43 SEVERE PROTEIN-CALORIE MALNUTRITION (HCC): ICD-10-CM

## 2024-02-11 DIAGNOSIS — I35.0 NONRHEUMATIC AORTIC VALVE STENOSIS: Chronic | ICD-10-CM

## 2024-02-11 DIAGNOSIS — K56.1 INTUSSUSCEPTION (HCC): ICD-10-CM

## 2024-02-11 DIAGNOSIS — Z71.89 GOALS OF CARE, COUNSELING/DISCUSSION: ICD-10-CM

## 2024-02-11 DIAGNOSIS — K56.1 INTUSSUSCEPTION OF COLON (HCC): ICD-10-CM

## 2024-02-11 PROBLEM — R93.5 ABNORMAL CT OF THE ABDOMEN: Status: ACTIVE | Noted: 2024-02-11

## 2024-02-11 LAB
2HR DELTA HS TROPONIN: 4 NG/L
4HR DELTA HS TROPONIN: 8 NG/L
ALBUMIN SERPL BCP-MCNC: 3.4 G/DL (ref 3.5–5)
ALP SERPL-CCNC: 71 U/L (ref 34–104)
ALT SERPL W P-5'-P-CCNC: 10 U/L (ref 7–52)
ANION GAP SERPL CALCULATED.3IONS-SCNC: 8 MMOL/L
APTT PPP: 44 SECONDS (ref 23–37)
APTT PPP: 46 SECONDS (ref 23–37)
AST SERPL W P-5'-P-CCNC: 30 U/L (ref 13–39)
ATRIAL RATE: 81 BPM
BASOPHILS # BLD AUTO: 0.05 THOUSANDS/ÂΜL (ref 0–0.1)
BASOPHILS NFR BLD AUTO: 1 % (ref 0–1)
BILIRUB SERPL-MCNC: 0.38 MG/DL (ref 0.2–1)
BUN SERPL-MCNC: 20 MG/DL (ref 5–25)
CALCIUM ALBUM COR SERPL-MCNC: 9.7 MG/DL (ref 8.3–10.1)
CALCIUM SERPL-MCNC: 9.2 MG/DL (ref 8.4–10.2)
CARDIAC TROPONIN I PNL SERPL HS: 10 NG/L
CARDIAC TROPONIN I PNL SERPL HS: 14 NG/L
CARDIAC TROPONIN I PNL SERPL HS: 18 NG/L
CHLORIDE SERPL-SCNC: 104 MMOL/L (ref 96–108)
CO2 SERPL-SCNC: 22 MMOL/L (ref 21–32)
CREAT SERPL-MCNC: 1.28 MG/DL (ref 0.6–1.3)
EOSINOPHIL # BLD AUTO: 0.23 THOUSAND/ÂΜL (ref 0–0.61)
EOSINOPHIL NFR BLD AUTO: 3 % (ref 0–6)
ERYTHROCYTE [DISTWIDTH] IN BLOOD BY AUTOMATED COUNT: 21.1 % (ref 11.6–15.1)
GFR SERPL CREATININE-BSD FRML MDRD: 34 ML/MIN/1.73SQ M
GLUCOSE SERPL-MCNC: 109 MG/DL (ref 65–140)
GLUCOSE SERPL-MCNC: 113 MG/DL (ref 65–140)
HCT VFR BLD AUTO: 32.4 % (ref 34.8–46.1)
HGB BLD-MCNC: 9.4 G/DL (ref 11.5–15.4)
IMM GRANULOCYTES # BLD AUTO: 0.04 THOUSAND/UL (ref 0–0.2)
IMM GRANULOCYTES NFR BLD AUTO: 1 % (ref 0–2)
INR PPP: 2.01 (ref 0.84–1.19)
LACTATE SERPL-SCNC: 1.7 MMOL/L (ref 0.5–2)
LIPASE SERPL-CCNC: 19 U/L (ref 11–82)
LYMPHOCYTES # BLD AUTO: 0.87 THOUSANDS/ÂΜL (ref 0.6–4.47)
LYMPHOCYTES NFR BLD AUTO: 12 % (ref 14–44)
MCH RBC QN AUTO: 23 PG (ref 26.8–34.3)
MCHC RBC AUTO-ENTMCNC: 29 G/DL (ref 31.4–37.4)
MCV RBC AUTO: 79 FL (ref 82–98)
MONOCYTES # BLD AUTO: 0.79 THOUSAND/ÂΜL (ref 0.17–1.22)
MONOCYTES NFR BLD AUTO: 11 % (ref 4–12)
NEUTROPHILS # BLD AUTO: 5.36 THOUSANDS/ÂΜL (ref 1.85–7.62)
NEUTS SEG NFR BLD AUTO: 72 % (ref 43–75)
NRBC BLD AUTO-RTO: 0 /100 WBCS
P AXIS: 102 DEGREES
PLATELET # BLD AUTO: 284 THOUSANDS/UL (ref 149–390)
PMV BLD AUTO: 10.9 FL (ref 8.9–12.7)
POTASSIUM SERPL-SCNC: 5 MMOL/L (ref 3.5–5.3)
PROT SERPL-MCNC: 7 G/DL (ref 6.4–8.4)
PROTHROMBIN TIME: 23 SECONDS (ref 11.6–14.5)
QRS AXIS: 15 DEGREES
QRSD INTERVAL: 110 MS
QT INTERVAL: 356 MS
QTC INTERVAL: 405 MS
RBC # BLD AUTO: 4.08 MILLION/UL (ref 3.81–5.12)
SODIUM SERPL-SCNC: 134 MMOL/L (ref 135–147)
T WAVE AXIS: 61 DEGREES
VENTRICULAR RATE: 78 BPM
WBC # BLD AUTO: 7.34 THOUSAND/UL (ref 4.31–10.16)

## 2024-02-11 PROCEDURE — 93005 ELECTROCARDIOGRAM TRACING: CPT

## 2024-02-11 PROCEDURE — 36415 COLL VENOUS BLD VENIPUNCTURE: CPT

## 2024-02-11 PROCEDURE — 83690 ASSAY OF LIPASE: CPT

## 2024-02-11 PROCEDURE — 74177 CT ABD & PELVIS W/CONTRAST: CPT

## 2024-02-11 PROCEDURE — 84484 ASSAY OF TROPONIN QUANT: CPT

## 2024-02-11 PROCEDURE — G1004 CDSM NDSC: HCPCS

## 2024-02-11 PROCEDURE — 85730 THROMBOPLASTIN TIME PARTIAL: CPT | Performed by: STUDENT IN AN ORGANIZED HEALTH CARE EDUCATION/TRAINING PROGRAM

## 2024-02-11 PROCEDURE — 99285 EMERGENCY DEPT VISIT HI MDM: CPT | Performed by: EMERGENCY MEDICINE

## 2024-02-11 PROCEDURE — 82948 REAGENT STRIP/BLOOD GLUCOSE: CPT

## 2024-02-11 PROCEDURE — 96360 HYDRATION IV INFUSION INIT: CPT

## 2024-02-11 PROCEDURE — 99223 1ST HOSP IP/OBS HIGH 75: CPT | Performed by: STUDENT IN AN ORGANIZED HEALTH CARE EDUCATION/TRAINING PROGRAM

## 2024-02-11 PROCEDURE — 83605 ASSAY OF LACTIC ACID: CPT

## 2024-02-11 PROCEDURE — 85610 PROTHROMBIN TIME: CPT

## 2024-02-11 PROCEDURE — 99284 EMERGENCY DEPT VISIT MOD MDM: CPT

## 2024-02-11 PROCEDURE — 80053 COMPREHEN METABOLIC PANEL: CPT

## 2024-02-11 PROCEDURE — 85730 THROMBOPLASTIN TIME PARTIAL: CPT

## 2024-02-11 PROCEDURE — 85025 COMPLETE CBC W/AUTO DIFF WBC: CPT

## 2024-02-11 PROCEDURE — 93010 ELECTROCARDIOGRAM REPORT: CPT | Performed by: INTERNAL MEDICINE

## 2024-02-11 RX ORDER — ACETAMINOPHEN 325 MG/1
650 TABLET ORAL EVERY 6 HOURS PRN
Status: DISCONTINUED | OUTPATIENT
Start: 2024-02-11 | End: 2024-02-14 | Stop reason: HOSPADM

## 2024-02-11 RX ORDER — ALBUTEROL SULFATE 90 UG/1
2 AEROSOL, METERED RESPIRATORY (INHALATION) EVERY 6 HOURS PRN
Status: DISCONTINUED | OUTPATIENT
Start: 2024-02-11 | End: 2024-02-14 | Stop reason: HOSPADM

## 2024-02-11 RX ORDER — PANTOPRAZOLE SODIUM 40 MG/1
40 TABLET, DELAYED RELEASE ORAL
Status: DISCONTINUED | OUTPATIENT
Start: 2024-02-12 | End: 2024-02-12

## 2024-02-11 RX ORDER — FAMOTIDINE 20 MG/1
40 TABLET, FILM COATED ORAL
Status: DISCONTINUED | OUTPATIENT
Start: 2024-02-11 | End: 2024-02-14 | Stop reason: HOSPADM

## 2024-02-11 RX ORDER — HEPARIN SODIUM 1000 [USP'U]/ML
3600 INJECTION, SOLUTION INTRAVENOUS; SUBCUTANEOUS ONCE
Status: COMPLETED | OUTPATIENT
Start: 2024-02-11 | End: 2024-02-11

## 2024-02-11 RX ORDER — INSULIN LISPRO 100 [IU]/ML
1-5 INJECTION, SOLUTION INTRAVENOUS; SUBCUTANEOUS
Status: DISCONTINUED | OUTPATIENT
Start: 2024-02-11 | End: 2024-02-14 | Stop reason: HOSPADM

## 2024-02-11 RX ORDER — HEPARIN SODIUM 1000 [USP'U]/ML
3600 INJECTION, SOLUTION INTRAVENOUS; SUBCUTANEOUS EVERY 6 HOURS PRN
Status: DISCONTINUED | OUTPATIENT
Start: 2024-02-11 | End: 2024-02-13

## 2024-02-11 RX ORDER — HEPARIN SODIUM 10000 [USP'U]/100ML
3-20 INJECTION, SOLUTION INTRAVENOUS
Status: DISCONTINUED | OUTPATIENT
Start: 2024-02-11 | End: 2024-02-13

## 2024-02-11 RX ORDER — LATANOPROST 50 UG/ML
1 SOLUTION/ DROPS OPHTHALMIC
Status: DISCONTINUED | OUTPATIENT
Start: 2024-02-11 | End: 2024-02-14 | Stop reason: HOSPADM

## 2024-02-11 RX ORDER — MECLIZINE HCL 12.5 MG/1
12.5 TABLET ORAL 3 TIMES DAILY PRN
Status: DISCONTINUED | OUTPATIENT
Start: 2024-02-11 | End: 2024-02-14 | Stop reason: HOSPADM

## 2024-02-11 RX ORDER — HEPARIN SODIUM 1000 [USP'U]/ML
1800 INJECTION, SOLUTION INTRAVENOUS; SUBCUTANEOUS EVERY 6 HOURS PRN
Status: DISCONTINUED | OUTPATIENT
Start: 2024-02-11 | End: 2024-02-13

## 2024-02-11 RX ORDER — ONDANSETRON 2 MG/ML
4 INJECTION INTRAMUSCULAR; INTRAVENOUS EVERY 6 HOURS PRN
Status: DISCONTINUED | OUTPATIENT
Start: 2024-02-11 | End: 2024-02-14 | Stop reason: HOSPADM

## 2024-02-11 RX ADMIN — LATANOPROST 1 DROP: 50 SOLUTION OPHTHALMIC at 21:14

## 2024-02-11 RX ADMIN — IOHEXOL 100 ML: 350 INJECTION, SOLUTION INTRAVENOUS at 14:16

## 2024-02-11 RX ADMIN — FAMOTIDINE 40 MG: 20 TABLET ORAL at 21:14

## 2024-02-11 RX ADMIN — HEPARIN SODIUM 12 UNITS/KG/HR: 10000 INJECTION, SOLUTION INTRAVENOUS at 20:57

## 2024-02-11 RX ADMIN — SODIUM CHLORIDE 500 ML: 0.9 INJECTION, SOLUTION INTRAVENOUS at 14:26

## 2024-02-11 RX ADMIN — HEPARIN SODIUM 3600 UNITS: 1000 INJECTION INTRAVENOUS; SUBCUTANEOUS at 20:56

## 2024-02-11 NOTE — CONSULTS
Consultation - General Surgery  : [facility] Surgery Resident role on [employer]  Priya Zarate 89 y.o. female MRN: [id number]  Unit/Bed#: ED-29 Encounter: [id number]        Assessment:  89 y.o. year old female with colo-colo intussusception,     Plan:  Appreciate medical admission  No indication for urgent surgical intervention  Will discuss with attending, patient may benefit from C-scope this admission to evaluate colo-colo intusussception   Will have to discuss with family what family/patient would want in the event that a malignancy is discovered    HPI:  Priya Zarate is a 89 y.o. female with a history of low-flow CRITICAL AORTIC STENOSIS (velocity 4.4, gradient 50, DVI 0.14, INO 0.47, SVI 32.4) with moderate AI, CHF (52%, g1dd), CAD, NIDDM2 (9.4%), HTN/HLD, VTE on ELIQUIS (L acute occlusive pop, R chronic nonocclusive paired gastrocs).    Patient is a 89F with chronic colo-colo intussusception of unknown etiology, seen last month for abdominal symptoms without obstruction, elected for trial of expectant management and was discharged on 1/19.    Represents with abdominal found, colo-colo intussusception without obstruction again seen on CT, admitted to medicine with GI / gen surg consult. Last solid PO intake eggs for breakfast yesterday. Tolerated juice in ER.    Physical Exam  Constitutional:       General: She is not in acute distress.     Appearance: Normal appearance. She is not ill-appearing or toxic-appearing (Nontoxic but appears frail).   HENT:      Head: Normocephalic and atraumatic.      Right Ear: External ear normal.      Left Ear: External ear normal.      Nose: Nose normal.      Mouth/Throat:      Mouth: Mucous membranes are moist.      Pharynx: Oropharynx is clear.   Eyes:      General:         Right eye: No discharge.         Left eye: No discharge.      Extraocular Movements: Extraocular movements intact.      Conjunctiva/sclera: Conjunctivae normal.      Pupils: Pupils are  equal, round, and reactive to light.   Cardiovascular:      Rate and Rhythm: Normal rate.      Pulses: Normal pulses.      Heart sounds: Normal heart sounds.   Pulmonary:      Effort: Pulmonary effort is normal. No respiratory distress.   Abdominal:      General: Abdomen is flat. There is no distension.      Palpations: Abdomen is soft.      Tenderness: There is no abdominal tenderness. There is no guarding or rebound.   Musculoskeletal:         General: No swelling, tenderness or signs of injury.      Cervical back: Normal range of motion and neck supple. No rigidity or tenderness.   Skin:     Coloration: Skin is not jaundiced.      Findings: No lesion.   Neurological:      General: No focal deficit present.      Mental Status: She is alert and oriented to person, place, and time. Mental status is at baseline.   Psychiatric:         Mood and Affect: Mood normal.         Behavior: Behavior normal.            Review of Systems   Constitutional:  Negative for activity change, appetite change, chills and fever.   HENT: Negative.  Negative for congestion, dental problem, ear pain, facial swelling, nosebleeds, sore throat and trouble swallowing.    Eyes: Negative.  Negative for pain and visual disturbance.   Respiratory: Negative.  Negative for apnea and shortness of breath.    Cardiovascular: Negative.  Negative for chest pain and palpitations.   Gastrointestinal:  Positive for abdominal pain. Negative for anal bleeding, blood in stool, nausea and vomiting.   Endocrine: Negative.  Negative for cold intolerance and heat intolerance.   Genitourinary: Negative.  Negative for dysuria and hematuria.   Musculoskeletal: Negative.  Negative for gait problem and joint swelling.   Skin: Negative.  Negative for color change and wound.   Allergic/Immunologic: Negative.    Neurological: Negative.  Negative for dizziness, seizures, light-headedness and numbness.   Hematological: Negative.    Psychiatric/Behavioral: Negative.   Negative for behavioral problems and hallucinations.         Objective         Intake/Output Summary (Last 24 hours) at 2/11/2024 1719  Last data filed at 2/11/2024 1548  Gross per 24 hour   Intake 500 ml   Output --   Net 500 ml       First Vitals:   Blood Pressure: 111/55 (02/11/24 1300)  Pulse: 97 (02/11/24 1300)  Temperature: 97.7 °F (36.5 °C) (02/11/24 1302)  Temp Source: Oral (02/11/24 1302)  Respirations: 18 (02/11/24 1300)  SpO2: 100 % (02/11/24 1300)    Current Vitals:   Blood Pressure: 116/53 (02/11/24 1633)  Pulse: 83 (02/11/24 1633)  Temperature: 97.7 °F (36.5 °C) (02/11/24 1302)  Temp Source: Oral (02/11/24 1302)  Respirations: 20 (02/11/24 1633)  SpO2: 98 % (02/11/24 1633)    Invasive Devices       Peripheral Intravenous Line  Duration             Peripheral IV 02/11/24 Left Arm <1 day                    Imaging: I have personally reviewed pertinent reports.      CT abdomen pelvis with contrast    Result Date: 2/11/2024  Impression: 1.  Continued long segment colocolonic intussusception, persistent since August 2023 though without obstruction. While not visualized, this is highly suggestive of a lead point, particularly malignancy. 2.  Stable circumferential bladder wall thickening and enhancement with associated vaginal enhancement. This is consistent with known candidal infection. 3.  Left retroperitoneal soft tissue of uncertain etiology though suspicious for malignancy given gradual increase. 4.  Mottled appearance of the left iliac bone, similar to recent prior studies though not present in 2021. Malignancy is not excluded The study was marked in EPIC for immediate notification. Workstation performed: [id number]       EKG, Pathology, and Other Studies: I have personally reviewed pertinent reports.    VTE Pharmacologic Prophylaxis: Heparin  VTE Mechanical Prophylaxis: sequential compression device    Historical Information   Past Medical History:   Diagnosis Date    Asthma     Atypical chest pain      CAD (coronary artery disease)     Candidal intertrigo     CHF (congestive heart failure) (HCC)     Depression     Diabetes mellitus (HCC)     Diabetic neuropathy (HCC)     Diverticulitis     Dyslipidemia     Female bladder prolapse     Gastric ulcer     Glaucoma     HTN (hypertension)     Hyperlipidemia     Hypothyroidism 4/18/2016    RAD (reactive airway disease)      Past Surgical History:   Procedure Laterality Date    COLONOSCOPY      ESOPHAGOGASTRODUODENOSCOPY  2011    HYSTERECTOMY      TONSILLECTOMY       Social History   Social History     Substance and Sexual Activity   Alcohol Use No    Comment: Social Alcohol Use -- as per allscripts (pt denies all alcohol use)     Social History     Substance and Sexual Activity   Drug Use No     Social History     Tobacco Use   Smoking Status Never   Smokeless Tobacco Never     Family History   Problem Relation Age of Onset    Breast cancer Mother     Hypothyroidism Mother     Hypertension Father     Breast cancer Sister     Thyroid disease Sister     Hypertension Sister        Meds/Allergies   all current active meds have been reviewed, current meds:   Current Facility-Administered Medications   Medication Dose Route Frequency    acetaminophen (TYLENOL) tablet 650 mg  650 mg Oral Q6H PRN    albuterol (PROVENTIL HFA,VENTOLIN HFA) inhaler 2 puff  2 puff Inhalation Q6H PRN    famotidine (PEPCID) tablet 40 mg  40 mg Oral HS    heparin (porcine) 25,000 units in 0.45% NaCl 250 mL infusion (premix)  3-20 Units/kg/hr (Order-Specific) Intravenous Titrated    heparin (porcine) injection 1,800 Units  1,800 Units Intravenous Q6H PRN    heparin (porcine) injection 3,600 Units  3,600 Units Intravenous Once    heparin (porcine) injection 3,600 Units  3,600 Units Intravenous Q6H PRN    insulin lispro (HumaLOG) 100 units/mL subcutaneous injection 1-5 Units  1-5 Units Subcutaneous TID AC    latanoprost (XALATAN) 0.005 % ophthalmic solution 1 drop  1 drop Both Eyes HS    meclizine  (ANTIVERT) tablet 12.5 mg  12.5 mg Oral TID PRN    ondansetron (ZOFRAN) injection 4 mg  4 mg Intravenous Q6H PRN    [START ON 2/12/2024] pantoprazole (PROTONIX) EC tablet 40 mg  40 mg Oral Early Morning    and PTA meds:   Prior to Admission Medications   Prescriptions Last Dose Informant Patient Reported? Taking?   Accu-Chek Softclix Lancets lancets   No No   Sig: Testing 1 time daily.            Dx: E11.9   Alcohol Swabs (B-D SINGLE USE SWABS REGULAR) PADS   No No   Sig: Use in the morning   Blood Glucose Monitoring Suppl (Accu-Chek Guide) w/Device KIT   No No   Sig: Use 2 (two) times a day   Cholecalciferol (VITAMIN D3) 2000 units capsule  Child Yes No   Sig: Take 2,000 Units by mouth daily    Diclofenac Sodium (VOLTAREN) 1 %   No No   Sig: Apply 2 g topically 4 (four) times a day   Empagliflozin (JARDIANCE) 10 MG TABS tablet   No No   Sig: Take 1 tablet (10 mg total) by mouth daily   Incontinence Supply Disposable (SELECT DISPOSABLE UNDERWEAR LG) MISC  Child Yes No   Lancets (accu-chek multiclix) lancets   No No   Sig: Use as instructed   Misc. Devices (Transport Chair) MISC  Child No No   Sig: Use if needed (with outings outside of the house.)   albuterol (PROVENTIL HFA,VENTOLIN HFA) 90 mcg/act inhaler   No No   Sig: Inhale 2 puffs every 6 (six) hours as needed for wheezing or shortness of breath   amLODIPine (NORVASC) 5 mg tablet   No No   Sig: TAKE 1 TABLET (5 MG TOTAL)  DAILY   apixaban (Eliquis) 5 mg   No No   Sig: Take 1 tablet (5 mg total) by mouth 2 (two) times a day   aspirin 81 MG tablet  Child Yes No   Sig: Take 81 mg by mouth daily.   famotidine (PEPCID) 40 MG tablet   No No   Sig: Take 1 tablet (40 mg total) by mouth daily at bedtime   furosemide (LASIX) 20 mg tablet   No No   Sig: TAKE 1 TABLET BY MOUTH IN THE MORNING AND 2 TABLETS IN THE EVENING   gabapentin (NEURONTIN) 600 MG tablet   No No   Sig: TAKE 1/2 TABLET IN THE MORNING AND AFTERNOON AND TAKE 1 TABLET AT BEDTIME   Patient taking  differently: Take 600 mg by mouth 2 (two) times a day TAKE MORNING AND  TAKE 1 TABLET AT BEDTIME   glucose blood (Accu-Chek Guide) test strip   No No   Sig: Use as instructed   glucose blood test strip   No No   Sig: Use 1 each 2 (two) times a day Test Fasting and 2 hour postprandial daily   latanoprost (XALATAN) 0.005 % ophthalmic solution  Child No No   Sig: Apply 1 drop to eye daily at bedtime Both eyes   levothyroxine 75 mcg tablet   No No   Sig: Take 1 tablet (75 mcg total) by mouth daily   meclizine (ANTIVERT) 12.5 MG tablet   No No   Sig: Take 1 tablet (12.5 mg total) by mouth 3 (three) times a day as needed for dizziness   nitrofurantoin (MACROBID) 100 mg capsule   No No   Sig: Take 1 capsule (100 mg total) by mouth 2 (two) times a day for 7 days   oxygen gas  Child Yes No   Sig: Inhale 2 L/min daily at bedtime   pantoprazole (PROTONIX) 40 mg tablet   No No   Sig: TAKE 1 TABLET EVERY DAY   vitamin B-12 (CYANOCOBALAMIN) 250 MCG tablet  Child Yes No   Sig: Take 250 mcg by mouth daily      Facility-Administered Medications: None     Allergies   Allergen Reactions    Levaquin [Levofloxacin] Myalgia    Bactrim [Sulfamethoxazole-Trimethoprim] Hallucinations    Metformin Other (See Comments)     Side effect, did not like.      Statins      Other reaction(s): Weakness  Category: Adverse Reaction;        Lab Results: I have personally reviewed pertinent lab results.  , CBC:   Lab Results   Component Value Date    WBC 7.34 02/11/2024    HGB 9.4 (L) 02/11/2024    HCT 32.4 (L) 02/11/2024    MCV 79 (L) 02/11/2024     02/11/2024    RBC 4.08 02/11/2024    MCH 23.0 (L) 02/11/2024    MCHC 29.0 (L) 02/11/2024    RDW 21.1 (H) 02/11/2024    MPV 10.9 02/11/2024    NRBC 0 02/11/2024   , CMP:   Lab Results   Component Value Date    SODIUM 134 (L) 02/11/2024    K 5.0 02/11/2024     02/11/2024    CO2 22 02/11/2024    BUN 20 02/11/2024    CREATININE 1.28 02/11/2024    CALCIUM 9.2 02/11/2024    AST 30 02/11/2024    ALT 10  02/11/2024    ALKPHOS 71 02/11/2024    EGFR 34 02/11/2024       Counseling / Coordination of Care  Total floor / unit time spent today 25 minutes.  Greater than 50% of total time was spent with the patient and / or family counseling and / or coordination of care.    Inpatient consult to Acute Care Surgery  Consult performed by: Jamarcus Alva MD  Consult ordered by: DO Jamarcus Devi MD  2/11/2024 5:19 PM

## 2024-02-11 NOTE — ASSESSMENT & PLAN NOTE
Recently saw PCP with reports of dysuria, sent home with Macrobid  Urine cultures grew Candida, discontinue Macrobid    Start diflucan 200mg, plan for 10 days

## 2024-02-11 NOTE — ED NOTES
Inquiring with [facility] provider about Heparin. Awaiting response      Gabrielle Bah RN  02/11/24 0379

## 2024-02-11 NOTE — ASSESSMENT & PLAN NOTE
89-year-old female with past medical history of DVT, hypothyroidism and recent hospitalization with intussusception presented today with abdominal pain  CT imaging showing continued intussusception  On previous admission, GI and surgery evaluated, had goals of care discussion with family and ultimately decided on conservative management  Surgery and GI consulted  IV fluids, clear liquid diet  As needed pain control  Hold Eliquis, switch to heparin drip

## 2024-02-11 NOTE — ED ATTENDING ATTESTATION
2/11/2024  I, Almaz Miller DO, saw and evaluated the patient. I have discussed the patient with the resident/non-physician practitioner and agree with the resident's/non-physician practitioner's findings, Plan of Care, and MDM as documented in the resident's/non-physician practitioner's note, except where noted. All available labs and Radiology studies were reviewed.  I was present for key portions of any procedure(s) performed by the resident/non-physician practitioner and I was immediately available to provide assistance.       At this point I agree with the current assessment done in the Emergency Department.  I have conducted an independent evaluation of this patient a history and physical is as follows:      89 yo F presenting for evaluation of abdominal discomfort.  Pt describes as a band of discomfort to left side of abdomen.   +loose stools and blood in stools  Anticoagulated on Eliquis     MDM: 89 yo F abd pain- CBC to assess for leukocytosis/anemia, CMP to assess for elevated LFTs/CAIT/electrolyte abn, lipase to r/o pancreatitis, CT A/P to assess for intussusception/other pathology        Per independent review of external records:   Admission 1/13- 1/19  Intussusception of colon  PE/DVT- Heparin to Eliquis    1/14/24 CT A/P:   1. Persistence of distal transverse colocolonic intussusception. No definite lead point although the colonic wall does appear somewhat thickened without surrounding inflammatory changes. Given persistence, the possibility of a lesion creating a lead   point should be excluded.  2. Small amount of soft tissue density posterior to the aortic bifurcation and left proximal common iliac vessels (2/94) may represent mild adenopathy  3. Questionable trace amount of free air noted adjacent to the liver margin which cannot be definitively connected to adjacent bowel. Therefore, perforation of a hollow viscus is not excluded although no definite source is seen. Colonic diverticulosis  is   present but no focal diverticulitis pattern is noted  4. Bladder wall thickening particularly on the right which should be correlated for hematuria and/or clinical concern of cystitis.    ED Course         Critical Care Time  Procedures

## 2024-02-11 NOTE — H&P
[facility]  H&P  Name: Priya Zarate 89 y.o. female I MRN: [id number]  Unit/Bed#: ED-29 I Date of Admission: 2/11/2024   Date of Service: 2/11/2024 I Hospital Day: 0      Assessment/Plan   Abnormal CT of the abdomen  Assessment & Plan  CT imaging did show possible left retroperitoneal soft tissue mass  Will need to discuss if patient and family willing to further evaluate given patient age    Acute deep vein thrombosis (DVT) of popliteal vein of left lower extremity (HCC)  Assessment & Plan  Hold eliquis, heparin gtt while evaluation for possible surgical intervention    Acute cystitis without hematuria  Assessment & Plan  Recently saw PCP with reports of dysuria, sent home with Macrobid  Urine cultures grew Candida, discontinue Macrobid    Start diflucan 200mg, plan for 10 days    Type 2 diabetes mellitus with hyperglycemia, without long-term current use of insulin (HCC)  Assessment & Plan  Lab Results   Component Value Date    HGBA1C 9.4 (H) 12/12/2023     Hold Jardiance while inpatient, sliding scale and Accu-Cheks      Acquired hypothyroidism  Assessment & Plan  Continue levothyroxine    * Intussusception of colon (HCC)  Assessment & Plan  89-year-old female with past medical history of DVT, hypothyroidism and recent hospitalization with intussusception presented today with abdominal pain  CT imaging showing continued intussusception  On previous admission, GI and surgery evaluated, had goals of care discussion with family and ultimately decided on conservative management  Surgery and GI consulted  IV fluids, clear liquid diet  As needed pain control  Hold Eliquis, switch to heparin drip           VTE Prophylaxis: Heparin Drip  / sequential compression device   Code Status: DNR/DNI  POLST: There is no POLST form on file for this patient (pre-hospital)  Discussion with family: daughter on phone    Anticipated Length of Stay:  Patient will be admitted on an Inpatient basis  with an anticipated length of stay of 2 midnights.   Justification for Hospital Stay: GI and surg evaluation    Chief Complaint:   Abdominal Pain    History of Present Illness:    Priya Zarate is a 89 y.o. female who presents with abdominal pain.  Patient represented today with abdominal pain, and recent hospitalization with similar complaint.  Diagnosed with colonic intussusception which has been chronic.  Previously had discussion with GI, surgery and ultimately family and patient decided on conservative measurements due to her advanced age and comorbidities.  She has been on clears, slowly advance to full and eventually regular diet.  Today she had sharp pain in the epigastric region, rating to her back.  She denies any constipation, diarrhea, nausea or vomiting.    Review of Systems:    Review of Systems   All other systems reviewed and are negative.      Past Medical and Surgical History:     Past Medical History:   Diagnosis Date    Asthma     Atypical chest pain     CAD (coronary artery disease)     Candidal intertrigo     CHF (congestive heart failure) (HCC)     Depression     Diabetes mellitus (HCC)     Diabetic neuropathy (HCC)     Diverticulitis     Dyslipidemia     Female bladder prolapse     Gastric ulcer     Glaucoma     HTN (hypertension)     Hyperlipidemia     Hypothyroidism 4/18/2016    RAD (reactive airway disease)        Past Surgical History:   Procedure Laterality Date    COLONOSCOPY      ESOPHAGOGASTRODUODENOSCOPY  2011    HYSTERECTOMY      TONSILLECTOMY         Meds/Allergies:    Prior to Admission medications    Medication Sig Start Date End Date Taking? Authorizing Provider   Accu-Chek Softclix Lancets lancets Testing 1 time daily.            Dx: E11.9 9/12/23   Ayah Azul PA-C   albuterol (PROVENTIL HFA,VENTOLIN HFA) 90 mcg/act inhaler Inhale 2 puffs every 6 (six) hours as needed for wheezing or shortness of breath 12/21/23   DEVEN Velásquez   Alcohol Swabs (B-D SINGLE USE  SWABS REGULAR) PADS Use in the morning 9/12/23   Ayah Azul PA-C   amLODIPine (NORVASC) 5 mg tablet TAKE 1 TABLET (5 MG TOTAL)  DAILY 10/5/23   Ayah Azul PA-C   apixaban (Eliquis) 5 mg Take 1 tablet (5 mg total) by mouth 2 (two) times a day 1/18/24   Dionicio Stauffer MD   aspirin 81 MG tablet Take 81 mg by mouth daily.    Historical Provider, MD   Blood Glucose Monitoring Suppl (Accu-Chek Guide) w/Device KIT Use 2 (two) times a day 9/18/23   Ayah Azul PA-C   Cholecalciferol (VITAMIN D3) 2000 units capsule Take 2,000 Units by mouth daily     Historical Provider, MD   Diclofenac Sodium (VOLTAREN) 1 % Apply 2 g topically 4 (four) times a day 11/30/23   Juan Manuel Soto MD   Empagliflozin (JARDIANCE) 10 MG TABS tablet Take 1 tablet (10 mg total) by mouth daily 10/17/23 4/14/24  Ayah Azul PA-C   famotidine (PEPCID) 40 MG tablet Take 1 tablet (40 mg total) by mouth daily at bedtime 12/22/23   DEVEN Velásquez   furosemide (LASIX) 20 mg tablet TAKE 1 TABLET BY MOUTH IN THE MORNING AND 2 TABLETS IN THE EVENING 8/9/23   Ayah Azul PA-C   gabapentin (NEURONTIN) 600 MG tablet TAKE 1/2 TABLET IN THE MORNING AND AFTERNOON AND TAKE 1 TABLET AT BEDTIME  Patient taking differently: Take 600 mg by mouth 2 (two) times a day TAKE MORNING AND  TAKE 1 TABLET AT BEDTIME 11/1/23   Ayah Azul PA-C   glucose blood (Accu-Chek Guide) test strip Use as instructed 9/18/23   Ayah Azul PA-C   glucose blood test strip Use 1 each 2 (two) times a day Test Fasting and 2 hour postprandial daily 9/14/23   Ayah Azul PA-C   Incontinence Supply Disposable (SELECT DISPOSABLE UNDERWEAR LG) MISC  6/14/19   Historical Provider, MD   Lancets (accu-chek multiclix) lancets Use as instructed 9/18/23   Ayah Azul PA-C   latanoprost (XALATAN) 0.005 % ophthalmic solution Apply 1 drop to eye daily at bedtime Both eyes 3/16/19   Arlin Zuñiga MD   levothyroxine 75 mcg tablet Take 1 tablet  (75 mcg total) by mouth daily 8/29/23   Ana Lilia Gaspar PA-C   meclizine (ANTIVERT) 12.5 MG tablet Take 1 tablet (12.5 mg total) by mouth 3 (three) times a day as needed for dizziness 2/7/24   Caden Rivera MD   Misc. Devices (Transport Chair) MISC Use if needed (with outings outside of the house.) 4/26/22   Ayah Azul PA-C   nitrofurantoin (MACROBID) 100 mg capsule Take 1 capsule (100 mg total) by mouth 2 (two) times a day for 7 days 2/7/24 2/14/24  Caden Rivera MD   oxygen gas Inhale 2 L/min daily at bedtime    Historical Provider, MD   pantoprazole (PROTONIX) 40 mg tablet TAKE 1 TABLET EVERY DAY 10/5/23   Nicole Clancy MD   vitamin B-12 (CYANOCOBALAMIN) 250 MCG tablet Take 250 mcg by mouth daily    Historical Provider, MD     I have reviewed home medications with patient personally.    Allergies:   Allergies   Allergen Reactions    Levaquin [Levofloxacin] Myalgia    Bactrim [Sulfamethoxazole-Trimethoprim] Hallucinations    Metformin Other (See Comments)     Side effect, did not like.      Statins      Other reaction(s): Weakness  Category: Adverse Reaction;        Social History:     Marital Status:    Occupation: retired  Patient Pre-hospital Living Situation: home with family  Patient Pre-hospital Level of Mobility: amb  Patient Pre-hospital Diet Restrictions: none  Substance Use History:   Social History     Substance and Sexual Activity   Alcohol Use No    Comment: Social Alcohol Use -- as per allscripts (pt denies all alcohol use)     Social History     Tobacco Use   Smoking Status Never   Smokeless Tobacco Never     Social History     Substance and Sexual Activity   Drug Use No       Family History:    Family History   Problem Relation Age of Onset    Breast cancer Mother     Hypothyroidism Mother     Hypertension Father     Breast cancer Sister     Thyroid disease Sister     Hypertension Sister        Physical Exam:     Vitals:   Blood Pressure: 116/53 (02/11/24  1633)  Pulse: 83 (02/11/24 1633)  Temperature: 97.7 °F (36.5 °C) (02/11/24 1302)  Temp Source: Oral (02/11/24 1302)  Respirations: 20 (02/11/24 1633)  SpO2: 98 % (02/11/24 1633)    Physical Exam  Vitals and nursing note reviewed.   HENT:      Head: Normocephalic.   Eyes:      Conjunctiva/sclera: Conjunctivae normal.   Cardiovascular:      Rate and Rhythm: Normal rate.   Pulmonary:      Effort: Pulmonary effort is normal. No respiratory distress.   Abdominal:      General: Bowel sounds are normal. There is no distension.      Palpations: Abdomen is soft.   Musculoskeletal:         General: No swelling.      Right lower leg: No edema.      Left lower leg: No edema.   Skin:     General: Skin is warm.   Neurological:      Mental Status: She is alert. Mental status is at baseline.         Additional Data:     Lab Results: I have personally reviewed pertinent reports.      Results from last 7 days   Lab Units 02/11/24  1332   WBC Thousand/uL 7.34   HEMOGLOBIN g/dL 9.4*   HEMATOCRIT % 32.4*   PLATELETS Thousands/uL 284   NEUTROS PCT % 72   LYMPHS PCT % 12*   MONOS PCT % 11   EOS PCT % 3     Results from last 7 days   Lab Units 02/11/24  1332   SODIUM mmol/L 134*   POTASSIUM mmol/L 5.0   CHLORIDE mmol/L 104   CO2 mmol/L 22   BUN mg/dL 20   CREATININE mg/dL 1.28   ANION GAP mmol/L 8   CALCIUM mg/dL 9.2   ALBUMIN g/dL 3.4*   TOTAL BILIRUBIN mg/dL 0.38   ALK PHOS U/L 71   ALT U/L 10   AST U/L 30   GLUCOSE RANDOM mg/dL 113     Results from last 7 days   Lab Units 02/11/24  1332   INR  2.01*             Results from last 7 days   Lab Units 02/11/24  1332   LACTIC ACID mmol/L 1.7       Imaging: I have personally reviewed pertinent reports.      CT abdomen pelvis with contrast   ED Interpretation by Annette Maria Palladino, DO (02/11 1427)   Diverticulosis/outpouching present, suspcion for intussuception left sided; telescoping of bowel present      Final Result by Zach Hidalgo MD (02/11 7298)      1.  Continued long segment  colocolonic intussusception, persistent since August 2023 though without obstruction. While not visualized, this is highly suggestive of a lead point, particularly malignancy.      2.  Stable circumferential bladder wall thickening and enhancement with associated vaginal enhancement. This is consistent with known candidal infection.      3.  Left retroperitoneal soft tissue of uncertain etiology though suspicious for malignancy given gradual increase.      4.  Mottled appearance of the left iliac bone, similar to recent prior studies though not present in 2021. Malignancy is not excluded      The study was marked in EPIC for immediate notification.         Workstation performed: [id number]             EKG, Pathology, and Other Studies Reviewed on Admission:   EKG: SR with 1st Deg AV block    Allscripts / Epic Records Reviewed: Yes     ** Please Note: This note has been constructed using a voice recognition system. **

## 2024-02-11 NOTE — ASSESSMENT & PLAN NOTE
Lab Results   Component Value Date    HGBA1C 9.4 (H) 12/12/2023     Hold Jardiance while inpatient, sliding scale and Accu-Cheks

## 2024-02-11 NOTE — ASSESSMENT & PLAN NOTE
CT imaging did show possible left retroperitoneal soft tissue mass  Will need to discuss if patient and family willing to further evaluate given patient age

## 2024-02-12 ENCOUNTER — TELEPHONE (OUTPATIENT)
Dept: HEMATOLOGY ONCOLOGY | Facility: CLINIC | Age: 89
End: 2024-02-12

## 2024-02-12 DIAGNOSIS — Z87.440 HISTORY OF RECURRENT UTIS: Primary | ICD-10-CM

## 2024-02-12 LAB
AFP-TM SERPL-MCNC: 2.25 NG/ML (ref 0–9)
ANION GAP SERPL CALCULATED.3IONS-SCNC: 7 MMOL/L
APTT PPP: 57 SECONDS (ref 23–37)
APTT PPP: 63 SECONDS (ref 23–37)
APTT PPP: 79 SECONDS (ref 23–37)
ATRIAL RATE: 46 BPM
ATRIAL RATE: 70 BPM
ATRIAL RATE: 72 BPM
BUN SERPL-MCNC: 15 MG/DL (ref 5–25)
CALCIUM SERPL-MCNC: 8.2 MG/DL (ref 8.4–10.2)
CANCER AG125 SERPL-ACNC: 27.7 U/ML (ref 0–35)
CEA SERPL-MCNC: 4.8 NG/ML (ref 0–3)
CHLORIDE SERPL-SCNC: 109 MMOL/L (ref 96–108)
CO2 SERPL-SCNC: 21 MMOL/L (ref 21–32)
CREAT SERPL-MCNC: 1.05 MG/DL (ref 0.6–1.3)
ERYTHROCYTE [DISTWIDTH] IN BLOOD BY AUTOMATED COUNT: 21.2 % (ref 11.6–15.1)
GFR SERPL CREATININE-BSD FRML MDRD: 43 ML/MIN/1.73SQ M
GLUCOSE SERPL-MCNC: 111 MG/DL (ref 65–140)
GLUCOSE SERPL-MCNC: 127 MG/DL (ref 65–140)
GLUCOSE SERPL-MCNC: 129 MG/DL (ref 65–140)
GLUCOSE SERPL-MCNC: 139 MG/DL (ref 65–140)
GLUCOSE SERPL-MCNC: 209 MG/DL (ref 65–140)
HCT VFR BLD AUTO: 28.5 % (ref 34.8–46.1)
HGB BLD-MCNC: 8.1 G/DL (ref 11.5–15.4)
MCH RBC QN AUTO: 23.3 PG (ref 26.8–34.3)
MCHC RBC AUTO-ENTMCNC: 28.4 G/DL (ref 31.4–37.4)
MCV RBC AUTO: 82 FL (ref 82–98)
P AXIS: 40 DEGREES
P AXIS: 64 DEGREES
PLATELET # BLD AUTO: 244 THOUSANDS/UL (ref 149–390)
PMV BLD AUTO: 10.7 FL (ref 8.9–12.7)
POTASSIUM SERPL-SCNC: 4.4 MMOL/L (ref 3.5–5.3)
PR INTERVAL: 226 MS
PR INTERVAL: 240 MS
QRS AXIS: 22 DEGREES
QRS AXIS: 4 DEGREES
QRS AXIS: 7 DEGREES
QRSD INTERVAL: 102 MS
QRSD INTERVAL: 106 MS
QRSD INTERVAL: 108 MS
QT INTERVAL: 308 MS
QT INTERVAL: 370 MS
QT INTERVAL: 376 MS
QTC INTERVAL: 405 MS
QTC INTERVAL: 406 MS
QTC INTERVAL: 428 MS
RBC # BLD AUTO: 3.47 MILLION/UL (ref 3.81–5.12)
SODIUM SERPL-SCNC: 137 MMOL/L (ref 135–147)
T WAVE AXIS: 132 DEGREES
T WAVE AXIS: 21 DEGREES
T WAVE AXIS: 24 DEGREES
VENTRICULAR RATE: 116 BPM
VENTRICULAR RATE: 70 BPM
VENTRICULAR RATE: 72 BPM
WBC # BLD AUTO: 5.34 THOUSAND/UL (ref 4.31–10.16)

## 2024-02-12 PROCEDURE — 93010 ELECTROCARDIOGRAM REPORT: CPT | Performed by: INTERNAL MEDICINE

## 2024-02-12 PROCEDURE — 82378 CARCINOEMBRYONIC ANTIGEN: CPT | Performed by: SURGERY

## 2024-02-12 PROCEDURE — 86304 IMMUNOASSAY TUMOR CA 125: CPT | Performed by: SURGERY

## 2024-02-12 PROCEDURE — 82948 REAGENT STRIP/BLOOD GLUCOSE: CPT

## 2024-02-12 PROCEDURE — 99223 1ST HOSP IP/OBS HIGH 75: CPT | Performed by: INTERNAL MEDICINE

## 2024-02-12 PROCEDURE — 99232 SBSQ HOSP IP/OBS MODERATE 35: CPT | Performed by: PHYSICIAN ASSISTANT

## 2024-02-12 PROCEDURE — NC001 PR NO CHARGE: Performed by: SURGERY

## 2024-02-12 PROCEDURE — 86301 IMMUNOASSAY TUMOR CA 19-9: CPT | Performed by: SURGERY

## 2024-02-12 PROCEDURE — 85730 THROMBOPLASTIN TIME PARTIAL: CPT | Performed by: STUDENT IN AN ORGANIZED HEALTH CARE EDUCATION/TRAINING PROGRAM

## 2024-02-12 PROCEDURE — 82105 ALPHA-FETOPROTEIN SERUM: CPT | Performed by: SURGERY

## 2024-02-12 PROCEDURE — 85027 COMPLETE CBC AUTOMATED: CPT | Performed by: STUDENT IN AN ORGANIZED HEALTH CARE EDUCATION/TRAINING PROGRAM

## 2024-02-12 PROCEDURE — 80048 BASIC METABOLIC PNL TOTAL CA: CPT | Performed by: STUDENT IN AN ORGANIZED HEALTH CARE EDUCATION/TRAINING PROGRAM

## 2024-02-12 RX ORDER — OXYCODONE HYDROCHLORIDE 5 MG/1
5 TABLET ORAL EVERY 6 HOURS PRN
Status: DISCONTINUED | OUTPATIENT
Start: 2024-02-12 | End: 2024-02-14 | Stop reason: HOSPADM

## 2024-02-12 RX ORDER — FLUCONAZOLE 100 MG/1
100 TABLET ORAL DAILY
Qty: 10 TABLET | Refills: 0 | Status: SHIPPED | OUTPATIENT
Start: 2024-02-12 | End: 2024-02-16

## 2024-02-12 RX ORDER — PANTOPRAZOLE SODIUM 40 MG/1
40 TABLET, DELAYED RELEASE ORAL
Status: DISCONTINUED | OUTPATIENT
Start: 2024-02-13 | End: 2024-02-14 | Stop reason: HOSPADM

## 2024-02-12 RX ADMIN — PANTOPRAZOLE SODIUM 40 MG: 40 TABLET, DELAYED RELEASE ORAL at 04:42

## 2024-02-12 RX ADMIN — OXYCODONE HYDROCHLORIDE 5 MG: 5 TABLET ORAL at 04:40

## 2024-02-12 RX ADMIN — HEPARIN SODIUM 1800 UNITS: 1000 INJECTION INTRAVENOUS; SUBCUTANEOUS at 11:38

## 2024-02-12 RX ADMIN — LATANOPROST 1 DROP: 50 SOLUTION OPHTHALMIC at 22:08

## 2024-02-12 NOTE — ASSESSMENT & PLAN NOTE
Recently saw PCP with reports of dysuria, sent home with Macrobid  Urine cultures grew Candida, discontinue Macrobid    Started on diflucan 200mg, day 2/10

## 2024-02-12 NOTE — PLAN OF CARE
Problem: PAIN - ADULT  Goal: Verbalizes/displays adequate comfort level or baseline comfort level  Description: Interventions:  - Encourage patient to monitor pain and request assistance  - Assess pain using appropriate pain scale  - Administer analgesics based on type and severity of pain and evaluate response  - Implement non-pharmacological measures as appropriate and evaluate response  - Consider cultural and social influences on pain and pain management  - Notify physician/advanced practitioner if interventions unsuccessful or patient reports new pain  Outcome: Progressing     Problem: INFECTION - ADULT  Goal: Absence or prevention of progression during hospitalization  Description: INTERVENTIONS:  - Assess and monitor for signs and symptoms of infection  - Monitor lab/diagnostic results  - Monitor all insertion sites, i.e. indwelling lines, tubes, and drains  - Monitor endotracheal if appropriate and nasal secretions for changes in amount and color  - [place] appropriate cooling/warming therapies per order  - Administer medications as ordered  - Instruct and encourage patient and family to use good hand hygiene technique  - Identify and instruct in appropriate isolation precautions for identified infection/condition  Outcome: Progressing  Goal: Absence of fever/infection during neutropenic period  Description: INTERVENTIONS:  - Monitor WBC    Outcome: Progressing     Problem: SAFETY ADULT  Goal: Patient will remain free of falls  Description: INTERVENTIONS:  - Educate patient/family on patient safety including physical limitations  - Instruct patient to call for assistance with activity   - Consult OT/PT to assist with strengthening/mobility   - Keep Call bell within reach  - Keep bed low and locked with side rails adjusted as appropriate  - Keep care items and personal belongings within reach  - Initiate and maintain comfort rounds  - Make Fall Risk Sign visible to staff  - Offer Toileting every 2 Hours,  in advance of need  - Initiate/Maintain bed alarm  - Obtain necessary fall risk management equipment  - Apply yellow socks and bracelet for high fall risk patients  - Consider moving patient to room near nurses station  Outcome: Progressing  Goal: Maintain or return to baseline ADL function  Description: INTERVENTIONS:  -  Assess patient's ability to carry out ADLs; assess patient's baseline for ADL function and identify physical deficits which impact ability to perform ADLs (bathing, care of mouth/teeth, toileting, grooming, dressing, etc.)  - Assess/evaluate cause of self-care deficits   - Assess range of motion  - Assess patient's mobility; develop plan if impaired  - Assess patient's need for assistive devices and provide as appropriate  - Encourage maximum independence but intervene and supervise when necessary  - Involve family in performance of ADLs  - Assess for home care needs following discharge   - Consider OT consult to assist with ADL evaluation and planning for discharge  - Provide patient education as appropriate  Outcome: Progressing  Goal: Maintains/Returns to pre admission functional level  Description: INTERVENTIONS:  - Perform AM-PAC 6 Click Basic Mobility/ Daily Activity assessment daily.  - Set and communicate daily mobility goal to care team and patient/family/caregiver.   - Collaborate with rehabilitation services on mobility goals if consulted  - Perform Range of Motion 3 times a day.  - Reposition patient every 2 hours.  - Dangle patient 3 times a day  - Stand patient 3 times a day  - Ambulate patient 3 times a day  - Out of bed to chair 3 times a day   - Out of bed for meals 3 times a day  - Out of bed for toileting  - Record patient progress and toleration of activity level   Outcome: Progressing     Problem: DISCHARGE PLANNING  Goal: Discharge to home or other facility with appropriate resources  Description: INTERVENTIONS:  - Identify barriers to discharge w/patient and caregiver  -  Arrange for needed discharge resources and transportation as appropriate  - Identify discharge learning needs (meds, wound care, etc.)  - Arrange for interpretive services to assist at discharge as needed  - Refer to Case Management Department for coordinating discharge planning if the patient needs post-hospital services based on physician/advanced practitioner order or complex needs related to functional status, cognitive ability, or social support system  Outcome: Progressing     Problem: Knowledge Deficit  Goal: Patient/family/caregiver demonstrates understanding of disease process, treatment plan, medications, and discharge instructions  Description: Complete learning assessment and assess knowledge base.  Interventions:  - Provide teaching at level of understanding  - Provide teaching via preferred learning methods  Outcome: Progressing     Problem: GASTROINTESTINAL - ADULT  Goal: Maintains adequate nutritional intake  Description: INTERVENTIONS:  - Monitor percentage of each meal consumed  - Identify factors contributing to decreased intake, treat as appropriate  - Assist with meals as needed  - Monitor I&O, weight, and lab values if indicated  - Obtain nutrition services referral as needed  Outcome: Progressing     Problem: METABOLIC, FLUID AND ELECTROLYTES - ADULT  Goal: Electrolytes maintained within normal limits  Description: INTERVENTIONS:  - Monitor labs and assess patient for signs and symptoms of electrolyte imbalances  - Administer electrolyte replacement as ordered  - Monitor response to electrolyte replacements, including repeat lab results as appropriate  - Instruct patient on fluid and nutrition as appropriate  Outcome: Progressing  Goal: Fluid balance maintained  Description: INTERVENTIONS:  - Monitor labs   - Monitor I/O and WT  - Instruct patient on fluid and nutrition as appropriate  - Assess for signs & symptoms of volume excess or deficit  Outcome: Progressing  Goal: Glucose maintained  within target range  Description: INTERVENTIONS:  - Monitor Blood Glucose as ordered  - Assess for signs and symptoms of hyperglycemia and hypoglycemia  - Administer ordered medications to maintain glucose within target range  - Assess nutritional intake and initiate nutrition service referral as needed  Outcome: Progressing

## 2024-02-12 NOTE — ASSESSMENT & PLAN NOTE
89-year-old female with past medical history of DVT, hypothyroidism and recent hospitalization with intussusception presented today with abdominal pain  CT imaging showing continued intussusception  On previous admission, GI and surgery evaluated, had goals of care discussion with family and ultimately decided on conservative management  Surgery and GI consulted  Possibility of colonoscopy vs flex sig  Initiated on IV fluids, will DC given severe AS  Clear liquid diet  As needed pain control  Hold Eliquis, switch to heparin drip  CEA, CA 19-9, , and AFP pending

## 2024-02-12 NOTE — UTILIZATION REVIEW
Initial Clinical Review    Admission: Date/Time/Statement:   Admission Orders (From admission, onward)       Ordered        02/11/24 1548  INPATIENT ADMISSION  Once                          Orders Placed This Encounter   Procedures    INPATIENT ADMISSION     Standing Status:   Standing     Number of Occurrences:   1     Order Specific Question:   Level of Care     Answer:   Med Surg [16]     Order Specific Question:   Estimated length of stay     Answer:   More than 2 Midnights     Order Specific Question:   Certification     Answer:   I certify that inpatient services are medically necessary for this patient for a duration of greater than two midnights. See H&P and MD Progress Notes for additional information about the patient's course of treatment.     ED Arrival Information       Expected   -    Arrival   2/11/2024 12:48    Acuity   Urgent              Means of arrival   Wheelchair    Escorted by   Family Member    Service   Hospitalist    Admission type   Emergency              Arrival complaint   abd pain             Chief Complaint   Patient presents with    Abdominal Pain     Pt reports epigastric pain and diarrhea for past couple of days. Pt reports when wiping, noticing blood on the toilet paper. Pt reports diarrhea after eating. Also, pt diagnosed with UTI last week.        Initial Presentation: 89 y.o. female w/ PMH of DVT, DM2, hypothyroidism and recent hospitalization with intussusception presented to the ED from home w/ abdominal pain.  Pt reports sharp pain in the epigastric region, radiating to her back.   In the ED, CT imaging showing continued intussusception.    Admit as Inpatient for evaluation and treatment of intussusception of colon.  Plan: Surgery and GI consulted. IV fluids, clear liquid diet. As needed pain control. Hold Eliquis, switch to heparin drip    Date: 02/12   Day 2:   Gen Surg Consult: [place]-colo intussusception:  Plan: No indication for urgent surgical intervention. patient may  benefit from C-scope this admission to evaluate colo-colo intusussception     GI Consult: Pt w/ colo-colonic intussusception with imaging highly suggestive of a lead point.   Plan: Continue clear liquid diet . Can consider unsedated  flex-sig. Continue heparin gtt. F/up CEA, , AFP, CA 19-9    IM Notes: Pt reports having BM today, denies bld in stool. Dc IVF d/t sevre AS. For possible colonoscopy vs flex sig. Cont OP Diflucan.   PE: Heart murmur heard, abd soft, nt, nd.     ED Triage Vitals   Temperature Pulse Respirations Blood Pressure SpO2   02/11/24 1302 02/11/24 1300 02/11/24 1300 02/11/24 1300 02/11/24 1300   97.7 °F (36.5 °C) 97 18 111/55 100 %      Temp Source Heart Rate Source Patient Position - Orthostatic VS BP Location FiO2 (%)   02/11/24 1302 02/11/24 1300 02/11/24 1300 02/11/24 1300 --   Oral Monitor Sitting Right arm       Pain Score       02/11/24 1950       No Pain          Wt Readings from Last 1 Encounters:   02/11/24 62.5 kg (137 lb 12.6 oz)     Additional Vital Signs:   Date/Time Temp Pulse Resp BP MAP (mmHg) SpO2 O2 Device Patient Position - Orthostatic VS   02/12/24 1633 -- -- -- 94/60 -- -- -- --   02/12/24 16:27:13 -- 75 -- 86/47 Abnormal  60 Abnormal  96 % -- --   02/12/24 14:53:51 98.3 °F (36.8 °C) 74 16 81/47 Abnormal  58 Abnormal  94 % -- --   02/12/24 0850 -- -- -- -- -- -- None (Room air) --   02/12/24 07:41:37 97.6 °F (36.4 °C) 84 15 89/49 Abnormal  62 Abnormal  91 % -- --   02/11/24 21:24:52 98.2 °F (36.8 °C) 74 18 99/50 66 91 % -- --   02/11/24 19:52:55 98.2 °F (36.8 °C) 84 20 116/69 85 96 % -- --   02/11/24 1950 -- -- -- -- -- 91 % None (Room air) --   02/11/24 1730 -- 68 20 118/57 82 94 % None (Room air) Sitting   02/11/24 1633 -- 83 20 116/53 -- 98 % None (Room air) Sitting   02/11/24 1600 -- 70 18 110/56 80 95 % None (Room air) Lying   02/11/24 1530 -- 70 18 114/54 78 94 % -- --   02/11/24 1332 -- -- -- -- -- -- None (Room air) --   02/11/24 1302 97.7 °F (36.5 °C) -- -- --  -- -- -- --       Pertinent Labs/Diagnostic Test Results:   CT abdomen pelvis with contrast   ED Interpretation by Annette Maria Palladino, DO (02/11 1427)   Diverticulosis/outpouching present, suspcion for intussuception left sided; telescoping of bowel present      Final Result by Zach Hidalgo MD (02/11 2620)      1.  Continued long segment colocolonic intussusception, persistent since August 2023 though without obstruction. While not visualized, this is highly suggestive of a lead point, particularly malignancy.      2.  Stable circumferential bladder wall thickening and enhancement with associated vaginal enhancement. This is consistent with known candidal infection.      3.  Left retroperitoneal soft tissue of uncertain etiology though suspicious for malignancy given gradual increase.      4.  Mottled appearance of the left iliac bone, similar to recent prior studies though not present in 2021. Malignancy is not excluded      The study was marked in EPIC for immediate notification.         Workstation performed: [id number]           02/11 EKG result: Sinus rhythm with 1st degree A-V block  Voltage criteria for left ventricular hypertrophy        Results from last 7 days   Lab Units 02/12/24  0422 02/11/24  1332   WBC Thousand/uL 5.34 7.34   HEMOGLOBIN g/dL 8.1* 9.4*   HEMATOCRIT % 28.5* 32.4*   PLATELETS Thousands/uL 244 284   NEUTROS ABS Thousands/µL  --  5.36         Results from last 7 days   Lab Units 02/12/24  0422 02/11/24  1332   SODIUM mmol/L 137 134*   POTASSIUM mmol/L 4.4 5.0   CHLORIDE mmol/L 109* 104   CO2 mmol/L 21 22   ANION GAP mmol/L 7 8   BUN mg/dL 15 20   CREATININE mg/dL 1.05 1.28   EGFR ml/min/1.73sq m 43 34   CALCIUM mg/dL 8.2* 9.2     Results from last 7 days   Lab Units 02/11/24  1332   AST U/L 30   ALT U/L 10   ALK PHOS U/L 71   TOTAL PROTEIN g/dL 7.0   ALBUMIN g/dL 3.4*   TOTAL BILIRUBIN mg/dL 0.38     Results from last 7 days   Lab Units 02/12/24  1106 02/12/24  0632 02/11/24  1832    POC GLUCOSE mg/dl 129 139 109     Results from last 7 days   Lab Units 02/12/24  0422 02/11/24  1332   GLUCOSE RANDOM mg/dL 111 113           Results from last 7 days   Lab Units 02/11/24 2007 02/11/24  1621 02/11/24  1332   HS TNI 0HR ng/L  --   --  10   HS TNI 2HR ng/L  --  14  --    HSTNI D2 ng/L  --  4  --    HS TNI 4HR ng/L 18  --   --    HSTNI D4 ng/L 8  --   --          Results from last 7 days   Lab Units 02/12/24  0957 02/12/24  0245 02/11/24 2007 02/11/24  1332   PROTIME seconds  --   --   --  23.0*   INR   --   --   --  2.01*   PTT seconds 57* 79* 46* 44*             Results from last 7 days   Lab Units 02/11/24  1332   LACTIC ACID mmol/L 1.7           Results from last 7 days   Lab Units 02/11/24  1332   LIPASE u/L 19         Results from last 7 days   Lab Units 02/12/24  1419   CEA ng/mL 4.8*         Results from last 7 days   Lab Units 02/07/24  1534   CLARITY UA  cloudy   COLOR UA  yellow   GLUCOSE UA  1,000   KETONES UA  negative   BLOOD UA  moderate   PROTEIN UA  100   NITRITE UA  negative   BILIRUBIN UA POC  negative   UROBILINOGEN UA  0.2   LEUKOCYTES UA  large             Results from last 7 days   Lab Units 02/07/24  1540   URINE CULTURE  >100,000 cfu/ml Candida albicans*  >100,000 cfu/ml                   ED Treatment:   Medication Administration from 02/11/2024 1248 to 02/11/2024 1943         Date/Time Order Dose Route Action     02/11/2024 1548 EST sodium chloride 0.9 % bolus 500 mL 0 mL Intravenous Stopped     02/11/2024 1426 EST sodium chloride 0.9 % bolus 500 mL 500 mL Intravenous New Bag     02/11/2024 1416 EST iohexol (OMNIPAQUE) 350 MG/ML injection (SINGLE-DOSE) 100 mL 100 mL Intravenous Given          Past Medical History:   Diagnosis Date    Asthma     Atypical chest pain     CAD (coronary artery disease)     Candidal intertrigo     CHF (congestive heart failure) (HCC)     Depression     Diabetes mellitus (HCC)     Diabetic neuropathy (HCC)     Diverticulitis     Dyslipidemia      Female bladder prolapse     Gastric ulcer     Glaucoma     HTN (hypertension)     Hyperlipidemia     Hypothyroidism 4/18/2016    RAD (reactive airway disease)      Present on Admission:   Acute cystitis without hematuria   Type 2 diabetes mellitus with hyperglycemia, without long-term current use of insulin (HCC)   Acquired hypothyroidism   Acute deep vein thrombosis (DVT) of popliteal vein of left lower extremity (HCC)   Intussusception of colon (HCC)   Aortic stenosis      Admitting Diagnosis: Intussusception (HCC) [K56.1]  Abdominal pain [R10.9]  Age/Sex: 89 y.o. female  Admission Orders:  SCD  PT/OT    Scheduled Medications:  famotidine, 40 mg, Oral, HS  insulin lispro, 1-5 Units, Subcutaneous, TID AC  latanoprost, 1 drop, Both Eyes, HS  pantoprazole, 40 mg, Oral, Early Morning      Continuous IV Infusions:  heparin (porcine), 3-20 Units/kg/hr (Order-Specific), Intravenous, Titrated      PRN Meds:  acetaminophen, 650 mg, Oral, Q6H PRN  albuterol, 2 puff, Inhalation, Q6H PRN  heparin (porcine), 1,800 Units, Intravenous, Q6H PRN 02/12 x 1  heparin (porcine), 3,600 Units, Intravenous, Q6H PRN  meclizine, 12.5 mg, Oral, TID PRN  ondansetron, 4 mg, Intravenous, Q6H PRN  oxyCODONE, 5 mg, Oral, Q6H PRN 02/12 x 1  oxyCODONE, 2.5 mg, Oral, Q6H PRN        IP CONSULT TO ACUTE CARE SURGERY  IP CONSULT TO GASTROENTEROLOGY    Network Utilization Review Department  ATTENTION: Please call with any questions or concerns to [phone] and carefully listen to the prompts so that you are directed to the right person. All voicemails are confidential.   For Discharge needs, contact Care Management DC Support Team at [phone] opt. [phone]  Send all requests for admission clinical reviews, approved or denied determinations and any other requests to dedicated fax number below belonging to the campus where the patient is receiving treatment. List of dedicated fax numbers for the Facilities:  FACILITY NAME UR FAX NUMBER   ADMISSION  DENIALS (Administrative/Medical Necessity) [phone]   DISCHARGE SUPPORT TEAM (NETWORK) [phone]   PARENT CHILD HEALTH (Maternity/NICU/Pediatrics) [phone]   [facility] [phone]   [facility] [phone]   [facility] [phone]   [facility] [phone]   [facility] [phone]   [facility] [phone]   [facility] [phone]   [facility] [phone]   [facility] [phone]   [facility] [phone]   [facility] [phone]   [facility] [phone]

## 2024-02-12 NOTE — PLAN OF CARE
Problem: PAIN - ADULT  Goal: Verbalizes/displays adequate comfort level or baseline comfort level  Description: Interventions:  - Encourage patient to monitor pain and request assistance  - Assess pain using appropriate pain scale  - Administer analgesics based on type and severity of pain and evaluate response  - Implement non-pharmacological measures as appropriate and evaluate response  - Consider cultural and social influences on pain and pain management  - Notify physician/advanced practitioner if interventions unsuccessful or patient reports new pain  Outcome: Progressing     Problem: INFECTION - ADULT  Goal: Absence or prevention of progression during hospitalization  Description: INTERVENTIONS:  - Assess and monitor for signs and symptoms of infection  - Monitor lab/diagnostic results  - Monitor all insertion sites, i.e. indwelling lines, tubes, and drains  - Monitor endotracheal if appropriate and nasal secretions for changes in amount and color  - Moss appropriate cooling/warming therapies per order  - Administer medications as ordered  - Instruct and encourage patient and family to use good hand hygiene technique  - Identify and instruct in appropriate isolation precautions for identified infection/condition  Outcome: Progressing  Goal: Absence of fever/infection during neutropenic period  Description: INTERVENTIONS:  - Monitor WBC    Outcome: Progressing     Problem: SAFETY ADULT  Goal: Patient will remain free of falls  Description: INTERVENTIONS:  - Educate patient/family on patient safety including physical limitations  - Instruct patient to call for assistance with activity   - Consult OT/PT to assist with strengthening/mobility   - Keep Call bell within reach  - Keep bed low and locked with side rails adjusted as appropriate  - Keep care items and personal belongings within reach  - Initiate and maintain comfort rounds  - Make Fall Risk Sign visible to staff  - Offer Toileting every 2 Hours,  in advance of need  - Initiate/Maintain bed alarm  - Obtain necessary fall risk management equipment.  - Apply yellow socks and bracelet for high fall risk patients  - Consider moving patient to room near nurses station  Outcome: Progressing  Goal: Maintain or return to baseline ADL function  Description: INTERVENTIONS:  -  Assess patient's ability to carry out ADLs; assess patient's baseline for ADL function and identify physical deficits which impact ability to perform ADLs (bathing, care of mouth/teeth, toileting, grooming, dressing, etc.)  - Assess/evaluate cause of self-care deficits   - Assess range of motion  - Assess patient's mobility; develop plan if impaired  - Assess patient's need for assistive devices and provide as appropriate  - Encourage maximum independence but intervene and supervise when necessary  - Involve family in performance of ADLs  - Assess for home care needs following discharge   - Consider OT consult to assist with ADL evaluation and planning for discharge  - Provide patient education as appropriate  Outcome: Progressing  Goal: Maintains/Returns to pre admission functional level  Description: INTERVENTIONS:  - Perform AM-PAC 6 Click Basic Mobility/ Daily Activity assessment daily.  - Set and communicate daily mobility goal to care team and patient/family/caregiver.   - Collaborate with rehabilitation services on mobility goals if consulted  - Perform Range of Motion 2 times a day.  - Reposition patient every 2 hours.  - Dangle patient 2 times a day  - Stand patient 2 times a day  - Ambulate patient 2 times a day  - Out of bed to chair 2 times a day   - Out of bed for meals 2 times a day  - Out of bed for toileting  - Record patient progress and toleration of activity level   Outcome: Progressing     Problem: DISCHARGE PLANNING  Goal: Discharge to home or other facility with appropriate resources  Description: INTERVENTIONS:  - Identify barriers to discharge w/patient and caregiver  -  Arrange for needed discharge resources and transportation as appropriate  - Identify discharge learning needs (meds, wound care, etc.)  - Arrange for interpretive services to assist at discharge as needed  - Refer to Case Management Department for coordinating discharge planning if the patient needs post-hospital services based on physician/advanced practitioner order or complex needs related to functional status, cognitive ability, or social support system  Outcome: Progressing     Problem: Knowledge Deficit  Goal: Patient/family/caregiver demonstrates understanding of disease process, treatment plan, medications, and discharge instructions  Description: Complete learning assessment and assess knowledge base.  Interventions:  - Provide teaching at level of understanding  - Provide teaching via preferred learning methods  Outcome: Progressing

## 2024-02-12 NOTE — PROGRESS NOTES
[facility]  Progress Note  Name: Priya Zarate I  MRN: [id number]  Unit/Bed#: [place] [address] -02 I Date of Admission: 2/11/2024   Date of Service: 2/12/2024 I Hospital Day: 1    Assessment/Plan   * Intussusception of colon (HCC)  Assessment & Plan  89-year-old female with past medical history of DVT, hypothyroidism and recent hospitalization with intussusception presented today with abdominal pain  CT imaging showing continued intussusception  On previous admission, GI and surgery evaluated, had goals of care discussion with family and ultimately decided on conservative management  Surgery and GI consulted  Possibility of colonoscopy vs flex sig  Initiated on IV fluids, will DC given severe AS  Clear liquid diet  As needed pain control  Hold Eliquis, switch to heparin drip  CEA, CA 19-9, , and AFP pending    Abnormal CT of the abdomen  Assessment & Plan  CT imaging did show possible left retroperitoneal soft tissue mass  Will need to discuss if patient and family willing to further evaluate given patient age   Awaiting formal recs from GI/surgery attendings to discuss with family    Acute deep vein thrombosis (DVT) of popliteal vein of left lower extremity (HCC)  Assessment & Plan  Hold eliquis, heparin gtt while evaluation for possible surgical intervention    Acute cystitis without hematuria  Assessment & Plan  Recently saw PCP with reports of dysuria, sent home with Macrobid  Urine cultures grew Candida, discontinue Macrobid    Started on diflucan 200mg, day 2/10    Type 2 diabetes mellitus with hyperglycemia, without long-term current use of insulin ([facility])  Assessment & Plan  Lab Results   Component Value Date    HGBA1C 9.4 (H) 12/12/2023     Hold Jardiance while inpatient, sliding scale and Accu-Cheks      Acquired hypothyroidism  Assessment & Plan  Continue levothyroxine           VTE Pharmacologic Prophylaxis:   Moderate Risk (Score 3-4) - Pharmacological DVT  Prophylaxis Ordered: heparin drip.    Mobility:   Basic Mobility Inpatient Raw Score: 18  -HLM Goal: 6: Walk 10 steps or more  JH-HLM Achieved: 1: Laying in bed  HLM Goal NOT achieved. Continue with multidisciplinary rounding and encourage appropriate mobility to improve upon HLM goals.    Patient Centered Rounds: I performed bedside rounds with nursing staff today.   Discussions with Specialists or Other Care Team Provider: ALF    Education and Discussions with Family / Patient:  Planning to call daughter after I discuss with GI and surgery.     Total Time Spent on Date of Encounter in care of patient: 25 mins. This time was spent on one or more of the following: performing physical exam; counseling and coordination of care; obtaining or reviewing history; documenting in the medical record; reviewing/ordering tests, medications or procedures; communicating with other healthcare professionals and discussing with patient's family/caregivers.    Current Length of Stay: 1 day(s)  Current Patient Status: Inpatient   Certification Statement: The patient will continue to require additional inpatient hospital stay due to further management  Discharge Plan: Anticipate discharge in 48 hrs to discharge location to be determined pending rehab evaluations.    Code Status: Level 3 - DNAR and DNI    Subjective:   The patient is seen resting comfortably in bed.  She offers no complaints at this time, denies any abdominal pain, nausea or vomiting.  Is feeling well overall.  She reports having a bowel movement today, denies any blood in her stool.    Objective:     Vitals:   Temp (24hrs), Av °F (36.7 °C), Min:97.6 °F (36.4 °C), Max:98.2 °F (36.8 °C)    Temp:  [97.6 °F (36.4 °C)-98.2 °F (36.8 °C)] 97.6 °F (36.4 °C)  HR:  [68-84] 84  Resp:  [15-20] 15  BP: ()/(49-69) 89/49  SpO2:  [91 %-98 %] 91 %  Body mass index is 25.2 kg/m².     Input and Output Summary (last 24 hours):     Intake/Output Summary (Last 24 hours) at  2/12/2024 1337  Last data filed at 2/11/2024 1548  Gross per 24 hour   Intake 500 ml   Output --   Net 500 ml       Physical Exam:   Physical Exam  Vitals and nursing note reviewed.   Constitutional:       General: She is not in acute distress.     Appearance: Normal appearance. She is not ill-appearing, toxic-appearing or diaphoretic.   HENT:      Head: Normocephalic and atraumatic.   Cardiovascular:      Rate and Rhythm: Normal rate and regular rhythm.      Heart sounds: Murmur heard.      No friction rub. No gallop.   Pulmonary:      Effort: Pulmonary effort is normal. No respiratory distress.      Breath sounds: Normal breath sounds. No wheezing, rhonchi or rales.   Abdominal:      General: Abdomen is flat. Bowel sounds are normal. There is no distension.      Palpations: Abdomen is soft. There is no mass.      Tenderness: There is no abdominal tenderness. There is no guarding or rebound.      Hernia: No hernia is present.   Musculoskeletal:      Right lower leg: No edema.      Left lower leg: No edema.   Skin:     General: Skin is warm and dry.      Coloration: Skin is not jaundiced or pale.   Neurological:      General: No focal deficit present.      Mental Status: She is alert. Mental status is at baseline.          Additional Data:     Labs:  Results from last 7 days   Lab Units 02/12/24  0422 02/11/24  1332   WBC Thousand/uL 5.34 7.34   HEMOGLOBIN g/dL 8.1* 9.4*   HEMATOCRIT % 28.5* 32.4*   PLATELETS Thousands/uL 244 284   NEUTROS PCT %  --  72   LYMPHS PCT %  --  12*   MONOS PCT %  --  11   EOS PCT %  --  3     Results from last 7 days   Lab Units 02/12/24  0422 02/11/24  1332   SODIUM mmol/L 137 134*   POTASSIUM mmol/L 4.4 5.0   CHLORIDE mmol/L 109* 104   CO2 mmol/L 21 22   BUN mg/dL 15 20   CREATININE mg/dL 1.05 1.28   ANION GAP mmol/L 7 8   CALCIUM mg/dL 8.2* 9.2   ALBUMIN g/dL  --  3.4*   TOTAL BILIRUBIN mg/dL  --  0.38   ALK PHOS U/L  --  71   ALT U/L  --  10   AST U/L  --  30   GLUCOSE RANDOM mg/dL  111 113     Results from last 7 days   Lab Units 02/11/24  1332   INR  2.01*     Results from last 7 days   Lab Units 02/12/24  1106 02/12/24  0632 02/11/24  1832   POC GLUCOSE mg/dl 129 139 109         Results from last 7 days   Lab Units 02/11/24  1332   LACTIC ACID mmol/L 1.7       Lines/Drains:  Invasive Devices       Peripheral Intravenous Line  Duration             Peripheral IV 02/11/24 Left Arm 1 day                          Imaging: Reviewed radiology reports from this admission including: abdominal/pelvic CT    Recent Cultures (last 7 days):   Results from last 7 days   Lab Units 02/07/24  1540   URINE CULTURE  >100,000 cfu/ml Candida albicans*  >100,000 cfu/ml       Last 24 Hours Medication List:   Current Facility-Administered Medications   Medication Dose Route Frequency Provider Last Rate    acetaminophen  650 mg Oral Q6H PRN Max Reynoso MD      albuterol  2 puff Inhalation Q6H PRN Max Reynoso MD      famotidine  40 mg Oral HS Max Reynoso MD      heparin (porcine)  3-20 Units/kg/hr (Order-Specific) Intravenous Titrated Max Reynoso MD 14 Units/kg/hr (02/12/24 1142)    heparin (porcine)  1,800 Units Intravenous Q6H PRN Max Reynoso MD      heparin (porcine)  3,600 Units Intravenous Q6H PRN Max Reynoso MD      insulin lispro  1-5 Units Subcutaneous TID AC Max Reynoso MD      latanoprost  1 drop Both Eyes HS Max Reynoso MD      meclizine  12.5 mg Oral TID PRN Max Reynoso MD      ondansetron  4 mg Intravenous Q6H PRN Max Reyonso MD      oxyCODONE  5 mg Oral Q6H PRN Mauricio Muse PA-C      oxyCODONE  2.5 mg Oral Q6H PRN Mauricio Muse PA-C      pantoprazole  40 mg Oral Early Morning Max Reynoso MD          Today, Patient Was Seen By: Seng Raymond PA-C    **Please Note: This note may have been constructed using a voice recognition system.**

## 2024-02-12 NOTE — CONSULTS
"Consultation -  [facility]  Priya Zarate 89 y.o. female MRN: [id number]  Unit/Bed#: [place] [address] -02 Encounter: [id number]            Inpatient consult to gastroenterology     Date/Time  2/12/2024 10:10 AM     Performed by  Luana Barrientos DO   Authorized by  Almaz Miller DO             Reason for Consult / Principal Problem:     1) [place] colonic intussusception   2) Abdominal pain  3) Diarrhea  4) Hx of severe AS  5) hx of DVT/PE on eliquis   6) microcytic hypochromic anemia       ASSESSMENT AND PLAN:    89-year-old female with history significant for aortic stenosis, chronic iron deficiency anemia, hypothyroidism, and type 2 diabetes. Admitted for colo-colonic intussusception with imaging highly suggestive of a lead point.     Continue clear liquid diet  Appreciate general surgery recommendations  Can consider unsedated  flex-sig  Continue heparin gtt  F/up CEA, , AFP, CA 19-9    ______________________________________________________________________    HPI:  89-year-old female with history significant for aortic stenosis, chronic iron deficiency anemia, hypothyroidism, and type 2 diabetes. Pt was recently admitted one month ago for colonic intussusception that was managed conservatively. Pt states that since discharge, she has been having intermittent epigastric abdominal pain that she describes as \"barrel around her stomach.\" She has associated episodes of diarrhea 2-3x per day with BRBPR on toilet paper. Blood does not encase the stool or fill the toilet bowel. She does not have any associated abdominal cramping with these episodes. She notes a 10 lb weight loss over the past 2-3 months due to poor appetite. She denies any nausea or vomiting. Last BM was this morning which she describes as loose. She tolerated her clear liquid diet this morning without difficulty.       REVIEW OF SYSTEMS:    CONSTITUTIONAL: Denies any fever, chills, rigors, and weight " loss.  HEENT: No earache or tinnitus. Denies hearing loss or visual disturbances.  CARDIOVASCULAR: No chest pain or palpitations.   RESPIRATORY: Denies any cough, hemoptysis, shortness of breath or dyspnea on exertion.  GASTROINTESTINAL: As noted in the History of Present Illness.   GENITOURINARY: No problems with urination. Denies any hematuria or dysuria.  NEUROLOGIC: No dizziness or vertigo, denies headaches.   MUSCULOSKELETAL: Denies any muscle or joint pain.   SKIN: Denies skin rashes or itching.   ENDOCRINE: Denies excessive thirst. Denies intolerance to heat or cold.  PSYCHOSOCIAL: Denies depression or anxiety. Denies any recent memory loss.       Historical Information   Past Medical History:   Diagnosis Date    Asthma     Atypical chest pain     CAD (coronary artery disease)     Candidal intertrigo     CHF (congestive heart failure) (HCC)     Depression     Diabetes mellitus (HCC)     Diabetic neuropathy (HCC)     Diverticulitis     Dyslipidemia     Female bladder prolapse     Gastric ulcer     Glaucoma     HTN (hypertension)     Hyperlipidemia     Hypothyroidism 4/18/2016    RAD (reactive airway disease)      Past Surgical History:   Procedure Laterality Date    COLONOSCOPY      ESOPHAGOGASTRODUODENOSCOPY  2011    HYSTERECTOMY      TONSILLECTOMY       Social History   Social History     Substance and Sexual Activity   Alcohol Use No    Comment: Social Alcohol Use -- as per allscripts (pt denies all alcohol use)     Social History     Substance and Sexual Activity   Drug Use No     Social History     Tobacco Use   Smoking Status Never   Smokeless Tobacco Never     Family History   Problem Relation Age of Onset    Breast cancer Mother     Hypothyroidism Mother     Hypertension Father     Breast cancer Sister     Thyroid disease Sister     Hypertension Sister        Meds/Allergies     Medications Prior to Admission   Medication    albuterol (PROVENTIL HFA,VENTOLIN HFA) 90 mcg/act inhaler    amLODIPine  (NORVASC) 5 mg tablet    apixaban (Eliquis) 5 mg    aspirin 81 MG tablet    Cholecalciferol (VITAMIN D3) 2000 units capsule    Empagliflozin (JARDIANCE) 10 MG TABS tablet    famotidine (PEPCID) 40 MG tablet    gabapentin (NEURONTIN) 600 MG tablet    latanoprost (XALATAN) 0.005 % ophthalmic solution    levothyroxine 75 mcg tablet    nitrofurantoin (MACROBID) 100 mg capsule    pantoprazole (PROTONIX) 40 mg tablet    vitamin B-12 (CYANOCOBALAMIN) 250 MCG tablet    Accu-Chek Softclix Lancets lancets    Alcohol Swabs (B-D SINGLE USE SWABS REGULAR) PADS    Blood Glucose Monitoring Suppl (Accu-Chek Guide) w/Device KIT    Diclofenac Sodium (VOLTAREN) 1 %    furosemide (LASIX) 20 mg tablet    glucose blood (Accu-Chek Guide) test strip    glucose blood test strip    Incontinence Supply Disposable (SELECT DISPOSABLE UNDERWEAR LG) MISC    Lancets (accu-chek multiclix) lancets    meclizine (ANTIVERT) 12.5 MG tablet    Misc. Devices (Transport Chair) MISC    oxygen gas     Current Facility-Administered Medications   Medication Dose Route Frequency    acetaminophen (TYLENOL) tablet 650 mg  650 mg Oral Q6H PRN    albuterol (PROVENTIL HFA,VENTOLIN HFA) inhaler 2 puff  2 puff Inhalation Q6H PRN    famotidine (PEPCID) tablet 40 mg  40 mg Oral HS    heparin (porcine) 25,000 units in 0.45% NaCl 250 mL infusion (premix)  3-20 Units/kg/hr (Order-Specific) Intravenous Titrated    heparin (porcine) injection 1,800 Units  1,800 Units Intravenous Q6H PRN    heparin (porcine) injection 3,600 Units  3,600 Units Intravenous Q6H PRN    insulin lispro (HumaLOG) 100 units/mL subcutaneous injection 1-5 Units  1-5 Units Subcutaneous TID AC    latanoprost (XALATAN) 0.005 % ophthalmic solution 1 drop  1 drop Both Eyes HS    meclizine (ANTIVERT) tablet 12.5 mg  12.5 mg Oral TID PRN    ondansetron (ZOFRAN) injection 4 mg  4 mg Intravenous Q6H PRN    oxyCODONE (ROXICODONE) IR tablet 5 mg  5 mg Oral Q6H PRN    oxyCODONE (ROXICODONE) split tablet 2.5 mg   "2.5 mg Oral Q6H PRN    pantoprazole (PROTONIX) EC tablet 40 mg  40 mg Oral Early Morning       Allergies   Allergen Reactions    Levaquin [Levofloxacin] Myalgia    Bactrim [Sulfamethoxazole-Trimethoprim] Hallucinations    Metformin Other (See Comments)     Side effect, did not like.      Statins      Other reaction(s): Weakness  Category: Adverse Reaction;            Objective     Blood pressure (!) 89/49, pulse 84, temperature 97.6 °F (36.4 °C), resp. rate 15, height 5' 2\" (1.575 m), weight 62.5 kg (137 lb 12.6 oz), SpO2 91%, not currently breastfeeding. Body mass index is 25.2 kg/m².      Intake/Output Summary (Last 24 hours) at 2/12/2024 0953  Last data filed at 2/11/2024 1548  Gross per 24 hour   Intake 500 ml   Output --   Net 500 ml         PHYSICAL EXAM:      General Appearance:   Alert, cooperative, no distress   HEENT:   Normocephalic, atraumatic, anicteric.     Neck:  Supple, symmetrical, trachea midline   Lungs:   Clear to auscultation bilaterally; no rales, rhonchi or wheezing; respirations unlabored    Heart::   Regular rate and rhythm; + systolic murmur, rub, or gallop.   Abdomen:   Soft, non-tender, non-distended; normal bowel sounds; no masses, no organomegaly    Genitalia:   Deferred    Rectal:   Deferred    Extremities:  No cyanosis, clubbing or edema    Pulses:  2+ and symmetric all extremities    Skin:  No jaundice, rashes, or lesions    Lymph nodes:  No palpable cervical lymphadenopathy        Lab Results:   Admission on 02/11/2024   Component Date Value    Ventricular Rate 02/11/2024 116     Atrial Rate 02/11/2024 46     QRSD Interval 02/11/2024 102     QT Interval 02/11/2024 308     QTC Interval 02/11/2024 428     QRS Axis 02/11/2024 22     T Wave [place] 02/11/2024 132     Ventricular Rate 02/11/2024 78     Atrial Rate 02/11/2024 81     QRSD Interval 02/11/2024 110     QT Interval 02/11/2024 356     QTC Interval 02/11/2024 405     P [place] 02/11/2024 102     QRS Axis 02/11/2024 15     T Wave [place] " 02/11/2024 61     WBC 02/11/2024 7.34     RBC 02/11/2024 4.08     Hemoglobin 02/11/2024 9.4 (L)     Hematocrit 02/11/2024 32.4 (L)     MCV 02/11/2024 79 (L)     MCH 02/11/2024 23.0 (L)     MCHC 02/11/2024 29.0 (L)     RDW 02/11/2024 21.1 (H)     MPV 02/11/2024 10.9     Platelets 02/11/2024 284     nRBC 02/11/2024 0     Neutrophils Relative 02/11/2024 72     Immat GRANS % 02/11/2024 1     Lymphocytes Relative 02/11/2024 12 (L)     Monocytes Relative 02/11/2024 11     Eosinophils Relative 02/11/2024 3     Basophils Relative 02/11/2024 1     Neutrophils Absolute 02/11/2024 5.36     Immature Grans Absolute 02/11/2024 0.04     Lymphocytes Absolute 02/11/2024 0.87     Monocytes Absolute 02/11/2024 0.79     Eosinophils Absolute 02/11/2024 0.23     Basophils Absolute 02/11/2024 0.05     Sodium 02/11/2024 134 (L)     Potassium 02/11/2024 5.0     Chloride 02/11/2024 104     CO2 02/11/2024 22     ANION GAP 02/11/2024 8     BUN 02/11/2024 20     Creatinine 02/11/2024 1.28     Glucose 02/11/2024 113     Calcium 02/11/2024 9.2     Corrected Calcium 02/11/2024 9.7     AST 02/11/2024 30     ALT 02/11/2024 10     Alkaline Phosphatase 02/11/2024 71     Total Protein 02/11/2024 7.0     Albumin 02/11/2024 3.4 (L)     Total Bilirubin 02/11/2024 0.38     eGFR 02/11/2024 34     LACTIC ACID 02/11/2024 1.7     Lipase 02/11/2024 19     Protime 02/11/2024 23.0 (H)     INR 02/11/2024 2.01 (H)     PTT 02/11/2024 44 (H)     hs TnI 0hr 02/11/2024 10     hs TnI 2hr 02/11/2024 14     Delta 2hr hsTnI 02/11/2024 4     Ventricular Rate 02/11/2024 70     Atrial Rate 02/11/2024 70     OR Interval 02/11/2024 226     QRSD Interval 02/11/2024 108     QT Interval 02/11/2024 376     QTC Interval 02/11/2024 406     P [place] 02/11/2024 64     QRS Axis 02/11/2024 4     T Wave [place] 02/11/2024 21     hs TnI 4hr 02/11/2024 18     Delta 4hr hsTnI 02/11/2024 8     Ventricular Rate 02/11/2024 72     Atrial Rate 02/11/2024 72     OR Interval 02/11/2024 240     QRSD  Interval 02/11/2024 106     QT Interval 02/11/2024 370     QTC Interval 02/11/2024 405     P Axis 02/11/2024 40     QRS Axis 02/11/2024 7     T Wave [place] 02/11/2024 24     PTT 02/11/2024 46 (H)     POC Glucose 02/11/2024 109     PTT 02/12/2024 79 (H)     Sodium 02/12/2024 137     Potassium 02/12/2024 4.4     Chloride 02/12/2024 109 (H)     CO2 02/12/2024 21     ANION GAP 02/12/2024 7     BUN 02/12/2024 15     Creatinine 02/12/2024 1.05     Glucose 02/12/2024 111     Calcium 02/12/2024 8.2 (L)     eGFR 02/12/2024 43     WBC 02/12/2024 5.34     RBC 02/12/2024 3.47 (L)     Hemoglobin 02/12/2024 8.1 (L)     Hematocrit 02/12/2024 28.5 (L)     MCV 02/12/2024 82     MCH 02/12/2024 23.3 (L)     MCHC 02/12/2024 28.4 (L)     RDW 02/12/2024 21.2 (H)     Platelets 02/12/2024 244     MPV 02/12/2024 10.7     POC Glucose 02/12/2024 139        Imaging Studies: I have personally reviewed pertinent imaging studies.

## 2024-02-12 NOTE — TELEPHONE ENCOUNTER
Appointment Change  Cancel, Reschedule, Change to Virtual      Who are you speaking with? Patient   If it is not the patient, is the caller listed on the communication consent form? N/A   Which provider is the appointment scheduled with? Sara Flood PA-C   When was the original appointment scheduled?    Please list date and time 2/12/24 1pm   At which location is the appointment scheduled to take place? [place]   Was the appointment rescheduled?     Was the appointment changed from an in person visit to a virtual visit?    If so, please list the details of the change. N/a; she will call back   What is the reason for the appointment change? Pt hospitalized       Was STAR transport scheduled? N/A   Does STAR transport need to be scheduled for the new visit (if applicable) N/A   Does the patient need an infusion appointment rescheduled? N/A   Does the patient have an upcoming infusion appointment scheduled? If so, when? No   Is the patient undergoing chemotherapy? N/A   For appointments cancelled with less than 24 hours:  Was the no-show policy reviewed? Yes

## 2024-02-12 NOTE — ASSESSMENT & PLAN NOTE
CT imaging did show possible left retroperitoneal soft tissue mass  Will need to discuss if patient and family willing to further evaluate given patient age   Awaiting formal recs from GI/surgery attendings to discuss with family

## 2024-02-12 NOTE — MALNUTRITION/BMI
This medical record reflects one or more clinical indicators suggestive of malnutrition and/or morbid obesity.    Malnutrition Findings:   Adult Malnutrition type: Acute illness  Adult Degree of Malnutrition: Other severe protein calorie malnutrition  Malnutrition Characteristics: Fat loss, Muscle loss, Weight loss                  360 Statement: Severe calorie protein malnutrition in context of acute illness r/t abdominal pain, diarrhea, inadequate PO intake as evidance by moderate body fat (triceps, orbital area) and muscle mass depletions (protruding clavicles, temporal wasting), 8% wt loss x 1 month ( 68.5kg 1/8/24-> 62.5kg 2/11/24), Treated with oral supplements    BMI Findings:           Body mass index is 25.2 kg/m².     See Nutrition note dated 2/12/24 for additional details.  Completed nutrition assessment is viewable in the nutrition documentation.

## 2024-02-12 NOTE — RESULT ENCOUNTER NOTE
Tests were not normal, and should follow at  your scheduled Office visit. Please call about this, if [employer] message is not available or not read by patient.  Please call daughter to let her know.

## 2024-02-13 ENCOUNTER — HOSPICE ADMISSION (OUTPATIENT)
Dept: HOME HOSPICE | Facility: HOSPICE | Age: 89
End: 2024-02-13
Payer: MEDICARE

## 2024-02-13 ENCOUNTER — HOME CARE VISIT (OUTPATIENT)
Dept: HOME HEALTH SERVICES | Facility: HOME HEALTHCARE | Age: 89
End: 2024-02-13
Payer: MEDICARE

## 2024-02-13 PROBLEM — E43 SEVERE PROTEIN-CALORIE MALNUTRITION (HCC): Status: ACTIVE | Noted: 2024-02-13

## 2024-02-13 LAB
ANION GAP SERPL CALCULATED.3IONS-SCNC: 8 MMOL/L
APTT PPP: 62 SECONDS (ref 23–37)
APTT PPP: 66 SECONDS (ref 23–37)
BUN SERPL-MCNC: 13 MG/DL (ref 5–25)
CALCIUM SERPL-MCNC: 8.1 MG/DL (ref 8.4–10.2)
CANCER AG19-9 SERPL-ACNC: <2 U/ML (ref 0–35)
CHLORIDE SERPL-SCNC: 106 MMOL/L (ref 96–108)
CO2 SERPL-SCNC: 21 MMOL/L (ref 21–32)
CREAT SERPL-MCNC: 1 MG/DL (ref 0.6–1.3)
ERYTHROCYTE [DISTWIDTH] IN BLOOD BY AUTOMATED COUNT: 21.2 % (ref 11.6–15.1)
GFR SERPL CREATININE-BSD FRML MDRD: 46 ML/MIN/1.73SQ M
GLUCOSE SERPL-MCNC: 117 MG/DL (ref 65–140)
GLUCOSE SERPL-MCNC: 133 MG/DL (ref 65–140)
GLUCOSE SERPL-MCNC: 134 MG/DL (ref 65–140)
GLUCOSE SERPL-MCNC: 242 MG/DL (ref 65–140)
GLUCOSE SERPL-MCNC: 248 MG/DL (ref 65–140)
HCT VFR BLD AUTO: 27.5 % (ref 34.8–46.1)
HGB BLD-MCNC: 8 G/DL (ref 11.5–15.4)
MCH RBC QN AUTO: 23.8 PG (ref 26.8–34.3)
MCHC RBC AUTO-ENTMCNC: 29.1 G/DL (ref 31.4–37.4)
MCV RBC AUTO: 82 FL (ref 82–98)
PLATELET # BLD AUTO: 248 THOUSANDS/UL (ref 149–390)
PMV BLD AUTO: 11.9 FL (ref 8.9–12.7)
POTASSIUM SERPL-SCNC: 4.1 MMOL/L (ref 3.5–5.3)
RBC # BLD AUTO: 3.36 MILLION/UL (ref 3.81–5.12)
SODIUM SERPL-SCNC: 135 MMOL/L (ref 135–147)
WBC # BLD AUTO: 5.97 THOUSAND/UL (ref 4.31–10.16)

## 2024-02-13 PROCEDURE — 99497 ADVNCD CARE PLAN 30 MIN: CPT | Performed by: PHYSICIAN ASSISTANT

## 2024-02-13 PROCEDURE — 99232 SBSQ HOSP IP/OBS MODERATE 35: CPT | Performed by: PHYSICIAN ASSISTANT

## 2024-02-13 PROCEDURE — 80048 BASIC METABOLIC PNL TOTAL CA: CPT | Performed by: PHYSICIAN ASSISTANT

## 2024-02-13 PROCEDURE — NC001 PR NO CHARGE: Performed by: SURGERY

## 2024-02-13 PROCEDURE — 97167 OT EVAL HIGH COMPLEX 60 MIN: CPT

## 2024-02-13 PROCEDURE — 85027 COMPLETE CBC AUTOMATED: CPT | Performed by: PHYSICIAN ASSISTANT

## 2024-02-13 PROCEDURE — 99232 SBSQ HOSP IP/OBS MODERATE 35: CPT | Performed by: INTERNAL MEDICINE

## 2024-02-13 PROCEDURE — 97163 PT EVAL HIGH COMPLEX 45 MIN: CPT | Performed by: PHYSICAL THERAPIST

## 2024-02-13 PROCEDURE — 85730 THROMBOPLASTIN TIME PARTIAL: CPT | Performed by: STUDENT IN AN ORGANIZED HEALTH CARE EDUCATION/TRAINING PROGRAM

## 2024-02-13 PROCEDURE — 82948 REAGENT STRIP/BLOOD GLUCOSE: CPT

## 2024-02-13 RX ORDER — GABAPENTIN 300 MG/1
300 CAPSULE ORAL 2 TIMES DAILY
Status: DISCONTINUED | OUTPATIENT
Start: 2024-02-13 | End: 2024-02-14 | Stop reason: HOSPADM

## 2024-02-13 RX ORDER — LEVOTHYROXINE SODIUM 0.07 MG/1
75 TABLET ORAL
Status: DISCONTINUED | OUTPATIENT
Start: 2024-02-13 | End: 2024-02-14 | Stop reason: HOSPADM

## 2024-02-13 RX ADMIN — PANTOPRAZOLE SODIUM 40 MG: 40 TABLET, DELAYED RELEASE ORAL at 17:22

## 2024-02-13 RX ADMIN — INSULIN LISPRO 2 UNITS: 100 INJECTION, SOLUTION INTRAVENOUS; SUBCUTANEOUS at 13:26

## 2024-02-13 RX ADMIN — LEVOTHYROXINE SODIUM 75 MCG: 75 TABLET ORAL at 11:21

## 2024-02-13 RX ADMIN — GABAPENTIN 300 MG: 300 CAPSULE ORAL at 17:22

## 2024-02-13 RX ADMIN — LATANOPROST 1 DROP: 50 SOLUTION OPHTHALMIC at 21:24

## 2024-02-13 RX ADMIN — ACETAMINOPHEN 325MG 650 MG: 325 TABLET ORAL at 09:03

## 2024-02-13 RX ADMIN — APIXABAN 5 MG: 5 TABLET, FILM COATED ORAL at 17:22

## 2024-02-13 RX ADMIN — GABAPENTIN 300 MG: 300 CAPSULE ORAL at 11:21

## 2024-02-13 RX ADMIN — HEPARIN SODIUM 14 UNITS/KG/HR: 10000 INJECTION, SOLUTION INTRAVENOUS at 03:02

## 2024-02-13 RX ADMIN — FAMOTIDINE 40 MG: 20 TABLET ORAL at 21:24

## 2024-02-13 RX ADMIN — APIXABAN 5 MG: 5 TABLET, FILM COATED ORAL at 11:20

## 2024-02-13 NOTE — ACP (ADVANCE CARE PLANNING)
Advanced Care Planning Progress Note    Serious Illness Conversation    1. What is your understanding now of where you are with your illness?  Prognostic Understanding: appropriate understanding of prognosis     2. How much information about what is likely to be ahead with your illness would you like to have?  Information: patient wants to be fully informed     3. What did you (clinician) communicate to the patient?  Prognostic Communication: Function - I hope that this is not the case, but I’m worried that this may be as strong as you will feel, and things are likely to get more difficult.     4. If your health situation worsens, what are your most important goals?  Goals: be physically comfortable, be at home     5. What are the biggest fears and worries about the future and your health?  Fears/Worries: pain     6. If you become sicker, how much are you willing to go through for the possibility of gaining more time?  Be in the hospital: No Have a feeding tube: No   Be in the ICU: No Live in a nursing home: No   Be on a ventilator: No Be uncomfortable: No   Be on dialysis: No Undergo aggressive test and/or procedures: No     7. How much does your proxy and family know about your priorities and wishes?  Discussion Discussion: extensive discussion with family about goals and wishes        How does this plan sound to you? I will do everything I can to help you through this.  Patient verbalized understanding of the plan        Advanced directives       Discussed on the phone with patient's daughter and son regarding hospice.  Both are in agreement.  Patient is also in agreement with hospice at home.    Seng Raymond PA-C

## 2024-02-13 NOTE — UTILIZATION REVIEW
NOTIFICATION OF INPATIENT ADMISSION   AUTHORIZATION REQUEST   SERVICING FACILITY:   [place] [address]  Tax ID: [id number]  NPI: [id number] ATTENDING PROVIDER:  Attending Name and NPI#: Max Reynoso Md [[id number]]  Address: [address] [place] [address]  Phone: [phone]     ADMISSION INFORMATION:  Place of Service: Inpatient [facility]  Place of Service Code: 21  Inpatient Admission Date/Time: 2/11/24  3:48 PM  Discharge Date/Time: No discharge date for patient encounter.  Admitting Diagnosis Code/Description:  Intussusception (HCC) [K56.1]  Abdominal pain [R10.9]     UTILIZATION REVIEW CONTACT:  [facility] Utilization   Network Utilization Review Department  Phone: [phone]  Fax [phone]  Email: [email or web address]  Contact for approvals/pending authorizations, clinical reviews, and discharge.     PHYSICIAN ADVISORY SERVICES:  Medical Necessity Denial & Jvcm-ki-Ntoq Review  Phone: [phone]  Fax: [phone]  Email: [email or web address]     DISCHARGE SUPPORT TEAM:  For Patients Discharge Needs & Updates  Phone: [phone] opt. 2 Fax: [phone]  Email: [email or web address]       Chonodrocutaneous Helical Advancement Flap Text: The defect edges were debeveled with a #15 scalpel blade.  Given the location of the defect and the proximity to free margins a chondrocutaneous helical advancement flap was deemed most appropriate.  Using a sterile surgical marker, the appropriate advancement flap was drawn incorporating the defect and placing the expected incisions within the relaxed skin tension lines where possible.    The area thus outlined was incised deep to adipose tissue with a #15 scalpel blade.  The skin margins were undermined to an appropriate distance in all directions utilizing iris scissors.

## 2024-02-13 NOTE — PLAN OF CARE
Problem: PAIN - ADULT  Goal: Verbalizes/displays adequate comfort level or baseline comfort level  Description: Interventions:  - Encourage patient to monitor pain and request assistance  - Assess pain using appropriate pain scale  - Administer analgesics based on type and severity of pain and evaluate response  - Implement non-pharmacological measures as appropriate and evaluate response  - Consider cultural and social influences on pain and pain management  - Notify physician/advanced practitioner if interventions unsuccessful or patient reports new pain  Outcome: Progressing     Problem: INFECTION - ADULT  Goal: Absence or prevention of progression during hospitalization  Description: INTERVENTIONS:  - Assess and monitor for signs and symptoms of infection  - Monitor lab/diagnostic results  - Monitor all insertion sites, i.e. indwelling lines, tubes, and drains  - Monitor endotracheal if appropriate and nasal secretions for changes in amount and color  - [place] appropriate cooling/warming therapies per order  - Administer medications as ordered  - Instruct and encourage patient and family to use good hand hygiene technique  - Identify and instruct in appropriate isolation precautions for identified infection/condition  Outcome: Progressing  Goal: Absence of fever/infection during neutropenic period  Description: INTERVENTIONS:  - Monitor WBC    Outcome: Progressing     Problem: SAFETY ADULT  Goal: Patient will remain free of falls  Description: INTERVENTIONS:  - Educate patient/family on patient safety including physical limitations  - Instruct patient to call for assistance with activity   - Consult OT/PT to assist with strengthening/mobility   - Keep Call bell within reach  - Keep bed low and locked with side rails adjusted as appropriate  - Keep care items and personal belongings within reach  - Initiate and maintain comfort rounds  - Make Fall Risk Sign visible to staff  - Offer Toileting every 2 Hours,  in advance of need  - Initiate/Maintain bed alarm  - Obtain necessary fall risk management equipment.  - Apply yellow socks and bracelet for high fall risk patients  - Consider moving patient to room near nurses station  Outcome: Progressing  Goal: Maintain or return to baseline ADL function  Description: INTERVENTIONS:  -  Assess patient's ability to carry out ADLs; assess patient's baseline for ADL function and identify physical deficits which impact ability to perform ADLs (bathing, care of mouth/teeth, toileting, grooming, dressing, etc.)  - Assess/evaluate cause of self-care deficits   - Assess range of motion  - Assess patient's mobility; develop plan if impaired  - Assess patient's need for assistive devices and provide as appropriate  - Encourage maximum independence but intervene and supervise when necessary  - Involve family in performance of ADLs  - Assess for home care needs following discharge   - Consider OT consult to assist with ADL evaluation and planning for discharge  - Provide patient education as appropriate  Outcome: Progressing  Goal: Maintains/Returns to pre admission functional level  Description: INTERVENTIONS:  - Perform AM-PAC 6 Click Basic Mobility/ Daily Activity assessment daily.  - Set and communicate daily mobility goal to care team and patient/family/caregiver.   - Collaborate with rehabilitation services on mobility goals if consulted  - Perform Range of Motion 2 times a day.  - Reposition patient every 2 hours.  - Dangle patient 2 times a day  - Stand patient 2 times a day  - Ambulate patient 2 times a day  - Out of bed to chair 2 times a day   - Out of bed for meals 2 times a day  - Out of bed for toileting  - Record patient progress and toleration of activity level   Outcome: Progressing     Problem: DISCHARGE PLANNING  Goal: Discharge to home or other facility with appropriate resources  Description: INTERVENTIONS:  - Identify barriers to discharge w/patient and caregiver  -  Arrange for needed discharge resources and transportation as appropriate  - Identify discharge learning needs (meds, wound care, etc.)  - Arrange for interpretive services to assist at discharge as needed  - Refer to Case Management Department for coordinating discharge planning if the patient needs post-hospital services based on physician/advanced practitioner order or complex needs related to functional status, cognitive ability, or social support system  Outcome: Progressing     Problem: Knowledge Deficit  Goal: Patient/family/caregiver demonstrates understanding of disease process, treatment plan, medications, and discharge instructions  Description: Complete learning assessment and assess knowledge base.  Interventions:  - Provide teaching at level of understanding  - Provide teaching via preferred learning methods  Outcome: Progressing     Problem: GASTROINTESTINAL - ADULT  Goal: Maintains adequate nutritional intake  Description: INTERVENTIONS:  - Monitor percentage of each meal consumed  - Identify factors contributing to decreased intake, treat as appropriate  - Assist with meals as needed  - Monitor I&O, weight, and lab values if indicated  - Obtain nutrition services referral as needed  Outcome: Progressing     Problem: METABOLIC, FLUID AND ELECTROLYTES - ADULT  Goal: Electrolytes maintained within normal limits  Description: INTERVENTIONS:  - Monitor labs and assess patient for signs and symptoms of electrolyte imbalances  - Administer electrolyte replacement as ordered  - Monitor response to electrolyte replacements, including repeat lab results as appropriate  - Instruct patient on fluid and nutrition as appropriate  Outcome: Progressing  Goal: Fluid balance maintained  Description: INTERVENTIONS:  - Monitor labs   - Monitor I/O and WT  - Instruct patient on fluid and nutrition as appropriate  - Assess for signs & symptoms of volume excess or deficit  Outcome: Progressing  Goal: Glucose maintained  within target range  Description: INTERVENTIONS:  - Monitor Blood Glucose as ordered  - Assess for signs and symptoms of hyperglycemia and hypoglycemia  - Administer ordered medications to maintain glucose within target range  - Assess nutritional intake and initiate nutrition service referral as needed  Outcome: Progressing     Problem: Nutrition/Hydration-ADULT  Goal: Nutrient/Hydration intake appropriate for improving, restoring or maintaining nutritional needs  Description: Monitor and assess patient's nutrition/hydration status for malnutrition. Collaborate with interdisciplinary team and initiate plan and interventions as ordered.  Monitor patient's weight and dietary intake as ordered or per policy. Utilize nutrition screening tool and intervene as necessary. Determine patient's food preferences and provide high-protein, high-caloric foods as appropriate.     INTERVENTIONS:  - Monitor oral intake, urinary output, labs, and treatment plans  - Assess nutrition and hydration status and recommend course of action  - Evaluate amount of meals eaten  - Assist patient with eating if necessary   - Allow adequate time for meals  - Recommend/ encourage appropriate diets, oral nutritional supplements, and vitamin/mineral supplements  - Order, calculate, and assess calorie counts as needed  - Recommend, monitor, and adjust tube feedings and TPN/PPN based on assessed needs  - Assess need for intravenous fluids  - Provide specific nutrition/hydration education as appropriate  - Include patient/family/caregiver in decisions related to nutrition  Outcome: Progressing

## 2024-02-13 NOTE — PROGRESS NOTES
Progress Note -  [facility]  Priya Zarate 89 y.o. female MRN: [id number]  Unit/Bed#: [place] [address] -02 Encounter: [id number]      ASSESSMENT AND PLAN:      89-year-old female with past medical history of aortic stenosis, chronic iron deficiency anemia, diabetes mellitus type 2, hypothyroidism, colocolonic intussusception who was admitted for abdominal pain, rectal bleeding.  GI is consulted for further management.    Colocolonic intussusception  Abdominal pain  Rectal bleeding  CT findings with unchanged appearance of intussusception.  She continues to have bowel movements and appears unobstructed.  Rectal bleeding is likely hemorrhoidal noted on rectal exam.  Given advanced age and comorbidities she is high risk for anesthesia and any procedures.  Malignancy is high on differential given her age.  CEA minorly elevated at 6.  Ca1 25, Ca19 9, AFP normal.  Hemoglobin 8.1.  Given the patient is not interested in surgical management in case this is malignancy will defer any further workup of colocolonic intussusception at this time.  Recommend palliative care for symptom management.  Appreciate surgical input.  Supplement iron with IV Venofer.  Recommend MiraLAX twice daily.  Monitor bowel movements.  Monitor hemoglobin, transfuse for less than 7.    Gastroenterology to sign off at this time.  Please contact with any questions.  Rest of care per primary team.    ______________________________________________________________________    Subjective: Seen and examined.  Abdominal pain somewhat improved.  Continues to have bowel movements.  Mostly brown with some minor blood on toilet paper.  No nausea, vomiting.  Tolerating diet.  Rest of ROS was negative.    REVIEW OF SYSTEMS:    Review of Systems   Constitutional:  Negative for chills and fever.   HENT:  Negative for congestion and sinus pressure.    Respiratory:  Negative for cough and shortness of breath.    Cardiovascular:  Negative for chest  pain, palpitations and leg swelling.   Gastrointestinal:  Positive for abdominal pain. Negative for diarrhea, nausea and vomiting.   Genitourinary:  Negative for dysuria and hematuria.   Musculoskeletal:  Negative for arthralgias and back pain.   Skin:  Negative for color change and rash.   Neurological:  Negative for dizziness and headaches.   Psychiatric/Behavioral:  Negative for agitation and confusion.    All other systems reviewed and are negative.         Historical Information   Past Medical History:   Diagnosis Date    Asthma     Atypical chest pain     CAD (coronary artery disease)     Candidal intertrigo     CHF (congestive heart failure) (HCC)     Depression     Diabetes mellitus (HCC)     Diabetic neuropathy (HCC)     Diverticulitis     Dyslipidemia     Female bladder prolapse     Gastric ulcer     Glaucoma     HTN (hypertension)     Hyperlipidemia     Hypothyroidism 4/18/2016    RAD (reactive airway disease)      Past Surgical History:   Procedure Laterality Date    COLONOSCOPY      ESOPHAGOGASTRODUODENOSCOPY  2011    HYSTERECTOMY      TONSILLECTOMY       Social History   Social History     Substance and Sexual Activity   Alcohol Use No    Comment: Social Alcohol Use -- as per allscripts (pt denies all alcohol use)     Social History     Substance and Sexual Activity   Drug Use No     Social History     Tobacco Use   Smoking Status Never   Smokeless Tobacco Never     Family History   Problem Relation Age of Onset    Breast cancer Mother     Hypothyroidism Mother     Hypertension Father     Breast cancer Sister     Thyroid disease Sister     Hypertension Sister        Meds/Allergies     Medications Prior to Admission   Medication    albuterol (PROVENTIL HFA,VENTOLIN HFA) 90 mcg/act inhaler    amLODIPine (NORVASC) 5 mg tablet    apixaban (Eliquis) 5 mg    aspirin 81 MG tablet    Cholecalciferol (VITAMIN D3) 2000 units capsule    Empagliflozin (JARDIANCE) 10 MG TABS tablet    famotidine (PEPCID) 40 MG  tablet    gabapentin (NEURONTIN) 600 MG tablet    latanoprost (XALATAN) 0.005 % ophthalmic solution    levothyroxine 75 mcg tablet    nitrofurantoin (MACROBID) 100 mg capsule    pantoprazole (PROTONIX) 40 mg tablet    vitamin B-12 (CYANOCOBALAMIN) 250 MCG tablet    Accu-Chek Softclix Lancets lancets    Alcohol Swabs (B-D SINGLE USE SWABS REGULAR) PADS    Blood Glucose Monitoring Suppl (Accu-Chek Guide) w/Device KIT    Diclofenac Sodium (VOLTAREN) 1 %    furosemide (LASIX) 20 mg tablet    glucose blood (Accu-Chek Guide) test strip    glucose blood test strip    Incontinence Supply Disposable (SELECT DISPOSABLE UNDERWEAR LG) MISC    Lancets (accu-chek multiclix) lancets    meclizine (ANTIVERT) 12.5 MG tablet    Misc. Devices (Transport Chair) MISC    oxygen gas     Current Facility-Administered Medications   Medication Dose Route Frequency    acetaminophen (TYLENOL) tablet 650 mg  650 mg Oral Q6H PRN    albuterol (PROVENTIL HFA,VENTOLIN HFA) inhaler 2 puff  2 puff Inhalation Q6H PRN    famotidine (PEPCID) tablet 40 mg  40 mg Oral HS    heparin (porcine) 25,000 units in 0.45% NaCl 250 mL infusion (premix)  3-20 Units/kg/hr (Order-Specific) Intravenous Titrated    heparin (porcine) injection 1,800 Units  1,800 Units Intravenous Q6H PRN    heparin (porcine) injection 3,600 Units  3,600 Units Intravenous Q6H PRN    insulin lispro (HumaLOG) 100 units/mL subcutaneous injection 1-5 Units  1-5 Units Subcutaneous TID AC    latanoprost (XALATAN) 0.005 % ophthalmic solution 1 drop  1 drop Both Eyes HS    meclizine (ANTIVERT) tablet 12.5 mg  12.5 mg Oral TID PRN    ondansetron (ZOFRAN) injection 4 mg  4 mg Intravenous Q6H PRN    oxyCODONE (ROXICODONE) IR tablet 5 mg  5 mg Oral Q6H PRN    oxyCODONE (ROXICODONE) split tablet 2.5 mg  2.5 mg Oral Q6H PRN    pantoprazole (PROTONIX) EC tablet 40 mg  40 mg Oral BID AC       Allergies   Allergen Reactions    Levaquin [Levofloxacin] Myalgia    Bactrim [Sulfamethoxazole-Trimethoprim]  "Hallucinations    Metformin Other (See Comments)     Side effect, did not like.      Statins      Other reaction(s): Weakness  Category: Adverse Reaction;            Objective     Blood pressure 100/69, pulse 95, temperature 98 °F (36.7 °C), resp. rate 18, height 5' 2\" (1.575 m), weight 62.5 kg (137 lb 12.6 oz), SpO2 96%, not currently breastfeeding. Body mass index is 25.2 kg/m².    No intake or output data in the 24 hours ending 02/13/24 0746      PHYSICAL EXAM:      Physical Exam  Vitals and nursing note reviewed.   Constitutional:       General: She is not in acute distress.     Appearance: Normal appearance. She is not ill-appearing.   HENT:      Head: Normocephalic and atraumatic.      Mouth/Throat:      Mouth: Mucous membranes are moist.   Eyes:      Extraocular Movements: Extraocular movements intact.      Conjunctiva/sclera: Conjunctivae normal.   Cardiovascular:      Pulses: Normal pulses.   Pulmonary:      Effort: Pulmonary effort is normal.   Abdominal:      General: Abdomen is flat. Bowel sounds are normal. There is no distension.      Palpations: Abdomen is soft.      Tenderness: There is abdominal tenderness in the left lower quadrant. There is no guarding.   Skin:     General: Skin is warm and dry.   Neurological:      General: No focal deficit present.      Mental Status: She is alert and oriented to person, place, and time.   Psychiatric:         Mood and Affect: Mood normal.         Behavior: Behavior normal.          Lab Results:   Admission on 02/11/2024   Component Date Value    Ventricular Rate 02/11/2024 116     Atrial Rate 02/11/2024 46     QRSD Interval 02/11/2024 102     QT Interval 02/11/2024 308     QTC Interval 02/11/2024 428     QRS Axis 02/11/2024 22     T Wave [place] 02/11/2024 132     Ventricular Rate 02/11/2024 78     Atrial Rate 02/11/2024 81     QRSD Interval 02/11/2024 110     QT Interval 02/11/2024 356     QTC Interval 02/11/2024 405     P [place] 02/11/2024 102     QRS Axis " 02/11/2024 15     T Wave [place] 02/11/2024 61     WBC 02/11/2024 7.34     RBC 02/11/2024 4.08     Hemoglobin 02/11/2024 9.4 (L)     Hematocrit 02/11/2024 32.4 (L)     MCV 02/11/2024 79 (L)     MCH 02/11/2024 23.0 (L)     MCHC 02/11/2024 29.0 (L)     RDW 02/11/2024 21.1 (H)     MPV 02/11/2024 10.9     Platelets 02/11/2024 284     nRBC 02/11/2024 0     Neutrophils Relative 02/11/2024 72     Immat GRANS % 02/11/2024 1     Lymphocytes Relative 02/11/2024 12 (L)     Monocytes Relative 02/11/2024 11     Eosinophils Relative 02/11/2024 3     Basophils Relative 02/11/2024 1     Neutrophils Absolute 02/11/2024 5.36     Immature Grans Absolute 02/11/2024 0.04     Lymphocytes Absolute 02/11/2024 0.87     Monocytes Absolute 02/11/2024 0.79     Eosinophils Absolute 02/11/2024 0.23     Basophils Absolute 02/11/2024 0.05     Sodium 02/11/2024 134 (L)     Potassium 02/11/2024 5.0     Chloride 02/11/2024 104     CO2 02/11/2024 22     ANION GAP 02/11/2024 8     BUN 02/11/2024 20     Creatinine 02/11/2024 1.28     Glucose 02/11/2024 113     Calcium 02/11/2024 9.2     Corrected Calcium 02/11/2024 9.7     AST 02/11/2024 30     ALT 02/11/2024 10     Alkaline Phosphatase 02/11/2024 71     Total Protein 02/11/2024 7.0     Albumin 02/11/2024 3.4 (L)     Total Bilirubin 02/11/2024 0.38     eGFR 02/11/2024 34     LACTIC ACID 02/11/2024 1.7     Lipase 02/11/2024 19     Protime 02/11/2024 23.0 (H)     INR 02/11/2024 2.01 (H)     PTT 02/11/2024 44 (H)     hs TnI 0hr 02/11/2024 10     hs TnI 2hr 02/11/2024 14     Delta 2hr hsTnI 02/11/2024 4     Ventricular Rate 02/11/2024 70     Atrial Rate 02/11/2024 70     NC Interval 02/11/2024 226     QRSD Interval 02/11/2024 108     QT Interval 02/11/2024 376     QTC Interval 02/11/2024 406     P [place] 02/11/2024 64     QRS Axis 02/11/2024 4     T Wave [place] 02/11/2024 21     hs TnI 4hr 02/11/2024 18     Delta 4hr hsTnI 02/11/2024 8     Ventricular Rate 02/11/2024 72     Atrial Rate 02/11/2024 72     NC  Interval 02/11/2024 240     QRSD Interval 02/11/2024 106     QT Interval 02/11/2024 370     QTC Interval 02/11/2024 405     P Axis 02/11/2024 40     QRS Axis 02/11/2024 7     T Wave [place] 02/11/2024 24     PTT 02/11/2024 46 (H)     POC Glucose 02/11/2024 109     PTT 02/12/2024 79 (H)     Sodium 02/12/2024 137     Potassium 02/12/2024 4.4     Chloride 02/12/2024 109 (H)     CO2 02/12/2024 21     ANION GAP 02/12/2024 7     BUN 02/12/2024 15     Creatinine 02/12/2024 1.05     Glucose 02/12/2024 111     Calcium 02/12/2024 8.2 (L)     eGFR 02/12/2024 43     WBC 02/12/2024 5.34     RBC 02/12/2024 3.47 (L)     Hemoglobin 02/12/2024 8.1 (L)     Hematocrit 02/12/2024 28.5 (L)     MCV 02/12/2024 82     MCH 02/12/2024 23.3 (L)     MCHC 02/12/2024 28.4 (L)     RDW 02/12/2024 21.2 (H)     Platelets 02/12/2024 244     MPV 02/12/2024 10.7     CEA 02/12/2024 4.8 (H)     AFP TUMOR MARKER 02/12/2024 2.25      02/12/2024 27.7     POC Glucose 02/12/2024 139     PTT 02/12/2024 57 (H)     POC Glucose 02/12/2024 129     PTT 02/12/2024 63 (H)     POC Glucose 02/12/2024 127     POC Glucose 02/12/2024 209 (H)     PTT 02/13/2024 66 (H)     WBC 02/13/2024 5.97     RBC 02/13/2024 3.36 (L)     Hemoglobin 02/13/2024 8.0 (L)     Hematocrit 02/13/2024 27.5 (L)     MCV 02/13/2024 82     MCH 02/13/2024 23.8 (L)     MCHC 02/13/2024 29.1 (L)     RDW 02/13/2024 21.2 (H)     Platelets 02/13/2024 248     MPV 02/13/2024 11.9     Sodium 02/13/2024 135     Potassium 02/13/2024 4.1     Chloride 02/13/2024 106     CO2 02/13/2024 21     ANION GAP 02/13/2024 8     BUN 02/13/2024 13     Creatinine 02/13/2024 1.00     Glucose 02/13/2024 117     Calcium 02/13/2024 8.1 (L)     eGFR 02/13/2024 46     PTT 02/13/2024 62 (H)     POC Glucose 02/13/2024 134        Imaging Studies: I have personally reviewed pertinent imaging studies.    Gurjit Goldstein D.O.  Gastroenterology Fellow  PGY-5  Available via [employer]  2/13/2024 7:46 AM

## 2024-02-13 NOTE — Clinical Note
Hi Dr. Oshea and Dr. Bermudez, Seeking RLOC approval for pt at [facility] 2[facility] . Priya Zarate 89 yo  1922. Presented 24 to [facility] ED with CO abd pain. Hospitalized about one month ago for colonic intussusception, declined aggressive follow up at that time and was managed conservatively. PMHX: Severe AS LA severely dilated chronic iron deficiency anemia T2DM. Reported epigastric pain diarrhea 2-3x per day with BRBPR on toilet paper 10 lb weight loss over the past 2-3 months dt poor appetite. 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 On Eliquis for DVT LE. Pt and dtr want pt to come home with hospice services. Labs: Hb 8.0 Ca 8.1 CEA 4.8 Alb 3.4. Appears appropriate for RLOC.

## 2024-02-13 NOTE — ASSESSMENT & PLAN NOTE
Recently saw PCP with reports of dysuria, sent home with Macrobid  Urine cultures grew Candida, discontinue Macrobid    Started on diflucan 200mg, day 3/10

## 2024-02-13 NOTE — OCCUPATIONAL THERAPY NOTE
Occupational Therapy Note:    Patient Name: Priya Zarate  Today's Date: 2/13/2024    Chart reviewed. Pt and family have decided to transition pt to home hospice. Pt w/ TDC tomorrow AM at 11:30 AM. No further acute skilled OT needs identified at this time. Will D/C OT.    Ida Olvera, OTR/L

## 2024-02-13 NOTE — PLAN OF CARE
Problem: OCCUPATIONAL THERAPY ADULT  Goal: Performs self-care activities at highest level of function for planned discharge setting.  See evaluation for individualized goals.  Description: Treatment Interventions: ADL retraining, Functional transfer training, UE strengthening/ROM, Endurance training, Patient/family training, Equipment evaluation/education, Compensatory technique education, Continued evaluation, Activityengagement, Energy conservation          See flowsheet documentation for full assessment, interventions and recommendations.   Note: Limitation: Decreased ADL status, Decreased UE strength, Decreased Safe judgement during ADL, Decreased cognition, Decreased endurance, Decreased self-care trans, Decreased high-level ADLs  Prognosis: Fair  Assessment: Pt is a 89 y.o. female seen for OT evaluation s/p adm to [facility] on 2/11/2024 w/ Intussusception of colon (HCC) . Comorbidities affecting pt’s functional performance include a significant PMH of Asthma, CAD, CHF, depression, DM, Diabetic neuropathy, HTN, HLD, Glaucoma. Pt with active OT orders and activity orders for Up and OOB as tolerated. Pt lives with dtr in a two level house with 3 STAR and 1st floor setup. Pt reports dtr is retired and able to assist as needed. At baseline, pt was primarily I w/ ADLs (assist w/ bathing only) and Mod I for functional transfers/mobility w/ use of Rollator. Pt required assist w/ IADLs. (-) . (+) falls PTA. Upon evaluation, pt currently requires Supervision for UB ADLs, Mod-Min A for LB ADLs, Mod A for toileting, and Min A for functional mobility/transfers 2* the following deficits impacting occupational performance: decreased strength , decreased balance, decreased activity tolerance, limited functional reach, impaired sensation, decreased safety awareness, and increased pain. These impairments, as well at pt’s personal factors of: STAR home environment, difficulty performing ADLs, difficulty  performing transfers/mobility, fall risk , functional decline , and advanced age limit pt’s ability to safely engage in all baseline areas of occupation. Pt to continue to benefit from continued acute OT services during hospital stay to address defined deficits and to maximize level of functional independence in the following Occupational Performance areas: grooming, bathing/shower, toilet hygiene, dressing, medication management, health maintenance, functional mobility, community mobility, clothing management, and social participation. The patient's raw score on the -PAC Daily Activity Inpatient Short Form is 17. A raw score of less than 19 suggests the patient may benefit from discharge to post-acute rehabilitation services. Please refer to the recommendation of the Occupational Therapist for safe discharge planning. OT will continue to follow pt 3-5x/wk to address the following goals to  w/in 10-14 days:     Rehab Resource Intensity Level, OT: II (Moderate Resource Intensity)

## 2024-02-13 NOTE — ASSESSMENT & PLAN NOTE
[facility] conversation had with daughter yesterday, 2/12.  Discussed that there is a very good possibility that the reason that she has recurrent intussusception is due to malignancy.  Patient's daughter understands that given the patient's age and comorbidities, she would not fare well with extensive interventions.  I did discuss the possibility of hospice and her main goal for the patient is to keep her at home and comfortable.    Additional goals of care conversation had with the patient's daughter, the patient, and the patient's son over the phone  All parties involved are in agreement to the initiation of hospice care at home.  Hospice to be consulted

## 2024-02-13 NOTE — PROGRESS NOTES
Patient:    MRN:  [id number]    Aidin Request ID:  [id number]    Level of care reserved:  Hospice    Partner Reserved:  [facility], [place] [address] [phone]    Clinical needs requested:    Geography searched:  [phone]    Start of Service:    Request sent:  11:19am EST on 2/13/2024 by Krista Mcgregor    Partner reserved:  11:56am EST on 2/13/2024 by Krista Mcgregor    Choice list shared:  11:56am EST on 2/13/2024 by Krista Mcgregor

## 2024-02-13 NOTE — PLAN OF CARE
Problem: PHYSICAL THERAPY ADULT  Goal: Performs mobility at highest level of function for planned discharge setting.  See evaluation for individualized goals.  Description: Treatment/Interventions: Functional transfer training, LE strengthening/ROM, Elevations, Therapeutic exercise, Endurance training, Patient/family training, Bed mobility, Gait training, Spoke to nursing, OT  Equipment Recommended: Walker (pt owns rollator)       See flowsheet documentation for full assessment, interventions and recommendations.  Note: Prognosis: Good  Problem List: Decreased strength, Decreased range of motion, Decreased endurance, Impaired balance, Decreased mobility, Decreased safety awareness, Pain  Assessment: Pt. 89 y.o.female presents with abdominal discomfort. Past medical hx includes asthma, CAD, CHF, DM, neuropathy, HTN, glaucoma, . Pt admitted for Intussusception of colon (HCC) w/ Intussusception (HCC) (K56.1)  Abdominal pain (R10.9). Pt referred to PT for functional mobility evaluation & D/C planning w/ orders of up & OOB as tolerated. Pt greeted OOB in chair. PTA, pt reports being  independent with rollator and ADLs, needing assistance for IADLs . Pain 8/10 in L abdomen. During evaluation, deficits included dec mobility, balance, ambulation. Required minAx1 for sit<>stand, and ambulation. Pt was able to ambulate 60' with RW and minAx1 in unit. Forward flexed posture with poor use of RW. Cues provided to improve RW management; reinforcement required. Pt demonstrated dec endurance and tolerance to activity. Denies reports of dizziness or SOB t/o session. Pt was educated on fall precautions and reinforced w/ good understanding. Pt would benefit from continued PT to address deficits as defined above and maximize level of independence with functional mobility and safety. Based on pt presentation and impaired function, pt would benefit from level II, (moderate resource intensity) at D/C pending progress. The patient's  AM-PAC Basic Mobility Inpatient Short Form Raw Score is 17. A Raw score of greater than 16 suggests the patient may benefit from discharge to home. Please also refer to the recommendation of the Physical Therapist for safe discharge planning. CM to follow. Nsg staff to continue to mobilized pt (OOB in chair for all meals & ambulate in room/unit) as tolerated to prevent further decline in function. Nsg notified. Co-eval performed to complete this PT evaluation for the pts best interest given pts medical complexity and functional level.        Rehab Resource Intensity Level, PT: II (Moderate Resource Intensity) (pending progress)    See flowsheet documentation for full assessment.

## 2024-02-13 NOTE — PHYSICAL THERAPY NOTE
PT EVALUATION    Pt. Name: Priya Zarate  Pt. Age: 89 y.o.  MRN: [id number]  LENGTH OF STAY: 2    Patient Active Problem List   Diagnosis    Peripheral vascular disease (HCC)    Glaucoma, open angle, severe stage    Arteriosclerosis of coronary artery    Asthma    Aortic stenosis    Hyperlipidemia    Advanced age    Allergic rhinitis    Ambulatory dysfunction    Anxiety disorder    Other fatigue    Irritable bowel    Trigeminal neuralgia    Hallucination    Dyspnea on minimal exertion    Acquired hypothyroidism    Chronic diastolic congestive heart failure (HCC)    Insomnia    GERD (gastroesophageal reflux disease)    Bilateral cold feet    Female cystocele    Diverticulosis    Dizziness    Hearing loss    Hiatal hernia    Low back pain    Mild cognitive impairment    Osteoarthritis    Overactive bladder    Shakiness    Headache    Pain of both hip joints    Candidal intertrigo    Vitamin D deficiency    Type 2 diabetes mellitus with hyperglycemia, without long-term current use of insulin (HCC)    Paroxysmal nocturnal dyspnea    Orthopnea    Shortness of breath    Chronic headache    Neoplasm of face    Atypical facial pain    Diarrhea    Left hip pain    History of recurrent UTIs    Constipation    Conductive hearing loss, bilateral    Supraorbital neuralgia    Myofascial muscle pain    Cranial neuralgia    Hypertension    Hyponatremia    Vertigo    Chronic nonintractable headache    Tail bone pain    CAIT (acute kidney injury) ([facility])    Myalgia    Lumbar paraspinal muscle spasm    Abnormal gait    Disc degeneration, lumbar    Pelviectasis of kidney    Costochondritis    Generalized body aches    Colitis    Spinal stenosis of lumbar region with neurogenic claudication    GI bleed    CKD (chronic kidney disease)    Anemia due to stage 3b chronic kidney disease     Iron deficiency anemia    Acute pulmonary embolism without acute cor pulmonale (HCC)    Intussusception of colon (HCC)    Acute cystitis without  hematuria    Acute deep vein thrombosis (DVT) of popliteal vein of left lower extremity (HCC)    Goals of care, counseling/discussion    Abnormal CT of the abdomen    Severe protein-calorie malnutrition (HCC)       Admitting Diagnoses:   Intussusception (HCC) [K56.1]  Abdominal pain [R10.9]    Past Medical History:   Diagnosis Date    Asthma     Atypical chest pain     CAD (coronary artery disease)     Candidal intertrigo     CHF (congestive heart failure) (HCC)     Depression     Diabetes mellitus (HCC)     Diabetic neuropathy (HCC)     Diverticulitis     Dyslipidemia     Female bladder prolapse     Gastric ulcer     Glaucoma     HTN (hypertension)     Hyperlipidemia     Hypothyroidism 4/18/2016    RAD (reactive airway disease)        Past Surgical History:   Procedure Laterality Date    COLONOSCOPY      ESOPHAGOGASTRODUODENOSCOPY  2011    HYSTERECTOMY      TONSILLECTOMY         Imaging Studies:  CT abdomen pelvis with contrast   ED Interpretation by Annette Maria Palladino, DO (02/11 9337)   Diverticulosis/outpouching present, suspcion for intussuception left sided; telescoping of bowel present      Final Result by Zach Hidalgo MD (02/11 0003)      1.  Continued long segment colocolonic intussusception, persistent since August 2023 though without obstruction. While not visualized, this is highly suggestive of a lead point, particularly malignancy.      2.  Stable circumferential bladder wall thickening and enhancement with associated vaginal enhancement. This is consistent with known candidal infection.      3.  Left retroperitoneal soft tissue of uncertain etiology though suspicious for malignancy given gradual increase.      4.  Mottled appearance of the left iliac bone, similar to recent prior studies though not present in 2021. Malignancy is not excluded      The study was marked in EPIC for immediate notification.         Workstation performed: [id number]              02/13/24 0923   PT Last Visit   PT  Visit Date 02/13/24   Note Type   Note type Evaluation   Pain Assessment   Pain Assessment Tool 0-10   Pain Score 8   Pain Location/Orientation Orientation: Left;Location: Abdomen   Hospital Pain Intervention(s) Repositioned;Ambulation/increased activity;Emotional support;Rest   Restrictions/Precautions   Weight Bearing Precautions Per Order No   Other Precautions Multiple lines;Fall Risk;Pain;Hard of hearing   Home Living   Type of Home House   Home Layout Two level;Performs ADLs on one level;Able to live on main level with bedroom/bathroom;Stairs to enter with rails  (3 STAR with hand rail; lives on main floor)   Bathroom Shower/Tub Walk-in shower   Bathroom Toilet Standard   Bathroom Equipment Tub transfer bench   Home Equipment Other (Comment)  (Rollator)   Prior Function   Level of [place] Independent with functional mobility;Needs assistance with IADLS;Independent with ADLs  (Needs assistance with bathing; I w/ dressing and toileting)   Lives With Daughter   Receives Help From Family   IADLs Family/Friend/Other provides transportation;Family/Friend/Other provides meals;Family/Friend/Other provides medication management   Falls in the last 6 months 1 to 4  (3x)   Vocational Retired   Comments PTA, pt independent with ADLs such as dressing and toileting however required assistance with bathing. Independent with functional mobility with use of Rollator. Needs assistance for IADLs. (-) . (-) home alone.   General   Family/Caregiver Present No   Cognition   Overall Cognitive Status WFL   Arousal/Participation Alert   Attention Attends with cues to redirect   Orientation Level Oriented X4   Following Commands Follows one step commands with increased time or repetition   Comments Pleasant and cooperative   Subjective   Subjective I am feeling weaker   RUE Assessment   RUE Assessment   (refer to OT)   LUE Assessment   LUE Assessment   (refer to OT)   RLE Assessment   RLE Assessment WFL  (4-/5 grossly)   LLE  Assessment   LLE Assessment WFL  (4-/5 grossly)   Bed Mobility   Supine to Sit Unable to assess   Sit to Supine Unable to assess   Additional Comments Pt greeted OOB in chair. Pt OOB in chair post session.   Transfers   Sit to Stand 4  Minimal assistance   Additional items Assist x 1;Armrests;Increased time required;Verbal cues  (w/ RW)   Stand to Sit 4  Minimal assistance   Additional items Assist x 1;Armrests;Increased time required;Verbal cues  (w/ RW)   Additional Comments cues for hand placement   Ambulation/Elevation   Gait pattern Forward Flexion;Decreased foot clearance;Foward flexed;Short stride;Excessively slow;Step through pattern   Gait Assistance 4  Minimal assist   Additional items Assist x 1;Verbal cues;Tactile cues   Assistive Device Rolling walker   Distance 60'x1   Ambulation/Elevation Additional Comments cues for RW management   Balance   Static Sitting Fair +   Dynamic Sitting Fair   Static Standing Fair -  (w/ RW)   Dynamic Standing Poor +  (w/ RW)   Ambulatory Poor +  (w/ RW)   Endurance Deficit   Endurance Deficit Yes   Endurance Deficit Description fatigue and weakness   Activity Tolerance   Activity Tolerance Patient limited by fatigue   Medical Staff Made Aware LYNDA Prieto   Nurse Made Aware HETAL Sharon   Assessment   Prognosis Good   Problem List Decreased strength;Decreased range of motion;Decreased endurance;Impaired balance;Decreased mobility;Decreased safety awareness;Pain   Assessment Pt. 89 y.o.female presents with abdominal discomfort. Past medical hx includes asthma, CAD, CHF, DM, neuropathy, HTN, glaucoma, . Pt admitted for Intussusception of colon (HCC) w/ Intussusception (HCC) (K56.1)  Abdominal pain (R10.9). Pt referred to PT for functional mobility evaluation & D/C planning w/ orders of up & OOB as tolerated. Pt greeted OOB in chair. PTA, pt reports being  independent with rollator and ADLs, needing assistance for IADLs . Pain 8/10 in L abdomen. During evaluation, deficits included  dec mobility, balance, ambulation. Required minAx1 for sit<>stand, and ambulation. Pt was able to ambulate 60' with RW and minAx1 in unit. Forward flexed posture with poor use of RW. Cues provided to improve RW management; reinforcement required. Pt demonstrated dec endurance and tolerance to activity. Denies reports of dizziness or SOB t/o session. Pt was educated on fall precautions and reinforced w/ good understanding. Pt would benefit from continued PT to address deficits as defined above and maximize level of independence with functional mobility and safety. Based on pt presentation and impaired function, pt would benefit from level II, (moderate resource intensity) at D/C pending progress. The patient's AM-PAC Basic Mobility Inpatient Short Form Raw Score is 17. A Raw score of greater than 16 suggests the patient may benefit from discharge to home. Please also refer to the recommendation of the Physical Therapist for safe discharge planning. CM to follow. Nsg staff to continue to mobilized pt (OOB in chair for all meals & ambulate in room/unit) as tolerated to prevent further decline in function. Nsg notified. Co-eval performed to complete this PT evaluation for the pts best interest given pts medical complexity and functional level.   Goals   Patient Goals to get stronger   STG Expiration Date 02/27/24   Short Term Goal #1 1) Inc overall LE strength by 1/2 MMT grade to improve functional mobility; 2) Pt will demonstrate improved bed mobility with S to dec caregiver burden; 3) Pt will demonstrate improved transfers w/ S for inc safety; 4) Pt will be able to amb w/ S >150' w/ RW for household distances to inc safety and dec caregiver burden; 5) Pt will be able to navigate stairs with S to dec caregiver burden and inc safety with functional mobility; 6) Improve general balance by 1 grade to inc safety; 7) PT for ongoing patient and caregiver education   PT Treatment Day 0   Plan   Treatment/Interventions  Functional transfer training;LE strengthening/ROM;Elevations;Therapeutic exercise;Endurance training;Patient/family training;Bed mobility;Gait training;Spoke to nursing;OT   PT Frequency 3-5x/wk   Discharge Recommendation   Rehab Resource Intensity Level, PT II (Moderate Resource Intensity)  (pending progress)   Equipment Recommended Walker  (pt owns rollator)   AM-PAC Basic Mobility Inpatient   Turning in Flat Bed Without Bedrails 3   Lying on Back to Sitting on Edge of Flat Bed Without Bedrails 3   Moving Bed to Chair 3   Standing Up From Chair Using Arms 3   Walk in Room 3   Climb 3-5 Stairs With Railing 2   Basic Mobility Inpatient Raw Score 17   Basic Mobility Standardized Score 39.67   Highest Level Of Mobility   JH-HLM Goal 5: Stand one or more mins   JH-HLM Achieved 7: Walk 25 feet or more   End of Consult   Patient Position at End of Consult Bedside chair;All needs within reach   End of Consult Comments Pt in stable condition at end of session. RN notified.   Hx/personal factors: co-morbidities, inaccessible home, advanced age, mutliple lines, use of AD, pain, fall risk, and assist w/ ADL's, coping styles, social background, past experience, behavior pattern  Examination: dec mobility, dec balance, dec endurance, dec amb, risk for falls, pain, assessed body system, balance, endurance, amb, D/C disposition & fall risk, impairements in locomotion, musculoskeletal, balance, endurance, posture, coordination, assessed cognition, impairments in systems including multiple body structures involved; musculoskeletal (ROM, strength, posture, BMI), neuromuscular (balance,locomotion, gait, transfers, motor control and learning, sensation), joint integrity, integumentary (skin integrity, presence of scars or wounds), cardiopulmonary (vitals, edema); activity limitations (difficulties executing an action); participation restrictions (problems associated w involvement in life situations)  Clinical: unpredictable (ongoing  medical status, abnormal lab values, risk for falls, and pain mgt)  Complexity: high      Priya Allred, PT

## 2024-02-13 NOTE — PROGRESS NOTES
"Progress Note - General Surgery   Priya Zarate 89 y.o. female MRN: [id number]  Unit/Bed#: [place] [address] -02 Encounter: [id number]    Assessment:  89 y.o. year old female with colo-colo intussusception      Plan:  -CLD- adv as tolerate  -no surgical intervention at this time- pt declined surgery  -Appreciate GI recs: risks outweigh benefits of flex sig or c-scope  -Rest of care per primary      Subjective/Objective     Subjective: No acute events overnight.  Patient reports her pain has been improving, although occasionally waxes and wanes.  Denies any nausea or vomiting.  Passing flatus having bowel function.  Patient expresses that she does not want to have any surgical intervention regardless of laboratory findings.  Informed of elevated CEA level and its clinical correlation.  Patient expressed understanding.    Objective: Afebrile, hemodynamically normal on room air, mild hypotension throughout 2/12    Blood pressure 100/69, pulse 95, temperature 98 °F (36.7 °C), temperature source Oral, resp. rate 18, height 5' 2\" (1.575 m), weight 62.5 kg (137 lb 12.6 oz), SpO2 96%, not currently breastfeeding.,Body mass index is 25.2 kg/m².    I/O: Uncharted    Invasive Devices       Peripheral Intravenous Line  Duration             Peripheral IV 02/11/24 Left Arm 1 day                    Physical Exam:  General: No acute distress, alert and oriented  CV: RRR  Lungs: Normal work of breathing   Abdomen: Soft, nondistended, nontender  Skin: Warm, dry    Lab, Imaging and other studies:  Recent Labs     02/11/24  1332 02/12/24  0422 02/13/24  0427   WBC 7.34 5.34 5.97   HGB 9.4* 8.1* 8.0*    244 248   SODIUM 134* 137 135   K 5.0 4.4 4.1    109* 106   CO2 22 21 21   BUN 20 15 13   CREATININE 1.28 1.05 1.00   GLUC 113 111 117   CALCIUM 9.2 8.2* 8.1*   AST 30  --   --    ALT 10  --   --    ALKPHOS 71  --   --    TBILI 0.38  --   --      CEA: 4.8   -27.7  AFP: 2.25    "

## 2024-02-13 NOTE — PLAN OF CARE
Problem: PAIN - ADULT  Goal: Verbalizes/displays adequate comfort level or baseline comfort level  Description: Interventions:  - Encourage patient to monitor pain and request assistance  - Assess pain using appropriate pain scale  - Administer analgesics based on type and severity of pain and evaluate response  - Implement non-pharmacological measures as appropriate and evaluate response  - Consider cultural and social influences on pain and pain management  - Notify physician/advanced practitioner if interventions unsuccessful or patient reports new pain  Outcome: Progressing     Problem: INFECTION - ADULT  Goal: Absence or prevention of progression during hospitalization  Description: INTERVENTIONS:  - Assess and monitor for signs and symptoms of infection  - Monitor lab/diagnostic results  - Monitor all insertion sites, i.e. indwelling lines, tubes, and drains  - Monitor endotracheal if appropriate and nasal secretions for changes in amount and color  - [place] appropriate cooling/warming therapies per order  - Administer medications as ordered  - Instruct and encourage patient and family to use good hand hygiene technique  - Identify and instruct in appropriate isolation precautions for identified infection/condition  Outcome: Progressing  Goal: Absence of fever/infection during neutropenic period  Description: INTERVENTIONS:  - Monitor WBC    Outcome: Progressing     Problem: SAFETY ADULT  Goal: Patient will remain free of falls  Description: INTERVENTIONS:  - Educate patient/family on patient safety including physical limitations  - Instruct patient to call for assistance with activity   - Consult OT/PT to assist with strengthening/mobility   - Keep Call bell within reach  - Keep bed low and locked with side rails adjusted as appropriate  - Keep care items and personal belongings within reach  - Initiate and maintain comfort rounds  - Make Fall Risk Sign visible to staff  - Offer Toileting every 2 Hours,  in advance of need  - Initiate/Maintain bed alarm  - Obtain necessary fall risk management equipment  - Apply yellow socks and bracelet for high fall risk patients  - Consider moving patient to room near nurses station  Outcome: Progressing  Goal: Maintain or return to baseline ADL function  Description: INTERVENTIONS:  -  Assess patient's ability to carry out ADLs; assess patient's baseline for ADL function and identify physical deficits which impact ability to perform ADLs (bathing, care of mouth/teeth, toileting, grooming, dressing, etc.)  - Assess/evaluate cause of self-care deficits   - Assess range of motion  - Assess patient's mobility; develop plan if impaired  - Assess patient's need for assistive devices and provide as appropriate  - Encourage maximum independence but intervene and supervise when necessary  - Involve family in performance of ADLs  - Assess for home care needs following discharge   - Consider OT consult to assist with ADL evaluation and planning for discharge  - Provide patient education as appropriate  Outcome: Progressing  Goal: Maintains/Returns to pre admission functional level  Description: INTERVENTIONS:  - Perform AM-PAC 6 Click Basic Mobility/ Daily Activity assessment daily.  - Set and communicate daily mobility goal to care team and patient/family/caregiver.   - Collaborate with rehabilitation services on mobility goals if consulted  - Perform Range of Motion 3 times a day.  - Reposition patient every 2 hours.  - Dangle patient 3 times a day  - Stand patient 3 times a day  - Ambulate patient 3 times a day  - Out of bed to chair 3 times a day   - Out of bed for meals 3 times a day  - Out of bed for toileting  - Record patient progress and toleration of activity level   Outcome: Progressing     Problem: DISCHARGE PLANNING  Goal: Discharge to home or other facility with appropriate resources  Description: INTERVENTIONS:  - Identify barriers to discharge w/patient and caregiver  -  Arrange for needed discharge resources and transportation as appropriate  - Identify discharge learning needs (meds, wound care, etc.)  - Arrange for interpretive services to assist at discharge as needed  - Refer to Case Management Department for coordinating discharge planning if the patient needs post-hospital services based on physician/advanced practitioner order or complex needs related to functional status, cognitive ability, or social support system  Outcome: Progressing     Problem: Knowledge Deficit  Goal: Patient/family/caregiver demonstrates understanding of disease process, treatment plan, medications, and discharge instructions  Description: Complete learning assessment and assess knowledge base.  Interventions:  - Provide teaching at level of understanding  - Provide teaching via preferred learning methods  Outcome: Progressing     Problem: GASTROINTESTINAL - ADULT  Goal: Maintains adequate nutritional intake  Description: INTERVENTIONS:  - Monitor percentage of each meal consumed  - Identify factors contributing to decreased intake, treat as appropriate  - Assist with meals as needed  - Monitor I&O, weight, and lab values if indicated  - Obtain nutrition services referral as needed  Outcome: Progressing     Problem: METABOLIC, FLUID AND ELECTROLYTES - ADULT  Goal: Electrolytes maintained within normal limits  Description: INTERVENTIONS:  - Monitor labs and assess patient for signs and symptoms of electrolyte imbalances  - Administer electrolyte replacement as ordered  - Monitor response to electrolyte replacements, including repeat lab results as appropriate  - Instruct patient on fluid and nutrition as appropriate  Outcome: Progressing  Goal: Fluid balance maintained  Description: INTERVENTIONS:  - Monitor labs   - Monitor I/O and WT  - Instruct patient on fluid and nutrition as appropriate  - Assess for signs & symptoms of volume excess or deficit  Outcome: Progressing  Goal: Glucose maintained  within target range  Description: INTERVENTIONS:  - Monitor Blood Glucose as ordered  - Assess for signs and symptoms of hyperglycemia and hypoglycemia  - Administer ordered medications to maintain glucose within target range  - Assess nutritional intake and initiate nutrition service referral as needed  Outcome: Progressing     Problem: Nutrition/Hydration-ADULT  Goal: Nutrient/Hydration intake appropriate for improving, restoring or maintaining nutritional needs  Description: Monitor and assess patient's nutrition/hydration status for malnutrition. Collaborate with interdisciplinary team and initiate plan and interventions as ordered.  Monitor patient's weight and dietary intake as ordered or per policy. Utilize nutrition screening tool and intervene as necessary. Determine patient's food preferences and provide high-protein, high-caloric foods as appropriate.     INTERVENTIONS:  - Monitor oral intake, urinary output, labs, and treatment plans  - Assess nutrition and hydration status and recommend course of action  - Evaluate amount of meals eaten  - Assist patient with eating if necessary   - Allow adequate time for meals  - Recommend/ encourage appropriate diets, oral nutritional supplements, and vitamin/mineral supplements  - Order, calculate, and assess calorie counts as needed  - Recommend, monitor, and adjust tube feedings and TPN/PPN based on assessed needs  - Assess need for intravenous fluids  - Provide specific nutrition/hydration education as appropriate  - Include patient/family/caregiver in decisions related to nutrition  Outcome: Progressing

## 2024-02-13 NOTE — ASSESSMENT & PLAN NOTE
89-year-old female with past medical history of DVT, hypothyroidism and recent hospitalization with intussusception presented today with abdominal pain  CT imaging showing continued intussusception  On previous admission, GI and surgery evaluated, had goals of care discussion with family and ultimately decided on conservative management  Surgery and GI consulted  Patient declining surgery or further evaluations at this time. Given her age and comorbidities, I think that this is a reasonable assessment  Initiated on IV fluids, will DC given severe AS  Clear liquid diet  As needed pain control  Eliquis initially on hold given possible intervention, was on heparin gtt  Dc heparin gtt at this time, switch back to eliquis  CEA evaluated at 4.5

## 2024-02-13 NOTE — CASE MANAGEMENT
Case Management Assessment & Discharge Planning Note    Patient name Priya Zarate  Location [place] [address] /[place] 2 M* MRN [id number]  : 1922 Date 2024       Current Admission Date: 2024  Current Admission Diagnosis:Intussusception of colon (HCC)   Patient Active Problem List    Diagnosis Date Noted    Severe protein-calorie malnutrition ([facility]) 2024    Abnormal CT of the abdomen 2024    Acute deep vein thrombosis (DVT) of popliteal vein of left lower extremity ([facility]) 01/15/2024    Goals of care, counseling/discussion 01/15/2024    Acute pulmonary embolism without acute cor pulmonale ([facility]) 2024    Intussusception of colon ([facility]) 2024    Acute cystitis without hematuria 2024    Iron deficiency anemia 2023    Anemia due to stage 3b chronic kidney disease  2023    CKD (chronic kidney disease) 10/27/2023    GI bleed 2023    Spinal stenosis of lumbar region with neurogenic claudication 2023    Colitis 2022    Generalized body aches 2022    Costochondritis 2022    Pelviectasis of kidney 2022    Disc degeneration, lumbar 2022    Abnormal gait 2022    Lumbar paraspinal muscle spasm 2022    CAIT (acute kidney injury) ([facility]) 2022    Myalgia 2022    Tail bone pain 2021    Chronic nonintractable headache 2021    Vertigo 2021    Hypertension 2021    Hyponatremia 2021    Supraorbital neuralgia 2021    Myofascial muscle pain 2021    Cranial neuralgia 2021    Conductive hearing loss, bilateral 2021    History of recurrent UTIs 2021    Constipation 2021    Left hip pain 2021    Diarrhea 2020    Atypical facial pain 2020    Neoplasm of face 2020    Chronic headache 2019    Paroxysmal nocturnal dyspnea 2019    Orthopnea 2019    Shortness of breath 2019    Type 2 diabetes mellitus with  hyperglycemia, without long-term current use of insulin (HCC) 10/05/2019    Vitamin D deficiency 08/22/2019    Pain of both hip joints 06/21/2019    Candidal intertrigo 06/21/2019    Headache 01/09/2019    GERD (gastroesophageal reflux disease) 11/06/2018    Insomnia 10/12/2018    Chronic diastolic congestive heart failure (HCC) 08/05/2018    Dyspnea on minimal exertion 02/17/2018    Shakiness 05/23/2017    Allergic rhinitis 05/02/2017    Hyperlipidemia     Bilateral cold feet 12/28/2016    Aortic stenosis 10/21/2016    Arteriosclerosis of coronary artery     Asthma     Anxiety disorder 08/17/2016    Osteoarthritis 06/21/2016    Acquired hypothyroidism 04/18/2016    Trigeminal neuralgia 03/16/2016    Peripheral vascular disease (HCC)     Glaucoma, open angle, severe stage     Overactive bladder 01/20/2016    Hallucination 11/17/2015    Other fatigue 11/03/2015    Low back pain 11/03/2015    Hearing loss 07/27/2015    Mild cognitive impairment 07/27/2015    Dizziness 07/02/2015    Advanced age 06/03/2015    Irritable bowel 04/24/2015    Female cystocele 04/24/2015    Diverticulosis 04/03/2015    Hiatal hernia 04/03/2015    Ambulatory dysfunction 04/02/2015      LOS (days): 2  Geometric Mean LOS (GMLOS) (days): 3.1  Days to GMLOS:1.3     OBJECTIVE:  PATIENT READMITTED TO HOSPITAL  Risk of Unplanned Readmission Score: 30.3         Current admission status: Inpatient       Preferred Pharmacy:   [facility] - [facility] [address] [facility]  [address] [facility] [address]  Phone: [phone] Fax: [phone]    [id number]  [place] - [address]  [address]  [place] [address]  Phone: [phone] Fax: [phone]    Primary Care Provider: Ayah Azul PA-C    Primary Insurance: [employer]  Secondary Insurance:     ASSESSMENT:  Active Health Care Proxies       Coby Zarate Health Care Representative - Daughter   Primary Phone: [phone]  (Home)  Mobile Phone: [phone]                 Advance Directives  Does patient have a Health Care POA?: No  Was patient offered paperwork?: No (pt has decided on hospice with pt's Dtr on board with same)  Does patient currently have a Health Care decision maker?: Yes, please see Health Care Proxy section  Does patient have Advance Directives?: No  Was patient offered paperwork?: No (Pt has decided on hospice with Dtr on board with same)  Primary Contact: Coby Zarate-Dttate         Readmission Root Cause  30 Day Readmission: Yes  Who directed you to return to the hospital?: Family  Did you understand whom to contact if you had questions or problems?: Yes  Did you get your prescriptions before you left the hospital?: No  Reason:: Preference for own pharmacy  Were you able to get your prescriptions filled when you left the hospital?: Yes  Did you take your medications as prescribed?: Yes  Were you able to get to your follow-up appointments?: Yes  During previous admission, was a post-acute recommendation made?: Yes  What post-acute resources were offered?: HHC (declined at the [facility] wishing for family to care for her)  Patient was readmitted due to: Pt again returned with intersucception of colon having abdominal pain  Action Plan: Pt opting for home hospice with Dtr agreeable to same as no intervention to be done    Patient Information  Admitted from:: Home  Mental Status: Alert  During Assessment patient was accompanied by: Not accompanied during assessment  Assessment information provided by:: Daughter  Primary Caregiver: Family  Support Systems: Daughter, Son, Family members  County of Residence: [place]  What city do you live in?: [place]  Home entry access options. Select all that apply.: Stairs  Number of steps to enter home.: 3  Do the steps have railings?: No  Type of Current Residence: [address] [place] home ( [place])  Upon entering residence, is there a bedroom on the main floor (no further steps)?: Yes  Upon  entering residence, is there a bathroom on the main floor (no further steps)?: Yes (with walk in shower and bench)  Living Arrangements: Lives w/ Daughter  Is patient a ?: No    Activities of Daily Living Prior to Admission  Functional Status: Assistance  Completes ADLs independently?: No  Level of ADL dependence: Assistance  Ambulates independently?: No  Level of ambulatory dependence: Assistance  Does patient use assisted devices?: Yes  Assisted Devices (DME) used: Walker, Home Oxygen concentrator  DME Company Name (respiratory supplies): [employer]  O2 Rate(s): 2L at HS (tolerates for 2-3 hrs then removes)  Does patient currently own DME?: Yes  What DME does the patient currently own?: Bedside Commode, Walker, Home Oxygen concentrator  Does patient have a history of Outpatient Therapy (PT/OT)?: No  Does patient have a history of HHC?: No  Does patient currently have HHC?: No         Patient Information Continued  Income Source: SSI/SSD  Does patient have prescription coverage?: Yes  Does patient have a history of substance abuse?: No  Does patient have a history of Mental Health Diagnosis?: No         Means of Transportation  Means of Transport to Appts:: Family transport      Social Determinants of Health (SDOH)      Flowsheet Row Most Recent Value   Housing Stability    In the last 12 months, was there a time when you were not able to pay the mortgage or rent on time? N   In the last 12 months, how many places have you lived? 1   In the last 12 months, was there a time when you did not have a steady place to sleep or slept in a shelter (including now)? N   Transportation Needs    In the past 12 months, has lack of transportation kept you from medical appointments or from getting medications? no   In the past 12 months, has lack of transportation kept you from meetings, work, or from getting things needed for daily living? No   Food Insecurity    Within the past 12 months, you worried that your food  would run out before you got the money to buy more. Never true   Within the past 12 months, the food you bought just didn't last and you didn't have money to get more. Never true   Utilities    In the past 12 months has the electric, gas, oil, or water company threatened to shut off services in your home? No            DISCHARGE DETAILS:    Discharge planning discussed with:: Pt's Daughter Coby who spoke to pt and pt's son  Freedom of Choice: Yes  Comments - Freedom of Choice: All parties agreeable to home hospice- discussed agencies and their role for home hospice with possible conversion to IP hospice (hopeful to keep home until pt passes) but asks for referral to  Hospice  CM contacted family/caregiver?: Yes  Were Treatment Team discharge recommendations reviewed with patient/caregiver?: Yes  Did patient/caregiver verbalize understanding of patient care needs?: Yes       Contacts  Patient Contacts: Coby Zarate (Daughter)  [phone]  Contact Method: Phone  Phone Number: Coby Zarate (Daughter)  [phone] (  Reason/Outcome: Continuity of Care, Emergency Contact, Referral, Discharge Planning    Requested Home Health Care         Is the patient interested in HHC at discharge?:  (Home Hospice)    DME Referral Provided  Referral made for DME?: No    Other Referral/Resources/Interventions Provided:  Interventions: Hospice    Would you like to participate in our [facility] service program?  : No - Declined    Treatment Team Recommendation: Home, Hospice  Discharge Destination Plan:: Hospice, Home  Transport at Discharge : Wheelchair van                             IMM Given (Date):: 02/13/24  IMM Given to:: Family

## 2024-02-13 NOTE — PROGRESS NOTES
Critical access hospital  Progress Note  Name: Priya Zarate I  MRN: [id number]  Unit/Bed#: [place] [address] -02 I Date of Admission: 2/11/2024   Date of Service: 2/13/2024 I Hospital Day: 2    Assessment/Plan   * Intussusception of colon (HCC)  Assessment & Plan  89-year-old female with past medical history of DVT, hypothyroidism and recent hospitalization with intussusception presented today with abdominal pain  CT imaging showing continued intussusception  On previous admission, GI and surgery evaluated, had goals of care discussion with family and ultimately decided on conservative management  Surgery and GI consulted  Patient declining surgery or further evaluations at this time. Given her age and comorbidities, I think that this is a reasonable assessment  Initiated on IV fluids, will DC given severe AS  Clear liquid diet  As needed pain control  Eliquis initially on hold given possible intervention, was on heparin gtt  Dc heparin gtt at this time, switch back to eliquis  CEA evaluated at 4.5    Goals of care, counseling/discussion  Assessment & Plan  [facility] conversation had with daughter yesterday, 2/12.  Discussed that there is a very good possibility that the reason that she has recurrent intussusception is due to malignancy.  Patient's daughter understands that given the patient's age and comorbidities, she would not fare well with extensive interventions.  I did discuss the possibility of hospice and her main goal for the patient is to keep her at home and comfortable.    Additional goals of care conversation had with the patient's daughter, the patient, and the patient's son over the phone  All parties involved are in agreement to the initiation of hospice care at home.  Hospice to be consulted      Abnormal CT of the abdomen  Assessment & Plan  CT imaging did show possible left retroperitoneal soft tissue mass  Will need to discuss if patient and family willing to further evaluate given  SUBJECTIVE:   Chief Complaint   Patient presents with   • Pelvic Pain     pt is following up on u/s and reports pelvic pain has improved        Екатерина Mendez is a 20 y.o.  who presents for pelvic pain follow up. At our last visit, she reported that she had one week of pelvic pain that came and went in waves .She has a Kyleena IUD in place.  She reports that the pain stopped around the time we saw each other and she has had none since. She thinks it was because her muscles were sore from not being sexually active in over a year prior to that time she had intercourse.  She has not had any pain or bleeding since      History reviewed. No pertinent past medical history.  History reviewed. No pertinent surgical history.  OB History    Para Term  AB Living   1 0     1 0   SAB TAB Ectopic Molar Multiple Live Births   1                # Outcome Date GA Lbr Delonte/2nd Weight Sex Delivery Anes PTL Lv   1 SAB               Social History     Tobacco Use   • Smoking status: Never Smoker   • Smokeless tobacco: Never Used   Substance Use Topics   • Alcohol use: No   • Drug use: No     Family History   Problem Relation Age of Onset   • Breast cancer Mother    • Bipolar disorder Father    • Cancer Paternal Grandmother    • Diabetes Paternal Grandfather      Current Outpatient Medications on File Prior to Visit   Medication Sig Dispense Refill   • [DISCONTINUED] acetaminophen (TYLENOL) 325 MG tablet Take 2 tablets by mouth Every 6 (Six) Hours As Needed for Mild Pain . 100 tablet 2   • [DISCONTINUED] naproxen (NAPROSYN) 500 MG tablet Take 0.5 tablets by mouth Every 8 (Eight) Hours As Needed for Mild Pain . 30 tablet 0     No current facility-administered medications on file prior to visit.      No Known Allergies     Review of Systems   Constitutional: Negative.    HENT: Negative.    Respiratory: Negative.    Cardiovascular: Negative.    Gastrointestinal: Negative.    Endocrine: Negative.    Genitourinary:  "Negative.    Musculoskeletal: Negative.    Skin: Negative.    Neurological: Negative.    Psychiatric/Behavioral: Negative.          OBJECTIVE:   Vitals:    01/29/20 1531   BP: 98/63   Pulse: 88   Weight: 83 kg (183 lb)   Height: 152.4 cm (60\")      Physical Exam   Constitutional: She is oriented to person, place, and time. She appears well-developed and well-nourished. No distress.   HENT:   Head: Normocephalic and atraumatic.   Eyes: EOM are normal. No scleral icterus.   Neck: Normal range of motion.   Cardiovascular: Normal rate and regular rhythm.   Pulmonary/Chest: Effort normal. No respiratory distress.   Abdominal: Soft. She exhibits no distension.   Musculoskeletal: Normal range of motion.   Neurological: She is alert and oriented to person, place, and time.   Skin: Skin is warm and dry. No rash noted. She is not diaphoretic. No erythema.   Psychiatric: She has a normal mood and affect. Her behavior is normal. Judgment and thought content normal.       ASSESSMENT/PLAN:     ICD-10-CM ICD-9-CM   1. Pelvic pain R10.2 [id number]     Pelvic pain resolved.   Reviewed ultrasound.    Return in about 6 months (around 7/29/2020). for annual exam.             " patient age  Discussed findings with family and patient, they want to continue conservative measures at this time    Acute deep vein thrombosis (DVT) of popliteal vein of left lower extremity (HCC)  Assessment & Plan  Switch back to eliquis    Acute cystitis without hematuria  Assessment & Plan  Recently saw PCP with reports of dysuria, sent home with Macrobid  Urine cultures grew Candida, discontinue Macrobid    Started on diflucan 200mg, day 3/10    Type 2 diabetes mellitus with hyperglycemia, without long-term current use of insulin ([facility])  Assessment & Plan  Lab Results   Component Value Date    HGBA1C 9.4 (H) 12/12/2023     Hold Jardiance while inpatient, sliding scale and Accu-Cheks      Acquired hypothyroidism  Assessment & Plan  Continue levothyroxine    Aortic stenosis  Assessment & Plan  Severe AS noted on echo  Follows with cardiology, recommending medical management           VTE Pharmacologic Prophylaxis:   Moderate Risk (Score 3-4) - Pharmacological DVT Prophylaxis Ordered: heparin drip.    Mobility:   Basic Mobility Inpatient Raw Score: 18  JH-HLM Goal: 6: Walk 10 steps or more  JH-HLM Achieved: 7: Walk 25 feet or more  HLM Goal achieved. Continue to encourage appropriate mobility.    Patient Centered Rounds: I performed bedside rounds with nursing staff today.   Discussions with Specialists or Other Care Team Provider: Cm    Education and Discussions with Family / Patient: Updated  (daughter) via phone.    Total Time Spent on Date of Encounter in care of patient: 45 mins. This time was spent on one or more of the following: performing physical exam; counseling and coordination of care; obtaining or reviewing history; documenting in the medical record; reviewing/ordering tests, medications or procedures; communicating with other healthcare professionals and discussing with patient's family/caregivers.    Current Length of Stay: 2 day(s)  Current Patient Status: Inpatient    Certification Statement: The patient will continue to require additional inpatient hospital stay due to care coordation   Discharge Plan: Anticipate discharge tomorrow to home with home services.    Code Status: Level 3 - DNAR and DNI    Subjective:   The patient is seen resting comfortably in chair at bedside.  She is currently eating breakfast.  She states she is feeling well overall.  I discussed with her the idea of hospice, all questions were answered.  The patient is in agreement to hospice at home.  I also discussed this with the patient's son as well as daughter over the phone, both of which are also in agreement with initiating hospice.  Hospice is to be consulted while inpatient.    Objective:     Vitals:   Temp (24hrs), Av.5 °F (36.9 °C), Min:98 °F (36.7 °C), Max:99.1 °F (37.3 °C)    Temp:  [98 °F (36.7 °C)-99.1 °F (37.3 °C)] 98 °F (36.7 °C)  HR:  [74-95] 95  Resp:  [16-18] 18  BP: ()/(47-69) 100/69  SpO2:  [94 %-96 %] 96 %  Body mass index is 25.2 kg/m².     Input and Output Summary (last 24 hours):   No intake or output data in the 24 hours ending 24 1047    Physical Exam:   Physical Exam  Vitals and nursing note reviewed.   Constitutional:       General: She is not in acute distress.     Appearance: Normal appearance. She is not ill-appearing, toxic-appearing or diaphoretic.   HENT:      Head: Normocephalic and atraumatic.   Cardiovascular:      Rate and Rhythm: Normal rate and regular rhythm.      Heart sounds: Murmur heard.      No friction rub. No gallop.   Pulmonary:      Effort: Pulmonary effort is normal. No respiratory distress.      Breath sounds: Normal breath sounds. No wheezing, rhonchi or rales.   Abdominal:      General: Abdomen is flat. Bowel sounds are normal. There is no distension.      Palpations: Abdomen is soft.      Tenderness: There is no abdominal tenderness.   Musculoskeletal:      Right lower leg: No edema.      Left lower leg: No edema.   Skin:     General:  Skin is warm and dry.      Coloration: Skin is not jaundiced or pale.   Neurological:      General: No focal deficit present.      Mental Status: She is alert and oriented to person, place, and time. Mental status is at baseline.        Additional Data:     Labs:  Results from last 7 days   Lab Units 02/13/24 0427 02/12/24 0422 02/11/24  1332   WBC Thousand/uL 5.97   < > 7.34   HEMOGLOBIN g/dL 8.0*   < > 9.4*   HEMATOCRIT % 27.5*   < > 32.4*   PLATELETS Thousands/uL 248   < > 284   NEUTROS PCT %  --   --  72   LYMPHS PCT %  --   --  12*   MONOS PCT %  --   --  11   EOS PCT %  --   --  3    < > = values in this interval not displayed.     Results from last 7 days   Lab Units 02/13/24 0427 02/12/24 0422 02/11/24  1332   SODIUM mmol/L 135   < > 134*   POTASSIUM mmol/L 4.1   < > 5.0   CHLORIDE mmol/L 106   < > 104   CO2 mmol/L 21   < > 22   BUN mg/dL 13   < > 20   CREATININE mg/dL 1.00   < > 1.28   ANION GAP mmol/L 8   < > 8   CALCIUM mg/dL 8.1*   < > 9.2   ALBUMIN g/dL  --   --  3.4*   TOTAL BILIRUBIN mg/dL  --   --  0.38   ALK PHOS U/L  --   --  71   ALT U/L  --   --  10   AST U/L  --   --  30   GLUCOSE RANDOM mg/dL 117   < > 113    < > = values in this interval not displayed.     Results from last 7 days   Lab Units 02/11/24  1332   INR  2.01*     Results from last 7 days   Lab Units 02/13/24  0627 02/12/24  2046 02/12/24  1639 02/12/24  1106 02/12/24  0632 02/11/24  1832   POC GLUCOSE mg/dl 134 209* 127 129 139 109         Results from last 7 days   Lab Units 02/11/24  1332   LACTIC ACID mmol/L 1.7       Lines/Drains:  Invasive Devices       Peripheral Intravenous Line  Duration             Peripheral IV 02/11/24 Left Arm 1 day                          Imaging: No pertinent imaging reviewed.    Recent Cultures (last 7 days):   Results from last 7 days   Lab Units 02/07/24  1540   URINE CULTURE  >100,000 cfu/ml Candida albicans*  >100,000 cfu/ml       Last 24 Hours Medication List:   Current  Facility-Administered Medications   Medication Dose Route Frequency Provider Last Rate    acetaminophen  650 mg Oral Q6H PRN Max Reynoso MD      albuterol  2 puff Inhalation Q6H PRN Max Reynoso MD      famotidine  40 mg Oral HS Max Reynoso MD      heparin (porcine)  3-20 Units/kg/hr (Order-Specific) Intravenous Titrated Max Reynoso MD 14 Units/kg/hr (02/13/24 0302)    heparin (porcine)  1,800 Units Intravenous Q6H PRN Max Reynoso MD      heparin (porcine)  3,600 Units Intravenous Q6H PRN Max Reynoso MD      insulin lispro  1-5 Units Subcutaneous TID AC Max Reynoso MD      latanoprost  1 drop Both Eyes HS Max Reynoso MD      meclizine  12.5 mg Oral TID PRN Max Reynoso MD      ondansetron  4 mg Intravenous Q6H PRN Max Reynoso MD      oxyCODONE  5 mg Oral Q6H PRN Mauricio Muse PA-C      oxyCODONE  2.5 mg Oral Q6H PRN Mauricio Muse PA-C      pantoprazole  40 mg Oral BID ARELI Goldstein DO          Today, Patient Was Seen By: Seng Raymond PA-C    **Please Note: This note may have been constructed using a voice recognition system.**

## 2024-02-13 NOTE — UTILIZATION REVIEW
Date: 2/13    Day 3: Has surpassed a 2nd midnight with active treatments and services, which include general surgery consult for  intussusception of colon - clears, Heparin drip, labs, no urgent surgical intervention.  See initial review completed by Ruthie Esqueda on 2/12.

## 2024-02-13 NOTE — CASE MANAGEMENT
Case Management Discharge Planning Note    Patient name Priya Zarate  Location [place] [address] /[facility] 2 M* MRN [id number]  : 1922 Date 2024       Current Admission Date: 2024  Current Admission Diagnosis:Intussusception of colon (HCC)   Patient Active Problem List    Diagnosis Date Noted    Severe protein-calorie malnutrition ([facility]) 2024    Abnormal CT of the abdomen 2024    Acute deep vein thrombosis (DVT) of popliteal vein of left lower extremity ([facility]) 01/15/2024    Goals of care, counseling/discussion 01/15/2024    Acute pulmonary embolism without acute cor pulmonale ([facility]) 2024    Intussusception of colon ([facility]) 2024    Acute cystitis without hematuria 2024    Iron deficiency anemia 2023    Anemia due to stage 3b chronic kidney disease  2023    CKD (chronic kidney disease) 10/27/2023    GI bleed 2023    Spinal stenosis of lumbar region with neurogenic claudication 2023    Colitis 2022    Generalized body aches 2022    Costochondritis 2022    Pelviectasis of kidney 2022    Disc degeneration, lumbar 2022    Abnormal gait 2022    Lumbar paraspinal muscle spasm 2022    CAIT (acute kidney injury) ([facility]) 2022    Myalgia 2022    Tail bone pain 2021    Chronic nonintractable headache 2021    Vertigo 2021    Hypertension 2021    Hyponatremia 2021    Supraorbital neuralgia 2021    Myofascial muscle pain 2021    Cranial neuralgia 2021    Conductive hearing loss, bilateral 2021    History of recurrent UTIs 2021    Constipation 2021    Left hip pain 2021    Diarrhea 2020    Atypical facial pain 2020    Neoplasm of face 2020    Chronic headache 2019    Paroxysmal nocturnal dyspnea 2019    Orthopnea 2019    Shortness of breath 2019    Type 2 diabetes mellitus with hyperglycemia, without  long-term current use of insulin (HCC) 10/05/2019    Vitamin D deficiency 08/22/2019    Pain of both hip joints 06/21/2019    Candidal intertrigo 06/21/2019    Headache 01/09/2019    GERD (gastroesophageal reflux disease) 11/06/2018    Insomnia 10/12/2018    Chronic diastolic congestive heart failure (HCC) 08/05/2018    Dyspnea on minimal exertion 02/17/2018    Shakiness 05/23/2017    Allergic rhinitis 05/02/2017    Hyperlipidemia     Bilateral cold feet 12/28/2016    Aortic stenosis 10/21/2016    Arteriosclerosis of coronary artery     Asthma     Anxiety disorder 08/17/2016    Osteoarthritis 06/21/2016    Acquired hypothyroidism 04/18/2016    Trigeminal neuralgia 03/16/2016    Peripheral vascular disease (HCC)     Glaucoma, open angle, severe stage     Overactive bladder 01/20/2016    Hallucination 11/17/2015    Other fatigue 11/03/2015    Low back pain 11/03/2015    Hearing loss 07/27/2015    Mild cognitive impairment 07/27/2015    Dizziness 07/02/2015    Advanced age 06/03/2015    Irritable bowel 04/24/2015    Female cystocele 04/24/2015    Diverticulosis 04/03/2015    Hiatal hernia 04/03/2015    Ambulatory dysfunction 04/02/2015      LOS (days): 2  Geometric Mean LOS (GMLOS) (days): 3.1  Days to GMLOS:1.1     OBJECTIVE:  Risk of Unplanned Readmission Score: 30.37         Current admission status: Inpatient   Preferred Pharmacy:   [facility] - [facility] [address]  [address] [address]  Phone: [phone] Fax: [phone]    [id number]  [place] - [address]   [address]  [place] [address]  Phone: [phone] Fax: [phone]    Primary Care Provider: Ayah Azul PA-C    Primary Insurance: [employer] [place]  REP  Secondary Insurance:     DISCHARGE DETAILS:                                                          Transport at Discharge : Wheelchair van  Dispatcher Contacted: Yes  Number/Name of Dispatcher:  Roundtrip  Transported by (Company and Unit #): [employer]  ETA of Transport (Date): 02/14/24  ETA of Transport (Time): 1100                          Hospice Liaison and pt's daughter made aware of  time

## 2024-02-13 NOTE — OCCUPATIONAL THERAPY NOTE
Occupational Therapy Evaluation     Patient Name: Priya Zarate  Today's Date: 2/13/2024  Problem List  Principal Problem:    Intussusception of colon (HCC)  Active Problems:    Aortic stenosis    Acquired hypothyroidism    Type 2 diabetes mellitus with hyperglycemia, without long-term current use of insulin (HCC)    Acute cystitis without hematuria    Acute deep vein thrombosis (DVT) of popliteal vein of left lower extremity (HCC)    Goals of care, counseling/discussion    Abnormal CT of the abdomen    Severe protein-calorie malnutrition (HCC)    Past Medical History  Past Medical History:   Diagnosis Date    Asthma     Atypical chest pain     CAD (coronary artery disease)     Candidal intertrigo     CHF (congestive heart failure) (HCC)     Depression     Diabetes mellitus (HCC)     Diabetic neuropathy (HCC)     Diverticulitis     Dyslipidemia     Female bladder prolapse     Gastric ulcer     Glaucoma     HTN (hypertension)     Hyperlipidemia     Hypothyroidism 4/18/2016    RAD (reactive airway disease)      Past Surgical History  Past Surgical History:   Procedure Laterality Date    COLONOSCOPY      ESOPHAGOGASTRODUODENOSCOPY  2011    HYSTERECTOMY      TONSILLECTOMY             02/13/24 0924   OT Last Visit   OT Visit Date 02/13/24   Note Type   Note type Evaluation   Pain Assessment   Pain Assessment Tool 0-10   Pain Score 8   Pain Location/Orientation Orientation: Left;Location: Abdomen   Patient's Stated Pain Goal No pain   Hospital Pain Intervention(s) Repositioned;Ambulation/increased activity;Emotional support;Rest   Multiple Pain Sites No   Restrictions/Precautions   Weight Bearing Precautions Per Order No   Other Precautions Chair Alarm;Bed Alarm;Fall Risk;Pain;Multiple lines;Hard of hearing   Home Living   Type of Home House   Home Layout Two level;Performs ADLs on one level;Able to live on main level with bedroom/bathroom  (3 STAR)   Bathroom Shower/Tub Walk-in shower   Bathroom Toilet Standard    Bathroom Equipment Grab bars in shower;Tub transfer bench   Bathroom Accessibility Accessible   Home Equipment Other (Comment)  (Rollator)   Additional Comments Pt lives with dtr in a two level house with 3 STAR and 1st floor setup. Pt reports dtr is retired and able to assist as needed.   Prior Function   Level of [place] Independent with ADLs;Independent with functional mobility;Needs assistance with IADLS  (I w/ dressing and toileting; Assist w/ bathing)   Lives With Daughter   Receives Help From Family   IADLs Family/Friend/Other provides transportation;Family/Friend/Other provides meals;Family/Friend/Other provides medication management   Falls in the last 6 months 1 to 4  (3)   Vocational Retired   Comments At baseline, pt was primarily I w/ ADLs (assist w/ bathing only) and Mod I for functional transfers/mobility w/ use of Rollator. Pt required assist w/ IADLs. (-) . (+) falls PTA.   Lifestyle   Autonomy At baseline, pt was primarily I w/ ADLs (assist w/ bathing only) and Mod I for functional transfers/mobility w/ use of Rollator. Pt required assist w/ IADLs. (-) . (+) falls PTA.   Reciprocal Relationships Dtr   Service to Others Retired   ADL   Where Assessed Chair   Eating Assistance 7  Independent   Grooming Assistance 5  Supervision/Setup   UB Bathing Assistance 5  Supervision/Setup   LB Bathing Assistance 4  Minimal Assistance   UB Dressing Assistance 5  Supervision/Setup   LB Dressing Assistance 3  Moderate Assistance   Toileting Assistance  3  Moderate Assistance   Functional Assistance 4  Minimal Assistance   Bed Mobility   Supine to Sit Unable to assess   Sit to Supine Unable to assess   Additional Comments Pt seated OOB in chair with chair alarm activated at end of session. Call bell and phone within reach. All needs met and pt reports no further questions for OT at this time.   Transfers   Sit to Stand 4  Minimal assistance   Additional items Assist x 1;Armrests;Increased time  required;Verbal cues   Stand to Sit 4  Minimal assistance   Additional items Assist x 1;Increased time required;Verbal cues;Armrests   Additional Comments Cues for safe technique and hand placement   Functional Mobility   Functional Mobility 4  Minimal assistance   Additional Comments Assist x1; Verbal cues for safe body positioning with RW   Additional items Rolling walker   Balance   Static Sitting Fair   Dynamic Sitting Fair -   Static Standing Fair -   Dynamic Standing Poor +   Ambulatory Poor +   Activity Tolerance   Activity Tolerance Patient limited by fatigue   Medical Staff Made Aware ZEFERINO Powers   Nurse Made Aware yes; HETAL Herrera   RUE Assessment   RUE Assessment WFL  (4-/5 throughout)   LUE Assessment   LUE Assessment WFL  (4-/5 throughout)   Hand Function   Gross Motor Coordination Functional   Fine Motor Coordination Functional   Sensation   Light Touch No apparent deficits   Proprioception   Proprioception No apparent deficits   Vision-Basic Assessment   Visual History Glaucoma   Vision - Complex Assessment   Acuity Able to read clock/calendar on wall without difficulty   Additional Comments Pt reports overall poor acuity B/L eyes   Psychosocial   Psychosocial (WDL) WDL   Perception   Inattention/Neglect Appears intact   Cognition   Overall Cognitive Status WFL   Arousal/Participation Alert;Cooperative   Attention Attends with cues to redirect   Orientation Level Oriented to person;Oriented to place;Oriented to time   Memory Within functional limits   Following Commands Follows one step commands with increased time or repetition   Comments Pleasant and cooperative. [facility] [place]   Assessment   Limitation Decreased ADL status;Decreased UE strength;Decreased Safe judgement during ADL;Decreased cognition;Decreased endurance;Decreased self-care trans;Decreased high-level ADLs   Prognosis Fair   Assessment Pt is a 89 y.o. female seen for OT evaluation s/p adm to [facility] on [facility]/11/[facility]24 w/ Intussusception of  colon (HCC) . Comorbidities affecting pt’s functional performance include a significant PMH of Asthma, CAD, CHF, depression, DM, Diabetic neuropathy, HTN, HLD, Glaucoma. Pt with active OT orders and activity orders for Up and OOB as tolerated. Pt lives with dtr in a two level house with 3 STAR and 1st floor setup. Pt reports dtr is retired and able to assist as needed. At baseline, pt was primarily I w/ ADLs (assist w/ bathing only) and Mod I for functional transfers/mobility w/ use of Rollator. Pt required assist w/ IADLs. (-) . (+) falls PTA. Upon evaluation, pt currently requires Supervision for UB ADLs, Mod-Min A for LB ADLs, Mod A for toileting, and Min A for functional mobility/transfers 2* the following deficits impacting occupational performance: decreased strength , decreased balance, decreased activity tolerance, limited functional reach, impaired sensation, decreased safety awareness, and increased pain. These impairments, as well at pt’s personal factors of: STAR home environment, difficulty performing ADLs, difficulty performing transfers/mobility, fall risk , functional decline , and advanced age limit pt’s ability to safely engage in all baseline areas of occupation. Pt to continue to benefit from continued acute OT services during hospital stay to address defined deficits and to maximize level of functional independence in the following Occupational Performance areas: grooming, bathing/shower, toilet hygiene, dressing, medication management, health maintenance, functional mobility, community mobility, clothing management, and social participation. The patient's raw score on the AM-PAC Daily Activity Inpatient Short Form is 17. A raw score of less than 19 suggests the patient may benefit from discharge to post-acute rehabilitation services. Please refer to the recommendation of the Occupational Therapist for safe discharge planning. OT will continue to follow pt 3-5x/wk to address the following  goals to  w/in 10-14 days:   Goals   Patient Goals To get stronger   LTG Time Frame 10-14   Long Term Goal Please refer to LTGs listed below   Plan   Treatment Interventions ADL retraining;Functional transfer training;UE strengthening/ROM;Endurance training;Patient/family training;Equipment evaluation/education;Compensatory technique education;Continued evaluation;Activityengagement;Energy conservation   Goal Expiration Date 24   OT Treatment Day 0   OT Frequency 3-5x/wk   Discharge Recommendation   Rehab Resource Intensity Level, OT II (Moderate Resource Intensity)   AM-PAC Daily Activity Inpatient   Lower Body Dressing 2   Bathing 3   Toileting 2   Upper Body Dressing 3   Grooming 3   Eating 4   Daily Activity Raw Score 17   Daily Activity Standardized Score (Calc for Raw Score >=11) 37.26   AM-PAC Applied Cognition Inpatient   Following a Speech/Presentation 4   Understanding Ordinary Conversation 4   Taking Medications 3   Remembering Where Things Are Placed or Put Away 4   Remembering List of 4-5 Errands 3   Taking Care of Complicated Tasks 3   Applied Cognition Raw Score 21   Applied Cognition Standardized Score 44.3        GOALS    Pt will improve activity tolerance to G for min 30 min txment sessions for increase engagement in functional tasks    Pt will complete bed mobility at a Mod I level w/ G balance/safety demonstrated to decrease caregiver assistance required     Pt will complete UB/LB bathing w/ Supervision using adaptive device and DME as needed     Pt will complete UB/LB dressing w/ mod I using adaptive device and DME as needed    Pt will complete toileting w/ mod I w/ G hygiene/thoroughness using DME as needed    Pt will improve functional transfers to Mod I on/off all surfaces using DME as needed w/ G balance/safety     Pt will improve functional mobility during ADL/IADL/leisure tasks to Mod I using DME as needed w/ G balance/safety     Pt will be attentive 100% of the time during  ongoing cognitive assessment w/ G participation to assist w/ safe d/c planning/recommendations    Pt will demonstrate G carryover of pt/caregiver education and training as appropriate w/o cues w/ good tolerance to increase safety during functional tasks    Pt will demonstrate 100% carryover of energy conservation techniques t/o functional I/ADL/leisure tasks w/o cues s/p skilled education to increase endurance during functional tasks    Pt will increase BUE strength by 1MM grade via AROM exercises to increase independence in ADLs and transfers    Pt will verbalize 3 potential fall hazards and identify appropriate compensatory techniques to decrease fall risk in home environment     Pt will increase standing tolerance to 3-5 mins with Fair+ dynamic standing balance to increase safety during participation in ADLs       Ida Olvera, OTR/L

## 2024-02-13 NOTE — HOSPICE NOTE
RECEIVED HOSPICE REFERRAL. REVIEWED CASE WITH DR. STEVENS. APPROVED FOR RLOC WHICH COULD BE PROVIDED AT HOME OR SNF. LEFT VMM FOR DTR ZAIDA TO CONTACT ME TO DISCUSS HOSPICE SERVICES. PER NATHANAEL FONG [facility] CAN START SERVICES TOMORROW. LILO MILNER UPDATED VIA TT.

## 2024-02-13 NOTE — HOSPICE NOTE
SPOKE TO DTR ZAIDA VIA [facility]. REVIEWED HOSPICE SERVICES AND BENEFITS PER  GUIDELINES. QUESTIONS ANSWERED. SHE IS IN AGREEMENT WITH SOC TOMORROW 2.14.24. SHE WILL SIGN CONSENTS AT SOC. SHE NEEDS TIME TO HAVE NEIGHBOR MOVE FURNITURE TO TAKE DELIVERY ON DME. WILL ASK OPEN RN TO ORDER DME WHILE SHE IS AT PT'S HOME TOMORROW. LILO MILNER UPDATED VIA TT, SHE REQUESTED 1130 TRANSPORT TIME. SENT TT TO DR. IVORY REQUESTING SCRIPTS FOR COMFORT MEDS BE SENT TO PT PHARMACY FOR DC.

## 2024-02-14 ENCOUNTER — TRANSITIONAL CARE MANAGEMENT (OUTPATIENT)
Dept: FAMILY MEDICINE CLINIC | Facility: CLINIC | Age: 89
End: 2024-02-14

## 2024-02-14 ENCOUNTER — TELEPHONE (OUTPATIENT)
Dept: FAMILY MEDICINE CLINIC | Facility: CLINIC | Age: 89
End: 2024-02-14

## 2024-02-14 ENCOUNTER — HOME CARE VISIT (OUTPATIENT)
Dept: HOME HEALTH SERVICES | Facility: HOME HEALTHCARE | Age: 89
End: 2024-02-14
Payer: MEDICARE

## 2024-02-14 VITALS
OXYGEN SATURATION: 97 % | HEART RATE: 77 BPM | SYSTOLIC BLOOD PRESSURE: 110 MMHG | TEMPERATURE: 97.9 F | RESPIRATION RATE: 16 BRPM | DIASTOLIC BLOOD PRESSURE: 82 MMHG

## 2024-02-14 VITALS
BODY MASS INDEX: 25.36 KG/M2 | TEMPERATURE: 98.2 F | HEART RATE: 72 BPM | HEIGHT: 62 IN | WEIGHT: 137.79 LBS | OXYGEN SATURATION: 95 % | SYSTOLIC BLOOD PRESSURE: 106 MMHG | RESPIRATION RATE: 18 BRPM | DIASTOLIC BLOOD PRESSURE: 47 MMHG

## 2024-02-14 LAB
ANION GAP SERPL CALCULATED.3IONS-SCNC: 5 MMOL/L
APTT PPP: 44 SECONDS (ref 23–37)
BUN SERPL-MCNC: 10 MG/DL (ref 5–25)
CALCIUM SERPL-MCNC: 8.4 MG/DL (ref 8.4–10.2)
CHLORIDE SERPL-SCNC: 108 MMOL/L (ref 96–108)
CO2 SERPL-SCNC: 22 MMOL/L (ref 21–32)
CREAT SERPL-MCNC: 0.99 MG/DL (ref 0.6–1.3)
ERYTHROCYTE [DISTWIDTH] IN BLOOD BY AUTOMATED COUNT: 21 % (ref 11.6–15.1)
GFR SERPL CREATININE-BSD FRML MDRD: 46 ML/MIN/1.73SQ M
GLUCOSE SERPL-MCNC: 125 MG/DL (ref 65–140)
GLUCOSE SERPL-MCNC: 127 MG/DL (ref 65–140)
HCT VFR BLD AUTO: 29.8 % (ref 34.8–46.1)
HGB BLD-MCNC: 8.6 G/DL (ref 11.5–15.4)
MCH RBC QN AUTO: 23.3 PG (ref 26.8–34.3)
MCHC RBC AUTO-ENTMCNC: 28.9 G/DL (ref 31.4–37.4)
MCV RBC AUTO: 81 FL (ref 82–98)
PLATELET # BLD AUTO: 151 THOUSANDS/UL (ref 149–390)
PMV BLD AUTO: 11.5 FL (ref 8.9–12.7)
POTASSIUM SERPL-SCNC: 4.2 MMOL/L (ref 3.5–5.3)
RBC # BLD AUTO: 3.69 MILLION/UL (ref 3.81–5.12)
SODIUM SERPL-SCNC: 135 MMOL/L (ref 135–147)
WBC # BLD AUTO: 5.24 THOUSAND/UL (ref 4.31–10.16)

## 2024-02-14 PROCEDURE — 85027 COMPLETE CBC AUTOMATED: CPT | Performed by: PHYSICIAN ASSISTANT

## 2024-02-14 PROCEDURE — 99239 HOSP IP/OBS DSCHRG MGMT >30: CPT | Performed by: PHYSICIAN ASSISTANT

## 2024-02-14 PROCEDURE — G0299 HHS/HOSPICE OF RN EA 15 MIN: HCPCS

## 2024-02-14 PROCEDURE — 80048 BASIC METABOLIC PNL TOTAL CA: CPT | Performed by: PHYSICIAN ASSISTANT

## 2024-02-14 PROCEDURE — 10330057 MEDICATION, GENERAL

## 2024-02-14 PROCEDURE — 85730 THROMBOPLASTIN TIME PARTIAL: CPT | Performed by: STUDENT IN AN ORGANIZED HEALTH CARE EDUCATION/TRAINING PROGRAM

## 2024-02-14 PROCEDURE — 82948 REAGENT STRIP/BLOOD GLUCOSE: CPT

## 2024-02-14 RX ORDER — LORAZEPAM 2 MG/ML
2 CONCENTRATE ORAL EVERY 4 HOURS PRN
Qty: 30 ML | Refills: 0 | Status: SHIPPED | OUTPATIENT
Start: 2024-02-14 | End: 2024-02-16

## 2024-02-14 RX ORDER — MORPHINE SULFATE 100 MG/5ML
10 SOLUTION, CONCENTRATE ORAL EVERY 2 HOUR PRN
Qty: 30 ML | Refills: 0 | Status: SHIPPED | OUTPATIENT
Start: 2024-02-14 | End: 2024-02-16

## 2024-02-14 RX ORDER — OXYCODONE HCL 5 MG/5 ML
5 SOLUTION, ORAL ORAL EVERY 4 HOURS PRN
Qty: 473 ML | Refills: 0 | Status: SHIPPED | OUTPATIENT
Start: 2024-02-14 | End: 2024-02-14

## 2024-02-14 RX ORDER — FLUCONAZOLE 100 MG/1
200 TABLET ORAL DAILY
Status: DISCONTINUED | OUTPATIENT
Start: 2024-02-14 | End: 2024-02-14 | Stop reason: HOSPADM

## 2024-02-14 RX ADMIN — APIXABAN 5 MG: 5 TABLET, FILM COATED ORAL at 08:40

## 2024-02-14 RX ADMIN — GABAPENTIN 300 MG: 300 CAPSULE ORAL at 08:41

## 2024-02-14 RX ADMIN — LEVOTHYROXINE SODIUM 75 MCG: 75 TABLET ORAL at 05:26

## 2024-02-14 RX ADMIN — PANTOPRAZOLE SODIUM 40 MG: 40 TABLET, DELAYED RELEASE ORAL at 05:26

## 2024-02-14 RX ADMIN — FLUCONAZOLE 200 MG: 100 TABLET ORAL at 08:40

## 2024-02-14 RX ADMIN — OXYCODONE HYDROCHLORIDE 5 MG: 5 TABLET ORAL at 00:43

## 2024-02-14 NOTE — RESTORATIVE TECHNICIAN NOTE
Restorative Technician Note      Patient Name: Priya Zarate     Restorative Tech Visit Date: 02/14/24  Note Type: Mobility  Patient Position Upon Consult: Supine  Activity Performed: Transferred  Assistive Device: Other (Comment) (HHA)  Patient Position at End of Consult: Bedside chair; All needs within reach; Bed/Chair alarm activated

## 2024-02-14 NOTE — DISCHARGE SUMMARY
[facility]  Discharge- Priya Zarate 9/24/1922, 89 y.o. female MRN: [id number]  Unit/Bed#: [place] [address] -02 Encounter: [id number]  Primary Care Provider: Ayah Azul PA-C   Date and time admitted to hospital: 2/11/2024  1:02 PM    * Intussusception of colon (HCC)  Assessment & Plan  89-year-old female with past medical history of DVT, hypothyroidism and recent hospitalization with intussusception presented today with abdominal pain  CT imaging showing continued intussusception  Surgery and GI consulted  Patient declining surgery or further evaluations at this time. Given her age and comorbidities, I think that this is a reasonable assessment  Patient opting for hospice at this time    Goals of care, counseling/discussion  Assessment & Plan  C conversation had with daughter and son Discussed that there is a very good possibility that the reason that she has recurrent intussusception is due to malignancy.  Patient's daughter and son understands that given the patient's age and comorbidities, she would not fare well with extensive interventions.  I did discuss the possibility of hospice and her main goal for the patient is to keep her at home and comfortable.    Additional goals of care conversation had with the patient's daughter, the patient, and the patient's son over the phone  All parties involved are in agreement to the initiation of hospice care at home.  Hospice at home      Acute deep vein thrombosis (DVT) of popliteal vein of left lower extremity (HCC)  Assessment & Plan  eliquis    Acute cystitis without hematuria  Assessment & Plan  Recently saw PCP with reports of dysuria, sent home with Macrobid  Urine cultures grew Candida, discontinue Macrobid    Started on diflucan 200mg    Aortic stenosis  Assessment & Plan  Severe AS noted on echo  hospice      Medical Problems       Resolved Problems  Date Reviewed: 2/13/2024   None       Discharging Physician /  Practitioner: Seng Raymond PA-C  PCP: Ayah Azul PA-C  Admission Date:   Admission Orders (From admission, onward)       Ordered        02/11/24 1548  INPATIENT ADMISSION  Once                          Discharge Date: 02/14/24    Consultations During Hospital Stay:  GI, surgery    Procedures Performed:   Noen    Significant Findings / Test Results:   CT abdomen pelvis with contrast  Result Date: 2/11/2024    Impression: 1.  Continued long segment colocolonic intussusception, persistent since August 2023 though without obstruction. While not visualized, this is highly suggestive of a lead point, particularly malignancy. 2.  Stable circumferential bladder wall thickening and enhancement with associated vaginal enhancement. This is consistent with known candidal infection. 3.  Left retroperitoneal soft tissue of uncertain etiology though suspicious for malignancy given gradual increase. 4.  Mottled appearance of the left iliac bone, similar to recent prior studies though not present in 2021. Malignancy is not excluded The study was marked in EPIC for immediate notification. Workstation performed: [id number]     Incidental Findings:   None     Test Results Pending at Discharge (will require follow up):   None     Outpatient Tests Requested:  None    Complications:  None    Reason for Admission: Abdominal pain    Hospital Course:   Priya Zarate is a 89 y.o. female patient with a past history of DVT, hypothyroidism who originally presented to the hospital on 2/11/2024 due to abdominal pain.  Of note, the patient had a recent hospitalization for intussusception.  During that hospitalization, both surgery, and GI were consulted and the decision was made for conservative management given her age and comorbidities.  She was then discharged home in stable condition.  She then represented due to continued abdominal pain.  Surgery and gastroenterology were reconsulted however after further discussions with the patient  "and the patient's family members, the decision was made to transition the patient to hospice at home with a goal of comfort.  The patient was stable on the day of discharge.    Please see above list of diagnoses and related plan for additional information.     Condition at Discharge: stable    Discharge Day Visit / Exam:   Subjective: The patient is seen resting comfortably in bed.  She is very eager to go home today.  She is in agreement with hospice care.  Vitals: Blood Pressure: (!) 106/47 (02/14/24 0749)  Pulse: 72 (02/14/24 0749)  Temperature: 98.2 °F (36.8 °C) (02/14/24 0749)  Temp Source: Oral (02/14/24 0749)  Respirations: 18 (02/14/24 0749)  Height: 5' 2\" (157.5 cm) (02/11/24 1952)  Weight - Scale: 62.5 kg (137 lb 12.6 oz) (02/11/24 1952)  SpO2: 95 % (02/14/24 0749)  Exam:   Physical Exam  Vitals and nursing note reviewed.   Constitutional:       General: She is not in acute distress.     Appearance: Normal appearance. She is not ill-appearing, toxic-appearing or diaphoretic.   HENT:      Head: Normocephalic and atraumatic.   Cardiovascular:      Rate and Rhythm: Normal rate and regular rhythm.      Heart sounds: No murmur heard.     No friction rub. No gallop.   Pulmonary:      Effort: Pulmonary effort is normal. No respiratory distress.      Breath sounds: Normal breath sounds. No wheezing, rhonchi or rales.   Abdominal:      General: Abdomen is flat. Bowel sounds are normal. There is no distension.      Palpations: Abdomen is soft.      Tenderness: There is no abdominal tenderness.   Musculoskeletal:      Right lower leg: No edema.      Left lower leg: No edema.   Skin:     General: Skin is warm and dry.      Coloration: Skin is not jaundiced or pale.   Neurological:      General: No focal deficit present.      Mental Status: She is alert and oriented to person, place, and time. Mental status is at baseline.          Discussion with Family: Updated  (daughter) via phone.    Discharge " instructions/Information to patient and family:   See after visit summary for information provided to patient and family.      Provisions for Follow-Up Care:  See after visit summary for information related to follow-up care and any pertinent home health orders.      Mobility at time of Discharge:   Basic Mobility Inpatient Raw Score: 17  JH-HLM Goal: 5: Stand one or more mins  JH-HLM Achieved: 7: Walk 25 feet or more  HLM Goal achieved. Continue to encourage appropriate mobility.     Disposition:   Home with VNA Services (Reminder: Complete face to face encounter)    Planned Readmission: n/a     Discharge Statement:  I spent 45 minutes discharging the patient. This time was spent on the day of discharge. I had direct contact with the patient on the day of discharge. Greater than 50% of the total time was spent examining patient, answering all patient questions, arranging and discussing plan of care with patient as well as directly providing post-discharge instructions.  Additional time then spent on discharge activities.    Discharge Medications:  See after visit summary for reconciled discharge medications provided to patient and/or family.      **Please Note: This note may have been constructed using a voice recognition system**

## 2024-02-14 NOTE — PLAN OF CARE
Problem: PAIN - ADULT  Goal: Verbalizes/displays adequate comfort level or baseline comfort level  Description: Interventions:  - Encourage patient to monitor pain and request assistance  - Assess pain using appropriate pain scale  - Administer analgesics based on type and severity of pain and evaluate response  - Implement non-pharmacological measures as appropriate and evaluate response  - Consider cultural and social influences on pain and pain management  - Notify physician/advanced practitioner if interventions unsuccessful or patient reports new pain  Outcome: Progressing     Problem: INFECTION - ADULT  Goal: Absence or prevention of progression during hospitalization  Description: INTERVENTIONS:  - Assess and monitor for signs and symptoms of infection  - Monitor lab/diagnostic results  - Monitor all insertion sites, i.e. indwelling lines, tubes, and drains  - Monitor endotracheal if appropriate and nasal secretions for changes in amount and color  - [place] appropriate cooling/warming therapies per order  - Administer medications as ordered  - Instruct and encourage patient and family to use good hand hygiene technique  - Identify and instruct in appropriate isolation precautions for identified infection/condition  Outcome: Progressing  Goal: Absence of fever/infection during neutropenic period  Description: INTERVENTIONS:  - Monitor WBC    Outcome: Progressing     Problem: SAFETY ADULT  Goal: Patient will remain free of falls  Description: INTERVENTIONS:  - Educate patient/family on patient safety including physical limitations  - Instruct patient to call for assistance with activity   - Consult OT/PT to assist with strengthening/mobility   - Keep Call bell within reach  - Keep bed low and locked with side rails adjusted as appropriate  - Keep care items and personal belongings within reach  - Initiate and maintain comfort rounds  - Make Fall Risk Sign visible to staff  - Apply yellow socks and bracelet  for high fall risk patients  - Consider moving patient to room near nurses station  Outcome: Progressing  Goal: Maintain or return to baseline ADL function  Description: INTERVENTIONS:  -  Assess patient's ability to carry out ADLs; assess patient's baseline for ADL function and identify physical deficits which impact ability to perform ADLs (bathing, care of mouth/teeth, toileting, grooming, dressing, etc.)  - Assess/evaluate cause of self-care deficits   - Assess range of motion  - Assess patient's mobility; develop plan if impaired  - Assess patient's need for assistive devices and provide as appropriate  - Encourage maximum independence but intervene and supervise when necessary  - Involve family in performance of ADLs  - Assess for home care needs following discharge   - Consider OT consult to assist with ADL evaluation and planning for discharge  - Provide patient education as appropriate  Outcome: Progressing  Goal: Maintains/Returns to pre admission functional level  Description: INTERVENTIONS:  - Perform AM-PAC 6 Click Basic Mobility/ Daily Activity assessment daily.  - Set and communicate daily mobility goal to care team and patient/family/caregiver.   - Collaborate with rehabilitation services on mobility goals if consulted  - Perform Range of Motion 4 times a day.  - Reposition patient every 2 hours.  - Dangle patient 3 times a day  - Stand patient 3 times a day  - Ambulate patient 3 times a day  - Out of bed to chair 3 times a day   - Out of bed for meals 3 times a day  - Out of bed for toileting  - Record patient progress and toleration of activity level   Outcome: Progressing     Problem: DISCHARGE PLANNING  Goal: Discharge to home or other facility with appropriate resources  Description: INTERVENTIONS:  - Identify barriers to discharge w/patient and caregiver  - Arrange for needed discharge resources and transportation as appropriate  - Identify discharge learning needs (meds, wound care, etc.)  -  Arrange for interpretive services to assist at discharge as needed  - Refer to Case Management Department for coordinating discharge planning if the patient needs post-hospital services based on physician/advanced practitioner order or complex needs related to functional status, cognitive ability, or social support system  Outcome: Progressing     Problem: Knowledge Deficit  Goal: Patient/family/caregiver demonstrates understanding of disease process, treatment plan, medications, and discharge instructions  Description: Complete learning assessment and assess knowledge base.  Interventions:  - Provide teaching at level of understanding  - Provide teaching via preferred learning methods  Outcome: Progressing     Problem: GASTROINTESTINAL - ADULT  Goal: Maintains adequate nutritional intake  Description: INTERVENTIONS:  - Monitor percentage of each meal consumed  - Identify factors contributing to decreased intake, treat as appropriate  - Assist with meals as needed  - Monitor I&O, weight, and lab values if indicated  - Obtain nutrition services referral as needed  Outcome: Progressing     Problem: METABOLIC, FLUID AND ELECTROLYTES - ADULT  Goal: Electrolytes maintained within normal limits  Description: INTERVENTIONS:  - Monitor labs and assess patient for signs and symptoms of electrolyte imbalances  - Administer electrolyte replacement as ordered  - Monitor response to electrolyte replacements, including repeat lab results as appropriate  - Instruct patient on fluid and nutrition as appropriate  Outcome: Progressing  Goal: Fluid balance maintained  Description: INTERVENTIONS:  - Monitor labs   - Monitor I/O and WT  - Instruct patient on fluid and nutrition as appropriate  - Assess for signs & symptoms of volume excess or deficit  Outcome: Progressing  Goal: Glucose maintained within target range  Description: INTERVENTIONS:  - Monitor Blood Glucose as ordered  - Assess for signs and symptoms of hyperglycemia and  hypoglycemia  - Administer ordered medications to maintain glucose within target range  - Assess nutritional intake and initiate nutrition service referral as needed  Outcome: Progressing     Problem: Nutrition/Hydration-ADULT  Goal: Nutrient/Hydration intake appropriate for improving, restoring or maintaining nutritional needs  Description: Monitor and assess patient's nutrition/hydration status for malnutrition. Collaborate with interdisciplinary team and initiate plan and interventions as ordered.  Monitor patient's weight and dietary intake as ordered or per policy. Utilize nutrition screening tool and intervene as necessary. Determine patient's food preferences and provide high-protein, high-caloric foods as appropriate.     INTERVENTIONS:  - Monitor oral intake, urinary output, labs, and treatment plans  - Assess nutrition and hydration status and recommend course of action  - Evaluate amount of meals eaten  - Assist patient with eating if necessary   - Allow adequate time for meals  - Recommend/ encourage appropriate diets, oral nutritional supplements, and vitamin/mineral supplements  - Order, calculate, and assess calorie counts as needed  - Recommend, monitor, and adjust tube feedings and TPN/PPN based on assessed needs  - Assess need for intravenous fluids  - Provide specific nutrition/hydration education as appropriate  - Include patient/family/caregiver in decisions related to nutrition  Outcome: Progressing

## 2024-02-14 NOTE — ASSESSMENT & PLAN NOTE
89-year-old female with past medical history of DVT, hypothyroidism and recent hospitalization with intussusception presented today with abdominal pain  CT imaging showing continued intussusception  Surgery and GI consulted  Patient declining surgery or further evaluations at this time. Given her age and comorbidities, I think that this is a reasonable assessment  Patient opting for hospice at this time

## 2024-02-14 NOTE — TELEPHONE ENCOUNTER
----- Message from Seng Raymond PA-C sent at 2/14/2024 10:17 AM EST -----  Thank you for allowing us to participate in the care of your patient, Priya Zarate, who was hospitalized from 2/11/2024 through 2/14/2024 with the admitting diagnosis of colonic intussusception.  It was thought to likely be from an unknown malignancy, after discussion the patient and her family opted to transition to hospice at home.      If you have any additional questions or would like to discuss further, please feel free to contact me.    Seng Raymond PA-C  [facility] Internal Medicine, Hospitalist  [phone]

## 2024-02-14 NOTE — NURSING NOTE
Called and reviewed discharge paperwork with daughter Coby over the phone. All questions and concerns answered at this time. IV and masimo removed. Patient and belongings safely in wheelchair van with transport team for discharge home.

## 2024-02-14 NOTE — PLAN OF CARE
Problem: PAIN - ADULT  Goal: Verbalizes/displays adequate comfort level or baseline comfort level  Description: Interventions:  - Encourage patient to monitor pain and request assistance  - Assess pain using appropriate pain scale  - Administer analgesics based on type and severity of pain and evaluate response  - Implement non-pharmacological measures as appropriate and evaluate response  - Consider cultural and social influences on pain and pain management  - Notify physician/advanced practitioner if interventions unsuccessful or patient reports new pain  Outcome: Progressing     Problem: INFECTION - ADULT  Goal: Absence or prevention of progression during hospitalization  Description: INTERVENTIONS:  - Assess and monitor for signs and symptoms of infection  - Monitor lab/diagnostic results  - Monitor all insertion sites, i.e. indwelling lines, tubes, and drains  - Monitor endotracheal if appropriate and nasal secretions for changes in amount and color  - [place] appropriate cooling/warming therapies per order  - Administer medications as ordered  - Instruct and encourage patient and family to use good hand hygiene technique  - Identify and instruct in appropriate isolation precautions for identified infection/condition  Outcome: Progressing  Goal: Absence of fever/infection during neutropenic period  Description: INTERVENTIONS:  - Monitor WBC    Outcome: Progressing     Problem: SAFETY ADULT  Goal: Patient will remain free of falls  Description: INTERVENTIONS:  - Educate patient/family on patient safety including physical limitations  - Instruct patient to call for assistance with activity   - Consult OT/PT to assist with strengthening/mobility   - Keep Call bell within reach  - Keep bed low and locked with side rails adjusted as appropriate  - Keep care items and personal belongings within reach  - Initiate and maintain comfort rounds  - Make Fall Risk Sign visible to staff  - Offer Toileting every 2 Hours,  in advance of need  - Initiate/Maintain bed alarm  - Obtain necessary fall risk management equipment  - Apply yellow socks and bracelet for high fall risk patients  - Consider moving patient to room near nurses station  Outcome: Progressing  Goal: Maintain or return to baseline ADL function  Description: INTERVENTIONS:  -  Assess patient's ability to carry out ADLs; assess patient's baseline for ADL function and identify physical deficits which impact ability to perform ADLs (bathing, care of mouth/teeth, toileting, grooming, dressing, etc.)  - Assess/evaluate cause of self-care deficits   - Assess range of motion  - Assess patient's mobility; develop plan if impaired  - Assess patient's need for assistive devices and provide as appropriate  - Encourage maximum independence but intervene and supervise when necessary  - Involve family in performance of ADLs  - Assess for home care needs following discharge   - Consider OT consult to assist with ADL evaluation and planning for discharge  - Provide patient education as appropriate  Outcome: Progressing  Goal: Maintains/Returns to pre admission functional level  Description: INTERVENTIONS:  - Perform AM-PAC 6 Click Basic Mobility/ Daily Activity assessment daily.  - Set and communicate daily mobility goal to care team and patient/family/caregiver.   - Collaborate with rehabilitation services on mobility goals if consulted  - Perform Range of Motion 3 times a day.  - Reposition patient every 2 hours.  - Dangle patient 3 times a day  - Stand patient 3 times a day  - Ambulate patient 3 times a day  - Out of bed to chair 3 times a day   - Out of bed for meals 3 times a day  - Out of bed for toileting  - Record patient progress and toleration of activity level   Outcome: Progressing     Problem: DISCHARGE PLANNING  Goal: Discharge to home or other facility with appropriate resources  Description: INTERVENTIONS:  - Identify barriers to discharge w/patient and caregiver  -  Arrange for needed discharge resources and transportation as appropriate  - Identify discharge learning needs (meds, wound care, etc.)  - Arrange for interpretive services to assist at discharge as needed  - Refer to Case Management Department for coordinating discharge planning if the patient needs post-hospital services based on physician/advanced practitioner order or complex needs related to functional status, cognitive ability, or social support system  Outcome: Progressing     Problem: Knowledge Deficit  Goal: Patient/family/caregiver demonstrates understanding of disease process, treatment plan, medications, and discharge instructions  Description: Complete learning assessment and assess knowledge base.  Interventions:  - Provide teaching at level of understanding  - Provide teaching via preferred learning methods  Outcome: Progressing     Problem: GASTROINTESTINAL - ADULT  Goal: Maintains adequate nutritional intake  Description: INTERVENTIONS:  - Monitor percentage of each meal consumed  - Identify factors contributing to decreased intake, treat as appropriate  - Assist with meals as needed  - Monitor I&O, weight, and lab values if indicated  - Obtain nutrition services referral as needed  Outcome: Progressing     Problem: METABOLIC, FLUID AND ELECTROLYTES - ADULT  Goal: Electrolytes maintained within normal limits  Description: INTERVENTIONS:  - Monitor labs and assess patient for signs and symptoms of electrolyte imbalances  - Administer electrolyte replacement as ordered  - Monitor response to electrolyte replacements, including repeat lab results as appropriate  - Instruct patient on fluid and nutrition as appropriate  Outcome: Progressing  Goal: Fluid balance maintained  Description: INTERVENTIONS:  - Monitor labs   - Monitor I/O and WT  - Instruct patient on fluid and nutrition as appropriate  - Assess for signs & symptoms of volume excess or deficit  Outcome: Progressing  Goal: Glucose maintained  within target range  Description: INTERVENTIONS:  - Monitor Blood Glucose as ordered  - Assess for signs and symptoms of hyperglycemia and hypoglycemia  - Administer ordered medications to maintain glucose within target range  - Assess nutritional intake and initiate nutrition service referral as needed  Outcome: Progressing     Problem: Nutrition/Hydration-ADULT  Goal: Nutrient/Hydration intake appropriate for improving, restoring or maintaining nutritional needs  Description: Monitor and assess patient's nutrition/hydration status for malnutrition. Collaborate with interdisciplinary team and initiate plan and interventions as ordered.  Monitor patient's weight and dietary intake as ordered or per policy. Utilize nutrition screening tool and intervene as necessary. Determine patient's food preferences and provide high-protein, high-caloric foods as appropriate.     INTERVENTIONS:  - Monitor oral intake, urinary output, labs, and treatment plans  - Assess nutrition and hydration status and recommend course of action  - Evaluate amount of meals eaten  - Assist patient with eating if necessary   - Allow adequate time for meals  - Recommend/ encourage appropriate diets, oral nutritional supplements, and vitamin/mineral supplements  - Order, calculate, and assess calorie counts as needed  - Recommend, monitor, and adjust tube feedings and TPN/PPN based on assessed needs  - Assess need for intravenous fluids  - Provide specific nutrition/hydration education as appropriate  - Include patient/family/caregiver in decisions related to nutrition  Outcome: Progressing

## 2024-02-14 NOTE — ASSESSMENT & PLAN NOTE
Recently saw PCP with reports of dysuria, sent home with Macrobid  Urine cultures grew Candida, discontinue Macrobid    Started on diflucan 200mg

## 2024-02-15 PROCEDURE — G0155 HHCP-SVS OF CSW,EA 15 MIN: HCPCS

## 2024-02-15 NOTE — UTILIZATION REVIEW
NOTIFICATION OF ADMISSION DISCHARGE   This is a Notification of Discharge from [facility]. Please be advised that this patient has been discharge from our facility. Below you will find the admission and discharge date and time including the patient’s disposition.   UTILIZATION REVIEW CONTACT:  Winsome Mccain MA  Utilization   Network Utilization Review Department  Phone: [phone] x carefully listen to the prompts. All voicemails are confidential.  Email: [email or web address]     ADMISSION INFORMATION  PRESENTATION DATE: 2/11/2024  1:02 PM  OBERVATION ADMISSION DATE:   INPATIENT ADMISSION DATE: 2/11/24  3:48 PM   DISCHARGE DATE: 2/14/2024 11:38 AM   DISPOSITION:Home with Home Health Care    Network Utilization Review Department  ATTENTION: Please call with any questions or concerns to [phone] and carefully listen to the prompts so that you are directed to the right person. All voicemails are confidential.   For Discharge needs, contact Care Management DC Support Team at [phone] opt. 2  Send all requests for admission clinical reviews, approved or denied determinations and any other requests to dedicated fax number below belonging to the campus where the patient is receiving treatment. List of dedicated fax numbers for the Facilities:  FACILITY NAME UR FAX NUMBER   ADMISSION DENIALS (Administrative/Medical Necessity) [phone]   DISCHARGE SUPPORT TEAM ([facility]) [phone]   PARENT CHILD HEALTH (Maternity/NICU/Pediatrics) [phone]   [facility] [phone]   [facility] [phone]   [facility] [phone]   [facility] [phone]   [facility] [phone]   [facility] [phone]   [facility] [phone]   [facility]  [place] [phone]   [facility] [phone]   [facility] [phone]   [facility] [phone]   [facility] [phone]

## 2024-02-20 ENCOUNTER — HOME CARE VISIT (OUTPATIENT)
Dept: HOME HEALTH SERVICES | Facility: HOME HEALTHCARE | Age: 89
End: 2024-02-20
Payer: MEDICARE

## 2024-02-20 PROCEDURE — 10330057 MEDICATION, GENERAL

## 2024-02-20 PROCEDURE — G0299 HHS/HOSPICE OF RN EA 15 MIN: HCPCS

## 2024-02-21 PROBLEM — N30.00 ACUTE CYSTITIS WITHOUT HEMATURIA: Status: RESOLVED | Noted: 2024-01-14 | Resolved: 2024-02-21

## 2024-02-26 PROCEDURE — G0155 HHCP-SVS OF CSW,EA 15 MIN: HCPCS

## 2024-02-28 ENCOUNTER — HOME CARE VISIT (OUTPATIENT)
Dept: HOME HEALTH SERVICES | Facility: HOME HEALTHCARE | Age: 89
End: 2024-02-28
Payer: MEDICARE

## 2024-02-28 VITALS
RESPIRATION RATE: 18 BRPM | DIASTOLIC BLOOD PRESSURE: 60 MMHG | HEART RATE: 62 BPM | SYSTOLIC BLOOD PRESSURE: 105 MMHG | TEMPERATURE: 99.1 F

## 2024-02-28 PROCEDURE — G0299 HHS/HOSPICE OF RN EA 15 MIN: HCPCS

## 2024-03-06 ENCOUNTER — HOME CARE VISIT (OUTPATIENT)
Dept: HOME HEALTH SERVICES | Facility: HOME HEALTHCARE | Age: 89
End: 2024-03-06
Payer: MEDICARE

## 2024-03-06 VITALS
SYSTOLIC BLOOD PRESSURE: 120 MMHG | DIASTOLIC BLOOD PRESSURE: 62 MMHG | RESPIRATION RATE: 16 BRPM | TEMPERATURE: 98.6 F | HEART RATE: 80 BPM

## 2024-03-06 PROCEDURE — G0299 HHS/HOSPICE OF RN EA 15 MIN: HCPCS

## 2024-12-26 NOTE — ED NOTES
Pt assisted to restroom at this time  Pt utilized cane; which is Pt's baseline  Steady gait noted        Paty Covington  37/60/51 [phone]
Pt aware to ring call bell when able to void      Rubi Ferrera  63/19/45 [phone]
Pt's daughter, Roopa Arias, leaving bedside at this time  Please call her house phone listed on Pt's demographics if needed at any time        Lucille Siemens  58/80/87 [phone]
MEDICATIONS  (STANDING):  calcium carbonate 1250 mG  + Vitamin D (OsCal 500 + D) 1 Tablet(s) Oral daily  cyanocobalamin 1000 MICROGram(s) Oral daily  dexMEDEtomidine Infusion 0.2 MICROgram(s)/kG/Hr (2.81 mL/Hr) IV Continuous <Continuous>  dextrose 5%. 1000 milliLiter(s) (100 mL/Hr) IV Continuous <Continuous>  ferrous    sulfate 325 milliGRAM(s) Oral daily  insulin lispro (ADMELOG) corrective regimen sliding scale   SubCutaneous three times a day before meals  pantoprazole  Injectable 40 milliGRAM(s) IV Push daily  polyethylene glycol 3350 17 Gram(s) Oral daily  propofol Infusion 40 MICROgram(s)/kG/Min (13.5 mL/Hr) IV Continuous <Continuous>  senna 2 Tablet(s) Oral at bedtime  sodium chloride 0.9%. 1000 milliLiter(s) (60 mL/Hr) IV Continuous <Continuous>